# Patient Record
Sex: FEMALE | Race: WHITE | Employment: OTHER | URBAN - METROPOLITAN AREA
[De-identification: names, ages, dates, MRNs, and addresses within clinical notes are randomized per-mention and may not be internally consistent; named-entity substitution may affect disease eponyms.]

---

## 2017-01-03 ENCOUNTER — HOSPITAL ENCOUNTER (OUTPATIENT)
Dept: PHYSICAL MEDICINE AND REHAB | Facility: CLINIC | Age: 57
Discharge: HOME | End: 2017-01-03
Attending: PHYSICIAN ASSISTANT

## 2017-01-03 ENCOUNTER — COMMUNICATION - HEALTHEAST (OUTPATIENT)
Dept: PHYSICAL MEDICINE AND REHAB | Facility: CLINIC | Age: 57
End: 2017-01-03

## 2017-01-03 DIAGNOSIS — F43.21 ADJUSTMENT DISORDER WITH DEPRESSED MOOD: ICD-10-CM

## 2017-02-14 ENCOUNTER — HOSPITAL ENCOUNTER (OUTPATIENT)
Dept: PHYSICAL MEDICINE AND REHAB | Facility: CLINIC | Age: 57
Discharge: HOME | End: 2017-02-14

## 2017-02-14 DIAGNOSIS — F43.21 ADJUSTMENT DISORDER WITH DEPRESSED MOOD: ICD-10-CM

## 2017-02-23 ENCOUNTER — HOSPITAL ENCOUNTER (EMERGENCY)
Facility: CLINIC | Age: 57
Discharge: HOME | End: 2017-02-24
Attending: EMERGENCY MEDICINE | Admitting: EMERGENCY MEDICINE
Payer: COMMERCIAL

## 2017-02-23 DIAGNOSIS — R51.9 ACUTE NONINTRACTABLE HEADACHE, UNSPECIFIED HEADACHE TYPE: ICD-10-CM

## 2017-02-23 PROCEDURE — 99284 EMERGENCY DEPT VISIT MOD MDM: CPT

## 2017-02-23 PROCEDURE — 25000125 ZZHC RX 250: Performed by: EMERGENCY MEDICINE

## 2017-02-23 PROCEDURE — 25000128 H RX IP 250 OP 636: Performed by: EMERGENCY MEDICINE

## 2017-02-23 PROCEDURE — 96365 THER/PROPH/DIAG IV INF INIT: CPT

## 2017-02-23 PROCEDURE — 96375 TX/PRO/DX INJ NEW DRUG ADDON: CPT

## 2017-02-23 RX ORDER — DIPHENHYDRAMINE HYDROCHLORIDE 50 MG/ML
25 INJECTION INTRAMUSCULAR; INTRAVENOUS ONCE
Status: COMPLETED | OUTPATIENT
Start: 2017-02-23 | End: 2017-02-23

## 2017-02-23 RX ORDER — DEXAMETHASONE SODIUM PHOSPHATE 10 MG/ML
10 INJECTION, SOLUTION INTRAMUSCULAR; INTRAVENOUS ONCE
Status: COMPLETED | OUTPATIENT
Start: 2017-02-23 | End: 2017-02-23

## 2017-02-23 RX ORDER — LIDOCAINE 40 MG/G
CREAM TOPICAL
Status: DISCONTINUED | OUTPATIENT
Start: 2017-02-23 | End: 2017-02-24 | Stop reason: HOSPADM

## 2017-02-23 RX ORDER — KETOROLAC TROMETHAMINE 30 MG/ML
30 INJECTION, SOLUTION INTRAMUSCULAR; INTRAVENOUS ONCE
Status: COMPLETED | OUTPATIENT
Start: 2017-02-23 | End: 2017-02-23

## 2017-02-23 RX ADMIN — DEXAMETHASONE SODIUM PHOSPHATE 10 MG: 10 INJECTION, SOLUTION INTRAMUSCULAR; INTRAVENOUS at 23:39

## 2017-02-23 RX ADMIN — KETOROLAC TROMETHAMINE 30 MG: 30 INJECTION, SOLUTION INTRAMUSCULAR at 23:39

## 2017-02-23 RX ADMIN — DIPHENHYDRAMINE HYDROCHLORIDE 25 MG: 50 INJECTION, SOLUTION INTRAMUSCULAR; INTRAVENOUS at 23:39

## 2017-02-23 RX ADMIN — SODIUM CHLORIDE 1000 ML: 9 INJECTION, SOLUTION INTRAVENOUS at 23:20

## 2017-02-23 NOTE — ED AVS SNAPSHOT
[facility] Emergency Department    [address] [place] [address]    Phone:  [phone]    Fax:  [phone]                                       Janelle White   MRN: [id number]    Department:  [facility] Emergency Department   Date of Visit:  2/23/2017           Patient Information     Date Of Birth          1960        Your diagnoses for this visit were:     Acute nonintractable headache, unspecified headache type        You were seen by Dannie Paris MD.      Follow-up Information     Follow up with Christina Hinkle MD.    Specialties:  Internal Medicine, Pediatrics    Why:  As needed    Contact information:    [facility] [address] [place] [address]  [phone]          Discharge Instructions       Discharge Instructions  Headache    You were seen today for a headache. Headaches may be caused by many different things such as muscle tension, sinus inflammation, anxiety and stress, having too little sleep, too much alcohol, some medical conditions or injury. You may have a migraine, which is caused by changes in the blood vessels in your head.  At this time your doctor does not find that your headache is a sign of anything dangerous or life-threatening.  However, sometimes the signs of serious illness do not show up right away.  If you have new or worse symptoms, you may need to be seen again in the emergency department or by your primary doctor.      Return to the Emergency Department if:    You get a fever of 101 F or higher.    Your headache gets much worse.    You get a stiff neck with your headache.    You get a new headache that is different or worse than headaches you have had before.    You are vomiting and can t keep food or water down.    You have blurry or double vision or other problems with your eyes.    You have a new weakness on one side of your body.    You have difficulty with balance which is  new.    You or your family thinks you are confused.    You have a seizure or convulsion.    What can I do to help myself?    Pain medications - You may take a pain medication such as Tylenol  (acetaminophen), Advil , Nuprin  (ibuprofen) or Aleve  (naproxen).  If you have been given a narcotic such as Vicodin  (hydrocodone with acetaminophen), Percocet  (oxycodone with acetaminophen), codeine, or a muscle relaxant such as Flexeril  (cyclobenzaprine) or Soma  (carisoprodol), do not drive for four hours after you have taken it. If the narcotic contains Tylenol  (acetaminophen), do not take Tylenol  with it. All narcotics will cause constipation, so eat a high fiber diet.        Take a pain reliever as soon as you notice symptoms.  Starting medications as soon as you start to have symptoms may lessen the amount of pain you have.    Relaxing in a quiet, dark room may help.    Get enough sleep and eat meals regularly.    Schedule an appointment with your primary physician as instructed, or at least within 1 week.    You may need to watch for certain foods or other things which may trigger your headaches.  Keeping a journal of your headaches and possible triggers may help you and your primary doctor to identify things which you should avoid which may be causing your headaches.  If you were given a prescription for medicine here today, be sure to read all of the information (including the package insert) that comes with your prescription.  This will include important information about the medicine, its side effects, and any warnings that you need to know about.  The pharmacist who fills the prescription can provide more information and answer questions you may have about the medicine.  If you have questions or concerns that the pharmacist cannot address, please call or return to the Emergency Department.     Remember that you can always come back to the Emergency Department if you are not able to see your regular doctor in  the amount of time listed above, if you get any new symptoms, or if there is anything that worries you.          24 Hour Appointment Hotline       To make an appointment at any [facility], call [phone] ([phone]). If you don't have a family doctor or clinic, we will help you find one. [facility] are conveniently located to serve the needs of you and your family.             Review of your medicines      Our records show that you are taking the medicines listed below. If these are incorrect, please call your family doctor or clinic.        Dose / Directions Last dose taken    calcium citrate 950 MG tablet   Commonly known as:  CALCITRATE   Dose:  1 tablet        Take 1 tablet by mouth 2 times daily.   Refills:  0        cholecalciferol 54261 UNITS capsule   Commonly known as:  VITAMIN D3   Dose:  1 capsule   Quantity:  8 capsule        Take 1 capsule by mouth once a week.   Refills:  0        ciprofloxacin 500 MG tablet   Commonly known as:  CIPRO   Dose:  500 mg   Quantity:  28 tablet        Take 1 tablet (500 mg) by mouth 2 times daily   Refills:  0        cyanocobalamin 1000 MCG/ML injection   Commonly known as:  VITAMIN B12   Dose:  1 mL   Quantity:  1 mL        Inject 1 mL as directed every 30 days.   Refills:  12        cyclobenzaprine 10 MG tablet   Commonly known as:  FLEXERIL   Dose:  10 mg   Quantity:  90 tablet        Take 1 tablet by mouth 3 times daily as needed for muscle spasms.   Refills:  2        docusate sodium 100 MG capsule   Commonly known as:  COLACE   Dose:  200 mg   Quantity:  100 capsule        Take 200 mg by mouth as needed.   Refills:  12        DULoxetine 60 MG EC capsule   Commonly known as:  CYMBALTA   Dose:  60 mg   Quantity:  60 capsule        Take 1 capsule (60 mg) by mouth daily 2 tabs   Refills:  11        estradiol 0.1 MG/GM cream   Commonly known as:  ESTRACE VAGINAL   Dose:  2 g   Quantity:  42.5 g        Place 2 g vaginally three times a week.   Refills:   11        Estradiol 1 MG/GM Gel   Dose:  5 g   Quantity:  60 g        Place 5 g onto the skin daily   Refills:  11        hydrOXYzine 25 MG tablet   Commonly known as:  ATARAX   Dose:   mg   Quantity:  30 tablet        Take 2-4 tablets ( mg) by mouth nightly as needed for itching   Refills:  11        Ketoprofen Powd   Quantity:  120 g        Apply to affected area BID   Refills:  11        lidocaine 2 % topical gel   Commonly known as:  XYLOCAINE   Quantity:  30 mL        Apply  topically as needed.   Refills:  11        lidocaine 5 % Patch   Commonly known as:  LIDODERM   Dose:  1 patch   Quantity:  90 patch        Place 1 patch onto the skin daily. Apply 1 patch to skin. Wear for 12 hours and remove for 12 hrs   Refills:  3        morphine 15 MG 12 hr tablet   Commonly known as:  MS CONTIN   Dose:  15 mg   Quantity:  60 tablet        Take 1 tablet (15 mg) by mouth 2 times daily   Refills:  0        polyethylene glycol powder   Commonly known as:  MIRALAX   Dose:  1 capful   Quantity:  510 g        Take 17 g by mouth 2 times daily   Refills:  11        sennosides 8.6 MG tablet   Commonly known as:  SENOKOT   Dose:  1 tablet        Take 1 tablet by mouth daily as needed for constipation.   Refills:  0                Orders Needing Specimen Collection     None      Pending Results     No orders found for last 3 day(s).            Pending Culture Results     No orders found for last 3 day(s).             Test Results from your hospital stay            Clinical Quality Measure: Blood Pressure Screening     Your blood pressure was checked while you were in the emergency department today. The last reading we obtained was  BP: 127/75 . Please read the guidelines below about what these numbers mean and what you should do about them.  If your systolic blood pressure (the top number) is less than 120 and your diastolic blood pressure (the bottom number) is less than 80, then your blood pressure is normal. There  is nothing more that you need to do about it.  If your systolic blood pressure (the top number) is 120-139 or your diastolic blood pressure (the bottom number) is 80-89, your blood pressure may be higher than it should be. You should have your blood pressure rechecked within a year by a primary care provider.  If your systolic blood pressure (the top number) is 140 or greater or your diastolic blood pressure (the bottom number) is 90 or greater, you may have high blood pressure. High blood pressure is treatable, but if left untreated over time it can put you at risk for heart attack, stroke, or kidney failure. You should have your blood pressure rechecked by a primary care provider within the next 4 weeks.  If your provider in the emergency department today gave you specific instructions to follow-up with your doctor or provider even sooner than that, you should follow that instruction and not wait for up to 4 weeks for your follow-up visit.        Thank you for choosing [facility]       Thank you for choosing [facility] for your care. Our goal is always to provide you with excellent care. Hearing back from our patients is one way we can continue to improve our services. Please take a few minutes to complete the written survey that you may receive in the mail after you visit with us. Thank you!        [facility] Information     [facility] gives you secure access to your electronic health record. If you see a primary care provider, you can also send messages to your care team and make appointments. If you have questions, please call your primary care clinic.  If you do not have a primary care provider, please call [phone] and they will assist you.        Care EveryWhere ID     This is your Care EveryWhere ID. This could be used by other organizations to access your [facility] medical records  [id number]        After Visit Summary       This is your record. Keep this with you and show to your community pharmacist(s) and  doctor(s) at your next visit.

## 2017-02-23 NOTE — ED AVS SNAPSHOT
[facility] Emergency Department    [address]    [facility] [address]    Phone:  [phone]    Fax:  [phone]                                       Janelle White   MRN: [id number]    Department:  [facility] Emergency Department   Date of Visit:  2/23/2017           After Visit Summary Signature Page     I have received my discharge instructions, and my questions have been answered. I have discussed any challenges I see with this plan with the nurse or doctor.    ..........................................................................................................................................  Patient/Patient Representative Signature      ..........................................................................................................................................  Patient Representative Print Name and Relationship to Patient    ..................................................               ................................................  Date                                            Time    ..........................................................................................................................................  Reviewed by Signature/Title    ...................................................              ..............................................  Date                                                            Time

## 2017-02-24 VITALS
SYSTOLIC BLOOD PRESSURE: 127 MMHG | HEART RATE: 98 BPM | WEIGHT: 185 LBS | BODY MASS INDEX: 29.42 KG/M2 | DIASTOLIC BLOOD PRESSURE: 75 MMHG | OXYGEN SATURATION: 92 % | TEMPERATURE: 98.7 F | RESPIRATION RATE: 22 BRPM

## 2017-02-24 PROCEDURE — 25000128 H RX IP 250 OP 636: Performed by: EMERGENCY MEDICINE

## 2017-02-24 PROCEDURE — 25000125 ZZHC RX 250: Performed by: EMERGENCY MEDICINE

## 2017-02-24 PROCEDURE — 96375 TX/PRO/DX INJ NEW DRUG ADDON: CPT

## 2017-02-24 PROCEDURE — 96365 THER/PROPH/DIAG IV INF INIT: CPT

## 2017-02-24 RX ORDER — VALPROATE SODIUM 100 MG/ML
500 INJECTION, SOLUTION INTRAVENOUS ONCE
Status: COMPLETED | OUTPATIENT
Start: 2017-02-24 | End: 2017-02-24

## 2017-02-24 RX ADMIN — PROCHLORPERAZINE EDISYLATE 10 MG: 5 INJECTION INTRAMUSCULAR; INTRAVENOUS at 00:39

## 2017-02-24 RX ADMIN — VALPROATE SODIUM 500 MG: 100 INJECTION, SOLUTION INTRAVENOUS at 00:39

## 2017-02-24 ASSESSMENT — ENCOUNTER SYMPTOMS
SHORTNESS OF BREATH: 0
NECK STIFFNESS: 0
NAUSEA: 0
COUGH: 1
PHOTOPHOBIA: 1
VOMITING: 0
FEVER: 0
HEADACHES: 1
NECK PAIN: 0

## 2017-02-24 NOTE — ED NOTES
Seen at u/c  For chest congestion on Monday  On zpak    Was not getting any better  So  Saw her md yesterday and was started on steroids and nebs   Ate pizza tonight and went into hottub  Having frontal ha  Eye pain    After neck surgery migraines went away   Now with 10/10 pain  Here for rashawn

## 2017-02-24 NOTE — ED PROVIDER NOTES
"  History     Chief Complaint:  Headache    HPI   Janelle White is a 56 year old female with a history of migraine headaches who presents to the emergency department today for evaluation of a headache. The patient reports that for the past week she has been experiencing intermittent headaches which seemed to be associated with URI symptoms on 2017 including cough, congestion, and fever. She went into urgent care at that time and had a negative strep and influenza swab and went home on a Z-Biju and Prednisone. She affirms that she took her first dose of 80 mg Prednisone today. Throughout the day her headache had been bothering her, but seemed to be exacerbated after having a hot tub. She notes a history of migraine headaches, but states that it has been some time since the last one and that this is current headache is especially bad; rating it at 10/10 in severity at this time. She reports that she feels like her \"left eye is going to pop out\" and is quite photophobic at this time. She denies any current fevers, nausea, vomiting, neck pain/stiffness, chest pain, shortness of breath, vision changes, or other     Allergies:  Budeprion  Codeine  Effexor  Lexapro  Pregablin  Prozac  Tramadol     Medications:    Morphine  Atarax  Ketoprofen  Miralax  Estrace  Flexeril  Lidoderm  Colace  Senokot     Past Medical History:    Cervicalgia  Migraines  Rotator cuff repair  Sacroiliac inflammation    Past Surgical History:     x2  Tubal ligation  Appendectomy  Cervical discectomy and fusion  Hysterectomy  Partial vulvectomy  Fusion lumbar anterior L3-S1  Rotator cuff repair, right  Blepharoplasty bilateral    Family History:    Mother - Hypertension, Osteoporosis, Alcohol/Drug  Father - Hypertension, Diabetes, Alcohol/Drug  Brother - Alcohol/Drugs    Social History:  The patient was accompanied to the ED by her .  Smoking Status: Former Smoker  Alcohol Use: Positive  Marital Status:   "     Review of Systems   Constitutional: Negative for fever.   HENT: Positive for congestion.    Eyes: Positive for photophobia. Negative for visual disturbance.   Respiratory: Positive for cough. Negative for shortness of breath.    Cardiovascular: Negative for chest pain.   Gastrointestinal: Negative for nausea and vomiting.   Musculoskeletal: Negative for neck pain and neck stiffness.   Neurological: Positive for headaches.   All other systems reviewed and are negative.    Physical Exam   Vitals:  Patient Vitals for the past 24 hrs:   BP Temp Temp src Pulse Resp SpO2 Weight   02/24/17 0112 - - - - - 92 % -   02/24/17 0100 127/75 - - - - 96 % -   02/23/17 2302 123/88 98.7  F (37.1  C) Oral 98 22 93 % 83.9 kg (185 lb)       Physical Exam  Nursing note and vitals reviewed.  Constitutional: Cooperative. Appears uncomfortable, towel over her eyes.   HENT:   Mouth/Throat: Mucous membranes are normal.   Cardiovascular: Normal rate, regular rhythm and normal heart sounds.  No murmur.  Pulmonary/Chest: Effort normal and breath sounds normal. No respiratory distress. No wheezes. No rales.   Abdominal: Soft. Normal appearance. There is no tenderness.   Musculoskeletal: Normal range of motion. No neck rigidity.    Neurological: Alert. Oriented x4.  Strength normal.   Skin: Skin is warm and dry.   Psychiatric: Normal mood and affect.     Emergency Department Course     Interventions:  2320 ns 1000 mL IV  2339 Dexamethasone 10 mg IV  2339 Diphenhydramine 25 mg IV  2339 Ketorolac 30 mg IV     0039 Depacon 500 mg IV    Emergency Department Course:  Nursing notes and vitals reviewed.  I performed an exam of the patient as documented above.   At 0056 the patient was rechecked and reports that her headache has improved to 6/10 in severity from 10/10.  At 0115 the patient was rechecked and reports that her headache is improved enough at this time and wishes to be discharged home.   I discussed the treatment plan with the patient. She  expressed understanding of this plan and consented to discharge. She will be discharged home with instructions for care and follow up. In addition, the patient will return to the emergency department if their symptoms persist, worsen, if new symptoms arise or if there is any concern.  All questions were answered.    Impression & Plan      Medical Decision Making:  Janelle White is a 56 year old female with a history of migraines who is currently being treated for a upper respiratory and lower respiratory infections with a Z-Biju and steroids. She presents with a headache. Initial medication was not successful, but with the addition of Depacon she is feeling much better and wishes to go home. She is afebrile, has no neck rigidity and has a normal neurologic exam. Her history is not consistent with encephalitis or meningitis. There has been no trauma. This was not thunderclap onset or concerning for subarachnoid hemorrhage. No indication for lumbar puncture at this time. Etiology is not consistent with carbon monoxide poisoning or ocular involvement. She will be discharged home with appropriate return precautions regarding her headache.     Diagnosis:    ICD-10-CM    1. Acute nonintractable headache, unspecified headache type R51        Scribe Disclosure:  Norberto SHERWOOD, am serving as a scribe at 11:09 PM on 2/23/2017 to document services personally performed by Dannie Paris MD, based on my observations and the provider's statements to me.    2/23/2017   [facility] EMERGENCY DEPARTMENT       Dannie Paris MD  02/24/17 0132

## 2017-02-24 NOTE — DISCHARGE INSTRUCTIONS
Discharge Instructions  Headache    You were seen today for a headache. Headaches may be caused by many different things such as muscle tension, sinus inflammation, anxiety and stress, having too little sleep, too much alcohol, some medical conditions or injury. You may have a migraine, which is caused by changes in the blood vessels in your head.  At this time your doctor does not find that your headache is a sign of anything dangerous or life-threatening.  However, sometimes the signs of serious illness do not show up right away.  If you have new or worse symptoms, you may need to be seen again in the emergency department or by your primary doctor.      Return to the Emergency Department if:    You get a fever of 101 F or higher.    Your headache gets much worse.    You get a stiff neck with your headache.    You get a new headache that is different or worse than headaches you have had before.    You are vomiting and can t keep food or water down.    You have blurry or double vision or other problems with your eyes.    You have a new weakness on one side of your body.    You have difficulty with balance which is new.    You or your family thinks you are confused.    You have a seizure or convulsion.    What can I do to help myself?    Pain medications - You may take a pain medication such as Tylenol  (acetaminophen), Advil , Nuprin  (ibuprofen) or Aleve  (naproxen).  If you have been given a narcotic such as Vicodin  (hydrocodone with acetaminophen), Percocet  (oxycodone with acetaminophen), codeine, or a muscle relaxant such as Flexeril  (cyclobenzaprine) or Soma  (carisoprodol), do not drive for four hours after you have taken it. If the narcotic contains Tylenol  (acetaminophen), do not take Tylenol  with it. All narcotics will cause constipation, so eat a high fiber diet.        Take a pain reliever as soon as you notice symptoms.  Starting medications as soon as you start to have symptoms may lessen the  amount of pain you have.    Relaxing in a quiet, dark room may help.    Get enough sleep and eat meals regularly.    Schedule an appointment with your primary physician as instructed, or at least within 1 week.    You may need to watch for certain foods or other things which may trigger your headaches.  Keeping a journal of your headaches and possible triggers may help you and your primary doctor to identify things which you should avoid which may be causing your headaches.  If you were given a prescription for medicine here today, be sure to read all of the information (including the package insert) that comes with your prescription.  This will include important information about the medicine, its side effects, and any warnings that you need to know about.  The pharmacist who fills the prescription can provide more information and answer questions you may have about the medicine.  If you have questions or concerns that the pharmacist cannot address, please call or return to the Emergency Department.     Remember that you can always come back to the Emergency Department if you are not able to see your regular doctor in the amount of time listed above, if you get any new symptoms, or if there is anything that worries you.

## 2017-04-18 ENCOUNTER — HOSPITAL ENCOUNTER (OUTPATIENT)
Dept: PHYSICAL MEDICINE AND REHAB | Facility: CLINIC | Age: 57
Discharge: HOME | End: 2017-04-18

## 2017-04-18 DIAGNOSIS — F43.21 ADJUSTMENT DISORDER WITH DEPRESSED MOOD: ICD-10-CM

## 2017-06-05 ENCOUNTER — COMMUNICATION - HEALTHEAST (OUTPATIENT)
Dept: PHYSICAL MEDICINE AND REHAB | Facility: CLINIC | Age: 57
End: 2017-06-05

## 2017-07-27 ENCOUNTER — AMBULATORY - HEALTHEAST (OUTPATIENT)
Dept: PHYSICAL MEDICINE AND REHAB | Facility: CLINIC | Age: 57
End: 2017-07-27

## 2017-07-28 ENCOUNTER — COMMUNICATION - HEALTHEAST (OUTPATIENT)
Dept: PHYSICAL MEDICINE AND REHAB | Facility: CLINIC | Age: 57
End: 2017-07-28

## 2017-11-14 ENCOUNTER — OFFICE VISIT (OUTPATIENT)
Dept: FAMILY MEDICINE | Facility: CLINIC | Age: 57
End: 2017-11-14
Payer: COMMERCIAL

## 2017-11-14 ENCOUNTER — RADIANT APPOINTMENT (OUTPATIENT)
Dept: GENERAL RADIOLOGY | Facility: CLINIC | Age: 57
End: 2017-11-14
Attending: FAMILY MEDICINE
Payer: COMMERCIAL

## 2017-11-14 VITALS
HEIGHT: 66 IN | HEART RATE: 64 BPM | RESPIRATION RATE: 16 BRPM | SYSTOLIC BLOOD PRESSURE: 110 MMHG | WEIGHT: 189.5 LBS | BODY MASS INDEX: 30.46 KG/M2 | DIASTOLIC BLOOD PRESSURE: 76 MMHG | TEMPERATURE: 96.8 F

## 2017-11-14 DIAGNOSIS — M53.3 SACROILIAC DYSFUNCTION: ICD-10-CM

## 2017-11-14 DIAGNOSIS — Z23 NEED FOR PROPHYLACTIC VACCINATION AND INOCULATION AGAINST INFLUENZA: ICD-10-CM

## 2017-11-14 DIAGNOSIS — M50.30 DDD (DEGENERATIVE DISC DISEASE), CERVICAL: ICD-10-CM

## 2017-11-14 DIAGNOSIS — Z01.818 PREOP GENERAL PHYSICAL EXAM: Primary | ICD-10-CM

## 2017-11-14 LAB
DIFFERENTIAL METHOD BLD: NORMAL
ERYTHROCYTE [DISTWIDTH] IN BLOOD BY AUTOMATED COUNT: 12.8 % (ref 10–15)
ERYTHROCYTE [DISTWIDTH] IN BLOOD BY AUTOMATED COUNT: NORMAL % (ref 10–15)
HCT VFR BLD AUTO: 40.5 % (ref 35–47)
HCT VFR BLD AUTO: NORMAL % (ref 35–47)
HGB BLD-MCNC: 12.9 G/DL (ref 11.7–15.7)
HGB BLD-MCNC: NORMAL G/DL (ref 11.7–15.7)
MCH RBC QN AUTO: 30.4 PG (ref 26.5–33)
MCH RBC QN AUTO: NORMAL PG (ref 26.5–33)
MCHC RBC AUTO-ENTMCNC: 31.9 G/DL (ref 31.5–36.5)
MCHC RBC AUTO-ENTMCNC: NORMAL G/DL (ref 31.5–36.5)
MCV RBC AUTO: 96 FL (ref 78–100)
MCV RBC AUTO: NORMAL FL (ref 78–100)
PLATELET # BLD AUTO: 183 10E9/L (ref 150–450)
PLATELET # BLD AUTO: NORMAL 10E9/L (ref 150–450)
POTASSIUM SERPL-SCNC: 4.1 MMOL/L (ref 3.4–5.3)
RBC # BLD AUTO: 4.24 10E12/L (ref 3.8–5.2)
RBC # BLD AUTO: NORMAL 10E12/L (ref 3.8–5.2)
WBC # BLD AUTO: 18.4 10E9/L (ref 4–11)
WBC # BLD AUTO: NORMAL 10E9/L (ref 4–11)

## 2017-11-14 PROCEDURE — 90686 IIV4 VACC NO PRSV 0.5 ML IM: CPT | Performed by: FAMILY MEDICINE

## 2017-11-14 PROCEDURE — 93000 ELECTROCARDIOGRAM COMPLETE: CPT | Performed by: FAMILY MEDICINE

## 2017-11-14 PROCEDURE — 85025 COMPLETE CBC W/AUTO DIFF WBC: CPT | Performed by: FAMILY MEDICINE

## 2017-11-14 PROCEDURE — 85027 COMPLETE CBC AUTOMATED: CPT | Mod: 59 | Performed by: FAMILY MEDICINE

## 2017-11-14 PROCEDURE — 90471 IMMUNIZATION ADMIN: CPT | Performed by: FAMILY MEDICINE

## 2017-11-14 PROCEDURE — 84132 ASSAY OF SERUM POTASSIUM: CPT | Performed by: FAMILY MEDICINE

## 2017-11-14 PROCEDURE — 99215 OFFICE O/P EST HI 40 MIN: CPT | Mod: 25 | Performed by: FAMILY MEDICINE

## 2017-11-14 PROCEDURE — 36415 COLL VENOUS BLD VENIPUNCTURE: CPT | Performed by: FAMILY MEDICINE

## 2017-11-14 PROCEDURE — 71020 XR CHEST 2 VW: CPT

## 2017-11-14 RX ORDER — HYDROCODONE BITARTRATE AND ACETAMINOPHEN 10; 325 MG/1; MG/1
1 TABLET ORAL EVERY 8 HOURS PRN
Qty: 90 TABLET | Refills: 0 | Status: SHIPPED | OUTPATIENT
Start: 2017-11-14 | End: 2017-12-14

## 2017-11-14 RX ORDER — LORAZEPAM 0.5 MG/1
0.5 TABLET ORAL DAILY
Qty: 60 TABLET | Refills: 3 | Status: SHIPPED | OUTPATIENT
Start: 2017-11-14 | End: 2017-12-31

## 2017-11-14 RX ORDER — MORPHINE SULFATE 20 MG/1
20 CAPSULE, EXTENDED RELEASE ORAL EVERY 12 HOURS
Qty: 60 CAPSULE | Refills: 0 | Status: SHIPPED | OUTPATIENT
Start: 2017-11-14 | End: 2017-12-19

## 2017-11-14 RX ORDER — LORAZEPAM 0.5 MG/1
0.5 TABLET ORAL DAILY
COMMUNITY
Start: 2017-10-19 | End: 2017-11-14

## 2017-11-14 ASSESSMENT — PATIENT HEALTH QUESTIONNAIRE - PHQ9: SUM OF ALL RESPONSES TO PHQ QUESTIONS 1-9: 0

## 2017-11-14 NOTE — MR AVS SNAPSHOT
After Visit Summary   11/14/2017    Janelle White    MRN: [id number]           Patient Information     Date Of Birth          1960        Visit Information        Provider Department      11/14/2017 11:20 AM Emili Ballard MD [facility]        Today's Diagnoses     Preop general physical exam    -  1    Sacroiliac dysfunction        DDD (degenerative disc disease), cervical        Need for prophylactic vaccination and inoculation against influenza          Care Instructions      Before Your Surgery      Call your surgeon if there is any change in your health. This includes signs of a cold or flu (such as a sore throat, runny nose, cough, rash or fever).    Do not smoke, drink alcohol or take over the counter medicine (unless your surgeon or primary care doctor tells you to) for the 24 hours before and after surgery.    If you take prescribed drugs: Follow your doctor s orders about which medicines to take and which to stop until after surgery.    Eating and drinking prior to surgery: follow the instructions from your surgeon    Take a shower or bath the night before surgery. Use the soap your surgeon gave you to gently clean your skin. If you do not have soap from your surgeon, use your regular soap. Do not shave or scrub the surgery site.  Wear clean pajamas and have clean sheets on your bed.           Follow-ups after your visit        Follow-up notes from your care team     Return if symptoms worsen or fail to improve.      Who to contact     If you have questions or need follow up information about today's clinic visit or your schedule please contact [facility] directly at [phone].  Normal or non-critical lab and imaging results will be communicated to you by MyChart, letter or phone within 4 business days after the clinic has received the results. If you do not hear from us within 7 days, please contact the clinic  "through [employer] or phone. If you have a critical or abnormal lab result, we will notify you by phone as soon as possible.  Submit refill requests through Aub[employer] or call your pharmacy and they will forward the refill request to us. Please allow 3 business days for your refill to be completed.          Additional Information About Your Visit        [employer] Information     [employer] gives you secure access to your electronic health record. If you see a primary care provider, you can also send messages to your care team and make appointments. If you have questions, please call your primary care clinic.  If you do not have a primary care provider, please call [phone] and they will assist you.        Care EveryWhere ID     This is your Care EveryWhere ID. This could be used by other organizations to access your [facility] medical records  [id number]        Your Vitals Were     Pulse Temperature Respirations Height Last Period Breastfeeding?    64 96.8  F (36  C) (Tympanic) 16 5' 5.5\" (1.664 m) 05/01/2005 No    BMI (Body Mass Index)                   31.05 kg/m2            Blood Pressure from Last 3 Encounters:   11/14/17 110/76   02/24/17 127/75   01/15/14 100/80    Weight from Last 3 Encounters:   11/14/17 189 lb 8 oz (86 kg)   02/23/17 185 lb (83.9 kg)   11/08/13 183 lb (83 kg)              We Performed the Following     CBC with platelets differential     CBC with platelets     EKG 12-lead complete w/read - Clinics     FLU VAC, SPLIT VIRUS IM > 3 YO (QUADRIVALENT) [90169]     Potassium     Vaccine Administration, Initial [65277]     XR Chest 2 Views          Today's Medication Changes          These changes are accurate as of: 11/14/17 11:59 PM.  If you have any questions, ask your nurse or doctor.               Start taking these medicines.        Dose/Directions    HYDROcodone-acetaminophen  MG per tablet   Commonly known as:  NORCO   Used for:  Sacroiliac dysfunction   Started by:  Emili Ballard " MD Alirio        Dose:  1 tablet   Take 1 tablet by mouth every 8 hours as needed for moderate to severe pain maximum 3 tablet(s) per day   Quantity:  90 tablet   Refills:  0       morphine sulfate 20 MG 24 hr capsule   Commonly known as:  NARGIS   Used for:  Sacroiliac dysfunction   Replaces:  morphine 15 MG 12 hr tablet   Started by:  Emili Ballard MD        Dose:  20 mg   Take 1 capsule (20 mg) by mouth every 12 hours   Quantity:  60 capsule   Refills:  0         Stop taking these medicines if you haven't already. Please contact your care team if you have questions.     ciprofloxacin 500 MG tablet   Commonly known as:  CIPRO   Stopped by:  Emili Ballard MD           cyanocobalamin 1000 MCG/ML injection   Commonly known as:  VITAMIN B12   Stopped by:  Emili Ballard MD           docusate sodium 100 MG capsule   Commonly known as:  COLACE   Stopped by:  Emili Ballard MD           hydrOXYzine 25 MG tablet   Commonly known as:  ATARAX   Stopped by:  Emili Ballard MD           Ketoprofen Powd   Stopped by:  Emili Ballard MD           lidocaine 2 % topical gel   Commonly known as:  XYLOCAINE   Stopped by:  Emili Ballard MD           morphine 15 MG 12 hr tablet   Commonly known as:  MS CONTIN   Replaced by:  morphine sulfate 20 MG 24 hr capsule   Stopped by:  Emili Ballard MD           polyethylene glycol powder   Commonly known as:  MIRALAX   Stopped by:  Emili Ballard MD                Where to get your medicines      Some of these will need a paper prescription and others can be bought over the counter.  Ask your nurse if you have questions.     Bring a paper prescription for each of these medications     HYDROcodone-acetaminophen  MG per tablet    LORazepam 0.5 MG tablet    morphine sulfate 20 MG 24 hr capsule                 Primary Care Provider Office Phone # Fax #    Christina Hinkle [phone] [address] [place] [address]        Equal Access to Services     TIERAJAYDON HICKSEVI : Lottie eric pham isaaco Andrés, waaxda luqadaha, qaybta kaalmada rocio, william rao lamagoderic geneva. So [facility] [phone].    ATENCIÓN: Si habla español, tiene a parson disposición servicios gratuitos de asistencia lingüística. Llame al [phone].    We comply with applicable federal civil rights laws and [place] laws. We do not discriminate on the basis of race, color, national origin, age, disability, sex, sexual orientation, or gender identity.            Thank you!     Thank you for choosing [facility]  for your care. Our goal is always to provide you with excellent care. Hearing back from our patients is one way we can continue to improve our services. Please take a few minutes to complete the written survey that you may receive in the mail after your visit with us. Thank you!             Your Updated Medication List - Protect others around you: Learn how to safely use, store and throw away your medicines at www.disposemymeds.org.          This list is accurate as of: 11/14/17 11:59 PM.  Always use your most recent med list.                   Brand Name Dispense Instructions for use Diagnosis    calcium citrate 950 MG tablet    CALCITRATE     Take 1 tablet by mouth 2 times daily.        cholecalciferol 16158 UNITS capsule    VITAMIN D3    8 capsule    Take 1 capsule by mouth once a week.    Vitamin D deficiency       cyclobenzaprine 10 MG tablet    FLEXERIL    90 tablet    Take 1 tablet by mouth 3 times daily as needed for muscle spasms.    Myofascial pain       DULoxetine 60 MG EC capsule    CYMBALTA    60 capsule    Take 1 capsule (60 mg) by mouth daily 2 tabs    Chronic low back pain, DDD (degenerative disc disease), cervical, Sacroiliac pain       estradiol 0.1  MG/GM cream    ESTRACE VAGINAL    42.5 g    Place 2 g vaginally three times a week.    Vaginal atrophy       Estradiol 1 MG/GM Gel     60 g    Place 5 g onto the skin daily    Postmenopausal status       HYDROcodone-acetaminophen  MG per tablet    NORCO    90 tablet    Take 1 tablet by mouth every 8 hours as needed for moderate to severe pain maximum 3 tablet(s) per day    Sacroiliac dysfunction       lidocaine 5 % Patch    LIDODERM    90 patch    Place 1 patch onto the skin daily. Apply 1 patch to skin. Wear for 12 hours and remove for 12 hrs    Cervicalgia       LORazepam 0.5 MG tablet    ATIVAN    60 tablet    Take 1 tablet (0.5 mg) by mouth daily    Sacroiliac dysfunction       morphine sulfate 20 MG 24 hr capsule    NARGIS    60 capsule    Take 1 capsule (20 mg) by mouth every 12 hours    Sacroiliac dysfunction       sennosides 8.6 MG tablet    SENOKOT     Take 1 tablet by mouth daily as needed for constipation.
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
## 2017-11-14 NOTE — PROGRESS NOTES

## 2017-12-06 ENCOUNTER — TELEPHONE (OUTPATIENT)
Dept: FAMILY MEDICINE | Facility: CLINIC | Age: 57
End: 2017-12-06

## 2017-12-06 NOTE — TELEPHONE ENCOUNTER
Reason for Call:  Other fax    Detailed comments: [facility] states they received the Pre-Op notes however they are missing the EKG tracing and chest X-Ray report. Please fax as soon as possible to [phone]. Thanks!    Phone Number Patient can be reached at: Other phone number: See contacts    Best Time: ASAP    Can we leave a detailed message on this number? Not Applicable    Call taken on 12/6/2017 at 2:32 PM by Julianna Cavanaugh

## 2017-12-19 ENCOUNTER — OFFICE VISIT (OUTPATIENT)
Dept: FAMILY MEDICINE | Facility: CLINIC | Age: 57
End: 2017-12-19
Payer: COMMERCIAL

## 2017-12-19 VITALS
DIASTOLIC BLOOD PRESSURE: 75 MMHG | HEART RATE: 100 BPM | OXYGEN SATURATION: 100 % | TEMPERATURE: 97.8 F | SYSTOLIC BLOOD PRESSURE: 107 MMHG | RESPIRATION RATE: 16 BRPM

## 2017-12-19 DIAGNOSIS — M54.2 CERVICALGIA: ICD-10-CM

## 2017-12-19 DIAGNOSIS — M54.5 CHRONIC LOW BACK PAIN, UNSPECIFIED BACK PAIN LATERALITY, WITH SCIATICA PRESENCE UNSPECIFIED: ICD-10-CM

## 2017-12-19 DIAGNOSIS — Z78.0 POSTMENOPAUSAL STATUS: ICD-10-CM

## 2017-12-19 DIAGNOSIS — G89.29 CHRONIC LOW BACK PAIN, UNSPECIFIED BACK PAIN LATERALITY, WITH SCIATICA PRESENCE UNSPECIFIED: ICD-10-CM

## 2017-12-19 PROCEDURE — 99214 OFFICE O/P EST MOD 30 MIN: CPT | Performed by: FAMILY MEDICINE

## 2017-12-19 RX ORDER — DULOXETIN HYDROCHLORIDE 60 MG/1
60 CAPSULE, DELAYED RELEASE ORAL DAILY
Qty: 60 CAPSULE | Refills: 11 | Status: SHIPPED | OUTPATIENT
Start: 2017-12-19 | End: 2017-12-19

## 2017-12-19 RX ORDER — LIDOCAINE 50 MG/G
1 PATCH TOPICAL DAILY
Qty: 90 PATCH | Refills: 3 | Status: SHIPPED | OUTPATIENT
Start: 2017-12-19 | End: 2018-05-08

## 2017-12-19 RX ORDER — ESTRADIOL 1 MG/G
5 GEL TOPICAL DAILY
Qty: 60 G | Refills: 11 | Status: SHIPPED | OUTPATIENT
Start: 2017-12-19 | End: 2018-02-19

## 2017-12-19 RX ORDER — DULOXETIN HYDROCHLORIDE 60 MG/1
60 CAPSULE, DELAYED RELEASE ORAL DAILY
Qty: 30 CAPSULE | Refills: 11 | Status: SHIPPED | OUTPATIENT
Start: 2017-12-19 | End: 2019-03-21

## 2017-12-19 RX ORDER — HYDROCODONE BITARTRATE AND ACETAMINOPHEN 10; 325 MG/1; MG/1
1 TABLET ORAL EVERY 6 HOURS PRN
Qty: 120 TABLET | Refills: 0 | Status: SHIPPED | OUTPATIENT
Start: 2017-12-19 | End: 2018-01-09

## 2017-12-19 RX ORDER — MORPHINE SULFATE 20 MG/1
20 CAPSULE, EXTENDED RELEASE ORAL EVERY 12 HOURS
Qty: 60 CAPSULE | Refills: 0 | Status: SHIPPED | OUTPATIENT
Start: 2018-01-05 | End: 2018-01-09

## 2017-12-19 NOTE — MR AVS SNAPSHOT
After Visit Summary   12/19/2017    Janelle White    MRN: [id number]           Patient Information     Date Of Birth          1960        Visit Information        Provider Department      12/19/2017 11:20 AM Emili Ballard MD [facility]        Today's Diagnoses     Chronic low back pain, unspecified back pain laterality, with sciatica presence unspecified        Cervicalgia        Postmenopausal status           Follow-ups after your visit        Follow-up notes from your care team     Return if symptoms worsen or fail to improve.      Who to contact     If you have questions or need follow up information about today's clinic visit or your schedule please contact [facility] directly at [phone].  Normal or non-critical lab and imaging results will be communicated to you by Ormet Circuitshart, letter or phone within 4 business days after the clinic has received the results. If you do not hear from us within 7 days, please contact the clinic through O[employer] Circ[employer]hart or phone. If you have a critical or abnormal lab result, we will notify you by phone as soon as possible.  Submit refill requests through Local Magnet or call your pharmacy and they will forward the refill request to us. Please allow 3 business days for your refill to be completed.          Additional Information About Your Visit        MyChart Information     Local Magnet gives you secure access to your electronic health record. If you see a primary care provider, you can also send messages to your care team and make appointments. If you have questions, please call your primary care clinic.  If you do not have a primary care provider, please call [phone] and they will assist you.        Care EveryWhere ID     This is your Care EveryWhere ID. This could be used by other organizations to access your [place] medical records  [id number]        Your Vitals Were     Pulse Temperature  Respirations Last Period Pulse Oximetry Breastfeeding?    100 97.8  F (36.6  C) (Oral) 16 05/01/2005 100% No       Blood Pressure from Last 3 Encounters:   12/19/17 107/75   11/14/17 110/76   02/24/17 127/75    Weight from Last 3 Encounters:   11/14/17 189 lb 8 oz (86 kg)   02/23/17 185 lb (83.9 kg)   11/08/13 183 lb (83 kg)              Today, you had the following     No orders found for display         Today's Medication Changes          These changes are accurate as of: 12/19/17 11:59 PM.  If you have any questions, ask your nurse or doctor.               Start taking these medicines.        Dose/Directions    DULoxetine 60 MG EC capsule   Commonly known as:  CYMBALTA   Used for:  Chronic low back pain, unspecified back pain laterality, with sciatica presence unspecified   Started by:  Emili Ballard MD        Dose:  60 mg   Take 1 capsule (60 mg) by mouth daily   Quantity:  30 capsule   Refills:  11       HYDROcodone-acetaminophen  MG per tablet   Commonly known as:  NORCO   Used for:  Chronic low back pain, unspecified back pain laterality, with sciatica presence unspecified   Started by:  Emili Ballard MD        Dose:  1 tablet   Take 1 tablet by mouth every 6 hours as needed for moderate to severe pain maximum 4 tablet(s) per day   Quantity:  120 tablet   Refills:  0         These medicines have changed or have updated prescriptions.        Dose/Directions    lidocaine 5 % Patch   Commonly known as:  LIDODERM   This may have changed:  additional instructions   Used for:  Cervicalgia   Changed by:  Emili Ballard MD        Dose:  1 patch   Place 1 patch onto the skin daily Apply 1 patch to skin. Wear for 12 hours and remove for 12 hrs.  Refill when patient requests.   Quantity:  90 patch   Refills:  3            Where to get your medicines      These medications were sent to [facility] - [place], [place] - [address]  [address]  [address], [facility] [address]     Phone:  [phone]     DULoxetine 60 MG EC capsule    Estradiol 1 MG/GM Gel    lidocaine 5 % Patch         Some of these will need a paper prescription and others can be bought over the counter.  Ask your nurse if you have questions.     Bring a paper prescription for each of these medications     HYDROcodone-acetaminophen  MG per tablet    morphine sulfate 20 MG 24 hr capsule                Primary Care Provider Office Phone # Fax #    Christina Hinkle [phone] [address] [place] [address]        Equal Access to Services     [facility]: Hadii eric pham hadashjoseph Soandria, waaxda luqadaha, qaybta kaalmashukri chan, william royal . So [facility] [phone].    ATENCIÓN: Si habla español, tiene a parson disposición servicios gratuitos de asistencia lingüística. [facility] [phone].    We comply with applicable federal civil rights laws and [place] laws. We do not discriminate on the basis of race, color, national origin, age, disability, sex, sexual orientation, or gender identity.            Thank you!     Thank you for choosing [facility]  for your care. Our goal is always to provide you with excellent care. Hearing back from our patients is one way we can continue to improve our services. Please take a few minutes to complete the written survey that you may receive in the mail after your visit with us. Thank you!             Your Updated Medication List - Protect others around you: Learn how to safely use, store and throw away your medicines at www.disposemymeds.org.          This list is accurate as of: 12/19/17 11:59 PM.  Always use your most recent med list.                   Brand Name Dispense Instructions for use Diagnosis    calcium citrate 950 MG tablet    CALCITRATE     Take 1 tablet by mouth 2 times daily.        cholecalciferol 88293 UNITS capsule     VITAMIN D3    8 capsule    Take 1 capsule by mouth once a week.    Vitamin D deficiency       cyclobenzaprine 10 MG tablet    FLEXERIL    90 tablet    Take 1 tablet by mouth 3 times daily as needed for muscle spasms.    Myofascial pain       DULoxetine 60 MG EC capsule    CYMBALTA    30 capsule    Take 1 capsule (60 mg) by mouth daily    Chronic low back pain, unspecified back pain laterality, with sciatica presence unspecified       estradiol 0.1 MG/GM cream    ESTRACE VAGINAL    42.5 g    Place 2 g vaginally three times a week.    Vaginal atrophy       Estradiol 1 MG/GM Gel     60 g    Place 5 g onto the skin daily    Postmenopausal status       HYDROcodone-acetaminophen  MG per tablet    NORCO    120 tablet    Take 1 tablet by mouth every 6 hours as needed for moderate to severe pain maximum 4 tablet(s) per day    Chronic low back pain, unspecified back pain laterality, with sciatica presence unspecified       lidocaine 5 % Patch    LIDODERM    90 patch    Place 1 patch onto the skin daily Apply 1 patch to skin. Wear for 12 hours and remove for 12 hrs.  Refill when patient requests.    Cervicalgia       LORazepam 0.5 MG tablet    ATIVAN    60 tablet    Take 1 tablet (0.5 mg) by mouth daily    Sacroiliac dysfunction       morphine sulfate 20 MG 24 hr capsule   Start taking on:  1/5/2018    NARGIS    60 capsule    Take 1 capsule (20 mg) by mouth every 12 hours        sennosides 8.6 MG tablet    SENOKOT     Take 1 tablet by mouth daily as needed for constipation.

## 2017-12-19 NOTE — PROGRESS NOTES
SUBJECTIVE:   Janelle White is a 57 year old female who presents to clinic today for the following health issues:    Medication Followup of pain meds     Taking Medication as prescribed: yes    Side Effects:  None    Medication Helping Symptoms:  NO     Patient presents today after recent hospitalization for POSTERIOR FUSION WITH TRANSFORAMINAL LUMBAR INTERBODY FUSION (TFLIF) L2-3, HARDWARE REMOVAL L3/4 PRONE 12- at [facility] with Dr. Clayton.  She has done very well post-operatively but needs to reconcile her pain medications with chronic opioid use.  She would like to not use the Dilaudid she was given at discharge and go back to her regular regimen of the Norco 10/325 using 3-4 tablets daily in additional to her extended release morphine sulfate 20mg q12 hours.    She is also s/p recent ANTERIOR CERVICAL DECOMPRESSION AND FUSION C7-T1 WITH ANTERIOR HARDWARE REMOVAL C6-7 AND POSTERIOR CERVICAL FUSION C7-T1 9-    Diazepam 5 mg  # 45 at discharge from hospital-  2 at night for sleep     She also requests refill of her HRT for postmenopausal hot flashes.    Problem list and histories reviewed & adjusted, as indicated.  Additional history: as documented    Patient Active Problem List   Diagnosis     PERSONAL HX OF  MELANOMA     B-complex deficiency     Migraine     Chronic Low Back Pain     Urticaria     CARDIOVASCULAR SCREENING; LDL GOAL LESS THAN 160     DDD (degenerative disc disease), cervical     Moderate recurrent major depression (H)     Sacroiliac pain     ESBL (extended spectrum beta-lactamase) producing bacteria infection     Elevated liver enzymes     Vitamin D deficiency     Shift work sleep disorder     Chronic pain syndrome     CLL (chronic lymphocytic leukemia) (H)     Past Surgical History:   Procedure Laterality Date     anterior cervical discectomy C4-5 ,Fusion C5-6-7  10/2007     BLEPHAROPLASTY BILATERAL  4/25/2013    Procedure: BLEPHAROPLASTY BILATERAL;  BILATERAL  UPPER LID BLEPHAROPLASTY AND BROWPEXY ;  Surgeon: Godfrey Miguel MD;  Location:  [facility]     C APPENDECTOMY  2006      SECTION      x2     FUSION LUMBAR ANTERIOR, FUSION LUMBAR POSTERIOR TWO LEVELS, COMBINED  2010    L3-S1 anterior posterior fusion     HYSTERECTOMY  2006    ovaries intact     Partial vulvectomy for NEENA III  2009     Pubovaginal sling, post op durasphere injections  2006    wears pad     ROTATOR CUFF REPAIR RT/LT  2011    right     SPINAL FUSION C3-4  2009    C3-4, anterior spinal fusion     TUBAL LIGATION         Social History   Substance Use Topics     Smoking status: Former Smoker     Packs/day: 1.00     Years: 5.00     Quit date: 1984     Smokeless tobacco: Never Used     Alcohol use Yes      Comment: no alcohol currently since prior to her back surgeries,      Family History   Problem Relation Age of Onset     Hypertension Mother      Alcohol/Drug Mother      OSTEOPOROSIS Mother      borderline     Hypertension Father      DIABETES Father      Type 2, diet controlled     Alcohol/Drug Father      remote use     C.A.D. Maternal Grandmother       late 50s     C.A.D. Maternal Grandfather      bone and brain cancer mets as well     DIABETES Paternal Grandfather      Alcohol/Drug Brother      Breast Cancer No family hx of      Cancer - colorectal No family hx of      CEREBROVASCULAR DISEASE No family hx of          Current Outpatient Prescriptions   Medication Sig Dispense Refill     Estradiol 1 MG/GM GEL Place 5 g onto the skin daily 60 g 11     lidocaine (LIDODERM) 5 % Patch Place 1 patch onto the skin daily Apply 1 patch to skin. Wear for 12 hours and remove for 12 hrs.  Refill when patient requests. 90 patch 3     [START ON 2018] morphine sulfate (NARGIS) 20 MG 24 hr capsule Take 1 capsule (20 mg) by mouth every 12 hours 60 capsule 0     HYDROcodone-acetaminophen (NORCO)  MG per tablet Take 1 tablet by mouth every 6 hours as needed for moderate  to severe pain maximum 4 tablet(s) per day 120 tablet 0     DULoxetine (CYMBALTA) 60 MG EC capsule Take 1 capsule (60 mg) by mouth daily 30 capsule 11     LORazepam (ATIVAN) 0.5 MG tablet Take 1 tablet (0.5 mg) by mouth daily 60 tablet 3     estradiol (ESTRACE VAGINAL) 0.1 MG/GM vaginal cream Place 2 g vaginally three times a week. 42.5 g 11     cyclobenzaprine (FLEXERIL) 10 MG tablet Take 1 tablet by mouth 3 times daily as needed for muscle spasms. 90 tablet 2     cholecalciferol 58704 UNITS capsule Take 1 capsule by mouth once a week. 8 capsule 0     calcium citrate (CALCIUM CITRATE) 950 MG tablet Take 1 tablet by mouth 2 times daily.       sennosides (SENOKOT) 8.6 MG tablet Take 1 tablet by mouth daily as needed for constipation.       Allergies   Allergen Reactions     Budeprion Sr      Ineffective, name brand works best     Codeine      Shaky     Effexor [Venlafaxine Hydrochloride]      Affect too flat     Levaquin [Levofloxacin] Other (See Comments)     Dark thoughts- mood changes     Lexapro      Sexual dysfunction     Pregabalin      Audiovisual hallucinations     Prozac [Fluoxetine Hcl]      Sexual dysfunction     Tramadol      Hives       BP Readings from Last 3 Encounters:   12/19/17 107/75   11/14/17 110/76   02/24/17 127/75    Wt Readings from Last 3 Encounters:   11/14/17 189 lb 8 oz (86 kg)   02/23/17 185 lb (83.9 kg)   11/08/13 183 lb (83 kg)        Reviewed and updated as needed this visit by clinical staffTobacco  Allergies  Med Hx  Surg Hx  Fam Hx  Soc Hx      Reviewed and updated as needed this visit by Provider         ROS:  Constitutional, HEENT, cardiovascular, pulmonary, gi and gu systems are negative, except as otherwise noted.      OBJECTIVE:   /75 (BP Location: Left arm, Patient Position: Sitting, Cuff Size: Adult Regular)  Pulse 100  Temp 97.8  F (36.6  C) (Oral)  Resp 16  LMP 05/01/2005  SpO2 100%  Breastfeeding? No    GENERAL: healthy, alert and no distress  EYES: Eyes  grossly normal to inspection, PERRL and conjunctivae and sclerae normal  NECK: no adenopathy, no asymmetry, masses, anterior surgical scar healing well from recent surgery 9-2017.  RESP: lungs clear to auscultation - no rales, rhonchi or wheezes  CV: regular rate and rhythm, normal S1 S2, no S3 or S4, no murmur, click or rub, no peripheral edema and peripheral pulses strong  ABDOMEN: soft, nontender, no hepatosplenomegaly, no masses and bowel sounds normal  MS: no gross musculoskeletal defects noted, no edema  SKIN: no suspicious lesions or rashes  BACK: incision site midline with staples for closure clean, dry, intact.  No signs of infection.  PSYCH: mentation appears normal, affect normal/bright    Diagnostic Test Results:  none     ASSESSMENT/PLAN:     1. Chronic low back pain, unspecified back pain laterality, with sciatica presence unspecified    - HYDROcodone-acetaminophen (NORCO)  MG per tablet; Take 1 tablet by mouth every 6 hours as needed for moderate to severe pain maximum 4 tablet(s) per day  Dispense: 120 tablet; Refill: 0  - DULoxetine (CYMBALTA) 60 MG EC capsule; Take 1 capsule (60 mg) by mouth daily  Dispense: 30 capsule; Refill: 11    Patient to dc the Dilaudid and revert back to Norco  which she is best controlled at.  Refilled at #120 x 1 month- will back down to 3x.daily #90 next month as she recuperates.  Follow up one month.    2. Cervicalgia    - lidocaine (LIDODERM) 5 % Patch; Place 1 patch onto the skin daily Apply 1 patch to skin. Wear for 12 hours and remove for 12 hrs.  Refill when patient requests.  Dispense: 90 patch; Refill: 3    Continues to do well- refill of lidocaine patches today.    3. Postmenopausal status    - Estradiol 1 MG/GM GEL; Place 5 g onto the skin daily  Dispense: 60 g; Refill: 11    Stable on HRT- refilled today.      Emili Ballard MD  [facility]

## 2017-12-21 PROBLEM — G89.4 CHRONIC PAIN SYNDROME: Status: ACTIVE | Noted: 2017-12-21

## 2017-12-21 PROBLEM — C91.10 CLL (CHRONIC LYMPHOCYTIC LEUKEMIA) (H): Status: ACTIVE | Noted: 2017-12-21

## 2017-12-31 DIAGNOSIS — M53.3 SACROILIAC DYSFUNCTION: ICD-10-CM

## 2017-12-31 NOTE — TELEPHONE ENCOUNTER
Controlled Substance Refill Request for LORazepam (ATIVAN) 0.5 MG tablet  Problem List Complete:  No     PROVIDER TO CONSIDER COMPLETION OF PROBLEM LIST AND OVERVIEW/CONTROLLED SUBSTANCE AGREEMENT    Last Written Prescription Date:  11/14/17  Last Fill Quantity: 60,   # refills: 3    [facility] Office Visit with [facility] primary care provider: 12/19/2017    Future Office visit:     Controlled substance agreement on file: No.     Processing:  Fax Rx to [facility] pharmacy     checked in past 6 months?  Yes 12/21/2017

## 2018-01-02 RX ORDER — LORAZEPAM 0.5 MG/1
0.5 TABLET ORAL DAILY
Qty: 60 TABLET | Refills: 3 | Status: SHIPPED | OUTPATIENT
Start: 2018-01-02 | End: 2018-02-02

## 2018-01-09 ENCOUNTER — OFFICE VISIT (OUTPATIENT)
Dept: FAMILY MEDICINE | Facility: CLINIC | Age: 58
End: 2018-01-09
Payer: COMMERCIAL

## 2018-01-09 VITALS
DIASTOLIC BLOOD PRESSURE: 75 MMHG | SYSTOLIC BLOOD PRESSURE: 109 MMHG | RESPIRATION RATE: 16 BRPM | HEART RATE: 83 BPM | TEMPERATURE: 96 F | OXYGEN SATURATION: 96 %

## 2018-01-09 DIAGNOSIS — G89.4 CHRONIC PAIN SYNDROME: Primary | ICD-10-CM

## 2018-01-09 DIAGNOSIS — M54.5 CHRONIC LOW BACK PAIN, UNSPECIFIED BACK PAIN LATERALITY, WITH SCIATICA PRESENCE UNSPECIFIED: ICD-10-CM

## 2018-01-09 DIAGNOSIS — G89.29 CHRONIC LOW BACK PAIN, UNSPECIFIED BACK PAIN LATERALITY, WITH SCIATICA PRESENCE UNSPECIFIED: ICD-10-CM

## 2018-01-09 PROCEDURE — 99213 OFFICE O/P EST LOW 20 MIN: CPT | Performed by: FAMILY MEDICINE

## 2018-01-09 RX ORDER — MORPHINE SULFATE 20 MG/1
20 CAPSULE, EXTENDED RELEASE ORAL EVERY 12 HOURS
Qty: 60 CAPSULE | Refills: 0 | Status: SHIPPED | OUTPATIENT
Start: 2018-01-09 | End: 2018-02-02

## 2018-01-09 RX ORDER — HYDROCODONE BITARTRATE AND ACETAMINOPHEN 10; 325 MG/1; MG/1
1 TABLET ORAL EVERY 6 HOURS PRN
Qty: 120 TABLET | Refills: 0 | Status: SHIPPED | OUTPATIENT
Start: 2018-01-14 | End: 2018-02-02

## 2018-01-09 NOTE — MR AVS SNAPSHOT
After Visit Summary   1/9/2018    Janelle White    MRN: [id number]           Patient Information     Date Of Birth          1960        Visit Information        Provider Department      1/9/2018 2:00 PM Emili Ballard MD [facility]        Today's Diagnoses     Chronic pain syndrome    -  1    Chronic low back pain, unspecified back pain laterality, with sciatica presence unspecified           Follow-ups after your visit        Follow-up notes from your care team     Return if symptoms worsen or fail to improve.      Who to contact     If you have questions or need follow up information about today's clinic visit or your schedule please contact [facility] directly at [phone].  Normal or non-critical lab and imaging results will be communicated to you by myEDmatchhart, letter or phone within 4 business days after the clinic has received the results. If you do not hear from us within 7 days, please contact the clinic through myEDmatchhart or phone. If you have a critical or abnormal lab result, we will notify you by phone as soon as possible.  Submit refill requests through Webstep or call your pharmacy and they will forward the refill request to us. Please allow 3 business days for your refill to be completed.          Additional Information About Your Visit        MyChart Information     Webstep gives you secure access to your electronic health record. If you see a primary care provider, you can also send messages to your care team and make appointments. If you have questions, please call your primary care clinic.  If you do not have a primary care provider, please call [phone] and they will assist you.        Care EveryWhere ID     This is your Care EveryWhere ID. This could be used by other organizations to access your [place] medical records  [id number]        Your Vitals Were     Pulse Temperature Respirations Last Period  Pulse Oximetry Breastfeeding?    83 96  F (35.6  C) (Tympanic) 16 05/01/2005 96% No       Blood Pressure from Last 3 Encounters:   01/09/18 109/75   12/19/17 107/75   11/14/17 110/76    Weight from Last 3 Encounters:   11/14/17 189 lb 8 oz (86 kg)   02/23/17 185 lb (83.9 kg)   11/08/13 183 lb (83 kg)              Today, you had the following     No orders found for display         Where to get your medicines      Some of these will need a paper prescription and others can be bought over the counter.  Ask your nurse if you have questions.     Bring a paper prescription for each of these medications     HYDROcodone-acetaminophen  MG per tablet    morphine sulfate 20 MG 24 hr capsule          Primary Care Provider Office Phone # Fax #    Christina Hinkle [phone] [address] [place] [address]        Equal Access to Services     [facility]: Hadii aad ku hadasho Soandria, waaxda luqadaha, qaybta kaalmada ademadyyada, william royal . So [facility] [phone].    ATENCIÓN: Si habla español, tiene a parson disposición servicios gratuitos de asistencia lingüística. Llame al [phone].    We comply with applicable federal civil rights laws and [place] laws. We do not discriminate on the basis of race, color, national origin, age, disability, sex, sexual orientation, or gender identity.            Thank you!     Thank you for choosing [facility]  for your care. Our goal is always to provide you with excellent care. Hearing back from our patients is one way we can continue to improve our services. Please take a few minutes to complete the written survey that you may receive in the mail after your visit with us. Thank you!             Your Updated Medication List - Protect others around you: Learn how to safely use, store and throw away your medicines at www.disposemymeds.org.          This list is accurate as of: 1/9/18 11:59 PM.   Always use your most recent med list.                   Brand Name Dispense Instructions for use Diagnosis    calcium citrate 950 MG tablet    CALCITRATE     Take 1 tablet by mouth 2 times daily.        cholecalciferol 23722 UNITS capsule    VITAMIN D3    8 capsule    Take 1 capsule by mouth once a week.    Vitamin D deficiency       cyclobenzaprine 10 MG tablet    FLEXERIL    90 tablet    Take 1 tablet by mouth 3 times daily as needed for muscle spasms.    Myofascial pain       DULoxetine 60 MG EC capsule    CYMBALTA    30 capsule    Take 1 capsule (60 mg) by mouth daily    Chronic low back pain, unspecified back pain laterality, with sciatica presence unspecified       estradiol 0.1 MG/GM cream    ESTRACE VAGINAL    42.5 g    Place 2 g vaginally three times a week.    Vaginal atrophy       Estradiol 1 MG/GM Gel     60 g    Place 5 g onto the skin daily    Postmenopausal status       HYDROcodone-acetaminophen  MG per tablet   Start taking on:  1/14/2018    NORCO    120 tablet    Take 1 tablet by mouth every 6 hours as needed for moderate to severe pain maximum 4 tablet(s) per day    Chronic low back pain, unspecified back pain laterality, with sciatica presence unspecified       lidocaine 5 % Patch    LIDODERM    90 patch    Place 1 patch onto the skin daily Apply 1 patch to skin. Wear for 12 hours and remove for 12 hrs.  Refill when patient requests.    Cervicalgia       LORazepam 0.5 MG tablet    ATIVAN    60 tablet    Take 1 tablet (0.5 mg) by mouth daily    Sacroiliac dysfunction       morphine sulfate 20 MG 24 hr capsule    NARGIS    60 capsule    Take 1 capsule (20 mg) by mouth every 12 hours    Chronic pain syndrome       sennosides 8.6 MG tablet    SENOKOT     Take 1 tablet by mouth daily as needed for constipation.

## 2018-01-09 NOTE — PROGRESS NOTES
SUBJECTIVE:   Janelle White is a 57 year old female who presents to clinic today for the following health issues:    Medication Followup of Morphine and Narco     Taking Medication as prescribed: yes - pt state she is 3 days ahead of herself.     Side Effects:  None    Medication Helping Symptoms:  yes     Patient presents today after hospitalization for POSTERIOR FUSION WITH TRANSFORAMINAL LUMBAR INTERBODY FUSION (TFLIF) L2-3, HARDWARE REMOVAL L3/4 PRONE 12- at [facility] with Dr. Clayton.  She has done very well post-operatively and just had stitches removed from lumbar site yesterday.     She is also s/p recent ANTERIOR CERVICAL DECOMPRESSION AND FUSION C7-T1 WITH ANTERIOR HARDWARE REMOVAL C6-7 AND POSTERIOR CERVICAL FUSION C7-T1 9-    She is feeling a bit cooped up right now with the cold temps and the limited mobility with her bone stimulator on around her waist.  She is working with her therapist on mindfulness and meditation.  She is looking forward to a winter vacation to [place] with her  and friends at the end of the month.  She does not desire increasing the Cymbalta noting when she has increased the dose in the past she has felt numb.    She is here for refill of her pain medications.    Problem list and histories reviewed & adjusted, as indicated.  Additional history: as documented    Patient Active Problem List   Diagnosis     PERSONAL HX OF  MELANOMA     B-complex deficiency     Migraine     Chronic Low Back Pain     CARDIOVASCULAR SCREENING; LDL GOAL LESS THAN 160     DDD (degenerative disc disease), cervical     Moderate recurrent major depression (H)     Vitamin D deficiency     Shift work sleep disorder     Chronic pain syndrome     CLL (chronic lymphocytic leukemia) (H)     Past Surgical History:   Procedure Laterality Date     anterior cervical discectomy C4-5 ,Fusion C5-6-7  10/2007     BLEPHAROPLASTY BILATERAL  4/25/2013    Procedure: BLEPHAROPLASTY  BILATERAL;  BILATERAL UPPER LID BLEPHAROPLASTY AND BROWPEXY ;  Surgeon: Godfrey Miguel MD;  [facility]  [facility]     C APPENDECTOMY  2006      SECTION      x2     FUSION LUMBAR ANTERIOR, FUSION LUMBAR POSTERIOR TWO LEVELS, COMBINED  2010    L3-S1 anterior posterior fusion     HYSTERECTOMY  2006    ovaries intact     Partial vulvectomy for NEENA III  2009     Pubovaginal sling, post op durasphere injections  2006    wears pad     ROTATOR CUFF REPAIR RT/LT  2011    right     SPINAL FUSION C3-4  2009    C3-4, anterior spinal fusion     TUBAL LIGATION         Social History   Substance Use Topics     Smoking status: Former Smoker     Packs/day: 1.00     Years: 5.00     Quit date: 1984     Smokeless tobacco: Never Used     Alcohol use Yes      Comment: no alcohol currently since prior to her back surgeries,      Family History   Problem Relation Age of Onset     Hypertension Mother      Alcohol/Drug Mother      OSTEOPOROSIS Mother      borderline     Hypertension Father      DIABETES Father      Type 2, diet controlled     Alcohol/Drug Father      remote use     C.A.D. Maternal Grandmother       late 50s     C.A.D. Maternal Grandfather      bone and brain cancer mets as well     DIABETES Paternal Grandfather      Alcohol/Drug Brother      Breast Cancer No family hx of      Cancer - colorectal No family hx of      CEREBROVASCULAR DISEASE No family hx of          Current Outpatient Prescriptions   Medication Sig Dispense Refill     morphine sulfate (NARGIS) 20 MG 24 hr capsule Take 1 capsule (20 mg) by mouth every 12 hours 60 capsule 0     [START ON 2018] HYDROcodone-acetaminophen (NORCO)  MG per tablet Take 1 tablet by mouth every 6 hours as needed for moderate to severe pain maximum 4 tablet(s) per day 120 tablet 0     LORazepam (ATIVAN) 0.5 MG tablet Take 1 tablet (0.5 mg) by mouth daily 60 tablet 3     Estradiol 1 MG/GM GEL Place 5 g onto the skin daily 60 g 11      lidocaine (LIDODERM) 5 % Patch Place 1 patch onto the skin daily Apply 1 patch to skin. Wear for 12 hours and remove for 12 hrs.  Refill when patient requests. 90 patch 3     DULoxetine (CYMBALTA) 60 MG EC capsule Take 1 capsule (60 mg) by mouth daily 30 capsule 11     estradiol (ESTRACE VAGINAL) 0.1 MG/GM vaginal cream Place 2 g vaginally three times a week. 42.5 g 11     cyclobenzaprine (FLEXERIL) 10 MG tablet Take 1 tablet by mouth 3 times daily as needed for muscle spasms. 90 tablet 2     cholecalciferol 24993 UNITS capsule Take 1 capsule by mouth once a week. 8 capsule 0     calcium citrate (CALCIUM CITRATE) 950 MG tablet Take 1 tablet by mouth 2 times daily.       sennosides (SENOKOT) 8.6 MG tablet Take 1 tablet by mouth daily as needed for constipation.       Allergies   Allergen Reactions     Budeprion Sr      Ineffective, name brand works best     Codeine      Shaky     Effexor [Venlafaxine Hydrochloride]      Affect too flat     Levaquin [Levofloxacin] Other (See Comments)     Dark thoughts- mood changes     Lexapro      Sexual dysfunction     Pregabalin      Audiovisual hallucinations     Prozac [Fluoxetine Hcl]      Sexual dysfunction     Tramadol      Hives       BP Readings from Last 3 Encounters:   01/09/18 109/75   12/19/17 107/75   11/14/17 110/76    Wt Readings from Last 3 Encounters:   11/14/17 189 lb 8 oz (86 kg)   02/23/17 185 lb (83.9 kg)   11/08/13 183 lb (83 kg)                        Reviewed and updated as needed this visit by clinical staff     Reviewed and updated as needed this visit by Provider         ROS:  Constitutional, HEENT, cardiovascular, pulmonary, gi and gu systems are negative, except as otherwise noted.      OBJECTIVE:   /75 (BP Location: Left arm, Patient Position: Sitting, Cuff Size: Adult Regular)  Pulse 83  Temp 96  F (35.6  C) (Tympanic)  Resp 16  LMP 05/01/2005  SpO2 96%  Breastfeeding? No  There is no height or weight on file to calculate BMI.  GENERAL:  healthy, alert and no distress  EYES: Eyes grossly normal to inspection, PERRL and conjunctivae and sclerae normal  HENT: ear canals and TM's normal, nose and mouth without ulcers or lesions  NECK: no adenopathy, no asymmetry, masses, or scars and thyroid normal to palpation  RESP: lungs clear to auscultation - no rales, rhonchi or wheezes  CV: regular rate and rhythm, normal S1 S2, no S3 or S4, no murmur, click or rub, no peripheral edema and peripheral pulses strong  ABDOMEN: soft, nontender, no hepatosplenomegaly, no masses and bowel sounds normal  MS: no gross musculoskeletal defects noted, no edema  PSYCH: mentation appears normal, affect normal/bright    Diagnostic Test Results:  none     ASSESSMENT/PLAN:     1. Chronic pain syndrome    - morphine sulfate (NARGIS) 20 MG 24 hr capsule; Take 1 capsule (20 mg) by mouth every 12 hours  Dispense: 60 capsule; Refill: 0    Refill today x 1 month.  FU 1 month.    3. Chronic low back pain, unspecified back pain laterality, with sciatica presence unspecified    - HYDROcodone-acetaminophen (NORCO)  MG per tablet; Take 1 tablet by mouth every 6 hours as needed for moderate to severe pain maximum 4 tablet(s) per day  Dispense: 120 tablet; Refill: 0    Refill today x 1 month.  FU 1 month-- continue to support as she recuperates from two major surgeries this fa//winter-- continue with exercise as able- will be starting PT soon.    Emili Ballard MD  [facility]

## 2018-02-02 ENCOUNTER — OFFICE VISIT (OUTPATIENT)
Dept: FAMILY MEDICINE | Facility: CLINIC | Age: 58
End: 2018-02-02
Payer: COMMERCIAL

## 2018-02-02 VITALS
TEMPERATURE: 95.5 F | RESPIRATION RATE: 16 BRPM | DIASTOLIC BLOOD PRESSURE: 72 MMHG | OXYGEN SATURATION: 98 % | HEART RATE: 78 BPM | SYSTOLIC BLOOD PRESSURE: 111 MMHG

## 2018-02-02 DIAGNOSIS — G89.4 CHRONIC PAIN SYNDROME: Primary | ICD-10-CM

## 2018-02-02 DIAGNOSIS — M54.5 CHRONIC LOW BACK PAIN, UNSPECIFIED BACK PAIN LATERALITY, WITH SCIATICA PRESENCE UNSPECIFIED: ICD-10-CM

## 2018-02-02 DIAGNOSIS — G89.29 CHRONIC LOW BACK PAIN, UNSPECIFIED BACK PAIN LATERALITY, WITH SCIATICA PRESENCE UNSPECIFIED: ICD-10-CM

## 2018-02-02 PROCEDURE — 99213 OFFICE O/P EST LOW 20 MIN: CPT | Performed by: FAMILY MEDICINE

## 2018-02-02 RX ORDER — HYDROCODONE BITARTRATE AND ACETAMINOPHEN 10; 325 MG/1; MG/1
1 TABLET ORAL EVERY 6 HOURS PRN
Qty: 100 TABLET | Refills: 0 | Status: SHIPPED | OUTPATIENT
Start: 2018-02-15 | End: 2018-03-12

## 2018-02-02 RX ORDER — MORPHINE SULFATE 20 MG/1
CAPSULE, EXTENDED RELEASE ORAL
Qty: 65 CAPSULE | Refills: 0 | Status: SHIPPED | OUTPATIENT
Start: 2018-02-08 | End: 2018-03-12

## 2018-02-02 RX ORDER — LORAZEPAM 0.5 MG/1
0.5 TABLET ORAL EVERY 8 HOURS PRN
Qty: 90 TABLET | Refills: 2 | Status: SHIPPED | OUTPATIENT
Start: 2018-02-02 | End: 2018-06-12

## 2018-02-02 NOTE — PROGRESS NOTES
SUBJECTIVE:   Janelle White is a 57 year old female who presents to clinic today for the following health issues:    Medication Followup of Pain meds    Taking Medication as prescribed: yes    Side Effects:  None    Medication Helping Symptoms:  yes     Patient presents today after hospitalization for POSTERIOR FUSION WITH TRANSFORAMINAL LUMBAR INTERBODY FUSION (TFLIF) L2-3, HARDWARE REMOVAL L3/4 PRONE 12- at [facility] with Dr. Clayton.  She has done very well post-operatively and just had stitches removed from lumbar site yesterday.      She is also s/p recent ANTERIOR CERVICAL DECOMPRESSION AND FUSION C7-T1 WITH ANTERIOR HARDWARE REMOVAL C6-7 AND POSTERIOR CERVICAL FUSION C7-T1 9-    Recently returned from [place] with  and friends.  Continues to recuperate.  Has started at the [facility] for continued rehab and strengthening.  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    She is here for refill of her pain medications.  She is anxious she may need to increase use with initiation of PT.  Also wonders if down the road she may be a good candidate for medical marijuana.      Problem list and histories reviewed & adjusted, as indicated.  Additional history: as documented    Patient Active Problem List   Diagnosis     PERSONAL HX OF  MELANOMA     B-complex deficiency     Migraine     Chronic Low Back Pain     CARDIOVASCULAR SCREENING; LDL GOAL LESS THAN 160     DDD (degenerative disc disease), cervical     Moderate recurrent major depression (H)     Vitamin D deficiency     Shift work sleep disorder     Chronic pain syndrome     CLL (chronic lymphocytic leukemia) (H)     Past Surgical History:   Procedure Laterality Date     anterior cervical discectomy C4-5 ,Fusion C5-6-7  10/2007     BLEPHAROPLASTY BILATERAL  4/25/2013    Procedure: BLEPHAROPLASTY BILATERAL;  BILATERAL UPPER LID BLEPHAROPLASTY AND BROWPEXY ;  Surgeon: Lamont  MD Godfrey;  Location:  [facility]     C APPENDECTOMY  2006      SECTION      x2     FUSION LUMBAR ANTERIOR, FUSION LUMBAR POSTERIOR TWO LEVELS, COMBINED  2010    L3-S1 anterior posterior fusion     HYSTERECTOMY  2006    ovaries intact     Partial vulvectomy for NEENA III  2009     Pubovaginal sling, post op durasphere injections  2006    wears pad     ROTATOR CUFF REPAIR RT/LT  2011    right     SPINAL FUSION C3-4  2009    C3-4, anterior spinal fusion     TUBAL LIGATION         Social History   Substance Use Topics     Smoking status: Former Smoker     Packs/day: 1.00     Years: 5.00     Quit date: 1984     Smokeless tobacco: Never Used     Alcohol use Yes      Comment: no alcohol currently since prior to her back surgeries,      Family History   Problem Relation Age of Onset     Hypertension Mother      Alcohol/Drug Mother      OSTEOPOROSIS Mother      borderline     Hypertension Father      DIABETES Father      Type 2, diet controlled     Alcohol/Drug Father      remote use     C.A.D. Maternal Grandmother       late 50s     C.A.D. Maternal Grandfather      bone and brain cancer mets as well     DIABETES Paternal Grandfather      Alcohol/Drug Brother      Breast Cancer No family hx of      Cancer - colorectal No family hx of      CEREBROVASCULAR DISEASE No family hx of          Current Outpatient Prescriptions   Medication Sig Dispense Refill     [START ON 2018] morphine sulfate (NARGIS) 20 MG 24 hr capsule 1 capsule bid x 30 days with additional 5 tabs dispensed for breakthrough- -max 60mg/24 hours 65 capsule 0     [START ON 2/15/2018] HYDROcodone-acetaminophen (NORCO)  MG per tablet Take 1 tablet by mouth every 6 hours as needed for moderate to severe pain maximum 4 tablet(s) per day 100 tablet 0     LORazepam (ATIVAN) 0.5 MG tablet Take 1 tablet (0.5 mg) by mouth every 8 hours as needed for anxiety 90 tablet 2     Estradiol 1 MG/GM GEL Place 5 g onto the skin daily 60  g 11     lidocaine (LIDODERM) 5 % Patch Place 1 patch onto the skin daily Apply 1 patch to skin. Wear for 12 hours and remove for 12 hrs.  Refill when patient requests. 90 patch 3     DULoxetine (CYMBALTA) 60 MG EC capsule Take 1 capsule (60 mg) by mouth daily 30 capsule 11     estradiol (ESTRACE VAGINAL) 0.1 MG/GM vaginal cream Place 2 g vaginally three times a week. 42.5 g 11     cyclobenzaprine (FLEXERIL) 10 MG tablet Take 1 tablet by mouth 3 times daily as needed for muscle spasms. 90 tablet 2     cholecalciferol 43537 UNITS capsule Take 1 capsule by mouth once a week. 8 capsule 0     calcium citrate (CALCIUM CITRATE) 950 MG tablet Take 1 tablet by mouth 2 times daily.       sennosides (SENOKOT) 8.6 MG tablet Take 1 tablet by mouth daily as needed for constipation.       Allergies   Allergen Reactions     Budeprion Sr      Ineffective, name brand works best     Codeine      Shaky     Effexor [Venlafaxine Hydrochloride]      Affect too flat     Levaquin [Levofloxacin] Other (See Comments)     Dark thoughts- mood changes     Lexapro      Sexual dysfunction     Pregabalin      Audiovisual hallucinations     Prozac [Fluoxetine Hcl]      Sexual dysfunction     Tramadol      Hives       BP Readings from Last 3 Encounters:   02/02/18 111/72   01/09/18 109/75   12/19/17 107/75    Wt Readings from Last 3 Encounters:   11/14/17 189 lb 8 oz (86 kg)   02/23/17 185 lb (83.9 kg)   11/08/13 183 lb (83 kg)                    Reviewed and updated as needed this visit by clinical staff  Tobacco  Allergies  Med Hx  Surg Hx  Fam Hx  Soc Hx      Reviewed and updated as needed this visit by Provider         ROS:  Constitutional, HEENT, cardiovascular, pulmonary, gi and gu systems are negative, except as otherwise noted.    OBJECTIVE:     /72 (BP Location: Right arm, Patient Position: Sitting, Cuff Size: Adult Regular)  Pulse 78  Temp 95.5  F (35.3  C) (Tympanic)  Resp 16  LMP 05/01/2005  SpO2 98%  Breastfeeding?  No  There is no height or weight on file to calculate BMI.  GENERAL: healthy, alert and no distress, wearing bone stimulator today  EYES: Eyes grossly normal to inspection, PERRL and conjunctivae and sclerae normal  NECK: no adenopathy, no asymmetry, masses, or scars and thyroid normal to palpation  MS: no gross musculoskeletal defects noted, no edema  SKIN: no suspicious lesions or rashes  PSYCH: mentation appears normal, affect normal/bright    Diagnostic Test Results:  none     ASSESSMENT/PLAN:     1. Chronic pain syndrome    - morphine sulfate (NARGIS) 20 MG 24 hr capsule; 1 capsule bid x 30 days with additional 5 tabs dispensed for breakthrough- -max 60mg/24 hours  Dispense: 65 capsule; Refill: 0    Refill of MSO4 20mg bid x 30 days.  FU one month.    2. Chronic low back pain, unspecified back pain laterality, with sciatica presence unspecified    - HYDROcodone-acetaminophen (NORCO)  MG per tablet; Take 1 tablet by mouth every 6 hours as needed for moderate to severe pain maximum 4 tablet(s) per day  Dispense: 100 tablet; Refill: 0    Refill fo [facility] #100.  FU one month.    Continues under direction of neurosurgeon Dr. Clayton.  Will begin PT per his instruction and start at [facility]- as well.    Emili Ballard MD  [facility]

## 2018-02-02 NOTE — LETTER
My Depression Action Plan  Name: Janelle Sandovaludike   Date of Birth 1960  Date: 2/2/2018    My doctor: Christina Hinkle   My clinic: [address] [id number] [phone]          GREEN    ZONE   Good Control    What it looks like:     Things are going generally well. You have normal up s and down s. You may even feel depressed from time to time, but bad moods usually last less than a day.   What you need to do:  1. Continue to care for yourself (see self care plan)  2. Check your depression survival kit and update it as needed  3. Follow your physician s recommendations including any medication.  4. Do not stop taking medication unless you consult with your physician first.           YELLOW         ZONE Getting Worse    What it looks like:     Depression is starting to interfere with your life.     It may be hard to get out of bed; you may be starting to isolate yourself from others.    Symptoms of depression are starting to last most all day and this has happened for several days.     You may have suicidal thoughts but they are not constant.   What you need to do:     1. Call your care team, your response to treatment will improve if you keep your care team informed of your progress. Yellow periods are signs an adjustment may need to be made.     2. Continue your self-care, even if you have to fake it!    3. Talk to someone in your support network    4. Open up your depression survival kit           RED    ZONE Medical Alert - Get Help    What it looks like:     Depression is seriously interfering with your life.     You may experience these or other symptoms: You can t get out of bed most days, can t work or engage in other necessary activities, you have trouble taking care of basic hygiene, or basic responsibilities, thoughts of suicide or death that will not go away, self-injurious behavior.     What you need to do:  1. Call your care  team and request a same-day appointment. If they are not available (weekends or after hours) call your local crisis line, emergency room or 911.      Electronically signed by: Marcelino Dale, February 2, 2018    Depression Self Care Plan / Survival Kit    Self-Care for Depression  Here s the deal. Your body and mind are really not as separate as most people think.  What you do and think affects how you feel and how you feel influences what you do and think. This means if you do things that people who feel good do, it will help you feel better.  Sometimes this is all it takes.  There is also a place for medication and therapy depending on how severe your depression is, so be sure to consult with your medical provider and/ or Behavioral Health Consultant if your symptoms are worsening or not improving.     In order to better manage my stress, I will:    Exercise  Get some form of exercise, every day. This will help reduce pain and release endorphins, the  feel good  chemicals in your brain. This is almost as good as taking antidepressants!  This is not the same as joining a gym and then never going! (they count on that by the way ) It can be as simple as just going for a walk or doing some gardening, anything that will get you moving.      Hygiene   Maintain good hygiene (Get out of bed in the morning, Make your bed, Brush your teeth, Take a shower, and Get dressed like you were going to work, even if you are unemployed).  If your clothes don't fit try to get ones that do.    Diet  I will strive to eat foods that are good for me, drink plenty of water, and avoid excessive sugar, caffeine, alcohol, and other mood-altering substances.  Some foods that are helpful in depression are: complex carbohydrates, B vitamins, flaxseed, fish or fish oil, fresh fruits and vegetables.    Psychotherapy  I agree to participate in Individual Therapy (if recommended).    Medication  If prescribed medications, I agree to take them.  Missing  doses can result in serious side effects.  I understand that drinking alcohol, or other illicit drug use, may cause potential side effects.  I will not stop my medication abruptly without first discussing it with my provider.    Staying Connected With Others  I will stay in touch with my friends, family members, and my primary care provider/team.    Use your imagination  Be creative.  We all have a creative side; it doesn t matter if it s oil painting, sand castles, or mud pies! This will also kick up the endorphins.    Witness Beauty  (AKA stop and smell the roses) Take a look outside, even in mid-winter. Notice colors, textures. Watch the squirrels and birds.     Service to others  Be of service to others.  There is always someone else in need.  By helping others we can  get out of ourselves  and remember the really important things.  This also provides opportunities for practicing all the other parts of the program.    Humor  Laugh and be silly!  Adjust your TV habits for less news and crime-drama and more comedy.    Control your stress  Try breathing deep, massage therapy, biofeedback, and meditation. Find time to relax each day.     My support system    Clinic Contact:  Phone number:    Contact 1:  Phone number:    Contact 2:  Phone number:    Church/:  Phone number:    Therapist:  Phone number:    Local crisis center:    Phone number:    Other community support:  Phone number:

## 2018-02-02 NOTE — LETTER
Return to  Work Release      Able to return to work on 3-5-2018.    Date: 2/2/2018      Name: Janelle White                       YOB: 1960    Medical Record Number: [id number]    The patient was seen at: [facility]        Restrictions:    Return 1/2 days with patients scheduled 7AM with last appt 1130AM  With 15 minute block in AM and PM.    Also will require assistance in room as needed for instrumentation, positioning of patients and trays, etc.        Recommendations:    Please allow sit-to-stand desk and ergonomic accommodations.          Any questions, please call.              _________________________  Emili Ballard MD

## 2018-02-02 NOTE — MR AVS SNAPSHOT
After Visit Summary   2/2/2018    Janelle White    MRN: [id number]           Patient Information     Date Of Birth          1960        Visit Information        Provider Department      2/2/2018 11:20 AM Emili Ballard MD [facility]        Today's Diagnoses     Chronic pain syndrome    -  1    Chronic low back pain, unspecified back pain laterality, with sciatica presence unspecified           Follow-ups after your visit        Follow-up notes from your care team     Return if symptoms worsen or fail to improve.      Who to contact     If you have questions or need follow up information about today's clinic visit or your schedule please contact [facility] directly at [phone].  Normal or non-critical lab and imaging results will be communicated to you by [employer], letter or phone within 4 business days after the clinic has received the results. If you do not hear from us within 7 days, please contact the clinic through [employer] or phone. If you have a critical or abnormal lab result, we will notify you by phone as soon as possible.  Submit refill requests through [employer] or call your pharmacy and they will forward the refill request to us. Please allow 3 business days for your refill to be completed.          Additional Information About Your Visit        MyChart Information     [employer] gives you secure access to your electronic health record. If you see a primary care provider, you can also send messages to your care team and make appointments. If you have questions, please call your primary care clinic.  If you do not have a primary care provider, please call [phone] and they will assist you.        Care EveryWhere ID     This is your Care EveryWhere ID. This could be used by other organizations to access your [place] medical records  [id number]        Your Vitals Were     Pulse Temperature Respirations Last Period  Pulse Oximetry Breastfeeding?    78 95.5  F (35.3  C) (Tympanic) 16 05/01/2005 98% No       Blood Pressure from Last 3 Encounters:   02/02/18 111/72   01/09/18 109/75   12/19/17 107/75    Weight from Last 3 Encounters:   11/14/17 189 lb 8 oz (86 kg)   02/23/17 185 lb (83.9 kg)   11/08/13 183 lb (83 kg)              Today, you had the following     No orders found for display         Today's Medication Changes          These changes are accurate as of 2/2/18 11:59 PM.  If you have any questions, ask your nurse or doctor.               These medicines have changed or have updated prescriptions.        Dose/Directions    LORazepam 0.5 MG tablet   Commonly known as:  ATIVAN   This may have changed:    - when to take this  - reasons to take this   Changed by:  Emili Ballard MD        Dose:  0.5 mg   Take 1 tablet (0.5 mg) by mouth every 8 hours as needed for anxiety   Quantity:  90 tablet   Refills:  2       morphine sulfate 20 MG 24 hr capsule   Commonly known as:  NARGIS   This may have changed:    - how much to take  - how to take this  - when to take this  - additional instructions   Used for:  Chronic pain syndrome   Changed by:  Emili Ballard MD        Start taking on:  2/8/2018   1 capsule bid x 30 days with additional 5 tabs dispensed for breakthrough- -max 60mg/24 hours   Quantity:  65 capsule   Refills:  0            Where to get your medicines      Some of these will need a paper prescription and others can be bought over the counter.  Ask your nurse if you have questions.     Bring a paper prescription for each of these medications     HYDROcodone-acetaminophen  MG per tablet    LORazepam 0.5 MG tablet    morphine sulfate 20 MG 24 hr capsule                Primary Care Provider Office Phone # Fax #    Christina Hinkle [phone] [address] [place] [address]        Equal Access to Services     LIZA PASTRANA AH: Lottie reyes  ankit Bowen, wapebblesda luqadaha, qaybta kaalmada rocio, william stephaniein hayaaderic amezquitamady romarioysabel lamagoderic geneva. So [facility] [phone].    ATENCIÓN: Si habla mahamed, tiene a parson disposición servicios gratuitos de asistencia lingüística. Hermilo al [phone].    We comply with applicable federal civil rights laws and [place] laws. We do not discriminate on the basis of race, color, national origin, age, disability, sex, sexual orientation, or gender identity.            Thank you!     Thank you for choosing [facility]  for your care. Our goal is always to provide you with excellent care. Hearing back from our patients is one way we can continue to improve our services. Please take a few minutes to complete the written survey that you may receive in the mail after your visit with us. Thank you!             Your Updated Medication List - Protect others around you: Learn how to safely use, store and throw away your medicines at www.disposemymeds.org.          This list is accurate as of 2/2/18 11:59 PM.  Always use your most recent med list.                   Brand Name Dispense Instructions for use Diagnosis    calcium citrate 950 MG tablet    CALCITRATE     Take 1 tablet by mouth 2 times daily.        cholecalciferol 67772 UNITS capsule    VITAMIN D3    8 capsule    Take 1 capsule by mouth once a week.    Vitamin D deficiency       cyclobenzaprine 10 MG tablet    FLEXERIL    90 tablet    Take 1 tablet by mouth 3 times daily as needed for muscle spasms.    Myofascial pain       DULoxetine 60 MG EC capsule    CYMBALTA    30 capsule    Take 1 capsule (60 mg) by mouth daily    Chronic low back pain, unspecified back pain laterality, with sciatica presence unspecified       estradiol 0.1 MG/GM cream    ESTRACE VAGINAL    42.5 g    Place 2 g vaginally three times a week.    Vaginal atrophy       Estradiol 1 MG/GM Gel     60 g    Place 5 g onto the skin daily    Postmenopausal status        HYDROcodone-acetaminophen  MG per tablet   Start taking on:  2/15/2018    NORCO    100 tablet    Take 1 tablet by mouth every 6 hours as needed for moderate to severe pain maximum 4 tablet(s) per day    Chronic low back pain, unspecified back pain laterality, with sciatica presence unspecified       lidocaine 5 % Patch    LIDODERM    90 patch    Place 1 patch onto the skin daily Apply 1 patch to skin. Wear for 12 hours and remove for 12 hrs.  Refill when patient requests.    Cervicalgia       LORazepam 0.5 MG tablet    ATIVAN    90 tablet    Take 1 tablet (0.5 mg) by mouth every 8 hours as needed for anxiety        morphine sulfate 20 MG 24 hr capsule   Start taking on:  2/8/2018    NARGIS    65 capsule    1 capsule bid x 30 days with additional 5 tabs dispensed for breakthrough- -max 60mg/24 hours    Chronic pain syndrome       sennosides 8.6 MG tablet    SENOKOT     Take 1 tablet by mouth daily as needed for constipation.

## 2018-03-12 ENCOUNTER — OFFICE VISIT (OUTPATIENT)
Dept: FAMILY MEDICINE | Facility: CLINIC | Age: 58
End: 2018-03-12
Payer: COMMERCIAL

## 2018-03-12 VITALS
RESPIRATION RATE: 18 BRPM | HEART RATE: 69 BPM | OXYGEN SATURATION: 96 % | DIASTOLIC BLOOD PRESSURE: 74 MMHG | TEMPERATURE: 97.8 F | SYSTOLIC BLOOD PRESSURE: 117 MMHG

## 2018-03-12 DIAGNOSIS — Z23 NEED FOR PNEUMOCOCCAL VACCINATION: Primary | ICD-10-CM

## 2018-03-12 DIAGNOSIS — C91.10 CLL (CHRONIC LYMPHOCYTIC LEUKEMIA) (H): ICD-10-CM

## 2018-03-12 DIAGNOSIS — G89.29 CHRONIC LOW BACK PAIN, UNSPECIFIED BACK PAIN LATERALITY, WITH SCIATICA PRESENCE UNSPECIFIED: ICD-10-CM

## 2018-03-12 DIAGNOSIS — M54.5 CHRONIC LOW BACK PAIN, UNSPECIFIED BACK PAIN LATERALITY, WITH SCIATICA PRESENCE UNSPECIFIED: ICD-10-CM

## 2018-03-12 DIAGNOSIS — G89.4 CHRONIC PAIN SYNDROME: ICD-10-CM

## 2018-03-12 PROCEDURE — 90670 PCV13 VACCINE IM: CPT | Performed by: FAMILY MEDICINE

## 2018-03-12 PROCEDURE — 90471 IMMUNIZATION ADMIN: CPT | Performed by: FAMILY MEDICINE

## 2018-03-12 PROCEDURE — 99214 OFFICE O/P EST MOD 30 MIN: CPT | Mod: 25 | Performed by: FAMILY MEDICINE

## 2018-03-12 RX ORDER — MORPHINE SULFATE 20 MG/1
CAPSULE, EXTENDED RELEASE ORAL
Qty: 65 CAPSULE | Refills: 0 | Status: SHIPPED | OUTPATIENT
Start: 2018-03-12 | End: 2018-04-10

## 2018-03-12 RX ORDER — HYDROCODONE BITARTRATE AND ACETAMINOPHEN 10; 325 MG/1; MG/1
1 TABLET ORAL EVERY 6 HOURS PRN
Qty: 100 TABLET | Refills: 0 | Status: SHIPPED | OUTPATIENT
Start: 2018-03-12 | End: 2018-04-10

## 2018-03-12 NOTE — MR AVS SNAPSHOT
After Visit Summary   3/12/2018    Janelle White    MRN: [id number]           Patient Information     Date Of Birth          1960        Visit Information        Provider Department      3/12/2018 1:40 PM Emili Ballard MD [facility]        Today's Diagnoses     Need for pneumococcal vaccination    -  1    Chronic pain syndrome        Chronic low back pain, unspecified back pain laterality, with sciatica presence unspecified        CLL (chronic lymphocytic leukemia) (H)           Follow-ups after your visit        Follow-up notes from your care team     Return if symptoms worsen or fail to improve.      Who to contact     If you have questions or need follow up information about today's clinic visit or your schedule please contact [facility] directly at [phone].  Normal or non-critical lab and imaging results will be communicated to you by MyChart, letter or phone within 4 business days after the clinic has received the results. If you do not hear from us within 7 days, please contact the clinic through MyChart or phone. If you have a critical or abnormal lab result, we will notify you by phone as soon as possible.  Submit refill requests through [employer] or call your pharmacy and they will forward the refill request to us. Please allow 3 business days for your refill to be completed.          Additional Information About Your Visit        MyChart Information     [employer] gives you secure access to your electronic health record. If you see a primary care provider, you can also send messages to your care team and make appointments. If you have questions, please call your primary care clinic.  If you do not have a primary care provider, please call [phone] and they will assist you.        Care EveryWhere ID     This is your Care EveryWhere ID. This could be used by other organizations to access your [facility]  records  [id number]        Your Vitals Were     Pulse Temperature Respirations Last Period Pulse Oximetry       69 97.8  F (36.6  C) (Oral) 18 05/01/2005 96%        Blood Pressure from Last 3 Encounters:   03/12/18 117/74   02/02/18 111/72   01/09/18 109/75    Weight from Last 3 Encounters:   11/14/17 189 lb 8 oz (86 kg)   02/23/17 185 lb (83.9 kg)   11/08/13 183 lb (83 kg)              We Performed the Following     ADMIN 1st VACCINE     Pneumococcal vaccine 13 valent PCV13 IM (Prevnar) [68703]          Where to get your medicines      Some of these will need a paper prescription and others can be bought over the counter.  Ask your nurse if you have questions.     Bring a paper prescription for each of these medications     HYDROcodone-acetaminophen  MG per tablet    morphine sulfate 20 MG 24 hr capsule          Primary Care Provider Office Phone # Fax #    Christina Hinkle [phone] [address] [place] [address]        Equal Access to Services     [facility]: Hadii aad ku hadasho Soomaali, waaxda luqadaha, qaybta kaalmada adeegyada, waxsurjit ramey haylaura royal . So [facility] [phone].    ATENCIÓN: Si habla español, tiene a parson disposición servicios gratuitos de asistencia lingüística. Llame al [phone].    We comply with applicable federal civil rights laws and [place] laws. We do not discriminate on the basis of race, color, national origin, age, disability, sex, sexual orientation, or gender identity.            Thank you!     Thank you for choosing [facility]  for your care. Our goal is always to provide you with excellent care. Hearing back from our patients is one way we can continue to improve our services. Please take a few minutes to complete the written survey that you may receive in the mail after your visit with us. Thank you!             Your Updated Medication List - Protect others around you: Learn how to  safely use, store and throw away your medicines at www.disposemymeds.org.          This list is accurate as of 3/12/18  3:28 PM.  Always use your most recent med list.                   Brand Name Dispense Instructions for use Diagnosis    calcium citrate 950 MG tablet    CALCITRATE     Take 1 tablet by mouth 2 times daily.        cholecalciferol 91878 UNITS capsule    VITAMIN D3    8 capsule    Take 1 capsule by mouth once a week.    Vitamin D deficiency       cyclobenzaprine 10 MG tablet    FLEXERIL    90 tablet    Take 1 tablet by mouth 3 times daily as needed for muscle spasms.    Myofascial pain       DULoxetine 60 MG EC capsule    CYMBALTA    30 capsule    Take 1 capsule (60 mg) by mouth daily    Chronic low back pain, unspecified back pain laterality, with sciatica presence unspecified       estradiol 0.1 MG/GM cream    ESTRACE VAGINAL    42.5 g    Place 2 g vaginally three times a week.    Vaginal atrophy       Estradiol 1 MG/GM Gel     30 g    Place 1 g onto the skin daily    Postmenopausal status       HYDROcodone-acetaminophen  MG per tablet    NORCO    100 tablet    Take 1 tablet by mouth every 6 hours as needed for moderate to severe pain maximum 4 tablet(s) per day    Chronic low back pain, unspecified back pain laterality, with sciatica presence unspecified, Chronic pain syndrome, Need for pneumococcal vaccination       lidocaine 5 % Patch    LIDODERM    90 patch    Place 1 patch onto the skin daily Apply 1 patch to skin. Wear for 12 hours and remove for 12 hrs.  Refill when patient requests.    Cervicalgia       LORazepam 0.5 MG tablet    ATIVAN    90 tablet    Take 1 tablet (0.5 mg) by mouth every 8 hours as needed for anxiety        morphine sulfate 20 MG 24 hr capsule    NARGIS    65 capsule    1 capsule bid x 30 days with additional 5 tabs dispensed for breakthrough- -max 60mg/24 hours    Chronic pain syndrome, Chronic low back pain, unspecified back pain laterality, with sciatica presence  unspecified, Need for pneumococcal vaccination       sennosides 8.6 MG tablet    SENOKOT     Take 1 tablet by mouth daily as needed for constipation.

## 2018-03-12 NOTE — NURSING NOTE
Screening Questionnaire for Adult Immunization     Are you sick today?   No    Do you have allergies to medications, food or any vaccine?   No    Have you ever had a serious reaction after receiving a vaccination?   No    Do you have a long-term health problem with heart disease, lung disease,  asthma, kidney disease, diabetes, anemia, metabolic or blood disease?   No    Do you have cancer, leukemia, AIDS, or any immune system problem?   No    Do you take cortisone, prednisone, other steroids, or anticancer drugs, or  have you had any x-ray (radiation) treatments?   No    Have you had a seizure, brain, or other nervous system problem?   No    During the past year, have you received a transfusion of blood or blood       products, or been given a medicine called immune (gamma) globulin?   No    For women: Are you pregnant or is there a chance you could become         pregnant during the next month?   No    Have you received any vaccinations in the past 4 weeks?   No     Immunization questionnaire answers were all negative.      MNVFC doesn't apply on this patient     Form completed by patient.  Per orders of Dr. Ballard, injection(s) of PCV13 given by Manjit Dale. Patient instructed to remain in clinic for 20 minutes afterwards, and to report any adverse reaction to me immediately

## 2018-03-12 NOTE — PROGRESS NOTES
SUBJECTIVE:   Soo White is a 57 year old female who presents to clinic today for the following health issues:    Pt is here for refills on norco and morphine    Patient presents today after hospitalization for POSTERIOR FUSION WITH TRANSFORAMINAL LUMBAR INTERBODY FUSION (TFLIF) L2-3, HARDWARE REMOVAL L3/4 PRONE 12- at [facility] with Dr. Clayton.     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  She is in her second week back to work with increased upper back pain and tightness.    She has continued with [facility] for further rehab and strengthening.  She has been using her pain medications as scheduled.  She has been doing well back at work with her sit-to-stand work and finds she does best when she is standing.    She continues to work on her stamina as she returns from a six month medical leave.  She is exhausted by the end of her shift and needs a nap often after.  She is trying to get out and enjoy walks with her dog Dent.  She continues to see her talk therapist regularly.    She recently saw Dr. STEVE [place] [facility] for her CLL.  He recommended she get the Pneumovax and get an IgG infusion.        Problem list and histories reviewed & adjusted, as indicated.  Additional history: as documented    Patient Active Problem List   Diagnosis     PERSONAL HX OF  MELANOMA     B-complex deficiency     Migraine     Chronic Low Back Pain     CARDIOVASCULAR SCREENING; LDL GOAL LESS THAN 160     DDD (degenerative disc disease), cervical     Moderate recurrent major depression (H)     Vitamin D deficiency     Shift work sleep disorder     Chronic pain syndrome     CLL (chronic lymphocytic leukemia) (H)     Past Surgical History:   Procedure  Laterality Date     anterior cervical discectomy C4-5 ,Fusion C5-6-7  10/2007     BLEPHAROPLASTY BILATERAL  2013    Procedure: BLEPHAROPLASTY BILATERAL;  BILATERAL UPPER LID BLEPHAROPLASTY AND BROWPEXY ;  Surgeon: Godfrey Miguel MD;  [facility]: [facility] APPENDECTOMY  2006      SECTION      x2     FUSION LUMBAR ANTERIOR, FUSION LUMBAR POSTERIOR TWO LEVELS, COMBINED  2010    L3-S1 anterior posterior fusion     HYSTERECTOMY  2006    ovaries intact     Partial vulvectomy for NEENA III  2009     Pubovaginal sling, post op durasphere injections  2006    wears pad     ROTATOR CUFF REPAIR RT/LT  2011    right     SPINAL FUSION C3-4  2009    C3-4, anterior spinal fusion     TUBAL LIGATION         Social History   Substance Use Topics     Smoking status: Former Smoker     Packs/day: 1.00     Years: 5.00     Quit date: 1984     Smokeless tobacco: Never Used     Alcohol use Yes      Comment: no alcohol currently since prior to her back surgeries,      Family History   Problem Relation Age of Onset     Hypertension Mother      Alcohol/Drug Mother      OSTEOPOROSIS Mother      borderline     Hypertension Father      DIABETES Father      Type 2, diet controlled     Alcohol/Drug Father      remote use     C.A.D. Maternal Grandmother       late 50s     C.A.D. Maternal Grandfather      bone and brain cancer mets as well     DIABETES Paternal Grandfather      Alcohol/Drug Brother      Breast Cancer No family hx of      Cancer - colorectal No family hx of      CEREBROVASCULAR DISEASE No family hx of          Current Outpatient Prescriptions   Medication Sig Dispense Refill     morphine sulfate (NARGIS) 20 MG 24 hr capsule 1 capsule bid x 30 days with additional 5 tabs dispensed for breakthrough- -max 60mg/24 hours 65 capsule 0     HYDROcodone-acetaminophen (NORCO)  MG per tablet Take 1 tablet by mouth every 6 hours as needed for moderate to severe pain maximum 4 tablet(s) per day  100 tablet 0     Estradiol 1 MG/GM GEL Place 1 g onto the skin daily 30 g 11     LORazepam (ATIVAN) 0.5 MG tablet Take 1 tablet (0.5 mg) by mouth every 8 hours as needed for anxiety 90 tablet 2     lidocaine (LIDODERM) 5 % Patch Place 1 patch onto the skin daily Apply 1 patch to skin. Wear for 12 hours and remove for 12 hrs.  Refill when patient requests. 90 patch 3     DULoxetine (CYMBALTA) 60 MG EC capsule Take 1 capsule (60 mg) by mouth daily 30 capsule 11     estradiol (ESTRACE VAGINAL) 0.1 MG/GM vaginal cream Place 2 g vaginally three times a week. 42.5 g 11     cyclobenzaprine (FLEXERIL) 10 MG tablet Take 1 tablet by mouth 3 times daily as needed for muscle spasms. 90 tablet 2     cholecalciferol 45119 UNITS capsule Take 1 capsule by mouth once a week. 8 capsule 0     calcium citrate (CALCIUM CITRATE) 950 MG tablet Take 1 tablet by mouth 2 times daily.       sennosides (SENOKOT) 8.6 MG tablet Take 1 tablet by mouth daily as needed for constipation.       Allergies   Allergen Reactions     Budeprion Sr      Ineffective, name brand works best     Codeine      Shaky     Effexor [Venlafaxine Hydrochloride]      Affect too flat     Levaquin [Levofloxacin] Other (See Comments)     Dark thoughts- mood changes     Lexapro      Sexual dysfunction     Pregabalin      Audiovisual hallucinations     Prozac [Fluoxetine Hcl]      Sexual dysfunction     Tramadol      Hives       BP Readings from Last 3 Encounters:   03/12/18 117/74   02/02/18 111/72   01/09/18 109/75    Wt Readings from Last 3 Encounters:   11/14/17 189 lb 8 oz (86 kg)   02/23/17 185 lb (83.9 kg)   11/08/13 183 lb (83 kg)                    Reviewed and updated as needed this visit by clinical staff  Tobacco  Allergies  Meds  Med Hx  Surg Hx  Fam Hx  Soc Hx      Reviewed and updated as needed this visit by Provider         ROS:  Constitutional, HEENT, cardiovascular, pulmonary, gi and gu systems are negative, except as otherwise noted.    OBJECTIVE:      /74 (BP Location: Right arm, Patient Position: Sitting, Cuff Size: Adult Regular)  Pulse 69  Temp 97.8  F (36.6  C) (Oral)  Resp 18  LMP 05/01/2005  SpO2 96%  There is no height or weight on file to calculate BMI.  GENERAL: healthy, alert and no distress  EYES: Eyes grossly normal to inspection, PERRL and conjunctivae and sclerae normal  NECK: no adenopathy, no asymmetry, masses, or scars and thyroid normal to palpation  MS: no gross musculoskeletal defects noted, no edema  SKIN: no suspicious lesions or rashes  NEURO: Normal strength and tone, mentation intact and speech normal  PSYCH: mentation appears normal, affect normal/bright        ASSESSMENT/PLAN:     1. Chronic pain syndrome    - morphine sulfate (NARGIS) 20 MG 24 hr capsule; 1 capsule bid x 30 days with additional 5 tabs dispensed for breakthrough- -max 60mg/24 hours  Dispense: 65 capsule; Refill: 0  - HYDROcodone-acetaminophen (NORCO)  MG per tablet; Take 1 tablet by mouth every 6 hours as needed for moderate to severe pain maximum 4 tablet(s) per day  Dispense: 100 tablet; Refill: 0     Refill of her pain medications x 1 month.  FU one month.    2. Chronic low back pain, unspecified back pain laterality, with sciatica presence unspecified    - morphine sulfate (NARGIS) 20 MG 24 hr capsule; 1 capsule bid x 30 days with additional 5 tabs dispensed for breakthrough- -max 60mg/24 hours  Dispense: 65 capsule; Refill: 0  - HYDROcodone-acetaminophen (NORCO)  MG per tablet; Take 1 tablet by mouth every 6 hours as needed for moderate to severe pain maximum 4 tablet(s) per day  Dispense: 100 tablet; Refill: 0    Continue to work on appropriate work restrictions as she returns to avoid reinjury or new injury moving forward.  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  Continue to build stamina slowly.  Discussed weekly massage to help with upper  back spasms.  Continue same work schedule of half days at this time until feeling stronger.    3. CLL (chronic lymphocytic leukemia) (H)    Followed by Dr. Blackwell-- to call and see if she needs to go to the infusion center at [facility] for this.  We will FU with PCV-13 at his recommendation.    4. Need for pneumococcal vaccination    - Pneumococcal vaccine 13 valent PCV13 IM (Prevnar) [[id number]]  - ADMIN 1st VACCINE    FU one month for recheck-- sooner prn.    Emili Ballard MD  [facility]

## 2018-03-15 ASSESSMENT — ANXIETY QUESTIONNAIRES
3. WORRYING TOO MUCH ABOUT DIFFERENT THINGS: NOT AT ALL
IF YOU CHECKED OFF ANY PROBLEMS ON THIS QUESTIONNAIRE, HOW DIFFICULT HAVE THESE PROBLEMS MADE IT FOR YOU TO DO YOUR WORK, TAKE CARE OF THINGS AT HOME, OR GET ALONG WITH OTHER PEOPLE: NOT DIFFICULT AT ALL
6. BECOMING EASILY ANNOYED OR IRRITABLE: NOT AT ALL
1. FEELING NERVOUS, ANXIOUS, OR ON EDGE: NOT AT ALL
GAD7 TOTAL SCORE: 1
5. BEING SO RESTLESS THAT IT IS HARD TO SIT STILL: NOT AT ALL
2. NOT BEING ABLE TO STOP OR CONTROL WORRYING: SEVERAL DAYS
7. FEELING AFRAID AS IF SOMETHING AWFUL MIGHT HAPPEN: NOT AT ALL

## 2018-03-15 ASSESSMENT — PATIENT HEALTH QUESTIONNAIRE - PHQ9: 5. POOR APPETITE OR OVEREATING: NOT AT ALL

## 2018-03-16 ASSESSMENT — ANXIETY QUESTIONNAIRES: GAD7 TOTAL SCORE: 1

## 2018-03-16 ASSESSMENT — PATIENT HEALTH QUESTIONNAIRE - PHQ9: SUM OF ALL RESPONSES TO PHQ QUESTIONS 1-9: 4

## 2018-04-10 ENCOUNTER — OFFICE VISIT (OUTPATIENT)
Dept: FAMILY MEDICINE | Facility: CLINIC | Age: 58
End: 2018-04-10
Payer: COMMERCIAL

## 2018-04-10 VITALS
SYSTOLIC BLOOD PRESSURE: 117 MMHG | OXYGEN SATURATION: 97 % | DIASTOLIC BLOOD PRESSURE: 80 MMHG | TEMPERATURE: 97.8 F | HEART RATE: 73 BPM | RESPIRATION RATE: 16 BRPM

## 2018-04-10 DIAGNOSIS — G89.29 CHRONIC LOW BACK PAIN, UNSPECIFIED BACK PAIN LATERALITY, WITH SCIATICA PRESENCE UNSPECIFIED: ICD-10-CM

## 2018-04-10 DIAGNOSIS — G89.4 CHRONIC PAIN SYNDROME: ICD-10-CM

## 2018-04-10 DIAGNOSIS — M54.5 CHRONIC LOW BACK PAIN, UNSPECIFIED BACK PAIN LATERALITY, WITH SCIATICA PRESENCE UNSPECIFIED: ICD-10-CM

## 2018-04-10 PROCEDURE — 99213 OFFICE O/P EST LOW 20 MIN: CPT | Performed by: FAMILY MEDICINE

## 2018-04-10 RX ORDER — MORPHINE SULFATE 20 MG/1
CAPSULE, EXTENDED RELEASE ORAL
Qty: 60 CAPSULE | Refills: 0 | Status: SHIPPED | OUTPATIENT
Start: 2018-04-10 | End: 2018-05-08

## 2018-04-10 RX ORDER — HYDROCODONE BITARTRATE AND ACETAMINOPHEN 10; 325 MG/1; MG/1
1 TABLET ORAL EVERY 6 HOURS PRN
Qty: 90 TABLET | Refills: 0 | Status: SHIPPED | OUTPATIENT
Start: 2018-04-10 | End: 2018-05-08

## 2018-04-10 NOTE — PROGRESS NOTES
SUBJECTIVE:   Janelle White is a 57 year old female who presents to clinic today for the following health issues:      Medication Followup of Pain medications    Taking Medication as prescribed: yes    Side Effects:  None    Medication Helping Symptoms:  yes     Pt is here for refills on norco and morphine     Patient presents today after hospitalization for POSTERIOR FUSION WITH TRANSFORAMINAL LUMBAR INTERBODY FUSION (TFLIF) L2-3, HARDWARE REMOVAL L3/4 PRONE 12- at [facility] with Dr. Clayton.     She is also s/p recent ANTERIOR CERVICAL DECOMPRESSION AND FUSION C7-T1 WITH ANTERIOR HARDWARE REMOVAL C6-7 AND POSTERIOR CERVICAL FUSION C7-T1 9-      She continues to do well at work-- finding benefit in a one hour noon break time.  She hopes this will remain a priority on her schedule.     She has continued with [facility] for further rehab and strengthening.  She has been using her pain medications as scheduled.  She has been doing well back at work with her sit-to-stand work and finds she does best when she is standing.     She continues to work on her stamina as she returns from a six month medical leave.  She is exhausted by the end of her shift and needs a nap often after.  She is trying to get out and enjoy walks with her dog Taylor.  She continues to see her talk therapist regularly.     She just came from a modified Pilates session today.  She is feeling she is regaining energy and strength.  Pain control has been stable.      Problem list and histories reviewed & adjusted, as indicated.  Additional history: as documented    Patient Active Problem List   Diagnosis     PERSONAL HX OF  MELANOMA     B-complex deficiency     Migraine     Chronic Low Back Pain     CARDIOVASCULAR SCREENING; LDL GOAL LESS THAN 160     DDD (degenerative disc disease), cervical     Moderate recurrent major depression (H)     Vitamin D deficiency     Shift work sleep disorder     Chronic pain syndrome     CLL  (chronic lymphocytic leukemia) (H)     Past Surgical History:   Procedure Laterality Date     anterior cervical discectomy C4-5 ,Fusion C5-6-7  10/2007     BLEPHAROPLASTY BILATERAL  2013    Procedure: BLEPHAROPLASTY BILATERAL;  BILATERAL UPPER LID BLEPHAROPLASTY AND BROWPEXY ;  Surgeon: Godfrey Miguel MD;  Location: [facility]     C APPENDECTOMY  2006      SECTION      x2     FUSION LUMBAR ANTERIOR, FUSION LUMBAR POSTERIOR TWO LEVELS, COMBINED  2010    L3-S1 anterior posterior fusion     HYSTERECTOMY  2006    ovaries intact     Partial vulvectomy for NEENA III  2009     Pubovaginal sling, post op durasphere injections  2006    wears pad     ROTATOR CUFF REPAIR RT/LT  2011    right     SPINAL FUSION C3-4  2009    C3-4, anterior spinal fusion     TUBAL LIGATION         Social History   Substance Use Topics     Smoking status: Former Smoker     Packs/day: 1.00     Years: 5.00     Quit date: 1984     Smokeless tobacco: Never Used     Alcohol use Yes      Comment: no alcohol currently since prior to her back surgeries,      Family History   Problem Relation Age of Onset     Hypertension Mother      Alcohol/Drug Mother      OSTEOPOROSIS Mother      borderline     Hypertension Father      DIABETES Father      Type 2, diet controlled     Alcohol/Drug Father      remote use     C.A.D. Maternal Grandmother       late 50s     C.A.D. Maternal Grandfather      bone and brain cancer mets as well     DIABETES Paternal Grandfather      Alcohol/Drug Brother      Breast Cancer No family hx of      Cancer - colorectal No family hx of      CEREBROVASCULAR DISEASE No family hx of          Current Outpatient Prescriptions   Medication Sig Dispense Refill     morphine sulfate (NARGIS) 20 MG 24 hr capsule 1 capsule bid x 30 days with additional 5 tabs dispensed for breakthrough- -max 60mg/24 hours 65 capsule 0     HYDROcodone-acetaminophen (NORCO)  MG per tablet Take 1 tablet by mouth every  6 hours as needed for moderate to severe pain maximum 4 tablet(s) per day 100 tablet 0     Estradiol 1 MG/GM GEL Place 1 g onto the skin daily 30 g 11     LORazepam (ATIVAN) 0.5 MG tablet Take 1 tablet (0.5 mg) by mouth every 8 hours as needed for anxiety 90 tablet 2     lidocaine (LIDODERM) 5 % Patch Place 1 patch onto the skin daily Apply 1 patch to skin. Wear for 12 hours and remove for 12 hrs.  Refill when patient requests. 90 patch 3     DULoxetine (CYMBALTA) 60 MG EC capsule Take 1 capsule (60 mg) by mouth daily 30 capsule 11     estradiol (ESTRACE VAGINAL) 0.1 MG/GM vaginal cream Place 2 g vaginally three times a week. 42.5 g 11     cyclobenzaprine (FLEXERIL) 10 MG tablet Take 1 tablet by mouth 3 times daily as needed for muscle spasms. 90 tablet 2     cholecalciferol 38410 UNITS capsule Take 1 capsule by mouth once a week. 8 capsule 0     calcium citrate (CALCIUM CITRATE) 950 MG tablet Take 1 tablet by mouth 2 times daily.       sennosides (SENOKOT) 8.6 MG tablet Take 1 tablet by mouth daily as needed for constipation.       Allergies   Allergen Reactions     Budeprion Sr      Ineffective, name brand works best     Codeine      Shaky     Effexor [Venlafaxine Hydrochloride]      Affect too flat     Levaquin [Levofloxacin] Other (See Comments)     Dark thoughts- mood changes     Lexapro      Sexual dysfunction     Pregabalin      Audiovisual hallucinations     Prozac [Fluoxetine Hcl]      Sexual dysfunction     Tramadol      Hives       BP Readings from Last 3 Encounters:   04/10/18 117/80   03/12/18 117/74   02/02/18 111/72    Wt Readings from Last 3 Encounters:   11/14/17 189 lb 8 oz (86 kg)   02/23/17 185 lb (83.9 kg)   11/08/13 183 lb (83 kg)                    Reviewed and updated as needed this visit by clinical staff  Tobacco  Allergies  Meds  Med Hx  Surg Hx  Fam Hx  Soc Hx      Reviewed and updated as needed this visit by Provider         ROS:  Constitutional, HEENT, cardiovascular, pulmonary,  gi and gu systems are negative, except as otherwise noted.    OBJECTIVE:     /80  Pulse 73  Temp 97.8  F (36.6  C) (Oral)  Resp 16  LMP 05/01/2005 (Exact Date)  SpO2 97%  Breastfeeding? No  There is no height or weight on file to calculate BMI.  GENERAL: healthy, alert and no distress  EYES: Eyes grossly normal to inspection, PERRL and conjunctivae and sclerae normal  NECK: no adenopathy, no asymmetry, masses, or scars and thyroid normal to palpation  MS: no gross musculoskeletal defects noted, no edema  SKIN: no suspicious lesions or rashes  PSYCH: mentation appears normal, affect normal/bright        ASSESSMENT/PLAN:             1. Chronic low back pain, unspecified back pain laterality, with sciatica presence unspecified    - HYDROcodone-acetaminophen (NORCO)  MG per tablet; Take 1 tablet by mouth every 6 hours as needed for moderate to severe pain maximum 4 tablet(s) per day  Dispense: 90 tablet; Refill: 0  - morphine sulfate (NARGIS) 20 MG 24 hr capsule; 1 capsule bid x 30 days with additional 5 tabs dispensed for breakthrough- -max 60mg/24 hours  Dispense: 60 capsule; Refill: 0    2. Chronic pain syndrome    - HYDROcodone-acetaminophen (NORCO)  MG per tablet; Take 1 tablet by mouth every 6 hours as needed for moderate to severe pain maximum 4 tablet(s) per day  Dispense: 90 tablet; Refill: 0  - morphine sulfate (NARGIS) 20 MG 24 hr capsule; 1 capsule bid x 30 days with additional 5 tabs dispensed for breakthrough- -max 60mg/24 hours  Dispense: 60 capsule; Refill: 0    Patient is doing great transitioning back to work after two major surgeries this fall and winter.  Pain has stabilized and she feels her dosing is stable- does not desire a dose change.  FU one month.    Emili Ballard MD  [facility]

## 2018-04-10 NOTE — MR AVS SNAPSHOT
After Visit Summary   4/10/2018    Janelle White    MRN: [id number]           Patient Information     Date Of Birth          1960        Visit Information        Provider Department      4/10/2018 4:00 PM Emili Ballard MD [facility]        Today's Diagnoses     Chronic low back pain, unspecified back pain laterality, with sciatica presence unspecified        Chronic pain syndrome           Follow-ups after your visit        Follow-up notes from your care team     Return if symptoms worsen or fail to improve.      Who to contact     If you have questions or need follow up information about today's clinic visit or your schedule please contact [facility] directly at [phone].  Normal or non-critical lab and imaging results will be communicated to you by AFARhart, letter or phone within 4 business days after the clinic has received the results. If you do not hear from us within 7 days, please contact the clinic through AFARhart or phone. If you have a critical or abnormal lab result, we will notify you by phone as soon as possible.  Submit refill requests through [employer] or call your pharmacy and they will forward the refill request to us. Please allow 3 business days for your refill to be completed.          Additional Information About Your Visit        MyChart Information     [employer] gives you secure access to your electronic health record. If you see a primary care provider, you can also send messages to your care team and make appointments. If you have questions, please call your primary care clinic.  If you do not have a primary care provider, please call [phone] and they will assist you.        Care EveryWhere ID     This is your Care EveryWhere ID. This could be used by other organizations to access your [place] medical records  [id number]        Your Vitals Were     Pulse Temperature Respirations Last Period Pulse  Oximetry Breastfeeding?    73 97.8  F (36.6  C) (Oral) 16 05/01/2005 (Exact Date) 97% No       Blood Pressure from Last 3 Encounters:   04/10/18 117/80   03/12/18 117/74   02/02/18 111/72    Weight from Last 3 Encounters:   11/14/17 189 lb 8 oz (86 kg)   02/23/17 185 lb (83.9 kg)   11/08/13 183 lb (83 kg)              Today, you had the following     No orders found for display         Where to get your medicines      Some of these will need a paper prescription and others can be bought over the counter.  Ask your nurse if you have questions.     Bring a paper prescription for each of these medications     HYDROcodone-acetaminophen  MG per tablet    morphine sulfate 20 MG 24 hr capsule          Primary Care Provider Office Phone # Fax #    Emili Shayna Ballard [phone]       [facility] [address] [place] [address]        Equal Access to Services     LIZA PASTRANA : Hadii aad ku hadasho Soomaali, waaxda luqadaha, qaybta kaalmada adeegyada, waxay idiin hayrgn dhara royal . So [facility] [phone].    ATENCIÓN: Si habla español, tiene a parson disposición servicios gratuitos de asistencia lingüística. Llame al [phone].    We comply with applicable federal civil rights laws and [place] laws. We do not discriminate on the basis of race, color, national origin, age, disability, sex, sexual orientation, or gender identity.            Thank you!     Thank you for choosing [facility]  for your care. Our goal is always to provide you with excellent care. Hearing back from our patients is one way we can continue to improve our services. Please take a few minutes to complete the written survey that you may receive in the mail after your visit with us. Thank you!             Your Updated Medication List - Protect others around you: Learn how to safely use, store and throw away your medicines at www.disposemymeds.org.          This list is  accurate as of 4/10/18 11:59 PM.  Always use your most recent med list.                   Brand Name Dispense Instructions for use Diagnosis    calcium citrate 950 MG tablet    CALCITRATE     Take 1 tablet by mouth 2 times daily.        cholecalciferol 04927 units capsule    VITAMIN D3    8 capsule    Take 1 capsule by mouth once a week.    Vitamin D deficiency       cyclobenzaprine 10 MG tablet    FLEXERIL    90 tablet    Take 1 tablet by mouth 3 times daily as needed for muscle spasms.    Myofascial pain       DULoxetine 60 MG EC capsule    CYMBALTA    30 capsule    Take 1 capsule (60 mg) by mouth daily    Chronic low back pain, unspecified back pain laterality, with sciatica presence unspecified       estradiol 0.1 MG/GM cream    ESTRACE VAGINAL    42.5 g    Place 2 g vaginally three times a week.    Vaginal atrophy       Estradiol 1 MG/GM Gel     30 g    Place 1 g onto the skin daily    Postmenopausal status       HYDROcodone-acetaminophen  MG per tablet    NORCO    90 tablet    Take 1 tablet by mouth every 6 hours as needed for moderate to severe pain maximum 4 tablet(s) per day    Chronic low back pain, unspecified back pain laterality, with sciatica presence unspecified, Chronic pain syndrome       lidocaine 5 % Patch    LIDODERM    90 patch    Place 1 patch onto the skin daily Apply 1 patch to skin. Wear for 12 hours and remove for 12 hrs.  Refill when patient requests.    Cervicalgia       LORazepam 0.5 MG tablet    ATIVAN    90 tablet    Take 1 tablet (0.5 mg) by mouth every 8 hours as needed for anxiety        morphine sulfate 20 MG 24 hr capsule    NARGIS    60 capsule    1 capsule bid x 30 days with additional 5 tabs dispensed for breakthrough- -max 60mg/24 hours    Chronic pain syndrome, Chronic low back pain, unspecified back pain laterality, with sciatica presence unspecified       sennosides 8.6 MG tablet    SENOKOT     Take 1 tablet by mouth daily as needed for constipation.

## 2018-04-18 DIAGNOSIS — M79.18 MYOFASCIAL PAIN: ICD-10-CM

## 2018-04-18 RX ORDER — CYCLOBENZAPRINE HCL 10 MG
10 TABLET ORAL 3 TIMES DAILY PRN
Qty: 90 TABLET | Refills: 2 | Status: SHIPPED | OUTPATIENT
Start: 2018-04-18 | End: 2018-05-08

## 2018-04-18 NOTE — TELEPHONE ENCOUNTER
cyclobenzaprine (FLEXERIL) 10 MG tablet      Last Written Prescription Date:  4-8-13  Last Fill Quantity: 90 tab,   # refills: 2  Last Office Visit: 4-10-18  Future Office visit:       Routing refill request to provider for review/approval because:  Drug not on the [facility], [facility] or [facility] refill protocol or controlled substance

## 2018-05-08 ENCOUNTER — OFFICE VISIT (OUTPATIENT)
Dept: FAMILY MEDICINE | Facility: CLINIC | Age: 58
End: 2018-05-08
Payer: COMMERCIAL

## 2018-05-08 VITALS
HEART RATE: 85 BPM | SYSTOLIC BLOOD PRESSURE: 109 MMHG | TEMPERATURE: 95.9 F | RESPIRATION RATE: 16 BRPM | OXYGEN SATURATION: 96 % | DIASTOLIC BLOOD PRESSURE: 66 MMHG

## 2018-05-08 DIAGNOSIS — M54.2 CERVICALGIA: ICD-10-CM

## 2018-05-08 DIAGNOSIS — G89.29 CHRONIC LOW BACK PAIN, UNSPECIFIED BACK PAIN LATERALITY, WITH SCIATICA PRESENCE UNSPECIFIED: ICD-10-CM

## 2018-05-08 DIAGNOSIS — M79.18 MYOFASCIAL PAIN: ICD-10-CM

## 2018-05-08 DIAGNOSIS — G89.4 CHRONIC PAIN SYNDROME: ICD-10-CM

## 2018-05-08 DIAGNOSIS — M54.5 CHRONIC LOW BACK PAIN, UNSPECIFIED BACK PAIN LATERALITY, WITH SCIATICA PRESENCE UNSPECIFIED: ICD-10-CM

## 2018-05-08 PROCEDURE — 99213 OFFICE O/P EST LOW 20 MIN: CPT | Performed by: FAMILY MEDICINE

## 2018-05-08 RX ORDER — MORPHINE SULFATE 20 MG/1
CAPSULE, EXTENDED RELEASE ORAL
Qty: 60 CAPSULE | Refills: 0 | Status: SHIPPED | OUTPATIENT
Start: 2018-05-08 | End: 2018-06-12

## 2018-05-08 RX ORDER — CYCLOBENZAPRINE HCL 10 MG
10 TABLET ORAL 3 TIMES DAILY PRN
Qty: 90 TABLET | Refills: 3 | Status: SHIPPED | OUTPATIENT
Start: 2018-05-08 | End: 2019-05-30

## 2018-05-08 RX ORDER — LIDOCAINE 50 MG/G
4 PATCH TOPICAL DAILY
Qty: 120 PATCH | Refills: 3 | Status: SHIPPED | OUTPATIENT
Start: 2018-05-08 | End: 2018-05-10

## 2018-05-08 RX ORDER — HYDROCODONE BITARTRATE AND ACETAMINOPHEN 10; 325 MG/1; MG/1
1 TABLET ORAL EVERY 6 HOURS PRN
Qty: 90 TABLET | Refills: 0 | Status: SHIPPED | OUTPATIENT
Start: 2018-05-08 | End: 2018-06-12

## 2018-05-08 NOTE — MR AVS SNAPSHOT
After Visit Summary   5/8/2018    Janelle White    MRN: [id number]           Patient Information     Date Of Birth          1960        Visit Information        Provider Department      5/8/2018 2:00 PM Emili Ballard MD [facility]        Today's Diagnoses     Myofascial pain        Chronic pain syndrome        Chronic low back pain, unspecified back pain laterality, with sciatica presence unspecified        Cervicalgia           Follow-ups after your visit        Who to contact     If you have questions or need follow up information about today's clinic visit or your schedule please contact [facility] directly at [phone].  Normal or non-critical lab and imaging results will be communicated to you by MyChart, letter or phone within 4 business days after the clinic has received the results. If you do not hear from us within 7 days, please contact the clinic through [employer] or phone. If you have a critical or abnormal lab result, we will notify you by phone as soon as possible.  Submit refill requests through [employer] or call your pharmacy and they will forward the refill request to us. Please allow 3 business days for your refill to be completed.          Additional Information About Your Visit        MyChart Information     [employer] gives you secure access to your electronic health record. If you see a primary care provider, you can also send messages to your care team and make appointments. If you have questions, please call your primary care clinic.  If you do not have a primary care provider, please call [phone] and they will assist you.        Care EveryWhere ID     This is your Care EveryWhere ID. This could be used by other organizations to access your [place] medical records  [id number]        Your Vitals Were     Pulse Temperature Respirations Last Period Pulse Oximetry Breastfeeding?    85 95.9  F (35.5  C)  (Tympanic) 16 05/01/2005 (Exact Date) 96% No       Blood Pressure from Last 3 Encounters:   05/08/18 109/66   04/10/18 117/80   03/12/18 117/74    Weight from Last 3 Encounters:   11/14/17 189 lb 8 oz (86 kg)   02/23/17 185 lb (83.9 kg)   11/08/13 183 lb (83 kg)              Today, you had the following     No orders found for display         Today's Medication Changes          These changes are accurate as of 5/8/18  2:34 PM.  If you have any questions, ask your nurse or doctor.               These medicines have changed or have updated prescriptions.        Dose/Directions    lidocaine 5 % Patch   Commonly known as:  LIDODERM   This may have changed:    - how much to take  - additional instructions   Used for:  Cervicalgia   Changed by:  Emili Ballard MD        Dose:  4 patch   Place 4 patches onto the skin daily Apply up to 4 patches to skin. Wear for 12 hours and remove for 12 hrs.  Refill when patient requests.   Quantity:  120 patch   Refills:  3            Where to get your medicines      These medications were sent to [facility] [place] [id number] [employer] [place] [id number] [employer] [facility] [address]     Phone:  [phone]     cyclobenzaprine 10 MG tablet         Some of these will need a paper prescription and others can be bought over the counter.  Ask your nurse if you have questions.     Bring a paper prescription for each of these medications     HYDROcodone-acetaminophen  MG per tablet    morphine sulfate 20 MG 24 hr capsule         Call your pharmacy to confirm that your medication is ready for pickup. It may take up to 24 hours for them to receive the prescription. If the prescription is not ready within 3 business days, please contact your clinic or your provider.     We will let you know when these medications are ready. If you don't hear back within 3 business days, please contact us.     lidocaine 5 % Patch               Information  about OPIOIDS     PRESCRIPTION OPIOIDS: WHAT YOU NEED TO KNOW   You have a prescription for an opioid (narcotic) pain medicine. Opioids can cause addiction. If you have a history of chemical dependency of any type, you are at a higher risk of becoming addicted to opioids. Only take this medicine after all other options have been tried. Take it for as short a time and as few doses as possible.     Do not:    Drive. If you drive while taking these medicines, you could be arrested for driving under the influence (DUI).    Operate heavy machinery    Do any other dangerous activities while taking these medicines.     Drink any alcohol while taking these medicines.      Take with any other medicines that contain acetaminophen. Read all labels carefully. Look for the word  acetaminophen  or  Tylenol.  Ask your pharmacist if you have questions or are unsure.    Store your pills in a secure place, locked if possible. We will not replace any lost or stolen medicine. If you don t finish your medicine, please throw away (dispose) as directed by your pharmacist. The [place] Pollution Control Agency has more information about safe disposal: https://www.pca.[facility].[place].us/living-green/managing-unwanted-medications    All opioids tend to cause constipation. Drink plenty of water and eat foods that have a lot of fiber, such as fruits, vegetables, prune juice, apple juice and high-fiber cereal. Take a laxative (Miralax, milk of magnesia, Colace, Senna) if you don t move your bowels at least every other day.          Primary Care Provider Office Phone # Fax #    Emili Shayna Ballard [phone]       [facility] [place] [address]        Equal Access to Services     JAYDON PASTRANA : Hadii eric jenkins Soandria, waaxda luqadaha, qaybta kaalmada william chan. So [facility] [phone].    ATENCIÓN: Si habla español, tiene a parson disposición servicios gratuitos de  asistencia lingüística. Hermilo al [phone].    We comply with applicable federal civil rights laws and [place] laws. We do not discriminate on the basis of race, color, national origin, age, disability, sex, sexual orientation, or gender identity.            Thank you!     Thank you for choosing [facility]  for your care. Our goal is always to provide you with excellent care. Hearing back from our patients is one way we can continue to improve our services. Please take a few minutes to complete the written survey that you may receive in the mail after your visit with us. Thank you!             Your Updated Medication List - Protect others around you: Learn how to safely use, store and throw away your medicines at www.disposemymeds.org.          This list is accurate as of 5/8/18  2:34 PM.  Always use your most recent med list.                   Brand Name Dispense Instructions for use Diagnosis    calcium citrate 950 MG tablet    CALCITRATE     Take 1 tablet by mouth 2 times daily.        cholecalciferol 24914 units capsule    VITAMIN D3    8 capsule    Take 1 capsule by mouth once a week.    Vitamin D deficiency       cyclobenzaprine 10 MG tablet    FLEXERIL    90 tablet    Take 1 tablet (10 mg) by mouth 3 times daily as needed for muscle spasms    Myofascial pain       DULoxetine 60 MG EC capsule    CYMBALTA    30 capsule    Take 1 capsule (60 mg) by mouth daily    Chronic low back pain, unspecified back pain laterality, with sciatica presence unspecified       estradiol 0.1 MG/GM cream    ESTRACE VAGINAL    42.5 g    Place 2 g vaginally three times a week.    Vaginal atrophy       Estradiol 1 MG/GM Gel     30 g    Place 1 g onto the skin daily    Postmenopausal status       HYDROcodone-acetaminophen  MG per tablet    NORCO    90 tablet    Take 1 tablet by mouth every 6 hours as needed for moderate to severe pain maximum 4 tablet(s) per day    Chronic low back pain, unspecified back  pain laterality, with sciatica presence unspecified, Chronic pain syndrome       lidocaine 5 % Patch    LIDODERM    120 patch    Place 4 patches onto the skin daily Apply up to 4 patches to skin. Wear for 12 hours and remove for 12 hrs.  Refill when patient requests.    Cervicalgia       LORazepam 0.5 MG tablet    ATIVAN    90 tablet    Take 1 tablet (0.5 mg) by mouth every 8 hours as needed for anxiety        morphine sulfate 20 MG 24 hr capsule    NARGIS    60 capsule    1 capsule bid x 30 days with additional 5 tabs dispensed for breakthrough- -max 60mg/24 hours    Chronic pain syndrome, Chronic low back pain, unspecified back pain laterality, with sciatica presence unspecified       sennosides 8.6 MG tablet    SENOKOT     Take 1 tablet by mouth daily as needed for constipation.

## 2018-05-10 RX ORDER — LIDOCAINE 50 MG/G
4 PATCH TOPICAL DAILY
Qty: 120 PATCH | Refills: 3 | Status: SHIPPED | OUTPATIENT
Start: 2018-05-10 | End: 2019-04-04

## 2018-06-12 ENCOUNTER — OFFICE VISIT (OUTPATIENT)
Dept: FAMILY MEDICINE | Facility: CLINIC | Age: 58
End: 2018-06-12
Payer: COMMERCIAL

## 2018-06-12 VITALS
SYSTOLIC BLOOD PRESSURE: 95 MMHG | RESPIRATION RATE: 18 BRPM | OXYGEN SATURATION: 96 % | HEART RATE: 76 BPM | TEMPERATURE: 98 F | DIASTOLIC BLOOD PRESSURE: 61 MMHG

## 2018-06-12 DIAGNOSIS — G47.00 INSOMNIA, UNSPECIFIED TYPE: Primary | ICD-10-CM

## 2018-06-12 DIAGNOSIS — G89.4 CHRONIC PAIN SYNDROME: ICD-10-CM

## 2018-06-12 DIAGNOSIS — M54.5 CHRONIC LOW BACK PAIN, UNSPECIFIED BACK PAIN LATERALITY, WITH SCIATICA PRESENCE UNSPECIFIED: ICD-10-CM

## 2018-06-12 DIAGNOSIS — G89.29 CHRONIC LOW BACK PAIN, UNSPECIFIED BACK PAIN LATERALITY, WITH SCIATICA PRESENCE UNSPECIFIED: ICD-10-CM

## 2018-06-12 PROCEDURE — 99214 OFFICE O/P EST MOD 30 MIN: CPT | Performed by: FAMILY MEDICINE

## 2018-06-12 RX ORDER — MORPHINE SULFATE 20 MG/1
CAPSULE, EXTENDED RELEASE ORAL
Qty: 60 CAPSULE | Refills: 0 | Status: SHIPPED | OUTPATIENT
Start: 2018-07-12 | End: 2018-08-14

## 2018-06-12 RX ORDER — HYDROCODONE BITARTRATE AND ACETAMINOPHEN 10; 325 MG/1; MG/1
1 TABLET ORAL EVERY 6 HOURS PRN
Qty: 90 TABLET | Refills: 0 | Status: SHIPPED | OUTPATIENT
Start: 2018-07-12 | End: 2018-08-14

## 2018-06-12 RX ORDER — HYDROCODONE BITARTRATE AND ACETAMINOPHEN 10; 325 MG/1; MG/1
1 TABLET ORAL EVERY 6 HOURS PRN
Qty: 90 TABLET | Refills: 0 | Status: SHIPPED | OUTPATIENT
Start: 2018-06-12 | End: 2018-06-12

## 2018-06-12 RX ORDER — LORAZEPAM 0.5 MG/1
TABLET ORAL
Qty: 100 TABLET | Refills: 5 | Status: SHIPPED | OUTPATIENT
Start: 2018-06-12 | End: 2019-01-03

## 2018-06-12 RX ORDER — MORPHINE SULFATE 20 MG/1
CAPSULE, EXTENDED RELEASE ORAL
Qty: 60 CAPSULE | Refills: 0 | Status: SHIPPED | OUTPATIENT
Start: 2018-06-12 | End: 2018-06-12

## 2018-06-12 NOTE — MR AVS SNAPSHOT
After Visit Summary   6/12/2018    Janelle White    MRN: [id number]           Patient Information     Date Of Birth          1960        Visit Information        Provider Department      6/12/2018 4:00 PM Emili Ballard MD [facility]        Today's Diagnoses     Insomnia, unspecified type    -  1    Chronic low back pain, unspecified back pain laterality, with sciatica presence unspecified        Chronic pain syndrome           Follow-ups after your visit        Follow-up notes from your care team     Return if symptoms worsen or fail to improve.      Who to contact     If you have questions or need follow up information about today's clinic visit or your schedule please contact [facility] directly at [phone].  Normal or non-critical lab and imaging results will be communicated to you by [employer], letter or phone within 4 business days after the clinic has received the results. If you do not hear from us within 7 days, please contact the clinic through MyChart or phone. If you have a critical or abnormal lab result, we will notify you by phone as soon as possible.  Submit refill requests through [employer] or call your pharmacy and they will forward the refill request to us. Please allow 3 business days for your refill to be completed.          Additional Information About Your Visit        MyChart Information     [employer] gives you secure access to your electronic health record. If you see a primary care provider, you can also send messages to your care team and make appointments. If you have questions, please call your primary care clinic.  If you do not have a primary care provider, please call [phone] and they will assist you.        Care EveryWhere ID     This is your Care EveryWhere ID. This could be used by other organizations to access your [facility] records  [id number]        Your Vitals Were     Pulse  Temperature Respirations Last Period Pulse Oximetry       76 98  F (36.7  C) (Oral) 18 05/01/2005 (Exact Date) 96%        Blood Pressure from Last 3 Encounters:   06/12/18 95/61   05/08/18 109/66   04/10/18 117/80    Weight from Last 3 Encounters:   11/14/17 189 lb 8 oz (86 kg)   02/23/17 185 lb (83.9 kg)   11/08/13 183 lb (83 kg)              Today, you had the following     No orders found for display         Today's Medication Changes          These changes are accurate as of 6/12/18 11:59 PM.  If you have any questions, ask your nurse or doctor.               Start taking these medicines.        Dose/Directions    HYDROcodone-acetaminophen  MG per tablet   Commonly known as:  NORCO   Used for:  Chronic low back pain, unspecified back pain laterality, with sciatica presence unspecified, Chronic pain syndrome   Started by:  Emili Ballard MD        Dose:  1 tablet   Start taking on:  7/12/2018   Take 1 tablet by mouth every 6 hours as needed for moderate to severe pain maximum 4 tablet(s) per day   Quantity:  90 tablet   Refills:  0       morphine sulfate 20 MG 24 hr capsule   Commonly known as:  NARGIS   Used for:  Chronic pain syndrome, Chronic low back pain, unspecified back pain laterality, with sciatica presence unspecified   Started by:  Emili Ballard MD        Start taking on:  7/12/2018   1 capsule bid x 30 days with additional 5 tabs dispensed for breakthrough- -max 60mg/24 hours   Quantity:  60 capsule   Refills:  0         These medicines have changed or have updated prescriptions.        Dose/Directions    LORazepam 0.5 MG tablet   Commonly known as:  ATIVAN   This may have changed:    - how much to take  - how to take this  - when to take this  - reasons to take this  - additional instructions   Used for:  Insomnia, unspecified type   Changed by:  Emili Ballard MD        1-2 tabs qhs prn insomnia and 1 q 8 hours prn anxiety    Quantity:  100 tablet   Refills:  5            Where to get your medicines      Some of these will need a paper prescription and others can be bought over the counter.  Ask your nurse if you have questions.     Bring a paper prescription for each of these medications     HYDROcodone-acetaminophen  MG per tablet    LORazepam 0.5 MG tablet    morphine sulfate 20 MG 24 hr capsule               Information about OPIOIDS     PRESCRIPTION OPIOIDS: WHAT YOU NEED TO KNOW   We gave you an opioid (narcotic) pain medicine. It is important to manage your pain, but opioids are not always the best choice. You should first try all the other options your care team gave you. Take this medicine for as short a time (and as few doses) as possible.     These medicines have risks:    DO NOT drive when on new or higher doses of pain medicine. These medicines can affect your alertness and reaction times, and you could be arrested for driving under the influence (DUI). If you need to use opioids long-term, talk to your care team about driving.    DO NOT operate heave machinery    DO NOT do any other dangerous activities while taking these medicines.     DO NOT drink any alcohol while taking these medicines.      If the opioid prescribed includes acetaminophen, DO NOT take with any other medicines that contain acetaminophen. Read all labels carefully. Look for the word  acetaminophen  or  Tylenol.  Ask your pharmacist if you have questions or are unsure.    You can get addicted to pain medicines, especially if you have a history of addiction (chemical, alcohol or substance dependence). Talk to your care team about ways to reduce this risk.    Store your pills in a secure place, locked if possible. We will not replace any lost or stolen medicine. If you don t finish your medicine, please throw away (dispose) as directed by your pharmacist. The [place] Pollution Control Agency has more information about safe disposal:  https://www.pca.[facility].[place]/living-green/managing-unwanted-medications.     All opioids tend to cause constipation. Drink plenty of water and eat foods that have a lot of fiber, such as fruits, vegetables, prune juice, apple juice and high-fiber cereal. Take a laxative (Miralax, milk of magnesia, Colace, Senna) if you don t move your bowels at least every other day.          Primary Care Provider Office Phone # Fax #    Emili Shayna Ballard [phone] [address] [place] [address]        Equal Access to Services     LIZA PASTRANA : Hadii eric grayo Soandria, waaxda luqadaha, qaybta kaalmada adeross, william bean. So [facility] [phone].    ATENCIÓN: Si habla español, tiene a parson disposición servicios gratuitos de asistencia lingüística. Llame al [phone].    We comply with applicable federal civil rights laws and [place] laws. We do not discriminate on the basis of race, color, national origin, age, disability, sex, sexual orientation, or gender identity.            Thank you!     Thank you for choosing [facility]  for your care. Our goal is always to provide you with excellent care. Hearing back from our patients is one way we can continue to improve our services. Please take a few minutes to complete the written survey that you may receive in the mail after your visit with us. Thank you!             Your Updated Medication List - Protect others around you: Learn how to safely use, store and throw away your medicines at www.disposemymeds.org.          This list is accurate as of 6/12/18 11:59 PM.  Always use your most recent med list.                   Brand Name Dispense Instructions for use Diagnosis    calcium citrate 950 MG tablet    CALCITRATE     Take 1 tablet by mouth 2 times daily.        cholecalciferol 90743 units capsule    VITAMIN D3    8 capsule    Take 1 capsule by mouth once a week.    Vitamin D  deficiency       cyclobenzaprine 10 MG tablet    FLEXERIL    90 tablet    Take 1 tablet (10 mg) by mouth 3 times daily as needed for muscle spasms    Myofascial pain       DULoxetine 60 MG EC capsule    CYMBALTA    30 capsule    Take 1 capsule (60 mg) by mouth daily    Chronic low back pain, unspecified back pain laterality, with sciatica presence unspecified       estradiol 0.1 MG/GM cream    ESTRACE VAGINAL    42.5 g    Place 2 g vaginally three times a week.    Vaginal atrophy       Estradiol 1 MG/GM Gel     30 g    Place 1 g onto the skin daily    Postmenopausal status       HYDROcodone-acetaminophen  MG per tablet   Start taking on:  7/12/2018    NORCO    90 tablet    Take 1 tablet by mouth every 6 hours as needed for moderate to severe pain maximum 4 tablet(s) per day    Chronic low back pain, unspecified back pain laterality, with sciatica presence unspecified, Chronic pain syndrome       lidocaine 5 % Patch    LIDODERM    120 patch    Place 4 patches onto the skin daily Apply up to 4 patches to skin. Wear for 12 hours and remove for 12 hrs.  Refill when patient requests.    Cervicalgia, Chronic low back pain, unspecified back pain laterality, with sciatica presence unspecified       LORazepam 0.5 MG tablet    ATIVAN    100 tablet    1-2 tabs qhs prn insomnia and 1 q 8 hours prn anxiety    Insomnia, unspecified type       morphine sulfate 20 MG 24 hr capsule   Start taking on:  7/12/2018    NARGIS    60 capsule    1 capsule bid x 30 days with additional 5 tabs dispensed for breakthrough- -max 60mg/24 hours    Chronic pain syndrome, Chronic low back pain, unspecified back pain laterality, with sciatica presence unspecified       sennosides 8.6 MG tablet    SENOKOT     Take 1 tablet by mouth daily as needed for constipation.

## 2018-06-12 NOTE — PROGRESS NOTES
SUBJECTIVE:   Janelle White is a 57 year old female who presents to clinic today for the following health issues:    Medication Followup    Taking Medication as prescribed: yes    Side Effects:  None    Medication Helping Symptoms:  yes     Pt is here for refills on norco and morphine      Patient presents today after hospitalization for POSTERIOR FUSION WITH TRANSFORAMINAL LUMBAR INTERBODY FUSION (TFLIF) L2-3, HARDWARE REMOVAL L3/4 PRONE 12- at [facility] with Dr. Clayton.     She is also s/p recent ANTERIOR CERVICAL DECOMPRESSION AND FUSION C7-T1 WITH ANTERIOR HARDWARE REMOVAL C6-7 AND POSTERIOR CERVICAL FUSION C7-T1 9-      She continues to do well at work-- finding benefit in a one hour noon break time.  She hopes this will remain a priority on her schedule.  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      She has continued with [facility] for further rehab and strengthening.  She has been using her pain medications as scheduled.  She has been doing well back at work with her sit-to-stand work and finds she does best when she is standing.  She has been cleared to begin classes.  Recently started a Pilates class.      She continues to work on her stamina as she returns from a six month medical leave.  She is exhausted by the end of her shift and needs a nap often after.  She is trying to get out and enjoy walks with her dog [place].  She continues to see her talk therapist regularly.      Pain control has been stable.  Does not desire a dose adjustment today.    Has had some intermittent increased insomnia.  Would like to use lorazepam 1-2 pills qhs prn.    Problem list and histories reviewed & adjusted, as indicated.  Additional history: as documented    Patient Active Problem List   Diagnosis     PERSONAL HX OF  MELANOMA     B-complex deficiency     Migraine     Chronic Low Back Pain     CARDIOVASCULAR SCREENING; LDL  GOAL LESS THAN 160     DDD (degenerative disc disease), cervical     Moderate recurrent major depression (H)     Vitamin D deficiency     Shift work sleep disorder     Chronic pain syndrome     CLL (chronic lymphocytic leukemia) (H)     Past Surgical History:   Procedure Laterality Date     anterior cervical discectomy C4-5 ,Fusion C5-6-7  10/2007     BLEPHAROPLASTY BILATERAL  2013    Procedure: BLEPHAROPLASTY BILATERAL;  BILATERAL UPPER LID BLEPHAROPLASTY AND BROWPEXY ;  Surgeon: Godfrey Miguel MD;  Location:  [facility]     C APPENDECTOMY  2006      SECTION      x2     FUSION LUMBAR ANTERIOR, FUSION LUMBAR POSTERIOR TWO LEVELS, COMBINED  2010    L3-S1 anterior posterior fusion     HYSTERECTOMY  2006    ovaries intact     Partial vulvectomy for NEENA III  2009     Pubovaginal sling, post op durasphere injections  2006    wears pad     ROTATOR CUFF REPAIR RT/LT  2011    right     SPINAL FUSION C3-4  2009    C3-4, anterior spinal fusion     TUBAL LIGATION         Social History   Substance Use Topics     Smoking status: Former Smoker     Packs/day: 1.00     Years: 5.00     Quit date: 1984     Smokeless tobacco: Never Used     Alcohol use Yes      Comment: no alcohol currently since prior to her back surgeries,      Family History   Problem Relation Age of Onset     Hypertension Mother      Alcohol/Drug Mother      OSTEOPOROSIS Mother      borderline     Hypertension Father      DIABETES Father      Type 2, diet controlled     Alcohol/Drug Father      remote use     C.A.D. Maternal Grandmother       late 50s     C.A.D. Maternal Grandfather      bone and brain cancer mets as well     DIABETES Paternal Grandfather      Alcohol/Drug Brother      Breast Cancer No family hx of      Cancer - colorectal No family hx of      CEREBROVASCULAR DISEASE No family hx of          Current Outpatient Prescriptions   Medication Sig Dispense Refill     calcium citrate (CALCIUM CITRATE) 950 MG  tablet Take 1 tablet by mouth 2 times daily.       cholecalciferol 58778 UNITS capsule Take 1 capsule by mouth once a week. 8 capsule 0     cyclobenzaprine (FLEXERIL) 10 MG tablet Take 1 tablet (10 mg) by mouth 3 times daily as needed for muscle spasms 90 tablet 3     DULoxetine (CYMBALTA) 60 MG EC capsule Take 1 capsule (60 mg) by mouth daily 30 capsule 11     estradiol (ESTRACE VAGINAL) 0.1 MG/GM vaginal cream Place 2 g vaginally three times a week. 42.5 g 11     Estradiol 1 MG/GM GEL Place 1 g onto the skin daily 30 g 11     [START ON 7/12/2018] HYDROcodone-acetaminophen (NORCO)  MG per tablet Take 1 tablet by mouth every 6 hours as needed for moderate to severe pain maximum 4 tablet(s) per day 90 tablet 0     lidocaine (LIDODERM) 5 % Patch Place 4 patches onto the skin daily Apply up to 4 patches to skin. Wear for 12 hours and remove for 12 hrs.  Refill when patient requests. 120 patch 3     LORazepam (ATIVAN) 0.5 MG tablet 1-2 tabs qhs prn insomnia and 1 q 8 hours prn anxiety 100 tablet 5     sennosides (SENOKOT) 8.6 MG tablet Take 1 tablet by mouth daily as needed for constipation.       [START ON 7/12/2018] morphine sulfate (NARGIS) 20 MG 24 hr capsule 1 capsule bid x 30 days with additional 5 tabs dispensed for breakthrough- -max 60mg/24 hours 60 capsule 0     Allergies   Allergen Reactions     Budeprion Sr      Ineffective, name brand works best     Codeine      Shaky     Effexor [Venlafaxine Hydrochloride]      Affect too flat     Levaquin [Levofloxacin] Other (See Comments)     Dark thoughts- mood changes     Lexapro      Sexual dysfunction     Pregabalin      Audiovisual hallucinations     Prozac [Fluoxetine Hcl]      Sexual dysfunction     Tramadol      Hives       BP Readings from Last 3 Encounters:   06/12/18 95/61   05/08/18 109/66   04/10/18 117/80    Wt Readings from Last 3 Encounters:   11/14/17 189 lb 8 oz (86 kg)   02/23/17 185 lb (83.9 kg)   11/08/13 183 lb (83 kg)                     Reviewed and updated as needed this visit by clinical staff  Allergies  Meds       Reviewed and updated as needed this visit by Provider         ROS:  Constitutional, HEENT, cardiovascular, pulmonary, gi and gu systems are negative, except as otherwise noted.    OBJECTIVE:     BP 95/61  Pulse 76  Temp 98  F (36.7  C) (Oral)  Resp 18  LMP 05/01/2005 (Exact Date)  SpO2 96%  There is no height or weight on file to calculate BMI.  GENERAL: healthy, alert and no distress  EYES: Eyes grossly normal to inspection, PERRL and conjunctivae and sclerae normal  NECK: no adenopathy, no asymmetry, masses, or scars and thyroid normal to palpation  MS: no gross musculoskeletal defects noted, no edema  SKIN: no suspicious lesions or rashes  PSYCH: mentation appears normal, affect normal/bright    Diagnostic Test Results:  none     ASSESSMENT/PLAN:     1. Chronic low back pain, unspecified back pain laterality, with sciatica presence unspecified    - morphine sulfate (NARGIS) 20 MG 24 hr capsule; 1 capsule bid x 30 days with additional 5 tabs dispensed for breakthrough- -max 60mg/24 hours  Dispense: 60 capsule; Refill: 0  - HYDROcodone-acetaminophen (NORCO)  MG per tablet; Take 1 tablet by mouth every 6 hours as needed for moderate to severe pain maximum 4 tablet(s) per day  Dispense: 90 tablet; Refill: 0    Refill of MS and Norco as above x 2 months-- rxs for 6-15 and 7-15 given today.  Will FU in early August to check in and for further refills.    2. Chronic pain syndrome    - morphine sulfate (NARGIS) 20 MG 24 hr capsule; 1 capsule bid x 30 days with additional 5 tabs dispensed for breakthrough- -max 60mg/24 hours  Dispense: 60 capsule; Refill: 0  - HYDROcodone-acetaminophen (NORCO)  MG per tablet; Take 1 tablet by mouth every 6 hours as needed for moderate to severe pain maximum 4 tablet(s) per day  Dispense: 90 tablet; Refill: 0    As above.    3. Insomnia, unspecified type    - LORazepam (ATIVAN) 0.5 MG  tablet; 1-2 tabs qhs prn insomnia and 1 q 8 hours prn anxiety  Dispense: 100 tablet; Refill: 5    Using 1-2 lorazepam for sleep at this time and rarely takes an additional one during the day.  Script rewritten to reflect this.    Emili Ballard MD  [facility]

## 2018-08-14 ENCOUNTER — OFFICE VISIT (OUTPATIENT)
Dept: FAMILY MEDICINE | Facility: CLINIC | Age: 58
End: 2018-08-14
Payer: COMMERCIAL

## 2018-08-14 VITALS
DIASTOLIC BLOOD PRESSURE: 68 MMHG | TEMPERATURE: 98.3 F | SYSTOLIC BLOOD PRESSURE: 99 MMHG | HEART RATE: 61 BPM | OXYGEN SATURATION: 98 %

## 2018-08-14 DIAGNOSIS — G89.4 CHRONIC PAIN SYNDROME: Primary | ICD-10-CM

## 2018-08-14 DIAGNOSIS — M54.5 CHRONIC LOW BACK PAIN, UNSPECIFIED BACK PAIN LATERALITY, WITH SCIATICA PRESENCE UNSPECIFIED: ICD-10-CM

## 2018-08-14 DIAGNOSIS — G89.29 CHRONIC LOW BACK PAIN, UNSPECIFIED BACK PAIN LATERALITY, WITH SCIATICA PRESENCE UNSPECIFIED: ICD-10-CM

## 2018-08-14 DIAGNOSIS — Z79.899 CONTROLLED SUBSTANCE AGREEMENT SIGNED: ICD-10-CM

## 2018-08-14 PROCEDURE — 80307 DRUG TEST PRSMV CHEM ANLYZR: CPT | Mod: 90 | Performed by: FAMILY MEDICINE

## 2018-08-14 PROCEDURE — 99213 OFFICE O/P EST LOW 20 MIN: CPT | Performed by: FAMILY MEDICINE

## 2018-08-14 PROCEDURE — 99000 SPECIMEN HANDLING OFFICE-LAB: CPT | Performed by: FAMILY MEDICINE

## 2018-08-14 RX ORDER — MORPHINE SULFATE 15 MG/1
15 TABLET, FILM COATED, EXTENDED RELEASE ORAL EVERY 12 HOURS
Qty: 60 TABLET | Refills: 0 | Status: SHIPPED | OUTPATIENT
Start: 2018-08-14 | End: 2018-09-11

## 2018-08-14 RX ORDER — HYDROCODONE BITARTRATE AND ACETAMINOPHEN 10; 325 MG/1; MG/1
1 TABLET ORAL EVERY 6 HOURS PRN
Qty: 90 TABLET | Refills: 0 | Status: SHIPPED | OUTPATIENT
Start: 2018-08-14 | End: 2018-09-11

## 2018-08-14 NOTE — MR AVS SNAPSHOT
After Visit Summary   8/14/2018    Janelle White    MRN: [id number]           Patient Information     Date Of Birth          1960        Visit Information        Provider Department      8/14/2018 11:40 AM Emili Ballard MD [facility]        Today's Diagnoses     Chronic pain syndrome    -  1    Controlled substance agreement signed        Chronic low back pain, unspecified back pain laterality, with sciatica presence unspecified           Follow-ups after your visit        Follow-up notes from your care team     Return if symptoms worsen or fail to improve.      Who to contact     If you have questions or need follow up information about today's clinic visit or your schedule please contact [facility] directly at [phone].  Normal or non-critical lab and imaging results will be communicated to you by Coterahart, letter or phone within 4 business days after the clinic has received the results. If you do not hear from us within 7 days, please contact the clinic through Coterahart or phone. If you have a critical or abnormal lab result, we will notify you by phone as soon as possible.  Submit refill requests through Foodzai or call your pharmacy and they will forward the refill request to us. Please allow 3 business days for your refill to be completed.          Additional Information About Your Visit        MyChart Information     Foodzai gives you secure access to your electronic health record. If you see a primary care provider, you can also send messages to your care team and make appointments. If you have questions, please call your primary care clinic.  If you do not have a primary care provider, please call [phone] and they will assist you.        Care EveryWhere ID     This is your Care EveryWhere ID. This could be used by other organizations to access your [place] medical records  [id number]        Your Vitals Were      Pulse Temperature Last Period Pulse Oximetry Breastfeeding?       61 98.3  F (36.8  C) (Oral) 05/01/2005 (Exact Date) 98% No        Blood Pressure from Last 3 Encounters:   08/14/18 99/68   06/12/18 95/61   05/08/18 109/66    Weight from Last 3 Encounters:   11/14/17 189 lb 8 oz (86 kg)   02/23/17 185 lb (83.9 kg)   11/08/13 183 lb (83 kg)              We Performed the Following     Pain Drug Scr UR W Rptd Meds          Today's Medication Changes          These changes are accurate as of 8/14/18  1:37 PM.  If you have any questions, ask your nurse or doctor.               Start taking these medicines.        Dose/Directions    morphine 15 MG 12 hr tablet   Commonly known as:  MS CONTIN   Used for:  Chronic pain syndrome   Replaces:  morphine sulfate 20 MG 24 hr capsule   Started by:  Emili Ballard MD        Dose:  15 mg   Take 1 tablet (15 mg) by mouth every 12 hours maximum 2 tablet(s) per day   Quantity:  60 tablet   Refills:  0         Stop taking these medicines if you haven't already. Please contact your care team if you have questions.     morphine sulfate 20 MG 24 hr capsule   Commonly known as:  NARGIS   Replaced by:  morphine 15 MG 12 hr tablet   Stopped by:  Emili Ballard MD                Where to get your medicines      Some of these will need a paper prescription and others can be bought over the counter.  Ask your nurse if you have questions.     Bring a paper prescription for each of these medications     HYDROcodone-acetaminophen  MG per tablet    morphine 15 MG 12 hr tablet               Information about OPIOIDS     PRESCRIPTION OPIOIDS: WHAT YOU NEED TO KNOW   We gave you an opioid (narcotic) pain medicine. It is important to manage your pain, but opioids are not always the best choice. You should first try all the other options your care team gave you. Take this medicine for as short a time (and as few doses) as possible.    Some activities can  increase your pain, such as bandage changes or therapy sessions. It may help to take your pain medicine 30 to 60 minutes before these activities. Reduce your stress by getting enough sleep, working on hobbies you enjoy and practicing relaxation or meditation. Talk to your care team about ways to manage your pain beyond prescription opioids.    These medicines have risks:    DO NOT drive when on new or higher doses of pain medicine. These medicines can affect your alertness and reaction times, and you could be arrested for driving under the influence (DUI). If you need to use opioids long-term, talk to your care team about driving.    DO NOT operate heavy machinery    DO NOT do any other dangerous activities while taking these medicines.    DO NOT drink any alcohol while taking these medicines.     If the opioid prescribed includes acetaminophen, DO NOT take with any other medicines that contain acetaminophen. Read all labels carefully. Look for the word  acetaminophen  or  Tylenol.  Ask your pharmacist if you have questions or are unsure.    You can get addicted to pain medicines, especially if you have a history of addiction (chemical, alcohol or substance dependence). Talk to your care team about ways to reduce this risk.    All opioids tend to cause constipation. Drink plenty of water and eat foods that have a lot of fiber, such as fruits, vegetables, prune juice, apple juice and high-fiber cereal. Take a laxative (Miralax, milk of magnesia, Colace, Senna) if you don t move your bowels at least every other day. Other side effects include upset stomach, sleepiness, dizziness, throwing up, tolerance (needing more of the medicine to have the same effect), physical dependence and slowed breathing.    Store your pills in a secure place, locked if possible. We will not replace any lost or stolen medicine. If you don t finish your medicine, please throw away (dispose) as directed by your pharmacist. The [place]  Pollution Control Agency has more information about safe disposal: https://www.pca.[email or web address].[email or web address]/living-green/managing-unwanted-medications         Primary Care Provider Office Phone # Fax #    Emili Shayna Ballard [phone] [address] [place] [address]        Equal Access to Services     [facility] VICTORIANO : Hadii aad ku hadasho Soomaali, waaxda luqadaha, qaybta kaalmada adeegyada, waxay idiin hayaan adeeg khysabel lapatria . So [facility] [phone].    ATENCIÓN: Si habla español, tiene a parson disposición servicios gratuitos de asistencia lingüística. Hermilo al [phone].    We comply with applicable federal civil rights laws and [place] laws. We do not discriminate on the basis of race, color, national origin, age, disability, sex, sexual orientation, or gender identity.            Thank you!     Thank you for choosing [facility]  for your care. Our goal is always to provide you with excellent care. Hearing back from our patients is one way we can continue to improve our services. Please take a few minutes to complete the written survey that you may receive in the mail after your visit with us. Thank you!             Your Updated Medication List - Protect others around you: Learn how to safely use, store and throw away your medicines at www.disposemymeds.org.          This list is accurate as of 8/14/18  1:37 PM.  Always use your most recent med list.                   Brand Name Dispense Instructions for use Diagnosis    calcium citrate 950 MG tablet    CALCITRATE     Take 1 tablet by mouth 2 times daily.        cholecalciferol 46028 units capsule    VITAMIN D3    8 capsule    Take 1 capsule by mouth once a week.    Vitamin D deficiency       cyclobenzaprine 10 MG tablet    FLEXERIL    90 tablet    Take 1 tablet (10 mg) by mouth 3 times daily as needed for muscle spasms    Myofascial pain       DULoxetine 60 MG EC capsule    CYMBALTA    30 capsule    Take  1 capsule (60 mg) by mouth daily    Chronic low back pain, unspecified back pain laterality, with sciatica presence unspecified       estradiol 0.1 MG/GM cream    ESTRACE VAGINAL    42.5 g    Place 2 g vaginally three times a week.    Vaginal atrophy       Estradiol 1 MG/GM Gel     30 g    Place 1 g onto the skin daily    Postmenopausal status       HYDROcodone-acetaminophen  MG per tablet    NORCO    90 tablet    Take 1 tablet by mouth every 6 hours as needed for moderate to severe pain maximum 4 tablet(s) per day    Chronic low back pain, unspecified back pain laterality, with sciatica presence unspecified, Chronic pain syndrome       lidocaine 5 % Patch    LIDODERM    120 patch    Place 4 patches onto the skin daily Apply up to 4 patches to skin. Wear for 12 hours and remove for 12 hrs.  Refill when patient requests.    Cervicalgia, Chronic low back pain, unspecified back pain laterality, with sciatica presence unspecified       LORazepam 0.5 MG tablet    ATIVAN    100 tablet    1-2 tabs qhs prn insomnia and 1 q 8 hours prn anxiety    Insomnia, unspecified type       morphine 15 MG 12 hr tablet    MS CONTIN    60 tablet    Take 1 tablet (15 mg) by mouth every 12 hours maximum 2 tablet(s) per day    Chronic pain syndrome       sennosides 8.6 MG tablet    SENOKOT     Take 1 tablet by mouth daily as needed for constipation.

## 2018-08-14 NOTE — LETTER
[facility]    08/14/18    Patient: Janelle White  YOB: 1960  Medical Record Number: [id number]                                                                  Controlled Substance Agreement  I understand that my care provider has prescribed controlled substances (narcotics, tranquilizers, and/or stimulants) to help manage my condition(s).  I am taking this medicine to help me function or work.  I know that this is strong medicine.  It could have serious side effects and even cause a dependency on the drug.  If I stop these medicines suddenly, I could have severe withdrawal symptoms.    The risks, benefits, and side effects of these medication(s) were explained to me.  I agree that:  1. I will take part in other treatments as advised by my provider.  This may be psychiatry or counseling, physical therapy, behavioral therapy, group treatment, or a referral to a pain clinic.  I will reduce or stop my medicine when my provider tells me to do so.   2. I will take my medicines as prescribed.  I will not change the dose or schedule unless my provider tells me to.  There will be no refills if I  run out early.   I may be contacted at any time without warning and asked to complete a drug test or pill count.   3. I will keep all my appointments at the clinic.  If I miss appointments or fail to follow instructions, my provider may stop my medicine.  4. I will not ask other providers to prescribe controlled substances. And I will not accept controlled substances from other people. If I need another prescribed controlled substance for a new reason, I will notify my provider within one business day.  5. If I enroll in the [place] Medical Marijuana program, I will tell my provider.  I will also sign an agreement to share my medical records with my provider.  6. I will use one pharmacy to fill all of my controlled substance prescriptions.  If my prescription is mailed to my pharmacy, it  may take 5 to 7 days for my medicine to be ready.  7. I understand that my provider, clinic care team, and pharmacy can track controlled substance prescriptions from other providers through a central database (prescription monitoring program).  8. I will bring in my list of medications (or my medicine bottles) each time I come to the clinic.  [id number] REV-  07/2018                                                                                                                                   Page 1 of 2      [facility]    08/14/18    Patient: Janelle White  YOB: 1960  Medical Record Number: [id number]    9. Refills of controlled substances will be made only during office hours.  It is up to me to make sure that I do not run out of my medicines on weekends or holidays.    10. I am responsible for my prescriptions.  If the medicine/prescription is lost or stolen, it will not be replaced.   I also agree not to share these medicines with anyone.  11. I agree to not use ANY illegal or recreational drugs.  This includes marijuana, cocaine, bath salts or other drugs.  I agree not to use alcohol unless my provider says I may.  I agree to give urine samples whenever asked.  If I fail to give a urine sample, the provider may stop my medicine.     12. I will tell my nurse or provider right away if I become pregnant or have a new medical problem treated outside of [facility].  13. I understand that this medicine can affect my thinking and judgment.  It may be unsafe for me to drive, use machinery and do dangerous tasks.  I will not do any of these things until I know how the medicine affects me.  If my dose changes, I will wait to see how it affects me.  I will contact my provider if I have concerns about medicine side effects.  I understand that if I do not follow any of the conditions above, my prescriptions or treatment may be stopped.    I agree that my provider, clinic care  team, and pharmacy may work with any city, state or federal law enforcement agency that investigates the misuse, sale, or other diversion of my controlled medicine. I will allow my provider to discuss my care with or share a copy of this agreement with any other treating provider, pharmacy or emergency room where I receive care.  I agree to give up (waive) any right of privacy or confidentiality with respect to these authorizations.   I have read this agreement and have asked questions about anything I did not understand.   ___________________________________    ___________________________  Patient Signature                                                           Date and Time  ___________________________________     ____________________________  Witness                                                                            Date and Time  ___________________________________  Emili Ballard MD  [id number] REV-  07/2018                                                                                                                                                   Page 2 of 2

## 2018-08-14 NOTE — PROGRESS NOTES
SUBJECTIVE:   Janelle White is a 57 year old female who presents to clinic today for the following health issues:    Medication Followup of all medications    Taking Medication as prescribed: yes    Side Effects:  None    Medication Helping Symptoms:  yes     Pt is here for refills on norco and morphine      Patient presents today after hospitalization for POSTERIOR FUSION WITH TRANSFORAMINAL LUMBAR INTERBODY FUSION (TFLIF) L2-3, HARDWARE REMOVAL L3/4 PRONE 12- at [facility] with Dr. Clayton.     She is also s/p ANTERIOR CERVICAL DECOMPRESSION AND FUSION C7-T1 WITH ANTERIOR HARDWARE REMOVAL C6-7 AND POSTERIOR CERVICAL FUSION C7-T1 9-      She continues to do well at work-- finding benefit in a one hour noon break time.  She hopes this will remain a priority on her schedule.  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      She has continued with [facility] for further rehab and strengthening.  She has been using her pain medications as scheduled.  She has been doing well back at work with her sit-to-stand work and finds she does best when she is standing. She is enjoying her [employer] class.      Pain control has been stable.  Does not desire a dose adjustment today.    Self reports using THC this weekend while home in [place] with family.    Problem list and histories reviewed & adjusted, as indicated.  Additional history: as documented    Patient Active Problem List   Diagnosis     PERSONAL HX OF  MELANOMA     B-complex deficiency     Migraine     Chronic Low Back Pain     CARDIOVASCULAR SCREENING; LDL GOAL LESS THAN 160     DDD (degenerative disc disease), cervical     Moderate recurrent major depression (H)     Vitamin D deficiency     Shift work sleep disorder     Chronic pain syndrome     CLL (chronic lymphocytic leukemia) (H)     Controlled substance agreement signed     Past Surgical History:   Procedure  Laterality Date     anterior cervical discectomy C4-5 ,Fusion C5-6-7  10/2007     BLEPHAROPLASTY BILATERAL  2013    Procedure: BLEPHAROPLASTY BILATERAL;  BILATERAL UPPER LID BLEPHAROPLASTY AND BROWPEXY ;  Surgeon: Godfrey Miguel MD;  Location: [facility] APPENDECTOMY  2006      SECTION      x2     FUSION LUMBAR ANTERIOR, FUSION LUMBAR POSTERIOR TWO LEVELS, COMBINED  2010    L3-S1 anterior posterior fusion     HYSTERECTOMY  2006    ovaries intact     Partial vulvectomy for NEENA III  2009     Pubovaginal sling, post op durasphere injections  2006    wears pad     ROTATOR CUFF REPAIR RT/LT  2011    right     SPINAL FUSION C3-4  2009    C3-4, anterior spinal fusion     TUBAL LIGATION         Social History   Substance Use Topics     Smoking status: Former Smoker     Packs/day: 1.00     Years: 5.00     Quit date: 1984     Smokeless tobacco: Never Used     Alcohol use Yes      Comment: no alcohol currently since prior to her back surgeries,      Family History   Problem Relation Age of Onset     Hypertension Mother      Alcohol/Drug Mother      Osteoperosis Mother      borderline     Hypertension Father      Diabetes Father      Type 2, diet controlled     Alcohol/Drug Father      remote use     C.A.D. Maternal Grandmother       late 50s     C.A.D. Maternal Grandfather      bone and brain cancer mets as well     Diabetes Paternal Grandfather      Alcohol/Drug Brother      Breast Cancer No family hx of      Cancer - colorectal No family hx of      Cerebrovascular Disease No family hx of          Current Outpatient Prescriptions   Medication Sig Dispense Refill     calcium citrate (CALCIUM CITRATE) 950 MG tablet Take 1 tablet by mouth 2 times daily.       cholecalciferol 40419 UNITS capsule Take 1 capsule by mouth once a week. 8 capsule 0     cyclobenzaprine (FLEXERIL) 10 MG tablet Take 1 tablet (10 mg) by mouth 3 times daily as needed for muscle spasms 90 tablet 3      DULoxetine (CYMBALTA) 60 MG EC capsule Take 1 capsule (60 mg) by mouth daily 30 capsule 11     estradiol (ESTRACE VAGINAL) 0.1 MG/GM vaginal cream Place 2 g vaginally three times a week. 42.5 g 11     Estradiol 1 MG/GM GEL Place 1 g onto the skin daily 30 g 11     HYDROcodone-acetaminophen (NORCO)  MG per tablet Take 1 tablet by mouth every 6 hours as needed for moderate to severe pain maximum 4 tablet(s) per day 90 tablet 0     lidocaine (LIDODERM) 5 % Patch Place 4 patches onto the skin daily Apply up to 4 patches to skin. Wear for 12 hours and remove for 12 hrs.  Refill when patient requests. 120 patch 3     LORazepam (ATIVAN) 0.5 MG tablet 1-2 tabs qhs prn insomnia and 1 q 8 hours prn anxiety 100 tablet 5     morphine sulfate (NARGIS) 20 MG 24 hr capsule 1 capsule bid x 30 days with additional 5 tabs dispensed for breakthrough- -max 60mg/24 hours 60 capsule 0     sennosides (SENOKOT) 8.6 MG tablet Take 1 tablet by mouth daily as needed for constipation.       Allergies   Allergen Reactions     Budeprion Sr      Ineffective, name brand works best     Codeine      Shaky     Effexor [Venlafaxine Hydrochloride]      Affect too flat     Levaquin [Levofloxacin] Other (See Comments)     Dark thoughts- mood changes     Lexapro      Sexual dysfunction     Pregabalin      Audiovisual hallucinations     Prozac [Fluoxetine Hcl]      Sexual dysfunction     Tramadol      Hives       BP Readings from Last 3 Encounters:   08/14/18 99/68   06/12/18 95/61   05/08/18 109/66    Wt Readings from Last 3 Encounters:   11/14/17 189 lb 8 oz (86 kg)   02/23/17 185 lb (83.9 kg)   11/08/13 183 lb (83 kg)                    Reviewed and updated as needed this visit by clinical staff  Tobacco  Allergies  Med Hx  Surg Hx  Fam Hx  Soc Hx      Reviewed and updated as needed this visit by Provider         ROS:  Constitutional, HEENT, cardiovascular, pulmonary, gi and gu systems are negative, except as otherwise noted.    OBJECTIVE:      BP 99/68  Pulse 61  Temp 98.3  F (36.8  C) (Oral)  LMP 05/01/2005 (Exact Date)  SpO2 98%  Breastfeeding? No  There is no height or weight on file to calculate BMI.  GENERAL: healthy, alert and no distress  EYES: Eyes grossly normal to inspection, PERRL and conjunctivae and sclerae normal  NECK: no adenopathy, no asymmetry, masses, or scars and thyroid normal to palpation  MS: no gross musculoskeletal defects noted, no edema  SKIN: no suspicious lesions or rashes  NEURO: Normal strength and tone, mentation intact and speech normal  PSYCH: mentation appears normal, affect normal/bright    ASSESSMENT/PLAN:     1. Chronic pain syndrome    - Pain Drug Scr UR W Rptd Meds  - morphine (MS CONTIN) 15 MG 12 hr tablet; Take 1 tablet (15 mg) by mouth every 12 hours maximum 2 tablet(s) per day  Dispense: 60 tablet; Refill: 0  - HYDROcodone-acetaminophen (NORCO)  MG per tablet; Take 1 tablet by mouth every 6 hours as needed for moderate to severe pain maximum 4 tablet(s) per day  Dispense: 90 tablet; Refill: 0    2. Controlled substance agreement signed    - Pain Drug Scr UR W Rptd Meds    3. Chronic low back pain, unspecified back pain laterality, with sciatica presence unspecified    - HYDROcodone-acetaminophen (NORCO)  MG per tablet; Take 1 tablet by mouth every 6 hours as needed for moderate to severe pain maximum 4 tablet(s) per day  Dispense: 90 tablet; Refill: 0    Continues to work at [employer] with [employer]/PT and pain management.  Has appt to see NEUROS[facility]G at end of month.  Overall continues to progress well and feels ready to be able to wean dwon on MS Contin decreasing from 20mg q12 to 15mg q 12.  Follow-up one month for recheck.    Pain control substance agreement signed today.  UTox today pending.    Emili Ballard MD  [facility]

## 2018-08-16 LAB — PAIN DRUG SCR UR W RPTD MEDS: NORMAL

## 2018-09-11 ENCOUNTER — OFFICE VISIT (OUTPATIENT)
Dept: FAMILY MEDICINE | Facility: CLINIC | Age: 58
End: 2018-09-11
Payer: COMMERCIAL

## 2018-09-11 VITALS
SYSTOLIC BLOOD PRESSURE: 121 MMHG | DIASTOLIC BLOOD PRESSURE: 82 MMHG | TEMPERATURE: 98.1 F | HEART RATE: 67 BPM | OXYGEN SATURATION: 97 % | RESPIRATION RATE: 16 BRPM

## 2018-09-11 DIAGNOSIS — G89.4 CHRONIC PAIN SYNDROME: ICD-10-CM

## 2018-09-11 DIAGNOSIS — G89.29 CHRONIC LOW BACK PAIN, UNSPECIFIED BACK PAIN LATERALITY, WITH SCIATICA PRESENCE UNSPECIFIED: ICD-10-CM

## 2018-09-11 DIAGNOSIS — M54.5 CHRONIC LOW BACK PAIN, UNSPECIFIED BACK PAIN LATERALITY, WITH SCIATICA PRESENCE UNSPECIFIED: ICD-10-CM

## 2018-09-11 PROCEDURE — 99213 OFFICE O/P EST LOW 20 MIN: CPT | Performed by: FAMILY MEDICINE

## 2018-09-11 RX ORDER — MORPHINE SULFATE 15 MG/1
15 TABLET, FILM COATED, EXTENDED RELEASE ORAL EVERY 12 HOURS
Qty: 60 TABLET | Refills: 0 | Status: SHIPPED | OUTPATIENT
Start: 2018-09-13 | End: 2018-10-16

## 2018-09-11 RX ORDER — HYDROCODONE BITARTRATE AND ACETAMINOPHEN 10; 325 MG/1; MG/1
1 TABLET ORAL EVERY 6 HOURS PRN
Qty: 90 TABLET | Refills: 0 | Status: SHIPPED | OUTPATIENT
Start: 2018-09-13 | End: 2018-10-16

## 2018-09-11 ASSESSMENT — PAIN SCALES - GENERAL: PAINLEVEL: MODERATE PAIN (4)

## 2018-09-11 NOTE — MR AVS SNAPSHOT
After Visit Summary   9/11/2018    Janelle White    MRN: [id number]           Patient Information     Date Of Birth          1960        Visit Information        Provider Department      9/11/2018 11:20 AM Emili Ballard MD [facility]        Today's Diagnoses     Chronic pain syndrome        Chronic low back pain, unspecified back pain laterality, with sciatica presence unspecified           Follow-ups after your visit        Follow-up notes from your care team     Return if symptoms worsen or fail to improve.      Who to contact     If you have questions or need follow up information about today's clinic visit or your schedule please contact [facility] directly at [phone].  Normal or non-critical lab and imaging results will be communicated to you by CodeGuardhart, letter or phone within 4 business days after the clinic has received the results. If you do not hear from us within 7 days, please contact the clinic through CodeGuardhart or phone. If you have a critical or abnormal lab result, we will notify you by phone as soon as possible.  Submit refill requests through RaftOut or call your pharmacy and they will forward the refill request to us. Please allow 3 business days for your refill to be completed.          Additional Information About Your Visit        MyChart Information     RaftOut gives you secure access to your electronic health record. If you see a primary care provider, you can also send messages to your care team and make appointments. If you have questions, please call your primary care clinic.  If you do not have a primary care provider, please call [phone] and they will assist you.        Care EveryWhere ID     This is your Care EveryWhere ID. This could be used by other organizations to access your [facility] medical records  [id number]        Your Vitals Were     Pulse Temperature Respirations Last Period  Pulse Oximetry       67 98.1  F (36.7  C) (Oral) 16 05/01/2005 (Exact Date) 97%        Blood Pressure from Last 3 Encounters:   09/11/18 121/82   08/14/18 99/68   06/12/18 95/61    Weight from Last 3 Encounters:   11/14/17 189 lb 8 oz (86 kg)   02/23/17 185 lb (83.9 kg)   11/08/13 183 lb (83 kg)              Today, you had the following     No orders found for display         Where to get your medicines      Some of these will need a paper prescription and others can be bought over the counter.  Ask your nurse if you have questions.     Bring a paper prescription for each of these medications     HYDROcodone-acetaminophen  MG per tablet    morphine 15 MG 12 hr tablet         Information about OPIOIDS     PRESCRIPTION OPIOIDS: WHAT YOU NEED TO KNOW   We gave you an opioid (narcotic) pain medicine. It is important to manage your pain, but opioids are not always the best choice. You should first try all the other options your care team gave you. Take this medicine for as short a time (and as few doses) as possible.    Some activities can increase your pain, such as bandage changes or therapy sessions. It may help to take your pain medicine 30 to 60 minutes before these activities. Reduce your stress by getting enough sleep, working on hobbies you enjoy and practicing relaxation or meditation. Talk to your care team about ways to manage your pain beyond prescription opioids.    These medicines have risks:    DO NOT drive when on new or higher doses of pain medicine. These medicines can affect your alertness and reaction times, and you could be arrested for driving under the influence (DUI). If you need to use opioids long-term, talk to your care team about driving.    DO NOT operate heavy machinery    DO NOT do any other dangerous activities while taking these medicines.    DO NOT drink any alcohol while taking these medicines.     If the opioid prescribed includes acetaminophen, DO NOT take with any other  medicines that contain acetaminophen. Read all labels carefully. Look for the word  acetaminophen  or  Tylenol.  Ask your pharmacist if you have questions or are unsure.    You can get addicted to pain medicines, especially if you have a history of addiction (chemical, alcohol or substance dependence). Talk to your care team about ways to reduce this risk.    All opioids tend to cause constipation. Drink plenty of water and eat foods that have a lot of fiber, such as fruits, vegetables, prune juice, apple juice and high-fiber cereal. Take a laxative (Miralax, milk of magnesia, Colace, Senna) if you don t move your bowels at least every other day. Other side effects include upset stomach, sleepiness, dizziness, throwing up, tolerance (needing more of the medicine to have the same effect), physical dependence and slowed breathing.    Store your pills in a secure place, locked if possible. We will not replace any lost or stolen medicine. If you don t finish your medicine, please throw away (dispose) as directed by your pharmacist. The [place] Pollution Control Agency has more information about safe disposal: [email or web address]         Primary Care Provider Office Phone # Fax #    Emili Shayna Ballard [phone] [address] [place] [address]        Equal Access to Services     LIZA PASTRANA AH: Hadii eric pham hadasho Soomaali, waaxda luqadaha, qaybta kaalmada adeegyada, william bean. So [facility] [phone].    ATENCIÓN: Si habla español, tiene a parson disposición servicios gratuitos de asistencia lingüística. Llame al [phone].    We comply with applicable federal civil rights laws and [place] laws. We do not discriminate on the basis of race, color, national origin, age, disability, sex, sexual orientation, or gender identity.            Thank you!     Thank you for choosing [facility]  for your care. Our goal is always to provide you with excellent care. Hearing back from our patients is one way we can continue to improve our services. Please take a few minutes to complete the written survey that you may receive in the mail after your visit with us. Thank you!             Your Updated Medication List - Protect others around you: Learn how to safely use, store and throw away your medicines at www.disposemymeds.org.          This list is accurate as of 9/11/18 11:59 PM.  Always use your most recent med list.                   Brand Name Dispense Instructions for use Diagnosis    calcium citrate 950 MG tablet    CALCITRATE     Take 1 tablet by mouth 2 times daily.        cholecalciferol 26737 units capsule    VITAMIN D3    8 capsule    Take 1 capsule by mouth once a week.    Vitamin D deficiency       cyclobenzaprine 10 MG tablet    FLEXERIL    90 tablet    Take 1 tablet (10 mg) by mouth 3 times daily as needed for muscle spasms    Myofascial pain       DULoxetine 60 MG EC capsule    CYMBALTA    30 capsule    Take 1 capsule (60 mg) by mouth daily    Chronic low back pain, unspecified back pain laterality, with sciatica presence unspecified       estradiol 0.1 MG/GM cream    ESTRACE VAGINAL    42.5 g    Place 2 g vaginally three times a week.    Vaginal atrophy       Estradiol 1 MG/GM Gel     30 g    Place 1 g onto the skin daily    Postmenopausal status       HYDROcodone-acetaminophen  MG per tablet    NORCO    90 tablet    Take 1 tablet by mouth every 6 hours as needed for moderate to severe pain maximum 4 tablet(s) per day    Chronic low back pain, unspecified back pain laterality, with sciatica presence unspecified, Chronic pain syndrome       lidocaine 5 % Patch    LIDODERM    120 patch    Place 4 patches onto the skin daily Apply up to 4 patches to skin. Wear for 12 hours and remove for 12 hrs.  Refill when patient requests.    Cervicalgia, Chronic low back pain, unspecified  back pain laterality, with sciatica presence unspecified       LORazepam 0.5 MG tablet    ATIVAN    100 tablet    1-2 tabs qhs prn insomnia and 1 q 8 hours prn anxiety    Insomnia, unspecified type       morphine 15 MG 12 hr tablet    MS CONTIN    60 tablet    Take 1 tablet (15 mg) by mouth every 12 hours maximum 2 tablet(s) per day    Chronic pain syndrome       sennosides 8.6 MG tablet    SENOKOT     Take 1 tablet by mouth daily as needed for constipation.

## 2018-09-11 NOTE — PROGRESS NOTES
SUBJECTIVE:   Janelle White is a 57 year old female who presents to clinic today for the following health issues:    Chronic Pain Follow-Up       Type / Location of Pain: All over SPINE  Analgesia/pain control:       Recent changes:  worse      Overall control: Tolerable with discomfort  Activity level/function:      Daily activities:  Able to do all daily activities    Work:  Able to work part time with limitations  Adverse effects:  No  Adherance    Taking medication as directed?  Yes    Participating in other treatments: yes, stretching and walking   Risk Factors:    Sleep:  Good    Mood/anxiety:  controlled    Recent family or social stressors:  none noted    Other aggravating factors: prolonged sitting and prolonged standing  PHQ-9 SCORE 12/16/2013 11/14/2017 3/15/2018   Total Score 4 - -   Total Score - 0 4     STACIE-7 SCORE 12/16/2013 3/15/2018   Total Score 6 -   Total Score - 1     Encounter-Level CSA - 08/14/2018:          Controlled Substance Agreement - Scan on 8/20/2018 12:04 PM : CONTROLLED SUBSTANCE AGREEMENT (below)                Amount of exercise or physical activity: 6-7 days/week for an average of 30-45 minutes    Problems taking medications regularly: No    Medication side effects: none    Diet: regular (no restrictions)    Pt is here for refills on norco and morphine      Patient presents today after hospitalization for POSTERIOR FUSION WITH TRANSFORAMINAL LUMBAR INTERBODY FUSION (TFLIF) L2-3, HARDWARE REMOVAL L3/4 PRONE 12- at [facility] with Dr. Clayton.     She is also s/p ANTERIOR CERVICAL DECOMPRESSION AND FUSION C7-T1 WITH ANTERIOR HARDWARE REMOVAL C6-7 AND POSTERIOR CERVICAL FUSION C7-T1 9-      She continues to do well at work-- finding benefit in a one hour noon break time.  She hopes this will remain a priority on her schedule.  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      She has  continued with [facility] for further rehab and strengthening.  She has been using her pain medications as scheduled.  She has been doing well back at work with her sit-to-stand work and finds she does best when she is standing. She is enjoying her [employer] class.      Pain control has been stable.  Does not desire a dose adjustment today.    Recently seen by NEUROSURG team.  She is scheduled for some occipital injections later this month for some C2-C3 degenerative issues,  She has new complaint 3-4x/day feels she suddenly notes she is leaning or veering to the left without warning.  She does not have any pain which precipitates this.  She forgot to mention this to her NEUROSURG team.  Denies any recent fall, head trauma, HA, vision changes or muscle weakness or speech issues.  No memory changes.    Problem list and histories reviewed & adjusted, as indicated.  Additional history: as documented    Patient Active Problem List   Diagnosis     PERSONAL HX OF  MELANOMA     B-complex deficiency     Migraine     Chronic Low Back Pain     CARDIOVASCULAR SCREENING; LDL GOAL LESS THAN 160     DDD (degenerative disc disease), cervical     Moderate recurrent major depression (H)     Vitamin D deficiency     Shift work sleep disorder     Chronic pain syndrome     CLL (chronic lymphocytic leukemia) (H)     Controlled substance agreement signed     Past Surgical History:   Procedure Laterality Date     anterior cervical discectomy C4-5 ,Fusion C5-6-7  10/2007     BLEPHAROPLASTY BILATERAL  2013    Procedure: BLEPHAROPLASTY BILATERAL;  BILATERAL UPPER LID BLEPHAROPLASTY AND BROWPEXY ;  Surgeon: Godfrey Miguel MD;  Location: [facility] APPENDECTOMY  2006      SECTION      x2     FUSION LUMBAR ANTERIOR, FUSION LUMBAR POSTERIOR TWO LEVELS, COMBINED  2010    L3-S1 anterior posterior fusion     HYSTERECTOMY  2006    ovaries intact     Partial vulvectomy for NEENA III  2009     Pubovaginal sling, post op  durasphere injections  2006    wears pad     ROTATOR CUFF REPAIR RT/LT  2011    right     SPINAL FUSION C3-4  2009    C3-4, anterior spinal fusion     TUBAL LIGATION         Social History   Substance Use Topics     Smoking status: Former Smoker     Packs/day: 1.00     Years: 5.00     Quit date: 1984     Smokeless tobacco: Never Used     Alcohol use Yes      Comment: no alcohol currently since prior to her back surgeries,      Family History   Problem Relation Age of Onset     Hypertension Mother      Alcohol/Drug Mother      Osteoporosis Mother      borderline     Hypertension Father      Diabetes Father      Type 2, diet controlled     Alcohol/Drug Father      remote use     C.A.D. Maternal Grandmother       late 50s     C.A.D. Maternal Grandfather      bone and brain cancer mets as well     Diabetes Paternal Grandfather      Alcohol/Drug Brother      Breast Cancer No family hx of      Cancer - colorectal No family hx of      Cerebrovascular Disease No family hx of          Current Outpatient Prescriptions   Medication Sig Dispense Refill     calcium citrate (CALCIUM CITRATE) 950 MG tablet Take 1 tablet by mouth 2 times daily.       cholecalciferol 46225 UNITS capsule Take 1 capsule by mouth once a week. 8 capsule 0     cyclobenzaprine (FLEXERIL) 10 MG tablet Take 1 tablet (10 mg) by mouth 3 times daily as needed for muscle spasms 90 tablet 3     DULoxetine (CYMBALTA) 60 MG EC capsule Take 1 capsule (60 mg) by mouth daily 30 capsule 11     estradiol (ESTRACE VAGINAL) 0.1 MG/GM vaginal cream Place 2 g vaginally three times a week. 42.5 g 11     Estradiol 1 MG/GM GEL Place 1 g onto the skin daily 30 g 11     [START ON 2018] HYDROcodone-acetaminophen (NORCO)  MG per tablet Take 1 tablet by mouth every 6 hours as needed for moderate to severe pain maximum 4 tablet(s) per day 90 tablet 0     lidocaine (LIDODERM) 5 % Patch Place 4 patches onto the skin daily Apply up to 4 patches to  "skin. Wear for 12 hours and remove for 12 hrs.  Refill when patient requests. 120 patch 3     LORazepam (ATIVAN) 0.5 MG tablet 1-2 tabs qhs prn insomnia and 1 q 8 hours prn anxiety 100 tablet 5     [START ON 9/13/2018] morphine (MS CONTIN) 15 MG 12 hr tablet Take 1 tablet (15 mg) by mouth every 12 hours maximum 2 tablet(s) per day 60 tablet 0     sennosides (SENOKOT) 8.6 MG tablet Take 1 tablet by mouth daily as needed for constipation.       Allergies   Allergen Reactions     Budeprion Sr      Ineffective, name brand works best     Bupropion Other (See Comments)     Ineffective, name brand works best     Codeine      Shaky     Codeine Other (See Comments)     \"Feels like electricity running through body\"  No reaction noted in [employer].  Ria  With Tylenol     Effexor [Venlafaxine Hydrochloride]      Affect too flat     Escitalopram      Other reaction(s): *Unknown  Sexual dysfunction     Fluoxetine Other (See Comments)     Sexual dysfunction     Levaquin [Levofloxacin] Other (See Comments)     Suicidal thoughts  Dark thoughts- mood changes     Lexapro      Sexual dysfunction     Pregabalin Other (See Comments)     Hallucinations  Audiovisual hallucinations     Prozac [Fluoxetine Hcl]      Sexual dysfunction     Tramadol Hives     Hives       Venlafaxine      Other reaction(s): *Unknown  Affect too flat     BP Readings from Last 3 Encounters:   09/11/18 121/82   08/14/18 99/68   06/12/18 95/61    Wt Readings from Last 3 Encounters:   11/14/17 189 lb 8 oz (86 kg)   02/23/17 185 lb (83.9 kg)   11/08/13 183 lb (83 kg)                    Reviewed and updated as needed this visit by clinical staff  Tobacco  Allergies  Meds  Med Hx  Surg Hx  Fam Hx  Soc Hx      Reviewed and updated as needed this visit by Provider         ROS:  Constitutional, HEENT, cardiovascular, pulmonary, gi and gu systems are negative, except as otherwise noted.    OBJECTIVE:     /82  Pulse 67  Temp 98.1  F (36.7  C) (Oral)  Resp 16  " LMP 05/01/2005 (Exact Date)  SpO2 97%  There is no height or weight on file to calculate BMI.  GENERAL: healthy, alert and no distress  EYES: Eyes grossly normal to inspection, PERRL and conjunctivae and sclerae normal  NECK: no adenopathy, no asymmetry, masses, or scars and thyroid normal to palpation  MS: no gross musculoskeletal defects noted, no edema  SKIN: no suspicious lesions or rashes  NEURO: Normal strength and tone, mentation intact and speech normal  PSYCH: mentation appears normal, affect normal/bright    ASSESSMENT/PLAN:     1. Chronic pain syndrome    - morphine (MS CONTIN) 15 MG 12 hr tablet; Take 1 tablet (15 mg) by mouth every 12 hours maximum 2 tablet(s) per day  Dispense: 60 tablet; Refill: 0  - HYDROcodone-acetaminophen (NORCO)  MG per tablet; Take 1 tablet by mouth every 6 hours as needed for moderate to severe pain maximum 4 tablet(s) per day  Dispense: 90 tablet; Refill: 0    2. Chronic low back pain, unspecified back pain laterality, with sciatica presence unspecified    - HYDROcodone-acetaminophen (NORCO)  MG per tablet; Take 1 tablet by mouth every 6 hours as needed for moderate to severe pain maximum 4 tablet(s) per day  Dispense: 90 tablet; Refill: 0    Refill of her MS Contin and Norco as noted above x 1 month.  Will Follow-up in one month.    Patient is referred back to NEUROSURG for further recommendations with her new symptoms of leaning to the left.  Advised emergent Follow-up if any change or worsening or new symptoms worrisome for a vascular event.    Emili Ballard MD  [facility]

## 2018-10-16 ENCOUNTER — OFFICE VISIT (OUTPATIENT)
Dept: FAMILY MEDICINE | Facility: CLINIC | Age: 58
End: 2018-10-16
Payer: COMMERCIAL

## 2018-10-16 VITALS
DIASTOLIC BLOOD PRESSURE: 82 MMHG | RESPIRATION RATE: 14 BRPM | HEART RATE: 75 BPM | SYSTOLIC BLOOD PRESSURE: 124 MMHG | TEMPERATURE: 98.3 F | OXYGEN SATURATION: 98 %

## 2018-10-16 DIAGNOSIS — M50.30 DDD (DEGENERATIVE DISC DISEASE), CERVICAL: ICD-10-CM

## 2018-10-16 DIAGNOSIS — G89.29 CHRONIC LOW BACK PAIN, UNSPECIFIED BACK PAIN LATERALITY, WITH SCIATICA PRESENCE UNSPECIFIED: ICD-10-CM

## 2018-10-16 DIAGNOSIS — G89.4 CHRONIC PAIN SYNDROME: Primary | ICD-10-CM

## 2018-10-16 DIAGNOSIS — M54.5 CHRONIC LOW BACK PAIN, UNSPECIFIED BACK PAIN LATERALITY, WITH SCIATICA PRESENCE UNSPECIFIED: ICD-10-CM

## 2018-10-16 PROCEDURE — 99213 OFFICE O/P EST LOW 20 MIN: CPT | Performed by: FAMILY MEDICINE

## 2018-10-16 RX ORDER — MORPHINE SULFATE 15 MG/1
15 TABLET, FILM COATED, EXTENDED RELEASE ORAL EVERY 12 HOURS
Qty: 60 TABLET | Refills: 0 | Status: SHIPPED | OUTPATIENT
Start: 2018-10-16 | End: 2018-11-13

## 2018-10-16 RX ORDER — HYDROCODONE BITARTRATE AND ACETAMINOPHEN 10; 325 MG/1; MG/1
1 TABLET ORAL EVERY 6 HOURS PRN
Qty: 90 TABLET | Refills: 0 | Status: SHIPPED | OUTPATIENT
Start: 2018-10-16 | End: 2018-11-13

## 2018-10-16 NOTE — MR AVS SNAPSHOT
After Visit Summary   10/16/2018    Janelle White    MRN: [id number]           Patient Information     Date Of Birth          1960        Visit Information        Provider Department      10/16/2018 11:40 AM Emili Ballard MD [facility]        Today's Diagnoses     Chronic pain syndrome    -  1    Chronic low back pain, unspecified back pain laterality, with sciatica presence unspecified           Follow-ups after your visit        Who to contact     If you have questions or need follow up information about today's clinic visit or your schedule please contact [facility] directly at [phone].  Normal or non-critical lab and imaging results will be communicated to you by MyChart, letter or phone within 4 business days after the clinic has received the results. If you do not hear from us within 7 days, please contact the clinic through [employer] or phone. If you have a critical or abnormal lab result, we will notify you by phone as soon as possible.  Submit refill requests through [employer] or call your pharmacy and they will forward the refill request to us. Please allow 3 business days for your refill to be completed.          Additional Information About Your Visit        MyChart Information     [employer] gives you secure access to your electronic health record. If you see a primary care provider, you can also send messages to your care team and make appointments. If you have questions, please call your primary care clinic.  If you do not have a primary care provider, please call [phone] and they will assist you.        Care EveryWhere ID     This is your Care EveryWhere ID. This could be used by other organizations to access your [facility] medical records  [id number]        Your Vitals Were     Pulse Temperature Respirations Last Period Pulse Oximetry       75 98.3  F (36.8  C) (Oral) 14 05/01/2005 (Exact Date) 98%         Blood Pressure from Last 3 Encounters:   10/16/18 124/82   09/11/18 121/82   08/14/18 99/68    Weight from Last 3 Encounters:   11/14/17 189 lb 8 oz (86 kg)   02/23/17 185 lb (83.9 kg)   11/08/13 183 lb (83 kg)              Today, you had the following     No orders found for display         Where to get your medicines      Some of these will need a paper prescription and others can be bought over the counter.  Ask your nurse if you have questions.     Bring a paper prescription for each of these medications     HYDROcodone-acetaminophen  MG per tablet    morphine 15 MG 12 hr tablet         Information about OPIOIDS     PRESCRIPTION OPIOIDS: WHAT YOU NEED TO KNOW   We gave you an opioid (narcotic) pain medicine. It is important to manage your pain, but opioids are not always the best choice. You should first try all the other options your care team gave you. Take this medicine for as short a time (and as few doses) as possible.    Some activities can increase your pain, such as bandage changes or therapy sessions. It may help to take your pain medicine 30 to 60 minutes before these activities. Reduce your stress by getting enough sleep, working on hobbies you enjoy and practicing relaxation or meditation. Talk to your care team about ways to manage your pain beyond prescription opioids.    These medicines have risks:    DO NOT drive when on new or higher doses of pain medicine. These medicines can affect your alertness and reaction times, and you could be arrested for driving under the influence (DUI). If you need to use opioids long-term, talk to your care team about driving.    DO NOT operate heavy machinery    DO NOT do any other dangerous activities while taking these medicines.    DO NOT drink any alcohol while taking these medicines.     If the opioid prescribed includes acetaminophen, DO NOT take with any other medicines that contain acetaminophen. Read all labels carefully. Look for the word   acetaminophen  or  Tylenol.  Ask your pharmacist if you have questions or are unsure.    You can get addicted to pain medicines, especially if you have a history of addiction (chemical, alcohol or substance dependence). Talk to your care team about ways to reduce this risk.    All opioids tend to cause constipation. Drink plenty of water and eat foods that have a lot of fiber, such as fruits, vegetables, prune juice, apple juice and high-fiber cereal. Take a laxative (Miralax, milk of magnesia, Colace, Senna) if you don t move your bowels at least every other day. Other side effects include upset stomach, sleepiness, dizziness, throwing up, tolerance (needing more of the medicine to have the same effect), physical dependence and slowed breathing.    Store your pills in a secure place, locked if possible. We will not replace any lost or stolen medicine. If you don t finish your medicine, please throw away (dispose) as directed by your pharmacist. The [place] Pollution Control Agency has more information about safe disposal: [email or web address]://www.pca.[facility].[place].us/living-green/[email or web address]-unwanted-medications         Primary Care Provider Office Phone # Fax #    Emili Shayna Ballard [phone] [address] [place] [address]        Equal Access to Services     LIZA PASTRANA AH: Lottie grayo Soandria, waaxda luqadaha, qaybta kaalmada adeegyada, william bean. So [facility] [phone].    ATENCIÓN: Si habla español, tiene a parson disposición servicios gratuitos de asistencia lingüística. Llame al [phone].    We comply with applicable federal civil rights laws and [place] laws. We do not discriminate on the basis of race, color, national origin, age, disability, sex, sexual orientation, or gender identity.            Thank you!     Thank you for choosing [facility]  for your care. Our goal is always to provide you with excellent care.  Hearing back from our patients is one way we can continue to improve our services. Please take a few minutes to complete the written survey that you may receive in the mail after your visit with us. Thank you!             Your Updated Medication List - Protect others around you: Learn how to safely use, store and throw away your medicines at www.disposemymeds.org.          This list is accurate as of 10/16/18 12:18 PM.  Always use your most recent med list.                   Brand Name Dispense Instructions for use Diagnosis    calcium citrate 950 MG tablet    CALCITRATE     Take 1 tablet by mouth 2 times daily.        cholecalciferol 72480 units capsule    VITAMIN D3    8 capsule    Take 1 capsule by mouth once a week.    Vitamin D deficiency       cyclobenzaprine 10 MG tablet    FLEXERIL    90 tablet    Take 1 tablet (10 mg) by mouth 3 times daily as needed for muscle spasms    Myofascial pain       DULoxetine 60 MG EC capsule    CYMBALTA    30 capsule    Take 1 capsule (60 mg) by mouth daily    Chronic low back pain, unspecified back pain laterality, with sciatica presence unspecified       estradiol 0.1 MG/GM cream    ESTRACE VAGINAL    42.5 g    Place 2 g vaginally three times a week.    Vaginal atrophy       Estradiol 1 MG/GM Gel     30 g    Place 1 g onto the skin daily    Postmenopausal status       HYDROcodone-acetaminophen  MG per tablet    NORCO    90 tablet    Take 1 tablet by mouth every 6 hours as needed for moderate to severe pain maximum 4 tablet(s) per day    Chronic low back pain, unspecified back pain laterality, with sciatica presence unspecified, Chronic pain syndrome       lidocaine 5 % Patch    LIDODERM    120 patch    Place 4 patches onto the skin daily Apply up to 4 patches to skin. Wear for 12 hours and remove for 12 hrs.  Refill when patient requests.    Cervicalgia, Chronic low back pain, unspecified back pain laterality, with sciatica presence unspecified       LORazepam 0.5 MG  tablet    ATIVAN    100 tablet    1-2 tabs qhs prn insomnia and 1 q 8 hours prn anxiety    Insomnia, unspecified type       morphine 15 MG 12 hr tablet    MS CONTIN    60 tablet    Take 1 tablet (15 mg) by mouth every 12 hours maximum 2 tablet(s) per day    Chronic pain syndrome       sennosides 8.6 MG tablet    SENOKOT     Take 1 tablet by mouth daily as needed for constipation.

## 2018-10-16 NOTE — PROGRESS NOTES
"  SUBJECTIVE:   Janelle White is a 57 year old female who presents to clinic today for the following health issues:      Medications follow up for morphine and NORCO       Fever: no     Chills/Sweats: YES    Headache (location?): YES    Sinus Pressure:no    Conjunctivitis:  no    Ear Pain: no    Rhinorrhea: YES    Congestion: no     Sore Throat: no      Cough: no    Wheeze: no     Decreased Appetite: no     Nausea: no     Vomiting: no     Diarrhea:  no     Dysuria/Freq.: no     Fatigue/Achiness: YES    Sick/Strep Exposure: no          Previous vist: 1 month(s) ago    Medication changes: no        Taking medication as prescribed: YES               New side effects: no    Accompanying Signs & Symptoms:          How are you feeling today: \"not well\"         Do you feel the medication(s) are helping: yes      Pt is here for refills on norco and morphine      Patient presents today after hospitalization for POSTERIOR FUSION WITH TRANSFORAMINAL LUMBAR INTERBODY FUSION (TFLIF) L2-3, HARDWARE REMOVAL L3/4 PRONE 12- at [facility] with Dr. Clayton.     She is also s/p ANTERIOR CERVICAL DECOMPRESSION AND FUSION C7-T1 WITH ANTERIOR HARDWARE REMOVAL C6-7 AND POSTERIOR CERVICAL FUSION C7-T1 9-      She continues to do well at work-- finding benefit in a one hour noon break time.  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      She has continued with [facility] for further rehab and strengthening.  She has been using her pain medications as scheduled.  She has been doing well back at work with her sit-to-stand work and finds she does best when she is standing. She is enjoying her [employer] class.      Pain control has been stable.  Does not desire a dose adjustment today.     Recently seen by NEUROSURG team.  Had some occipital injections recently for some C2-C3 degenerative issues with no relief appreciated.    She would like to " consider medical cannabis to see if this might help her wean off opioids.    Problem list and histories reviewed & adjusted, as indicated.  Additional history: as documented    Patient Active Problem List   Diagnosis     PERSONAL HX OF  MELANOMA     B-complex deficiency     Migraine     Chronic Low Back Pain     CARDIOVASCULAR SCREENING; LDL GOAL LESS THAN 160     DDD (degenerative disc disease), cervical     Moderate recurrent major depression (H)     Vitamin D deficiency     Shift work sleep disorder     Chronic pain syndrome     CLL (chronic lymphocytic leukemia) (H)     Controlled substance agreement signed     Past Surgical History:   Procedure Laterality Date     anterior cervical discectomy C4-5 ,Fusion C5-6-7  10/2007     BLEPHAROPLASTY BILATERAL  2013    Procedure: BLEPHAROPLASTY BILATERAL;  BILATERAL UPPER LID BLEPHAROPLASTY AND BROWPEXY ;  Surgeon: Godfrey Miguel MD;  Location:  [facility]     C APPENDECTOMY  2006      SECTION      x2     FUSION LUMBAR ANTERIOR, FUSION LUMBAR POSTERIOR TWO LEVELS, COMBINED  2010    L3-S1 anterior posterior fusion     HYSTERECTOMY  2006    ovaries intact     Partial vulvectomy for NEENA III  2009     Pubovaginal sling, post op durasphere injections  2006    wears pad     ROTATOR CUFF REPAIR RT/LT  2011    right     SPINAL FUSION C3-4  2009    C3-4, anterior spinal fusion     TUBAL LIGATION         Social History   Substance Use Topics     Smoking status: Former Smoker     Packs/day: 1.00     Years: 5.00     Quit date: 1984     Smokeless tobacco: Never Used     Alcohol use Yes      Comment: no alcohol currently since prior to her back surgeries,      Family History   Problem Relation Age of Onset     Hypertension Mother      Alcohol/Drug Mother      Osteoporosis Mother      borderline     Hypertension Father      Diabetes Father      Type 2, diet controlled     Alcohol/Drug Father      remote use     C.A.D. Maternal Grandmother       " late 50s     C.A.D. Maternal Grandfather      bone and brain cancer mets as well     Diabetes Paternal Grandfather      Alcohol/Drug Brother      Breast Cancer No family hx of      Cancer - colorectal No family hx of      Cerebrovascular Disease No family hx of          Current Outpatient Prescriptions   Medication Sig Dispense Refill     calcium citrate (CALCIUM CITRATE) 950 MG tablet Take 1 tablet by mouth 2 times daily.       cholecalciferol 71849 UNITS capsule Take 1 capsule by mouth once a week. 8 capsule 0     cyclobenzaprine (FLEXERIL) 10 MG tablet Take 1 tablet (10 mg) by mouth 3 times daily as needed for muscle spasms 90 tablet 3     DULoxetine (CYMBALTA) 60 MG EC capsule Take 1 capsule (60 mg) by mouth daily 30 capsule 11     estradiol (ESTRACE VAGINAL) 0.1 MG/GM vaginal cream Place 2 g vaginally three times a week. 42.5 g 11     Estradiol 1 MG/GM GEL Place 1 g onto the skin daily 30 g 11     HYDROcodone-acetaminophen (NORCO)  MG per tablet Take 1 tablet by mouth every 6 hours as needed for moderate to severe pain maximum 4 tablet(s) per day 90 tablet 0     lidocaine (LIDODERM) 5 % Patch Place 4 patches onto the skin daily Apply up to 4 patches to skin. Wear for 12 hours and remove for 12 hrs.  Refill when patient requests. 120 patch 3     LORazepam (ATIVAN) 0.5 MG tablet 1-2 tabs qhs prn insomnia and 1 q 8 hours prn anxiety 100 tablet 5     morphine (MS CONTIN) 15 MG 12 hr tablet Take 1 tablet (15 mg) by mouth every 12 hours maximum 2 tablet(s) per day 60 tablet 0     sennosides (SENOKOT) 8.6 MG tablet Take 1 tablet by mouth daily as needed for constipation.       Allergies   Allergen Reactions     Budeprion Sr      Ineffective, name brand works best     Bupropion Other (See Comments)     Ineffective, name brand works best     Codearley Rollins     Codeine Other (See Comments)     \"Feels like electricity running through body\"  No reaction noted in [place].  Ria  With Tylenol     Effexor " [Venlafaxine Hydrochloride]      Affect too flat     Escitalopram      Other reaction(s): *Unknown  Sexual dysfunction     Fluoxetine Other (See Comments)     Sexual dysfunction     Levaquin [Levofloxacin] Other (See Comments)     Suicidal thoughts  Dark thoughts- mood changes     Lexapro      Sexual dysfunction     Pregabalin Other (See Comments)     Hallucinations  Audiovisual hallucinations     Prozac [Fluoxetine Hcl]      Sexual dysfunction     Tramadol Hives     Hives       Venlafaxine      Other reaction(s): *Unknown  Affect too flat     Recent Labs   Lab Test  11/14/17   1239  12/16/13   0739  11/07/12   1207   01/09/12   0903   10/06/10   1320   LDL   --   137*  104   --    --    --    --    HDL   --   52  46*   --    --    --    --    TRIG   --   226*  120   --    --    --    --    ALT   --   17 23   --   32   < >   --    CR   --   0.76  0.68   < >   --    < >  0.67   GFRESTIMATED   --   80  >90   < >   --    < >  >90   GFRESTBLACK   --   >90  >90   < >   --    < >  >90   POTASSIUM  4.1  4.6  4.2   --    --    < >  4.0   TSH   --    --    --    --    --    --   0.94    < > = values in this interval not displayed.      BP Readings from Last 3 Encounters:   10/16/18 124/82   09/11/18 121/82   08/14/18 99/68    Wt Readings from Last 3 Encounters:   11/14/17 189 lb 8 oz (86 kg)   02/23/17 185 lb (83.9 kg)   11/08/13 183 lb (83 kg)         Reviewed and updated as needed this visit by clinical staff  Allergies  Meds       Reviewed and updated as needed this visit by Provider         ROS:  Constitutional, HEENT, cardiovascular, pulmonary, gi and gu systems are negative, except as otherwise noted.    OBJECTIVE:     /82 (BP Location: Left arm, Patient Position: Sitting, Cuff Size: Adult Regular)  Pulse 75  Temp 98.3  F (36.8  C) (Oral)  Resp 14  LMP 05/01/2005 (Exact Date)  SpO2 98%  There is no height or weight on file to calculate BMI.  GENERAL: healthy, alert and no distress  EYES: Eyes grossly  normal to inspection, PERRL and conjunctivae and sclerae normal  NECK: no adenopathy, no asymmetry, masses, or scars and thyroid normal to palpation  MS: no gross musculoskeletal defects noted, no edema  SKIN: no suspicious lesions or rashes  NEURO: Normal strength and tone, mentation intact and speech normal  PSYCH: mentation appears normal, affect normal/bright    Diagnostic Test Results:  none     ASSESSMENT/PLAN:     1. Chronic pain syndrome    - morphine (MS CONTIN) 15 MG 12 hr tablet; Take 1 tablet (15 mg) by mouth every 12 hours maximum 2 tablet(s) per day  Dispense: 60 tablet; Refill: 0  - HYDROcodone-acetaminophen (NORCO)  MG per tablet; Take 1 tablet by mouth every 6 hours as needed for moderate to severe pain maximum 4 tablet(s) per day  Dispense: 90 tablet; Refill: 0    2. Chronic low back pain, unspecified back pain laterality, with sciatica presence unspecified    - HYDROcodone-acetaminophen (NORCO)  MG per tablet; Take 1 tablet by mouth every 6 hours as needed for moderate to severe pain maximum 4 tablet(s) per day  Dispense: 90 tablet; Refill: 0    3. DDD (degenerative disc disease), cervical      Meds refilled x 1 month with no change to regimen today.  Agree with patient as she considers looking at medical cannabis to help decrease her overall opioid MEDD.  Referral sent to [facility] for eval and further discussion.    Emili Ballard MD  Critical access hospital

## 2018-10-17 ASSESSMENT — PATIENT HEALTH QUESTIONNAIRE - PHQ9: SUM OF ALL RESPONSES TO PHQ QUESTIONS 1-9: 4

## 2018-11-13 ENCOUNTER — OFFICE VISIT (OUTPATIENT)
Dept: FAMILY MEDICINE | Facility: CLINIC | Age: 58
End: 2018-11-13
Payer: COMMERCIAL

## 2018-11-13 VITALS
SYSTOLIC BLOOD PRESSURE: 118 MMHG | TEMPERATURE: 98.2 F | HEART RATE: 82 BPM | BODY MASS INDEX: 31.05 KG/M2 | DIASTOLIC BLOOD PRESSURE: 78 MMHG | HEIGHT: 66 IN | OXYGEN SATURATION: 98 % | RESPIRATION RATE: 14 BRPM

## 2018-11-13 DIAGNOSIS — G89.4 CHRONIC PAIN SYNDROME: ICD-10-CM

## 2018-11-13 DIAGNOSIS — M54.5 CHRONIC LOW BACK PAIN, UNSPECIFIED BACK PAIN LATERALITY, WITH SCIATICA PRESENCE UNSPECIFIED: ICD-10-CM

## 2018-11-13 DIAGNOSIS — M50.30 DDD (DEGENERATIVE DISC DISEASE), CERVICAL: Primary | ICD-10-CM

## 2018-11-13 DIAGNOSIS — G89.29 CHRONIC LOW BACK PAIN, UNSPECIFIED BACK PAIN LATERALITY, WITH SCIATICA PRESENCE UNSPECIFIED: ICD-10-CM

## 2018-11-13 PROCEDURE — 99213 OFFICE O/P EST LOW 20 MIN: CPT | Performed by: FAMILY MEDICINE

## 2018-11-13 RX ORDER — MORPHINE SULFATE 15 MG/1
15 TABLET, FILM COATED, EXTENDED RELEASE ORAL EVERY 12 HOURS
Qty: 60 TABLET | Refills: 0 | Status: SHIPPED | OUTPATIENT
Start: 2018-11-15 | End: 2018-12-11

## 2018-11-13 RX ORDER — HYDROCODONE BITARTRATE AND ACETAMINOPHEN 10; 325 MG/1; MG/1
1 TABLET ORAL EVERY 6 HOURS PRN
Qty: 90 TABLET | Refills: 0 | Status: SHIPPED | OUTPATIENT
Start: 2018-11-15 | End: 2018-12-11

## 2018-11-13 ASSESSMENT — ENCOUNTER SYMPTOMS
PARESIS: 0
WEIGHT LOSS: 0
BACK PAIN: 1
PARESTHESIAS: 1
TINGLING: 1
ABDOMINAL PAIN: 0
PERIANAL NUMBNESS: 0
WEAKNESS: 1
HEADACHES: 1
LEG PAIN: 1
NUMBNESS: 1
DYSURIA: 0
FEVER: 0
BOWEL INCONTINENCE: 0

## 2018-11-13 NOTE — MR AVS SNAPSHOT
"              After Visit Summary   11/13/2018    Janelle White    MRN: [id number]           Patient Information     Date Of Birth          1960        Visit Information        Provider Department      11/13/2018 11:40 AM Emili Ballard MD [facility]        Today's Diagnoses     DDD (degenerative disc disease), cervical    -  1    Chronic pain syndrome        Chronic low back pain, unspecified back pain laterality, with sciatica presence unspecified           Follow-ups after your visit        Follow-up notes from your care team     Return in about 4 weeks (around 12/11/2018) for med check.      Who to contact     If you have questions or need follow up information about today's clinic visit or your schedule please contact [facility] directly at [phone].  Normal or non-critical lab and imaging results will be communicated to you by MyChart, letter or phone within 4 business days after the clinic has received the results. If you do not hear from us within 7 days, please contact the clinic through MyChart or phone. If you have a critical or abnormal lab result, we will notify you by phone as soon as possible.  Submit refill requests through [employer] or call your pharmacy and they will forward the refill request to us. Please allow 3 business days for your refill to be completed.          Additional Information About Your Visit        MyChart Information     [employer] lets you send messages to your doctor, view your test results, renew your prescriptions, schedule appointments and more. To sign up, go to [email or web address] . Click on \"Log in\" on the left side of the screen, which will take you to the Welcome page. Then click on \"Sign up Now\" on the right side of the page.     You will be asked to enter the access code listed below, as well as some personal information. Please follow the directions to create your username and password.   " "  Your access code is: [email or web address]  Expires: 2019  1:07 PM     Your access code will  in 90 days. If you need help or a new code, please call your [facility] or [phone].        Care EveryWhere ID     This is your Care EveryWhere ID. This could be used by other organizations to access your [place] medical records  [id number]        Your Vitals Were     Pulse Temperature Respirations Height Last Period Pulse Oximetry    82 98.2  F (36.8  C) (Oral) 14 5' 5.5\" (1.664 m) 2005 (Exact Date) 98%    BMI (Body Mass Index)                   31.05 kg/m2            Blood Pressure from Last 3 Encounters:   18 118/78   10/16/18 124/82   18 121/82    Weight from Last 3 Encounters:   17 189 lb 8 oz (86 kg)   17 185 lb (83.9 kg)   13 183 lb (83 kg)              Today, you had the following     No orders found for display         Today's Medication Changes          These changes are accurate as of 18  1:07 PM.  If you have any questions, ask your nurse or doctor.               These medicines have changed or have updated prescriptions.        Dose/Directions    HYDROcodone-acetaminophen  MG per tablet   Commonly known as:  NORCO   This may have changed:  These instructions start on 11/15/2018. If you are unsure what to do until then, ask your doctor or other care provider.   Used for:  Chronic low back pain, unspecified back pain laterality, with sciatica presence unspecified, Chronic pain syndrome   Changed by:  Emili Ballard MD        Dose:  1 tablet   Start taking on:  11/15/2018   Take 1 tablet by mouth every 6 hours as needed for moderate to severe pain maximum 4 tablet(s) per day   Quantity:  90 tablet   Refills:  0       morphine 15 MG 12 hr tablet   Commonly known as:  MS CONTIN   This may have changed:  These instructions start on 11/15/2018. If you are unsure what to do until then, ask your doctor or other care provider.   Used for: "  Chronic pain syndrome, Chronic low back pain, unspecified back pain laterality, with sciatica presence unspecified   Changed by:  Emili Ballard MD        Dose:  15 mg   Start taking on:  11/15/2018   Take 1 tablet (15 mg) by mouth every 12 hours maximum 2 tablet(s) per day   Quantity:  60 tablet   Refills:  0            Where to get your medicines      Some of these will need a paper prescription and others can be bought over the counter.  Ask your nurse if you have questions.     Bring a paper prescription for each of these medications     HYDROcodone-acetaminophen  MG per tablet    morphine 15 MG 12 hr tablet               Information about OPIOIDS     PRESCRIPTION OPIOIDS: WHAT YOU NEED TO KNOW   We gave you an opioid (narcotic) pain medicine. It is important to manage your pain, but opioids are not always the best choice. You should first try all the other options your care team gave you. Take this medicine for as short a time (and as few doses) as possible.    Some activities can increase your pain, such as bandage changes or therapy sessions. It may help to take your pain medicine 30 to 60 minutes before these activities. Reduce your stress by getting enough sleep, working on hobbies you enjoy and practicing relaxation or meditation. Talk to your care team about ways to manage your pain beyond prescription opioids.    These medicines have risks:    DO NOT drive when on new or higher doses of pain medicine. These medicines can affect your alertness and reaction times, and you could be arrested for driving under the influence (DUI). If you need to use opioids long-term, talk to your care team about driving.    DO NOT operate heavy machinery    DO NOT do any other dangerous activities while taking these medicines.    DO NOT drink any alcohol while taking these medicines.     If the opioid prescribed includes acetaminophen, DO NOT take with any other medicines that contain  acetaminophen. Read all labels carefully. Look for the word  acetaminophen  or  Tylenol.  Ask your pharmacist if you have questions or are unsure.    You can get addicted to pain medicines, especially if you have a history of addiction (chemical, alcohol or substance dependence). Talk to your care team about ways to reduce this risk.    All opioids tend to cause constipation. Drink plenty of water and eat foods that have a lot of fiber, such as fruits, vegetables, prune juice, apple juice and high-fiber cereal. Take a laxative (Miralax, milk of magnesia, Colace, Senna) if you don t move your bowels at least every other day. Other side effects include upset stomach, sleepiness, dizziness, throwing up, tolerance (needing more of the medicine to have the same effect), physical dependence and slowed breathing.    Store your pills in a secure place, locked if possible. We will not replace any lost or stolen medicine. If you don t finish your medicine, please throw away (dispose) as directed by your pharmacist. The [place] Pollution Control Agency has more information about safe disposal: https://www.pca.[email or web address].us/living-green/managing-unwanted-medications         Primary Care Provider Office Phone # Fax #    Emili Shayna Ballard [phone] [address] [place] [address]        Equal Access to Services     LIZA PASTRANA AH: Hadii aad ku hadasho Soandria, waaxda luqadaha, qaybta kaalmada adeegyada, william bean. So [facility] [phone].    ATENCIÓN: Si habla español, tiene a parson disposición servicios gratuitos de asistencia lingüística. Llame al [phone].    We comply with applicable federal civil rights laws and [place] laws. We do not discriminate on the basis of race, color, national origin, age, disability, sex, sexual orientation, or gender identity.            Thank you!     Thank you for choosing [facility]  for your  care. Our goal is always to provide you with excellent care. Hearing back from our patients is one way we can continue to improve our services. Please take a few minutes to complete the written survey that you may receive in the mail after your visit with us. Thank you!             Your Updated Medication List - Protect others around you: Learn how to safely use, store and throw away your medicines at www.disposemymeds.org.          This list is accurate as of 11/13/18  1:07 PM.  Always use your most recent med list.                   Brand Name Dispense Instructions for use Diagnosis    calcium citrate 950 MG tablet    CALCITRATE     Take 1 tablet by mouth 2 times daily.        cholecalciferol 83166 units capsule    VITAMIN D3    8 capsule    Take 1 capsule by mouth once a week.    Vitamin D deficiency       cyclobenzaprine 10 MG tablet    FLEXERIL    90 tablet    Take 1 tablet (10 mg) by mouth 3 times daily as needed for muscle spasms    Myofascial pain       DULoxetine 60 MG EC capsule    CYMBALTA    30 capsule    Take 1 capsule (60 mg) by mouth daily    Chronic low back pain, unspecified back pain laterality, with sciatica presence unspecified       estradiol 0.1 MG/GM cream    ESTRACE VAGINAL    42.5 g    Place 2 g vaginally three times a week.    Vaginal atrophy       Estradiol 1 MG/GM Gel     30 g    Place 1 g onto the skin daily    Postmenopausal status       HYDROcodone-acetaminophen  MG per tablet   Start taking on:  11/15/2018    NORCO    90 tablet    Take 1 tablet by mouth every 6 hours as needed for moderate to severe pain maximum 4 tablet(s) per day    Chronic low back pain, unspecified back pain laterality, with sciatica presence unspecified, Chronic pain syndrome       lidocaine 5 % Patch    LIDODERM    120 patch    Place 4 patches onto the skin daily Apply up to 4 patches to skin. Wear for 12 hours and remove for 12 hrs.  Refill when patient requests.    Cervicalgia, Chronic low back pain,  unspecified back pain laterality, with sciatica presence unspecified       LORazepam 0.5 MG tablet    ATIVAN    100 tablet    1-2 tabs qhs prn insomnia and 1 q 8 hours prn anxiety    Insomnia, unspecified type       morphine 15 MG 12 hr tablet   Start taking on:  11/15/2018    MS CONTIN    60 tablet    Take 1 tablet (15 mg) by mouth every 12 hours maximum 2 tablet(s) per day    Chronic pain syndrome, Chronic low back pain, unspecified back pain laterality, with sciatica presence unspecified       sennosides 8.6 MG tablet    SENOKOT     Take 1 tablet by mouth daily as needed for constipation.

## 2018-12-11 ENCOUNTER — OFFICE VISIT (OUTPATIENT)
Dept: FAMILY MEDICINE | Facility: CLINIC | Age: 58
End: 2018-12-11
Payer: COMMERCIAL

## 2018-12-11 VITALS
DIASTOLIC BLOOD PRESSURE: 70 MMHG | OXYGEN SATURATION: 100 % | SYSTOLIC BLOOD PRESSURE: 103 MMHG | TEMPERATURE: 98.3 F | HEART RATE: 70 BPM | RESPIRATION RATE: 16 BRPM

## 2018-12-11 DIAGNOSIS — G89.29 CHRONIC LOW BACK PAIN, UNSPECIFIED BACK PAIN LATERALITY, WITH SCIATICA PRESENCE UNSPECIFIED: ICD-10-CM

## 2018-12-11 DIAGNOSIS — M54.5 CHRONIC LOW BACK PAIN, UNSPECIFIED BACK PAIN LATERALITY, WITH SCIATICA PRESENCE UNSPECIFIED: ICD-10-CM

## 2018-12-11 DIAGNOSIS — M50.30 DDD (DEGENERATIVE DISC DISEASE), CERVICAL: Primary | ICD-10-CM

## 2018-12-11 DIAGNOSIS — G89.4 CHRONIC PAIN SYNDROME: ICD-10-CM

## 2018-12-11 PROCEDURE — 99213 OFFICE O/P EST LOW 20 MIN: CPT | Performed by: FAMILY MEDICINE

## 2018-12-11 RX ORDER — MORPHINE SULFATE 15 MG/1
15 TABLET, FILM COATED, EXTENDED RELEASE ORAL EVERY 12 HOURS
Qty: 60 TABLET | Refills: 0 | Status: SHIPPED | OUTPATIENT
Start: 2018-12-15 | End: 2019-01-15

## 2018-12-11 RX ORDER — HYDROCODONE BITARTRATE AND ACETAMINOPHEN 10; 325 MG/1; MG/1
1 TABLET ORAL EVERY 6 HOURS PRN
Qty: 90 TABLET | Refills: 0 | Status: SHIPPED | OUTPATIENT
Start: 2018-12-15 | End: 2019-01-15

## 2018-12-11 ASSESSMENT — PAIN SCALES - GENERAL: PAINLEVEL: MILD PAIN (2)

## 2018-12-11 NOTE — LETTER
Work Note    Date: 12/11/2018      Name: Janelle White                       YOB: 1960    Medical Record Number: [id number]    The patient was seen at: [facility].    Please allow Janelle to continue seeing maximum 15-18 patients in a clinic due to medical necessity.    She has continued to do very well this past year since spine surgery x2 but continues to heal. progress has been greatly affected by limiting her workload to this patient maximum.      Please contact me with any questions.              _________________________  Emili Ballard MD

## 2018-12-11 NOTE — PROGRESS NOTES
SUBJECTIVE:   Janelle White is a 58 year old female who presents to clinic today for the following health issues:    HPI     Pt is here for refills on norco and morphine.  Had a bit of flare in past month.        Patient presents today after hospitalization for POSTERIOR FUSION WITH TRANSFORAMINAL LUMBAR INTERBODY FUSION (TFLIF) L2-3, HARDWARE REMOVAL L3/4 PRONE 12- at [facility] with Dr. [place].     She is also s/p ANTERIOR CERVICAL DECOMPRESSION AND FUSION C7-T1 WITH ANTERIOR HARDWARE REMOVAL C6-7 AND POSTERIOR CERVICAL FUSION C7-T1 9-      She continues to do well at work-- finding benefit in a one hour noon break time.  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  Feels she is in her sweet spot seeing 15-18 patients/week.  She would like to keep this schedule as she continues to rehab.      She has continued with [facility] for further rehab and strengthening.  She has been using her pain medications as scheduled.  She has been doing well back at work with her sit-to-stand work and finds she does best when she is standing. She is enjoying her [employer] class.      Pain control has been stable.  Does not desire a dose adjustment today.      Recently seen by NEUROSURG team.  Had some occipital injections recently for some C2-C3 degenerative issues with no relief appreciated.  Considering further injections and nerve stim. Considering PRP perhaps later this month or in January.    She is contemplating the possibility of medical cannabis at this time.    Problem list and histories reviewed & adjusted, as indicated.  Additional history: as documented        Patient Active Problem List   Diagnosis     PERSONAL HX OF  MELANOMA     B-complex deficiency     Migraine     Chronic Low Back Pain     CARDIOVASCULAR SCREENING; LDL GOAL LESS THAN 160     DDD (degenerative disc disease), cervical     Moderate recurrent major  depression (H)     Vitamin D deficiency     Shift work sleep disorder     Chronic pain syndrome     CLL (chronic lymphocytic leukemia) (H)     Controlled substance agreement signed     Past Surgical History:   Procedure Laterality Date     anterior cervical discectomy C4-5 ,Fusion C5-6-7  10/2007     BLEPHAROPLASTY BILATERAL  2013    Procedure: BLEPHAROPLASTY BILATERAL;  BILATERAL UPPER LID BLEPHAROPLASTY AND BROWPEXY ;  Surgeon: Godfrey Miguel MD;  Location:  [facility]     C APPENDECTOMY  2006      SECTION      x2     FUSION LUMBAR ANTERIOR, FUSION LUMBAR POSTERIOR TWO LEVELS, COMBINED  2010    L3-S1 anterior posterior fusion     HYSTERECTOMY  2006    ovaries intact     Partial vulvectomy for NEENA III  2009     Pubovaginal sling, post op durasphere injections  2006    wears pad     ROTATOR CUFF REPAIR RT/LT  2011    right     SPINAL FUSION C3-4  2009    C3-4, anterior spinal fusion     TUBAL LIGATION         Social History     Tobacco Use     Smoking status: Former Smoker     Packs/day: 1.00     Years: 5.00     Pack years: 5.00     Last attempt to quit: 1984     Years since quittin.9     Smokeless tobacco: Never Used   Substance Use Topics     Alcohol use: Yes     Comment: no alcohol currently since prior to her back surgeries,      Family History   Problem Relation Age of Onset     Hypertension Mother      Alcohol/Drug Mother      Osteoporosis Mother         borderline     Hypertension Father      Diabetes Father         Type 2, diet controlled     Alcohol/Drug Father         remote use     C.A.D. Maternal Grandmother          late 50s     C.A.D. Maternal Grandfather         bone and brain cancer mets as well     Diabetes Paternal Grandfather      Alcohol/Drug Brother      Breast Cancer No family hx of      Cancer - colorectal No family hx of      Cerebrovascular Disease No family hx of          Current Outpatient Medications   Medication Sig Dispense Refill      "calcium citrate (CALCIUM CITRATE) 950 MG tablet Take 1 tablet by mouth 2 times daily.       cholecalciferol 93922 UNITS capsule Take 1 capsule by mouth once a week. 8 capsule 0     cyclobenzaprine (FLEXERIL) 10 MG tablet Take 1 tablet (10 mg) by mouth 3 times daily as needed for muscle spasms 90 tablet 3     DULoxetine (CYMBALTA) 60 MG EC capsule Take 1 capsule (60 mg) by mouth daily 30 capsule 11     estradiol (ESTRACE VAGINAL) 0.1 MG/GM vaginal cream Place 2 g vaginally three times a week. 42.5 g 11     Estradiol 1 MG/GM GEL Place 1 g onto the skin daily 30 g 11     [START ON 12/15/2018] HYDROcodone-acetaminophen (NORCO)  MG per tablet Take 1 tablet by mouth every 6 hours as needed for moderate to severe pain maximum 4 tablet(s) per day 90 tablet 0     lidocaine (LIDODERM) 5 % Patch Place 4 patches onto the skin daily Apply up to 4 patches to skin. Wear for 12 hours and remove for 12 hrs.  Refill when patient requests. 120 patch 3     LORazepam (ATIVAN) 0.5 MG tablet 1-2 tabs qhs prn insomnia and 1 q 8 hours prn anxiety 100 tablet 5     milnacipran HCl 12.5 & 25 & 50 MG MISC Take as directed starter pack       [START ON 12/15/2018] morphine (MS CONTIN) 15 MG CR tablet Take 1 tablet (15 mg) by mouth every 12 hours maximum 2 tablet(s) per day 60 tablet 0     sennosides (SENOKOT) 8.6 MG tablet Take 1 tablet by mouth daily as needed for constipation.       Allergies   Allergen Reactions     Budeprion Sr      Ineffective, name brand works best     Bupropion Other (See Comments)     Ineffective, name brand works best     Codeine      Ria     Codeine Other (See Comments)     \"Feels like electricity running through body\"  No reaction noted in [place].  Ria  With Tylenol     Effexor [Venlafaxine Hydrochloride]      Affect too flat     Escitalopram      Other reaction(s): *Unknown  Sexual dysfunction     Fluoxetine Other (See Comments)     Sexual dysfunction     Levaquin [Levofloxacin] Other (See Comments)     " Suicidal thoughts  Dark thoughts- mood changes     Lexapro      Sexual dysfunction     Pregabalin Other (See Comments)     Hallucinations  Audiovisual hallucinations     Prozac [Fluoxetine Hcl]      Sexual dysfunction     Tramadol Hives     Hives       Venlafaxine      Other reaction(s): *Unknown  Affect too flat     Recent Labs   Lab Test 11/14/17  1239 12/16/13  0739 11/07/12  1207  01/09/12  0903  10/06/10  1320   LDL  --  137* 104  --   --   --   --    HDL  --  52 46*  --   --   --   --    TRIG  --  226* 120  --   --   --   --    ALT  --  17 23  --  32   < >  --    CR  --  0.76 0.68   < >  --    < > 0.67   GFRESTIMATED  --  80 >90   < >  --    < > >90   GFRESTBLACK  --  >90 >90   < >  --    < > >90   POTASSIUM 4.1 4.6 4.2  --   --    < > 4.0   TSH  --   --   --   --   --   --  0.94    < > = values in this interval not displayed.      BP Readings from Last 3 Encounters:   12/11/18 103/70   11/13/18 118/78   10/16/18 124/82    Wt Readings from Last 3 Encounters:   11/14/17 86 kg (189 lb 8 oz)   02/23/17 83.9 kg (185 lb)   11/08/13 83 kg (183 lb)                    ROS:  Constitutional, HEENT, cardiovascular, pulmonary, gi and gu systems are negative, except as otherwise noted.    OBJECTIVE:     /70   Pulse 70   Temp 98.3  F (36.8  C) (Oral)   Resp 16   LMP 05/01/2005 (Exact Date)   SpO2 100%   There is no height or weight on file to calculate BMI.  GENERAL: healthy, alert and no distress  EYES: Eyes grossly normal to inspection, PERRL and conjunctivae and sclerae normal  NECK: no adenopathy, no asymmetry, masses, or scars and thyroid normal to palpation  MS: no gross musculoskeletal defects noted, no edema  SKIN: no suspicious lesions or rashes  NEURO: Normal strength and tone, mentation intact and speech normal  PSYCH: mentation appears normal, affect normal/bright    ASSESSMENT/PLAN:     1. Chronic low back pain, unspecified back pain laterality, with sciatica presence unspecified    -  HYDROcodone-acetaminophen (NORCO)  MG per tablet; Take 1 tablet by mouth every 6 hours as needed for moderate to severe pain maximum 4 tablet(s) per day  Dispense: 90 tablet; Refill: 0  - morphine (MS CONTIN) 15 MG CR tablet; Take 1 tablet (15 mg) by mouth every 12 hours maximum 2 tablet(s) per day  Dispense: 60 tablet; Refill: 0    Stable on current regimen.    2. Chronic pain syndrome    - HYDROcodone-acetaminophen (NORCO)  MG per tablet; Take 1 tablet by mouth every 6 hours as needed for moderate to severe pain maximum 4 tablet(s) per day  Dispense: 90 tablet; Refill: 0  - morphine (MS CONTIN) 15 MG CR tablet; Take 1 tablet (15 mg) by mouth every 12 hours maximum 2 tablet(s) per day  Dispense: 60 tablet; Refill: 0    Stable on current regimen.    3. DDD (degenerative disc disease), cervical    Hoping for PRP injection later this month to three different areas of cervical spine.    Advised Follow-up with Nancy Mustafa CNP to discuss certifying for medical cannabis use prn.    Emili Ballard MD  [facility]

## 2019-01-02 DIAGNOSIS — G47.00 INSOMNIA, UNSPECIFIED TYPE: ICD-10-CM

## 2019-01-02 NOTE — TELEPHONE ENCOUNTER
Reason for Call:  Medication or medication refill:    Do you use a [facility]?  Name of the pharmacy and phone number for the current request:  [facility] - [place], [place] - [address]     Name of the medication requested: LORazepam (ATIVAN) 0.5 MG tablet    Other request: Patient is requesting for a refill asap, she's leaving tomorrow night. Please advise, thank you!    Can we leave a detailed message on this number? YES    Phone number patient can be reached at: Home number on file [phone] (home)    Best Time: anytime    Call taken on 1/2/2019 at 8:02 AM by Rica Alonso

## 2019-01-03 RX ORDER — LORAZEPAM 0.5 MG/1
TABLET ORAL
Qty: 100 TABLET | Refills: 5 | Status: SHIPPED | OUTPATIENT
Start: 2019-01-03 | End: 2019-07-18

## 2019-01-15 ENCOUNTER — OFFICE VISIT (OUTPATIENT)
Dept: FAMILY MEDICINE | Facility: CLINIC | Age: 59
End: 2019-01-15
Payer: COMMERCIAL

## 2019-01-15 VITALS
OXYGEN SATURATION: 98 % | SYSTOLIC BLOOD PRESSURE: 110 MMHG | HEART RATE: 81 BPM | DIASTOLIC BLOOD PRESSURE: 76 MMHG | RESPIRATION RATE: 18 BRPM | TEMPERATURE: 98.7 F

## 2019-01-15 VITALS
SYSTOLIC BLOOD PRESSURE: 110 MMHG | OXYGEN SATURATION: 98 % | HEART RATE: 81 BPM | RESPIRATION RATE: 18 BRPM | TEMPERATURE: 98.7 F | DIASTOLIC BLOOD PRESSURE: 76 MMHG

## 2019-01-15 DIAGNOSIS — G89.4 CHRONIC PAIN SYNDROME: Primary | ICD-10-CM

## 2019-01-15 DIAGNOSIS — M54.5 CHRONIC LOW BACK PAIN, UNSPECIFIED BACK PAIN LATERALITY, WITH SCIATICA PRESENCE UNSPECIFIED: ICD-10-CM

## 2019-01-15 DIAGNOSIS — G89.29 CHRONIC LOW BACK PAIN, UNSPECIFIED BACK PAIN LATERALITY, WITH SCIATICA PRESENCE UNSPECIFIED: ICD-10-CM

## 2019-01-15 DIAGNOSIS — M50.30 DDD (DEGENERATIVE DISC DISEASE), CERVICAL: ICD-10-CM

## 2019-01-15 DIAGNOSIS — G89.4 CHRONIC PAIN SYNDROME: ICD-10-CM

## 2019-01-15 PROCEDURE — 99213 OFFICE O/P EST LOW 20 MIN: CPT | Performed by: NURSE PRACTITIONER

## 2019-01-15 RX ORDER — MORPHINE SULFATE 15 MG/1
15 TABLET, FILM COATED, EXTENDED RELEASE ORAL EVERY 12 HOURS
Qty: 60 TABLET | Refills: 0 | Status: SHIPPED | OUTPATIENT
Start: 2019-01-15 | End: 2019-02-12

## 2019-01-15 RX ORDER — HYDROCODONE BITARTRATE AND ACETAMINOPHEN 10; 325 MG/1; MG/1
1 TABLET ORAL EVERY 6 HOURS PRN
Qty: 90 TABLET | Refills: 0 | Status: SHIPPED | OUTPATIENT
Start: 2019-01-15 | End: 2019-02-12

## 2019-01-15 NOTE — PROGRESS NOTES
SUBJECTIVE:   Saw Nancy first to be certified for medical cannabis- could not bill for two appointments same day to address same concern: chronic pain syndrome-- will Follow-up with me in one month.  Medications refilled today x 1 month.
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
## 2019-01-29 ENCOUNTER — TRANSFERRED RECORDS (OUTPATIENT)
Dept: HEALTH INFORMATION MANAGEMENT | Facility: CLINIC | Age: 59
End: 2019-01-29

## 2019-02-12 ENCOUNTER — OFFICE VISIT (OUTPATIENT)
Dept: FAMILY MEDICINE | Facility: CLINIC | Age: 59
End: 2019-02-12
Payer: COMMERCIAL

## 2019-02-12 VITALS
DIASTOLIC BLOOD PRESSURE: 70 MMHG | RESPIRATION RATE: 16 BRPM | TEMPERATURE: 97.6 F | SYSTOLIC BLOOD PRESSURE: 103 MMHG | OXYGEN SATURATION: 96 % | HEART RATE: 70 BPM

## 2019-02-12 DIAGNOSIS — G89.29 CHRONIC LOW BACK PAIN, UNSPECIFIED BACK PAIN LATERALITY, WITH SCIATICA PRESENCE UNSPECIFIED: ICD-10-CM

## 2019-02-12 DIAGNOSIS — G89.4 CHRONIC PAIN SYNDROME: ICD-10-CM

## 2019-02-12 DIAGNOSIS — F33.1 MODERATE RECURRENT MAJOR DEPRESSION (H): ICD-10-CM

## 2019-02-12 DIAGNOSIS — G47.33 OSA (OBSTRUCTIVE SLEEP APNEA): Primary | ICD-10-CM

## 2019-02-12 DIAGNOSIS — M54.5 CHRONIC LOW BACK PAIN, UNSPECIFIED BACK PAIN LATERALITY, WITH SCIATICA PRESENCE UNSPECIFIED: ICD-10-CM

## 2019-02-12 DIAGNOSIS — C91.10 CLL (CHRONIC LYMPHOCYTIC LEUKEMIA) (H): ICD-10-CM

## 2019-02-12 PROCEDURE — 99214 OFFICE O/P EST MOD 30 MIN: CPT | Performed by: FAMILY MEDICINE

## 2019-02-12 RX ORDER — HYDROCODONE BITARTRATE AND ACETAMINOPHEN 10; 325 MG/1; MG/1
1 TABLET ORAL EVERY 6 HOURS PRN
Qty: 90 TABLET | Refills: 0 | Status: SHIPPED | OUTPATIENT
Start: 2019-02-15 | End: 2019-03-12

## 2019-02-12 RX ORDER — HYDROCODONE BITARTRATE AND ACETAMINOPHEN 10; 325 MG/1; MG/1
1 TABLET ORAL EVERY 6 HOURS PRN
Qty: 90 TABLET | Refills: 0 | Status: SHIPPED | OUTPATIENT
Start: 2019-02-12 | End: 2019-02-12

## 2019-02-12 RX ORDER — MORPHINE SULFATE 15 MG/1
15 TABLET, FILM COATED, EXTENDED RELEASE ORAL EVERY 12 HOURS
Qty: 60 TABLET | Refills: 0 | Status: SHIPPED | OUTPATIENT
Start: 2019-02-15 | End: 2019-03-12

## 2019-02-12 RX ORDER — DIPHENOXYLATE HYDROCHLORIDE AND ATROPINE SULFATE 2.5; .025 MG/1; MG/1
2 TABLET ORAL
COMMUNITY
Start: 2011-04-23 | End: 2022-06-30

## 2019-02-12 RX ORDER — MORPHINE SULFATE 15 MG/1
15 TABLET, FILM COATED, EXTENDED RELEASE ORAL EVERY 12 HOURS
Qty: 60 TABLET | Refills: 0 | Status: SHIPPED | OUTPATIENT
Start: 2019-02-12 | End: 2019-02-12

## 2019-02-12 ASSESSMENT — PAIN SCALES - GENERAL: PAINLEVEL: MILD PAIN (2)

## 2019-02-12 ASSESSMENT — MIFFLIN-ST. JEOR
SCORE: 1417.96
SCORE: 1417.96

## 2019-02-12 NOTE — PROGRESS NOTES
SUBJECTIVE:   Janelle White is a 58 year old female who presents to clinic today for the following health issues:      HPI     Pt is here for refills on norco and morphine.         Patient presents today after hospitalization for POSTERIOR FUSION WITH TRANSFORAMINAL LUMBAR INTERBODY FUSION (TFLIF) L2-3, HARDWARE REMOVAL L3/4 PRONE 12- at [facility] with Dr. Clayton.     She is also s/p ANTERIOR CERVICAL DECOMPRESSION AND FUSION C7-T1 WITH ANTERIOR HARDWARE REMOVAL C6-7 AND POSTERIOR CERVICAL FUSION C7-T1 9-      She continues to do well at work-- finding benefit in a one hour noon break time.  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  Feels she is in her sweet spot seeing 15-18 patients/week.  She would like to keep this schedule as she continues to rehab.      She has continued with [facility] for further rehab and strengthening.  She has been using her pain medications as scheduled.  She has been doing well back at work with her sit-to-stand work and finds she does best when she is standing. She is enjoying her [id number] class.      Pain control has been stable.  Does not desire a dose adjustment today.      Recently seen by NEUROSURG team.  Had some occipital injections recently for some C2-C3 degenerative issues with no relief appreciated.  HAs first trial of PRP later this week.     Has been certified for medical cannabis last month.  Feels she might be able to omit her last dose of norco in the evening.  She continues to work with her dispensary tweaking her meds.     Now using CPAP x 1 week for CLARE after recent sleep study.    Recent travel to [place] with her family.  She had a great week in [place].  Mood has been stable.    CLL stable off meds at this time followed by HEME/ONC.    Problem list and histories reviewed & adjusted, as indicated.  Additional history: as documented        Patient Active  Problem List   Diagnosis     PERSONAL HX OF  MELANOMA     B-complex deficiency     Migraine     Chronic Low Back Pain     CARDIOVASCULAR SCREENING; LDL GOAL LESS THAN 160     DDD (degenerative disc disease), cervical     Moderate recurrent major depression (H)     Vitamin D deficiency     Shift work sleep disorder     Chronic pain syndrome     CLL (chronic lymphocytic leukemia) (H)     Controlled substance agreement signed     Past Surgical History:   Procedure Laterality Date     anterior cervical discectomy C4-5 ,Fusion C5-6-7  10/2007     BLEPHAROPLASTY BILATERAL  2013    Procedure: BLEPHAROPLASTY BILATERAL;  BILATERAL UPPER LID BLEPHAROPLASTY AND BROWPEXY ;  Surgeon: Godfrey Miguel MD;  Location: [facility]     C APPENDECTOMY  2006      SECTION      x2     FUSION LUMBAR ANTERIOR, FUSION LUMBAR POSTERIOR TWO LEVELS, COMBINED  2010    L3-S1 anterior posterior fusion     HYSTERECTOMY  2006    ovaries intact     Partial vulvectomy for NEENA III  2009     Pubovaginal sling, post op durasphere injections  2006    wears pad     ROTATOR CUFF REPAIR RT/LT  2011    right     SPINAL FUSION C3-4  2009    C3-4, anterior spinal fusion     TUBAL LIGATION         Social History     Tobacco Use     Smoking status: Former Smoker     Packs/day: 1.00     Years: 5.00     Pack years: 5.00     Last attempt to quit: 1984     Years since quittin.1     Smokeless tobacco: Never Used   Substance Use Topics     Alcohol use: Yes     Comment: no alcohol currently since prior to her back surgeries,      Family History   Problem Relation Age of Onset     Hypertension Mother      Alcohol/Drug Mother      Osteoporosis Mother         borderline     Hypertension Father      Diabetes Father         Type 2, diet controlled     Alcohol/Drug Father         remote use     C.A.D. Maternal Grandmother          late 50s     C.A.D. Maternal Grandfather         bone and brain cancer mets as well     Diabetes  "Paternal Grandfather      Alcohol/Drug Brother      Breast Cancer No family hx of      Cancer - colorectal No family hx of      Cerebrovascular Disease No family hx of          Current Outpatient Medications   Medication Sig Dispense Refill     calcium citrate (CALCIUM CITRATE) 950 MG tablet Take 1 tablet by mouth 2 times daily.       cholecalciferol 63670 UNITS capsule Take 1 capsule by mouth once a week. 8 capsule 0     cyclobenzaprine (FLEXERIL) 10 MG tablet Take 1 tablet (10 mg) by mouth 3 times daily as needed for muscle spasms 90 tablet 3     DULoxetine (CYMBALTA) 60 MG EC capsule Take 1 capsule (60 mg) by mouth daily 30 capsule 11     estradiol (ESTRACE VAGINAL) 0.1 MG/GM vaginal cream Place 2 g vaginally three times a week. 42.5 g 11     Estradiol 1 MG/GM GEL Place 1 g onto the skin daily 30 g 11     HYDROcodone-acetaminophen (NORCO)  MG per tablet Take 1 tablet by mouth every 6 hours as needed for moderate to severe pain maximum 4 tablet(s) per day 90 tablet 0     lidocaine (LIDODERM) 5 % Patch Place 4 patches onto the skin daily Apply up to 4 patches to skin. Wear for 12 hours and remove for 12 hrs.  Refill when patient requests. 120 patch 3     LORazepam (ATIVAN) 0.5 MG tablet 1-2 tabs qhs prn insomnia and 1 q 8 hours prn anxiety 100 tablet 5     morphine (MS CONTIN) 15 MG CR tablet Take 1 tablet (15 mg) by mouth every 12 hours maximum 2 tablet(s) per day 60 tablet 0     Multiple Vitamin (MULTI-VITAMINS) TABS Take 1 tablet by mouth       sennosides (SENOKOT) 8.6 MG tablet Take 1 tablet by mouth daily as needed for constipation.       Allergies   Allergen Reactions     Budeprion Sr      Ineffective, name brand works best     Bupropion Other (See Comments)     Ineffective, name brand works best     [place] Rollins     Codeine Other (See Comments)     \"Feels like electricity running through body\"  No reaction noted in Abby.  Ria  With Tylenol     Effexor [Venlafaxine Hydrochloride]      Affect " "too flat     Escitalopram      Other reaction(s): *Unknown  Sexual dysfunction     Fluoxetine Other (See Comments)     Sexual dysfunction     Levaquin [Levofloxacin] Other (See Comments)     Suicidal thoughts  Dark thoughts- mood changes     Lexapro      Sexual dysfunction     Milnacipran      12.5 MG is fine. 25 MG caused side effects. \"Dark thoughts\"     Pregabalin Other (See Comments)     Hallucinations  Audiovisual hallucinations     Prozac [Fluoxetine Hcl]      Sexual dysfunction     Tramadol Hives     Hives       Venlafaxine      Other reaction(s): *Unknown  Affect too flat     Recent Labs   Lab Test 11/14/17  1239 12/16/13  0739 11/07/12  1207  01/09/12  0903   LDL  --  137* 104  --   --    HDL  --  52 46*  --   --    TRIG  --  226* 120  --   --    ALT  --  17 23  --  32   CR  --  0.76 0.68   < >  --    GFRESTIMATED  --  80 >90   < >  --    GFRESTBLACK  --  >90 >90   < >  --    POTASSIUM 4.1 4.6 4.2  --   --     < > = values in this interval not displayed.      BP Readings from Last 3 Encounters:   02/12/19 103/70   01/15/19 110/76   01/15/19 110/76    Wt Readings from Last 3 Encounters:   11/14/17 86 kg (189 lb 8 oz)   02/23/17 83.9 kg (185 lb)   11/08/13 83 kg (183 lb)                    ROS:  Constitutional, HEENT, cardiovascular, pulmonary, gi and gu systems are negative, except as otherwise noted.    OBJECTIVE:     /70   Pulse 70   Temp 97.6  F (36.4  C) (Oral)   Resp 16   LMP 05/01/2005 (Exact Date)   SpO2 96%   There is no height or weight on file to calculate BMI.  GENERAL: healthy, alert and no distress  EYES: Eyes grossly normal to inspection, PERRL and conjunctivae and sclerae normal  NECK: no adenopathy, no asymmetry, masses, or scars and thyroid normal to palpation  MS: no gross musculoskeletal defects noted, no edema  SKIN: no suspicious lesions or rashes  NEURO: Normal strength and tone, mentation intact and speech normal  PSYCH: mentation appears normal, affect " normal/bright    ASSESSMENT/PLAN:     1. Chronic pain syndrome    - morphine (MS CONTIN) 15 MG CR tablet; Take 1 tablet (15 mg) by mouth every 12 hours maximum 2 tablet(s) per day  Dispense: 60 tablet; Refill: 0  - HYDROcodone-acetaminophen (NORCO)  MG per tablet; Take 1 tablet by mouth every 6 hours as needed for moderate to severe pain maximum 4 tablet(s) per day  Dispense: 90 tablet; Refill: 0    See below.    2. Chronic low back pain, unspecified back pain laterality, with sciatica presence unspecified    - morphine (MS CONTIN) 15 MG CR tablet; Take 1 tablet (15 mg) by mouth every 12 hours maximum 2 tablet(s) per day  Dispense: 60 tablet; Refill: 0  - HYDROcodone-acetaminophen (NORCO)  MG per tablet; Take 1 tablet by mouth every 6 hours as needed for moderate to severe pain maximum 4 tablet(s) per day  Dispense: 90 tablet; Refill: 0     Stable on current regimen.  No change to plan yet as she starts medical cannabis program and PRP in hopes to reduce her pain meds.  Follow-up one month.    3. Moderate recurrent major depression (H)    Stable on current regimen.  Continues with therapy.    4. CLL (chronic lymphocytic leukemia) (H)    Stable.  Followed by HEME/ONC.    5. CLARE    Recently started on CLARE.    Emili Ballard MD  [facility]

## 2019-02-13 PROBLEM — G47.33 OSA (OBSTRUCTIVE SLEEP APNEA): Status: ACTIVE | Noted: 2019-02-13

## 2019-02-14 ENCOUNTER — HOSPITAL ENCOUNTER (OUTPATIENT)
Dept: SURGERY | Facility: AMBULATORY SURGERY CENTER | Age: 59
Discharge: HOME | End: 2019-02-14
Attending: PHYSICAL MEDICINE & REHABILITATION | Admitting: PHYSICAL MEDICINE & REHABILITATION
Payer: COMMERCIAL

## 2019-02-14 ENCOUNTER — SURGERY - HEALTHEAST (OUTPATIENT)
Dept: SURGERY | Facility: AMBULATORY SURGERY CENTER | Age: 59
End: 2019-02-14

## 2019-02-14 ASSESSMENT — MIFFLIN-ST. JEOR
SCORE: 1417.96
SCORE: 1417.96

## 2019-03-12 ENCOUNTER — OFFICE VISIT (OUTPATIENT)
Dept: FAMILY MEDICINE | Facility: CLINIC | Age: 59
End: 2019-03-12
Payer: COMMERCIAL

## 2019-03-12 VITALS
RESPIRATION RATE: 16 BRPM | HEART RATE: 74 BPM | SYSTOLIC BLOOD PRESSURE: 117 MMHG | TEMPERATURE: 98.1 F | OXYGEN SATURATION: 98 % | DIASTOLIC BLOOD PRESSURE: 76 MMHG

## 2019-03-12 DIAGNOSIS — G89.4 CHRONIC PAIN SYNDROME: ICD-10-CM

## 2019-03-12 DIAGNOSIS — G89.29 CHRONIC LOW BACK PAIN, UNSPECIFIED BACK PAIN LATERALITY, WITH SCIATICA PRESENCE UNSPECIFIED: ICD-10-CM

## 2019-03-12 DIAGNOSIS — C91.10 CLL (CHRONIC LYMPHOCYTIC LEUKEMIA) (H): ICD-10-CM

## 2019-03-12 DIAGNOSIS — F33.1 MODERATE RECURRENT MAJOR DEPRESSION (H): Primary | ICD-10-CM

## 2019-03-12 DIAGNOSIS — M54.5 CHRONIC LOW BACK PAIN, UNSPECIFIED BACK PAIN LATERALITY, WITH SCIATICA PRESENCE UNSPECIFIED: ICD-10-CM

## 2019-03-12 PROCEDURE — 99214 OFFICE O/P EST MOD 30 MIN: CPT | Performed by: FAMILY MEDICINE

## 2019-03-12 RX ORDER — TOCOPHERSOLAN (VITAMIN E TPGS) 400/15ML
1 LIQUID (ML) ORAL
COMMUNITY
Start: 2019-02-28 | End: 2022-06-30

## 2019-03-12 RX ORDER — HYDROCODONE BITARTRATE AND ACETAMINOPHEN 10; 325 MG/1; MG/1
1 TABLET ORAL EVERY 6 HOURS PRN
Qty: 90 TABLET | Refills: 0 | Status: SHIPPED | OUTPATIENT
Start: 2019-03-12 | End: 2019-04-04

## 2019-03-12 RX ORDER — MORPHINE SULFATE 15 MG/1
15 TABLET, FILM COATED, EXTENDED RELEASE ORAL EVERY 12 HOURS
Qty: 60 TABLET | Refills: 0 | Status: SHIPPED | OUTPATIENT
Start: 2019-03-12 | End: 2019-04-04

## 2019-03-12 ASSESSMENT — ANXIETY QUESTIONNAIRES
GAD7 TOTAL SCORE: 5
7. FEELING AFRAID AS IF SOMETHING AWFUL MIGHT HAPPEN: NOT AT ALL
2. NOT BEING ABLE TO STOP OR CONTROL WORRYING: SEVERAL DAYS
5. BEING SO RESTLESS THAT IT IS HARD TO SIT STILL: NOT AT ALL
1. FEELING NERVOUS, ANXIOUS, OR ON EDGE: NOT AT ALL
GAD7 TOTAL SCORE: 5
GAD7 TOTAL SCORE: 5
7. FEELING AFRAID AS IF SOMETHING AWFUL MIGHT HAPPEN: NOT AT ALL
3. WORRYING TOO MUCH ABOUT DIFFERENT THINGS: SEVERAL DAYS
4. TROUBLE RELAXING: NOT AT ALL
6. BECOMING EASILY ANNOYED OR IRRITABLE: NEARLY EVERY DAY

## 2019-03-12 ASSESSMENT — PATIENT HEALTH QUESTIONNAIRE - PHQ9
SUM OF ALL RESPONSES TO PHQ QUESTIONS 1-9: 13
SUM OF ALL RESPONSES TO PHQ QUESTIONS 1-9: 13
10. IF YOU CHECKED OFF ANY PROBLEMS, HOW DIFFICULT HAVE THESE PROBLEMS MADE IT FOR YOU TO DO YOUR WORK, TAKE CARE OF THINGS AT HOME, OR GET ALONG WITH OTHER PEOPLE: VERY DIFFICULT

## 2019-03-12 ASSESSMENT — PAIN SCALES - GENERAL: PAINLEVEL: MILD PAIN (3)

## 2019-03-12 NOTE — PROGRESS NOTES
SUBJECTIVE:   Janelle White is a 58 year old female who presents to clinic today for the following health issues:    Answers for HPI/ROS submitted by the patient on 3/12/2019   If you checked off any problems, how difficult have these problems made it for you to do your work, take care of things at home, or get along with other people?: Very difficult  PHQ9 TOTAL SCORE: 13  STACIE 7 TOTAL SCORE: 5    CC: Med check    HPI    She is here today for refills of her pain medications.  She has found medical cannabis to be too expensive and not working well for long-term relief of her pain.  It has helped for short times but is thinking she will only be using it for flares and not for daily use at this time.    Past Medical/Surgical History  Patient presents today after hospitalization for POSTERIOR FUSION WITH TRANSFORAMINAL LUMBAR INTERBODY FUSION (TFLIF) L2-3, HARDWARE REMOVAL L3/4 PRONE 12- at [facility] with Dr. Clayton.     She is also s/p ANTERIOR CERVICAL DECOMPRESSION AND FUSION C7-T1 WITH ANTERIOR HARDWARE REMOVAL C6-7 AND POSTERIOR CERVICAL FUSION C7-T1 9-      She continues to do well at work-- finding benefit in a one hour noon break time.  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  Feels she is in her sweet spot seeing 15-18 patients/week.  She would like to keep this schedule as she continues to rehab.      She has continued with [facility] for further rehab and strengthening.  She has been using her pain medications as scheduled.  She has been doing well back at work with her sit-to-stand work and finds she does best when she is standing. She is enjoying her [employer] class.      Pain control has been stable.  Does not desire a dose adjustment today.      Continues to be followed by NEUROSURG team.  Had some occipital injections recently for some C2-C3 degenerative issues with no relief appreciated.  Had  first trial of PRP 2-3 weeks ago now to the anterior cervical spine.     She has been struggling with mood these past weeks- missing the warm weather after a great trip to [place].  She continues to miss her beloved dog which passed away earlier this winter.  She has had more fatigue recently.    Has Follow-up in next week with HEME/ONC for labs related to her CLL.    Problem list and histories reviewed & adjusted, as indicated.  Additional history: as documented        Patient Active Problem List   Diagnosis     PERSONAL HX OF  MELANOMA     B-complex deficiency     Migraine     Chronic Low Back Pain     CARDIOVASCULAR SCREENING; LDL GOAL LESS THAN 160     DDD (degenerative disc disease), cervical     Moderate recurrent major depression (H)     Vitamin D deficiency     Shift work sleep disorder     Chronic pain syndrome     CLL (chronic lymphocytic leukemia) (H)     Controlled substance agreement signed     CLARE (obstructive sleep apnea)     Past Surgical History:   Procedure Laterality Date     anterior cervical discectomy C4-5 ,Fusion C5-6-7  10/2007     BLEPHAROPLASTY BILATERAL  2013    Procedure: BLEPHAROPLASTY BILATERAL;  BILATERAL UPPER LID BLEPHAROPLASTY AND BROWPEXY ;  Surgeon: Godfrey Miguel MD;  Location: [facility]     C APPENDECTOMY  2006      SECTION      x2     FUSION LUMBAR ANTERIOR, FUSION LUMBAR POSTERIOR TWO LEVELS, COMBINED  2010    L3-S1 anterior posterior fusion     HYSTERECTOMY  2006    ovaries intact     Partial vulvectomy for NEENA III  2009     Pubovaginal sling, post op durasphere injections  2006    wears pad     ROTATOR CUFF REPAIR RT/LT  2011    right     SPINAL FUSION C3-4  2009    C3-4, anterior spinal fusion     TUBAL LIGATION         Social History     Tobacco Use     Smoking status: Former Smoker     Packs/day: 1.00     Years: 5.00     Pack years: 5.00     Last attempt to quit: 1984     Years since quittin.2     Smokeless tobacco: Never Used   Substance  Use Topics     Alcohol use: Yes     Comment: no alcohol currently since prior to her back surgeries,      Family History   Problem Relation Age of Onset     Hypertension Mother      Alcohol/Drug Mother      Osteoporosis Mother         borderline     Hypertension Father      Diabetes Father         Type 2, diet controlled     Alcohol/Drug Father         remote use     C.A.D. Maternal Grandmother          late 50s     C.A.D. Maternal Grandfather         bone and brain cancer mets as well     Diabetes Paternal Grandfather      Alcohol/Drug Brother      Breast Cancer No family hx of      Cancer - colorectal No family hx of      Cerebrovascular Disease No family hx of          Current Outpatient Medications   Medication Sig Dispense Refill     calcium citrate (CALCIUM CITRATE) 950 MG tablet Take 1 tablet by mouth 2 times daily.       Calcium-Magnesium-Vitamin D (CALCIUM 500) 500-250-200 MG-MG-UNIT TABS Take 1 tablet by mouth       Cholecalciferol (D-5000) 5000 units TABS Take 5,000 Units by mouth       cyclobenzaprine (FLEXERIL) 10 MG tablet Take 1 tablet (10 mg) by mouth 3 times daily as needed for muscle spasms 90 tablet 3     DOCOSAHEXAENOIC ACID PO Take 1,000 mg by mouth        DULoxetine (CYMBALTA) 60 MG EC capsule Take 1 capsule (60 mg) by mouth daily 30 capsule 11     estradiol (ESTRACE VAGINAL) 0.1 MG/GM vaginal cream Place 2 g vaginally three times a week. 42.5 g 11     Estradiol 1 MG/GM GEL Place 1 g onto the skin daily 30 g 11     HYDROcodone-acetaminophen (NORCO)  MG per tablet Take 1 tablet by mouth every 6 hours as needed for moderate to severe pain maximum 3 tablet(s) per day 90 tablet 0     lidocaine (LIDODERM) 5 % Patch Place 4 patches onto the skin daily Apply up to 4 patches to skin. Wear for 12 hours and remove for 12 hrs.  Refill when patient requests. 120 patch 3     LORazepam (ATIVAN) 0.5 MG tablet 1-2 tabs qhs prn insomnia and 1 q 8 hours prn anxiety 100 tablet 5     medical  "cannabis (Patient's own supply.  Not a prescription) Medical Cannabis - [place] 4-6 ml by mouth daily. [facility]       morphine (MS CONTIN) 15 MG CR tablet Take 1 tablet (15 mg) by mouth every 12 hours maximum 2 tablet(s) per day 60 tablet 0     Multiple Vitamin (MULTI-VITAMINS) TABS Take 1 tablet by mouth       sennosides (SENOKOT) 8.6 MG tablet Take 1 tablet by mouth daily as needed for constipation.       Allergies   Allergen Reactions     Budeprion Sr      Ineffective, name brand works best     Bupropion Other (See Comments)     Ineffective, name brand works best     Codeine      Shaky     Codeine Other (See Comments)     \"Feels like electricity running through body\"  No reaction noted in Abby.  Ria  With Tylenol     Effexor [Venlafaxine Hydrochloride]      Affect too flat     Escitalopram      Other reaction(s): *Unknown  Sexual dysfunction     Fluoxetine Other (See Comments)     Sexual dysfunction     Levaquin [Levofloxacin] Other (See Comments)     Suicidal thoughts  Dark thoughts- mood changes     Lexapro      Sexual dysfunction     Milnacipran      12.5 MG is fine. 25 MG caused side effects. \"Dark thoughts\"     Pregabalin Other (See Comments)     Hallucinations  Audiovisual hallucinations     Prozac [Fluoxetine Hcl]      Sexual dysfunction     Tramadol Hives     Hives       Venlafaxine      Other reaction(s): *Unknown  Affect too flat     Recent Labs   Lab Test 11/14/17  1239 12/16/13  0739 11/07/12  1207  01/09/12  0903   LDL  --  137* 104  --   --    HDL  --  52 46*  --   --    TRIG  --  226* 120  --   --    ALT  --  17 23  --  32   CR  --  0.76 0.68   < >  --    GFRESTIMATED  --  80 >90   < >  --    GFRESTBLACK  --  >90 >90   < >  --    POTASSIUM 4.1 4.6 4.2  --   --     < > = values in this interval not displayed.      BP Readings from Last 3 Encounters:   03/12/19 117/76   02/12/19 103/70   01/15/19 110/76    Wt Readings from Last 3 Encounters:   11/14/17 86 kg (189 lb 8 oz)   02/23/17 83.9 " kg (185 lb)   11/08/13 83 kg (183 lb)                    ROS:  Constitutional, HEENT, cardiovascular, pulmonary, gi and gu systems are negative, except as otherwise noted.    OBJECTIVE:     /76   Pulse 74   Temp 98.1  F (36.7  C) (Oral)   Resp 16   LMP 05/01/2005 (Exact Date)   SpO2 98%   Breastfeeding? No   There is no height or weight on file to calculate BMI.  GENERAL: healthy, alert and no distress  EYES: Eyes grossly normal to inspection, PERRL and conjunctivae and sclerae normal  NECK: no adenopathy, no asymmetry, masses, or scars and thyroid normal to palpation  ABDOMEN: soft, nontender, no hepatosplenomegaly, no masses and bowel sounds normal  MS: no gross musculoskeletal defects noted, no edema  SKIN: no suspicious lesions or rashes  NEURO: Normal strength and tone, mentation intact and speech normal  PSYCH: mentation appears normal, affect normal/bright    ASSESSMENT/PLAN:     1. Chronic low back pain, unspecified back pain laterality, with sciatica presence unspecified  2. Chronic pain syndrome    - HYDROcodone-acetaminophen (NORCO)  MG per tablet; Take 1 tablet by mouth every 6 hours as needed for moderate to severe pain maximum 3 tablet(s) per day  Dispense: 90 tablet; Refill: 0  - morphine (MS CONTIN) 15 MG CR tablet; Take 1 tablet (15 mg) by mouth every 12 hours maximum 2 tablet(s) per day  Dispense: 60 tablet; Refill: 0    Refill of meds x 1 month.  Continues to discern benefit/longterm use of medical cannabis as noted above.    3. Moderate recurrent major depression (H)    Acute exacerbation with winter weather and still grieving loss of dear family dog.  Trying to carve out space for self.  Not currently in therapy.  Continue to support.    4. CLL (chronic lymphocytic leukemia) (H)    Has Follow-up with HEME/ONC this month for labs.  Remaining stable.        Emili Ballard MD  [facility]

## 2019-03-13 ASSESSMENT — ANXIETY QUESTIONNAIRES: GAD7 TOTAL SCORE: 5

## 2019-03-14 DIAGNOSIS — Z78.0 POSTMENOPAUSAL STATUS: ICD-10-CM

## 2019-03-15 NOTE — TELEPHONE ENCOUNTER
"Requested Prescriptions   Pending Prescriptions Disp Refills     DIVIGEL 1 MG/GM GEL [Pharmacy Med Name: estradiol 1 mg/gram (0.1 %) transdermal gel packet]  Last Written Prescription Date:  4/19/2013  Last Fill Quantity: 42.5g,  # refills: 11   Last Office Visit: 3/12/2019 Elio  Future Office Visit:      30 g 11     Sig: Place 1 packet onto the skin daily    Hormone Replacement Therapy Failed - 3/14/2019  5:08 PM       Failed - Patient has mammogram in past 2 years on file if age 50-75       Passed - Blood pressure under 140/90 in past 12 months    BP Readings from Last 3 Encounters:   03/12/19 117/76   02/12/19 103/70   01/15/19 110/76            Passed - Recent (12 mo) or future (30 days) visit within the authorizing provider's specialty    Patient had office visit in the last 12 months or has a visit in the next 30 days with authorizing provider or within the authorizing provider's specialty.  See \"Patient Info\" tab in inbasket, or \"Choose Columns\" in Meds & Orders section of the refill encounter.             Passed - Medication is active on med list       Passed - Patient is 18 years of age or older       Passed - No active pregnancy on record       Passed - No positive pregnancy test on record in past 12 months          "

## 2019-03-18 RX ORDER — ESTRADIOL 1 MG/G
GEL TOPICAL
Qty: 30 G | Refills: 11 | Status: SHIPPED | OUTPATIENT
Start: 2019-03-18 | End: 2020-01-14

## 2019-03-19 ENCOUNTER — TRANSFERRED RECORDS (OUTPATIENT)
Dept: HEALTH INFORMATION MANAGEMENT | Facility: CLINIC | Age: 59
End: 2019-03-19

## 2019-03-20 DIAGNOSIS — M54.5 CHRONIC LOW BACK PAIN, UNSPECIFIED BACK PAIN LATERALITY, WITH SCIATICA PRESENCE UNSPECIFIED: ICD-10-CM

## 2019-03-20 DIAGNOSIS — G89.29 CHRONIC LOW BACK PAIN, UNSPECIFIED BACK PAIN LATERALITY, WITH SCIATICA PRESENCE UNSPECIFIED: ICD-10-CM

## 2019-03-20 NOTE — TELEPHONE ENCOUNTER
"Requested Prescriptions   Pending Prescriptions Disp Refills     DULoxetine (CYMBALTA) 60 MG capsule  Last Written Prescription Date:  12/19/2017  Last Fill Quantity: 30 cap,  # refills: 11   Last office visit: 3/12/2019 with prescribing provider:  Elio   Future Office Visit:     30 capsule 11     Sig: Take 1 capsule (60 mg) by mouth daily    Serotonin-Norepinephrine Reuptake Inhibitors  Passed - 3/20/2019 11:07 AM       Passed - Blood pressure under 140/90 in past 12 months    BP Readings from Last 3 Encounters:   03/12/19 117/76   02/12/19 103/70   01/15/19 110/76                Passed - Recent (12 mo) or future (30 days) visit within the authorizing provider's specialty    Patient had office visit in the last 12 months or has a visit in the next 30 days with authorizing provider or within the authorizing provider's specialty.  See \"Patient Info\" tab in inbasket, or \"Choose Columns\" in Meds & Orders section of the refill encounter.             Passed - Medication is active on med list       Passed - Patient is age 18 or older       Passed - No active pregnancy on record       Passed - No positive pregnancy test in past 12 months        "

## 2019-03-21 RX ORDER — DULOXETIN HYDROCHLORIDE 60 MG/1
60 CAPSULE, DELAYED RELEASE ORAL DAILY
Qty: 30 CAPSULE | Refills: 11 | Status: SHIPPED | OUTPATIENT
Start: 2019-03-21 | End: 2019-06-19

## 2019-04-04 ENCOUNTER — OFFICE VISIT (OUTPATIENT)
Dept: FAMILY MEDICINE | Facility: CLINIC | Age: 59
End: 2019-04-04
Payer: COMMERCIAL

## 2019-04-04 VITALS
TEMPERATURE: 97.2 F | SYSTOLIC BLOOD PRESSURE: 107 MMHG | OXYGEN SATURATION: 96 % | HEART RATE: 69 BPM | DIASTOLIC BLOOD PRESSURE: 66 MMHG | RESPIRATION RATE: 16 BRPM

## 2019-04-04 DIAGNOSIS — G89.4 CHRONIC PAIN SYNDROME: ICD-10-CM

## 2019-04-04 DIAGNOSIS — M54.5 CHRONIC LOW BACK PAIN, UNSPECIFIED BACK PAIN LATERALITY, WITH SCIATICA PRESENCE UNSPECIFIED: ICD-10-CM

## 2019-04-04 DIAGNOSIS — G89.29 CHRONIC LOW BACK PAIN, UNSPECIFIED BACK PAIN LATERALITY, WITH SCIATICA PRESENCE UNSPECIFIED: ICD-10-CM

## 2019-04-04 DIAGNOSIS — M54.2 CERVICALGIA: ICD-10-CM

## 2019-04-04 PROCEDURE — 99213 OFFICE O/P EST LOW 20 MIN: CPT | Performed by: FAMILY MEDICINE

## 2019-04-04 RX ORDER — LIDOCAINE 50 MG/G
4 PATCH TOPICAL DAILY
Qty: 120 PATCH | Refills: 3 | Status: SHIPPED | OUTPATIENT
Start: 2019-04-04 | End: 2019-07-23

## 2019-04-04 RX ORDER — MORPHINE SULFATE 15 MG/1
15 TABLET, FILM COATED, EXTENDED RELEASE ORAL EVERY 12 HOURS
Qty: 60 TABLET | Refills: 0 | Status: SHIPPED | OUTPATIENT
Start: 2019-04-14 | End: 2019-05-14

## 2019-04-04 RX ORDER — HYDROCODONE BITARTRATE AND ACETAMINOPHEN 10; 325 MG/1; MG/1
1 TABLET ORAL EVERY 6 HOURS PRN
Qty: 90 TABLET | Refills: 0 | Status: SHIPPED | OUTPATIENT
Start: 2019-04-14 | End: 2019-05-14

## 2019-04-04 ASSESSMENT — PAIN SCALES - GENERAL: PAINLEVEL: MILD PAIN (3)

## 2019-04-04 NOTE — PROGRESS NOTES
SUBJECTIVE:   Janelle White is a 58 year old female who presents to clinic today for the following health issues:      HPI     She is here today for refills of her pain medications.      Past Medical/Surgical History  Patient presents today after hospitalization for POSTERIOR FUSION WITH TRANSFORAMINAL LUMBAR INTERBODY FUSION (TFLIF) L2-3, HARDWARE REMOVAL L3/4 PRONE 2017 at [facility] with Dr. Clayton.     She is also s/p ANTERIOR CERVICAL DECOMPRESSION AND FUSION C7-T1 WITH ANTERIOR HARDWARE REMOVAL C6-7 AND POSTERIOR CERVICAL FUSION C7-T1 2017      Pain control has been stable.  Does not desire a dose adjustment today.      Continues to be followed by NEUROSURG team.  Had some occipital injections recently for some C2-C3 degenerative issues with no relief appreciated.  Had first trial of PRP 6+ weeks ago now to the anterior cervical spine.    Problem list and histories reviewed & adjusted, as indicated.  Additional history: as documented        Patient Active Problem List   Diagnosis     PERSONAL HX OF  MELANOMA     B-complex deficiency     Migraine     Chronic Low Back Pain     CARDIOVASCULAR SCREENING; LDL GOAL LESS THAN 160     DDD (degenerative disc disease), cervical     Moderate recurrent major depression (H)     Vitamin D deficiency     Shift work sleep disorder     Chronic pain syndrome     CLL (chronic lymphocytic leukemia) (H)     Controlled substance agreement signed     CLARE (obstructive sleep apnea)     Past Surgical History:   Procedure Laterality Date     anterior cervical discectomy C4-5 ,Fusion C5-6-7  10/2007     BLEPHAROPLASTY BILATERAL  2013    Procedure: BLEPHAROPLASTY BILATERAL;  BILATERAL UPPER LID BLEPHAROPLASTY AND BROWPEXY ;  Surgeon: Godfrey Miguel MD;  Location: [facility] APPENDECTOMY  2006      SECTION      x2     FUSION LUMBAR ANTERIOR, FUSION LUMBAR POSTERIOR TWO LEVELS, COMBINED  2010    L3-S1 anterior posterior fusion      HYSTERECTOMY  2006    ovaries intact     Partial vulvectomy for NEENA III  2009     Pubovaginal sling, post op durasphere injections  2006    wears pad     ROTATOR CUFF REPAIR RT/LT  2011    right     SPINAL FUSION C3-4  2009    C3-4, anterior spinal fusion     TUBAL LIGATION         Social History     Tobacco Use     Smoking status: Former Smoker     Packs/day: 1.00     Years: 5.00     Pack years: 5.00     Last attempt to quit: 1984     Years since quittin.2     Smokeless tobacco: Never Used   Substance Use Topics     Alcohol use: Yes     Comment: no alcohol currently since prior to her back surgeries,      Family History   Problem Relation Age of Onset     Hypertension Mother      Alcohol/Drug Mother      Osteoporosis Mother         borderline     Hypertension Father      Diabetes Father         Type 2, diet controlled     Alcohol/Drug Father         remote use     C.A.D. Maternal Grandmother          late 50s     C.A.D. Maternal Grandfather         bone and brain cancer mets as well     Diabetes Paternal Grandfather      Alcohol/Drug Brother      Breast Cancer No family hx of      Cancer - colorectal No family hx of      Cerebrovascular Disease No family hx of          Current Outpatient Medications   Medication Sig Dispense Refill     calcium citrate (CALCIUM CITRATE) 950 MG tablet Take 1 tablet by mouth 2 times daily.       Calcium-Magnesium-Vitamin D (CALCIUM 500) 500-250-200 MG-MG-UNIT TABS Take 1 tablet by mouth       Cholecalciferol (D-5000) 5000 units TABS Take 5,000 Units by mouth       cyclobenzaprine (FLEXERIL) 10 MG tablet Take 1 tablet (10 mg) by mouth 3 times daily as needed for muscle spasms 90 tablet 3     DIVIGEL 1 MG/GM GEL Place 1 packet onto the skin daily 30 g 11     DOCOSAHEXAENOIC ACID PO Take 1,000 mg by mouth        DULoxetine (CYMBALTA) 60 MG capsule Take 1 capsule (60 mg) by mouth daily 30 capsule 11     estradiol (ESTRACE VAGINAL) 0.1 MG/GM vaginal cream  "Place 2 g vaginally three times a week. 42.5 g 11     [START ON 4/14/2019] HYDROcodone-acetaminophen (NORCO)  MG per tablet Take 1 tablet by mouth every 6 hours as needed for moderate to severe pain maximum 3 tablet(s) per day 90 tablet 0     lidocaine (LIDODERM) 5 % patch Place 4 patches onto the skin daily Apply up to 4 patches to skin. Wear for 12 hours and remove for 12 hrs.  Refill when patient requests. 120 patch 3     LORazepam (ATIVAN) 0.5 MG tablet 1-2 tabs qhs prn insomnia and 1 q 8 hours prn anxiety 100 tablet 5     medical cannabis (Patient's own supply.  Not a prescription) Medical Cannabis - Tangerine 4-6 ml by mouth daily. [facility]       [START ON 4/14/2019] morphine (MS CONTIN) 15 MG CR tablet Take 1 tablet (15 mg) by mouth every 12 hours maximum 2 tablet(s) per day 60 tablet 0     Multiple Vitamin (MULTI-VITAMINS) TABS Take 1 tablet by mouth       sennosides (SENOKOT) 8.6 MG tablet Take 1 tablet by mouth daily as needed for constipation.       Allergies   Allergen Reactions     Budeprion Sr      Ineffective, name brand works best     Bupropion Other (See Comments)     Ineffective, name brand works best     Codeine      Shaky     Codeine Other (See Comments)     \"Feels like electricity running through body\"  No reaction noted in Abby.  Ria  With Tylenol     Effexor [Venlafaxine Hydrochloride]      Affect too flat     Escitalopram      Other reaction(s): *Unknown  Sexual dysfunction     Fluoxetine Other (See Comments)     Sexual dysfunction     Levaquin [Levofloxacin] Other (See Comments)     Suicidal thoughts  Dark thoughts- mood changes     Lexapro      Sexual dysfunction     Milnacipran      12.5 MG is fine. 25 MG caused side effects. \"Dark thoughts\"     Pregabalin Other (See Comments)     Hallucinations  Audiovisual hallucinations     Prozac [Fluoxetine Hcl]      Sexual dysfunction     Tramadol Hives     Hives       Venlafaxine      Other reaction(s): *Unknown  Affect too flat "     Recent Labs   Lab Test 11/14/17  1239 12/16/13  0739 11/07/12  1207  01/09/12  0903   LDL  --  137* 104  --   --    HDL  --  52 46*  --   --    TRIG  --  226* 120  --   --    ALT  --  17 23  --  32   CR  --  0.76 0.68   < >  --    GFRESTIMATED  --  80 >90   < >  --    GFRESTBLACK  --  >90 >90   < >  --    POTASSIUM 4.1 4.6 4.2  --   --     < > = values in this interval not displayed.      BP Readings from Last 3 Encounters:   04/04/19 107/66   03/12/19 117/76   02/12/19 103/70    Wt Readings from Last 3 Encounters:   11/14/17 86 kg (189 lb 8 oz)   02/23/17 83.9 kg (185 lb)   11/08/13 83 kg (183 lb)                    ROS:  Constitutional, HEENT, cardiovascular, pulmonary, gi and gu systems are negative, except as otherwise noted.    OBJECTIVE:     /66   Pulse 69   Temp 97.2  F (36.2  C) (Oral)   Resp 16   LMP 05/01/2005 (Exact Date)   SpO2 96%   There is no height or weight on file to calculate BMI.  GENERAL: healthy, alert and no distress  EYES: Eyes grossly normal to inspection, PERRL and conjunctivae and sclerae normal  NECK: no adenopathy, no asymmetry, masses, or scars and thyroid normal to palpation  MS: no gross musculoskeletal defects noted, no edema  SKIN: no suspicious lesions or rashes  NEURO: Normal strength and tone, mentation intact and speech normal  PSYCH: mentation appears normal, affect normal/bright    ASSESSMENT/PLAN:             1. Chronic pain syndrome  2. Cervicalgia  3. Chronic low back pain, unspecified back pain laterality, with sciatica presence unspecified    - lidocaine (LIDODERM) 5 % patch; Place 4 patches onto the skin daily Apply up to 4 patches to skin. Wear for 12 hours and remove for 12 hrs.  Refill when patient requests.  Dispense: 120 patch; Refill: 3  - morphine (MS CONTIN) 15 MG CR tablet; Take 1 tablet (15 mg) by mouth every 12 hours maximum 2 tablet(s) per day  Dispense: 60 tablet; Refill: 0  - HYDROcodone-acetaminophen (NORCO)  MG per tablet; Take 1  tablet by mouth every 6 hours as needed for moderate to severe pain maximum 3 tablet(s) per day  Dispense: 90 tablet; Refill: 0    Medications refilled today x 1 month.  Remains stable on current regimen.  Mood improving with change of seasons.  Follow-up 1 month.    Emili Ballard MD  [facility]

## 2019-05-09 DIAGNOSIS — M54.5 CHRONIC LOW BACK PAIN, UNSPECIFIED BACK PAIN LATERALITY, WITH SCIATICA PRESENCE UNSPECIFIED: ICD-10-CM

## 2019-05-09 DIAGNOSIS — G89.29 CHRONIC LOW BACK PAIN, UNSPECIFIED BACK PAIN LATERALITY, WITH SCIATICA PRESENCE UNSPECIFIED: ICD-10-CM

## 2019-05-09 DIAGNOSIS — G89.4 CHRONIC PAIN SYNDROME: ICD-10-CM

## 2019-05-09 NOTE — TELEPHONE ENCOUNTER
Reason for Call:  Medication or medication refill:    Do you use a [facility]?  Name of the pharmacy and phone number for the current request:  [facility] - [place], [place] - [address]     Name of the medication requested:   1.HYDROcodone-acetaminophen (NORCO)  MG per tablet   2. morphine (MS CONTIN) 15 MG CR tablet    Other request: Patient is seeing PCP on 5/22 but will need a weeks worth of refills to last her until her appt. Please advise, thank you!    * Pt will  the hardcopy from the clinic if approved*    Can we leave a detailed message on this number? YES    Phone number patient can be reached at: Home number on file [phone] (home)    Best Time: anytime    Call taken on 5/9/2019 at 12:57 PM by Rica Alonso

## 2019-05-10 NOTE — TELEPHONE ENCOUNTER
Requested Prescriptions   Pending Prescriptions Disp Refills     HYDROcodone-acetaminophen (NORCO)  MG per tablet  This maybe too early.   Last Written Prescription Date:  4-14-19  Last Fill Quantity: 90 tab,   # refills: 3  Last Office Visit: 4-4-19  Future Office visit:    Next 5 appointments (look out 90 days)    May 22, 2019  6:00 PM CDT  Office Visit with Osmar Ballard MD  [address] [address]  [phone]       Routing refill request to provider for review/approval because:  Drug not on the [facility], [facility] or  Health refill protocol or controlled substance  90 tablet 0     Sig: Take 1 tablet by mouth every 6 hours as needed for moderate to severe pain maximum 3 tablet(s) per day       There is no refill protocol information for this order     ____________________________________         morphine (MS CONTIN) 15 MG CR tablet  Last Written Prescription Date:  4-14-19  Last Fill Quantity: 60 tab,  # refills: 0   Last office visit: 4/4/2019 with prescribing provider:  OSMAR BALLRAD    Future Office Visit:   Next 5 appointments (look out 90 days)    May 22, 2019  6:00 PM CDT  Office Visit with Osmar Ballard MD  [address] [id number] [phone]       60 tablet 0     Sig: Take 1 tablet (15 mg) by mouth every 12 hours maximum 2 tablet(s) per day       There is no refill protocol information for this order

## 2019-05-14 ENCOUNTER — MYC REFILL (OUTPATIENT)
Dept: FAMILY MEDICINE | Facility: CLINIC | Age: 59
End: 2019-05-14

## 2019-05-14 DIAGNOSIS — M54.5 CHRONIC LOW BACK PAIN, UNSPECIFIED BACK PAIN LATERALITY, WITH SCIATICA PRESENCE UNSPECIFIED: ICD-10-CM

## 2019-05-14 DIAGNOSIS — G89.4 CHRONIC PAIN SYNDROME: ICD-10-CM

## 2019-05-14 DIAGNOSIS — G89.29 CHRONIC LOW BACK PAIN, UNSPECIFIED BACK PAIN LATERALITY, WITH SCIATICA PRESENCE UNSPECIFIED: ICD-10-CM

## 2019-05-15 RX ORDER — HYDROCODONE BITARTRATE AND ACETAMINOPHEN 10; 325 MG/1; MG/1
1 TABLET ORAL EVERY 6 HOURS PRN
Qty: 21 TABLET | Refills: 0 | Status: SHIPPED | OUTPATIENT
Start: 2019-05-15 | End: 2019-05-22

## 2019-05-15 RX ORDER — MORPHINE SULFATE 15 MG/1
15 TABLET, FILM COATED, EXTENDED RELEASE ORAL EVERY 12 HOURS
Qty: 60 TABLET | Refills: 0 | OUTPATIENT
Start: 2019-05-15

## 2019-05-15 RX ORDER — MORPHINE SULFATE 15 MG/1
15 TABLET, FILM COATED, EXTENDED RELEASE ORAL EVERY 12 HOURS
Qty: 14 TABLET | Refills: 0 | Status: SHIPPED | OUTPATIENT
Start: 2019-05-15 | End: 2019-05-22

## 2019-05-15 RX ORDER — HYDROCODONE BITARTRATE AND ACETAMINOPHEN 10; 325 MG/1; MG/1
1 TABLET ORAL EVERY 6 HOURS PRN
Qty: 90 TABLET | Refills: 0 | OUTPATIENT
Start: 2019-05-15

## 2019-05-15 NOTE — TELEPHONE ENCOUNTER
I spoke with Janelle to let her know the scripts were ready for her and she said her  [facility] will pick them up.

## 2019-05-15 NOTE — TELEPHONE ENCOUNTER
Patient calling to find out status of these refills.  Is out of these medications and needs asap.  Please call as soon as you can.  OK to LM on VM.

## 2019-05-15 NOTE — TELEPHONE ENCOUNTER
Dr. Ballard-Please review and advise if you would like patient to request eVisit to address these refill requests?  patient has appt scheduled on 5/22/19.    Thank you!  SHANEL MooneyN, RN

## 2019-05-22 ENCOUNTER — OFFICE VISIT (OUTPATIENT)
Dept: FAMILY MEDICINE | Facility: CLINIC | Age: 59
End: 2019-05-22
Payer: COMMERCIAL

## 2019-05-22 VITALS
OXYGEN SATURATION: 97 % | SYSTOLIC BLOOD PRESSURE: 119 MMHG | TEMPERATURE: 98.7 F | DIASTOLIC BLOOD PRESSURE: 79 MMHG | HEART RATE: 65 BPM | RESPIRATION RATE: 16 BRPM

## 2019-05-22 DIAGNOSIS — G89.4 CHRONIC PAIN SYNDROME: Primary | ICD-10-CM

## 2019-05-22 DIAGNOSIS — M54.5 CHRONIC LOW BACK PAIN, UNSPECIFIED BACK PAIN LATERALITY, WITH SCIATICA PRESENCE UNSPECIFIED: ICD-10-CM

## 2019-05-22 DIAGNOSIS — M50.30 DDD (DEGENERATIVE DISC DISEASE), CERVICAL: ICD-10-CM

## 2019-05-22 DIAGNOSIS — G89.29 CHRONIC LOW BACK PAIN, UNSPECIFIED BACK PAIN LATERALITY, WITH SCIATICA PRESENCE UNSPECIFIED: ICD-10-CM

## 2019-05-22 PROCEDURE — 99213 OFFICE O/P EST LOW 20 MIN: CPT | Performed by: FAMILY MEDICINE

## 2019-05-22 RX ORDER — MORPHINE SULFATE 15 MG/1
15 TABLET, FILM COATED, EXTENDED RELEASE ORAL EVERY 12 HOURS
Qty: 60 TABLET | Refills: 0 | Status: SHIPPED | OUTPATIENT
Start: 2019-05-22 | End: 2019-06-19

## 2019-05-22 RX ORDER — HYDROCODONE BITARTRATE AND ACETAMINOPHEN 10; 325 MG/1; MG/1
1 TABLET ORAL EVERY 6 HOURS PRN
Qty: 90 TABLET | Refills: 0 | Status: SHIPPED | OUTPATIENT
Start: 2019-05-22 | End: 2019-06-19

## 2019-05-22 ASSESSMENT — PAIN SCALES - GENERAL: PAINLEVEL: MODERATE PAIN (4)

## 2019-05-22 NOTE — PROGRESS NOTES
Subjective     Janelle White is a 58 year old female who presents to clinic today for the following health issues:    HPI     She is here today for refills of her pain medications.       Past Medical/Surgical History  Patient presents today after hospitalization for POSTERIOR FUSION WITH TRANSFORAMINAL LUMBAR INTERBODY FUSION (TFLIF) L2-3, HARDWARE REMOVAL L3/4 PRONE 2017 at [facility] with Dr. Clayton.     She is also s/p ANTERIOR CERVICAL DECOMPRESSION AND FUSION C7-T1 WITH ANTERIOR HARDWARE REMOVAL C6-7 AND POSTERIOR CERVICAL FUSION C7-T1 2017      Pain control has been stable.  Does not desire a dose adjustment today.      Continues to be followed by NEUROSURG team.  Had some occipital injections recently for some C2-C3 degenerative issues with no relief appreciated.  Had first trial of PRP 3 months ago now to the anterior cervical spine with minimal results.  Considering botox injections for tight upper back and neck muscles.      Patient Active Problem List   Diagnosis     PERSONAL HX OF  MELANOMA     B-complex deficiency     Migraine     Chronic Low Back Pain     CARDIOVASCULAR SCREENING; LDL GOAL LESS THAN 160     DDD (degenerative disc disease), cervical     Moderate recurrent major depression (H)     Vitamin D deficiency     Shift work sleep disorder     Chronic pain syndrome     CLL (chronic lymphocytic leukemia) (H)     Controlled substance agreement signed     CLARE (obstructive sleep apnea)     Past Surgical History:   Procedure Laterality Date     anterior cervical discectomy C4-5 ,Fusion C5-6-7  10/2007     BLEPHAROPLASTY BILATERAL  2013    Procedure: BLEPHAROPLASTY BILATERAL;  BILATERAL UPPER LID BLEPHAROPLASTY AND BROWPEXY ;  Surgeon: Godfrey Miguel MD;  Location: [facility] APPENDECTOMY  2006      SECTION      x2     FUSION LUMBAR ANTERIOR, FUSION LUMBAR POSTERIOR TWO LEVELS, COMBINED  2010    L3-S1 anterior posterior fusion     HYSTERECTOMY  2006     ovaries intact     Partial vulvectomy for NEENA III  2009     Pubovaginal sling, post op durasphere injections  2006    wears pad     ROTATOR CUFF REPAIR RT/LT  2011    right     SPINAL FUSION C3-4  2009    C3-4, anterior spinal fusion     TUBAL LIGATION         Social History     Tobacco Use     Smoking status: Former Smoker     Packs/day: 1.00     Years: 5.00     Pack years: 5.00     Last attempt to quit: 1984     Years since quittin.4     Smokeless tobacco: Never Used   Substance Use Topics     Alcohol use: Yes     Comment: no alcohol currently since prior to her back surgeries,      Family History   Problem Relation Age of Onset     Hypertension Mother      Alcohol/Drug Mother      Osteoporosis Mother         borderline     Hypertension Father      Diabetes Father         Type 2, diet controlled     Alcohol/Drug Father         remote use     C.A.D. Maternal Grandmother          late 50s     C.A.D. Maternal Grandfather         bone and brain cancer mets as well     Diabetes Paternal Grandfather      Alcohol/Drug Brother      Breast Cancer No family hx of      Cancer - colorectal No family hx of      Cerebrovascular Disease No family hx of          Current Outpatient Medications   Medication Sig Dispense Refill     calcium citrate (CALCIUM CITRATE) 950 MG tablet Take 1 tablet by mouth 2 times daily.       Calcium-Magnesium-Vitamin D (CALCIUM 500) 500-250-200 MG-MG-UNIT TABS Take 1 tablet by mouth       Cholecalciferol (D-5000) 5000 units TABS Take 5,000 Units by mouth       cyclobenzaprine (FLEXERIL) 10 MG tablet Take 1 tablet (10 mg) by mouth 3 times daily as needed for muscle spasms 90 tablet 3     DIVIGEL 1 MG/GM GEL Place 1 packet onto the skin daily 30 g 11     DOCOSAHEXAENOIC ACID PO Take 1,000 mg by mouth        DULoxetine (CYMBALTA) 60 MG capsule Take 1 capsule (60 mg) by mouth daily 30 capsule 11     estradiol (ESTRACE VAGINAL) 0.1 MG/GM vaginal cream Place 2 g vaginally  "three times a week. 42.5 g 11     HYDROcodone-acetaminophen (NORCO)  MG per tablet Take 1 tablet by mouth every 6 hours as needed for moderate to severe pain maximum 3 tablet(s) per day 21 tablet 0     lidocaine (LIDODERM) 5 % patch Place 4 patches onto the skin daily Apply up to 4 patches to skin. Wear for 12 hours and remove for 12 hrs.  Refill when patient requests. 120 patch 3     LORazepam (ATIVAN) 0.5 MG tablet 1-2 tabs qhs prn insomnia and 1 q 8 hours prn anxiety 100 tablet 5     medical cannabis (Patient's own supply.  Not a prescription) Medical Cannabis - [employer] 4-6 ml by mouth daily. [facility]       morphine (MS CONTIN) 15 MG CR tablet Take 1 tablet (15 mg) by mouth every 12 hours maximum 2 tablet(s) per day 14 tablet 0     Multiple Vitamin (MULTI-VITAMINS) TABS Take 1 tablet by mouth       sennosides (SENOKOT) 8.6 MG tablet Take 1 tablet by mouth daily as needed for constipation.       Allergies   Allergen Reactions     Budeprion Sr      Ineffective, name brand works best     Bupropion Other (See Comments)     Ineffective, name brand works best     Codeine      Shaky     Codeine Other (See Comments)     \"Feels like electricity running through body\"  No reaction noted in Kristian Rollins  With Tylenol     Effexor [Venlafaxine Hydrochloride]      Affect too flat     Escitalopram      Other reaction(s): *Unknown  Sexual dysfunction     Fluoxetine Other (See Comments)     Sexual dysfunction     Levaquin [Levofloxacin] Other (See Comments)     Suicidal thoughts  Dark thoughts- mood changes     Lexapro      Sexual dysfunction     Milnacipran      12.5 MG is fine. 25 MG caused side effects. \"Dark thoughts\"     Pregabalin Other (See Comments)     Hallucinations  Audiovisual hallucinations     Prozac [Fluoxetine Hcl]      Sexual dysfunction     Tramadol Hives     Hives       Venlafaxine      Other reaction(s): *Unknown  Affect too flat     Recent Labs   Lab Test 11/14/17  1239 12/16/13  0739 " 11/07/12  1207  01/09/12  0903   LDL  --  137* 104  --   --    HDL  --  52 46*  --   --    TRIG  --  226* 120  --   --    ALT  --  17 23  --  32   CR  --  0.76 0.68   < >  --    GFRESTIMATED  --  80 >90   < >  --    GFRESTBLACK  --  >90 >90   < >  --    POTASSIUM 4.1 4.6 4.2  --   --     < > = values in this interval not displayed.      BP Readings from Last 3 Encounters:   05/22/19 119/79   04/04/19 107/66   03/12/19 117/76    Wt Readings from Last 3 Encounters:   11/14/17 86 kg (189 lb 8 oz)   02/23/17 83.9 kg (185 lb)   11/08/13 83 kg (183 lb)                    Reviewed and updated as needed this visit by Provider         Review of Systems   ROS COMP: Constitutional, HEENT, cardiovascular, pulmonary, GI, , musculoskeletal, neuro, skin, endocrine and psych systems are negative, except as otherwise noted.      Objective    /79   Pulse 65   Temp 98.7  F (37.1  C) (Oral)   Resp 16   LMP 05/01/2005 (Exact Date)   SpO2 97%   There is no height or weight on file to calculate BMI.  Physical Exam   GENERAL: healthy, alert and no distress  EYES: Eyes grossly normal to inspection, PERRL and conjunctivae and sclerae normal  NECK: no adenopathy, no asymmetry, masses, or scars and thyroid normal to palpation  MS: no gross musculoskeletal defects noted, no edema  SKIN: no suspicious lesions or rashes  NEURO: Normal strength and tone, mentation intact and speech normal  PSYCH: mentation appears normal, affect normal/bright        Assessment & Plan     1. Chronic pain syndrome  2. Chronic low back pain, unspecified back pain laterality, with sciatica presence unspecified  3. DDD (degenerative disc disease), cervical    - morphine (MS CONTIN) 15 MG CR tablet; Take 1 tablet (15 mg) by mouth every 12 hours maximum 2 tablet(s) per day  Dispense: 60 tablet; Refill: 0  - HYDROcodone-acetaminophen (NORCO)  MG per tablet; Take 1 tablet by mouth every 6 hours as needed for moderate to severe pain maximum 3 tablet(s)  per day  Dispense: 90 tablet; Refill: 0    Stable on current pain regimen.  Follow-up one month.    Emili Ballard MD  [facility]

## 2019-05-30 DIAGNOSIS — M79.18 MYOFASCIAL PAIN: ICD-10-CM

## 2019-05-30 RX ORDER — CYCLOBENZAPRINE HCL 10 MG
10 TABLET ORAL 3 TIMES DAILY PRN
Qty: 90 TABLET | Refills: 3 | Status: SHIPPED | OUTPATIENT
Start: 2019-05-30 | End: 2020-01-14

## 2019-05-30 NOTE — TELEPHONE ENCOUNTER
cyclobenzaprine (FLEXERIL) 10 MG tablet 90 tablet 3 5/8/2018  No   Sig - Route: Take 1 tablet (10 mg) by mouth 3 times daily as needed for muscle spasms - Oral   Sent to pharmacy as: cyclobenzaprine (FLEXERIL) 10 MG tablet   Class: E-Prescribe   Order: [id number]   E-Prescribing Status: Receipt confirmed by pharmacy (5/8/2018  2:32 PM CDT)       Last Office Visit: 5/22/2019  Future Office visit:    Next 5 appointments (look out 90 days)    Jun 19, 2019  5:40 PM CDT  SHORT with Emili Ballard MD  [facility] ([facility]) [address] [place] [id number] [phone]           Routing refill request to provider for review/approval because:  Drug not on the [facility], [facility] or  Health refill protocol or controlled substance

## 2019-05-30 NOTE — TELEPHONE ENCOUNTER
Routing refill request to provider for review/approval because:  Drug not on the [facility] refill protocol   Galina Hines RN

## 2019-06-19 ENCOUNTER — OFFICE VISIT (OUTPATIENT)
Dept: FAMILY MEDICINE | Facility: CLINIC | Age: 59
End: 2019-06-19
Payer: COMMERCIAL

## 2019-06-19 VITALS
RESPIRATION RATE: 16 BRPM | OXYGEN SATURATION: 97 % | SYSTOLIC BLOOD PRESSURE: 111 MMHG | TEMPERATURE: 98.4 F | DIASTOLIC BLOOD PRESSURE: 75 MMHG | HEART RATE: 72 BPM

## 2019-06-19 DIAGNOSIS — G89.29 CHRONIC LOW BACK PAIN, UNSPECIFIED BACK PAIN LATERALITY, WITH SCIATICA PRESENCE UNSPECIFIED: ICD-10-CM

## 2019-06-19 DIAGNOSIS — M54.5 CHRONIC LOW BACK PAIN, UNSPECIFIED BACK PAIN LATERALITY, WITH SCIATICA PRESENCE UNSPECIFIED: ICD-10-CM

## 2019-06-19 DIAGNOSIS — G89.4 CHRONIC PAIN SYNDROME: Primary | ICD-10-CM

## 2019-06-19 DIAGNOSIS — M50.30 DDD (DEGENERATIVE DISC DISEASE), CERVICAL: ICD-10-CM

## 2019-06-19 PROCEDURE — 99213 OFFICE O/P EST LOW 20 MIN: CPT | Performed by: FAMILY MEDICINE

## 2019-06-19 RX ORDER — MORPHINE SULFATE 15 MG/1
15 TABLET, FILM COATED, EXTENDED RELEASE ORAL EVERY 12 HOURS
Qty: 60 TABLET | Refills: 0 | Status: SHIPPED | OUTPATIENT
Start: 2019-06-19 | End: 2019-07-15

## 2019-06-19 RX ORDER — DULOXETIN HYDROCHLORIDE 60 MG/1
60 CAPSULE, DELAYED RELEASE ORAL DAILY
Qty: 90 CAPSULE | Refills: 3 | Status: SHIPPED | OUTPATIENT
Start: 2019-06-19 | End: 2020-03-10

## 2019-06-19 RX ORDER — HYDROCODONE BITARTRATE AND ACETAMINOPHEN 10; 325 MG/1; MG/1
1 TABLET ORAL EVERY 6 HOURS PRN
Qty: 90 TABLET | Refills: 0 | Status: SHIPPED | OUTPATIENT
Start: 2019-06-19 | End: 2019-07-15

## 2019-06-19 ASSESSMENT — PAIN SCALES - GENERAL: PAINLEVEL: MODERATE PAIN (4)

## 2019-06-19 NOTE — PROGRESS NOTES
Subjective     Janelle White is a 58 year old female who presents to clinic today for the following health issues:    HPI     She is here today for refills of her pain medications.       Past Medical/Surgical History  Patient presents today after hospitalization for POSTERIOR FUSION WITH TRANSFORAMINAL LUMBAR INTERBODY FUSION (TFLIF) L2-3, HARDWARE REMOVAL L3/4 PRONE 2017 at  with Dr. Clayton.     She is also s/p ANTERIOR CERVICAL DECOMPRESSION AND FUSION C7-T1 WITH ANTERIOR HARDWARE REMOVAL C6-7 AND POSTERIOR CERVICAL FUSION C7-T1 2017      Pain control has been stable.  Does not desire a dose adjustment today.      Continues to be followed by NEUROSURG team.  Had some occipital injections recently for some C2-C3 degenerative issues with no relief appreciated.  Had first trial of PRP 4 months ago now to the anterior cervical spine with minimal results.  Was told recently botox injections would not help patient.    More tearful today with increased stress at work.       Patient Active Problem List   Diagnosis     PERSONAL HX OF  MELANOMA     B-complex deficiency     Migraine     Chronic Low Back Pain     CARDIOVASCULAR SCREENING; LDL GOAL LESS THAN 160     DDD (degenerative disc disease), cervical     Moderate recurrent major depression (H)     Vitamin D deficiency     Shift work sleep disorder     Chronic pain syndrome     CLL (chronic lymphocytic leukemia) (H)     Controlled substance agreement signed     CLARE (obstructive sleep apnea)     Past Surgical History:   Procedure Laterality Date     anterior cervical discectomy C4-5 ,Fusion C5-6-7  10/2007     BLEPHAROPLASTY BILATERAL  2013    Procedure: BLEPHAROPLASTY BILATERAL;  BILATERAL UPPER LID BLEPHAROPLASTY AND BROWPEXY ;  Surgeon: Godfrey Miguel MD;  Location: [facility] APPENDECTOMY  2006      SECTION      x2     FUSION LUMBAR ANTERIOR, FUSION LUMBAR POSTERIOR TWO LEVELS, COMBINED  2010    L3-S1 anterior  posterior fusion     HYSTERECTOMY  2006    ovaries intact     Partial vulvectomy for NEENA III  2009     Pubovaginal sling, post op durasphere injections  2006    wears pad     ROTATOR CUFF REPAIR RT/LT  2011    right     SPINAL FUSION C3-4  2009    C3-4, anterior spinal fusion     TUBAL LIGATION         Social History     Tobacco Use     Smoking status: Former Smoker     Packs/day: 1.00     Years: 5.00     Pack years: 5.00     Last attempt to quit: 1984     Years since quittin.4     Smokeless tobacco: Never Used   Substance Use Topics     Alcohol use: Yes     Comment: no alcohol currently since prior to her back surgeries,      Family History   Problem Relation Age of Onset     Hypertension Mother      Alcohol/Drug Mother      Osteoporosis Mother         borderline     Hypertension Father      Diabetes Father         Type 2, diet controlled     Alcohol/Drug Father         remote use     C.A.D. Maternal Grandmother          late 50s     C.A.D. Maternal Grandfather         bone and brain cancer mets as well     Diabetes Paternal Grandfather      Alcohol/Drug Brother      Breast Cancer No family hx of      Cancer - colorectal No family hx of      Cerebrovascular Disease No family hx of          Current Outpatient Medications   Medication Sig Dispense Refill     calcium citrate (CALCIUM CITRATE) 950 MG tablet Take 1 tablet by mouth 2 times daily.       Calcium-Magnesium-Vitamin D (CALCIUM 500) 500-250-200 MG-MG-UNIT TABS Take 1 tablet by mouth       Cholecalciferol (D-5000) 5000 units TABS Take 5,000 Units by mouth       cyclobenzaprine (FLEXERIL) 10 MG tablet Take 1 tablet (10 mg) by mouth 3 times daily as needed for muscle spasms 90 tablet 3     DIVIGEL 1 MG/GM GEL Place 1 packet onto the skin daily 30 g 11     DOCOSAHEXAENOIC ACID PO Take 1,000 mg by mouth        DULoxetine (CYMBALTA) 60 MG capsule Take 1 capsule (60 mg) by mouth daily 90 capsule 3     estradiol (ESTRACE VAGINAL) 0.1  "MG/GM vaginal cream Place 2 g vaginally three times a week. 42.5 g 11     HYDROcodone-acetaminophen (NORCO)  MG per tablet Take 1 tablet by mouth every 6 hours as needed for moderate to severe pain maximum 3 tablet(s) per day 90 tablet 0     lidocaine (LIDODERM) 5 % patch Place 4 patches onto the skin daily Apply up to 4 patches to skin. Wear for 12 hours and remove for 12 hrs.  Refill when patient requests. 120 patch 3     LORazepam (ATIVAN) 0.5 MG tablet 1-2 tabs qhs prn insomnia and 1 q 8 hours prn anxiety 100 tablet 5     medical cannabis (Patient's own supply.  Not a prescription) Medical Cannabis - Tangerine 4-6 ml by mouth daily. [facility]       morphine (MS CONTIN) 15 MG CR tablet Take 1 tablet (15 mg) by mouth every 12 hours maximum 2 tablet(s) per day 60 tablet 0     Multiple Vitamin (MULTI-VITAMINS) TABS Take 1 tablet by mouth       sennosides (SENOKOT) 8.6 MG tablet Take 1 tablet by mouth daily as needed for constipation.       Allergies   Allergen Reactions     Budeprion Sr      Ineffective, name brand works best     Bupropion Other (See Comments)     Ineffective, name brand works best     Codeine      Shaky     Codeine Other (See Comments)     \"Feels like electricity running through body\"  No reaction noted in [facility] Rollins  With Tylenol     Effexor [Venlafaxine Hydrochloride]      Affect too flat     Escitalopram      Other reaction(s): *Unknown  Sexual dysfunction     Fluoxetine Other (See Comments)     Sexual dysfunction     Levaquin [Levofloxacin] Other (See Comments)     Suicidal thoughts  Dark thoughts- mood changes     Lexapro      Sexual dysfunction     Milnacipran      12.5 MG is fine. 25 MG caused side effects. \"Dark thoughts\"     Pregabalin Other (See Comments)     Hallucinations  Audiovisual hallucinations     Prozac [Fluoxetine Hcl]      Sexual dysfunction     Tramadol Hives     Hives       Venlafaxine      Other reaction(s): *Unknown  Affect too flat     Recent Labs   Lab Test " 11/14/17  1239 12/16/13  0739 11/07/12  1207  01/09/12  0903   LDL  --  137* 104  --   --    HDL  --  52 46*  --   --    TRIG  --  226* 120  --   --    ALT  --  17 23  --  32   CR  --  0.76 0.68   < >  --    GFRESTIMATED  --  80 >90   < >  --    GFRESTBLACK  --  >90 >90   < >  --    POTASSIUM 4.1 4.6 4.2  --   --     < > = values in this interval not displayed.      BP Readings from Last 3 Encounters:   06/19/19 111/75   05/22/19 119/79   04/04/19 107/66    Wt Readings from Last 3 Encounters:   11/14/17 86 kg (189 lb 8 oz)   02/23/17 83.9 kg (185 lb)   11/08/13 83 kg (183 lb)                    Reviewed and updated as needed this visit by Provider         Review of Systems   ROS COMP: Constitutional, HEENT, cardiovascular, pulmonary, gi and gu systems are negative, except as otherwise noted.      Objective    /75   Pulse 72   Temp 98.4  F (36.9  C) (Oral)   Resp 16   LMP 05/01/2005 (Exact Date)   SpO2 97%   There is no height or weight on file to calculate BMI.  Physical Exam   GENERAL: healthy, alert and no distress  EYES: Eyes grossly normal to inspection, PERRL and conjunctivae and sclerae normal  NECK: no adenopathy, no asymmetry, masses, or scars and thyroid normal to palpation  MS: no gross musculoskeletal defects noted, no edema  SKIN: no suspicious lesions or rashes  NEURO: Normal strength and tone, mentation intact and speech normal  PSYCH: mentation appears normal, affect normal/bright          Assessment & Plan     1. Chronic pain syndrome  2. Chronic low back pain without sciatica, unspecified back pain laterality  3. DDD (degenerative disc disease), cervical    - DULoxetine (CYMBALTA) 60 MG capsule; Take 1 capsule (60 mg) by mouth daily  Dispense: 90 capsule; Refill: 3  - morphine (MS CONTIN) 15 MG CR tablet; Take 1 tablet (15 mg) by mouth every 12 hours maximum 2 tablet(s) per day  Dispense: 60 tablet; Refill: 0  - HYDROcodone-acetaminophen (NORCO)  MG per tablet; Take 1 tablet by  mouth every 6 hours as needed for moderate to severe pain maximum 3 tablet(s) per day  Dispense: 90 tablet; Refill: 0     Stable on current pain regimen- no changes made today.  Recommend improved self-care with increased stress at work.  Restart therapy.  Follow-up one month.    Emili Ballard MD  [facility]

## 2019-07-16 DIAGNOSIS — G47.00 INSOMNIA, UNSPECIFIED TYPE: ICD-10-CM

## 2019-07-16 NOTE — TELEPHONE ENCOUNTER
LORazepam (ATIVAN) 0.5 MG tablet      Last Written Prescription Date:  1-3-19  Last Fill Quantity: 100 tab,   # refills: 5  Last Office Visit: 6-19-19  Future Office visit:    Next 5 appointments (look out 90 days)    Jul 23, 2019 12:00 PM CDT  Office Visit with Emili Ballard MD  [facility] ([facility]) [address] [place] [address] [phone]           Routing refill request to provider for review/approval because:  Drug not on the [facility], [facility] or [facility] refill protocol or controlled substance

## 2019-07-18 RX ORDER — LORAZEPAM 0.5 MG/1
TABLET ORAL
Qty: 100 TABLET | Refills: 5 | Status: SHIPPED | OUTPATIENT
Start: 2019-07-18 | End: 2020-01-14

## 2019-07-18 NOTE — TELEPHONE ENCOUNTER
Script faxed to pharmacy.  Placed in provider's faxed RX bin.     Patient informed.    Rica Son

## 2019-07-23 ENCOUNTER — OFFICE VISIT (OUTPATIENT)
Dept: FAMILY MEDICINE | Facility: CLINIC | Age: 59
End: 2019-07-23
Payer: COMMERCIAL

## 2019-07-23 VITALS — SYSTOLIC BLOOD PRESSURE: 108 MMHG | DIASTOLIC BLOOD PRESSURE: 68 MMHG | HEART RATE: 74 BPM | RESPIRATION RATE: 16 BRPM

## 2019-07-23 DIAGNOSIS — G89.4 CHRONIC PAIN SYNDROME: ICD-10-CM

## 2019-07-23 DIAGNOSIS — G89.29 CHRONIC LOW BACK PAIN, UNSPECIFIED BACK PAIN LATERALITY, WITH SCIATICA PRESENCE UNSPECIFIED: ICD-10-CM

## 2019-07-23 DIAGNOSIS — M54.5 CHRONIC LOW BACK PAIN, UNSPECIFIED BACK PAIN LATERALITY, WITH SCIATICA PRESENCE UNSPECIFIED: ICD-10-CM

## 2019-07-23 DIAGNOSIS — N95.2 ATROPHIC VAGINITIS: Primary | ICD-10-CM

## 2019-07-23 DIAGNOSIS — M50.30 DDD (DEGENERATIVE DISC DISEASE), CERVICAL: ICD-10-CM

## 2019-07-23 DIAGNOSIS — M54.2 CERVICALGIA: ICD-10-CM

## 2019-07-23 PROCEDURE — 99214 OFFICE O/P EST MOD 30 MIN: CPT | Performed by: FAMILY MEDICINE

## 2019-07-23 RX ORDER — HYDROCODONE BITARTRATE AND ACETAMINOPHEN 10; 325 MG/1; MG/1
1 TABLET ORAL EVERY 6 HOURS PRN
Qty: 90 TABLET | Refills: 0 | Status: SHIPPED | OUTPATIENT
Start: 2019-07-23 | End: 2019-08-19

## 2019-07-23 RX ORDER — MORPHINE SULFATE 15 MG/1
15 TABLET, FILM COATED, EXTENDED RELEASE ORAL EVERY 12 HOURS
Qty: 60 TABLET | Refills: 0 | Status: SHIPPED | OUTPATIENT
Start: 2019-07-23 | End: 2019-08-19

## 2019-07-23 RX ORDER — LIDOCAINE 50 MG/G
4 PATCH TOPICAL DAILY
Qty: 120 PATCH | Refills: 3 | Status: SHIPPED | OUTPATIENT
Start: 2019-07-23 | End: 2020-03-04

## 2019-07-23 ASSESSMENT — ANXIETY QUESTIONNAIRES
IF YOU CHECKED OFF ANY PROBLEMS ON THIS QUESTIONNAIRE, HOW DIFFICULT HAVE THESE PROBLEMS MADE IT FOR YOU TO DO YOUR WORK, TAKE CARE OF THINGS AT HOME, OR GET ALONG WITH OTHER PEOPLE: NOT DIFFICULT AT ALL
7. FEELING AFRAID AS IF SOMETHING AWFUL MIGHT HAPPEN: NOT AT ALL
1. FEELING NERVOUS, ANXIOUS, OR ON EDGE: NOT AT ALL
3. WORRYING TOO MUCH ABOUT DIFFERENT THINGS: NOT AT ALL
5. BEING SO RESTLESS THAT IT IS HARD TO SIT STILL: NOT AT ALL
2. NOT BEING ABLE TO STOP OR CONTROL WORRYING: NOT AT ALL
6. BECOMING EASILY ANNOYED OR IRRITABLE: SEVERAL DAYS
GAD7 TOTAL SCORE: 1

## 2019-07-23 ASSESSMENT — PATIENT HEALTH QUESTIONNAIRE - PHQ9
5. POOR APPETITE OR OVEREATING: NOT AT ALL
SUM OF ALL RESPONSES TO PHQ QUESTIONS 1-9: 3

## 2019-07-23 ASSESSMENT — PAIN SCALES - GENERAL: PAINLEVEL: MODERATE PAIN (4)

## 2019-07-23 NOTE — PROGRESS NOTES
Subjective     Janelle White is a 58 year old female who presents to clinic today for the following health issues:    HPI     Presents today for follow up of her chronic pain syndrome related to DD in neck and back s/p multiple surgeries and residual pain.  OVerall she continues to manage very well and pain has been very well-controlled on current regimen.    She has been having unbearable vaginal dryness.  She would like to try Prasterone vaginal suppositories for relief.  UTD on mammogram, s/p hysterectomy.  NOtes dryness is causing dyspareunia.    Patient Active Problem List   Diagnosis     PERSONAL HX OF  MELANOMA     B-complex deficiency     Migraine     Chronic Low Back Pain     CARDIOVASCULAR SCREENING; LDL GOAL LESS THAN 160     DDD (degenerative disc disease), cervical     Moderate recurrent major depression (H)     Vitamin D deficiency     Shift work sleep disorder     Chronic pain syndrome     CLL (chronic lymphocytic leukemia) (H)     Controlled substance agreement signed     CLARE (obstructive sleep apnea)     Past Surgical History:   Procedure Laterality Date     anterior cervical discectomy C4-5 ,Fusion C5-6-7  10/2007     BLEPHAROPLASTY BILATERAL  2013    Procedure: BLEPHAROPLASTY BILATERAL;  BILATERAL UPPER LID BLEPHAROPLASTY AND BROWPEXY ;  Surgeon: Godfrey Miguel MD;  Location: [facility]     C APPENDECTOMY  2006      SECTION      x2     FUSION LUMBAR ANTERIOR, FUSION LUMBAR POSTERIOR TWO LEVELS, COMBINED  2010    L3-S1 anterior posterior fusion     HYSTERECTOMY  2006    ovaries intact     Partial vulvectomy for NEENA III  2009     Pubovaginal sling, post op durasphere injections  2006    wears pad     ROTATOR CUFF REPAIR RT/LT  2011    right     SPINAL FUSION C3-4  2009    C3-4, anterior spinal fusion     TUBAL LIGATION         Social History     Tobacco Use     Smoking status: Former Smoker     Packs/day: 1.00     Years: 5.00     Pack years: 5.00     Last attempt  to quit: 1984     Years since quittin.5     Smokeless tobacco: Never Used   Substance Use Topics     Alcohol use: Yes     Comment: no alcohol currently since prior to her back surgeries,      Family History   Problem Relation Age of Onset     Hypertension Mother      Alcohol/Drug Mother      Osteoporosis Mother         borderline     Hypertension Father      Diabetes Father         Type 2, diet controlled     Alcohol/Drug Father         remote use     C.A.D. Maternal Grandmother          late 50s     C.A.D. Maternal Grandfather         bone and brain cancer mets as well     Diabetes Paternal Grandfather      Alcohol/Drug Brother      Breast Cancer No family hx of      Cancer - colorectal No family hx of      Cerebrovascular Disease No family hx of          Current Outpatient Medications   Medication Sig Dispense Refill     calcium citrate (CALCIUM CITRATE) 950 MG tablet Take 1 tablet by mouth 2 times daily.       Calcium-Magnesium-Vitamin D (CALCIUM 500) 500-250-200 MG-MG-UNIT TABS Take 1 tablet by mouth       Cholecalciferol (D-5000) 5000 units TABS Take 5,000 Units by mouth       cyclobenzaprine (FLEXERIL) 10 MG tablet Take 1 tablet (10 mg) by mouth 3 times daily as needed for muscle spasms 90 tablet 3     DIVIGEL 1 MG/GM GEL Place 1 packet onto the skin daily 30 g 11     DOCOSAHEXAENOIC ACID PO Take 1,000 mg by mouth        DULoxetine (CYMBALTA) 60 MG capsule Take 1 capsule (60 mg) by mouth daily 90 capsule 3     estradiol (ESTRACE VAGINAL) 0.1 MG/GM vaginal cream Place 2 g vaginally three times a week. 42.5 g 11     HYDROcodone-acetaminophen (NORCO)  MG per tablet Take 1 tablet by mouth every 6 hours as needed for moderate to severe pain maximum 3 tablet(s) per day 90 tablet 0     lidocaine (LIDODERM) 5 % patch Place 4 patches onto the skin daily Apply up to 4 patches to skin. Wear for 12 hours and remove for 12 hrs.  Refill when patient requests. 120 patch 3     LORazepam (ATIVAN)  "0.5 MG tablet 1-2 tabs qhs prn insomnia and 1 q 8 hours prn anxiety 100 tablet 5     medical cannabis (Patient's own supply.  Not a prescription) Medical Cannabis - [employer] 4-6 ml by mouth daily. [facility]       morphine (MS CONTIN) 15 MG CR tablet Take 1 tablet (15 mg) by mouth every 12 hours maximum 2 tablet(s) per day 60 tablet 0     Multiple Vitamin (MULTI-VITAMINS) TABS Take 1 tablet by mouth       Prasterone 6.5 MG INST Place 1 suppository vaginally At Bedtime 28 each 11     sennosides (SENOKOT) 8.6 MG tablet Take 1 tablet by mouth daily as needed for constipation.       Allergies   Allergen Reactions     Bupropion Other (See Comments) and Swelling     Ineffective, name brand works best  Ineffective, name brand works best     Codeine Other (See Comments)     \"Feels like electricity running through body\"  No reaction noted in [employer].  Shaky  With Tylenol     Effexor [Venlafaxine Hydrochloride]      Affect too flat     Escitalopram      Other reaction(s): *Unknown  Sexual dysfunction     Fluoxetine Other (See Comments)     Sexual dysfunction     Levaquin [Levofloxacin] Other (See Comments)     Suicidal thoughts  Dark thoughts- mood changes     Lexapro      Sexual dysfunction     Milnacipran      12.5 MG is fine. 25 MG caused side effects. \"Dark thoughts\"     Pregabalin Other (See Comments)     Hallucinations  Audiovisual hallucinations     Prozac [Fluoxetine Hcl]      Sexual dysfunction     Tramadol Hives     Hives       Venlafaxine      Other reaction(s): *Unknown  Affect too flat     Recent Labs   Lab Test 11/14/17  1239 12/16/13  0739 11/07/12  1207  01/09/12  0903   LDL  --  137* 104  --   --    HDL  --  52 46*  --   --    TRIG  --  226* 120  --   --    ALT  --  17 23  --  32   CR  --  0.76 0.68   < >  --    GFRESTIMATED  --  80 >90   < >  --    GFRESTBLACK  --  >90 >90   < >  --    POTASSIUM 4.1 4.6 4.2  --   --     < > = values in this interval not displayed.      BP Readings from Last 3 Encounters: "   07/23/19 108/68   06/19/19 111/75   05/22/19 119/79    Wt Readings from Last 3 Encounters:   11/14/17 86 kg (189 lb 8 oz)   02/23/17 83.9 kg (185 lb)   11/08/13 83 kg (183 lb)                    Reviewed and updated as needed this visit by Provider         Review of Systems   ROS COMP: Constitutional, HEENT, cardiovascular, pulmonary, gi and gu systems are negative, except as otherwise noted.      Objective    /68   Pulse 74   Resp 16   LMP 05/01/2005 (Exact Date)   There is no height or weight on file to calculate BMI.  Physical Exam   GENERAL: healthy, alert and no distress  EYES: Eyes grossly normal to inspection, PERRL and conjunctivae and sclerae normal  NECK: no adenopathy, no asymmetry, masses, or scars and thyroid normal to palpation  MS: no gross musculoskeletal defects noted, no edema  SKIN: no suspicious lesions or rashes  NEURO: Normal strength and tone, mentation intact and speech normal  PSYCH: mentation appears normal, affect normal/bright          Assessment & Plan     1. Chronic pain syndrome  2. Cervicalgia  3. DDD (degenerative disc disease), cervical  4. Chronic low back pain, unspecified back pain laterality, with sciatica presence unspecified    - lidocaine (LIDODERM) 5 % patch; Place 4 patches onto the skin daily Apply up to 4 patches to skin. Wear for 12 hours and remove for 12 hrs.  Refill when patient requests.  Dispense: 120 patch; Refill: 3  - morphine (MS CONTIN) 15 MG CR tablet; Take 1 tablet (15 mg) by mouth every 12 hours maximum 2 tablet(s) per day  Dispense: 60 tablet; Refill: 0  - HYDROcodone-acetaminophen (NORCO)  MG per tablet; Take 1 tablet by mouth every 6 hours as needed for moderate to severe pain maximum 3 tablet(s) per day  Dispense: 90 tablet; Refill: 0    Stable on current regimen.  Follow-up one month.    5. Atrophic vaginitis    - Prasterone 6.5 MG INST; Place 1 suppository vaginally At Bedtime  Dispense: 28 each; Refill: 11    Trial of Prasterone  given.  Follow-up one month for recheck.     Emili Ballard MD  [facility]

## 2019-07-24 ASSESSMENT — ANXIETY QUESTIONNAIRES: GAD7 TOTAL SCORE: 1

## 2019-08-15 ENCOUNTER — MYC REFILL (OUTPATIENT)
Dept: FAMILY MEDICINE | Facility: CLINIC | Age: 59
End: 2019-08-15

## 2019-08-15 ENCOUNTER — MYC MEDICAL ADVICE (OUTPATIENT)
Dept: FAMILY MEDICINE | Facility: CLINIC | Age: 59
End: 2019-08-15

## 2019-08-15 DIAGNOSIS — G89.29 CHRONIC LOW BACK PAIN, UNSPECIFIED BACK PAIN LATERALITY, WITH SCIATICA PRESENCE UNSPECIFIED: ICD-10-CM

## 2019-08-15 DIAGNOSIS — M54.5 CHRONIC LOW BACK PAIN, UNSPECIFIED BACK PAIN LATERALITY, WITH SCIATICA PRESENCE UNSPECIFIED: ICD-10-CM

## 2019-08-15 DIAGNOSIS — G89.4 CHRONIC PAIN SYNDROME: ICD-10-CM

## 2019-08-19 RX ORDER — MORPHINE SULFATE 15 MG/1
15 TABLET, FILM COATED, EXTENDED RELEASE ORAL EVERY 12 HOURS
Qty: 60 TABLET | Refills: 0 | Status: SHIPPED | OUTPATIENT
Start: 2019-08-19 | End: 2019-09-17

## 2019-08-19 RX ORDER — HYDROCODONE BITARTRATE AND ACETAMINOPHEN 10; 325 MG/1; MG/1
1 TABLET ORAL EVERY 6 HOURS PRN
Qty: 90 TABLET | Refills: 0 | Status: SHIPPED | OUTPATIENT
Start: 2019-08-19 | End: 2019-09-17

## 2019-08-19 RX ORDER — MORPHINE SULFATE 15 MG/1
15 TABLET, FILM COATED, EXTENDED RELEASE ORAL EVERY 12 HOURS
Qty: 60 TABLET | Refills: 0 | OUTPATIENT
Start: 2019-08-19

## 2019-08-19 RX ORDER — HYDROCODONE BITARTRATE AND ACETAMINOPHEN 10; 325 MG/1; MG/1
1 TABLET ORAL EVERY 6 HOURS PRN
Qty: 90 TABLET | Refills: 0 | OUTPATIENT
Start: 2019-08-19

## 2019-08-19 NOTE — TELEPHONE ENCOUNTER
"Patient appointment for med refills on 8/27. Patient has enough to get through to 8/22. Asking for bridge from 8/22-8/27 for MScontin and Norco.    \"My 8/20 appointment was rescheduled for 8/27 for medication refill. I submitted a request for the week gap for MS Contin 15 mg #14, and Norco 10mg #21.\"       Pleased advise. meds pended.    Thanks! Joelle Reeves RN    "

## 2019-08-19 NOTE — TELEPHONE ENCOUNTER
Requested Prescriptions   Pending Prescriptions Disp Refills     morphine (MS CONTIN) 15 MG CR tablet 60 tablet 0     Sig: Take 1 tablet (15 mg) by mouth every 12 hours maximum 2 tablet(s) per day         Last Written Prescription Date:  7/23/19  Last Fill Quantity: 60,   # refills: 0  Last Office Visit: 7/23/19  Future Office visit:    Next 5 appointments (look out 90 days)    Aug 27, 2019 11:40 AM CDT  [facility] [address] [place] [id number]  [phone]   Sep 17, 2019  2:00 PM CDT  Office Visit with Emili Ballard MD  [facility] [address] [place] [id number]  [phone]   Oct 15, 2019 12:00 PM CDT  Office Visit with Emili Ballard MD  [facility] [address] [place] [id number]  [phone]           Routing refill request to provider for review/approval because:  Drug not on the [facility] or  Health refill protocol or controlled substance      There is no refill protocol information for this order        HYDROcodone-acetaminophen (NORCO)  MG per tablet 90 tablet 0     Sig: Take 1 tablet by mouth every 6 hours as needed for moderate to severe pain maximum 3 tablet(s) per day       There is no refill protocol information for this order            Last Written Prescription Date:  7/23/19  Last Fill Quantity: 90,   # refills: 0      Routing refill request to provider for review/approval because:  Drug not on the [facility], [facility] or  Health refill protocol or controlled substance

## 2019-08-20 ENCOUNTER — MYC MEDICAL ADVICE (OUTPATIENT)
Dept: FAMILY MEDICINE | Facility: CLINIC | Age: 59
End: 2019-08-20

## 2019-08-20 ENCOUNTER — MYC REFILL (OUTPATIENT)
Dept: FAMILY MEDICINE | Facility: CLINIC | Age: 59
End: 2019-08-20

## 2019-08-20 DIAGNOSIS — M54.5 CHRONIC LOW BACK PAIN, UNSPECIFIED BACK PAIN LATERALITY, WITH SCIATICA PRESENCE UNSPECIFIED: ICD-10-CM

## 2019-08-20 DIAGNOSIS — G89.29 CHRONIC LOW BACK PAIN, UNSPECIFIED BACK PAIN LATERALITY, WITH SCIATICA PRESENCE UNSPECIFIED: ICD-10-CM

## 2019-08-20 DIAGNOSIS — G89.4 CHRONIC PAIN SYNDROME: ICD-10-CM

## 2019-08-20 RX ORDER — MORPHINE SULFATE 15 MG/1
15 TABLET, FILM COATED, EXTENDED RELEASE ORAL EVERY 12 HOURS
Qty: 60 TABLET | Refills: 0 | Status: CANCELLED | OUTPATIENT
Start: 2019-08-20

## 2019-08-20 RX ORDER — HYDROCODONE BITARTRATE AND ACETAMINOPHEN 10; 325 MG/1; MG/1
1 TABLET ORAL EVERY 6 HOURS PRN
Qty: 90 TABLET | Refills: 0 | Status: CANCELLED | OUTPATIENT
Start: 2019-08-20

## 2019-08-21 NOTE — TELEPHONE ENCOUNTER
Reason for Call:  Other prescription    Detailed comments: Pt is calling to see if scripts can be filled today by another provider and is hoping that her  can pick this up for her today. She states that she takes her meds at 4 am and she will not be able to go to work tomorrow if she does not have this. Pt will be with her patient's today so please  LVM.     Phone Number Patient can be reached at: Home number on file [phone] (home)    Best Time: anytime    Can we leave a detailed message on this number? YES    Call taken on 8/21/2019 at 8:45 AM by Sophia Cabral

## 2019-08-21 NOTE — TELEPHONE ENCOUNTER
[facility] and [facility] got the ref[facility] and the meds are ready for her. I also called and left a message for Janelle that the pharmacy has the scripts and the meds are ready for .  Malini Murcia RN

## 2019-08-27 ENCOUNTER — OFFICE VISIT (OUTPATIENT)
Dept: FAMILY MEDICINE | Facility: CLINIC | Age: 59
End: 2019-08-27
Payer: COMMERCIAL

## 2019-08-27 VITALS
HEART RATE: 64 BPM | RESPIRATION RATE: 16 BRPM | SYSTOLIC BLOOD PRESSURE: 110 MMHG | DIASTOLIC BLOOD PRESSURE: 78 MMHG | TEMPERATURE: 97 F

## 2019-08-27 DIAGNOSIS — Z13.1 SCREENING FOR DIABETES MELLITUS: ICD-10-CM

## 2019-08-27 DIAGNOSIS — Z23 NEED FOR DIPHTHERIA-TETANUS-PERTUSSIS (TDAP) VACCINE: Primary | ICD-10-CM

## 2019-08-27 DIAGNOSIS — Z11.4 ENCOUNTER FOR SCREENING FOR HIV: ICD-10-CM

## 2019-08-27 DIAGNOSIS — E78.5 DYSLIPIDEMIA: ICD-10-CM

## 2019-08-27 DIAGNOSIS — G89.29 CHRONIC LOW BACK PAIN WITHOUT SCIATICA, UNSPECIFIED BACK PAIN LATERALITY: ICD-10-CM

## 2019-08-27 DIAGNOSIS — M54.50 CHRONIC LOW BACK PAIN WITHOUT SCIATICA, UNSPECIFIED BACK PAIN LATERALITY: ICD-10-CM

## 2019-08-27 DIAGNOSIS — G89.4 CHRONIC PAIN SYNDROME: ICD-10-CM

## 2019-08-27 DIAGNOSIS — Z23 NEED FOR SHINGLES VACCINE: ICD-10-CM

## 2019-08-27 DIAGNOSIS — M50.30 DDD (DEGENERATIVE DISC DISEASE), CERVICAL: ICD-10-CM

## 2019-08-27 PROCEDURE — 90472 IMMUNIZATION ADMIN EACH ADD: CPT | Performed by: FAMILY MEDICINE

## 2019-08-27 PROCEDURE — 99214 OFFICE O/P EST MOD 30 MIN: CPT | Mod: 25 | Performed by: FAMILY MEDICINE

## 2019-08-27 PROCEDURE — 90715 TDAP VACCINE 7 YRS/> IM: CPT | Performed by: FAMILY MEDICINE

## 2019-08-27 PROCEDURE — 90750 HZV VACC RECOMBINANT IM: CPT | Performed by: FAMILY MEDICINE

## 2019-08-27 PROCEDURE — 90471 IMMUNIZATION ADMIN: CPT | Performed by: FAMILY MEDICINE

## 2019-08-27 ASSESSMENT — PAIN SCALES - GENERAL: PAINLEVEL: MODERATE PAIN (5)

## 2019-08-27 NOTE — PROGRESS NOTES
Subjective     Janelle White is a 58 year old female who presents to clinic today for the following health issues:    HPI     Presents today for Follow-up of chronic pain syndrome.  Everything remains status quo.  Medications have already been refilled by my colleague in my absence while I was on vacation.  She presents for her monthly check in.  No change.  Regimen remains stable.    She is overdue for many healthcare maintenance items.  She would like to do labs when she is seen by [facility] for her CLL.    She is also due for update of her Tdap and first Shingrix shot today.      Patient Active Problem List   Diagnosis     PERSONAL HX OF  MELANOMA     B-complex deficiency     Migraine     Chronic Low Back Pain     CARDIOVASCULAR SCREENING; LDL GOAL LESS THAN 160     DDD (degenerative disc disease), cervical     Moderate recurrent major depression (H)     Vitamin D deficiency     Shift work sleep disorder     Chronic pain syndrome     CLL (chronic lymphocytic leukemia) (H)     Controlled substance agreement signed     CLARE (obstructive sleep apnea)     Past Surgical History:   Procedure Laterality Date     anterior cervical discectomy C4-5 ,Fusion C5-6-7  10/2007     BLEPHAROPLASTY BILATERAL  2013    Procedure: BLEPHAROPLASTY BILATERAL;  BILATERAL UPPER LID BLEPHAROPLASTY AND BROWPEXY ;  Surgeon: Godfrey Miguel MD;  Location: [facility] APPENDECTOMY  2006      SECTION      x2     FUSION LUMBAR ANTERIOR, FUSION LUMBAR POSTERIOR TWO LEVELS, COMBINED  2010    L3-S1 anterior posterior fusion     HYSTERECTOMY  2006    ovaries intact     Partial vulvectomy for NEENA III  2009     Pubovaginal sling, post op durasphere injections  2006    wears pad     ROTATOR CUFF REPAIR RT/LT  2011    right     SPINAL FUSION C3-4  2009    C3-4, anterior spinal fusion     TUBAL LIGATION         Social History     Tobacco Use     Smoking status: Former Smoker     Packs/day: 1.00     Years: 5.00     Pack  years: 5.00     Last attempt to quit: 1984     Years since quittin.6     Smokeless tobacco: Never Used   Substance Use Topics     Alcohol use: Yes     Comment: no alcohol currently since prior to her back surgeries,      Family History   Problem Relation Age of Onset     Hypertension Mother      Alcohol/Drug Mother      Osteoporosis Mother         borderline     Hypertension Father      Diabetes Father         Type 2, diet controlled     Alcohol/Drug Father         remote use     C.A.D. Maternal Grandmother          late 50s     C.A.D. Maternal Grandfather         bone and brain cancer mets as well     Diabetes Paternal Grandfather      Alcohol/Drug Brother      Breast Cancer No family hx of      Cancer - colorectal No family hx of      Cerebrovascular Disease No family hx of          Current Outpatient Medications   Medication Sig Dispense Refill     calcium citrate (CALCIUM CITRATE) 950 MG tablet Take 1 tablet by mouth 2 times daily.       Calcium-Magnesium-Vitamin D (CALCIUM 500) 500-250-200 MG-MG-UNIT TABS Take 1 tablet by mouth       Cholecalciferol (D-5000) 5000 units TABS Take 5,000 Units by mouth       cyclobenzaprine (FLEXERIL) 10 MG tablet Take 1 tablet (10 mg) by mouth 3 times daily as needed for muscle spasms 90 tablet 3     DIVIGEL 1 MG/GM GEL Place 1 packet onto the skin daily 30 g 11     DOCOSAHEXAENOIC ACID PO Take 1,000 mg by mouth        DULoxetine (CYMBALTA) 60 MG capsule Take 1 capsule (60 mg) by mouth daily 90 capsule 3     estradiol (ESTRACE VAGINAL) 0.1 MG/GM vaginal cream Place 2 g vaginally three times a week. 42.5 g 11     HYDROcodone-acetaminophen (NORCO)  MG per tablet Take 1 tablet by mouth every 6 hours as needed for moderate to severe pain maximum 3 tablet(s) per day 90 tablet 0     lidocaine (LIDODERM) 5 % patch Place 4 patches onto the skin daily Apply up to 4 patches to skin. Wear for 12 hours and remove for 12 hrs.  Refill when patient requests. 120  "patch 3     LORazepam (ATIVAN) 0.5 MG tablet 1-2 tabs qhs prn insomnia and 1 q 8 hours prn anxiety 100 tablet 5     medical cannabis (Patient's own supply.  Not a prescription) Medical Cannabis - [employer] 4-6 ml by mouth daily. [facility]       morphine (MS CONTIN) 15 MG CR tablet Take 1 tablet (15 mg) by mouth every 12 hours maximum 2 tablet(s) per day 60 tablet 0     Multiple Vitamin (MULTI-VITAMINS) TABS Take 1 tablet by mouth       Prasterone 6.5 MG INST Place 1 suppository vaginally At Bedtime 28 each 11     sennosides (SENOKOT) 8.6 MG tablet Take 1 tablet by mouth daily as needed for constipation.       Allergies   Allergen Reactions     Bupropion Other (See Comments) and Swelling     Ineffective, name brand works best  Ineffective, name brand works best     Codeine Other (See Comments)     \"Feels like electricity running through body\"  No reaction noted in [employer]ner.  Shaky  With Tylenol     Effexor [Venlafaxine Hydrochloride]      Affect too flat     Escitalopram      Other reaction(s): *Unknown  Sexual dysfunction     Fluoxetine Other (See Comments)     Sexual dysfunction     Levaquin [Levofloxacin] Other (See Comments)     Suicidal thoughts  Dark thoughts- mood changes     Lexapro      Sexual dysfunction     Milnacipran      12.5 MG is fine. 25 MG caused side effects. \"Dark thoughts\"     Pregabalin Other (See Comments)     Hallucinations  Audiovisual hallucinations     Prozac [Fluoxetine Hcl]      Sexual dysfunction     Tramadol Hives     Hives       Venlafaxine      Other reaction(s): *Unknown  Affect too flat     Recent Labs   Lab Test 11/14/17  1239 12/16/13  0739 11/07/12  1207  01/09/12  0903   LDL  --  137* 104  --   --    HDL  --  52 46*  --   --    TRIG  --  226* 120  --   --    ALT  --  17 23  --  32   CR  --  0.76 0.68   < >  --    GFRESTIMATED  --  80 >90   < >  --    GFRESTBLACK  --  >90 >90   < >  --    POTASSIUM 4.1 4.6 4.2  --   --     < > = values in this interval not displayed.      BP " Readings from Last 3 Encounters:   08/27/19 110/78   07/23/19 108/68   06/19/19 111/75    Wt Readings from Last 3 Encounters:   11/14/17 86 kg (189 lb 8 oz)   02/23/17 83.9 kg (185 lb)   11/08/13 83 kg (183 lb)                    Reviewed and updated as needed this visit by Provider         Review of Systems   ROS COMP: Constitutional, HEENT, cardiovascular, pulmonary, gi and gu systems are negative, except as otherwise noted.      Objective    /78   Pulse 64   Temp 97  F (36.1  C) (Tympanic)   Resp 16   LMP 05/01/2005 (Exact Date)   There is no height or weight on file to calculate BMI.  Physical Exam   GENERAL: healthy, alert and no distress  EYES: Eyes grossly normal to inspection, PERRL and conjunctivae and sclerae normal  NECK: no adenopathy, no asymmetry, masses, or scars and thyroid normal to palpation  MS: no gross musculoskeletal defects noted, no edema  SKIN: no suspicious lesions or rashes  NEURO: Normal strength and tone, mentation intact and speech normal  PSYCH: mentation appears normal, affect normal/bright          Assessment & Plan     1. Chronic pain syndrome  2. DDD (degenerative disc disease), cervical  3. Chronic low back pain without sciatica, unspecified back pain laterality    Stable on current regimen.  Patient will follow up in one month.    4. Need for diphtheria-tetanus-pertussis (Tdap) vaccine    - TDAP, IM (10 - 64 YRS) - Adacel    Tdap updated today.    5. Need for shingles vaccine    - ZOSTER VACCINE RECOMBINANT ADJUVANTED IM NJX    Shingrix #1 today.    6. Dyslipidemia    - Comprehensive metabolic panel; Future  - Lipid panel reflex to direct LDL Fasting; Future    7. Screening for diabetes mellitus    - Comprehensive metabolic panel; Future  - Hemoglobin A1c; Future    8. Encounter for screening for HIV    - HIV Antigen Antibody Combo; Future      Will return for fasting labs.    Emili Ballard MD  [facility]

## 2019-08-27 NOTE — NURSING NOTE
Screening Questionnaire for Adult Immunization    Are you sick today?   No   Do you have allergies to medications, food, a vaccine component or latex?   No   Have you ever had a serious reaction after receiving a vaccination?   No   Do you have a long-term health problem with heart disease, lung disease, asthma, kidney disease, metabolic disease (e.g. diabetes), anemia, or other blood disorder?   Yes   Do you have cancer, leukemia, HIV/AIDS, or any other immune system problem?   No   In the past 3 months, have you taken medications that affect  your immune system, such as prednisone, other steroids, or anticancer drugs; drugs for the treatment of rheumatoid arthritis, Crohn s disease, or psoriasis; or have you had radiation treatments?   No   Have you had a seizure, or a brain or other nervous system problem?   No   During the past year, have you received a transfusion of blood or blood     products, or been given immune (gamma) globulin or antiviral drug?   No   For women: Are you pregnant or is there a chance you could become        pregnant during the next month?   No   Have you received any vaccinations in the past 4 weeks?   No     Immunization questionnaire was positive for at least one answer.  Notified Elio.        Per orders of Dr. Ballard, injection of Tdap and Shingrix given by Trini Leyva. Patient instructed to remain in clinic for 15 minutes afterwards, and to report any adverse reaction to me immediately.       Screening performed by Trini Leyva on 8/27/2019 at 12:40 PM.

## 2019-09-06 ENCOUNTER — TRANSFERRED RECORDS (OUTPATIENT)
Dept: HEALTH INFORMATION MANAGEMENT | Facility: CLINIC | Age: 59
End: 2019-09-06

## 2019-09-06 LAB
ALT SERPL-CCNC: 21 IU/L (ref 8–45)
AST SERPL-CCNC: 31 IU/L (ref 2–40)
CHOLEST SERPL-MCNC: 246 MG/DL (ref 100–199)
CREAT SERPL-MCNC: 0.75 MG/DL (ref 0.57–1.11)
GFR SERPL CREATININE-BSD FRML MDRD: >60 ML/MIN/1.73M2
GLUCOSE SERPL-MCNC: 90 MG/DL (ref 65–100)
HBA1C MFR BLD: 5.9 % (ref 0–?)
HDLC SERPL-MCNC: 40 MG/DL
LDLC SERPL CALC-MCNC: 154 MG/DL
NONHDLC SERPL-MCNC: 206 MG/DL
POTASSIUM SERPL-SCNC: 4.3 MMOL/L (ref 3.5–5)
TRIGL SERPL-MCNC: 259 MG/DL

## 2019-09-13 ENCOUNTER — TRANSFERRED RECORDS (OUTPATIENT)
Dept: HEALTH INFORMATION MANAGEMENT | Facility: CLINIC | Age: 59
End: 2019-09-13

## 2019-09-17 ENCOUNTER — OFFICE VISIT (OUTPATIENT)
Dept: FAMILY MEDICINE | Facility: CLINIC | Age: 59
End: 2019-09-17
Payer: COMMERCIAL

## 2019-09-17 VITALS
HEIGHT: 66 IN | TEMPERATURE: 97.5 F | RESPIRATION RATE: 16 BRPM | HEART RATE: 74 BPM | BODY MASS INDEX: 31.05 KG/M2 | DIASTOLIC BLOOD PRESSURE: 62 MMHG | SYSTOLIC BLOOD PRESSURE: 98 MMHG

## 2019-09-17 DIAGNOSIS — G89.4 CHRONIC PAIN SYNDROME: ICD-10-CM

## 2019-09-17 DIAGNOSIS — R73.03 PREDIABETES: Primary | ICD-10-CM

## 2019-09-17 DIAGNOSIS — G89.29 CHRONIC LOW BACK PAIN, UNSPECIFIED BACK PAIN LATERALITY, WITH SCIATICA PRESENCE UNSPECIFIED: ICD-10-CM

## 2019-09-17 DIAGNOSIS — E78.5 HYPERLIPIDEMIA LDL GOAL <130: ICD-10-CM

## 2019-09-17 DIAGNOSIS — M54.5 CHRONIC LOW BACK PAIN, UNSPECIFIED BACK PAIN LATERALITY, WITH SCIATICA PRESENCE UNSPECIFIED: ICD-10-CM

## 2019-09-17 DIAGNOSIS — M50.30 DDD (DEGENERATIVE DISC DISEASE), CERVICAL: ICD-10-CM

## 2019-09-17 PROCEDURE — 99214 OFFICE O/P EST MOD 30 MIN: CPT | Performed by: FAMILY MEDICINE

## 2019-09-17 RX ORDER — HYDROCODONE BITARTRATE AND ACETAMINOPHEN 10; 325 MG/1; MG/1
1 TABLET ORAL EVERY 6 HOURS PRN
Qty: 90 TABLET | Refills: 0 | Status: SHIPPED | OUTPATIENT
Start: 2019-09-17 | End: 2019-10-15

## 2019-09-17 RX ORDER — MORPHINE SULFATE 15 MG/1
15 TABLET, FILM COATED, EXTENDED RELEASE ORAL EVERY 12 HOURS
Qty: 60 TABLET | Refills: 0 | Status: SHIPPED | OUTPATIENT
Start: 2019-09-17 | End: 2019-09-17

## 2019-09-17 RX ORDER — MORPHINE SULFATE 15 MG/1
15 TABLET, FILM COATED, EXTENDED RELEASE ORAL EVERY 12 HOURS
Qty: 60 TABLET | Refills: 0 | Status: SHIPPED | OUTPATIENT
Start: 2019-09-17 | End: 2019-10-15

## 2019-09-17 NOTE — PROGRESS NOTES
Subjective     Janelle White is a 58 year old female who presents to clinic today for the following health issues:    HPI   Presents today to follow up chronic pain syndrome managed with opioids per pain contract.  More trigger point injections planned. Still wearing splints at night.  Was given green light for all activities-- hoping to cross country ski this winter.      CLL stable-- fatigue is biggest symptom.     Recent labs done with [facility] shows stable prediabetes with A1c 5.9% at beginning of month and lipids worsening with total 246// HDL 40/ .  With recent surgeries and continued recovery- she is at her highest weight and feels she is only slowly getting back to her regular exercise affecting these numbers.      Patient Active Problem List   Diagnosis     PERSONAL HX OF  MELANOMA     B-complex deficiency     Migraine     Chronic Low Back Pain     CARDIOVASCULAR SCREENING; LDL GOAL LESS THAN 160     DDD (degenerative disc disease), cervical     Moderate recurrent major depression (H)     Vitamin D deficiency     Shift work sleep disorder     Chronic pain syndrome     CLL (chronic lymphocytic leukemia) (H)     Controlled substance agreement signed     CLARE (obstructive sleep apnea)     Past Surgical History:   Procedure Laterality Date     anterior cervical discectomy C4-5 ,Fusion C5-6-7  10/2007     BLEPHAROPLASTY BILATERAL  2013    Procedure: BLEPHAROPLASTY BILATERAL;  BILATERAL UPPER LID BLEPHAROPLASTY AND BROWPEXY ;  Surgeon: Godfrey Miguel MD;  Location:  [facility]     C APPENDECTOMY  2006      SECTION      x2     FUSION LUMBAR ANTERIOR, FUSION LUMBAR POSTERIOR TWO LEVELS, COMBINED  2010    L3-S1 anterior posterior fusion     HYSTERECTOMY  2006    ovaries intact     Partial vulvectomy for NEENA III  2009     Pubovaginal sling, post op durasphere injections  2006    wears pad     ROTATOR CUFF REPAIR RT/LT  2011    right     SPINAL FUSION C3-4  2009    C3-4,  anterior spinal fusion     TUBAL LIGATION         Social History     Tobacco Use     Smoking status: Former Smoker     Packs/day: 1.00     Years: 5.00     Pack years: 5.00     Last attempt to quit: 1984     Years since quittin.7     Smokeless tobacco: Never Used   Substance Use Topics     Alcohol use: Yes     Comment: no alcohol currently since prior to her back surgeries,      Family History   Problem Relation Age of Onset     Hypertension Mother      Alcohol/Drug Mother      Osteoporosis Mother         borderline     Hypertension Father      Diabetes Father         Type 2, diet controlled     Alcohol/Drug Father         remote use     C.A.D. Maternal Grandmother          late 50s     C.A.D. Maternal Grandfather         bone and brain cancer mets as well     Diabetes Paternal Grandfather      Alcohol/Drug Brother      Breast Cancer No family hx of      Cancer - colorectal No family hx of      Cerebrovascular Disease No family hx of          Current Outpatient Medications   Medication Sig Dispense Refill     calcium citrate (CALCIUM CITRATE) 950 MG tablet Take 1 tablet by mouth 2 times daily.       Calcium-Magnesium-Vitamin D (CALCIUM 500) 500-250-200 MG-MG-UNIT TABS Take 1 tablet by mouth       Cholecalciferol (D-5000) 5000 units TABS Take 5,000 Units by mouth       cyclobenzaprine (FLEXERIL) 10 MG tablet Take 1 tablet (10 mg) by mouth 3 times daily as needed for muscle spasms 90 tablet 3     DIVIGEL 1 MG/GM GEL Place 1 packet onto the skin daily 30 g 11     DOCOSAHEXAENOIC ACID PO Take 1,000 mg by mouth        DULoxetine (CYMBALTA) 60 MG capsule Take 1 capsule (60 mg) by mouth daily 90 capsule 3     estradiol (ESTRACE VAGINAL) 0.1 MG/GM vaginal cream Place 2 g vaginally three times a week. 42.5 g 11     HYDROcodone-acetaminophen (NORCO)  MG per tablet Take 1 tablet by mouth every 6 hours as needed for moderate to severe pain maximum 3 tablet(s) per day 90 tablet 0     lidocaine  "(LIDODERM) 5 % patch Place 4 patches onto the skin daily Apply up to 4 patches to skin. Wear for 12 hours and remove for 12 hrs.  Refill when patient requests. 120 patch 3     LORazepam (ATIVAN) 0.5 MG tablet 1-2 tabs qhs prn insomnia and 1 q 8 hours prn anxiety 100 tablet 5     medical cannabis (Patient's own supply.  Not a prescription) Medical Cannabis - [employer] 4-6 ml by mouth daily. [facility]       morphine (MS CONTIN) 15 MG CR tablet Take 1 tablet (15 mg) by mouth every 12 hours maximum 2 tablet(s) per day 60 tablet 0     Multiple Vitamin (MULTI-VITAMINS) TABS Take 1 tablet by mouth       Prasterone 6.5 MG INST Place 1 suppository vaginally At Bedtime 28 each 11     sennosides (SENOKOT) 8.6 MG tablet Take 1 tablet by mouth daily as needed for constipation.       Allergies   Allergen Reactions     Bupropion Other (See Comments) and Swelling     Ineffective, name brand works best  Ineffective, name brand works best     Codeine Other (See Comments)     \"Feels like electricity running through body\"  No reaction noted in [employer]ner.  Shaky  With Tylenol     Effexor [Venlafaxine Hydrochloride]      Affect too flat     Escitalopram      Other reaction(s): *Unknown  Sexual dysfunction     Fluoxetine Other (See Comments)     Sexual dysfunction     Levaquin [Levofloxacin] Other (See Comments)     Suicidal thoughts  Dark thoughts- mood changes     Lexapro      Sexual dysfunction     Milnacipran      12.5 MG is fine. 25 MG caused side effects. \"Dark thoughts\"     Pregabalin Other (See Comments)     Hallucinations  Audiovisual hallucinations     Prozac [Fluoxetine Hcl]      Sexual dysfunction     Tramadol Hives     Hives       Venlafaxine      Other reaction(s): *Unknown  Affect too flat     Recent Labs   Lab Test 11/14/17  1239 12/16/13  0739 11/07/12  1207  01/09/12  0903   LDL  --  137* 104  --   --    HDL  --  52 46*  --   --    TRIG  --  226* 120  --   --    ALT  --  17 23  --  32   CR  --  0.76 0.68   < >  --  " "  GFRESTIMATED  --  80 >90   < >  --    GFRESTBLACK  --  >90 >90   < >  --    POTASSIUM 4.1 4.6 4.2  --   --     < > = values in this interval not displayed.      BP Readings from Last 3 Encounters:   09/17/19 98/62   08/27/19 110/78   07/23/19 108/68    Wt Readings from Last 3 Encounters:   11/14/17 86 kg (189 lb 8 oz)   02/23/17 83.9 kg (185 lb)   11/08/13 83 kg (183 lb)                    Reviewed and updated as needed this visit by Provider         Review of Systems   ROS COMP: Constitutional, HEENT, cardiovascular, pulmonary, gi and gu systems are negative, except as otherwise noted.      Objective    BP 98/62   Pulse 74   Temp 97.5  F (36.4  C) (Tympanic)   Resp 16   Ht 1.664 m (5' 5.5\")   LMP 05/01/2005 (Exact Date)   Breastfeeding? No   BMI 31.05 kg/m    Body mass index is 31.05 kg/m .  Physical Exam   GENERAL: healthy, alert and no distress  EYES: Eyes grossly normal to inspection, PERRL and conjunctivae and sclerae normal  NECK: no adenopathy, no asymmetry, masses, or scars and thyroid normal to palpation  MS: no gross musculoskeletal defects noted, no edema  SKIN: no suspicious lesions or rashes  PSYCH: mentation appears normal, affect normal/bright          Assessment & Plan     1. Chronic pain syndrome  2. DDD (degenerative disc disease), cervical  3. Chronic low back pain, unspecified back pain laterality, with sciatica presence unspecified    - HYDROcodone-acetaminophen (NORCO)  MG per tablet; Take 1 tablet by mouth every 6 hours as needed for moderate to severe pain maximum 3 tablet(s) per day  Dispense: 90 tablet; Refill: 0  - morphine (MS CONTIN) 15 MG CR tablet; Take 1 tablet (15 mg) by mouth every 12 hours maximum 2 tablet(s) per day  Dispense: 60 tablet; Refill: 0    Stable on current regimen.  Working hard with Pilates and other modalities to actively improve stamina and strength.  Follow-up 3 months for next face-to-face visit.    4. Prediabetes    Continue to work at improved " diet and exercise.    5. Hyperlipidemia LDL goal <130    Continue to work at improved diet and exercise.  Recheck in one year.    Emili Ballard MD  [facility]

## 2019-09-19 PROBLEM — E78.5 HYPERLIPIDEMIA LDL GOAL <130: Status: ACTIVE | Noted: 2019-09-19

## 2019-09-19 PROBLEM — R73.03 PREDIABETES: Status: ACTIVE | Noted: 2019-09-19

## 2019-09-26 DIAGNOSIS — M79.18 MYOFASCIAL PAIN: ICD-10-CM

## 2019-09-26 NOTE — TELEPHONE ENCOUNTER
Received 3 refill request from pharmacy. Cymbalta 60mg, flexeril 10mg, lidoderm 5% patch.     Spoke to pharmacist and he said disregard request due to error. It is all taken care of.

## 2019-10-15 ENCOUNTER — MYC MEDICAL ADVICE (OUTPATIENT)
Dept: FAMILY MEDICINE | Facility: CLINIC | Age: 59
End: 2019-10-15

## 2019-10-15 ENCOUNTER — OFFICE VISIT (OUTPATIENT)
Dept: FAMILY MEDICINE | Facility: CLINIC | Age: 59
End: 2019-10-15
Payer: COMMERCIAL

## 2019-10-15 VITALS
RESPIRATION RATE: 18 BRPM | SYSTOLIC BLOOD PRESSURE: 104 MMHG | DIASTOLIC BLOOD PRESSURE: 72 MMHG | HEART RATE: 68 BPM | TEMPERATURE: 98.5 F

## 2019-10-15 DIAGNOSIS — R05.9 COUGH: Primary | ICD-10-CM

## 2019-10-15 DIAGNOSIS — G89.4 CHRONIC PAIN SYNDROME: ICD-10-CM

## 2019-10-15 PROCEDURE — 99214 OFFICE O/P EST MOD 30 MIN: CPT | Performed by: FAMILY MEDICINE

## 2019-10-15 RX ORDER — ALBUTEROL SULFATE 90 UG/1
2 AEROSOL, METERED RESPIRATORY (INHALATION) EVERY 6 HOURS
Qty: 1 INHALER | Refills: 3 | Status: SHIPPED | OUTPATIENT
Start: 2019-10-15 | End: 2020-01-14

## 2019-10-15 RX ORDER — HYDROCODONE BITARTRATE AND ACETAMINOPHEN 10; 325 MG/1; MG/1
1 TABLET ORAL EVERY 6 HOURS PRN
Qty: 90 TABLET | Refills: 0 | Status: SHIPPED | OUTPATIENT
Start: 2019-10-15 | End: 2019-11-11

## 2019-10-15 RX ORDER — METHYLPREDNISOLONE 4 MG
TABLET, DOSE PACK ORAL
Qty: 21 TABLET | Refills: 0 | Status: SHIPPED | OUTPATIENT
Start: 2019-10-15 | End: 2020-03-03

## 2019-10-15 RX ORDER — MORPHINE SULFATE 15 MG/1
15 TABLET, FILM COATED, EXTENDED RELEASE ORAL EVERY 12 HOURS
Qty: 60 TABLET | Refills: 0 | Status: SHIPPED | OUTPATIENT
Start: 2019-10-15 | End: 2019-11-10

## 2019-10-15 ASSESSMENT — PAIN SCALES - GENERAL: PAINLEVEL: MODERATE PAIN (4)

## 2019-10-15 NOTE — PROGRESS NOTES
Subjective     Janelle White is a 58 year old female who presents to clinic today for the following health issues:    HPI     Would like refill of albuterol inhaler to use with seasons changing and when has URI with bronchospasm.  Not currently sick today.  No cough or fever.    Presents today to follow up chronic pain syndrome managed with opioids per pain contract for chronic neck and back issues and multiple surgeries to manage.  More trigger point injections planned. Still wearing splints at night.  Was given green light for all activities-- hoping to cross country ski this winter.      Patient Active Problem List   Diagnosis     PERSONAL HX OF  MELANOMA     B-complex deficiency     Migraine     Chronic Low Back Pain     CARDIOVASCULAR SCREENING; LDL GOAL LESS THAN 160     DDD (degenerative disc disease), cervical     Moderate recurrent major depression (H)     Vitamin D deficiency     Shift work sleep disorder     Chronic pain syndrome     CLL (chronic lymphocytic leukemia) (H)     Controlled substance agreement signed     CLARE (obstructive sleep apnea)     Prediabetes     Hyperlipidemia LDL goal <130     Past Surgical History:   Procedure Laterality Date     anterior cervical discectomy C4-5 ,Fusion C5-6-7  10/2007     BLEPHAROPLASTY BILATERAL  2013    Procedure: BLEPHAROPLASTY BILATERAL;  BILATERAL UPPER LID BLEPHAROPLASTY AND BROWPEXY ;  Surgeon: Godfrey Miguel MD;  Location: [facility] APPENDECTOMY  2006      SECTION      x2     FUSION LUMBAR ANTERIOR, FUSION LUMBAR POSTERIOR TWO LEVELS, COMBINED  2010    L3-S1 anterior posterior fusion     HYSTERECTOMY  2006    ovaries intact     Partial vulvectomy for NEENA III  2009     Pubovaginal sling, post op durasphere injections  2006    wears pad     ROTATOR CUFF REPAIR RT/LT  2011    right     SPINAL FUSION C3-4  2009    C3-4, anterior spinal fusion     TUBAL LIGATION         Social History     Tobacco Use     Smoking status:  Former Smoker     Packs/day: 1.00     Years: 5.00     Pack years: 5.00     Last attempt to quit: 1984     Years since quittin.8     Smokeless tobacco: Never Used   Substance Use Topics     Alcohol use: Yes     Comment: no alcohol currently since prior to her back surgeries,      Family History   Problem Relation Age of Onset     Hypertension Mother      Alcohol/Drug Mother      Osteoporosis Mother         borderline     Hypertension Father      Diabetes Father         Type 2, diet controlled     Alcohol/Drug Father         remote use     C.A.D. Maternal Grandmother          late 50s     C.A.D. Maternal Grandfather         bone and brain cancer mets as well     Diabetes Paternal Grandfather      Alcohol/Drug Brother      Breast Cancer No family hx of      Cancer - colorectal No family hx of      Cerebrovascular Disease No family hx of          Current Outpatient Medications   Medication Sig Dispense Refill     albuterol (PROAIR HFA/PROVENTIL HFA/VENTOLIN HFA) 108 (90 Base) MCG/ACT inhaler Inhale 2 puffs into the lungs every 6 hours 1 Inhaler 3     calcium citrate (CALCIUM CITRATE) 950 MG tablet Take 1 tablet by mouth 2 times daily.       Calcium-Magnesium-Vitamin D (CALCIUM 500) 500-250-200 MG-MG-UNIT TABS Take 1 tablet by mouth       Cholecalciferol (D-5000) 5000 units TABS Take 5,000 Units by mouth       cyclobenzaprine (FLEXERIL) 10 MG tablet Take 1 tablet (10 mg) by mouth 3 times daily as needed for muscle spasms 90 tablet 3     DIVIGEL 1 MG/GM GEL Place 1 packet onto the skin daily 30 g 11     DOCOSAHEXAENOIC ACID PO Take 1,000 mg by mouth        DULoxetine (CYMBALTA) 60 MG capsule Take 1 capsule (60 mg) by mouth daily 90 capsule 3     estradiol (ESTRACE VAGINAL) 0.1 MG/GM vaginal cream Place 2 g vaginally three times a week. 42.5 g 11     HYDROcodone-acetaminophen (NORCO)  MG per tablet Take 1 tablet by mouth every 6 hours as needed for moderate to severe pain maximum 3 tablet(s) per  "day 90 tablet 0     lidocaine (LIDODERM) 5 % patch Place 4 patches onto the skin daily Apply up to 4 patches to skin. Wear for 12 hours and remove for 12 hrs.  Refill when patient requests. 120 patch 3     LORazepam (ATIVAN) 0.5 MG tablet 1-2 tabs qhs prn insomnia and 1 q 8 hours prn anxiety 100 tablet 5     medical cannabis (Patient's own supply.  Not a prescription) Medical Cannabis - [employer] 4-6 ml by mouth daily. [facility]       methylPREDNISolone (MEDROL DOSEPAK) 4 MG tablet therapy pack Follow Package Directions 21 tablet 0     morphine (MS CONTIN) 15 MG CR tablet Take 1 tablet (15 mg) by mouth every 12 hours maximum 2 tablet(s) per day 60 tablet 0     Multiple Vitamin (MULTI-VITAMINS) TABS Take 1 tablet by mouth       Prasterone 6.5 MG INST Place 1 suppository vaginally At Bedtime 28 each 11     sennosides (SENOKOT) 8.6 MG tablet Take 1 tablet by mouth daily as needed for constipation.       Allergies   Allergen Reactions     Bupropion Other (See Comments) and Swelling     Ineffective, name brand works best  Ineffective, name brand works best     Codeine Other (See Comments)     \"Feels like electricity running through body\"  No reaction noted in Cerner.  Shaky  With Tylenol     Effexor [Venlafaxine Hydrochloride]      Affect too flat     Escitalopram      Other reaction(s): *Unknown  Sexual dysfunction     Fluoxetine Other (See Comments)     Sexual dysfunction     Levaquin [Levofloxacin] Other (See Comments)     Suicidal thoughts  Dark thoughts- mood changes     Lexapro      Sexual dysfunction     Milnacipran      12.5 MG is fine. 25 MG caused side effects. \"Dark thoughts\"     Pregabalin Other (See Comments)     Hallucinations  Audiovisual hallucinations     Prozac [Fluoxetine Hcl]      Sexual dysfunction     Tramadol Hives     Hives       Venlafaxine      Other reaction(s): *Unknown  Affect too flat     Recent Labs   Lab Test 09/06/19 11/14/17  1239 12/16/13  0739 11/07/12  1207   A1C 5.9*  --   --   " --    *  --  137* 104   HDL 40*  --  52 46*   TRIG 259*  --  226* 120   ALT 21  --  17 23   CR 0.75  --  0.76 0.68   GFRESTIMATED >60  --  80 >90   GFRESTBLACK >60  --  >90 >90   POTASSIUM 4.3 4.1 4.6 4.2      BP Readings from Last 3 Encounters:   10/15/19 104/72   09/17/19 98/62   08/27/19 110/78    Wt Readings from Last 3 Encounters:   11/14/17 86 kg (189 lb 8 oz)   02/23/17 83.9 kg (185 lb)   11/08/13 83 kg (183 lb)                    Reviewed and updated as needed this visit by Provider         Review of Systems   ROS COMP: Constitutional, HEENT, cardiovascular, pulmonary, gi and gu systems are negative, except as otherwise noted.      Objective    /72   Pulse 68   Temp 98.5  F (36.9  C) (Oral)   Resp 18   LMP 05/01/2005 (Exact Date)   There is no height or weight on file to calculate BMI.  Physical Exam   GENERAL: healthy, alert and no distress  EYES: Eyes grossly normal to inspection, PERRL and conjunctivae and sclerae normal  HENT: ear canals and TM's normal, nose and mouth without ulcers or lesions  NECK: no adenopathy, no asymmetry, masses, or scars and thyroid normal to palpation  RESP: lungs clear to auscultation - no rales, rhonchi or wheezes  CV: regular rate and rhythm, normal S1 S2, no S3 or S4, no murmur, click or rub, no peripheral edema and peripheral pulses strong  ABDOMEN: soft, nontender, no hepatosplenomegaly, no masses and bowel sounds normal  MS: no gross musculoskeletal defects noted, no edema  SKIN: no suspicious lesions or rashes  NEURO: Normal strength and tone, mentation intact and speech normal  PSYCH: mentation appears normal, affect normal/bright          Assessment & Plan     1. Cough    - albuterol (PROAIR HFA/PROVENTIL HFA/VENTOLIN HFA) 108 (90 Base) MCG/ACT inhaler; Inhale 2 puffs into the lungs every 6 hours  Dispense: 1 Inhaler; Refill: 3    Refill albuterol for prn use with URIs and cold air.    2. Chronic pain syndrome    - methylPREDNISolone (MEDROL DOSEPAK)  4 MG tablet therapy pack; Follow Package Directions  Dispense: 21 tablet; Refill: 0  - HYDROcodone-acetaminophen (NORCO)  MG per tablet; Take 1 tablet by mouth every 6 hours as needed for moderate to severe pain maximum 3 tablet(s) per day  Dispense: 90 tablet; Refill: 0  - morphine (MS CONTIN) 15 MG CR tablet; Take 1 tablet (15 mg) by mouth every 12 hours maximum 2 tablet(s) per day  Dispense: 60 tablet; Refill: 0    Stable on current regimen.  Follow-up one month.  Medrol dosepak to have on hand for acute flares.    Emili Ballard MD  [facility]

## 2019-10-18 ENCOUNTER — TRANSFERRED RECORDS (OUTPATIENT)
Dept: HEALTH INFORMATION MANAGEMENT | Facility: CLINIC | Age: 59
End: 2019-10-18

## 2019-11-03 ENCOUNTER — HEALTH MAINTENANCE LETTER (OUTPATIENT)
Age: 59
End: 2019-11-03

## 2019-12-10 ENCOUNTER — OFFICE VISIT (OUTPATIENT)
Dept: FAMILY MEDICINE | Facility: CLINIC | Age: 59
End: 2019-12-10
Payer: COMMERCIAL

## 2019-12-10 VITALS
RESPIRATION RATE: 16 BRPM | TEMPERATURE: 95.6 F | DIASTOLIC BLOOD PRESSURE: 68 MMHG | SYSTOLIC BLOOD PRESSURE: 88 MMHG | HEART RATE: 60 BPM

## 2019-12-10 DIAGNOSIS — G89.4 CHRONIC PAIN SYNDROME: ICD-10-CM

## 2019-12-10 PROCEDURE — 99213 OFFICE O/P EST LOW 20 MIN: CPT | Performed by: FAMILY MEDICINE

## 2019-12-10 RX ORDER — MORPHINE SULFATE 15 MG/1
15 TABLET, FILM COATED, EXTENDED RELEASE ORAL EVERY 12 HOURS
Qty: 60 TABLET | Refills: 0 | Status: SHIPPED | OUTPATIENT
Start: 2019-12-10 | End: 2020-01-14

## 2019-12-10 RX ORDER — HYDROCODONE BITARTRATE AND ACETAMINOPHEN 10; 325 MG/1; MG/1
1 TABLET ORAL EVERY 6 HOURS PRN
Qty: 90 TABLET | Refills: 0 | Status: SHIPPED | OUTPATIENT
Start: 2019-12-10 | End: 2020-01-14

## 2019-12-10 ASSESSMENT — ANXIETY QUESTIONNAIRES
1. FEELING NERVOUS, ANXIOUS, OR ON EDGE: NOT AT ALL
5. BEING SO RESTLESS THAT IT IS HARD TO SIT STILL: NOT AT ALL
GAD7 TOTAL SCORE: 2
7. FEELING AFRAID AS IF SOMETHING AWFUL MIGHT HAPPEN: NOT AT ALL
2. NOT BEING ABLE TO STOP OR CONTROL WORRYING: NOT AT ALL
6. BECOMING EASILY ANNOYED OR IRRITABLE: SEVERAL DAYS
IF YOU CHECKED OFF ANY PROBLEMS ON THIS QUESTIONNAIRE, HOW DIFFICULT HAVE THESE PROBLEMS MADE IT FOR YOU TO DO YOUR WORK, TAKE CARE OF THINGS AT HOME, OR GET ALONG WITH OTHER PEOPLE: NOT DIFFICULT AT ALL
3. WORRYING TOO MUCH ABOUT DIFFERENT THINGS: NOT AT ALL

## 2019-12-10 ASSESSMENT — PAIN SCALES - GENERAL: PAINLEVEL: MODERATE PAIN (4)

## 2019-12-10 ASSESSMENT — PATIENT HEALTH QUESTIONNAIRE - PHQ9
5. POOR APPETITE OR OVEREATING: SEVERAL DAYS
SUM OF ALL RESPONSES TO PHQ QUESTIONS 1-9: 6

## 2019-12-10 NOTE — PROGRESS NOTES
Subjective     Janelle White is a 59 year old female who presents to clinic today for the following health issues:    HPI     Presents today to follow up chronic pain syndrome managed with opioids per pain contract for chronic neck and back issues and multiple surgeries to manage.  More trigger point injections planned. Still wearing splints at night.    No acute concerns today.    Patient Active Problem List   Diagnosis     PERSONAL HX OF  MELANOMA     B-complex deficiency     Migraine     Chronic Low Back Pain     CARDIOVASCULAR SCREENING; LDL GOAL LESS THAN 160     DDD (degenerative disc disease), cervical     Moderate recurrent major depression (H)     Vitamin D deficiency     Shift work sleep disorder     Chronic pain syndrome     CLL (chronic lymphocytic leukemia) (H)     Controlled substance agreement signed     CLARE (obstructive sleep apnea)     Prediabetes     Hyperlipidemia LDL goal <130     Past Surgical History:   Procedure Laterality Date     anterior cervical discectomy C4-5 ,Fusion C5-6-7  10/2007     BLEPHAROPLASTY BILATERAL  2013    Procedure: BLEPHAROPLASTY BILATERAL;  BILATERAL UPPER LID BLEPHAROPLASTY AND BROWPEXY ;  Surgeon: Godfrey Miguel MD;  Location:  [facility]     C APPENDECTOMY  2006      SECTION      x2     FUSION LUMBAR ANTERIOR, FUSION LUMBAR POSTERIOR TWO LEVELS, COMBINED  2010    L3-S1 anterior posterior fusion     HYSTERECTOMY  2006    ovaries intact     Partial vulvectomy for NEENA III  2009     Pubovaginal sling, post op durasphere injections  2006    wears pad     ROTATOR CUFF REPAIR RT/LT  2011    right     SPINAL FUSION C3-4  2009    C3-4, anterior spinal fusion     TUBAL LIGATION         Social History     Tobacco Use     Smoking status: Former Smoker     Packs/day: 1.00     Years: 5.00     Pack years: 5.00     Last attempt to quit: 1984     Years since quittin.9     Smokeless tobacco: Never Used   Substance Use Topics     Alcohol use:  Yes     Comment: no alcohol currently since prior to her back surgeries,      Family History   Problem Relation Age of Onset     Hypertension Mother      Alcohol/Drug Mother      Osteoporosis Mother         borderline     Hypertension Father      Diabetes Father         Type 2, diet controlled     Alcohol/Drug Father         remote use     C.A.D. Maternal Grandmother          late 50s     C.A.D. Maternal Grandfather         bone and brain cancer mets as well     Diabetes Paternal Grandfather      Alcohol/Drug Brother      Breast Cancer No family hx of      Cancer - colorectal No family hx of      Cerebrovascular Disease No family hx of          Current Outpatient Medications   Medication Sig Dispense Refill     albuterol (PROAIR HFA/PROVENTIL HFA/VENTOLIN HFA) 108 (90 Base) MCG/ACT inhaler Inhale 2 puffs into the lungs every 6 hours 1 Inhaler 3     calcium citrate (CALCIUM CITRATE) 950 MG tablet Take 1 tablet by mouth 2 times daily.       Calcium-Magnesium-Vitamin D (CALCIUM 500) 500-250-200 MG-MG-UNIT TABS Take 1 tablet by mouth       Cholecalciferol (D-5000) 5000 units TABS Take 5,000 Units by mouth       cyclobenzaprine (FLEXERIL) 10 MG tablet Take 1 tablet (10 mg) by mouth 3 times daily as needed for muscle spasms 90 tablet 3     DIVIGEL 1 MG/GM GEL Place 1 packet onto the skin daily 30 g 11     DOCOSAHEXAENOIC ACID PO Take 1,000 mg by mouth        DULoxetine (CYMBALTA) 60 MG capsule Take 1 capsule (60 mg) by mouth daily 90 capsule 3     estradiol (ESTRACE VAGINAL) 0.1 MG/GM vaginal cream Place 2 g vaginally three times a week. 42.5 g 11     HYDROcodone-acetaminophen (NORCO)  MG per tablet Take 1 tablet by mouth every 6 hours as needed for moderate to severe pain maximum 3 tablet(s) per day 90 tablet 0     lidocaine (LIDODERM) 5 % patch Place 4 patches onto the skin daily Apply up to 4 patches to skin. Wear for 12 hours and remove for 12 hrs.  Refill when patient requests. 120 patch 3      "LORazepam (ATIVAN) 0.5 MG tablet 1-2 tabs qhs prn insomnia and 1 q 8 hours prn anxiety 100 tablet 5     medical cannabis (Patient's own supply.  Not a prescription) Medical Cannabis - [employer] 4-6 ml by mouth daily. [facility]       methylPREDNISolone (MEDROL DOSEPAK) 4 MG tablet therapy pack Follow Package Directions 21 tablet 0     morphine (MS CONTIN) 15 MG CR tablet Take 1 tablet (15 mg) by mouth every 12 hours maximum 2 tablet(s) per day 60 tablet 0     Multiple Vitamin (MULTI-VITAMINS) TABS Take 1 tablet by mouth       Prasterone 6.5 MG INST Place 1 suppository vaginally At Bedtime 28 each 11     sennosides (SENOKOT) 8.6 MG tablet Take 1 tablet by mouth daily as needed for constipation.       Allergies   Allergen Reactions     Bupropion Other (See Comments) and Swelling     Ineffective, name brand works best  Ineffective, name brand works best     Codeine Other (See Comments)     \"Feels like electricity running through body\"  No reaction noted in [employer]ner.  Shaky  With Tylenol     Effexor [Venlafaxine Hydrochloride]      Affect too flat     Escitalopram      Other reaction(s): *Unknown  Sexual dysfunction     Fluoxetine Other (See Comments)     Sexual dysfunction     Levaquin [Levofloxacin] Other (See Comments)     Suicidal thoughts  Dark thoughts- mood changes     Lexapro      Sexual dysfunction     Milnacipran      12.5 MG is fine. 25 MG caused side effects. \"Dark thoughts\"     Pregabalin Other (See Comments)     Hallucinations  Audiovisual hallucinations     Prozac [Fluoxetine Hcl]      Sexual dysfunction     Tramadol Hives     Hives       Venlafaxine      Other reaction(s): *Unknown  Affect too flat     Recent Labs   Lab Test 09/06/19 11/14/17  1239 12/16/13  0739 11/07/12  1207   A1C 5.9*  --   --   --    *  --  137* 104   HDL 40*  --  52 46*   TRIG 259*  --  226* 120   ALT 21  --  17 23   CR 0.75  --  0.76 0.68   GFRESTIMATED >60  --  80 >90   GFRESTBLACK >60  --  >90 >90   POTASSIUM 4.3 4.1 4.6 " 4.2      BP Readings from Last 3 Encounters:   12/10/19 (!) 88/68   10/15/19 104/72   09/17/19 98/62    Wt Readings from Last 3 Encounters:   11/14/17 86 kg (189 lb 8 oz)   02/23/17 83.9 kg (185 lb)   11/08/13 83 kg (183 lb)                    Reviewed and updated as needed this visit by Provider         Review of Systems   ROS COMP: Constitutional, HEENT, cardiovascular, pulmonary, gi and gu systems are negative, except as otherwise noted.      Objective    BP (!) 88/68   Pulse 60   Temp 95.6  F (35.3  C) (Tympanic)   Resp 16   LMP 05/01/2005 (Exact Date)   There is no height or weight on file to calculate BMI.  Physical Exam   GENERAL: healthy, alert and no distress  EYES: Eyes grossly normal to inspection, PERRL and conjunctivae and sclerae normal  NECK: no adenopathy, no asymmetry, masses, or scars and thyroid normal to palpation  RESP: lungs clear to auscultation - no rales, rhonchi or wheezes  CV: regular rate and rhythm, normal S1 S2, no S3 or S4, no murmur, click or rub, no peripheral edema and peripheral pulses strong  MS: no gross musculoskeletal defects noted, no edema  SKIN: no suspicious lesions or rashes  NEURO: Normal strength and tone, mentation intact and speech normal  PSYCH: mentation appears normal, affect normal/bright          Assessment & Plan     1. Chronic pain syndrome    - morphine (MS CONTIN) 15 MG CR tablet; Take 1 tablet (15 mg) by mouth every 12 hours maximum 2 tablet(s) per day  Dispense: 60 tablet; Refill: 0  - HYDROcodone-acetaminophen (NORCO)  MG per tablet; Take 1 tablet by mouth every 6 hours as needed for moderate to severe pain maximum 3 tablet(s) per day  Dispense: 90 tablet; Refill: 0     Continue current regimen- refilled x 1 month.  Follow-up 1 month.    Emili Ballard MD  [facility]

## 2019-12-11 ASSESSMENT — ANXIETY QUESTIONNAIRES: GAD7 TOTAL SCORE: 2

## 2020-01-14 ENCOUNTER — MYC MEDICAL ADVICE (OUTPATIENT)
Dept: FAMILY MEDICINE | Facility: CLINIC | Age: 60
End: 2020-01-14

## 2020-01-14 ENCOUNTER — OFFICE VISIT (OUTPATIENT)
Dept: FAMILY MEDICINE | Facility: CLINIC | Age: 60
End: 2020-01-14
Payer: COMMERCIAL

## 2020-01-14 VITALS
HEART RATE: 60 BPM | TEMPERATURE: 96.1 F | SYSTOLIC BLOOD PRESSURE: 108 MMHG | RESPIRATION RATE: 16 BRPM | DIASTOLIC BLOOD PRESSURE: 70 MMHG

## 2020-01-14 DIAGNOSIS — Z12.11 SPECIAL SCREENING FOR MALIGNANT NEOPLASMS, COLON: ICD-10-CM

## 2020-01-14 DIAGNOSIS — M54.50 CHRONIC LOW BACK PAIN WITHOUT SCIATICA, UNSPECIFIED BACK PAIN LATERALITY: ICD-10-CM

## 2020-01-14 DIAGNOSIS — R10.13 EPIGASTRIC PAIN: ICD-10-CM

## 2020-01-14 DIAGNOSIS — M50.30 DDD (DEGENERATIVE DISC DISEASE), CERVICAL: ICD-10-CM

## 2020-01-14 DIAGNOSIS — G89.4 CHRONIC PAIN SYNDROME: Primary | ICD-10-CM

## 2020-01-14 DIAGNOSIS — G47.00 INSOMNIA, UNSPECIFIED TYPE: ICD-10-CM

## 2020-01-14 DIAGNOSIS — M79.18 MYOFASCIAL PAIN: ICD-10-CM

## 2020-01-14 DIAGNOSIS — G89.29 CHRONIC LOW BACK PAIN WITHOUT SCIATICA, UNSPECIFIED BACK PAIN LATERALITY: ICD-10-CM

## 2020-01-14 DIAGNOSIS — Z23 NEED FOR VACCINATION: ICD-10-CM

## 2020-01-14 DIAGNOSIS — Z78.0 POSTMENOPAUSAL STATUS: ICD-10-CM

## 2020-01-14 DIAGNOSIS — R05.9 COUGH: ICD-10-CM

## 2020-01-14 PROCEDURE — 80307 DRUG TEST PRSMV CHEM ANLYZR: CPT | Mod: 90 | Performed by: FAMILY MEDICINE

## 2020-01-14 PROCEDURE — 99000 SPECIMEN HANDLING OFFICE-LAB: CPT | Performed by: FAMILY MEDICINE

## 2020-01-14 PROCEDURE — 99214 OFFICE O/P EST MOD 30 MIN: CPT | Mod: 25 | Performed by: FAMILY MEDICINE

## 2020-01-14 PROCEDURE — 90750 HZV VACC RECOMBINANT IM: CPT | Performed by: FAMILY MEDICINE

## 2020-01-14 PROCEDURE — 90471 IMMUNIZATION ADMIN: CPT | Performed by: FAMILY MEDICINE

## 2020-01-14 RX ORDER — CYCLOBENZAPRINE HCL 10 MG
10 TABLET ORAL 3 TIMES DAILY PRN
Qty: 90 TABLET | Refills: 3 | Status: SHIPPED | OUTPATIENT
Start: 2020-01-14 | End: 2020-09-29

## 2020-01-14 RX ORDER — MORPHINE SULFATE 15 MG/1
15 TABLET, FILM COATED, EXTENDED RELEASE ORAL EVERY 12 HOURS
Qty: 60 TABLET | Refills: 0 | Status: SHIPPED | OUTPATIENT
Start: 2020-01-14 | End: 2020-02-11

## 2020-01-14 RX ORDER — HYDROCODONE BITARTRATE AND ACETAMINOPHEN 10; 325 MG/1; MG/1
1 TABLET ORAL EVERY 6 HOURS PRN
Qty: 90 TABLET | Refills: 0 | Status: SHIPPED | OUTPATIENT
Start: 2020-01-14 | End: 2020-02-11

## 2020-01-14 RX ORDER — ESTRADIOL 1 MG/G
GEL TOPICAL
Qty: 30 G | Refills: 11 | Status: SHIPPED | OUTPATIENT
Start: 2020-01-14 | End: 2021-03-22

## 2020-01-14 RX ORDER — ALBUTEROL SULFATE 90 UG/1
2 AEROSOL, METERED RESPIRATORY (INHALATION) EVERY 6 HOURS
Qty: 1 INHALER | Refills: 3 | Status: SHIPPED | OUTPATIENT
Start: 2020-01-14 | End: 2021-07-31

## 2020-01-14 RX ORDER — LORAZEPAM 0.5 MG/1
TABLET ORAL
Qty: 100 TABLET | Refills: 5 | Status: SHIPPED | OUTPATIENT
Start: 2020-01-14 | End: 2020-06-30

## 2020-01-14 ASSESSMENT — PAIN SCALES - GENERAL: PAINLEVEL: MODERATE PAIN (4)

## 2020-01-14 NOTE — PROGRESS NOTES
Subjective     Janelle White is a 59 year old female who presents to clinic today for the following health issues:    HPI     Presents today to follow up chronic pain syndrome managed with opioids per pain contract for chronic neck and back issues and multiple surgeries to manage.  More trigger point injections planned. Still wearing splints at night.      Requests updated refills of her chronic medical conditions.    She also notes some increased midepigastric and LUQ pains that come and go and not relieved by PPI.  She feels more gassy-- has been using a lot of simethicone.  No N/V or unexplained weight loss.    Patient Active Problem List   Diagnosis     PERSONAL HX OF  MELANOMA     B-complex deficiency     Migraine     Chronic Low Back Pain     CARDIOVASCULAR SCREENING; LDL GOAL LESS THAN 160     DDD (degenerative disc disease), cervical     Moderate recurrent major depression (H)     Vitamin D deficiency     Shift work sleep disorder     Chronic pain syndrome     CLL (chronic lymphocytic leukemia) (H)     Controlled substance agreement signed     CLARE (obstructive sleep apnea)     Prediabetes     Hyperlipidemia LDL goal <130     Past Surgical History:   Procedure Laterality Date     anterior cervical discectomy C4-5 ,Fusion C5-6-7  10/2007     BLEPHAROPLASTY BILATERAL  2013    Procedure: BLEPHAROPLASTY BILATERAL;  BILATERAL UPPER LID BLEPHAROPLASTY AND BROWPEXY ;  Surgeon: Godfrey Miguel MD;  Location: [facility] APPENDECTOMY  2006      SECTION      x2     FUSION LUMBAR ANTERIOR, FUSION LUMBAR POSTERIOR TWO LEVELS, COMBINED  2010    L3-S1 anterior posterior fusion     HYSTERECTOMY  2006    ovaries intact     Partial vulvectomy for NEENA III  2009     Pubovaginal sling, post op durasphere injections  2006    wears pad     ROTATOR CUFF REPAIR RT/LT  2011    right     SPINAL FUSION C3-4  2009    C3-4, anterior spinal fusion     TUBAL LIGATION         Social History     Tobacco  Use     Smoking status: Former Smoker     Packs/day: 1.00     Years: 5.00     Pack years: 5.00     Last attempt to quit: 1984     Years since quittin.0     Smokeless tobacco: Never Used   Substance Use Topics     Alcohol use: Yes     Comment: no alcohol currently since prior to her back surgeries,      Family History   Problem Relation Age of Onset     Hypertension Mother      Alcohol/Drug Mother      Osteoporosis Mother         borderline     Hypertension Father      Diabetes Father         Type 2, diet controlled     Alcohol/Drug Father         remote use     C.A.D. Maternal Grandmother          late 50s     C.A.D. Maternal Grandfather         bone and brain cancer mets as well     Diabetes Paternal Grandfather      Alcohol/Drug Brother      Breast Cancer No family hx of      Cancer - colorectal No family hx of      Cerebrovascular Disease No family hx of          Current Outpatient Medications   Medication Sig Dispense Refill     albuterol (PROAIR HFA/PROVENTIL HFA/VENTOLIN HFA) 108 (90 Base) MCG/ACT inhaler Inhale 2 puffs into the lungs every 6 hours 1 Inhaler 3     calcium citrate (CALCIUM CITRATE) 950 MG tablet Take 1 tablet by mouth 2 times daily.       Calcium-Magnesium-Vitamin D (CALCIUM 500) 500-250-200 MG-MG-UNIT TABS Take 1 tablet by mouth       Cholecalciferol (D-5000) 5000 units TABS Take 5,000 Units by mouth       cyclobenzaprine (FLEXERIL) 10 MG tablet Take 1 tablet (10 mg) by mouth 3 times daily as needed for muscle spasms 90 tablet 3     DOCOSAHEXAENOIC ACID PO Take 1,000 mg by mouth        DULoxetine (CYMBALTA) 60 MG capsule Take 1 capsule (60 mg) by mouth daily 90 capsule 3     Estradiol (DIVIGEL) 1 MG/GM GEL Place 1 packet onto the skin daily 30 g 11     estradiol (ESTRACE VAGINAL) 0.1 MG/GM vaginal cream Place 2 g vaginally three times a week. 42.5 g 11     HYDROcodone-acetaminophen (NORCO)  MG per tablet Take 1 tablet by mouth every 6 hours as needed for moderate to  "severe pain maximum 3 tablet(s) per day 90 tablet 0     lidocaine (LIDODERM) 5 % patch Place 4 patches onto the skin daily Apply up to 4 patches to skin. Wear for 12 hours and remove for 12 hrs.  Refill when patient requests. 120 patch 3     LORazepam (ATIVAN) 0.5 MG tablet 1-2 tabs qhs prn insomnia and 1 q 8 hours prn anxiety 100 tablet 5     medical cannabis (Patient's own supply.  Not a prescription) Medical Cannabis - [employer] 4-6 ml by mouth daily. [facility]       methylPREDNISolone (MEDROL DOSEPAK) 4 MG tablet therapy pack Follow Package Directions 21 tablet 0     morphine (MS CONTIN) 15 MG CR tablet Take 1 tablet (15 mg) by mouth every 12 hours maximum 2 tablet(s) per day 60 tablet 0     Multiple Vitamin (MULTI-VITAMINS) TABS Take 1 tablet by mouth       Prasterone 6.5 MG INST Place 1 suppository vaginally At Bedtime 28 each 11     sennosides (SENOKOT) 8.6 MG tablet Take 1 tablet by mouth daily as needed for constipation.       Allergies   Allergen Reactions     Bupropion Other (See Comments) and Swelling     Ineffective, name brand works best  Ineffective, name brand works best     Codeine Other (See Comments)     \"Feels like electricity running through body\"  No reaction noted in Cerner.  Shaky  With Tylenol     Effexor [Venlafaxine Hydrochloride]      Affect too flat     Escitalopram      Other reaction(s): *Unknown  Sexual dysfunction     Fluoxetine Other (See Comments)     Sexual dysfunction     Levaquin [Levofloxacin] Other (See Comments)     Suicidal thoughts  Dark thoughts- mood changes     Lexapro      Sexual dysfunction     Milnacipran      12.5 MG is fine. 25 MG caused side effects. \"Dark thoughts\"     Pregabalin Other (See Comments)     Hallucinations  Audiovisual hallucinations     Prozac [Fluoxetine Hcl]      Sexual dysfunction     Tramadol Hives     Hives       Venlafaxine      Other reaction(s): *Unknown  Affect too flat     Recent Labs   Lab Test 09/06/19 11/14/17  1239 12/16/13  0739 " 11/07/12  1207   A1C 5.9*  --   --   --    *  --  137* 104   HDL 40*  --  52 46*   TRIG 259*  --  226* 120   ALT 21  --  17 23   CR 0.75  --  0.76 0.68   GFRESTIMATED >60  --  80 >90   GFRESTBLACK >60  --  >90 >90   POTASSIUM 4.3 4.1 4.6 4.2      BP Readings from Last 3 Encounters:   01/14/20 108/70   12/10/19 (!) 88/68   10/15/19 104/72    Wt Readings from Last 3 Encounters:   11/14/17 86 kg (189 lb 8 oz)   02/23/17 83.9 kg (185 lb)   11/08/13 83 kg (183 lb)                    Reviewed and updated as needed this visit by Provider         Review of Systems   ROS COMP: Constitutional, HEENT, cardiovascular, pulmonary, GI, , musculoskeletal, neuro, skin, endocrine and psych systems are negative, except as otherwise noted.      Objective    /70   Pulse 60   Temp 96.1  F (35.6  C) (Tympanic)   Resp 16   LMP 05/01/2005 (Exact Date)   There is no height or weight on file to calculate BMI.  Physical Exam   GENERAL: healthy, alert and no distress  EYES: Eyes grossly normal to inspection, PERRL and conjunctivae and sclerae normal  NECK: no adenopathy, no asymmetry, masses, or scars and thyroid normal to palpation  MS: no gross musculoskeletal defects noted, no edema  SKIN: no suspicious lesions or rashes  NEURO: Normal strength and tone, mentation intact and speech normal  PSYCH: mentation appears normal, affect normal/bright            Assessment & Plan     1. Chronic pain syndrome  2. DDD (degenerative disc disease), cervical  3. Chronic low back pain without sciatica, unspecified back pain laterality    - Drug  Screen Comprehensive , Urine with Reported Meds ([facility]) (Pain Care Package)  - morphine (MS CONTIN) 15 MG CR tablet; Take 1 tablet (15 mg) by mouth every 12 hours maximum 2 tablet(s) per day  Dispense: 60 tablet; Refill: 0  - HYDROcodone-acetaminophen (NORCO)  MG per tablet; Take 1 tablet by mouth every 6 hours as needed for moderate to severe pain maximum 3 tablet(s) per day  Dispense: 90  tablet; Refill: 0    No change in chronic pain management. Remains stable on current regimen as well as Pilates.    4. Insomnia, unspecified type    - LORazepam (ATIVAN) 0.5 MG tablet; 1-2 tabs qhs prn insomnia and 1 q 8 hours prn anxiety  Dispense: 100 tablet; Refill: 5    Stable on current regimen.    5. Cough    - albuterol (PROAIR HFA/PROVENTIL HFA/VENTOLIN HFA) 108 (90 Base) MCG/ACT inhaler; Inhale 2 puffs into the lungs every 6 hours  Dispense: 1 Inhaler; Refill: 3    Prn mild intermittent asthma with cold air-    6. Postmenopausal status    - Estradiol (DIVIGEL) 1 MG/GM GEL; Place 1 packet onto the skin daily  Dispense: 30 g; Refill: 11    Stable on HRT.    7. Myofascial pain    - cyclobenzaprine (FLEXERIL) 10 MG tablet; Take 1 tablet (10 mg) by mouth 3 times daily as needed for muscle spasms  Dispense: 90 tablet; Refill: 3    Stable using Flexeril prn muscle spasms.    8. Special screening for malignant neoplasms, colon    - GASTROENTEROLOGY ADULT REF PROCEDURE ONLY Rowan Wylie [phone]; No Provider Preference    Due for screening colonoscopy with hx of polyps.    9. Epigastric pain    - GASTROENTEROLOGY ADULT REF PROCEDURE ONLY Rowan Wylie [phone]; No Provider Preference    Will get EGD for further eval-    10. Need for vaccination    - SHINGRIX [[id number]]  - 1st  Administration  [[id number]]       Emili Ballard MD  [facility]

## 2020-01-17 ENCOUNTER — TRANSFERRED RECORDS (OUTPATIENT)
Dept: HEALTH INFORMATION MANAGEMENT | Facility: CLINIC | Age: 60
End: 2020-01-17

## 2020-01-17 LAB — PAIN DRUG SCR UR W RPTD MEDS: NORMAL

## 2020-01-28 ENCOUNTER — OFFICE VISIT (OUTPATIENT)
Dept: FAMILY MEDICINE | Facility: CLINIC | Age: 60
End: 2020-01-28
Payer: COMMERCIAL

## 2020-01-28 VITALS
TEMPERATURE: 98 F | DIASTOLIC BLOOD PRESSURE: 70 MMHG | SYSTOLIC BLOOD PRESSURE: 110 MMHG | HEART RATE: 63 BPM | OXYGEN SATURATION: 96 %

## 2020-01-28 DIAGNOSIS — G89.4 CHRONIC PAIN SYNDROME: Primary | ICD-10-CM

## 2020-01-28 PROCEDURE — 99213 OFFICE O/P EST LOW 20 MIN: CPT | Performed by: NURSE PRACTITIONER
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
## 2020-02-11 ENCOUNTER — OFFICE VISIT (OUTPATIENT)
Dept: FAMILY MEDICINE | Facility: CLINIC | Age: 60
End: 2020-02-11
Payer: COMMERCIAL

## 2020-02-11 VITALS
DIASTOLIC BLOOD PRESSURE: 74 MMHG | SYSTOLIC BLOOD PRESSURE: 102 MMHG | TEMPERATURE: 96.4 F | RESPIRATION RATE: 16 BRPM | HEART RATE: 60 BPM

## 2020-02-11 DIAGNOSIS — M50.30 DDD (DEGENERATIVE DISC DISEASE), CERVICAL: ICD-10-CM

## 2020-02-11 DIAGNOSIS — R53.83 OTHER FATIGUE: ICD-10-CM

## 2020-02-11 DIAGNOSIS — G89.29 CHRONIC LOW BACK PAIN WITHOUT SCIATICA, UNSPECIFIED BACK PAIN LATERALITY: ICD-10-CM

## 2020-02-11 DIAGNOSIS — R73.03 PREDIABETES: ICD-10-CM

## 2020-02-11 DIAGNOSIS — G89.4 CHRONIC PAIN SYNDROME: Primary | ICD-10-CM

## 2020-02-11 DIAGNOSIS — M54.50 CHRONIC LOW BACK PAIN WITHOUT SCIATICA, UNSPECIFIED BACK PAIN LATERALITY: ICD-10-CM

## 2020-02-11 DIAGNOSIS — C91.10 CLL (CHRONIC LYMPHOCYTIC LEUKEMIA) (H): ICD-10-CM

## 2020-02-11 LAB
FERRITIN SERPL-MCNC: 32 NG/ML (ref 8–252)
HBA1C MFR BLD: 5.4 % (ref 0–5.6)
VIT B12 SERPL-MCNC: 477 PG/ML (ref 193–986)

## 2020-02-11 PROCEDURE — 82728 ASSAY OF FERRITIN: CPT | Performed by: FAMILY MEDICINE

## 2020-02-11 PROCEDURE — 36415 COLL VENOUS BLD VENIPUNCTURE: CPT | Performed by: FAMILY MEDICINE

## 2020-02-11 PROCEDURE — 82607 VITAMIN B-12: CPT | Performed by: FAMILY MEDICINE

## 2020-02-11 PROCEDURE — 83036 HEMOGLOBIN GLYCOSYLATED A1C: CPT | Performed by: FAMILY MEDICINE

## 2020-02-11 PROCEDURE — 82306 VITAMIN D 25 HYDROXY: CPT | Performed by: FAMILY MEDICINE

## 2020-02-11 PROCEDURE — 99214 OFFICE O/P EST MOD 30 MIN: CPT | Performed by: FAMILY MEDICINE

## 2020-02-11 RX ORDER — MORPHINE SULFATE 15 MG/1
15 TABLET, FILM COATED, EXTENDED RELEASE ORAL EVERY 12 HOURS
Qty: 60 TABLET | Refills: 0 | Status: SHIPPED | OUTPATIENT
Start: 2020-02-11 | End: 2020-03-10

## 2020-02-11 RX ORDER — HYDROCODONE BITARTRATE AND ACETAMINOPHEN 10; 325 MG/1; MG/1
1 TABLET ORAL EVERY 6 HOURS PRN
Qty: 90 TABLET | Refills: 0 | Status: SHIPPED | OUTPATIENT
Start: 2020-02-11 | End: 2020-03-10
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
## 2020-02-12 LAB — DEPRECATED CALCIDIOL+CALCIFEROL SERPL-MC: 24 UG/L (ref 20–75)

## 2020-02-18 ENCOUNTER — HOSPITAL ENCOUNTER (OUTPATIENT)
Facility: CLINIC | Age: 60
Discharge: HOME | End: 2020-02-18
Attending: INTERNAL MEDICINE | Admitting: INTERNAL MEDICINE
Payer: COMMERCIAL

## 2020-02-18 VITALS
RESPIRATION RATE: 16 BRPM | HEIGHT: 66 IN | OXYGEN SATURATION: 94 % | HEART RATE: 75 BPM | WEIGHT: 189 LBS | BODY MASS INDEX: 30.37 KG/M2 | SYSTOLIC BLOOD PRESSURE: 119 MMHG | DIASTOLIC BLOOD PRESSURE: 87 MMHG

## 2020-02-18 LAB
COLONOSCOPY: NORMAL
UPPER GI ENDOSCOPY: NORMAL

## 2020-02-18 PROCEDURE — 88305 TISSUE EXAM BY PATHOLOGIST: CPT | Performed by: INTERNAL MEDICINE

## 2020-02-18 PROCEDURE — G0500 MOD SEDAT ENDO SERVICE >5YRS: HCPCS | Performed by: INTERNAL MEDICINE

## 2020-02-18 PROCEDURE — G0121 COLON CA SCRN NOT HI RSK IND: HCPCS | Performed by: INTERNAL MEDICINE

## 2020-02-18 PROCEDURE — 43239 EGD BIOPSY SINGLE/MULTIPLE: CPT | Performed by: INTERNAL MEDICINE

## 2020-02-18 PROCEDURE — 25000128 H RX IP 250 OP 636: Performed by: INTERNAL MEDICINE

## 2020-02-18 PROCEDURE — 25000125 ZZHC RX 250: Performed by: INTERNAL MEDICINE

## 2020-02-18 PROCEDURE — 45378 DIAGNOSTIC COLONOSCOPY: CPT | Performed by: INTERNAL MEDICINE

## 2020-02-18 PROCEDURE — 88305 TISSUE EXAM BY PATHOLOGIST: CPT | Mod: 26 | Performed by: INTERNAL MEDICINE

## 2020-02-18 PROCEDURE — 99153 MOD SED SAME PHYS/QHP EA: CPT | Performed by: INTERNAL MEDICINE

## 2020-02-18 RX ORDER — FENTANYL CITRATE 50 UG/ML
INJECTION, SOLUTION INTRAMUSCULAR; INTRAVENOUS PRN
Status: DISCONTINUED | OUTPATIENT
Start: 2020-02-18 | End: 2020-02-18 | Stop reason: HOSPADM

## 2020-02-18 RX ORDER — ONDANSETRON 2 MG/ML
4 INJECTION INTRAMUSCULAR; INTRAVENOUS
Status: DISCONTINUED | OUTPATIENT
Start: 2020-02-18 | End: 2020-02-18 | Stop reason: HOSPADM

## 2020-02-18 RX ORDER — FLUMAZENIL 0.1 MG/ML
0.2 INJECTION, SOLUTION INTRAVENOUS
Status: CANCELLED | OUTPATIENT
Start: 2020-02-18 | End: 2020-02-18

## 2020-02-18 RX ORDER — NALOXONE HYDROCHLORIDE 0.4 MG/ML
.1-.4 INJECTION, SOLUTION INTRAMUSCULAR; INTRAVENOUS; SUBCUTANEOUS
Status: CANCELLED | OUTPATIENT
Start: 2020-02-18 | End: 2020-02-19

## 2020-02-18 RX ORDER — ONDANSETRON 2 MG/ML
4 INJECTION INTRAMUSCULAR; INTRAVENOUS EVERY 6 HOURS PRN
Status: CANCELLED | OUTPATIENT
Start: 2020-02-18

## 2020-02-18 RX ORDER — LIDOCAINE 40 MG/G
CREAM TOPICAL
Status: DISCONTINUED | OUTPATIENT
Start: 2020-02-18 | End: 2020-02-18 | Stop reason: HOSPADM

## 2020-02-18 RX ORDER — ONDANSETRON 4 MG/1
4 TABLET, ORALLY DISINTEGRATING ORAL EVERY 6 HOURS PRN
Status: CANCELLED | OUTPATIENT
Start: 2020-02-18

## 2020-02-18 ASSESSMENT — MIFFLIN-ST. JEOR: SCORE: 1441.11

## 2020-02-18 NOTE — DISCHARGE INSTRUCTIONS
Understanding H. pylori and Ulcers  Traditionally, ulcers, or sores in the lining of your digestive tract, were thought to be caused by too much spicy food, stress, or an anxious personality. We now know that most ulcers are probably due to infection with bacteria known as Helicobacter pylori (H. pylori).     H. pylori invade and disturb the lining of the digestive tract. Acid may weaken the area, causing an ulcer.      Common Ulcer Symptoms  Burning, cramping, or hunger-like pain in the stomach area, often one to three hours after a meal or in the middle of the night  Pain that gets better or worse with eating  Nausea or vomiting  Black, tarry, or bloody stools (which means the ulcer is bleeding)  Or you may have no symptoms.     An ulcer can form in two areas of the digestive tract; the stomach and the duodenum (where the stomach meets the small intestine).      Your Evaluation  An evaluation by your doctor can show if you have an ulcer and determine whether it was caused by H. pylori. Your doctor may ask you questions, examine you, and possibly do some tests. These may include:  A special X-ray called a barium upper gastrointestinal series, to help locate an ulcer. During the test, you drink a chalky liquid. This liquid helps the ulcer show up on the X-ray.  An endoscopic exam, done with a long tube passed through your mouth into your stomach, to give the doctor a closer look at your ulcer. You will be lightly sedated for this procedure. Your doctor can also take a tissue sample to test for H. pylori.  Blood, stool, and breath tests are also available to show whether you have H. pylori in your digestive tract.  Your Treatment  To kill H. pylori so your ulcer can heal, your doctor will probably prescribe antibiotics. Other ulcer medications that help reduce stomach acid may also be prescribed as well. Testing after treatment is recommended to be sure the H. pylori infection is gone. Usually, killing H. pylori  helps keep the ulcer from returning.    [id number] [employer], [address] [place] [address], [place] [address]. All rights reserved. This information is not intended as a substitute for professional medical care. Always follow your healthcare professional's instructions.

## 2020-02-18 NOTE — H&P
Pre-Endoscopy History and Physical     Janelle White MRN# [id number]   YOB: 1960 Age: 59 year old     Date of Procedure: 2/18/2020  Primary care provider: Emili Ballard  Type of Endoscopy: Gastroscopy with possible biopsy, possible dilation  Reason for Procedure: pain  Type of Anesthesia Anticipated: Conscious Sedation    HPI:    Janelle is a 59 year old female who will be undergoing the above procedure.      A history and physical has been performed. The patient's medications and allergies have been reviewed. The risks and benefits of the procedure and the sedation options and risks were discussed with the patient.  All questions were answered and informed consent was obtained.      She denies a personal or family history of anesthesia complications or bleeding disorders.     Patient Active Problem List   Diagnosis     PERSONAL HX OF  MELANOMA     B-complex deficiency     Migraine     Chronic Low Back Pain     CARDIOVASCULAR SCREENING; LDL GOAL LESS THAN 160     DDD (degenerative disc disease), cervical     Moderate recurrent major depression (H)     Vitamin D deficiency     Shift work sleep disorder     Chronic pain syndrome     CLL (chronic lymphocytic leukemia) (H)     Controlled substance agreement signed     CLARE (obstructive sleep apnea)     Prediabetes     Hyperlipidemia LDL goal <130        Past Medical History:   Diagnosis Date     Cervicalgia 2007    C5-6 disc protrusion     ESBL (extended spectrum beta-lactamase) producing bacteria infection      History of blood transfusion 2007    Cervical fusion     Melanoma (H) 1998     Migraine      Rotator cuff tear     s/p injections     Sacroiliac inflammation (H)      Shift work sleep disorder 12/16/2013     Urinary calculi      Vitamin B12 deficiency anemia 2006    started injections        Past Surgical History:   Procedure Laterality Date     anterior cervical discectomy C4-5 ,Fusion C5-6-7  10/2007      BLEPHAROPLASTY BILATERAL  2013    Procedure: BLEPHAROPLASTY BILATERAL;  BILATERAL UPPER LID BLEPHAROPLASTY AND BROWPEXY ;  Surgeon: Godfrey Miguel MD;  Location:  [facility]     C APPENDECTOMY  2006      SECTION      x2     FUSION LUMBAR ANTERIOR, FUSION LUMBAR POSTERIOR TWO LEVELS, COMBINED  2010    L3-S1 anterior posterior fusion     HYSTERECTOMY  2006    ovaries intact     HYSTERECTOMY, PAP NO LONGER INDICATED       Partial vulvectomy for NEENA III  2009     Pubovaginal sling, post op durasphere injections  2006    wears pad     ROTATOR CUFF REPAIR RT/LT  2011    right     SPINAL FUSION C3-4  2009    C3-4, anterior spinal fusion     TUBAL LIGATION         Social History     Tobacco Use     Smoking status: Former Smoker     Packs/day: 1.00     Years: 5.00     Pack years: 5.00     Last attempt to quit: 1984     Years since quittin.1     Smokeless tobacco: Never Used   Substance Use Topics     Alcohol use: Yes     Comment: no alcohol currently since prior to her back surgeries,        Family History   Problem Relation Age of Onset     Hypertension Mother      Alcohol/Drug Mother      Osteoporosis Mother         borderline     Hypertension Father      Diabetes Father         Diet controlled     Alcohol/Drug Father         remote use     C.A.D. Maternal Grandmother          late 50s     C.A.D. Maternal Grandfather         bone and brain cancer mets as well     Diabetes Paternal Grandfather          from diabetic complications     Alcohol/Drug Brother      Hypertension Brother      Breast Cancer No family hx of         Dcis     Cancer - colorectal No family hx of      Cerebrovascular Disease No family hx of        Prior to Admission medications    Medication Sig Start Date End Date Taking? Authorizing Provider   albuterol (PROAIR HFA/PROVENTIL HFA/VENTOLIN HFA) 108 (90 Base) MCG/ACT inhaler Inhale 2 puffs into the lungs every 6 hours 20   Emili Ballard  "MD Alirio   calcium citrate (CALCIUM CITRATE) 950 MG tablet Take 1 tablet by mouth 2 times daily. 12/29/10   Christina Hinkle MD   Calcium-Magnesium-Vitamin D (CALCIUM 500) 500-250-200 MG-MG-UNIT TABS Take 1 tablet by mouth 2/28/19   Reported, Patient   Cholecalciferol (D-5000) 5000 units TABS Take 5,000 Units by mouth 2/28/19   Reported, Patient   cyanocobalamin (CYANOCOBALAMIN) 1000 MCG/ML injection Inject 1 mL (1,000 mcg) into the muscle every 30 days 2/14/20   Emili Ballard MD   cyclobenzaprine (FLEXERIL) 10 MG tablet Take 1 tablet (10 mg) by mouth 3 times daily as needed for muscle spasms 1/14/20   Emili Ballard MD   DOCOSAHEXAENOIC ACID PO Take 1,000 mg by mouth  2/28/19   Reported, Patient   DULoxetine (CYMBALTA) 60 MG capsule Take 1 capsule (60 mg) by mouth daily 6/19/19   Emili Ballard MD   Estradiol (DIVIGEL) 1 MG/GM GEL Place 1 packet onto the skin daily 1/14/20   Emili Ballard MD   estradiol (ESTRACE VAGINAL) 0.1 MG/GM vaginal cream Place 2 g vaginally three times a week. 4/19/13   Christina Hinkle MD   HYDROcodone-acetaminophen (NORCO)  MG per tablet Take 1 tablet by mouth every 6 hours as needed for moderate to severe pain maximum 3 tablet(s) per day 2/11/20   Emili Ballard MD   insulin syringe-needle U-100 (30G X 1/2\" 1 ML) 30G X 1/2\" 1 ML miscellaneous Inject 1 ml B12 qmonth 2/14/20   Emili Ballard MD   lidocaine (LIDODERM) 5 % patch Place 4 patches onto the skin daily Apply up to 4 patches to skin. Wear for 12 hours and remove for 12 hrs.  Refill when patient requests. 7/23/19   Emili Ballard MD   LORazepam (ATIVAN) 0.5 MG tablet 1-2 tabs qhs prn insomnia and 1 q 8 hours prn anxiety 1/14/20   Emili Ballard MD   medical cannabis (Patient's own supply.  Not a prescription) Medical Cannabis - Tangerine 4-6 ml " "by mouth daily. [facility] 2/28/19   Reported, Patient   methylPREDNISolone (MEDROL DOSEPAK) 4 MG tablet therapy pack Follow Package Directions 10/15/19   Emili Ballard MD   morphine (MS CONTIN) 15 MG CR tablet Take 1 tablet (15 mg) by mouth every 12 hours maximum 2 tablet(s) per day 2/11/20   Emili Ballard MD   Multiple Vitamin (MULTI-VITAMINS) TABS Take 1 tablet by mouth 4/23/11   Reported, Patient   Prasterone 6.5 MG INST Place 1 suppository vaginally At Bedtime 7/23/19   Emili Ballard MD   sennosides (SENOKOT) 8.6 MG tablet Take 1 tablet by mouth daily as needed for constipation. 12/29/10   Christina Hinkle MD       Allergies   Allergen Reactions     Bupropion Other (See Comments) and Swelling     Ineffective, name brand works best  Ineffective, name brand works best     Codeine Other (See Comments)     \"Feels like electricity running through body\"  No reaction noted in [employer].  Shaky  With Tylenol     Effexor [Venlafaxine Hydrochloride]      Affect too flat     Escitalopram      Other reaction(s): *Unknown  Sexual dysfunction     Fluoxetine Other (See Comments)     Sexual dysfunction     Levaquin [Levofloxacin] Other (See Comments)     Suicidal thoughts  Dark thoughts- mood changes     Lexapro      Sexual dysfunction     Milnacipran      12.5 MG is fine. 25 MG caused side effects. \"Dark thoughts\"     Pregabalin Other (See Comments)     Hallucinations  Audiovisual hallucinations     Prozac [Fluoxetine Hcl]      Sexual dysfunction     Tramadol Hives     Hives       Venlafaxine      Other reaction(s): *Unknown  Affect too flat        REVIEW OF SYSTEMS:   5 point ROS negative except as noted above in HPI, including Gen., Resp., CV, GI &  system review.    PHYSICAL EXAM:   LMP 05/01/2005 (Exact Date)  Estimated body mass index is 31.05 kg/m  as calculated from the following:    Height as of 9/17/19: 1.664 m (5' 5.5\").    Weight as of " 11/14/17: 86 kg (189 lb 8 oz).   GENERAL APPEARANCE: alert, and oriented  MENTAL STATUS: alert  AIRWAY EXAM: Mallampatti Class I (visualization of the soft palate, fauces, uvula, anterior and posterior pillars)  RESP: lungs clear to auscultation - no rales, rhonchi or wheezes  CV: regular rates and rhythm  DIAGNOSTICS:    Not indicated    IMPRESSION   ASA Class 2 - Mild systemic disease    PLAN:   Plan for Gastroscopy with possible biopsy, possible dilation. We discussed the risks, benefits and alternatives and the patient wished to proceed.    The above has been forwarded to the consulting provider.      Signed Electronically by: Butch Lockhart MD  February 18, 2020

## 2020-02-18 NOTE — LETTER
January 30, 2020      Janelle White  [address] [id number]        Dear Janelle,         Thank you for choosing [facility]. You are scheduled for the following service(s).   Please be aware that coverage of these services is subject to the terms and limitations of your health insurance plan.  Call member services at your health plan with any benefit or coverage questions.    Date:  2-18-20       Procedure: UPPER ENDOSCOPY & COLONOSCOPY  Doctor:  2-18-20          Arrival Time:   0900  *check in at Emergency/Endoscopy desk*  Procedure Time:   0930  Location:   [facility]        Endoscopy Department, First Floor (Enter through ER Doors) *         [address] [place], [place] [address]      [phone] or [phone] ([facility]) to reschedule   MIRALAX -GATORADE  PREP    Upper Endoscopy or Esophagogastroduodenoscopy (EGD) is a test performed to evaluate symptoms of persistent abdominal pain, nausea, vomiting or difficulty swallowing. It may also be used to treat various conditions of the upper gastrointestinal (GI) tract, such as bleeding, narrowing or abnormal growths. During the procedure, a doctor examines the lining of your esophagus, stomach and the first part of your small intestine through a thin, flexible tube called an endoscope. If growths or other abnormalities are found during the procedure, the doctor may remove the abnormal tissue (biopsy) for further examination.     Colonoscopy is the most accurate test to detect colon polyps and colon cancer; and the only test where polyps can be removed. During this procedure, a doctor examines the lining of your large intestine and rectum through a flexible tube.     Transportation  You must arrange for a ride for the day of your procedure with a responsible adult. A taxi , Uber, etc, is not an option unless you are accompanied by a responsible adult. If you fail to arrange  transportation with a responsible adult, your procedure will be cancelled and rescheduled.    Purchase the  following supplies at your local pharmacy:  - 2 (two) bisacodyl tablets: each tablet contains 5 mg.  (Dulcolax  laxative NOT Dulcolax  stool softener)   - 1 (one) 8.3 oz bottle of Polyethylene Glycol (PEG) 3350 Powder   (MiraLAX , Smooth LAX , ClearLAX  or equivalent)  - 64 oz Gatorade    Regular Gatorade, Gatorade G2 , Powerade , Powerade Zero  or Pedialyte  is acceptable. Red colored flavors are not allowed; all other colors (yellow, green, orange, purple and blue) are okay. It is also okay to buy two 2.12 oz packets of powdered Gatorade that can be mixed with water to a total volume of 64 oz of liquid.  - 1 (one) 10 oz bottle of Magnesium Citrate (Red colored flavors are not allowed)  It is also okay for you to use a 0.5 oz package of powdered magnesium citrate (17 g) mixed with 10 oz of water.      PREPARATION FOR COLONOSCOPY  7 days before:    Discontinue fiber supplements and medications containing iron. This includes Metamucil  and Fibercon ; and multivitamins with iron.  3 days before:    Begin a low-fiber diet. A low-fiber diet helps making the cleanout more effective.     Examples of a low-fiber diet include (but are not limited to): white bread, white rice, pasta, crackers, fish, chicken, eggs, ground beef, creamy peanut butter, cooked/steamed/boiled vegetables, canned fruit, bananas, melons, milk, plain yogurt cheese, salad dressing and other condiments.     The following are not allowed on a low-fiber diet: seeds, nuts, popcorn, bran, whole wheat, corn, quinoa, raw fruits and vegetables, berries and dried fruit, beans and lentils.    For additional details on low-fiber diet, please refer to the table on the last page.  2 days before:    Continue the low-fiber diet.     Drink at least 8 glasses of water throughout the day.     Stop eating solid foods at 11:45 pm.  1. 1 day before:    In the  morning: begin a clear liquid diet (liquids you can see through).     Examples of a clear liquid diet include: water, clear broth or bouillon, coffee or tea without milk or cream Gatorade, Pedialyte or Powerade, carbonated and non-carbonated soft drinks (Sprite , 7-Up , ginger ale), strained fruit juices without pulp (apple, white grape, white cranberry), Jell-O  and popsicles.     The following are not allowed on a clear liquid diet: red liquids, alcoholic beverages,  dairy products (milk, creamer, and yogurt), protein shakes, creamy broths, juice with pulp and chewing tobacco.    At noon: take 2 (two) bisacodyl tablets     At 4 (and no later than 6pm): start drinking the Miralax-Gatorade preparation (8.3 oz of Miralax mixed with 64 oz of Gatorade in a large pitcher). Drink 1(one) 8 oz glass every 15 minutes thereafter, until the mixture is gone.      COLON CLEANSING TIPS: drink adequate amounts of fluids before and after your colon cleansing to prevent dehydration. Stay near a toilet because you will have diarrhea. Even if you are sitting on the toilet, continue to drink the cleansing solution every 15 minutes. If you feel nauseous or vomit, rinse your mouth with water, take a 15 to 30-minute-break and then continue drinking the solution. You will be uncomfortable until the stool has flushed from your colon (in about 2 to 4 hours). You may feel chilled.    Day of your procedure  You may take all of your morning medications including blood pressure medications, blood thinners (if you have not been instructed to stop these by our office), methadone, anti-seizure medications with sips of water 3 hours prior to your procedure or earlier. Do not take insulin or vitamins prior to your procedure. Continue the clear liquid diet.   4 hours prior: drink 10 oz of magnesium citrate. It may be easier to drink it with a straw.    STOP consuming all liquids after that.     Do not take anything by mouth during this time.      Allow extra time to travel to your procedure as you may need to stop and use a restroom along the way.  You are ready for the procedure, if you followed all instructions and your stool is no longer formed, but clear or yellow liquid. If you are unsure whether your colon is clean, please call our office at [phone] before you leave for your appointment.  Bring the following to your procedure:  - Insurance Card/Photo ID.   - List of current medications including over-the-counter medications and supplements.   - Your rescue inhaler if you currently use one to control asthma.    Canceling or rescheduling your appointment:   If you must cancel or reschedule your appointment, please call [phone] as soon as possible.    COLONOSCOPY PRE-PROCEDURE CHECKLIST  If you have diabetes, ask your regular doctor for diet and medication restrictions.  If you take an anticoagulant or anti-platelet medication (such as Coumadin , Lovenox , Pradaxa , Xarelto , Eliquis , etc.), please call your primary doctor for advice on holding this medication.  If you take aspirin you may continue to do so.  If you are or may be pregnant, please discuss the risks and benefits of this procedure with your doctor.    What happens during a colonoscopy?  Plan to spend up to two hours, starting at registration time, at the endoscopy center the day of your procedure. The colonoscopy takes an average of 15 to 30 minutes. Recovery time is about 30 minutes.     Before the exam:    You will change into a gown.    Your medical history and medication list will be reviewed with you, unless that has been done over the phone prior to the procedure.     A nurse will insert an intravenous (IV) line into your hand or arm.    The doctor will meet with you and will give you a consent form to sign.  1.   2. During the exam:     Medicine will be given through the IV line to help you relax.     Your heart rate and oxygen levels will be monitored. If your blood  pressure is low, you may be given fluids through the IV line.     The doctor will insert a flexible hollow tube, called a colonoscope, into your rectum. The scope will be advanced slowly through the large intestine (colon).    You may have a feeling of fullness or pressure.     If an abnormal tissue or a polyp is found, the doctor may remove it through the endoscope for closer examination, or biopsy. Tissue removal is painless    After the exam:           Any tissue samples removed during the exam will be sent to a lab for evaluation. It may take 5-7 working days for you to be notified of the results.     A nurse will provide you with complete discharge instructions before you leave the endoscopy center. Be sure to ask the nurse for specific instructions if you take blood thinners such as Aspirin, Coumadin or Plavix.     The doctor will prepare a full report for you and for the physician who referred you for the procedure.     Your doctor will talk with you about the initial results of your exam.      Medication given during the exam will prohibit you from driving for the rest of the day.     Following the exam, you may resume your normal diet. Your first meal should be light, no greasy foods. Avoid alcohol until the next day.     You may resume your regular activities the day after the procedure.       LOW-FIBER DIET  Foods RECOMMENDED Foods to AVOID   Breads, Cereal, Rice and Pasta:   White bread, rolls, biscuits, croissant and stalin toast.   Waffles, Kyrgyz toast and pancakes.   White rice, noodles, pasta, macaroni and peeled cooked potatoes.   Plain crackers and saltines.   Cooked cereals: farina, cream of rice.   Cold cereals: Puffed Rice , Rice Krispies , Corn Flakes  and Special K    Breads, Cereal, Rice and Pasta:   Breads or rolls with nuts, seeds or fruit.   Whole wheat, pumpernickel, rye breads and cornbread.   Potatoes with skin, brown or wild rice, and kasha (buckwheat).     Vegetables:   Tender cooked  and canned vegetables without seeds: carrots, asparagus tips, green or wax beans, pumpkin, spinach, lima beans. Vegetables:   Raw or steamed vegetables (w/ or without seeds)   Sauerkraut.   Winter squash, peas, broccoli, Brussel sprouts, cabbage, onions, cauliflower, baked beans, peas and corn.   Fruits:   Strained fruit juice.   Canned fruit, except pineapple.   Ripe bananas and melon. Fruits:   Prunes and prune juice.   Raw fruits.   Dried fruits: figs, dates and raisins.   Milk/Dairy:   Milk: plain or flavored; yogurt; ice cream.   Custard; cheese and cottage cheese Milk/Dairy:     Meat and other proteins:   Ground, well-cooked tender beef, lamb, ham, veal, pork, fish, poultry, organ meats and eggs.   Peanut butter without nuts. Meat and other proteins:   Tough, fibrous meats with gristle.   Dry beans and peas; lentils and Tofu   Peanut butter with nuts.   Fats, Snack, Sweets, Condiments and Beverages:   Margarine, butter, oils, mayonnaise, sour cream and salad dressing, plain gravy.   Sugar, hard candy, clear jelly, honey and syrup.   Spices, cooked herbs, bouillon, broth and soups made with allowed vegetable, ketchup and mustard.   Coffee, tea and carbonated drinks.   Plain cakes, cookies and pretzels.   Gelatin, plain puddings, custard, ice cream, sherbet and popsicles. Fats, Snack, Sweets, Condiments and Beverages:   Nuts, seeds and coconut.   Jam, marmalade and preserves.   Pickles, olives, relish and horseradish.   All desserts containing nuts, seeds, dried fruit and coconut; or made from whole grains or bran.   Candy made with nuts or seeds.   Popcorn.             DIRECTIONS TO THE ENDOSCOPY DEPARTMENT  From the north ([facility])  Take [address] south, exit on [address] . Get into the left hand nubia, turn left (east), go one-half mile to [address] and turn left. Go north to the first stoplight, take a right on [address] and follow it to the Emergency entrance.    From the  south ([facility])  Take 35N to the 35E split and exit on [address] . On [address] , turn left (west) to [address]. Turn right (north) on [address]. Go north to the first stoplight, take a right on [address] and follow it to the Emergency entrance.    From the east via 35E ([facility])  Take [address]E south to [address]  exit. Turn right on [address] . Go west to [address]. Turn right (north) on [address]. Go to the first stoplight, take a right and follow on [address] to the Emergency entrance.    From the east via [address] ([facility])  Take [address] West to [address]. Turn left (south) on [address] to [address]. Turn left (east) on [address] and follow it to the Emergency entrance.    From the west via [address] ([place], [place])  Take [address] east to [address]. Turn right (south) on [address] to [address]. Turn left (east) on [address] and follow it to the Emergency entrance.

## 2020-02-18 NOTE — H&P
Pre-Endoscopy History and Physical     Janelle White MRN# [id number]   YOB: 1960 Age: 59 year old     Date of Procedure: 2/18/2020  Primary care provider: Emili Ballard  Type of Endoscopy: Colonoscopy with possible biopsy, possible polypectomy and Gastroscopy with possible biopsy, possible dilation  Reason for Procedure: screen,pain  Type of Anesthesia Anticipated: Conscious Sedation    HPI:    Janelle is a 59 year old female who will be undergoing the above procedure.      A history and physical has been performed. The patient's medications and allergies have been reviewed. The risks and benefits of the procedure and the sedation options and risks were discussed with the patient.  All questions were answered and informed consent was obtained.      She denies a personal or family history of anesthesia complications or bleeding disorders.     Patient Active Problem List   Diagnosis     PERSONAL HX OF  MELANOMA     B-complex deficiency     Migraine     Chronic Low Back Pain     CARDIOVASCULAR SCREENING; LDL GOAL LESS THAN 160     DDD (degenerative disc disease), cervical     Moderate recurrent major depression (H)     Vitamin D deficiency     Shift work sleep disorder     Chronic pain syndrome     CLL (chronic lymphocytic leukemia) (H)     Controlled substance agreement signed     CLARE (obstructive sleep apnea)     Prediabetes     Hyperlipidemia LDL goal <130        Past Medical History:   Diagnosis Date     Cervicalgia 2007    C5-6 disc protrusion     ESBL (extended spectrum beta-lactamase) producing bacteria infection      History of blood transfusion 2007    Cervical fusion     Melanoma (H) 1998     Migraine      Rotator cuff tear     s/p injections     Sacroiliac inflammation (H)      Shift work sleep disorder 12/16/2013     Urinary calculi      Vitamin B12 deficiency anemia 2006    started injections        Past Surgical History:   Procedure Laterality Date     anterior  cervical discectomy C4-5 ,Fusion C5-6-7  10/2007     BLEPHAROPLASTY BILATERAL  2013    Procedure: BLEPHAROPLASTY BILATERAL;  BILATERAL UPPER LID BLEPHAROPLASTY AND BROWPEXY ;  Surgeon: Godfrey Miguel MD;  Location: [facility]     C APPENDECTOMY  2006      SECTION      x2     FUSION LUMBAR ANTERIOR, FUSION LUMBAR POSTERIOR TWO LEVELS, COMBINED  2010    L3-S1 anterior posterior fusion     HYSTERECTOMY  2006    ovaries intact     HYSTERECTOMY, PAP NO LONGER INDICATED       Partial vulvectomy for NEENA III  2009     Pubovaginal sling, post op durasphere injections  2006    wears pad     ROTATOR CUFF REPAIR RT/LT  2011    right     SPINAL FUSION C3-4  2009    C3-4, anterior spinal fusion     TUBAL LIGATION         Social History     Tobacco Use     Smoking status: Former Smoker     Packs/day: 1.00     Years: 5.00     Pack years: 5.00     Last attempt to quit: 1984     Years since quittin.1     Smokeless tobacco: Never Used   Substance Use Topics     Alcohol use: Yes     Comment: no alcohol currently since prior to her back surgeries,        Family History   Problem Relation Age of Onset     Hypertension Mother      Alcohol/Drug Mother      Osteoporosis Mother         borderline     Hypertension Father      Diabetes Father         Diet controlled     Alcohol/Drug Father         remote use     C.A.D. Maternal Grandmother          late 50s     C.A.D. Maternal Grandfather         bone and brain cancer mets as well     Diabetes Paternal Grandfather          from diabetic complications     Alcohol/Drug Brother      Hypertension Brother      Breast Cancer No family hx of         Dcis     Cancer - colorectal No family hx of      Cerebrovascular Disease No family hx of        Prior to Admission medications    Medication Sig Start Date End Date Taking? Authorizing Provider   albuterol (PROAIR HFA/PROVENTIL HFA/VENTOLIN HFA) 108 (90 Base) MCG/ACT inhaler Inhale 2 puffs into the  "lungs every 6 hours 1/14/20   Emili Ballard MD   calcium citrate (CALCIUM CITRATE) 950 MG tablet Take 1 tablet by mouth 2 times daily. 12/29/10   Christina Hinkle MD   Calcium-Magnesium-Vitamin D (CALCIUM 500) 500-250-200 MG-MG-UNIT TABS Take 1 tablet by mouth 2/28/19   Reported, Patient   Cholecalciferol (D-5000) 5000 units TABS Take 5,000 Units by mouth 2/28/19   Reported, Patient   cyanocobalamin (CYANOCOBALAMIN) 1000 MCG/ML injection Inject 1 mL (1,000 mcg) into the muscle every 30 days 2/14/20   Emili Ballard MD   cyclobenzaprine (FLEXERIL) 10 MG tablet Take 1 tablet (10 mg) by mouth 3 times daily as needed for muscle spasms 1/14/20   Emili Ballard MD   DOCOSAHEXAENOIC ACID PO Take 1,000 mg by mouth  2/28/19   Reported, Patient   DULoxetine (CYMBALTA) 60 MG capsule Take 1 capsule (60 mg) by mouth daily 6/19/19   Emili Ballard MD   Estradiol (DIVIGEL) 1 MG/GM GEL Place 1 packet onto the skin daily 1/14/20   Emili Ballard MD   estradiol (ESTRACE VAGINAL) 0.1 MG/GM vaginal cream Place 2 g vaginally three times a week. 4/19/13   Christina Hinkle MD   HYDROcodone-acetaminophen (NORCO)  MG per tablet Take 1 tablet by mouth every 6 hours as needed for moderate to severe pain maximum 3 tablet(s) per day 2/11/20   Emili Ballard MD   insulin syringe-needle U-100 (30G X 1/2\" 1 ML) 30G X 1/2\" 1 ML miscellaneous Inject 1 ml B12 qmonth 2/14/20   Emili Ballard MD   lidocaine (LIDODERM) 5 % patch Place 4 patches onto the skin daily Apply up to 4 patches to skin. Wear for 12 hours and remove for 12 hrs.  Refill when patient requests. 7/23/19   Emili Ballard MD   LORazepam (ATIVAN) 0.5 MG tablet 1-2 tabs qhs prn insomnia and 1 q 8 hours prn anxiety 1/14/20   Emili Ballard MD   medical cannabis (Patient's own supply.  " "Not a prescription) Medical Cannabis - Tangerine 4-6 ml by mouth daily. [facility] 2/28/19   Reported, Patient   methylPREDNISolone (MEDROL DOSEPAK) 4 MG tablet therapy pack Follow Package Directions 10/15/19   Emili Ballard MD   morphine (MS CONTIN) 15 MG CR tablet Take 1 tablet (15 mg) by mouth every 12 hours maximum 2 tablet(s) per day 2/11/20   Emili Ballard MD   Multiple Vitamin (MULTI-VITAMINS) TABS Take 1 tablet by mouth 4/23/11   Reported, Patient   Prasterone 6.5 MG INST Place 1 suppository vaginally At Bedtime 7/23/19   Emili Ballard MD   sennosides (SENOKOT) 8.6 MG tablet Take 1 tablet by mouth daily as needed for constipation. 12/29/10   Christina Hinkle MD       Allergies   Allergen Reactions     Bupropion Other (See Comments) and Swelling     Ineffective, name brand works best  Ineffective, name brand works best     Codeine Other (See Comments)     \"Feels like electricity running through body\"  No reaction noted in Cerner.  Shaky  With Tylenol     Effexor [Venlafaxine Hydrochloride]      Affect too flat     Escitalopram      Other reaction(s): *Unknown  Sexual dysfunction     Fluoxetine Other (See Comments)     Sexual dysfunction     Levaquin [Levofloxacin] Other (See Comments)     Suicidal thoughts  Dark thoughts- mood changes     Lexapro      Sexual dysfunction     Milnacipran      12.5 MG is fine. 25 MG caused side effects. \"Dark thoughts\"     Pregabalin Other (See Comments)     Hallucinations  Audiovisual hallucinations     Prozac [Fluoxetine Hcl]      Sexual dysfunction     Tramadol Hives     Hives       Venlafaxine      Other reaction(s): *Unknown  Affect too flat        REVIEW OF SYSTEMS:   5 point ROS negative except as noted above in HPI, including Gen., Resp., CV, GI &  system review.    PHYSICAL EXAM:   LMP 05/01/2005 (Exact Date)  Estimated body mass index is 31.05 kg/m  as calculated from the following:    Height " "as of 9/17/19: 1.664 m (5' 5.5\").    Weight as of 11/14/17: 86 kg (189 lb 8 oz).   GENERAL APPEARANCE: alert, and oriented  MENTAL STATUS: alert  AIRWAY EXAM: Mallampatti Class I (visualization of the soft palate, fauces, uvula, anterior and posterior pillars)  RESP: lungs clear to auscultation - no rales, rhonchi or wheezes  CV: regular rates and rhythm  DIAGNOSTICS:    Not indicated    IMPRESSION   ASA Class 2 - Mild systemic disease    PLAN:   Plan for Colonoscopy with possible biopsy, possible polypectomy and Gastroscopy with possible biopsy, possible dilation. We discussed the risks, benefits and alternatives and the patient wished to proceed.    The above has been forwarded to the consulting provider.      Signed Electronically by: Butch Lockhart MD  February 18, 2020          "

## 2020-02-19 LAB — COPATH REPORT: NORMAL

## 2020-03-02 DIAGNOSIS — G89.4 CHRONIC PAIN SYNDROME: ICD-10-CM

## 2020-03-02 NOTE — TELEPHONE ENCOUNTER
Requested Prescriptions   Pending Prescriptions Disp Refills     methylPREDNISolone (MEDROL DOSEPAK) 4 MG tablet therapy pack  Last Written Prescription Date:  10-15-19  Last Fill Quantity: 21 tab,   # refills: 0  Last Office Visit: 2-11-20  Future Office visit:    Next 5 appointments (look out 90 days)    Mar 10, 2020 11:40 AM CDT  Office Visit with Emili Ballard MD  [facility] [place] [address] [place] [address]  [phone]   Apr 14, 2020 11:40 AM CDT  Office Visit with Emili Ballard MD  [facility] ([facility]) [address] [place] [address]  [phone]           Routing refill request to provider for review/approval because:  Drug not on the [facility], [facility] or  Health refill protocol or controlled substance 21 tablet 0     Sig: Follow Package Directions       There is no refill protocol information for this order

## 2020-03-03 DIAGNOSIS — M54.2 CERVICALGIA: ICD-10-CM

## 2020-03-03 RX ORDER — METHYLPREDNISOLONE 4 MG
TABLET, DOSE PACK ORAL
Qty: 21 TABLET | Refills: 0 | Status: ON HOLD | OUTPATIENT
Start: 2020-03-03 | End: 2021-08-11

## 2020-03-03 NOTE — TELEPHONE ENCOUNTER
Office visit 2/11   1. Chronic pain syndrome  2. Chronic low back pain without sciatica, unspecified back pain laterality  3. DDD (degenerative disc disease), cervical     - HYDROcodone-acetaminophen (NORCO)  MG per tablet; Take 1 tablet by mouth every 6 hours as needed for moderate to severe pain maximum 3 tablet(s) per day  Dispense: 90 tablet; Refill: 0  - morphine (MS CONTIN) 15 MG CR tablet; Take 1 tablet (15 mg) by mouth every 12 hours maximum 2 tablet(s) per day  Dispense: 60 tablet; Refill: 0        Office visit 10/15 -   Medrol dosepak to have on hand for acute flares.

## 2020-03-04 RX ORDER — LIDOCAINE 50 MG/G
4 PATCH TOPICAL DAILY
Qty: 120 PATCH | Refills: 3 | Status: SHIPPED | OUTPATIENT
Start: 2020-03-04 | End: 2020-10-06

## 2020-03-04 NOTE — TELEPHONE ENCOUNTER
"Requested Prescriptions   Pending Prescriptions Disp Refills     lidocaine (LIDODERM) 5 % patch 120 patch 3     Sig: Place 4 patches onto the skin daily Apply up to 4 patches to skin. Wear for 12 hours and remove for 12 hrs.  Refill when patient requests.  Last Written Prescription Date:  07/23/2019  Last Fill Quantity: 120 patch,  # refills: 3   Last Office Visit: 2/11/2020 Elio  Future Office Visit:    Next 5 appointments (look out 90 days)    Mar 10, 2020 11:40 AM CDT  Office Visit with Emili Ballard MD  [facility] [address] [place] [address]  [phone]   Apr 14, 2020 11:40 AM CDT  Office Visit with Emili Ballard MD  [facility] ([facility]) [address] [place] [address]  [phone]                Topical Anesthetic Agents Passed - 3/3/2020  6:33 PM        Passed - Recent (12 mo) or future (30 days) visit within the authorizing provider's specialty     Patient has had an office visit with the authorizing provider or a provider within the authorizing providers department within the previous 12 mos or has a future within next 30 days. See \"Patient Info\" tab in inbasket, or \"Choose Columns\" in Meds & Orders section of the refill encounter.              Passed - Medication is active on med list        Passed - Patient is age 2 or older        "

## 2020-03-10 ENCOUNTER — OFFICE VISIT (OUTPATIENT)
Dept: FAMILY MEDICINE | Facility: CLINIC | Age: 60
End: 2020-03-10
Payer: COMMERCIAL

## 2020-03-10 VITALS
DIASTOLIC BLOOD PRESSURE: 88 MMHG | HEART RATE: 72 BPM | TEMPERATURE: 98.5 F | RESPIRATION RATE: 16 BRPM | SYSTOLIC BLOOD PRESSURE: 122 MMHG

## 2020-03-10 DIAGNOSIS — F33.1 MODERATE RECURRENT MAJOR DEPRESSION (H): Primary | ICD-10-CM

## 2020-03-10 DIAGNOSIS — G89.4 CHRONIC PAIN SYNDROME: ICD-10-CM

## 2020-03-10 PROCEDURE — 99214 OFFICE O/P EST MOD 30 MIN: CPT | Performed by: FAMILY MEDICINE

## 2020-03-10 RX ORDER — HYDROCODONE BITARTRATE AND ACETAMINOPHEN 10; 325 MG/1; MG/1
1 TABLET ORAL EVERY 6 HOURS PRN
Qty: 90 TABLET | Refills: 0 | Status: SHIPPED | OUTPATIENT
Start: 2020-03-10 | End: 2020-04-10

## 2020-03-10 RX ORDER — MORPHINE SULFATE 15 MG/1
15 TABLET, FILM COATED, EXTENDED RELEASE ORAL EVERY 12 HOURS
Qty: 60 TABLET | Refills: 0 | Status: SHIPPED | OUTPATIENT
Start: 2020-03-10 | End: 2020-04-10

## 2020-03-10 RX ORDER — DULOXETIN HYDROCHLORIDE 60 MG/1
60 CAPSULE, DELAYED RELEASE ORAL DAILY
Qty: 90 CAPSULE | Refills: 3 | Status: SHIPPED | OUTPATIENT
Start: 2020-03-10 | End: 2020-10-13

## 2020-03-10 ASSESSMENT — PAIN SCALES - GENERAL: PAINLEVEL: MILD PAIN (3)
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
## 2020-04-10 ENCOUNTER — VIRTUAL VISIT (OUTPATIENT)
Dept: FAMILY MEDICINE | Facility: CLINIC | Age: 60
End: 2020-04-10
Payer: COMMERCIAL

## 2020-04-10 VITALS — WEIGHT: 190 LBS | BODY MASS INDEX: 30.53 KG/M2 | HEIGHT: 66 IN

## 2020-04-10 DIAGNOSIS — G89.4 CHRONIC PAIN SYNDROME: ICD-10-CM

## 2020-04-10 PROCEDURE — 99213 OFFICE O/P EST LOW 20 MIN: CPT | Mod: 95 | Performed by: FAMILY MEDICINE

## 2020-04-10 RX ORDER — HYDROCODONE BITARTRATE AND ACETAMINOPHEN 10; 325 MG/1; MG/1
1 TABLET ORAL EVERY 6 HOURS PRN
Qty: 90 TABLET | Refills: 0 | Status: SHIPPED | OUTPATIENT
Start: 2020-04-10 | End: 2020-05-05

## 2020-04-10 RX ORDER — MORPHINE SULFATE 15 MG/1
15 TABLET, FILM COATED, EXTENDED RELEASE ORAL EVERY 12 HOURS
Qty: 60 TABLET | Refills: 0 | Status: SHIPPED | OUTPATIENT
Start: 2020-04-10 | End: 2020-05-05

## 2020-04-10 ASSESSMENT — MIFFLIN-ST. JEOR: SCORE: 1445.64

## 2020-04-10 NOTE — PROGRESS NOTES
"Subjective     Janelle White is a 59 year old female who is being evaluated via a billable telephone visit.      The patient has been notified of following:     \"This telephone visit will be conducted via a call between you and your physician/provider. We have found that certain health care needs can be provided without the need for a physical exam.  This service lets us provide the care you need with a short phone conversation.  If a prescription is necessary we can send it directly to your pharmacy.  If lab work is needed we can place an order for that and you can then stop by our lab to have the test done at a later time.    Telephone visits are billed at different rates depending on your insurance coverage. During this emergency period, for some insurers they may be billed the same as an in-person visit.  Please reach out to your insurance provider with any questions.    If during the course of the call the physician/provider feels a telephone visit is not appropriate, you will not be charged for this service.\"    Patient has given verbal consent for Telephone visit?  Yes    How would you like to obtain your AVS? MyChart    Janelle White complains of   Chief Complaint   Patient presents with     Musculoskeletal Problem     Refill Request     Med check today.  Hx of chronic pain syndrome- please refer to past notes.  STable on current regimen.  No dose change needed today.    ALLERGIES  Bupropion; Codeine; Effexor [venlafaxine hydrochloride]; Escitalopram; Fluoxetine; Levaquin [levofloxacin]; Lexapro; Milnacipran; Pregabalin; Prozac [fluoxetine hcl]; Tramadol; and Venlafaxine               Patient Active Problem List   Diagnosis     PERSONAL HX OF  MELANOMA     B-complex deficiency     Migraine     Chronic Low Back Pain     CARDIOVASCULAR SCREENING; LDL GOAL LESS THAN 160     DDD (degenerative disc disease), cervical     Moderate recurrent major depression (H)     Vitamin D deficiency     Shift work " sleep disorder     Chronic pain syndrome     CLL (chronic lymphocytic leukemia) (H)     Controlled substance agreement signed     CLARE (obstructive sleep apnea)     Prediabetes     Hyperlipidemia LDL goal <130     Past Surgical History:   Procedure Laterality Date     anterior cervical discectomy C4-5 ,Fusion C5-6-7  10/2007     BLEPHAROPLASTY BILATERAL  2013    Procedure: BLEPHAROPLASTY BILATERAL;  BILATERAL UPPER LID BLEPHAROPLASTY AND BROWPEXY ;  Surgeon: Godfrey Miguel MD;  Location:  [facility]     C APPENDECTOMY  2006      SECTION      x2     COLONOSCOPY N/A 2020    Procedure: COLONOSCOPY;  Surgeon: Butch Lockhart MD;  Location:  [facility]     ESOPHAGOSCOPY, GASTROSCOPY, DUODENOSCOPY (EGD), COMBINED N/A 2020    Procedure: ESOPHAGOGASTRODUODENOSCOPY, WITH BIOPSY biosies by cold forceps;  Surgeon: Butch Lockhart MD;  Location:  GI     FUSION LUMBAR ANTERIOR, FUSION LUMBAR POSTERIOR TWO LEVELS, COMBINED  2010    L3-S1 anterior posterior fusion     HYSTERECTOMY  2006    ovaries intact     HYSTERECTOMY, PAP NO LONGER INDICATED       Partial vulvectomy for NEENA III  2009     Pubovaginal sling, post op durasphere injections  2006    wears pad     ROTATOR CUFF REPAIR RT/LT  2011    right     SPINAL FUSION C3-4  2009    C3-4, anterior spinal fusion     TUBAL LIGATION         Social History     Tobacco Use     Smoking status: Former Smoker     Packs/day: 1.00     Years: 5.00     Pack years: 5.00     Last attempt to quit: 1984     Years since quittin.2     Smokeless tobacco: Never Used   Substance Use Topics     Alcohol use: Yes     Comment: no alcohol currently since prior to her back surgeries,      Family History   Problem Relation Age of Onset     Hypertension Mother      Alcohol/Drug Mother      Osteoporosis Mother         borderline     Hypertension Father      Diabetes Father         Diet controlled     Alcohol/Drug Father         remote use     C.A.D.  "Maternal Grandmother          late 50s     C.A.D. Maternal Grandfather         bone and brain cancer mets as well     Diabetes Paternal Grandfather          from diabetic complications     Alcohol/Drug Brother      Hypertension Brother      Breast Cancer No family hx of         Dcis     Cancer - colorectal No family hx of      Cerebrovascular Disease No family hx of      Colon Cancer No family hx of          Current Outpatient Medications   Medication Sig Dispense Refill     albuterol (PROAIR HFA/PROVENTIL HFA/VENTOLIN HFA) 108 (90 Base) MCG/ACT inhaler Inhale 2 puffs into the lungs every 6 hours 1 Inhaler 3     calcium citrate (CALCIUM CITRATE) 950 MG tablet Take 1 tablet by mouth 2 times daily.       Calcium-Magnesium-Vitamin D (CALCIUM 500) 500-250-200 MG-MG-UNIT TABS Take 1 tablet by mouth       Cholecalciferol (D-5000) 5000 units TABS Take 5,000 Units by mouth       cyanocobalamin (CYANOCOBALAMIN) 1000 MCG/ML injection Inject 1 mL (1,000 mcg) into the muscle every 30 days 10 mL 1     cyclobenzaprine (FLEXERIL) 10 MG tablet Take 1 tablet (10 mg) by mouth 3 times daily as needed for muscle spasms 90 tablet 3     DOCOSAHEXAENOIC ACID PO Take 1,000 mg by mouth        DULoxetine (CYMBALTA) 60 MG capsule Take 1 capsule (60 mg) by mouth daily 90 capsule 3     Estradiol (DIVIGEL) 1 MG/GM GEL Place 1 packet onto the skin daily 30 g 11     estradiol (ESTRACE VAGINAL) 0.1 MG/GM vaginal cream Place 2 g vaginally three times a week. 42.5 g 11     HYDROcodone-acetaminophen (NORCO)  MG per tablet Take 1 tablet by mouth every 6 hours as needed for moderate to severe pain maximum 3 tablet(s) per day 90 tablet 0     insulin syringe-needle U-100 (30G X 1/2\" 1 ML) 30G X 1/2\" 1 ML miscellaneous Inject 1 ml B12 qmonth 10 each 1     lidocaine (LIDODERM) 5 % patch Place 4 patches onto the skin daily Apply up to 4 patches to skin. Wear for 12 hours and remove for 12 hrs.  Refill when patient requests. 120 patch 3     " "LORazepam (ATIVAN) 0.5 MG tablet 1-2 tabs qhs prn insomnia and 1 q 8 hours prn anxiety 100 tablet 5     medical cannabis (Patient's own supply.  Not a prescription) Medical Cannabis - [employer] 4-6 ml by mouth daily. [facility]       methylPREDNISolone (MEDROL DOSEPAK) 4 MG tablet therapy pack Follow Package Directions 21 tablet 0     morphine (MS CONTIN) 15 MG CR tablet Take 1 tablet (15 mg) by mouth every 12 hours maximum 2 tablet(s) per day 60 tablet 0     Multiple Vitamin (MULTI-VITAMINS) TABS Take 1 tablet by mouth       Prasterone 6.5 MG INST Place 1 suppository vaginally At Bedtime 28 each 11     sennosides (SENOKOT) 8.6 MG tablet Take 1 tablet by mouth daily as needed for constipation.       Allergies   Allergen Reactions     Bupropion Other (See Comments) and Swelling     Ineffective, name brand works best  Ineffective, name brand works best     Codeine Other (See Comments)     \"Feels like electricity running through body\"  No reaction noted in [employer]ner.  Shaky  With Tylenol     Effexor [Venlafaxine Hydrochloride]      Affect too flat     Escitalopram      Other reaction(s): *Unknown  Sexual dysfunction     Fluoxetine Other (See Comments)     Sexual dysfunction     Levaquin [Levofloxacin] Other (See Comments)     Suicidal thoughts  Dark thoughts- mood changes     Lexapro      Sexual dysfunction     Milnacipran      12.5 MG is fine. 25 MG caused side effects. \"Dark thoughts\"     Pregabalin Other (See Comments)     Hallucinations  Audiovisual hallucinations     Prozac [Fluoxetine Hcl]      Sexual dysfunction     Tramadol Hives     Hives       Venlafaxine      Other reaction(s): *Unknown  Affect too flat     Recent Labs   Lab Test 02/11/20  1231 09/06/19 11/14/17  1239 12/16/13  0739 11/07/12  1207   A1C 5.4 5.9*  --   --   --    LDL  --  154*  --  137* 104   HDL  --  40*  --  52 46*   TRIG  --  259*  --  226* 120   ALT  --  21  --  17 23   CR  --  0.75  --  0.76 0.68   GFRESTIMATED  --  >60  --  80 >90 " "  GFRESTBLACK  --  >60  --  >90 >90   POTASSIUM  --  4.3 4.1 4.6 4.2      BP Readings from Last 3 Encounters:   03/10/20 122/88   02/18/20 119/87   02/11/20 102/74    Wt Readings from Last 3 Encounters:   04/10/20 86.2 kg (190 lb)   02/18/20 85.7 kg (189 lb)   11/14/17 86 kg (189 lb 8 oz)                    Reviewed and updated as needed this visit by Provider         Review of Systems   ROS COMP: Constitutional, HEENT, cardiovascular, pulmonary, gi and gu systems are negative, except as otherwise noted.       Objective   Reported vitals:  Ht 1.664 m (5' 5.5\")   Wt 86.2 kg (190 lb)   LMP 05/01/2005 (Exact Date)   BMI 31.14 kg/m     alert and no distress  Psych: Alert and oriented times 3; coherent speech, normal   rate and volume, able to articulate logical thoughts, able   to abstract reason, no tangential thoughts, no hallucinations   or delusions  Her affect is normal             Assessment/Plan:  1. Chronic pain syndrome    - HYDROcodone-acetaminophen (NORCO)  MG per tablet; Take 1 tablet by mouth every 6 hours as needed for moderate to severe pain maximum 3 tablet(s) per day  Dispense: 90 tablet; Refill: 0  - morphine (MS CONTIN) 15 MG CR tablet; Take 1 tablet (15 mg) by mouth every 12 hours maximum 2 tablet(s) per day  Dispense: 60 tablet; Refill: 0    No follow-ups on file.    Stable on current regimen.  Refilled x 1 month.  Follow-up 4 weeks.    Phone call duration:  10 minutes    Emili Ballard MD      "

## 2020-05-04 ENCOUNTER — MYC REFILL (OUTPATIENT)
Dept: FAMILY MEDICINE | Facility: CLINIC | Age: 60
End: 2020-05-04

## 2020-05-04 DIAGNOSIS — G89.4 CHRONIC PAIN SYNDROME: ICD-10-CM

## 2020-05-04 RX ORDER — MORPHINE SULFATE 15 MG/1
15 TABLET, FILM COATED, EXTENDED RELEASE ORAL EVERY 12 HOURS
Qty: 60 TABLET | Refills: 0 | Status: CANCELLED | OUTPATIENT
Start: 2020-05-04

## 2020-05-04 RX ORDER — HYDROCODONE BITARTRATE AND ACETAMINOPHEN 10; 325 MG/1; MG/1
1 TABLET ORAL EVERY 6 HOURS PRN
Qty: 90 TABLET | Refills: 0 | Status: CANCELLED | OUTPATIENT
Start: 2020-05-04

## 2020-05-05 ENCOUNTER — VIRTUAL VISIT (OUTPATIENT)
Dept: FAMILY MEDICINE | Facility: CLINIC | Age: 60
End: 2020-05-05
Payer: COMMERCIAL

## 2020-05-05 DIAGNOSIS — G89.4 CHRONIC PAIN SYNDROME: ICD-10-CM

## 2020-05-05 PROCEDURE — 99213 OFFICE O/P EST LOW 20 MIN: CPT | Mod: 95 | Performed by: FAMILY MEDICINE

## 2020-05-05 RX ORDER — MORPHINE SULFATE 15 MG/1
15 TABLET, FILM COATED, EXTENDED RELEASE ORAL EVERY 12 HOURS
Qty: 60 TABLET | Refills: 0 | Status: SHIPPED | OUTPATIENT
Start: 2020-05-07 | End: 2020-06-02

## 2020-05-05 RX ORDER — HYDROCODONE BITARTRATE AND ACETAMINOPHEN 10; 325 MG/1; MG/1
1 TABLET ORAL EVERY 6 HOURS PRN
Qty: 90 TABLET | Refills: 0 | Status: SHIPPED | OUTPATIENT
Start: 2020-05-07 | End: 2020-06-02

## 2020-05-05 NOTE — PROGRESS NOTES
"Janelle White is a 59 year old female who is being evaluated via a billable telephone visit.      The patient has been notified of following:     \"This telephone visit will be conducted via a call between you and your physician/provider. We have found that certain health care needs can be provided without the need for a physical exam.  This service lets us provide the care you need with a short phone conversation.  If a prescription is necessary we can send it directly to your pharmacy.  If lab work is needed we can place an order for that and you can then stop by our lab to have the test done at a later time.    Telephone visits are billed at different rates depending on your insurance coverage. During this emergency period, for some insurers they may be billed the same as an in-person visit.  Please reach out to your insurance provider with any questions.    If during the course of the call the physician/provider feels a telephone visit is not appropriate, you will not be charged for this service.\"    Patient has given verbal consent for Telephone visit?  Yes    What phone number would you like to be contacted at? [phone]*    How would you like to obtain your AVS? Alisha Alvares     Janelle White is a 59 year old female who presents to clinic today for the following health issues:    HPI  Monthly Follow-up today for chronic pain syndrome and refill of meds.  Stable on current regimen.  No dose change needed today.    Patient Active Problem List   Diagnosis     PERSONAL HX OF  MELANOMA     B-complex deficiency     Migraine     Chronic Low Back Pain     CARDIOVASCULAR SCREENING; LDL GOAL LESS THAN 160     DDD (degenerative disc disease), cervical     Moderate recurrent major depression (H)     Vitamin D deficiency     Shift work sleep disorder     Chronic pain syndrome     CLL (chronic lymphocytic leukemia) (H)     Controlled substance agreement signed     CLARE (obstructive sleep apnea)     " Prediabetes     Hyperlipidemia LDL goal <130     Past Surgical History:   Procedure Laterality Date     anterior cervical discectomy C4-5 ,Fusion C5-6-7  10/2007     BLEPHAROPLASTY BILATERAL  2013    Procedure: BLEPHAROPLASTY BILATERAL;  BILATERAL UPPER LID BLEPHAROPLASTY AND BROWPEXY ;  Surgeon: Godfrey Miguel MD;  Location: [facility]     C APPENDECTOMY  2006      SECTION      x2     COLONOSCOPY N/A 2020    Procedure: COLONOSCOPY;  Surgeon: Butch Lockhart MD;  Location:  [facility]     ESOPHAGOSCOPY, GASTROSCOPY, DUODENOSCOPY (EGD), COMBINED N/A 2020    Procedure: ESOPHAGOGASTRODUODENOSCOPY, WITH BIOPSY biosies by cold forceps;  Surgeon: Butch Lockhart MD;  Location:  [facility]     FUSION LUMBAR ANTERIOR, FUSION LUMBAR POSTERIOR TWO LEVELS, COMBINED  2010    L3-S1 anterior posterior fusion     HYSTERECTOMY  2006    ovaries intact     HYSTERECTOMY, PAP NO LONGER INDICATED       Partial vulvectomy for NEENA III  2009     Pubovaginal sling, post op durasphere injections  2006    wears pad     ROTATOR CUFF REPAIR RT/LT  2011    right     SPINAL FUSION C3-4  2009    C3-4, anterior spinal fusion     TUBAL LIGATION         Social History     Tobacco Use     Smoking status: Former Smoker     Packs/day: 1.00     Years: 5.00     Pack years: 5.00     Last attempt to quit: 1984     Years since quittin.3     Smokeless tobacco: Never Used   Substance Use Topics     Alcohol use: Yes     Comment: no alcohol currently since prior to her back surgeries,      Family History   Problem Relation Age of Onset     Hypertension Mother      Alcohol/Drug Mother      Osteoporosis Mother         borderline     Hypertension Father      Diabetes Father         Diet controlled     Alcohol/Drug Father         remote use     C.A.D. Maternal Grandmother          late 50s     C.A.D. Maternal Grandfather         bone and brain cancer mets as well     Diabetes Paternal Grandfather           "from diabetic complications     Alcohol/Drug Brother      Hypertension Brother      Breast Cancer No family hx of         Dcis     Cancer - colorectal No family hx of      Cerebrovascular Disease No family hx of      Colon Cancer No family hx of          Current Outpatient Medications   Medication Sig Dispense Refill     albuterol (PROAIR HFA/PROVENTIL HFA/VENTOLIN HFA) 108 (90 Base) MCG/ACT inhaler Inhale 2 puffs into the lungs every 6 hours 1 Inhaler 3     calcium citrate (CALCIUM CITRATE) 950 MG tablet Take 1 tablet by mouth 2 times daily.       Calcium-Magnesium-Vitamin D (CALCIUM 500) 500-250-200 MG-MG-UNIT TABS Take 1 tablet by mouth       Cholecalciferol (D-5000) 5000 units TABS Take 5,000 Units by mouth       cyanocobalamin (CYANOCOBALAMIN) 1000 MCG/ML injection Inject 1 mL (1,000 mcg) into the muscle every 30 days 10 mL 1     cyclobenzaprine (FLEXERIL) 10 MG tablet Take 1 tablet (10 mg) by mouth 3 times daily as needed for muscle spasms 90 tablet 3     DOCOSAHEXAENOIC ACID PO Take 1,000 mg by mouth        DULoxetine (CYMBALTA) 60 MG capsule Take 1 capsule (60 mg) by mouth daily 90 capsule 3     Estradiol (DIVIGEL) 1 MG/GM GEL Place 1 packet onto the skin daily 30 g 11     estradiol (ESTRACE VAGINAL) 0.1 MG/GM vaginal cream Place 2 g vaginally three times a week. 42.5 g 11     [START ON 5/7/2020] HYDROcodone-acetaminophen (NORCO)  MG per tablet Take 1 tablet by mouth every 6 hours as needed for moderate to severe pain maximum 3 tablet(s) per day 90 tablet 0     insulin syringe-needle U-100 (30G X 1/2\" 1 ML) 30G X 1/2\" 1 ML miscellaneous Inject 1 ml B12 qmonth 10 each 1     lidocaine (LIDODERM) 5 % patch Place 4 patches onto the skin daily Apply up to 4 patches to skin. Wear for 12 hours and remove for 12 hrs.  Refill when patient requests. 120 patch 3     LORazepam (ATIVAN) 0.5 MG tablet 1-2 tabs qhs prn insomnia and 1 q 8 hours prn anxiety 100 tablet 5     medical cannabis (Patient's own supply.  Not " "a prescription) Medical Cannabis - [employer] 4-6 ml by mouth daily. [facility]       methylPREDNISolone (MEDROL DOSEPAK) 4 MG tablet therapy pack Follow Package Directions 21 tablet 0     [START ON 5/7/2020] morphine (MS CONTIN) 15 MG CR tablet Take 1 tablet (15 mg) by mouth every 12 hours maximum 2 tablet(s) per day 60 tablet 0     Multiple Vitamin (MULTI-VITAMINS) TABS Take 1 tablet by mouth       Prasterone 6.5 MG INST Place 1 suppository vaginally At Bedtime 28 each 11     sennosides (SENOKOT) 8.6 MG tablet Take 1 tablet by mouth daily as needed for constipation.       Allergies   Allergen Reactions     Bupropion Other (See Comments) and Swelling     Ineffective, name brand works best  Ineffective, name brand works best     Codeine Other (See Comments)     \"Feels like electricity running through body\"  No reaction noted in [employer].  Shaky  With Tylenol     Effexor [Venlafaxine Hydrochloride]      Affect too flat     Escitalopram      Other reaction(s): *Unknown  Sexual dysfunction     Fluoxetine Other (See Comments)     Sexual dysfunction     Levaquin [Levofloxacin] Other (See Comments)     Suicidal thoughts  Dark thoughts- mood changes     Lexapro      Sexual dysfunction     Milnacipran      12.5 MG is fine. 25 MG caused side effects. \"Dark thoughts\"     Pregabalin Other (See Comments)     Hallucinations  Audiovisual hallucinations     Prozac [Fluoxetine Hcl]      Sexual dysfunction     Tramadol Hives     Hives       Venlafaxine      Other reaction(s): *Unknown  Affect too flat     Recent Labs   Lab Test 02/11/20  1231 09/06/19 11/14/17  1239 12/16/13  0739 11/07/12  1207   A1C 5.4 5.9*  --   --   --    LDL  --  154*  --  137* 104   HDL  --  40*  --  52 46*   TRIG  --  259*  --  226* 120   ALT  --  21  --  17 23   CR  --  0.75  --  0.76 0.68   GFRESTIMATED  --  >60  --  80 >90   GFRESTBLACK  --  >60  --  >90 >90   POTASSIUM  --  4.3 4.1 4.6 4.2      BP Readings from Last 3 Encounters:   03/10/20 122/88 "   02/18/20 119/87   02/11/20 102/74    Wt Readings from Last 3 Encounters:   04/10/20 86.2 kg (190 lb)   02/18/20 85.7 kg (189 lb)   11/14/17 86 kg (189 lb 8 oz)                    Reviewed and updated as needed this visit by Provider         Review of Systems   ROS COMP: Constitutional, HEENT, cardiovascular, pulmonary, gi and gu systems are negative, except as otherwise noted.       Objective   Reported vitals:  LMP 05/01/2005 (Exact Date)    healthy, alert and no distress  PSYCH: Alert and oriented times 3; coherent speech, normal   rate and volume, able to articulate logical thoughts, able   to abstract reason, no tangential thoughts, no hallucinations   or delusions  Her affect is normal and pleasant  RESP: No cough, no audible wheezing, able to talk in full sentences  Remainder of exam unable to be completed due to telephone visits        Assessment/Plan:  1. Chronic pain syndrome    - HYDROcodone-acetaminophen (NORCO)  MG per tablet; Take 1 tablet by mouth every 6 hours as needed for moderate to severe pain maximum 3 tablet(s) per day  Dispense: 90 tablet; Refill: 0  - morphine (MS CONTIN) 15 MG CR tablet; Take 1 tablet (15 mg) by mouth every 12 hours maximum 2 tablet(s) per day  Dispense: 60 tablet; Refill: 0    Stable on current regimen.  Refilled x 1 month.  Follow-up one month.    Phone call duration:  10 minutes    Emili Ballard MD

## 2020-06-02 ENCOUNTER — VIRTUAL VISIT (OUTPATIENT)
Dept: FAMILY MEDICINE | Facility: CLINIC | Age: 60
End: 2020-06-02
Payer: COMMERCIAL

## 2020-06-02 VITALS — BODY MASS INDEX: 30.97 KG/M2 | WEIGHT: 189 LBS

## 2020-06-02 DIAGNOSIS — G89.4 CHRONIC PAIN SYNDROME: ICD-10-CM

## 2020-06-02 PROCEDURE — 99213 OFFICE O/P EST LOW 20 MIN: CPT | Mod: 95 | Performed by: FAMILY MEDICINE

## 2020-06-02 RX ORDER — HYDROCODONE BITARTRATE AND ACETAMINOPHEN 10; 325 MG/1; MG/1
1 TABLET ORAL EVERY 6 HOURS PRN
Qty: 90 TABLET | Refills: 0 | Status: SHIPPED | OUTPATIENT
Start: 2020-06-03 | End: 2020-06-30

## 2020-06-02 RX ORDER — MORPHINE SULFATE 15 MG/1
15 TABLET, FILM COATED, EXTENDED RELEASE ORAL EVERY 12 HOURS
Qty: 60 TABLET | Refills: 0 | Status: SHIPPED | OUTPATIENT
Start: 2020-06-03 | End: 2020-06-30

## 2020-06-02 ASSESSMENT — PATIENT HEALTH QUESTIONNAIRE - PHQ9: SUM OF ALL RESPONSES TO PHQ QUESTIONS 1-9: 6

## 2020-06-02 NOTE — PROGRESS NOTES
"Janelle White is a 59 year old female who is being evaluated via a billable telephone visit.      The patient has been notified of following:     \"This telephone visit will be conducted via a call between you and your physician/provider. We have found that certain health care needs can be provided without the need for a physical exam.  This service lets us provide the care you need with a short phone conversation.  If a prescription is necessary we can send it directly to your pharmacy.  If lab work is needed we can place an order for that and you can then stop by our lab to have the test done at a later time.    Telephone visits are billed at different rates depending on your insurance coverage. During this emergency period, for some insurers they may be billed the same as an in-person visit.  Please reach out to your insurance provider with any questions.    If during the course of the call the physician/provider feels a telephone visit is not appropriate, you will not be charged for this service.\"    Patient has given verbal consent for Telephone visit?  Yes    What phone number would you like to be contacted at? [phone]    How would you like to obtain your AVS? Alisha Alvares     Janelle White is a 59 year old female who presents via phone visit today for the following health issues:    HPI  Monthly Follow-up today for chronic pain syndrome and refill of meds. Stable on current regimen.  No dose change needed today.    Patient on pain contract with me.  No past issues.    Patient Active Problem List   Diagnosis     PERSONAL HX OF  MELANOMA     B-complex deficiency     Migraine     Chronic Low Back Pain     CARDIOVASCULAR SCREENING; LDL GOAL LESS THAN 160     DDD (degenerative disc disease), cervical     Moderate recurrent major depression (H)     Vitamin D deficiency     Shift work sleep disorder     Chronic pain syndrome     CLL (chronic lymphocytic leukemia) (H)     Controlled " substance agreement signed     CLARE (obstructive sleep apnea)     Prediabetes     Hyperlipidemia LDL goal <130     Past Surgical History:   Procedure Laterality Date     anterior cervical discectomy C4-5 ,Fusion C5-6-7  10/2007     BLEPHAROPLASTY BILATERAL  2013    Procedure: BLEPHAROPLASTY BILATERAL;  BILATERAL UPPER LID BLEPHAROPLASTY AND BROWPEXY ;  Surgeon: Godfrey Miguel MD;  Location: [facility]     C APPENDECTOMY  2006      SECTION      x2     COLONOSCOPY N/A 2020    Procedure: COLONOSCOPY;  Surgeon: Butch Lockhart MD;  Location:  GI     ESOPHAGOSCOPY, GASTROSCOPY, DUODENOSCOPY (EGD), COMBINED N/A 2020    Procedure: ESOPHAGOGASTRODUODENOSCOPY, WITH BIOPSY biosies by cold forceps;  Surgeon: Butch Lockhart MD;  Location:  GI     FUSION LUMBAR ANTERIOR, FUSION LUMBAR POSTERIOR TWO LEVELS, COMBINED  2010    L3-S1 anterior posterior fusion     HYSTERECTOMY  2006    ovaries intact     HYSTERECTOMY, PAP NO LONGER INDICATED       Partial vulvectomy for NEENA III  2009     Pubovaginal sling, post op durasphere injections  2006    wears pad     ROTATOR CUFF REPAIR RT/LT  2011    right     SPINAL FUSION C3-4  2009    C3-4, anterior spinal fusion     TUBAL LIGATION         Social History     Tobacco Use     Smoking status: Former Smoker     Packs/day: 1.00     Years: 5.00     Pack years: 5.00     Last attempt to quit: 1984     Years since quittin.4     Smokeless tobacco: Never Used   Substance Use Topics     Alcohol use: Yes     Comment: no alcohol currently since prior to her back surgeries,      Family History   Problem Relation Age of Onset     Hypertension Mother      Alcohol/Drug Mother      Osteoporosis Mother         borderline     Hypertension Father      Diabetes Father         Diet controlled     Alcohol/Drug Father         remote use     C.A.D. Maternal Grandmother          late 50s     C.A.D. Maternal Grandfather         bone and brain  "cancer mets as well     Diabetes Paternal Grandfather          from diabetic complications     Alcohol/Drug Brother      Hypertension Brother      Breast Cancer No family hx of         Dcis     Cancer - colorectal No family hx of      Cerebrovascular Disease No family hx of      Colon Cancer No family hx of          Current Outpatient Medications   Medication Sig Dispense Refill     albuterol (PROAIR HFA/PROVENTIL HFA/VENTOLIN HFA) 108 (90 Base) MCG/ACT inhaler Inhale 2 puffs into the lungs every 6 hours 1 Inhaler 3     calcium citrate (CALCIUM CITRATE) 950 MG tablet Take 1 tablet by mouth 2 times daily.       Calcium-Magnesium-Vitamin D (CALCIUM 500) 500-250-200 MG-MG-UNIT TABS Take 1 tablet by mouth       Cholecalciferol (D-5000) 5000 units TABS Take 5,000 Units by mouth       cyanocobalamin (CYANOCOBALAMIN) 1000 MCG/ML injection Inject 1 mL (1,000 mcg) into the muscle every 30 days 10 mL 1     cyclobenzaprine (FLEXERIL) 10 MG tablet Take 1 tablet (10 mg) by mouth 3 times daily as needed for muscle spasms 90 tablet 3     DOCOSAHEXAENOIC ACID PO Take 1,000 mg by mouth        DULoxetine (CYMBALTA) 60 MG capsule Take 1 capsule (60 mg) by mouth daily 90 capsule 3     Estradiol (DIVIGEL) 1 MG/GM GEL Place 1 packet onto the skin daily 30 g 11     estradiol (ESTRACE VAGINAL) 0.1 MG/GM vaginal cream Place 2 g vaginally three times a week. 42.5 g 11     HYDROcodone-acetaminophen (NORCO)  MG per tablet Take 1 tablet by mouth every 6 hours as needed for moderate to severe pain maximum 3 tablet(s) per day 90 tablet 0     insulin syringe-needle U-100 (30G X 1/2\" 1 ML) 30G X 1/2\" 1 ML miscellaneous Inject 1 ml B12 qmonth 10 each 1     lidocaine (LIDODERM) 5 % patch Place 4 patches onto the skin daily Apply up to 4 patches to skin. Wear for 12 hours and remove for 12 hrs.  Refill when patient requests. 120 patch 3     LORazepam (ATIVAN) 0.5 MG tablet 1-2 tabs qhs prn insomnia and 1 q 8 hours prn anxiety 100 tablet 5 " "    medical cannabis (Patient's own supply.  Not a prescription) Medical Cannabis - [place] 4-6 ml by mouth daily. [facility]       methylPREDNISolone (MEDROL DOSEPAK) 4 MG tablet therapy pack Follow Package Directions 21 tablet 0     morphine (MS CONTIN) 15 MG CR tablet Take 1 tablet (15 mg) by mouth every 12 hours maximum 2 tablet(s) per day 60 tablet 0     Multiple Vitamin (MULTI-VITAMINS) TABS Take 1 tablet by mouth       Prasterone 6.5 MG INST Place 1 suppository vaginally At Bedtime 28 each 11     sennosides (SENOKOT) 8.6 MG tablet Take 1 tablet by mouth daily as needed for constipation.       Allergies   Allergen Reactions     Bupropion Other (See Comments) and Swelling     Ineffective, name brand works best  Ineffective, name brand works best     Codeine Other (See Comments)     \"Feels like electricity running through body\"  No reaction noted in Cerner.  Shaky  With Tylenol     Effexor [Venlafaxine Hydrochloride]      Affect too flat     Escitalopram      Other reaction(s): *Unknown  Sexual dysfunction     Fluoxetine Other (See Comments)     Sexual dysfunction     Levaquin [Levofloxacin] Other (See Comments)     Suicidal thoughts  Dark thoughts- mood changes     Lexapro      Sexual dysfunction     Milnacipran      12.5 MG is fine. 25 MG caused side effects. \"Dark thoughts\"     Pregabalin Other (See Comments)     Hallucinations  Audiovisual hallucinations     Prozac [Fluoxetine Hcl]      Sexual dysfunction     Tramadol Hives     Hives       Venlafaxine      Other reaction(s): *Unknown  Affect too flat     Recent Labs   Lab Test 02/11/20  1231 09/06/19 11/14/17  1239 12/16/13  0739 11/07/12  1207   A1C 5.4 5.9*  --   --   --    LDL  --  154*  --  137* 104   HDL  --  40*  --  52 46*   TRIG  --  259*  --  226* 120   ALT  --  21  --  17 23   CR  --  0.75  --  0.76 0.68   GFRESTIMATED  --  >60  --  80 >90   GFRESTBLACK  --  >60  --  >90 >90   POTASSIUM  --  4.3 4.1 4.6 4.2      BP Readings from Last 3 " Encounters:   03/10/20 122/88   02/18/20 119/87   02/11/20 102/74    Wt Readings from Last 3 Encounters:   06/02/20 85.7 kg (189 lb)   04/10/20 86.2 kg (190 lb)   02/18/20 85.7 kg (189 lb)                    Reviewed and updated as needed this visit by Provider         Review of Systems   Constitutional, HEENT, cardiovascular, pulmonary, GI, , musculoskeletal, neuro, skin, endocrine and psych systems are negative, except as otherwise noted.       Objective   Reported vitals:  LMP 05/01/2005 (Exact Date)    healthy, alert and no distress  PSYCH: Alert and oriented times 3; coherent speech, normal   rate and volume, able to articulate logical thoughts, able   to abstract reason, no tangential thoughts, no hallucinations   or delusions  Her affect is normal and pleasant  RESP: No cough, no audible wheezing, able to talk in full sentences  Remainder of exam unable to be completed due to telephone visits        Assessment/Plan:  1. Chronic pain syndrome    - HYDROcodone-acetaminophen (NORCO)  MG per tablet; Take 1 tablet by mouth every 6 hours as needed for moderate to severe pain maximum 3 tablet(s) per day  Dispense: 90 tablet; Refill: 0  - morphine (MS CONTIN) 15 MG CR tablet; Take 1 tablet (15 mg) by mouth every 12 hours maximum 2 tablet(s) per day  Dispense: 60 tablet; Refill: 0    Stable on current regimen.  Refilled x 1 month.  Follow-up one month.    Phone call duration: 10  minutes    Emili Ballard MD

## 2020-06-30 ENCOUNTER — VIRTUAL VISIT (OUTPATIENT)
Dept: FAMILY MEDICINE | Facility: CLINIC | Age: 60
End: 2020-06-30
Payer: COMMERCIAL

## 2020-06-30 DIAGNOSIS — G47.00 INSOMNIA, UNSPECIFIED TYPE: ICD-10-CM

## 2020-06-30 DIAGNOSIS — G89.4 CHRONIC PAIN SYNDROME: ICD-10-CM

## 2020-06-30 DIAGNOSIS — M75.102 TEAR OF LEFT ROTATOR CUFF, UNSPECIFIED TEAR EXTENT, UNSPECIFIED WHETHER TRAUMATIC: Primary | ICD-10-CM

## 2020-06-30 PROCEDURE — 99214 OFFICE O/P EST MOD 30 MIN: CPT | Mod: 95 | Performed by: FAMILY MEDICINE

## 2020-06-30 RX ORDER — LORAZEPAM 0.5 MG/1
TABLET ORAL
Qty: 100 TABLET | Refills: 5 | Status: SHIPPED | OUTPATIENT
Start: 2020-06-30 | End: 2021-02-11

## 2020-06-30 RX ORDER — MORPHINE SULFATE 15 MG/1
15 TABLET, FILM COATED, EXTENDED RELEASE ORAL EVERY 12 HOURS
Qty: 60 TABLET | Refills: 0 | Status: SHIPPED | OUTPATIENT
Start: 2020-07-02 | End: 2020-07-24

## 2020-06-30 RX ORDER — HYDROCODONE BITARTRATE AND ACETAMINOPHEN 10; 325 MG/1; MG/1
1 TABLET ORAL EVERY 6 HOURS PRN
Qty: 90 TABLET | Refills: 0 | Status: SHIPPED | OUTPATIENT
Start: 2020-07-02 | End: 2020-07-24
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
## 2020-07-08 ENCOUNTER — HOSPITAL ENCOUNTER (OUTPATIENT)
Dept: MRI IMAGING | Facility: CLINIC | Age: 60
Discharge: HOME | End: 2020-07-08
Attending: FAMILY MEDICINE | Admitting: FAMILY MEDICINE
Payer: COMMERCIAL

## 2020-07-08 DIAGNOSIS — M75.102 TEAR OF LEFT ROTATOR CUFF, UNSPECIFIED TEAR EXTENT, UNSPECIFIED WHETHER TRAUMATIC: ICD-10-CM

## 2020-07-08 PROCEDURE — 73221 MRI JOINT UPR EXTREM W/O DYE: CPT | Mod: LT

## 2020-07-14 ENCOUNTER — TRANSFERRED RECORDS (OUTPATIENT)
Dept: HEALTH INFORMATION MANAGEMENT | Facility: CLINIC | Age: 60
End: 2020-07-14

## 2020-07-24 ENCOUNTER — OFFICE VISIT (OUTPATIENT)
Dept: FAMILY MEDICINE | Facility: CLINIC | Age: 60
End: 2020-07-24
Payer: COMMERCIAL

## 2020-07-24 VITALS
HEART RATE: 72 BPM | DIASTOLIC BLOOD PRESSURE: 76 MMHG | TEMPERATURE: 98.4 F | SYSTOLIC BLOOD PRESSURE: 108 MMHG | OXYGEN SATURATION: 97 % | RESPIRATION RATE: 18 BRPM

## 2020-07-24 DIAGNOSIS — M75.102 TEAR OF LEFT ROTATOR CUFF, UNSPECIFIED TEAR EXTENT, UNSPECIFIED WHETHER TRAUMATIC: Primary | ICD-10-CM

## 2020-07-24 DIAGNOSIS — G89.4 CHRONIC PAIN SYNDROME: ICD-10-CM

## 2020-07-24 PROCEDURE — 99213 OFFICE O/P EST LOW 20 MIN: CPT | Performed by: FAMILY MEDICINE

## 2020-07-24 RX ORDER — HYDROCODONE BITARTRATE AND ACETAMINOPHEN 10; 325 MG/1; MG/1
TABLET ORAL
Qty: 180 TABLET | Refills: 0 | Status: SHIPPED | OUTPATIENT
Start: 2020-07-26 | End: 2020-08-06

## 2020-07-24 RX ORDER — MORPHINE SULFATE 15 MG/1
15 TABLET, FILM COATED, EXTENDED RELEASE ORAL EVERY 12 HOURS
Qty: 60 TABLET | Refills: 0 | Status: SHIPPED | OUTPATIENT
Start: 2020-07-26 | End: 2020-09-01

## 2020-07-24 NOTE — PATIENT INSTRUCTIONS
Lifestyle Medicine Resources  Documentaries to get you started:  [place] [employer]  The Game[employer]angers  What the Health    Websites to browse:  [email or web address]  https://nutritionstudies.org/  https://www.[employer].com/    Books to explore:  How Not to Die: Discover the Foods Scientifically Proven to Prevent and Reverse Disease by Harmeet Atwood MD  How Not to Diet: The Groundbreaking Science of Healthy, Permanent Weight Loss by Harmeet Atwood MD  Fiber Fueled by Jonnathan Brooke MD  Undo It! How Simple Lifestyle Changes Can Revers Most Chronic Disease by Tyrone Isidro MD  The Lifestyle Medicine Handbook by Miriam Escalante  The [place] Study by RAUL Light  The Omnivore's Dilemma by Harmeet Youssef  Why We Sleep by Bin Caicedo, PhD  The Spectrum by Tyrone Iisdro MD  Super Immunity by Mukund Cramer MD  Kick Diabetes Essentials: The Diet and Lifestyle Guide by Rosemary Connolly  The Obesity Code by Elfego Mayen MD  The Longevity Diet by Bonifacio Muhammad  Food Rules: An Eater's Manual by Harmeet Youssef  Cooked: A Natural History of Transformation by Harmeet Youssef  Food Politics by Adilia Perea    Feel free to follow my Facebook page called Plant Based MD--I will be adding pertinent articles and info regularly in the weeks and months ahead!    And two great quotes I will end with:     He that takes medicine and neglects diet, wastes the time of his doctor.  Ancient Chinese Proverb   The doctor of the future will no longer treat the human frame with drugs, but rather will cure and prevent disease with nutrition.  Harshal Montenegro    I look forward to partnering with you as we all strive to live a healthy lifestyle and be well!  Cheers!  MD [email or web address]

## 2020-07-24 NOTE — PROGRESS NOTES
Subjective     Janelle White is a 59 year old female who presents to clinic today for the following health issues:    HPI   Presents today for follow up of chronic pain syndrome and monthly med check/refill.  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          Patient Active Problem List   Diagnosis     PERSONAL HX OF  MELANOMA     B-complex deficiency     Migraine     Chronic Low Back Pain     CARDIOVASCULAR SCREENING; LDL GOAL LESS THAN 160     DDD (degenerative disc disease), cervical     Moderate recurrent major depression (H)     Vitamin D deficiency     Shift work sleep disorder     Chronic pain syndrome     CLL (chronic lymphocytic leukemia) (H)     Controlled substance agreement signed     CLARE (obstructive sleep apnea)     Prediabetes     Hyperlipidemia LDL goal <130     Past Surgical History:   Procedure Laterality Date     anterior cervical discectomy C4-5 ,Fusion C5-6-7  10/2007     BLEPHAROPLASTY BILATERAL  2013    Procedure: BLEPHAROPLASTY BILATERAL;  BILATERAL UPPER LID BLEPHAROPLASTY AND BROWPEXY ;  Surgeon: Godfrey Migule MD;  Location: [facility]     C APPENDECTOMY  2006      SECTION      x2     COLONOSCOPY N/A 2020    Procedure: COLONOSCOPY;  Surgeon: Butch Lockhart MD;  Location:  GI     ESOPHAGOSCOPY, GASTROSCOPY, DUODENOSCOPY (EGD), COMBINED N/A 2020    Procedure: ESOPHAGOGASTRODUODENOSCOPY, WITH BIOPSY biosies by cold forceps;  Surgeon: Butch Lockhart MD;  Location:  GI     FUSION LUMBAR ANTERIOR, FUSION LUMBAR POSTERIOR TWO LEVELS, COMBINED  2010    L3-S1 anterior posterior fusion     HYSTERECTOMY  2006    ovaries intact     HYSTERECTOMY, PAP NO LONGER INDICATED       Partial vulvectomy for  NEENA III  2009     Pubovaginal sling, post op durasphere injections  2006    wears pad     ROTATOR CUFF REPAIR RT/LT  2011    right     SPINAL FUSION C3-4  2009    C3-4, anterior spinal fusion     TUBAL LIGATION         Social History     Tobacco Use     Smoking status: Former Smoker     Packs/day: 1.00     Years: 5.00     Pack years: 5.00     Last attempt to quit: 1984     Years since quittin.5     Smokeless tobacco: Never Used   Substance Use Topics     Alcohol use: Yes     Comment: no alcohol currently since prior to her back surgeries,      Family History   Problem Relation Age of Onset     Hypertension Mother      Alcohol/Drug Mother      Osteoporosis Mother         borderline     Hypertension Father      Diabetes Father         Diet controlled     Alcohol/Drug Father         remote use     C.A.D. Maternal Grandmother          late 50s     C.A.D. Maternal Grandfather         bone and brain cancer mets as well     Diabetes Paternal Grandfather          from diabetic complications     Alcohol/Drug Brother      Hypertension Brother      Breast Cancer No family hx of         Dcis     Cancer - colorectal No family hx of      Cerebrovascular Disease No family hx of      Colon Cancer No family hx of          Current Outpatient Medications   Medication Sig Dispense Refill     albuterol (PROAIR HFA/PROVENTIL HFA/VENTOLIN HFA) 108 (90 Base) MCG/ACT inhaler Inhale 2 puffs into the lungs every 6 hours 1 Inhaler 3     calcium citrate (CALCIUM CITRATE) 950 MG tablet Take 1 tablet by mouth 2 times daily.       Calcium-Magnesium-Vitamin D (CALCIUM 500) 500-250-200 MG-MG-UNIT TABS Take 1 tablet by mouth       Cholecalciferol (D-5000) 5000 units TABS Take 5,000 Units by mouth       cyanocobalamin (CYANOCOBALAMIN) 1000 MCG/ML injection Inject 1 mL (1,000 mcg) into the muscle every 30 days 10 mL 1     cyclobenzaprine (FLEXERIL) 10 MG tablet Take 1 tablet (10 mg) by mouth 3 times daily as needed  "for muscle spasms 90 tablet 3     DOCOSAHEXAENOIC ACID PO Take 1,000 mg by mouth        DULoxetine (CYMBALTA) 60 MG capsule Take 1 capsule (60 mg) by mouth daily 90 capsule 3     Estradiol (DIVIGEL) 1 MG/GM GEL Place 1 packet onto the skin daily 30 g 11     estradiol (ESTRACE VAGINAL) 0.1 MG/GM vaginal cream Place 2 g vaginally three times a week. 42.5 g 11     [START ON 7/26/2020] HYDROcodone-acetaminophen (NORCO)  MG per tablet 1.5 tabs q 6 hours for pain-maximum 6 tablet(s) per day 180 tablet 0     insulin syringe-needle U-100 (30G X 1/2\" 1 ML) 30G X 1/2\" 1 ML miscellaneous Inject 1 ml B12 qmonth 10 each 1     lidocaine (LIDODERM) 5 % patch Place 4 patches onto the skin daily Apply up to 4 patches to skin. Wear for 12 hours and remove for 12 hrs.  Refill when patient requests. 120 patch 3     LORazepam (ATIVAN) 0.5 MG tablet 1-2 tabs qhs prn insomnia and 1 q 8 hours prn anxiety 100 tablet 5     medical cannabis (Patient's own supply.  Not a prescription) Medical Cannabis - Tangerine 4-6 ml by mouth daily. [facility] [facility]       methylPREDNISolone (MEDROL DOSEPAK) 4 MG tablet therapy pack Follow Package Directions 21 tablet 0     [START ON 7/26/2020] morphine (MS CONTIN) 15 MG CR tablet Take 1 tablet (15 mg) by mouth every 12 hours maximum 2 tablet(s) per day 60 tablet 0     Multiple Vitamin (MULTI-VITAMINS) TABS Take 1 tablet by mouth       Prasterone 6.5 MG INST Place 1 suppository vaginally At Bedtime 28 each 11     sennosides (SENOKOT) 8.6 MG tablet Take 1 tablet by mouth daily as needed for constipation.       Allergies   Allergen Reactions     Bupropion Other (See Comments) and Swelling     Ineffective, name brand works best  Ineffective, name brand works best     Codeine Other (See Comments)     \"Feels like electricity running through body\"  No reaction noted in [employer]ner.  Shaky  With Tylenol     Effexor [Venlafaxine Hydrochloride]      Affect too flat     Escitalopram      Other reaction(s): " "*Unknown  Sexual dysfunction     Fluoxetine Other (See Comments)     Sexual dysfunction     Levaquin [Levofloxacin] Other (See Comments)     Suicidal thoughts  Dark thoughts- mood changes     Lexapro      Sexual dysfunction     Milnacipran      12.5 MG is fine. 25 MG caused side effects. \"Dark thoughts\"     Pregabalin Other (See Comments)     Hallucinations  Audiovisual hallucinations     Prozac [Fluoxetine Hcl]      Sexual dysfunction     Tramadol Hives     Hives       Venlafaxine      Other reaction(s): *Unknown  Affect too flat     Recent Labs   Lab Test 02/11/20  1231 09/06/19 11/14/17  1239 12/16/13  0739 11/07/12  1207   A1C 5.4 5.9*  --   --   --    LDL  --  154*  --  137* 104   HDL  --  40*  --  52 46*   TRIG  --  259*  --  226* 120   ALT  --  21  --  17 23   CR  --  0.75  --  0.76 0.68   GFRESTIMATED  --  >60  --  80 >90   GFRESTBLACK  --  >60  --  >90 >90   POTASSIUM  --  4.3 4.1 4.6 4.2      BP Readings from Last 3 Encounters:   07/24/20 108/76   03/10/20 122/88   02/18/20 119/87    Wt Readings from Last 3 Encounters:   06/02/20 85.7 kg (189 lb)   04/10/20 86.2 kg (190 lb)   02/18/20 85.7 kg (189 lb)                    Reviewed and updated as needed this visit by Provider         Review of Systems   Constitutional, HEENT, cardiovascular, pulmonary, GI, , musculoskeletal, neuro, skin, endocrine and psych systems are negative, except as otherwise noted.      Objective    LMP 05/01/2005 (Exact Date)   There is no height or weight on file to calculate BMI.  Physical Exam   GENERAL: healthy, alert and no distress  EYES: Eyes grossly normal to inspection, PERRL and conjunctivae and sclerae normal  NECK: no adenopathy, no asymmetry, masses, or scars and thyroid normal to palpation  MS: no gross musculoskeletal defects noted, no edema  SKIN: no suspicious lesions or rashes  NEURO: mentation intact and speech normal  PSYCH: mentation appears normal, affect normal/bright            Assessment & Plan     1. " Chronic pain syndrome    - morphine (MS CONTIN) 15 MG CR tablet; Take 1 tablet (15 mg) by mouth every 12 hours maximum 2 tablet(s) per day  Dispense: 60 tablet; Refill: 0  - HYDROcodone-acetaminophen (NORCO)  MG per tablet; 1.5 tabs q 6 hours for pain-maximum 6 tablet(s) per day  Dispense: 180 tablet; Refill: 0    2. Tear of left rotator cuff, unspecified tear extent, unspecified whether traumatic    - morphine (MS CONTIN) 15 MG CR tablet; Take 1 tablet (15 mg) by mouth every 12 hours maximum 2 tablet(s) per day  Dispense: 60 tablet; Refill: 0  - HYDROcodone-acetaminophen (NORCO)  MG per tablet; 1.5 tabs q 6 hours for pain-maximum 6 tablet(s) per day  Dispense: 180 tablet; Refill: 0     Stable on current regimen- agree with need to increase Norco in this short interim leading up to surgery.  Will Follow-up for preop in next weeks.    Emili Ballard MD  [facility]

## 2020-08-06 ENCOUNTER — OFFICE VISIT (OUTPATIENT)
Dept: FAMILY MEDICINE | Facility: CLINIC | Age: 60
End: 2020-08-06
Payer: COMMERCIAL

## 2020-08-06 VITALS
TEMPERATURE: 97.4 F | OXYGEN SATURATION: 98 % | BODY MASS INDEX: 31.3 KG/M2 | WEIGHT: 191 LBS | HEART RATE: 77 BPM | RESPIRATION RATE: 18 BRPM | DIASTOLIC BLOOD PRESSURE: 70 MMHG | SYSTOLIC BLOOD PRESSURE: 101 MMHG

## 2020-08-06 DIAGNOSIS — M75.102 NONTRAUMATIC TEAR OF LEFT ROTATOR CUFF, UNSPECIFIED TEAR EXTENT: ICD-10-CM

## 2020-08-06 DIAGNOSIS — Z01.818 PREOP GENERAL PHYSICAL EXAM: Primary | ICD-10-CM

## 2020-08-06 DIAGNOSIS — G89.4 CHRONIC PAIN SYNDROME: ICD-10-CM

## 2020-08-06 LAB
ANION GAP SERPL CALCULATED.3IONS-SCNC: 9 MMOL/L (ref 3–14)
BASOPHILS # BLD AUTO: 0 10E9/L (ref 0–0.2)
BASOPHILS NFR BLD AUTO: 0.1 %
BUN SERPL-MCNC: 10 MG/DL (ref 7–30)
CALCIUM SERPL-MCNC: 9.1 MG/DL (ref 8.5–10.1)
CHLORIDE SERPL-SCNC: 106 MMOL/L (ref 94–109)
CO2 SERPL-SCNC: 25 MMOL/L (ref 20–32)
CREAT SERPL-MCNC: 0.71 MG/DL (ref 0.52–1.04)
DIFFERENTIAL METHOD BLD: ABNORMAL
EOSINOPHIL # BLD AUTO: 0.3 10E9/L (ref 0–0.7)
EOSINOPHIL NFR BLD AUTO: 1.1 %
ERYTHROCYTE [DISTWIDTH] IN BLOOD BY AUTOMATED COUNT: 14 % (ref 10–15)
GFR SERPL CREATININE-BSD FRML MDRD: >90 ML/MIN/{1.73_M2}
GLUCOSE SERPL-MCNC: 90 MG/DL (ref 70–99)
HCT VFR BLD AUTO: 42.4 % (ref 35–47)
HGB BLD-MCNC: 13.5 G/DL (ref 11.7–15.7)
LYMPHOCYTES # BLD AUTO: 20.2 10E9/L (ref 0.8–5.3)
LYMPHOCYTES NFR BLD AUTO: 80.4 %
MCH RBC QN AUTO: 29.8 PG (ref 26.5–33)
MCHC RBC AUTO-ENTMCNC: 31.8 G/DL (ref 31.5–36.5)
MCV RBC AUTO: 94 FL (ref 78–100)
MONOCYTES # BLD AUTO: 1.2 10E9/L (ref 0–1.3)
MONOCYTES NFR BLD AUTO: 4.8 %
NEUTROPHILS # BLD AUTO: 3.4 10E9/L (ref 1.6–8.3)
NEUTROPHILS NFR BLD AUTO: 13.6 %
PLATELET # BLD AUTO: 140 10E9/L (ref 150–450)
POTASSIUM SERPL-SCNC: 3.8 MMOL/L (ref 3.4–5.3)
RBC # BLD AUTO: 4.53 10E12/L (ref 3.8–5.2)
SODIUM SERPL-SCNC: 140 MMOL/L (ref 133–144)
WBC # BLD AUTO: 25.1 10E9/L (ref 4–11)

## 2020-08-06 PROCEDURE — 80048 BASIC METABOLIC PNL TOTAL CA: CPT | Performed by: FAMILY MEDICINE

## 2020-08-06 PROCEDURE — 99214 OFFICE O/P EST MOD 30 MIN: CPT | Performed by: FAMILY MEDICINE

## 2020-08-06 PROCEDURE — 85025 COMPLETE CBC W/AUTO DIFF WBC: CPT | Performed by: FAMILY MEDICINE

## 2020-08-06 PROCEDURE — 36415 COLL VENOUS BLD VENIPUNCTURE: CPT | Performed by: FAMILY MEDICINE

## 2020-08-06 RX ORDER — HYDROCODONE BITARTRATE AND ACETAMINOPHEN 10; 325 MG/1; MG/1
TABLET ORAL
Qty: 180 TABLET | Refills: 0 | Status: SHIPPED | OUTPATIENT
Start: 2020-08-14 | End: 2020-08-18

## 2020-08-06 RX ORDER — MORPHINE SULFATE 30 MG/1
30 TABLET, FILM COATED, EXTENDED RELEASE ORAL EVERY 12 HOURS
Qty: 60 TABLET | Refills: 0 | Status: SHIPPED | OUTPATIENT
Start: 2020-08-14 | End: 2020-09-13

## 2020-08-06 NOTE — PROGRESS NOTES
[facility] [address] [place] [address]  [phone]  Dept: [phone]    PRE-OP EVALUATION:  Today's date: 2020    Janelle White (: 1960) presents for pre-operative evaluation assessment as requested by Dr. Barrera.  She requires evaluation and anesthesia risk assessment prior to undergoing surgery/procedure for treatment of Left RVR,SAD,, Biceps tenodesis, +\- SCR .    Fax number for surgical facility: [phone]  Primary Physician: Emili Ballard  Type of Anesthesia Anticipated: to be determined    Preop Questionnnaire:  Pre-op Questionnaire 2020   Surgery Location: [facility]   Surgeon: Dr Barrera   Surgery/Procedure: Left RVR,SAD,, Biceps tenodesis, +\- SCR   Surgery Date: 2020   Time of Surgery: 700   Where patient plans to recover: At home with family   Have you ever had a heart attack or stroke? No   Have you ever had surgery on your heart or blood vessels, such as a stent placement, a coronary artery bypass, or surgery on an artery in your head, neck, heart, or legs? No   Do you have chest pain with activity? No   Do you have a history of  heart failure? No   Do you currently have a cold, bronchitis or symptoms of other infection? No   Do you have a cough, shortness of breath, or wheezing? No   Do you or anyone in your family have previous history of blood clots? No   Do you or does anyone in your family have a serious bleeding problem such as prolonged bleeding following surgeries or cuts? No   Have you ever had problems with anemia or been told to take iron pills? No   Have you had any abnormal blood loss such as black, tarry or bloody stools, or abnormal vaginal bleeding? No   Have you ever had a blood transfusion? YES -  or    Have you ever had a transfusion reaction? No   Are you willing to have a blood transfusion if it is medically needed before, during, or after your surgery? Yes   Have you  or any of your relatives ever had problems with anesthesia? No   Do you have sleep apnea, excessive snoring or daytime drowsiness? YES - pt have CPAP machine    Do you have a CPAP machine? Yes   Do you have any artifical heart valves or other implanted medical devices like a pacemaker, defibrillator, or continuous glucose monitor? No   Do you have artificial joints? No   Are you allergic to latex? No   Is there any chance that you may be pregnant? No       HPI:     HPI related to upcoming procedure: Increased LEFT shoulder pain- MRI revealed rotator cuff tear-- surgery recommended to treat.      See problem list for active medical problems.  Problems all longstanding and stable, except as noted/documented.  See ROS for pertinent symptoms related to these conditions.      MEDICAL HISTORY:     Patient Active Problem List    Diagnosis Date Noted     Prediabetes 09/19/2019     Priority: Medium     Hyperlipidemia LDL goal <130 09/19/2019     Priority: Medium     CLARE (obstructive sleep apnea) 02/13/2019     Priority: Medium     Controlled substance agreement signed 08/14/2018     Priority: Medium     Chronic pain syndrome 12/21/2017     Priority: Medium     CLL (chronic lymphocytic leukemia) (H) 12/21/2017     Priority: Medium     Shift work sleep disorder 12/16/2013     Priority: Medium     Vitamin D deficiency 11/08/2012     Priority: Medium     Moderate recurrent major depression (H) 01/06/2011     Priority: Medium     DDD (degenerative disc disease), cervical 10/07/2010     Priority: Medium     CARDIOVASCULAR SCREENING; LDL GOAL LESS THAN 160 02/10/2010     Priority: Medium     Chronic Low Back Pain 10/01/2009     Priority: Medium     S/p AP L3-S1 fusion 12/2010 - referred to [facility].   Orthopedics writing scripts for narcotics post-op.       Migraine      Priority: Medium     Problem list name updated by automated process. Provider to review       B-complex deficiency 10/10/2006     Priority: Medium      Problem list name updated by automated process. Provider to review       PERSONAL HX OF  MELANOMA 2003     Priority: Low      Past Medical History:   Diagnosis Date     Cervicalgia     C5-6 disc protrusion     ESBL (extended spectrum beta-lactamase) producing bacteria infection      History of blood transfusion     Cervical fusion     Melanoma (H)      Migraine      Rotator cuff tear     s/p injections     Sacroiliac inflammation (H)      Shift work sleep disorder 2013     Urinary calculi      Vitamin B12 deficiency anemia 2006    started injections     Past Surgical History:   Procedure Laterality Date     anterior cervical discectomy C4-5 ,Fusion C5-6-7  10/2007     BLEPHAROPLASTY BILATERAL  2013    Procedure: BLEPHAROPLASTY BILATERAL;  BILATERAL UPPER LID BLEPHAROPLASTY AND BROWPEXY ;  Surgeon: Godfrey Miguel MD;  Location:  [facility]     C APPENDECTOMY  2006      SECTION      x2     COLONOSCOPY N/A 2020    Procedure: COLONOSCOPY;  Surgeon: Butch Lockhart MD;  Location:  GI     ESOPHAGOSCOPY, GASTROSCOPY, DUODENOSCOPY (EGD), COMBINED N/A 2020    Procedure: ESOPHAGOGASTRODUODENOSCOPY, WITH BIOPSY biosies by cold forceps;  Surgeon: Butch Lockhart MD;  Location:  GI     FUSION LUMBAR ANTERIOR, FUSION LUMBAR POSTERIOR TWO LEVELS, COMBINED  2010    L3-S1 anterior posterior fusion     HYSTERECTOMY  2006    ovaries intact     HYSTERECTOMY, PAP NO LONGER INDICATED       Partial vulvectomy for NEENA III  2009     Pubovaginal sling, post op durasphere injections  2006    wears pad     ROTATOR CUFF REPAIR RT/LT  2011    right     SPINAL FUSION C3-4  2009    C3-4, anterior spinal fusion     TUBAL LIGATION       Current Outpatient Medications   Medication Sig Dispense Refill     albuterol (PROAIR HFA/PROVENTIL HFA/VENTOLIN HFA) 108 (90 Base) MCG/ACT inhaler Inhale 2 puffs into the lungs every 6 hours 1 Inhaler 3     calcium citrate (CALCIUM CITRATE) 950  "MG tablet Take 1 tablet by mouth 2 times daily.       Calcium-Magnesium-Vitamin D (CALCIUM 500) 500-250-200 MG-MG-UNIT TABS Take 1 tablet by mouth       Cholecalciferol (D-5000) 5000 units TABS Take 5,000 Units by mouth       cyanocobalamin (CYANOCOBALAMIN) 1000 MCG/ML injection Inject 1 mL (1,000 mcg) into the muscle every 30 days 10 mL 1     cyclobenzaprine (FLEXERIL) 10 MG tablet Take 1 tablet (10 mg) by mouth 3 times daily as needed for muscle spasms 90 tablet 3     DOCOSAHEXAENOIC ACID PO Take 1,000 mg by mouth        DULoxetine (CYMBALTA) 60 MG capsule Take 1 capsule (60 mg) by mouth daily 90 capsule 3     Estradiol (DIVIGEL) 1 MG/GM GEL Place 1 packet onto the skin daily 30 g 11     estradiol (ESTRACE VAGINAL) 0.1 MG/GM vaginal cream Place 2 g vaginally three times a week. 42.5 g 11     [START ON 8/14/2020] HYDROcodone-acetaminophen (NORCO)  MG per tablet 1.5 tabs q 6 hours for pain-maximum 6 tablet(s) per day 180 tablet 0     insulin syringe-needle U-100 (30G X 1/2\" 1 ML) 30G X 1/2\" 1 ML miscellaneous Inject 1 ml B12 qmonth 10 each 1     lidocaine (LIDODERM) 5 % patch Place 4 patches onto the skin daily Apply up to 4 patches to skin. Wear for 12 hours and remove for 12 hrs.  Refill when patient requests. 120 patch 3     LORazepam (ATIVAN) 0.5 MG tablet 1-2 tabs qhs prn insomnia and 1 q 8 hours prn anxiety 100 tablet 5     medical cannabis (Patient's own supply.  Not a prescription) Medical Cannabis - Tangerine 4-6 ml by mouth daily. [facility]       methylPREDNISolone (MEDROL DOSEPAK) 4 MG tablet therapy pack Follow Package Directions 21 tablet 0     morphine (MS CONTIN) 15 MG CR tablet Take 1 tablet (15 mg) by mouth every 12 hours maximum 2 tablet(s) per day 60 tablet 0     [START ON 8/14/2020] morphine (MS CONTIN) 30 MG CR tablet Take 1 tablet (30 mg) by mouth every 12 hours maximum 2 tablet(s) per day post-op x 2-4 weeks 60 tablet 0     Multiple Vitamin (MULTI-VITAMINS) TABS Take 1 tablet by " "mouth       naloxone (NARCAN) 4 MG/0.1ML nasal spray Spray 1 spray (4 mg) into one nostril alternating nostrils as needed for opioid reversal every 2-3 minutes until assistance arrives 0.2 mL 1     Prasterone 6.5 MG INST Place 1 suppository vaginally At Bedtime 28 each 11     sennosides (SENOKOT) 8.6 MG tablet Take 1 tablet by mouth daily as needed for constipation.       OTC products: None, except as noted above    Allergies   Allergen Reactions     Bupropion Other (See Comments) and Swelling     Ineffective, name brand works best  Ineffective, name brand works best     Codeine Other (See Comments)     \"Feels like electricity running through body\"  No reaction noted in [employer].  Shaky  With Tylenol     Effexor [Venlafaxine Hydrochloride]      Affect too flat     Escitalopram      Other reaction(s): *Unknown  Sexual dysfunction     Fluoxetine Other (See Comments)     Sexual dysfunction     Levaquin [Levofloxacin] Other (See Comments)     Suicidal thoughts  Dark thoughts- mood changes     Lexapro      Sexual dysfunction     Milnacipran      12.5 MG is fine. 25 MG caused side effects. \"Dark thoughts\"     Pregabalin Other (See Comments)     Hallucinations  Audiovisual hallucinations     Prozac [Fluoxetine Hcl]      Sexual dysfunction     Tramadol Hives     Hives       Venlafaxine      Other reaction(s): *Unknown  Affect too flat      Latex Allergy: NO    Social History     Tobacco Use     Smoking status: Former Smoker     Packs/day: 1.00     Years: 5.00     Pack years: 5.00     Last attempt to quit: 1984     Years since quittin.6     Smokeless tobacco: Never Used   Substance Use Topics     Alcohol use: Yes     Comment: no alcohol currently since prior to her back surgeries,      History   Drug Use No       REVIEW OF SYSTEMS:   CONSTITUTIONAL: NEGATIVE for fever, chills, change in weight  INTEGUMENTARY/SKIN: NEGATIVE for worrisome rashes, moles or lesions  EYES: NEGATIVE for vision changes or " irritation  ENT/MOUTH: NEGATIVE for ear, mouth and throat problems  RESP: NEGATIVE for significant cough or SOB  BREAST: NEGATIVE for masses, tenderness or discharge  CV: NEGATIVE for chest pain, palpitations or peripheral edema  GI: NEGATIVE for nausea, abdominal pain, heartburn, or change in bowel habits  : NEGATIVE for frequency, dysuria, or hematuria  MUSCULOSKELETAL: NEGATIVE for significant arthralgias or myalgia  NEURO: NEGATIVE for weakness, dizziness or paresthesias  ENDOCRINE: NEGATIVE for temperature intolerance, skin/hair changes  HEME: NEGATIVE for bleeding problems  PSYCHIATRIC: NEGATIVE for changes in mood or affect    EXAM:   /70 (BP Location: Right arm, Patient Position: Sitting, Cuff Size: Adult Regular)   Pulse 77   Temp 97.4  F (36.3  C) (Oral)   Resp 18   Wt 86.6 kg (191 lb)   LMP 05/01/2005 (Exact Date)   SpO2 98%   BMI 31.30 kg/m      GENERAL APPEARANCE: healthy, alert and no distress     EYES: EOMI, PERRL     HENT: ear canals and TM's normal and nose and mouth without ulcers or lesions     NECK: no adenopathy, no asymmetry, masses, or scars and thyroid normal to palpation     RESP: lungs clear to auscultation - no rales, rhonchi or wheezes     CV: regular rates and rhythm, normal S1 S2, no S3 or S4 and no murmur, click or rub     ABDOMEN:  soft, nontender, no HSM or masses and bowel sounds normal     MS: extremities normal- no gross deformities noted, no evidence of inflammation in joints, FROM in all extremities.     SKIN: no suspicious lesions or rashes     NEURO: Normal strength and tone, sensory exam grossly normal, mentation intact and speech normal     PSYCH: mentation appears normal. and affect normal/bright     LYMPHATICS: No cervical adenopathy    DIAGNOSTICS:     Labs Drawn and in Process:   CBC and BMP today    IMPRESSION:   Reason for surgery/procedure: as above    The proposed surgical procedure is considered INTERMEDIATE risk.    REVISED CARDIAC RISK INDEX  The  patient has the following serious cardiovascular risks for perioperative complications such as (MI, PE, VFib and 3  AV Block):  No serious cardiac risks  INTERPRETATION: 0 risks: Class I (very low risk - 0.4% complication rate)    The patient has the following additional risks for perioperative complications:  High tolerance to opioid analgesics due to chronic pain syndrome for DJD of neck and back s/p fusion surgeries      ICD-10-CM    1. Preop general physical exam  Z01.818 CBC with platelets and differential     Basic metabolic panel  (Ca, Cl, CO2, Creat, Gluc, K, Na, BUN)   2. Nontraumatic tear of left rotator cuff, unspecified tear extent  M75.102 CBC with platelets and differential     Basic metabolic panel  (Ca, Cl, CO2, Creat, Gluc, K, Na, BUN)     morphine (MS CONTIN) 30 MG CR tablet     naloxone (NARCAN) 4 MG/0.1ML nasal spray   3. Chronic pain syndrome  G89.4 naloxone (NARCAN) 4 MG/0.1ML nasal spray     HYDROcodone-acetaminophen (NORCO)  MG per tablet       RECOMMENDATIONS:     --Consult hospital rounder / IM to assist post-op medical management    --Patient is to take all scheduled medications on the day of surgery EXCEPT for modifications listed below.    APPROVAL GIVEN to proceed with proposed procedure, without further diagnostic evaluation       Signed Electronically by: Emili Ballard MD    Copy of this evaluation report is provided to requesting physician.    [facility] Preop Guidelines    Revised Cardiac Risk Index

## 2020-08-18 ENCOUNTER — TELEPHONE (OUTPATIENT)
Dept: FAMILY MEDICINE | Facility: CLINIC | Age: 60
End: 2020-08-18

## 2020-08-18 DIAGNOSIS — G89.4 CHRONIC PAIN SYNDROME: ICD-10-CM

## 2020-08-18 RX ORDER — HYDROCODONE BITARTRATE AND ACETAMINOPHEN 10; 325 MG/1; MG/1
TABLET ORAL
Qty: 180 TABLET | Refills: 0 | Status: SHIPPED | OUTPATIENT
Start: 2020-08-18 | End: 2020-09-17

## 2020-08-18 NOTE — TELEPHONE ENCOUNTER
Reason for Call:  Other call back    Detailed comments: Isis from [facility] states pt had surgery yesterday on rotator cuff ,pharmacy will not refill norco without provider approval, very urgent she gets this medication.     Phone Number Patient can be reached at: Other phone number:  [phone]    Best Time: asap    Can we leave a detailed message on this number? Not Applicable    Call taken on 8/18/2020 at 1:42 PM by Mela Ruffin

## 2020-08-18 NOTE — TELEPHONE ENCOUNTER
TC Ortho nurse call back stating her request was that 'Dr. Ballard needs to okay the fill from Dr. Barrera for post op pain by calling the [facility]    at [phone]'. She wasn't requesting an RX from PCP but rather that her care team okay the one already filled so they can dispense it to the patient. Patient is waiting to get this RX. States her initial request was for a call back so she could explain this in further detail. Please assist, unless PCP wants to be the one to take over and continue managing patients pain medication. Thanks!

## 2020-08-18 NOTE — TELEPHONE ENCOUNTER
Can another provider approve for patient to get NORCO refill?  Pt had Surgery Yesterday.  Latrice Bar RN

## 2020-08-18 NOTE — TELEPHONE ENCOUNTER
Left message on voice mail for Dr Bruce Marinelli's care coordinator that this prescription has been OK'd.  Latrice Bar RN

## 2020-08-19 NOTE — TELEPHONE ENCOUNTER
Rx received 6:35pm from Pharmacy: Received new RX with same directions as previous. Next fill not available until 8/24. If patient is to be taking more than 6 per day we need new RX with current directions and max # of tablets daily.

## 2020-08-19 NOTE — TELEPHONE ENCOUNTER
Zafar called from pharmacy stating they are still needing clarification to fill Norco for patient    Zafar explains pt received a script for 40 Norco from having surgery and needs ok to fill the additional 40.   Also, unable to fill the Norco script from Dr Ballard due to directions being max of 6 and per insurance and having extra it would need to state max of 8 daily    Please advise asap

## 2020-08-19 NOTE — TELEPHONE ENCOUNTER
Pharmacist Zafar called at [facility].   This was all they needed.     Veronica Mercado, RN, BSN

## 2020-09-01 ENCOUNTER — TRANSFERRED RECORDS (OUTPATIENT)
Dept: HEALTH INFORMATION MANAGEMENT | Facility: CLINIC | Age: 60
End: 2020-09-01

## 2020-09-01 DIAGNOSIS — G89.4 CHRONIC PAIN SYNDROME: ICD-10-CM

## 2020-09-01 DIAGNOSIS — M75.102 TEAR OF LEFT ROTATOR CUFF, UNSPECIFIED TEAR EXTENT, UNSPECIFIED WHETHER TRAUMATIC: ICD-10-CM

## 2020-09-01 RX ORDER — MORPHINE SULFATE 15 MG/1
15 TABLET, FILM COATED, EXTENDED RELEASE ORAL EVERY 12 HOURS
Qty: 20 TABLET | Refills: 0 | Status: SHIPPED | OUTPATIENT
Start: 2020-09-01 | End: 2020-09-17

## 2020-09-02 ENCOUNTER — MYC MEDICAL ADVICE (OUTPATIENT)
Dept: FAMILY MEDICINE | Facility: CLINIC | Age: 60
End: 2020-09-02

## 2020-09-17 ENCOUNTER — OFFICE VISIT (OUTPATIENT)
Dept: FAMILY MEDICINE | Facility: CLINIC | Age: 60
End: 2020-09-17
Payer: COMMERCIAL

## 2020-09-17 VITALS
TEMPERATURE: 97.7 F | OXYGEN SATURATION: 96 % | DIASTOLIC BLOOD PRESSURE: 81 MMHG | RESPIRATION RATE: 16 BRPM | SYSTOLIC BLOOD PRESSURE: 116 MMHG | HEART RATE: 65 BPM

## 2020-09-17 DIAGNOSIS — M54.50 CHRONIC LOW BACK PAIN WITHOUT SCIATICA, UNSPECIFIED BACK PAIN LATERALITY: ICD-10-CM

## 2020-09-17 DIAGNOSIS — G89.4 CHRONIC PAIN SYNDROME: Primary | ICD-10-CM

## 2020-09-17 DIAGNOSIS — M75.102 TEAR OF LEFT ROTATOR CUFF, UNSPECIFIED TEAR EXTENT, UNSPECIFIED WHETHER TRAUMATIC: ICD-10-CM

## 2020-09-17 DIAGNOSIS — M50.30 DDD (DEGENERATIVE DISC DISEASE), CERVICAL: ICD-10-CM

## 2020-09-17 DIAGNOSIS — G89.29 CHRONIC LOW BACK PAIN WITHOUT SCIATICA, UNSPECIFIED BACK PAIN LATERALITY: ICD-10-CM

## 2020-09-17 PROCEDURE — 99213 OFFICE O/P EST LOW 20 MIN: CPT | Performed by: FAMILY MEDICINE

## 2020-09-17 RX ORDER — MORPHINE SULFATE 30 MG/1
TABLET, FILM COATED, EXTENDED RELEASE ORAL
COMMUNITY
Start: 2020-08-14 | End: 2020-09-17

## 2020-09-17 RX ORDER — MORPHINE SULFATE 15 MG/1
TABLET, FILM COATED, EXTENDED RELEASE ORAL
Qty: 30 TABLET | Refills: 0 | Status: SHIPPED | OUTPATIENT
Start: 2020-09-17 | End: 2021-04-16

## 2020-09-17 RX ORDER — HYDROCODONE BITARTRATE AND ACETAMINOPHEN 10; 325 MG/1; MG/1
TABLET ORAL
Qty: 180 TABLET | Refills: 0 | Status: SHIPPED | OUTPATIENT
Start: 2020-09-17 | End: 2020-09-18

## 2020-09-17 RX ORDER — MORPHINE SULFATE 30 MG/1
TABLET, FILM COATED, EXTENDED RELEASE ORAL
Qty: 30 TABLET | Refills: 0 | Status: SHIPPED | OUTPATIENT
Start: 2020-09-17 | End: 2020-10-13
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
## 2020-09-18 ENCOUNTER — TRANSFERRED RECORDS (OUTPATIENT)
Dept: HEALTH INFORMATION MANAGEMENT | Facility: CLINIC | Age: 60
End: 2020-09-18

## 2020-09-18 ENCOUNTER — MYC MEDICAL ADVICE (OUTPATIENT)
Dept: FAMILY MEDICINE | Facility: CLINIC | Age: 60
End: 2020-09-18

## 2020-09-18 DIAGNOSIS — G89.4 CHRONIC PAIN SYNDROME: ICD-10-CM

## 2020-09-18 RX ORDER — HYDROCODONE BITARTRATE AND ACETAMINOPHEN 10; 325 MG/1; MG/1
TABLET ORAL
Qty: 180 TABLET | Refills: 0 | Status: SHIPPED | OUTPATIENT
Start: 2020-09-18 | End: 2020-10-13

## 2020-09-21 ENCOUNTER — TRANSFERRED RECORDS (OUTPATIENT)
Dept: HEALTH INFORMATION MANAGEMENT | Facility: CLINIC | Age: 60
End: 2020-09-21

## 2020-09-29 ENCOUNTER — TRANSFERRED RECORDS (OUTPATIENT)
Dept: HEALTH INFORMATION MANAGEMENT | Facility: CLINIC | Age: 60
End: 2020-09-29

## 2020-09-29 DIAGNOSIS — M79.18 MYOFASCIAL PAIN: ICD-10-CM

## 2020-09-29 DIAGNOSIS — N95.2 ATROPHIC VAGINITIS: ICD-10-CM

## 2020-09-29 RX ORDER — CYCLOBENZAPRINE HCL 10 MG
10 TABLET ORAL 3 TIMES DAILY PRN
Qty: 90 TABLET | Refills: 3 | Status: SHIPPED | OUTPATIENT
Start: 2020-09-29 | End: 2021-01-12

## 2020-10-01 ENCOUNTER — MYC MEDICAL ADVICE (OUTPATIENT)
Dept: FAMILY MEDICINE | Facility: CLINIC | Age: 60
End: 2020-10-01

## 2020-10-01 NOTE — TELEPHONE ENCOUNTER
Dr Ballard, would patient be able to do both the pre-op and med check at appointment 10/13/2020? Or will she need 2 separate appointments?  Latrice Bar RN

## 2020-10-13 ENCOUNTER — OFFICE VISIT (OUTPATIENT)
Dept: FAMILY MEDICINE | Facility: CLINIC | Age: 60
End: 2020-10-13
Payer: COMMERCIAL

## 2020-10-13 VITALS
BODY MASS INDEX: 30.99 KG/M2 | RESPIRATION RATE: 16 BRPM | WEIGHT: 186 LBS | HEART RATE: 71 BPM | TEMPERATURE: 97 F | DIASTOLIC BLOOD PRESSURE: 80 MMHG | HEIGHT: 65 IN | SYSTOLIC BLOOD PRESSURE: 112 MMHG | OXYGEN SATURATION: 97 %

## 2020-10-13 DIAGNOSIS — G56.01 CARPAL TUNNEL SYNDROME OF RIGHT WRIST: ICD-10-CM

## 2020-10-13 DIAGNOSIS — G56.21 ULNAR TUNNEL SYNDROME OF RIGHT WRIST: ICD-10-CM

## 2020-10-13 DIAGNOSIS — G56.22 ULNAR TUNNEL SYNDROME OF LEFT WRIST: ICD-10-CM

## 2020-10-13 DIAGNOSIS — F33.1 MODERATE RECURRENT MAJOR DEPRESSION (H): ICD-10-CM

## 2020-10-13 DIAGNOSIS — G56.02 CARPAL TUNNEL SYNDROME OF LEFT WRIST: ICD-10-CM

## 2020-10-13 DIAGNOSIS — Z01.818 PREOP GENERAL PHYSICAL EXAM: Primary | ICD-10-CM

## 2020-10-13 DIAGNOSIS — Z23 NEED FOR PROPHYLACTIC VACCINATION AND INOCULATION AGAINST INFLUENZA: ICD-10-CM

## 2020-10-13 DIAGNOSIS — G89.4 CHRONIC PAIN SYNDROME: ICD-10-CM

## 2020-10-13 LAB
ANION GAP SERPL CALCULATED.3IONS-SCNC: 7 MMOL/L (ref 3–14)
BASOPHILS # BLD AUTO: 0 10E9/L (ref 0–0.2)
BASOPHILS NFR BLD AUTO: 0.1 %
BUN SERPL-MCNC: 12 MG/DL (ref 7–30)
CALCIUM SERPL-MCNC: 9.2 MG/DL (ref 8.5–10.1)
CHLORIDE SERPL-SCNC: 106 MMOL/L (ref 94–109)
CO2 SERPL-SCNC: 24 MMOL/L (ref 20–32)
CREAT SERPL-MCNC: 0.65 MG/DL (ref 0.52–1.04)
DIFFERENTIAL METHOD BLD: ABNORMAL
EOSINOPHIL # BLD AUTO: 0.3 10E9/L (ref 0–0.7)
EOSINOPHIL NFR BLD AUTO: 1.6 %
ERYTHROCYTE [DISTWIDTH] IN BLOOD BY AUTOMATED COUNT: 14 % (ref 10–15)
GFR SERPL CREATININE-BSD FRML MDRD: >90 ML/MIN/{1.73_M2}
GLUCOSE SERPL-MCNC: 95 MG/DL (ref 70–99)
HCT VFR BLD AUTO: 41.4 % (ref 35–47)
HGB BLD-MCNC: 13 G/DL (ref 11.7–15.7)
LYMPHOCYTES # BLD AUTO: 15.2 10E9/L (ref 0.8–5.3)
LYMPHOCYTES NFR BLD AUTO: 77.2 %
MCH RBC QN AUTO: 29.9 PG (ref 26.5–33)
MCHC RBC AUTO-ENTMCNC: 31.4 G/DL (ref 31.5–36.5)
MCV RBC AUTO: 95 FL (ref 78–100)
MONOCYTES # BLD AUTO: 0.7 10E9/L (ref 0–1.3)
MONOCYTES NFR BLD AUTO: 3.7 %
NEUTROPHILS # BLD AUTO: 3.4 10E9/L (ref 1.6–8.3)
NEUTROPHILS NFR BLD AUTO: 17.4 %
PLATELET # BLD AUTO: 137 10E9/L (ref 150–450)
POTASSIUM SERPL-SCNC: 4.3 MMOL/L (ref 3.4–5.3)
RBC # BLD AUTO: 4.35 10E12/L (ref 3.8–5.2)
SODIUM SERPL-SCNC: 138 MMOL/L (ref 133–144)
WBC # BLD AUTO: 19.7 10E9/L (ref 4–11)

## 2020-10-13 PROCEDURE — 80048 BASIC METABOLIC PNL TOTAL CA: CPT | Performed by: FAMILY MEDICINE

## 2020-10-13 PROCEDURE — 90682 RIV4 VACC RECOMBINANT DNA IM: CPT | Performed by: FAMILY MEDICINE

## 2020-10-13 PROCEDURE — 36415 COLL VENOUS BLD VENIPUNCTURE: CPT | Performed by: FAMILY MEDICINE

## 2020-10-13 PROCEDURE — 85025 COMPLETE CBC W/AUTO DIFF WBC: CPT | Performed by: FAMILY MEDICINE

## 2020-10-13 PROCEDURE — 90471 IMMUNIZATION ADMIN: CPT | Performed by: FAMILY MEDICINE

## 2020-10-13 PROCEDURE — 93000 ELECTROCARDIOGRAM COMPLETE: CPT | Performed by: FAMILY MEDICINE

## 2020-10-13 PROCEDURE — 99214 OFFICE O/P EST MOD 30 MIN: CPT | Mod: 25 | Performed by: FAMILY MEDICINE

## 2020-10-13 RX ORDER — DULOXETIN HYDROCHLORIDE 60 MG/1
60 CAPSULE, DELAYED RELEASE ORAL 2 TIMES DAILY
Qty: 180 CAPSULE | Refills: 3 | Status: SHIPPED | OUTPATIENT
Start: 2020-10-13 | End: 2021-04-18 | Stop reason: DRUGHIGH

## 2020-10-13 RX ORDER — DULOXETIN HYDROCHLORIDE 60 MG/1
60 CAPSULE, DELAYED RELEASE ORAL 2 TIMES DAILY
Qty: 180 CAPSULE | Refills: 3 | Status: SHIPPED | OUTPATIENT
Start: 2020-10-13 | End: 2020-10-13

## 2020-10-13 RX ORDER — MORPHINE SULFATE 30 MG/1
TABLET, FILM COATED, EXTENDED RELEASE ORAL
Qty: 30 TABLET | Refills: 0 | Status: SHIPPED | OUTPATIENT
Start: 2020-10-14 | End: 2020-10-15

## 2020-10-13 RX ORDER — HYDROCODONE BITARTRATE AND ACETAMINOPHEN 10; 325 MG/1; MG/1
TABLET ORAL
Qty: 180 TABLET | Refills: 0 | Status: SHIPPED | OUTPATIENT
Start: 2020-10-14 | End: 2020-11-16

## 2020-10-13 ASSESSMENT — MIFFLIN-ST. JEOR: SCORE: 1419.57

## 2020-10-13 NOTE — PATIENT INSTRUCTIONS

## 2020-10-13 NOTE — PROGRESS NOTES
[facility] [address] [place] [address]  Phone: [phone]  Primary Provider: Osmar Ballard  Pre-op Performing Provider: OSMAR BALLARD    PREOPERATIVE EVALUATION:  Today's date: 10/13/2020    Janelle White is a 59 year old female who presents for a preoperative evaluation.    Surgical Information:  Surgery Details 10/13/2020   Surgery/Procedure: bilateral carpal, medial ulnar relese   Surgery Location: [facility]   Surgeon: Dr Vahid Kessler   Surgery Date: 10/22/2020 and 11/12/2020   Time of Surgery: 9am   Where patient plans to recover: At home with family     Fax number for surgical facility: [facility]- [phone]  Type of Anesthesia Anticipated: to be determined    Subjective     HPI related to upcoming procedure: Increased pain and numbness and weakness, loss of function in B hands.  Preop Questions 10/13/2020   1. Have you ever had a heart attack or stroke? No   2. Have you ever had surgery on your heart or blood vessels, such as a stent placement, a coronary artery bypass, or surgery on an artery in your head, neck, heart, or legs? No   3. Do you have chest pain with activity? No   4. Do you have a history of  heart failure? No   5. Do you currently have a cold, bronchitis or symptoms of other infection? No   6. Do you have a cough, shortness of breath, or wheezing? No   7. Do you or anyone in your family have previous history of blood clots? No   8. Do you or does anyone in your family have a serious bleeding problem such as prolonged bleeding following surgeries or cuts? No   9. Have you ever had problems with anemia or been told to take iron pills? No   10. Have you had any abnormal blood loss such as black, tarry or bloody stools, or abnormal vaginal bleeding? No   11. Have you ever had a blood transfusion? YES - in the past surgery   11a. Have you ever had a transfusion reaction? No   Are you  willing to have a blood transfusion if it is medically needed before, during, or after your surgery? Yes   13. Have you or any of your relatives ever had problems with anesthesia? No   14. Do you have sleep apnea, excessive snoring or daytime drowsiness? No   14a. Do you have a CPAP machine? -   15. Do you have any artifical heart valves or other implanted medical devices like a pacemaker, defibrillator, or continuous glucose monitor? No   16. Do you have artificial joints? No   17. Are you allergic to latex? No   18. Is there any chance that you may be pregnant? No     Patient does not have a Health Care Directive or Living Will: Discussed advance care planning with patient; however, patient declined at this time.    RX [place] ([place] reviewed:  reviewed- no concerns  See problem list for active medical problems.  Problems all longstanding and stable, except as noted/documented.  See ROS for pertinent symptoms related to these conditions.      Review of Systems  CONSTITUTIONAL: NEGATIVE for fever, chills, change in weight  INTEGUMENTARY/SKIN: NEGATIVE for worrisome rashes, moles or lesions  EYES: NEGATIVE for vision changes or irritation  ENT/MOUTH: NEGATIVE for ear, mouth and throat problems  RESP: NEGATIVE for significant cough or SOB  BREAST: NEGATIVE for masses, tenderness or discharge  CV: NEGATIVE for chest pain, palpitations or peripheral edema  GI: NEGATIVE for nausea, abdominal pain, heartburn, or change in bowel habits  : NEGATIVE for frequency, dysuria, or hematuria  MUSCULOSKELETAL: NEGATIVE for significant arthralgias or myalgia  NEURO: NEGATIVE for weakness, dizziness or paresthesias  ENDOCRINE: NEGATIVE for temperature intolerance, skin/hair changes  HEME: NEGATIVE for bleeding problems  PSYCHIATRIC: NEGATIVE for changes in mood or affect    Patient Active Problem List    Diagnosis Date Noted     Prediabetes 09/19/2019     Priority: Medium     Hyperlipidemia LDL goal <130 09/19/2019      Priority: Medium     CLARE (obstructive sleep apnea) 2019     Priority: Medium     Controlled substance agreement signed 2018     Priority: Medium     Chronic pain syndrome 2017     Priority: Medium     CLL (chronic lymphocytic leukemia) (H) 2017     Priority: Medium     Shift work sleep disorder 2013     Priority: Medium     Vitamin D deficiency 2012     Priority: Medium     Moderate recurrent major depression (H) 2011     Priority: Medium     DDD (degenerative disc disease), cervical 10/07/2010     Priority: Medium     CARDIOVASCULAR SCREENING; LDL GOAL LESS THAN 160 02/10/2010     Priority: Medium     Chronic Low Back Pain 10/01/2009     Priority: Medium     S/p AP L3-S1 fusion 2010 - referred to  Pain clinic.   Orthopedics writing scripts for narcotics post-op.       Migraine      Priority: Medium     Problem list name updated by automated process. Provider to review       B-complex deficiency 10/10/2006     Priority: Medium     Problem list name updated by automated process. Provider to review       PERSONAL HX OF  MELANOMA 2003     Priority: Low      Past Medical History:   Diagnosis Date     Cervicalgia 2007    C5-6 disc protrusion     ESBL (extended spectrum beta-lactamase) producing bacteria infection      History of blood transfusion     Cervical fusion     Melanoma (H)      Migraine      Rotator cuff tear     s/p injections     Sacroiliac inflammation (H)      Shift work sleep disorder 2013     Urinary calculi      Vitamin B12 deficiency anemia     started injections     Past Surgical History:   Procedure Laterality Date     anterior cervical discectomy C4-5 ,Fusion C5-6-7  10/2007     BLEPHAROPLASTY BILATERAL  2013    Procedure: BLEPHAROPLASTY BILATERAL;  BILATERAL UPPER LID BLEPHAROPLASTY AND BROWPEXY ;  Surgeon: Godfrey Miguel MD;  Location: [facility] APPENDECTOMY  2006      SECTION      x2     COLONOSCOPY N/A  2/18/2020    Procedure: COLONOSCOPY;  Surgeon: Butch Lockhart MD;  Location: [facility]     ESOPHAGOSCOPY, GASTROSCOPY, DUODENOSCOPY (EGD), COMBINED N/A 2/18/2020    Procedure: ESOPHAGOGASTRODUODENOSCOPY, WITH BIOPSY biosies by cold forceps;  Surgeon: Butch Lockhart MD;  Location: [facility]     FUSION LUMBAR ANTERIOR, FUSION LUMBAR POSTERIOR TWO LEVELS, COMBINED  12/2010    L3-S1 anterior posterior fusion     HYSTERECTOMY  7/2006    ovaries intact     HYSTERECTOMY, PAP NO LONGER INDICATED       Partial vulvectomy for NEENA III  01/2009     Pubovaginal sling, post op durasphere injections  07/2006    wears pad     ROTATOR CUFF REPAIR RT/LT  5/2011    right     SPINAL FUSION C3-4  7/2009    C3-4, anterior spinal fusion     TUBAL LIGATION       Current Outpatient Medications   Medication Sig Dispense Refill     albuterol (PROAIR HFA/PROVENTIL HFA/VENTOLIN HFA) 108 (90 Base) MCG/ACT inhaler Inhale 2 puffs into the lungs every 6 hours 1 Inhaler 3     calcium citrate (CALCIUM CITRATE) 950 MG tablet Take 1 tablet by mouth 2 times daily.       Calcium-Magnesium-Vitamin D (CALCIUM 500) 500-250-200 MG-MG-UNIT TABS Take 1 tablet by mouth       Cholecalciferol (D-5000) 5000 units TABS Take 5,000 Units by mouth       cyanocobalamin (CYANOCOBALAMIN) 1000 MCG/ML injection Inject 1 mL (1,000 mcg) into the muscle every 30 days 10 mL 1     cyclobenzaprine (FLEXERIL) 10 MG tablet Take 1 tablet (10 mg) by mouth 3 times daily as needed for muscle spasms 90 tablet 3     DOCOSAHEXAENOIC ACID PO Take 1,000 mg by mouth        DULoxetine (CYMBALTA) 60 MG capsule Take 1 capsule (60 mg) by mouth 2 times daily 180 capsule 3     Estradiol (DIVIGEL) 1 MG/GM GEL Place 1 packet onto the skin daily 30 g 11     estradiol (ESTRACE VAGINAL) 0.1 MG/GM vaginal cream Place 2 g vaginally three times a week. 42.5 g 11     [START ON 10/14/2020] HYDROcodone-acetaminophen (NORCO)  MG per tablet take 1-2 tabs q 4-6 hrs, max 8 tabs/24 hrs 180 tablet 0      "insulin syringe-needle U-100 (30G X 1/2\" 1 ML) 30G X 1/2\" 1 ML miscellaneous Inject 1 ml B12 qmonth 10 each 1     lidocaine (LIDODERM) 5 % patch Place 4 patches onto the skin daily Apply up to 4 patches to skin. Wear for 12 hours and remove for 12 hrs.  Refill when patient requests. 120 patch 3     LORazepam (ATIVAN) 0.5 MG tablet 1-2 tabs qhs prn insomnia and 1 q 8 hours prn anxiety 100 tablet 5     medical cannabis (Patient's own supply.  Not a prescription) Medical Cannabis - [employer] 4-6 ml by mouth daily. [facility]       methylPREDNISolone (MEDROL DOSEPAK) 4 MG tablet therapy pack Follow Package Directions 21 tablet 0     morphine (MS CONTIN) 15 MG CR tablet Take 15 mg in AM 30 tablet 0     [START ON 10/14/2020] morphine (MS CONTIN) 30 MG CR tablet Take 30 mg qhs 30 tablet 0     Multiple Vitamin (MULTI-VITAMINS) TABS Take 1 tablet by mouth       naloxone (NARCAN) 4 MG/0.1ML nasal spray Spray 1 spray (4 mg) into one nostril alternating nostrils as needed for opioid reversal every 2-3 minutes until assistance arrives 0.2 mL 1     Prasterone 6.5 MG INST Place 1 suppository vaginally At Bedtime 28 each 11     sennosides (SENOKOT) 8.6 MG tablet Take 1 tablet by mouth daily as needed for constipation.         Allergies   Allergen Reactions     Bupropion Other (See Comments) and Swelling     Ineffective, name brand works best  Ineffective, name brand works best     Codeine Other (See Comments)     \"Feels like electricity running through body\"  No reaction noted in [employer].  Shaky  With Tylenol     Effexor [Venlafaxine Hydrochloride]      Affect too flat     Escitalopram      Other reaction(s): *Unknown  Sexual dysfunction     Fluoxetine Other (See Comments)     Sexual dysfunction     Levaquin [Levofloxacin] Other (See Comments)     Suicidal thoughts  Dark thoughts- mood changes     Lexapro      Sexual dysfunction     Milnacipran      12.5 MG is fine. 25 MG caused side effects. \"Dark thoughts\"     Pregabalin Other " "(See Comments)     Hallucinations  Audiovisual hallucinations     Prozac [Fluoxetine Hcl]      Sexual dysfunction     Tramadol Hives     Hives       Venlafaxine      Other reaction(s): *Unknown  Affect too flat        Social History     Tobacco Use     Smoking status: Former Smoker     Packs/day: 1.00     Years: 5.00     Pack years: 5.00     Quit date: 1984     Years since quittin.8     Smokeless tobacco: Never Used   Substance Use Topics     Alcohol use: Yes     Comment: no alcohol currently since prior to her back surgeries,      Family History   Problem Relation Age of Onset     Hypertension Mother      Alcohol/Drug Mother      Osteoporosis Mother         borderline     Hypertension Father      Diabetes Father         Diet controlled     Alcohol/Drug Father         remote use     C.A.D. Maternal Grandmother          late 50s     C.A.D. Maternal Grandfather         bone and brain cancer mets as well     Diabetes Paternal Grandfather          from diabetic complications     Alcohol/Drug Brother      Hypertension Brother      Breast Cancer No family hx of         Dcis     Cancer - colorectal No family hx of      Cerebrovascular Disease No family hx of      Colon Cancer No family hx of      History   Drug Use No            Objective   /80   Pulse 71   Temp 97  F (36.1  C) (Tympanic)   Resp 16   Ht 1.651 m (5' 5\")   Wt 84.4 kg (186 lb)   LMP 2005 (Exact Date)   SpO2 97%   BMI 30.95 kg/m    Physical Exam    GENERAL APPEARANCE: healthy, alert and no distress     EYES: EOMI, PERRL     HENT: ear canals and TM's normal and nose and mouth without ulcers or lesions     NECK: no adenopathy, no asymmetry, masses, or scars and thyroid normal to palpation     RESP: lungs clear to auscultation - no rales, rhonchi or wheezes     CV: regular rates and rhythm, normal S1 S2, no S3 or S4 and no murmur, click or rub     ABDOMEN:  soft, nontender, no HSM or masses and bowel sounds normal     " MS: extremities normal- no gross deformities noted, no evidence of inflammation in joints, FROM in all extremities.     SKIN: no suspicious lesions or rashes     NEURO: Normal strength and tone, sensory exam grossly normal, mentation intact and speech normal     PSYCH: mentation appears normal. and affect normal/bright     LYMPHATICS: No cervical adenopathy    Recent Labs   Lab Test 08/06/20  0901 02/11/20  1231 09/06/19   HGB 13.5  --   --    *  --   --      --   --    POTASSIUM 3.8  --  4.3   CR 0.71  --  0.75   A1C  --  5.4 5.9*        PRE-OP Diagnostics:  Labs pending at this time.  Results will be reviewed when available.  EKG: appears normal, NSR, normal axis, normal intervals, no acute ST/T changes c/w ischemia, no LVH by voltage criteria  Elevated WBC is secondary to CLL- this remains stable- followed by [facility]       Assessment & Plan   The proposed surgical procedure is considered INTERMEDIATE risk.    REVISED CARDIAC RISK INDEX  The patient has the following serious cardiovascular risks for perioperative complications:   - No serious cardiac risks = 0 points  No serious cardiac risks = 0 points    INTERPRETATION: 0 points: Class I (very low risk - 0.4% complication rate)      1. Preop general physical exam    - CBC with platelets and differential  - Basic metabolic panel  (Ca, Cl, CO2, Creat, Gluc, K, Na, BUN)  - EKG 12-lead complete w/read - Clinics    2. Carpal tunnel syndrome of left wrist  3. Ulnar tunnel syndrome of left wrist  4. Carpal tunnel syndrome of right wrist  5. Ulnar tunnel syndrome of right wrist    Patient is deemed medically optimized for the two surgeries to repair B carpal and ulnar tunnel syndromes.    6. Chronic pain syndrome    - morphine (MS CONTIN) 30 MG CR tablet; Take 30 mg qhs  Dispense: 30 tablet; Refill: 0  - HYDROcodone-acetaminophen (NORCO)  MG per tablet; take 1-2 tabs q 4-6 hrs, max 8 tabs/24 hrs  Dispense: 180 tablet; Refill: 0    Stable on current pain  regimen.  Refilled x 1 month.  Follow-up 4 weeks.    7. Moderate recurrent major depression (H)    - DULoxetine (CYMBALTA) 60 MG capsule; Take 1 capsule (60 mg) by mouth 2 times daily  Dispense: 180 capsule; Refill: 3    Stable with this dose change.    8. Need for prophylactic vaccination and inoculation against influenza    - INFLUENZA QUAD, RECOMBINANT, P-FREE (RIV4) (FLUBLOCK) [30029]  - Vaccine Administration, Initial [60115]    Flu vaccine updated today.    The patient has the following additional risks and recommendations for perioperative complications:     - No identified additional risk factors other than previously addressed     MEDICATION INSTRUCTIONS:  Patient is to take all scheduled medications on the day of surgery    RECOMMENDATION:  APPROVAL GIVEN to proceed with proposed procedure, without further diagnostic evaluation.    No follow-ups on file.    Signed Electronically by: Emili Ballard MD    Copy of this evaluation report is provided to requesting physician.    [facility]    Revised Cardiac Risk Index

## 2020-10-15 DIAGNOSIS — G89.4 CHRONIC PAIN SYNDROME: ICD-10-CM

## 2020-10-15 RX ORDER — MORPHINE SULFATE 30 MG/1
TABLET, FILM COATED, EXTENDED RELEASE ORAL
Qty: 60 TABLET | Refills: 0 | Status: SHIPPED | OUTPATIENT
Start: 2020-10-15 | End: 2021-04-16

## 2020-11-02 ENCOUNTER — TRANSFERRED RECORDS (OUTPATIENT)
Dept: HEALTH INFORMATION MANAGEMENT | Facility: CLINIC | Age: 60
End: 2020-11-02

## 2020-11-16 ENCOUNTER — VIRTUAL VISIT (OUTPATIENT)
Dept: FAMILY MEDICINE | Facility: CLINIC | Age: 60
End: 2020-11-16
Payer: COMMERCIAL

## 2020-11-16 ENCOUNTER — HEALTH MAINTENANCE LETTER (OUTPATIENT)
Age: 60
End: 2020-11-16

## 2020-11-16 DIAGNOSIS — G89.4 CHRONIC PAIN SYNDROME: ICD-10-CM

## 2020-11-16 PROCEDURE — 99213 OFFICE O/P EST LOW 20 MIN: CPT | Mod: 95 | Performed by: FAMILY MEDICINE

## 2020-11-16 RX ORDER — MORPHINE SULFATE 15 MG/1
15 TABLET, FILM COATED, EXTENDED RELEASE ORAL EVERY 12 HOURS
Qty: 60 TABLET | Refills: 0 | Status: SHIPPED | OUTPATIENT
Start: 2020-11-16 | End: 2020-12-15

## 2020-11-16 RX ORDER — HYDROCODONE BITARTRATE AND ACETAMINOPHEN 10; 325 MG/1; MG/1
TABLET ORAL
Qty: 100 TABLET | Refills: 0 | Status: SHIPPED | OUTPATIENT
Start: 2020-11-16 | End: 2020-12-15

## 2020-11-16 ASSESSMENT — PATIENT HEALTH QUESTIONNAIRE - PHQ9: SUM OF ALL RESPONSES TO PHQ QUESTIONS 1-9: 2

## 2020-11-16 NOTE — PROGRESS NOTES
"Janelle White is a 59 year old female who is being evaluated via a billable video visit.      The patient has been notified of following:     \"This video visit will be conducted via a call between you and your physician/provider. We have found that certain health care needs can be provided without the need for an in-person physical exam.  This service lets us provide the care you need with a video conversation.  If a prescription is necessary we can send it directly to your pharmacy.  If lab work is needed we can place an order for that and you can then stop by our lab to have the test done at a later time.    Video visits are billed at different rates depending on your insurance coverage.  Please reach out to your insurance provider with any questions.    If during the course of the call the physician/provider feels a video visit is not appropriate, you will not be charged for this service.\"    Patient has given verbal consent for Video visit? Yes  How would you like to obtain your AVS? MyChart  If you are dropped from the video visit, the video invite should be resent to: Send to e-mail at: [email or web address]  Will anyone else be joining your video visit? No      Subjective     Janelle White is a 59 year old female who presents today via video visit for the following health issues:    HPI     Medication Followup of morphine 30mg    Taking Medication as prescribed: yes    Side Effects:  None    Medication Helping Symptoms:  Yes    Additional: pt would like to discuss stopping 30mg tabs, would like to take one 15mg tab bid    Had refill 10/28/2020         Med check s/p recent B carpal tunnel surgeries.  Pain in BUEs largely gone.  Feeling so much better- is ready to reduce MS Contin to pre-op requirements for chronic pain syndrome.    Video Start Time: 10:34 AM        Review of Systems   Constitutional, HEENT, cardiovascular, pulmonary, GI, , musculoskeletal, neuro, skin, endocrine and psych systems " "are negative, except as otherwise noted.      Objective    Vitals - Patient Reported  Weight (Patient Reported): 83.9 kg (185 lb)  Height (Patient Reported): 166.4 cm (5' 5.5\")  BMI (Based on Pt Reported Ht/Wt): 30.32  Temperature (Patient Reported): 97.5  F (36.4  C)        Physical Exam     GENERAL: Healthy, alert and no distress  EYES: Eyes grossly normal to inspection.  No discharge or erythema, or obvious scleral/conjunctival abnormalities.  RESP: No audible wheeze, cough, or visible cyanosis.  No visible retractions or increased work of breathing.    SKIN: Visible skin clear. No significant rash, abnormal pigmentation or lesions.  NEURO: Cranial nerves grossly intact.  Mentation and speech appropriate for age.  PSYCH: Mentation appears normal, affect normal/bright, judgement and insight intact, normal speech and appearance well-groomed.          Assessment & Plan     Chronic pain syndrome    - HYDROcodone-acetaminophen (NORCO)  MG per tablet; take 1 tabs q 4-6 hrs, max 3-4 tabs/24 hrs  - morphine (MS CONTIN) 15 MG CR tablet; Take 1 tablet (15 mg) by mouth every 12 hours maximum 2 tablet(s) per day     Recuperating so well following B carpal tunnel surgeries- LEFT first and now RIGHT hand still splinted.  Feels able to reduce pain regimen back to preop requirements with excellent reduction of BUE pain s/p recent surgeries.  Follow-up 4 weeks med check.    Emili Ballard MD  [facility]      Video-Visit Details    Type of service:  Video Visit    Video End Time:10:55 AM    Originating Location (pt. Location): Home    Distant Location (provider location):  [facility]     Platform used for Video Visit: Janis          "

## 2020-12-02 ENCOUNTER — TELEPHONE (OUTPATIENT)
Dept: FAMILY MEDICINE | Facility: CLINIC | Age: 60
End: 2020-12-02

## 2020-12-02 NOTE — TELEPHONE ENCOUNTER
Form from [employer] insurance/disaility management services placed in providers wall/box located at front by fax machine

## 2020-12-04 ENCOUNTER — TRANSFERRED RECORDS (OUTPATIENT)
Dept: HEALTH INFORMATION MANAGEMENT | Facility: CLINIC | Age: 60
End: 2020-12-04

## 2020-12-08 NOTE — TELEPHONE ENCOUNTER
Final request for completion of attending physician statement for disability benefits  Placed in provider wall/box up front by fax machine

## 2020-12-15 ENCOUNTER — OFFICE VISIT (OUTPATIENT)
Dept: FAMILY MEDICINE | Facility: CLINIC | Age: 60
End: 2020-12-15
Payer: COMMERCIAL

## 2020-12-15 VITALS
DIASTOLIC BLOOD PRESSURE: 77 MMHG | TEMPERATURE: 96.4 F | HEART RATE: 76 BPM | RESPIRATION RATE: 16 BRPM | SYSTOLIC BLOOD PRESSURE: 129 MMHG | OXYGEN SATURATION: 99 %

## 2020-12-15 DIAGNOSIS — C91.10 CLL (CHRONIC LYMPHOCYTIC LEUKEMIA) (H): ICD-10-CM

## 2020-12-15 DIAGNOSIS — G89.4 CHRONIC PAIN SYNDROME: ICD-10-CM

## 2020-12-15 DIAGNOSIS — F33.1 MODERATE RECURRENT MAJOR DEPRESSION (H): Primary | ICD-10-CM

## 2020-12-15 PROCEDURE — 99214 OFFICE O/P EST MOD 30 MIN: CPT | Performed by: FAMILY MEDICINE

## 2020-12-15 RX ORDER — HYDROCODONE BITARTRATE AND ACETAMINOPHEN 10; 325 MG/1; MG/1
TABLET ORAL
Qty: 100 TABLET | Refills: 0 | Status: SHIPPED | OUTPATIENT
Start: 2020-12-15 | End: 2021-01-12

## 2020-12-15 RX ORDER — MORPHINE SULFATE 15 MG/1
15 TABLET, FILM COATED, EXTENDED RELEASE ORAL EVERY 12 HOURS
Qty: 60 TABLET | Refills: 0 | Status: SHIPPED | OUTPATIENT
Start: 2020-12-15 | End: 2021-01-12

## 2020-12-15 NOTE — PROGRESS NOTES
Wellbutrin in past- name brand- then went to generic and felt very depressed and could not get off couch  Cymbalta-- x 10 years and overall doing really well-  Splenomegaly    Subjective   HPI     Med check today for chronic pain syndrome s/p B carpal tunnel surgeries 10-22 and  and LEFT rotator cuff repair 2020.  She has done well anticipating return to work half days  with work accommodations.    Increased stressors with work not filling out right paperwork to have her off on leave-- now may need to pay back the company money she was paid while on leave.  Feeling more depressed and exhausted.  Would be interested in med eval.    Currently on Cymbalta 60 mg bid x 10 days.  She previously had been on Wellbutrin alone with great results until it became generic and suddenly stopped working.    Recently had U/S of LUQ for increased pain and hx of CLL followed by [facility].  U/S showed mild splenomegaly.    Patient Active Problem List   Diagnosis     PERSONAL HX OF  MELANOMA     B-complex deficiency     Migraine     Chronic Low Back Pain     CARDIOVASCULAR SCREENING; LDL GOAL LESS THAN 160     DDD (degenerative disc disease), cervical     Moderate recurrent major depression (H)     Vitamin D deficiency     Shift work sleep disorder     Chronic pain syndrome     CLL (chronic lymphocytic leukemia) (H)     Controlled substance agreement signed     CLARE (obstructive sleep apnea)     Prediabetes     Hyperlipidemia LDL goal <130     Past Surgical History:   Procedure Laterality Date     anterior cervical discectomy C4-5 ,Fusion C5-6-7  10/2007     BLEPHAROPLASTY BILATERAL  2013    Procedure: BLEPHAROPLASTY BILATERAL;  BILATERAL UPPER LID BLEPHAROPLASTY AND BROWPEXY ;  Surgeon: Godfrey Miguel MD;  Location: [facility] APPENDECTOMY  2006      SECTION      x2     COLONOSCOPY N/A 2020    Procedure: COLONOSCOPY;  Surgeon: Butch Lockhart MD;  Location:  GI     ESOPHAGOSCOPY,  GASTROSCOPY, DUODENOSCOPY (EGD), COMBINED N/A 2020    Procedure: ESOPHAGOGASTRODUODENOSCOPY, WITH BIOPSY biosies by cold forceps;  Surgeon: Butch Lockhart MD;  Location: [facility]     FUSION LUMBAR ANTERIOR, FUSION LUMBAR POSTERIOR TWO LEVELS, COMBINED  2010    L3-S1 anterior posterior fusion     HYSTERECTOMY  2006    ovaries intact     HYSTERECTOMY, PAP NO LONGER INDICATED       Partial vulvectomy for NEENA III  2009     Pubovaginal sling, post op durasphere injections  2006    wears pad     ROTATOR CUFF REPAIR RT/LT  2011    right     SPINAL FUSION C3-4  2009    C3-4, anterior spinal fusion     TUBAL LIGATION         Social History     Tobacco Use     Smoking status: Former Smoker     Packs/day: 1.00     Years: 5.00     Pack years: 5.00     Quit date: 1984     Years since quittin.9     Smokeless tobacco: Never Used   Substance Use Topics     Alcohol use: Yes     Comment: no alcohol currently since prior to her back surgeries,      Family History   Problem Relation Age of Onset     Hypertension Mother      Alcohol/Drug Mother      Osteoporosis Mother         borderline     Hypertension Father      Diabetes Father         Diet controlled     Alcohol/Drug Father         remote use     C.A.D. Maternal Grandmother          late 50s     C.A.D. Maternal Grandfather         bone and brain cancer mets as well     Diabetes Paternal Grandfather          from diabetic complications     Alcohol/Drug Brother      Hypertension Brother      Breast Cancer No family hx of         Dcis     Cancer - colorectal No family hx of      Cerebrovascular Disease No family hx of      Colon Cancer No family hx of          Current Outpatient Medications   Medication Sig Dispense Refill     albuterol (PROAIR HFA/PROVENTIL HFA/VENTOLIN HFA) 108 (90 Base) MCG/ACT inhaler Inhale 2 puffs into the lungs every 6 hours 1 Inhaler 3     calcium citrate (CALCIUM CITRATE) 950 MG tablet Take 1 tablet by mouth 2  "times daily.       Calcium-Magnesium-Vitamin D (CALCIUM 500) 500-250-200 MG-MG-UNIT TABS Take 1 tablet by mouth       Cholecalciferol (D-5000) 5000 units TABS Take 5,000 Units by mouth       cyanocobalamin (CYANOCOBALAMIN) 1000 MCG/ML injection Inject 1 mL (1,000 mcg) into the muscle every 30 days 10 mL 1     cyclobenzaprine (FLEXERIL) 10 MG tablet Take 1 tablet (10 mg) by mouth 3 times daily as needed for muscle spasms 90 tablet 3     DOCOSAHEXAENOIC ACID PO Take 1,000 mg by mouth        DULoxetine (CYMBALTA) 60 MG capsule Take 1 capsule (60 mg) by mouth 2 times daily 180 capsule 3     Estradiol (DIVIGEL) 1 MG/GM GEL Place 1 packet onto the skin daily 30 g 11     estradiol (ESTRACE VAGINAL) 0.1 MG/GM vaginal cream Place 2 g vaginally three times a week. 42.5 g 11     HYDROcodone-acetaminophen (NORCO)  MG per tablet take 1 tabs q 4-6 hrs, max 3-4 tabs/24 hrs 100 tablet 0     insulin syringe-needle U-100 (30G X 1/2\" 1 ML) 30G X 1/2\" 1 ML miscellaneous Inject 1 ml B12 qmonth 10 each 1     lidocaine (LIDODERM) 5 % patch Place 4 patches onto the skin daily Apply up to 4 patches to skin. Wear for 12 hours and remove for 12 hrs.  Refill when patient requests. 120 patch 3     LORazepam (ATIVAN) 0.5 MG tablet 1-2 tabs qhs prn insomnia and 1 q 8 hours prn anxiety 100 tablet 5     medical cannabis (Patient's own supply.  Not a prescription) Medical Cannabis - [place] 4-6 ml by mouth daily. [facility]       methylPREDNISolone (MEDROL DOSEPAK) 4 MG tablet therapy pack Follow Package Directions 21 tablet 0     morphine (MS CONTIN) 15 MG CR tablet Take 1 tablet (15 mg) by mouth every 12 hours maximum 2 tablet(s) per day 60 tablet 0     morphine (MS CONTIN) 15 MG CR tablet Take 15 mg in AM 30 tablet 0     morphine (MS CONTIN) 30 MG CR tablet Take 30 mg bid 60 tablet 0     Multiple Vitamin (MULTI-VITAMINS) TABS Take 1 tablet by mouth       naloxone (NARCAN) 4 MG/0.1ML nasal spray Spray 1 spray (4 mg) into one nostril " "alternating nostrils as needed for opioid reversal every 2-3 minutes until assistance arrives 0.2 mL 1     Prasterone 6.5 MG INST Place 1 suppository vaginally At Bedtime 28 each 11     sennosides (SENOKOT) 8.6 MG tablet Take 1 tablet by mouth daily as needed for constipation.       Allergies   Allergen Reactions     Bupropion Other (See Comments) and Swelling     Ineffective, name brand works best  Ineffective, name brand works best     Codeine Other (See Comments)     \"Feels like electricity running through body\"  No reaction noted in [employer].  Shaky  With Tylenol     Effexor [Venlafaxine Hydrochloride]      Affect too flat     Escitalopram      Other reaction(s): *Unknown  Sexual dysfunction     Fluoxetine Other (See Comments)     Sexual dysfunction     Levaquin [Levofloxacin] Other (See Comments)     Suicidal thoughts  Dark thoughts- mood changes     Lexapro      Sexual dysfunction     Milnacipran      12.5 MG is fine. 25 MG caused side effects. \"Dark thoughts\"     Pregabalin Other (See Comments)     Hallucinations  Audiovisual hallucinations     Prozac [Fluoxetine Hcl]      Sexual dysfunction     Tramadol Hives     Hives       Venlafaxine      Other reaction(s): *Unknown  Affect too flat     Recent Labs   Lab Test 10/13/20  1157 08/06/20  0901 02/11/20  1231 09/06/19 12/16/13  0739 12/16/13  0739 11/07/12  1207   A1C  --   --  5.4 5.9*  --   --   --    LDL  --   --   --  154*  --  137* 104   HDL  --   --   --  40*  --  52 46*   TRIG  --   --   --  259*  --  226* 120   ALT  --   --   --  21  --  17 23   CR 0.65 0.71  --  0.75  --  0.76 0.68   GFRESTIMATED >90 >90  --  >60  --  80 >90   GFRESTBLACK >90 >90  --  >60  --  >90 >90   POTASSIUM 4.3 3.8  --  4.3   < > 4.6 4.2    < > = values in this interval not displayed.      BP Readings from Last 3 Encounters:   12/15/20 129/77   10/13/20 112/80   09/17/20 116/81    Wt Readings from Last 3 Encounters:   10/13/20 84.4 kg (186 lb)   08/06/20 86.6 kg (191 lb)   06/02/20 " 85.7 kg (189 lb)                    Reviewed and updated as needed this visit by Provider         Review of Systems   ROS COMP: Constitutional, HEENT, cardiovascular, pulmonary, GI, , musculoskeletal, neuro, skin, endocrine and psych systems are negative, except as otherwise noted.      Objective    /77   Pulse 76   Temp 96.4  F (35.8  C) (Tympanic)   Resp 16   LMP 05/01/2005 (Exact Date)   SpO2 99%   Breastfeeding No     Physical Exam   GENERAL: healthy, alert and no distress  EYES: Eyes grossly normal to inspection, PERRL and conjunctivae and sclerae normal  MS: no gross musculoskeletal defects noted, no edema  SKIN: no suspicious lesions or rashes  NEURO: Normal strength and tone, mentation intact and speech normal  PSYCH: tearful today.            Assessment & Plan     1. Chronic pain syndrome    - HYDROcodone-acetaminophen (NORCO)  MG per tablet; take 1 tabs q 4-6 hrs, max 3-4 tabs/24 hrs  Dispense: 100 tablet; Refill: 0  - morphine (MS CONTIN) 15 MG CR tablet; Take 1 tablet (15 mg) by mouth every 12 hours maximum 2 tablet(s) per day  Dispense: 60 tablet; Refill: 0    Patient has recovered well from her 3 surgeries in the past 4 months.  Her pain med use is back down to her baseline and she is feeling that she is healing well.  Paperwork filled out for return to work.    2. Moderate recurrent major depression (H)    - MENTAL HEALTH REFERRAL  - Adult; Psychiatry; Psychiatry; G: Collaborative Care Psychiatry Service/Bridge to Long-Term Psychiatry as indicated ([phone]); Yes; Several Medications tried and failed; Yes; We will contact you to schedule the a...    Would like med eval with multiple meds she is already on- and maxed out on Cymbalta with reaction to Wellbutrin.  Patient currently working with [facility] for counseling right now.    3. CLL (chronic lymphocytic leukemia) (H)    Per [facility]- considering starting on medication now for CLL management.      Emili Ballard,  MD  [facility]

## 2021-01-05 ENCOUNTER — TRANSFERRED RECORDS (OUTPATIENT)
Dept: HEALTH INFORMATION MANAGEMENT | Facility: CLINIC | Age: 61
End: 2021-01-05

## 2021-01-12 ENCOUNTER — VIRTUAL VISIT (OUTPATIENT)
Dept: FAMILY MEDICINE | Facility: CLINIC | Age: 61
End: 2021-01-12
Payer: COMMERCIAL

## 2021-01-12 DIAGNOSIS — G89.4 CHRONIC PAIN SYNDROME: ICD-10-CM

## 2021-01-12 DIAGNOSIS — M79.18 MYOFASCIAL PAIN: ICD-10-CM

## 2021-01-12 PROCEDURE — 99213 OFFICE O/P EST LOW 20 MIN: CPT | Mod: 95 | Performed by: FAMILY MEDICINE

## 2021-01-12 RX ORDER — HYDROCODONE BITARTRATE AND ACETAMINOPHEN 10; 325 MG/1; MG/1
TABLET ORAL
Qty: 100 TABLET | Refills: 0 | Status: SHIPPED | OUTPATIENT
Start: 2021-01-12 | End: 2021-02-11

## 2021-01-12 RX ORDER — CYCLOBENZAPRINE HCL 10 MG
10 TABLET ORAL 3 TIMES DAILY PRN
Qty: 90 TABLET | Refills: 3 | Status: SHIPPED | OUTPATIENT
Start: 2021-01-12 | End: 2022-06-30

## 2021-01-12 RX ORDER — MORPHINE SULFATE 15 MG/1
15 TABLET, FILM COATED, EXTENDED RELEASE ORAL EVERY 12 HOURS
Qty: 60 TABLET | Refills: 0 | Status: SHIPPED | OUTPATIENT
Start: 2021-01-14 | End: 2021-02-11

## 2021-01-12 NOTE — PROGRESS NOTES
"Janelle is a 60 year old who is being evaluated via a billable video visit.      How would you like to obtain your AVS? MyChart  If the video visit is dropped, the invitation should be resent by: Text to cell phone: [phone]  Will anyone else be joining your video visit? No      Video Start Time: 458p  Assessment & Plan     Chronic pain syndrome    - HYDROcodone-acetaminophen (NORCO)  MG per tablet; take 1 tabs q 4hrs, max 4 tabs/24 hrs  - morphine (MS CONTIN) 15 MG CR tablet; Take 1 tablet (15 mg) by mouth every 12 hours maximum 2 tablet(s) per day    Myofascial pain    - cyclobenzaprine (FLEXERIL) 10 MG tablet; Take 1 tablet (10 mg) by mouth 3 times daily as needed for muscle spasms    Med check today for chronic pain syndrome s/p B carpal tunnel surgeries 10-22 and 11-12 and LEFT rotator cuff repair 8-.  She has done well and went back to work next week.  Pain stable- no change to regimen.  Refills today.      20 minutes spent on the date of the encounter doing patient visit and documentation          BMI:   Estimated body mass index is 30.95 kg/m  as calculated from the following:    Height as of 10/13/20: 1.651 m (5' 5\").    Weight as of 10/13/20: 84.4 kg (186 lb).             No follow-ups on file.    Emili Ballard MD  [facility]    Giorgio Luis is a 60 year old who presents to clinic today for the following health issues     HPI         Review of Systems   Constitutional, HEENT, cardiovascular, pulmonary, GI, , musculoskeletal, neuro, skin, endocrine and psych systems are negative, except as otherwise noted.      Objective           Vitals:  No vitals were obtained today due to virtual visit.    Physical Exam   GENERAL: Healthy, alert and no distress  EYES: Eyes grossly normal to inspection.  No discharge or erythema, or obvious scleral/conjunctival abnormalities.  RESP: No audible wheeze, cough, or visible cyanosis.  No visible retractions or " increased work of breathing.    SKIN: Visible skin clear. No significant rash, abnormal pigmentation or lesions.  NEURO: Cranial nerves grossly intact.  Mentation and speech appropriate for age.  PSYCH: Mentation appears normal, affect normal/bright, judgement and insight intact, normal speech and appearance well-groomed.                Video-Visit Details    Type of service:  Video Visit    Video End Time: 511P    Originating Location (pt. Location): Home    Distant Location (provider location):  [facility]     Platform used for Video Visit: Doximity changed to phone due to poor Internet connection on patient end.

## 2021-02-09 DIAGNOSIS — G47.00 INSOMNIA, UNSPECIFIED TYPE: ICD-10-CM

## 2021-02-10 RX ORDER — LORAZEPAM 0.5 MG/1
TABLET ORAL
Qty: 100 TABLET | Refills: 5 | OUTPATIENT
Start: 2021-02-10

## 2021-02-10 RX ORDER — LORAZEPAM 0.5 MG/1
TABLET ORAL
Qty: 100 TABLET | Refills: 5 | Status: CANCELLED | OUTPATIENT
Start: 2021-02-10

## 2021-02-10 NOTE — TELEPHONE ENCOUNTER
Routing refill request to provider for review/approval because:  Drug not on the [facility] refill protocol       Last Written Prescription Date:  6/30/2020  Last Fill Quantity: 100,  # refills: 5   Last office visit: 12/15/2020 with prescribing provider:     Future Office Visit:   Next 5 appointments (look out 90 days)    Feb 11, 2021  8:20 AM  Alisha Longo with Emili Ballard MD  [facility] ([facility] ) [address] [place] [id number] [phone]

## 2021-02-11 ENCOUNTER — OFFICE VISIT (OUTPATIENT)
Dept: FAMILY MEDICINE | Facility: CLINIC | Age: 61
End: 2021-02-11
Payer: COMMERCIAL

## 2021-02-11 VITALS
SYSTOLIC BLOOD PRESSURE: 129 MMHG | WEIGHT: 175.38 LBS | HEART RATE: 84 BPM | HEIGHT: 65 IN | OXYGEN SATURATION: 99 % | DIASTOLIC BLOOD PRESSURE: 78 MMHG | BODY MASS INDEX: 29.22 KG/M2 | RESPIRATION RATE: 18 BRPM

## 2021-02-11 DIAGNOSIS — G89.4 CHRONIC PAIN SYNDROME: ICD-10-CM

## 2021-02-11 DIAGNOSIS — G47.00 INSOMNIA, UNSPECIFIED TYPE: ICD-10-CM

## 2021-02-11 PROCEDURE — 99214 OFFICE O/P EST MOD 30 MIN: CPT | Performed by: FAMILY MEDICINE

## 2021-02-11 RX ORDER — MORPHINE SULFATE 15 MG/1
15 TABLET, FILM COATED, EXTENDED RELEASE ORAL EVERY 12 HOURS
Qty: 60 TABLET | Refills: 0 | Status: SHIPPED | OUTPATIENT
Start: 2021-02-11 | End: 2021-03-12

## 2021-02-11 RX ORDER — LORAZEPAM 0.5 MG/1
TABLET ORAL
Qty: 100 TABLET | Refills: 5 | Status: SHIPPED | OUTPATIENT
Start: 2021-02-11 | End: 2021-12-13 | Stop reason: DRUGHIGH

## 2021-02-11 RX ORDER — HYDROCODONE BITARTRATE AND ACETAMINOPHEN 10; 325 MG/1; MG/1
TABLET ORAL
Qty: 100 TABLET | Refills: 0 | Status: SHIPPED | OUTPATIENT
Start: 2021-02-11 | End: 2021-02-11

## 2021-02-11 RX ORDER — HYDROCODONE BITARTRATE AND ACETAMINOPHEN 10; 325 MG/1; MG/1
TABLET ORAL
Qty: 100 TABLET | Refills: 0 | Status: SHIPPED | OUTPATIENT
Start: 2021-02-11 | End: 2021-03-12

## 2021-02-11 ASSESSMENT — MIFFLIN-ST. JEOR: SCORE: 1362.41

## 2021-02-11 NOTE — PROGRESS NOTES
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  Increasing pain with poorly designed work spaces for patient returning to work.  Follow-up one month.    Insomnia, unspecified type    - LORazepam (ATIVAN) 0.5 MG tablet; 1-2 tabs qhs prn insomnia and 1 q 8 hours prn anxiety    Stable with Ativan prn.    30 minutes spent on the date of the encounter doing chart review, patient visit and documentation     Emili Ballard MD  [facility]    Giorgio Luis is a 60 year old who presents for the following health issues     HPI       Chronic Pain Follow-Up    Where in your body do you have pain? Chronic pain   How has your pain affected your ability to work? Just resigned   Which of these pain treatments have you tried since your last clinic visit? No   How well are you sleeping? Fair  How has your mood been since your last visit? Better  Have you had a significant life event? Job   Other aggravating factors: none  Taking medication as directed? Yes    PHQ-9 SCORE 12/10/2019 6/2/2020 11/16/2020   PHQ-9 Total Score - - -   PHQ-9 Total Score MyChart - - -   PHQ-9 Total Score 6 6 2     STACIE-7 SCORE 3/12/2019 7/23/2019 12/10/2019   Total Score - - -   Total Score 5 (mild anxiety) - -   Total Score 5 1 2     No flowsheet data found.  Encounter-Level CSA - 08/14/2018:    Controlled Substance Agreement - Scan on 8/20/2018 12:04 PM: CONTROLLED SUBSTANCE AGREEMENT     Patient-Level CSA:    There are no patient-level csa.               Review of Systems   Constitutional, HEENT, cardiovascular, pulmonary, GI, ,  "musculoskeletal, neuro, skin, endocrine and psych systems are negative, except as otherwise noted.      Objective    /78   Pulse 84   Resp 18   Ht 1.645 m (5' 4.75\")   Wt 79.5 kg (175 lb 6 oz)   LMP 05/01/2005 (Exact Date)   SpO2 99%   BMI 29.41 kg/m    Body mass index is 29.41 kg/m .  Physical Exam                     "

## 2021-02-16 ENCOUNTER — TRANSFERRED RECORDS (OUTPATIENT)
Dept: HEALTH INFORMATION MANAGEMENT | Facility: CLINIC | Age: 61
End: 2021-02-16

## 2021-03-05 ENCOUNTER — TELEPHONE (OUTPATIENT)
Dept: FAMILY MEDICINE | Facility: CLINIC | Age: 61
End: 2021-03-05

## 2021-03-05 NOTE — TELEPHONE ENCOUNTER
Janelle White 's LONG TERM DISABILITY FORMS : PRUDENTIAL  have been  transfer to Emili Ballard's forms folder.       Wayne Dale MA

## 2021-03-11 ENCOUNTER — MYC MEDICAL ADVICE (OUTPATIENT)
Dept: PSYCHIATRY | Facility: CLINIC | Age: 61
End: 2021-03-11

## 2021-03-11 ENCOUNTER — VIRTUAL VISIT (OUTPATIENT)
Dept: PSYCHOLOGY | Facility: CLINIC | Age: 61
End: 2021-03-11
Payer: COMMERCIAL

## 2021-03-11 ENCOUNTER — VIRTUAL VISIT (OUTPATIENT)
Dept: PSYCHIATRY | Facility: CLINIC | Age: 61
End: 2021-03-11
Attending: FAMILY MEDICINE
Payer: COMMERCIAL

## 2021-03-11 DIAGNOSIS — F33.2 SEVERE EPISODE OF RECURRENT MAJOR DEPRESSIVE DISORDER, WITHOUT PSYCHOTIC FEATURES (H): Primary | ICD-10-CM

## 2021-03-11 DIAGNOSIS — F33.9 RECURRENT MAJOR DEPRESSION RESISTANT TO TREATMENT (H): ICD-10-CM

## 2021-03-11 PROCEDURE — 99204 OFFICE O/P NEW MOD 45 MIN: CPT | Mod: 95 | Performed by: PSYCHIATRY & NEUROLOGY

## 2021-03-11 PROCEDURE — 90791 PSYCH DIAGNOSTIC EVALUATION: CPT | Mod: 52 | Performed by: PSYCHOLOGIST

## 2021-03-11 RX ORDER — METHYLPHENIDATE HYDROCHLORIDE 10 MG/1
10 TABLET ORAL 2 TIMES DAILY
Qty: 60 TABLET | Refills: 0 | Status: SHIPPED | OUTPATIENT
Start: 2021-03-11 | End: 2021-03-22

## 2021-03-11 SDOH — HEALTH STABILITY: MENTAL HEALTH: HOW MANY STANDARD DRINKS CONTAINING ALCOHOL DO YOU HAVE ON A TYPICAL DAY?: 1 OR 2

## 2021-03-11 SDOH — ECONOMIC STABILITY: FOOD INSECURITY: WITHIN THE PAST 12 MONTHS, YOU WORRIED THAT YOUR FOOD WOULD RUN OUT BEFORE YOU GOT MONEY TO BUY MORE.: NEVER TRUE

## 2021-03-11 SDOH — ECONOMIC STABILITY: INCOME INSECURITY: HOW HARD IS IT FOR YOU TO PAY FOR THE VERY BASICS LIKE FOOD, HOUSING, MEDICAL CARE, AND HEATING?: NOT HARD AT ALL

## 2021-03-11 SDOH — SOCIAL STABILITY: SOCIAL INSECURITY: WITHIN THE LAST YEAR, HAVE YOU BEEN HUMILIATED OR EMOTIONALLY ABUSED IN OTHER WAYS BY YOUR PARTNER OR EX-PARTNER?: NO

## 2021-03-11 SDOH — SOCIAL STABILITY: SOCIAL NETWORK: HOW OFTEN DO YOU ATTEND CHURCH OR RELIGIOUS SERVICES?: NOT ASKED

## 2021-03-11 SDOH — SOCIAL STABILITY: SOCIAL INSECURITY
WITHIN THE LAST YEAR, HAVE YOU BEEN KICKED, HIT, SLAPPED, OR OTHERWISE PHYSICALLY HURT BY YOUR PARTNER OR EX-PARTNER?: NO

## 2021-03-11 SDOH — ECONOMIC STABILITY: FOOD INSECURITY: WITHIN THE PAST 12 MONTHS, THE FOOD YOU BOUGHT JUST DIDN'T LAST AND YOU DIDN'T HAVE MONEY TO GET MORE.: NEVER TRUE

## 2021-03-11 SDOH — SOCIAL STABILITY: SOCIAL NETWORK: HOW OFTEN DO YOU ATTENT MEETINGS OF THE CLUB OR ORGANIZATION YOU BELONG TO?: NOT ASKED

## 2021-03-11 SDOH — SOCIAL STABILITY: SOCIAL INSECURITY: WITHIN THE LAST YEAR, HAVE YOU BEEN AFRAID OF YOUR PARTNER OR EX-PARTNER?: NO

## 2021-03-11 SDOH — SOCIAL STABILITY: SOCIAL NETWORK: HOW OFTEN DO YOU GET TOGETHER WITH FRIENDS OR RELATIVES?: NOT ASKED

## 2021-03-11 SDOH — HEALTH STABILITY: MENTAL HEALTH: HOW OFTEN DO YOU HAVE 6 OR MORE DRINKS ON ONE OCCASION?: NEVER

## 2021-03-11 SDOH — SOCIAL STABILITY: SOCIAL NETWORK: IN A TYPICAL WEEK, HOW MANY TIMES DO YOU TALK ON THE PHONE WITH FAMILY, FRIENDS, OR NEIGHBORS?: NOT ASKED

## 2021-03-11 SDOH — SOCIAL STABILITY: SOCIAL NETWORK: ARE YOU MARRIED, WIDOWED, DIVORCED, SEPARATED, NEVER MARRIED, OR LIVING WITH A PARTNER?: MARRIED

## 2021-03-11 SDOH — SOCIAL STABILITY: SOCIAL INSECURITY
WITHIN THE LAST YEAR, HAVE TO BEEN RAPED OR FORCED TO HAVE ANY KIND OF SEXUAL ACTIVITY BY YOUR PARTNER OR EX-PARTNER?: NO

## 2021-03-11 SDOH — ECONOMIC STABILITY: TRANSPORTATION INSECURITY
IN THE PAST 12 MONTHS, HAS THE LACK OF TRANSPORTATION KEPT YOU FROM MEDICAL APPOINTMENTS OR FROM GETTING MEDICATIONS?: NO

## 2021-03-11 SDOH — HEALTH STABILITY: MENTAL HEALTH: HOW OFTEN DO YOU HAVE A DRINK CONTAINING ALCOHOL?: 2-3 TIMES A WEEK

## 2021-03-11 SDOH — SOCIAL STABILITY: SOCIAL NETWORK
DO YOU BELONG TO ANY CLUBS OR ORGANIZATIONS SUCH AS CHURCH GROUPS UNIONS, FRATERNAL OR ATHLETIC GROUPS, OR SCHOOL GROUPS?: NOT ASKED

## 2021-03-11 SDOH — ECONOMIC STABILITY: TRANSPORTATION INSECURITY
IN THE PAST 12 MONTHS, HAS LACK OF TRANSPORTATION KEPT YOU FROM MEETINGS, WORK, OR FROM GETTING THINGS NEEDED FOR DAILY LIVING?: NO

## 2021-03-11 ASSESSMENT — ANXIETY QUESTIONNAIRES
GAD7 TOTAL SCORE: 8
GAD7 TOTAL SCORE: 8
7. FEELING AFRAID AS IF SOMETHING AWFUL MIGHT HAPPEN: NOT AT ALL
6. BECOMING EASILY ANNOYED OR IRRITABLE: NEARLY EVERY DAY
GAD7 TOTAL SCORE: 8
7. FEELING AFRAID AS IF SOMETHING AWFUL MIGHT HAPPEN: NOT AT ALL
1. FEELING NERVOUS, ANXIOUS, OR ON EDGE: SEVERAL DAYS
3. WORRYING TOO MUCH ABOUT DIFFERENT THINGS: SEVERAL DAYS
4. TROUBLE RELAXING: MORE THAN HALF THE DAYS
2. NOT BEING ABLE TO STOP OR CONTROL WORRYING: SEVERAL DAYS
5. BEING SO RESTLESS THAT IT IS HARD TO SIT STILL: NOT AT ALL

## 2021-03-11 ASSESSMENT — COLUMBIA-SUICIDE SEVERITY RATING SCALE - C-SSRS
4. HAVE YOU HAD THESE THOUGHTS AND HAD SOME INTENTION OF ACTING ON THEM?: YES
TOTAL  NUMBER OF INTERRUPTED ATTEMPTS LIFETIME: NO
6. HAVE YOU EVER DONE ANYTHING, STARTED TO DO ANYTHING, OR PREPARED TO DO ANYTHING TO END YOUR LIFE?: NO
2. HAVE YOU ACTUALLY HAD ANY THOUGHTS OF KILLING YOURSELF?: YES
TOTAL  NUMBER OF ABORTED OR SELF INTERRUPTED ATTEMPTS PAST 3 MONTHS: NO
5. HAVE YOU STARTED TO WORK OUT OR WORKED OUT THE DETAILS OF HOW TO KILL YOURSELF? DO YOU INTEND TO CARRY OUT THIS PLAN?: YES
ATTEMPT PAST THREE MONTHS: NO
TOTAL  NUMBER OF INTERRUPTED ATTEMPTS PAST 3 MONTHS: NO
TOTAL  NUMBER OF ABORTED OR SELF INTERRUPTED ATTEMPTS PAST LIFETIME: NO
5. HAVE YOU STARTED TO WORK OUT OR WORKED OUT THE DETAILS OF HOW TO KILL YOURSELF? DO YOU INTEND TO CARRY OUT THIS PLAN?: YES
4. HAVE YOU HAD THESE THOUGHTS AND HAD SOME INTENTION OF ACTING ON THEM?: YES
1. IN THE PAST MONTH, HAVE YOU WISHED YOU WERE DEAD OR WISHED YOU COULD GO TO SLEEP AND NOT WAKE UP?: YES
ATTEMPT LIFETIME: NO
6. HAVE YOU EVER DONE ANYTHING, STARTED TO DO ANYTHING, OR PREPARED TO DO ANYTHING TO END YOUR LIFE?: NO
2. HAVE YOU ACTUALLY HAD ANY THOUGHTS OF KILLING YOURSELF LIFETIME?: YES
1. IN THE PAST MONTH, HAVE YOU WISHED YOU WERE DEAD OR WISHED YOU COULD GO TO SLEEP AND NOT WAKE UP?: YES
3. HAVE YOU BEEN THINKING ABOUT HOW YOU MIGHT KILL YOURSELF?: NO

## 2021-03-11 ASSESSMENT — PATIENT HEALTH QUESTIONNAIRE - PHQ9
SUM OF ALL RESPONSES TO PHQ QUESTIONS 1-9: 22
SUM OF ALL RESPONSES TO PHQ QUESTIONS 1-9: 22
10. IF YOU CHECKED OFF ANY PROBLEMS, HOW DIFFICULT HAVE THESE PROBLEMS MADE IT FOR YOU TO DO YOUR WORK, TAKE CARE OF THINGS AT HOME, OR GET ALONG WITH OTHER PEOPLE: EXTREMELY DIFFICULT

## 2021-03-11 NOTE — Clinical Note
Please call this patient to get them scheduled for a follow-up visit in 4 weeks. Please schedule with me and the [facility], Dr. Manzo if possible. Thanks!

## 2021-03-11 NOTE — PATIENT INSTRUCTIONS
"  [facility]                                      Patient's Name: Janelle White  March 11, 2021    SAFETY PLAN:  Step 1: Warning signs / cues (Thoughts, images, mood, situation, behavior) that a crisis may be developing:    Thoughts: \"I don't matter\", \"People would be better off without me\", \"I can't do this anymore\" and \"I just want this to end\"    Images: obsessive thoughts of death or dying: thoughts of carrying out plan    Thinking Processes: ruminations (can't stop thinking about my problems): ruminate on my plan and highly critical and negative thoughts: if  says something critical i shut down    Mood: worsening depression, hopelessness, disinhibited (not caring about things or consequences) and worthlessness    Behaviors: isolating/withdrawing , can't stop crying, not taking care of myself and not taking care of my responsibilities    Situations: relationship problems   Step 2: Coping strategies - Things I can do to take my mind off of my problems without contacting another person (relaxation technique, physical activity):    Distress Tolerance Strategies:  play with my pet  and watch a funny movie:      Physical Activities: go for a walk and get in the GIVTEDuzzi    Focus on helpful thoughts:  \"This is temporary\"  Step 3: People and social settings that provide distraction:   Name: Alyx Phone: in my phone   Name: Raven Phone: in my phone    park    Step 4: Remind myself of people and things that are important to me and worth living for:  Children, dog, friends  Step 5: When I am in crisis, I can ask these people to help me use my safety plan:   Name: Alyx Phone: in my phone   Name: Raven Phone: in my phone  Step 6: Making the environment safe:     none identified  Step 7: Professionals or agencies I can contact during a crisis:    Suicide Prevention Lifeline: 0-228-678-TALK (4580)    Crisis Text Line Service: Text   HOME  to 455-796.    Local Crisis Services: [facility]" [facility]    Call 911 or go to my nearest emergency department.   I helped develop this safety plan and agree to use it when needed.  I have been given a copy of this plan.      Patient signature: _______________________________________________________________  Today s date:  March 11, 2021  Adapted from Safety Plan Template 2008 Antionette Trevino and Joseph Leon is reprinted with the express permission of the authors.  No portion of the Safety Plan Template may be reproduced without the express, written permission.  You can contact the authors at [email or web address] or [email or web address].

## 2021-03-11 NOTE — Clinical Note
Hi there!  I saw Janelle today for intake.  I do not know if I will have her note complete by your visit with her tomorrow, but my treatment plan in her AVS is all typed up and complete for your review.  Tough case.  I did start her on twice daily low-dose methylphenidate to augment her current regimen and to see if we can improve her mood by activating her dopamine receptors and by increasing energy/motivation.  Discussed risks and benefits at length with her.  I will make recommendations as I see fit, but at this time I will defer benzodiazepine prescribing to you since you know her well and she is on a complicated regimen (especially given ongoing prominent suicidal ideation).  I am glad she is open to therapy.  I will be sending her some resources.  I will follow-up with her in about a month.  Let me know if you have any questions or concerns at all!  I look forward to continuing to work with her and hope we can get her more stable for you.

## 2021-03-11 NOTE — PROGRESS NOTES
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 suicidal ideation. There are notable risk factors for self-harm, including anxiety, comorbid medical condition of Chronic pain, suicidal ideation, purposelessness/no reason for living, hopelessness, withdrawing and mood change. However, risk is mitigated by commitment to family, absence of past attempts, ability to volunteer a safety plan, history of seeking help when needed, future oriented and denies suicidal intent or plan. Therefore, based on all available evidence including the factors cited above, Janelle White does not appear to be at imminent risk for self-harm, does not meet criteria for a 72-hr hold, and therefore remains appropriate for ongoing outpatient level of care.  A thorough assessment of risk factors related to suicide and self-harm have been reviewed and are noted above. The patient convincingly denies acute suicidality on several occasions. Local community safety resources reviewed and printed for patient to use if needed. There was no deceit  detected, and the patient presented in a manner that was believable.     DSM5  Diagnosis:  296.33 (F33.2) Major Depressive Disorder, Recurrent Episode, Severe _, Without psychotic features  Treatment resistant depression    Medical Comorbidities Include:   Patient Active Problem List    Diagnosis Date Noted     Prediabetes 09/19/2019     Priority: Medium     Hyperlipidemia LDL goal <130 09/19/2019     Priority: Medium     CLARE (obstructive sleep apnea) 02/13/2019     Priority: Medium     Controlled substance agreement signed 08/14/2018     Priority: Medium     Chronic pain syndrome 12/21/2017     Priority: Medium     CLL (chronic lymphocytic leukemia) (H) 12/21/2017     Priority: Medium     Shift work sleep disorder 12/16/2013     Priority: Medium     Vitamin D deficiency 11/08/2012     Priority: Medium     Moderate recurrent major depression (H) 01/06/2011     Priority: Medium     DDD (degenerative disc disease), cervical 10/07/2010     Priority: Medium     CARDIOVASCULAR SCREENING; LDL GOAL LESS THAN 160 02/10/2010     Priority: Medium     Chronic Low Back Pain 10/01/2009     Priority: Medium     S/p AP L3-S1 fusion 12/2010 - referred to [facility].   Orthopedics writing scripts for narcotics post-op.       Migraine      Priority: Medium     Problem list name updated by automated process. Provider to review       B-complex deficiency 10/10/2006     Priority: Medium     Problem list name updated by automated process. Provider to review       PERSONAL HX OF  MELANOMA 12/04/2003     Priority: Low       Impression:  Janelle TORRES Christopher is a 60-year-old female with past psychiatric history including severe major depressive disorder who presents today for psychiatric evaluation.  Unfortunately patient has experienced profound worsening of her depression, particularly within the context of chronic pain.  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  She has been unable to be engaged in the activities that bring her roxy due to this pain.  She has been quite dependent on narcotic pain medication and benzodiazepines to help treat the pain and restlessness and poor sleep.  She has had multiple past psychiatric medication trials, and even a trial of ECT as a teenager.  She is at high risk for suicide/self-harm given the above.  At this time, the goal would be to work towards increasing her quality of life.  Patient educated about Suboxone as an option to treat her pain and to maybe allow for a better range of functioning off of narcotic pain medication.  I am guessing some of her mood and day-to-day functioning has to be somewhat blunted from being on both benzodiazepines and opioid pain medication.  She has been on duloxetine for quite some time and has found it somewhat helpful.  Due to her multiple past failed medication trials and sensitivity to medications, I recommended [employer] testing to investigate how her body metabolizes medication.  We will wait on any antidepressant medication changes at this time.  We did discuss risks and benefits of starting a stimulant trial to augment her duloxetine.  I am hopeful this will help increase some of her energy, motivation, focus/concentration, and improve mood.  Also recommend individual psychotherapy for additional support and ongoing development of nonpharmacologic coping skills and strategies.    Medication side effects and alternatives reviewed. Health promotion activities recommended and reviewed today. All questions addressed. Education and counseling completed regarding risks and benefits of medications and psychotherapy options. Recommend therapy for additional support.     Treatment Plan:    Continue duloxetine 60 mg daily for mood, anxiety, pain.  Message me on my chart if you would prefer I prescribe 30 mg capsules to take twice daily.    Start  ritalin/methylphenadate 10 mg twice daily to augment duloxetine and for treatment resistant depression symptoms. Start with 5 mg twice daily and increase as tolerated.      Continue benzodiazepines per primary care prescriber as I am not comfortable managing your benzodiazepine regimen due to concommitment chronic opioid therapy, ongoing suicidal ideation, and lack of knowledge/rapport with patient.    Recommend individual and/or psychotherapy.  Discussed recommendation for DBT therapy since it is quite skill-based.  I will send resources via [employer] for [facility] programs once I receive them from colleagues.    [employer] test kit will be mailed to your home as discussed during today's appointment.  Any questions or concerns regarding the test (particularly financial responsibility) should be directed to their customer service line as noted in the kit materials.    Continue all other cares per primary care provider.     Continue all other medications as reviewed per electronic medical record today.     Safety plan reviewed. To the Emergency Department as needed or call after hours crisis line at [phone] or [phone]. [place] Crisis Text Line: Text [place] to 946096  or  Suicide LifeLine Chat: suicidepreventionlifeline.org/chat    Schedule an appointment with me in 4 weeks or sooner as needed.  Call [facility] at [phone] to schedule.    Follow up with primary care provider as planned or sooner if needed for acute medical concerns.    Call the psychiatric nurse line with medication questions or concerns at [phone].    [employer] may be used to communicate with your provider, but this is not intended to be used for emergencies.    Patient Education:  Discussed risks of stimulant medication including, but not limited to, decreased appetite, risk of tics (and that they may be lasting), trouble sleeping, cardiac risks such as increased heart rate and blood pressure, and rare risk of  sudden cardiac death.  Also risk of addiction/tolerance/dependence.    Discussed safety planning.  Discussed consideration of keeping her opioid and benzodiazepine medications with someone she trusts/full bottles locked up while making medication changes.  Discussed that stimulant medications may provide enough energy to follow through with suicide plans in some cases.  I recommended she inform loved ones whom are close to her that she is making medication changes and to help her watch out for any red flags/changes in behaviors.  She understood the risk/benefit discussion.    Community Resources:    National Suicide Prevention Lifeline: 376.793.2118 (TTY: 156.411.7106). Call anytime for help.  (www.suicidepreventionlifeline.org)  [employer]ness (www.[place]is.org): 884.801.1631 or 045-130-8611.   Mental Health Association (www.mentalhealth.org): 534.442.8719 or 285-543-5154.  [place] Crisis Text Line: Text [place] to 705767  Suicide LifeLine Chat: suicide[email or web address].org/chat    Administrative Billing:   Phone Call/Video Duration: 44 Minutes  Start: 9:26a  Stop: 10:10a    Complexity of Care: Patient with multiple psychiatric diagnoses, medical comorbidities, and medication change adding to complexity of care.    Patient Status:  Patient will continue to be seen for ongoing consultation and stabilization.    Signed:   Kimberly Perez DO  [facility]    Disclaimer: This note consists of symbols derived from keyboarding, dictation and/or voice recognition software. As a result, there may be errors in the script that have gone undetected. Please consider this when interpreting information found in this chart.
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
## 2021-03-11 NOTE — PATIENT INSTRUCTIONS
Treatment Plan:    Continue duloxetine 60 mg daily for mood, anxiety, pain.  Message me on my chart if you would prefer I prescribe 30 mg capsules to take twice daily.    Start ritalin/methylphenadate 10 mg twice daily to augment duloxetine and for treatment resistant depression symptoms. Start with 5 mg twice daily and increase as tolerated.      Continue benzodiazepines per primary care prescriber as I am not comfortable managing your benzodiazepine regimen due to concommitment chronic opioid therapy, ongoing suicidal ideation, and lack of knowledge/rapport with patient.    Recommend individual and/or psychotherapy.  Discussed recommendation for DBT therapy since it is quite skill-based.  I will send resources via FanChatter for [facility] programs once I receive them from colleagues.    [employer] test kit will be mailed to your home as discussed during today's appointment.  Any questions or concerns regarding the test (particularly financial responsibility) should be directed to their customer service line as noted in the kit materials.    Continue all other cares per primary care provider.     Continue all other medications as reviewed per electronic medical record today.     Safety plan reviewed. To the Emergency Department as needed or call after hours crisis line at [phone] or [phone]. [place] Crisis Text Line: Text [place] to 540524  or  Suicide LifeLine Chat: suicidepreventionlifeline.org/chat    Schedule an appointment with me in 4 weeks or sooner as needed.  Call [facility] at [phone] to schedule.    Follow up with primary care provider as planned or sooner if needed for acute medical concerns.    Call the psychiatric nurse line with medication questions or concerns at [phone].    FanChatter may be used to communicate with your provider, but this is not intended to be used for emergencies.    Patient Education:  Discussed risks of stimulant medication including, but not  limited to, decreased appetite, risk of tics (and that they may be lasting), trouble sleeping, cardiac risks such as increased heart rate and blood pressure, and rare risk of sudden cardiac death.  Also risk of addiction/tolerance/dependence.    Discussed safety planning.  Discussed consideration of keeping her opioid and benzodiazepine medications with someone she trusts/full bottles locked up while making medication changes.  Discussed that stimulant medications may provide enough energy to follow through with suicide plans in some cases.  I recommended she inform loved ones whom are close to her that she is making medication changes and to help her watch out for any red flags/changes in behaviors.  She understood the risk/benefit discussion.    Community Resources:    National Suicide Prevention Lifeline: 278.509.5104 (TTY: 127.842.5129). Call anytime for help.  (www.suicidepreventionlifeline.org)  National [place] [employer] Mental Illness (www.[place].org): 568.334.9955 or 963-272-8558.   Mental Health Association (www.mentalhealth.org): 307.777.3859 or 727-739-5499.  [place] Crisis Text Line: Text [place] to 424791  Suicide LifeLine Chat: suicidepreiMotions - Eye Trackingline.org/chat

## 2021-03-12 DIAGNOSIS — G89.4 CHRONIC PAIN SYNDROME: ICD-10-CM

## 2021-03-12 RX ORDER — HYDROCODONE BITARTRATE AND ACETAMINOPHEN 10; 325 MG/1; MG/1
TABLET ORAL
Qty: 60 TABLET | Refills: 0 | Status: SHIPPED | OUTPATIENT
Start: 2021-03-12 | End: 2021-04-16

## 2021-03-12 RX ORDER — MORPHINE SULFATE 15 MG/1
15 TABLET, FILM COATED, EXTENDED RELEASE ORAL EVERY 12 HOURS
Qty: 60 TABLET | Refills: 0 | Status: SHIPPED | OUTPATIENT
Start: 2021-03-12 | End: 2021-04-16

## 2021-03-12 ASSESSMENT — ANXIETY QUESTIONNAIRES: GAD7 TOTAL SCORE: 8

## 2021-03-16 ENCOUNTER — OFFICE VISIT (OUTPATIENT)
Dept: FAMILY MEDICINE | Facility: CLINIC | Age: 61
End: 2021-03-16
Payer: COMMERCIAL

## 2021-03-16 VITALS
WEIGHT: 163 LBS | RESPIRATION RATE: 16 BRPM | HEART RATE: 64 BPM | BODY MASS INDEX: 27.33 KG/M2 | DIASTOLIC BLOOD PRESSURE: 84 MMHG | SYSTOLIC BLOOD PRESSURE: 110 MMHG | TEMPERATURE: 97.2 F

## 2021-03-16 DIAGNOSIS — F33.1 MODERATE RECURRENT MAJOR DEPRESSION (H): ICD-10-CM

## 2021-03-16 DIAGNOSIS — G89.4 CHRONIC PAIN SYNDROME: Primary | ICD-10-CM

## 2021-03-16 DIAGNOSIS — C91.10 CLL (CHRONIC LYMPHOCYTIC LEUKEMIA) (H): ICD-10-CM

## 2021-03-16 DIAGNOSIS — R10.84 ABDOMINAL PAIN, GENERALIZED: ICD-10-CM

## 2021-03-16 DIAGNOSIS — R11.10 VOMITING AND DIARRHEA: ICD-10-CM

## 2021-03-16 DIAGNOSIS — R19.7 VOMITING AND DIARRHEA: ICD-10-CM

## 2021-03-16 LAB
ALBUMIN SERPL-MCNC: 3.4 G/DL (ref 3.4–5)
ALP SERPL-CCNC: 87 U/L (ref 40–150)
ALT SERPL W P-5'-P-CCNC: 26 U/L (ref 0–50)
AMYLASE SERPL-CCNC: 35 U/L (ref 30–110)
ANION GAP SERPL CALCULATED.3IONS-SCNC: 2 MMOL/L (ref 3–14)
AST SERPL W P-5'-P-CCNC: 44 U/L (ref 0–45)
BILIRUB SERPL-MCNC: 0.4 MG/DL (ref 0.2–1.3)
BUN SERPL-MCNC: 5 MG/DL (ref 7–30)
CALCIUM SERPL-MCNC: 8.7 MG/DL (ref 8.5–10.1)
CHLORIDE SERPL-SCNC: 111 MMOL/L (ref 94–109)
CO2 SERPL-SCNC: 28 MMOL/L (ref 20–32)
CREAT SERPL-MCNC: 0.68 MG/DL (ref 0.52–1.04)
GFR SERPL CREATININE-BSD FRML MDRD: >90 ML/MIN/{1.73_M2}
GLUCOSE SERPL-MCNC: 99 MG/DL (ref 70–99)
LIPASE SERPL-CCNC: 99 U/L (ref 73–393)
POTASSIUM SERPL-SCNC: 3.8 MMOL/L (ref 3.4–5.3)
PROT SERPL-MCNC: 6.2 G/DL (ref 6.8–8.8)
SODIUM SERPL-SCNC: 141 MMOL/L (ref 133–144)

## 2021-03-16 PROCEDURE — 36415 COLL VENOUS BLD VENIPUNCTURE: CPT | Performed by: FAMILY MEDICINE

## 2021-03-16 PROCEDURE — 80053 COMPREHEN METABOLIC PANEL: CPT | Performed by: FAMILY MEDICINE

## 2021-03-16 PROCEDURE — 82150 ASSAY OF AMYLASE: CPT | Performed by: FAMILY MEDICINE

## 2021-03-16 PROCEDURE — 85025 COMPLETE CBC W/AUTO DIFF WBC: CPT | Performed by: FAMILY MEDICINE

## 2021-03-16 PROCEDURE — 83690 ASSAY OF LIPASE: CPT | Performed by: FAMILY MEDICINE

## 2021-03-16 PROCEDURE — 99214 OFFICE O/P EST MOD 30 MIN: CPT | Performed by: FAMILY MEDICINE

## 2021-03-16 NOTE — PROGRESS NOTES
Assessment & Plan     Vomiting and diarrhea  Abdominal pain, generalized    - Comprehensive metabolic panel (BMP + Alb, Alk Phos, ALT, AST, Total. Bili, TP)  - Amylase  - Lipase  - CBC with platelets differential    Will check labs to rule out any metabolic cause-- suspect IBS with increased stress from job transition and new meds.  Close Follow-up if no change or worsening symptoms prn.    Chronic pain syndrome    Starting to wean down on meds- recent change from #100 [place] last month to #60 this month.  Has stopped all medical marijuana and minimized EtOH feeling a link between their use and depressed mood sx.  Spoke with Psychiatry about possible change to Suboxone in future prn.  Patient with mixed feelings about Suboxone.  Follow-up 1 month for recheck.    CLL (chronic lymphocytic leukemia) (H)    Followed by [facility]-- increased fatigue lately.    Moderate recurrent major depression (H)    Recent start of Ritalin bid to help augment Cymbalta and help with refractory depression sx.  Has noted some weight loss with medication.  Feels it is starting to help- has Follow-up with Psych and therapy again later this month.      30 minutes spent on the date of the encounter doing chart review, review of test results, interpretation of tests and patient visit     Emili Ballard MD  [facility]    Giorgio Luis is a 60 year old who presents for the following health issues  accompanied by her spouse:    HPI   Hx CLL dx in 2017-- splenomegaly --   decreased intake, constipation, nausea, vomiting. Labs for CLL -- CBC, CMP. Walking with new puppy, abd uncomfortable but movement helps.   Constipation -- taking senna and colace, occasionally mag citrate. Soft, mark like BM this morning -- either watery or difficult to pass. Normally BMs soft/formed. Eating high fiber diet.   Unintentional weight loss -- believes she has lost close to 40lb since December.     Mild splenomegaly on  imaging a few months ago through [facility].     Dr Blackwell hem/onc -- has discussed IVIG or immunotherapy.      Working on getting disability -- stopped working Feb 26th.     Working with behavioral health specialist  -- will be doing gene kit soon    Started ritalin Friday -- helping energy.     Erythematous rash on hands -- similar occurrence about a year.           Review of Systems   Constitutional, HEENT, cardiovascular, pulmonary, GI, , musculoskeletal, neuro, skin, endocrine and psych systems are negative, except as otherwise noted.      Objective    /84   Pulse 64   Temp 97.2  F (36.2  C) (Tympanic)   Resp 16   Wt 73.9 kg (163 lb)   LMP 05/01/2005 (Exact Date)   Breastfeeding No   BMI 27.33 kg/m    Body mass index is 27.33 kg/m .  Physical Exam   GENERAL: healthy, alert and no distress  EYES: Eyes grossly normal to inspection, PERRL and conjunctivae and sclerae normal  NECK: no adenopathy, no asymmetry, masses, or scars and thyroid normal to palpation  ABDOMEN: soft, nontender, no hepatosplenomegaly, no masses and bowel sounds normal  MS: no gross musculoskeletal defects noted, no edema  SKIN: no suspicious lesions or rashes  NEURO: Normal strength and tone, mentation intact and speech normal  PSYCH: mentation appears normal, affect normal/bright        Physician Attestation   I, Emili Ballard, was present with the medical/FADY student who participated in the service and in the documentation of the note.  I have verified the history and personally performed the physical exam and medical decision making.  I agree with the assessment and plan of care as documented in the note.      Vani Bautista RN, DNP student.    Emili Ballard MD

## 2021-03-17 ENCOUNTER — TRANSFERRED RECORDS (OUTPATIENT)
Dept: HEALTH INFORMATION MANAGEMENT | Facility: CLINIC | Age: 61
End: 2021-03-17

## 2021-03-17 LAB
DIFFERENTIAL METHOD BLD: ABNORMAL
EOSINOPHIL # BLD AUTO: 0.5 10E9/L (ref 0–0.7)
EOSINOPHIL NFR BLD AUTO: 3 %
ERYTHROCYTE [DISTWIDTH] IN BLOOD BY AUTOMATED COUNT: 13.7 % (ref 10–15)
HCT VFR BLD AUTO: 42.1 % (ref 35–47)
HGB BLD-MCNC: 13.3 G/DL (ref 11.7–15.7)
LYMPHOCYTES # BLD AUTO: 14 10E9/L (ref 0.8–5.3)
LYMPHOCYTES NFR BLD AUTO: 80 %
MCH RBC QN AUTO: 29.6 PG (ref 26.5–33)
MCHC RBC AUTO-ENTMCNC: 31.6 G/DL (ref 31.5–36.5)
MCV RBC AUTO: 94 FL (ref 78–100)
MONOCYTES # BLD AUTO: 0.4 10E9/L (ref 0–1.3)
MONOCYTES NFR BLD AUTO: 2 %
NEUTROPHILS # BLD AUTO: 2.6 10E9/L (ref 1.6–8.3)
NEUTROPHILS NFR BLD AUTO: 15 %
PLATELET # BLD AUTO: 134 10E9/L (ref 150–450)
PLATELET # BLD EST: ABNORMAL 10*3/UL
RBC # BLD AUTO: 4.5 10E12/L (ref 3.8–5.2)
RBC MORPH BLD: ABNORMAL
SMUDGE CELLS BLD QL SMEAR: PRESENT
WBC # BLD AUTO: 17.5 10E9/L (ref 4–11)

## 2021-03-19 ENCOUNTER — MYC MEDICAL ADVICE (OUTPATIENT)
Dept: FAMILY MEDICINE | Facility: CLINIC | Age: 61
End: 2021-03-19

## 2021-03-19 ENCOUNTER — TRANSFERRED RECORDS (OUTPATIENT)
Dept: HEALTH INFORMATION MANAGEMENT | Facility: CLINIC | Age: 61
End: 2021-03-19

## 2021-03-19 DIAGNOSIS — E53.9 B-COMPLEX DEFICIENCY: ICD-10-CM

## 2021-03-19 DIAGNOSIS — Z78.0 POSTMENOPAUSAL STATUS: ICD-10-CM

## 2021-03-20 ENCOUNTER — MYC MEDICAL ADVICE (OUTPATIENT)
Dept: PSYCHIATRY | Facility: CLINIC | Age: 61
End: 2021-03-20

## 2021-03-22 ENCOUNTER — TRANSFERRED RECORDS (OUTPATIENT)
Dept: HEALTH INFORMATION MANAGEMENT | Facility: CLINIC | Age: 61
End: 2021-03-22

## 2021-03-22 DIAGNOSIS — F33.9 RECURRENT MAJOR DEPRESSION RESISTANT TO TREATMENT (H): ICD-10-CM

## 2021-03-22 DIAGNOSIS — F33.2 SEVERE EPISODE OF RECURRENT MAJOR DEPRESSIVE DISORDER, WITHOUT PSYCHOTIC FEATURES (H): Primary | ICD-10-CM

## 2021-03-22 RX ORDER — CYANOCOBALAMIN 1000 UG/ML
1 INJECTION, SOLUTION INTRAMUSCULAR; SUBCUTANEOUS
Qty: 10 ML | Refills: 1 | Status: SHIPPED | OUTPATIENT
Start: 2021-03-22 | End: 2022-06-30

## 2021-03-22 RX ORDER — ESTRADIOL 1 MG/G
GEL TOPICAL
Qty: 30 G | Refills: 11 | Status: SHIPPED | OUTPATIENT
Start: 2021-03-22 | End: 2021-12-27

## 2021-03-22 NOTE — TELEPHONE ENCOUNTER
Dr. Ballard-Please advise if you recommend follow up visit and/or lab work in regards to Vitamin B12 injections?   A. Medication and lab pended.    Thank you!  SHANEL MooneyN, RN  [facility]

## 2021-03-23 DIAGNOSIS — E53.9 B-COMPLEX DEFICIENCY: ICD-10-CM

## 2021-03-23 RX ORDER — DEXTROAMPHETAMINE SACCHARATE, AMPHETAMINE ASPARTATE, DEXTROAMPHETAMINE SULFATE AND AMPHETAMINE SULFATE 2.5; 2.5; 2.5; 2.5 MG/1; MG/1; MG/1; MG/1
10 TABLET ORAL 2 TIMES DAILY
Qty: 60 TABLET | Refills: 0 | Status: SHIPPED | OUTPATIENT
Start: 2021-03-23 | End: 2021-04-12

## 2021-03-24 RX ORDER — SYRINGE-NEEDLE,INSULIN,0.5 ML 27GX1/2"
SYRINGE, EMPTY DISPOSABLE MISCELLANEOUS
Qty: 10 EACH | Refills: 1 | Status: SHIPPED | OUTPATIENT
Start: 2021-03-24 | End: 2023-12-01

## 2021-03-29 ENCOUNTER — MYC MEDICAL ADVICE (OUTPATIENT)
Dept: PSYCHIATRY | Facility: CLINIC | Age: 61
End: 2021-03-29

## 2021-04-02 ENCOUNTER — TRANSFERRED RECORDS (OUTPATIENT)
Dept: HEALTH INFORMATION MANAGEMENT | Facility: CLINIC | Age: 61
End: 2021-04-02

## 2021-04-12 ENCOUNTER — VIRTUAL VISIT (OUTPATIENT)
Dept: PSYCHOLOGY | Facility: CLINIC | Age: 61
End: 2021-04-12
Payer: COMMERCIAL

## 2021-04-12 ENCOUNTER — VIRTUAL VISIT (OUTPATIENT)
Dept: PSYCHIATRY | Facility: CLINIC | Age: 61
End: 2021-04-12
Payer: COMMERCIAL

## 2021-04-12 DIAGNOSIS — F33.9 RECURRENT MAJOR DEPRESSION RESISTANT TO TREATMENT (H): ICD-10-CM

## 2021-04-12 DIAGNOSIS — E88.89 CYP2D6 POOR METABOLIZER (H): ICD-10-CM

## 2021-04-12 DIAGNOSIS — F33.2 SEVERE EPISODE OF RECURRENT MAJOR DEPRESSIVE DISORDER, WITHOUT PSYCHOTIC FEATURES (H): Primary | ICD-10-CM

## 2021-04-12 DIAGNOSIS — E72.12 HOMOZYGOUS FOR C677T POLYMORPHISM OF MTHFR (H): ICD-10-CM

## 2021-04-12 DIAGNOSIS — E88.89 CYP2B6 INTERMEDIATE METABOLIZER (H): ICD-10-CM

## 2021-04-12 PROCEDURE — 99214 OFFICE O/P EST MOD 30 MIN: CPT | Mod: 95 | Performed by: PSYCHIATRY & NEUROLOGY

## 2021-04-12 PROCEDURE — 90832 PSYTX W PT 30 MINUTES: CPT | Mod: 95 | Performed by: PSYCHOLOGIST

## 2021-04-12 RX ORDER — LEVOMEFOLATE CALCIUM 7.5 MG TABLET
7.5 TABLET DAILY
Qty: 30 TABLET | Refills: 1 | Status: SHIPPED | OUTPATIENT
Start: 2021-04-12 | End: 2023-01-09

## 2021-04-12 RX ORDER — DESVENLAFAXINE 25 MG/1
25 TABLET, EXTENDED RELEASE ORAL DAILY
Qty: 30 TABLET | Refills: 0 | Status: SHIPPED | OUTPATIENT
Start: 2021-04-12 | End: 2021-05-03

## 2021-04-12 RX ORDER — DEXTROAMPHETAMINE SACCHARATE, AMPHETAMINE ASPARTATE, DEXTROAMPHETAMINE SULFATE AND AMPHETAMINE SULFATE 2.5; 2.5; 2.5; 2.5 MG/1; MG/1; MG/1; MG/1
10 TABLET ORAL 2 TIMES DAILY
Qty: 60 TABLET | Refills: 0 | Status: SHIPPED | OUTPATIENT
Start: 2021-04-19 | End: 2021-05-03 | Stop reason: DRUGHIGH

## 2021-04-12 RX ORDER — DULOXETIN HYDROCHLORIDE 20 MG/1
CAPSULE, DELAYED RELEASE ORAL
Qty: 42 CAPSULE | Refills: 0 | Status: SHIPPED | OUTPATIENT
Start: 2021-04-12 | End: 2021-05-03 | Stop reason: SINTOL

## 2021-04-12 NOTE — PROGRESS NOTES
"Janelle White is a 60 year old year old who is being evaluated via a billable video visit.      How would you like to obtain your AVS? MyChart  If you are dropped from the video visit, the video invite should be resent to: Text to cell phone: see Epic  Will anyone else be joining your video visit? No       Telemedicine Visit: The patient's condition can be safely assessed and treated via synchronous audio and visual telemedicine encounter.      Reason for Telemedicine Visit: Covid-19 Pandemic    Originating Site (Patient Location): Patient's home     Distant Site (Provider Location): Provider Remote Setting    Mode of Communication:  Video Conference via LiquidTalk    As the provider I attest to compliance with applicable laws and regulations related to telemedicine.        Outpatient Psychiatric Progress Note    Name: Janelle White   : 1960                    Primary Care Provider: Emili Ballard MD   Therapist: None    PHQ-9 scores:  PHQ-9 SCORE 2020 2020 3/11/2021   PHQ-9 Total Score - - -   PHQ-9 Total Score MyChart - - 22 (Severe depression)   PHQ-9 Total Score 6 2 22       STACIE-7 scores:  STACIE-7 SCORE 2019 12/10/2019 3/11/2021   Total Score - - -   Total Score - - 8 (mild anxiety)   Total Score 1 2 8       Patient Identification:  Patient is a 60 year old,   White American female  who presents for return visit with me.  Patient is currently on medical leave from work as NP. Patient attended the phone/video session alone. Patient prefers to be called: \"Janelle\".    Interim History:  I last saw Janelle White for outpatient psychiatry Consultation on 2021. During that appointment, we:      Continue duloxetine 60 mg daily for mood, anxiety, pain.  Message me on my chart if you would prefer I prescribe 30 mg capsules to take twice daily.    Start ritalin/methylphenadate 10 mg twice daily to augment duloxetine and for treatment resistant depression " "symptoms. Start with 5 mg twice daily and increase as tolerated.      Continue benzodiazepines per primary care prescriber as I am not comfortable managing your benzodiazepine regimen due to concommitment chronic opioid therapy, ongoing suicidal ideation, and lack of knowledge/rapport with patient.    Recommend individual and/or psychotherapy.  Discussed recommendation for DBT therapy since it is quite skill-based.  I will send resources via [employer] for [facility] programs once I receive them from colleagues.    GeneSight test kit will be mailed to your home as discussed during today's appointment.  Any questions or concerns regarding the test (particularly financial responsibility) should be directed to their customer service line as noted in the kit materials.    4/12: Patient overall with little change in her symptoms since last visit.  Although, she does notice a little boost in energy from Adderall, just not as much as with Ritalin.  Ritalin had been quite helpful but unfortunately she broke out into hives and did not tolerate the medication.  Adderall was started in lieu of Ritalin.  No obvious negative side effects.  Patient completed GeneSight testing and we discussed results during today's visit.  She continues to work hard on self cares.  Only taking 60 mg of her duloxetine daily since she was feeling quite a bit of cognitive clouding on the full 120 mg daily.  No problematic drug or alcohol use.  No safety concerns today.    Per [facility] [place], Dr. Matt Manzo, during today's team-based visit:  Ritalin caused hives. Finds the medication switch to Adderall has helped some \"but not the same zip as with the ritalin.\" She spoke about working on long-term disability and said \"it's not in my control but it's stressful.\" She spoke about her challenges with her health as well. She continues to work with her pain specialist and oncologist in this regard. She noted that her word finding problems are better as she has " "limited duloxetine to 60mg once per day and finds it is better for her. She is hopeful that [employer] test results are ready. She also would like to discuss any adjustments to the Adderall.     Psychiatric ROS:  Janelle MELISSA White reports mood has been: About the same, depressed/low  Anxiety has been: About the same  Sleep has been: About the same  Deepa sxs: None  Psychosis sxs: None  ADHD/ADD sxs: None  PTSD sxs: None  PHQ9 and GAD7 scores were reviewed today if completed.   Medication side effects: Yes: Cognitive clouding and other side effects from duloxetine  Current stressors include: Symptoms and See HPI above  Coping mechanisms and supports include: Family, Hobbies and Friends    Current medications include:   Current Outpatient Medications   Medication Sig     albuterol (PROAIR HFA/PROVENTIL HFA/VENTOLIN HFA) 108 (90 Base) MCG/ACT inhaler Inhale 2 puffs into the lungs every 6 hours     amphetamine-dextroamphetamine (ADDERALL) 10 MG tablet Take 1 tablet (10 mg) by mouth 2 times daily     calcium citrate (CALCIUM CITRATE) 950 MG tablet Take 1 tablet by mouth 2 times daily.     Calcium-Magnesium-Vitamin D (CALCIUM 500) 500-250-200 MG-MG-UNIT TABS Take 1 tablet by mouth     Cholecalciferol (D-5000) 5000 units TABS Take 5,000 Units by mouth     cyanocobalamin (CYANOCOBALAMIN) 1000 MCG/ML injection Inject 1 mL (1,000 mcg) into the muscle every 30 days     cyclobenzaprine (FLEXERIL) 10 MG tablet Take 1 tablet (10 mg) by mouth 3 times daily as needed for muscle spasms     DOCOSAHEXAENOIC ACID PO Take 1,000 mg by mouth      DULoxetine (CYMBALTA) 60 MG capsule Take 1 capsule (60 mg) by mouth 2 times daily     Estradiol (DIVIGEL) 1 MG/GM GEL Place 1 packet onto the skin daily     estradiol (ESTRACE VAGINAL) 0.1 MG/GM vaginal cream Place 2 g vaginally three times a week.     HYDROcodone-acetaminophen (NORCO)  MG per tablet take 1 tabs q 4hrs, max 4 tabs/24 hrs     insulin syringe-needle U-100 (30G X 1/2\" 1 ML) " "30G X 1/2\" 1 ML miscellaneous Inject 1 ml B12 qmonth     lidocaine (LIDODERM) 5 % patch Place 4 patches onto the skin daily Apply up to 4 patches to skin. Wear for 12 hours and remove for 12 hrs.  Refill when patient requests.     LORazepam (ATIVAN) 0.5 MG tablet 1-2 tabs qhs prn insomnia and 1 q 8 hours prn anxiety     medical cannabis (Patient's own supply.  Not a prescription) Medical Cannabis - [place] 4-6 ml by mouth daily. [facility]     methylPREDNISolone (MEDROL DOSEPAK) 4 MG tablet therapy pack Follow Package Directions     morphine (MS CONTIN) 15 MG CR tablet Take 1 tablet (15 mg) by mouth every 12 hours maximum 2 tablet(s) per day     morphine (MS CONTIN) 15 MG CR tablet Take 15 mg in AM     morphine (MS CONTIN) 30 MG CR tablet Take 30 mg bid     Multiple Vitamin (MULTI-VITAMINS) TABS Take 1 tablet by mouth     naloxone (NARCAN) 4 MG/0.1ML nasal spray Spray 1 spray (4 mg) into one nostril alternating nostrils as needed for opioid reversal every 2-3 minutes until assistance arrives     Prasterone 6.5 MG INST Place 1 suppository vaginally At Bedtime     sennosides (SENOKOT) 8.6 MG tablet Take 1 tablet by mouth daily as needed for constipation.     No current facility-administered medications for this visit.        The [place] Prescription Monitoring Program has been reviewed and there are no concerns about diversionary activity for controlled substances at this time.   03/30/2021 1 02/11/2021 03/31/2021 Lorazepam 0.5 Mg Tablet  100.00 20 Ka [facility] [id number] All (4435) 1/5 2.50 L[place]  03/23/2021 1 03/23/2021 03/23/2021 Dextroamp-Amphetamin 10 Mg Tab  60.00 30 Al [facility] [phone] All (4435) 0/0  [place]  03/15/2021 1 03/12/2021  Morphine Sulf Er 15 Mg Tablet  60.00 30 Ka Kli [id number] All (4435) 0/0 30.00 [place]  03/15/2021 1 03/12/2021  Hydrocodone-Acetamin  Mg  60.00 15 Ka Kli [id number] All (4435) 0/0 40.00 [place]  03/12/2021 1 03/11/2021  Methylphenidate " 10 Mg Tablet  60.00 30 Al [facility] [id number] All (4435[place] 0/[place]  [place]  02/11/2021 1 02/11/2021  Lorazepam 0.5 Mg Tablet  100.00 20 Ka Kli [id number] All (4435) 0/5 2.50 [place]  02/11/2021 1 02/11/2021  Morphine Sulf Er 15 Mg Tablet  60.00 30 Ka Kli [id number] All (4435) 0/0 30.[place]  02/11/2021 1 02/11/2021  Hydrocodone-Acetamin  Mg  100.00 25 Ka Kli [id number] All (4435) 0/0 40.00 [place]    Past Medical/Surgical History:  Past Medical History:   Diagnosis Date     Anxiety      Cervicalgia 2007    C5-6 disc protrusion     Depressive disorder      ESBL (extended spectrum beta-lactamase) producing bacteria infection      History of blood transfusion 2007    Cervical fusion     Melanoma (H) 1998     Migraine      Other chronic pain      Rotator cuff tear     s/p injections     Sacroiliac inflammation (H)      Shift work sleep disorder 12/16/2013     Urinary calculi      Vitamin B12 deficiency anemia 2006    started injections      has a past medical history of Anxiety, Cervicalgia (2007), Depressive disorder, ESBL (extended spectrum beta-lactamase) producing bacteria infection, History of blood transfusion (2007), Melanoma (H) (1998), Migraine, Other chronic pain, Rotator cuff tear, Sacroiliac inflammation (H), Shift work sleep disorder (12/16/2013), Urinary calculi, and Vitamin B12 deficiency anemia (2006).    Social History:  Reviewed. No changes to social history except as noted above in HPI.    Vital Signs:   None. This is phone/video visit.     Labs:  Most recent laboratory results reviewed and the pertinent results include:   Lab Results   Component Value Date    WBC 17.5 03/16/2021     Lab Results   Component Value Date    RBC 4.50 03/16/2021     Lab Results   Component Value Date    HGB 13.3 03/16/2021     Lab Results   Component Value Date    HCT 42.1 03/16/2021     No components found for: MCT  Lab Results   Component Value Date    MCV 94 03/16/2021     Lab Results    Component Value Date    MCH 29.6 03/16/2021     Lab Results   Component Value Date    MCHC 31.6 03/16/2021     Lab Results   Component Value Date    RDW 13.7 03/16/2021     Lab Results   Component Value Date     03/16/2021     Last Comprehensive Metabolic Panel:  Sodium   Date Value Ref Range Status   03/16/2021 141 133 - 144 mmol/L Final     Potassium   Date Value Ref Range Status   03/16/2021 3.8 3.4 - 5.3 mmol/L Final     Chloride   Date Value Ref Range Status   03/16/2021 111 (H) 94 - 109 mmol/L Final     Carbon Dioxide   Date Value Ref Range Status   03/16/2021 28 20 - 32 mmol/L Final     Anion Gap   Date Value Ref Range Status   03/16/2021 2 (L) 3 - 14 mmol/L Final     Glucose   Date Value Ref Range Status   03/16/2021 99 70 - 99 mg/dL Final     Comment:     Fasting specimen     Urea Nitrogen   Date Value Ref Range Status   03/16/2021 5 (L) 7 - 30 mg/dL Final     Creatinine   Date Value Ref Range Status   03/16/2021 0.68 0.52 - 1.04 mg/dL Final     GFR Estimate   Date Value Ref Range Status   03/16/2021 >90 >60 mL/min/[1.73_m2] Final     Comment:     Non  GFR Calc  Starting 12/18/2018, serum creatinine based estimated GFR (eGFR) will be   calculated using the Chronic Kidney Disease Epidemiology Collaboration   (CKD-EPI) equation.       Calcium   Date Value Ref Range Status   03/16/2021 8.7 8.5 - 10.1 mg/dL Final     Bilirubin Total   Date Value Ref Range Status   03/16/2021 0.4 0.2 - 1.3 mg/dL Final     Alkaline Phosphatase   Date Value Ref Range Status   03/16/2021 87 40 - 150 U/L Final     ALT   Date Value Ref Range Status   03/16/2021 26 0 - 50 U/L Final     AST   Date Value Ref Range Status   03/16/2021 44 0 - 45 U/L Final     Review of Systems:  10 systems (general, cardiovascular, respiratory, eyes, ENT, endocrine, GI, , M/S, neurological) were reviewed. Most pertinent finding(s) is/are: Chronic pain. The remaining systems are all unremarkable.    Mental Status Examination  (limited as this is by phone/video):  Appearance: Awake, alert, appears stated age, well-groomed, no acute distress  Attitude:  cooperative, pleasant  Motor: No gross abnormalities observed via video, not formally tested  Oriented to:  person, place, time, and situation  Attention Span and Concentration:  normal  Speech:  clear, coherent, regular rate, rhythm, and volume  Language: intact  Mood:  anxious and depressed  Affect:  appropriate and in normal range and mood congruent  Associations:  no loose associations  Thought Process:  logical, linear and goal oriented  Thought Content:  no evidence of suicidal ideation today or homicidal ideation, no evidence of psychotic thought, no auditory hallucinations present and no visual hallucinations present  Recent and Remote Memory:  Intact to interview. Not formally assessed. No amnesia.  Fund of Knowledge: appropriate  Insight:  good  Judgment:  intact, adequate for safety  Impulse Control:  intact    Suicide Risk Assessment:  Today Janelle White reports no suicidal ideation today but does have history of chronic/intermittent suicidal ideation.There are notable risk factors for self-harm, including anxiety, comorbid medical condition of Chronic pain, suicidal ideation, purposelessness/no reason for living, hopelessness, withdrawing and mood change. However, risk is mitigated by commitment to family, absence of past attempts, ability to volunteer a safety plan, history of seeking help when needed, future oriented and denies suicidal intent or plan.  Therefore, based on all available evidence including the factors cited above, Janelle White does not appear to be at imminent risk for self-harm, does not meet criteria for a 72-hr hold, and therefore remains appropriate for ongoing outpatient level of care.  A thorough assessment of risk factors related to suicide and self-harm have been reviewed and are noted above. Local community safety resources printed and  reviewed for patient to use if needed. There was no deceit detected, and the patient presented in a manner that was believable.     DSM5 Diagnosis:  296.33 (F33.2) Major Depressive Disorder, Recurrent Episode, Severe _, Without psychotic features  Treatment resistant depression  CYP2D6 poor metabolizer  CYP2B6 intermediate metabolizer  Homozygous for C677T polymorphism of MTHFR    Medical comorbidities include:   Patient Active Problem List    Diagnosis Date Noted     Prediabetes 09/19/2019     Priority: Medium     Hyperlipidemia LDL goal <130 09/19/2019     Priority: Medium     CLARE (obstructive sleep apnea) 02/13/2019     Priority: Medium     Controlled substance agreement signed 08/14/2018     Priority: Medium     Chronic pain syndrome 12/21/2017     Priority: Medium     CLL (chronic lymphocytic leukemia) (H) 12/21/2017     Priority: Medium     Shift work sleep disorder 12/16/2013     Priority: Medium     Vitamin D deficiency 11/08/2012     Priority: Medium     Moderate recurrent major depression (H) 01/06/2011     Priority: Medium     DDD (degenerative disc disease), cervical 10/07/2010     Priority: Medium     CARDIOVASCULAR SCREENING; LDL GOAL LESS THAN 160 02/10/2010     Priority: Medium     Chronic Low Back Pain 10/01/2009     Priority: Medium     S/p AP L3-S1 fusion 12/2010 - referred to [facility].   Orthopedics writing scripts for narcotics post-op.       Migraine      Priority: Medium     Problem list name updated by automated process. Provider to review       B-complex deficiency 10/10/2006     Priority: Medium     Problem list name updated by automated process. Provider to review       PERSONAL HX OF  MELANOMA 12/04/2003     Priority: Low       Psychosocial & Contextual Factors: see HPI above    Assessment:  Janelle TORRES Christopher reports overall little change in her symptoms with the exception of slightly improved energy and Adderall.  Methylphenidate was quite helpful but unfortunately she did not  tolerate due to allergies/hives.  Adderall started in its place.  We will wait on increasing Adderall at this time due to multiple other medication changes.  GeneSight testing revealed she is a poor 2D6 metabolizer and an intermediate 2B6 metabolizer.  This would explain her multiple failed medication trials due to the negative side effects.  She also has a genotype that would suggest a phenotype sensitivity to serotonin.  She decreased duloxetine to 60 mg daily since she was not tolerating the higher dose.  GeneSight testing revealed she may tolerate Pristiq better than duloxetine (Fetzima and Viibryd also in her green category).  I would like to give Pristiq a try since she may still get some pain benefits by staying on an SNRI.  She also was found to have significantly reduced folic acid conversion.  I recommend a trial with methyl folate.  Folate lab ordered as well.    Medication side effects and alternatives were reviewed. Health promotion activities recommended and reviewed today. All questions addressed. Education and counseling completed regarding risks and benefits of medications and psychotherapy options. Recommend therapy for additional support.     Treatment Plan:    Decrease/discontinue duloxetine.  Take 40 mg daily for 2 weeks and then decrease to 20 mg daily for 2 weeks and then stop the medication.    Start Pristiq 25 mg daily for mood, anxiety.  Depending on how you are feeling, can trial increasing the dose to 50 mg daily when you are down to 20 mg daily of duloxetine.  You can also wait for your follow-up visit if preferred.    Start methyl folate 7.5 mg daily as supplementation.    Continue Adderall 10 mg twice daily to augment your antidepressant.    Continue benzodiazepine per primary care prescriber as I am not comfortable managing your benzodiazepine regimen due to concommitment chronic opioid therapy, ongoing suicidal ideation, and lack of knowledge/rapport with patient.    Recommend  individual and/or psychotherapy.      Continue all other cares per primary care provider.     Continue all other medications as reviewed per electronic medical record today.     Safety plan reviewed. To the Emergency Department as needed or call after hours crisis line at [phone] or [phone]. [place] Crisis Text Line. Text [place] to 709994 or Suicide LifeLine Chat: [email or web address] of [email or web address]/chat    Schedule an appointment with me in 3-4 weeks or sooner as needed. Call [facility] at [phone] to schedule.    Follow up with primary care provider as planned or for acute medical concerns.    Call the psychiatric nurse line with medication questions or concerns at [phone].    WineNicehart may be used to communicate with your provider, but this is not intended to be used for emergencies.    Risks of benzodiazepine (Ativan, Xanax, Klonopin, Valium, etc) use including, but not limited to, sedation, tolerance, risk for addiction/dependence. Do not drink alcohol while taking benzodiazepines due to risk of trouble breathing and potential death. Do not drive or operate heavy machinery until it is known how the drug affects you. Discuss with physician or pharmacist before ever taking a benzodiazepine with a narcotic/opioid pain medication.     Have discussed risks of stimulant medication including, but not limited to, decreased appetite, risk of tics (and that they may be lasting), trouble sleeping, cardiac risks such as increased heart rate and blood pressure, and rare risk of sudden cardiac death.  Also risk of addiction/tolerance/dependence.    Administrative Billing:   Phone Call/Video Duration: 30 Minutes  Start: 10:31a  Stop: 11:01a    Time spent with patient was 30 minutes and greater than 50% of time or 16 minutes was spent in counseling and coordination of care regarding above diagnoses and treatment plan. Patient with difficult to treat psychiatric diagnoses and multiple medication  changes adding to complexity of care.    Patient Status:  Patient will continue to be seen for ongoing consultation and stabilization.    Signed:   Kimberly Perez DO  [facility]    Disclaimer: This note consists of symbols derived from keyboarding, dictation and/or voice recognition software. As a result, there may be errors in the script that have gone undetected. Please consider this when interpreting information found in this chart.

## 2021-04-12 NOTE — PROGRESS NOTES
"    [facility]                                      Patient's Name: Janelle White  March 11, 2021    SAFETY PLAN:  Step 1: Warning signs / cues (Thoughts, images, mood, situation, behavior) that a crisis may be developing:    Thoughts: \"I don't matter\", \"People would be better off without me\", \"I can't do this anymore\" and \"I just want this to end\"    Images: obsessive thoughts of death or dying: thoughts of carrying out plan    Thinking Processes: ruminations (can't stop thinking about my problems): ruminate on my plan and highly critical and negative thoughts: if  says something critical i shut down    Mood: worsening depression, hopelessness, disinhibited (not caring about things or consequences) and worthlessness    Behaviors: isolating/withdrawing , can't stop crying, not taking care of myself and not taking care of my responsibilities    Situations: relationship problems   Step 2: Coping strategies - Things I can do to take my mind off of my problems without contacting another person (relaxation technique, physical activity):    Distress Tolerance Strategies:  play with my pet  and watch a funny movie:      Physical Activities: go for a walk and get in the [place]    Focus on helpful thoughts:  \"This is temporary\"  Step 3: People and social settings that provide distraction:   Name: Alyx Phone: in my phone   Name: Raven Phone: in my phone    park    Step 4: Remind myself of people and things that are important to me and worth living for:  Children, dog, friends  Step 5: When I am in crisis, I can ask these people to help me use my safety plan:   Name: Alyx Phone: in my phone   Name: Raven Phone: in my phone  Step 6: Making the environment safe:     none identified  Step 7: Professionals or agencies I can contact during a crisis:    Suicide Prevention Lifeline: 2-062-950-TALK (0156)    Crisis Text Line Service: Text   HOME  to 506-762.    Local Crisis Services: [facility]" [facility]    Call 911 or go to my nearest emergency department.   I helped develop this safety plan and agree to use it when needed.  I have been given a copy of this plan.      Patient signature: _______________________________________________________________  Today s date:  March 11, 2021  Adapted from Safety Plan Template 2008 Antionette Trevino and Joseph Leon is reprinted with the express permission of the authors.  No portion of the Safety Plan Template may be reproduced without the express, written permission.  You can contact the authors at [email or web address] or [email or web address].      [facility] ([facility])  April 12, 2021    Behavioral Health Clinician Progress Note    Patient Name: Janelle White      Telemedicine Visit: The patient's condition can be safely assessed and treated via synchronous audio and visual telemedicine encounter.      Reason for Telemedicine Visit: Services only offered telehealth    Originating Site (Patient Location): Patient's home    Distant Site (Provider Location): Provider Remote Setting    Consent:  The patient/guardian has verbally consented to: the potential risks and benefits of telemedicine (video visit) versus in person care; bill my insurance or make self-payment for services provided; and responsibility for payment of non-covered services.     Mode of Communication:  Video Conference via [employer]    As the provider I attest to compliance with applicable laws and regulations related to telemedicine.         Service Type:  Individual      Session Start Time: 09:45am  Session End Time: 10:10am      Session Length: 16 - 37      Attendees: Patient    Visit Activities (Refresh list every visit): [facility] Only    Diagnostic Assessment Date: 03/11/2021  See Flowsheets for today's PHQ-9 and STACIE-7 results  Previous PHQ-9:   PHQ-9 SCORE 6/2/2020 11/16/2020 3/11/2021   PHQ-9 Total Score - - -   PHQ-9 Total Score MyChart - - 22  "(Severe depression)   PHQ-9 Total Score 6 2 22       Previous STACIE-7:   STACIE-7 SCORE 7/23/2019 12/10/2019 3/11/2021   Total Score - - -   Total Score - - 8 (mild anxiety)   Total Score 1 2 8       WHODAS  WHODAS 2.0 Total Score 3/11/2021   Total Score 37   Total Score MyChart 37        AUDIT  AUDIT - Alcohol Use Disorders Identification Test - Reproduced from the World Health Organization Audit 2001 (Second Edition) 3/11/2021   1.  How often do you have a drink containing alcohol? 2 to 3 times a week   2.  How many drinks containing alcohol do you have on a typical day when you are drinking? 1 or 2   3.  How often do you have five or more drinks on one occasion? Never   4.  How often during the last year have you found that you were not able to stop drinking once you had started? Never   5.  How often during the last year have you failed to do what was normally expected of you because of drinking? Never   6.  How often during the last year have you needed a first drink in the morning to get yourself going after a heavy drinking session? Never   7.  How often during the last year have you had a feeling of guilt or remorse after drinking? Less than monthly   8.  How often during the last year have you been unable to remember what happened the night before because of your drinking? Never   9.  Have you or someone else been injured because of your drinking? No   10. Has a relative, friend, doctor or other health care worker been concerned about your drinking or suggested you cut down? Yes, during the last year   TOTAL SCORE 8       DATA  Extended Session (60+ minutes): No  Interactive Complexity: No  Crisis: No    Medication Compliance:  Yes      Chemical Use Review:   Substance Use: Chemical use reviewed, no active concerns identified      Tobacco Use: No current tobacco use.      Current Stressors / Issues:  Ritalin caused hives. Finds the medication switch to Adderall has helped some \"but not the same zip as with the " "ritalin.\" She spoke about working on long-term disability and said \"it's not in my control but it's stressful.\" She spoke about her challenges with her health as well. She continues to work with her pain specialist and oncologist in this regard. She noted that her word finding problems are better as she has limited duloxetine to 60mg once per day and finds it is better for her. She is hopeful that [facility] test results are ready. She also would like to discuss any adjustments to the Adderall.       Review of Symptoms per patient report:  Depression:     Change in sleep, Change in energy level, Difficulties concentrating, Suicidal ideation, Feelings of hopelessness, Low self-worth, Ruminations, Irritability, Feeling sad, down, or depressed, Withdrawn, Poor hygeine, Frequent crying, Anger outbursts and has a history of cutting behavior in high school; not since then  Deepa:             No Symptoms  Psychosis:       No Symptoms  Anxiety:           No Symptoms  Panic:              No symptoms  Post Traumatic Stress Disorder:  No Symptoms   Eating Disorder:          No Symptoms and History of binge/purge in college and for a few years after; no treatment  ADD / ADHD:              No symptoms  Conduct Disorder:       No symptoms  Autism Spectrum Disorder:     No symptoms  Obsessive Compulsive Disorder:       No Symptoms      Changes in Health Issues:   None reported    Assessment: Current Emotional / Mental Status (status of significant symptoms):  Risk status (Self / Other harm or suicidal ideation)  Patient has had a history of suicidal ideation: has a plan but will not disclose   Patient denies current fears or concerns for personal safety.  Patient reports the following current or recent suicidal ideation or behaviors: ongoing SI as previously noted .  Patient denies current or recent homicidal ideation or behaviors.  Patient denies current or recent self injurious behavior or ideation.  Patient denies other safety " concerns.  A safety and risk management plan has been developed including: Patient consented to co-developed safety plan.  A safety and risk management plan was completed.  Patient agreed to use safety plan should any safety concerns arise.  A copy was given to the patient.    Appearance:   Appropriate   Eye Contact:   Good   Psychomotor Behavior: Normal   Attitude:   Cooperative   Orientation:   All  Speech   Rate / Production: Normal    Volume:  Normal   Mood:    Normal  Affect:    Appropriate   Thought Content:  Clear   Thought Form:  Coherent  Logical   Insight:    Good     Diagnoses:  1. Severe episode of recurrent major depressive disorder, without psychotic features (H)        Collateral Reports Completed:  Communicated with: Dr. Perez    Plan: (Homework, other):  Patient was given information about behavioral services and encouraged to schedule a follow up appointment with the clinic [facility] in conjunction with next [facility] appointment.  She was also given information about mental health symptoms and treatment options .  CD Recommendations: No indications of CD issues.      Matt Manzo PsyD, LP      Melvin Manzo PsyD  April 12, 2021

## 2021-04-12 NOTE — Clinical Note
Setup testing offered a great deal of insight into her case.  You can see my note for further details.  Made some changes based on results.  Hopefully the changes will be more helpful.  I will see her back for follow-up soon.  Let me know if you have any questions or concerns.

## 2021-04-12 NOTE — Clinical Note
Please call this patient to get them scheduled for a follow-up visit in 3-4 weeks. Please schedule with me and the [facility]. Thanks!

## 2021-04-12 NOTE — PATIENT INSTRUCTIONS
Treatment Plan:    Decrease/discontinue duloxetine.  Take 40 mg daily for 2 weeks and then decrease to 20 mg daily for 2 weeks and then stop the medication.    Start Pristiq 25 mg daily for mood, anxiety.  Depending on how you are feeling, can trial increasing the dose to 50 mg daily when you are down to 20 mg daily of duloxetine.  You can also wait for your follow-up visit if preferred.    Start methyl folate 7.5 mg daily as supplementation.    Continue Adderall 10 mg twice daily to augment your antidepressant.    Continue benzodiazepine per primary care prescriber as I am not comfortable managing your benzodiazepine regimen due to concommitment chronic opioid therapy, ongoing suicidal ideation, and lack of knowledge/rapport with patient.    Recommend individual and/or psychotherapy.      Continue all other cares per primary care provider.     Continue all other medications as reviewed per electronic medical record today.     Safety plan reviewed. To the Emergency Department as needed or call after hours crisis line at [phone] or [phone]. [place] Crisis Text Line. Text [place] to 837921 or Suicide LifeLine Chat: suicidepreventionlifeline.org/chat    Schedule an appointment with me in 3-4 weeks or sooner as needed. Call [facility] at [phone] to schedule.    Follow up with primary care provider as planned or for acute medical concerns.    Call the psychiatric nurse line with medication questions or concerns at [phone].    MyChart may be used to communicate with your provider, but this is not intended to be used for emergencies.    Risks of benzodiazepine (Ativan, Xanax, Klonopin, Valium, etc) use including, but not limited to, sedation, tolerance, risk for addiction/dependence. Do not drink alcohol while taking benzodiazepines due to risk of trouble breathing and potential death. Do not drive or operate heavy machinery until it is known how the drug affects you. Discuss with  physician or pharmacist before ever taking a benzodiazepine with a narcotic/opioid pain medication.     Have discussed risks of stimulant medication including, but not limited to, decreased appetite, risk of tics (and that they may be lasting), trouble sleeping, cardiac risks such as increased heart rate and blood pressure, and rare risk of sudden cardiac death.  Also risk of addiction/tolerance/dependence.

## 2021-04-13 ENCOUNTER — HOSPITAL ENCOUNTER (OUTPATIENT)
Dept: PET IMAGING | Facility: CLINIC | Age: 61
Discharge: HOME | End: 2021-04-13
Attending: INTERNAL MEDICINE | Admitting: INTERNAL MEDICINE
Payer: COMMERCIAL

## 2021-04-13 DIAGNOSIS — C91.10 CHRONIC LYMPHOCYTIC LEUKEMIA OF B-CELL TYPE NOT HAVING ACHIEVED REMISSION (H): ICD-10-CM

## 2021-04-13 PROCEDURE — 78816 PET IMAGE W/CT FULL BODY: CPT | Mod: 26 | Performed by: RADIOLOGY

## 2021-04-13 PROCEDURE — A9552 F18 FDG: HCPCS | Performed by: INTERNAL MEDICINE

## 2021-04-13 PROCEDURE — 343N000001 HC RX 343: Performed by: INTERNAL MEDICINE

## 2021-04-13 PROCEDURE — 78816 PET IMAGE W/CT FULL BODY: CPT | Mod: PI

## 2021-04-13 RX ADMIN — FLUDEOXYGLUCOSE F-18 11.37 MCI.: 500 INJECTION, SOLUTION INTRAVENOUS at 14:34

## 2021-04-16 ENCOUNTER — OFFICE VISIT (OUTPATIENT)
Dept: FAMILY MEDICINE | Facility: CLINIC | Age: 61
End: 2021-04-16
Payer: COMMERCIAL

## 2021-04-16 VITALS
DIASTOLIC BLOOD PRESSURE: 74 MMHG | SYSTOLIC BLOOD PRESSURE: 110 MMHG | BODY MASS INDEX: 26.66 KG/M2 | WEIGHT: 160 LBS | HEIGHT: 65 IN | HEART RATE: 80 BPM | OXYGEN SATURATION: 98 % | RESPIRATION RATE: 14 BRPM | TEMPERATURE: 96.8 F

## 2021-04-16 DIAGNOSIS — E53.9 B-COMPLEX DEFICIENCY: ICD-10-CM

## 2021-04-16 DIAGNOSIS — F33.2 SEVERE EPISODE OF RECURRENT MAJOR DEPRESSIVE DISORDER, WITHOUT PSYCHOTIC FEATURES (H): ICD-10-CM

## 2021-04-16 DIAGNOSIS — G89.4 CHRONIC PAIN SYNDROME: ICD-10-CM

## 2021-04-16 LAB
FOLATE SERPL-MCNC: 15.2 NG/ML
VIT B12 SERPL-MCNC: 478 PG/ML (ref 193–986)

## 2021-04-16 PROCEDURE — 82607 VITAMIN B-12: CPT | Performed by: FAMILY MEDICINE

## 2021-04-16 PROCEDURE — 99214 OFFICE O/P EST MOD 30 MIN: CPT | Performed by: FAMILY MEDICINE

## 2021-04-16 PROCEDURE — 82746 ASSAY OF FOLIC ACID SERUM: CPT | Performed by: PSYCHIATRY & NEUROLOGY

## 2021-04-16 PROCEDURE — 36415 COLL VENOUS BLD VENIPUNCTURE: CPT | Performed by: PSYCHIATRY & NEUROLOGY

## 2021-04-16 RX ORDER — MORPHINE SULFATE 15 MG/1
15 TABLET, FILM COATED, EXTENDED RELEASE ORAL EVERY 12 HOURS
Qty: 60 TABLET | Refills: 0 | Status: SHIPPED | OUTPATIENT
Start: 2021-04-16 | End: 2021-05-10

## 2021-04-16 RX ORDER — HYDROCODONE BITARTRATE AND ACETAMINOPHEN 10; 325 MG/1; MG/1
TABLET ORAL
Qty: 60 TABLET | Refills: 0 | Status: SHIPPED | OUTPATIENT
Start: 2021-04-16 | End: 2021-05-10

## 2021-04-16 ASSESSMENT — MIFFLIN-ST. JEOR: SCORE: 1292.67
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
## 2021-04-18 PROBLEM — Z15.89 MTHFR GENE MUTATION: Status: ACTIVE | Noted: 2021-04-12

## 2021-04-22 ENCOUNTER — TRANSFERRED RECORDS (OUTPATIENT)
Dept: HEALTH INFORMATION MANAGEMENT | Facility: CLINIC | Age: 61
End: 2021-04-22

## 2021-04-27 ENCOUNTER — TRANSFERRED RECORDS (OUTPATIENT)
Dept: HEALTH INFORMATION MANAGEMENT | Facility: CLINIC | Age: 61
End: 2021-04-27

## 2021-05-03 ENCOUNTER — VIRTUAL VISIT (OUTPATIENT)
Dept: PSYCHIATRY | Facility: CLINIC | Age: 61
End: 2021-05-03
Payer: COMMERCIAL

## 2021-05-03 ENCOUNTER — VIRTUAL VISIT (OUTPATIENT)
Dept: PSYCHOLOGY | Facility: CLINIC | Age: 61
End: 2021-05-03
Payer: COMMERCIAL

## 2021-05-03 DIAGNOSIS — E88.89 CYP2B6 INTERMEDIATE METABOLIZER (H): ICD-10-CM

## 2021-05-03 DIAGNOSIS — F33.41 RECURRENT MAJOR DEPRESSIVE DISORDER, IN PARTIAL REMISSION (H): Primary | ICD-10-CM

## 2021-05-03 DIAGNOSIS — E72.12 HOMOZYGOUS FOR C677T POLYMORPHISM OF MTHFR (H): ICD-10-CM

## 2021-05-03 DIAGNOSIS — F33.9 RECURRENT MAJOR DEPRESSION RESISTANT TO TREATMENT (H): ICD-10-CM

## 2021-05-03 DIAGNOSIS — E88.89 CYP2D6 POOR METABOLIZER (H): ICD-10-CM

## 2021-05-03 DIAGNOSIS — F33.9 RECURRENT MAJOR DEPRESSION RESISTANT TO TREATMENT (H): Primary | ICD-10-CM

## 2021-05-03 PROCEDURE — 90832 PSYTX W PT 30 MINUTES: CPT | Mod: 95 | Performed by: PSYCHOLOGIST

## 2021-05-03 PROCEDURE — 99214 OFFICE O/P EST MOD 30 MIN: CPT | Mod: 95 | Performed by: PSYCHIATRY & NEUROLOGY

## 2021-05-03 RX ORDER — DEXTROAMPHETAMINE SACCHARATE, AMPHETAMINE ASPARTATE MONOHYDRATE, DEXTROAMPHETAMINE SULFATE AND AMPHETAMINE SULFATE 5; 5; 5; 5 MG/1; MG/1; MG/1; MG/1
20 CAPSULE, EXTENDED RELEASE ORAL DAILY
Qty: 30 CAPSULE | Refills: 0 | Status: SHIPPED | OUTPATIENT
Start: 2021-05-03 | End: 2021-06-01

## 2021-05-03 RX ORDER — DESVENLAFAXINE 50 MG/1
50 TABLET, FILM COATED, EXTENDED RELEASE ORAL DAILY
Qty: 30 TABLET | Refills: 1 | Status: SHIPPED | OUTPATIENT
Start: 2021-05-03 | End: 2021-06-15

## 2021-05-03 RX ORDER — DEXTROAMPHETAMINE SACCHARATE, AMPHETAMINE ASPARTATE, DEXTROAMPHETAMINE SULFATE AND AMPHETAMINE SULFATE 2.5; 2.5; 2.5; 2.5 MG/1; MG/1; MG/1; MG/1
5-10 TABLET ORAL DAILY PRN
COMMUNITY
End: 2021-06-01

## 2021-05-03 NOTE — Clinical Note
Had a good visit with Janelle today.  Things are starting to look up since transitioning to Pristiq.  I am hopeful we have turned a corner by getting her off of duloxetine and on 2 medication she will tolerate better.  I will see her back for follow-up in about a month.  Let me know if you have any questions or concerns.

## 2021-05-03 NOTE — Clinical Note
Please call this patient to get them scheduled for a follow-up visit in 4-6 weeks. Please schedule with me and the [facility]. Thanks!

## 2021-05-03 NOTE — PATIENT INSTRUCTIONS
Treatment Plan:    Discontinued duloxetine due to side effects/inefficacy    Increase Pristiq to 50 mg daily for mood, anxiety. Can wait another week or two to increase to 50 mg if you would like.    Continue methyl folate 7.5 mg daily as supplementation.    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    Continue benzodiazepine per primary care prescriber.    Recommend individual and/or psychotherapy.      Continue all other cares per primary care provider.     Continue all other medications as reviewed per electronic medical record today.     Safety plan reviewed. To the Emergency Department as needed or call after hours crisis line at [phone] or [phone]. [place] Crisis Text Line. Text [place] to 070837 or Suicide LifeLine Chat: suicidepreventionlifeline.org/chat    Schedule an appointment with me in 4-6 weeks or sooner as needed. Call [facility] at [phone] to schedule.    Follow up with primary care provider as planned or for acute medical concerns.    Call the psychiatric nurse line with medication questions or concerns at [phone].    Fubleshart may be used to communicate with your provider, but this is not intended to be used for emergencies.    Risks of benzodiazepine (Ativan, Xanax, Klonopin, Valium, etc) use including, but not limited to, sedation, tolerance, risk for addiction/dependence. Do not drink alcohol while taking benzodiazepines due to risk of trouble breathing and potential death. Do not drive or operate heavy machinery until it is known how the drug affects you. Discuss with physician or pharmacist before ever taking a benzodiazepine with a narcotic/opioid pain medication.     Have discussed risks of stimulant medication including, but not limited to, decreased appetite, risk of tics (and that they may be  lasting), trouble sleeping, cardiac risks such as increased heart rate and blood pressure, and rare risk of sudden cardiac death.  Also risk of addiction/tolerance/dependence.

## 2021-05-03 NOTE — PROGRESS NOTES
"    [facility]                                      Patient's Name: Janelle White  March 11, 2021    SAFETY PLAN:  Step 1: Warning signs / cues (Thoughts, images, mood, situation, behavior) that a crisis may be developing:    Thoughts: \"I don't matter\", \"People would be better off without me\", \"I can't do this anymore\" and \"I just want this to end\"    Images: obsessive thoughts of death or dying: thoughts of carrying out plan    Thinking Processes: ruminations (can't stop thinking about my problems): ruminate on my plan and highly critical and negative thoughts: if  says something critical i shut down    Mood: worsening depression, hopelessness, disinhibited (not caring about things or consequences) and worthlessness    Behaviors: isolating/withdrawing , can't stop crying, not taking care of myself and not taking care of my responsibilities    Situations: relationship problems   Step 2: Coping strategies - Things I can do to take my mind off of my problems without contacting another person (relaxation technique, physical activity):    Distress Tolerance Strategies:  play with my pet  and watch a funny movie:      Physical Activities: go for a walk and get in the [employer]    Focus on helpful thoughts:  \"This is temporary\"  Step 3: People and social settings that provide distraction:   Name: Alyx Phone: in my phone   Name: Raven Phone: in my phone    park    Step 4: Remind myself of people and things that are important to me and worth living for:  Children, dog, friends  Step 5: When I am in crisis, I can ask these people to help me use my safety plan:   Name: Alyx Phone: in my phone   Name: Raven Phone: in my phone  Step 6: Making the environment safe:     none identified  Step 7: Professionals or agencies I can contact during a crisis:    Suicide Prevention Lifeline: 0-816-791-TALK (1496)    Crisis Text Line Service: Text   HOME  to 730-726.    Local Crisis Services: [facility]" [facility]    Call 911 or go to my nearest emergency department.   I helped develop this safety plan and agree to use it when needed.  I have been given a copy of this plan.      Patient signature: _______________________________________________________________  Today s date:  March 11, 2021  Adapted from Safety Plan Template 2008 Antionette Trevino and Joseph Leon is reprinted with the express permission of the authors.  No portion of the Safety Plan Template may be reproduced without the express, written permission.  You can contact the authors at [email or web address] or [email or web address].      [facility] ([facility])  May 3, 2021      Behavioral Health Clinician Progress Note    Patient Name: Janelle White      Telemedicine Visit: The patient's condition can be safely assessed and treated via synchronous audio and visual telemedicine encounter.      Reason for Telemedicine Visit: Services only offered telehealth    Originating Site (Patient Location): Patient's home    Distant Site (Provider Location): Provider Remote Setting    Consent:  The patient/guardian has verbally consented to: the potential risks and benefits of telemedicine (video visit) versus in person care; bill my insurance or make self-payment for services provided; and responsibility for payment of non-covered services.     Mode of Communication:  Video Conference via [employer]    As the provider I attest to compliance with applicable laws and regulations related to telemedicine.         Service Type:  Individual      Session Start Time: 09:45am  Session End Time: 10:05am      Session Length: 16 - 37      Attendees: Patient    Visit Activities (Refresh list every visit): [facility] [place] Only    Diagnostic Assessment Date: 03/11/2021  See Flowsheets for today's PHQ-9 and STACIE-7 results  Previous PHQ-9:   PHQ-9 SCORE 6/2/2020 11/16/2020 3/11/2021   PHQ-9 Total Score - - -   PHQ-9 Total Score MyChart - - 22  "(Severe depression)   PHQ-9 Total Score 6 2 22       Previous STACIE-7:   STACIE-7 SCORE 7/23/2019 12/10/2019 3/11/2021   Total Score - - -   Total Score - - 8 (mild anxiety)   Total Score 1 2 8       WHODAS  WHODAS 2.0 Total Score 3/11/2021   Total Score 37   Total Score MyChart 37        AUDIT  AUDIT - Alcohol Use Disorders Identification Test - Reproduced from the World Health Organization Audit 2001 (Second Edition) 3/11/2021   1.  How often do you have a drink containing alcohol? 2 to 3 times a week   2.  How many drinks containing alcohol do you have on a typical day when you are drinking? 1 or 2   3.  How often do you have five or more drinks on one occasion? Never   4.  How often during the last year have you found that you were not able to stop drinking once you had started? Never   5.  How often during the last year have you failed to do what was normally expected of you because of drinking? Never   6.  How often during the last year have you needed a first drink in the morning to get yourself going after a heavy drinking session? Never   7.  How often during the last year have you had a feeling of guilt or remorse after drinking? Less than monthly   8.  How often during the last year have you been unable to remember what happened the night before because of your drinking? Never   9.  Have you or someone else been injured because of your drinking? No   10. Has a relative, friend, doctor or other health care worker been concerned about your drinking or suggested you cut down? Yes, during the last year   TOTAL SCORE 8       DATA  Extended Session (60+ minutes): No  Interactive Complexity: No  Crisis: No    Medication Compliance:  Yes      Chemical Use Review:   Substance Use: Chemical use reviewed, no active concerns identified      Tobacco Use: No current tobacco use.      Current Stressors / Issues:  \"The transition to [facility] actually went pretty well.\" She noted that better weather and being able to be outside " "helped as well. She noted that her mood seems stable and she is pleased with that. She noted she has some irritability still and is working on better communication at home. She wondered if in the future there will be room to increase the Pristiq if needed. She also questioned if she could increase Adderall to 15mg twice per day to help with \"a little more consistent energy.\" She finds that as it wears off she gets \"more snappier\" with people. \"I've noticed a good walk helps with that as well.\" She continues to work with her therapist to improve communication and irritability. \"A lot of times I don't recognize when I'm getting irritable\" which is part of her work.       Review of Symptoms per patient report:  Depression:     Change in sleep, Change in energy level, Difficulties concentrating, Suicidal ideation, Feelings of hopelessness, Low self-worth, Ruminations, Irritability, Feeling sad, down, or depressed, Withdrawn, Poor hygeine, Frequent crying, Anger outbursts and has a history of cutting behavior in high school; not since then  Deepa:             No Symptoms  Psychosis:       No Symptoms  Anxiety:           No Symptoms  Panic:              No symptoms  Post Traumatic Stress Disorder:  No Symptoms   Eating Disorder:          No Symptoms and History of binge/purge in college and for a few years after; no treatment  ADD / ADHD:              No symptoms  Conduct Disorder:       No symptoms  Autism Spectrum Disorder:     No symptoms  Obsessive Compulsive Disorder:       No Symptoms      Changes in Health Issues:   None reported    Assessment: Current Emotional / Mental Status (status of significant symptoms):  Risk status (Self / Other harm or suicidal ideation)  Patient has had a history of suicidal ideation: has a plan but will not disclose   Patient denies current fears or concerns for personal safety.  Patient reports the following current or recent suicidal ideation or behaviors: ongoing SI as previously " noted .  Patient denies current or recent homicidal ideation or behaviors.  Patient denies current or recent self injurious behavior or ideation.  Patient denies other safety concerns.  A safety and risk management plan has been developed including: Patient consented to co-developed safety plan.  A safety and risk management plan was completed.  Patient agreed to use safety plan should any safety concerns arise.  A copy was given to the patient.    Appearance:   Appropriate   Eye Contact:   Good   Psychomotor Behavior: Normal   Attitude:   Cooperative   Orientation:   All  Speech   Rate / Production: Normal    Volume:  Normal   Mood:    Normal  Affect:    Appropriate   Thought Content:  Clear   Thought Form:  Coherent  Logical   Insight:    Good     Diagnoses:  1. Recurrent major depression resistant to treatment (H)        Collateral Reports Completed:  Communicated with: Dr. Perez    Plan: (Homework, other):  Patient was given information about behavioral services and encouraged to schedule a follow up appointment with the clinic [facility] in conjunction with next [facility] appointment.  She was also given information about mental health symptoms and treatment options .  CD Recommendations: No indications of CD issues.      Matt Manzo PsyD, LP      Melvin Manzo PsyD  May 3, 2021

## 2021-05-03 NOTE — PROGRESS NOTES
"Janelle White is a 60 year old year old who is being evaluated via a billable video visit.      How would you like to obtain your AVS? MyChart  If you are dropped from the video visit, the video invite should be resent to: Text to cell phone: see Epic  Will anyone else be joining your video visit? No       Telemedicine Visit: The patient's condition can be safely assessed and treated via synchronous audio and visual telemedicine encounter.      Reason for Telemedicine Visit: Covid-19 Pandemic    Originating Site (Patient Location): Patient's home     Distant Site (Provider Location): Provider Remote Setting    Mode of Communication:  Video Conference via GroundLink    As the provider I attest to compliance with applicable laws and regulations related to telemedicine.        Outpatient Psychiatric Progress Note    Name: Janelle White   : 1960                    Primary Care Provider: Emili Ballard MD   Therapist: None    PHQ-9 scores:  PHQ-9 SCORE 2020 2020 3/11/2021   PHQ-9 Total Score - - -   PHQ-9 Total Score MyChart - - 22 (Severe depression)   PHQ-9 Total Score 6 2 22       STACIE-7 scores:  STACIE-7 SCORE 2019 12/10/2019 3/11/2021   Total Score - - -   Total Score - - 8 (mild anxiety)   Total Score 1 2 8       Patient Identification:  Patient is a 60 year old,   White Not  or  female  who presents for return visit with me.  Patient is currently on medical leave from work as NP. Patient attended the phone/video session alone. Patient prefers to be called: \"Janelle\".    Interim History:  I last saw Janelle White for outpatient psychiatry return visit on 2021. During that appointment, we:      Decrease/discontinue duloxetine.  Take 40 mg daily for 2 weeks and then decrease to 20 mg daily for 2 weeks and then stop the medication.    Start Pristiq 25 mg daily for mood, anxiety.  Depending on how you are feeling, can trial increasing the dose " "to 50 mg daily when you are down to 20 mg daily of duloxetine.  You can also wait for your follow-up visit if preferred.    Start methyl folate 7.5 mg daily as supplementation.    Continue Adderall 10 mg twice daily to augment your antidepressant.    Continue benzodiazepine per primary care prescriber as I am not comfortable managing your benzodiazepine regimen due to concommitment chronic opioid therapy, ongoing suicidal ideation, and lack of knowledge/rapport with patient.    Recommend individual and/or psychotherapy.      5/3: Pt overall doing quite well since last visit.  Transition off of duloxetine went better than expected.  No brain zaps or other major discontinuation symptoms.  Tolerating Pristiq well.  Having more energy and better motivation.  Thinking more clearly.  She will be starting chemotherapy soon for her CLL.  Goes for frequent walks and is eating a healthy diet.  Adderall continues to be helpful for improved energy and motivation but she does notice crashing in the early afternoon.  She will often take a nap in the afternoon.  Does not endorse suicidal ideation today.  No problematic drug or alcohol use.    Per [facility], Dr. Matt Manzo, during today's team-based visit:  \"The transition to Pristiq actually went pretty well.\" She noted that better weather and being able to be outside helped as well. She noted that her mood seems stable and she is pleased with that. She noted she has some irritability still and is working on better communication at home. She wondered if in the future there will be room to increase the Pristiq if needed. She also questioned if she could increase Adderall to 15mg twice per day to help with \"a little more consistent energy.\" She finds that as it wears off she gets \"more snappier\" with people. \"I've noticed a good walk helps with that as well.\" She continues to work with her therapist to improve communication and irritability. \"A lot of times I don't recognize when I'm " "getting irritable\" which is part of her work.     Psychiatric ROS:  Janelle TORRES Christopher reports mood has been: Improving  Anxiety has been: Improving  Sleep has been: Improving  Deepa sxs: None  Psychosis sxs: None  ADHD/ADD sxs: None  PTSD sxs: None  PHQ9 and GAD7 scores were reviewed today if completed.   Medication side effects: Denies  Current stressors include: Symptoms and See HPI above  Coping mechanisms and supports include: Family, Hobbies and Friends    Current medications include:   Current Outpatient Medications   Medication Sig     albuterol (PROAIR HFA/PROVENTIL HFA/VENTOLIN HFA) 108 (90 Base) MCG/ACT inhaler Inhale 2 puffs into the lungs every 6 hours     amphetamine-dextroamphetamine (ADDERALL) 10 MG tablet Take 1 tablet (10 mg) by mouth 2 times daily     calcium citrate (CALCIUM CITRATE) 950 MG tablet Take 1 tablet by mouth 2 times daily.     Calcium-Magnesium-Vitamin D (CALCIUM 500) 500-250-200 MG-MG-UNIT TABS Take 1 tablet by mouth     Cholecalciferol (D-5000) 5000 units TABS Take 5,000 Units by mouth     cyanocobalamin (CYANOCOBALAMIN) 1000 MCG/ML injection Inject 1 mL (1,000 mcg) into the muscle every 30 days     cyclobenzaprine (FLEXERIL) 10 MG tablet Take 1 tablet (10 mg) by mouth 3 times daily as needed for muscle spasms     desvenlafaxine succinate (PRISTIQ) 25 MG 24 hr tablet Take 1 tablet (25 mg) by mouth daily     DOCOSAHEXAENOIC ACID PO Take 1,000 mg by mouth      DULoxetine (CYMBALTA) 20 MG capsule Take 2 caps by mouth daily for two weeks then 1 cap daily for two weeks then stop.     Estradiol (DIVIGEL) 1 MG/GM GEL Place 1 packet onto the skin daily     estradiol (ESTRACE VAGINAL) 0.1 MG/GM vaginal cream Place 2 g vaginally three times a week.     HYDROcodone-acetaminophen (NORCO)  MG per tablet take 1 tabs q 4hrs, max 4 tabs/24 hrs     insulin syringe-needle U-100 (30G X 1/2\" 1 ML) 30G X 1/2\" 1 ML miscellaneous Inject 1 ml B12 qmonth     lidocaine (LIDODERM) 5 % patch Place 4 " patches onto the skin daily Apply up to 4 patches to skin. Wear for 12 hours and remove for 12 hrs.  Refill when patient requests.     LORazepam (ATIVAN) 0.5 MG tablet 1-2 tabs qhs prn insomnia and 1 q 8 hours prn anxiety     medical cannabis (Patient's own supply.  Not a prescription) Medical Cannabis - [place] 4-6 ml by mouth daily. [facility]     methylfolate (DEPLIN) 7.5 MG TABS tablet Take 1 tablet (7.5 mg) by mouth daily     methylPREDNISolone (MEDROL DOSEPAK) 4 MG tablet therapy pack Follow Package Directions     morphine (MS CONTIN) 15 MG CR tablet Take 1 tablet (15 mg) by mouth every 12 hours maximum 2 tablet(s) per day     Multiple Vitamin (MULTI-VITAMINS) TABS Take 1 tablet by mouth     naloxone (NARCAN) 4 MG/0.1ML nasal spray Spray 1 spray (4 mg) into one nostril alternating nostrils as needed for opioid reversal every 2-3 minutes until assistance arrives     Prasterone 6.5 MG INST Place 1 suppository vaginally At Bedtime     sennosides (SENOKOT) 8.6 MG tablet Take 1 tablet by mouth daily as needed for constipation.     No current facility-administered medications for this visit.        The [place] Prescription Monitoring Program has been reviewed and there are no concerns about diversionary activity for controlled substances at this time.   04/20/2021 1 04/12/2021 04/20/2021 Dextroamp-Amphetamin 10 Mg Tab  60.00 30 Al Bau [id number] All (4435) 0/0  [place]  04/16/2021 2 04/16/2021 04/16/2021 Morphine Sulf Er 15 Mg Tablet  60.00 30 Ka Kli [id number] All (4435) 0/0 30.00 [place]  04/16/2021 2 04/16/2021 04/16/2021 Hydrocodone-Acetamin  Mg  60.00 15 Ka Kli [id number] All (4435) 0/0 40.00 [place]  03/30/2021 1 02/11/2021 03/31/2021 Lorazepam 0.5 Mg Tablet  100.00 20 Ka Kli [id number] All (7249) 1/5 2.50 [place]    Past Medical/Surgical History:  Past Medical History:   Diagnosis Date     Anxiety      Cervicalgia 2007    C5-6 disc protrusion     Depressive  disorder      ESBL (extended spectrum beta-lactamase) producing bacteria infection      History of blood transfusion 2007    Cervical fusion     Melanoma (H) 1998     Migraine      Other chronic pain      Rotator cuff tear     s/p injections     Sacroiliac inflammation (H)      Shift work sleep disorder 12/16/2013     Urinary calculi      Vitamin B12 deficiency anemia 2006    started injections      has a past medical history of Anxiety, Cervicalgia (2007), Depressive disorder, ESBL (extended spectrum beta-lactamase) producing bacteria infection, History of blood transfusion (2007), Melanoma (H) (1998), Migraine, Other chronic pain, Rotator cuff tear, Sacroiliac inflammation (H), Shift work sleep disorder (12/16/2013), Urinary calculi, and Vitamin B12 deficiency anemia (2006).    Social History:  Reviewed. No changes to social history except as noted above in HPI.    Vital Signs:   None. This is phone/video visit.     Labs:  Most recent laboratory results reviewed and the pertinent results include:   Lab Results   Component Value Date    WBC 17.5 03/16/2021     Lab Results   Component Value Date    RBC 4.50 03/16/2021     Lab Results   Component Value Date    HGB 13.3 03/16/2021     Lab Results   Component Value Date    HCT 42.1 03/16/2021     No components found for: MCT  Lab Results   Component Value Date    MCV 94 03/16/2021     Lab Results   Component Value Date    MCH 29.6 03/16/2021     Lab Results   Component Value Date    MCHC 31.6 03/16/2021     Lab Results   Component Value Date    RDW 13.7 03/16/2021     Lab Results   Component Value Date     03/16/2021     Last Comprehensive Metabolic Panel:  Sodium   Date Value Ref Range Status   03/16/2021 141 133 - 144 mmol/L Final     Potassium   Date Value Ref Range Status   03/16/2021 3.8 3.4 - 5.3 mmol/L Final     Chloride   Date Value Ref Range Status   03/16/2021 111 (H) 94 - 109 mmol/L Final     Carbon Dioxide   Date Value Ref Range Status   03/16/2021  28 20 - 32 mmol/L Final     Anion Gap   Date Value Ref Range Status   03/16/2021 2 (L) 3 - 14 mmol/L Final     Glucose   Date Value Ref Range Status   03/16/2021 99 70 - 99 mg/dL Final     Comment:     Fasting specimen     Urea Nitrogen   Date Value Ref Range Status   03/16/2021 5 (L) 7 - 30 mg/dL Final     Creatinine   Date Value Ref Range Status   03/16/2021 0.68 0.52 - 1.04 mg/dL Final     GFR Estimate   Date Value Ref Range Status   03/16/2021 >90 >60 mL/min/[1.73_m2] Final     Comment:     Non  GFR Calc  Starting 12/18/2018, serum creatinine based estimated GFR (eGFR) will be   calculated using the Chronic Kidney Disease Epidemiology Collaboration   (CKD-EPI) equation.       Calcium   Date Value Ref Range Status   03/16/2021 8.7 8.5 - 10.1 mg/dL Final     Bilirubin Total   Date Value Ref Range Status   03/16/2021 0.4 0.2 - 1.3 mg/dL Final     Alkaline Phosphatase   Date Value Ref Range Status   03/16/2021 87 40 - 150 U/L Final     ALT   Date Value Ref Range Status   03/16/2021 26 0 - 50 U/L Final     AST   Date Value Ref Range Status   03/16/2021 44 0 - 45 U/L Final     Review of Systems:  10 systems (general, cardiovascular, respiratory, eyes, ENT, endocrine, GI, , M/S, neurological) were reviewed. Most pertinent finding(s) is/are: Chronic pain. The remaining systems are all unremarkable.    Mental Status Examination (limited as this is by phone/video):  Appearance: Awake, alert, appears stated age, well-groomed, no acute distress  Attitude:  cooperative, pleasant  Motor: No gross abnormalities observed via video, not formally tested  Oriented to:  person, place, time, and situation  Attention Span and Concentration:  normal  Speech:  clear, coherent, regular rate, rhythm, and volume  Language: intact  Mood: A little better  Affect:  appropriate and in normal range and mood congruent  Associations:  no loose associations  Thought Process:  logical, linear and goal oriented  Thought Content:   no evidence of suicidal ideation today or homicidal ideation, no evidence of psychotic thought, no auditory hallucinations present and no visual hallucinations present  Recent and Remote Memory:  Intact to interview. Not formally assessed. No amnesia.  Fund of Knowledge: appropriate  Insight:  good  Judgment:  intact, adequate for safety  Impulse Control:  intact    Suicide Risk Assessment:  Today Janelle White reports no suicidal ideation today but does have history of chronic/intermittent suicidal ideation.There are notable risk factors for self-harm, including anxiety, comorbid medical condition of Chronic pain, suicidal ideation, purposelessness/no reason for living, hopelessness, withdrawing and mood change. However, risk is mitigated by commitment to family, absence of past attempts, ability to volunteer a safety plan, history of seeking help when needed, future oriented and denies suicidal intent or plan.  Therefore, based on all available evidence including the factors cited above, Janelle White does not appear to be at imminent risk for self-harm, does not meet criteria for a 72-hr hold, and therefore remains appropriate for ongoing outpatient level of care.  A thorough assessment of risk factors related to suicide and self-harm have been reviewed and are noted above. Local community safety resources printed and reviewed for patient to use if needed. There was no deceit detected, and the patient presented in a manner that was believable.     DSM5 Diagnosis:  296.33 (F33.2) Major Depressive Disorder, Recurrent Episode, In Partial Remission  Treatment resistant depression  CYP2D6 poor metabolizer  CYP2B6 intermediate metabolizer  Homozygous for C677T polymorphism of MTHFR    Medical comorbidities include:   Patient Active Problem List    Diagnosis Date Noted     MTHFR gene mutation (H) 04/12/2021     Priority: Medium     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     Priority: Medium      CYP2D6 poor metabolizer (H) 04/12/2021     Priority: Medium     Prediabetes 09/19/2019     Priority: Medium     Hyperlipidemia LDL goal <130 09/19/2019     Priority: Medium     CLARE (obstructive sleep apnea) 02/13/2019     Priority: Medium     Controlled substance agreement signed 08/14/2018     Priority: Medium     Chronic pain syndrome 12/21/2017     Priority: Medium     CLL (chronic lymphocytic leukemia) (H) 12/21/2017     Priority: Medium     Shift work sleep disorder 12/16/2013     Priority: Medium     Vitamin D deficiency 11/08/2012     Priority: Medium     Moderate recurrent major depression (H) 01/06/2011     Priority: Medium     DDD (degenerative disc disease), cervical 10/07/2010     Priority: Medium     CARDIOVASCULAR SCREENING; LDL GOAL LESS THAN 160 02/10/2010     Priority: Medium     Chronic Low Back Pain 10/01/2009     Priority: Medium     S/p AP L3-S1 fusion 12/2010 - referred to [facility].   Orthopedics writing scripts for narcotics post-op.       Migraine      Priority: Medium     Problem list name updated by automated process. Provider to review       B-complex deficiency 10/10/2006     Priority: Medium     Problem list name updated by automated process. Provider to review       PERSONAL HX OF  MELANOMA 12/04/2003     Priority: Low       Psychosocial & Contextual Factors: see HPI above    Assessment:  Janelle TORRES Franchescamag reports overall some significant changes in her mental health symptoms and energy levels since changing medications around at last visit.  She so far tolerating Pristiq quite well.  Transition off of duloxetine went better than expected.  She is noticing how contributory the negative effects of duloxetine have been to her overall physical and mental health.  Feeling more clear cognitively.  Increased energy.  Adderall continues to be helpful but does notice some decent crashes when morning dose of stimulant wears off mid afternoon.  She is getting plenty of water and good  caloric intake throughout the day.  She is agreeable to a trial with extended release Adderall to see if that helps prevent crashes.  She can continue 5-10 mg daily as needed of the immediate release Adderall for poor energy, poor motivation, and to continue to augment her antidepressant.  She is currently only taking Pristiq 25 mg daily.  She will likely give that another 1-2 weeks before considering increasing to 50 mg daily.  Overall, she feels things are moving in the right direction.    GeneSight testing Info:  GeneSight testing revealed she is a poor 2D6 metabolizer and an intermediate 2B6 metabolizer.  This would explain her multiple failed medication trials due to the negative side effects.  She also has a genotype that would suggest a phenotype sensitivity to serotonin. She also was found to have significantly reduced folic acid conversion.      Medication side effects and alternatives were reviewed. Health promotion activities recommended and reviewed today. All questions addressed. Education and counseling completed regarding risks and benefits of medications and psychotherapy options. Recommend therapy for additional support.     Treatment Plan:    Discontinued duloxetine due to side effects/inefficacy    Increase Pristiq to 50 mg daily for mood, anxiety. Can wait another week or two to increase to 50 mg if you would like.    Continue methyl folate 7.5 mg daily as supplementation.    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    Continue benzodiazepine per primary care prescriber.    Recommend individual and/or psychotherapy.      Continue all other cares per primary care provider.     Continue all other medications as reviewed per electronic medical record today.     Safety plan reviewed. To the Emergency Department as needed or call after  hours crisis line at [phone] or [phone]. [place] Crisis Text Line. Text [place] to 011016 or Suicide LifeLine Chat: suicidepreventionlifeline.org/chat    Schedule an appointment with me in 4-6 weeks or sooner as needed. Call [facility] at [phone] to schedule.    Follow up with primary care provider as planned or for acute medical concerns.    Call the psychiatric nurse line with medication questions or concerns at [phone].    Xcalarhart may be used to communicate with your provider, but this is not intended to be used for emergencies.    Risks of benzodiazepine (Ativan, Xanax, Klonopin, Valium, etc) use including, but not limited to, sedation, tolerance, risk for addiction/dependence. Do not drink alcohol while taking benzodiazepines due to risk of trouble breathing and potential death. Do not drive or operate heavy machinery until it is known how the drug affects you. Discuss with physician or pharmacist before ever taking a benzodiazepine with a narcotic/opioid pain medication.     Have discussed risks of stimulant medication including, but not limited to, decreased appetite, risk of tics (and that they may be lasting), trouble sleeping, cardiac risks such as increased heart rate and blood pressure, and rare risk of sudden cardiac death.  Also risk of addiction/tolerance/dependence.    Administrative Billing:   Phone Call/Video Duration: 15 Minutes  Start: 10:37a  Stop: 10:52a    Time spent with patient was 15 minutes and greater than 50% of time or 8 minutes was spent in counseling and coordination of care regarding above diagnoses and treatment plan. Patient with multiple psychiatric diagnoses and multiple medication changes adding to complexity of care.    Patient Status:  Patient will continue to be seen for ongoing consultation and stabilization.    Signed:   Kimberly Perez DO  [facility]    Disclaimer: This note consists of symbols derived from keyboarding, dictation and/or  voice recognition software. As a result, there may be errors in the script that have gone undetected. Please consider this when interpreting information found in this chart.

## 2021-05-04 ENCOUNTER — TRANSFERRED RECORDS (OUTPATIENT)
Dept: HEALTH INFORMATION MANAGEMENT | Facility: CLINIC | Age: 61
End: 2021-05-04

## 2021-05-10 ENCOUNTER — OFFICE VISIT (OUTPATIENT)
Dept: FAMILY MEDICINE | Facility: CLINIC | Age: 61
End: 2021-05-10
Payer: COMMERCIAL

## 2021-05-10 VITALS
SYSTOLIC BLOOD PRESSURE: 110 MMHG | BODY MASS INDEX: 26 KG/M2 | WEIGHT: 156.04 LBS | TEMPERATURE: 97.7 F | RESPIRATION RATE: 16 BRPM | HEART RATE: 80 BPM | HEIGHT: 65 IN | DIASTOLIC BLOOD PRESSURE: 64 MMHG

## 2021-05-10 DIAGNOSIS — R53.83 OTHER FATIGUE: ICD-10-CM

## 2021-05-10 DIAGNOSIS — R61 NIGHT SWEATS: ICD-10-CM

## 2021-05-10 DIAGNOSIS — C91.10 CLL (CHRONIC LYMPHOCYTIC LEUKEMIA) (H): ICD-10-CM

## 2021-05-10 DIAGNOSIS — M54.2 CERVICALGIA: ICD-10-CM

## 2021-05-10 DIAGNOSIS — Z13.220 SCREENING FOR HYPERLIPIDEMIA: ICD-10-CM

## 2021-05-10 DIAGNOSIS — F33.1 MODERATE RECURRENT MAJOR DEPRESSION (H): ICD-10-CM

## 2021-05-10 DIAGNOSIS — M79.18 MYOFASCIAL PAIN: ICD-10-CM

## 2021-05-10 DIAGNOSIS — M75.102 TEAR OF LEFT ROTATOR CUFF, UNSPECIFIED TEAR EXTENT, UNSPECIFIED WHETHER TRAUMATIC: ICD-10-CM

## 2021-05-10 DIAGNOSIS — M50.30 DDD (DEGENERATIVE DISC DISEASE), CERVICAL: ICD-10-CM

## 2021-05-10 DIAGNOSIS — G89.4 CHRONIC PAIN SYNDROME: Primary | ICD-10-CM

## 2021-05-10 DIAGNOSIS — R10.13 EPIGASTRIC PAIN: ICD-10-CM

## 2021-05-10 LAB
CHOLEST SERPL-MCNC: 175 MG/DL
HDLC SERPL-MCNC: 23 MG/DL
LDLC SERPL CALC-MCNC: 118 MG/DL
NONHDLC SERPL-MCNC: 152 MG/DL
TRIGL SERPL-MCNC: 168 MG/DL

## 2021-05-10 PROCEDURE — 99215 OFFICE O/P EST HI 40 MIN: CPT | Performed by: PHYSICIAN ASSISTANT

## 2021-05-10 PROCEDURE — 36415 COLL VENOUS BLD VENIPUNCTURE: CPT | Performed by: PHYSICIAN ASSISTANT

## 2021-05-10 PROCEDURE — 80061 LIPID PANEL: CPT | Performed by: PHYSICIAN ASSISTANT

## 2021-05-10 RX ORDER — MORPHINE SULFATE 15 MG/1
15 TABLET, FILM COATED, EXTENDED RELEASE ORAL EVERY 12 HOURS
Qty: 60 TABLET | Refills: 0 | Status: SHIPPED | OUTPATIENT
Start: 2021-05-10 | End: 2021-06-10

## 2021-05-10 RX ORDER — HYDROCODONE BITARTRATE AND ACETAMINOPHEN 10; 325 MG/1; MG/1
TABLET ORAL
Qty: 60 TABLET | Refills: 0 | Status: SHIPPED | OUTPATIENT
Start: 2021-05-10 | End: 2021-05-24

## 2021-05-10 ASSESSMENT — MIFFLIN-ST. JEOR: SCORE: 1274.7

## 2021-05-10 NOTE — LETTER
Opioid / Opioid Plus Controlled Substance Agreement    This is an agreement between you and your provider about the safe and appropriate use of controlled substance/opioids prescribed by your care team. Controlled substances are medicines that can cause physical and mental dependence (abuse).    There are strict laws about having and using these medicines. We here at [facility] are committing to working with you in your efforts to get better. To support you in this work, we ll help you schedule regular office appointments for medicine refills. If we must cancel or change your appointment for any reason, we ll make sure you have enough medicine to last until your next appointment.     As a Provider, I will:    Listen carefully to your concerns and treat you with respect.     Recommend a treatment plan that I believe is in your best interest. This plan may involve therapies other than opioid pain medication.     Talk with you often about the possible benefits, and the risk of harm of any medicine that we prescribe for you.     Provide a plan on how to taper (discontinue or go off) using this medicine if the decision is made to stop its use.    As a Patient, I understand that opioid(s):     Are a controlled substance prescribed by my care team to help me function or work and manage my condition(s).     Are strong medicines and can cause serious side effects such as:    Drowsiness, which can seriously affect my driving ability    A lower breathing rate, enough to cause death    Harm to my thinking ability     Depression     Abuse of and addiction to this medicine    Need to be taken exactly as prescribed. Combining opioids with certain medicines or chemicals (such as illegal drugs, sedatives, sleeping pills, and benzodiazepines) can be dangerous or even fatal. If I stop opioids suddenly, I may have severe withdrawal symptoms.    Do not work for all types of pain nor for all patients. If they re not helpful, I may  be asked to stop them.      The risks, benefits and side effects of these medicine(s) were explained to me. I agree that:  1. I will take part in other treatments as advised by my care team. This may be psychiatry or counseling, physical therapy, behavioral therapy, group treatment or a referral to a specialist.     2. I will keep all my appointments. I understand that this is part of the monitoring of opioids. My care team may require an office visit for EVERY opioid/controlled substance refill. If I miss appointments or don t follow instructions, my care team may stop my medicine.    3. I will take my medicines as prescribed. I will not change the dose or schedule unless my care team tells me to. There will be no refills if I run out early.     4. I may be asked to come to the clinic and complete a urine drug test or complete a pill count at any time. If I don t give a urine sample or participate in a pill count, the care team may stop my medicine.    5. I will only receive prescriptions from this clinic for chronic pain. If I am treated by another provider for acute pain issues, I will tell them that I am taking opioid pain medication for chronic pain and that I have a treatment agreement with this provider. I will inform my [facility] care team within one business day if I am given a prescription for any pain medication by another healthcare provider. My [facility] care team can contact other providers and pharmacists about my use of any medicines.    6. It is up to me to make sure that I don t run out of my medicines on weekends or holidays. If my care team is willing to refill my opioid prescription without a visit, I must request refills only during office hours. Refills may take up to 3 business days to process. I will use one pharmacy to fill all my opioid and other controlled substance prescriptions. I will notify the clinic about any changes to my insurance or medication  availability.    7. I am responsible for my prescriptions. If the medicine/prescription is lost, stolen or destroyed, it will not be replaced. I also agree not to share controlled substance medicines with anyone.    8. I am aware I should not use any illegal or recreational drugs. I agree not to drink alcohol unless my care team says I can.       9. If I enroll in the [place] [facility] Cannabis program, I will tell my care team prior to my next refill.     10. I will tell my care team right away if I become pregnant, have a new medical problem treated outside of my regular clinic, or have a change in my medications.    11. I understand that this medicine can affect my thinking, judgment and reaction time. Alcohol and drugs affect the brain and body, which can affect the safety of my driving. Being under the influence of alcohol or drugs can affect my decision-making, behaviors, personal safety, and the safety of others. Driving while impaired (DWI) can occur if a person is driving, operating, or in physical control of a car, motorcycle, boat, snowmobile, ATV, motorbike, off-road vehicle, or any other motor vehicle ([place] Statute 169A.20). I understand the risk if I choose to drive or operate any vehicle or machinery.    I understand that if I do not follow any of the conditions above, my prescriptions or treatment may be stopped or changed.          Opioids  What You Need to Know    What are opioids?   Opioids are pain medicines that must be prescribed by a doctor. They are also known as narcotics.     Examples are:   1. morphine (MS Contin, Liyah)  2. oxycodone (Oxycontin)  3. oxycodone and acetaminophen (Percocet)  4. hydrocodone and acetaminophen (Vicodin, Norco)   5. fentanyl patch (Duragesic)   6. hydromorphone (Dilaudid)   7. methadone  8. codeine (Tylenol #3)     What do opioids do well?   Opioids are best for severe short-term pain such as after a surgery or injury. They may work well for cancer pain. They may  help some people with long-lasting (chronic) pain.     What do opioids NOT do well?   Opioids never get rid of pain entirely, and they don t work well for most patients with chronic pain. Opioids don t reduce swelling, one of the causes of pain.                                    Other ways to manage chronic pain and improve function include:       Treat the health problem that may be causing pain    Anti-inflammation medicines, which reduce swelling and tenderness, such as ibuprofen (Advil, Motrin) or naproxen (Aleve)    Acetaminophen (Tylenol)    Antidepressants and anti-seizure medicines, especially for nerve pain    Topical treatments such as patches or creams    Injections or nerve blocks    Chiropractic or osteopathic treatment    Acupuncture, massage, deep breathing, meditation, visual imagery, aromatherapy    Use heat or ice at the pain site    Physical therapy     Exercise    Stop smoking    Take part in therapy       Risks and side effects     Talk to your doctor before you start or decide to keep taking opioids. Possible side effects include:      Lowering your breathing rate enough to cause death    Overdose, including death, especially if taking higher than prescribed doses    Worse depression symptoms; less pleasure in things you usually enjoy    Feeling tired or sluggish    Slower thoughts or cloudy thinking    Being more sensitive to pain over time; pain is harder to control    Trouble sleeping or restless sleep    Changes in hormone levels (for example, less testosterone)    Changes in sex drive or ability to have sex    Constipation    Unsafe driving    Itching and sweating    Dizziness    Nausea, throwing up and dry mouth    What else should I know about opioids?    Opioids may lead to dependence, tolerance, or addiction.      Dependence means that if you stop or reduce the medicine too quickly, you will have withdrawal symptoms. These include loose poop (diarrhea), jitters, flu-like symptoms,  nervousness and tremors. Dependence is not the same as addiction.                       Tolerance means needing higher doses over time to get the same effect. This may increase the chance of serious side effects.      Addiction is when people improperly use a substance that harms their body, their mind or their relations with others. Use of opiates can cause a relapse of addiction if you have a history of drug or alcohol abuse.      People who have used opioids for a long time may have a lower quality of life, worse depression, higher levels of pain and more visits to doctors.    You can overdose on opioids. Take these steps to lower your risk of overdose:    1. Recognize the signs:  Signs of overdose include decrease or loss of consciousness (blackout), slowed breathing, trouble waking up and blue lips. If someone is worried about overdose, they should call 911.    2. Talk to your doctor about Narcan (naloxone).   If you are at risk for overdose, you may be given a prescription for Narcan. This medicine very quickly reverses the effects of opioids.   If you overdose, a friend or family member can give you Narcan while waiting for the ambulance. They need to know the signs of overdose and how to give Narcan.     3. Don't use alcohol or street drugs.   Taking them with opioids can cause death.    4. Do not take any of these medicines unless your doctor says it s OK. Taking these with opioids can cause death:    Benzodiazepines, such as lorazepam (Ativan), alprazolam (Xanax) or diazepam (Valium)    Muscle relaxers, such as cyclobenzaprine (Flexeril)    Sleeping pills like zolpidem (Ambien)     Other opioids      How to keep you and other people safe while taking opioids:    1. Never share your opioids with others.  Opioid medicines are regulated by the Drug Enforcement Agency (ENE). Selling or sharing medications is a criminal act.    2. Be sure to store opioids in a secure place, locked up if possible. Young children  can easily swallow them and overdose.    3. When you are traveling with your medicines, keep them in the original bottles. If you use a pill box, be sure you also carry a copy of your medicine list from your clinic or pharmacy.    4. Safe disposal of opioids    Most pharmacies have places to get rid of medicine, called disposal kiosks. Medicine disposal options are also available in every [facility]. Search your county and  medication disposal  to find more options. You can find more details at:  [email or web address]     I agree that my provider, clinic care team, and pharmacy may work with any city, state or federal law enforcement agency that investigates the misuse, sale, or other diversion of my controlled medicine. I will allow my provider to discuss my care with, or share a copy of, this agreement with any other treating provider, pharmacy or emergency room where I receive care.    I have read this agreement and have asked questions about anything I did not understand.    _______________________________________________________  Patient Signature - Janelle White _____________________                   Date     _______________________________________________________  Provider Signature - Hakeem Concepcion PA-C   _____________________                   Date     _______________________________________________________  Witness Signature (required if provider not present while patient signing)   _____________________                   Date

## 2021-05-13 ENCOUNTER — TRANSFERRED RECORDS (OUTPATIENT)
Dept: HEALTH INFORMATION MANAGEMENT | Facility: CLINIC | Age: 61
End: 2021-05-13

## 2021-05-17 ENCOUNTER — TRANSFERRED RECORDS (OUTPATIENT)
Dept: HEALTH INFORMATION MANAGEMENT | Facility: CLINIC | Age: 61
End: 2021-05-17

## 2021-05-24 ENCOUNTER — OFFICE VISIT (OUTPATIENT)
Dept: FAMILY MEDICINE | Facility: CLINIC | Age: 61
End: 2021-05-24
Payer: COMMERCIAL

## 2021-05-24 ENCOUNTER — TRANSFERRED RECORDS (OUTPATIENT)
Dept: HEALTH INFORMATION MANAGEMENT | Facility: CLINIC | Age: 61
End: 2021-05-24

## 2021-05-24 VITALS
HEART RATE: 84 BPM | SYSTOLIC BLOOD PRESSURE: 96 MMHG | DIASTOLIC BLOOD PRESSURE: 60 MMHG | WEIGHT: 152 LBS | OXYGEN SATURATION: 96 % | BODY MASS INDEX: 25.49 KG/M2 | TEMPERATURE: 97.2 F

## 2021-05-24 DIAGNOSIS — Z98.890 S/P LEFT ROTATOR CUFF REPAIR: ICD-10-CM

## 2021-05-24 DIAGNOSIS — Z01.818 PREOP GENERAL PHYSICAL EXAM: Primary | ICD-10-CM

## 2021-05-24 DIAGNOSIS — G89.4 CHRONIC PAIN SYNDROME: ICD-10-CM

## 2021-05-24 DIAGNOSIS — M75.102 TEAR OF LEFT ROTATOR CUFF, UNSPECIFIED TEAR EXTENT, UNSPECIFIED WHETHER TRAUMATIC: ICD-10-CM

## 2021-05-24 LAB
BASOPHILS # BLD AUTO: 0 10E9/L (ref 0–0.2)
BASOPHILS NFR BLD AUTO: 0.3 %
DIFFERENTIAL METHOD BLD: ABNORMAL
EOSINOPHIL # BLD AUTO: 0 10E9/L (ref 0–0.7)
EOSINOPHIL NFR BLD AUTO: 0.9 %
ERYTHROCYTE [DISTWIDTH] IN BLOOD BY AUTOMATED COUNT: 13.4 % (ref 10–15)
HCT VFR BLD AUTO: 33.6 % (ref 35–47)
HGB BLD-MCNC: 11 G/DL (ref 11.7–15.7)
LYMPHOCYTES # BLD AUTO: 1.1 10E9/L (ref 0.8–5.3)
LYMPHOCYTES NFR BLD AUTO: 34.4 %
MCH RBC QN AUTO: 29.4 PG (ref 26.5–33)
MCHC RBC AUTO-ENTMCNC: 32.7 G/DL (ref 31.5–36.5)
MCV RBC AUTO: 90 FL (ref 78–100)
MONOCYTES # BLD AUTO: 0.3 10E9/L (ref 0–1.3)
MONOCYTES NFR BLD AUTO: 7.8 %
NEUTROPHILS # BLD AUTO: 1.8 10E9/L (ref 1.6–8.3)
NEUTROPHILS NFR BLD AUTO: 56.6 %
PLATELET # BLD AUTO: 105 10E9/L (ref 150–450)
RBC # BLD AUTO: 3.74 10E12/L (ref 3.8–5.2)
WBC # BLD AUTO: 3.2 10E9/L (ref 4–11)

## 2021-05-24 PROCEDURE — 80048 BASIC METABOLIC PNL TOTAL CA: CPT | Performed by: PHYSICIAN ASSISTANT

## 2021-05-24 PROCEDURE — 36415 COLL VENOUS BLD VENIPUNCTURE: CPT | Performed by: PHYSICIAN ASSISTANT

## 2021-05-24 PROCEDURE — 99214 OFFICE O/P EST MOD 30 MIN: CPT | Performed by: PHYSICIAN ASSISTANT

## 2021-05-24 PROCEDURE — 85025 COMPLETE CBC W/AUTO DIFF WBC: CPT | Performed by: PHYSICIAN ASSISTANT

## 2021-05-24 PROCEDURE — 93000 ELECTROCARDIOGRAM COMPLETE: CPT | Performed by: PHYSICIAN ASSISTANT

## 2021-05-24 RX ORDER — HYDROCODONE BITARTRATE AND ACETAMINOPHEN 10; 325 MG/1; MG/1
TABLET ORAL
Qty: 40 TABLET | Refills: 0 | Status: SHIPPED | OUTPATIENT
Start: 2021-06-01 | End: 2021-05-24

## 2021-05-24 RX ORDER — HYDROCODONE BITARTRATE AND ACETAMINOPHEN 10; 325 MG/1; MG/1
TABLET ORAL
Qty: 40 TABLET | Refills: 0 | Status: SHIPPED | OUTPATIENT
Start: 2021-06-01 | End: 2021-06-10

## 2021-05-24 ASSESSMENT — ANXIETY QUESTIONNAIRES
GAD7 TOTAL SCORE: 6
4. TROUBLE RELAXING: SEVERAL DAYS
GAD7 TOTAL SCORE: 6
2. NOT BEING ABLE TO STOP OR CONTROL WORRYING: SEVERAL DAYS
7. FEELING AFRAID AS IF SOMETHING AWFUL MIGHT HAPPEN: NOT AT ALL
7. FEELING AFRAID AS IF SOMETHING AWFUL MIGHT HAPPEN: NOT AT ALL
GAD7 TOTAL SCORE: 6
6. BECOMING EASILY ANNOYED OR IRRITABLE: MORE THAN HALF THE DAYS
3. WORRYING TOO MUCH ABOUT DIFFERENT THINGS: SEVERAL DAYS
5. BEING SO RESTLESS THAT IT IS HARD TO SIT STILL: NOT AT ALL
1. FEELING NERVOUS, ANXIOUS, OR ON EDGE: SEVERAL DAYS

## 2021-05-24 ASSESSMENT — PATIENT HEALTH QUESTIONNAIRE - PHQ9
10. IF YOU CHECKED OFF ANY PROBLEMS, HOW DIFFICULT HAVE THESE PROBLEMS MADE IT FOR YOU TO DO YOUR WORK, TAKE CARE OF THINGS AT HOME, OR GET ALONG WITH OTHER PEOPLE: NOT DIFFICULT AT ALL
SUM OF ALL RESPONSES TO PHQ QUESTIONS 1-9: 4
SUM OF ALL RESPONSES TO PHQ QUESTIONS 1-9: 4

## 2021-05-24 NOTE — PROGRESS NOTES
Answers for HPI/ROS submitted by the patient on 5/24/2021   If you checked off any problems, how difficult have these problems made it for you to do your work, take care of things at home, or get along with other people?: Not difficult at all  PHQ9 TOTAL SCORE: 4  STACIE 7 TOTAL SCORE: 6  [facility] [address] [place] [address]  Phone: [phone]  Primary Provider: Hakeem Sahu  Pre-op Performing Provider: HAKEEM SAHU    PREOPERATIVE EVALUATION:  Today's date: 5/24/2021    Janelle White is a 60 year old female who presents for a preoperative evaluation.  Surgical Information:  Surgery/Procedure: Left shoulder rotator cuff surgery   Surgery Location: [facility]  Surgeon: Dr Barrera  Surgery Date: June 2  Time of Surgery: 7:30  Where patient plans to recover: At home with family  Fax number for surgical facility: [phone]    Type of Anesthesia Anticipated: to be determined    Assessment & Plan     The proposed surgical procedure is considered INTERMEDIATE risk.    Preop general physical exam  Tear of left rotator cuff, unspecified tear extent, unspecified whether traumatic  S/P left rotator cuff repair  - EKG 12-lead complete w/read - Clinics  - CBC with platelets and differential  - Basic metabolic panel  (Ca, Cl, CO2, Creat, Gluc, K, Na, BUN)    Chronic pain syndrome  For post op pain management additional 40 norco to be filled 6/1/21.   - HYDROcodone-acetaminophen (NORCO)  MG per tablet; take 1 tabs q 4hrs, max 4 tabs/24 hrs        Risks and Recommendations:  The patient has the following additional risks and recommendations for perioperative complications:   - Consult Hospitalist / IM to assist with post-op medical management   - High tolerance for opioid analgesics due to chronic pain related to DJD of neck and back s/p fusion surgeries     Medication Instructions:  Patient is to take all scheduled medications on the day of surgery EXCEPT  for modifications listed below:    RECOMMENDATION:  APPROVAL GIVEN to proceed with proposed procedure, without further diagnostic evaluation.    30 minutes spent on the date of the encounter doing chart review, history and exam, documentation and further activities per the note      Subjective     HPI related to upcoming procedure:   Worsening left shoulder pain s/p fall. Planning rotator cuff repair and possible biceps tendon release. Has had rotator cuff repair previously.       Preop Questions 5/18/2021   1. Have you ever had a heart attack or stroke? No   2. Have you ever had surgery on your heart or blood vessels, such as a stent placement, a coronary artery bypass, or surgery on an artery in your head, neck, heart, or legs? No   3. Do you have chest pain with activity? No   4. Do you have a history of  heart failure? No   5. Do you currently have a cold, bronchitis or symptoms of other infection? No   6. Do you have a cough, shortness of breath, or wheezing? No   7. Do you or anyone in your family have previous history of blood clots? No   8. Do you or does anyone in your family have a serious bleeding problem such as prolonged bleeding following surgeries or cuts? No   9. Have you ever had problems with anemia or been told to take iron pills? No   10. Have you had any abnormal blood loss such as black, tarry or bloody stools, or abnormal vaginal bleeding? No   11. Have you ever had a blood transfusion? YES - 2010 or 2012   11a. Have you ever had a transfusion reaction? No   12. Are you willing to have a blood transfusion if it is medically needed before, during, or after your surgery? Yes   13. Have you or any of your relatives ever had problems with anesthesia? No   14. Do you have sleep apnea, excessive snoring or daytime drowsiness? YES - pt has CPAP machine   14a. Do you have a CPAP machine? Yes   15. Do you have any artifical heart valves or other implanted medical devices like a pacemaker,  defibrillator, or continuous glucose monitor? No   16. Do you have artificial joints? No   17. Are you allergic to latex? No   18. Is there any chance that you may be pregnant? No       Health Care Directive:  Patient does not have a Health Care Directive or Living Will: Discussed advance care planning with patient; information given to patient to review.    Preoperative Review of :   reviewed - controlled substances reflected in medication list.      Status of Chronic Conditions:  See problem list for active medical problems.  Problems all longstanding and stable, except as noted/documented.  See ROS for pertinent symptoms related to these conditions.      Review of Systems  CONSTITUTIONAL:POSITIVE  for night sweats related to CLL have been improving since starting chemotherapy   INTEGUMENTARY/SKIN: NEGATIVE for worrisome rashes, moles or lesions  EYES: NEGATIVE for vision changes or irritation  ENT/MOUTH: NEGATIVE for ear, mouth and throat problems  RESP: NEGATIVE for significant cough or SOB  BREAST: NEGATIVE for masses, tenderness or discharge  CV: NEGATIVE for chest pain, palpitations or peripheral edema  GI: POSITIVE for nausea but this too has been improving since starting chemotherapy   : NEGATIVE for frequency, dysuria, or hematuria  MUSCULOSKELETAL: POSITIVE for chronic neck, back, and shoulder pain   NEURO: NEGATIVE for weakness, dizziness or paresthesias  ENDOCRINE: NEGATIVE for temperature intolerance, skin/hair changes  HEME: NEGATIVE for bleeding problems  PSYCHIATRIC: POSITIVE for anxiety depression related to chronic pain overall stable at present     Patient Active Problem List    Diagnosis Date Noted     MTHFR gene mutation (H) 04/12/2021     Priority: Medium     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     Priority: Medium     CYP2D6 poor metabolizer (H) 04/12/2021     Priority: Medium     Prediabetes 09/19/2019     Priority: Medium     Hyperlipidemia LDL goal <130 09/19/2019      Priority: Medium     CLARE (obstructive sleep apnea) 02/13/2019     Priority: Medium     Controlled substance agreement signed 08/14/2018     Priority: Medium     Chronic pain syndrome 12/21/2017     Priority: Medium     CLL (chronic lymphocytic leukemia) (H) 12/21/2017     Priority: Medium     Shift work sleep disorder 12/16/2013     Priority: Medium     Vitamin D deficiency 11/08/2012     Priority: Medium     Moderate recurrent major depression (H) 01/06/2011     Priority: Medium     DDD (degenerative disc disease), cervical 10/07/2010     Priority: Medium     CARDIOVASCULAR SCREENING; LDL GOAL LESS THAN 160 02/10/2010     Priority: Medium     Chronic Low Back Pain 10/01/2009     Priority: Medium     S/p AP L3-S1 fusion 12/2010 - referred to [facility].   Orthopedics writing scripts for narcotics post-op.       Migraine      Priority: Medium     Problem list name updated by automated process. Provider to review       B-complex deficiency 10/10/2006     Priority: Medium     Problem list name updated by automated process. Provider to review       PERSONAL HX OF  MELANOMA 12/04/2003     Priority: Low      Past Medical History:   Diagnosis Date     Anxiety      Cervicalgia 2007    C5-6 disc protrusion     Depressive disorder      ESBL (extended spectrum beta-lactamase) producing bacteria infection      History of blood transfusion 2007    Cervical fusion     Melanoma (H) 1998     Migraine      Other chronic pain      Rotator cuff tear     s/p injections     Sacroiliac inflammation (H)      Shift work sleep disorder 12/16/2013     Urinary calculi      Vitamin B12 deficiency anemia 2006    started injections     Past Surgical History:   Procedure Laterality Date     anterior cervical discectomy C4-5 ,Fusion C5-6-7  10/2007     BLEPHAROPLASTY BILATERAL  4/25/2013    Procedure: BLEPHAROPLASTY BILATERAL;  BILATERAL UPPER LID BLEPHAROPLASTY AND BROWPEXY ;  Surgeon: Godfrey Miguel MD;  Location: [facility] APPENDECTOMY   2006      SECTION      x2     COLONOSCOPY N/A 2020    Procedure: COLONOSCOPY;  Surgeon: Butch Lockhart MD;  Location: [facility]     ESOPHAGOSCOPY, GASTROSCOPY, DUODENOSCOPY (EGD), COMBINED N/A 2020    Procedure: ESOPHAGOGASTRODUODENOSCOPY, WITH BIOPSY biosies by cold forceps;  Surgeon: Butch Lockhart MD;  Location: [facility]     FUSION LUMBAR ANTERIOR, FUSION LUMBAR POSTERIOR TWO LEVELS, COMBINED  2010    L3-S1 anterior posterior fusion     HYSTERECTOMY  2006    ovaries intact     HYSTERECTOMY, PAP NO LONGER INDICATED       Partial vulvectomy for NEENA III  2009     Pubovaginal sling, post op durasphere injections  2006    wears pad     ROTATOR CUFF REPAIR RT/LT  2011    right     SPINAL FUSION C3-4  2009    C3-4, anterior spinal fusion     TUBAL LIGATION       Current Outpatient Medications   Medication Sig Dispense Refill     albuterol (PROAIR HFA/PROVENTIL HFA/VENTOLIN HFA) 108 (90 Base) MCG/ACT inhaler Inhale 2 puffs into the lungs every 6 hours 1 Inhaler 3     amphetamine-dextroamphetamine (ADDERALL XR) 20 MG 24 hr capsule Take 1 capsule (20 mg) by mouth daily 30 capsule 0     amphetamine-dextroamphetamine (ADDERALL) 10 MG tablet Take 5-10 mg by mouth daily as needed for other For mood and energy sxs       calcium citrate (CALCIUM CITRATE) 950 MG tablet Take 1 tablet by mouth 2 times daily.       Calcium-Magnesium-Vitamin D (CALCIUM 500) 500-250-200 MG-MG-UNIT TABS Take 1 tablet by mouth       Cholecalciferol (D-5000) 5000 units TABS Take 5,000 Units by mouth       cyanocobalamin (CYANOCOBALAMIN) 1000 MCG/ML injection Inject 1 mL (1,000 mcg) into the muscle every 30 days 10 mL 1     cyclobenzaprine (FLEXERIL) 10 MG tablet Take 1 tablet (10 mg) by mouth 3 times daily as needed for muscle spasms 90 tablet 3     desvenlafaxine succinate (PRISTIQ) 50 MG 24 hr tablet Take 1 tablet (50 mg) by mouth daily 30 tablet 1     DOCOSAHEXAENOIC ACID PO Take 1,000 mg by mouth        Estradiol  "(DIVIGEL) 1 MG/GM GEL Place 1 packet onto the skin daily 30 g 11     estradiol (ESTRACE VAGINAL) 0.1 MG/GM vaginal cream Place 2 g vaginally three times a week. 42.5 g 11     [START ON 6/1/2021] HYDROcodone-acetaminophen (NORCO)  MG per tablet take 1 tabs q 4hrs, max 4 tabs/24 hrs 40 tablet 0     insulin syringe-needle U-100 (30G X 1/2\" 1 ML) 30G X 1/2\" 1 ML miscellaneous Inject 1 ml B12 qmonth 10 each 1     lidocaine (LIDODERM) 5 % patch Place 4 patches onto the skin daily Apply up to 4 patches to skin. Wear for 12 hours and remove for 12 hrs.  Refill when patient requests. 120 patch 3     LORazepam (ATIVAN) 0.5 MG tablet 1-2 tabs qhs prn insomnia and 1 q 8 hours prn anxiety 100 tablet 5     medical cannabis (Patient's own supply.  Not a prescription) Medical Cannabis - [employer] 4-6 ml by mouth daily. [facility]       methylfolate (DEPLIN) 7.5 MG TABS tablet Take 1 tablet (7.5 mg) by mouth daily 30 tablet 1     methylPREDNISolone (MEDROL DOSEPAK) 4 MG tablet therapy pack Follow Package Directions 21 tablet 0     morphine (MS CONTIN) 15 MG CR tablet Take 1 tablet (15 mg) by mouth every 12 hours maximum 2 tablet(s) per day 60 tablet 0     Multiple Vitamin (MULTI-VITAMINS) TABS Take 1 tablet by mouth       naloxone (NARCAN) 4 MG/0.1ML nasal spray Spray 1 spray (4 mg) into one nostril alternating nostrils as needed for opioid reversal every 2-3 minutes until assistance arrives 0.2 mL 1     Prasterone 6.5 MG INST Place 1 suppository vaginally At Bedtime 28 each 11     sennosides (SENOKOT) 8.6 MG tablet Take 1 tablet by mouth daily as needed for constipation.         Allergies   Allergen Reactions     Bupropion Other (See Comments) and Swelling     Ineffective, name brand works best  Ineffective, name brand works best     Codeine Other (See Comments)     \"Feels like electricity running through body\"  No reaction noted in [employer].  Shaky  With Tylenol     Effexor [Venlafaxine Hydrochloride]      Affect too flat " "    Escitalopram      Other reaction(s): *Unknown  Sexual dysfunction     Fluoxetine Other (See Comments)     Sexual dysfunction     Levaquin [Levofloxacin] Other (See Comments)     Suicidal thoughts  Dark thoughts- mood changes     Lexapro      Sexual dysfunction     Methylphenidate Hives     Milnacipran      12.5 MG is fine. 25 MG caused side effects. \"Dark thoughts\"     Pregabalin Other (See Comments)     Hallucinations  Audiovisual hallucinations     Prozac [Fluoxetine Hcl]      Sexual dysfunction     Tramadol Hives     Hives       Venlafaxine      Other reaction(s): *Unknown  Affect too flat        Social History     Tobacco Use     Smoking status: Former Smoker     Packs/day: 1.00     Years: 5.00     Pack years: 5.00     Quit date: 1984     Years since quittin.4     Smokeless tobacco: Never Used   Substance Use Topics     Alcohol use: Yes     Frequency: 2-3 times a week     Drinks per session: 1 or 2     Binge frequency: Never     Comment: no alcohol currently since prior to her back surgeries,        History   Drug Use Unknown         Objective     BP 96/60   Pulse 84   Temp 97.2  F (36.2  C) (Tympanic)   Wt 68.9 kg (152 lb)   LMP 2005 (LMP Unknown)   SpO2 96%   BMI 25.49 kg/m      Physical Exam    GENERAL APPEARANCE: healthy, alert and no distress     EYES: EOMI, PERRL     HENT: ear canals and TM's normal and nose and mouth without ulcers or lesions     NECK: no adenopathy, no asymmetry, masses, or scars and thyroid normal to palpation     RESP: lungs clear to auscultation - no rales, rhonchi or wheezes     CV: regular rates and rhythm, normal S1 S2, no S3 or S4 and no murmur, click or rub     ABDOMEN:  soft, nontender, no HSM or masses and bowel sounds normal     MS: extremities normal- no gross deformities noted, no evidence of inflammation in joints, FROM in all extremities.     SKIN: no suspicious lesions or rashes     NEURO: Normal strength and tone, sensory exam grossly " normal, mentation intact and speech normal     PSYCH: mentation appears normal. and affect normal/bright     LYMPHATICS: No cervical adenopathy    Recent Labs   Lab Test 03/16/21  1002 10/13/20  1157 02/11/20  1231 02/11/20  1231 09/06/19   HGB 13.3 13.0   < >  --   --    * 137*   < >  --   --     138   < >  --   --    POTASSIUM 3.8 4.3   < >  --  4.3   CR 0.68 0.65   < >  --  0.75   A1C  --   --   --  5.4 5.9*    < > = values in this interval not displayed.        Diagnostics:  Recent Results (from the past 168 hour(s))   CBC with platelets and differential    Collection Time: 05/24/21  1:11 PM   Result Value Ref Range    WBC 3.2 (L) 4.0 - 11.0 10e9/L    RBC Count 3.74 (L) 3.8 - 5.2 10e12/L    Hemoglobin 11.0 (L) 11.7 - 15.7 g/dL    Hematocrit 33.6 (L) 35.0 - 47.0 %    MCV 90 78 - 100 fl    MCH 29.4 26.5 - 33.0 pg    MCHC 32.7 31.5 - 36.5 g/dL    RDW 13.4 10.0 - 15.0 %    Platelet Count 105 (L) 150 - 450 10e9/L    Diff Method Automated Method     % Neutrophils 56.6 %    % Lymphocytes 34.4 %    % Monocytes 7.8 %    % Eosinophils 0.9 %    % Basophils 0.3 %    Absolute Neutrophil 1.8 1.6 - 8.3 10e9/L    Absolute Lymphocytes 1.1 0.8 - 5.3 10e9/L    Absolute Monocytes 0.3 0.0 - 1.3 10e9/L    Absolute Eosinophils 0.0 0.0 - 0.7 10e9/L    Absolute Basophils 0.0 0.0 - 0.2 10e9/L   Basic metabolic panel  (Ca, Cl, CO2, Creat, Gluc, K, Na, BUN)    Collection Time: 05/24/21  1:11 PM   Result Value Ref Range    Sodium 141 133 - 144 mmol/L    Potassium 3.9 3.4 - 5.3 mmol/L    Chloride 108 94 - 109 mmol/L    Carbon Dioxide 25 20 - 32 mmol/L    Anion Gap 8 3 - 14 mmol/L    Glucose 87 70 - 99 mg/dL    Urea Nitrogen 15 7 - 30 mg/dL    Creatinine 0.64 0.52 - 1.04 mg/dL    GFR Estimate >90 >60 mL/min/[1.73_m2]    GFR Estimate If Black >90 >60 mL/min/[1.73_m2]    Calcium 8.4 (L) 8.5 - 10.1 mg/dL      EKG: appears normal, NSR, normal axis, normal intervals, no acute ST/T changes c/w ischemia, no LVH by voltage criteria,  unchanged from previous tracings    Revised Cardiac Risk Index (RCRI):  The patient has the following serious cardiovascular risks for perioperative complications:   - No serious cardiac risks = 0 points     RCRI Interpretation: 0 points: Class I (very low risk - 0.4% complication rate)           Signed Electronically by: Hakeem Concepcion PA-C  Copy of this evaluation report is provided to requesting physician.

## 2021-05-25 LAB
ANION GAP SERPL CALCULATED.3IONS-SCNC: 8 MMOL/L (ref 3–14)
BUN SERPL-MCNC: 15 MG/DL (ref 7–30)
CALCIUM SERPL-MCNC: 8.4 MG/DL (ref 8.5–10.1)
CHLORIDE SERPL-SCNC: 108 MMOL/L (ref 94–109)
CO2 SERPL-SCNC: 25 MMOL/L (ref 20–32)
CREAT SERPL-MCNC: 0.64 MG/DL (ref 0.52–1.04)
GFR SERPL CREATININE-BSD FRML MDRD: >90 ML/MIN/{1.73_M2}
GLUCOSE SERPL-MCNC: 87 MG/DL (ref 70–99)
POTASSIUM SERPL-SCNC: 3.9 MMOL/L (ref 3.4–5.3)
SODIUM SERPL-SCNC: 141 MMOL/L (ref 133–144)

## 2021-05-25 ASSESSMENT — ANXIETY QUESTIONNAIRES: GAD7 TOTAL SCORE: 6

## 2021-05-25 ASSESSMENT — PATIENT HEALTH QUESTIONNAIRE - PHQ9: SUM OF ALL RESPONSES TO PHQ QUESTIONS 1-9: 4

## 2021-05-30 ENCOUNTER — MYC MEDICAL ADVICE (OUTPATIENT)
Dept: PSYCHIATRY | Facility: CLINIC | Age: 61
End: 2021-05-30

## 2021-05-30 DIAGNOSIS — F33.9 RECURRENT MAJOR DEPRESSION RESISTANT TO TREATMENT (H): Primary | ICD-10-CM

## 2021-05-30 DIAGNOSIS — F33.41 RECURRENT MAJOR DEPRESSIVE DISORDER, IN PARTIAL REMISSION (H): ICD-10-CM

## 2021-06-01 DIAGNOSIS — F33.9 RECURRENT MAJOR DEPRESSION RESISTANT TO TREATMENT (H): ICD-10-CM

## 2021-06-01 DIAGNOSIS — F33.41 RECURRENT MAJOR DEPRESSIVE DISORDER, IN PARTIAL REMISSION (H): ICD-10-CM

## 2021-06-01 RX ORDER — DESVENLAFAXINE 50 MG/1
50 TABLET, FILM COATED, EXTENDED RELEASE ORAL DAILY
Qty: 30 TABLET | Refills: 1 | Status: CANCELLED | OUTPATIENT
Start: 2021-06-01

## 2021-06-01 RX ORDER — DEXTROAMPHETAMINE SACCHARATE, AMPHETAMINE ASPARTATE, DEXTROAMPHETAMINE SULFATE AND AMPHETAMINE SULFATE 2.5; 2.5; 2.5; 2.5 MG/1; MG/1; MG/1; MG/1
5-10 TABLET ORAL DAILY PRN
Qty: 30 TABLET | Refills: 0 | Status: SHIPPED | OUTPATIENT
Start: 2021-06-01 | End: 2021-06-15 | Stop reason: ALTCHOICE

## 2021-06-01 RX ORDER — DEXTROAMPHETAMINE SACCHARATE, AMPHETAMINE ASPARTATE MONOHYDRATE, DEXTROAMPHETAMINE SULFATE AND AMPHETAMINE SULFATE 5; 5; 5; 5 MG/1; MG/1; MG/1; MG/1
20 CAPSULE, EXTENDED RELEASE ORAL DAILY
Qty: 30 CAPSULE | Refills: 0 | Status: SHIPPED | OUTPATIENT
Start: 2021-06-01 | End: 2021-06-15 | Stop reason: ALTCHOICE

## 2021-06-01 NOTE — TELEPHONE ENCOUNTER
Date of Last Office Visit: 5/3/21  Date of Next Office Visit: 6/15/21  No shows since last visit: 0  Cancellations since last visit: 0    Medication requested: amphetamine-dextroamphetamine 20 mg Date last ordered: 5/3/21 Qty: 30 Refills: 0     Review of [place] ?: Yes  Medication last filled date: 5/4/21 Qty filled: 30  Other controlled substance on [place] ?: Yes, see report.  If yes, is this a new medication?: Unknown    Lapse in medication adherence greater than 5 days?: No  If yes, call patient and gather details: N/A  Medication refill request verified as identical to current order?: Yes  Result of Last DAM, VPA, Li+ Level, CBC, or Carbamazepine Level (at or since last visit): N/A    []Medication refilled per  Medication Refill in Ambulatory Care  policy.  [x]Medication unable to be refilled by RN due to criteria not met as indicated below:    []Eligibility - not seen in the last year   []Supervision - no future appointment   []Compliance - no shows, cancellations or lapse in therapy   []Verification - order discrepancy   [x]Controlled medication   []Medication not included in policy   []90-day supply request   []Other

## 2021-06-02 VITALS
HEIGHT: 66 IN | BODY MASS INDEX: 29.73 KG/M2 | BODY MASS INDEX: 29.73 KG/M2 | WEIGHT: 185 LBS | WEIGHT: 185 LBS | HEIGHT: 66 IN

## 2021-06-06 ENCOUNTER — MYC MEDICAL ADVICE (OUTPATIENT)
Dept: FAMILY MEDICINE | Facility: CLINIC | Age: 61
End: 2021-06-06

## 2021-06-06 DIAGNOSIS — G89.4 CHRONIC PAIN SYNDROME: ICD-10-CM

## 2021-06-08 NOTE — PROGRESS NOTES
Mental Health Visit Note    Date of Service: 2/14/2017    Start time: 1003  Stop time: 1100  Individual PsychotherapySession # 1 (this calendar year)    No  was required because the patient speaks and understands English.  Janelle White is a 56 y.o. female is being seen today for individual psychotherapy to address the following:    Chief Complaint   Patient presents with     Follow-up     Psychotherapy   .     New symptoms or complaints: None    Functional Impairment:   Personal: 3  Family: 2  Work: 3  Social:2    Clinical Assessment of Mental Status  Mood: Depressed and Anxious  Affect: Congruent with content of speech  Appearance: well groomed,  Speech/Language: Within normal limits  Orientation: Oriented to person, time, and place  Memory: No Evidence of Impairment  Concentration: Within normal limits  Focus: Within normal limits  Fund of knowledge: adequate  Behavior Towards Examiner: Cooperative  Motor Activity: Within normal limits   Eye Contact: Appropriate  Attention: Within normal limits  Thought Process: Within normal limits  Thought Content: Within normal limits   Judgement: No Evidence of Impairment  Demonstrated Insight: Adequate    Suicidal Risk Assessment  Suicidal Ideation: Absent  Suicidal Plan: No specific plan to harm self  Crisis plan: Patient able to call 911 if needed.    Homicidal Risk Assessment:   None reported  No crisis plan required due to absence of risk.    Patient's impression of their current status:  Patient believes symptoms show no change overall.  Patient continues to be especially distressed by the following symptoms:  ongoing pain and physical limitations and frustration with not knowing plan/timeline for healing; feeling overwhelmed with recovery process and impact on time, work, etc.    Therapist impression of patient's current status:  Symptoms  show no change overall.   Patient reports her nightmares have decreased in frequency; discussed dream rescripting  with patient and she identified image of bubble to use to rescript recurrent nightmare. Patient identified knowledge and practice of sleep hygiene techniques, imagery skills, and stretches to help with sleep. Processed thoughts and feelings regarding pain, work (past and present), and recovery process. Educated regarding EFT with plan to learn technique and practice in next session.     Type of psychotherapeutic technique provided:  Cognitive Behavioral Therapy  Insight-oriented therapy  Client-centered therapy  Mindfulness-based therapy  Techniques used:  Problem identification  Identification of emotions.  Identification of automatic thoughts Mindfulness    Response to psychotherapeutic interventions:  Patient responded to interventions in the following manner: Accepting    Progress toward short term goals:  Patient is engaging in the therapeutic process.     Review of long-term goals:  Not done at today's visit. Treatment planning in process.    NECESSITY: The individual session was necessary for the care of the patient to address difficulties in psychosocial functioning that are affected by and affect the patient's ability to cope with and heal from spinal conditions and are affected by her mental health diagnosis listed in the Assessment section below.     Diagnosis:   1. Adjustment disorder with depressed mood      Plan and Follow-Up:  Patient will return for follow-up psychotherapy session in one -two weeks    Patient will complete the following homework before our next session:  Practice of mindfulness techniques  and make appropriate phone calls to learn more about FMLA and surgery     Discharge Criteria/ Planning:  Patient will continue with follow-up until therapy can be discontinued without return of symptoms.      Raven Garcia, RAMIREZ   2/14/2017
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
## 2021-06-10 RX ORDER — HYDROCODONE BITARTRATE AND ACETAMINOPHEN 10; 325 MG/1; MG/1
TABLET ORAL
Qty: 60 TABLET | Refills: 0 | Status: SHIPPED | OUTPATIENT
Start: 2021-06-12 | End: 2021-07-03

## 2021-06-10 RX ORDER — MORPHINE SULFATE 15 MG/1
15 TABLET, FILM COATED, EXTENDED RELEASE ORAL EVERY 12 HOURS
Qty: 60 TABLET | Refills: 0 | Status: SHIPPED | OUTPATIENT
Start: 2021-06-12 | End: 2021-07-03

## 2021-06-10 NOTE — PROGRESS NOTES
Mental Health Visit Note    Date of Service: 4/19/2017    Start time: 1507  Stop time: 1600  Individual PsychotherapySession # 2 (this calendar year)    No  was required because the patient speaks and understands English.  Janelle White is a 56 y.o. female is being seen today for individual psychotherapy to address the following:    Chief Complaint   Patient presents with     Follow-up     Psychotherapy   .     New symptoms or complaints: None    Functional Impairment:   Personal: 3  Family: 2  Work: 3  Social:2    Clinical Assessment of Mental Status  Mood: Depressed  Affect: Congruent with content of speech  Appearance: well groomed,  Speech/Language: Within normal limits  Orientation: Oriented to person, time, and place  Memory: No Evidence of Impairment  Concentration: Within normal limits  Focus: Within normal limits  Fund of knowledge: adequate  Behavior Towards Examiner: Cooperative  Motor Activity: Within normal limits   Eye Contact: Appropriate  Attention: Within normal limits  Thought Process: Within normal limits  Thought Content: Somatic and psychosocial stressors   Judgement: No Evidence of Impairment  Demonstrated Insight: Adequate    Suicidal Risk Assessment  Suicidal Ideation: Absent  Suicidal Plan: No specific plan to harm self  Crisis plan: Patient is able and willing to call 911 if needed.    Homicidal Risk Assessment:   None reported  No crisis plan required due to absence of risk.    Patient's impression of their current status:  Patient believes symptoms show no change overall.  Patient continues to be especially distressed by the following symptoms:  pain, sexual intimacy difficulties    Therapist impression of patient's current status:  Symptoms  show no change overall.   First session with patient since 2/14/17; discussed approaches for engaging more frequently in psychotherapy to include patient seeking a psychotherapist closer to her home. Will continue to work together;  plan for monthly sessions due to distance/time to get to this clinic. Provided CBT skills regarding sexual intimacy difficulties; identified ways to reframe the approach to sex; provided handout. Educated about EFT for pain and mood management; practiced EFT skills regarding current pain in tailbone. Patient reported a reduction in current pain and identified ways to continue to apply these skills between sessions; provided handout.    Type of psychotherapeutic technique provided:  Cognitive Behavioral Therapy  Client-centered therapy  Mindfulness-based therapy  Techniques used:  Identification of emotions.  Identification of automatic thoughts Behavioral activation  Mindfulness EFT    Response to psychotherapeutic interventions:  Patient responded to interventions in the following manner: Enthusiastic    Progress toward short term goals:  The patient is experiencing some relief from symptoms as a result of using skills learned in therapy.    Review of long-term goals:  Not done at today's visit. Patient unsure if she will continue in psychotherapy with this provider or find a provider closer to her home.    NECESSITY: The individual session was necessary for the care of the patient to address difficulties in psychosocial functioning that are affected by and affect the patient's ability to cope with and heal from spinal conditions and are affected by her mental health diagnosis listed in the Assessment section below.     Diagnosis:   1. Adjustment disorder with depressed mood      Plan and Follow-Up:  Patient will return for follow-up psychotherapy session in one month    Patient will complete the following homework before our next session:  Practice the CBT and EFT skills learned in today's session.    Discharge Criteria/ Planning:  Patient will continue with follow-up until therapy can be discontinued without return of symptoms. or until patient finds a new therapist closer to home and then transfer patient to  that therapist.      Raven Garcia, [facility]   4/19/2017

## 2021-06-12 NOTE — PROGRESS NOTES
I have requested that the [facility]  staff send the patient a letter stating we will be closing the patient's psychotherapy file due to inactivity. If the patient does not respond to the letter within two weeks, I will consider the patient's psychotherapy file closed due to lack of follow-up.  At close of treatment, the patient's condition was unchanged due to lack of follow-through.    The patient was informed in the letter that I am available to re-open the patient's case at any time and I can provide a list of resources of other psychotherapists for the patient if needed.\

## 2021-06-15 ENCOUNTER — VIRTUAL VISIT (OUTPATIENT)
Dept: PSYCHIATRY | Facility: CLINIC | Age: 61
End: 2021-06-15
Payer: COMMERCIAL

## 2021-06-15 ENCOUNTER — TRANSFERRED RECORDS (OUTPATIENT)
Dept: HEALTH INFORMATION MANAGEMENT | Facility: CLINIC | Age: 61
End: 2021-06-15

## 2021-06-15 ENCOUNTER — TELEPHONE (OUTPATIENT)
Dept: PSYCHIATRY | Facility: CLINIC | Age: 61
End: 2021-06-15

## 2021-06-15 ENCOUNTER — VIRTUAL VISIT (OUTPATIENT)
Dept: BEHAVIORAL HEALTH | Facility: CLINIC | Age: 61
End: 2021-06-15
Payer: COMMERCIAL

## 2021-06-15 DIAGNOSIS — F33.1 MODERATE RECURRENT MAJOR DEPRESSION (H): Primary | ICD-10-CM

## 2021-06-15 DIAGNOSIS — E88.89 CYP2B6 INTERMEDIATE METABOLIZER (H): ICD-10-CM

## 2021-06-15 DIAGNOSIS — F33.2 SEVERE EPISODE OF RECURRENT MAJOR DEPRESSIVE DISORDER, WITHOUT PSYCHOTIC FEATURES (H): ICD-10-CM

## 2021-06-15 DIAGNOSIS — E72.12 HOMOZYGOUS FOR C677T POLYMORPHISM OF MTHFR (H): ICD-10-CM

## 2021-06-15 DIAGNOSIS — F41.1 GAD (GENERALIZED ANXIETY DISORDER): ICD-10-CM

## 2021-06-15 DIAGNOSIS — E88.89 CYP2D6 POOR METABOLIZER (H): ICD-10-CM

## 2021-06-15 DIAGNOSIS — F33.9 RECURRENT MAJOR DEPRESSION RESISTANT TO TREATMENT (H): Primary | ICD-10-CM

## 2021-06-15 PROCEDURE — 90832 PSYTX W PT 30 MINUTES: CPT | Mod: 95 | Performed by: MARRIAGE & FAMILY THERAPIST

## 2021-06-15 PROCEDURE — 99214 OFFICE O/P EST MOD 30 MIN: CPT | Mod: 95 | Performed by: PSYCHIATRY & NEUROLOGY

## 2021-06-15 RX ORDER — DESVENLAFAXINE 50 MG/1
50 TABLET, FILM COATED, EXTENDED RELEASE ORAL DAILY
Qty: 90 TABLET | Refills: 0 | Status: SHIPPED | OUTPATIENT
Start: 2021-06-15 | End: 2021-07-27

## 2021-06-15 RX ORDER — LIOTHYRONINE SODIUM 50 UG/1
50 TABLET ORAL DAILY
Qty: 30 TABLET | Refills: 1 | Status: SHIPPED | OUTPATIENT
Start: 2021-06-15 | End: 2021-07-08

## 2021-06-15 NOTE — Clinical Note
Please call this patient to get them scheduled for a follow-up visit in 4-6 weeks. Please schedule with me and the [facility]. Pt aware there is a freeze on my scheduling for now and that call might come later in the week/early next week. Thanks!

## 2021-06-15 NOTE — PROGRESS NOTES
[facility] ([facility])  Nichol 15, 2021    Behavioral Health Clinician Progress Note    Patient Name: Janelle White      Telemedicine Visit: The patient's condition can be safely assessed and treated via synchronous audio and visual telemedicine encounter.      Reason for Telemedicine Visit: Services only offered telehealth    Originating Site (Patient Location): Patient's other car    Distant Site (Provider Location): [facility]: [place]    Consent:  The patient/guardian has verbally consented to: the potential risks and benefits of telemedicine (video visit) versus in person care; bill my insurance or make self-payment for services provided; and responsibility for payment of non-covered services.     Mode of Communication:  Video Conference via MobileSnack    As the provider I attest to compliance with applicable laws and regulations related to telemedicine.         Service Type:  Individual      Service Location:   Face to Face in Home / Community     Session Start Time: 11:11am  Session End Time: 11:35 am      Session Length: 16 - 37      Attendees: Client    Visit Activities (Refresh list every visit): [facility] [place] Only    Diagnostic Assessment Date: 03/11/2021 Dr. Manzo  See Flowsheets for today's PHQ-9 and STACIE-7 results  Previous PHQ-9:   PHQ-9 SCORE 11/16/2020 3/11/2021 5/24/2021   PHQ-9 Total Score - - -   PHQ-9 Total Score MyChart - 22 (Severe depression) 4 (Minimal depression)   PHQ-9 Total Score 2 22 4       Previous STACIE-7:   STACIE-7 SCORE 12/10/2019 3/11/2021 5/24/2021   Total Score - - -   Total Score - 8 (mild anxiety) 6 (mild anxiety)   Total Score 2 8 6       WHODAS  WHODAS 2.0 Total Score 3/11/2021   Total Score 37   Total Score MyChart 37        AUDIT  AUDIT - Alcohol Use Disorders Identification Test - Reproduced from the World Health Organization Audit 2001 (Second Edition) 3/11/2021   1.  How often do you have a drink containing alcohol? 2 to 3 times a week  "  2.  How many drinks containing alcohol do you have on a typical day when you are drinking? 1 or 2   3.  How often do you have five or more drinks on one occasion? Never   4.  How often during the last year have you found that you were not able to stop drinking once you had started? Never   5.  How often during the last year have you failed to do what was normally expected of you because of drinking? Never   6.  How often during the last year have you needed a first drink in the morning to get yourself going after a heavy drinking session? Never   7.  How often during the last year have you had a feeling of guilt or remorse after drinking? Less than monthly   8.  How often during the last year have you been unable to remember what happened the night before because of your drinking? Never   9.  Have you or someone else been injured because of your drinking? No   10. Has a relative, friend, doctor or other health care worker been concerned about your drinking or suggested you cut down? Yes, during the last year   TOTAL SCORE 8       DATA  Extended Session (60+ minutes): No  Interactive Complexity: No  Crisis: No    Medication Compliance:  Yes      Chemical Use Review:   Substance Use: Chemical use reviewed, no active concerns identified      Tobacco Use: No current tobacco use.      Current Stressors / Issues:  Questions/Thoughts for Dr. Perez:  Highlights: Had surgery. I have another surgery in July. They were unexpected. Physically and mentally not feeling well. Hand surgery in July and shoulder a couple of weeks ago. Struggling with daughter.   Current Symptoms: Just feel icky. Feeling stuck. Very angry and irritable. In a lot of pain. SI.   Worse/Better/Same: Worse  Side Effects: Denied  Mood: depressed and anxious.   Suicidality: SI, without plans or intentions. Denied needing to go to the hospital. \" I would never do anything, I am safe.\" I have had these thought for awhile.\" Has her safety plan. Reviewed " "coping skills with patient. Dog keeps her going. States she can call the crisis number and has in the past. Recent plan with crisis numbers in [facility].   Substance Use: denied  Therapist: Seeing therapist biweekly. To help with pain and anger  Medication Questions/Requests: I don't have the motivation to do the things I need to do to help myself.     Talked about coping skills- go for a walk with dog.   Recommend day treatment- declined. \"I don't have the motivation. Explained it is a lot of time and hard but so is depression and anxiety.   Pt agreed to take the dog for a walk today. States dog keep her going.     Review of Symptoms per patient report:  Depression: Change in sleep, Lack of interest, Excessive or inappropriate guilt, Change in energy level, Difficulties concentrating, Psychomotor slowing or agitation, Suicidal ideation, Feelings of hopelessness, Feelings of helplessness, Low self-worth, Ruminations, Irritability, Feeling sad, down, or depressed, Withdrawn, Poor hygeine and Frequent crying  Deepa:  No Symptoms  Psychosis: No Symptoms  Anxiety: Excessive worry, Nervousness, Physical complaints, such as headaches, stomachaches, muscle tension, Sleep disturbance, Psychomotor agitation, Ruminations, Poor concentration and Irritability  Panic:  No symptoms  Post Traumatic Stress Disorder:  No Symptoms   Eating Disorder: No Symptoms  ADD / ADHD:  No symptoms  Conduct Disorder: No symptoms  Autism Spectrum Disorder: No symptoms  Obsessive Compulsive Disorder: No Symptoms      Changes in Health Issues:   Yes: Pain, Associated Psychological Distress    Assessment: Current Emotional / Mental Status (status of significant symptoms):  Risk status (Self / Other harm or suicidal ideation)  Patient has had a history of suicidal ideation: ongoing SI as previously noted   Patient denies current fears or concerns for personal safety.  Patient reports the following current or recent suicidal ideation or behaviors: " "ongoing SI as previously noted .  Patient denies current or recent homicidal ideation or behaviors.  Patient denies current or recent self injurious behavior or ideation.  Patient denies other safety concerns.  A safety and risk management plan has been developed including: Patient consented to co-developed safety plan.  A safety and risk management plan was completed.  Patient agreed to use safety plan should any safety concerns arise.  A copy was given to the patient.    Appearance:   Appropriate   Eye Contact:   Fair   Psychomotor Behavior: Normal   Attitude:   Cooperative   Orientation:   All  Speech   Rate / Production: Normal    Volume:  Normal   Mood:    Sad   Affect:    Flat   Thought Content:  Clear   Thought Form:  Coherent  Logical   Insight:    Fair     Diagnoses:  1. Moderate recurrent major depression (H)    2. STACIE (generalized anxiety disorder)        Collateral Reports Completed:  Communicated with: Dr. Perez Talked to Dr. Perez about patients SI. Dr. Perez reports chronic thoughts and will access safety with patient as well.     Plan: (Homework, other):  Patient was given information about behavioral services and encouraged to schedule a follow up appointment with the clinic [facility].  She was also given information about mental health symptoms and treatment options  and Cognitive Behavioral Therapy skills to practice when experiencing anxiety and depression.  CD Recommendations: No indications of CD issues.  JEANE Degroot, [facility]      JEANE Ramos  Nichol 15, 2021        Integrated Behavioral Health Services                                       Patient's Name: Janelle White  March 11, 2021     SAFETY PLAN:  Step 1: Warning signs / cues (Thoughts, images, mood, situation, behavior) that a crisis may be developing:  ? Thoughts: \"I don't matter\", \"People would be better off without me\", \"I can't do this anymore\" and \"I just want this to " "end\"  ? Images: obsessive thoughts of death or dying: thoughts of carrying out plan  ? Thinking Processes: ruminations (can't stop thinking about my problems): ruminate on my plan and highly critical and negative thoughts: if  says something critical i shut down  ? Mood: worsening depression, hopelessness, disinhibited (not caring about things or consequences) and worthlessness  ? Behaviors: isolating/withdrawing , can't stop crying, not taking care of myself and not taking care of my responsibilities  ? Situations: relationship problems   Step 2: Coping strategies - Things I can do to take my mind off of my problems without contacting another person (relaxation technique, physical activity):  ? Distress Tolerance Strategies:  play with my pet  and watch a funny movie:    ? Physical Activities: go for a walk and get in the [employer]  ? Focus on helpful thoughts:  \"This is temporary\"  Step 3: People and social settings that provide distraction:              Name: Alyx     Phone: in my phone              Name: Raven   Phone: in my phone  ? park              Step 4: Remind myself of people and things that are important to me and worth living for:  Children, dog, friends  Step 5: When I am in crisis, I can ask these people to help me use my safety plan:              Name: Alyx     Phone: in my phone              Name: Raven   Phone: in my phone  Step 6: Making the environment safe:   ? none identified  Step 7: Professionals or agencies I can contact during a crisis:  ? Suicide Prevention Lifeline: 2-605-280-TALK (3036)  ? Crisis Text Line Service: Text   HOME  to 542-081.    Local Crisis Services: [facility] [place] [facility]     Call 981 or go to my nearest emergency department.       I helped develop this safety plan and agree to use it when needed.  I have been given a copy of this plan.       Patient signature: _______________________________________________________________  Today s date:  March 11, " 2021  Adapted from Safety Plan Template 2008 Antionette Trevino and Joseph Leon is reprinted with the express permission of the authors.  No portion of the Safety Plan Template may be reproduced without the express, written permission.  You can contact the authors at [email or web address] or [email or web address].

## 2021-06-15 NOTE — Clinical Note
Patient feeling quite depressed.  Started T3 as augmentation strategy for her treatment resistant depression symptoms.  Stopped stimulant augmentation in lieu of starting T3.  She was also referred to interventional psychiatry clinic for her treatment resistant depression to explore treatment options such as ketamine, ECT, TMS.  I will continue to follow.  Let me know if you have any questions or concerns.

## 2021-06-15 NOTE — PATIENT INSTRUCTIONS
Treatment Plan:    Continue Pristiq 50 mg daily for mood, anxiety.     Start Cytomel/T3 25 mcg for 2 weeks then increase to 50 mcg daily as tolerated for depression, treatment resistant depression symptoms. START ONLY AFTER LABS/BASELINE TSH COMPLETED AND NORMAL.    Continue methyl folate 7.5 mg daily as supplementation.    Discontinue Adderall XR    Continue benzodiazepine per primary care prescriber.    Have labs completed at any [facility] lab. Need to call your clinic ahead of time to schedule an appointment for lab due to limited hours and no walk-ins due to Covid-19 restrictions/changes.     Call [facility] for your treatment resistant depression: #[phone]    Recommend individual psychotherapy.      Continue all other cares per primary care provider.     Continue all other medications as reviewed per electronic medical record today.     Safety plan reviewed. To the Emergency Department as needed or call after hours crisis line at [phone] or [phone]. [place] Crisis Text Line. Text [place] to 110473 or Suicide LifeLine Chat: suicidepreventionlifeline.org/chat    Schedule an appointment with me in 4-6 weeks or sooner as needed. Call [facility] at [phone] to schedule.    Follow up with primary care provider as planned or for acute medical concerns.    Call the psychiatric nurse line with medication questions or concerns at [phone].    oboxohart may be used to communicate with your provider, but this is not intended to be used for emergencies.    [facility] Website:  [email or web address]    Risks of benzodiazepine (Ativan, Xanax, Klonopin, Valium, etc) use including, but not limited to, sedation, tolerance, risk for addiction/dependence. Do not drink alcohol while taking benzodiazepines due to risk of trouble breathing and potential death. Do not  drive or operate heavy machinery until it is known how the drug affects you. Discuss with physician or pharmacist before ever taking a benzodiazepine with a narcotic/opioid pain medication.

## 2021-06-15 NOTE — PROGRESS NOTES
"Janelle White is a 60 year old year old who is being evaluated via a billable video visit.      How would you like to obtain your AVS? MyChart  If you are dropped from the video visit, the video invite should be resent to: Text to cell phone: see Epic  Will anyone else be joining your video visit? No     Telemedicine Visit: The patient's condition can be safely assessed and treated via synchronous audio and visual telemedicine encounter.      Reason for Telemedicine Visit: Covid-19 Pandemic    Originating Site (Patient Location): Patient's home     Distant Site (Provider Location): Provider Remote Setting    Mode of Communication:  Video Conference via [employer]    As the provider I attest to compliance with applicable laws and regulations related to telemedicine.        Outpatient Psychiatric Progress Note    Name: Janelle White   : 1960                    Primary Care Provider: Emili Ballard MD   Therapist: None    PHQ-9 scores:  PHQ-9 SCORE 2020 3/11/2021 2021   PHQ-9 Total Score - - -   PHQ-9 Total Score MyChart - 22 (Severe depression) 4 (Minimal depression)   PHQ-9 Total Score 2 22 4       STACIE-7 scores:  STACIE-7 SCORE 12/10/2019 3/11/2021 2021   Total Score - - -   Total Score - 8 (mild anxiety) 6 (mild anxiety)   Total Score 2 8 6       Patient Identification:  Patient is a 60 year old,   White Not  or  female  who presents for return visit with me.  Patient is currently on medical leave from work as NP. Patient attended the phone/video session alone. Patient prefers to be called: \"Janelle\".    Interim History:  I last saw Janelle White for outpatient psychiatry return visit on 5/3/2021. During that appointment, we:      Discontinued duloxetine due to side effects/inefficacy    Increase Pristiq to 50 mg daily for mood, anxiety. Can wait another week or two to increase to 50 mg if you would like.    Continue methyl folate 7.5 mg daily " "as supplementation.    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    Continue benzodiazepine per primary care prescriber.    Recommend individual and/or psychotherapy.      6/15: Unfortunately patient reports really struggling lately.  Had surgery June 2 on her shoulder.  She had shoulder surgery last fall that needed a revision currently.  Historically she has been very emotional a couple weeks after being under general anesthesia.  Everything feels quite overwhelming at this moment.  Poor motivation, poor energy.  With Adderall XR she reports she usually has about 3 good hours per day.  10 AM-1 PM or 9 AM-12 PM tend to be the best hours.  She has hand surgery July 8.  Adderall XR - usually has about 3 good hours per day (10a-1p or 9a-12p best hours of day).  Depression unfortunately severe enough that it is currently interfering with her ability to utilize her typical coping skills, strategies, breathing consider more intensive group therapy like PHP.  Continues to have passive thoughts of suicide but denies any intent or plan.  Does not feel like she needs to go to the hospital.  No problematic drug or alcohol use.    Per [facility], JEANE Degroot, during today's team-based visit:  Questions/Thoughts for Dr. Perez:  Highlights: Had surgery. I have another surgery in July. They were unexpected. Physically and mentally not feeling well. Hand surgery in July and shoulder a couple of weeks ago. Struggling with daughter.   Current Symptoms: Just feel icky. Feeling stuck. Very angry and irritable. In a lot of pain. SI.   Worse/Better/Same: Worse  Side Effects: Denied  Mood: depressed and anxious.   Suicidality: SI, without plans or intentions. Denied needing to go to the hospital. \" I would never do anything, I am safe.\" I have had these thought for awhile.\" " "Has her safety plan. Reviewed coping skills with patient. Dog keeps her going. States she can call the crisis number and has in the past. Recent plan with crisis numbers in [facility].   Substance Use: denied  Therapist: Seeing therapist biweekly. To help with pain and anger  Medication Questions/Requests: I don't have the motivation to do the things I need to do to help myself.      Talked about coping skills- go for a walk with dog.   Recommend day treatment- declined. \"I don't have the motivation. Explained it is a lot of time and hard but so is depression and anxiety.   Pt agreed to take the dog for a walk today. States dog keep her going.     Past medication trials include but are not limited to:   Effexor-very flat  Celexa, zoloft, was on wellbutrin a couple years at one point  wellbutrin XL + celexa; 2005ish  tca-a ton of weight gain  depakote maybe for a short time  Abilify/lithium ?  ECT as teen - made me dull    Psychiatric ROS:  Janelle White reports mood has been: Worse, depressed  Anxiety has been: Worse  Sleep has been: Worse  Deepa sxs: None  Psychosis sxs: None  ADHD/ADD sxs: None  PTSD sxs: None  PHQ9 and GAD7 scores were reviewed today if completed.   Medication side effects: Denies  Current stressors include: Symptoms and See HPI above, recent surgery  Coping mechanisms and supports include: Family, Hobbies and Friends    Current medications include:   Current Outpatient Medications   Medication Sig     albuterol (PROAIR HFA/PROVENTIL HFA/VENTOLIN HFA) 108 (90 Base) MCG/ACT inhaler Inhale 2 puffs into the lungs every 6 hours     amphetamine-dextroamphetamine (ADDERALL XR) 20 MG 24 hr capsule Take 1 capsule (20 mg) by mouth daily     amphetamine-dextroamphetamine (ADDERALL) 10 MG tablet Take 0.5-1 tablets (5-10 mg) by mouth daily as needed (focus/concentration, energy, motivation) For mood and energy sxs     calcium citrate (CALCIUM CITRATE) 950 MG tablet Take 1 tablet by mouth 2 times daily. " "    Calcium-Magnesium-Vitamin D (CALCIUM 500) 500-250-200 MG-MG-UNIT TABS Take 1 tablet by mouth     Cholecalciferol (D-5000) 5000 units TABS Take 5,000 Units by mouth     cyanocobalamin (CYANOCOBALAMIN) 1000 MCG/ML injection Inject 1 mL (1,000 mcg) into the muscle every 30 days     cyclobenzaprine (FLEXERIL) 10 MG tablet Take 1 tablet (10 mg) by mouth 3 times daily as needed for muscle spasms     desvenlafaxine succinate (PRISTIQ) 50 MG 24 hr tablet Take 1 tablet (50 mg) by mouth daily     DOCOSAHEXAENOIC ACID PO Take 1,000 mg by mouth      Estradiol (DIVIGEL) 1 MG/GM GEL Place 1 packet onto the skin daily     estradiol (ESTRACE VAGINAL) 0.1 MG/GM vaginal cream Place 2 g vaginally three times a week.     HYDROcodone-acetaminophen (NORCO)  MG per tablet take 1 tabs q 4hrs, max 4 tabs/24 hrs     insulin syringe-needle U-100 (30G X 1/2\" 1 ML) 30G X 1/2\" 1 ML miscellaneous Inject 1 ml B12 qmonth     lidocaine (LIDODERM) 5 % patch Place 4 patches onto the skin daily Apply up to 4 patches to skin. Wear for 12 hours and remove for 12 hrs.  Refill when patient requests.     LORazepam (ATIVAN) 0.5 MG tablet 1-2 tabs qhs prn insomnia and 1 q 8 hours prn anxiety     medical cannabis (Patient's own supply.  Not a prescription) Medical Cannabis - [place] 4-6 ml by mouth daily. [facility]     methylfolate (DEPLIN) 7.5 MG TABS tablet Take 1 tablet (7.5 mg) by mouth daily     methylPREDNISolone (MEDROL DOSEPAK) 4 MG tablet therapy pack Follow Package Directions     morphine (MS CONTIN) 15 MG CR tablet Take 1 tablet (15 mg) by mouth every 12 hours maximum 2 tablet(s) per day     Multiple Vitamin (MULTI-VITAMINS) TABS Take 1 tablet by mouth     naloxone (NARCAN) 4 MG/0.1ML nasal spray Spray 1 spray (4 mg) into one nostril alternating nostrils as needed for opioid reversal every 2-3 minutes until assistance arrives     Prasterone 6.5 MG INST Place 1 suppository vaginally At Bedtime     sennosides (SENOKOT) 8.6 MG tablet " Take 1 tablet by mouth daily as needed for constipation.     No current facility-administered medications for this visit.        The [place] Prescription Monitoring Program has been reviewed and there are no concerns about diversionary activity for controlled substances at this time.   06/12/2021 1 06/10/2021 06/12/2021 Morphine Sulf Er 15 Mg Tablet  60.00 30 Ev Wel [id number] All (4435) 0/0 30.00 [place]  06/12/2021 1 06/10/2021 06/12/2021 Hydrocodone-Acetamin  Mg  60.00 15 Ev Wel [id number] All (4435) 0/0 40.00 [place]  06/01/2021 1 06/01/2021 06/01/2021 Dextroamp-Amphetamin 10 Mg Tab  30.00 30 Al Bau [id number] All (4435) 0/[place]  [place]  06/01/2021 1 06/01/2021 06/01/2021 Adderall Xr 20 Mg Capsule  30.00 30 Al Bau [id number] All (4435[place] 0/[place]  [place]  06/01/2021 1 05/24/2021 06/02/2021 Hydrocodone-Acetamin  Mg  40.00 10 Ev Wel [id number] All (4435) 0/0 40.00 [place]  05/17/2021 1 02/11/2021 05/17/2021 Lorazepam 0.5 Mg Tablet  100.00 20 Ka Kli [id number] All (4435) 2/5 2.50 [place]  05/13/2021 1 05/10/2021 05/13/2021 Morphine Sulf Er 15 Mg Tablet  60.00 30 Ev Wel [id number] All (4435) 0/0 30.00 [place]    Past Medical/Surgical History:  Past Medical History:   Diagnosis Date     Anxiety      Cervicalgia 2007    C5-6 disc protrusion     Depressive disorder      ESBL (extended spectrum beta-lactamase) producing bacteria infection      History of blood transfusion 2007    Cervical fusion     Melanoma (H) 1998     Migraine      Other chronic pain      Rotator cuff tear     s/p injections     Sacroiliac inflammation (H)      Shift work sleep disorder 12/16/2013     Urinary calculi      Vitamin B12 deficiency anemia 2006    started injections      has a past medical history of Anxiety, Cervicalgia (2007), Depressive disorder, ESBL (extended spectrum beta-lactamase) producing bacteria infection, History of blood transfusion (2007), Melanoma (H)  (1998), Migraine, Other chronic pain, Rotator cuff tear, Sacroiliac inflammation (H), Shift work sleep disorder (12/16/2013), Urinary calculi, and Vitamin B12 deficiency anemia (2006).    Social History:  Reviewed. No changes to social history except as noted above in HPI.    Vital Signs:   None. This is phone/video visit.     Labs:  Most recent laboratory results reviewed and the pertinent results include:   Lab Results   Component Value Date    WBC 17.5 03/16/2021     Lab Results   Component Value Date    RBC 4.50 03/16/2021     Lab Results   Component Value Date    HGB 13.3 03/16/2021     Lab Results   Component Value Date    HCT 42.1 03/16/2021     No components found for: MCT  Lab Results   Component Value Date    MCV 94 03/16/2021     Lab Results   Component Value Date    MCH 29.6 03/16/2021     Lab Results   Component Value Date    MCHC 31.6 03/16/2021     Lab Results   Component Value Date    RDW 13.7 03/16/2021     Lab Results   Component Value Date     03/16/2021     Last Comprehensive Metabolic Panel:  Sodium   Date Value Ref Range Status   05/24/2021 141 133 - 144 mmol/L Final     Potassium   Date Value Ref Range Status   05/24/2021 3.9 3.4 - 5.3 mmol/L Final     Chloride   Date Value Ref Range Status   05/24/2021 108 94 - 109 mmol/L Final     Carbon Dioxide   Date Value Ref Range Status   05/24/2021 25 20 - 32 mmol/L Final     Anion Gap   Date Value Ref Range Status   05/24/2021 8 3 - 14 mmol/L Final     Glucose   Date Value Ref Range Status   05/24/2021 87 70 - 99 mg/dL Final     Urea Nitrogen   Date Value Ref Range Status   05/24/2021 15 7 - 30 mg/dL Final     Creatinine   Date Value Ref Range Status   05/24/2021 0.64 0.52 - 1.04 mg/dL Final     GFR Estimate   Date Value Ref Range Status   05/24/2021 >90 >60 mL/min/[1.73_m2] Final     Comment:     Non  GFR Calc  Starting 12/18/2018, serum creatinine based estimated GFR (eGFR) will be   calculated using the Chronic Kidney Disease  Epidemiology Collaboration   (CKD-EPI) equation.       Calcium   Date Value Ref Range Status   05/24/2021 8.4 (L) 8.5 - 10.1 mg/dL Final     Bilirubin Total   Date Value Ref Range Status   03/16/2021 0.4 0.2 - 1.3 mg/dL Final     Alkaline Phosphatase   Date Value Ref Range Status   03/16/2021 87 40 - 150 U/L Final     ALT   Date Value Ref Range Status   03/16/2021 26 0 - 50 U/L Final     AST   Date Value Ref Range Status   03/16/2021 44 0 - 45 U/L Final     Review of Systems:  10 systems (general, cardiovascular, respiratory, eyes, ENT, endocrine, GI, , M/S, neurological) were reviewed. Most pertinent finding(s) is/are: Severe chronic pain. The remaining systems are all unremarkable.    Mental Status Examination (limited as this is by phone/video):  Appearance: Awake, alert, appears stated age, well-groomed, no acute distress  Attitude:  cooperative, pleasant  Motor: No gross abnormalities observed via video, not formally tested  Oriented to:  person, place, time, and situation  Attention Span and Concentration:  normal  Speech:  clear, coherent, regular rate, rhythm, and volume  Language: intact  Mood: Depressed  Affect: Quite subdued, mood congruent  Associations:  no loose associations  Thought Process:  logical, linear and goal oriented  Thought Content:  evidence of suicidal ideation with no plan or intent today, no homicidal ideation, no evidence of psychotic thought, no auditory hallucinations present and no visual hallucinations present  Recent and Remote Memory:  Intact to interview. Not formally assessed. No amnesia.  Fund of Knowledge: appropriate  Insight:  good  Judgment:  intact, adequate for safety  Impulse Control:  intact    Suicide Risk Assessment:  Today Janelle TORRES Christopher reports no suicidal ideation today but does have history of chronic/intermittent suicidal ideation.There are notable risk factors for self-harm, including anxiety, comorbid medical condition of Chronic pain, suicidal  ideation, purposelessness/no reason for living, hopelessness, withdrawing and mood change. However, risk is mitigated by commitment to family, absence of past attempts, ability to volunteer a safety plan, history of seeking help when needed, future oriented and denies suicidal intent or plan.  Therefore, based on all available evidence including the factors cited above, Janelle White does not appear to be at imminent risk for self-harm, does not meet criteria for a 72-hr hold, and therefore remains appropriate for ongoing outpatient level of care.  A thorough assessment of risk factors related to suicide and self-harm have been reviewed and are noted above. Local community safety resources printed and reviewed for patient to use if needed. There was no deceit detected, and the patient presented in a manner that was believable.     DSM5 Diagnosis:  Major Depressive Disorder, Recurrent Episode, severe, without psychotic features  Treatment resistant depression  CYP2D6 poor metabolizer  CYP2B6 intermediate metabolizer  Homozygous for C677T polymorphism of MTHFR    Medical comorbidities include:   Patient Active Problem List    Diagnosis Date Noted     MTHFR gene mutation (H) 04/12/2021     Priority: Medium     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     Priority: Medium     CYP2D6 poor metabolizer (H) 04/12/2021     Priority: Medium     Prediabetes 09/19/2019     Priority: Medium     Hyperlipidemia LDL goal <130 09/19/2019     Priority: Medium     CLARE (obstructive sleep apnea) 02/13/2019     Priority: Medium     Controlled substance agreement signed 08/14/2018     Priority: Medium     Chronic pain syndrome 12/21/2017     Priority: Medium     CLL (chronic lymphocytic leukemia) (H) 12/21/2017     Priority: Medium     Shift work sleep disorder 12/16/2013     Priority: Medium     Vitamin D deficiency 11/08/2012     Priority: Medium     Moderate recurrent major depression (H) 01/06/2011     Priority: Medium      DDD (degenerative disc disease), cervical 10/07/2010     Priority: Medium     CARDIOVASCULAR SCREENING; LDL GOAL LESS THAN 160 02/10/2010     Priority: Medium     Chronic Low Back Pain 10/01/2009     Priority: Medium     S/p AP L3-S1 fusion 12/2010 - referred to [facility].   Orthopedics writing scripts for narcotics post-op.       Migraine      Priority: Medium     Problem list name updated by automated process. Provider to review       B-complex deficiency 10/10/2006     Priority: Medium     Problem list name updated by automated process. Provider to review       PERSONAL HX OF  MELANOMA 12/04/2003     Priority: Low       Psychosocial & Contextual Factors: see HPI above    Assessment:  Janelle TORRES Franchescamag reports overall some significant worsening of mood symptoms.  Increased anxiety with her depression.  Poor motivation and poor energy.  Symptoms severe enough to prevent her from being able to utilize typical coping skills and strategies.  No current motivation or energy to participate in PHP/day treatment program.  Continues to take medication as scheduled.  Recent surgery and general anesthesia could be contributing.  Discussed discontinuing stimulant medication as an augmentation strategy.  She is agreeable to moving forward with T3 as an augmentation for treatment resistant depression.  Discussed risks and benefits at length.  Will get baseline thyroid labs prior to starting T3.  Hoping she will experience increased energy and motivation and that her mood will lift.  Also discussed setting up an appointment with her interventional psychiatry clinic to discuss other potential options such as ketamine therapy, ECT, TMS.  Does have history of ECT for depression when she was quite young.  I am hopeful to stay ahead of her symptoms before things get too severe.  Has chronic suicidal ideation and is at high risk for suicide.  Continues to deny any plan or intent.  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  See below for risk/benefit conversation regarding T3 had with the patient:    GeneSight testing Info:  GeneSight testing revealed she is a poor 2D6 metabolizer and an intermediate 2B6 metabolizer.  This would explain her multiple failed medication trials due to the negative side effects.  She also has a genotype that would suggest a phenotype sensitivity to serotonin. She also was found to have significantly reduced folic acid conversion.      Starting T3 as augment to antidepressant therapy for treatment resistant depression:  Start T3 at 25 mcg per day for one to two weeks, and if there is little or no improvement, increase the dose to 50 mcg per day; this is consistent with practice guidelines from the American Psychiatric Association and [place] [employer] for Mood and Anxiety Treatments.     Adverse effects consistent with hyperthyroidism may occur, including tremor, palpitations, heat intolerance, sweating, anxiety, increased frequency of bowel movements, shortness of breath, and exacerbation of cardiac arrhythmia. In addition, hyperthyroidism that emerges during long-term treatment may lead to bone demineralization, osteoporosis, and an increased risk of fracture    Following a normal baseline TSH concentration, no other laboratory monitoring during a four to six week trial of adjunctive T3 is necessary. However, if T3 is continued longer, a serum TSH concentration should be checked after the first one to three months of treatment and then every six months.    Medication side effects and alternatives were reviewed. Health promotion activities recommended and reviewed today. All questions addressed. Education and counseling completed regarding risks and benefits of medications and psychotherapy options. Recommend therapy for additional support.     Treatment Plan:    Continue Pristiq 50 mg daily for mood, anxiety.     Start Cytomel/T3 25 mcg for 2 weeks then increase to 50 mcg daily  as tolerated for depression, treatment resistant depression symptoms. START ONLY AFTER LABS/BASELINE TSH COMPLETED AND NORMAL.    Continue methyl folate 7.5 mg daily as supplementation.    Discontinue Adderall XR    Continue benzodiazepine per primary care prescriber.    Have labs completed at any [facility] lab. Need to call your clinic ahead of time to schedule an appointment for lab due to limited hours and no walk-ins due to Covid-19 restrictions/changes.     Call [facility] for your treatment resistant depression: #[phone]    Recommend individual psychotherapy.      Continue all other cares per primary care provider.     Continue all other medications as reviewed per electronic medical record today.     Safety plan reviewed. To the Emergency Department as needed or call after hours crisis line at [phone] or [phone]. [place] Crisis Text Line. Text [place] to 244492 or Suicide LifeLine Chat: [email or web address] Softwareline.[email or web address]/chat    Schedule an appointment with me in 4-6 weeks or sooner as needed. Call [facility] at [phone] to schedule.    Follow up with primary care provider as planned or for acute medical concerns.    Call the psychiatric nurse line with medication questions or concerns at [phone].    [employer] may be used to communicate with your provider, but this is not intended to be used for emergencies.    [facility] Website:  [email or web address]    Risks of benzodiazepine (Ativan, Xanax, Klonopin, Valium, etc) use including, but not limited to, sedation, tolerance, risk for addiction/dependence. Do not drink alcohol while taking benzodiazepines due to risk of trouble breathing and potential death. Do not drive or operate heavy machinery until it is known how the drug affects you. Discuss with physician or pharmacist before ever taking a  benzodiazepine with a narcotic/opioid pain medication.     Administrative Billing:   Phone Call/Video Duration: 21 Minutes    Time spent with patient was 21 minutes and greater than 50% of time or 12 minutes was spent in counseling and coordination of care regarding above diagnoses and treatment plan. Patient with multiple psychiatric diagnoses, treatment resistant sxs, and multiple medication changes adding to complexity of care.    Patient Status:  Patient will continue to be seen for ongoing consultation and stabilization.    Signed:   Kimberly Perez DO  [facility]    Disclaimer: This note consists of symbols derived from keyboarding, dictation and/or voice recognition software. As a result, there may be errors in the script that have gone undetected. Please consider this when interpreting information found in this chart.

## 2021-06-15 NOTE — TELEPHONE ENCOUNTER
PSYCHIATRY CLINIC PHONE INTAKE     SERVICES REQUESTED / INTERESTED IN          Other:  TRD    Presenting Problem and Brief History                              What would you like to be seen for? (brief description):  Pt's diagnosis has been evolving over the years. Pt's current therapist recommended TRD tx options such as ECT or ketamine, but wasn't sure which tx would be best. Pt currently takes prestiq 50, and recently got off of adderall. She also takes lorazepam 0.5mg as needed. Pt stated she doesn't metabolize most medications well. Pt has leukemia and it's been difficult to eat. Pt has always had an issue due to pain.\    Have you received a mental health diagnosis? Yes   Which one (s): TRD  Is there any history of developmental delay?  No   Are you currently seeing a mental health provider?  Yes            Who / month last seen:  Suyapa Perez - [facility]. Also has behavioral therapist - Faby Fry, [facility] in [place]  Do you have mental health records elsewhere?  Yes  Will you sign a release so we can obtain them?  Yes    Have you ever been hospitalized for psychiatric reasons?  No  Describe:  Not in the last 10 years.    Do you have current thoughts of self-harm?  Yes  . She has thoughts but no plan or intent.   Do you currently have thoughts of harming others?  No       Substance Use History     Do you have any history of alcohol / illicit drug use?  No  Describe:  NA  Have you ever received treatment for this?  No    Describe:  NA     Social History     Who is the patient's a guardian?  No    Name / number: NA  Have you had an ACT team in last 12 months?  No  Describe: NA   Do you have any current or past legal issues?  No  Describe: NA   OK to leave a detailed voicemail?  Yes    Medical/ Surgical History                                   Patient Active Problem List   Diagnosis     PERSONAL HX OF  MELANOMA     B-complex deficiency     Migraine     Chronic Low Back Pain     CARDIOVASCULAR SCREENING; LDL  "GOAL LESS THAN 160     DDD (degenerative disc disease), cervical     Moderate recurrent major depression (H)     Vitamin D deficiency     Shift work sleep disorder     Chronic pain syndrome     CLL (chronic lymphocytic leukemia) (H)     Controlled substance agreement signed     CLARE (obstructive sleep apnea)     Prediabetes     Hyperlipidemia LDL goal <130     MTHFR gene mutation (H)     CYP2B6 intermediate metabolizer (H)     CYP2D6 poor metabolizer (H)          Medications             Current Outpatient Medications   Medication Sig Dispense Refill     albuterol (PROAIR HFA/PROVENTIL HFA/VENTOLIN HFA) 108 (90 Base) MCG/ACT inhaler Inhale 2 puffs into the lungs every 6 hours 1 Inhaler 3     calcium citrate (CALCIUM CITRATE) 950 MG tablet Take 1 tablet by mouth 2 times daily.       Calcium-Magnesium-Vitamin D (CALCIUM 500) 500-250-200 MG-MG-UNIT TABS Take 1 tablet by mouth       Cholecalciferol (D-5000) 5000 units TABS Take 5,000 Units by mouth       cyanocobalamin (CYANOCOBALAMIN) 1000 MCG/ML injection Inject 1 mL (1,000 mcg) into the muscle every 30 days 10 mL 1     cyclobenzaprine (FLEXERIL) 10 MG tablet Take 1 tablet (10 mg) by mouth 3 times daily as needed for muscle spasms 90 tablet 3     desvenlafaxine (PRISTIQ) 50 MG 24 hr tablet Take 1 tablet (50 mg) by mouth daily 90 tablet 0     DOCOSAHEXAENOIC ACID PO Take 1,000 mg by mouth        Estradiol (DIVIGEL) 1 MG/GM GEL Place 1 packet onto the skin daily 30 g 11     estradiol (ESTRACE VAGINAL) 0.1 MG/GM vaginal cream Place 2 g vaginally three times a week. 42.5 g 11     HYDROcodone-acetaminophen (NORCO)  MG per tablet take 1 tabs q 4hrs, max 4 tabs/24 hrs 60 tablet 0     insulin syringe-needle U-100 (30G X 1/2\" 1 ML) 30G X 1/2\" 1 ML miscellaneous Inject 1 ml B12 qmonth 10 each 1     lidocaine (LIDODERM) 5 % patch Place 4 patches onto the skin daily Apply up to 4 patches to skin. Wear for 12 hours and remove for 12 hrs.  Refill when patient requests. 120 " patch 3     liothyronine (CYTOMEL) 50 MCG tablet Take 1 tablet (50 mcg) by mouth daily 30 tablet 1     LORazepam (ATIVAN) 0.5 MG tablet 1-2 tabs qhs prn insomnia and 1 q 8 hours prn anxiety 100 tablet 5     medical cannabis (Patient's own supply.  Not a prescription) Medical Cannabis - [employer] 4-6 ml by mouth daily. [facility]       methylfolate (DEPLIN) 7.5 MG TABS tablet Take 1 tablet (7.5 mg) by mouth daily 30 tablet 1     methylPREDNISolone (MEDROL DOSEPAK) 4 MG tablet therapy pack Follow Package Directions 21 tablet 0     morphine (MS CONTIN) 15 MG CR tablet Take 1 tablet (15 mg) by mouth every 12 hours maximum 2 tablet(s) per day 60 tablet 0     Multiple Vitamin (MULTI-VITAMINS) TABS Take 1 tablet by mouth       naloxone (NARCAN) 4 MG/0.1ML nasal spray Spray 1 spray (4 mg) into one nostril alternating nostrils as needed for opioid reversal every 2-3 minutes until assistance arrives 0.2 mL 1     Prasterone 6.5 MG INST Place 1 suppository vaginally At Bedtime 28 each 11     sennosides (SENOKOT) 8.6 MG tablet Take 1 tablet by mouth daily as needed for constipation.           DISPOSITION      6/15/21 Intake complete. Sending to Rona Noriega at [facility] for TRD.     Itzel Carter,

## 2021-06-16 ENCOUNTER — TRANSFERRED RECORDS (OUTPATIENT)
Dept: HEALTH INFORMATION MANAGEMENT | Facility: CLINIC | Age: 61
End: 2021-06-16

## 2021-06-22 ENCOUNTER — TELEPHONE (OUTPATIENT)
Dept: FAMILY MEDICINE | Facility: CLINIC | Age: 61
End: 2021-06-22

## 2021-06-22 NOTE — TELEPHONE ENCOUNTER
[employer] form has been placed in provider forms folder in pod c     Cinda Askew CMA on 6/22/2021 at 2:58 PM

## 2021-06-23 NOTE — TELEPHONE ENCOUNTER
Forms completed and faxed to [employer].   Will hold onto originals and return to patient at upcoming appointment.   ERW

## 2021-06-24 NOTE — PROCEDURES
"CERVICAL FACET JOINT INJECTION WITH FLUOROSCOPIC GUIDANCE C1-C2 and C2-C3 Bilaterally with PRP      Pre Procedure Diagnosis:  NECK PAIN, Cervical Facet syndrome, Cervical spondylosis   Post Procedure Diagnosis:  Same  Procedure Performed:  Diagnostic Cervical Facet Joint injection with Fluoroscopic Guidance  Clinical Scenario:  OA in facets up Fusion  Anesthesia/Fluids:  As per intra-procedure documentation  Vital Signs:  As per intra-procedure documentation  Level Injected:  C1-C2 and C2-C3  Side Injected:  Bilateral  CC:      The patient has had other conservative treatment but wishes to pursue spine injections.  Alternative treatments to spine injections were discussed with the patient.  The procedure of  Cervical facet joint corticosteroid injection was discussed in detail, using a spine model to demonstrate.  The patient had the opportunity to directly ask the physician questions regarding the procedure and the questions were answered prior to the consent form being presented.  The risks of the procedure, including but not limited to:  Neck pain/soreness, infection, bleeding, allergic reaction,  worsening of neck pain or no change in pain. The patient elected to proceed and signed informed consent.       The patient denies any symptoms of an active infection and denies taking antibiotics. The patient denies taking any prescription blood thinning medications. The patient denies any allergies to iodine or iodine contrast.     The patient was placed  in the prone position in the fluoroscopy table. A procedural pause was performed to verify patient identity, site, and side of the procedure.  The neck was prepped and draped in the usual sterile fashion.  After anesthetizing the skin, a 3.5\",22 gauge spinal needle was introduced into the Bilateral C1-C2 and C2-C3 facet joints under fluoroscopic guidance.   After negative aspiration of blood or CSF, 0.1 ml of Omnipaque 300 was injected at each site.  Intra-articular " flow was noted.  A solution of 2 mL of autologous  injected into each facet joint.    The patient tolerated the procedure well.  After a short period of observation, the patient was discharged.  The patient was instructed to call the spine clinic if any questions or concerns arise prior to the follow-up appointment.

## 2021-06-29 ENCOUNTER — OFFICE VISIT (OUTPATIENT)
Dept: PSYCHIATRY | Facility: CLINIC | Age: 61
End: 2021-06-29
Payer: COMMERCIAL

## 2021-06-29 DIAGNOSIS — F33.2 SEVERE EPISODE OF RECURRENT MAJOR DEPRESSIVE DISORDER, WITHOUT PSYCHOTIC FEATURES (H): Primary | ICD-10-CM

## 2021-07-02 ENCOUNTER — OFFICE VISIT (OUTPATIENT)
Dept: FAMILY MEDICINE | Facility: CLINIC | Age: 61
End: 2021-07-02
Payer: COMMERCIAL

## 2021-07-02 VITALS
SYSTOLIC BLOOD PRESSURE: 100 MMHG | WEIGHT: 150 LBS | OXYGEN SATURATION: 99 % | TEMPERATURE: 97.1 F | BODY MASS INDEX: 24.99 KG/M2 | DIASTOLIC BLOOD PRESSURE: 68 MMHG | HEIGHT: 65 IN | HEART RATE: 74 BPM | RESPIRATION RATE: 14 BRPM

## 2021-07-02 DIAGNOSIS — R22.32 MASS OF LEFT HAND: ICD-10-CM

## 2021-07-02 DIAGNOSIS — G89.4 CHRONIC PAIN SYNDROME: ICD-10-CM

## 2021-07-02 DIAGNOSIS — Z01.818 PREOP GENERAL PHYSICAL EXAM: Primary | ICD-10-CM

## 2021-07-02 PROCEDURE — 99214 OFFICE O/P EST MOD 30 MIN: CPT | Performed by: PHYSICIAN ASSISTANT

## 2021-07-02 ASSESSMENT — MIFFLIN-ST. JEOR: SCORE: 1243.34

## 2021-07-02 NOTE — PROGRESS NOTES
[facility] [address] [place] [address]  Phone: [phone]  Primary Provider: Hakeem Sahu  Pre-op Performing Provider: HAKEEM SAHU      PREOPERATIVE EVALUATION:  Today's date: 7/2/2021    Janelle White is a 60 year old female who presents for a preoperative evaluation.    Surgical Information:  Surgery/Procedure: left hand-Cole Mass Excision   Surgery Location:  [facility]  Surgeon: Dr.Adam Kessler  Surgery Date: 7/8/2021   Time of Surgery: TBA  Where patient plans to recover: At home with family  Fax number for surgical facility: [phone]    Type of Anesthesia Anticipated: General    Assessment & Plan     The proposed surgical procedure is considered INTERMEDIATE risk.    Preop general physical exam  Mass of left hand  Ongoing left hand and wrist pain with palmar mass. Planning excision. Cleared for surgery.     Chronic pain syndrome  Refills to pharmacy. Additional # due to upcoming surgery and anticipation of post op pain.   - HYDROcodone-acetaminophen (NORCO)  MG per tablet; take 1 tabs q 4hrs, max 4 tabs/24 hrs  - morphine (MS CONTIN) 15 MG CR tablet; Take 1 tablet (15 mg) by mouth every 12 hours maximum 2 tablet(s) per day         Risks and Recommendations:  The patient has the following additional risks and recommendations for perioperative complications:   - Consult Hospitalist / IM to assist with post-op medical management   - High tolerance for opioid analgesics due to chronic pain related to DJD of neck and back s/p fusion surgeries     Medication Instructions:  Patient is to take all scheduled medications on the day of surgery EXCEPT for modifications listed below:    RECOMMENDATION:  APPROVAL GIVEN to proceed with proposed procedure, without further diagnostic evaluation.    30 minutes spent on the date of the encounter doing chart review, history and exam, documentation and further activities per the note      Subjective      HPI related to upcoming procedure:     Left hand palmar mass. Causing left wrist pain and increasing discomfort.     Preop Questions 7/2/2021   1. Have you ever had a heart attack or stroke? No   2. Have you ever had surgery on your heart or blood vessels, such as a stent placement, a coronary artery bypass, or surgery on an artery in your head, neck, heart, or legs? No   3. Do you have chest pain with activity? No   4. Do you have a history of  heart failure? No   5. Do you currently have a cold, bronchitis or symptoms of other infection? No   6. Do you have a cough, shortness of breath, or wheezing? No   7. Do you or anyone in your family have previous history of blood clots? YES    8. Do you or does anyone in your family have a serious bleeding problem such as prolonged bleeding following surgeries or cuts? No   9. Have you ever had problems with anemia or been told to take iron pills? No   10. Have you had any abnormal blood loss such as black, tarry or bloody stools, or abnormal vaginal bleeding? No   11. Have you ever had a blood transfusion? YES   11a. Have you ever had a transfusion reaction? No   12. Are you willing to have a blood transfusion if it is medically needed before, during, or after your surgery? Yes   13. Have you or any of your relatives ever had problems with anesthesia? No   14. Do you have sleep apnea, excessive snoring or daytime drowsiness? YES - uses CPAP   14a. Do you have a CPAP machine? Yes   15. Do you have any artifical heart valves or other implanted medical devices like a pacemaker, defibrillator, or continuous glucose monitor? No   16. Do you have artificial joints? No   17. Are you allergic to latex? No   18. Is there any chance that you may be pregnant? No       Health Care Directive:  Patient does not have a Health Care Directive or Living Will: Discussed advance care planning with patient; however, patient declined at this time.    Preoperative Review of :   reviewed  - controlled substances reflected in medication list.    Review of Systems  Constitutional, neuro, ENT, endocrine, pulmonary, cardiac, gastrointestinal, genitourinary, musculoskeletal, integument and psychiatric systems are negative, except as otherwise noted.    Patient Active Problem List    Diagnosis Date Noted     MTHFR gene mutation (H) 04/12/2021     Priority: Medium     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     Priority: Medium     CYP2D6 poor metabolizer (H) 04/12/2021     Priority: Medium     Prediabetes 09/19/2019     Priority: Medium     Hyperlipidemia LDL goal <130 09/19/2019     Priority: Medium     CLARE (obstructive sleep apnea) 02/13/2019     Priority: Medium     Controlled substance agreement signed 08/14/2018     Priority: Medium     Chronic pain syndrome 12/21/2017     Priority: Medium     CLL (chronic lymphocytic leukemia) (H) 12/21/2017     Priority: Medium     Shift work sleep disorder 12/16/2013     Priority: Medium     Vitamin D deficiency 11/08/2012     Priority: Medium     Moderate recurrent major depression (H) 01/06/2011     Priority: Medium     DDD (degenerative disc disease), cervical 10/07/2010     Priority: Medium     CARDIOVASCULAR SCREENING; LDL GOAL LESS THAN 160 02/10/2010     Priority: Medium     Chronic Low Back Pain 10/01/2009     Priority: Medium     S/p AP L3-S1 fusion 12/2010 - referred to [facility].   Orthopedics writing scripts for narcotics post-op.       Migraine      Priority: Medium     Problem list name updated by automated process. Provider to review       B-complex deficiency 10/10/2006     Priority: Medium     Problem list name updated by automated process. Provider to review       PERSONAL HX OF  MELANOMA 12/04/2003     Priority: Low      Past Medical History:   Diagnosis Date     Anxiety      Cervicalgia 2007    C5-6 disc protrusion     Depressive disorder      ESBL (extended spectrum beta-lactamase) producing bacteria infection      History of blood  transfusion     Cervical fusion     Melanoma (H)      Migraine      Other chronic pain      Rotator cuff tear     s/p injections     Sacroiliac inflammation (H)      Shift work sleep disorder 2013     Urinary calculi      Vitamin B12 deficiency anemia     started injections     Past Surgical History:   Procedure Laterality Date     anterior cervical discectomy C4-5 ,Fusion C5-6-7  10/2007     APPENDECTOMY       BACK SURGERY       BLEPHAROPLASTY BILATERAL  2013    Procedure: BLEPHAROPLASTY BILATERAL;  BILATERAL UPPER LID BLEPHAROPLASTY AND BROWPEXY ;  Surgeon: Godfrey Miguel MD;  Location:  [facility]     C APPENDECTOMY  2006      SECTION      x2     COLONOSCOPY N/A 2020    Procedure: COLONOSCOPY;  Surgeon: Butch Lockhart MD;  Location:  GI     ESOPHAGOSCOPY, GASTROSCOPY, DUODENOSCOPY (EGD), COMBINED N/A 2020    Procedure: ESOPHAGOGASTRODUODENOSCOPY, WITH BIOPSY biosies by cold forceps;  Surgeon: Butch Lockhart MD;  Location:  GI     EYE SURGERY       FUSION LUMBAR ANTERIOR, FUSION LUMBAR POSTERIOR TWO LEVELS, COMBINED  2010    L3-S1 anterior posterior fusion     HYSTERECTOMY  2006    ovaries intact     HYSTERECTOMY       HYSTERECTOMY, PAP NO LONGER INDICATED       Partial vulvectomy for NEENA III  2009     Pubovaginal sling, post op durasphere injections  2006    wears pad     ROTATOR CUFF REPAIR RT/LT  2011    right     SPINAL FUSION C3-4  2009    C3-4, anterior spinal fusion     TUBAL LIGATION       Current Outpatient Medications   Medication Sig Dispense Refill     albuterol (PROAIR HFA/PROVENTIL HFA/VENTOLIN HFA) 108 (90 Base) MCG/ACT inhaler Inhale 2 puffs into the lungs every 6 hours 1 Inhaler 3     calcium citrate (CALCIUM CITRATE) 950 MG tablet Take 1 tablet by mouth 2 times daily.       Calcium-Magnesium-Vitamin D (CALCIUM 500) 500-250-200 MG-MG-UNIT TABS Take 1 tablet by mouth       Cholecalciferol (D-5000) 5000 units TABS Take 5,000 Units by  "mouth       cyanocobalamin (CYANOCOBALAMIN) 1000 MCG/ML injection Inject 1 mL (1,000 mcg) into the muscle every 30 days 10 mL 1     cyclobenzaprine (FLEXERIL) 10 MG tablet Take 1 tablet (10 mg) by mouth 3 times daily as needed for muscle spasms 90 tablet 3     desvenlafaxine (PRISTIQ) 50 MG 24 hr tablet Take 1 tablet (50 mg) by mouth daily 90 tablet 0     DOCOSAHEXAENOIC ACID PO Take 1,000 mg by mouth        Estradiol (DIVIGEL) 1 MG/GM GEL Place 1 packet onto the skin daily 30 g 11     estradiol (ESTRACE VAGINAL) 0.1 MG/GM vaginal cream Place 2 g vaginally three times a week. 42.5 g 11     HYDROcodone-acetaminophen (NORCO)  MG per tablet take 1 tabs q 4hrs, max 4 tabs/24 hrs 60 tablet 0     insulin syringe-needle U-100 (30G X 1/2\" 1 ML) 30G X 1/2\" 1 ML miscellaneous Inject 1 ml B12 qmonth 10 each 1     lidocaine (LIDODERM) 5 % patch Place 4 patches onto the skin daily Apply up to 4 patches to skin. Wear for 12 hours and remove for 12 hrs.  Refill when patient requests. 120 patch 3     liothyronine (CYTOMEL) 50 MCG tablet Take 1 tablet (50 mcg) by mouth daily 30 tablet 1     LORazepam (ATIVAN) 0.5 MG tablet 1-2 tabs qhs prn insomnia and 1 q 8 hours prn anxiety 100 tablet 5     medical cannabis (Patient's own supply.  Not a prescription) Medical Cannabis - [employer] 4-6 ml by mouth daily. [facility]       methylfolate (DEPLIN) 7.5 MG TABS tablet Take 1 tablet (7.5 mg) by mouth daily 30 tablet 1     methylPREDNISolone (MEDROL DOSEPAK) 4 MG tablet therapy pack Follow Package Directions 21 tablet 0     morphine (MS CONTIN) 15 MG CR tablet Take 1 tablet (15 mg) by mouth every 12 hours maximum 2 tablet(s) per day 60 tablet 0     Multiple Vitamin (MULTI-VITAMINS) TABS Take 1 tablet by mouth       naloxone (NARCAN) 4 MG/0.1ML nasal spray Spray 1 spray (4 mg) into one nostril alternating nostrils as needed for opioid reversal every 2-3 minutes until assistance arrives 0.2 mL 1     Prasterone 6.5 MG INST Place 1 " "suppository vaginally At Bedtime 28 each 11     sennosides (SENOKOT) 8.6 MG tablet Take 1 tablet by mouth daily as needed for constipation.         Allergies   Allergen Reactions     Bupropion Other (See Comments) and Swelling     Ineffective, name brand works best  Ineffective, name brand works best     Codeine Other (See Comments)     \"Feels like electricity running through body\"  No reaction noted in [employer].  Shaky  With Tylenol     Effexor [Venlafaxine Hydrochloride]      Affect too flat     Escitalopram      Other reaction(s): *Unknown  Sexual dysfunction     Fluoxetine Other (See Comments)     Sexual dysfunction     Levaquin [Levofloxacin] Other (See Comments)     Suicidal thoughts  Dark thoughts- mood changes     Lexapro      Sexual dysfunction     Methylphenidate Hives     Milnacipran      12.5 MG is fine. 25 MG caused side effects. \"Dark thoughts\"     Pregabalin Other (See Comments)     Hallucinations  Audiovisual hallucinations     Prozac [Fluoxetine Hcl]      Sexual dysfunction     Tramadol Hives     Hives       Venlafaxine      Other reaction(s): *Unknown  Affect too flat        Social History     Tobacco Use     Smoking status: Former Smoker     Packs/day: 1.00     Years: 5.00     Pack years: 5.00     Quit date: 1984     Years since quittin.5     Smokeless tobacco: Never Used   Substance Use Topics     Alcohol use: Yes     Frequency: 2-3 times a week     Drinks per session: 1 or 2     Binge frequency: Never     Comment: no alcohol currently since prior to her back surgeries,        History   Drug Use Unknown         Objective     /68 (BP Location: Right arm, Patient Position: Chair, Cuff Size: Adult Regular)   Pulse 74   Temp 97.1  F (36.2  C) (Tympanic)   Resp 14   Ht 1.638 m (5' 4.5\")   Wt 68 kg (150 lb)   LMP 2005 (LMP Unknown)   SpO2 99%   BMI 25.35 kg/m      Physical Exam    GENERAL APPEARANCE: healthy, alert and no distress     EYES: EOMI, PERRL     HENT: ear " canals and TM's normal and nose and mouth without ulcers or lesions     NECK: no adenopathy, no asymmetry, masses, or scars and thyroid normal to palpation     RESP: lungs clear to auscultation - no rales, rhonchi or wheezes     CV: regular rates and rhythm, normal S1 S2, no S3 or S4 and no murmur, click or rub     ABDOMEN:  soft, nontender, no HSM or masses and bowel sounds normal     MS: extremities normal- no gross deformities noted, no evidence of inflammation in joints, FROM in all extremities.     SKIN: no suspicious lesions or rashes     NEURO: Normal strength and tone, sensory exam grossly normal, mentation intact and speech normal     PSYCH: mentation appears normal. and affect normal/bright     LYMPHATICS: No cervical adenopathy    Recent Labs   Lab Test 05/24/21  1311 03/16/21  1002 02/11/20  1231 02/11/20  1231 09/06/19   HGB 11.0* 13.3   < >  --   --    * 134*   < >  --   --     141   < >  --   --    POTASSIUM 3.9 3.8   < >  --  4.3   CR 0.64 0.68   < >  --  0.75   A1C  --   --   --  5.4 5.9*    < > = values in this interval not displayed.        Diagnostics:  Recent outpatient labs included normal CBC and BMP   No EKG this visit, completed in the last 90 days.    Revised Cardiac Risk Index (RCRI):  The patient has the following serious cardiovascular risks for perioperative complications:   - No serious cardiac risks = 0 points     RCRI Interpretation: 0 points: Class I (very low risk - 0.4% complication rate)           Signed Electronically by: Hakeem Concepcion PA-C  Copy of this evaluation report is provided to requesting physician.

## 2021-07-02 NOTE — PATIENT INSTRUCTIONS

## 2021-07-03 RX ORDER — MORPHINE SULFATE 15 MG/1
15 TABLET, FILM COATED, EXTENDED RELEASE ORAL EVERY 12 HOURS
Qty: 60 TABLET | Refills: 0 | Status: SHIPPED | OUTPATIENT
Start: 2021-07-10 | End: 2021-08-08

## 2021-07-03 RX ORDER — HYDROCODONE BITARTRATE AND ACETAMINOPHEN 10; 325 MG/1; MG/1
TABLET ORAL
Qty: 60 TABLET | Refills: 0 | Status: SHIPPED | OUTPATIENT
Start: 2021-07-03 | End: 2021-07-21

## 2021-07-07 ENCOUNTER — MYC MEDICAL ADVICE (OUTPATIENT)
Dept: PSYCHIATRY | Facility: CLINIC | Age: 61
End: 2021-07-07

## 2021-07-07 DIAGNOSIS — F33.2 SEVERE EPISODE OF RECURRENT MAJOR DEPRESSIVE DISORDER, WITHOUT PSYCHOTIC FEATURES (H): ICD-10-CM

## 2021-07-07 DIAGNOSIS — F33.9 RECURRENT MAJOR DEPRESSION RESISTANT TO TREATMENT (H): Primary | ICD-10-CM

## 2021-07-08 RX ORDER — DEXTROAMPHETAMINE SACCHARATE, AMPHETAMINE ASPARTATE MONOHYDRATE, DEXTROAMPHETAMINE SULFATE AND AMPHETAMINE SULFATE 5; 5; 5; 5 MG/1; MG/1; MG/1; MG/1
20 CAPSULE, EXTENDED RELEASE ORAL DAILY
Qty: 30 CAPSULE | Refills: 0 | Status: SHIPPED | OUTPATIENT
Start: 2021-07-08 | End: 2021-07-27

## 2021-07-08 RX ORDER — DEXTROAMPHETAMINE SACCHARATE, AMPHETAMINE ASPARTATE, DEXTROAMPHETAMINE SULFATE AND AMPHETAMINE SULFATE 2.5; 2.5; 2.5; 2.5 MG/1; MG/1; MG/1; MG/1
5-10 TABLET ORAL DAILY PRN
Qty: 30 TABLET | Refills: 0 | Status: SHIPPED | OUTPATIENT
Start: 2021-07-08 | End: 2021-07-27
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
## 2021-07-13 ENCOUNTER — TRANSFERRED RECORDS (OUTPATIENT)
Dept: HEALTH INFORMATION MANAGEMENT | Facility: CLINIC | Age: 61
End: 2021-07-13

## 2021-07-14 ENCOUNTER — VIRTUAL VISIT (OUTPATIENT)
Dept: PSYCHIATRY | Facility: CLINIC | Age: 61
End: 2021-07-14
Payer: COMMERCIAL

## 2021-07-14 DIAGNOSIS — F33.9 RECURRENT MAJOR DEPRESSION RESISTANT TO TREATMENT (H): Primary | ICD-10-CM

## 2021-07-14 ASSESSMENT — PATIENT HEALTH QUESTIONNAIRE - PHQ9: SUM OF ALL RESPONSES TO PHQ QUESTIONS 1-9: 14

## 2021-07-14 NOTE — PROGRESS NOTES
brittany is a 60 year old who is being evaluated via a billable video visit.      How would you like to obtain your AVS? MyChart  If the video visit is dropped, the invitation should be resent by: Text to cell phone: [phone]  Will anyone else be joining your video visit? No       Depression Response    Patient completed the PHQ-9 assessment for depression and scored >9? Yes  Question 9 on the PHQ-9 was positive for suicidality? Yes  Does patient have current mental health provider? Yes    Is this a virtual visit? Yes   Does patient have suicidal ideation (positive question 9)? Yes, does have a good support and doesn't need a referral.         Video Start Time: 1:00 pm  Video-Visit Details    Type of service:  Video Visit    Video End Time:2:01 pm    Originating Location (pt. Location): Home    Distant Location (provider location):  [facility] PSYCHIATRY     Platform used for Video Visit: [employer]

## 2021-07-21 ENCOUNTER — MYC REFILL (OUTPATIENT)
Dept: FAMILY MEDICINE | Facility: CLINIC | Age: 61
End: 2021-07-21

## 2021-07-21 ENCOUNTER — MYC MEDICAL ADVICE (OUTPATIENT)
Dept: FAMILY MEDICINE | Facility: CLINIC | Age: 61
End: 2021-07-21

## 2021-07-21 DIAGNOSIS — G89.4 CHRONIC PAIN SYNDROME: ICD-10-CM

## 2021-07-21 RX ORDER — HYDROCODONE BITARTRATE AND ACETAMINOPHEN 10; 325 MG/1; MG/1
TABLET ORAL
Qty: 60 TABLET | Refills: 0 | Status: SHIPPED | OUTPATIENT
Start: 2021-07-21 | End: 2021-08-08

## 2021-07-21 NOTE — TELEPHONE ENCOUNTER
Routing refill request to provider for review/approval because:  Drug not on the [facility] refill protocol     Last filled: 7/3/2021 #30 no refills    Last visit: 7/2/2021

## 2021-07-27 ENCOUNTER — VIRTUAL VISIT (OUTPATIENT)
Dept: BEHAVIORAL HEALTH | Facility: CLINIC | Age: 61
End: 2021-07-27
Payer: COMMERCIAL

## 2021-07-27 ENCOUNTER — VIRTUAL VISIT (OUTPATIENT)
Dept: PSYCHIATRY | Facility: CLINIC | Age: 61
End: 2021-07-27
Payer: COMMERCIAL

## 2021-07-27 DIAGNOSIS — F33.2 SEVERE EPISODE OF RECURRENT MAJOR DEPRESSIVE DISORDER, WITHOUT PSYCHOTIC FEATURES (H): ICD-10-CM

## 2021-07-27 DIAGNOSIS — F33.1 MODERATE RECURRENT MAJOR DEPRESSION (H): Primary | ICD-10-CM

## 2021-07-27 DIAGNOSIS — E88.89 CYP2D6 POOR METABOLIZER (H): ICD-10-CM

## 2021-07-27 DIAGNOSIS — E88.89 CYP2B6 INTERMEDIATE METABOLIZER (H): ICD-10-CM

## 2021-07-27 DIAGNOSIS — F33.9 RECURRENT MAJOR DEPRESSION RESISTANT TO TREATMENT (H): Primary | ICD-10-CM

## 2021-07-27 DIAGNOSIS — F41.1 GAD (GENERALIZED ANXIETY DISORDER): ICD-10-CM

## 2021-07-27 DIAGNOSIS — E72.12 HOMOZYGOUS FOR C677T POLYMORPHISM OF MTHFR (H): ICD-10-CM

## 2021-07-27 PROCEDURE — 99214 OFFICE O/P EST MOD 30 MIN: CPT | Mod: 95 | Performed by: PSYCHIATRY & NEUROLOGY

## 2021-07-27 PROCEDURE — 90832 PSYTX W PT 30 MINUTES: CPT | Mod: 95 | Performed by: MARRIAGE & FAMILY THERAPIST

## 2021-07-27 RX ORDER — DEXTROAMPHETAMINE SACCHARATE, AMPHETAMINE ASPARTATE MONOHYDRATE, DEXTROAMPHETAMINE SULFATE AND AMPHETAMINE SULFATE 5; 5; 5; 5 MG/1; MG/1; MG/1; MG/1
20 CAPSULE, EXTENDED RELEASE ORAL DAILY
Qty: 30 CAPSULE | Refills: 0 | Status: SHIPPED | OUTPATIENT
Start: 2021-07-30 | End: 2021-09-09

## 2021-07-27 RX ORDER — DESVENLAFAXINE 100 MG/1
100 TABLET, EXTENDED RELEASE ORAL DAILY
Qty: 90 TABLET | Refills: 0 | Status: SHIPPED | OUTPATIENT
Start: 2021-07-27 | End: 2021-10-05

## 2021-07-27 RX ORDER — DEXTROAMPHETAMINE SACCHARATE, AMPHETAMINE ASPARTATE, DEXTROAMPHETAMINE SULFATE AND AMPHETAMINE SULFATE 5; 5; 5; 5 MG/1; MG/1; MG/1; MG/1
10-20 TABLET ORAL DAILY PRN
Qty: 30 TABLET | Refills: 0 | Status: SHIPPED | OUTPATIENT
Start: 2021-07-30 | End: 2021-09-09

## 2021-07-27 ASSESSMENT — PATIENT HEALTH QUESTIONNAIRE - PHQ9
SUM OF ALL RESPONSES TO PHQ QUESTIONS 1-9: 17
10. IF YOU CHECKED OFF ANY PROBLEMS, HOW DIFFICULT HAVE THESE PROBLEMS MADE IT FOR YOU TO DO YOUR WORK, TAKE CARE OF THINGS AT HOME, OR GET ALONG WITH OTHER PEOPLE: VERY DIFFICULT
SUM OF ALL RESPONSES TO PHQ QUESTIONS 1-9: 17

## 2021-07-27 ASSESSMENT — ANXIETY QUESTIONNAIRES
6. BECOMING EASILY ANNOYED OR IRRITABLE: SEVERAL DAYS
7. FEELING AFRAID AS IF SOMETHING AWFUL MIGHT HAPPEN: NOT AT ALL
7. FEELING AFRAID AS IF SOMETHING AWFUL MIGHT HAPPEN: NOT AT ALL
GAD7 TOTAL SCORE: 5
2. NOT BEING ABLE TO STOP OR CONTROL WORRYING: SEVERAL DAYS
8. IF YOU CHECKED OFF ANY PROBLEMS, HOW DIFFICULT HAVE THESE MADE IT FOR YOU TO DO YOUR WORK, TAKE CARE OF THINGS AT HOME, OR GET ALONG WITH OTHER PEOPLE?: SOMEWHAT DIFFICULT
5. BEING SO RESTLESS THAT IT IS HARD TO SIT STILL: NOT AT ALL
1. FEELING NERVOUS, ANXIOUS, OR ON EDGE: SEVERAL DAYS
3. WORRYING TOO MUCH ABOUT DIFFERENT THINGS: SEVERAL DAYS
GAD7 TOTAL SCORE: 5
4. TROUBLE RELAXING: SEVERAL DAYS
GAD7 TOTAL SCORE: 5

## 2021-07-27 NOTE — PROGRESS NOTES
"Janelle White is a 60 year old year old who is being evaluated via a billable video visit.      How would you like to obtain your AVS? MyChart  If you are dropped from the video visit, the video invite should be resent to: Text to cell phone: see Epic  Will anyone else be joining your video visit? No     Telemedicine Visit: The patient's condition can be safely assessed and treated via synchronous audio and visual telemedicine encounter.      Reason for Telemedicine Visit: Covid-19 Pandemic    Originating Site (Patient Location): Patient's home     Distant Site (Provider Location): Provider Remote Setting    Mode of Communication:  Video Conference via [employer]    As the provider I attest to compliance with applicable laws and regulations related to telemedicine.        Outpatient Psychiatric Progress Note    Name: Janelle White   : 1960                    Primary Care Provider: Emili Ballard MD   Therapist: None    PHQ-9 scores:  PHQ-9 SCORE 3/11/2021 2021 2021   PHQ-9 Total Score - - -   PHQ-9 Total Score MyChart 22 (Severe depression) 4 (Minimal depression) -   PHQ-9 Total Score 22 4 14       STACIE-7 scores:  STACIE-7 SCORE 12/10/2019 3/11/2021 2021   Total Score - - -   Total Score - 8 (mild anxiety) 6 (mild anxiety)   Total Score 2 8 6       Patient Identification:  Patient is a 60 year old,   White Not  or  female  who presents for return visit with me.  Patient is currently on medical leave from work as NP. Patient attended the phone/video session alone. Patient prefers to be called: \"Janelle\".    Interim History:  I last saw Janelle White for outpatient psychiatry return visit on 6/15/2021. During that appointment, we:      Continue Pristiq 50 mg daily for mood, anxiety.     Start Cytomel/T3 25 mcg for 2 weeks then increase to 50 mcg daily as tolerated for depression, treatment resistant depression symptoms. START ONLY AFTER " LABS/BASELINE TSH COMPLETED AND NORMAL.    Continue methyl folate 7.5 mg daily as supplementation.    Discontinue Adderall XR    Continue benzodiazepine per primary care prescriber.    Have labs completed at any [facility] lab. Need to call your clinic ahead of time to schedule an appointment for lab due to limited hours and no walk-ins due to Covid-19 restrictions/changes.     Call [facility] for your treatment resistant depression: #[phone]    Recommend individual psychotherapy.      Since stopped Cytomel and back on stimulant/Adderall.     7/27: Pt reports overall feeling relatively the same as last visit, but not worse.  Did not feel Cytomel was working very well and so we discontinued that between last visit and today.  She was restarted on stimulant augmentation since felt better taking that.  Continues to give her more energy and less in the fatigue during the day.  She continues to walk her dog early every morning before he gets too hot.  She also has a close friend with an inground pool and will go do some light swimming/wading.  She does find that helpful as well for her pain and to prevent too much stiffness.  She used to participate in a lot of scuba diving but has been unable to for the past several years due to her medical conditions.  It is been difficult to not be able to participate in activities she used to really enjoy.  She has an appointment tomorrow with the interventional psychiatry clinic.  She is looking forward to the appointment and is very hopeful regarding possible next treatments.  Suicidal ideation has lessened some on current regimen.  Usually takes an extended release Adderall in the morning and up to 15 mg total of immediate release throughout the day.  Denies any negative side effects and is tolerating well.  Recent surgery went well.  No problematic drug or alcohol use.    Per [facility], JEANE Degroot, during today's team-based visit:  Pt  "reports that the surgery went well and that is positive. Reviewed screening. Pt states that nothing has changed and the increased numbers are due to her physical health and headaches and her dog being anemic. Pt reports that she feels like medication is working might like an increase pristiq. Afternoons and evening are hard. Physical pain is bad. Had a CT about neck and waiting to here back on 8/2/21.  Still struggling with motivation. Reviewed suicidal thoughts. \"They are better. Very fleeting. No plans or intentions. I am safe and would never do anything.\"Pt states her dog keeps going. Has her safety plan.  Excited about meeting Dr. Yanes tomorrow at 1pm about ketamine. No questions just kind of waiting to hear what Dr. Yanes says tomorrow.     Past medication trials include but are not limited to:   Effexor-very flat  Celexa, zoloft, was on wellbutrin a couple years at one point  wellbutrin XL + celexa; 2005ish  tca-a ton of weight gain  depakote maybe for a short time  Abilify/lithium ?  ECT as teen - made me dull  Cytomel-not effective at all, used 50 mcg    Psychiatric ROS:  Janelle White reports mood has been: Ongoing depression  Anxiety has been: Slightly better/improved  Sleep has been: Up-and-down  Deepa sxs: None  Psychosis sxs: None  ADHD/ADD sxs: None  PTSD sxs: None  PHQ9 and GAD7 scores were reviewed today if completed.   Medication side effects: Denies  Current stressors include: Symptoms and See HPI above, recent surgery  Coping mechanisms and supports include: Family, Hobbies and Friends    Current medications include:   Current Outpatient Medications   Medication Sig     albuterol (PROAIR HFA/PROVENTIL HFA/VENTOLIN HFA) 108 (90 Base) MCG/ACT inhaler Inhale 2 puffs into the lungs every 6 hours     amphetamine-dextroamphetamine (ADDERALL XR) 20 MG 24 hr capsule Take 1 capsule (20 mg) by mouth daily     amphetamine-dextroamphetamine (ADDERALL) 10 MG tablet Take 0.5-1 tablets (5-10 mg) by " "mouth daily as needed (extreme fatigue, exhaustion, daytime sedation)     calcium citrate (CALCIUM CITRATE) 950 MG tablet Take 1 tablet by mouth 2 times daily.     Calcium-Magnesium-Vitamin D (CALCIUM 500) 500-250-200 MG-MG-UNIT TABS Take 1 tablet by mouth     Cholecalciferol (D-5000) 5000 units TABS Take 5,000 Units by mouth     cyanocobalamin (CYANOCOBALAMIN) 1000 MCG/ML injection Inject 1 mL (1,000 mcg) into the muscle every 30 days     cyclobenzaprine (FLEXERIL) 10 MG tablet Take 1 tablet (10 mg) by mouth 3 times daily as needed for muscle spasms     desvenlafaxine (PRISTIQ) 50 MG 24 hr tablet Take 1 tablet (50 mg) by mouth daily     DOCOSAHEXAENOIC ACID PO Take 1,000 mg by mouth      Estradiol (DIVIGEL) 1 MG/GM GEL Place 1 packet onto the skin daily     estradiol (ESTRACE VAGINAL) 0.1 MG/GM vaginal cream Place 2 g vaginally three times a week.     HYDROcodone-acetaminophen (NORCO)  MG per tablet take 1 tabs q 4hrs, max 4 tabs/24 hrs     insulin syringe-needle U-100 (30G X 1/2\" 1 ML) 30G X 1/2\" 1 ML miscellaneous Inject 1 ml B12 qmonth     lidocaine (LIDODERM) 5 % patch Place 4 patches onto the skin daily Apply up to 4 patches to skin. Wear for 12 hours and remove for 12 hrs.  Refill when patient requests.     LORazepam (ATIVAN) 0.5 MG tablet 1-2 tabs qhs prn insomnia and 1 q 8 hours prn anxiety     medical cannabis (Patient's own supply.  Not a prescription) Medical Cannabis - [place] 4-6 ml by mouth daily. [facility]     methylfolate (DEPLIN) 7.5 MG TABS tablet Take 1 tablet (7.5 mg) by mouth daily     methylPREDNISolone (MEDROL DOSEPAK) 4 MG tablet therapy pack Follow Package Directions     morphine (MS CONTIN) 15 MG CR tablet Take 1 tablet (15 mg) by mouth every 12 hours maximum 2 tablet(s) per day     Multiple Vitamin (MULTI-VITAMINS) TABS Take 1 tablet by mouth     naloxone (NARCAN) 4 MG/0.1ML nasal spray Spray 1 spray (4 mg) into one nostril alternating nostrils as needed for opioid reversal " every 2-3 minutes until assistance arrives     Prasterone 6.5 MG INST Place 1 suppository vaginally At Bedtime     sennosides (SENOKOT) 8.6 MG tablet Take 1 tablet by mouth daily as needed for constipation.     No current facility-administered medications for this visit.       The [place] Prescription Monitoring Program has been reviewed and there are no concerns about diversionary activity for controlled substances at this time.   07/21/2021 1 07/21/2021 07/23/2021 Hydrocodone-Acetamin  Mg  60.00 10 Ev Wel [id number] All (4435) 0/0 60.00 [place]  07/10/2021 1 07/03/2021 07/10/2021 Morphine Sulf Er 15 Mg Tablet  60.00 30 Ev Wel [id number] All (4435) 0/0 30.00 [place]  07/09/2021 1 07/08/2021 07/09/2021 Dextroamp-Amphet Er 20 Mg Cap  30.00 30 Al Bau [id number] All (4435) 0/0  [place]  07/08/2021 1 07/08/2021 07/09/2021 Dextroamp-Amphetamin 10 Mg Tab  30.00 30 Al Bau [id number] All (4435[place] 0/[place]  [place]  07/03/2021 1 07/03/2021 07/03/2021 Hydrocodone-Acetamin  Mg  60.00 15 Ev Wel [id number] All (4435) 0/0 40.00 [place]  06/29/2021 1 02/11/2021 06/29/2021 Lorazepam 0.5 Mg Tablet  100.00 20 Ka Kli [id number] All (4435) 3/5 2.50 [place]    Past Medical/Surgical History:  Past Medical History:   Diagnosis Date     Anxiety      Cervicalgia 2007    C5-6 disc protrusion     Depressive disorder      ESBL (extended spectrum beta-lactamase) producing bacteria infection      History of blood transfusion 2007    Cervical fusion     Melanoma (H) 1998     Migraine      Other chronic pain      Rotator cuff tear     s/p injections     Sacroiliac inflammation (H)      Shift work sleep disorder 12/16/2013     Urinary calculi      Vitamin B12 deficiency anemia 2006    started injections      has a past medical history of Anxiety, Cervicalgia (2007), Depressive disorder, ESBL (extended spectrum beta-lactamase) producing bacteria infection, History of blood transfusion  (2007), Melanoma (H) (1998), Migraine, Other chronic pain, Rotator cuff tear, Sacroiliac inflammation (H), Shift work sleep disorder (12/16/2013), Urinary calculi, and Vitamin B12 deficiency anemia (2006).    Social History:  Reviewed. No changes to social history except as noted above in HPI.    Vital Signs:   None. This is phone/video visit.     Labs:  Most recent laboratory results reviewed and the pertinent results include:   Lab Results   Component Value Date    WBC 17.5 03/16/2021     Lab Results   Component Value Date    RBC 4.50 03/16/2021     Lab Results   Component Value Date    HGB 13.3 03/16/2021     Lab Results   Component Value Date    HCT 42.1 03/16/2021     No components found for: MCT  Lab Results   Component Value Date    MCV 94 03/16/2021     Lab Results   Component Value Date    MCH 29.6 03/16/2021     Lab Results   Component Value Date    MCHC 31.6 03/16/2021     Lab Results   Component Value Date    RDW 13.7 03/16/2021     Lab Results   Component Value Date     03/16/2021     Last Comprehensive Metabolic Panel:  Sodium   Date Value Ref Range Status   05/24/2021 141 133 - 144 mmol/L Final     Potassium   Date Value Ref Range Status   05/24/2021 3.9 3.4 - 5.3 mmol/L Final     Chloride   Date Value Ref Range Status   05/24/2021 108 94 - 109 mmol/L Final     Carbon Dioxide   Date Value Ref Range Status   05/24/2021 25 20 - 32 mmol/L Final     Anion Gap   Date Value Ref Range Status   05/24/2021 8 3 - 14 mmol/L Final     Glucose   Date Value Ref Range Status   05/24/2021 87 70 - 99 mg/dL Final     Urea Nitrogen   Date Value Ref Range Status   05/24/2021 15 7 - 30 mg/dL Final     Creatinine   Date Value Ref Range Status   05/24/2021 0.64 0.52 - 1.04 mg/dL Final     GFR Estimate   Date Value Ref Range Status   05/24/2021 >90 >60 mL/min/[1.73_m2] Final     Comment:     Non  GFR Calc  Starting 12/18/2018, serum creatinine based estimated GFR (eGFR) will be   calculated using the  Chronic Kidney Disease Epidemiology Collaboration   (CKD-EPI) equation.       Calcium   Date Value Ref Range Status   05/24/2021 8.4 (L) 8.5 - 10.1 mg/dL Final     Bilirubin Total   Date Value Ref Range Status   03/16/2021 0.4 0.2 - 1.3 mg/dL Final     Alkaline Phosphatase   Date Value Ref Range Status   03/16/2021 87 40 - 150 U/L Final     ALT   Date Value Ref Range Status   03/16/2021 26 0 - 50 U/L Final     AST   Date Value Ref Range Status   03/16/2021 44 0 - 45 U/L Final     Review of Systems:  10 systems (general, cardiovascular, respiratory, eyes, ENT, endocrine, GI, , M/S, neurological) were reviewed. Most pertinent finding(s) is/are: Severe chronic pain. The remaining systems are all unremarkable.    Mental Status Examination (limited as this is by phone/video):  Appearance: Awake, alert, appears stated age, well-groomed, no acute distress  Attitude:  cooperative, pleasant  Motor: No gross abnormalities observed via video, not formally tested  Oriented to:  person, place, time, and situation  Attention Span and Concentration:  normal  Speech:  clear, coherent, regular rate, rhythm, and volume  Language: intact  Mood: Depressed  Affect: Quite subdued, mood congruent  Associations:  no loose associations  Thought Process:  logical, linear and goal oriented  Thought Content:  evidence of suicidal ideation with no plan or intent today, no homicidal ideation, no evidence of psychotic thought, no auditory hallucinations present and no visual hallucinations present  Recent and Remote Memory:  Intact to interview. Not formally assessed. No amnesia.  Fund of Knowledge: appropriate  Insight:  good  Judgment:  intact, adequate for safety  Impulse Control:  intact    Suicide Risk Assessment:  Today Janelle TORRES Christopher reports no suicidal ideation today but does have history of chronic/intermittent suicidal ideation.There are notable risk factors for self-harm, including anxiety, comorbid medical condition of Chronic  pain, suicidal ideation, purposelessness/no reason for living, hopelessness, withdrawing and mood change. However, risk is mitigated by commitment to family, absence of past attempts, ability to volunteer a safety plan, history of seeking help when needed, future oriented and denies suicidal intent or plan.  Therefore, based on all available evidence including the factors cited above, Janelle White does not appear to be at imminent risk for self-harm, does not meet criteria for a 72-hr hold, and therefore remains appropriate for ongoing outpatient level of care.  A thorough assessment of risk factors related to suicide and self-harm have been reviewed and are noted above. Local community safety resources printed and reviewed for patient to use if needed. There was no deceit detected, and the patient presented in a manner that was believable.     DSM5 Diagnosis:  Major Depressive Disorder, Recurrent Episode, severe, without psychotic features  Treatment resistant depression  CYP2D6 poor metabolizer  CYP2B6 intermediate metabolizer  Homozygous for C677T polymorphism of MTHFR    Medical comorbidities include:   Patient Active Problem List    Diagnosis Date Noted     MTHFR gene mutation (H) 04/12/2021     Priority: Medium     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     Priority: Medium     CYP2D6 poor metabolizer (H) 04/12/2021     Priority: Medium     Prediabetes 09/19/2019     Priority: Medium     Hyperlipidemia LDL goal <130 09/19/2019     Priority: Medium     CLARE (obstructive sleep apnea) 02/13/2019     Priority: Medium     Controlled substance agreement signed 08/14/2018     Priority: Medium     Chronic pain syndrome 12/21/2017     Priority: Medium     CLL (chronic lymphocytic leukemia) (H) 12/21/2017     Priority: Medium     Shift work sleep disorder 12/16/2013     Priority: Medium     Vitamin D deficiency 11/08/2012     Priority: Medium     Moderate recurrent major depression (H) 01/06/2011      Priority: Medium     DDD (degenerative disc disease), cervical 10/07/2010     Priority: Medium     CARDIOVASCULAR SCREENING; LDL GOAL LESS THAN 160 02/10/2010     Priority: Medium     Chronic Low Back Pain 10/01/2009     Priority: Medium     S/p AP L3-S1 fusion 12/2010 - referred to [facility].   Orthopedics writing scripts for narcotics post-op.       Migraine      Priority: Medium     Problem list name updated by automated process. Provider to review       B-complex deficiency 10/10/2006     Priority: Medium     Problem list name updated by automated process. Provider to review       PERSONAL HX OF  MELANOMA 12/04/2003     Priority: Low       Psychosocial & Contextual Factors: see HPI above    Assessment:  6/15/2021:  Janelle TORRES Franchescamag reports overall some significant worsening of mood symptoms.  Increased anxiety with her depression.  Poor motivation and poor energy.  Symptoms severe enough to prevent her from being able to utilize typical coping skills and strategies.  No current motivation or energy to participate in PHP/day treatment program.  Continues to take medication as scheduled.  Recent surgery and general anesthesia could be contributing.  Discussed discontinuing stimulant medication as an augmentation strategy.  She is agreeable to moving forward with T3 as an augmentation for treatment resistant depression.  Discussed risks and benefits at length.  Will get baseline thyroid labs prior to starting T3.  Hoping she will experience increased energy and motivation and that her mood will lift.  Also discussed setting up an appointment with her interventional psychiatry clinic to discuss other potential options such as ketamine therapy, ECT, TMS.  Does have history of ECT for depression when she was quite young.  I am hopeful to stay ahead of her symptoms before things get too severe.  Has chronic suicidal ideation and is at high risk for suicide.  Continues to deny any plan or intent.  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  See below for risk/benefit conversation regarding T3 had with the patient:    GeneSight testing Info:  GeneSight testing revealed she is a poor 2D6 metabolizer and an intermediate 2B6 metabolizer.  This would explain her multiple failed medication trials due to the negative side effects.  She also has a genotype that would suggest a phenotype sensitivity to serotonin. She also was found to have significantly reduced folic acid conversion.      Starting T3 as augment to antidepressant therapy for treatment resistant depression:  Start T3 at 25 mcg per day for one to two weeks, and if there is little or no improvement, increase the dose to 50 mcg per day; this is consistent with practice guidelines from the American Psychiatric Association and [place] Network for Mood and Anxiety Treatments.     Adverse effects consistent with hyperthyroidism may occur, including tremor, palpitations, heat intolerance, sweating, anxiety, increased frequency of bowel movements, shortness of breath, and exacerbation of cardiac arrhythmia. In addition, hyperthyroidism that emerges during long-term treatment may lead to bone demineralization, osteoporosis, and an increased risk of fracture    Following a normal baseline TSH concentration, no other laboratory monitoring during a four to six week trial of adjunctive T3 is necessary. However, if T3 is continued longer, a serum TSH concentration should be checked after the first one to three months of treatment and then every six months.    Today, 7/27/21:   Patient overall with little change in her symptoms.  Did not do as well on Cytomel and had limited to no improvement at all after weeks on 50 mcg.  Labs were unremarkable prior to starting Cytomel.  Opted to go back to stimulant augmentation since patient does find it quite helpful.  She has been taking 15 mg of immediate release most afternoons.  Due to good tolerability and  some efficacy, we will bump up her immediate release dose slightly to 20 mg daily as needed in addition to her 20 mg extended release dose. I have no concerns about misuse or diversion at this time.  Tolerating well with no negative side effects.  Last blood pressure normal 7/2.  Patient has noted some efficacy from Pristiq more than other antidepressant trials and so we will continue to titrate further to 100 mg daily.  She is tolerating well.  Continues to use lorazepam for anxiety, pain medicine adjunct, and for sleep as prescribed by primary care provider.  Has been utilizing roughly 100 tablets of 0.5 mg every 1.5 months.  Patient with ongoing chronic intermittent passive suicidal ideation with no plan or intent.  Denies safety concerns today.  No problematic drug or alcohol use.  I am hopeful the interventional psychiatry clinic might be able to initiate ketamine therapy or another therapy they would recommend as potentially being more helpful than patient's multiple medication/augmentation trials.    Medication side effects and alternatives were reviewed. Health promotion activities recommended and reviewed today. All questions addressed. Education and counseling completed regarding risks and benefits of medications and psychotherapy options. Recommend therapy for additional support.     Treatment Plan:    Increase Pristiq to 100 mg daily for mood, anxiety.     Continue methyl folate 7.5 mg daily as supplementation.    Continue Adderall XR 20 mg daily for mood augmentation    Increase Adderall IR to 20 mg daily as needed for mood augmentation and daytime fatigue/hypersomnia    Continue benzodiazepine per primary care prescriber.    Keep appt tomorrow with Dr. Yanes at the interventional psychiatry clinic.     Recommend individual psychotherapy.      Continue all other cares per primary care provider.     Continue all other medications as reviewed per electronic medical record today.     Safety plan reviewed.  To the Emergency Department as needed or call after hours crisis line at [phone] or [phone]. [place] Crisis Text Line. Text [place] to 257571 or Suicide LifeLine Chat: suicidepreventionlifeline.org/chat    Schedule an appointment with me in 6 weeks or sooner as needed. Call [facility] at [phone] to schedule.    Follow up with primary care provider as planned or for acute medical concerns.    Call the psychiatric nurse line with medication questions or concerns at [phone].    [employer] may be used to communicate with your provider, but this is not intended to be used for emergencies.    [facility] [place] [facility] Website:  [email or web address]    Risks of benzodiazepine (Ativan, Xanax, Klonopin, Valium, etc) use including, but not limited to, sedation, tolerance, risk for addiction/dependence. Do not drink alcohol while taking benzodiazepines due to risk of trouble breathing and potential death. Do not drive or operate heavy machinery until it is known how the drug affects you. Discuss with physician or pharmacist before ever taking a benzodiazepine with a narcotic/opioid pain medication.     Administrative Billing:   Phone Call/Video Duration: 14 Minutes    Time spent with patient was 14 minutes and greater than 50% of time or 8 minutes was spent in counseling and coordination of care regarding above diagnoses and treatment plan. Patient with multiple psychiatric diagnoses, treatment resistant sxs, and multiple medication changes adding to complexity of care.    Patient Status:  Patient will continue to be seen for ongoing consultation and stabilization.    Signed:   Kimberly Perez DO  [facility]    Disclaimer: This note consists of symbols derived from keyboarding, dictation and/or voice recognition software. As a result, there may be errors in the script that have gone undetected. Please consider  this when interpreting information found in this chart.

## 2021-07-27 NOTE — PATIENT INSTRUCTIONS
Treatment Plan:    Increase Pristiq to 100 mg daily for mood, anxiety.     Continue methyl folate 7.5 mg daily as supplementation.    Continue Adderall XR 20 mg daily for mood augmentation    Increase Adderall IR to 20 mg daily as needed for mood augmentation and daytime fatigue/hypersomnia    Continue benzodiazepine per primary care prescriber.    Keep appt tomorrow with Dr. Yanes at the interventional psychiatry clinic.     Recommend individual psychotherapy.      Continue all other cares per primary care provider.     Continue all other medications as reviewed per electronic medical record today.     Safety plan reviewed. To the Emergency Department as needed or call after hours crisis line at [phone] or [phone]. [place] Crisis Text Line. Text [place] to [address]30719 or Suicide LifeLine Chat: suicidepreventionFlossonicline.org/chat    Schedule an appointment with me in 6 weeks or sooner as needed. Call [facility] at [phone] to schedule.    Follow up with primary care provider as planned or for acute medical concerns.    Call the psychiatric nurse line with medication questions or concerns at [phone].    Exotelhart may be used to communicate with your provider, but this is not intended to be used for emergencies.    [facility] Website:  [email or web address]    Risks of benzodiazepine (Ativan, Xanax, Klonopin, Valium, etc) use including, but not limited to, sedation, tolerance, risk for addiction/dependence. Do not drink alcohol while taking benzodiazepines due to risk of trouble breathing and potential death. Do not drive or operate heavy machinery until it is known how the drug affects you. Discuss with physician or pharmacist before ever taking a benzodiazepine with a narcotic/opioid pain medication.

## 2021-07-27 NOTE — PROGRESS NOTES
[facility] ([facility])  July 27th, 2021    Behavioral Health Clinician Progress Note    Patient Name: Janelle White      Telemedicine Visit: The patient's condition can be safely assessed and treated via synchronous audio and visual telemedicine encounter.      Reason for Telemedicine Visit: Services only offered telehealth    Originating Site (Patient Location): Patient's home    Distant Site (Provider Location): [facility]: [place]    Consent:  The patient/guardian has verbally consented to: the potential risks and benefits of telemedicine (video visit) versus in person care; bill my insurance or make self-payment for services provided; and responsibility for payment of non-covered services.     Mode of Communication:  Video Conference via [employer]    As the provider I attest to compliance with applicable laws and regulations related to telemedicine.         Service Type:  Individual      Service Location:   Face to Face in Home / Community     Session Start Time: 11:00am  Session End Time: 11:18 am      Session Length: 16 - 37      Attendees: Client    Visit Activities (Refresh list every visit): [facility] Only    Diagnostic Assessment Date: 03/11/2021 Dr. Manzo  See Flowsheets for today's PHQ-9 and STACIE-7 results  Previous PHQ-9:   PHQ-9 SCORE 3/11/2021 5/24/2021 7/14/2021   PHQ-9 Total Score - - -   PHQ-9 Total Score MyChart 22 (Severe depression) 4 (Minimal depression) -   PHQ-9 Total Score 22 4 14       Previous STACIE-7:   STACIE-7 SCORE 12/10/2019 3/11/2021 5/24/2021   Total Score - - -   Total Score - 8 (mild anxiety) 6 (mild anxiety)   Total Score 2 8 6       WHODAS  WHODAS 2.0 Total Score 3/11/2021   Total Score 37   Total Score MyChart 37        AUDIT  AUDIT - Alcohol Use Disorders Identification Test - Reproduced from the World Health Organization Audit 2001 (Second Edition) 3/11/2021   1.  How often do you have a drink containing alcohol? 2 to 3 times a week   2.   "How many drinks containing alcohol do you have on a typical day when you are drinking? 1 or 2   3.  How often do you have five or more drinks on one occasion? Never   4.  How often during the last year have you found that you were not able to stop drinking once you had started? Never   5.  How often during the last year have you failed to do what was normally expected of you because of drinking? Never   6.  How often during the last year have you needed a first drink in the morning to get yourself going after a heavy drinking session? Never   7.  How often during the last year have you had a feeling of guilt or remorse after drinking? Less than monthly   8.  How often during the last year have you been unable to remember what happened the night before because of your drinking? Never   9.  Have you or someone else been injured because of your drinking? No   10. Has a relative, friend, doctor or other health care worker been concerned about your drinking or suggested you cut down? Yes, during the last year   TOTAL SCORE 8       DATA  Extended Session (60+ minutes): No  Interactive Complexity: No  Crisis: No    Medication Compliance:  Yes      Chemical Use Review:   Substance Use: Chemical use reviewed, no active concerns identified      Tobacco Use: No current tobacco use.      Current Stressors / Issues:    Pt reports that the surgery went well and that is positive. Reviewed screening. Pt states that nothing has changed and the increased numbers are due to her physical health and headaches and her dog being anemic. Pt reports that she feels like medication is working might like an increase pristiq. Afternoons and evening are hard. Physical pain is bad. Had a CT about neck and waiting to here back on 8/2/21.  Still struggling with motivation. Reviewed suicidal thoughts. \"They are better. Very fleeting. No plans or intentions. I am safe and would never do anything.\"Pt states her dog keeps going. Has her safety plan.  " Excited about meeting Dr. Yanes tomorrow at 1pm about ketamine. No questions just kind of waiting to hear what Dr. Yanes says tomorrow.         Review of Symptoms per patient report:  Depression: Change in sleep, Lack of interest, Excessive or inappropriate guilt, Change in energy level, Difficulties concentrating, Psychomotor slowing or agitation, Suicidal ideation, Feelings of hopelessness, Feelings of helplessness, Low self-worth, Ruminations, Irritability, Feeling sad, down, or depressed, Withdrawn, Poor hygeine and Frequent crying  Deepa:  No Symptoms  Psychosis: No Symptoms  Anxiety: Excessive worry, Nervousness, Physical complaints, such as headaches, stomachaches, muscle tension, Sleep disturbance, Psychomotor agitation, Ruminations, Poor concentration and Irritability  Panic:  No symptoms  Post Traumatic Stress Disorder:  No Symptoms   Eating Disorder: No Symptoms  ADD / ADHD:  No symptoms  Conduct Disorder: No symptoms  Autism Spectrum Disorder: No symptoms  Obsessive Compulsive Disorder: No Symptoms      Changes in Health Issues:   Yes: Pain, Associated Psychological Distress    Assessment: Current Emotional / Mental Status (status of significant symptoms):  Risk status (Self / Other harm or suicidal ideation)  Patient has had a history of suicidal ideation: ongoing SI as previously noted   Patient denies current fears or concerns for personal safety.  Patient reports the following current or recent suicidal ideation or behaviors: ongoing SI as previously noted .  Patient denies current or recent homicidal ideation or behaviors.  Patient denies current or recent self injurious behavior or ideation.  Patient denies other safety concerns.  A safety and risk management plan has been developed including: Patient consented to co-developed safety plan.  A safety and risk management plan was completed.  Patient agreed to use safety plan should any safety concerns arise.  A copy was given to the  "patient.    Appearance:   Appropriate   Eye Contact:   Fair   Psychomotor Behavior: Normal   Attitude:   Cooperative   Orientation:   All  Speech   Rate / Production: Normal    Volume:  Normal   Mood:    Sad   Affect:    Flat   Thought Content:  Clear   Thought Form:  Coherent  Logical   Insight:    Fair     Diagnoses:  1. Moderate recurrent major depression (H)    2. STACIE (generalized anxiety disorder)        Collateral Reports Completed:  Communicated with: Dr. Perez Talked to Dr. Perez about patients SI. Dr. Perez reports chronic thoughts and will access safety with patient as well.     Plan: (Homework, other):  Patient was given information about behavioral services and encouraged to schedule a follow up appointment with the clinic [facility].  She was also given information about mental health symptoms and treatment options  and Cognitive Behavioral Therapy skills to practice when experiencing anxiety and depression.  CD Recommendations: No indications of CD issues.  JEANE Degroot, [facility]      JEANE Ramos  July 27th, 2021        [facility]                                       Patient's Name: Janelle White  March 11, 2021     SAFETY PLAN:  Step 1: Warning signs / cues (Thoughts, images, mood, situation, behavior) that a crisis may be developing:  ? Thoughts: \"I don't matter\", \"People would be better off without me\", \"I can't do this anymore\" and \"I just want this to end\"  ? Images: obsessive thoughts of death or dying: thoughts of carrying out plan  ? Thinking Processes: ruminations (can't stop thinking about my problems): ruminate on my plan and highly critical and negative thoughts: if  says something critical i shut down  ? Mood: worsening depression, hopelessness, disinhibited (not caring about things or consequences) and worthlessness  ? Behaviors: isolating/withdrawing , can't stop crying, not taking care of myself " "and not taking care of my responsibilities  ? Situations: relationship problems   Step 2: Coping strategies - Things I can do to take my mind off of my problems without contacting another person (relaxation technique, physical activity):  ? Distress Tolerance Strategies:  play with my pet  and watch a funny movie:    ? Physical Activities: go for a walk and get in the jacuzzi  ? Focus on helpful thoughts:  \"This is temporary\"  Step 3: People and social settings that provide distraction:              Name: Alyx     Phone: in my phone              Name: Raven   Phone: in my phone  ? park              Step 4: Remind myself of people and things that are important to me and worth living for:  Children, dog, friends  Step 5: When I am in crisis, I can ask these people to help me use my safety plan:              Name: Alyx     Phone: in my phone              Name: Raven   Phone: in my phone  Step 6: Making the environment safe:   ? none identified  Step 7: Professionals or agencies I can contact during a crisis:  ? Suicide Prevention Lifeline: 0-677-030-TALK (1504)  ? Crisis Text Line Service: Text   HOME  to 724-027.    Local Crisis Services: [facility]     Call 911 or go to my nearest emergency department.       I helped develop this safety plan and agree to use it when needed.  I have been given a copy of this plan.       Patient signature: _______________________________________________________________  Today s date:  March 11, 2021  Adapted from Safety Plan Template 2008 Antionette Trevino and Joseph Leon is reprinted with the express permission of the authors.  No portion of the Safety Plan Template may be reproduced without the express, written permission.  You can contact the authors at [email or web address] or [email or web address].  "

## 2021-07-27 NOTE — Clinical Note
Please call this patient to get them scheduled for a follow-up visit in 6 weeks. Please schedule with me and the [facility]. Thanks!

## 2021-07-27 NOTE — Clinical Note
Thanks for seeing this patient tomorrow.  I am hopeful ketamine might be a viable option for her.  Please feel free to reach out to me with any questions, concerns, suggestions, etc.  I have built good report with this patient and I am happy to continue seeing her and advocating on her behalf. -Kimberly

## 2021-07-28 ENCOUNTER — OFFICE VISIT (OUTPATIENT)
Dept: PSYCHIATRY | Facility: CLINIC | Age: 61
End: 2021-07-28
Payer: COMMERCIAL

## 2021-07-28 VITALS
TEMPERATURE: 97.5 F | WEIGHT: 149.8 LBS | SYSTOLIC BLOOD PRESSURE: 120 MMHG | HEIGHT: 65 IN | DIASTOLIC BLOOD PRESSURE: 68 MMHG | HEART RATE: 80 BPM | BODY MASS INDEX: 24.96 KG/M2

## 2021-07-28 DIAGNOSIS — G89.4 CHRONIC PAIN SYNDROME: ICD-10-CM

## 2021-07-28 DIAGNOSIS — F33.2 SEVERE EPISODE OF RECURRENT MAJOR DEPRESSIVE DISORDER, WITHOUT PSYCHOTIC FEATURES (H): Primary | ICD-10-CM

## 2021-07-28 ASSESSMENT — PATIENT HEALTH QUESTIONNAIRE - PHQ9: SUM OF ALL RESPONSES TO PHQ QUESTIONS 1-9: 17

## 2021-07-28 ASSESSMENT — MIFFLIN-ST. JEOR: SCORE: 1250.37

## 2021-07-28 ASSESSMENT — ANXIETY QUESTIONNAIRES: GAD7 TOTAL SCORE: 5

## 2021-07-28 NOTE — PROGRESS NOTES
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 ongoing follow-up for ketamine?    NA  Has lab monitoring been ordered? NA           CRISIS NUMBERS:   Provided routinely in AVS.    Treatment Risk Statement:  The patient understands the risks, benefits, adverse effects and alternatives. Agrees to treatment with the capacity to do so. No medical contraindications to treatment. Agrees to call clinic for any problems. The patient understands to call 911 or go to the nearest ED if life threatening or urgent symptoms occur.            PROVIDER:  Zafar Yanes MD    Time based billing.  Time on day of service includes:  65min spent face to face with the patient   30 minutes pre-reviewing records  35 minutes preparing consultation note and follow up messages/documentation
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
## 2021-07-30 ENCOUNTER — MYC MEDICAL ADVICE (OUTPATIENT)
Dept: FAMILY MEDICINE | Facility: CLINIC | Age: 61
End: 2021-07-30

## 2021-07-30 DIAGNOSIS — G89.4 CHRONIC PAIN SYNDROME: ICD-10-CM

## 2021-07-31 DIAGNOSIS — R05.9 COUGH: ICD-10-CM

## 2021-08-03 ENCOUNTER — MYC REFILL (OUTPATIENT)
Dept: FAMILY MEDICINE | Facility: CLINIC | Age: 61
End: 2021-08-03

## 2021-08-03 DIAGNOSIS — G89.4 CHRONIC PAIN SYNDROME: ICD-10-CM

## 2021-08-03 RX ORDER — HYDROCODONE BITARTRATE AND ACETAMINOPHEN 10; 325 MG/1; MG/1
TABLET ORAL
Qty: 60 TABLET | Refills: 0 | Status: CANCELLED | OUTPATIENT
Start: 2021-08-03

## 2021-08-03 RX ORDER — MORPHINE SULFATE 15 MG/1
15 TABLET, FILM COATED, EXTENDED RELEASE ORAL EVERY 12 HOURS
Qty: 60 TABLET | Refills: 0 | Status: CANCELLED | OUTPATIENT
Start: 2021-08-03

## 2021-08-04 RX ORDER — ALBUTEROL SULFATE 90 UG/1
2 AEROSOL, METERED RESPIRATORY (INHALATION) EVERY 6 HOURS
Qty: 18 G | Refills: 0 | Status: SHIPPED | OUTPATIENT
Start: 2021-08-04 | End: 2021-09-15

## 2021-08-04 NOTE — TELEPHONE ENCOUNTER
Prescription approved per [facility] Refill Protocol.  SHANEL MooneyN, RN  [facility]

## 2021-08-08 RX ORDER — MORPHINE SULFATE 15 MG/1
15 TABLET, FILM COATED, EXTENDED RELEASE ORAL EVERY 12 HOURS
Qty: 60 TABLET | Refills: 0 | Status: SHIPPED | OUTPATIENT
Start: 2021-08-08 | End: 2021-08-30

## 2021-08-08 RX ORDER — HYDROCODONE BITARTRATE AND ACETAMINOPHEN 10; 325 MG/1; MG/1
TABLET ORAL
Qty: 60 TABLET | Refills: 0 | Status: SHIPPED | OUTPATIENT
Start: 2021-08-08 | End: 2021-08-30

## 2021-08-09 NOTE — TELEPHONE ENCOUNTER
Morphine and Norco have refill date of 8/8/21.  Patient to follow up with her pharmacy.    Writer responded via [employer].    SHANEL MooneyN, RN  [facility]

## 2021-08-10 ENCOUNTER — HOSPITAL ENCOUNTER (INPATIENT)
Facility: CLINIC | Age: 61
LOS: 2 days | Discharge: HOME | End: 2021-08-13
Attending: EMERGENCY MEDICINE | Admitting: INTERNAL MEDICINE
Payer: COMMERCIAL

## 2021-08-10 DIAGNOSIS — A41.9 SEPSIS, DUE TO UNSPECIFIED ORGANISM, UNSPECIFIED WHETHER ACUTE ORGAN DYSFUNCTION PRESENT (H): ICD-10-CM

## 2021-08-10 DIAGNOSIS — L03.90 CELLULITIS, UNSPECIFIED CELLULITIS SITE: ICD-10-CM

## 2021-08-10 DIAGNOSIS — M11.20 PSEUDOGOUT: Primary | ICD-10-CM

## 2021-08-10 DIAGNOSIS — M25.50 POLYARTHRALGIA: ICD-10-CM

## 2021-08-10 LAB
ALBUMIN SERPL-MCNC: 2.7 G/DL (ref 3.4–5)
ALBUMIN UR-MCNC: 70 MG/DL
ALP SERPL-CCNC: 92 U/L (ref 40–150)
ALT SERPL W P-5'-P-CCNC: 26 U/L (ref 0–50)
ANION GAP SERPL CALCULATED.3IONS-SCNC: 7 MMOL/L (ref 3–14)
APPEARANCE UR: ABNORMAL
AST SERPL W P-5'-P-CCNC: 19 U/L (ref 0–45)
BASOPHILS # BLD AUTO: 0 10E3/UL (ref 0–0.2)
BASOPHILS NFR BLD AUTO: 0 %
BILIRUB SERPL-MCNC: 0.5 MG/DL (ref 0.2–1.3)
BILIRUB UR QL STRIP: NEGATIVE
BUN SERPL-MCNC: 12 MG/DL (ref 7–30)
CALCIUM SERPL-MCNC: 9.2 MG/DL (ref 8.5–10.1)
CHLORIDE BLD-SCNC: 106 MMOL/L (ref 94–109)
CO2 SERPL-SCNC: 24 MMOL/L (ref 20–32)
COLOR UR AUTO: YELLOW
CREAT SERPL-MCNC: 0.77 MG/DL (ref 0.52–1.04)
EOSINOPHIL # BLD AUTO: 0 10E3/UL (ref 0–0.7)
EOSINOPHIL NFR BLD AUTO: 0 %
ERYTHROCYTE [DISTWIDTH] IN BLOOD BY AUTOMATED COUNT: 12.1 % (ref 10–15)
GFR SERPL CREATININE-BSD FRML MDRD: 84 ML/MIN/1.73M2
GLUCOSE BLD-MCNC: 116 MG/DL (ref 70–99)
GLUCOSE BLDC GLUCOMTR-MCNC: 106 MG/DL (ref 70–99)
GLUCOSE UR STRIP-MCNC: NEGATIVE MG/DL
HCT VFR BLD AUTO: 36.4 % (ref 35–47)
HGB BLD-MCNC: 12 G/DL (ref 11.7–15.7)
HGB UR QL STRIP: NEGATIVE
HOLD SPECIMEN: NORMAL
IMM GRANULOCYTES # BLD: 0.1 10E3/UL
IMM GRANULOCYTES NFR BLD: 1 %
KETONES UR STRIP-MCNC: ABNORMAL MG/DL
LACTATE SERPL-SCNC: 1.1 MMOL/L (ref 0.7–2)
LEUKOCYTE ESTERASE UR QL STRIP: NEGATIVE
LYMPHOCYTES # BLD AUTO: 0.7 10E3/UL (ref 0.8–5.3)
LYMPHOCYTES NFR BLD AUTO: 5 %
MCH RBC QN AUTO: 30.6 PG (ref 26.5–33)
MCHC RBC AUTO-ENTMCNC: 33 G/DL (ref 31.5–36.5)
MCV RBC AUTO: 93 FL (ref 78–100)
MONOCYTES # BLD AUTO: 0.4 10E3/UL (ref 0–1.3)
MONOCYTES NFR BLD AUTO: 3 %
MUCOUS THREADS #/AREA URNS LPF: PRESENT /LPF
NEUTROPHILS # BLD AUTO: 11.8 10E3/UL (ref 1.6–8.3)
NEUTROPHILS NFR BLD AUTO: 91 %
NITRATE UR QL: NEGATIVE
NRBC # BLD AUTO: 0 10E3/UL
NRBC BLD AUTO-RTO: 0 /100
PH UR STRIP: 7 [PH] (ref 5–7)
PLATELET # BLD AUTO: 157 10E3/UL (ref 150–450)
POTASSIUM BLD-SCNC: 3.5 MMOL/L (ref 3.4–5.3)
PROT SERPL-MCNC: 6.4 G/DL (ref 6.8–8.8)
RBC # BLD AUTO: 3.92 10E6/UL (ref 3.8–5.2)
RBC URINE: 7 /HPF
SODIUM SERPL-SCNC: 137 MMOL/L (ref 133–144)
SP GR UR STRIP: 1.03 (ref 1–1.03)
SQUAMOUS EPITHELIAL: 4 /HPF
UROBILINOGEN UR STRIP-MCNC: NORMAL MG/DL
WBC # BLD AUTO: 13 10E3/UL (ref 4–11)
WBC URINE: 3 /HPF

## 2021-08-10 PROCEDURE — 99285 EMERGENCY DEPT VISIT HI MDM: CPT | Mod: 25

## 2021-08-10 PROCEDURE — 83605 ASSAY OF LACTIC ACID: CPT | Performed by: EMERGENCY MEDICINE

## 2021-08-10 PROCEDURE — 250N000011 HC RX IP 250 OP 636: Performed by: EMERGENCY MEDICINE

## 2021-08-10 PROCEDURE — 36415 COLL VENOUS BLD VENIPUNCTURE: CPT | Performed by: EMERGENCY MEDICINE

## 2021-08-10 PROCEDURE — 80053 COMPREHEN METABOLIC PANEL: CPT | Performed by: EMERGENCY MEDICINE

## 2021-08-10 PROCEDURE — 81001 URINALYSIS AUTO W/SCOPE: CPT | Performed by: EMERGENCY MEDICINE

## 2021-08-10 PROCEDURE — 86140 C-REACTIVE PROTEIN: CPT | Performed by: EMERGENCY MEDICINE

## 2021-08-10 PROCEDURE — 85025 COMPLETE CBC W/AUTO DIFF WBC: CPT | Performed by: EMERGENCY MEDICINE

## 2021-08-10 PROCEDURE — 82040 ASSAY OF SERUM ALBUMIN: CPT | Performed by: EMERGENCY MEDICINE

## 2021-08-10 PROCEDURE — 87635 SARS-COV-2 COVID-19 AMP PRB: CPT | Performed by: EMERGENCY MEDICINE

## 2021-08-10 PROCEDURE — 87040 BLOOD CULTURE FOR BACTERIA: CPT | Performed by: EMERGENCY MEDICINE

## 2021-08-10 PROCEDURE — 250N000013 HC RX MED GY IP 250 OP 250 PS 637: Performed by: EMERGENCY MEDICINE

## 2021-08-10 PROCEDURE — 85652 RBC SED RATE AUTOMATED: CPT | Performed by: EMERGENCY MEDICINE

## 2021-08-10 PROCEDURE — 99223 1ST HOSP IP/OBS HIGH 75: CPT | Mod: AI | Performed by: INTERNAL MEDICINE

## 2021-08-10 RX ORDER — CEFTRIAXONE 1 G/1
1 INJECTION, POWDER, FOR SOLUTION INTRAMUSCULAR; INTRAVENOUS ONCE
Status: DISCONTINUED | OUTPATIENT
Start: 2021-08-10 | End: 2021-08-10

## 2021-08-10 RX ORDER — CEFTRIAXONE 1 G/1
1 INJECTION, POWDER, FOR SOLUTION INTRAMUSCULAR; INTRAVENOUS ONCE
Status: COMPLETED | OUTPATIENT
Start: 2021-08-10 | End: 2021-08-11

## 2021-08-10 RX ORDER — ONDANSETRON 2 MG/ML
4 INJECTION INTRAMUSCULAR; INTRAVENOUS ONCE
Status: COMPLETED | OUTPATIENT
Start: 2021-08-10 | End: 2021-08-10

## 2021-08-10 RX ORDER — HYDROMORPHONE HYDROCHLORIDE 1 MG/ML
0.5 INJECTION, SOLUTION INTRAMUSCULAR; INTRAVENOUS; SUBCUTANEOUS ONCE
Status: COMPLETED | OUTPATIENT
Start: 2021-08-10 | End: 2021-08-10

## 2021-08-10 RX ORDER — ACETAMINOPHEN 500 MG
1000 TABLET ORAL ONCE
Status: COMPLETED | OUTPATIENT
Start: 2021-08-10 | End: 2021-08-10

## 2021-08-10 RX ADMIN — ACETAMINOPHEN 1000 MG: 500 TABLET, FILM COATED ORAL at 23:22

## 2021-08-10 RX ADMIN — ONDANSETRON 4 MG: 2 INJECTION INTRAMUSCULAR; INTRAVENOUS at 23:21

## 2021-08-10 RX ADMIN — ONDANSETRON 4 MG: 2 INJECTION INTRAMUSCULAR; INTRAVENOUS at 20:09

## 2021-08-10 RX ADMIN — HYDROMORPHONE HYDROCHLORIDE 0.5 MG: 1 INJECTION, SOLUTION INTRAMUSCULAR; INTRAVENOUS; SUBCUTANEOUS at 23:22

## 2021-08-11 ENCOUNTER — TELEPHONE (OUTPATIENT)
Dept: PSYCHIATRY | Facility: CLINIC | Age: 61
End: 2021-08-11

## 2021-08-11 ENCOUNTER — APPOINTMENT (OUTPATIENT)
Dept: MRI IMAGING | Facility: CLINIC | Age: 61
End: 2021-08-11
Attending: INTERNAL MEDICINE
Payer: COMMERCIAL

## 2021-08-11 PROBLEM — A41.9 SEPSIS, DUE TO UNSPECIFIED ORGANISM, UNSPECIFIED WHETHER ACUTE ORGAN DYSFUNCTION PRESENT (H): Status: ACTIVE | Noted: 2021-08-11

## 2021-08-11 PROBLEM — M25.50 POLYARTHRALGIA: Status: ACTIVE | Noted: 2021-08-11

## 2021-08-11 PROBLEM — L03.90 CELLULITIS: Status: ACTIVE | Noted: 2021-08-11

## 2021-08-11 PROBLEM — L03.90 CELLULITIS, UNSPECIFIED CELLULITIS SITE: Status: ACTIVE | Noted: 2021-08-11

## 2021-08-11 LAB
APPEARANCE FLD: ABNORMAL
COLOR FLD: YELLOW
CREAT SERPL-MCNC: 0.65 MG/DL (ref 0.52–1.04)
CRP SERPL-MCNC: 237 MG/L (ref 0–8)
CRYSTALS SNV MICRO: ABNORMAL
ERYTHROCYTE [SEDIMENTATION RATE] IN BLOOD BY WESTERGREN METHOD: 50 MM/HR (ref 0–30)
GFR SERPL CREATININE-BSD FRML MDRD: >90 ML/MIN/1.73M2
LACTATE SERPL-SCNC: 0.8 MMOL/L (ref 0.7–2)
LYMPHOCYTES NFR FLD MANUAL: 7 %
MONOS+MACROS NFR FLD MANUAL: 5 %
NEUTS BAND NFR FLD MANUAL: 88 %
SARS-COV-2 RNA RESP QL NAA+PROBE: NEGATIVE
WBC # FLD AUTO: 7427 /UL

## 2021-08-11 PROCEDURE — 36415 COLL VENOUS BLD VENIPUNCTURE: CPT | Performed by: EMERGENCY MEDICINE

## 2021-08-11 PROCEDURE — 82565 ASSAY OF CREATININE: CPT | Performed by: INTERNAL MEDICINE

## 2021-08-11 PROCEDURE — 250N000013 HC RX MED GY IP 250 OP 250 PS 637: Performed by: EMERGENCY MEDICINE

## 2021-08-11 PROCEDURE — 83605 ASSAY OF LACTIC ACID: CPT | Performed by: INTERNAL MEDICINE

## 2021-08-11 PROCEDURE — 250N000011 HC RX IP 250 OP 636: Performed by: INTERNAL MEDICINE

## 2021-08-11 PROCEDURE — 250N000013 HC RX MED GY IP 250 OP 250 PS 637: Performed by: PHYSICIAN ASSISTANT

## 2021-08-11 PROCEDURE — 72158 MRI LUMBAR SPINE W/O & W/DYE: CPT

## 2021-08-11 PROCEDURE — 36415 COLL VENOUS BLD VENIPUNCTURE: CPT | Performed by: INTERNAL MEDICINE

## 2021-08-11 PROCEDURE — 250N000013 HC RX MED GY IP 250 OP 250 PS 637: Performed by: INTERNAL MEDICINE

## 2021-08-11 PROCEDURE — 99233 SBSQ HOSP IP/OBS HIGH 50: CPT | Performed by: INTERNAL MEDICINE

## 2021-08-11 PROCEDURE — 0S9C3ZX DRAINAGE OF RIGHT KNEE JOINT, PERCUTANEOUS APPROACH, DIAGNOSTIC: ICD-10-PCS | Performed by: PHYSICIAN ASSISTANT

## 2021-08-11 PROCEDURE — 89051 BODY FLUID CELL COUNT: CPT | Performed by: PHYSICIAN ASSISTANT

## 2021-08-11 PROCEDURE — 258N000003 HC RX IP 258 OP 636: Performed by: INTERNAL MEDICINE

## 2021-08-11 PROCEDURE — 258N000003 HC RX IP 258 OP 636: Performed by: EMERGENCY MEDICINE

## 2021-08-11 PROCEDURE — A9585 GADOBUTROL INJECTION: HCPCS | Performed by: EMERGENCY MEDICINE

## 2021-08-11 PROCEDURE — 250N000011 HC RX IP 250 OP 636: Performed by: EMERGENCY MEDICINE

## 2021-08-11 PROCEDURE — 120N000004 HC R&B MS OVERFLOW

## 2021-08-11 PROCEDURE — 89060 EXAM SYNOVIAL FLUID CRYSTALS: CPT | Mod: TC | Performed by: PHYSICIAN ASSISTANT

## 2021-08-11 PROCEDURE — 84145 PROCALCITONIN (PCT): CPT | Performed by: INTERNAL MEDICINE

## 2021-08-11 PROCEDURE — 86618 LYME DISEASE ANTIBODY: CPT | Performed by: INTERNAL MEDICINE

## 2021-08-11 PROCEDURE — 82784 ASSAY IGA/IGD/IGG/IGM EACH: CPT | Performed by: INTERNAL MEDICINE

## 2021-08-11 PROCEDURE — 255N000002 HC RX 255 OP 636: Performed by: EMERGENCY MEDICINE

## 2021-08-11 PROCEDURE — 87040 BLOOD CULTURE FOR BACTERIA: CPT | Performed by: EMERGENCY MEDICINE

## 2021-08-11 RX ORDER — HYDROMORPHONE HYDROCHLORIDE 1 MG/ML
0.5 INJECTION, SOLUTION INTRAMUSCULAR; INTRAVENOUS; SUBCUTANEOUS
Status: DISCONTINUED | OUTPATIENT
Start: 2021-08-11 | End: 2021-08-13 | Stop reason: HOSPADM

## 2021-08-11 RX ORDER — ONDANSETRON 2 MG/ML
4 INJECTION INTRAMUSCULAR; INTRAVENOUS EVERY 6 HOURS PRN
Status: DISCONTINUED | OUTPATIENT
Start: 2021-08-11 | End: 2021-08-13 | Stop reason: HOSPADM

## 2021-08-11 RX ORDER — HYDROMORPHONE HYDROCHLORIDE 1 MG/ML
0.5 INJECTION, SOLUTION INTRAMUSCULAR; INTRAVENOUS; SUBCUTANEOUS ONCE
Status: COMPLETED | OUTPATIENT
Start: 2021-08-11 | End: 2021-08-11

## 2021-08-11 RX ORDER — ONDANSETRON 4 MG/1
4 TABLET, ORALLY DISINTEGRATING ORAL EVERY 6 HOURS PRN
Status: DISCONTINUED | OUTPATIENT
Start: 2021-08-11 | End: 2021-08-13 | Stop reason: HOSPADM

## 2021-08-11 RX ORDER — AMOXICILLIN 250 MG
6-9 CAPSULE ORAL EVERY EVENING
COMMUNITY

## 2021-08-11 RX ORDER — IBUPROFEN 600 MG/1
600 TABLET, FILM COATED ORAL ONCE
Status: COMPLETED | OUTPATIENT
Start: 2021-08-11 | End: 2021-08-11

## 2021-08-11 RX ORDER — OXYCODONE HYDROCHLORIDE 5 MG/1
5 TABLET ORAL EVERY 4 HOURS PRN
Status: DISCONTINUED | OUTPATIENT
Start: 2021-08-11 | End: 2021-08-11

## 2021-08-11 RX ORDER — LIDOCAINE 40 MG/G
CREAM TOPICAL
Status: DISCONTINUED | OUTPATIENT
Start: 2021-08-11 | End: 2021-08-13 | Stop reason: HOSPADM

## 2021-08-11 RX ORDER — MORPHINE SULFATE 15 MG/1
15 TABLET, FILM COATED, EXTENDED RELEASE ORAL EVERY 12 HOURS
Status: DISCONTINUED | OUTPATIENT
Start: 2021-08-11 | End: 2021-08-13 | Stop reason: HOSPADM

## 2021-08-11 RX ORDER — ACETAMINOPHEN 650 MG/1
650 SUPPOSITORY RECTAL EVERY 6 HOURS PRN
Status: DISCONTINUED | OUTPATIENT
Start: 2021-08-11 | End: 2021-08-13 | Stop reason: HOSPADM

## 2021-08-11 RX ORDER — CYCLOBENZAPRINE HCL 10 MG
10 TABLET ORAL 3 TIMES DAILY PRN
Status: DISCONTINUED | OUTPATIENT
Start: 2021-08-11 | End: 2021-08-13 | Stop reason: HOSPADM

## 2021-08-11 RX ORDER — CEFAZOLIN SODIUM 1 G/50ML
2000 SOLUTION INTRAVENOUS EVERY 24 HOURS
Status: DISCONTINUED | OUTPATIENT
Start: 2021-08-11 | End: 2021-08-13

## 2021-08-11 RX ORDER — GADOBUTROL 604.72 MG/ML
7.5 INJECTION INTRAVENOUS ONCE
Status: COMPLETED | OUTPATIENT
Start: 2021-08-11 | End: 2021-08-11

## 2021-08-11 RX ORDER — LIDOCAINE 4 G/G
1-4 PATCH TOPICAL DAILY
Status: DISCONTINUED | OUTPATIENT
Start: 2021-08-11 | End: 2021-08-13 | Stop reason: HOSPADM

## 2021-08-11 RX ORDER — LORAZEPAM 1 MG/1
1 TABLET ORAL AT BEDTIME
Status: DISCONTINUED | OUTPATIENT
Start: 2021-08-11 | End: 2021-08-13 | Stop reason: HOSPADM

## 2021-08-11 RX ORDER — HYDROCODONE BITARTRATE AND ACETAMINOPHEN 10; 325 MG/1; MG/1
1-2 TABLET ORAL EVERY 4 HOURS PRN
Status: DISCONTINUED | OUTPATIENT
Start: 2021-08-11 | End: 2021-08-13 | Stop reason: HOSPADM

## 2021-08-11 RX ORDER — FENTANYL CITRATE 50 UG/ML
50 INJECTION, SOLUTION INTRAMUSCULAR; INTRAVENOUS ONCE
Status: COMPLETED | OUTPATIENT
Start: 2021-08-11 | End: 2021-08-11

## 2021-08-11 RX ORDER — CEFAZOLIN SODIUM 1 G/50ML
1 INJECTION, SOLUTION INTRAVENOUS ONCE
Status: COMPLETED | OUTPATIENT
Start: 2021-08-11 | End: 2021-08-12

## 2021-08-11 RX ORDER — ACETAMINOPHEN 325 MG/1
650 TABLET ORAL EVERY 6 HOURS PRN
Status: DISCONTINUED | OUTPATIENT
Start: 2021-08-11 | End: 2021-08-13 | Stop reason: HOSPADM

## 2021-08-11 RX ORDER — SODIUM CHLORIDE, SODIUM LACTATE, POTASSIUM CHLORIDE, CALCIUM CHLORIDE 600; 310; 30; 20 MG/100ML; MG/100ML; MG/100ML; MG/100ML
INJECTION, SOLUTION INTRAVENOUS CONTINUOUS
Status: DISCONTINUED | OUTPATIENT
Start: 2021-08-11 | End: 2021-08-12

## 2021-08-11 RX ORDER — CEFAZOLIN SODIUM 1 G/3ML
1 INJECTION, POWDER, FOR SOLUTION INTRAMUSCULAR; INTRAVENOUS 3 TIMES DAILY
Status: DISCONTINUED | OUTPATIENT
Start: 2021-08-11 | End: 2021-08-12

## 2021-08-11 RX ADMIN — HYDROMORPHONE HYDROCHLORIDE 0.5 MG: 1 INJECTION, SOLUTION INTRAMUSCULAR; INTRAVENOUS; SUBCUTANEOUS at 08:58

## 2021-08-11 RX ADMIN — OXYCODONE HYDROCHLORIDE 5 MG: 5 TABLET ORAL at 14:00

## 2021-08-11 RX ADMIN — VANCOMYCIN HYDROCHLORIDE 2000 MG: 10 INJECTION, POWDER, LYOPHILIZED, FOR SOLUTION INTRAVENOUS at 14:40

## 2021-08-11 RX ADMIN — LIDOCAINE 2 PATCH: 246 PATCH TOPICAL at 19:35

## 2021-08-11 RX ADMIN — HYDROMORPHONE HYDROCHLORIDE 0.5 MG: 1 INJECTION, SOLUTION INTRAMUSCULAR; INTRAVENOUS; SUBCUTANEOUS at 17:11

## 2021-08-11 RX ADMIN — CEFAZOLIN 1 G: 1 INJECTION, POWDER, FOR SOLUTION INTRAMUSCULAR; INTRAVENOUS at 14:00

## 2021-08-11 RX ADMIN — GADOBUTROL 7 ML: 604.72 INJECTION INTRAVENOUS at 07:01

## 2021-08-11 RX ADMIN — IBUPROFEN 600 MG: 600 TABLET, FILM COATED ORAL at 04:48

## 2021-08-11 RX ADMIN — SODIUM CHLORIDE, POTASSIUM CHLORIDE, SODIUM LACTATE AND CALCIUM CHLORIDE: 600; 310; 30; 20 INJECTION, SOLUTION INTRAVENOUS at 11:31

## 2021-08-11 RX ADMIN — CEFAZOLIN SODIUM 1 G: 1 INJECTION, SOLUTION INTRAVENOUS at 09:17

## 2021-08-11 RX ADMIN — ONDANSETRON 4 MG: 2 INJECTION INTRAMUSCULAR; INTRAVENOUS at 17:11

## 2021-08-11 RX ADMIN — SODIUM CHLORIDE 1000 ML: 9 INJECTION, SOLUTION INTRAVENOUS at 12:49

## 2021-08-11 RX ADMIN — SODIUM CHLORIDE 1000 ML: 9 INJECTION, SOLUTION INTRAVENOUS at 02:23

## 2021-08-11 RX ADMIN — ACETAMINOPHEN 650 MG: 325 TABLET, FILM COATED ORAL at 11:30

## 2021-08-11 RX ADMIN — CEFTRIAXONE 1 G: 1 INJECTION, POWDER, FOR SOLUTION INTRAMUSCULAR; INTRAVENOUS at 00:56

## 2021-08-11 RX ADMIN — LORAZEPAM 1 MG: 1 TABLET ORAL at 21:55

## 2021-08-11 RX ADMIN — FENTANYL CITRATE 50 MCG: 50 INJECTION, SOLUTION INTRAMUSCULAR; INTRAVENOUS at 03:56

## 2021-08-11 RX ADMIN — SODIUM CHLORIDE, POTASSIUM CHLORIDE, SODIUM LACTATE AND CALCIUM CHLORIDE: 600; 310; 30; 20 INJECTION, SOLUTION INTRAVENOUS at 15:25

## 2021-08-11 RX ADMIN — VANCOMYCIN HYDROCHLORIDE 1250 MG: 5 INJECTION, POWDER, LYOPHILIZED, FOR SOLUTION INTRAVENOUS at 01:48

## 2021-08-11 RX ADMIN — SODIUM CHLORIDE 1000 ML: 9 INJECTION, SOLUTION INTRAVENOUS at 00:55

## 2021-08-11 RX ADMIN — MORPHINE SULFATE 15 MG: 15 TABLET, EXTENDED RELEASE ORAL at 20:33

## 2021-08-11 RX ADMIN — ACETAMINOPHEN 650 MG: 325 TABLET, FILM COATED ORAL at 16:19

## 2021-08-11 RX ADMIN — HYDROMORPHONE HYDROCHLORIDE 0.5 MG: 1 INJECTION, SOLUTION INTRAMUSCULAR; INTRAVENOUS; SUBCUTANEOUS at 12:50

## 2021-08-11 ASSESSMENT — ENCOUNTER SYMPTOMS
FEVER: 0
SHORTNESS OF BREATH: 0
FATIGUE: 1
JOINT SWELLING: 1
MYALGIAS: 1
NAUSEA: 1
COUGH: 0
APPETITE CHANGE: 1
ARTHRALGIAS: 1
VOMITING: 1
COLOR CHANGE: 1

## 2021-08-11 ASSESSMENT — MIFFLIN-ST. JEOR: SCORE: 1311.6

## 2021-08-11 NOTE — ED NOTES
"[facility]  ED Nurse Handoff Report    Janelle White is a 60 year old female   ED Chief complaint: Fatigue  . ED Diagnosis:   Final diagnoses:   Cellulitis, unspecified cellulitis site   Sepsis, due to unspecified organism, unspecified whether acute organ dysfunction present (H)   Polyarthralgia     Allergies:   Allergies   Allergen Reactions     Bupropion Other (See Comments) and Swelling     Ineffective, name brand works best  Ineffective, name brand works best     Codeine Other (See Comments)     \"Feels like electricity running through body\"  No reaction noted in [employer].  Shaky  With Tylenol     Effexor [Venlafaxine Hydrochloride]      Affect too flat     Escitalopram      Other reaction(s): *Unknown  Sexual dysfunction     Fluoxetine Other (See Comments)     Sexual dysfunction     Levaquin [Levofloxacin] Other (See Comments)     Suicidal thoughts  Dark thoughts- mood changes     Lexapro      Sexual dysfunction     Methylphenidate Hives     Milnacipran      12.5 MG is fine. 25 MG caused side effects. \"Dark thoughts\"     Pregabalin Other (See Comments)     Hallucinations  Audiovisual hallucinations     Prozac [Fluoxetine Hcl]      Sexual dysfunction     Tramadol Hives     Hives       Venlafaxine      Other reaction(s): *Unknown  Affect too flat       Code Status: Full Code  Activity level - Baseline/Home:  Independent. Activity Level - Current:   Assist X 1. Lift room needed: No. Bariatric: No   Needed: No   Isolation: Yes. Infection: Not Applicable  ESBL.     Vital Signs:   Vitals:    08/11/21 0430 08/11/21 0500 08/11/21 0530 08/11/21 0600   BP: 116/69 123/79 117/66 97/48   Pulse: 94 96 102 92   Resp:       Temp: 99.9  F (37.7  C)  (!) 100.5  F (38.1  C)    TempSrc: Oral  Oral    SpO2: 96% 99% 91% 93%   Weight:           Cardiac Rhythm:  ,      Pain level:    Patient confused: No. Patient Falls Risk: Yes.   Elimination Status: Has voided   Patient Report - Initial Complaint: " Arthralgia & chills. Focused Assessment: Pt has hx CLL and multiple ortho surgeries. New generalized cellulitis. Multiple joint pains & chills, low grade temps. Sepsis work up. IV abx given. 2L IVF. MRI later today, checklist completed and faxed.   Tests Performed:   Labs Ordered and Resulted from Time of ED Arrival Up to the Time of Departure from the ED   COMPREHENSIVE METABOLIC PANEL - Abnormal; Notable for the following components:       Result Value    Glucose 116 (*)     Protein Total 6.4 (*)     Albumin 2.7 (*)     All other components within normal limits   CBC WITH PLATELETS AND DIFFERENTIAL - Abnormal; Notable for the following components:    WBC Count 13.0 (*)     Absolute Neutrophils 11.8 (*)     Absolute Lymphocytes 0.7 (*)     Absolute Immature Granulocytes 0.1 (*)     All other components within normal limits   GLUCOSE BY METER - Abnormal; Notable for the following components:    GLUCOSE BY METER POCT 106 (*)     All other components within normal limits   ROUTINE UA WITH MICROSCOPIC - Abnormal; Notable for the following components:    Appearance Urine Slightly Cloudy (*)     Ketones Urine Trace (*)     Protein Albumin Urine 70  (*)     Mucus Urine Present (*)     RBC Urine 7 (*)     Squamous Epithelials Urine 4 (*)     All other components within normal limits   ERYTHROCYTE SEDIMENTATION RATE AUTO - Abnormal; Notable for the following components:    Erythrocyte Sedimentation Rate 50 (*)     All other components within normal limits   CRP INFLAMMATION - Abnormal; Notable for the following components:    CRP Inflammation 237.0 (*)     All other components within normal limits   LACTIC ACID WHOLE BLOOD - Normal   SARS-COV2 (COVID-19) VIRUS RT-PCR - Normal    Narrative:     Testing was performed using the [facility]ita  SARS-CoV-2 & Influenza A/B Assay on the [employer]  [employer]  System.  This test should be ordered for the detection of SARS-COV-2 in individuals who meet SARS-CoV-2 clinical and/or epidemiological  criteria. Test performance is unknown in asymptomatic patients.  This test is for in vitro diagnostic use under the FDA EUA for laboratories certified under CLIA to perform moderate and/or high complexity testing. This test has not been FDA cleared or approved.  A negative test does not rule out the presence of PCR inhibitors in the specimen or target RNA in concentration below the limit of detection for the assay. The possibility of a false negative should be considered if the patient's recent exposure or clinical presentation suggests COVID-19.  [facility] are certified under the Clinical Laboratory Improvement Amendments of 1988 (CLIA-88) as qualified to perform moderate and/or high complexity laboratory testing.   CBC WITH PLATELETS & DIFFERENTIAL    Narrative:     The following orders were created for panel order CBC + differential.  Procedure                               Abnormality         Status                     ---------                               -----------         ------                     CBC with platelets and d...[[id number]]  Abnormal            Final result                 Please view results for these tests on the individual orders.   EXTRA RED TOP TUBE   COVID-19 VIRUS (CORONAVIRUS) BY PCR    Narrative:     The following orders were created for panel order Asymptomatic COVID-19 Virus (Coronavirus) by PCR Nasopharyngeal.  Procedure                               Abnormality         Status                     ---------                               -----------         ------                     SARS-COV2 (COVID-19) Vir...[[id number]]  Normal              Final result                 Please view results for these tests on the individual orders.   BLOOD CULTURE   BLOOD CULTURE   EXTRA TUBE    Narrative:     The following orders were created for panel order Extra Tube ([place] Draw).  Procedure                               Abnormality         Status                      ---------                               -----------         ------                     Extra Red Top Tube[[id number]]                               Final result                 Please view results for these tests on the individual orders.     . Abnormal Results: Bcx pending.   Treatments provided:   MR Lumbar Spine w/o & w Contrast    (Results Pending)     Family Comments: Pt will contact  OBS brochure/video discussed/provided to patient:  No  ED Medications:   Medications   ondansetron (ZOFRAN) injection 4 mg (4 mg Intravenous Given 8/10/21 2009)   acetaminophen (TYLENOL) tablet 1,000 mg (1,000 mg Oral Given 8/10/21 2322)   HYDROmorphone (PF) (DILAUDID) injection 0.5 mg (0.5 mg Intravenous Given 8/10/21 2322)   ondansetron (ZOFRAN) injection 4 mg (4 mg Intravenous Given 8/10/21 2321)   cefTRIAXone (ROCEPHIN) 1 g vial to attach to  mL bag for ADULTS or NS 50 mL bag for PEDS (0 g Intravenous Stopped 8/11/21 0131)   0.9% sodium chloride BOLUS (0 mLs Intravenous Stopped 8/11/21 0220)   vancomycin 1250 mg in 0.9% NaCl 250 mL intermittent infusion 1,250 mg (0 mg Intravenous Stopped 8/11/21 0341)   0.9% sodium chloride BOLUS (0 mLs Intravenous Stopped 8/11/21 0340)   fentaNYL (PF) (SUBLIMAZE) injection 50 mcg (50 mcg Intravenous Given 8/11/21 0356)   ibuprofen (ADVIL/MOTRIN) tablet 600 mg (600 mg Oral Given 8/11/21 0448)     Drips infusing:  No  For the majority of the shift, the patient's behavior Green. Interventions performed were none.    Sepsis treatment initiated: No     Patient tested for COVID 19 prior to admission: YES    ED Nurse Name/Phone Number: Luciana Vu RN,   5:53 AM    RECEIVING UNIT ED HANDOFF REVIEW    Above ED Nurse Handoff Report was reviewed: Yes  Reviewed by: Francia Meraz RN on August 11, 2021 at 10:48 AM

## 2021-08-11 NOTE — ED PROVIDER NOTES
History   Chief Complaint:  Weakness     History limited. Poor historian.     HPI   Janelle White is a 60 year old female with a complicated past medical history who presents with generalized weakness. The patient reports that she began experiencing nausea and vomiting about five days ago. Three days ago, she began noticing body aches, joint swelling, and redness. She also denies chest pain, abdominal pain, difficulty breathing, IV drug use, history of MRSA, or fever. Of note however, she does state roughly 3 weeks ago hitting her R. Leg on a stick and reports recent travel to [place] for camping trip.  She denies known tick or insect bites.     Of note, the patient has a history of leukemia but is not currently undergoing chemo therapy; states last dose roughly 2 months ago.     Patient's last tetanus was in 2019 (per [facility])    Review of Systems   Constitutional: Positive for appetite change and fatigue. Negative for fever.   Respiratory: Negative for cough and shortness of breath.    Cardiovascular: Negative for chest pain.   Gastrointestinal: Positive for nausea and vomiting.   Musculoskeletal: Positive for arthralgias, joint swelling and myalgias.   Skin: Positive for color change.   All other systems reviewed and are negative.    Allergies:  Bupropion  Codeine  Effexor  Escitalopram  Fluoxetine  Levaquin  Methylphenidate  Milnacipran  Pregabalin    Medications:  Proair  Adderall  Flexeril  Pristiq  Divigel  Estace  Lidoderm  Ativan  Deplin  Senokot    Past Medical History:    Anxiety  Cervicalgia  Depressive disorder  ESBL  Leukemia  Melanoma  Migraine  Chronic pain  Rotator cuff tear  Sacroiliac inflammation  Urinary calculi  Vitamin B12 deficiency  CLARE    Past Surgical History:    Appendectomy  Blepharoplasty bilateral   section x2  Colonoscopy  EGD  Eye surgery  Hysterectomy  Partial vulvectomy  Spinal fusion  Tubal ligation     Family History:    Hypertension, mother  Osteoporosis,  mother  Hypertension, father  Diabetes, father  Hypertension, brother    Social History:  Accompanied by   Arrives via triage    Physical Exam     Patient Vitals for the past 24 hrs:   BP Temp Temp src Pulse Resp SpO2 Weight   08/11/21 0243 -- -- -- -- -- -- 68 kg (150 lb)   08/11/21 0230 -- -- -- -- -- 93 % --   08/11/21 0215 -- -- -- -- -- 95 % --   08/11/21 0210 -- -- -- -- -- 90 % --   08/11/21 0205 -- -- -- -- -- 90 % --   08/11/21 0200 98/58 -- -- 87 -- 90 % --   08/11/21 0155 -- -- -- -- -- 90 % --   08/11/21 0150 (!) 88/54 -- -- 87 -- 92 % --   08/11/21 0145 -- -- -- -- -- 91 % --   08/10/21 1959 123/72 99  F (37.2  C) Temporal 112 20 100 % --     Physical Exam  Nursing note and vitals reviewed.  Constitutional: Well nourished. Ill appearing  Eyes: Conjunctiva normal.    ENT: Nose normal. Mucous membranes pink and moist.  No oral lesions  Neck: Normal range of motion.  CVS: Sinus tachycardia.  Normal heart sounds.  No murmur.  Pulmonary: Lungs clear to auscultation bilaterally. No wheezes/rales/rhonchi.  GI: Abdomen soft. Nontender, nondistended. No rigidity or guarding.    MSK: No calf tenderness or swelling.  No CVA tenderness. No c/t/l bony tenderness  Neuro: Alert. Follows simple commands.  Skin: Skin is warm and dry. No rash noted. Well healed scar to lower lumbar region.  Right lower tibia region with excoration and surrounding erythema/warmth/tenderness. No crepitance.  R. Inner upper thigh erythema, no crepitance.  L. Anterior tibia with noted nodular erythema.  L. Dorsal hand erythema, no L. Wrist tenderness, full active ROM.   Psychiatric: Normal affect.     Emergency Department Course     Laboratory:    Asymptomatic COVID19 Virus PCR by nasopharyngeal swab negative    Blood cultures pending x2    UA with microscopic: appearance slightly cloudy (A), ketones trace (A), albumin 70 (A), mucus present (A), rbc 7 (H), squamous epithelial 4 (H) o/w WNL     Glucose by meter: 106 (H)    CMP: glucose  116 (H), protein total 6.4 (L), albumin 2.7 (L) o/w WNL (Creatinine 0.77)     Lactic acid (result time 2008) 1.1    CBC: WBC 13.0 (H), HGB 12.0,      Sed rate: 50 (H)    CRP inflammation: 237 (H)     Emergency Department Course:    Reviewed:  I reviewed nursing notes, vitals, past medical history and care everywhere    Assessments:  2239 I obtained history and examined the patient as noted above.   0037 I rechecked the patient and explained findings.     Consults:   2348 I spoke with Dr. Godwin of the hospitalist service who agrees to admit the patient.     Interventions:  2009 Zofran 4 mg IV  2321 Zofran 4 mg IV  2322 Dilaudid 0.5 mg IV  2322 Tylenol 1000 mg PO  0055 Normal Saline 1000 mL IV  0056 Rocephin 1 g IV  0148 Vancomycin 1250 mg IV    Disposition:  The patient was admitted to the hospital under the care of Dr. Tato MD.     Impression & Plan     Medical Decision Making:  Patient is a 61 yo female presenting with complaint of weakness, vomiting and nausea.  She is immunocompromised.  She is ill-appearing on arrival and has notable areas concerning for cellulitis.  She also has polyarthralgias.  No evidence to suggest obvious septic joint on exam.  I do have concern however for primary right lower extremity cellulitis.  She does have a history of ESBL though in discussion with hospitalist decision was made to initiate Rocephin and vancomycin at this point time given concern for possible early bacteremia.  No history MRSA.  She is meeting sepsis criteria in the setting of tachycardia and leukocytosis though no evidence to suggest severe sepsis or shock. I do not feel her presentation is consistent with Lyme as patient expressed concern for this.  No abdominal tenderness on exam to necessitate emergent imaging as I doubt intraabdominal source.  She was accepted by hospitalist for admission.     Covid-19  Janelle TORRES Christopher was evaluated during a global COVID-19 pandemic, which necessitated  consideration that the patient might be at risk for infection with the SARS-CoV-2 virus that causes COVID-19.   Applicable protocols for evaluation were followed during the patient's care.   COVID-19 was considered as part of the patient's evaluation. The plan for testing is:  a test was obtained during this visit.    Diagnosis:    ICD-10-CM    1. Cellulitis, unspecified cellulitis site  L03.90    2. Sepsis, due to unspecified organism, unspecified whether acute organ dysfunction present (H)  A41.9    3. Polyarthralgia  M25.50      Scribe Disclosure:  Carlo SHERWOOD, am serving as a scribe at 10:40 PM on 8/10/2021 to document services personally performed by Linda Bell DO based on my observations and the provider's statements to me.            Linda Bell DO  08/11/21 0246

## 2021-08-11 NOTE — CONSULTS
[facility]    Orthopedic Consultation    Janelle White MRN# [id number]   Age: 60 year old YOB: 1960     Date of Admission: 8/10/2021    Reason for consult: Right knee effusion, polyarthralgias       Requesting physician: Emanuel Barnes       Level of consult: Consult, follow and place orders           Assessment and Plan:   Assessment:   Right knee effusion, polyarthralgias  Cellulitis bilateral lower extremities and left dorsal wrist       Plan:   Given the elevated inflammatory markers and new onset of right knee pain and effusion, the right knee was aspirated and fluid was sent for analysis (see procedure note).  Will continue to monitor her multiple joint pain complaints  WBAT Right LE  Continue IV Abx  Pain medication prn           Chief Complaint:   Right knee pain          History of Present Illness:   Janelle White is a 60 year old female.   This is a 60-year-old lady with a past medical history significant for CLL for which she received rituximab a few months ago through [place] [facility] (Dr. Vishnu Blackwell), osteoarthritis history of multiple surgeries, chronic pain syndrome, ESBL UTI and bacteremia in 2011, rotator cuff surgery of the left shoulder in June, lumbar spine surgery, right shoulder surgery with hardware in place, C-spine surgery.  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    Her symptoms started last Thursday in form of feeling unwell and vomiting, multiple episodes.  She was visiting her mom in [place] at that time.   Over the weekend she started feeling worse.  Her symptoms included subjective fever, sweating, chills.  She also started noticing patches of skin redness all over her body.  She also started experiencing joint pain and swelling affecting multiple joints, starting with right knee joint.    Her greatest pain complaints today are her right knee and lower back.  Her knee is painful both at rest and with  weight bearing. The lower back pain started about a day ago.  Localizing in the lower back and she thinks it is due to her laying in the bed.  Denies any radiating symptoms into either of the legs.     She has a history of lumbar spine surgery a few years ago.  For the last 1 day or so she has noticed some pain in the area.  She has had left shoulder surgery by Dr Barrera recently.  She notes chronic pain in that shoulder but no new pain in the area.  Has a history of C-spine surgery but denies any new pain in the area.  No surgery of the right knee but history of carpal tunnel surgery both sides.          Past Medical History:     Past Medical History:   Diagnosis Date     Anxiety      Cervicalgia     C5-6 disc protrusion     Depressive disorder      ESBL (extended spectrum beta-lactamase) producing bacteria infection      History of blood transfusion     Cervical fusion     Leukemia (H)     CLA large beta     Melanoma (H)      Migraine      Other chronic pain      Rotator cuff tear     s/p injections     Sacroiliac inflammation (H)      Shift work sleep disorder 2013     Urinary calculi      Vitamin B12 deficiency anemia 2006    started injections             Past Surgical History:     Past Surgical History:   Procedure Laterality Date     anterior cervical discectomy C4-5 ,Fusion C5-6-7  10/2007     APPENDECTOMY       BACK SURGERY       BLEPHAROPLASTY BILATERAL  2013    Procedure: BLEPHAROPLASTY BILATERAL;  BILATERAL UPPER LID BLEPHAROPLASTY AND BROWPEXY ;  Surgeon: Godfrey Miguel MD;  Location: [facility] APPENDECTOMY  2006      SECTION      x2     COLONOSCOPY N/A 2020    Procedure: COLONOSCOPY;  Surgeon: Butch Lockhart MD;  [facility]: [facility]     ESOPHAGOSCOPY, GASTROSCOPY, DUODENOSCOPY (EGD), COMBINED N/A 2020    Procedure: ESOPHAGOGASTRODUODENOSCOPY, WITH BIOPSY biosies by cold forceps;  Surgeon: Butch Lockhart MD;  Location:  GI     EYE SURGERY       FUSION  LUMBAR ANTERIOR, FUSION LUMBAR POSTERIOR TWO LEVELS, COMBINED  2010    L3-S1 anterior posterior fusion     HYSTERECTOMY  2006    ovaries intact     HYSTERECTOMY       HYSTERECTOMY, PAP NO LONGER INDICATED       Partial vulvectomy for NEENA III  2009     Pubovaginal sling, post op durasphere injections  2006    wears pad     ROTATOR CUFF REPAIR RT/LT  2011    right     SPINAL FUSION C3-4  2009    C3-4, anterior spinal fusion     TUBAL LIGATION               Social History:     Social History     Tobacco Use     Smoking status: Former Smoker     Packs/day: 1.00     Years: 5.00     Pack years: 5.00     Quit date: 1984     Years since quittin.6     Smokeless tobacco: Never Used   Substance Use Topics     Alcohol use: Yes     Comment: no alcohol currently since prior to her back surgeries,              Family History:     Family History   Problem Relation Age of Onset     Hypertension Mother      Alcohol/Drug Mother      Osteoporosis Mother         borderline     Hypertension Father      Diabetes Father         Diet controlled     Alcohol/Drug Father         remote use     C.A.D. Maternal Grandmother          late 50s     C.A.D. Maternal Grandfather         bone and brain cancer mets as well     Diabetes Paternal Grandfather          from diabetic complications     Alcohol/Drug Brother      Hypertension Brother      Breast Cancer No family hx of         Dcis     Cancer - colorectal No family hx of      Cerebrovascular Disease No family hx of      Colon Cancer No family hx of              Immunizations:     VACCINE/DOSE   Diptheria   DPT   DTAP   HBIG   Hepatitis A   Hepatitis B   HIB   Influenza   Measles   Meningococcal   MMR   Mumps   Pneumococcal   Polio   Rubella   Small Pox   TDAP   Varicella   Zoster             Allergies:     Allergies   Allergen Reactions     Bupropion Other (See Comments) and Swelling     Ineffective, name brand works best  Ineffective, name brand works  "best     Codeine Other (See Comments)     \"Feels like electricity running through body\"  No reaction noted in [employer].  Shaky  With Tylenol     Effexor [Venlafaxine Hydrochloride]      Affect too flat     Escitalopram      Other reaction(s): *Unknown  Sexual dysfunction     Fluoxetine Other (See Comments)     Sexual dysfunction     Levaquin [Levofloxacin] Other (See Comments)     Suicidal thoughts  Dark thoughts- mood changes     Lexapro      Sexual dysfunction     Methylphenidate Hives     Milnacipran      12.5 MG is fine. 25 MG caused side effects. \"Dark thoughts\"     Pregabalin Other (See Comments)     Hallucinations  Audiovisual hallucinations     Prozac [Fluoxetine Hcl]      Sexual dysfunction     Tramadol Hives     Hives       Venlafaxine      Other reaction(s): *Unknown  Affect too flat             Medications:     Current Facility-Administered Medications   Medication     0.9% sodium chloride BOLUS     acetaminophen (TYLENOL) tablet 650 mg    Or     acetaminophen (TYLENOL) Suppository 650 mg     ceFAZolin (ANCEF) 1 g vial to attach to  ml bag for ADULT or 50 ml bag for PEDS     HYDROmorphone (PF) (DILAUDID) injection 0.5 mg     lactated ringers infusion     lidocaine (LMX4) cream     lidocaine 1 % 0.1-1 mL     melatonin tablet 1 mg     ondansetron (ZOFRAN-ODT) ODT tab 4 mg    Or     ondansetron (ZOFRAN) injection 4 mg     oxyCODONE (ROXICODONE) tablet 5 mg     sodium chloride (PF) 0.9% PF flush 3 mL     sodium chloride (PF) 0.9% PF flush 3 mL     vancomycin (VANCOCIN) 2,000 mg in sodium chloride 0.9 % 500 mL intermittent infusion             Review of Systems:   ROS:  10 point ROS neg other than the symptoms noted above in the HPI.          Physical Exam:   All vitals have been reviewed  Patient Vitals for the past 24 hrs:   BP Temp Temp src Pulse Resp SpO2 Height Weight   08/11/21 1250 -- 98.8  F (37.1  C) Axillary -- -- -- -- --   08/11/21 1147 -- 99.9  F (37.7  C) Oral -- -- -- -- --   08/11/21 1117 " "105/62 (!) 101.7  F (38.7  C) Oral 90 20 91 % -- --   08/11/21 1109 109/55 100.3  F (37.9  C) Oral 87 20 94 % 1.651 m (5' 5\") 74.1 kg (163 lb 4.8 oz)   08/11/21 1059 104/71 -- -- 100 -- 99 % -- --   08/11/21 1030 106/62 -- -- 90 -- 96 % -- --   08/11/21 1000 101/61 -- -- 94 -- 98 % -- --   08/11/21 0930 108/66 -- -- 88 -- 100 % -- --   08/11/21 0915 -- -- -- -- -- 100 % -- --   08/11/21 0900 109/61 -- -- 84 -- 100 % -- --   08/11/21 0840 98/58 -- -- 88 -- 100 % -- --   08/11/21 0830 (!) 88/50 -- -- 87 -- 98 % -- --   08/11/21 0800 99/59 -- -- 85 -- 97 % -- --   08/11/21 0730 102/61 -- -- 83 -- 99 % -- --   08/11/21 0630 -- -- -- -- -- 96 % -- --   08/11/21 0600 97/48 -- -- 92 -- 93 % -- --   08/11/21 0530 117/66 (!) 100.5  F (38.1  C) Oral 102 -- 91 % -- --   08/11/21 0500 123/79 -- -- 96 -- 99 % -- --   08/11/21 0430 116/69 99.9  F (37.7  C) Oral 94 -- 96 % -- --   08/11/21 0400 109/70 -- -- 88 -- 98 % -- --   08/11/21 0330 101/67 -- -- 91 -- 95 % -- --   08/11/21 0315 -- -- -- -- -- 94 % -- --   08/11/21 0300 109/64 -- -- 84 -- 95 % -- --   08/11/21 0245 -- -- -- -- -- 95 % -- --   08/11/21 0243 -- -- -- -- -- -- -- 68 kg (150 lb)   08/11/21 0230 -- -- -- -- -- 93 % -- --   08/11/21 0215 -- -- -- -- -- 95 % -- --   08/11/21 0210 -- -- -- -- -- 90 % -- --   08/11/21 0205 -- -- -- -- -- 90 % -- --   08/11/21 0200 98/58 -- -- 87 -- 90 % -- --   08/11/21 0155 -- -- -- -- -- 90 % -- --   08/11/21 0150 (!) 88/54 -- -- 87 -- 92 % -- --   08/11/21 0145 -- -- -- -- -- 91 % -- --   08/10/21 1959 123/72 99  F (37.2  C) Temporal 112 20 100 % -- --     No intake or output data in the 24 hours ending 08/11/21 1438      Physical Exam   Temp: 98.8  F (37.1  C) Temp src: Axillary BP: 105/62 Pulse: 90   Resp: 20 SpO2: 91 % O2 Device: None (Room air)    Vital Signs with Ranges  Temp:  [98.8  F (37.1  C)-101.7  F (38.7  C)] 98.8  F (37.1  C)  Pulse:  [] 90  Resp:  [20] 20  BP: ()/(48-79) 105/62  SpO2:  [90 %-100 %] 91 " %  163 lbs 4.8 oz    Constitutional: Pleasant, alert, appropriate, following commands.  HEENT: Head atraumatic normocephalic. Pupils equal round and reactive to light.  Respiratory: Unlabored breathing no audible wheeze  Cardiovascular: Regular rate and rhythm per pulses   GI: Abdomen soft nontender nondistended.  Lymph/Hematologic: No lymphadenopathy in areas examined  Genitourinary:  No blanchard  Skin: No rashes, no cyanosis, no edema.  Musculoskeletal: On physical exam:  Moderate right knee effusion present, warm to touch.  No erythema along knee.  There is distal anterior low leg appearance of cellulitis along bilateral low legs, right greater than left.  Able to range the right knee 0-80 with mild discomfort.  Left knee: no effusion or pain with ROM.  Able to range bilateral ankles without pain.  Denies pain with hip rotation. Erythema is present into the right groin region.  Pulses and sensation intact and equal bilaterally.  SLR negative bilaterally for radiculopathy.    There is also erythema along the dorsal left wrist.  Denies pain with wrist flexion/extension.  Denies left shoulder pain with passive motion and is able to actively flex to 90 degrees.  No effusion present.    Neurologic: normal without focal findings, mental status, speech normal, alert and oriented x iii, SATHISH  Neuropsychiatric: stable          Data:   All laboratory data reviewed  Results for orders placed or performed during the hospital encounter of 08/10/21   MR Lumbar Spine w/o & w Contrast     Status: None    Narrative    MR LUMBAR SPINE WITH AND WITHOUT CONTRAST August 11, 2021 7:12 AM     HISTORY: Low back pain, infection suspected. Prior L2-S1 fusion with  posterior instrumentation.    TECHNIQUE: Multiplanar multisequence images were obtained through the  lumbar spine without and with contrast. 17 mL of Gadavist given.    COMPARISON: 10/14/2010.    FINDINGS: Since the prior exam, there has been anterior and posterior  fusion from L2  through S1. Posterior alignment is normal. The  posterior instrumentation rods and and transpedicular screws are  causing significant metallic artifact. Vertebral body heights are  maintained. Bone marrow signal intensity is within normal limits.  Postcontrast images do not show any abnormal intradural enhancement or  enhancement along the cauda equina nerve roots. There is also a  metallic device across the right sacroiliac joint which is probably a  fusion device. Paraspinal soft tissues and visualized bony pelvis  otherwise normal.    T12-L1: Normal.    L1-L2: Normal discs. Mild to moderate facet hypertrophy. No  significant stenosis.    L2-L3: Prior anterior and posterior fusion and posterior  decompression. There is some mild left-sided foraminal stenosis but no  central canal or right-sided foraminal stenosis.    L3-L4: Prior anterior and posterior fusion with posterior  instrumentation. There is no significant stenosis.    L4-L5: Prior anterior and posterior fusion with posterior  instrumentation. There is no significant stenosis.    L5-S1: Prior anterior and posterior fusion. There is no evidence for  any stenosis.      Impression    IMPRESSION:  1. Interval L2-S1 anterior and posterior fusion with posterior  instrumentation. There also appears be fusion across the right  sacroiliac joint. There is no evidence for any significant stenosis.  2. No evidence for any infectious spondylitis or epidural abscess.    MG SERRANO MD         SYSTEM ID:  [id number]   CBC + differential     Status: Abnormal    Narrative    The following orders were created for panel order CBC + differential.  Procedure                               Abnormality         Status                     ---------                               -----------         ------                     CBC with platelets and d...[[id number]]  Abnormal            Final result                 Please view results for these tests on the individual orders.    Comprehensive metabolic panel     Status: Abnormal   Result Value Ref Range    Sodium 137 133 - 144 mmol/L    Potassium 3.5 3.4 - 5.3 mmol/L    Chloride 106 94 - 109 mmol/L    Carbon Dioxide (CO2) 24 20 - 32 mmol/L    Anion Gap 7 3 - 14 mmol/L    Urea Nitrogen 12 7 - 30 mg/dL    Creatinine 0.77 0.52 - 1.04 mg/dL    Calcium 9.2 8.5 - 10.1 mg/dL    Glucose 116 (H) 70 - 99 mg/dL    Alkaline Phosphatase 92 40 - 150 U/L    AST 19 0 - 45 U/L    ALT 26 0 - 50 U/L    Protein Total 6.4 (L) 6.8 - 8.8 g/dL    Albumin 2.7 (L) 3.4 - 5.0 g/dL    Bilirubin Total 0.5 0.2 - 1.3 mg/dL    GFR Estimate 84 >60 mL/min/1.73m2   Lactic acid whole blood     Status: Normal   Result Value Ref Range    Lactic Acid 1.1 0.7 - 2.0 mmol/L   CBC with platelets and differential     Status: Abnormal   Result Value Ref Range    WBC Count 13.0 (H) 4.0 - 11.0 10e3/uL    RBC Count 3.92 3.80 - 5.20 10e6/uL    Hemoglobin 12.0 11.7 - 15.7 g/dL    Hematocrit 36.4 35.0 - 47.0 %    MCV 93 78 - 100 fL    MCH 30.6 26.5 - 33.0 pg    MCHC 33.0 31.5 - 36.5 g/dL    RDW 12.1 10.0 - 15.0 %    Platelet Count 157 150 - 450 10e3/uL    % Neutrophils 91 %    % Lymphocytes 5 %    % Monocytes 3 %    % Eosinophils 0 %    % Basophils 0 %    % Immature Granulocytes 1 %    NRBCs per 100 WBC 0 <1 /100    Absolute Neutrophils 11.8 (H) 1.6 - 8.3 10e3/uL    Absolute Lymphocytes 0.7 (L) 0.8 - 5.3 10e3/uL    Absolute Monocytes 0.4 0.0 - 1.3 10e3/uL    Absolute Eosinophils 0.0 0.0 - 0.7 10e3/uL    Absolute Basophils 0.0 0.0 - 0.2 10e3/uL    Absolute Immature Granulocytes 0.1 (H) <=0.0 10e3/uL    Absolute NRBCs 0.0 10e3/uL   Extra Red Top Tube     Status: None   Result Value Ref Range    Hold Specimen JIC    Glucose by meter     Status: Abnormal   Result Value Ref Range    GLUCOSE BY METER POCT 106 (H) 70 - 99 mg/dL   UA with Microscopic     Status: Abnormal   Result Value Ref Range    Color Urine Yellow Colorless, Straw, Light Yellow, Yellow    Appearance Urine Slightly Cloudy (A)  Clear    Glucose Urine Negative Negative mg/dL    Bilirubin Urine Negative Negative    Ketones Urine Trace (A) Negative mg/dL    Specific Gravity Urine 1.026 1.003 - 1.035    Blood Urine Negative Negative    pH Urine 7.0 5.0 - 7.0    Protein Albumin Urine 70  (A) Negative mg/dL    Urobilinogen Urine Normal Normal, 2.0 mg/dL    Nitrite Urine Negative Negative    Leukocyte Esterase Urine Negative Negative    Mucus Urine Present (A) None Seen /LPF    RBC Urine 7 (H) <=2 /HPF    WBC Urine 3 <=5 /HPF    Squamous Epithelials Urine 4 (H) <=1 /HPF   SARS-COV2 (COVID-19) Virus RT-PCR     Status: Normal    Specimen: Nasopharyngeal; Swab   Result Value Ref Range    SARS CoV2 PCR Negative Negative    Narrative    Testing was performed using the [facility]  SARS-CoV-2 & Influenza A/B Assay on the [employer]  [employer]  System.  This test should be ordered for the detection of SARS-COV-2 in individuals who meet SARS-CoV-2 clinical and/or epidemiological criteria. Test performance is unknown in asymptomatic patients.  This test is for in vitro diagnostic use under the FDA EUA for laboratories certified under CLIA to perform moderate and/or high complexity testing. This test has not been FDA cleared or approved.  A negative test does not rule out the presence of PCR inhibitors in the specimen or target RNA in concentration below the limit of detection for the assay. The possibility of a false negative should be considered if the patient's recent exposure or clinical presentation suggests COVID-19.  [facility] are certified under the Clinical Laboratory Improvement Amendments of 1988 (CLIA-88) as qualified to perform moderate and/or high complexity laboratory testing.   Erythrocyte sedimentation rate auto     Status: Abnormal   Result Value Ref Range    Erythrocyte Sedimentation Rate 50 (H) 0 - 30 mm/hr   CRP inflammation     Status: Abnormal   Result Value Ref Range    CRP Inflammation 237.0 (H) 0.0 - 8.0 mg/L   Creatinine      Status: Normal   Result Value Ref Range    Creatinine 0.65 0.52 - 1.04 mg/dL    GFR Estimate >90 >60 mL/min/1.73m2   Lactic acid whole blood     Status: Normal   Result Value Ref Range    Lactic Acid 0.8 0.7 - 2.0 mmol/L   Asymptomatic COVID-19 Virus (Coronavirus) by PCR Nasopharyngeal     Status: Normal    Specimen: Nasopharyngeal; Swab    Narrative    The following orders were created for panel order Asymptomatic COVID-19 Virus (Coronavirus) by PCR Nasopharyngeal.  Procedure                               Abnormality         Status                     ---------                               -----------         ------                     SARS-COV2 (COVID-19) Vir...[[id number]]  Normal              Final result                 Please view results for these tests on the individual orders.   Extra Tube ([place] Draw)     Status: None    Narrative    The following orders were created for panel order Extra Tube ([place] Draw).  Procedure                               Abnormality         Status                     ---------                               -----------         ------                     Extra Red Top Tube[[id number]]                               Final result                 Please view results for these tests on the individual orders.   Cell count with differential fluid     Status: None ()    Narrative    The following orders were created for panel order Cell count with differential fluid.  Procedure                               Abnormality         Status                     ---------                               -----------         ------                     Cell Count Body Fluid[[id number]]                                                         Please view results for these tests on the individual orders.          Attestation:  I have reviewed today's vital signs, notes, medications, labs and imaging with Dr. Abreu.  Amount of time performed on this consult: 30 minutes.    Isis Camacho PA-C

## 2021-08-11 NOTE — PLAN OF CARE
ROOM # 226    Living Situation (if not independent, order SW consult):  Facility name:  : [facility] () [phone]    Activity level at baseline: Independent  Activity level on admit: 1 assist gait belt       Patient registered to observation; given Patient Bill of Rights; given the opportunity to ask questions about observation status and their plan of care.  Patient has been oriented to the observation room, bathroom and call light is in place.    Discussed discharge goals and expectations with patient/family.

## 2021-08-11 NOTE — PROGRESS NOTES
[facility]    Medicine Progress Note - Hospitalist Service       Date of Admission:  8/10/2021    Assessment & Plan         Janelle White is a 60 year old female who presents with fever, sweating, chills, multiple areas of skin redness, polyarthralgia.    This is a 60-year-old lady with a past medical history significant for CLL for which she received rituximab a few months ago through [facility] (Dr. Vishnu Blackwell), osteoarthritis history of multiple surgeries, chronic pain syndrome, ESBL UTI and bacteremia in 2011, rotator cuff surgery of the left shoulder in June, lumbar spine surgery, right shoulder surgery with hardware in place, C-spine surgery.  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    Her symptoms started last Thursday in form of feeling unwell and vomiting, multiple episodes.  She was visiting her mom in [place] at that time.  This weekend, on Saturday she started feeling worse.  Her symptoms included subjective fever, sweating, chills.  She also started noticing patches of skin redness all over her body.  She also started experiencing joint pain and swelling affecting multiple joints, starting with right knee joint.  Currently she has pain in her right knee, bilateral wrists, lower back pain.  All of the pain started this weekend and progressively worsening.  She has noticed that her skin redness is appearing on multiple sites.  The lower back pain started about a day ago.  Localizing in the lower back and she thinks it is due to her laying in the bed.  Not radiating to either of the legs.  No history of bowel or bladder incontinence.  No history of weakness of lower extremities.    She has a history of lumbar spine surgery a few years ago.  For the last 1 day or so she has noticed some pain in the area.  On exam she did have redness in the lower back.  She also has a history of right shoulder surgery with hardware in place, many years ago but  denies any new pain in the right shoulder area.  Recent left shoulder surgery but no hardware in place.  No new pain in the area.  Has a history of C-spine surgery but denies any new pain in the area.  No surgery of the right knee but history of carpal tunnel surgery both sides.    In the ER, patient appears to be septic.  Appears flushed and sweaty.  Low-grade fever.  Tachycardia.  Leukocytosis.  2 sets of blood cultures drawn.  Being given IV ceftriaxone followed by IV vancomycin.  Being admitted to internal medicine service.    COVID vaccinated, second dose in January.    Problem List:    Sepsis.  High probability of bacteremia.  Skin and soft tissue infection.  May also have joint infection.  -Patient has multiple patches of skin redness: Right lower leg, right inner thigh, left lower leg, lower back, bilateral hands.  -Has right knee pain and some effusion as well as bilateral hand and wrist pain.  This is likely reactive polyarthritis in the setting of bacteremia.  She has fair amount of movement of the right knee limited by some pain but mostly due to her tightness due to effusion.    -Continue IV cefazolin and IV vancomycin.  -2 sets of blood cultures drawn from the emergency room.  -Lumbar spine MRI with and without contrast showed no acute process.  -Infectious diseases consultation and ortho consult.  -Immunocompromised due to history of CLL and recent rituximab infusion.  -I did not appreciate any murmur on exam.    Chronic pain syndrome.  -On multiple chronic opioid medications.  Pharmacy to reconcile med list then resume.    History of CLL  -Received rituximab in May.  Primary oncologist Dr. Vishnu Blackwell [facility].    Depression/anxiety.  By history.  -Resume home meds once med list is reconciled.           Diet: Combination Diet Regular Diet Adult    DVT Prophylaxis: Pneumatic Compression Devices  [place] Catheter: Not present  Central Lines: None  Code Status: Full Code      Disposition Plan    Expected discharge: 3-5 days  recommended to prior living arrangement once SIRS/Sepsis treated.     The patient's care was discussed with the Patient and Patient's Family.    Emanuel Barnes MD  Hospitalist Service  [facility]  Securely message with the Vocera Web Console (learn more here)  Text page via [facility] Paging/Directory      Clinically Significant Risk Factors Present on Admission                   ______________________________________________________________________    Interval History     + fever. + arthralgia. No chest pain/SOB.    Data reviewed today: I reviewed all medications, new labs and imaging results over the last 24 hours. I personally reviewed no images or EKG's today.    Physical Exam   Vital Signs: Temp: 98.8  F (37.1  C) Temp src: Axillary BP: 105/62 Pulse: 90   Resp: 20 SpO2: 91 % O2 Device: None (Room air)    Weight: 163 lbs 4.8 oz    Gen - AAO x 3.  Lungs - CTA B.  Heart - RR,S1+S2 nml, no m/g/r.  Abd - soft, NT, ND, + BS.  Ext - + redness, warmth and ttp in B wrist joints, R knee joint and L shin.    Data   Recent Labs   Lab 08/11/21  1133 08/10/21  2011 08/10/21  2008   WBC  --   --  13.0*   HGB  --   --  12.0   MCV  --   --  93   PLT  --   --  157   NA  --   --  137   POTASSIUM  --   --  3.5   CHLORIDE  --   --  106   CO2  --   --  24   BUN  --   --  12   CR 0.65  --  0.77   ANIONGAP  --   --  7   BERYL  --   --  9.2   GLC  --  106* 116*   ALBUMIN  --   --  2.7*   PROTTOTAL  --   --  6.4*   BILITOTAL  --   --  0.5   ALKPHOS  --   --  92   ALT  --   --  26   AST  --   --  19     Recent Results (from the past 24 hour(s))   MR Lumbar Spine w/o & w Contrast    Narrative    MR LUMBAR SPINE WITH AND WITHOUT CONTRAST August 11, 2021 7:12 AM     HISTORY: Low back pain, infection suspected. Prior L2-S1 fusion with  posterior instrumentation.    TECHNIQUE: Multiplanar multisequence images were obtained through the  lumbar spine without and with contrast. 17 mL of Gadavist  given.    COMPARISON: 10/14/2010.    FINDINGS: Since the prior exam, there has been anterior and posterior  fusion from L2 through S1. Posterior alignment is normal. The  posterior instrumentation rods and and transpedicular screws are  causing significant metallic artifact. Vertebral body heights are  maintained. Bone marrow signal intensity is within normal limits.  Postcontrast images do not show any abnormal intradural enhancement or  enhancement along the cauda equina nerve roots. There is also a  metallic device across the right sacroiliac joint which is probably a  fusion device. Paraspinal soft tissues and visualized bony pelvis  otherwise normal.    T12-L1: Normal.    L1-L2: Normal discs. Mild to moderate facet hypertrophy. No  significant stenosis.    L2-L3: Prior anterior and posterior fusion and posterior  decompression. There is some mild left-sided foraminal stenosis but no  central canal or right-sided foraminal stenosis.    L3-L4: Prior anterior and posterior fusion with posterior  instrumentation. There is no significant stenosis.    L4-L5: Prior anterior and posterior fusion with posterior  instrumentation. There is no significant stenosis.    L5-S1: Prior anterior and posterior fusion. There is no evidence for  any stenosis.      Impression    IMPRESSION:  1. Interval L2-S1 anterior and posterior fusion with posterior  instrumentation. There also appears be fusion across the right  sacroiliac joint. There is no evidence for any significant stenosis.  2. No evidence for any infectious spondylitis or epidural abscess.    MG SERRANO MD         SYSTEM ID:  [id number]     Medications     lactated ringers 100 mL/hr at 08/11/21 1131       sodium chloride 0.9%  1,000 mL Intravenous Once     ceFAZolin  1 g Intravenous TID     sodium chloride (PF)  3 mL Intracatheter Q8H

## 2021-08-11 NOTE — PROGRESS NOTES
Patient is alert and oriented x4. VS now WNL and documented on the FS. Lung sounds clear in all lobes and patient is on RA. Denies SOB. Active bowel sounds in all 4 quadrants with LBM today. Patient denies any pain, urgency, and frequency when voiding. Patient rating back, neck, hip, and head pain an 8/10 and is being treated with Dilaudid 0.5mg and also has Tylenol and Oxycodone available. Patient is a SBA/Independent when up. Regular diet and tolerating. IV fluids infusing at 100 ml/hr.  at bedside.     Redness noted on:   (L) hand swollen/red and marked.  (R) hand is swollen.  (R) leg/foot/knee is red/swollen and marked.  (R) thigh and groin area is also red.  (L) leg/foot is swollen, but not red.     Patient received a 1 liter bolus of NS and started on cefazolin and IV vancomycin. Blood cultures drawn and are pending.     Plan: ID, orthopedics consult.

## 2021-08-11 NOTE — PROCEDURES
Right knee aspiration:     Following a sterile skin prep with betadine, the right knee was aspirated expressing  23 ml of clear synovial fluid.  Patient tolerated procedure without complication.  All questions answered.  The fluid was sent to the lab for evaluation.

## 2021-08-11 NOTE — PHARMACY-ADMISSION MEDICATION HISTORY
Admission medication history interview status for this patient is complete. See [facility] admission navigator for allergy information, prior to admission medications and immunization status.     Medication history interview done, indicate source(s): Patient  Medication history resources (including written lists, pill bottles, clinic record): Rx refill hx  Pharmacy:  [facility]    Changes made to PTA medication list:  Added:  None  Changed:   - Albuterol scheduled ----> prn  - Lorazepam 0.5-1mg at bedtime prn & 0.5mg q8h prn ---> 1mg at bedtime   - Prasterone 6.5mg 1 suppository at bedtime ---> 1/2 suppository 3 times a week  - Senna 1 daily prn ---> Senna-S 4-6 tabs daily prn  Reported as Not Taking:  None  Removed:  Docosahexaenoic acid, Medrol dosepak    Actions taken by pharmacist (provider contacted, etc):None     Additional medication history information:None    Medication reconciliation/reorder completed by provider prior to medication history?  No      Prior to Admission medications    Medication Sig Last Dose Taking? Auth Provider   albuterol (PROAIR HFA/PROVENTIL HFA/VENTOLIN HFA) 108 (90 Base) MCG/ACT inhaler Inhale 2 puffs into the lungs every 6 hours  Patient taking differently: Inhale 2 puffs into the lungs every 6 hours as needed  prn Yes Hakeem Concepcion PAFRANCHESCA   amphetamine-dextroamphetamine (ADDERALL XR) 20 MG 24 hr capsule Take 1 capsule (20 mg) by mouth daily 8/10/2021 at   Yes Kimberly Perez,    amphetamine-dextroamphetamine (ADDERALL) 20 MG tablet Take 0.5-1 tablets (10-20 mg) by mouth daily as needed (extreme fatigue, exhaustion, daytime sedation) prn Yes Kimberly Perez,    calcium citrate (CALCIUM CITRATE) 950 MG tablet Take 1 tablet by mouth 2 times daily. Past Week at Unknown time Yes Christina Hinkle MD   Calcium-Magnesium-Vitamin D (CALCIUM 500) 500-250-200 MG-MG-UNIT TABS Take 1 tablet by mouth  Past Month at Unknown time Yes Reported, Patient   Cholecalciferol (D-5000) 5000 units  TABS Take 5,000 Units by mouth 8/8/2021 Yes Reported, Patient   cyanocobalamin (CYANOCOBALAMIN) 1000 MCG/ML injection Inject 1 mL (1,000 mcg) into the muscle every 30 days Past Month at Unknown time Yes Emili Ballard MD   cyclobenzaprine (FLEXERIL) 10 MG tablet Take 1 tablet (10 mg) by mouth 3 times daily as needed for muscle spasms prn Yes Emili Ballard MD   desvenlafaxine (PRISTIQ) 100 MG 24 hr tablet Take 1 tablet (100 mg) by mouth daily 8/10/2021 at Unknown time Yes Kimberly Perez DO   Estradiol (DIVIGEL) 1 MG/GM GEL Place 1 packet onto the skin daily 8/10/2021 at Unknown time Yes Emili Ballard MD   estradiol (ESTRACE VAGINAL) 0.1 MG/GM vaginal cream Place 2 g vaginally three times a week.  Patient taking differently: Place 2 g vaginally three times a week as needed prn Yes Christina Hinkle MD   HYDROcodone-acetaminophen (NORCO)  MG per tablet take 1 tabs q 4hrs, max 4 tabs/24 hrs 8/10/2021 at Unknown time Yes Hakeem Concepcion PA-C   lidocaine (LIDODERM) 5 % patch Place 4 patches onto the skin daily Apply up to 4 patches to skin. Wear for 12 hours and remove for 12 hrs.  Refill when patient requests. 8/10/2021 at Unknown time Yes Emili Ballard MD   LORazepam (ATIVAN) 0.5 MG tablet 1-2 tabs qhs prn insomnia and 1 q 8 hours prn anxiety  Patient taking differently: Take 1 mg by mouth At Bedtime  8/10/2021 at hs Yes Emili Ballard MD   medical cannabis (Patient's own supply.  Not a prescription) Medical Cannabis - [place] 4-6 ml by mouth daily. [facility] Past Week at Unknown time Yes Reported, Patient   methylfolate (DEPLIN) 7.5 MG TABS tablet Take 1 tablet (7.5 mg) by mouth daily 8/10/2021 at Unknown time Yes Kimberly Perez DO   morphine (MS CONTIN) 15 MG CR tablet Take 1 tablet (15 mg) by mouth every 12 hours maximum 2 tablet(s) per day 8/10/2021 at Unknown time Yes Hakeem Concepcion, PA-C  "  Multiple Vitamin (MULTI-VITAMINS) TABS Take 1 tablet by mouth  8/10/2021 at Unknown time Yes Reported, Patient   naloxone (NARCAN) 4 MG/0.1ML nasal spray Spray 1 spray (4 mg) into one nostril alternating nostrils as needed for opioid reversal every 2-3 minutes until assistance arrives prn Yes Emili Ballard MD   Prasterone 6.5 MG INST Place 1 suppository vaginally At Bedtime  Patient taking differently: Place 0.5 suppositories vaginally three times a week  8/8/2021 Yes Emili Ballard MD   senna-docusate (SENOKOT-S/PERICOLACE) 8.6-50 MG tablet Take 4-6 tablets by mouth daily as needed for constipation 8/10/2021 at Unknown time Yes Unknown, Entered By History   insulin syringe-needle U-100 (30G X 1/2\" 1 ML) 30G X 1/2\" 1 ML miscellaneous Inject 1 ml B12 qmonth   Emili Ballard MD         "

## 2021-08-11 NOTE — PROGRESS NOTES
When patient arrived on observations patient stated she just felt awful, Patient had rigors in the bed and temp was 101.7 (Tylenol given). Sepsis triggered and stat lactate ordered.  IV fluids started. Will monitor closely.

## 2021-08-11 NOTE — CONSULTS
Consult Date: 08/11/2021    INFECTIOUS DISEASE PROGRESS CONSULTATION    LOCATION:  Room 226 [facility].    REFERRING PHYSICIAN:  Emanuel Barnes M.D.    IMPRESSION:     1.  A 60-year-old female, underlying chronic lymphocytic leukemia, 1 week illness with fever, arthralgias and possible inflammatory joint process, numerous skin lesions.  Large differential diagnosis for this clinical syndrome.  The diagnosis is not clear.  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  Other possibilities here include atypical erythema nodosum, acute Lyme disease (no obvious exposure and tick-borne disease endemic areas and atypical labs), or bacteremia with multifocal infection, but this is certainly atypical in appearance.  2.  Prior history of extended spectrum beta-lactamase infections and urinary tract infections, but has been without this for several years.  3.  Chronic lymphocytic leukemia, active disease, being treated including Rituxan dose early this year.  Thus, immunosuppressed host and enlarges the differential diagnosis of the main problem.    RECOMMENDATIONS:     1.  Continue antibiotics for now, but if blood cultures are still negative tomorrow, discontinue antibiotics and proceed with further workup as directed.    2.  Get Lyme serology and procalcitonin.    3.  Definitely have oncology see.  Potentially a biopsy of one of these, as continues to have fever and is not improving, and diagnosis is unclear.    HISTORY OF PRESENT ILLNESS:  This 60-year-old female is seen in consultation due to fever and skin lesions.  The patient has a history of chronic lymphocytic leukemia, which has been fairly stable, but has been requiring treatment including Rituxan dose earlier this year.  She has had no significant infection problems with it, but has on occasions had fever symptoms.  She was in  [place] and as she was traveling there, noticed some degree of fever symptoms, nausea, malaise and eventually soon thereafter, had skin lesions gradually develop.  She has had new ones progressively appearing in multiple locations.  Of note, no other travel history.  Specifically, no travel to any typical tick-borne disease endemic areas.  Definitely fever symptoms, some of these are painful, particularly in the knee area, the right leg area and in the left wrist area, not so clear whether it is in the joint itself of the skin things themselves that are painful.  Her labs are relatively unremarkable otherwise.  White count 13,000, but hard to interpret in light of CLL.    PAST MEDICAL HISTORY:  CLL as noted, prior history of recurrent UTIs and ESBL.    ALLERGIES:  13 LISTED ALLERGIES.  NO ANTIBIOTIC ALLERGIES.    SOCIAL AND FAMILY HISTORY:  No travel to any tick-borne disease endemic areas.  No exposure to anyone else who has been ill.  No significant animal exposures or outdoor exposure of note.    MEDICATIONS:  As listed.    REVIEW OF SYSTEMS:  Some arthralgia-type symptoms, but most painful at the areas where the skin lesions themselves are, i.e., right knee area and left wrist.  Some tender and painful areas in the other locations.  No other focal symptoms.  Nausea is improved.    PHYSICAL EXAMINATION:    GENERAL:  The patient appears her stated age.  She is a good historian.  Cognition fully intact.  Febrile to 101, tachycardic 110.  HEENT:  No skin lesions noted.  There are no facial rashes.  NECK:  Supple and nontender, no meningismus.  HEART:  Regular rhythm, slightly tachycardic.  LUNGS:  Clear.  ABDOMEN:  Soft, nontender.  Spleen tip noted.  EXTREMITIES:  Multiple lesions, I count approximately 10 ranging from a few centimeters to 20 cm.  Right leg area is quite tender, as is the left wrist area.  These lesions are compatible with erythema migrans, but again, no tick-borne disease endemic exposure and at  least a couple are atypical in appearance.    LABORATORY DATA:  White count 13,000.  Platelet count stable and not that low.  Blood cultures pending.    Thank you very much for the consultation.  I will follow the patient with you.    Jim Diaz MD        D: 2021   T: 2021   MT: [id number]    Name:     SANDY BLEDSOE  MRN:      [id number]        Account:      [id number]   :      1960           Consult Date: 2021     Document: [id number]

## 2021-08-11 NOTE — PROGRESS NOTES
Oncology/Hematology Follow Up Note:    Assessment and Plan:  #1 Fever, polyarthralgia, knee effusion, N/V, and skin lesions  - Elevated inflammatory markers  - Knee effusion has been drained.  Studies pending  - Infectious vs. Inflammatory.  Very unlikely to be related to CLL    PLAN:  - Appreciate ID recommendations  - Continue antibiotics  - Follow up on studies on knee effusion  - If workup remains unremarkable, would consider biopsying one of the skin lesions    #2 CLL  - Developed fatigue and night sweats in the spring.  WBC was only around 20 at the time.  Plt was also decreased.  Gave the patient a short course of Rituxan weekly x 4 instead of a longer course of treatment for CLL, with significant improvement of her symptoms  - Hgb and Plt currently WNL.  WBC only elevated to 13  - No palpable adenopathy.  Spleen size appears stable per examination    PLAN:  - Again, do not think her current symptoms are CLL related at all, and the patient agrees  - Recent Rituxan does make her immunocompromised so infectious workup may need to be expanded  - Will check IgG level with tomorrow morning's labs    Vishnu Blackwell M.D.  [facility]  [phone]    Subjective:    Janelle was admitted to the hospital for 1 week history of low grade fever, nausea, vomiting, right knee effusion, polyathralgia, and skin lesions.  She was started on broad spectrum antibiotics.  Her knee effusion was drained.  ID is following    Scheduled Medications:  Reviewed active medications    Labs:  CBC RESULTS: Recent Labs   Lab Test 08/10/21  2008 05/24/21  1311 03/16/21  1002   WBC 13.0* 3.2* 17.5*   HGB 12.0 11.0* 13.3   HCT 36.4 33.6* 42.1   MCV 93 90 94    105* 134*       CMP  Recent Labs   Lab 08/11/21  1133 08/10/21  2011 08/10/21  2008   NA  --   --  137   POTASSIUM  --   --  3.5   CHLORIDE  --   --  106   CO2  --   --  24   ANIONGAP  --   --  7   GLC  --  106* 116*   BUN  --   --  12   CR 0.65  --  0.77   GFRESTIMATED >90  --  " 84   BERYL  --   --  9.2   PROTTOTAL  --   --  6.4*   ALBUMIN  --   --  2.7*   BILITOTAL  --   --  0.5   ALKPHOS  --   --  92   AST  --   --  19   ALT  --   --  26       INRNo lab results found in last 7 days.    Objective/Physical Exam:  Blood pressure 103/59, pulse 98, temperature 100.1  F (37.8  C), temperature source Oral, resp. rate 18, height 1.651 m (5' 5\"), weight 74.1 kg (163 lb 4.8 oz), last menstrual period 05/01/2005, SpO2 99 %, not currently breastfeeding.  General appearance:alert, oriented, no acute distress  HEENT:sclera anicteric  Skin: Erythematous patches on bilateral upper and lower extremities  Lymph:  No palpable adenopathy  Abd: Spleen tip barely palpable.  Ext: bilateral leg swelling 1+    Vishnu Blackwell MD  [facility]  8/11/2021 6:05 PM        "

## 2021-08-11 NOTE — TELEPHONE ENCOUNTER
What is the concern that needs to be addressed by a nurse? Pt was admitted to the ed/ hospital last night, and is unable to start tms or have her mapping appt today. Please try calling her back today and lvm, and if she doesn't answer she will call back when she is available or discharged.     May a detailed message be left on voicemail? yes    Date of last office visit: 7/28/21    Message routed to: [employer]

## 2021-08-11 NOTE — H&P
[facility]    History and Physical  Hospitalist       Date of Admission:  8/10/2021    Assessment & Plan   Janelle White is a 60 year old female who presents with fever, sweating, chills, multiple areas of skin redness, polyarthralgia.    This is a 60-year-old lady with a past medical history significant for CLL for which she received rituximab a few months ago through [facility] (Dr. Vishnu Blackwell), osteoarthritis history of multiple surgeries, chronic pain syndrome, ESBL UTI and bacteremia in 2011, rotator cuff surgery of the left shoulder in June, lumbar spine surgery, right shoulder surgery with hardware in place, C-spine surgery.  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    Her symptoms started last Thursday in form of feeling unwell and vomiting, multiple episodes.  She was visiting her mom in [place] at that time.  This weekend, on Saturday she started feeling worse.  Her symptoms included subjective fever, sweating, chills.  She also started noticing patches of skin redness all over her body.  She also started experiencing joint pain and swelling affecting multiple joints, starting with right knee joint.  Currently she has pain in her right knee, bilateral wrists, lower back pain.  All of the pain started this weekend and progressively worsening.  She has noticed that her skin redness is appearing on multiple sites.  The lower back pain started about a day ago.  Localizing in the lower back and she thinks it is due to her laying in the bed.  Not radiating to either of the legs.  No history of bowel or bladder incontinence.  No history of weakness of lower extremities.    She has a history of lumbar spine surgery a few years ago.  For the last 1 day or so she has noticed some pain in the area.  On exam she did have redness in the lower back.  She also has a history of right shoulder surgery with hardware in place, many years ago but denies any new pain  in the right shoulder area.  Recent left shoulder surgery but no hardware in place.  No new pain in the area.  Has a history of C-spine surgery but denies any new pain in the area.  No surgery of the right knee but history of carpal tunnel surgery both sides.    In the ER, patient appears to be septic.  Appears flushed and sweaty.  Low-grade fever.  Tachycardia.  Leukocytosis.  2 sets of blood cultures drawn.  Being given IV ceftriaxone followed by IV vancomycin.  Being admitted to internal medicine service.    COVID vaccinated, second dose in January.    Problem List:    Sepsis.  High probability of bacteremia.  Skin and soft tissue infection.  May also have joint infection.  -Patient has multiple patches of skin redness: Right lower leg, right inner thigh, left lower leg, lower back, bilateral hands.  -Has right knee pain and some effusion as well as bilateral hand and wrist pain.  This is likely reactive polyarthritis in the setting of bacteremia.  She has fair amount of movement of the right knee limited by some pain but mostly due to her tightness due to effusion.  Probably reactive rather than septic joint.  -Continue IV cefazolin and IV vancomycin.  -2 sets of blood cultures drawn from the emergency room.  -Lumbar spine MRI with and without contrast to rule out infection in the area.  -Infectious diseases consultation.  -If the knee joint pain and swelling gets worse then, obtain orthopedic consultation for aspiration of the knee.  -Immunocompromised due to history of CLL and recent rituximab infusion.  -I did not appreciate any murmur on exam.    Chronic pain syndrome.  -On multiple chronic opioid medications.  Pharmacy to reconcile med list then resume.    History of CLL  -Received rituximab in May.  Primary oncologist Dr. Vishnu Blackwell [facility].    Depression/anxiety.  By history.  -Resume home meds once med list is reconciled.    DVT Prophylaxis: Pneumatic Compression Devices  Code Status: Full  Code    Juan Pablo Godwin MD    Primary Care Physician   Hakeem Concepcion    Chief Complaint   Fever, chills, sweating, joint pain, skin redness      History of Present Illness   Janelle White is a 60 year old female presented with presented with the above symptoms.      Past Medical History    I have reviewed this patient's medical history and updated it with pertinent information if needed.   Past Medical History:   Diagnosis Date     Anxiety      Cervicalgia     C5-6 disc protrusion     Depressive disorder      ESBL (extended spectrum beta-lactamase) producing bacteria infection      History of blood transfusion     Cervical fusion     Leukemia (H)     CLA large beta     Melanoma (H)      Migraine      Other chronic pain      Rotator cuff tear     s/p injections     Sacroiliac inflammation (H)      Shift work sleep disorder 2013     Urinary calculi      Vitamin B12 deficiency anemia     started injections       Past Surgical History   I have reviewed this patient's surgical history and updated it with pertinent information if needed.  Past Surgical History:   Procedure Laterality Date     anterior cervical discectomy C4-5 ,Fusion C5-6-7  10/2007     APPENDECTOMY       BACK SURGERY       BLEPHAROPLASTY BILATERAL  2013    Procedure: BLEPHAROPLASTY BILATERAL;  BILATERAL UPPER LID BLEPHAROPLASTY AND BROWPEXY ;  Surgeon: Godfrey Miguel MD;  Location: [facility] APPENDECTOMY  2006      SECTION      x2     COLONOSCOPY N/A 2020    Procedure: COLONOSCOPY;  Surgeon: Butch Lockhart MD;  Location:  GI     ESOPHAGOSCOPY, GASTROSCOPY, DUODENOSCOPY (EGD), COMBINED N/A 2020    Procedure: ESOPHAGOGASTRODUODENOSCOPY, WITH BIOPSY biosies by cold forceps;  Surgeon: Butch Lockhart MD;  Location:  GI     EYE SURGERY       FUSION LUMBAR ANTERIOR, FUSION LUMBAR POSTERIOR TWO LEVELS, COMBINED  2010    L3-S1 anterior posterior fusion     HYSTERECTOMY  2006    ovaries intact      HYSTERECTOMY       HYSTERECTOMY, PAP NO LONGER INDICATED       Partial vulvectomy for NEENA III  2009     Pubovaginal sling, post op durasphere injections  2006    wears pad     ROTATOR CUFF REPAIR RT/LT  2011    right     SPINAL FUSION C3-4  2009    C3-4, anterior spinal fusion     TUBAL LIGATION         Prior to Admission Medications   Prior to Admission Medications   Prescriptions Last Dose Informant Patient Reported? Taking?   Calcium-Magnesium-Vitamin D (CALCIUM 500) 500-250-200 MG-MG-UNIT TABS   Yes No   Sig: Take 1 tablet by mouth   Patient not taking: Reported on 2021   Cholecalciferol (D-5000) 5000 units TABS   Yes No   Sig: Take 5,000 Units by mouth   DOCOSAHEXAENOIC ACID PO   Yes No   Sig: Take 1,000 mg by mouth    Estradiol (DIVIGEL) 1 MG/GM GEL   No No   Sig: Place 1 packet onto the skin daily   HYDROcodone-acetaminophen (NORCO)  MG per tablet   No No   Sig: take 1 tabs q 4hrs, max 4 tabs/24 hrs   LORazepam (ATIVAN) 0.5 MG tablet   No No   Si-2 tabs qhs prn insomnia and 1 q 8 hours prn anxiety   Multiple Vitamin (MULTI-VITAMINS) TABS   Yes No   Sig: Take 1 tablet by mouth   Patient not taking: Reported on 2021   Prasterone 6.5 MG INST   No No   Sig: Place 1 suppository vaginally At Bedtime   albuterol (PROAIR HFA/PROVENTIL HFA/VENTOLIN HFA) 108 (90 Base) MCG/ACT inhaler   No No   Sig: Inhale 2 puffs into the lungs every 6 hours   amphetamine-dextroamphetamine (ADDERALL XR) 20 MG 24 hr capsule   No No   Sig: Take 1 capsule (20 mg) by mouth daily   amphetamine-dextroamphetamine (ADDERALL) 20 MG tablet   No No   Sig: Take 0.5-1 tablets (10-20 mg) by mouth daily as needed (extreme fatigue, exhaustion, daytime sedation)   calcium citrate (CALCIUM CITRATE) 950 MG tablet   Yes No   Sig: Take 1 tablet by mouth 2 times daily.   cyanocobalamin (CYANOCOBALAMIN) 1000 MCG/ML injection   No No   Sig: Inject 1 mL (1,000 mcg) into the muscle every 30 days   cyclobenzaprine (FLEXERIL) 10  "MG tablet   No No   Sig: Take 1 tablet (10 mg) by mouth 3 times daily as needed for muscle spasms   desvenlafaxine (PRISTIQ) 100 MG 24 hr tablet   No No   Sig: Take 1 tablet (100 mg) by mouth daily   estradiol (ESTRACE VAGINAL) 0.1 MG/GM vaginal cream   No No   Sig: Place 2 g vaginally three times a week.   insulin syringe-needle U-100 (30G X 1/2\" 1 ML) 30G X 1/2\" 1 ML miscellaneous   No No   Sig: Inject 1 ml B12 qmonth   lidocaine (LIDODERM) 5 % patch   No No   Sig: Place 4 patches onto the skin daily Apply up to 4 patches to skin. Wear for 12 hours and remove for 12 hrs.  Refill when patient requests.   medical cannabis (Patient's own supply.  Not a prescription)   Yes No   Sig: Medical Cannabis - [place] 4-6 ml by mouth daily. [facility]   methylPREDNISolone (MEDROL DOSEPAK) 4 MG tablet therapy pack   No No   Sig: Follow Package Directions   methylfolate (DEPLIN) 7.5 MG TABS tablet   No No   Sig: Take 1 tablet (7.5 mg) by mouth daily   morphine (MS CONTIN) 15 MG CR tablet   No No   Sig: Take 1 tablet (15 mg) by mouth every 12 hours maximum 2 tablet(s) per day   naloxone (NARCAN) 4 MG/0.1ML nasal spray   No No   Sig: Spray 1 spray (4 mg) into one nostril alternating nostrils as needed for opioid reversal every 2-3 minutes until assistance arrives   sennosides (SENOKOT) 8.6 MG tablet   Yes No   Sig: Take 1 tablet by mouth daily as needed for constipation.      Facility-Administered Medications: None     Allergies   Allergies   Allergen Reactions     Bupropion Other (See Comments) and Swelling     Ineffective, name brand works best  Ineffective, name brand works best     Codeine Other (See Comments)     \"Feels like electricity running through body\"  No reaction noted in [employer].  Shaky  With Tylenol     Effexor [Venlafaxine Hydrochloride]      Affect too flat     Escitalopram      Other reaction(s): *Unknown  Sexual dysfunction     Fluoxetine Other (See Comments)     Sexual dysfunction     Levaquin " "[Levofloxacin] Other (See Comments)     Suicidal thoughts  Dark thoughts- mood changes     Lexapro      Sexual dysfunction     Methylphenidate Hives     Milnacipran      12.5 MG is fine. 25 MG caused side effects. \"Dark thoughts\"     Pregabalin Other (See Comments)     Hallucinations  Audiovisual hallucinations     Prozac [Fluoxetine Hcl]      Sexual dysfunction     Tramadol Hives     Hives       Venlafaxine      Other reaction(s): *Unknown  Affect too flat       Social History   I have reviewed this patient's social history and updated it with pertinent information if needed. Janelle TORRES Christopher  reports that she quit smoking about 37 years ago. She has a 5.00 pack-year smoking history. She has never used smokeless tobacco. She reports current alcohol use. She reports previous drug use. Drugs: Marijuana, Psilocybin, LSD, and Cocaine.    Family History   I have reviewed this patient's family history and updated it with pertinent information if needed.   Family History   Problem Relation Age of Onset     Hypertension Mother      Alcohol/Drug Mother      Osteoporosis Mother         borderline     Hypertension Father      Diabetes Father         Diet controlled     Alcohol/Drug Father         remote use     C.A.D. Maternal Grandmother          late 50s     C.A.D. Maternal Grandfather         bone and brain cancer mets as well     Diabetes Paternal Grandfather          from diabetic complications     Alcohol/Drug Brother      Hypertension Brother      Breast Cancer No family hx of         Dcis     Cancer - colorectal No family hx of      Cerebrovascular Disease No family hx of      Colon Cancer No family hx of        Review of Systems   The 10 point Review of Systems is negative other than noted in the HPI or here.     Physical Exam   Temp: 99  F (37.2  C) Temp src: Temporal BP: 123/72 Pulse: 112   Resp: 20 SpO2: 100 % O2 Device: None (Room air)    Vital Signs with Ranges  Temp:  [99  F (37.2  C)] 99  F (37.2 "  C)  Pulse:  [112] 112  Resp:  [20] 20  BP: (123)/(72) 123/72  SpO2:  [100 %] 100 %  0 lbs 0 oz    Constitutional: Awake, alert, cooperative, appears flushed and sweaty.  Eyes: Conjunctiva and pupils examined and normal.  HEENT: Dry mucous membranes, normal dentition.  Respiratory: Clear to auscultation bilaterally, no crackles or wheezing.  Cardiovascular: Regular rate and rhythm, normal S1 and S2, and no murmur noted.  GI: Soft, non-distended, non-tender, normal bowel sounds.  Skin:   Multiple areas of skin redness in form of patches.  They are marked.  There is also a red area affecting her lower back along the previous surgical scar.  Musculoskeletal:   Multiple joint pain and effusion, most prominently affected joint is right knee.  Left knee is unaffected.  Other affected joints include bilateral wrists.  Neurologic: Cranial nerves 2-12 intact, normal strength and sensation.  Bilateral lower extremity strength is symmetrical.  Psychiatric: Alert, oriented to person, place and time, no obvious anxiety or depression.    Data     Recent Labs   Lab 08/10/21  2011 08/10/21  2008   WBC  --  13.0*   HGB  --  12.0   MCV  --  93   PLT  --  157   NA  --  137   POTASSIUM  --  3.5   CHLORIDE  --  106   CO2  --  24   BUN  --  12   CR  --  0.77   ANIONGAP  --  7   BERYL  --  9.2   * 116*   ALBUMIN  --  2.7*   PROTTOTAL  --  6.4*   BILITOTAL  --  0.5   ALKPHOS  --  92   ALT  --  26   AST  --  19       No results found for this or any previous visit (from the past 24 hour(s)).

## 2021-08-11 NOTE — CONSULTS
ID consult dictated IMP 1 61 yo female CLL, acute fever/multiple skin lesions, dx not clear ? Lyme(no exposure/travel hx to fit), ? Bacteremia and multifocal infection but atypical, ? Sweets syndrome and some CLL conversion    REC await cx check lyme and procal, onc to see, folow clinically

## 2021-08-11 NOTE — ED TRIAGE NOTES
Pt presents with vomiting since Thursday of last week. Reports feeling worse and now with swelling in RLE and LUE. Pt feelks very weak and reports chills. Reports pain all over. Negative COVID. ABCs intact. A&OX3

## 2021-08-11 NOTE — PHARMACY-VANCOMYCIN DOSING SERVICE
Pharmacy Vancomycin Initial Note  Date of Service 2021  Patient's  1960  60 year old, female    Indication: Skin and Soft Tissue Infection    Current estimated CrCl = Estimated Creatinine Clearance: 92.7 mL/min (based on SCr of 0.65 mg/dL).    Creatinine for last 3 days  8/10/2021:  8:08 PM Creatinine 0.77 mg/dL  2021: 11:33 AM Creatinine 0.65 mg/dL    Recent Vancomycin Level(s) for last 3 days  No results found for requested labs within last 72 hours.      Vancomycin IV Administrations (past 72 hours)                   vancomycin 1250 mg in 0.9% NaCl 250 mL intermittent infusion 1,250 mg (mg) 1,250 mg New Bag 21 0148                Nephrotoxins and other renal medications (From now, onward)    Start     Dose/Rate Route Frequency Ordered Stop    21 1430  vancomycin (VANCOCIN) 2,000 mg in sodium chloride 0.9 % 500 mL intermittent infusion      2,000 mg  over 2 Hours Intravenous EVERY 24 HOURS 21 1408            Contrast Orders - past 72 hours (72h ago, onward)    Start     Dose/Rate Route Frequency Ordered Stop    21 0630  gadobutrol (GADAVIST) injection 7.5 mL      7.5 mL Intravenous ONCE 21 0629 21 0701          Loading dose: N/A  Regimen: 2000 mg IV every 24 hours.  Start time: 13:48 on 2021  Exposure target: AUC24 (range)400-600 mg/L.hr   AUC24,ss: 518 mg/L.hr  Probability of AUC24 > 400: 76 %  Ctrough,ss: 12.2 mg/L  Probability of Ctrough,ss > 20: 18 %  Probability of nephrotoxicity (Lodise SERGIO ): 7 %    Plan:  1. Start vancomycin  2000 mg IV q24h.   2. Vancomycin monitoring method: AUC  3. Vancomycin therapeutic monitoring goal: 400-600 mg*h/L  4. Pharmacy will check vancomycin levels as appropriate in 1-3 Days.    5. Serum creatinine levels will be ordered a minimum of twice weekly.      Emi Us, formerly [facility]

## 2021-08-12 ENCOUNTER — APPOINTMENT (OUTPATIENT)
Dept: GENERAL RADIOLOGY | Facility: CLINIC | Age: 61
End: 2021-08-12
Attending: PHYSICIAN ASSISTANT
Payer: COMMERCIAL

## 2021-08-12 LAB
ANION GAP SERPL CALCULATED.3IONS-SCNC: 4 MMOL/L (ref 3–14)
B BURGDOR IGG+IGM SER QL: 0.01
BUN SERPL-MCNC: 8 MG/DL (ref 7–30)
CALCIUM SERPL-MCNC: 8.1 MG/DL (ref 8.5–10.1)
CHLORIDE BLD-SCNC: 110 MMOL/L (ref 94–109)
CO2 SERPL-SCNC: 27 MMOL/L (ref 20–32)
CREAT SERPL-MCNC: 0.67 MG/DL (ref 0.52–1.04)
CRP SERPL-MCNC: 225 MG/L (ref 0–8)
ERYTHROCYTE [DISTWIDTH] IN BLOOD BY AUTOMATED COUNT: 12.5 % (ref 10–15)
GFR SERPL CREATININE-BSD FRML MDRD: >90 ML/MIN/1.73M2
GLUCOSE BLD-MCNC: 98 MG/DL (ref 70–99)
HCT VFR BLD AUTO: 29.5 % (ref 35–47)
HGB BLD-MCNC: 9.3 G/DL (ref 11.7–15.7)
IGG SERPL-MCNC: 118 MG/DL (ref 610–1616)
MCH RBC QN AUTO: 30.3 PG (ref 26.5–33)
MCHC RBC AUTO-ENTMCNC: 31.5 G/DL (ref 31.5–36.5)
MCV RBC AUTO: 96 FL (ref 78–100)
PLATELET # BLD AUTO: 121 10E3/UL (ref 150–450)
POTASSIUM BLD-SCNC: 3.3 MMOL/L (ref 3.4–5.3)
POTASSIUM BLD-SCNC: 3.5 MMOL/L (ref 3.4–5.3)
PROCALCITONIN SERPL-MCNC: 0.1 NG/ML
RBC # BLD AUTO: 3.07 10E6/UL (ref 3.8–5.2)
SODIUM SERPL-SCNC: 141 MMOL/L (ref 133–144)
WBC # BLD AUTO: 6.9 10E3/UL (ref 4–11)

## 2021-08-12 PROCEDURE — 999N000157 HC STATISTIC RCP TIME EA 10 MIN

## 2021-08-12 PROCEDURE — 36415 COLL VENOUS BLD VENIPUNCTURE: CPT | Performed by: HOSPITALIST

## 2021-08-12 PROCEDURE — 84132 ASSAY OF SERUM POTASSIUM: CPT | Performed by: HOSPITALIST

## 2021-08-12 PROCEDURE — 86431 RHEUMATOID FACTOR QUANT: CPT | Performed by: INTERNAL MEDICINE

## 2021-08-12 PROCEDURE — 80048 BASIC METABOLIC PNL TOTAL CA: CPT | Performed by: INTERNAL MEDICINE

## 2021-08-12 PROCEDURE — 250N000011 HC RX IP 250 OP 636: Performed by: INTERNAL MEDICINE

## 2021-08-12 PROCEDURE — G0463 HOSPITAL OUTPT CLINIC VISIT: HCPCS | Performed by: PHYSICIAN ASSISTANT

## 2021-08-12 PROCEDURE — 250N000013 HC RX MED GY IP 250 OP 250 PS 637: Performed by: HOSPITALIST

## 2021-08-12 PROCEDURE — 94660 CPAP INITIATION&MGMT: CPT

## 2021-08-12 PROCEDURE — 250N000013 HC RX MED GY IP 250 OP 250 PS 637: Performed by: PHYSICIAN ASSISTANT

## 2021-08-12 PROCEDURE — 85027 COMPLETE CBC AUTOMATED: CPT | Performed by: INTERNAL MEDICINE

## 2021-08-12 PROCEDURE — 99233 SBSQ HOSP IP/OBS HIGH 50: CPT | Performed by: HOSPITALIST

## 2021-08-12 PROCEDURE — 250N000013 HC RX MED GY IP 250 OP 250 PS 637: Performed by: INTERNAL MEDICINE

## 2021-08-12 PROCEDURE — 250N000011 HC RX IP 250 OP 636: Performed by: HOSPITALIST

## 2021-08-12 PROCEDURE — 86140 C-REACTIVE PROTEIN: CPT | Performed by: PHYSICIAN ASSISTANT

## 2021-08-12 PROCEDURE — 86255 FLUORESCENT ANTIBODY SCREEN: CPT | Performed by: INTERNAL MEDICINE

## 2021-08-12 PROCEDURE — 258N000003 HC RX IP 258 OP 636: Performed by: INTERNAL MEDICINE

## 2021-08-12 PROCEDURE — 36415 COLL VENOUS BLD VENIPUNCTURE: CPT | Performed by: INTERNAL MEDICINE

## 2021-08-12 PROCEDURE — 120N000004 HC R&B MS OVERFLOW

## 2021-08-12 PROCEDURE — 86038 ANTINUCLEAR ANTIBODIES: CPT | Performed by: INTERNAL MEDICINE

## 2021-08-12 PROCEDURE — 73562 X-RAY EXAM OF KNEE 3: CPT | Mod: RT

## 2021-08-12 RX ORDER — IBUPROFEN 600 MG/1
600 TABLET, FILM COATED ORAL ONCE
Status: DISCONTINUED | OUTPATIENT
Start: 2021-08-12 | End: 2021-08-12 | Stop reason: ALTCHOICE

## 2021-08-12 RX ORDER — POTASSIUM CHLORIDE 1500 MG/1
40 TABLET, EXTENDED RELEASE ORAL ONCE
Status: DISCONTINUED | OUTPATIENT
Start: 2021-08-12 | End: 2021-08-12 | Stop reason: CLARIF

## 2021-08-12 RX ORDER — COLCHICINE 0.6 MG/1
0.6 TABLET ORAL DAILY
Status: DISCONTINUED | OUTPATIENT
Start: 2021-08-13 | End: 2021-08-13 | Stop reason: HOSPADM

## 2021-08-12 RX ORDER — ALBUTEROL SULFATE 90 UG/1
2 AEROSOL, METERED RESPIRATORY (INHALATION) EVERY 6 HOURS PRN
Status: DISCONTINUED | OUTPATIENT
Start: 2021-08-12 | End: 2021-08-13 | Stop reason: HOSPADM

## 2021-08-12 RX ORDER — KETOROLAC TROMETHAMINE 30 MG/ML
30 INJECTION, SOLUTION INTRAMUSCULAR; INTRAVENOUS EVERY 6 HOURS PRN
Status: DISCONTINUED | OUTPATIENT
Start: 2021-08-12 | End: 2021-08-13 | Stop reason: HOSPADM

## 2021-08-12 RX ORDER — CEFAZOLIN SODIUM 1 G/3ML
1 INJECTION, POWDER, FOR SOLUTION INTRAMUSCULAR; INTRAVENOUS EVERY 8 HOURS
Status: DISCONTINUED | OUTPATIENT
Start: 2021-08-12 | End: 2021-08-13 | Stop reason: HOSPADM

## 2021-08-12 RX ORDER — DESVENLAFAXINE 100 MG/1
100 TABLET, EXTENDED RELEASE ORAL DAILY
Status: DISCONTINUED | OUTPATIENT
Start: 2021-08-12 | End: 2021-08-13 | Stop reason: HOSPADM

## 2021-08-12 RX ORDER — DEXTROAMPHETAMINE SACCHARATE, AMPHETAMINE ASPARTATE MONOHYDRATE, DEXTROAMPHETAMINE SULFATE AND AMPHETAMINE SULFATE 5; 5; 5; 5 MG/1; MG/1; MG/1; MG/1
20 CAPSULE, EXTENDED RELEASE ORAL DAILY
Status: DISCONTINUED | OUTPATIENT
Start: 2021-08-12 | End: 2021-08-13 | Stop reason: HOSPADM

## 2021-08-12 RX ORDER — LEVOMEFOLATE CALCIUM 7.5 MG TABLET
7.5 TABLET DAILY
Status: DISCONTINUED | OUTPATIENT
Start: 2021-08-12 | End: 2021-08-13 | Stop reason: HOSPADM

## 2021-08-12 RX ORDER — POTASSIUM CHLORIDE 1500 MG/1
20 TABLET, EXTENDED RELEASE ORAL ONCE
Status: COMPLETED | OUTPATIENT
Start: 2021-08-12 | End: 2021-08-12

## 2021-08-12 RX ORDER — COLCHICINE 0.6 MG/1
1.2 TABLET ORAL ONCE
Status: COMPLETED | OUTPATIENT
Start: 2021-08-12 | End: 2021-08-12

## 2021-08-12 RX ADMIN — DESVENLAFAXINE SUCCINATE 100 MG: 100 TABLET, EXTENDED RELEASE ORAL at 13:32

## 2021-08-12 RX ADMIN — LORAZEPAM 1 MG: 1 TABLET ORAL at 21:36

## 2021-08-12 RX ADMIN — DEXTROAMPHETAMINE SACCHARATE, AMPHETAMINE ASPARTATE MONOHYDRATE, DEXTROAMPHETAMINE SULFATE, AND AMPHETAMINE SULFATE 20 MG: 5; 5; 5; 5 CAPSULE, EXTENDED RELEASE ORAL at 13:32

## 2021-08-12 RX ADMIN — CEFAZOLIN 1 G: 1 INJECTION, POWDER, FOR SOLUTION INTRAMUSCULAR; INTRAVENOUS at 20:09

## 2021-08-12 RX ADMIN — ACETAMINOPHEN 650 MG: 325 TABLET, FILM COATED ORAL at 00:57

## 2021-08-12 RX ADMIN — HYDROCODONE BITARTRATE AND ACETAMINOPHEN 1 TABLET: 10; 325 TABLET ORAL at 16:24

## 2021-08-12 RX ADMIN — CEFAZOLIN 1 G: 1 INJECTION, POWDER, FOR SOLUTION INTRAMUSCULAR; INTRAVENOUS at 13:28

## 2021-08-12 RX ADMIN — POTASSIUM CHLORIDE 20 MEQ: 1500 TABLET, EXTENDED RELEASE ORAL at 13:32

## 2021-08-12 RX ADMIN — KETOROLAC TROMETHAMINE 30 MG: 30 INJECTION, SOLUTION INTRAMUSCULAR; INTRAVENOUS at 13:25

## 2021-08-12 RX ADMIN — CEFAZOLIN 1 G: 1 INJECTION, POWDER, FOR SOLUTION INTRAMUSCULAR; INTRAVENOUS at 00:50

## 2021-08-12 RX ADMIN — LIDOCAINE 1 PATCH: 246 PATCH TOPICAL at 20:09

## 2021-08-12 RX ADMIN — MORPHINE SULFATE 15 MG: 15 TABLET, EXTENDED RELEASE ORAL at 07:55

## 2021-08-12 RX ADMIN — MORPHINE SULFATE 15 MG: 15 TABLET, EXTENDED RELEASE ORAL at 20:09

## 2021-08-12 RX ADMIN — VANCOMYCIN HYDROCHLORIDE 2000 MG: 10 INJECTION, POWDER, LYOPHILIZED, FOR SOLUTION INTRAVENOUS at 14:54

## 2021-08-12 RX ADMIN — COLCHICINE 1.2 MG: 0.6 TABLET, FILM COATED ORAL at 13:32

## 2021-08-12 NOTE — PLAN OF CARE
PRIMARY DIAGNOSIS: SOFT TISSUE INFECTIONS  GOALS TO BE MET BEFORE DISCHARGE:  Vitals sign stable or return to baseline: Yes    Tolerating oral antibiotics or has home infusion set up if applicable: Yes    Pain status: Improved-controlled with oral pain medications.    Return to near baseline physical activity: No    Discharge Planner Nurse   Safe discharge environment identified: Yes  Barriers to discharge: Yes       Entered by: Janelle Harris 08/12/2021 5:41 AM     Please review provider order for any additional goals.     Nurse to notify provider when observation goals have been met and patient is ready for discharge.    Pt is alert and oriented. Pt had a fever at the beginning of this shift or 101.8 along with a headache. Pt requested Ibuprofen, MD paged. Tylenol given which resolved the fever and the headache. Redness appears to be receding from outline. Pt states that her right knee feels more swollen. Results from knee aspiration are back, see result review. Pt pulled out her IV while sleeping, per ID, possibly stopping IV abx today. Patient is drinking fluids, IV not replaced per pt request. Pt is SBA in the room.

## 2021-08-12 NOTE — PROGRESS NOTES
[facility]    Medicine Progress Note - Hospitalist Service       Date of Admission:  8/10/2021    Assessment & Plan           60-year-old lady with a past medical history significant for CLL for which she received rituximab a few months ago through [place] [facility] (Dr. Vishnu Blackwell), osteoarthritis history of multiple surgeries, chronic pain syndrome, ESBL UTI and bacteremia in 2011, rotator cuff surgery of the left shoulder in June, lumbar spine surgery, right shoulder surgery with hardware in place, C-spine surgery, CLARE. Admitted on 8/10/2021 with fever, diaphoresis, chills, cutaneous erythema, polyarthralgia. 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 Currently on IV vanco, blood cultures pending R knee pseudogout.    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 (acute febrile neutrophilic dermatosis) related to underlying CLL    - may need skin biopsy tomorrow    - covering for underlying systemic infection    - on vanco    - blood cultures negative TD    - Tmax 101.8 at midnight    - ID following    - pending Lyme serology    - seen by Oncology (does not feel process is related to her CLL)    R knee Pseudogout    - will start colchicine    - would avoid steroids given possible underlying systemic infection    - s/p tap by ortho    Low back pain over surgical site    - spoke with ortho, they are concerned about area over surgical site    - rec Neurosurg to review    - had MRI lumbar spine on admission without acute findings    - dose have some erythema over the area    - spoke with Neurosurg- will see (Dr. Ignacio does not come here)    CLL    - seen by Dr. Blackwell    - diagnosed this past Spring    - treated with Rituxan weekly x 4 with improvement    - pending labs    Hypokalemia    - replaced K    - ok to discontinue IVF    Chronic pain    - cont home meds    Depression/Anxiety    - cont home meds    CLARE    - cont CPAP at bedtime    Thrombocytopenia    - stable    Offered to  "call family, patient declined     Diet: Combination Diet Regular Diet Adult    DVT Prophylaxis: has thrombocytopenia  [place] Catheter: Not present  Central Lines: None  Code Status: Full Code      Disposition Plan   Expected discharge: 08/12/2021 recommended to prior living arrangement once antibiotic plan established.     The patient's care was discussed with the Bedside Nurse, Patient and Neurosurg/Ortho Consultant.    Chance Manzo MD  Hospitalist Service  [facility]  Securely message with the Vocera Web Console (learn more here)  Text page via [employer] Paging/Directory      Clinically Significant Risk Factors Present on Admission               ______________________________________________________________________    Interval History   Patient in bed. States she feels \"a little\" better\". No chest pain, sob, abdo pain, n/v/d. Joint swelling improved    Data reviewed today: I reviewed all medications, new labs and imaging results over the last 24 hours. I personally reviewed no images or EKG's today.    Physical Exam   Vital Signs: Temp: 98.9  F (37.2  C) Temp src: Oral BP: 102/57 Pulse: 85   Resp: 20 SpO2: 98 % O2 Device: None (Room air)    Weight: 163 lbs 4.8 oz  Constitutional: awake, alert, cooperative, no apparent distress, and appears stated age  Eyes: Lids and lashes normal, pupils equal, round and reactive to light, extra ocular muscles intact, sclera clear, conjunctiva normal  ENT: Normocephalic, without obvious abnormality, atraumatic, sinuses nontender on palpation, external ears without lesions, oral pharynx with moist mucous membranes, tonsils without erythema or exudates, gums normal and good dentition.  Respiratory: No increased work of breathing, good air exchange, clear to auscultation bilaterally, no crackles or wheezing  Cardiovascular: Normal apical impulse, regular rate and rhythm, normal S1 and S2, no S3 or S4, and no murmur noted  GI: No scars, normal bowel sounds, soft, " non-distended, non-tender, no masses palpated, no hepatosplenomegally  Skin: warm. Areas of erythema (arms/legs) decreased  Musculoskeletal: joint (ankle/wrists) swelling noted    Data   Recent Labs   Lab 08/12/21  1218 08/12/21  0606 08/11/21  1133 08/10/21  2011 08/10/21  2008   WBC  --  6.9  --   --  13.0*   HGB  --  9.3*  --   --  12.0   MCV  --  96  --   --  93   PLT  --  121*  --   --  157   NA  --  141  --   --  137   POTASSIUM 3.5 3.3*  --   --  3.5   CHLORIDE  --  110*  --   --  106   CO2  --  27  --   --  24   BUN  --  8  --   --  12   CR  --  0.67 0.65  --  0.77   ANIONGAP  --  4  --   --  7   BERYL  --  8.1*  --   --  9.2   GLC  --  98  --  106* 116*   ALBUMIN  --   --   --   --  2.7*   PROTTOTAL  --   --   --   --  6.4*   BILITOTAL  --   --   --   --  0.5   ALKPHOS  --   --   --   --  92   ALT  --   --   --   --  26   AST  --   --   --   --  19     Recent Results (from the past 24 hour(s))   XR Knee Right 3 Views    Narrative    KNEE RIGHT THREE VIEWS August 12, 2021 8:21 AM     HISTORY: Right knee pain and suspicion of effusion.    COMPARISON: None.      Impression    IMPRESSION: Joint spaces appear maintained. No evidence of acute  fracture. Small joint effusion.

## 2021-08-12 NOTE — PROGRESS NOTES
Vital signs:  Temp: 98.8  F (37.1  C) Temp src: Oral BP: 103/59 Pulse: 98   Resp: 18 SpO2: 99 % O2 Device: None (Room air)    [id number]     Neuro: alert and oriented x4.  Cardiac: wdl tachycardic at times  Lungs: wdl  GI: wdl ex for nausea at times. Zofran in use.  : wdl  Pain: chronic neck, back, buttock and hip pain. Dilaudid and Oxycodone given. resumed home medication (Lidocaine patch and MS Contin given). Tylenol given for headache and fever 100.1.   IV: LR @100mL/hr. Ancef Q8hrs and Vancomycin Q24 hrs for suspected Bacteriemia.    Labs/Imaging: WBC 13.0. . BC negative for >12 hrs.   Diet: regular diet. Good appetite. Zofran given before meal.  Activity: stand by assist  Skin: redness and swelling through out the body (Left and right hand swelling and redness. Right groin, thigh, knee, leg and foot redness and swelling. Left foot mild swelling).  Consult: ID, orthopedic, hematology/oncology   Isolation: contact for ESBL  Plan: pain management. IV antibiotic. IV hydration. Follow up with blood culture and culture from aspirated fluid from right knee.     Will continue to monitor and supportive care.

## 2021-08-12 NOTE — PROGRESS NOTES
Orthopedic Surgery  Janelle TORRES Christopher  2021  Admit Date:  8/10/2021  Right knee effusion: pseudogout  Polyarthralgia   Low back pain, History of lumbar surgery by Dr Ignacio     Alert and oriented to person, place, and time.  Patient states she did not sleep well due to her low back pain.  States her right knee remains painful with weight bearing.  States her knees feels like it wants to give out.    Fever of 101.8 overnight    Vital Sign Ranges  Temperature Temp  Av.1  F (37.8  C)  Min: 98.1  F (36.7  C)  Max: 101.8  F (38.8  C)   Blood pressure Systolic (24hrs), Av , Min:88 , Max:113        Diastolic (24hrs), Av, Min:50, Max:71      Pulse Pulse  Av.3  Min: 61  Max: 100   Respirations Resp  Av.7  Min: 12  Max: 20   Pulse oximetry SpO2  Av.7 %  Min: 91 %  Max: 100 %       Exam:   Right knee:  Effusion decreased today vs yesterday.  Remains TTP.  Active ROM 0-100 degrees with pain at end range.    Erythema along bilateral low legs decreasing and withdrawing from borders  Erythema along left dorsal wrist decreasing.    Patient remains very TTP along her lumbar spine.    SLR negative for radiculopathy  Bilateral calves are soft, non-tender.  Bilateral upper and lower extremities are NVI.  Sensation intact bilateral upper and lower extremities  Patient able to resist dorsi and plantar flexion bilaterally  +Dp pulse    Labs:    Recent Labs   Lab Test 21  0606 08/10/21  2008 05/24/21  1311   HGB 9.3* 12.0 11.0*      CRP:  237 --> 225  Right knee aspiration: + for pyrophosphate crystals  Cell count [id number]  Gram stain: pending   Cultures: pending      1. Plan:   Could consider colchicine for treatment of pseudogout if medically able to take.  Would hold on a cortisone injection in setting of fever and pending joint fluid cultures.     Recommend contacting Dr Ignacio or consulting spine team regarding her continued spine tenderness.       Mobilize with PT/OT    WBAT Right LE.   Will order a knee immobilizer for transfers only.  Ok to have off in bed.       Continue current pain regiment.    2. Disposition   Per medicine team.      Isis Camacho PA-C

## 2021-08-12 NOTE — PROGRESS NOTES
[facility]  Infectious Disease Progress Note          Assessment and Plan:      IMPRESSION:     1.  A 60-year-old female, underlying chronic lymphocytic leukemia, 1 week illness with fever, arthralgias and possible inflammatory joint process, numerous skin lesions.  Large differential diagnosis for this clinical syndrome.  The diagnosis is not clear.  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  Other possibilities here include atypical erythema nodosum, acute Lyme disease (no obvious exposure and tick-borne disease endemic areas and atypical labs), or bacteremia with multifocal infection, but this is certainly atypical in appearance.  2.  Prior history of extended spectrum beta-lactamase infections and urinary tract infections, but has been without this for several years.  3.  Chronic lymphocytic leukemia, active disease, being treated including Rituxan dose early this year.  Thus, immunosuppressed host and enlarges the differential diagnosis of the main problem.     RECOMMENDATIONS:     1.  Continue antibiotics for now, but if blood cultures are still negative as of 8/13, discontinue antibiotics and proceed with further workup as directed, unlikely this is bacterial.    2.  neg Lyme serology and procalcitonin low.    3.   oncology noted.  Potentially a biopsy of one of these as early as tomorrow is logical, if continues to have fever and is not improving, and diagnosis is unclear.  Likely this is an inflammatory dermatsis, neutrophilic or E nodosum variant with steroids as tx  But this requires biopsy and is dx of exclusion  4 Knee is pseudogout, tx but does not explain overall condition           Interval History:   no new complaints about same overall still fever knee crystals, cx neg lyme neg no new focal sxs              Medications:       ceFAZolin  1 g  "Intravenous TID     Lidocaine  1-4 patch Transdermal Daily     lidocaine   Transdermal Q8H     LORazepam  1 mg Oral At Bedtime     morphine  15 mg Oral Q12H     sodium chloride (PF)  3 mL Intracatheter Q8H     vancomycin  2,000 mg Intravenous Q24H                  Physical Exam:   Blood pressure 102/57, pulse 85, temperature 98.5  F (36.9  C), temperature source Oral, resp. rate 20, height 1.651 m (5' 5\"), weight 74.1 kg (163 lb 4.8 oz), last menstrual period 05/01/2005, SpO2 98 %, not currently breastfeeding.  Wt Readings from Last 2 Encounters:   08/11/21 74.1 kg (163 lb 4.8 oz)   07/28/21 67.9 kg (149 lb 12.8 oz)     Vital Signs with Ranges  Temp:  [98.1  F (36.7  C)-101.8  F (38.8  C)] 98.5  F (36.9  C)  Pulse:  [61-98] 85  Resp:  [12-20] 20  BP: ()/(47-60) 102/57  FiO2 (%):  [21 %] 21 %  SpO2:  [95 %-100 %] 98 %    Constitutional: Awake, alert, cooperative, no apparent distress   Lungs: Clear to auscultation bilaterally, no crackles or wheezing   Cardiovascular: Regular rate and rhythm, normal S1 and S2, and no murmur noted   Abdomen: Normal bowel sounds, soft, non-distended, non-tender   Skin: same rashes, not new areas, no cyanosis, sl edema   Other:           Data:   All microbiology laboratory data reviewed.  Recent Labs   Lab Test 08/12/21  0606 08/10/21  2008 05/24/21  1311   WBC 6.9 13.0* 3.2*   HGB 9.3* 12.0 11.0*   HCT 29.5* 36.4 33.6*   MCV 96 93 90   * 157 105*     Recent Labs   Lab Test 08/12/21  0606 08/11/21  1133 08/10/21  2008   CR 0.67 0.65 0.77     Recent Labs   Lab Test 08/10/21  2008   SED 50*     No lab results found.    Invalid input(s):     "

## 2021-08-12 NOTE — PROGRESS NOTES
X-COVER    Patient asked to have home meds resumed, mainly MS Contin, lidocaine patch, lorazepam, and Norco.  These medications have been reordered.  PRN oxycodone discontinued.  Additional medications can be resumed by rounding provider.    [facility]

## 2021-08-12 NOTE — CONSULTS
NEUROSURGERY CONSULT    Neurosurgery was asked by Dr. Manzo to consult for tenderness and erythema over a previous incision performed by an outside provider at an outside location.       PLAN:  Ms. White's most recent imaging does not exhibit any evidence for a infectious spondylitis or epidural abscess, therefore, we do not appreciate any indication for Neurosurgical intervention at this time. This appears to be an inflammatory process or atypical infection as the blood cultures are inconclusive at this time. We will defer further treatment to our colleagues in the Medicine and Infectious Disease departments.     We did review the images together and we explained that there is no need for surgical intervention, although we did discuss signs of a worsening problem that she should seek being evaluated.     We will sign off at this time. Please page our on-call service for any questions or concerns.     It has been a pleasure meeting Janelle White. Thank you for having us be involved in her care.    ______________________________________________________________________    HPI:    Janelle White is a pleasant 60 year old female who presented to the [facility] Emergency Department yesterday for consultation on generalized weakness, nausea, vomiting, myalgias and erythematous skin. Pertinent past medical history consist of a cervical and lumbar fusion performed in 2010 approximately by Dr. Ignacio, known CLL which has been treated with rituximab by Dr. Vishnu Blackwell of [facility] (last treatment was two months ago), chronic pain syndrome being treated at [facility] and bacteremia in 2011.     She states that three weeks ago she hit her right leg on a stick and was recently camping in [place]. We have been asked to see her to due to some erythema at her previous lumbar incision.      In regard to her lumbar spine and lower extremities. Other than the diffuse erythematous patches and her current  headache she denies any symptomology, specifically lumbar spine or lower extremity pain, paresthesia, numbness or perceived weakness.    There are no bowel or bladder changes. No other concerns are voiced.      Past Medical History:   Diagnosis Date     Anxiety      Cervicalgia     C5-6 disc protrusion     Depressive disorder      ESBL (extended spectrum beta-lactamase) producing bacteria infection      History of blood transfusion     Cervical fusion     Leukemia (H)     CLA large beta     Melanoma (H)      Migraine      Other chronic pain      Rotator cuff tear     s/p injections     Sacroiliac inflammation (H)      Shift work sleep disorder 2013     Urinary calculi      Vitamin B12 deficiency anemia     started injections       Past Surgical History:   Procedure Laterality Date     anterior cervical discectomy C4-5 ,Fusion C5-6-7  10/2007     APPENDECTOMY       BACK SURGERY       BLEPHAROPLASTY BILATERAL  2013    Procedure: BLEPHAROPLASTY BILATERAL;  BILATERAL UPPER LID BLEPHAROPLASTY AND BROWPEXY ;  Surgeon: Godfrey Miguel MD;  Location: [facility] APPENDECTOMY  2006      SECTION      x2     COLONOSCOPY N/A 2020    Procedure: COLONOSCOPY;  Surgeon: Butch Lockhart MD;  Location:  GI     ESOPHAGOSCOPY, GASTROSCOPY, DUODENOSCOPY (EGD), COMBINED N/A 2020    Procedure: ESOPHAGOGASTRODUODENOSCOPY, WITH BIOPSY biosies by cold forceps;  Surgeon: Butch Lockhart MD;  Location:  GI     EYE SURGERY       FUSION LUMBAR ANTERIOR, FUSION LUMBAR POSTERIOR TWO LEVELS, COMBINED  2010    L3-S1 anterior posterior fusion     HYSTERECTOMY  2006    ovaries intact     HYSTERECTOMY       HYSTERECTOMY, PAP NO LONGER INDICATED       Partial vulvectomy for NEENA III  2009     Pubovaginal sling, post op durasphere injections  2006    wears pad     ROTATOR CUFF REPAIR RT/LT  2011    right     SPINAL FUSION C3-4  2009    C3-4, anterior spinal fusion     TUBAL LIGATION  "        Allergies   Allergen Reactions     Bupropion Other (See Comments) and Swelling     Ineffective, name brand works best  Ineffective, name brand works best     Codeine Other (See Comments)     \"Feels like electricity running through body\"  No reaction noted in [employer].  Shaky  With Tylenol     Effexor [Venlafaxine Hydrochloride]      Affect too flat     Escitalopram      Other reaction(s): *Unknown  Sexual dysfunction     Fluoxetine Other (See Comments)     Sexual dysfunction     Levaquin [Levofloxacin] Other (See Comments)     Suicidal thoughts  Dark thoughts- mood changes     Lexapro      Sexual dysfunction     Methylphenidate Hives     Milnacipran      12.5 MG is fine. 25 MG caused side effects. \"Dark thoughts\"     Pregabalin Other (See Comments)     Hallucinations  Audiovisual hallucinations     Prozac [Fluoxetine Hcl]      Sexual dysfunction     Tramadol Hives     Hives       Venlafaxine      Other reaction(s): *Unknown  Affect too flat       Social History     Tobacco Use     Smoking status: Former Smoker     Packs/day: 1.00     Years: 5.00     Pack years: 5.00     Quit date: 1984     Years since quittin.6     Smokeless tobacco: Never Used   Substance Use Topics     Alcohol use: Yes     Comment: no alcohol currently since prior to her back surgeries,        Family History   Problem Relation Age of Onset     Hypertension Mother      Alcohol/Drug Mother      Osteoporosis Mother         borderline     Hypertension Father      Diabetes Father         Diet controlled     Alcohol/Drug Father         remote use     C.A.D. Maternal Grandmother          late 50s     C.A.D. Maternal Grandfather         bone and brain cancer mets as well     Diabetes Paternal Grandfather          from diabetic complications     Alcohol/Drug Brother      Hypertension Brother      Breast Cancer No family hx of         Dcis     Cancer - colorectal No family hx of      Cerebrovascular Disease No family hx of  " "    Colon Cancer No family hx of        Scheduled Medications      amphetamine-dextroamphetamine  20 mg Oral Daily     ceFAZolin  1 g Intravenous TID     [START ON 8/13/2021] colchicine  0.6 mg Oral Daily     colchicine  1.2 mg Oral Once     desvenlafaxine  100 mg Oral Daily     Lidocaine  1-4 patch Transdermal Daily     lidocaine   Transdermal Q8H     LORazepam  1 mg Oral At Bedtime     methylfolate  7.5 mg Oral Daily     morphine  15 mg Oral Q12H     potassium chloride  20 mEq Oral Once     sodium chloride (PF)  3 mL Intracatheter Q8H     vancomycin  2,000 mg Intravenous Q24H       Home Medications  Proair  Adderall  Flexeril  Pristiq  Divigel  Estace  Lidoderm  Ativan  Deplin  Senokot    PRN Medications    acetaminophen **OR** acetaminophen, albuterol, cyclobenzaprine, HYDROcodone-acetaminophen, HYDROmorphone, ketorolac, lidocaine 4%, lidocaine (buffered or not buffered), melatonin, ondansetron **OR** ondansetron, sodium chloride (PF)    ROS: 10 point ROS neg other than the symptoms noted above in the HPI.    Vitals:    /57 (BP Location: Left arm)   Pulse 85   Temp 98.9  F (37.2  C) (Oral)   Resp 20   Ht 5' 5\" (1.651 m)   Wt 163 lb 4.8 oz (74.1 kg)   LMP 05/01/2005 (LMP Unknown)   SpO2 98%   BMI 27.17 kg/m    Body mass index is 27.17 kg/m .  No intake/output data recorded.    Exam:    Patient appears comfortable, conversational, and in no apparent distress.   Head: Normocephalic, without obvious abnormality, atraumatic, no facial asymmetry.   Eyes: conjunctivae/corneas clear. PERRL, EOM's intact.   Throat: lips, mucosa, and tongue normal; teeth and gums normal.   Neck: supple, symmetrical, trachea midline, no adenopathy and thyroid: not enlarged, symmetric, no tenderness/mass/nodules.   Lungs: clear to auscultation bilaterally.   Heart: regular rate and rhythm.   Abdomen: soft, non-tender; bowel sounds normal; no masses, no organomegaly.   Pulses: 2+ and symmetric.   Skin: Skin color, texture, " turgor normal. No rashes or lesions.     CN II-XII grossly intact, alert and appropriate with conversation and following commands.   Cervical spine is non tender to palpation. Appropriate range of motion of neck, not concerning for lhermitte's phenomenon.   Bilateral bicep 2/4 and tricep reflexes 1/4. Sensation intact throughout upper extremities.     UE muscle strength  Right  Left    Deltoid  5/5  5/5    Biceps  5/5  5/5    Triceps  5/5  5/5    Hand intrinsics  5/5  5/5    Hand grasp  5/5  5/5    Moon signs  neg  neg      Lumbar spine is non tender to palpation.   Intact sensation throughout lower extremities.   Bilateral patellar 2/4 and achilles reflex 1/4. Negative for pain with straight leg raise.     LE muscle strength  Right  Left    Iliopsoas (hip flexion)  5/5  5/5    Quad (knee extension)  5/5  5/5    Hamstring (knee flexion)  5/5  5/5    Gastrocnemius (PF)  5/5  5/5    Tibialis Ant. (DF)  5/5  5/5    EHL  5/5  5/5      Negative Babinski bilaterally. Negative for clonus.   Calves are soft and non-tender bilaterally.     Imaging: MR LUMBAR SPINE WITH AND WITHOUT CONTRAST August 11, 2021 7:12 AM  Impression per radiology read - I have personally reviewed the images with the patient.    1. Interval L2-S1 anterior and posterior fusion with posterior  instrumentation. There also appears be fusion across the right  sacroiliac joint. There is no evidence for any significant stenosis.  2. No evidence for any infectious spondylitis or epidural abscess.    Available labs at time of consult:       Recent Labs   Lab 08/12/21  0606 08/10/21  2008   WBC 6.9 13.0*   HGB 9.3* 12.0   HCT 29.5* 36.4   MCV 96 93   * 157     Recent Labs   Lab 08/12/21  0606 08/10/21  2008   WBC 6.9 13.0*   HGB 9.3* 12.0   HCT 29.5* 36.4   MCV 96 93   * 157     Recent Labs   Lab 08/12/21  0606 08/10/21  2008   SED  --  50*   .0* 237.0*     Recent Labs   Lab 08/12/21  0606 08/10/21  2008   HGB 9.3* 12.0     No results  for input(s): INR in the last 168 hours.  No results for input(s): LIPASE in the last 168 hours.  Recent Labs   Lab 08/12/21  0606 08/10/21  2008   * 157     Recent Labs   Lab 08/12/21  0606 08/10/21  2008   WBC 6.9 13.0*         Respectfully,    PEDRO Rodriguez, PAFRANCHESCA  [facility]  [facility]     Tel: [phone]      All imaging, physical findings, and the above plan have been reviewed with Dr. Martinez.

## 2021-08-12 NOTE — PROGRESS NOTES
Oncology Chart Check:    Continues to spike fevers, up to 101 overnight.  Afebrile now.  Knee effusion c/w pseudogout which does not explain her other symptoms.  Inflammatory markers remain significantly elevated    Agree with ID this does not appear to be a bacterial infection.  Either an atypical infection or an inflammatory process.    Again I do not think her presentation is consistent with CLL.    - Adding IVETT, ANCA, and RF today  - If persistently febrile, may need to consider biopsying one of the skin lesions and order a CT of the C/A/P to rule out infectious source.    Vishnu Blackwell M.D.  [facility]  [phone]

## 2021-08-12 NOTE — PROGRESS NOTES
A CPAP of  +5 @ 21% was applied to the pt via the mask for sleep apnea. The bridge of the nose skin integrity looks intact with gel pad in place. Pt is tolerating it well. Will continue to monitor and assess the pt's current respiratory status and needs.        Deniz Weller, RT

## 2021-08-13 VITALS
RESPIRATION RATE: 16 BRPM | HEIGHT: 65 IN | BODY MASS INDEX: 27.21 KG/M2 | OXYGEN SATURATION: 100 % | DIASTOLIC BLOOD PRESSURE: 57 MMHG | HEART RATE: 73 BPM | SYSTOLIC BLOOD PRESSURE: 105 MMHG | WEIGHT: 163.3 LBS | TEMPERATURE: 97.9 F

## 2021-08-13 LAB
ANA SER QL IF: NEGATIVE
ANCA AB PATTERN SER IF-IMP: NORMAL
ANION GAP SERPL CALCULATED.3IONS-SCNC: 5 MMOL/L (ref 3–14)
BASOPHILS # BLD AUTO: 0 10E3/UL (ref 0–0.2)
BASOPHILS NFR BLD AUTO: 0 %
BUN SERPL-MCNC: 10 MG/DL (ref 7–30)
C-ANCA TITR SER IF: NORMAL {TITER}
CALCIUM SERPL-MCNC: 8.3 MG/DL (ref 8.5–10.1)
CHLORIDE BLD-SCNC: 111 MMOL/L (ref 94–109)
CO2 SERPL-SCNC: 25 MMOL/L (ref 20–32)
CREAT SERPL-MCNC: 0.6 MG/DL (ref 0.52–1.04)
CRP SERPL-MCNC: 126 MG/L (ref 0–8)
EOSINOPHIL # BLD AUTO: 0 10E3/UL (ref 0–0.7)
EOSINOPHIL NFR BLD AUTO: 1 %
ERYTHROCYTE [DISTWIDTH] IN BLOOD BY AUTOMATED COUNT: 12.4 % (ref 10–15)
GFR SERPL CREATININE-BSD FRML MDRD: >90 ML/MIN/1.73M2
GLUCOSE BLD-MCNC: 107 MG/DL (ref 70–99)
HCT VFR BLD AUTO: 29.1 % (ref 35–47)
HGB BLD-MCNC: 9.3 G/DL (ref 11.7–15.7)
IMM GRANULOCYTES # BLD: 0.1 10E3/UL
IMM GRANULOCYTES NFR BLD: 2 %
LYMPHOCYTES # BLD AUTO: 0.6 10E3/UL (ref 0.8–5.3)
LYMPHOCYTES NFR BLD AUTO: 13 %
MCH RBC QN AUTO: 29.9 PG (ref 26.5–33)
MCHC RBC AUTO-ENTMCNC: 32 G/DL (ref 31.5–36.5)
MCV RBC AUTO: 94 FL (ref 78–100)
MONOCYTES # BLD AUTO: 0.3 10E3/UL (ref 0–1.3)
MONOCYTES NFR BLD AUTO: 6 %
NEUTROPHILS # BLD AUTO: 3.7 10E3/UL (ref 1.6–8.3)
NEUTROPHILS NFR BLD AUTO: 78 %
NRBC # BLD AUTO: 0 10E3/UL
NRBC BLD AUTO-RTO: 0 /100
PLATELET # BLD AUTO: 130 10E3/UL (ref 150–450)
POTASSIUM BLD-SCNC: 3.4 MMOL/L (ref 3.4–5.3)
RBC # BLD AUTO: 3.11 10E6/UL (ref 3.8–5.2)
RHEUMATOID FACT SER NEPH-ACNC: 10 IU/ML
SODIUM SERPL-SCNC: 141 MMOL/L (ref 133–144)
VANCOMYCIN SERPL-MCNC: 6 MG/L
WBC # BLD AUTO: 4.7 10E3/UL (ref 4–11)

## 2021-08-13 PROCEDURE — 36415 COLL VENOUS BLD VENIPUNCTURE: CPT | Performed by: HOSPITALIST

## 2021-08-13 PROCEDURE — 250N000013 HC RX MED GY IP 250 OP 250 PS 637: Performed by: HOSPITALIST

## 2021-08-13 PROCEDURE — 86140 C-REACTIVE PROTEIN: CPT | Performed by: PHYSICIAN ASSISTANT

## 2021-08-13 PROCEDURE — 99239 HOSP IP/OBS DSCHRG MGMT >30: CPT | Performed by: HOSPITALIST

## 2021-08-13 PROCEDURE — 85025 COMPLETE CBC W/AUTO DIFF WBC: CPT | Performed by: HOSPITALIST

## 2021-08-13 PROCEDURE — 80048 BASIC METABOLIC PNL TOTAL CA: CPT | Performed by: HOSPITALIST

## 2021-08-13 PROCEDURE — 250N000013 HC RX MED GY IP 250 OP 250 PS 637: Performed by: INTERNAL MEDICINE

## 2021-08-13 PROCEDURE — 250N000013 HC RX MED GY IP 250 OP 250 PS 637: Performed by: PHYSICIAN ASSISTANT

## 2021-08-13 PROCEDURE — 80202 ASSAY OF VANCOMYCIN: CPT | Performed by: HOSPITALIST

## 2021-08-13 PROCEDURE — 250N000011 HC RX IP 250 OP 636: Performed by: HOSPITALIST

## 2021-08-13 RX ORDER — COLCHICINE 0.6 MG/1
0.6 TABLET ORAL DAILY
Qty: 20 TABLET | Refills: 0 | Status: SHIPPED | OUTPATIENT
Start: 2021-08-14 | End: 2022-06-30

## 2021-08-13 RX ADMIN — DEXTROAMPHETAMINE SACCHARATE, AMPHETAMINE ASPARTATE MONOHYDRATE, DEXTROAMPHETAMINE SULFATE, AND AMPHETAMINE SULFATE 20 MG: 5; 5; 5; 5 CAPSULE, EXTENDED RELEASE ORAL at 08:40

## 2021-08-13 RX ADMIN — HYDROCODONE BITARTRATE AND ACETAMINOPHEN 1 TABLET: 10; 325 TABLET ORAL at 03:52

## 2021-08-13 RX ADMIN — HYDROCODONE BITARTRATE AND ACETAMINOPHEN 1 TABLET: 10; 325 TABLET ORAL at 11:34

## 2021-08-13 RX ADMIN — COLCHICINE 0.6 MG: 0.6 TABLET, FILM COATED ORAL at 08:40

## 2021-08-13 RX ADMIN — MORPHINE SULFATE 15 MG: 15 TABLET, EXTENDED RELEASE ORAL at 08:40

## 2021-08-13 RX ADMIN — DESVENLAFAXINE SUCCINATE 100 MG: 100 TABLET, EXTENDED RELEASE ORAL at 08:40

## 2021-08-13 RX ADMIN — CEFAZOLIN 1 G: 1 INJECTION, POWDER, FOR SOLUTION INTRAMUSCULAR; INTRAVENOUS at 03:52

## 2021-08-13 NOTE — PLAN OF CARE
For VS and complete assessments, please see documentation flowsheets.     Pertinent shift assessments: VSS. A&Ox4. Pain controlled with MS Cotin and lidocaine patch. LS clear. Saline locked in between antibiotics Ancef and Vancomycin. Up independent in room. Using home CPAP for sleep.  ID, ortho, hem/onc consulted. Redness appears to be receeding from outlines.      Treatment Plan: Continue IV antibiotics and continue to provide supportive cares.      Expected Discharge Date/Disposition: TBD

## 2021-08-13 NOTE — PROGRESS NOTES
Oncology/Hematology Follow Up Note:    Assessment and Plan:  #1 Fever, polyarthralgia, knee effusion, N/V, and skin lesions  - Elevated inflammatory markers  - Knee effusion has been drained.  C/w pseudogout, but this wouldn't explain her other symptoms  - Infectious vs. Inflammatory.  Very unlikely to be related to CLL  - Ordered IVETT, RF, ANCA.  Pending     PLAN:  - Appreciate ID recommendations  - Agree with stopping antibiotics since blood cultures are negative and low suspicion for a bacterial infection  - Follow up on IVETT, ANCA, and RF  - If workup remains unremarkable, and she has recurrent fevers, would consider biopsying one of the skin lesions and getting a CT of the chest abdomen and pelvis     #2 CLL  - Developed fatigue and night sweats in the spring.  WBC was only around 20 at the time.  Plt was also decreased.  Gave the patient a short course of Rituxan weekly x 4 instead of a longer course of treatment for CLL, with significant improvement of her symptoms  - Hgb and Plt WNL on admission.  WBC only elevated to 13 on admission.  Hgb and Plt have decrease over the past 2 days likely due to hemodilution and antibiotics  - No palpable adenopathy.  Spleen size appears stable per examination     PLAN:  - Again, do not think her current symptoms are CLL related at all, and the patient agrees  - Recent Rituxan does make her immunocompromised so infectious workup may need to be expanded  - Wonder if she has had an autoimmune condition all along, and the Rituxan we gave her in May may have helped with that.     Vishnu Blackwell M.D.  [facility]  [phone]      Subjective:    Feeling better today.  Afebrile for the past 24 hours.    Scheduled Medications:  Reviewed active medications    Labs:  CBC RESULTS: Recent Labs   Lab Test 08/13/21  0547 08/12/21  0606 08/10/21  2008   WBC 4.7 6.9 13.0*   HGB 9.3* 9.3* 12.0   HCT 29.1* 29.5* 36.4   MCV 94 96 93   * 121* 157       CMP  Recent Labs   Lab  "08/13/21  0547 08/12/21  1218 08/12/21  0606 08/11/21  1133 08/10/21  2011 08/10/21  2008     --  141  --   --  137   POTASSIUM 3.4 3.5 3.3*  --   --  3.5   CHLORIDE 111*  --  110*  --   --  106   CO2 25  --  27  --   --  24   ANIONGAP 5  --  4  --   --  7   *  --  98  --  106* 116*   BUN 10  --  8  --   --  12   CR 0.60  --  0.67 0.65  --  0.77   GFRESTIMATED >90  --  >90 >90  --  84   BERYL 8.3*  --  8.1*  --   --  9.2   PROTTOTAL  --   --   --   --   --  6.4*   ALBUMIN  --   --   --   --   --  2.7*   BILITOTAL  --   --   --   --   --  0.5   ALKPHOS  --   --   --   --   --  92   AST  --   --   --   --   --  19   ALT  --   --   --   --   --  26       INRNo lab results found in last 7 days.    Objective/Physical Exam:  Blood pressure 105/57, pulse 73, temperature 97.9  F (36.6  C), temperature source Oral, resp. rate 16, height 1.651 m (5' 5\"), weight 74.1 kg (163 lb 4.8 oz), last menstrual period 05/01/2005, SpO2 100 %, not currently breastfeeding.  General appearance:alert, oriented, no acute distress  HEENT:sclera anicteric  Skin: Erythematous patches on bilateral upper and lower extremities, improving  Lymph:  No palpable adenopathy  Abd: Spleen tip barely palpable.  Ext: bilateral leg swelling 1+    Vishnu Blackwell MD  [facility]  8/13/2021 12:42 PM        "

## 2021-08-13 NOTE — DISCHARGE SUMMARY
[facility]  Hospitalist Discharge Summary      Date of Admission:  8/10/2021  Date of Discharge:  8/13/2021  Discharging Provider: Chance Manzo MD      Discharge Diagnoses   Fever, polyarthralgia, skin lesions. Possible underlying Rheumatologic disease. Pseudogout. 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  No follow up labs or test are needed. Follow-up with   Dr. Blackewll as directed. Follow-up with Rheumatology for further work-up. An   appointment has been scheduled for [facility] on Tuesday, September 21st at 8:30AM with Dr. Abarca. The   address for their office is [address] [place] [address] [id number], [place] [address].             Unresulted Labs Ordered in the Past 30 Days of this Admission     Date and Time Order Name Status Description    8/13/2021  9:31 AM Vancomycin level In process     8/12/2021 12:39 PM ANCA IgG by IFA with Reflex to Titer In process     8/12/2021 12:39 PM Anti Nuclear Nga IgG by IFA with Reflex In process     8/10/2021 10:51 PM Blood Culture Hand, Right Preliminary     8/10/2021 10:51 PM Blood Culture Line, venous Preliminary       These results will be followed up by PCP/Rheum    Discharge Disposition   Discharged to home  Condition at discharge: Stable      Hospital Course   Janelle White is a 60 year old female who presents with fever, sweating, chills, multiple areas of skin redness, polyarthralgia.     This is a 60-year-old lady with a past medical history significant for CLL for which she received rituximab a few months ago through [place] [facility] (Dr. Vishnu Blackwell), osteoarthritis history of multiple surgeries, chronic pain syndrome, ESBL UTI and bacteremia in 2011, rotator cuff surgery of the left shoulder in June, lumbar spine surgery, right  shoulder surgery with hardware in place, C-spine surgery.  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     Her symptoms started last Thursday in form of feeling unwell and vomiting, multiple episodes.  She was visiting her mom in [place] at that time.  This weekend, on Saturday she started feeling worse.  Her symptoms included subjective fever, sweating, chills.  She also started noticing patches of skin redness all over her body.  She also started experiencing joint pain and swelling affecting multiple joints, starting with right knee joint.  Currently she has pain in her right knee, bilateral wrists, lower back pain.  All of the pain started this weekend and progressively worsening.  She has noticed that her skin redness is appearing on multiple sites.  The lower back pain started about a day ago.  Localizing in the lower back and she thinks it is due to her laying in the bed.  Not radiating to either of the legs.  No history of bowel or bladder incontinence.  No history of weakness of lower extremities.     She has a history of lumbar spine surgery a few years ago.  For the last 1 day or so she has noticed some pain in the area.  On exam she did have redness in the lower back.  She also has a history of right shoulder surgery with hardware in place, many years ago but denies any new pain in the right shoulder area.  Recent left shoulder surgery but no hardware in place.  No new pain in the area.  Has a history of C-spine surgery but denies any new pain in the area.  No surgery of the right knee but history of carpal tunnel surgery both sides.     In the ER, patient appears to be septic.  Appears flushed and sweaty.  Low-grade fever.  Tachycardia.  Leukocytosis.  2 sets of blood cultures drawn.  Being given IV ceftriaxone followed by IV vancomycin.  Being admitted to internal medicine service.    Patient was admitted to the hospital.  He was seen by Dr. Blackwell of oncology who did not feel like  this was related to her CLL.  Patient continued to have fevers.  She was placed on Ancef and vancomycin.  Dr. Diaz was consulted.  She was seen by orthopedic surgery who did an arthrocentesis on her knee.  The results of this returned positive for pseudogout.  She was started on colchicine.  There was some concern on her low back pain where her surgery with Dr. Ignacio had been done.  MRI did not show any concerns.  She was seen by neurosurgery and cleared.  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  Dr. Blackwell even feels that her CLL might have actually been an underlying rheumatologic condition.  At this point she has now been afebrile for more than 24 hours.  Cultures are still negative.  I spoke with Dr. Blackwell and Dr. Diaz.  We will discharge her home.  She will follow up with rheumatology.  An appointment has been made for her for September.  She will continue on the colchicine.  Today patient's feels well.  Almost all her swelling of her joints is gone.  Her erythema is gone.  She has no new complaints.  She will discharge home.  Consultations This Hospital Stay   PHARMACY TO DOSE VANCO  PHARMACY TO DOSE VANCO  INFECTIOUS DISEASES IP CONSULT  ORTHOPEDIC SURGERY IP CONSULT  HEMATOLOGY & ONCOLOGY IP CONSULT  NEUROSURGERY IP CONSULT    Code Status   Full Code    Time Spent on this Encounter   I, Chance Manzo MD, personally saw the patient today and spent greater than 30 minutes discharging this patient.       Chance Manzo MD  [facility] OBSERVATION DEPT  [address] [place] [address]  Phone: [phone]  ______________________________________________________________________    Physical Exam   Vital Signs: Temp: 97.9  F (36.6  C) Temp src: Oral BP: 105/57 Pulse: 73   Resp: 16 SpO2: 100 % O2 Device: None (Room air)    Weight: 163 lbs 4.8 oz  Constitutional: awake, alert, cooperative, no apparent distress, and appears stated age  Eyes: Lids and lashes normal,  pupils equal, round and reactive to light, extra ocular muscles intact, sclera clear, conjunctiva normal  ENT: Normocephalic, without obvious abnormality, atraumatic, sinuses nontender on palpation, external ears without lesions, oral pharynx with moist mucous membranes, tonsils without erythema or exudates, gums normal and good dentition.  Respiratory: No increased work of breathing, good air exchange, clear to auscultation bilaterally, no crackles or wheezing  Cardiovascular: Normal apical impulse, regular rate and rhythm, normal S1 and S2, no S3 or S4, and no murmur noted  GI: No scars, normal bowel sounds, soft, non-distended, non-tender, no masses palpated, no hepatosplenomegally  Skin: no bruising or bleeding  Musculoskeletal: no edema. No erythema over ankles or wrists. Swelling essentially resolved.        Primary Care Physician   Hakeem Concepcion    Discharge Orders      Reason for your hospital stay    Fevers, arthralgias, skin lesions. Pseudogout of right knee. Possible underlying Rheumatological disease (possibly Sweet Syndrome)     Follow-up and recommended labs and tests     Follow up with primary care provider, Hakeem Concepcion, within 7 days for hospital follow- up.  No follow up labs or test are needed. Follow-up with Dr. Blackwell as directed. Follow-up with Rheumatology for further work-up. An appointment has been scheduled for you [facility] on Tuesday, September 21st at 8:30AM with Dr. Abarca. The address for their office is [address] [place] [address] [id number] [address].     Activity    Your activity upon discharge: activity as tolerated     Diet    Follow this diet upon discharge: Orders Placed This Encounter      Combination Diet Regular Diet Adult       Significant Results and Procedures   Most Recent 3 CBC's:Recent Labs   Lab Test 08/13/21  0547 08/12/21  0606 08/10/21  2008   WBC 4.7 6.9 13.0*   HGB 9.3* 9.3* 12.0   MCV 94 96 93   * 121* 157     Most Recent 3 BMP's:Recent  Labs   Lab Test 08/13/21  0547 08/12/21  1218 08/12/21  0606 08/11/21  1133 08/10/21  2011 08/10/21  2008     --  141  --   --  137   POTASSIUM 3.4 3.5 3.3*  --   --  3.5   CHLORIDE 111*  --  110*  --   --  106   CO2 25  --  27  --   --  24   BUN 10  --  8  --   --  12   CR 0.60  --  0.67 0.65  --  0.77   ANIONGAP 5  --  4  --   --  7   BERYL 8.3*  --  8.1*  --   --  9.2   *  --  98  --  106* 116*     Most Recent 2 LFT's:Recent Labs   Lab Test 08/10/21  2008 03/16/21  1002   AST 19 44   ALT 26 26   ALKPHOS 92 87   BILITOTAL 0.5 0.4     Most Recent Urinalysis:Recent Labs   Lab Test 08/10/21  2222   COLOR Yellow   APPEARANCE Slightly Cloudy*   URINEGLC Negative   URINEBILI Negative   URINEKETONE Trace*   SG 1.026   UBLD Negative   URINEPH 7.0   PROTEIN 70 *   NITRITE Negative   LEUKEST Negative   RBCU 7*   WBCU 3   ,   Results for orders placed or performed during the hospital encounter of 08/10/21   MR Lumbar Spine w/o & w Contrast    Narrative    MR LUMBAR SPINE WITH AND WITHOUT CONTRAST August 11, 2021 7:12 AM     HISTORY: Low back pain, infection suspected. Prior L2-S1 fusion with  posterior instrumentation.    TECHNIQUE: Multiplanar multisequence images were obtained through the  lumbar spine without and with contrast. 17 mL of Gadavist given.    COMPARISON: 10/14/2010.    FINDINGS: Since the prior exam, there has been anterior and posterior  fusion from L2 through S1. Posterior alignment is normal. The  posterior instrumentation rods and and transpedicular screws are  causing significant metallic artifact. Vertebral body heights are  maintained. Bone marrow signal intensity is within normal limits.  Postcontrast images do not show any abnormal intradural enhancement or  enhancement along the cauda equina nerve roots. There is also a  metallic device across the right sacroiliac joint which is probably a  fusion device. Paraspinal soft tissues and visualized bony pelvis  otherwise normal.    T12-L1:  Normal.    L1-L2: Normal discs. Mild to moderate facet hypertrophy. No  significant stenosis.    L2-L3: Prior anterior and posterior fusion and posterior  decompression. There is some mild left-sided foraminal stenosis but no  central canal or right-sided foraminal stenosis.    L3-L4: Prior anterior and posterior fusion with posterior  instrumentation. There is no significant stenosis.    L4-L5: Prior anterior and posterior fusion with posterior  instrumentation. There is no significant stenosis.    L5-S1: Prior anterior and posterior fusion. There is no evidence for  any stenosis.      Impression    IMPRESSION:  1. Interval L2-S1 anterior and posterior fusion with posterior  instrumentation. There also appears be fusion across the right  sacroiliac joint. There is no evidence for any significant stenosis.  2. No evidence for any infectious spondylitis or epidural abscess.    MG SERRANO MD         SYSTEM ID:  [id number]   XR Knee Right 3 Views    Narrative    KNEE RIGHT THREE VIEWS August 12, 2021 8:21 AM     HISTORY: Right knee pain and suspicion of effusion.    COMPARISON: None.      Impression    IMPRESSION: Joint spaces appear maintained. No evidence of acute  fracture. Small joint effusion.    GALA SAMUEL MD         SYSTEM ID:  [id number]       Discharge Medications   Current Discharge Medication List      START taking these medications    Details   colchicine (COLCYRS) 0.6 MG tablet Take 1 tablet (0.6 mg) by mouth daily  Qty: 20 tablet, Refills: 0    Associated Diagnoses: Pseudogout         CONTINUE these medications which have NOT CHANGED    Details   albuterol (PROAIR HFA/PROVENTIL HFA/VENTOLIN HFA) 108 (90 Base) MCG/ACT inhaler Inhale 2 puffs into the lungs every 6 hours  Qty: 18 g, Refills: 0    Comments: Pharmacy may dispense brand covered by insurance (Proair, or proventil or ventolin or generic albuterol inhaler)  Associated Diagnoses: Cough      amphetamine-dextroamphetamine (ADDERALL XR) 20 MG 24 hr  capsule Take 1 capsule (20 mg) by mouth daily  Qty: 30 capsule, Refills: 0    Comments: Ok to fill early on 7/30/21 to get her on same schedule as immediate release. Thanks!  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      Calcium-Magnesium-Vitamin D (CALCIUM 500) 500-250-200 MG-MG-UNIT TABS Take 1 tablet by mouth       Cholecalciferol (D-5000) 5000 units TABS Take 5,000 Units by mouth      cyanocobalamin (CYANOCOBALAMIN) 1000 MCG/ML injection Inject 1 mL (1,000 mcg) into the muscle every 30 days  Qty: 10 mL, Refills: 1    Associated Diagnoses: B-complex deficiency      cyclobenzaprine (FLEXERIL) 10 MG tablet Take 1 tablet (10 mg) by mouth 3 times daily as needed for muscle spasms  Qty: 90 tablet, Refills: 3    Associated Diagnoses: Myofascial pain      desvenlafaxine (PRISTIQ) 100 MG 24 hr tablet Take 1 tablet (100 mg) by mouth daily  Qty: 90 tablet, Refills: 0    Comments: Dose increase  Associated Diagnoses: Severe episode of recurrent major depressive disorder, without psychotic features (H)      Estradiol (DIVIGEL) 1 MG/GM GEL Place 1 packet onto the skin daily  Qty: 30 g, Refills: 11    Associated Diagnoses: Postmenopausal status      estradiol (ESTRACE VAGINAL) 0.1 MG/GM vaginal cream Place 2 g vaginally three times a week.  Qty: 42.5 g, Refills: 11    Associated Diagnoses: Vaginal atrophy      HYDROcodone-acetaminophen (NORCO)  " MG per tablet take 1 tabs q 4hrs, max 4 tabs/24 hrs  Qty: 60 tablet, Refills: 0    Associated Diagnoses: Chronic pain syndrome      lidocaine (LIDODERM) 5 % patch Place 4 patches onto the skin daily Apply up to 4 patches to skin. Wear for 12 hours and remove for 12 hrs.  Refill when patient requests.  Qty: 120 patch, Refills: 3    Associated Diagnoses: Cervicalgia      LORazepam (ATIVAN) 0.5 MG tablet 1-2 tabs qhs prn insomnia and 1 q 8 hours prn anxiety  Qty: 100 tablet, Refills: 5    Associated Diagnoses: Insomnia, unspecified type      medical cannabis (Patient's own supply.  Not a prescription) Medical Cannabis - Tangerine 4-6 ml by mouth daily. [facility]      methylfolate (DEPLIN) 7.5 MG TABS tablet Take 1 tablet (7.5 mg) by mouth daily  Qty: 30 tablet, Refills: 1    Associated Diagnoses: Severe episode of recurrent major depressive disorder, without psychotic features (H)      morphine (MS CONTIN) 15 MG CR tablet Take 1 tablet (15 mg) by mouth every 12 hours maximum 2 tablet(s) per day  Qty: 60 tablet, Refills: 0    Associated Diagnoses: Chronic pain syndrome      Multiple Vitamin (MULTI-VITAMINS) TABS Take 1 tablet by mouth       naloxone (NARCAN) 4 MG/0.1ML nasal spray Spray 1 spray (4 mg) into one nostril alternating nostrils as needed for opioid reversal every 2-3 minutes until assistance arrives  Qty: 0.2 mL, Refills: 1    Associated Diagnoses: Nontraumatic tear of left rotator cuff, unspecified tear extent; Chronic pain syndrome      Prasterone 6.5 MG INST Place 1 suppository vaginally At Bedtime  Qty: 28 each, Refills: 11    Associated Diagnoses: Atrophic vaginitis      senna-docusate (SENOKOT-S/PERICOLACE) 8.6-50 MG tablet Take 4-6 tablets by mouth daily as needed for constipation      insulin syringe-needle U-100 (30G X 1/2\" 1 ML) 30G X 1/2\" 1 ML miscellaneous Inject 1 ml B12 qmonth  Qty: 10 each, Refills: 1    Associated Diagnoses: B-complex deficiency           Allergies   Allergies " "  Allergen Reactions     Bupropion Other (See Comments) and Swelling     Ineffective, name brand works best  Ineffective, name brand works best     Codeine Other (See Comments)     \"Feels like electricity running through body\"  No reaction noted in [employer].  Shaky  With Tylenol     Effexor [Venlafaxine Hydrochloride]      Affect too flat     Escitalopram      Other reaction(s): *Unknown  Sexual dysfunction     Fluoxetine Other (See Comments)     Sexual dysfunction     Levaquin [Levofloxacin] Other (See Comments)     Suicidal thoughts  Dark thoughts- mood changes     Lexapro      Sexual dysfunction     Methylphenidate Hives     Milnacipran      12.5 MG is fine. 25 MG caused side effects. \"Dark thoughts\"     Pregabalin Other (See Comments)     Hallucinations  Audiovisual hallucinations     Prozac [Fluoxetine Hcl]      Sexual dysfunction     Tramadol Hives     Hives       Venlafaxine      Other reaction(s): *Unknown  Affect too flat     "

## 2021-08-13 NOTE — PROGRESS NOTES
Orthopedic Surgery  Janelle TORRES Christopher  2021  Admit Date:  8/10/2021  Right knee pain: Aspiration + Pseudogout   Polyarthralgia   Low back pain, history of lumbar surgery by Dr. Ignacio    Patient resting comfortably in bed side chair  Pleasantly alert and following commands   Patient states her right knee and lumbar pain have improved significantly from yesterday.   afebrile  No events overnight       Vital Sign Ranges  Temperature Temp  Av.4  F (36.9  C)  Min: 97.9  F (36.6  C)  Max: 98.9  F (37.2  C)   Blood pressure Systolic (24hrs), Av , Min:102 , Max:117        Diastolic (24hrs), Av, Min:57, Max:67      Pulse Pulse  Av.4  Min: 57  Max: 85   Respirations Resp  Av.6  Min: 16  Max: 20   Pulse oximetry SpO2  Av.2 %  Min: 97 %  Max: 100 %       Right knee:   Mild effusion   AROM: 0-100 degrees with pain at end range    Erythema on right lower extremity withdrawing from borders  Erythema on left lower extremity and left dorsal wrist nearly completely resolved.     Lumbar spine:  Negative for erythema along well healed incision  No midline TTP  SILT bilateral upper and lower extremities   5/5 df/pf/ehl   + DP pulse     Labs:  Recent Labs   Lab Test 21  0547 21  1218 21   POTASSIUM 3.4 3.5 3.3*     Recent Labs   Lab Test 21  0547 21  0606 08/10/21  2008   HGB 9.3* 9.3* 12.0     No results for input(s): INR in the last 12068 hours.  Recent Labs   Lab Test 21  0547 21  0606 08/10/21  2008   * 121* 157          CRP:  225 --> 126  Right knee aspiration: + for pyrophosphate crystals  Cell count [id number]  Gram stain: pending   Cultures: pending      1. Plan:         Continue Colchicine for treatment of pseudogout.         Will hold on cortisone injection while awaiting cultures         Mobilize with PT/OT          WBAT Right LE.   knee immobilizer for transfers only.  Ok to have off in bed.             Continue current pain  regiment.     2. Disposition              Per medicine team.          Marquise Alvarez PA-C

## 2021-08-13 NOTE — DISCHARGE INSTRUCTIONS
An appointment has been scheduled for you [facility] on Tuesday, September 21st at 8:30AM with Dr. Abarca. The address for their office is [address] [place] [address] [facility] [id number], [place] [address].

## 2021-08-13 NOTE — PROGRESS NOTES
[facility]  Infectious Disease Progress Note          Assessment and Plan:      IMPRESSION:     1.  A 60-year-old female, underlying chronic lymphocytic leukemia, 1 week illness with fever, arthralgias and possible inflammatory joint process, numerous skin lesions.  Large differential diagnosis for this clinical syndrome.  The diagnosis is not clear.  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  Other possibilities here include atypical erythema nodosum, acute Lyme disease (no obvious exposure and tick-borne disease endemic areas and atypical labs), or bacteremia with multifocal infection, but this is certainly atypical in appearance.  2.  Prior history of extended spectrum beta-lactamase infections and urinary tract infections, but has been without this for several years.  3.  Chronic lymphocytic leukemia, active disease, being treated including Rituxan dose early this year.  Thus, immunosuppressed host and enlarges the differential diagnosis of the main problem.     RECOMMENDATIONS:     1. Can DC antibiotics blood cultures are still negative as of 8/13, discontinue antibiotics and proceed with further workup as directed, unlikely this is bacterial.    2.  neg Lyme serology and procalcitonin low.    3.   oncology noted.  Potentially a biopsy of one of these as early as tomorrow is logical, if continues to have fever and is not improving, and diagnosis is unclear.  Likely this is an inflammatory dermatsis, neutrophilic or E nodosum variant with steroids as tx  But this requires biopsy and is dx of exclusion  4 Knee is pseudogout, tx but does not explain overall condition  Discussed with Dr Manzo, Rheum appt and disposition OK with me  Skin areas better, if pesrsidt derm and bx also likely need  as outpt           Interval History:   no new complaints improved overall  "resolving  Fever,+ knee crystals, cx neg lyme neg no new focal sxs              Medications:       amphetamine-dextroamphetamine  20 mg Oral Daily     ceFAZolin  1 g Intravenous Q8H     colchicine  0.6 mg Oral Daily     desvenlafaxine  100 mg Oral Daily     Lidocaine  1-4 patch Transdermal Daily     lidocaine   Transdermal Q8H     LORazepam  1 mg Oral At Bedtime     methylfolate  7.5 mg Oral Daily     morphine  15 mg Oral Q12H     sodium chloride (PF)  3 mL Intracatheter Q8H     vancomycin  2,000 mg Intravenous Q24H                  Physical Exam:   Blood pressure 105/57, pulse 73, temperature 97.9  F (36.6  C), temperature source Oral, resp. rate 16, height 1.651 m (5' 5\"), weight 74.1 kg (163 lb 4.8 oz), last menstrual period 05/01/2005, SpO2 100 %, not currently breastfeeding.  Wt Readings from Last 2 Encounters:   08/11/21 74.1 kg (163 lb 4.8 oz)   07/28/21 67.9 kg (149 lb 12.8 oz)     Vital Signs with Ranges  Temp:  [97.9  F (36.6  C)-98.5  F (36.9  C)] 97.9  F (36.6  C)  Pulse:  [57-73] 73  Resp:  [16-18] 16  BP: (104-117)/(57-67) 105/57  SpO2:  [97 %-100 %] 100 %    Constitutional: Awake, alert, cooperative, no apparent distress   Lungs: Clear to auscultation bilaterally, no crackles or wheezing   Cardiovascular: Regular rate and rhythm, normal S1 and S2, and no murmur noted   Abdomen: Normal bowel sounds, soft, non-distended, non-tender   Skin: same rashes, not new areas, no cyanosis, sl edema   Other:           Data:   All microbiology laboratory data reviewed.  Recent Labs   Lab Test 08/13/21  0547 08/12/21  0606 08/10/21  2008   WBC 4.7 6.9 13.0*   HGB 9.3* 9.3* 12.0   HCT 29.1* 29.5* 36.4   MCV 94 96 93   * 121* 157     Recent Labs   Lab Test 08/13/21  0547 08/12/21  0606 08/11/21  1133   CR 0.60 0.67 0.65     Recent Labs   Lab Test 08/10/21  2008   SED 50*     No lab results found.    Invalid input(s): MAGGIE    "

## 2021-08-13 NOTE — PLAN OF CARE
Patient's After Visit Summary was reviewed with patient.   Patient verbalized understanding of After Visit Summary, recommended follow up and was given an opportunity to ask questions.   Discharge medications sent home with patient/family: YES- filled at home pharmacy  Discharged with daughter

## 2021-08-14 DIAGNOSIS — Z71.89 OTHER SPECIFIED COUNSELING: ICD-10-CM

## 2021-08-15 ENCOUNTER — PATIENT OUTREACH (OUTPATIENT)
Dept: CARE COORDINATION | Facility: CLINIC | Age: 61
End: 2021-08-15

## 2021-08-15 NOTE — PROGRESS NOTES
Clinic Care Coordination Contact  [facility]/Voicemail    Clinical Data: Care Coordinator Outreach  Reason for referral: TCM outreach  Outreach attempted x 1.  Left message on patient's voicemail with call back information and requested return call.  Plan:. Care Coordinator will try to reach patient again in 1-2 business days.    Greta Carter   Community Health Worker   [facility]

## 2021-08-16 ENCOUNTER — TELEPHONE (OUTPATIENT)
Dept: NEUROSURGERY | Facility: CLINIC | Age: 61
End: 2021-08-16

## 2021-08-16 LAB
BACTERIA BLD CULT: NO GROWTH
BACTERIA BLD CULT: NO GROWTH

## 2021-08-16 NOTE — TELEPHONE ENCOUNTER
Writer TORY asking for pt to call back and schedule consult    Please schedule a visit with Dr. Subramanian, in person or virtual, for the next available     Tara Gonzalez

## 2021-08-16 NOTE — PROGRESS NOTES
Clinic Care Coordination Contact  [facility]/Voicemail       Clinical Data: TCM Outreach  Outreach attempted x 2.  Left message on patient's voicemail with call back information and requested return call.  Plan: Care Coordinator will do no further outreaches at this time.

## 2021-08-16 NOTE — TELEPHONE ENCOUNTER
FUTURE VISIT INFORMATION      FUTURE VISIT INFORMATION:    Date: 8/17/2021    Time: 230pm    Location: [facility]  REFERRAL INFORMATION:    Referring provider:      Referring providers clinic:      Reason for visit/diagnosis  Spinal Cord Stimulator     RECORDS REQUESTED FROM:       Clinic name Comments Records Status Imaging Status   Internal MR Lumbar Spine-8/11/2021 Epic PACS

## 2021-08-17 ENCOUNTER — PRE VISIT (OUTPATIENT)
Dept: NEUROSURGERY | Facility: CLINIC | Age: 61
End: 2021-08-17

## 2021-08-17 ENCOUNTER — VIRTUAL VISIT (OUTPATIENT)
Dept: NEUROSURGERY | Facility: CLINIC | Age: 61
End: 2021-08-17
Payer: COMMERCIAL

## 2021-08-17 DIAGNOSIS — G89.29 CHRONIC BILATERAL LOW BACK PAIN WITH RIGHT-SIDED SCIATICA: ICD-10-CM

## 2021-08-17 DIAGNOSIS — G89.4 CHRONIC PAIN SYNDROME: Primary | ICD-10-CM

## 2021-08-17 DIAGNOSIS — M54.41 CHRONIC BILATERAL LOW BACK PAIN WITH RIGHT-SIDED SCIATICA: ICD-10-CM

## 2021-08-17 PROCEDURE — 99204 OFFICE O/P NEW MOD 45 MIN: CPT | Mod: 95 | Performed by: NEUROLOGICAL SURGERY

## 2021-08-17 NOTE — PROGRESS NOTES
"Janelle is a 60 year old who is being evaluated via a billable video visit.      How would you like to obtain your AVS? MyChart  If the video visit is dropped, the invitation should be resent by: Send to e-mail at: [email or web address]  Will anyone else be joining your video visit? No      Video Start Time: 2:18 PM  Video-Visit Details    Type of service:  Video Visit    Video End Time:3:06 PM    Originating Location (pt. Location): Home    Distant Location (provider location):  [facility] [place]     Platform used for Video Visit: [facility]      Neurosurgery [facility] Note    Reason for Visit: back and neck pain    History of Present Illness  Janelle White is a 60 year old woman with a long history of back and neck pain. She has had three lumbar surgeries, including anterior posterior fusion at L5-S1, extension to L2-S1 and SI fusion as well as removal of hardware at L2. She has suffered back pain and right leg weakness and shooting pain, which resulted in difficulty standing. She continues to suffer lower back pain and neuropathy down her Right leg.    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 As a result, she has a suffered a few falls including one with a head trauma and torn shoulder last fall. In addition, she suffers ongoing neck pain. Her neck pain often travels to her head, resulting in a headache with secondary brain fogginess. Her neck often feels tilted where the \"angle of everything is off.\"      Did a trial a decade ago [place] spine      Usually she is doing well in the morning and gets outside. She tries to do some tasks. She often needs to lay down. Sometimes she performs pilates type movements to help with her neck and upper back pain. At around that time, she needs to lay down for a while after a walk. The changes in her neck, motions that do not feel good. If she tries to do anything when she gets home, then " she won't be able to do anything else for the rest of the day. So she breaks it up for the day. She gets up and moves around, working on raised garden beds. That's about as much as she is able to do.    She can generally tolerate 15-20 minutes standing > sitting, which is limited by neck strain mostly. If she stands too long then it feels like her femur is coming through her rectum. Right as COVID hit, she was starting rehab with her  Cinda for ROM of face, jaw, and cervical spine. She thought she was doing really well.    She tries to pace herself and acknowledges the pain and deals with it before it. Her pain has led her to postpone her TMS    At this time, she thinks she will benefit significantly from more rehabilitation and is not interested in procedural intervention. She did have an SCS trial about 10 years ago.    Past Medical History:   Diagnosis Date     Anxiety      Cervicalgia     C5-6 disc protrusion     Depressive disorder      ESBL (extended spectrum beta-lactamase) producing bacteria infection      History of blood transfusion     Cervical fusion     Leukemia (H)     CLA large beta     Melanoma (H)      Migraine      Other chronic pain      Rotator cuff tear     s/p injections     Sacroiliac inflammation (H)      Shift work sleep disorder 2013     Urinary calculi      Vitamin B12 deficiency anemia     started injections       Past Surgical History:   Procedure Laterality Date     anterior cervical discectomy C4-5 ,Fusion C5-6-7  10/2007     APPENDECTOMY       BACK SURGERY       BLEPHAROPLASTY BILATERAL  2013    Procedure: BLEPHAROPLASTY BILATERAL;  BILATERAL UPPER LID BLEPHAROPLASTY AND BROWPEXY ;  Surgeon: Godfrey Miguel MD;  Location: [facility]     C APPENDECTOMY  2006      SECTION      x2     COLONOSCOPY N/A 2020    Procedure: COLONOSCOPY;  Surgeon: Butch Lockhart MD;  Location:  GI     ESOPHAGOSCOPY, GASTROSCOPY, DUODENOSCOPY (EGD), COMBINED  N/A 2020    Procedure: ESOPHAGOGASTRODUODENOSCOPY, WITH BIOPSY biosies by cold forceps;  Surgeon: Butch Lockhart MD;  Location:  GI     EYE SURGERY       FUSION LUMBAR ANTERIOR, FUSION LUMBAR POSTERIOR TWO LEVELS, COMBINED  2010    L3-S1 anterior posterior fusion     HYSTERECTOMY  2006    ovaries intact     HYSTERECTOMY       HYSTERECTOMY, PAP NO LONGER INDICATED       Partial vulvectomy for NEENA III  2009     Pubovaginal sling, post op durasphere injections  2006    wears pad     ROTATOR CUFF REPAIR RT/LT  2011    right     SPINAL FUSION C3-4  2009    C3-4, anterior spinal fusion     TUBAL LIGATION         Family History   Problem Relation Age of Onset     Hypertension Mother      Alcohol/Drug Mother      Osteoporosis Mother         borderline     Hypertension Father      Diabetes Father         Diet controlled     Alcohol/Drug Father         remote use     C.A.D. Maternal Grandmother          late 50s     C.A.D. Maternal Grandfather         bone and brain cancer mets as well     Diabetes Paternal Grandfather          from diabetic complications     Alcohol/Drug Brother      Hypertension Brother      Breast Cancer No family hx of         Dcis     Cancer - colorectal No family hx of      Cerebrovascular Disease No family hx of      Colon Cancer No family hx of        Social History     Socioeconomic History     Marital status:      Spouse name: Milton     Number of children: 2     Years of education: 18     Highest education level: Bachelor's degree (e.g., BA, AB, BS)   Occupational History     Occupation: nurse practitioner     Employer: [employer]   Tobacco Use     Smoking status: Former Smoker     Packs/day: 1.00     Years: 5.00     Pack years: 5.00     Quit date: 1984     Years since quittin.6     Smokeless tobacco: Never Used   Substance and Sexual Activity     Alcohol use: Yes     Comment: no alcohol currently since prior to her back surgeries,  "2009/2010     Drug use: Not Currently     Types: Marijuana, Psilocybin, LSD, Cocaine     Sexual activity: Yes     Partners: Male     Birth control/protection: Post-menopausal, None   Other Topics Concern     Parent/sibling w/ CABG, MI or angioplasty before 65F 55M? Not Asked   Social History Narrative     Not on file        Allergies   Allergen Reactions     Bupropion Other (See Comments) and Swelling     Ineffective, name brand works best  Ineffective, name brand works best     Codeine Other (See Comments)     \"Feels like electricity running through body\"  No reaction noted in [employer].  Shaky  With Tylenol     Effexor [Venlafaxine Hydrochloride]      Affect too flat     Escitalopram      Other reaction(s): *Unknown  Sexual dysfunction     Fluoxetine Other (See Comments)     Sexual dysfunction     Levaquin [Levofloxacin] Other (See Comments)     Suicidal thoughts  Dark thoughts- mood changes     Lexapro      Sexual dysfunction     Methylphenidate Hives     Milnacipran      12.5 MG is fine. 25 MG caused side effects. \"Dark thoughts\"     Pregabalin Other (See Comments)     Hallucinations  Audiovisual hallucinations     Prozac [Fluoxetine Hcl]      Sexual dysfunction     Tramadol Hives     Hives       Venlafaxine      Other reaction(s): *Unknown  Affect too flat       Current Outpatient Medications   Medication     albuterol (PROAIR HFA/PROVENTIL HFA/VENTOLIN HFA) 108 (90 Base) MCG/ACT inhaler     amphetamine-dextroamphetamine (ADDERALL XR) 20 MG 24 hr capsule     amphetamine-dextroamphetamine (ADDERALL) 20 MG tablet     calcium citrate (CALCIUM CITRATE) 950 MG tablet     Calcium-Magnesium-Vitamin D (CALCIUM 500) 500-250-200 MG-MG-UNIT TABS     Cholecalciferol (D-5000) 5000 units TABS     colchicine (COLCYRS) 0.6 MG tablet     cyanocobalamin (CYANOCOBALAMIN) 1000 MCG/ML injection     cyclobenzaprine (FLEXERIL) 10 MG tablet     desvenlafaxine (PRISTIQ) 100 MG 24 hr tablet     Estradiol (DIVIGEL) 1 MG/GM GEL     estradiol " "(ESTRACE VAGINAL) 0.1 MG/GM vaginal cream     HYDROcodone-acetaminophen (NORCO)  MG per tablet     insulin syringe-needle U-100 (30G X 1/2\" 1 ML) 30G X 1/2\" 1 ML miscellaneous     lidocaine (LIDODERM) 5 % patch     LORazepam (ATIVAN) 0.5 MG tablet     medical cannabis (Patient's own supply.  Not a prescription)     methylfolate (DEPLIN) 7.5 MG TABS tablet     morphine (MS CONTIN) 15 MG CR tablet     Multiple Vitamin (MULTI-VITAMINS) TABS     naloxone (NARCAN) 4 MG/0.1ML nasal spray     Prasterone 6.5 MG INST     senna-docusate (SENOKOT-S/PERICOLACE) 8.6-50 MG tablet     No current facility-administered medications for this visit.       ROS: 10 point ROS neg other than the symptoms noted above in the HPI.        Imaging: my interpretations only  8/11/2021 MRI L: no significant stenosis, fusion from L2 to S1      Assessment and Plan   Janelle TORRES Jaimeyvette is a 60 year old female with MDD and failed-back surgery syndrome and cervicalgia. Unfortunately I do not have access to her cervical MRIs. At this time, she is not interested in pursuing an intervention for her pain. We spoke about her candidacy for SCS as well as the risks, benefits, and alternatives. She will follow up if she becomes interested in seeking further treatment.      Pipo Subramanian MD  Department of Neurosurgery  [facility]    "

## 2021-08-17 NOTE — LETTER
"8/17/2021       RE: Janelle White  [address] [address] [id number]     Dear Colleague,    Thank you for referring your patient, Janelle White, to the [facility] [place] [facility]. Please see a copy of my visit note below.    Neurosurgery Clinic Note    Reason for Visit: back and neck pain    History of Present Illness  Janelle White is a 60 year old woman with a long history of back and neck pain. She has had three lumbar surgeries, including anterior posterior fusion at L5-S1, extension to L2-S1 and SI fusion as well as removal of hardware at L2. She has suffered back pain and right leg weakness and shooting pain, which resulted in difficulty standing. She continues to suffer lower back pain and neuropathy down her Right leg.    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 As a result, she has a suffered a few falls including one with a head trauma and torn shoulder last fall. In addition, she suffers ongoing neck pain. Her neck pain often travels to her head, resulting in a headache with secondary brain fogginess. Her neck often feels tilted where the \"angle of everything is off.\"      Did a trial a decade ago [place] [facility]      Usually she is doing well in the morning and gets outside. She tries to do some tasks. She often needs to lay down. Sometimes she performs pilates type movements to help with her neck and upper back pain. At around that time, she needs to lay down for a while after a walk. The changes in her neck, motions that do not feel good. If she tries to do anything when she gets home, then she won't be able to do anything else for the rest of the day. So she breaks it up for the day. She gets up and moves around, working on raised garden beds. That's about as much as she is able to do.    She can generally tolerate 15-20 minutes standing " > sitting, which is limited by neck strain mostly. If she stands too long then it feels like her femur is coming through her rectum. Right as COVID hit, she was starting rehab with her  Cinda for ROM of face, jaw, and cervical spine. She thought she was doing really well.    She tries to pace herself and acknowledges the pain and deals with it before it. Her pain has led her to postpone her TMS    At this time, she thinks she will benefit significantly from more rehabilitation and is not interested in procedural intervention. She did have an SCS trial about 10 years ago.    Past Medical History:   Diagnosis Date     Anxiety      Cervicalgia     C5-6 disc protrusion     Depressive disorder      ESBL (extended spectrum beta-lactamase) producing bacteria infection      History of blood transfusion     Cervical fusion     Leukemia (H)     CLA large beta     Melanoma (H)      Migraine      Other chronic pain      Rotator cuff tear     s/p injections     Sacroiliac inflammation (H)      Shift work sleep disorder 2013     Urinary calculi      Vitamin B12 deficiency anemia 2006    started injections       Past Surgical History:   Procedure Laterality Date     anterior cervical discectomy C4-5 ,Fusion C5-6-7  10/2007     APPENDECTOMY       BACK SURGERY       BLEPHAROPLASTY BILATERAL  2013    Procedure: BLEPHAROPLASTY BILATERAL;  BILATERAL UPPER LID BLEPHAROPLASTY AND BROWPEXY ;  Surgeon: Godfrey Miguel MD;  Location: [facility]     C APPENDECTOMY  2006      SECTION      x2     COLONOSCOPY N/A 2020    Procedure: COLONOSCOPY;  Surgeon: Butch Lockhart MD;  Location:  GI     ESOPHAGOSCOPY, GASTROSCOPY, DUODENOSCOPY (EGD), COMBINED N/A 2020    Procedure: ESOPHAGOGASTRODUODENOSCOPY, WITH BIOPSY biosies by cold forceps;  Surgeon: Butch Lockhart MD;  Location:  GI     EYE SURGERY       FUSION LUMBAR ANTERIOR, FUSION LUMBAR POSTERIOR TWO LEVELS, COMBINED  2010    L3-S1  anterior posterior fusion     HYSTERECTOMY  2006    ovaries intact     HYSTERECTOMY       HYSTERECTOMY, PAP NO LONGER INDICATED       Partial vulvectomy for NEENA III  2009     Pubovaginal sling, post op durasphere injections  2006    wears pad     ROTATOR CUFF REPAIR RT/LT  2011    right     SPINAL FUSION C3-4  2009    C3-4, anterior spinal fusion     TUBAL LIGATION         Family History   Problem Relation Age of Onset     Hypertension Mother      Alcohol/Drug Mother      Osteoporosis Mother         borderline     Hypertension Father      Diabetes Father         Diet controlled     Alcohol/Drug Father         remote use     C.A.D. Maternal Grandmother          late 50s     C.A.D. Maternal Grandfather         bone and brain cancer mets as well     Diabetes Paternal Grandfather          from diabetic complications     Alcohol/Drug Brother      Hypertension Brother      Breast Cancer No family hx of         Dcis     Cancer - colorectal No family hx of      Cerebrovascular Disease No family hx of      Colon Cancer No family hx of        Social History     Socioeconomic History     Marital status:      Spouse name: Milton     Number of children: 2     Years of education: 18     Highest education level: Bachelor's degree (e.g., BA, AB, BS)   Occupational History     Occupation: nurse practitioner     Employer: [employer]   Tobacco Use     Smoking status: Former Smoker     Packs/day: 1.00     Years: 5.00     Pack years: 5.00     Quit date: 1984     Years since quittin.6     Smokeless tobacco: Never Used   Substance and Sexual Activity     Alcohol use: Yes     Comment: no alcohol currently since prior to her back surgeries,      Drug use: Not Currently     Types: Marijuana, Psilocybin, LSD, Cocaine     Sexual activity: Yes     Partners: Male     Birth control/protection: Post-menopausal, None   Other Topics Concern     Parent/sibling w/ CABG, MI or angioplasty before  "65F 55M? Not Asked   Social History Narrative     Not on file        Allergies   Allergen Reactions     Bupropion Other (See Comments) and Swelling     Ineffective, name brand works best  Ineffective, name brand works best     Codeine Other (See Comments)     \"Feels like electricity running through body\"  No reaction noted in [employer].  Shaky  With Tylenol     Effexor [Venlafaxine Hydrochloride]      Affect too flat     Escitalopram      Other reaction(s): *Unknown  Sexual dysfunction     Fluoxetine Other (See Comments)     Sexual dysfunction     Levaquin [Levofloxacin] Other (See Comments)     Suicidal thoughts  Dark thoughts- mood changes     Lexapro      Sexual dysfunction     Methylphenidate Hives     Milnacipran      12.5 MG is fine. 25 MG caused side effects. \"Dark thoughts\"     Pregabalin Other (See Comments)     Hallucinations  Audiovisual hallucinations     Prozac [Fluoxetine Hcl]      Sexual dysfunction     Tramadol Hives     Hives       Venlafaxine      Other reaction(s): *Unknown  Affect too flat       Current Outpatient Medications   Medication     albuterol (PROAIR HFA/PROVENTIL HFA/VENTOLIN HFA) 108 (90 Base) MCG/ACT inhaler     amphetamine-dextroamphetamine (ADDERALL XR) 20 MG 24 hr capsule     amphetamine-dextroamphetamine (ADDERALL) 20 MG tablet     calcium citrate (CALCIUM CITRATE) 950 MG tablet     Calcium-Magnesium-Vitamin D (CALCIUM 500) 500-250-200 MG-MG-UNIT TABS     Cholecalciferol (D-5000) 5000 units TABS     colchicine (COLCYRS) 0.6 MG tablet     cyanocobalamin (CYANOCOBALAMIN) 1000 MCG/ML injection     cyclobenzaprine (FLEXERIL) 10 MG tablet     desvenlafaxine (PRISTIQ) 100 MG 24 hr tablet     Estradiol (DIVIGEL) 1 MG/GM GEL     estradiol (ESTRACE VAGINAL) 0.1 MG/GM vaginal cream     HYDROcodone-acetaminophen (NORCO)  MG per tablet     insulin syringe-needle U-100 (30G X 1/2\" 1 ML) 30G X 1/2\" 1 ML miscellaneous     lidocaine (LIDODERM) 5 % patch     LORazepam (ATIVAN) 0.5 MG tablet "     medical cannabis (Patient's own supply.  Not a prescription)     methylfolate (DEPLIN) 7.5 MG TABS tablet     morphine (MS CONTIN) 15 MG CR tablet     Multiple Vitamin (MULTI-VITAMINS) TABS     naloxone (NARCAN) 4 MG/0.1ML nasal spray     Prasterone 6.5 MG INST     senna-docusate (SENOKOT-S/PERICOLACE) 8.6-50 MG tablet     No current facility-administered medications for this visit.       ROS: 10 point ROS neg other than the symptoms noted above in the HPI.        Imaging: my interpretations only  8/11/2021 MRI L: no significant stenosis, fusion from L2 to S1      Assessment and Plan   Janelle White is a 60 year old female with MDD and failed-back surgery syndrome and cervicalgia. Unfortunately I do not have access to her cervical MRIs. At this time, she is not interested in pursuing an intervention for her pain. We spoke about her candidacy for SCS as well as the risks, benefits, and alternatives. She will follow up if she becomes interested in seeking further treatment.      Pipo Subramanian MD  Department of Neurosurgery  [facility]        Again, thank you for allowing me to participate in the care of your patient.      Sincerely,    Pipo Subramanian MD

## 2021-08-18 ENCOUNTER — OFFICE VISIT (OUTPATIENT)
Dept: FAMILY MEDICINE | Facility: CLINIC | Age: 61
End: 2021-08-18
Payer: COMMERCIAL

## 2021-08-18 VITALS
OXYGEN SATURATION: 100 % | HEART RATE: 74 BPM | DIASTOLIC BLOOD PRESSURE: 64 MMHG | TEMPERATURE: 98.3 F | SYSTOLIC BLOOD PRESSURE: 98 MMHG

## 2021-08-18 DIAGNOSIS — R50.9 FEVER, UNSPECIFIED FEVER CAUSE: ICD-10-CM

## 2021-08-18 DIAGNOSIS — C91.10 CLL (CHRONIC LYMPHOCYTIC LEUKEMIA) (H): ICD-10-CM

## 2021-08-18 DIAGNOSIS — L98.9 SKIN LESION: ICD-10-CM

## 2021-08-18 DIAGNOSIS — Z09 HOSPITAL DISCHARGE FOLLOW-UP: Primary | ICD-10-CM

## 2021-08-18 DIAGNOSIS — M11.20 PSEUDOGOUT: ICD-10-CM

## 2021-08-18 DIAGNOSIS — E55.9 VITAMIN D DEFICIENCY: ICD-10-CM

## 2021-08-18 DIAGNOSIS — E53.8 VITAMIN B12 DEFICIENCY (NON ANEMIC): ICD-10-CM

## 2021-08-18 DIAGNOSIS — M25.50 POLYARTHRALGIA: ICD-10-CM

## 2021-08-18 LAB
DEPRECATED CALCIDIOL+CALCIFEROL SERPL-MC: 54 UG/L (ref 20–75)
VIT B12 SERPL-MCNC: 822 PG/ML (ref 193–986)

## 2021-08-18 PROCEDURE — 82607 VITAMIN B-12: CPT | Performed by: PHYSICIAN ASSISTANT

## 2021-08-18 PROCEDURE — 82306 VITAMIN D 25 HYDROXY: CPT | Performed by: PHYSICIAN ASSISTANT

## 2021-08-18 PROCEDURE — 99213 OFFICE O/P EST LOW 20 MIN: CPT | Performed by: PHYSICIAN ASSISTANT

## 2021-08-18 PROCEDURE — 36415 COLL VENOUS BLD VENIPUNCTURE: CPT | Performed by: PHYSICIAN ASSISTANT

## 2021-08-18 NOTE — PROGRESS NOTES
Assessment & Plan     Hospital discharge follow-up  Fever, unspecified fever cause  Polyarthralgia  Skin lesion  Pseudogout  CLL (chronic lymphocytic leukemia) (H)  Feeling improved since discharge. Some mild ongoing knee instability but wearing sleeve which helps. She has follow up with rheumatology scheduled. Question was raised at recent admission whether has CLL vs other underlying rheumatologic condition. Wondering if skin lesions could be erythema nodosum. Janelle is planning to schedule a consultation at [facility] to review and synthesize her health issues.     Vitamin D deficiency  - Vitamin D Deficiency; Future  - Vitamin D Deficiency    Vitamin B12 deficiency (non anemic)  - Vitamin B12; Future  - Vitamin B12    Return for [facility] evaluation.    Hakeem Concepcion PA-C  [facility]    Giorgio Luis is a 60 year old who presents for the following health issues     HPI       Hospital Follow-up Visit:    Hospital/Nursing Home/ Rehab Facility: [facility]  Date of Admission: 8-10   Date of Discharge: 8-13   Reason(s) for Admission:       Polyarthralgia  Cellulitis, unspecified cellulitis site  Sepsis, due to unspecified organism, unspecified whether acute organ dysfunction present (H)  Cellulitis        Was your hospitalization related to COVID-19? No   Problems taking medications regularly:  None  Medication changes since discharge: None  Problems adhering to non-medication therapy:  None    Summary of hospitalization:  [facility] discharge summary reviewed  Diagnostic Tests/Treatments reviewed.  Follow up needed: Yes; follow up with rheumatology   Other Healthcare Providers Involved in Patient s Care:         Rheumatology, oncology, psychiatry  Update since discharge: improved.   Post Discharge Medication Reconciliation: discharge medications reconciled, continue medications without change.  Plan of care communicated with  patient        Hospital Course  Janelle White is a 60 year old female who presents with fever, sweating, chills, multiple areas of skin redness, polyarthralgia.     This is a 60-year-old lady with a past medical history significant for CLL for which she received rituximab a few months ago through [place] [facility] (Dr. Vishnu Blackwell), osteoarthritis history of multiple surgeries, chronic pain syndrome, ESBL UTI and bacteremia in 2011, rotator cuff surgery of the left shoulder in June, lumbar spine surgery, right shoulder surgery with hardware in place, C-spine surgery.  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     Her symptoms started last Thursday in form of feeling unwell and vomiting, multiple episodes.  She was visiting her mom in [place] at that time.  This weekend, on Saturday she started feeling worse.  Her symptoms included subjective fever, sweating, chills.  She also started noticing patches of skin redness all over her body.  She also started experiencing joint pain and swelling affecting multiple joints, starting with right knee joint.  Currently she has pain in her right knee, bilateral wrists, lower back pain.  All of the pain started this weekend and progressively worsening.  She has noticed that her skin redness is appearing on multiple sites.  The lower back pain started about a day ago.  Localizing in the lower back and she thinks it is due to her laying in the bed.  Not radiating to either of the legs.  No history of bowel or bladder incontinence.  No history of weakness of lower extremities.     She has a history of lumbar spine surgery a few years ago.  For the last 1 day or so she has noticed some pain in the area.  On exam she did have redness in the lower back.  She also has a history of right shoulder surgery with hardware in place, many years ago but denies any new pain in the right shoulder area.  Recent left shoulder surgery but no hardware in place.  No new  pain in the area.  Has a history of C-spine surgery but denies any new pain in the area.  No surgery of the right knee but history of carpal tunnel surgery both sides.     In the ER, patient appears to be septic.  Appears flushed and sweaty.  Low-grade fever.  Tachycardia.  Leukocytosis.  2 sets of blood cultures drawn.  Being given IV ceftriaxone followed by IV vancomycin.  Being admitted to internal medicine service.     Patient was admitted to the hospital.  He was seen by Dr. Blackwell of oncology who did not feel like this was related to her CLL.  Patient continued to have fevers.  She was placed on Ancef and vancomycin.  Dr. Diaz was consulted.  She was seen by orthopedic surgery who did an arthrocentesis on her knee.  The results of this returned positive for pseudogout.  She was started on colchicine.  There was some concern on her low back pain where her surgery with Dr. Ignacio had been done.  MRI did not show any concerns.  She was seen by neurosurgery and cleared.  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  Dr. Blackwell even feels that her CLL might have actually been an underlying rheumatologic condition.  At this point she has now been afebrile for more than 24 hours.  Cultures are still negative.  I spoke with Dr. Blackwell and Dr. Diaz.  We will discharge her home.  She will follow up with rheumatology.  An appointment has been made for her for September.  She will continue on the colchicine.  Today patient's feels well.  Almost all her swelling of her joints is gone.  Her erythema is gone.  She has no new complaints.  She will discharge home.    Review of Systems   Constitutional, HEENT, cardiovascular, pulmonary, gi and gu systems are negative, except as otherwise noted.      Objective    BP 98/64   Pulse 74   Temp 98.3  F (36.8  C)   LMP 05/01/2005 (LMP Unknown)   SpO2 100%   There is no height or weight on file to calculate BMI.  Physical Exam  Vitals and nursing note reviewed.    Constitutional:       Appearance: Normal appearance.   HENT:      Head: Normocephalic and atraumatic.   Cardiovascular:      Rate and Rhythm: Normal rate and regular rhythm.      Heart sounds: Normal heart sounds.   Pulmonary:      Breath sounds: Normal breath sounds.   Musculoskeletal:         General: Normal range of motion.   Skin:     General: Skin is warm and dry.   Neurological:      General: No focal deficit present.      Mental Status: She is alert.   Psychiatric:         Mood and Affect: Mood normal.         Behavior: Behavior normal.            Results for orders placed or performed in visit on 08/18/21   Vitamin B12     Status: Normal   Result Value Ref Range    Vitamin B12 822 193 - 986 pg/mL   Vitamin D Deficiency     Status: Normal   Result Value Ref Range    Vitamin D, Total (25-Hydroxy) 54 20 - 75 ug/L    Narrative    Season, race, dietary intake, and treatment affect the concentration of 25-hydroxy-Vitamin D. Values may decrease during winter months and increase during summer months. Values 20-29 ug/L may indicate Vitamin D insufficiency and values <20 ug/L may indicate Vitamin D deficiency.    Vitamin D determination is routinely performed by an immunoassay specific for 25 hydroxyvitamin D3.  If an individual is on vitamin D2(ergocalciferol) supplementation, please specify 25 OH vitamin D2 and D3 level determination by LCMSMS test [id number].

## 2021-08-20 ENCOUNTER — MYC MEDICAL ADVICE (OUTPATIENT)
Dept: FAMILY MEDICINE | Facility: CLINIC | Age: 61
End: 2021-08-20

## 2021-08-25 NOTE — TELEPHONE ENCOUNTER
Hakeem Concepcion PA-C  [facility] Grand Triage 1 hour ago (8:35 AM)   EW  We could do virtual 5;20 appointment on 8/30?   Let me know. Thanks!

## 2021-08-25 NOTE — TELEPHONE ENCOUNTER
Kd Palacio-no availability at 1720 on 8/30/21.  Okay to use same day hold?    Thank you!  SHANEL MooneyN, RN  [facility]

## 2021-08-25 NOTE — TELEPHONE ENCOUNTER
Hakeem Concepcion PA-C  Hp Grand Triage 31 minutes ago (4:57 PM)   EW  Yes -- OK to use hold I went ahead and put her down for virtual appt. Thanks!      Writer responded via [employer].    SHANEL MooneyN, RN  [facility]

## 2021-08-26 NOTE — TELEPHONE ENCOUNTER
Writer responded via [employer].    SHANEL MooneyN, RN  [facility]

## 2021-08-27 ENCOUNTER — TELEPHONE (OUTPATIENT)
Dept: FAMILY MEDICINE | Facility: CLINIC | Age: 61
End: 2021-08-27

## 2021-08-27 NOTE — TELEPHONE ENCOUNTER
"Patient is asking for the monoclonal antibodies.  States she was exposed to COVID on Monday  Had Test today that came back positive.  Had the Pfizer vaccine and the booster was given 8/18/21  Patient has Leukemia and was recently in the hospital for a week due to sepsis.    If not able to get the monoclonal antibodies is wondering what she should do.  States she has been taking all her meds and ibuprofen and Tylenol and still \"every cell in my body hurts\" States this is chronic but has been worse the past couple days.  Denies any SOB but has developed a dry cough the past few days and this morning it was hard to get out of bed.  Please advise.  Latrice Bar RN  [facility]  "

## 2021-08-27 NOTE — TELEPHONE ENCOUNTER
So sorry to hear this!   Please advise Janelle that she should be able to call the [facility] and arrange this herself.  Had a quick huddle with some colleagues and it sounds like this is best route.  If any issues please let me know!

## 2021-08-30 ENCOUNTER — VIRTUAL VISIT (OUTPATIENT)
Dept: FAMILY MEDICINE | Facility: CLINIC | Age: 61
End: 2021-08-30
Payer: COMMERCIAL

## 2021-08-30 DIAGNOSIS — U07.1 CLINICAL DIAGNOSIS OF COVID-19: Primary | ICD-10-CM

## 2021-08-30 DIAGNOSIS — G89.4 CHRONIC PAIN SYNDROME: ICD-10-CM

## 2021-08-30 PROCEDURE — 99213 OFFICE O/P EST LOW 20 MIN: CPT | Mod: 95 | Performed by: PHYSICIAN ASSISTANT

## 2021-08-30 RX ORDER — MORPHINE SULFATE 15 MG/1
15 TABLET, FILM COATED, EXTENDED RELEASE ORAL EVERY 12 HOURS
Qty: 60 TABLET | Refills: 0 | Status: SHIPPED | OUTPATIENT
Start: 2021-09-06 | End: 2021-09-15

## 2021-08-30 RX ORDER — HYDROCODONE BITARTRATE AND ACETAMINOPHEN 10; 325 MG/1; MG/1
TABLET ORAL
Qty: 60 TABLET | Refills: 0 | Status: SHIPPED | OUTPATIENT
Start: 2021-08-30 | End: 2021-09-02

## 2021-08-30 NOTE — PROGRESS NOTES
Janelle is a 60 year old who is being evaluated via a billable telephone visit.      What phone number would you like to be contacted at? [phone]  How would you like to obtain your AVS? [place]    Assessment & Plan     Clinical diagnosis of COVID-19  Exposed to COVID-19 via unvaccinated friend on 8/23/21. Developed symptoms shortly thereafter and had positive test 8/27/21. I placed order for Monoclonal antibody treatment on 8/27/21 and earlier today Janelle was able to receive this treatment. Pushing fluids and rest. Did receive two dose Pfizer vaccine 12/22/2020 and 1/12/21 along with recent booster 8/18/21. If new or worsening symptoms should return to care. If shortness of breath to the ED for further evaluation.     Chronic pain syndrome  Chronic, stable. Refills to pharmacy.   - HYDROcodone-acetaminophen (NORCO)  MG per tablet; take 1 tabs q 4hrs, max 4 tabs/24 hrs  - morphine (MS CONTIN) 15 MG CR tablet; Take 1 tablet (15 mg) by mouth every 12 hours maximum 2 tablet(s) per day    Return in 5 weeks (on 10/4/2021) for visit with Nancy Mustafa.    Hakeem Concepcion PA-C  [facility]    Subjective   Janelle is a 60 year old who presents for the following health issues     HPI   COVID follow up - medication refills    Exposed to COVID on 8/23/21 when visited with friend who is unvaccinated. They were outside so thought risk was low. However received call from friend the next day with news of positive test. Janelle then developed symptoms -- body aches, sweats, congestion, occasional cough -- and went in for test. Test came back positive 8/27/21.   I placed order through [facility] for monoclonal antibody treatment. She was able to have this arranged and received today. Hoping it helps with symptoms. Janelle did receive her COVID-19 booster vaccine on 8/18/21.     Janelle is planning to follow up with [facility] on 9/8/21 for comprehensive evaluation of recent health  problems    Needing refills today for Shock and MS contin. Needs the Norco today and MS contin will need on 9/6/21.       Review of Systems   Constitutional, HEENT, cardiovascular, pulmonary, gi and gu systems are negative, except as otherwise noted.      Objective           Vitals:  No vitals were obtained today due to virtual visit.    Physical Exam   healthy, alert and no distress  PSYCH: Alert and oriented times 3; coherent speech, normal   rate and volume, able to articulate logical thoughts, able   to abstract reason, no tangential thoughts, no hallucinations   or delusions  Her affect is normal and pleasant  RESP: No cough, no audible wheezing, able to talk in full sentences  Remainder of exam unable to be completed due to telephone visits        Phone call duration: 20 minutes

## 2021-09-02 DIAGNOSIS — G89.4 CHRONIC PAIN SYNDROME: ICD-10-CM

## 2021-09-02 RX ORDER — HYDROCODONE BITARTRATE AND ACETAMINOPHEN 10; 325 MG/1; MG/1
TABLET ORAL
Qty: 60 TABLET | Refills: 0 | Status: SHIPPED | OUTPATIENT
Start: 2021-09-27 | End: 2021-09-15

## 2021-09-07 ENCOUNTER — DOCUMENTATION ONLY (OUTPATIENT)
Dept: NEUROSURGERY | Facility: CLINIC | Age: 61
End: 2021-09-07

## 2021-09-07 NOTE — PROGRESS NOTES
"Called [facility] to request cervical spine MRI be pushed. They do not have C-spine MRI, but suggested that the [facility] may have it.     Called [facility] and spoke to medical records. They will push images and fax report if they have them. If they do not, they will call with a \"patient not found\" message.   "

## 2021-09-09 ENCOUNTER — MYC REFILL (OUTPATIENT)
Dept: PSYCHIATRY | Facility: CLINIC | Age: 61
End: 2021-09-09

## 2021-09-09 DIAGNOSIS — F33.9 RECURRENT MAJOR DEPRESSION RESISTANT TO TREATMENT (H): ICD-10-CM

## 2021-09-09 DIAGNOSIS — F33.2 SEVERE EPISODE OF RECURRENT MAJOR DEPRESSIVE DISORDER, WITHOUT PSYCHOTIC FEATURES (H): ICD-10-CM

## 2021-09-09 RX ORDER — DEXTROAMPHETAMINE SACCHARATE, AMPHETAMINE ASPARTATE MONOHYDRATE, DEXTROAMPHETAMINE SULFATE AND AMPHETAMINE SULFATE 5; 5; 5; 5 MG/1; MG/1; MG/1; MG/1
20 CAPSULE, EXTENDED RELEASE ORAL DAILY
Qty: 30 CAPSULE | Refills: 0 | Status: SHIPPED | OUTPATIENT
Start: 2021-09-09 | End: 2021-10-05

## 2021-09-09 RX ORDER — DEXTROAMPHETAMINE SACCHARATE, AMPHETAMINE ASPARTATE, DEXTROAMPHETAMINE SULFATE AND AMPHETAMINE SULFATE 5; 5; 5; 5 MG/1; MG/1; MG/1; MG/1
10-20 TABLET ORAL DAILY PRN
Qty: 30 TABLET | Refills: 0 | Status: SHIPPED | OUTPATIENT
Start: 2021-09-09 | End: 2022-01-30

## 2021-09-09 NOTE — TELEPHONE ENCOUNTER
Date of Last Office Visit: 07/27/2021  Date of Next Office Visit: 10/5/2021  No shows since last visit: 0  Cancellations since last visit: 09/08/2021 by provider, 09/07/2021 by pt    Medication requested: amphetamine-dextroamphetamine (ADDERALL XR) 20 MG  Date last ordered: 07/30/2021 Qty: 30 Refills: 0     Medication requested: amphetamine-dextroamphetamine (ADDERALL) 20 MG PRN Date last ordered: 07/30/2021 Qty: 30 Refills: 0    From last visit w/ provider 7/27/2021 (for covering provider benefit):  Treatment Plan:    Increase Pristiq to 100 mg daily for mood, anxiety.     Continue methyl folate 7.5 mg daily as supplementation.    Continue Adderall XR 20 mg daily for mood augmentation    Increase Adderall IR to 20 mg daily as needed for mood augmentation and daytime fatigue/hypersomnia    Review of [place] ?: yes  Medication last filled date: 07/30/2021 for both formulations Qty filled: 28/30  Other [place] ?: yes (see below)  Fill Date  ID  Written  Sold  Drug  Qty  Days  Prescriber  Rx #  Pharmacy  Refill  Daily Dose *  Pymt Type    09/07/2021  1  08/30/2021 09/07/2021  Morphine Sulf Er 15 Mg Tablet   60.00  30  Ev Wel  [id number]  All (4435)  0/0  30.00 MME  [place]  08/30/2021  1  08/30/2021 09/01/2021  Hydrocodone-Acetamin  Mg   60.00  15  Ev Wel  [id number]  All (4435)  0/0  40.00 MME  [place]  08/09/2021  1  08/08/2021 08/09/2021  Morphine Sulf Er 15 Mg Tablet   60.00  30  Ev Wel  [id number]  All ([id number])  0/0  30.00 [place]  08/09/2021  1  08/08/2021 08/09/2021  Hydrocodone-Acetamin  Mg   60.00  15  Ev Wel  [id number]  All (4435)  0/0  40.00 MME  [place]  07/31/2021  1  02/11/2021 08/02/2021  Lorazepam 0.5 Mg Tablet   100.00  20  Ka Kli  [id number]  All ([id number])  4/5  2.50 LME  [place]  07/30/2021  1  07/27/2021 07/30/2021  Dextroamp-Amphetamin 20 Mg Tab   30.00  30  Al Bandaru  [id number]  All (4435)  0/0   Comm Ins     Lapse in  medication adherence greater than 5 days?: yes  If yes, call patient and gather details: pt reports she was sick with COVID-19 for a period of time  Medication refill request verified as identical to current order?: yes  Result of Last DAM, VPA, Li+ Level, CBC, or Carbamazepine Level (at or since last visit): N/A    []Medication refilled per  Medication Refill in Ambulatory Care  policy.  [x]Medication unable to be refilled by RN due to criteria not met as indicated below:    []Eligibility - not seen in the last year   []Supervision - no future appointment   []Compliance - no shows, cancellations or lapse in therapy   []Verification - order discrepancy   [x]Controlled medication   []Medication not included in policy   []90-day supply request   []Other    Routing to both provider pool & provider dt provider out today.    Olaf Blanc RN on 9/9/2021 at 11:40 AM

## 2021-09-15 DIAGNOSIS — G89.4 CHRONIC PAIN SYNDROME: ICD-10-CM

## 2021-09-15 RX ORDER — HYDROCODONE BITARTRATE AND ACETAMINOPHEN 10; 325 MG/1; MG/1
TABLET ORAL
Qty: 60 TABLET | Refills: 0 | Status: SHIPPED | OUTPATIENT
Start: 2021-09-16 | End: 2021-10-04

## 2021-09-15 RX ORDER — MORPHINE SULFATE 15 MG/1
15 TABLET, FILM COATED, EXTENDED RELEASE ORAL EVERY 12 HOURS
Qty: 60 TABLET | Refills: 0 | Status: SHIPPED | OUTPATIENT
Start: 2021-10-08 | End: 2021-11-05

## 2021-09-17 ENCOUNTER — TELEPHONE (OUTPATIENT)
Dept: PSYCHIATRY | Facility: CLINIC | Age: 61
End: 2021-09-17

## 2021-09-17 NOTE — TELEPHONE ENCOUNTER
Left VM - patient was hospitalized before starting TMS. Checking in to see if patient is still interested in pursuing TMS at this time.

## 2021-09-18 ENCOUNTER — HEALTH MAINTENANCE LETTER (OUTPATIENT)
Age: 61
End: 2021-09-18

## 2021-09-23 ENCOUNTER — MYC MEDICAL ADVICE (OUTPATIENT)
Dept: FAMILY MEDICINE | Facility: CLINIC | Age: 61
End: 2021-09-23

## 2021-09-24 NOTE — TELEPHONE ENCOUNTER
Responded to patient via [employer] and made tentative appt on 10/4/21.     SHANEL ArchuletaN RN  [facility]

## 2021-10-03 DIAGNOSIS — G47.00 INSOMNIA, UNSPECIFIED TYPE: ICD-10-CM

## 2021-10-04 ENCOUNTER — OFFICE VISIT (OUTPATIENT)
Dept: FAMILY MEDICINE | Facility: CLINIC | Age: 61
End: 2021-10-04
Payer: COMMERCIAL

## 2021-10-04 VITALS
RESPIRATION RATE: 16 BRPM | DIASTOLIC BLOOD PRESSURE: 63 MMHG | WEIGHT: 147 LBS | OXYGEN SATURATION: 100 % | TEMPERATURE: 97.4 F | BODY MASS INDEX: 24.49 KG/M2 | SYSTOLIC BLOOD PRESSURE: 92 MMHG | HEIGHT: 65 IN | HEART RATE: 88 BPM

## 2021-10-04 DIAGNOSIS — R53.83 FATIGUE, UNSPECIFIED TYPE: ICD-10-CM

## 2021-10-04 DIAGNOSIS — G89.4 CHRONIC PAIN SYNDROME: ICD-10-CM

## 2021-10-04 DIAGNOSIS — G47.00 INSOMNIA, UNSPECIFIED TYPE: ICD-10-CM

## 2021-10-04 DIAGNOSIS — R60.0 PERIPHERAL EDEMA: Primary | ICD-10-CM

## 2021-10-04 DIAGNOSIS — Z23 NEED FOR PROPHYLACTIC VACCINATION AND INOCULATION AGAINST INFLUENZA: ICD-10-CM

## 2021-10-04 DIAGNOSIS — R11.0 NAUSEA: ICD-10-CM

## 2021-10-04 PROCEDURE — 99214 OFFICE O/P EST MOD 30 MIN: CPT | Mod: 25 | Performed by: PHYSICIAN ASSISTANT

## 2021-10-04 PROCEDURE — 90471 IMMUNIZATION ADMIN: CPT | Performed by: PHYSICIAN ASSISTANT

## 2021-10-04 PROCEDURE — 90682 RIV4 VACC RECOMBINANT DNA IM: CPT | Performed by: PHYSICIAN ASSISTANT

## 2021-10-04 RX ORDER — ONDANSETRON 8 MG/1
8 TABLET, ORALLY DISINTEGRATING ORAL EVERY 8 HOURS PRN
Qty: 30 TABLET | Refills: 4 | Status: SHIPPED | OUTPATIENT
Start: 2021-10-04 | End: 2023-03-13

## 2021-10-04 RX ORDER — LORAZEPAM 1 MG/1
1 TABLET ORAL EVERY 6 HOURS PRN
Qty: 50 TABLET | Refills: 1 | Status: SHIPPED | OUTPATIENT
Start: 2021-10-04 | End: 2021-12-27

## 2021-10-04 RX ORDER — HYDROCODONE BITARTRATE AND ACETAMINOPHEN 10; 325 MG/1; MG/1
TABLET ORAL
Qty: 60 TABLET | Refills: 0 | Status: SHIPPED | OUTPATIENT
Start: 2021-10-04 | End: 2021-11-05

## 2021-10-04 ASSESSMENT — MIFFLIN-ST. JEOR: SCORE: 1237.67

## 2021-10-04 NOTE — PROGRESS NOTES
Assessment & Plan     Peripheral edema  Recent trip to [place] to visit son experienced bilateral lower extremity edema. On exam today this has resolved. Reports gradual improvement over the past few days. There is still some faint redness to skin overlying shins but no increased warmth or pain. Unclear etiology. Could be due to recent travel and increased time on feet hiking. No new chest pain or shortness of breath. No new urinary symptoms. Continue to monitor. Elevate legs at end of day. Continue compression stockings if on feet for extended periods. Return to care if new or worsening symptoms. We agreed to hold off on lab work today.     Fatigue, unspecified type  Some increased fatigue since COVID-19 infection with + test on 8/27/21. Did receive monoclonal antibodies which helped. Has been down to [place] for comprehensive evaluation of CLL and other symptoms. Could consider referral to post-COVID clinic. Having some shortness of breath and feeling orthostatic since COVID diagnosis. Could send for POTS evaluation. For now continue monitoring. Stay well hydrated.     Nausea  Occasional bouts of nausea. Zofran helps. Refill to pharmacy.   - ondansetron (ZOFRAN-ODT) 8 MG ODT tab; Take 1 tablet (8 mg) by mouth every 8 hours as needed for nausea    Insomnia, unspecified type  Chronic, stable. Refill ativan. Knows not to take with norco.   - LORazepam (ATIVAN) 1 MG tablet; Take 1 tablet (1 mg) by mouth every 6 hours as needed for anxiety    Chronic pain syndrome  Chornic, stable. Needing refill of norco today.   - HYDROcodone-acetaminophen (NORCO)  MG per tablet; take 1 tabs q 4hrs, max 4 tabs/24 hrs    Need for prophylactic vaccination and inoculation against influenza  - INFLUENZA QUAD, RECOMBINANT, P-FREE (RIV4) (FLUBLOK)    No follow-ups on file.    EZIO Mahajan [facility]    Giorgio Luis is a 60 year old who presents for the following health issues     HPI      Feels overarching fatigue, shortness of breath, orthostatic since COVID infection  Has been taking time and working on hydration     Depression and Anxiety Follow-Up    How are you doing with your depression since your last visit? Improved     How are you doing with your anxiety since your last visit?  Worsened     Are you having other symptoms that might be associated with depression or anxiety? No    Have you had a significant life event? No     Do you have any concerns with your use of alcohol or other drugs? No    Social History     Tobacco Use     Smoking status: Former Smoker     Packs/day: 1.00     Years: 5.00     Pack years: 5.00     Quit date: 1984     Years since quittin.7     Smokeless tobacco: Never Used   Substance Use Topics     Alcohol use: Yes     Comment: no alcohol currently since prior to her back surgeries,      Drug use: Not Currently     Types: Marijuana, Psilocybin, LSD, Cocaine     PHQ 2021   PHQ-9 Total Score 4 14 17   Q9: Thoughts of better off dead/self-harm past 2 weeks Not at all Several days More than half the days   F/U: Thoughts of suicide or self-harm - - Yes   F/U: Self harm-plan - - Yes   F/U: Self-harm action - - No   F/U: Safety concerns - - No     STACIE-7 SCORE 3/11/2021 2021 2021   Total Score - - -   Total Score 8 (mild anxiety) 6 (mild anxiety) 5 (mild anxiety)   Total Score 8 6 5     Suicide Assessment Five-step Evaluation and Treatment (SAFE-T)      How many servings of fruits and vegetables do you eat daily?  4 or more    On average, how many sweetened beverages do you drink each day (Examples: soda, juice, sweet tea, etc.  Do NOT count diet or artificially sweetened beverages)?   1    How many days per week do you exercise enough to make your heart beat faster? 6    How many minutes a day do you exercise enough to make your heart beat faster? 30 - 60    How many days per week do you miss taking your medication?  "0        Review of Systems   Constitutional, HEENT, cardiovascular, pulmonary, gi and gu systems are negative, except as otherwise noted.      Objective    BP 92/63 (BP Location: Right arm, Patient Position: Chair, Cuff Size: Adult Regular)   Pulse 88   Temp 97.4  F (36.3  C) (Tympanic)   Resp 16   Ht 1.651 m (5' 5\")   Wt 66.7 kg (147 lb)   LMP 05/01/2005 (LMP Unknown)   SpO2 100%   BMI 24.46 kg/m    Body mass index is 24.46 kg/m .  Physical Exam  Vitals and nursing note reviewed.   Constitutional:       Appearance: Normal appearance.   Eyes:      Conjunctiva/sclera: Conjunctivae normal.   Cardiovascular:      Rate and Rhythm: Normal rate and regular rhythm.      Heart sounds: Normal heart sounds.   Pulmonary:      Effort: Pulmonary effort is normal.      Breath sounds: Normal breath sounds.   Musculoskeletal:      Right lower leg: No edema.      Left lower leg: No edema.      Right ankle: No swelling.      Left ankle: No swelling.   Skin:     General: Skin is warm and dry.   Neurological:      General: No focal deficit present.      Mental Status: She is alert. Mental status is at baseline.   Psychiatric:         Mood and Affect: Mood normal.         Behavior: Behavior normal.         No results found for any visits on 10/04/21.          "

## 2021-10-04 NOTE — NURSING NOTE
Prior to immunization administration, verified patients identity using patient s name and date of birth. Please see Immunization Activity for additional information.     Screening Questionnaire for Adult Immunization    Are you sick today?   No   Do you have allergies to medications, food, a vaccine component or latex?   No   Have you ever had a serious reaction after receiving a vaccination?   No   Do you have a long-term health problem with heart, lung, kidney, or metabolic disease (e.g., diabetes), asthma, a blood disorder, no spleen, complement component deficiency, a cochlear implant, or a spinal fluid leak?  Are you on long-term aspirin therapy?   No   Do you have cancer, leukemia, HIV/AIDS, or any other immune system problem?   Yes   Do you have a parent, brother, or sister with an immune system problem?   No   In the past 3 months, have you taken medications that affect  your immune system, such as prednisone, other steroids, or anticancer drugs; drugs for the treatment of rheumatoid arthritis, Crohn s disease, or psoriasis; or have you had radiation treatments?   No   Have you had a seizure, or a brain or other nervous system problem?   No   During the past year, have you received a transfusion of blood or blood    products, or been given immune (gamma) globulin or antiviral drug?   No   For women: Are you pregnant or is there a chance you could become       pregnant during the next month?   No   Have you received any vaccinations in the past 4 weeks?   No     Immunization questionnaire was positive for at least one answer.  Notified bonilla.        Per orders of Dr. crystal, injection of ppsv23, flublok given by Alessandra Simon. Patient instructed to remain in clinic for 15 minutes afterwards, and to report any adverse reaction to me immediately.     Alessandra Simon on 10/4/2021 at 3:02 PM  Screening performed by Alessandra Simon on 10/4/2021 at 3:00 PM.

## 2021-10-05 ENCOUNTER — VIRTUAL VISIT (OUTPATIENT)
Dept: PSYCHIATRY | Facility: CLINIC | Age: 61
End: 2021-10-05
Payer: COMMERCIAL

## 2021-10-05 ENCOUNTER — VIRTUAL VISIT (OUTPATIENT)
Dept: BEHAVIORAL HEALTH | Facility: CLINIC | Age: 61
End: 2021-10-05
Payer: COMMERCIAL

## 2021-10-05 DIAGNOSIS — F33.2 SEVERE EPISODE OF RECURRENT MAJOR DEPRESSIVE DISORDER, WITHOUT PSYCHOTIC FEATURES (H): Primary | ICD-10-CM

## 2021-10-05 DIAGNOSIS — E72.12 HOMOZYGOUS FOR C677T POLYMORPHISM OF MTHFR (H): ICD-10-CM

## 2021-10-05 DIAGNOSIS — F33.1 MODERATE RECURRENT MAJOR DEPRESSION (H): Primary | ICD-10-CM

## 2021-10-05 DIAGNOSIS — F33.9 RECURRENT MAJOR DEPRESSION RESISTANT TO TREATMENT (H): ICD-10-CM

## 2021-10-05 DIAGNOSIS — E88.89 CYP2D6 POOR METABOLIZER (H): ICD-10-CM

## 2021-10-05 DIAGNOSIS — F41.1 GAD (GENERALIZED ANXIETY DISORDER): ICD-10-CM

## 2021-10-05 DIAGNOSIS — G89.4 CHRONIC PAIN SYNDROME: ICD-10-CM

## 2021-10-05 DIAGNOSIS — E88.89 CYP2B6 INTERMEDIATE METABOLIZER (H): ICD-10-CM

## 2021-10-05 PROCEDURE — 90832 PSYTX W PT 30 MINUTES: CPT | Mod: 95 | Performed by: MARRIAGE & FAMILY THERAPIST

## 2021-10-05 PROCEDURE — 99213 OFFICE O/P EST LOW 20 MIN: CPT | Mod: 95 | Performed by: PSYCHIATRY & NEUROLOGY

## 2021-10-05 RX ORDER — DEXTROAMPHETAMINE SACCHARATE, AMPHETAMINE ASPARTATE MONOHYDRATE, DEXTROAMPHETAMINE SULFATE AND AMPHETAMINE SULFATE 5; 5; 5; 5 MG/1; MG/1; MG/1; MG/1
20 CAPSULE, EXTENDED RELEASE ORAL DAILY
Qty: 30 CAPSULE | Refills: 0 | Status: SHIPPED | OUTPATIENT
Start: 2021-11-05 | End: 2021-12-05

## 2021-10-05 RX ORDER — DEXTROAMPHETAMINE SACCHARATE, AMPHETAMINE ASPARTATE MONOHYDRATE, DEXTROAMPHETAMINE SULFATE AND AMPHETAMINE SULFATE 5; 5; 5; 5 MG/1; MG/1; MG/1; MG/1
20 CAPSULE, EXTENDED RELEASE ORAL DAILY
Qty: 30 CAPSULE | Refills: 0 | Status: SHIPPED | OUTPATIENT
Start: 2021-10-05 | End: 2021-11-04

## 2021-10-05 RX ORDER — DEXTROAMPHETAMINE SACCHARATE, AMPHETAMINE ASPARTATE, DEXTROAMPHETAMINE SULFATE AND AMPHETAMINE SULFATE 5; 5; 5; 5 MG/1; MG/1; MG/1; MG/1
20 TABLET ORAL DAILY
Qty: 30 TABLET | Refills: 0 | Status: SHIPPED | OUTPATIENT
Start: 2021-12-06 | End: 2022-01-05

## 2021-10-05 RX ORDER — DESVENLAFAXINE 100 MG/1
100 TABLET, EXTENDED RELEASE ORAL DAILY
Qty: 90 TABLET | Refills: 0 | Status: SHIPPED | OUTPATIENT
Start: 2021-10-05 | End: 2022-01-04

## 2021-10-05 RX ORDER — DEXTROAMPHETAMINE SACCHARATE, AMPHETAMINE ASPARTATE, DEXTROAMPHETAMINE SULFATE AND AMPHETAMINE SULFATE 5; 5; 5; 5 MG/1; MG/1; MG/1; MG/1
20 TABLET ORAL DAILY
Qty: 30 TABLET | Refills: 0 | Status: SHIPPED | OUTPATIENT
Start: 2021-11-05 | End: 2021-12-05

## 2021-10-05 RX ORDER — DEXTROAMPHETAMINE SACCHARATE, AMPHETAMINE ASPARTATE, DEXTROAMPHETAMINE SULFATE AND AMPHETAMINE SULFATE 5; 5; 5; 5 MG/1; MG/1; MG/1; MG/1
20 TABLET ORAL DAILY
Qty: 30 TABLET | Refills: 0 | Status: SHIPPED | OUTPATIENT
Start: 2021-10-05 | End: 2021-11-04

## 2021-10-05 RX ORDER — LORAZEPAM 0.5 MG/1
TABLET ORAL
Qty: 100 TABLET | Refills: 5 | Status: CANCELLED | OUTPATIENT
Start: 2021-10-05

## 2021-10-05 RX ORDER — DEXTROAMPHETAMINE SACCHARATE, AMPHETAMINE ASPARTATE MONOHYDRATE, DEXTROAMPHETAMINE SULFATE AND AMPHETAMINE SULFATE 5; 5; 5; 5 MG/1; MG/1; MG/1; MG/1
20 CAPSULE, EXTENDED RELEASE ORAL DAILY
Qty: 30 CAPSULE | Refills: 0 | Status: SHIPPED | OUTPATIENT
Start: 2021-12-06 | End: 2022-01-05

## 2021-10-05 ASSESSMENT — ANXIETY QUESTIONNAIRES
5. BEING SO RESTLESS THAT IT IS HARD TO SIT STILL: NOT AT ALL
4. TROUBLE RELAXING: SEVERAL DAYS
1. FEELING NERVOUS, ANXIOUS, OR ON EDGE: NOT AT ALL
GAD7 TOTAL SCORE: 3
8. IF YOU CHECKED OFF ANY PROBLEMS, HOW DIFFICULT HAVE THESE MADE IT FOR YOU TO DO YOUR WORK, TAKE CARE OF THINGS AT HOME, OR GET ALONG WITH OTHER PEOPLE?: NOT DIFFICULT AT ALL
7. FEELING AFRAID AS IF SOMETHING AWFUL MIGHT HAPPEN: NOT AT ALL
2. NOT BEING ABLE TO STOP OR CONTROL WORRYING: NOT AT ALL
7. FEELING AFRAID AS IF SOMETHING AWFUL MIGHT HAPPEN: NOT AT ALL
6. BECOMING EASILY ANNOYED OR IRRITABLE: SEVERAL DAYS
GAD7 TOTAL SCORE: 3
GAD7 TOTAL SCORE: 3
3. WORRYING TOO MUCH ABOUT DIFFERENT THINGS: SEVERAL DAYS

## 2021-10-05 ASSESSMENT — PATIENT HEALTH QUESTIONNAIRE - PHQ9
10. IF YOU CHECKED OFF ANY PROBLEMS, HOW DIFFICULT HAVE THESE PROBLEMS MADE IT FOR YOU TO DO YOUR WORK, TAKE CARE OF THINGS AT HOME, OR GET ALONG WITH OTHER PEOPLE: SOMEWHAT DIFFICULT
SUM OF ALL RESPONSES TO PHQ QUESTIONS 1-9: 6
SUM OF ALL RESPONSES TO PHQ QUESTIONS 1-9: 6

## 2021-10-05 NOTE — PROGRESS NOTES
"Janelle White is a 60 year old year old who is being evaluated via a billable video visit.      How would you like to obtain your AVS? MyChart  If you are dropped from the video visit, the video invite should be resent to: Text to cell phone: see Epic  Will anyone else be joining your video visit? No     Telemedicine Visit: The patient's condition can be safely assessed and treated via synchronous audio and visual telemedicine encounter.      Reason for Telemedicine Visit: Covid-19 Pandemic    Originating Site (Patient Location): Patient's home     Distant Site (Provider Location): Provider Remote Setting    Mode of Communication:  Video Conference via GuideIT    As the provider I attest to compliance with applicable laws and regulations related to telemedicine.        Outpatient Psychiatric Progress Note    Name: Janelle White   : 1960                    Primary Care Provider: Emili Ballard MD   Therapist: None    PHQ-9 scores:  PHQ-9 SCORE 2021 2021 10/5/2021   PHQ-9 Total Score - - -   PHQ-9 Total Score MyChart - 17 (Moderately severe depression) 6 (Mild depression)   PHQ-9 Total Score 14 17 6       STACIE-7 scores:  STACIE-7 SCORE 2021 2021 10/5/2021   Total Score - - -   Total Score 6 (mild anxiety) 5 (mild anxiety) 3 (minimal anxiety)   Total Score 6 5 3       Patient Identification:  Patient is a 60 year old,   White Not  or  female  who presents for return visit with me.  Patient is currently on medical leave from work as NP. Patient attended the phone/video session alone. Patient prefers to be called: \"Janelle\".    Interim History:  I last saw Janelle White for outpatient psychiatry return visit on 6/15/2021. During that appointment, we:      Increase Pristiq to 100 mg daily for mood, anxiety.     Continue methyl folate 7.5 mg daily as supplementation.    Continue Adderall XR 20 mg daily for mood augmentation    Increase Adderall " "IR to 20 mg daily as needed for mood augmentation and daytime fatigue/hypersomnia    Continue benzodiazepine per primary care prescriber.    Keep appt tomorrow with Dr. Yanes at the interventional psychiatry clinic.     Recommend individual psychotherapy.      10/5: Patient overall with improvement of symptoms since last visit.  Still quite down at times due to her ongoing pain and medical conditions, but overall improved.  She saw specialty provider at [facility] who has instilled a lot of hope in her regarding her conditions and limitations.  Recent trip to [place] to visit her son has also helped her understand what she is capable of accomplishing despite her medical conditions and pain.  Was quite exhausting but she overall has more hope about the future.  This [place] has been quite powerful.  She also feels like Pristiq continues to be somewhat helpful.  Had a good appointment with Dr. Yanes at the interventional psychiatry clinic.  Might consider pursuing TMS when things slow down a little after the holidays.  She was sick with COVID which interrupted her possible TMS schedule.  Tolerating medication well with no negative side effects.  Her chronic suicidal ideation has decreased significantly as well.  They are more fleeting thoughts than chronic at this point.  No problematic drug or alcohol use.    Per [facility], JEANE Degroot, during today's team-based visit:  Reviewed notes and processed with patient her recent health scares. Pt is doing better with her covid- and getting the anti-body. She is now going to [place] for her CLL.   Pt reports doing better. Feels like the increase in the Pristiq has helped.  Reviewed safety with Patient. Patient denies any current SI,plans or intentions. Pt states that her health issues have helped her move past those thoughts. \"I have hope again and a plan to help with her physical health.\"      Pt got to see her son and states that has helped as well.      Still " struggling with energy level. Needs refills on her adderall medication. Good on ativan.   Does not feel like medication needs to changed.  Wants to work on pacing and not over doing it.   Is still interested in TMS but wondering about time with all her health issues. Encouraged her to call and ask them the questions. Pt agreed.    Past medication trials include but are not limited to:   Effexor-very flat  Celexa, zoloft, was on wellbutrin a couple years at one point  wellbutrin XL + celexa; 2005ish  tca-a ton of weight gain  depakote maybe for a short time  Abilify/lithium ?  ECT as teen - made me dull  Cytomel-not effective at all, used 50 mcg    Psychiatric ROS:  Janelle White reports mood has been: Still quite depressed at times, but definitely improved  Anxiety has been: Slightly better/improved  Sleep has been: Up-and-down  Deepa sxs: None  Psychosis sxs: None  ADHD/ADD sxs: None  PTSD sxs: None  PHQ9 and GAD7 scores were reviewed today if completed.   Medication side effects: Denies  Current stressors include: Symptoms and See HPI above  Coping mechanisms and supports include: Family, Hobbies and Friends    Current medications include:   Current Outpatient Medications   Medication Sig     amphetamine-dextroamphetamine (ADDERALL XR) 20 MG 24 hr capsule Take 1 capsule (20 mg) by mouth daily     amphetamine-dextroamphetamine (ADDERALL) 20 MG tablet Take 0.5-1 tablets (10-20 mg) by mouth daily as needed (extreme fatigue, exhaustion, daytime sedation)     calcium citrate (CALCIUM CITRATE) 950 MG tablet Take 1 tablet by mouth 2 times daily.     Calcium-Magnesium-Vitamin D (CALCIUM 500) 500-250-200 MG-MG-UNIT TABS Take 1 tablet by mouth      Cholecalciferol (D-5000) 5000 units TABS Take 5,000 Units by mouth     colchicine (COLCYRS) 0.6 MG tablet Take 1 tablet (0.6 mg) by mouth daily     cyanocobalamin (CYANOCOBALAMIN) 1000 MCG/ML injection Inject 1 mL (1,000 mcg) into the muscle every 30 days      "cyclobenzaprine (FLEXERIL) 10 MG tablet Take 1 tablet (10 mg) by mouth 3 times daily as needed for muscle spasms     desvenlafaxine (PRISTIQ) 100 MG 24 hr tablet Take 1 tablet (100 mg) by mouth daily     Estradiol (DIVIGEL) 1 MG/GM GEL Place 1 packet onto the skin daily     HYDROcodone-acetaminophen (NORCO)  MG per tablet take 1 tabs q 4hrs, max 4 tabs/24 hrs     insulin syringe-needle U-100 (30G X 1/2\" 1 ML) 30G X 1/2\" 1 ML miscellaneous Inject 1 ml B12 qmonth     lidocaine (LIDODERM) 5 % patch Place 4 patches onto the skin daily Apply up to 4 patches to skin. Wear for 12 hours and remove for 12 hrs.  Refill when patient requests.     LORazepam (ATIVAN) 0.5 MG tablet 1-2 tabs qhs prn insomnia and 1 q 8 hours prn anxiety (Patient taking differently: Take 1 mg by mouth At Bedtime )     LORazepam (ATIVAN) 1 MG tablet Take 1 tablet (1 mg) by mouth every 6 hours as needed for anxiety     medical cannabis (Patient's own supply.  Not a prescription) Medical Cannabis - [place] 4-6 ml by mouth daily. [facility]     methylfolate (DEPLIN) 7.5 MG TABS tablet Take 1 tablet (7.5 mg) by mouth daily     [START ON 10/8/2021] morphine (MS CONTIN) 15 MG CR tablet Take 1 tablet (15 mg) by mouth every 12 hours maximum 2 tablet(s) per day     Multiple Vitamin (MULTI-VITAMINS) TABS Take 1 tablet by mouth      naloxone (NARCAN) 4 MG/0.1ML nasal spray Spray 1 spray (4 mg) into one nostril alternating nostrils as needed for opioid reversal every 2-3 minutes until assistance arrives     ondansetron (ZOFRAN-ODT) 8 MG ODT tab Take 1 tablet (8 mg) by mouth every 8 hours as needed for nausea     Prasterone 6.5 MG INST Place 1 suppository vaginally At Bedtime (Patient taking differently: Place 0.5 suppositories vaginally three times a week )     senna-docusate (SENOKOT-S/PERICOLACE) 8.6-50 MG tablet Take 4-6 tablets by mouth daily as needed for constipation     No current facility-administered medications for this visit.       The " [place] Prescription Monitoring Program has been reviewed and there are no concerns about diversionary activity for controlled substances at this time.   10/04/2021 1 10/04/2021 10/05/2021 Lorazepam 1 Mg Tablet  50.00 12 Ev Wel [id number] All (4435) 0/1 4.17 [place]  10/04/2021 1 10/04/2021 10/05/2021 Hydrocodone-Acetamin  Mg  60.00 15 Ev Wel [id number] All (4435) 0/0 40.00 [place]  09/16/2021 1 09/15/2021 09/16/2021 Hydrocodone-Acetamin  Mg  60.00 30 Ev Wel [id number] All (4435) 0/0 20.00 [place]  09/09/2021 1 09/09/2021 09/11/2021 Dextroamp-Amphetamin 20 Mg Tab  30.00 30 De Cav [id number] All (4435[place] 0/[place]  09/09/2021 1 09/09/2021 09/11/2021 Dextroamp-Amphet Er 20 Mg Cap  30.00 30 De Cav [id number] [place] (4435[place] 0/[place]  09/07/2021 1 08/30/2021 09/07/2021 Morphine Sulf Er 15 Mg Tablet  60.00 30 Ev Wel [id number] All (4435) 0/0 30.00 [place]    Past Medical/Surgical History:  Past Medical History:   Diagnosis Date     Anxiety      Cervicalgia 2007    C5-6 disc protrusion     Depressive disorder      ESBL (extended spectrum beta-lactamase) producing bacteria infection      History of blood transfusion 2007    Cervical fusion     Leukemia (H)     CLA large beta     Melanoma (H) 1998     Migraine      Other chronic pain      Rotator cuff tear     s/p injections     Sacroiliac inflammation (H)      Shift work sleep disorder 12/16/2013     Urinary calculi      Vitamin B12 deficiency anemia 2006    started injections      has a past medical history of Anxiety, Cervicalgia (2007), Depressive disorder, ESBL (extended spectrum beta-lactamase) producing bacteria infection, History of blood transfusion (2007), Leukemia (H), Melanoma (H) (1998), Migraine, Other chronic pain, Rotator cuff tear, Sacroiliac inflammation (H), Shift work sleep disorder (12/16/2013), Urinary calculi, and Vitamin B12 deficiency anemia (2006).    Social History:  Reviewed. No  changes to social history except as noted above in HPI.    Vital Signs:   None. This is phone/video visit.     Labs:  Most recent laboratory results reviewed and the pertinent results include:   Lab Results   Component Value Date    WBC 17.5 03/16/2021     Lab Results   Component Value Date    RBC 4.50 03/16/2021     Lab Results   Component Value Date    HGB 13.3 03/16/2021     Lab Results   Component Value Date    HCT 42.1 03/16/2021     No components found for: MCT  Lab Results   Component Value Date    MCV 94 03/16/2021     Lab Results   Component Value Date    MCH 29.6 03/16/2021     Lab Results   Component Value Date    MCHC 31.6 03/16/2021     Lab Results   Component Value Date    RDW 13.7 03/16/2021     Lab Results   Component Value Date     03/16/2021     Last Comprehensive Metabolic Panel:  Sodium   Date Value Ref Range Status   08/13/2021 141 133 - 144 mmol/L Final   05/24/2021 141 133 - 144 mmol/L Final     Potassium   Date Value Ref Range Status   08/13/2021 3.4 3.4 - 5.3 mmol/L Final   05/24/2021 3.9 3.4 - 5.3 mmol/L Final     Chloride   Date Value Ref Range Status   08/13/2021 111 (H) 94 - 109 mmol/L Final   05/24/2021 108 94 - 109 mmol/L Final     Carbon Dioxide   Date Value Ref Range Status   05/24/2021 25 20 - 32 mmol/L Final     Carbon Dioxide (CO2)   Date Value Ref Range Status   08/13/2021 25 20 - 32 mmol/L Final     Anion Gap   Date Value Ref Range Status   08/13/2021 5 3 - 14 mmol/L Final   05/24/2021 8 3 - 14 mmol/L Final     Glucose   Date Value Ref Range Status   08/13/2021 107 (H) 70 - 99 mg/dL Final   05/24/2021 87 70 - 99 mg/dL Final     Urea Nitrogen   Date Value Ref Range Status   08/13/2021 10 7 - 30 mg/dL Final   05/24/2021 15 7 - 30 mg/dL Final     Creatinine   Date Value Ref Range Status   08/13/2021 0.60 0.52 - 1.04 mg/dL Final   05/24/2021 0.64 0.52 - 1.04 mg/dL Final     GFR Estimate   Date Value Ref Range Status   08/13/2021 >90 >60 mL/min/1.73m2 Final     Comment:     As  of July 11, 2021, eGFR is calculated by the CKD-EPI creatinine equation, without race adjustment. eGFR can be influenced by muscle mass, exercise, and diet. The reported eGFR is an estimation only and is only applicable if the renal function is stable.   05/24/2021 >90 >60 mL/min/[1.73_m2] Final     Comment:     Non  GFR Calc  Starting 12/18/2018, serum creatinine based estimated GFR (eGFR) will be   calculated using the Chronic Kidney Disease Epidemiology Collaboration   (CKD-EPI) equation.       Calcium   Date Value Ref Range Status   08/13/2021 8.3 (L) 8.5 - 10.1 mg/dL Final   05/24/2021 8.4 (L) 8.5 - 10.1 mg/dL Final     Bilirubin Total   Date Value Ref Range Status   08/10/2021 0.5 0.2 - 1.3 mg/dL Final   03/16/2021 0.4 0.2 - 1.3 mg/dL Final     Alkaline Phosphatase   Date Value Ref Range Status   08/10/2021 92 40 - 150 U/L Final   03/16/2021 87 40 - 150 U/L Final     ALT   Date Value Ref Range Status   08/10/2021 26 0 - 50 U/L Final   03/16/2021 26 0 - 50 U/L Final     AST   Date Value Ref Range Status   08/10/2021 19 0 - 45 U/L Final   03/16/2021 44 0 - 45 U/L Final     Review of Systems:  10 systems (general, cardiovascular, respiratory, eyes, ENT, endocrine, GI, , M/S, neurological) were reviewed. Most pertinent finding(s) is/are: Severe chronic pain. The remaining systems are all unremarkable.    Mental Status Examination (limited as this is by phone/video):  Appearance: Awake, alert, appears stated age, well-groomed, no acute distress  Attitude:  cooperative, pleasant  Motor: No gross abnormalities observed via video, not formally tested  Oriented to:  person, place, time, and situation  Attention Span and Concentration:  normal  Speech:  clear, coherent, regular rate, rhythm, and volume  Language: intact  Mood: A little better  Affect: Brighter than past visits, appropriate, mood congruent  Associations:  no loose associations  Thought Process:  logical, linear and goal  oriented  Thought Content: No suicidal ideation today, no homicidal ideation, no evidence of psychotic thought, no auditory hallucinations present and no visual hallucinations present  Recent and Remote Memory:  Intact to interview. Not formally assessed. No amnesia.  Fund of Knowledge: appropriate  Insight:  good  Judgment:  intact, adequate for safety  Impulse Control:  intact    Suicide Risk Assessment:  Today Janelle White reports no suicidal ideation today but does have history of chronic/intermittent suicidal ideation.There are notable risk factors for self-harm, including anxiety, comorbid medical condition of Chronic pain, suicidal ideation, purposelessness/no reason for living, hopelessness, withdrawing and mood change. However, risk is mitigated by commitment to family, absence of past attempts, ability to volunteer a safety plan, history of seeking help when needed, future oriented and denies suicidal intent or plan. Therefore, based on all available evidence including the factors cited above, Janelle White does not appear to be at imminent risk for self-harm, does not meet criteria for a 72-hr hold, and therefore remains appropriate for ongoing outpatient level of care.  A thorough assessment of risk factors related to suicide and self-harm have been reviewed and are noted above. Local community safety resources printed and reviewed for patient to use if needed. There was no deceit detected, and the patient presented in a manner that was believable.     DSM5 Diagnosis:  Major Depressive Disorder, Recurrent Episode, severe, without psychotic features, in partial remission?  Treatment resistant depression  CYP2D6 poor metabolizer  CYP2B6 intermediate metabolizer  Homozygous for C677T polymorphism of MTHFR    Medical comorbidities include:   Patient Active Problem List    Diagnosis Date Noted     Polyarthralgia 08/11/2021     Priority: Medium     Cellulitis, unspecified cellulitis site  08/11/2021     Priority: Medium     Sepsis, due to unspecified organism, unspecified whether acute organ dysfunction present (H) 08/11/2021     Priority: Medium     Cellulitis 08/11/2021     Priority: Medium     STACIE (generalized anxiety disorder) 07/27/2021     Priority: Medium     MTHFR gene mutation 04/12/2021     Priority: Medium     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     Priority: Medium     CYP2D6 poor metabolizer (H) 04/12/2021     Priority: Medium     Prediabetes 09/19/2019     Priority: Medium     Hyperlipidemia LDL goal <130 09/19/2019     Priority: Medium     CLARE (obstructive sleep apnea) 02/13/2019     Priority: Medium     Controlled substance agreement signed 08/14/2018     Priority: Medium     Chronic pain syndrome 12/21/2017     Priority: Medium     CLL (chronic lymphocytic leukemia) (H) 12/21/2017     Priority: Medium     Shift work sleep disorder 12/16/2013     Priority: Medium     Vitamin D deficiency 11/08/2012     Priority: Medium     Moderate recurrent major depression (H) 01/06/2011     Priority: Medium     DDD (degenerative disc disease), cervical 10/07/2010     Priority: Medium     CARDIOVASCULAR SCREENING; LDL GOAL LESS THAN 160 02/10/2010     Priority: Medium     Chronic Low Back Pain 10/01/2009     Priority: Medium     S/p AP L3-S1 fusion 12/2010 - referred to [facility].   Orthopedics writing scripts for narcotics post-op.       Migraine      Priority: Medium     Problem list name updated by automated process. Provider to review       B-complex deficiency 10/10/2006     Priority: Medium     Problem list name updated by automated process. Provider to review       PERSONAL HX OF  MELANOMA 12/04/2003     Priority: Low       Psychosocial & Contextual Factors: see HPI above    Assessment:  6/15/2021:  Janelle White reports overall some significant worsening of mood symptoms.  Increased anxiety with her depression.  Poor motivation and poor energy.  Symptoms severe enough  to prevent her from being able to utilize typical coping skills and strategies.  No current motivation or energy to participate in PHP/day treatment program.  Continues to take medication as scheduled.  Recent surgery and general anesthesia could be contributing.  Discussed discontinuing stimulant medication as an augmentation strategy.  She is agreeable to moving forward with T3 as an augmentation for treatment resistant depression.  Discussed risks and benefits at length.  Will get baseline thyroid labs prior to starting T3.  Hoping she will experience increased energy and motivation and that her mood will lift.  Also discussed setting up an appointment with her interventional psychiatry clinic to discuss other potential options such as ketamine therapy, ECT, TMS.  Does have history of ECT for depression when she was quite young.  I am hopeful to stay ahead of her symptoms before things get too severe.  Has chronic suicidal ideation and is at high risk for suicide.  Continues to deny any plan or intent.  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  See below for risk/benefit conversation regarding T3 had with the patient:    GeneSight testing Info:  GeneSight testing revealed she is a poor 2D6 metabolizer and an intermediate 2B6 metabolizer.  This would explain her multiple failed medication trials due to the negative side effects.  She also has a genotype that would suggest a phenotype sensitivity to serotonin. She also was found to have significantly reduced folic acid conversion.      Starting T3 as augment to antidepressant therapy for treatment resistant depression:  Start T3 at 25 mcg per day for one to two weeks, and if there is little or no improvement, increase the dose to 50 mcg per day; this is consistent with practice guidelines from the American Psychiatric Association and [place] [employer] for Mood and Anxiety Treatments.     Adverse effects consistent with hyperthyroidism  may occur, including tremor, palpitations, heat intolerance, sweating, anxiety, increased frequency of bowel movements, shortness of breath, and exacerbation of cardiac arrhythmia. In addition, hyperthyroidism that emerges during long-term treatment may lead to bone demineralization, osteoporosis, and an increased risk of fracture    Following a normal baseline TSH concentration, no other laboratory monitoring during a four to six week trial of adjunctive T3 is necessary. However, if T3 is continued longer, a serum TSH concentration should be checked after the first one to three months of treatment and then every six months.    Today, 7/27/21:   Patient overall with little change in her symptoms.  Did not do as well on Cytomel and had limited to no improvement at all after weeks on 50 mcg.  Labs were unremarkable prior to starting Cytomel.  Opted to go back to stimulant augmentation since patient does find it quite helpful.  She has been taking 15 mg of immediate release most afternoons.  Due to good tolerability and some efficacy, we will bump up her immediate release dose slightly to 20 mg daily as needed in addition to her 20 mg extended release dose. I have no concerns about misuse or diversion at this time.  Tolerating well with no negative side effects.  Last blood pressure normal 7/2.  Patient has noted some efficacy from Pristiq more than other antidepressant trials and so we will continue to titrate further to 100 mg daily.  She is tolerating well.  Continues to use lorazepam for anxiety, pain medicine adjunct, and for sleep as prescribed by primary care provider.  Has been utilizing roughly 100 tablets of 0.5 mg every 1.5 months.  Patient with ongoing chronic intermittent passive suicidal ideation with no plan or intent.  Denies safety concerns today.  No problematic drug or alcohol use.  I am hopeful the interventional psychiatry clinic might be able to initiate ketamine therapy or another therapy they  would recommend as potentially being more helpful than patient's multiple medication/augmentation trials.    10/6/2021:  Patient with some improved depression symptoms and much more hopeful.  Seeing specialist at [facility]-feels very hurt and listened to.  Also is hopeful there will be some helpful treatments.  Visit to [place] was also very good and instilled a lot of hope in her abilities.  She was encouraged to continue to push herself to do the things she enjoys doing.  No medication changes today.  She will follow-up with getting TMS rescheduled, possibly when things slow down after the holidays.  Does not need to be seen until after the holidays.  No acute safety concerns today.  No problematic drug or alcohol use.    Medication side effects and alternatives were reviewed. Health promotion activities recommended and reviewed today. All questions addressed. Education and counseling completed regarding risks and benefits of medications and psychotherapy options. Recommend therapy for additional support.     Treatment Plan:    Continue Pristiq 100 mg daily for mood, anxiety.     Continue methyl folate 7.5 mg daily as supplementation.    Continue Adderall XR 20 mg daily for mood augmentation    Increase Adderall IR to 20 mg daily as needed for mood augmentation and daytime fatigue/hypersomnia    Continue benzodiazepine per primary care prescriber.    Continue to consider starting TMS, possibly after the holidays when things slow down if still clinically indicated.    Continue to work with your specialist from [facility].    Recommend individual psychotherapy.      Continue all other cares per primary care provider.     Continue all other medications as reviewed per electronic medical record today.     Safety plan reviewed. To the Emergency Department as needed or call after hours crisis line at [phone] or [phone]. [place] Crisis Text Line. Text [place] to 967566 or Suicide LifeLine Chat:  suicidepreventionlifeline.org/chat    Schedule an appointment with me in 3-4 months (or after TMS), or sooner as needed. Call [facility] at [phone] to schedule.    Follow up with primary care provider as planned or for acute medical concerns.    Call the psychiatric nurse line with medication questions or concerns at [phone].    MyChart may be used to communicate with your provider, but this is not intended to be used for emergencies.    Risks of benzodiazepine (Ativan, Xanax, Klonopin, Valium, etc) use including, but not limited to, sedation, tolerance, risk for addiction/dependence. Do not drink alcohol while taking benzodiazepines due to risk of trouble breathing and potential death. Do not drive or operate heavy machinery until it is known how the drug affects you. Discuss with physician or pharmacist before ever taking a benzodiazepine with a narcotic/opioid pain medication.     Discussed risks of stimulant medication including, but not limited to, decreased appetite, risk of tics (and that they may be lasting), trouble sleeping, cardiac risks such as increased heart rate and blood pressure, and rare risk of sudden cardiac death.  Also risk of addiction/tolerance/dependence.    Administrative Billing:   Phone Call/Video Duration: 24 Minutes    Time spent with patient was 24 minutes and greater than 50% of time or 13 minutes was spent in counseling and coordination of care regarding above diagnoses and treatment plan. Patient with multiple psychiatric diagnoses, treatment resistant sxs, and multiple medication changes adding to complexity of care.    Patient Status:  Patient will continue to be seen for ongoing consultation and stabilization.    Signed:   Kimberly Perez DO  [facility]    Disclaimer: This note consists of symbols derived from keyboarding, dictation and/or voice recognition software. As a result, there may be errors in the script that have gone undetected. Please  consider this when interpreting information found in this chart.

## 2021-10-05 NOTE — PROGRESS NOTES
[facility]aborative Care Psychiatry Service ([facility]PS)  October 5th, 2021    Behavioral Health Clinician Progress Note    Patient Name: Janelle White      Telemedicine Visit: The patient's condition can be safely assessed and treated via synchronous audio and visual telemedicine encounter.      Reason for Telemedicine Visit: Services only offered telehealth    Originating Site (Patient Location): Patient's home    Distant Site (Provider Location): [facility]: [place]    Consent:  The patient/guardian has verbally consented to: the potential risks and benefits of telemedicine (video visit) versus in person care; bill my insurance or make self-payment for services provided; and responsibility for payment of non-covered services.     Mode of Communication:  Video Conference via [employer]    As the provider I attest to compliance with applicable laws and regulations related to telemedicine.         Service Type:  Individual      Service Location:   Face to Face in Home / Community     Session Start Time: 12:28 pm  Session End Time: 12:53 pm      Session Length: 16 - 37      Attendees: Client    Visit Activities (Refresh list every visit): [facility] Only    Diagnostic Assessment Date: 03/11/2021 Dr. Manzo  See Flowsheets for today's PHQ-9 and STACIE-7 results  Previous PHQ-9:   PHQ-9 SCORE 7/14/2021 7/27/2021 10/5/2021   PHQ-9 Total Score - - -   PHQ-9 Total Score MyChart - 17 (Moderately severe depression) 6 (Mild depression)   PHQ-9 Total Score 14 17 6       Previous STACIE-7:   STACIE-7 SCORE 5/24/2021 7/27/2021 10/5/2021   Total Score - - -   Total Score 6 (mild anxiety) 5 (mild anxiety) 3 (minimal anxiety)   Total Score 6 5 3       WHODAS  WHODAS 2.0 Total Score 3/11/2021   Total Score 37   Total Score MyChart 37        AUDIT  AUDIT - Alcohol Use Disorders Identification Test - Reproduced from the World Health Organization Audit 2001 (Second Edition) 3/11/2021   1.  How often do you have a drink containing  "alcohol? 2 to 3 times a week   2.  How many drinks containing alcohol do you have on a typical day when you are drinking? 1 or 2   3.  How often do you have five or more drinks on one occasion? Never   4.  How often during the last year have you found that you were not able to stop drinking once you had started? Never   5.  How often during the last year have you failed to do what was normally expected of you because of drinking? Never   6.  How often during the last year have you needed a first drink in the morning to get yourself going after a heavy drinking session? Never   7.  How often during the last year have you had a feeling of guilt or remorse after drinking? Less than monthly   8.  How often during the last year have you been unable to remember what happened the night before because of your drinking? Never   9.  Have you or someone else been injured because of your drinking? No   10. Has a relative, friend, doctor or other health care worker been concerned about your drinking or suggested you cut down? Yes, during the last year   TOTAL SCORE 8       DATA  Extended Session (60+ minutes): No  Interactive Complexity: No  Crisis: No    Medication Compliance:  Yes      Chemical Use Review:   Substance Use: Chemical use reviewed, no active concerns identified      Tobacco Use: No current tobacco use.      Current Stressors / Issues:     Reviewed notes and processed with patient her recent health scares. Pt is doing better with her covid- and getting the anti-body. She is now going to [place] for her CLL.   Pt reports doing better. Feels like the increase in the Pristiq has helped.  Reviewed safety with Patient. Patient denies any current SI,plans or intentions. Pt states that her health issues have helped her move past those thoughts. \"I have hope again and a plan to help with her physical health.\"     Pt got to see her son and states that has helped as well.     Still struggling with energy level. Needs refills " on her adderall medication. Good on ativan.   Does not feel like medication needs to changed.  Wants to work on pacing and not over doing it.   Is still interested in TMS but wondering about time with all her health issues. Encouraged her to call and ask them the questions. Pt agreed.    Review of Symptoms per patient report:  Depression: Change in sleep, Lack of interest, Excessive or inappropriate guilt, Change in energy level, Difficulties concentrating, Psychomotor slowing or agitation, Feelings of hopelessness, Feelings of helplessness, Low self-worth, Ruminations, Irritability, Feeling sad, down, or depressed, Withdrawn, Poor hygeine and Frequent crying  Deepa:  No Symptoms  Psychosis: No Symptoms  Anxiety: Excessive worry, Nervousness, Physical complaints, such as headaches, stomachaches, muscle tension, Sleep disturbance, Psychomotor agitation, Ruminations, Poor concentration and Irritability  Panic:  No symptoms  Post Traumatic Stress Disorder:  No Symptoms   Eating Disorder: No Symptoms  ADD / ADHD:  No symptoms  Conduct Disorder: No symptoms  Autism Spectrum Disorder: No symptoms  Obsessive Compulsive Disorder: No Symptoms      Changes in Health Issues:   Yes: Covid and CLL    Assessment: Current Emotional / Mental Status (status of significant symptoms):  Risk status (Self / Other harm or suicidal ideation)  Patient has had a history of suicidal ideation: ongoing SI as previously noted   Patient denies current fears or concerns for personal safety.  Patient denies current or recent suicidal ideation or behaviors.  Patient denies current or recent homicidal ideation or behaviors.  Patient denies current or recent self injurious behavior or ideation.  Patient denies other safety concerns.  A safety and risk management plan has been developed including: Patient consented to co-developed safety plan.  A safety and risk management plan was completed.  Patient agreed to use safety plan should any safety  "concerns arise.  A copy was given to the patient.    Appearance:   Appropriate   Eye Contact:   Fair   Psychomotor Behavior: Normal   Attitude:   Cooperative   Orientation:   All  Speech   Rate / Production: Normal    Volume:  Normal   Mood:    Normal  Affect:    Flat   Thought Content:  Clear   Thought Form:  Coherent  Logical   Insight:    Fair     Diagnoses:  1. Moderate recurrent major depression (H)    2. STACIE (generalized anxiety disorder)        Collateral Reports Completed:  Communicated with: Dr. Perez     Plan: (Homework, other):  Patient was given information about behavioral services and encouraged to schedule a follow up appointment with the clinic [facility] in conjunction with [facility].  She was also given information about mental health symptoms and treatment options  and Cognitive Behavioral Therapy skills to practice when experiencing anxiety and depression.  CD Recommendations: No indications of CD issues.  JEANE Degroot, [facility]      JEANE Ramos  July 27th, 2021        [facility]                                       Patient's Name: Janelle White  March 11, 2021     SAFETY PLAN:  Step 1: Warning signs / cues (Thoughts, images, mood, situation, behavior) that a crisis may be developing:  ? Thoughts: \"I don't matter\", \"People would be better off without me\", \"I can't do this anymore\" and \"I just want this to end\"  ? Images: obsessive thoughts of death or dying: thoughts of carrying out plan  ? Thinking Processes: ruminations (can't stop thinking about my problems): ruminate on my plan and highly critical and negative thoughts: if  says something critical i shut down  ? Mood: worsening depression, hopelessness, disinhibited (not caring about things or consequences) and worthlessness  ? Behaviors: isolating/withdrawing , can't stop crying, not taking care of myself and not taking care of my responsibilities  ? Situations: relationship " "problems   Step 2: Coping strategies - Things I can do to take my mind off of my problems without contacting another person (relaxation technique, physical activity):  ? Distress Tolerance Strategies:  play with my pet  and watch a funny movie:    ? Physical Activities: go for a walk and get in the jacuzzi  ? Focus on helpful thoughts:  \"This is temporary\"  Step 3: People and social settings that provide distraction:              Name: Alyx     Phone: in my phone              Name: Raven   Phone: in my phone  ? park              Step 4: Remind myself of people and things that are important to me and worth living for:  Children, dog, friends  Step 5: When I am in crisis, I can ask these people to help me use my safety plan:              Name: Alyx     Phone: in my phone              Name: Raven   Phone: in my phone  Step 6: Making the environment safe:   ? none identified  Step 7: Professionals or agencies I can contact during a crisis:  ? Suicide Prevention Lifeline: 8-028-233-TALK (8193)  ? Crisis Text Line Service: Text   HOME  to 274-254.    Local Crisis Services: [facility]     Call 911 or go to my nearest emergency department.       I helped develop this safety plan and agree to use it when needed.  I have been given a copy of this plan.       Patient signature: _______________________________________________________________  Today s date:  March 11, 2021  Adapted from Safety Plan Template 2008 Antionette Trevino and Joseph Leon is reprinted with the express permission of the authors.  No portion of the Safety Plan Template may be reproduced without the express, written permission.  You can contact the authors at [email or web address] or [email or web address].  "

## 2021-10-06 ENCOUNTER — TELEPHONE (OUTPATIENT)
Dept: PSYCHIATRY | Facility: CLINIC | Age: 61
End: 2021-10-06
Payer: COMMERCIAL

## 2021-10-06 ASSESSMENT — PATIENT HEALTH QUESTIONNAIRE - PHQ9: SUM OF ALL RESPONSES TO PHQ QUESTIONS 1-9: 6

## 2021-10-06 ASSESSMENT — ANXIETY QUESTIONNAIRES: GAD7 TOTAL SCORE: 3

## 2021-10-07 NOTE — PATIENT INSTRUCTIONS
Treatment Plan:    Continue Pristiq 100 mg daily for mood, anxiety.     Continue methyl folate 7.5 mg daily as supplementation.    Continue Adderall XR 20 mg daily for mood augmentation    Increase Adderall IR to 20 mg daily as needed for mood augmentation and daytime fatigue/hypersomnia    Continue benzodiazepine per primary care prescriber.    Continue to consider starting TMS, possibly after the holidays when things slow down if still clinically indicated.    Continue to work with your specialist from [facility].    Recommend individual psychotherapy.      Continue all other cares per primary care provider.     Continue all other medications as reviewed per electronic medical record today.     Safety plan reviewed. To the Emergency Department as needed or call after hours crisis line at [phone] or [phone]. [place] Crisis Text Line. Text [place] to 248543 or Suicide LifeLine Chat: suicidepreventionlifeline.org/chat    Schedule an appointment with me in 3-4 months (or after TMS), or sooner as needed. Call [facility] at [phone] to schedule.    Follow up with primary care provider as planned or for acute medical concerns.    Call the psychiatric nurse line with medication questions or concerns at [phone].    DataSp[employer]ret may be used to communicate with your provider, but this is not intended to be used for emergencies.    Risks of benzodiazepine (Ativan, Xanax, Klonopin, Valium, etc) use including, but not limited to, sedation, tolerance, risk for addiction/dependence. Do not drink alcohol while taking benzodiazepines due to risk of trouble breathing and potential death. Do not drive or operate heavy machinery until it is known how the drug affects you. Discuss with physician or pharmacist before ever taking a benzodiazepine with a narcotic/opioid pain medication.     Discussed risks of stimulant medication including, but not limited to, decreased appetite, risk of tics (and that they  may be lasting), trouble sleeping, cardiac risks such as increased heart rate and blood pressure, and rare risk of sudden cardiac death.  Also risk of addiction/tolerance/dependence.

## 2021-10-26 ENCOUNTER — TRANSFERRED RECORDS (OUTPATIENT)
Dept: HEALTH INFORMATION MANAGEMENT | Facility: CLINIC | Age: 61
End: 2021-10-26
Payer: COMMERCIAL

## 2021-11-03 ENCOUNTER — TRANSFERRED RECORDS (OUTPATIENT)
Dept: HEALTH INFORMATION MANAGEMENT | Facility: CLINIC | Age: 61
End: 2021-11-03
Payer: COMMERCIAL

## 2021-11-03 LAB
ALT SERPL-CCNC: 8 U/L (ref 6–29)
AST SERPL-CCNC: 14 U/L (ref 10–35)
CREATININE (EXTERNAL): 0.68 MG/DL (ref 0.5–0.99)
GFR ESTIMATED (EXTERNAL): 95 ML/MIN/1.73M2
GFR ESTIMATED (IF AFRICAN AMERICAN) (EXTERNAL): 110 ML/MIN/1.73M2
GLUCOSE (EXTERNAL): 92 MG/DL (ref 65–99)
POTASSIUM (EXTERNAL): 4.4 MMOL/L (ref 3.5–5.3)

## 2021-11-04 PROBLEM — U07.1 COVID-19: Status: ACTIVE | Noted: 2021-08-29

## 2021-11-04 PROBLEM — G44.209 TENSION TYPE HEADACHE: Status: ACTIVE | Noted: 2021-11-04

## 2021-11-04 PROBLEM — M48.061 SPINAL STENOSIS OF LUMBAR REGION WITH RADICULOPATHY: Status: ACTIVE | Noted: 2021-09-02

## 2021-11-04 PROBLEM — Z98.890 STATUS POST BLEPHAROPLASTY: Status: ACTIVE | Noted: 2020-11-18

## 2021-11-04 PROBLEM — I95.9 HYPOTENSION: Status: ACTIVE | Noted: 2017-09-14

## 2021-11-04 PROBLEM — S46.009A ROTATOR CUFF INJURY: Status: ACTIVE | Noted: 2021-11-04

## 2021-11-04 PROBLEM — M54.16 SPINAL STENOSIS OF LUMBAR REGION WITH RADICULOPATHY: Status: ACTIVE | Noted: 2021-09-02

## 2021-11-04 PROBLEM — H52.223 REGULAR ASTIGMATISM, BILATERAL: Status: ACTIVE | Noted: 2020-11-18

## 2021-11-04 NOTE — PROGRESS NOTES
SUBJECTIVE:   Janelle White is a 57 year old female who presents to clinic today for the following health issues:    Medication Followup of  Norco and Moirphine    Taking Medication as prescribed: yes    Side Effects:  None    Medication Helping Symptoms:  yes   Answers for HPI/ROS submitted by the patient on 11/13/2018   Back pain  Chronicity: chronic  Onset: more than 1 year ago  Frequency: constantly  Progression since onset: waxing and waning  Pain location: gluteal, lumbar spine, sacro-iliac, thoracic spine  Pain quality: aching, burning, cramping, shooting, stabbing  Radiates to: right foot, right knee, right thigh  Pain - numeric: 8/10  Pain is: worse during the night  Aggravated by: bending, coughing, position, lying down, sitting, stress  Stiffness is present: in the morning  Positive for the following: bladder incontinence, headaches, leg pain, numbness, paresthesias, tingling, weakness  Negative for the following: abdominal pain, bowel incontinence, chest pain, dysuria, fever, paresis, pelvic pain, perianal numbness, weight loss  Risk factors: history of cancer, history of steroid use, menopause, obesity    Pt is here for refills on norco and morphine.  Had a bit of flare in past month.        Patient presents today after hospitalization for POSTERIOR FUSION WITH TRANSFORAMINAL LUMBAR INTERBODY FUSION (TFLIF) L2-3, HARDWARE REMOVAL L3/4 PRONE 12- at [facility] with Dr. Clayton.     She is also s/p ANTERIOR CERVICAL DECOMPRESSION AND FUSION C7-T1 WITH ANTERIOR HARDWARE REMOVAL C6-7 AND POSTERIOR CERVICAL FUSION C7-T1 9-      She continues to do well at work-- finding benefit in a one hour noon break time.  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      She has continued with [facility] for further rehab and strengthening.  She has been using her pain medications as scheduled.  She has been doing well back at  I would prefer to continue with diclofenac.  Other NSAIDs have too many side effects, Celebrex has too many side effects work with her sit-to-stand work and finds she does best when she is standing. She is enjoying her [employer] class.      Pain control has been stable.  Does not desire a dose adjustment today.      Recently seen by NEUROSURG team.  Had some occipital injections recently for some C2-C3 degenerative issues with no relief appreciated.  Considering further injections and nerve stim.  Has Follow-up early next month with NEUROSURG     She is putting the possibility of medical cannabis at this time.        Problem list and histories reviewed & adjusted, as indicated.  Additional history: as documented    Patient Active Problem List   Diagnosis     PERSONAL HX OF  MELANOMA     B-complex deficiency     Migraine     Chronic Low Back Pain     CARDIOVASCULAR SCREENING; LDL GOAL LESS THAN 160     DDD (degenerative disc disease), cervical     Moderate recurrent major depression (H)     Vitamin D deficiency     Shift work sleep disorder     Chronic pain syndrome     CLL (chronic lymphocytic leukemia) (H)     Controlled substance agreement signed     Past Surgical History:   Procedure Laterality Date     anterior cervical discectomy C4-5 ,Fusion C5-6-7  10/2007     BLEPHAROPLASTY BILATERAL  2013    Procedure: BLEPHAROPLASTY BILATERAL;  BILATERAL UPPER LID BLEPHAROPLASTY AND BROWPEXY ;  Surgeon: Godfrey Miguel MD;  Location:  [facility]     C APPENDECTOMY  2006      SECTION      x2     FUSION LUMBAR ANTERIOR, FUSION LUMBAR POSTERIOR TWO LEVELS, COMBINED  2010    L3-S1 anterior posterior fusion     HYSTERECTOMY  2006    ovaries intact     Partial vulvectomy for NEENA III  2009     Pubovaginal sling, post op durasphere injections  2006    wears pad     ROTATOR CUFF REPAIR RT/LT  2011    right     SPINAL FUSION C3-4  2009    C3-4, anterior spinal fusion     TUBAL LIGATION         Social History   Substance Use Topics     Smoking status: Former Smoker     Packs/day: 1.00     Years: 5.00     Quit date:  1984     Smokeless tobacco: Never Used     Alcohol use Yes      Comment: no alcohol currently since prior to her back surgeries,      Family History   Problem Relation Age of Onset     Hypertension Mother      Alcohol/Drug Mother      Osteoporosis Mother      borderline     Hypertension Father      Diabetes Father      Type 2, diet controlled     Alcohol/Drug Father      remote use     C.A.D. Maternal Grandmother       late 50s     C.A.D. Maternal Grandfather      bone and brain cancer mets as well     Diabetes Paternal Grandfather      Alcohol/Drug Brother      Breast Cancer No family hx of      Cancer - colorectal No family hx of      Cerebrovascular Disease No family hx of          Current Outpatient Prescriptions   Medication Sig Dispense Refill     calcium citrate (CALCIUM CITRATE) 950 MG tablet Take 1 tablet by mouth 2 times daily.       cholecalciferol 30090 UNITS capsule Take 1 capsule by mouth once a week. 8 capsule 0     cyclobenzaprine (FLEXERIL) 10 MG tablet Take 1 tablet (10 mg) by mouth 3 times daily as needed for muscle spasms 90 tablet 3     DULoxetine (CYMBALTA) 60 MG EC capsule Take 1 capsule (60 mg) by mouth daily 30 capsule 11     estradiol (ESTRACE VAGINAL) 0.1 MG/GM vaginal cream Place 2 g vaginally three times a week. 42.5 g 11     Estradiol 1 MG/GM GEL Place 1 g onto the skin daily 30 g 11     HYDROcodone-acetaminophen (NORCO)  MG per tablet Take 1 tablet by mouth every 6 hours as needed for moderate to severe pain maximum 4 tablet(s) per day 90 tablet 0     lidocaine (LIDODERM) 5 % Patch Place 4 patches onto the skin daily Apply up to 4 patches to skin. Wear for 12 hours and remove for 12 hrs.  Refill when patient requests. 120 patch 3     LORazepam (ATIVAN) 0.5 MG tablet 1-2 tabs qhs prn insomnia and 1 q 8 hours prn anxiety 100 tablet 5     morphine (MS CONTIN) 15 MG 12 hr tablet Take 1 tablet (15 mg) by mouth every 12 hours maximum 2 tablet(s) per day 60 tablet 0      "sennosides (SENOKOT) 8.6 MG tablet Take 1 tablet by mouth daily as needed for constipation.       Allergies   Allergen Reactions     Budeprion Sr      Ineffective, name brand works best     Bupropion Other (See Comments)     Ineffective, name brand works best     Codeine      Shaky     Codeine Other (See Comments)     \"Feels like electricity running through body\"  No reaction noted in [place].  Ria  With Tylenol     Effexor [Venlafaxine Hydrochloride]      Affect too flat     Escitalopram      Other reaction(s): *Unknown  Sexual dysfunction     Fluoxetine Other (See Comments)     Sexual dysfunction     Levaquin [Levofloxacin] Other (See Comments)     Suicidal thoughts  Dark thoughts- mood changes     Lexapro      Sexual dysfunction     Pregabalin Other (See Comments)     Hallucinations  Audiovisual hallucinations     Prozac [Fluoxetine Hcl]      Sexual dysfunction     Tramadol Hives     Hives       Venlafaxine      Other reaction(s): *Unknown  Affect too flat     Recent Labs   Lab Test  11/14/17   1239  12/16/13   0739  11/07/12   1207   01/09/12   0903   10/06/10   1320   LDL   --   137*  104   --    --    --    --    HDL   --   52  46*   --    --    --    --    TRIG   --   226*  120   --    --    --    --    ALT   --   17  23   --   32   < >   --    CR   --   0.76  0.68   < >   --    < >  0.67   GFRESTIMATED   --   80  >90   < >   --    < >  >90   GFRESTBLACK   --   >90  >90   < >   --    < >  >90   POTASSIUM  4.1  4.6  4.2   --    --    < >  4.0   TSH   --    --    --    --    --    --   0.94    < > = values in this interval not displayed.      BP Readings from Last 3 Encounters:   10/16/18 124/82   09/11/18 121/82   08/14/18 99/68    Wt Readings from Last 3 Encounters:   11/14/17 189 lb 8 oz (86 kg)   02/23/17 185 lb (83.9 kg)   11/08/13 183 lb (83 kg)         Reviewed and updated as needed this visit by clinical staff  Tobacco  Allergies  Meds  Med Hx  Surg Hx  Fam Hx  Soc Hx      Reviewed and updated as " "needed this visit by Provider         ROS:  Constitutional, HEENT, cardiovascular, pulmonary, gi and gu systems are negative, except as otherwise noted.    OBJECTIVE:     /78 (BP Location: Left arm, Patient Position: Sitting, Cuff Size: Adult Regular)  Pulse 82  Temp 98.2  F (36.8  C) (Oral)  Resp 14  Ht 5' 5.5\" (1.664 m)  LMP 05/01/2005 (Exact Date)  SpO2 98%  BMI 31.05 kg/m2    GENERAL: healthy, alert and no distress  EYES: Eyes grossly normal to inspection, PERRL and conjunctivae and sclerae normal  NECK: no adenopathy, no asymmetry, masses, or scars and thyroid normal to palpation  MS: no gross musculoskeletal defects noted, no edema  SKIN: no suspicious lesions or rashes  PSYCH: mentation appears normal, affect normal/bright    ASSESSMENT/PLAN:     1. Chronic pain syndrome    - HYDROcodone-acetaminophen (NORCO)  MG per tablet; Take 1 tablet by mouth every 6 hours as needed for moderate to severe pain maximum 4 tablet(s) per day  Dispense: 90 tablet; Refill: 0  - morphine (MS CONTIN) 15 MG 12 hr tablet; Take 1 tablet (15 mg) by mouth every 12 hours maximum 2 tablet(s) per day  Dispense: 60 tablet; Refill: 0    2. Chronic low back pain, unspecified back pain laterality, with sciatica presence unspecified    - HYDROcodone-acetaminophen (NORCO)  MG per tablet; Take 1 tablet by mouth every 6 hours as needed for moderate to severe pain maximum 4 tablet(s) per day  Dispense: 90 tablet; Refill: 0  - morphine (MS CONTIN) 15 MG 12 hr tablet; Take 1 tablet (15 mg) by mouth every 12 hours maximum 2 tablet(s) per day  Dispense: 60 tablet; Refill: 0    3. DDD (degenerative disc disease), cervical    Refill of [place] and MS COntin today x 1 month- no dose change with stable progress noted.  Follow-up one month.    Emili Ballard MD  [facility]  "

## 2021-11-05 ENCOUNTER — OFFICE VISIT (OUTPATIENT)
Dept: FAMILY MEDICINE | Facility: CLINIC | Age: 61
End: 2021-11-05
Payer: COMMERCIAL

## 2021-11-05 VITALS
OXYGEN SATURATION: 95 % | TEMPERATURE: 97.4 F | WEIGHT: 146 LBS | HEART RATE: 54 BPM | RESPIRATION RATE: 16 BRPM | BODY MASS INDEX: 24.32 KG/M2 | DIASTOLIC BLOOD PRESSURE: 52 MMHG | SYSTOLIC BLOOD PRESSURE: 116 MMHG | HEIGHT: 65 IN

## 2021-11-05 DIAGNOSIS — G89.4 CHRONIC PAIN SYNDROME: Primary | ICD-10-CM

## 2021-11-05 DIAGNOSIS — L98.9 SKIN LESION: ICD-10-CM

## 2021-11-05 DIAGNOSIS — C91.10 CLL (CHRONIC LYMPHOCYTIC LEUKEMIA) (H): ICD-10-CM

## 2021-11-05 DIAGNOSIS — F33.1 MODERATE RECURRENT MAJOR DEPRESSION (H): ICD-10-CM

## 2021-11-05 PROCEDURE — 11104 PUNCH BX SKIN SINGLE LESION: CPT | Performed by: PHYSICIAN ASSISTANT

## 2021-11-05 PROCEDURE — 99213 OFFICE O/P EST LOW 20 MIN: CPT | Mod: 25 | Performed by: PHYSICIAN ASSISTANT

## 2021-11-05 RX ORDER — HYDROCODONE BITARTRATE AND ACETAMINOPHEN 10; 325 MG/1; MG/1
TABLET ORAL
Qty: 90 TABLET | Refills: 0 | Status: SHIPPED | OUTPATIENT
Start: 2021-11-06 | End: 2021-12-13

## 2021-11-05 RX ORDER — MORPHINE SULFATE 15 MG/1
15 TABLET, FILM COATED, EXTENDED RELEASE ORAL EVERY 12 HOURS
Qty: 80 TABLET | Refills: 0 | Status: SHIPPED | OUTPATIENT
Start: 2021-11-06 | End: 2021-12-13

## 2021-11-05 ASSESSMENT — MIFFLIN-ST. JEOR: SCORE: 1233.13
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
## 2021-11-08 DIAGNOSIS — M54.2 CERVICALGIA: ICD-10-CM

## 2021-11-08 PROCEDURE — 88312 SPECIAL STAINS GROUP 1: CPT | Performed by: DERMATOLOGY

## 2021-11-08 PROCEDURE — 88305 TISSUE EXAM BY PATHOLOGIST: CPT | Performed by: DERMATOLOGY

## 2021-11-09 RX ORDER — LIDOCAINE 50 MG/G
4 PATCH TOPICAL DAILY
Qty: 120 PATCH | Refills: 3 | Status: SHIPPED | OUTPATIENT
Start: 2021-11-09 | End: 2023-10-26

## 2021-11-12 LAB
PATH REPORT.COMMENTS IMP SPEC: NORMAL
PATH REPORT.FINAL DX SPEC: NORMAL
PATH REPORT.GROSS SPEC: NORMAL
PATH REPORT.MICROSCOPIC SPEC OTHER STN: NORMAL
PATH REPORT.RELEVANT HX SPEC: NORMAL

## 2021-12-13 DIAGNOSIS — G89.4 CHRONIC PAIN SYNDROME: ICD-10-CM

## 2021-12-13 RX ORDER — HYDROCODONE BITARTRATE AND ACETAMINOPHEN 10; 325 MG/1; MG/1
1 TABLET ORAL EVERY 4 HOURS PRN
Qty: 60 TABLET | Refills: 0 | Status: SHIPPED | OUTPATIENT
Start: 2021-12-13 | End: 2021-12-27

## 2021-12-13 RX ORDER — MORPHINE SULFATE 15 MG/1
15 TABLET, FILM COATED, EXTENDED RELEASE ORAL EVERY 12 HOURS
Qty: 60 TABLET | Refills: 0 | Status: SHIPPED | OUTPATIENT
Start: 2021-12-13 | End: 2021-12-27

## 2021-12-16 ENCOUNTER — TRANSFERRED RECORDS (OUTPATIENT)
Dept: HEALTH INFORMATION MANAGEMENT | Facility: CLINIC | Age: 61
End: 2021-12-16
Payer: COMMERCIAL

## 2021-12-22 ENCOUNTER — TELEPHONE (OUTPATIENT)
Dept: PSYCHIATRY | Facility: CLINIC | Age: 61
End: 2021-12-22

## 2021-12-22 NOTE — TELEPHONE ENCOUNTER
Pt called back to let clinic know she received the message about starting TMS on Monday, and that is okay for her. No need for call back.

## 2021-12-27 ENCOUNTER — ALLIED HEALTH/NURSE VISIT (OUTPATIENT)
Dept: PSYCHIATRY | Facility: CLINIC | Age: 61
End: 2021-12-27
Payer: COMMERCIAL

## 2021-12-27 ENCOUNTER — OFFICE VISIT (OUTPATIENT)
Dept: PSYCHIATRY | Facility: CLINIC | Age: 61
End: 2021-12-27
Payer: COMMERCIAL

## 2021-12-27 ENCOUNTER — VIRTUAL VISIT (OUTPATIENT)
Dept: FAMILY MEDICINE | Facility: CLINIC | Age: 61
End: 2021-12-27
Payer: COMMERCIAL

## 2021-12-27 DIAGNOSIS — G47.00 INSOMNIA, UNSPECIFIED TYPE: ICD-10-CM

## 2021-12-27 DIAGNOSIS — G89.4 CHRONIC PAIN SYNDROME: Primary | ICD-10-CM

## 2021-12-27 DIAGNOSIS — Z78.0 POSTMENOPAUSAL STATUS: ICD-10-CM

## 2021-12-27 DIAGNOSIS — F33.2 SEVERE EPISODE OF RECURRENT MAJOR DEPRESSIVE DISORDER, WITHOUT PSYCHOTIC FEATURES (H): Primary | ICD-10-CM

## 2021-12-27 DIAGNOSIS — N95.2 ATROPHIC VAGINITIS: ICD-10-CM

## 2021-12-27 PROCEDURE — 99213 OFFICE O/P EST LOW 20 MIN: CPT | Mod: 95 | Performed by: PHYSICIAN ASSISTANT

## 2021-12-27 RX ORDER — HYDROCODONE BITARTRATE AND ACETAMINOPHEN 10; 325 MG/1; MG/1
1 TABLET ORAL EVERY 4 HOURS PRN
Qty: 60 TABLET | Refills: 0 | Status: SHIPPED | OUTPATIENT
Start: 2022-01-10 | End: 2022-02-07

## 2021-12-27 RX ORDER — DULOXETIN HYDROCHLORIDE 30 MG/1
30 CAPSULE, DELAYED RELEASE ORAL
COMMUNITY
End: 2022-04-01

## 2021-12-27 RX ORDER — DULOXETIN HYDROCHLORIDE 60 MG/1
CAPSULE, DELAYED RELEASE ORAL
COMMUNITY
Start: 2020-10-13 | End: 2022-04-01

## 2021-12-27 RX ORDER — FERROUS SULFATE 325(65) MG
TABLET ORAL
COMMUNITY
Start: 2021-11-19 | End: 2022-06-30

## 2021-12-27 RX ORDER — LIOTHYRONINE SODIUM 50 UG/1
TABLET ORAL
COMMUNITY
Start: 2021-06-15 | End: 2022-04-01

## 2021-12-27 RX ORDER — LORAZEPAM 1 MG/1
1 TABLET ORAL EVERY 6 HOURS PRN
Qty: 50 TABLET | Refills: 3 | Status: SHIPPED | OUTPATIENT
Start: 2022-01-03 | End: 2023-08-17

## 2021-12-27 RX ORDER — ESTRADIOL 1 MG/G
GEL TOPICAL
Qty: 30 G | Refills: 11 | Status: SHIPPED | OUTPATIENT
Start: 2021-12-27 | End: 2023-03-13

## 2021-12-27 RX ORDER — DULOXETIN HYDROCHLORIDE 20 MG/1
CAPSULE, DELAYED RELEASE ORAL
COMMUNITY
Start: 2021-04-12 | End: 2022-04-01

## 2021-12-27 RX ORDER — MORPHINE SULFATE 15 MG/1
15 TABLET, FILM COATED, EXTENDED RELEASE ORAL EVERY 12 HOURS
Qty: 60 TABLET | Refills: 0 | Status: SHIPPED | OUTPATIENT
Start: 2022-01-10 | End: 2022-02-07

## 2021-12-27 RX ORDER — ERGOCALCIFEROL 1.25 MG/1
50000 CAPSULE ORAL
COMMUNITY
End: 2022-06-30

## 2021-12-27 ASSESSMENT — PATIENT HEALTH QUESTIONNAIRE - PHQ9: SUM OF ALL RESPONSES TO PHQ QUESTIONS 1-9: 25

## 2021-12-27 NOTE — PROGRESS NOTES
TMS Education     Janelle White MRN# [id number]  Age: 61 year old year old YOB: 1960        Patient is here in clinic for TMS education and consenting.  Patient will be starting TMS today on the [employer].  Writer and patient reviewed mechanics of TMS.  Discussed using magnetic pulses to stimulate and induce an electrical field inside the brain.  Discussed the difference between F8 and H1 coil.  Patient was able to verbalize understanding of this.  Discussed that the idea is that we are treating the DLPFC of the brain, as it has been shown by in studies that people who have depression have decreased activity in this part of the brain, the idea is that we stimulate this part of the brain to increase activity.       Reviewed processing of mapping and how visit today would go.  Encouraged patient to communicate with staff if treatment at anytime became painful.         Discussed side effects of TMS.  Side effects include headaches after treatment, hearing loss, lightheadedness/dizziness, short lived vision changes, and seizures.  Discussed ways that TMS Clinic helps with preventing these side effects.  Such as retesting motor thresholds and having patient wear ear plugs.  Instructed patient that she can take OTC pain relievers such as tylenol or IBU if she has a headache after today's treatment.  Reviewed safety concerns regarding sleep and use of illegal drugs/alcohol.        Patient was able to ask questions regarding procedure.  Patient informed of 10 minute late policy and attendance policy.  Consent was signed by patient today.

## 2021-12-27 NOTE — PROGRESS NOTES
" Interventional Psychiatry Program  [address], [address]  [place] [address]  TMS Procedure Note   Janelle White MRN# [id number]  Age: 61 year old year old YOB: 1960    Pre-Procedure:  History and Physical: Reviewed in medical record  Consent Signed by: Janelle White for this course of treatment.  On: 12/27/21    Reviewed possible side effects associated with treatment as well as questions regarding possible course of treatments.  Patient agreed to procedure and was able to reflect implications.    Clinical Narrative:  Patient presents to initiate an acute course of TMS for management of her symptoms of resistant depression.    Indications for TMS:  MDD, recurrent, severe; 4+ medication trials (from 2+ classes) ineffective; Psychotherapy ineffective     Procedure Diagnosis:  Severe episode of recurrent major depressive disorder, without psychotic features (H)  (primary encounter diagnosis)    Treatment Hx:  Treatment number this series:  1 and Total lifetime treatment number: 1    Allergies   Allergen Reactions     Bupropion Other (See Comments) and Swelling     Ineffective, name brand works best  Ineffective, name brand works best     Codeine Other (See Comments)     \"Feels like electricity running through body\"  No reaction noted in [employer]ner.  Shaky  With Tylenol     Effexor [Venlafaxine Hydrochloride]      Affect too flat     Escitalopram      Other reaction(s): *Unknown  Sexual dysfunction     Fluoxetine Other (See Comments)     Sexual dysfunction     Levaquin [Levofloxacin] Other (See Comments)     Suicidal thoughts  Dark thoughts- mood changes     Lexapro      Sexual dysfunction     Methylphenidate Hives     Milnacipran      12.5 MG is fine. 25 MG caused side effects. \"Dark thoughts\"     Pregabalin Other (See Comments)     Hallucinations  Audiovisual hallucinations     Prozac [Fluoxetine Hcl]      Sexual dysfunction     Tramadol Hives     Hives       Venlafaxine  "     Other reaction(s): *Unknown  Affect too flat      LMP 05/01/2005 (LMP Unknown)     Pause for the Cause  Right patient: Yes  Right procedure/correct coil:  Yes; rTMS; [id number]; F8 coil.   Earplugs in place:  Yes    Procedure  Patient was seated in procedure chair. Identity and procedure was verified. Ear plugs were placed in ears and patient-specific cap was placed on head and tightened appropriately. Ruler locations were verified. Bone conducting headphones were not used.  Coil was placed and stimulator was set to 62% (100% of MT).  Initial train was well tolerated so 60 trains were delivered.  Patient tolerated procedure well with minimal left eyebrow movement.    Motor Threshold Determination  Distance from nasion to inion: 36  Tragus to tragus distance: 35  Head circumference: 54  Distance along the vertex: 9.38  Distance along circumference from midline: 6.25  MT 1:@ 62% on 12/27/2021    Theta LDLFPC     Frequency: 50 Hz  Burst Frequency: 5 Hz  Train Duration: 10 sec   Number of Bursts: 10   Total pulses delivered: 1800  Tx Loc: F3  Energy: 62% (100%MT)  Trains: 60       Date MADRS IDS-SR PHQ-9   12/27/21 35 66 25    --         PHQ-9 SCORE 7/14/2021 7/27/2021 10/5/2021   PHQ-9 Total Score - - -   PHQ-9 Total Score MyChart - 17 (Moderately severe depression) 6 (Mild depression)   PHQ-9 Total Score 14 17 6       Saniya Miller RN   [facility]    Plan   - Cont TMS   -Increase energy as tolerated     I saw the patient before treatment and completed motor threshold re-determination as documented above and remained available in the clinic throughout the TMS session.      Dharmesh Degroot M.D.   [facility]
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
## 2021-12-28 ENCOUNTER — OFFICE VISIT (OUTPATIENT)
Dept: PSYCHIATRY | Facility: CLINIC | Age: 61
End: 2021-12-28
Payer: COMMERCIAL

## 2021-12-28 DIAGNOSIS — F33.2 SEVERE EPISODE OF RECURRENT MAJOR DEPRESSIVE DISORDER, WITHOUT PSYCHOTIC FEATURES (H): Primary | ICD-10-CM

## 2021-12-28 ASSESSMENT — PATIENT HEALTH QUESTIONNAIRE - PHQ9: SUM OF ALL RESPONSES TO PHQ QUESTIONS 1-9: 26

## 2021-12-28 NOTE — PROGRESS NOTES
" Interventional Psychiatry Program  [address], [address]  [place] [address]  TMS Procedure Note   Janelle White MRN# [id number]  Age: 61 year old year old YOB: 1960    Pre-Procedure:  History and Physical: Reviewed in medical record  Consent Signed by: Janelle White for this course of treatment.  On: 12/27/21    Reviewed possible side effects associated with treatment as well as questions regarding possible course of treatments.  Patient agreed to procedure and was able to reflect implications.    Clinical Narrative:  Patient tolerating treatment. Tolerated increase to 105% MT.    Indications for TMS:  MDD, recurrent, severe; 4+ medication trials (from 2+ classes) ineffective; Psychotherapy ineffective     Procedure Diagnosis:  Severe episode of recurrent major depressive disorder, without psychotic features (H)  (primary encounter diagnosis)    Treatment Hx:   Treatment number this series: 2  Total lifetime treatment number: 2      Allergies   Allergen Reactions     Bupropion Other (See Comments) and Swelling     Ineffective, name brand works best  Ineffective, name brand works best     Codeine Other (See Comments)     \"Feels like electricity running through body\"  No reaction noted in [employer]ner.  Ria  With Tylenol     Effexor [Venlafaxine Hydrochloride]      Affect too flat     Escitalopram      Other reaction(s): *Unknown  Sexual dysfunction     Fluoxetine Other (See Comments)     Sexual dysfunction     Levaquin [Levofloxacin] Other (See Comments)     Suicidal thoughts  Dark thoughts- mood changes     Lexapro      Sexual dysfunction     Methylphenidate Hives     Milnacipran      12.5 MG is fine. 25 MG caused side effects. \"Dark thoughts\"     Pregabalin Other (See Comments)     Hallucinations  Audiovisual hallucinations     Prozac [Fluoxetine Hcl]      Sexual dysfunction     Tramadol Hives     Hives       Venlafaxine      Other reaction(s): *Unknown  Affect too flat "      LMP 05/01/2005 (LMP Unknown)     Pause for the Cause  Right patient: Yes  Right procedure/correct coil:  Yes; rTMS; [id number]; F8 coil.   Earplugs in place:  Yes    Procedure  Patient was seated in procedure chair. Identity and procedure was verified. Ear plugs were placed in ears and patient-specific cap was placed on head and tightened appropriately. Ruler locations were verified. Bone conducting headphones were not used.  Coil was placed and stimulator was set to 62% (100% of MT).  Initial train was well tolerated so 60 trains were delivered.  Patient tolerated procedure well with minimal left eyebrow movement.    Motor Threshold Determination  Distance from nasion to inion: 36  Tragus to tragus distance: 35  Head circumference: 54  Distance along the vertex: 9.38  Distance along circumference from midline: 6.25  MT 1:@ 62% on 12/27/2021    Theta LDLFPC     Frequency: 50 Hz  Burst Frequency: 5 Hz  Train Duration: 10 sec   Number of Bursts: 10   Total pulses delivered: 1800  Tx Loc: F3  Energy: 65% (105%MT)  Trains: 60       Date MADRS IDS-SR PHQ-9   12/27/21 35 66 25    --         PHQ-9 SCORE 7/27/2021 10/5/2021 12/27/2021   PHQ-9 Total Score - - -   PHQ-9 Total Score MyChart 17 (Moderately severe depression) 6 (Mild depression) -   PHQ-9 Total Score 17 6 25       Griselda Lopez, EMT  [facility]    Plan   - Cont TMS   -Increase energy as tolerated     I did not see the patient but remained available in the clinic throughout the TMS session.     Dharmesh Degroot M.D.   [facility]

## 2021-12-29 ENCOUNTER — OFFICE VISIT (OUTPATIENT)
Dept: PSYCHIATRY | Facility: CLINIC | Age: 61
End: 2021-12-29
Payer: COMMERCIAL

## 2021-12-29 DIAGNOSIS — F33.2 SEVERE EPISODE OF RECURRENT MAJOR DEPRESSIVE DISORDER, WITHOUT PSYCHOTIC FEATURES (H): Primary | ICD-10-CM

## 2021-12-29 ASSESSMENT — PATIENT HEALTH QUESTIONNAIRE - PHQ9: SUM OF ALL RESPONSES TO PHQ QUESTIONS 1-9: 27

## 2021-12-29 NOTE — PROGRESS NOTES
" Interventional Psychiatry Program  [address], [address]  [place] [address]  TMS Procedure Note   Janelle White MRN# [id number]  Age: 61 year old year old YOB: 1960    Pre-Procedure:  History and Physical: Reviewed in medical record  Consent Signed by: Janelle White for this course of treatment.  On: 12/27/21    Reviewed possible side effects associated with treatment as well as questions regarding possible course of treatments.  Patient agreed to procedure and was able to reflect implications.    Clinical Narrative:  Patient tolerating treatment. Tolerated increase to 110% MT. She reports feeling tired and nauseous lately, but this isn't something new to TMS.    Indications for TMS:  MDD, recurrent, severe; 4+ medication trials (from 2+ classes) ineffective; Psychotherapy ineffective     Procedure Diagnosis:  Severe episode of recurrent major depressive disorder, without psychotic features (H)  (primary encounter diagnosis)    Treatment Hx:   Treatment number this series: 3  Total lifetime treatment number: 3      Allergies   Allergen Reactions     Bupropion Other (See Comments) and Swelling     Ineffective, name brand works best  Ineffective, name brand works best     Codeine Other (See Comments)     \"Feels like electricity running through body\"  No reaction noted in [employer]ner.  Shaky  With Tylenol     Effexor [Venlafaxine Hydrochloride]      Affect too flat     Escitalopram      Other reaction(s): *Unknown  Sexual dysfunction     Fluoxetine Other (See Comments)     Sexual dysfunction     Levaquin [Levofloxacin] Other (See Comments)     Suicidal thoughts  Dark thoughts- mood changes     Lexapro      Sexual dysfunction     Methylphenidate Hives     Milnacipran      12.5 MG is fine. 25 MG caused side effects. \"Dark thoughts\"     Pregabalin Other (See Comments)     Hallucinations  Audiovisual hallucinations     Prozac [Fluoxetine Hcl]      Sexual dysfunction     Tramadol " Hives     Hives       Venlafaxine      Other reaction(s): *Unknown  Affect too flat      LMP 05/01/2005 (LMP Unknown)     Pause for the Cause  Right patient: Yes  Right procedure/correct coil:  Yes; rTMS; [id number]; F8 coil.   Earplugs in place:  Yes    Procedure  Patient was seated in procedure chair. Identity and procedure was verified. Ear plugs were placed in ears and patient-specific cap was placed on head and tightened appropriately. Ruler locations were verified. Bone conducting headphones were not used.  Coil was placed and stimulator was set to 62% (100% of MT).  Initial train was well tolerated so 60 trains were delivered.  Patient tolerated procedure well with minimal left eyebrow movement.    Motor Threshold Determination  Distance from nasion to inion: 36  Tragus to tragus distance: 35  Head circumference: 54  Distance along the vertex: 9.38  Distance along circumference from midline: 6.25  MT 1:@ 62% on 12/27/2021    Theta LDLFPC     Frequency: 50 Hz  Burst Frequency: 5 Hz  Train Duration: 10 sec   Number of Bursts: 10   Total pulses delivered: 1800  Tx Loc: F3  Energy: 68% (110%MT)  Trains: 60       Date MADRS IDS-SR PHQ-9   12/27/21 35 66 25    --         PHQ-9 SCORE 10/5/2021 12/27/2021 12/28/2021   PHQ-9 Total Score - - -   PHQ-9 Total Score MyChart 6 (Mild depression) - -   PHQ-9 Total Score 6 25 26       Griselda Lopez, EMT  [facility]    Plan   - Cont TMS   -Increase energy as tolerated     I did not see the patient but remained available in the clinic throughout the TMS session.     Dharmesh Degroot M.D.   [facility]

## 2021-12-30 ENCOUNTER — OFFICE VISIT (OUTPATIENT)
Dept: PSYCHIATRY | Facility: CLINIC | Age: 61
End: 2021-12-30
Payer: COMMERCIAL

## 2021-12-30 DIAGNOSIS — F33.2 SEVERE EPISODE OF RECURRENT MAJOR DEPRESSIVE DISORDER, WITHOUT PSYCHOTIC FEATURES (H): Primary | ICD-10-CM

## 2021-12-30 ASSESSMENT — PATIENT HEALTH QUESTIONNAIRE - PHQ9: SUM OF ALL RESPONSES TO PHQ QUESTIONS 1-9: 27

## 2021-12-30 NOTE — PROGRESS NOTES
" Interventional Psychiatry Program  [address], [address]  [place] [address]  TMS Procedure Note   Jnaelle White MRN# [id number]  Age: 61 year old year old YOB: 1960    Pre-Procedure:  History and Physical: Reviewed in medical record  Consent Signed by: Janelle White for this course of treatment.  On: 12/27/21    Reviewed possible side effects associated with treatment as well as questions regarding possible course of treatments.  Patient agreed to procedure and was able to reflect implications.    Clinical Narrative:  Patient tolerating treatment. Tolerated increase to 115% MT. She reports feeling tired still.    Indications for TMS:  MDD, recurrent, severe; 4+ medication trials (from 2+ classes) ineffective; Psychotherapy ineffective     Procedure Diagnosis:  Severe episode of recurrent major depressive disorder, without psychotic features (H)  (primary encounter diagnosis)    Treatment Hx:   Treatment number this series: 4  Total lifetime treatment number: 4      Allergies   Allergen Reactions     Bupropion Other (See Comments) and Swelling     Ineffective, name brand works best  Ineffective, name brand works best     Codeine Other (See Comments)     \"Feels like electricity running through body\"  No reaction noted in [employer]ner.  Shaky  With Tylenol     Effexor [Venlafaxine Hydrochloride]      Affect too flat     Escitalopram      Other reaction(s): *Unknown  Sexual dysfunction     Fluoxetine Other (See Comments)     Sexual dysfunction     Levaquin [Levofloxacin] Other (See Comments)     Suicidal thoughts  Dark thoughts- mood changes     Lexapro      Sexual dysfunction     Methylphenidate Hives     Milnacipran      12.5 MG is fine. 25 MG caused side effects. \"Dark thoughts\"     Pregabalin Other (See Comments)     Hallucinations  Audiovisual hallucinations     Prozac [Fluoxetine Hcl]      Sexual dysfunction     Tramadol Hives     Hives       Venlafaxine      Other " reaction(s): *Unknown  Affect too flat      LMP 05/01/2005 (LMP Unknown)     Pause for the Cause  Right patient: Yes  Right procedure/correct coil:  Yes; rTMS; [id number]; F8 coil.   Earplugs in place:  Yes    Procedure  Patient was seated in procedure chair. Identity and procedure was verified. Ear plugs were placed in ears and patient-specific cap was placed on head and tightened appropriately. Ruler locations were verified. Bone conducting headphones were not used.  Coil was placed and stimulator was set to 62% (100% of MT).  Initial train was well tolerated so 60 trains were delivered.  Patient tolerated procedure well with minimal left eyebrow movement.    Motor Threshold Determination  Distance from nasion to inion: 36  Tragus to tragus distance: 35  Head circumference: 54  Distance along the vertex: 9.38  Distance along circumference from midline: 6.25  MT 1:@ 62% on 12/27/2021    Theta LDLFPC     Frequency: 50 Hz  Burst Frequency: 5 Hz  Train Duration: 10 sec   Number of Bursts: 10   Total pulses delivered: 1800  Tx Loc: F3  Energy: 71% (115%MT)  Trains: 60       Date MADRS IDS-SR PHQ-9   12/27/21 35 66 25    --         PHQ-9 SCORE 12/27/2021 12/28/2021 12/29/2021   PHQ-9 Total Score - - -   PHQ-9 Total Score MyChart - - -   PHQ-9 Total Score 25 26 27       Griselda Lopez, EMT  [facility]    Plan   - Cont TMS   -Increase energy as tolerated     I did not see the patient but remained available in the clinic throughout the TMS session.     Kavitha Abarca MD  [facility]

## 2021-12-31 ENCOUNTER — OFFICE VISIT (OUTPATIENT)
Dept: PSYCHIATRY | Facility: CLINIC | Age: 61
End: 2021-12-31
Payer: COMMERCIAL

## 2021-12-31 DIAGNOSIS — F33.2 SEVERE EPISODE OF RECURRENT MAJOR DEPRESSIVE DISORDER, WITHOUT PSYCHOTIC FEATURES (H): Primary | ICD-10-CM

## 2021-12-31 ASSESSMENT — PATIENT HEALTH QUESTIONNAIRE - PHQ9: SUM OF ALL RESPONSES TO PHQ QUESTIONS 1-9: 26

## 2021-12-31 NOTE — PROGRESS NOTES
" Interventional Psychiatry Program  [address], [address]  [place] [address]  TMS Procedure Note   Janelle White MRN# [id number]  Age: 61 year old year old YOB: 1960    Pre-Procedure:  History and Physical: Reviewed in medical record  Consent Signed by: Janelle White for this course of treatment.  On: 12/27/21    Reviewed possible side effects associated with treatment as well as questions regarding possible course of treatments.  Patient agreed to procedure and was able to reflect implications.    Clinical Narrative:  Patient tolerating treatment. Tolerated increase to 117% MT, slight increase in facial twitching. Plans on taking dog for walk after TMS.     Indications for TMS:  MDD, recurrent, severe; 4+ medication trials (from 2+ classes) ineffective; Psychotherapy ineffective     Procedure Diagnosis:  Severe episode of recurrent major depressive disorder, without psychotic features (H)  (primary encounter diagnosis)    Treatment Hx:   Treatment number this series: 5  Total lifetime treatment number: 5      Allergies   Allergen Reactions     Bupropion Other (See Comments) and Swelling     Ineffective, name brand works best  Ineffective, name brand works best     Codeine Other (See Comments)     \"Feels like electricity running through body\"  No reaction noted in Cerner.  Shaky  With Tylenol     Effexor [Venlafaxine Hydrochloride]      Affect too flat     Escitalopram      Other reaction(s): *Unknown  Sexual dysfunction     Fluoxetine Other (See Comments)     Sexual dysfunction     Levaquin [Levofloxacin] Other (See Comments)     Suicidal thoughts  Dark thoughts- mood changes     Lexapro      Sexual dysfunction     Methylphenidate Hives     Milnacipran      12.5 MG is fine. 25 MG caused side effects. \"Dark thoughts\"     Pregabalin Other (See Comments)     Hallucinations  Audiovisual hallucinations     Prozac [Fluoxetine Hcl]      Sexual dysfunction     Tramadol Hives "     Hives       Venlafaxine      Other reaction(s): *Unknown  Affect too flat      LMP 05/01/2005 (LMP Unknown)     Pause for the Cause  Right patient: Yes  Right procedure/correct coil:  Yes; rTMS; [id number]; F8 coil.   Earplugs in place:  Yes    Procedure  Patient was seated in procedure chair. Identity and procedure was verified. Ear plugs were placed in ears and patient-specific cap was placed on head and tightened appropriately. Ruler locations were verified. Bone conducting headphones were not used.  Coil was placed and stimulator was set to 62% (100% of MT).  Initial train was well tolerated so 60 trains were delivered.  Patient tolerated procedure well with minimal left eyebrow movement.    Motor Threshold Determination  Distance from nasion to inion: 36  Tragus to tragus distance: 35  Head circumference: 54  Distance along the vertex: 9.38  Distance along circumference from midline: 6.25  MT 1:@ 62% on 12/27/2021    Theta LDLFPC     Frequency: 50 Hz  Burst Frequency: 5 Hz  Train Duration: 10 sec   Number of Bursts: 10   Total pulses delivered: 1800  Tx Loc: F3  Energy: 71% (117%MT)  Trains: 60       Date MADRS IDS-SR PHQ-9   12/27/21 35 66 25    --         PHQ-9 SCORE 12/28/2021 12/29/2021 12/30/2021   PHQ-9 Total Score - - -   PHQ-9 Total Score MyChart - - -   PHQ-9 Total Score 26 27 27       Fiona Hand, ARTEMIO  [facility]    Plan   - Cont TMS   -Increase energy as tolerated     I did not see the patient during/after treatment but remained available in the clinic during  treatment.    Kavitha Abarca MD  [facility]     Kavitha Abarca MD  [facility]

## 2022-01-03 ENCOUNTER — OFFICE VISIT (OUTPATIENT)
Dept: PSYCHIATRY | Facility: CLINIC | Age: 62
End: 2022-01-03
Payer: COMMERCIAL

## 2022-01-03 DIAGNOSIS — F33.2 SEVERE EPISODE OF RECURRENT MAJOR DEPRESSIVE DISORDER, WITHOUT PSYCHOTIC FEATURES (H): Primary | ICD-10-CM

## 2022-01-03 NOTE — PROGRESS NOTES
" Interventional Psychiatry Program  [address], [address]  [place] [address]  TMS Procedure Note   Janelle White MRN# [id number]  Age: 61 year old year old YOB: 1960    Pre-Procedure:  History and Physical: Reviewed in medical record  Consent Signed by: Janelle White for this course of treatment.  On: 12/27/21    Reviewed possible side effects associated with treatment as well as questions regarding possible course of treatments.  Patient agreed to procedure and was able to reflect implications.    Clinical Narrative:  Patient tolerating treatment. Increased to 120%     Indications for TMS:  MDD, recurrent, severe; 4+ medication trials (from 2+ classes) ineffective; Psychotherapy ineffective     Procedure Diagnosis:  Severe episode of recurrent major depressive disorder, without psychotic features (H)  (primary encounter diagnosis)    Treatment Hx:   Treatment number this series: 6  Total lifetime treatment number: 6      Allergies   Allergen Reactions     Bupropion Other (See Comments) and Swelling     Ineffective, name brand works best  Ineffective, name brand works best     Codeine Other (See Comments)     \"Feels like electricity running through body\"  No reaction noted in [employer]ner.  Shaky  With Tylenol     Effexor [Venlafaxine Hydrochloride]      Affect too flat     Escitalopram      Other reaction(s): *Unknown  Sexual dysfunction     Fluoxetine Other (See Comments)     Sexual dysfunction     Levaquin [Levofloxacin] Other (See Comments)     Suicidal thoughts  Dark thoughts- mood changes     Lexapro      Sexual dysfunction     Methylphenidate Hives     Milnacipran      12.5 MG is fine. 25 MG caused side effects. \"Dark thoughts\"     Pregabalin Other (See Comments)     Hallucinations  Audiovisual hallucinations     Prozac [Fluoxetine Hcl]      Sexual dysfunction     Tramadol Hives     Hives       Venlafaxine      Other reaction(s): *Unknown  Affect too flat      LMP " 05/01/2005 (LMP Unknown)     Pause for the Cause  Right patient: Yes  Right procedure/correct coil:  Yes; rTMS; [id number]; F8 coil.   Earplugs in place:  Yes    Procedure  Patient was seated in procedure chair. Identity and procedure was verified. Ear plugs were placed in ears and patient-specific cap was placed on head and tightened appropriately. Ruler locations were verified. Bone conducting headphones were not used.  Coil was placed and stimulator was set to 62% (100% of MT).  Initial train was well tolerated so 60 trains were delivered.  Patient tolerated procedure well with minimal left eyebrow movement.    Motor Threshold Determination  Distance from nasion to inion: 36  Tragus to tragus distance: 35  Head circumference: 54  Distance along the vertex: 9.38  Distance along circumference from midline: 6.25  MT 1:@ 62% on 12/27/2021    Theta LDLFPC     Frequency: 50 Hz  Burst Frequency: 5 Hz  Train Duration: 10 sec   Number of Bursts: 10   Total pulses delivered: 1800  Tx Loc: F3  Energy: 71% (117%MT)  Trains: 60       Date MADRS IDS-SR PHQ-9   12/27/21 35 66 25    --         PHQ-9 SCORE 12/29/2021 12/30/2021 12/31/2021   PHQ-9 Total Score - - -   PHQ-9 Total Score MyChart - - -   PHQ-9 Total Score 27 27 26       Fiona Hand RN  [facility]    Plan   - Cont TMS     I did not see the patient during/after treatment but remained available in the clinic during  treatment.    Yayo Mireles MD  [facility]

## 2022-01-04 ENCOUNTER — OFFICE VISIT (OUTPATIENT)
Dept: PSYCHIATRY | Facility: CLINIC | Age: 62
End: 2022-01-04
Payer: COMMERCIAL

## 2022-01-04 DIAGNOSIS — F33.2 SEVERE EPISODE OF RECURRENT MAJOR DEPRESSIVE DISORDER, WITHOUT PSYCHOTIC FEATURES (H): Primary | ICD-10-CM

## 2022-01-04 ASSESSMENT — PATIENT HEALTH QUESTIONNAIRE - PHQ9: SUM OF ALL RESPONSES TO PHQ QUESTIONS 1-9: 21

## 2022-01-04 NOTE — PROGRESS NOTES
" Interventional Psychiatry Program  [address], [address]  [place] [address]  TMS Procedure Note   Janelle White MRN# [id number]  Age: 61 year old year old YOB: 1960    Pre-Procedure:  History and Physical: Reviewed in medical record  Consent Signed by: Janelle White for this course of treatment.  On: 12/27/21      Clinical Narrative:  Patient tolerating treatment.  Patient states TMS is going well, has been tolerating it well.     Indications for TMS:  MDD, recurrent, severe; 4+ medication trials (from 2+ classes) ineffective; Psychotherapy ineffective     Procedure Diagnosis:  Severe episode of recurrent major depressive disorder, without psychotic features (H)  (primary encounter diagnosis)    Treatment Hx:   Treatment number this series: 7  Total lifetime treatment number: 7      Allergies   Allergen Reactions     Bupropion Other (See Comments) and Swelling     Ineffective, name brand works best  Ineffective, name brand works best     Codeine Other (See Comments)     \"Feels like electricity running through body\"  No reaction noted in [employer]ner.  Shaky  With Tylenol     Effexor [Venlafaxine Hydrochloride]      Affect too flat     Escitalopram      Other reaction(s): *Unknown  Sexual dysfunction     Fluoxetine Other (See Comments)     Sexual dysfunction     Levaquin [Levofloxacin] Other (See Comments)     Suicidal thoughts  Dark thoughts- mood changes     Lexapro      Sexual dysfunction     Methylphenidate Hives     Milnacipran      12.5 MG is fine. 25 MG caused side effects. \"Dark thoughts\"     Pregabalin Other (See Comments)     Hallucinations  Audiovisual hallucinations     Prozac [Fluoxetine Hcl]      Sexual dysfunction     Tramadol Hives     Hives       Venlafaxine      Other reaction(s): *Unknown  Affect too flat      LMP 05/01/2005 (LMP Unknown)     Pause for the Cause  Right patient: Yes  Right procedure/correct coil:  Yes; rTMS; [id number]; F8 coil.   Earplugs " in place:  Yes    Procedure  Patient was seated in procedure chair. Identity and procedure was verified. Ear plugs were placed in ears and patient-specific cap was placed on head and tightened appropriately. Ruler locations were verified. Bone conducting headphones were not used.  Coil was placed and stimulator was set to 62% (100% of MT).  Initial train was well tolerated so 60 trains were delivered.  Patient tolerated procedure well with minimal left eyebrow movement.    Motor Threshold Determination  Distance from nasion to inion: 36  Tragus to tragus distance: 35  Head circumference: 54  Distance along the vertex: 9.38  Distance along circumference from midline: 6.25  MT 1:@ 62% on 12/27/2021    Theta LDLFPC     Frequency: 50 Hz  Burst Frequency: 5 Hz  Train Duration: 10 sec   Number of Bursts: 10   Total pulses delivered: 1800  Tx Loc: F3  Energy: 74% (120%MT)  Trains: 60       Date MADRS IDS-SR PHQ-9   12/27/21 35 66 25    --         PHQ-9 SCORE 12/30/2021 12/31/2021 1/4/2022   PHQ-9 Total Score - - -   PHQ-9 Total Score MyChart - - -   PHQ-9 Total Score 27 26 21       Saniya Miller RN   [facility]    Plan   - Cont TMS     I did not see the patient during/after treatment but remained available in the clinic during  treatment.    Melissa Taveras MD  [facility]

## 2022-01-05 ENCOUNTER — OFFICE VISIT (OUTPATIENT)
Dept: PSYCHIATRY | Facility: CLINIC | Age: 62
End: 2022-01-05
Payer: COMMERCIAL

## 2022-01-05 DIAGNOSIS — F33.2 SEVERE EPISODE OF RECURRENT MAJOR DEPRESSIVE DISORDER, WITHOUT PSYCHOTIC FEATURES (H): Primary | ICD-10-CM

## 2022-01-05 ASSESSMENT — PATIENT HEALTH QUESTIONNAIRE - PHQ9: SUM OF ALL RESPONSES TO PHQ QUESTIONS 1-9: 25

## 2022-01-05 NOTE — PROGRESS NOTES
" Interventional Psychiatry Program  [address], [address]  [place] [address]  TMS Procedure Note   Janelle White MRN# [id number]  Age: 61 year old year old YOB: 1960    Pre-Procedure:  History and Physical: Reviewed in medical record  Consent Signed by: Janelle White for this course of treatment.  On: 12/27/21      Clinical Narrative:  Patient tolerating treatment.  No changes reported.      Indications for TMS:  MDD, recurrent, severe; 4+ medication trials (from 2+ classes) ineffective; Psychotherapy ineffective     Procedure Diagnosis:  Severe episode of recurrent major depressive disorder, without psychotic features (H)  (primary encounter diagnosis)    Treatment Hx:   Treatment number this series: 8  Total lifetime treatment number: 8      Allergies   Allergen Reactions     Bupropion Other (See Comments) and Swelling     Ineffective, name brand works best  Ineffective, name brand works best     Codeine Other (See Comments)     \"Feels like electricity running through body\"  No reaction noted in [employer]ner.  Shaky  With Tylenol     Effexor [Venlafaxine Hydrochloride]      Affect too flat     Escitalopram      Other reaction(s): *Unknown  Sexual dysfunction     Fluoxetine Other (See Comments)     Sexual dysfunction     Levaquin [Levofloxacin] Other (See Comments)     Suicidal thoughts  Dark thoughts- mood changes     Lexapro      Sexual dysfunction     Methylphenidate Hives     Milnacipran      12.5 MG is fine. 25 MG caused side effects. \"Dark thoughts\"     Pregabalin Other (See Comments)     Hallucinations  Audiovisual hallucinations     Prozac [Fluoxetine Hcl]      Sexual dysfunction     Tramadol Hives     Hives       Venlafaxine      Other reaction(s): *Unknown  Affect too flat      LMP 05/01/2005 (LMP Unknown)     Pause for the Cause  Right patient:  Yes  Right procedure/correct coil:  Yes; rTMS; cpt 00966; F8 coil.   Earplugs in place:  Yes    Procedure  Patient was " seated in procedure chair. Identity and procedure was verified. Ear plugs were placed in ears and patient-specific cap was placed on head and tightened appropriately. Ruler locations were verified. Coil was placed at treatment location and stimulator was set to parameters described below. A test train was delivered and pt tolerated train. Given pt tolerance, 60 treatment trains were delivered. Pt tolerated procedure eyebrow movement.      Motor Threshold Determination  Distance from nasion to inion: 36  Tragus to tragus distance: 35  Head circumference: 54  Distance along the vertex: 9.38  Distance along circumference from midline: 6.25  MT 1:@ 62% on 12/27/2021    Theta LDLFPC     Frequency: 50 Hz  Burst Frequency: 5 Hz  Train Duration: 10 sec   Number of Bursts: 10   Total pulses delivered: 1800  Tx Loc: F3  Energy: 74% (120%MT)  Trains: 60       Date MADRS IDS-SR PHQ-9   12/27/21 35 66 25    --         PHQ-9 SCORE 12/31/2021 1/4/2022 1/5/2022   PHQ-9 Total Score - - -   PHQ-9 Total Score MyChart - - -   PHQ-9 Total Score 26 21 25       Saniya Miller RN   [facility]    Plan   - Cont TMS     I did not see the patient but remained available in the clinic throughout the TMS session.     Dharmesh Degroot M.D.   [facility]

## 2022-01-06 ENCOUNTER — OFFICE VISIT (OUTPATIENT)
Dept: PSYCHIATRY | Facility: CLINIC | Age: 62
End: 2022-01-06
Payer: COMMERCIAL

## 2022-01-06 DIAGNOSIS — F33.2 SEVERE EPISODE OF RECURRENT MAJOR DEPRESSIVE DISORDER, WITHOUT PSYCHOTIC FEATURES (H): Primary | ICD-10-CM

## 2022-01-06 ASSESSMENT — PATIENT HEALTH QUESTIONNAIRE - PHQ9: SUM OF ALL RESPONSES TO PHQ QUESTIONS 1-9: 26

## 2022-01-06 NOTE — PROGRESS NOTES
" Interventional Psychiatry Program  [address], [address]  [place] [address]  TMS Procedure Note   Janelle White MRN# [id number]  Age: 61 year old year old YOB: 1960    Pre-Procedure:  History and Physical: Reviewed in medical record  Consent Signed by: Janelle White for this course of treatment.  On: 12/27/21      Clinical Narrative:  Patient tolerating treatment.  No changes reported.      Indications for TMS:  MDD, recurrent, severe; 4+ medication trials (from 2+ classes) ineffective; Psychotherapy ineffective     Procedure Diagnosis:  Severe episode of recurrent major depressive disorder, without psychotic features (H)  (primary encounter diagnosis)    Treatment Hx:   Treatment number this series: 9  Total lifetime treatment number: 9      Allergies   Allergen Reactions     Bupropion Other (See Comments) and Swelling     Ineffective, name brand works best  Ineffective, name brand works best     Codeine Other (See Comments)     \"Feels like electricity running through body\"  No reaction noted in Cerner.  Shaky  With Tylenol     Effexor [Venlafaxine Hydrochloride]      Affect too flat     Escitalopram      Other reaction(s): *Unknown  Sexual dysfunction     Fluoxetine Other (See Comments)     Sexual dysfunction     Levaquin [Levofloxacin] Other (See Comments)     Suicidal thoughts  Dark thoughts- mood changes     Lexapro      Sexual dysfunction     Methylphenidate Hives     Milnacipran      12.5 MG is fine. 25 MG caused side effects. \"Dark thoughts\"     Pregabalin Other (See Comments)     Hallucinations  Audiovisual hallucinations     Prozac [Fluoxetine Hcl]      Sexual dysfunction     Tramadol Hives     Hives       Venlafaxine      Other reaction(s): *Unknown  Affect too flat      LMP 05/01/2005 (LMP Unknown)     Pause for the Cause  Right patient:  Yes  Right procedure/correct coil:  Yes; rTMS; cpt 63008; F8 coil.   Earplugs in place:  Yes    Procedure  Patient was " seated in procedure chair. Identity and procedure was verified. Ear plugs were placed in ears and patient-specific cap was placed on head and tightened appropriately. Ruler locations were verified. Coil was placed at treatment location and stimulator was set to parameters described below. A test train was delivered and pt tolerated train. Given pt tolerance, 60 treatment trains were delivered. Pt tolerated procedure eyebrow movement.      Motor Threshold Determination  Distance from nasion to inion: 36  Tragus to tragus distance: 35  Head circumference: 54  Distance along the vertex: 9.38  Distance along circumference from midline: 6.25  MT 1:@ 62% on 12/27/2021    Theta LDLFPC     Frequency: 50 Hz  Burst Frequency: 5 Hz  Train Duration: 10 sec   Number of Bursts: 10   Total pulses delivered: 1800  Tx Loc: F3  Energy: 74% (120%MT)  Trains: 60       Date MADRS IDS-SR PHQ-9   12/27/21 35 66 25    --         PHQ-9 SCORE 12/31/2021 1/4/2022 1/5/2022   PHQ-9 Total Score - - -   PHQ-9 Total Score MyChart - - -   PHQ-9 Total Score 26 21 25       Shilpi Ramesh Psychometrist  [facility]    Plan   - Cont TMS       I did not see the patient during/after treatment but remained available in the clinic during  treatment.    Melissa Taveras MD  [facility]

## 2022-01-07 ENCOUNTER — OFFICE VISIT (OUTPATIENT)
Dept: PSYCHIATRY | Facility: CLINIC | Age: 62
End: 2022-01-07
Payer: COMMERCIAL

## 2022-01-07 DIAGNOSIS — F33.2 SEVERE EPISODE OF RECURRENT MAJOR DEPRESSIVE DISORDER, WITHOUT PSYCHOTIC FEATURES (H): Primary | ICD-10-CM

## 2022-01-07 ASSESSMENT — PATIENT HEALTH QUESTIONNAIRE - PHQ9: SUM OF ALL RESPONSES TO PHQ QUESTIONS 1-9: 23

## 2022-01-07 NOTE — PROGRESS NOTES
" Interventional Psychiatry Program  [address], [address]  [place] [address]  TMS Procedure Note   Janelle White MRN# [id number]  Age: 61 year old year old YOB: 1960    Pre-Procedure:  History and Physical: Reviewed in medical record  Consent Signed by: Janelle White for this course of treatment.  On: 12/27/21      Clinical Narrative:  Patient tolerating treatment.  No changes reported.      Indications for TMS:  MDD, recurrent, severe; 4+ medication trials (from 2+ classes) ineffective; Psychotherapy ineffective     Procedure Diagnosis:  Severe episode of recurrent major depressive disorder, without psychotic features (H)  (primary encounter diagnosis)    Treatment Hx:   Treatment number this series: 10  Total lifetime treatment number: 10      Allergies   Allergen Reactions     Bupropion Other (See Comments) and Swelling     Ineffective, name brand works best  Ineffective, name brand works best     Codeine Other (See Comments)     \"Feels like electricity running through body\"  No reaction noted in Cerner.  Shaky  With Tylenol     Effexor [Venlafaxine Hydrochloride]      Affect too flat     Escitalopram      Other reaction(s): *Unknown  Sexual dysfunction     Fluoxetine Other (See Comments)     Sexual dysfunction     Levaquin [Levofloxacin] Other (See Comments)     Suicidal thoughts  Dark thoughts- mood changes     Lexapro      Sexual dysfunction     Methylphenidate Hives     Milnacipran      12.5 MG is fine. 25 MG caused side effects. \"Dark thoughts\"     Pregabalin Other (See Comments)     Hallucinations  Audiovisual hallucinations     Prozac [Fluoxetine Hcl]      Sexual dysfunction     Tramadol Hives     Hives       Venlafaxine      Other reaction(s): *Unknown  Affect too flat      LMP 05/01/2005 (LMP Unknown)     Pause for the Cause  Right patient:  Yes  Right procedure/correct coil:  Yes; rTMS; cpt 78591; F8 coil.   Earplugs in place:  Yes    Procedure  Patient " was seated in procedure chair. Identity and procedure was verified. Ear plugs were placed in ears and patient-specific cap was placed on head and tightened appropriately. Ruler locations were verified. Coil was placed at treatment location and stimulator was set to parameters described below. A test train was delivered and pt tolerated train. Given pt tolerance, 60 treatment trains were delivered. Pt tolerated procedure eyebrow movement.      Motor Threshold Determination  Distance from nasion to inion: 36  Tragus to tragus distance: 35  Head circumference: 54  Distance along the vertex: 9.38  Distance along circumference from midline: 6.25  MT 1:@ 62% on 12/27/2021    Theta LDLFPC     Frequency: 50 Hz  Burst Frequency: 5 Hz  Train Duration: 10 sec   Number of Bursts: 10   Total pulses delivered: 1800  Tx Loc: F3  Energy: 74% (120%MT)  Trains: 60       Date MADRS IDS-SR PHQ-9   12/27/21 35 66 25   1/7/22 -- 56 23       PHQ-9 SCORE 1/4/2022 1/5/2022 1/6/2022   PHQ-9 Total Score - - -   PHQ-9 Total Score MyChart - - -   PHQ-9 Total Score 21 25 26       Shilpi Ramesh, Psychometrist  [facility]    Plan   - Cont TMS       I did not see the patient during/after treatment but remained available in the clinic during  treatment.      Narendra Hopkins MD PhD  [facility]

## 2022-01-08 ENCOUNTER — HEALTH MAINTENANCE LETTER (OUTPATIENT)
Age: 62
End: 2022-01-08

## 2022-01-08 ENCOUNTER — MYC MEDICAL ADVICE (OUTPATIENT)
Dept: PSYCHIATRY | Facility: CLINIC | Age: 62
End: 2022-01-08
Payer: COMMERCIAL

## 2022-01-08 DIAGNOSIS — F33.9 RECURRENT MAJOR DEPRESSION RESISTANT TO TREATMENT (H): ICD-10-CM

## 2022-01-08 DIAGNOSIS — F33.2 SEVERE EPISODE OF RECURRENT MAJOR DEPRESSIVE DISORDER, WITHOUT PSYCHOTIC FEATURES (H): Primary | ICD-10-CM

## 2022-01-10 ENCOUNTER — MYC MEDICAL ADVICE (OUTPATIENT)
Dept: FAMILY MEDICINE | Facility: CLINIC | Age: 62
End: 2022-01-10
Payer: COMMERCIAL

## 2022-01-10 ENCOUNTER — OFFICE VISIT (OUTPATIENT)
Dept: PSYCHIATRY | Facility: CLINIC | Age: 62
End: 2022-01-10
Payer: COMMERCIAL

## 2022-01-10 DIAGNOSIS — F33.9 RECURRENT MAJOR DEPRESSION RESISTANT TO TREATMENT (H): Primary | ICD-10-CM

## 2022-01-10 RX ORDER — DEXTROAMPHETAMINE SACCHARATE, AMPHETAMINE ASPARTATE, DEXTROAMPHETAMINE SULFATE AND AMPHETAMINE SULFATE 5; 5; 5; 5 MG/1; MG/1; MG/1; MG/1
20 TABLET ORAL DAILY
Qty: 30 TABLET | Refills: 0 | Status: SHIPPED | OUTPATIENT
Start: 2022-02-10 | End: 2022-03-12

## 2022-01-10 RX ORDER — DEXTROAMPHETAMINE SACCHARATE, AMPHETAMINE ASPARTATE MONOHYDRATE, DEXTROAMPHETAMINE SULFATE AND AMPHETAMINE SULFATE 5; 5; 5; 5 MG/1; MG/1; MG/1; MG/1
20 CAPSULE, EXTENDED RELEASE ORAL DAILY
Qty: 30 CAPSULE | Refills: 0 | Status: SHIPPED | OUTPATIENT
Start: 2022-03-13 | End: 2022-04-12

## 2022-01-10 RX ORDER — DEXTROAMPHETAMINE SACCHARATE, AMPHETAMINE ASPARTATE MONOHYDRATE, DEXTROAMPHETAMINE SULFATE AND AMPHETAMINE SULFATE 5; 5; 5; 5 MG/1; MG/1; MG/1; MG/1
20 CAPSULE, EXTENDED RELEASE ORAL DAILY
Qty: 30 CAPSULE | Refills: 0 | Status: SHIPPED | OUTPATIENT
Start: 2022-02-10 | End: 2022-03-12

## 2022-01-10 RX ORDER — DEXTROAMPHETAMINE SACCHARATE, AMPHETAMINE ASPARTATE MONOHYDRATE, DEXTROAMPHETAMINE SULFATE AND AMPHETAMINE SULFATE 5; 5; 5; 5 MG/1; MG/1; MG/1; MG/1
20 CAPSULE, EXTENDED RELEASE ORAL DAILY
Qty: 30 CAPSULE | Refills: 0 | Status: SHIPPED | OUTPATIENT
Start: 2022-01-10 | End: 2022-02-09

## 2022-01-10 RX ORDER — DEXTROAMPHETAMINE SACCHARATE, AMPHETAMINE ASPARTATE, DEXTROAMPHETAMINE SULFATE AND AMPHETAMINE SULFATE 5; 5; 5; 5 MG/1; MG/1; MG/1; MG/1
20 TABLET ORAL DAILY
Qty: 30 TABLET | Refills: 0 | Status: SHIPPED | OUTPATIENT
Start: 2022-03-13 | End: 2022-04-12

## 2022-01-10 RX ORDER — DEXTROAMPHETAMINE SACCHARATE, AMPHETAMINE ASPARTATE, DEXTROAMPHETAMINE SULFATE AND AMPHETAMINE SULFATE 5; 5; 5; 5 MG/1; MG/1; MG/1; MG/1
20 TABLET ORAL DAILY
Qty: 30 TABLET | Refills: 0 | Status: SHIPPED | OUTPATIENT
Start: 2022-01-10 | End: 2022-02-09

## 2022-01-10 ASSESSMENT — PATIENT HEALTH QUESTIONNAIRE - PHQ9: SUM OF ALL RESPONSES TO PHQ QUESTIONS 1-9: 24

## 2022-01-10 NOTE — PROGRESS NOTES
" Interventional Psychiatry Program  [address], [address]  [place] [address]  TMS Procedure Note   Janelle White MRN# [id number]  Age: 61 year old year old YOB: 1960    Pre-Procedure:  History and Physical: Reviewed in medical record  Consent Signed by: Janelle White for this course of treatment.  On: 12/27/21      Clinical Narrative:  Patient tolerating treatment.  No changes reported.      Indications for TMS:  MDD, recurrent, severe; 4+ medication trials (from 2+ classes) ineffective; Psychotherapy ineffective     Procedure Diagnosis:  Severe episode of recurrent major depressive disorder, without psychotic features (H)  (primary encounter diagnosis)    Treatment Hx:   Treatment number this series: 11  Total lifetime treatment number: 11      Allergies   Allergen Reactions     Bupropion Other (See Comments) and Swelling     Ineffective, name brand works best  Ineffective, name brand works best     Codeine Other (See Comments)     \"Feels like electricity running through body\"  No reaction noted in [employer]ner.  Shaky  With Tylenol     Effexor [Venlafaxine Hydrochloride]      Affect too flat     Escitalopram      Other reaction(s): *Unknown  Sexual dysfunction     Fluoxetine Other (See Comments)     Sexual dysfunction     Levaquin [Levofloxacin] Other (See Comments)     Suicidal thoughts  Dark thoughts- mood changes     Lexapro      Sexual dysfunction     Methylphenidate Hives     Milnacipran      12.5 MG is fine. 25 MG caused side effects. \"Dark thoughts\"     Pregabalin Other (See Comments)     Hallucinations  Audiovisual hallucinations     Prozac [Fluoxetine Hcl]      Sexual dysfunction     Tramadol Hives     Hives       Venlafaxine      Other reaction(s): *Unknown  Affect too flat      LMP 05/01/2005 (LMP Unknown)     Pause for the Cause  Right patient:  Yes  Right procedure/correct coil:  Yes; rTMS; cpt 61979; F8 coil.   Earplugs in place:  Yes    Procedure  Patient " was seated in procedure chair. Identity and procedure was verified. Ear plugs were placed in ears and patient-specific cap was placed on head and tightened appropriately. Ruler locations were verified. Coil was placed at treatment location and stimulator was set to parameters described below. A test train was delivered and pt tolerated train. Given pt tolerance, 60 treatment trains were delivered. Pt tolerated procedure eyebrow movement.      Motor Threshold Determination  Distance from nasion to inion: 36  Tragus to tragus distance: 35  Head circumference: 54  Distance along the vertex: 9.38  Distance along circumference from midline: 6.25  MT 1:@ 62% on 12/27/2021    Theta LDLFPC     Frequency: 50 Hz  Burst Frequency: 5 Hz  Train Duration: 10 sec   Number of Bursts: 10   Total pulses delivered: 1800  Tx Loc: F3  Energy: 74% (120%MT)  Trains: 60       Date MADRS IDS-SR PHQ-9   12/27/21 35 66 25   1/7/22 -- 56 23       PHQ-9 SCORE 1/5/2022 1/6/2022 1/7/2022   PHQ-9 Total Score - - -   PHQ-9 Total Score MyChart - - -   PHQ-9 Total Score 25 26 23       Ashanti Carter, TMS Technician  [facility]    Plan   - Cont TMS       I did not see patient but remained available in the clinic throughout the TMS session.      Yayo Mireles MD   [facility]

## 2022-01-11 ENCOUNTER — OFFICE VISIT (OUTPATIENT)
Dept: PSYCHIATRY | Facility: CLINIC | Age: 62
End: 2022-01-11
Payer: COMMERCIAL

## 2022-01-11 DIAGNOSIS — F33.2 SEVERE EPISODE OF RECURRENT MAJOR DEPRESSIVE DISORDER, WITHOUT PSYCHOTIC FEATURES (H): Primary | ICD-10-CM

## 2022-01-11 ASSESSMENT — PATIENT HEALTH QUESTIONNAIRE - PHQ9: SUM OF ALL RESPONSES TO PHQ QUESTIONS 1-9: 20

## 2022-01-11 NOTE — PROGRESS NOTES
" Interventional Psychiatry Program  [address], [address]  [place] [address]  TMS Procedure Note   Janelle White MRN# [id number]  Age: 61 year old year old YOB: 1960    Pre-Procedure:  History and Physical: Reviewed in medical record  Consent Signed by: Janelle White for this course of treatment.  On: 12/27/21      Clinical Narrative:  Patient tolerating treatment.  Patient was talkative today.    Daily Adult Stimulation Safety Questionnaire: No or Yes in past 24 hours     1) Have you discontinued or started any new medication? No  2) Have you missed any doses of your medication differently then prescribed? No  3) Have you consumed alcohol or drugs (other than prescribed)?  No  4) Did you not sleep AT ALL last night?  No  5) Has your SI changed or worsened?  No    Indications for TMS:  MDD, recurrent, severe; 4+ medication trials (from 2+ classes) ineffective; Psychotherapy ineffective     Procedure Diagnosis:  Severe episode of recurrent major depressive disorder, without psychotic features (H)  (primary encounter diagnosis)    Treatment Hx:   Treatment number this series: 12  Total lifetime treatment number: 12      Allergies   Allergen Reactions     Bupropion Other (See Comments) and Swelling     Ineffective, name brand works best  Ineffective, name brand works best     Codeine Other (See Comments)     \"Feels like electricity running through body\"  No reaction noted in [employer].  Shaky  With Tylenol     Effexor [Venlafaxine Hydrochloride]      Affect too flat     Escitalopram      Other reaction(s): *Unknown  Sexual dysfunction     Fluoxetine Other (See Comments)     Sexual dysfunction     Levaquin [Levofloxacin] Other (See Comments)     Suicidal thoughts  Dark thoughts- mood changes     Lexapro      Sexual dysfunction     Methylphenidate Hives     Milnacipran      12.5 MG is fine. 25 MG caused side effects. \"Dark thoughts\"     Pregabalin Other (See Comments)     " Hallucinations  Audiovisual hallucinations     Prozac [Fluoxetine Hcl]      Sexual dysfunction     Tramadol Hives     Hives       Venlafaxine      Other reaction(s): *Unknown  Affect too flat      LMP 05/01/2005 (LMP Unknown)     Pause for the Cause  Right patient:  Yes  Right procedure/correct coil:  Yes; rTMS; cpt 39960; F8 coil.   Earplugs in place:  Yes    Procedure  Patient was seated in procedure chair. Identity and procedure was verified. Ear plugs were placed in ears and patient-specific cap was placed on head and tightened appropriately. Ruler locations were verified. Coil was placed at treatment location and stimulator was set to parameters described below. A test train was delivered and pt tolerated train. Given pt tolerance, 60 treatment trains were delivered. Pt tolerated procedure eyebrow movement.      Motor Threshold Determination  Distance from nasion to inion: 36  Tragus to tragus distance: 35  Head circumference: 54  Distance along the vertex: 9.38  Distance along circumference from midline: 6.25  MT 1:@ 62% on 12/27/2021    Theta LDLFPC     Frequency: 50 Hz  Burst Frequency: 5 Hz  Train Duration: 10 sec   Number of Bursts: 10   Total pulses delivered: 1800  Tx Loc: F3  Energy: 74% (120%MT)  Trains: 60       Date MADRS IDS-SR PHQ-9   12/27/21 35 66 25   1/7/22 -- 56 23       PHQ-9 SCORE 1/7/2022 1/10/2022 1/11/2022   PHQ-9 Total Score - - -   PHQ-9 Total Score MyChart - - -   PHQ-9 Total Score 23 24 20       Griselda Lopez, EMT  [facility]    Plan   - Cont TMS       I did not see the patient during/after treatment but remained available in the clinic during  treatment.    Melissa Taveras MD  [facility]

## 2022-01-12 ENCOUNTER — OFFICE VISIT (OUTPATIENT)
Dept: PSYCHIATRY | Facility: CLINIC | Age: 62
End: 2022-01-12
Payer: COMMERCIAL

## 2022-01-12 DIAGNOSIS — F33.2 SEVERE EPISODE OF RECURRENT MAJOR DEPRESSIVE DISORDER, WITHOUT PSYCHOTIC FEATURES (H): Primary | ICD-10-CM

## 2022-01-12 ASSESSMENT — PATIENT HEALTH QUESTIONNAIRE - PHQ9: SUM OF ALL RESPONSES TO PHQ QUESTIONS 1-9: 21

## 2022-01-12 NOTE — PROGRESS NOTES
" Interventional Psychiatry Program  [address], [address]  [place] [address]  TMS Procedure Note   Janelle White MRN# [id number]  Age: 61 year old year old YOB: 1960    Pre-Procedure:  History and Physical: Reviewed in medical record  Consent Signed by: Janelle White for this course of treatment.  On: 12/27/21      Clinical Narrative:  Patient tolerating treatment.  Patient reports she might be feeling slight improvement.  She reports being more labile, but the lows are shorter.    Daily Adult Stimulation Safety Questionnaire: No or Yes in past 24 hours     1) Have you discontinued or started any new medication? No  2) Have you missed any doses of your medication differently then prescribed? No  3) Have you consumed alcohol or drugs (other than prescribed)?  No  4) Did you not sleep AT ALL last night?  No  5) Has your SI changed or worsened?  No    Indications for TMS:  MDD, recurrent, severe; 4+ medication trials (from 2+ classes) ineffective; Psychotherapy ineffective     Procedure Diagnosis:  Severe episode of recurrent major depressive disorder, without psychotic features (H)  (primary encounter diagnosis)    Treatment Hx:   Treatment number this series: 13  Total lifetime treatment number: 13      Allergies   Allergen Reactions     Bupropion Other (See Comments) and Swelling     Ineffective, name brand works best  Ineffective, name brand works best     Codeine Other (See Comments)     \"Feels like electricity running through body\"  No reaction noted in [employer].  Shaky  With Tylenol     Effexor [Venlafaxine Hydrochloride]      Affect too flat     Escitalopram      Other reaction(s): *Unknown  Sexual dysfunction     Fluoxetine Other (See Comments)     Sexual dysfunction     Levaquin [Levofloxacin] Other (See Comments)     Suicidal thoughts  Dark thoughts- mood changes     Lexapro      Sexual dysfunction     Methylphenidate Hives     Milnacipran      12.5 MG is fine. " "25 MG caused side effects. \"Dark thoughts\"     Pregabalin Other (See Comments)     Hallucinations  Audiovisual hallucinations     Prozac [Fluoxetine Hcl]      Sexual dysfunction     Tramadol Hives     Hives       Venlafaxine      Other reaction(s): *Unknown  Affect too flat      LMP 05/01/2005 (LMP Unknown)     Pause for the Cause  Right patient:  Yes  Right procedure/correct coil:  Yes; rTMS; cpt 34681; F8 coil.   Earplugs in place:  Yes    Procedure  Patient was seated in procedure chair. Identity and procedure was verified. Ear plugs were placed in ears and patient-specific cap was placed on head and tightened appropriately. Ruler locations were verified. Coil was placed at treatment location and stimulator was set to parameters described below. A test train was delivered and pt tolerated train. Given pt tolerance, 60 treatment trains were delivered. Pt tolerated procedure eyebrow movement.      Motor Threshold Determination  Distance from nasion to inion: 36  Tragus to tragus distance: 35  Head circumference: 54  Distance along the vertex: 9.38  Distance along circumference from midline: 6.25  MT 1:@ 62% on 12/27/2021    Theta LDLFPC     Frequency: 50 Hz  Burst Frequency: 5 Hz  Train Duration: 10 sec   Number of Bursts: 10   Total pulses delivered: 1800  Tx Loc: F3  Energy: 74% (120%MT)  Trains: 60       Date MADRS IDS-SR PHQ-9   12/27/21 35 66 25   1/7/22 -- 56 23       PHQ-9 SCORE 1/7/2022 1/10/2022 1/11/2022   PHQ-9 Total Score - - -   PHQ-9 Total Score MyChart - - -   PHQ-9 Total Score 23 24 20       Shilpi Ramesh, Psychometrist  [facility]   [facility]    Plan   - Cont TMS     I did not see the patient butt remained available in clinic throughout treatment.     Zafar Yanes MD   [facility]  [facility]  "

## 2022-01-13 ENCOUNTER — OFFICE VISIT (OUTPATIENT)
Dept: PSYCHIATRY | Facility: CLINIC | Age: 62
End: 2022-01-13
Payer: COMMERCIAL

## 2022-01-13 DIAGNOSIS — F33.2 SEVERE EPISODE OF RECURRENT MAJOR DEPRESSIVE DISORDER, WITHOUT PSYCHOTIC FEATURES (H): Primary | ICD-10-CM

## 2022-01-13 ASSESSMENT — PATIENT HEALTH QUESTIONNAIRE - PHQ9: SUM OF ALL RESPONSES TO PHQ QUESTIONS 1-9: 17

## 2022-01-13 NOTE — PROGRESS NOTES
" Interventional Psychiatry Program  [address], [address]  [place] [address]  TMS Procedure Note   Janelle White MRN# [id number]  Age: 61 year old year old YOB: 1960    Pre-Procedure:  History and Physical: Reviewed in medical record  Consent Signed by: Janelle White for this course of treatment.  On: 12/27/21      Clinical Narrative:  Patient tolerating treatment.  No changes are reported.    Daily Adult Stimulation Safety Questionnaire: No or Yes in past 24 hours     1) Have you discontinued or started any new medication? No  2) Have you missed any doses of your medication differently then prescribed? No  3) Have you consumed alcohol or drugs (other than prescribed)?  No  4) Did you not sleep AT ALL last night?  No  5) Has your SI changed or worsened?  No    Indications for TMS:  MDD, recurrent, severe; 4+ medication trials (from 2+ classes) ineffective; Psychotherapy ineffective     Procedure Diagnosis:  Severe episode of recurrent major depressive disorder, without psychotic features (H)  (primary encounter diagnosis)    Treatment Hx:   Treatment number this series: 14  Total lifetime treatment number: 14      Allergies   Allergen Reactions     Bupropion Other (See Comments) and Swelling     Ineffective, name brand works best  Ineffective, name brand works best     Codeine Other (See Comments)     \"Feels like electricity running through body\"  No reaction noted in [employer].  Shaky  With Tylenol     Effexor [Venlafaxine Hydrochloride]      Affect too flat     Escitalopram      Other reaction(s): *Unknown  Sexual dysfunction     Fluoxetine Other (See Comments)     Sexual dysfunction     Levaquin [Levofloxacin] Other (See Comments)     Suicidal thoughts  Dark thoughts- mood changes     Lexapro      Sexual dysfunction     Methylphenidate Hives     Milnacipran      12.5 MG is fine. 25 MG caused side effects. \"Dark thoughts\"     Pregabalin Other (See Comments)     " Hallucinations  Audiovisual hallucinations     Prozac [Fluoxetine Hcl]      Sexual dysfunction     Tramadol Hives     Hives       Venlafaxine      Other reaction(s): *Unknown  Affect too flat      LMP 05/01/2005 (LMP Unknown)     Pause for the Cause  Right patient:  Yes  Right procedure/correct coil:  Yes; rTMS; cpt 53667; F8 coil.   Earplugs in place:  Yes    Procedure  Patient was seated in procedure chair. Identity and procedure was verified. Ear plugs were placed in ears and patient-specific cap was placed on head and tightened appropriately. Ruler locations were verified. Coil was placed at treatment location and stimulator was set to parameters described below. A test train was delivered and pt tolerated train. Given pt tolerance, 60 treatment trains were delivered. Pt tolerated procedure eyebrow movement.      Motor Threshold Determination  Distance from nasion to inion: 36  Tragus to tragus distance: 35  Head circumference: 54  Distance along the vertex: 9.38  Distance along circumference from midline: 6.25  MT 1:@ 62% on 12/27/2021    Theta LDLFPC     Frequency: 50 Hz  Burst Frequency: 5 Hz  Train Duration: 10 sec   Number of Bursts: 10   Total pulses delivered: 1800  Tx Loc: F3  Energy: 74% (120%MT)  Trains: 60       Date MADRS IDS-SR PHQ-9   12/27/21 35 66 25   1/7/22 -- 56 23       PHQ-9 SCORE 1/10/2022 1/11/2022 1/12/2022   PHQ-9 Total Score - - -   PHQ-9 Total Score MyChart - - -   PHQ-9 Total Score 24 20 21       Shilpi Ramesh Psychometrist  [facility]    Plan   - Cont TMS       I did not see the patient during/after treatment but remained available in the clinic during  treatment.    Melissa Taveras MD  [facility]

## 2022-01-14 ENCOUNTER — VIRTUAL VISIT (OUTPATIENT)
Dept: PSYCHIATRY | Facility: CLINIC | Age: 62
End: 2022-01-14
Payer: COMMERCIAL

## 2022-01-14 ENCOUNTER — VIRTUAL VISIT (OUTPATIENT)
Dept: PSYCHOLOGY | Facility: CLINIC | Age: 62
End: 2022-01-14
Payer: COMMERCIAL

## 2022-01-14 ENCOUNTER — OFFICE VISIT (OUTPATIENT)
Dept: PSYCHIATRY | Facility: CLINIC | Age: 62
End: 2022-01-14
Payer: COMMERCIAL

## 2022-01-14 DIAGNOSIS — G89.4 CHRONIC PAIN SYNDROME: ICD-10-CM

## 2022-01-14 DIAGNOSIS — F33.9 RECURRENT MAJOR DEPRESSION RESISTANT TO TREATMENT (H): ICD-10-CM

## 2022-01-14 DIAGNOSIS — E88.89 CYP2D6 POOR METABOLIZER (H): ICD-10-CM

## 2022-01-14 DIAGNOSIS — E72.12 HOMOZYGOUS FOR C677T POLYMORPHISM OF MTHFR (H): ICD-10-CM

## 2022-01-14 DIAGNOSIS — F33.2 SEVERE EPISODE OF RECURRENT MAJOR DEPRESSIVE DISORDER, WITHOUT PSYCHOTIC FEATURES (H): Primary | ICD-10-CM

## 2022-01-14 DIAGNOSIS — E88.89 CYP2B6 INTERMEDIATE METABOLIZER (H): ICD-10-CM

## 2022-01-14 PROCEDURE — 99214 OFFICE O/P EST MOD 30 MIN: CPT | Mod: 95 | Performed by: PSYCHIATRY & NEUROLOGY

## 2022-01-14 PROCEDURE — 90832 PSYTX W PT 30 MINUTES: CPT | Mod: 95 | Performed by: PSYCHOLOGIST

## 2022-01-14 ASSESSMENT — PATIENT HEALTH QUESTIONNAIRE - PHQ9: SUM OF ALL RESPONSES TO PHQ QUESTIONS 1-9: 19

## 2022-01-14 NOTE — PROGRESS NOTES
"Janelle White is a 61 year old year old who is being evaluated via a billable video visit.      How would you like to obtain your AVS? MyChart  If you are dropped from the video visit, the video invite should be resent to: Text to cell phone: see Epic  Will anyone else be joining your video visit? No     Telemedicine Visit: The patient's condition can be safely assessed and treated via synchronous audio and visual telemedicine encounter.      Reason for Telemedicine Visit: Covid-19 Pandemic    Originating Site (Patient Location): Patient's home     Distant Site (Provider Location): Provider Remote Setting    Mode of Communication:  Video Conference via [employer]    As the provider I attest to compliance with applicable laws and regulations related to telemedicine.        Outpatient Psychiatric Progress Note    Name: Janelle White   : 1960                    Primary Care Provider: Emili Ballard MD   Therapist: None    PHQ-9 scores:  PHQ-9 SCORE 2022   PHQ-9 Total Score - - -   PHQ-9 Total Score MyChart - - -   PHQ-9 Total Score 20 21 17       STACIE-7 scores:  STACIE-7 SCORE 2021 2021 10/5/2021   Total Score - - -   Total Score 6 (mild anxiety) 5 (mild anxiety) 3 (minimal anxiety)   Total Score 6 5 3       Patient Identification:  Patient is a 61 year old,   White Not  or  female  who presents for return visit with me.  Patient is currently on medical leave from work as NP. Patient attended the phone/video session alone. Patient prefers to be called: \"Janelle\".    Interim History:  I last saw Janelle White for outpatient psychiatry return visit on 10/05/2021. During that appointment, we:      Continue Pristiq 100 mg daily for mood, anxiety.     Continue methyl folate 7.5 mg daily as supplementation.    Continue Adderall XR 20 mg daily for mood augmentation    Increase Adderall IR to 20 mg daily as needed for mood augmentation " "and daytime fatigue/hypersomnia    Continue benzodiazepine per primary care prescriber.    Continue to consider starting TMS, possibly after the holidays when things slow down if still clinically indicated.    Continue to work with your specialist from [facility].    Recommend individual psychotherapy.      1/14: Patient overall hanging in there.  Spent some good time lately with family.  Unfortunate was hospitalized in [place] while on her trip for couple of days-for medical reasons.  Now she feels like she is feeling better than she has felt in a very long time physically at least.  Emotionally she has had some additional struggles lately.  Continues in TMS.  Hopeful things will start to stabilize.  Continues to find the stimulant medication very helpful.  We discussed that there will be no medication changes today since she is undergoing TMS.  No acute safety concerns today.  No problematic drug or alcohol use.    Per [facility], Dr. Matt Manzo, during today's team-based visit:  Reported that she is feeling \"really labile\" recently. She is in her 3rd week of TMS and has been more emotionally labile. She has been finding the benefits of TMS as positive. She tends to find the weather greatly influences her moods as well. She continues to struggle with not understanding why she feels \"so bad when I have nothing to feel bad for.\" She recently went on a month long trip with her . She said that it was \"really good but a real test on every level\" because of her physical and emotional health. The Adderall helps her stay organized and less scattered.      Past medication trials include but are not limited to:   Effexor-very flat  Celexa, zoloft, was on wellbutrin a couple years at one point  wellbutrin XL + celexa; 2005ish  tca-a ton of weight gain  depakote maybe for a short time  Abilify/lithium ?  ECT as teen - made me dull  Cytomel-not effective at all, used 50 mcg    Psychiatric ROS:  Janelle White reports mood " has been: A little up-and-down since starting TMS  Anxiety has been: A little more up-and-down  Sleep has been: Up-and-down  Deepa sxs: None  Psychosis sxs: None  ADHD/ADD sxs: None  PTSD sxs: None  PHQ9 and GAD7 scores were reviewed today if completed.   Medication side effects: Denies  Current stressors include: Symptoms and See HPI above  Coping mechanisms and supports include: Family, Hobbies and Friends    Current medications include:   Current Outpatient Medications   Medication Sig     albuterol (PROAIR HFA/PROVENTIL HFA/VENTOLIN HFA) 108 (90 Base) MCG/ACT inhaler Inhale 2 puffs into the lungs every 6 hours     amphetamine-dextroamphetamine (ADDERALL XR) 20 MG 24 hr capsule Take 1 capsule (20 mg) by mouth daily     [START ON 2/10/2022] amphetamine-dextroamphetamine (ADDERALL XR) 20 MG 24 hr capsule Take 1 capsule (20 mg) by mouth daily     [START ON 3/13/2022] amphetamine-dextroamphetamine (ADDERALL XR) 20 MG 24 hr capsule Take 1 capsule (20 mg) by mouth daily     amphetamine-dextroamphetamine (ADDERALL) 20 MG tablet Take 1 tablet (20 mg) by mouth daily     [START ON 2/10/2022] amphetamine-dextroamphetamine (ADDERALL) 20 MG tablet Take 1 tablet (20 mg) by mouth daily     [START ON 3/13/2022] amphetamine-dextroamphetamine (ADDERALL) 20 MG tablet Take 1 tablet (20 mg) by mouth daily     amphetamine-dextroamphetamine (ADDERALL) 20 MG tablet Take 0.5-1 tablets (10-20 mg) by mouth daily as needed (extreme fatigue, exhaustion, daytime sedation)     calcium citrate (CALCIUM CITRATE) 950 MG tablet Take 1 tablet by mouth 2 times daily.     Calcium-Magnesium-Vitamin D (CALCIUM 500) 500-250-200 MG-MG-UNIT TABS Take 1 tablet by mouth      Cholecalciferol (D-5000) 5000 units TABS Take 5,000 Units by mouth     colchicine (COLCYRS) 0.6 MG tablet Take 1 tablet (0.6 mg) by mouth daily     cyanocobalamin (CYANOCOBALAMIN) 1000 MCG/ML injection Inject 1 mL (1,000 mcg) into the muscle every 30 days     cyclobenzaprine (FLEXERIL)  "10 MG tablet Take 1 tablet (10 mg) by mouth 3 times daily as needed for muscle spasms     desvenlafaxine (PRISTIQ) 100 MG 24 hr tablet Take 1 tablet (100 mg) by mouth daily     DULoxetine (CYMBALTA) 20 MG capsule duloxetine 20 mg capsule,delayed release     DULoxetine (CYMBALTA) 30 MG capsule Take 30 mg by mouth     DULoxetine (CYMBALTA) 60 MG capsule duloxetine 60 mg capsule,delayed release     ergocalciferol (ERGOCALCIFEROL) 1.25 MG (93766 UT) capsule Take 50,000 Units by mouth     Estradiol (DIVIGEL) 1 MG/GM GEL Place 1 packet onto the skin daily     ferrous sulfate (FEROSUL) 325 (65 Fe) MG tablet      HYDROcodone-acetaminophen (NORCO)  MG per tablet Take 1 tablet by mouth every 4 hours as needed for severe pain max 4 tabs/24 hrs     insulin syringe-needle U-100 (30G X 1/2\" 1 ML) 30G X 1/2\" 1 ML miscellaneous Inject 1 ml B12 qmonth     lidocaine (LIDODERM) 5 % patch Place 4 patches onto the skin daily Apply up to 4 patches to skin. Wear for 12 hours and remove for 12 hrs.  Refill when patient requests.     liothyronine (CYTOMEL) 50 MCG tablet      LORazepam (ATIVAN) 1 MG tablet Take 1 tablet (1 mg) by mouth every 6 hours as needed for anxiety     medical cannabis (Patient's own supply.  Not a prescription) Medical Cannabis - [place] 4-6 ml by mouth daily. [facility]     methylfolate (DEPLIN) 7.5 MG TABS tablet Take 1 tablet (7.5 mg) by mouth daily     morphine (MS CONTIN) 15 MG CR tablet Take 1 tablet (15 mg) by mouth every 12 hours maximum 2 tablet(s) per day     Multiple Vitamin (MULTI-VITAMINS) TABS Take 1 tablet by mouth      naloxone (NARCAN) 4 MG/0.1ML nasal spray Spray 1 spray (4 mg) into one nostril alternating nostrils as needed for opioid reversal every 2-3 minutes until assistance arrives     ondansetron (ZOFRAN-ODT) 8 MG ODT tab Take 1 tablet (8 mg) by mouth every 8 hours as needed for nausea     Prasterone 6.5 MG INST Place 0.5 suppositories vaginally three times a week     senna-docusate " (SENOKOT-S/PERICOLACE) 8.6-50 MG tablet Take 4-6 tablets by mouth daily as needed for constipation     No current facility-administered medications for this visit.       The [place] Prescription Monitoring Program has been reviewed and there are no concerns about diversionary activity for controlled substances at this time.   01/10/2022 12/27/2021 01/11/2022 1   Hydrocodone-Acetamin  Mg  60.00 15 Ev Wel [id number] All (4435) 0/0 40.00 [place]  01/10/2022 12/27/2021 01/11/2022 1   Morphine Sulf Er 15 Mg Tablet  60.00 30 Ev Wel [id number] All (4435) 0/0 30.00 [place]  01/10/2022 01/10/2022 01/10/2022 1   Dextroamp-Amphetamin 20 Mg Tab  30.00 30 Al Bau [id number] All (4435) 0/[place]  [place]  01/10/2022 01/10/2022 01/10/2022 1   Dextroamp-Amphet Er 20 Mg Cap  30.00 30 Al Bau [id number] All (4435[place] 0/[place]  [place]  12/27/2021 12/27/2021 12/28/2021 1   Lorazepam 1 Mg Tablet  50.00 13 Ev Wel [id number] All (4435) 0/3 3.85 [place]  12/13/2021 12/13/2021 12/14/2021 1   Hydrocodone-Acetamin  Mg  60.00 15 Ev Wel [id number] All (4435) 0/0 40.00 [place]    Past Medical/Surgical History:  Past Medical History:   Diagnosis Date     Anxiety      Cervicalgia 2007    C5-6 disc protrusion     Depressive disorder      ESBL (extended spectrum beta-lactamase) producing bacteria infection      History of blood transfusion 2007    Cervical fusion     Leukemia (H)     CLA large beta     Melanoma (H) 1998     Migraine      Other chronic pain      Rotator cuff tear     s/p injections     Sacroiliac inflammation (H)      Shift work sleep disorder 12/16/2013     Urinary calculi      Vitamin B12 deficiency anemia 2006    started injections      has a past medical history of Anxiety, Cervicalgia (2007), Depressive disorder, ESBL (extended spectrum beta-lactamase) producing bacteria infection, History of blood transfusion (2007), Leukemia (H), Melanoma (H) (1998), Migraine, Other chronic  pain, Rotator cuff tear, Sacroiliac inflammation (H), Shift work sleep disorder (12/16/2013), Urinary calculi, and Vitamin B12 deficiency anemia (2006).    She has no past medical history of Cerebral infarction (H), Congestive heart failure (H), COPD (chronic obstructive pulmonary disease) (H), Diabetes (H), Heart disease, Hypertension, Thyroid disease, or Uncomplicated asthma.    Social History:  Reviewed. No changes to social history except as noted above in HPI.    Vital Signs:   None. This is phone/video visit.     Labs:  Most recent laboratory results reviewed and the pertinent results include:   Lab Results   Component Value Date    WBC 17.5 03/16/2021     Lab Results   Component Value Date    RBC 4.50 03/16/2021     Lab Results   Component Value Date    HGB 13.3 03/16/2021     Lab Results   Component Value Date    HCT 42.1 03/16/2021     No components found for: MCT  Lab Results   Component Value Date    MCV 94 03/16/2021     Lab Results   Component Value Date    MCH 29.6 03/16/2021     Lab Results   Component Value Date    MCHC 31.6 03/16/2021     Lab Results   Component Value Date    RDW 13.7 03/16/2021     Lab Results   Component Value Date     03/16/2021     Last Comprehensive Metabolic Panel:  Sodium   Date Value Ref Range Status   08/13/2021 141 133 - 144 mmol/L Final   05/24/2021 141 133 - 144 mmol/L Final     Potassium   Date Value Ref Range Status   08/13/2021 3.4 3.4 - 5.3 mmol/L Final   05/24/2021 3.9 3.4 - 5.3 mmol/L Final     Chloride   Date Value Ref Range Status   08/13/2021 111 (H) 94 - 109 mmol/L Final   05/24/2021 108 94 - 109 mmol/L Final     Carbon Dioxide   Date Value Ref Range Status   05/24/2021 25 20 - 32 mmol/L Final     Carbon Dioxide (CO2)   Date Value Ref Range Status   08/13/2021 25 20 - 32 mmol/L Final     Anion Gap   Date Value Ref Range Status   08/13/2021 5 3 - 14 mmol/L Final   05/24/2021 8 3 - 14 mmol/L Final     Glucose   Date Value Ref Range Status   08/13/2021 107  (H) 70 - 99 mg/dL Final   05/24/2021 87 70 - 99 mg/dL Final     Urea Nitrogen   Date Value Ref Range Status   08/13/2021 10 7 - 30 mg/dL Final   05/24/2021 15 7 - 30 mg/dL Final     Creatinine   Date Value Ref Range Status   08/13/2021 0.60 0.52 - 1.04 mg/dL Final   05/24/2021 0.64 0.52 - 1.04 mg/dL Final     GFR Estimate   Date Value Ref Range Status   08/13/2021 >90 >60 mL/min/1.73m2 Final     Comment:     As of July 11, 2021, eGFR is calculated by the CKD-EPI creatinine equation, without race adjustment. eGFR can be influenced by muscle mass, exercise, and diet. The reported eGFR is an estimation only and is only applicable if the renal function is stable.   05/24/2021 >90 >60 mL/min/[1.73_m2] Final     Comment:     Non  GFR Calc  Starting 12/18/2018, serum creatinine based estimated GFR (eGFR) will be   calculated using the Chronic Kidney Disease Epidemiology Collaboration   (CKD-EPI) equation.       Calcium   Date Value Ref Range Status   08/13/2021 8.3 (L) 8.5 - 10.1 mg/dL Final   05/24/2021 8.4 (L) 8.5 - 10.1 mg/dL Final     Bilirubin Total   Date Value Ref Range Status   08/10/2021 0.5 0.2 - 1.3 mg/dL Final   03/16/2021 0.4 0.2 - 1.3 mg/dL Final     Alkaline Phosphatase   Date Value Ref Range Status   08/10/2021 92 40 - 150 U/L Final   03/16/2021 87 40 - 150 U/L Final     ALT   Date Value Ref Range Status   08/10/2021 26 0 - 50 U/L Final   03/16/2021 26 0 - 50 U/L Final     AST   Date Value Ref Range Status   08/10/2021 19 0 - 45 U/L Final   03/16/2021 44 0 - 45 U/L Final     Review of Systems:  10 systems (general, cardiovascular, respiratory, eyes, ENT, endocrine, GI, , M/S, neurological) were reviewed. Most pertinent finding(s) is/are: Severe chronic pain. The remaining systems are all unremarkable.    Mental Status Examination (limited as this is by phone/video):  Appearance: Awake, alert, appears stated age, well-groomed, no acute distress  Attitude:  cooperative, pleasant  Motor: No  gross abnormalities observed via video, not formally tested  Oriented to:  person, place, time, and situation  Attention Span and Concentration:  normal  Speech:  clear, coherent, regular rate, rhythm, and volume  Language: intact  Mood: a little more down lately  Affect: appropriate, mood congruent  Associations:  no loose associations  Thought Process:  logical, linear and goal oriented  Thought Content: No suicidal ideation today, no homicidal ideation, no evidence of psychotic thought, no auditory hallucinations present and no visual hallucinations present  Recent and Remote Memory:  Intact to interview. Not formally assessed. No amnesia.  Fund of Knowledge: appropriate  Insight:  good  Judgment:  intact, adequate for safety  Impulse Control:  intact    Suicide Risk Assessment:  Today Janelle White reports no suicidal ideation today but does have history of chronic/intermittent suicidal ideation.There are notable risk factors for self-harm, including anxiety, comorbid medical condition of Chronic pain, suicidal ideation, purposelessness/no reason for living, hopelessness, withdrawing and mood change. However, risk is mitigated by commitment to family, absence of past attempts, ability to volunteer a safety plan, history of seeking help when needed, future oriented and denies suicidal intent or plan. Therefore, based on all available evidence including the factors cited above, Janelle White does not appear to be at imminent risk for self-harm, does not meet criteria for a 72-hr hold, and therefore remains appropriate for ongoing outpatient level of care.  A thorough assessment of risk factors related to suicide and self-harm have been reviewed and are noted above. Local community safety resources printed and reviewed for patient to use if needed. There was no deceit detected, and the patient presented in a manner that was believable.     DSM5 Diagnosis:  Major Depressive Disorder, Recurrent Episode,  severe, without psychotic features  Treatment resistant depression  CYP2D6 poor metabolizer  CYP2B6 intermediate metabolizer  Homozygous for C677T polymorphism of MTHFR    Medical comorbidities include:   Patient Active Problem List    Diagnosis Date Noted     Tension type headache 11/04/2021     Priority: Medium     Rotator cuff injury 11/04/2021     Priority: Medium     Spinal stenosis of lumbar region with radiculopathy 09/02/2021     Priority: Medium     COVID-19 08/29/2021     Priority: Medium     Polyarthralgia 08/11/2021     Priority: Medium     Cellulitis, unspecified cellulitis site 08/11/2021     Priority: Medium     Sepsis, due to unspecified organism, unspecified whether acute organ dysfunction present (H) 08/11/2021     Priority: Medium     Cellulitis 08/11/2021     Priority: Medium     STACIE (generalized anxiety disorder) 07/27/2021     Priority: Medium     MTHFR gene mutation 04/12/2021     Priority: Medium     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     Priority: Medium     CYP2D6 poor metabolizer (H) 04/12/2021     Priority: Medium     Status post blepharoplasty 11/18/2020     Priority: Medium     Regular astigmatism, bilateral 11/18/2020     Priority: Medium     Prediabetes 09/19/2019     Priority: Medium     Hyperlipidemia LDL goal <130 09/19/2019     Priority: Medium     CLARE (obstructive sleep apnea) 02/13/2019     Priority: Medium     Controlled substance agreement signed 08/14/2018     Priority: Medium     Chronic pain syndrome 12/21/2017     Priority: Medium     CLL (chronic lymphocytic leukemia) (H) 12/21/2017     Priority: Medium     Hypotension 09/14/2017     Priority: Medium     History of laser assisted in situ keratomileusis 10/14/2014     Priority: Medium     Pain medication agreement 04/23/2014     Priority: Medium     Formatting of this note might be different from the original.  Patient takes morphine 15 mg ER BID for chronic neck and back pain.  She is also on cymbalta,  ibuprofen, flexaril prn       Shift work sleep disorder 12/16/2013     Priority: Medium     Vitamin D deficiency 11/08/2012     Priority: Medium     Moderate recurrent major depression (H) 01/06/2011     Priority: Medium     DDD (degenerative disc disease), cervical 10/07/2010     Priority: Medium     CARDIOVASCULAR SCREENING; LDL GOAL LESS THAN 160 02/10/2010     Priority: Medium     Chronic Low Back Pain 10/01/2009     Priority: Medium     S/p AP L3-S1 fusion 12/2010 - referred to [facility].   Orthopedics writing scripts for narcotics post-op.       Migraine      Priority: Medium     Problem list name updated by automated process. Provider to review       B-complex deficiency 10/10/2006     Priority: Medium     Problem list name updated by automated process. Provider to review       PERSONAL HX OF  MELANOMA 12/04/2003     Priority: Low       Psychosocial & Contextual Factors: see HPI above    Assessment:  6/15/2021:  Janelle TORRES Christopher reports overall some significant worsening of mood symptoms.  Increased anxiety with her depression.  Poor motivation and poor energy.  Symptoms severe enough to prevent her from being able to utilize typical coping skills and strategies.  No current motivation or energy to participate in PHP/day treatment program.  Continues to take medication as scheduled.  Recent surgery and general anesthesia could be contributing.  Discussed discontinuing stimulant medication as an augmentation strategy.  She is agreeable to moving forward with T3 as an augmentation for treatment resistant depression.  Discussed risks and benefits at length.  Will get baseline thyroid labs prior to starting T3.  Hoping she will experience increased energy and motivation and that her mood will lift.  Also discussed setting up an appointment with her interventional psychiatry clinic to discuss other potential options such as ketamine therapy, ECT, TMS.  Does have history of ECT for depression when she was  quite young.  I am hopeful to stay ahead of her symptoms before things get too severe.  Has chronic suicidal ideation and is at high risk for suicide.  Continues to deny any plan or intent.  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  See below for risk/benefit conversation regarding T3 had with the patient:    GeneSight testing Info:  GeneSight testing revealed she is a poor 2D6 metabolizer and an intermediate 2B6 metabolizer.  This would explain her multiple failed medication trials due to the negative side effects.  She also has a genotype that would suggest a phenotype sensitivity to serotonin. She also was found to have significantly reduced folic acid conversion.      Starting T3 as augment to antidepressant therapy for treatment resistant depression:  Start T3 at 25 mcg per day for one to two weeks, and if there is little or no improvement, increase the dose to 50 mcg per day; this is consistent with practice guidelines from the American Psychiatric Association and [place] [employer] for Mood and Anxiety Treatments.     Adverse effects consistent with hyperthyroidism may occur, including tremor, palpitations, heat intolerance, sweating, anxiety, increased frequency of bowel movements, shortness of breath, and exacerbation of cardiac arrhythmia. In addition, hyperthyroidism that emerges during long-term treatment may lead to bone demineralization, osteoporosis, and an increased risk of fracture    Following a normal baseline TSH concentration, no other laboratory monitoring during a four to six week trial of adjunctive T3 is necessary. However, if T3 is continued longer, a serum TSH concentration should be checked after the first one to three months of treatment and then every six months.    Today, 7/27/21:   Patient overall with little change in her symptoms.  Did not do as well on Cytomel and had limited to no improvement at all after weeks on 50 mcg.  Labs were unremarkable prior  to starting Cytomel.  Opted to go back to stimulant augmentation since patient does find it quite helpful.  She has been taking 15 mg of immediate release most afternoons.  Due to good tolerability and some efficacy, we will bump up her immediate release dose slightly to 20 mg daily as needed in addition to her 20 mg extended release dose. I have no concerns about misuse or diversion at this time.  Tolerating well with no negative side effects.  Last blood pressure normal 7/2.  Patient has noted some efficacy from Pristiq more than other antidepressant trials and so we will continue to titrate further to 100 mg daily.  She is tolerating well.  Continues to use lorazepam for anxiety, pain medicine adjunct, and for sleep as prescribed by primary care provider.  Has been utilizing roughly 100 tablets of 0.5 mg every 1.5 months.  Patient with ongoing chronic intermittent passive suicidal ideation with no plan or intent.  Denies safety concerns today.  No problematic drug or alcohol use.  I am hopeful the interventional psychiatry clinic might be able to initiate ketamine therapy or another therapy they would recommend as potentially being more helpful than patient's multiple medication/augmentation trials.    10/6/2021:  Patient with some improved depression symptoms and much more hopeful.  [facility]  Also is hopeful there will be some helpful treatments.  Visit to [place] was also very good and instilled a lot of hope in her abilities.  She was encouraged to continue to push herself to do the things she enjoys doing.  No medication changes today.  She will follow-up with getting TMS rescheduled, possibly when things slow down after the holidays.  Does not need to be seen until after the holidays.  No acute safety concerns today.  No problematic drug or alcohol use.    1/14/2022:  Overall patient feeling a little more down lately.  Tolerating TMS well.  Encouraged to  continue TMS.  No medication changes today since undergoing TMS treatment.  Talked about life stages today generativity versus stagnation and also integrity versus despair.  Talked about consideration for acceptance and commitment therapy.  She is encouraged to continue to be active.  No acute suicidal ideation today.  No acute safety concerns today.  No problematic drug or alcohol use.    Medication side effects and alternatives were reviewed. Health promotion activities recommended and reviewed today. All questions addressed. Education and counseling completed regarding risks and benefits of medications and psychotherapy options. Recommend therapy for additional support.     Treatment Plan:    Continue Pristiq 100 mg daily for mood, anxiety.     Continue methyl folate 7.5 mg daily as supplementation.    Continue Adderall XR 20 mg daily for mood augmentation    Continue Adderall IR 20 mg daily as needed for mood augmentation and daytime fatigue/hypersomnia    Continue benzodiazepine per primary care prescriber.    Continue TMS therapy.     Continue to work with your specialist from [facility] as indicated.     Recommend individual psychotherapy.      Continue all other cares per primary care provider.     Continue all other medications as reviewed per electronic medical record today.     Safety plan reviewed. To the Emergency Department as needed or call after hours crisis line at [phone] or [phone]. [place] Crisis Text Line. Text [place] to 753647 or Suicide LifeLine Chat: suicidepreventionlifeline.org/chat    Schedule an appointment with me in 2-3 months (after TMS), or sooner as needed. Call [facility] at [phone] to schedule.    Follow up with primary care provider as planned or for acute medical concerns.    Call the psychiatric nurse line with medication questions or concerns at [phone].    Angie[employer] Listhart may be used to communicate with your provider, but this is not intended to be  used for emergencies.    Therapy resources:  Www.MPSI.org    Risks of benzodiazepine (Ativan, Xanax, Klonopin, Valium, etc) use including, but not limited to, sedation, tolerance, risk for addiction/dependence. Do not drink alcohol while taking benzodiazepines due to risk of trouble breathing and potential death. Do not drive or operate heavy machinery until it is known how the drug affects you. Discuss with physician or pharmacist before ever taking a benzodiazepine with a narcotic/opioid pain medication.     Discussed risks of stimulant medication including, but not limited to, decreased appetite, risk of tics (and that they may be lasting), trouble sleeping, cardiac risks such as increased heart rate and blood pressure, and rare risk of sudden cardiac death.  Also risk of addiction/tolerance/dependence.    Administrative Billing:   Phone Call/Video Duration: 24 Minutes  11:20a-11:44a    Time spent with patient was 24 minutes and greater than 50% of time or 13 minutes was spent in counseling and coordination of care regarding above diagnoses and treatment plan. Patient with multiple psychiatric diagnoses, treatment resistant sxs, and multiple medication changes adding to complexity of care.    Patient Status:  Patient will continue to be seen for ongoing consultation and stabilization.    Signed:   Kimberly Perez DO  [facility]    Disclaimer: This note consists of symbols derived from keyboarding, dictation and/or voice recognition software. As a result, there may be errors in the script that have gone undetected. Please consider this when interpreting information found in this chart.

## 2022-01-14 NOTE — PROGRESS NOTES
" Interventional Psychiatry Program  [address], [address]  [place] [address]  TMS Procedure Note   Janelle White MRN# [id number]  Age: 61 year old year old YOB: 1960    Pre-Procedure:  History and Physical: Reviewed in medical record  Consent Signed by: Janelle White for this course of treatment.  On: 12/27/21      Clinical Narrative:  Patient tolerating treatment.  No changes are reported.    Daily Adult Stimulation Safety Questionnaire: No or Yes in past 24 hours     1) Have you discontinued or started any new medication? No  2) Have you missed any doses of your medication differently then prescribed? No  3) Have you consumed alcohol or drugs (other than prescribed)?  No  4) Did you not sleep AT ALL last night?  No  5) Has your SI changed or worsened?  No    Indications for TMS:  MDD, recurrent, severe; 4+ medication trials (from 2+ classes) ineffective; Psychotherapy ineffective     Procedure Diagnosis:  Severe episode of recurrent major depressive disorder, without psychotic features (H)  (primary encounter diagnosis)    Treatment Hx:   Treatment number this series: 15  Total lifetime treatment number: 15      Allergies   Allergen Reactions     Bupropion Other (See Comments) and Swelling     Ineffective, name brand works best  Ineffective, name brand works best     Codeine Other (See Comments)     \"Feels like electricity running through body\"  No reaction noted in [employer]ner.  Shaky  With Tylenol     Effexor [Venlafaxine Hydrochloride]      Affect too flat     Escitalopram      Other reaction(s): *Unknown  Sexual dysfunction     Fluoxetine Other (See Comments)     Sexual dysfunction     Levaquin [Levofloxacin] Other (See Comments)     Suicidal thoughts  Dark thoughts- mood changes     Lexapro      Sexual dysfunction     Methylphenidate Hives     Milnacipran      12.5 MG is fine. 25 MG caused side effects. \"Dark thoughts\"     Pregabalin Other (See Comments)     " Hallucinations  Audiovisual hallucinations     Prozac [Fluoxetine Hcl]      Sexual dysfunction     Tramadol Hives     Hives       Venlafaxine      Other reaction(s): *Unknown  Affect too flat      LMP 05/01/2005 (LMP Unknown)     Pause for the Cause  Right patient:  Yes  Right procedure/correct coil:  Yes; rTMS; cpt 80600; F8 coil.   Earplugs in place:  Yes    Procedure  Patient was seated in procedure chair. Identity and procedure was verified. Ear plugs were placed in ears and patient-specific cap was placed on head and tightened appropriately. Ruler locations were verified. Coil was placed at treatment location and stimulator was set to parameters described below. A test train was delivered and pt tolerated train. Given pt tolerance, 60 treatment trains were delivered. Pt tolerated procedure eyebrow movement.      Motor Threshold Determination  Distance from nasion to inion: 36  Tragus to tragus distance: 35  Head circumference: 54  Distance along the vertex: 9.38  Distance along circumference from midline: 6.25  MT 1:@ 62% on 12/27/2021    Theta LDLFPC     Frequency: 50 Hz  Burst Frequency: 5 Hz  Train Duration: 10 sec   Number of Bursts: 10   Total pulses delivered: 1800  Tx Loc: F3  Energy: 74% (120%MT)  Trains: 60       Date MADRS IDS-SR PHQ-9   12/27/21 35 66 25   1/7/22 -- 56 23   1/14/22   19       PHQ-9 SCORE 1/11/2022 1/12/2022 1/13/2022   PHQ-9 Total Score - - -   PHQ-9 Total Score MyChart - - -   PHQ-9 Total Score 20 21 17       Shilpi Ramesh Psychometrist  [facility]    Plan   - Cont TMS     I did not see the patient but remained available in the clinic throughout the TMS session.     Dharmesh Degroot M.D.   [facility]

## 2022-01-14 NOTE — PROGRESS NOTES
Collaborative Care Psychiatry Service (CCPS)  January 14, 2022      Behavioral Health Clinician Progress Note    Patient Name: Janelle White      Telemedicine Visit: The patient's condition can be safely assessed and treated via synchronous audio and visual telemedicine encounter.      Reason for Telemedicine Visit: Services only offered telehealth    Originating Site (Patient Location): Patient's home    Distant Site (Provider Location): Provider Remote Setting- Home Office    Consent:  The patient/guardian has verbally consented to: the potential risks and benefits of telemedicine (video visit) versus in person care; bill my insurance or make self-payment for services provided; and responsibility for payment of non-covered services.     Mode of Communication:  Video Conference via [email or web address]    As the provider I attest to compliance with applicable laws and regulations related to telemedicine.         Service Type:  Individual     Session Start Time: 10:30am  Session End Time:  10:55am      Session Length: 16 - 37      Attendees: Client    Visit Activities (Refresh list every visit): [facility] Only    Diagnostic Assessment Date: 03/11/2021  See Flowsheets for today's PHQ-9 and STACIE-7 results  Previous PHQ-9:   PHQ-9 SCORE 1/11/2022 1/12/2022 1/13/2022   PHQ-9 Total Score - - -   PHQ-9 Total Score MyChart - - -   PHQ-9 Total Score 20 21 17       Previous STACIE-7:   STACIE-7 SCORE 5/24/2021 7/27/2021 10/5/2021   Total Score - - -   Total Score 6 (mild anxiety) 5 (mild anxiety) 3 (minimal anxiety)   Total Score 6 5 3       WHODAS  WHODAS 2.0 Total Score 3/11/2021   Total Score 37   Total Score MyChart 37        AUDIT  AUDIT - Alcohol Use Disorders Identification Test - Reproduced from the World Health Organization Audit 2001 (Second Edition) 3/11/2021   1.  How often do you have a drink containing alcohol? 2 to 3 times a week   2.  How many drinks containing alcohol do you have on a typical day when you are drinking? 1  "or 2   3.  How often do you have five or more drinks on one occasion? Never   4.  How often during the last year have you found that you were not able to stop drinking once you had started? Never   5.  How often during the last year have you failed to do what was normally expected of you because of drinking? Never   6.  How often during the last year have you needed a first drink in the morning to get yourself going after a heavy drinking session? Never   7.  How often during the last year have you had a feeling of guilt or remorse after drinking? Less than monthly   8.  How often during the last year have you been unable to remember what happened the night before because of your drinking? Never   9.  Have you or someone else been injured because of your drinking? No   10. Has a relative, friend, doctor or other health care worker been concerned about your drinking or suggested you cut down? Yes, during the last year   TOTAL SCORE 8       DATA  Extended Session (60+ minutes): No  Interactive Complexity: No  Crisis: No    Medication Compliance:  Yes      Chemical Use Review:   Substance Use: Chemical use reviewed, no active concerns identified      Tobacco Use: No current tobacco use.      Current Stressors / Issues:   Reported that she is feeling \"really labile\" recently. She is in her 3rd week of TMS and has been more emotionally labile. She has been finding the benefits of TMS as positive. She tends to find the weather greatly influences her moods as well. She continues to struggle with not understanding why she feels \"so bad when I have nothing to feel bad for.\" She recently went on a month long trip with her . She said that it was \"really good but a real test on every level\" because of her physical and emotional health. The Adderall helps her stay organized and less scattered.       Review of Symptoms per patient report:  Depression: Change in sleep, Lack of interest, Excessive or inappropriate guilt, " Change in energy level, Difficulties concentrating, Psychomotor slowing or agitation, Feelings of hopelessness, Feelings of helplessness, Low self-worth, Ruminations, Irritability, Feeling sad, down, or depressed, Withdrawn, Poor hygeine and Frequent crying  Deepa:  No Symptoms  Psychosis: No Symptoms  Anxiety: Excessive worry, Nervousness, Physical complaints, such as headaches, stomachaches, muscle tension, Sleep disturbance, Psychomotor agitation, Ruminations, Poor concentration and Irritability  Panic:  No symptoms  Post Traumatic Stress Disorder:  No Symptoms   Eating Disorder: No Symptoms  ADD / ADHD:  No symptoms  Conduct Disorder: No symptoms  Autism Spectrum Disorder: No symptoms  Obsessive Compulsive Disorder: No Symptoms      Changes in Health Issues:   Yes: Covid and CLL    Assessment: Current Emotional / Mental Status (status of significant symptoms):  Risk status (Self / Other harm or suicidal ideation)  Patient has had a history of suicidal ideation: ongoing SI as previously noted   Patient denies current fears or concerns for personal safety.  Patient denies current or recent suicidal ideation or behaviors.  Patient denies current or recent homicidal ideation or behaviors.  Patient denies current or recent self injurious behavior or ideation.  Patient denies other safety concerns.  A safety and risk management plan has been developed including: Patient consented to co-developed safety plan.  A safety and risk management plan was completed.  Patient agreed to use safety plan should any safety concerns arise.  A copy was given to the patient.    Appearance:   Appropriate   Eye Contact:   Fair   Psychomotor Behavior: Normal   Attitude:   Cooperative   Orientation:   All  Speech   Rate / Production: Normal    Volume:  Normal   Mood:    Normal  Affect:    Flat   Thought Content:  Clear   Thought Form:  Coherent  Logical   Insight:    Fair     Diagnoses:  1. Severe episode of recurrent major depressive  "disorder, without psychotic features (H)    2. Chronic pain syndrome        Collateral Reports Completed:  Communicated with: Dr. Perez     Plan: (Homework, other):  Patient was given information about behavioral services and encouraged to schedule a follow up appointment with the clinic [facility] in conjunction with next [facility].  She was also given information about mental health symptoms and treatment options  and Cognitive Behavioral Therapy skills to practice when experiencing anxiety and depression.  CD Recommendations: No indications of CD issues.  Matt Manzo PsyD, LP      Melvin Manzo PsyD  January 14, 2022          [facility]                                       Patient's Name: Janelle White  March 11, 2021     SAFETY PLAN:  Step 1: Warning signs / cues (Thoughts, images, mood, situation, behavior) that a crisis may be developing:  ? Thoughts: \"I don't matter\", \"People would be better off without me\", \"I can't do this anymore\" and \"I just want this to end\"  ? Images: obsessive thoughts of death or dying: thoughts of carrying out plan  ? Thinking Processes: ruminations (can't stop thinking about my problems): ruminate on my plan and highly critical and negative thoughts: if  says something critical i shut down  ? Mood: worsening depression, hopelessness, disinhibited (not caring about things or consequences) and worthlessness  ? Behaviors: isolating/withdrawing , can't stop crying, not taking care of myself and not taking care of my responsibilities  ? Situations: relationship problems   Step 2: Coping strategies - Things I can do to take my mind off of my problems without contacting another person (relaxation technique, physical activity):  ? Distress Tolerance Strategies:  play with my pet  and watch a funny movie:    ? Physical Activities: go for a walk and get in the [place]  ? Focus on helpful thoughts:  \"This is temporary\"  Step 3: People and social " settings that provide distraction:              Name: Alyx     Phone: in my phone              Name: Raven   Phone: in my phone  ? park              Step 4: Remind myself of people and things that are important to me and worth living for:  Children, dog, friends  Step 5: When I am in crisis, I can ask these people to help me use my safety plan:              Name: Alyx     Phone: in my phone              Name: Raven   Phone: in my phone  Step 6: Making the environment safe:   ? none identified  Step 7: Professionals or agencies I can contact during a crisis:  ? Suicide Prevention Lifeline: 4-231-471-VDSM (6902)  ? Crisis Text Line Service: Text   HOME  to 223-024.    Local Crisis Services: [facility]     Call 911 or go to my nearest emergency department.       I helped develop this safety plan and agree to use it when needed.  I have been given a copy of this plan.       Patient signature: _______________________________________________________________  Today s date:  March 11, 2021  Adapted from Safety Plan Template 2008 Antionette Trevino and Joseph Leon is reprinted with the express permission of the authors.  No portion of the Safety Plan Template may be reproduced without the express, written permission.  You can contact the authors at [email or web address] or [email or web address].

## 2022-01-14 NOTE — Clinical Note
Please call this patient to get them scheduled for a follow-up visit in 2-3 months. Please schedule with me and the [facility]. Thanks!

## 2022-01-17 ENCOUNTER — OFFICE VISIT (OUTPATIENT)
Dept: PSYCHIATRY | Facility: CLINIC | Age: 62
End: 2022-01-17
Payer: COMMERCIAL

## 2022-01-17 DIAGNOSIS — F33.2 SEVERE EPISODE OF RECURRENT MAJOR DEPRESSIVE DISORDER, WITHOUT PSYCHOTIC FEATURES (H): Primary | ICD-10-CM

## 2022-01-17 ASSESSMENT — PATIENT HEALTH QUESTIONNAIRE - PHQ9: SUM OF ALL RESPONSES TO PHQ QUESTIONS 1-9: 18

## 2022-01-17 NOTE — PROGRESS NOTES
" Interventional Psychiatry Program  [address], [address]  [place] [address]  TMS Procedure Note   Janelle White MRN# [id number]  Age: 61 year old year old YOB: 1960    Pre-Procedure:  History and Physical: Reviewed in medical record  Consent Signed by: Janelle White for this course of treatment.  On: 12/27/21      Clinical Narrative:  Patient tolerating treatment.  No changes are reported.    Daily Adult Stimulation Safety Questionnaire: No or Yes in past 24 hours     1) Have you discontinued or started any new medication? No  2) Have you missed any doses of your medication differently then prescribed? No  3) Have you consumed alcohol or drugs (other than prescribed)?  No  4) Did you not sleep AT ALL last night?  No  5) Has your SI changed or worsened?  No    Indications for TMS:  MDD, recurrent, severe; 4+ medication trials (from 2+ classes) ineffective; Psychotherapy ineffective     Procedure Diagnosis:  Severe episode of recurrent major depressive disorder, without psychotic features (H)  (primary encounter diagnosis)    Treatment Hx:   Treatment number this series: 16  Total lifetime treatment number: 16      Allergies   Allergen Reactions     Bupropion Other (See Comments) and Swelling     Ineffective, name brand works best  Ineffective, name brand works best     Codeine Other (See Comments)     \"Feels like electricity running through body\"  No reaction noted in [employer].  Shaky  With Tylenol     Effexor [Venlafaxine Hydrochloride]      Affect too flat     Escitalopram      Other reaction(s): *Unknown  Sexual dysfunction     Fluoxetine Other (See Comments)     Sexual dysfunction     Levaquin [Levofloxacin] Other (See Comments)     Suicidal thoughts  Dark thoughts- mood changes     Lexapro      Sexual dysfunction     Methylphenidate Hives     Milnacipran      12.5 MG is fine. 25 MG caused side effects. \"Dark thoughts\"     Pregabalin Other (See Comments)     " Hallucinations  Audiovisual hallucinations     Prozac [Fluoxetine Hcl]      Sexual dysfunction     Tramadol Hives     Hives       Venlafaxine      Other reaction(s): *Unknown  Affect too flat      LMP 05/01/2005 (LMP Unknown)     Pause for the Cause  Right patient:  Yes  Right procedure/correct coil:  Yes; rTMS; cpt 69277; F8 coil.   Earplugs in place:  Yes    Procedure  Patient was seated in procedure chair. Identity and procedure was verified. Ear plugs were placed in ears and patient-specific cap was placed on head and tightened appropriately. Ruler locations were verified. Coil was placed at treatment location and stimulator was set to parameters described below. A test train was delivered and pt tolerated train. Given pt tolerance, 60 treatment trains were delivered. Pt tolerated procedure eyebrow movement.      Motor Threshold Determination  Distance from nasion to inion: 36  Tragus to tragus distance: 35  Head circumference: 54  Distance along the vertex: 9.38  Distance along circumference from midline: 6.25  MT 1:@ 62% on 12/27/2021    Theta LDLFPC     Frequency: 50 Hz  Burst Frequency: 5 Hz  Train Duration: 10 sec   Number of Bursts: 10   Total pulses delivered: 1800  Tx Loc: F3  Energy: 74% (120%MT)  Trains: 60       Date MADRS IDS-SR PHQ-9   12/27/21 35 66 25   1/7/22 -- 56 23   1/14/22   19       PHQ-9 SCORE 1/12/2022 1/13/2022 1/14/2022   PHQ-9 Total Score - - -   PHQ-9 Total Score MyChart - - -   PHQ-9 Total Score 21 17 19       Shilpi Ramesh Psychometrist  [facility]    Plan   - Cont TMS       I did not see the patient during/after treatment but remained available in the clinic during  treatment.    Kavitha Abarca MD  [facility]

## 2022-01-18 ENCOUNTER — OFFICE VISIT (OUTPATIENT)
Dept: PSYCHIATRY | Facility: CLINIC | Age: 62
End: 2022-01-18
Payer: COMMERCIAL

## 2022-01-18 DIAGNOSIS — F33.2 SEVERE EPISODE OF RECURRENT MAJOR DEPRESSIVE DISORDER, WITHOUT PSYCHOTIC FEATURES (H): Primary | ICD-10-CM

## 2022-01-18 ASSESSMENT — PATIENT HEALTH QUESTIONNAIRE - PHQ9: SUM OF ALL RESPONSES TO PHQ QUESTIONS 1-9: 12

## 2022-01-18 NOTE — PROGRESS NOTES
" Interventional Psychiatry Program  [address], [address]  [place] [address]  TMS Procedure Note   Janelel White MRN# [id number]  Age: 61 year old year old YOB: 1960    Pre-Procedure:  History and Physical: Reviewed in medical record  Consent Signed by: Janelle White for this course of treatment.  On: 12/27/21      Clinical Narrative:  Patient tolerating treatment.  Reports feeling a little bit of energy today.  Had a good nights sleep last night.    Daily Adult Stimulation Safety Questionnaire: No or Yes in past 24 hours     1) Have you discontinued or started any new medication? No  2) Have you missed any doses of your medication differently then prescribed? No  3) Have you consumed alcohol or drugs (other than prescribed)?  No  4) Did you not sleep AT ALL last night?  No  5) Has your SI changed or worsened?  No    Indications for TMS:  MDD, recurrent, severe; 4+ medication trials (from 2+ classes) ineffective; Psychotherapy ineffective     Procedure Diagnosis:  Severe episode of recurrent major depressive disorder, without psychotic features (H)  (primary encounter diagnosis)    Treatment Hx:   Treatment number this series: 17  Total lifetime treatment number: 17      Allergies   Allergen Reactions     Bupropion Other (See Comments) and Swelling     Ineffective, name brand works best  Ineffective, name brand works best     Codeine Other (See Comments)     \"Feels like electricity running through body\"  No reaction noted in [employer].  Shaky  With Tylenol     Effexor [Venlafaxine Hydrochloride]      Affect too flat     Escitalopram      Other reaction(s): *Unknown  Sexual dysfunction     Fluoxetine Other (See Comments)     Sexual dysfunction     Levaquin [Levofloxacin] Other (See Comments)     Suicidal thoughts  Dark thoughts- mood changes     Lexapro      Sexual dysfunction     Methylphenidate Hives     Milnacipran      12.5 MG is fine. 25 MG caused side effects. \"Dark " "thoughts\"     Pregabalin Other (See Comments)     Hallucinations  Audiovisual hallucinations     Prozac [Fluoxetine Hcl]      Sexual dysfunction     Tramadol Hives     Hives       Venlafaxine      Other reaction(s): *Unknown  Affect too flat      LMP 05/01/2005 (LMP Unknown)     Pause for the Cause  Right patient:  Yes  Right procedure/correct coil:  Yes; rTMS; cpt 77955; F8 coil.   Earplugs in place:  Yes    Procedure  Patient was seated in procedure chair. Identity and procedure was verified. Ear plugs were placed in ears and patient-specific cap was placed on head and tightened appropriately. Ruler locations were verified. Coil was placed at treatment location and stimulator was set to parameters described below. A test train was delivered and pt tolerated train. Given pt tolerance, 60 treatment trains were delivered. Pt tolerated procedure eyebrow movement.      Motor Threshold Determination  Distance from nasion to inion: 36  Tragus to tragus distance: 35  Head circumference: 54  Distance along the vertex: 9.38  Distance along circumference from midline: 6.25  MT 1:@ 62% on 12/27/2021    Theta LDLFPC     Frequency: 50 Hz  Burst Frequency: 5 Hz  Train Duration: 10 sec   Number of Bursts: 10   Total pulses delivered: 1800  Tx Loc: F3  Energy: 74% (120%MT)  Trains: 60       Date MADRS IDS-SR PHQ-9   12/27/21 35 66 25   1/7/22 -- 56 23   1/14/22   19       PHQ-9 SCORE 1/13/2022 1/14/2022 1/17/2022   PHQ-9 Total Score - - -   PHQ-9 Total Score MyChart - - -   PHQ-9 Total Score 17 19 18       Shilpi Ramesh, Psychometrist  [facility]    Plan   - Cont TMS       I did not see the patient during/after treatment but remained available in the clinic during  treatment.    Melissa Taveras MD  [facility]    "

## 2022-01-19 ENCOUNTER — OFFICE VISIT (OUTPATIENT)
Dept: PSYCHIATRY | Facility: CLINIC | Age: 62
End: 2022-01-19
Payer: COMMERCIAL

## 2022-01-19 DIAGNOSIS — F33.2 SEVERE EPISODE OF RECURRENT MAJOR DEPRESSIVE DISORDER, WITHOUT PSYCHOTIC FEATURES (H): Primary | ICD-10-CM

## 2022-01-19 ASSESSMENT — PATIENT HEALTH QUESTIONNAIRE - PHQ9: SUM OF ALL RESPONSES TO PHQ QUESTIONS 1-9: 13

## 2022-01-19 NOTE — PROGRESS NOTES
" Interventional Psychiatry Program  [address], [address]  [place] [address]  TMS Procedure Note   Janelle White MRN# [id number]  Age: 61 year old year old YOB: 1960    Pre-Procedure:  History and Physical: Reviewed in medical record  Consent Signed by: Janelle White for this course of treatment.  On: 12/27/21      Clinical Narrative:  Patient tolerating treatment.  States her mood has cristian getting better, and its very subtle.      Daily Adult Stimulation Safety Questionnaire: No or Yes in past 24 hours     1) Have you discontinued or started any new medication? No  2) Have you missed any doses of your medication differently then prescribed? No  3) Have you consumed alcohol or drugs (other than prescribed)?  No  4) Did you not sleep AT ALL last night?  No  5) Has your SI changed or worsened?  No    Indications for TMS:  MDD, recurrent, severe; 4+ medication trials (from 2+ classes) ineffective; Psychotherapy ineffective     Procedure Diagnosis:  Severe episode of recurrent major depressive disorder, without psychotic features (H)  (primary encounter diagnosis)    Treatment Hx:   Treatment number this series: 18  Total lifetime treatment number: 18      Allergies   Allergen Reactions     Bupropion Other (See Comments) and Swelling     Ineffective, name brand works best  Ineffective, name brand works best     Codeine Other (See Comments)     \"Feels like electricity running through body\"  No reaction noted in [employer].  Shaky  With Tylenol     Effexor [Venlafaxine Hydrochloride]      Affect too flat     Escitalopram      Other reaction(s): *Unknown  Sexual dysfunction     Fluoxetine Other (See Comments)     Sexual dysfunction     Levaquin [Levofloxacin] Other (See Comments)     Suicidal thoughts  Dark thoughts- mood changes     Lexapro      Sexual dysfunction     Methylphenidate Hives     Milnacipran      12.5 MG is fine. 25 MG caused side effects. \"Dark thoughts\"     " Pregabalin Other (See Comments)     Hallucinations  Audiovisual hallucinations     Prozac [Fluoxetine Hcl]      Sexual dysfunction     Tramadol Hives     Hives       Venlafaxine      Other reaction(s): *Unknown  Affect too flat      LMP 05/01/2005 (LMP Unknown)     Pause for the Cause  Right patient:  Yes  Right procedure/correct coil:  Yes; rTMS; cpt 02138; F8 coil.   Earplugs in place:  Yes    Procedure  Patient was seated in procedure chair. Identity and procedure was verified. Ear plugs were placed in ears and patient-specific cap was placed on head and tightened appropriately. Ruler locations were verified. Coil was placed at treatment location and stimulator was set to parameters described below. A test train was delivered and pt tolerated train. Given pt tolerance, 60 treatment trains were delivered. Pt tolerated procedure eyebrow movement.      Motor Threshold Determination  Distance from nasion to inion: 36  Tragus to tragus distance: 35  Head circumference: 54  Distance along the vertex: 9.38  Distance along circumference from midline: 6.25  MT 1:@ 62% on 12/27/2021    Theta LDLFPC     Frequency: 50 Hz  Burst Frequency: 5 Hz  Train Duration: 10 sec   Number of Bursts: 10   Total pulses delivered: 1800  Tx Loc: F3  Energy: 74% (120%MT)  Trains: 60       Date MADRS IDS-SR PHQ-9   12/27/21 35 66 25   1/7/22 -- 56 23   1/14/22   19       PHQ-9 SCORE 1/17/2022 1/18/2022 1/19/2022   PHQ-9 Total Score - - -   PHQ-9 Total Score MyChart - - -   PHQ-9 Total Score 18 12 13       Saniya Miller RN   [facility]    Plan   - Cont TMS       I did not see the patient but remained available in clinic throughout treatment.     Zafar Yanes MD  [facility]

## 2022-01-20 ENCOUNTER — OFFICE VISIT (OUTPATIENT)
Dept: PSYCHIATRY | Facility: CLINIC | Age: 62
End: 2022-01-20
Payer: COMMERCIAL

## 2022-01-20 DIAGNOSIS — F33.2 SEVERE EPISODE OF RECURRENT MAJOR DEPRESSIVE DISORDER, WITHOUT PSYCHOTIC FEATURES (H): Primary | ICD-10-CM

## 2022-01-20 NOTE — PROGRESS NOTES
" Interventional Psychiatry Program  [address], [address]  [place] [address]  TMS Procedure Note   Janelle White MRN# [id number]  Age: 61 year old year old YOB: 1960    Pre-Procedure:  History and Physical: Reviewed in medical record  Consent Signed by: Janelle White for this course of treatment.  On: 12/27/21      Clinical Narrative:  Patient tolerating treatment. No changes reported.    Daily Adult Stimulation Safety Questionnaire: No or Yes in past 24 hours     1) Have you discontinued or started any new medication? No  2) Have you missed any doses of your medication differently then prescribed? No  3) Have you consumed alcohol or drugs (other than prescribed)?  No  4) Did you not sleep AT ALL last night?  No  5) Has your SI changed or worsened?  No    Indications for TMS:  MDD, recurrent, severe; 4+ medication trials (from 2+ classes) ineffective; Psychotherapy ineffective     Procedure Diagnosis:  Severe episode of recurrent major depressive disorder, without psychotic features (H)  (primary encounter diagnosis)    Treatment Hx:   Treatment number this series: 19  Total lifetime treatment number: 19      Allergies   Allergen Reactions     Bupropion Other (See Comments) and Swelling     Ineffective, name brand works best  Ineffective, name brand works best     Codeine Other (See Comments)     \"Feels like electricity running through body\"  No reaction noted in [employer].  Shaky  With Tylenol     Effexor [Venlafaxine Hydrochloride]      Affect too flat     Escitalopram      Other reaction(s): *Unknown  Sexual dysfunction     Fluoxetine Other (See Comments)     Sexual dysfunction     Levaquin [Levofloxacin] Other (See Comments)     Suicidal thoughts  Dark thoughts- mood changes     Lexapro      Sexual dysfunction     Methylphenidate Hives     Milnacipran      12.5 MG is fine. 25 MG caused side effects. \"Dark thoughts\"     Pregabalin Other (See Comments)     " Hallucinations  Audiovisual hallucinations     Prozac [Fluoxetine Hcl]      Sexual dysfunction     Tramadol Hives     Hives       Venlafaxine      Other reaction(s): *Unknown  Affect too flat      LMP 05/01/2005 (LMP Unknown)     Pause for the Cause  Right patient:  Yes  Right procedure/correct coil:  Yes; rTMS; cpt 03948; F8 coil.   Earplugs in place:  Yes    Procedure  Patient was seated in procedure chair. Identity and procedure was verified. Ear plugs were placed in ears and patient-specific cap was placed on head and tightened appropriately. Ruler locations were verified. Coil was placed at treatment location and stimulator was set to parameters described below. A test train was delivered and pt tolerated train. Given pt tolerance, 60 treatment trains were delivered. Pt tolerated procedure eyebrow movement.      Motor Threshold Determination  Distance from nasion to inion: 36  Tragus to tragus distance: 35  Head circumference: 54  Distance along the vertex: 9.38  Distance along circumference from midline: 6.25  MT 1:@ 62% on 12/27/2021    Theta LDLFPC     Frequency: 50 Hz  Burst Frequency: 5 Hz  Train Duration: 10 sec   Number of Bursts: 10   Total pulses delivered: 1800  Tx Loc: F3  Energy: 74% (120%MT)  Trains: 60       Date MADRS IDS-SR PHQ-9   12/27/21 35 66 25   1/7/22 -- 56 23   1/14/22   19       PHQ-9 SCORE 1/17/2022 1/18/2022 1/19/2022   PHQ-9 Total Score - - -   PHQ-9 Total Score MyChart - - -   PHQ-9 Total Score 18 12 13       Griselda Lopez, EMT  [facility]    Plan   - Cont TMS       I did not see the patient during/after treatment but remained available in the clinic during  treatment.    Melissa Taveras MD  [facility]

## 2022-01-21 ENCOUNTER — OFFICE VISIT (OUTPATIENT)
Dept: PSYCHIATRY | Facility: CLINIC | Age: 62
End: 2022-01-21
Payer: COMMERCIAL

## 2022-01-21 DIAGNOSIS — F33.2 SEVERE EPISODE OF RECURRENT MAJOR DEPRESSIVE DISORDER, WITHOUT PSYCHOTIC FEATURES (H): Primary | ICD-10-CM

## 2022-01-21 ASSESSMENT — PATIENT HEALTH QUESTIONNAIRE - PHQ9: SUM OF ALL RESPONSES TO PHQ QUESTIONS 1-9: 15

## 2022-01-21 NOTE — PROGRESS NOTES
" Interventional Psychiatry Program  [address], [address]  [place] [address]  TMS Procedure Note   Janelle White MRN# [id number]  Age: 61 year old year old YOB: 1960    Pre-Procedure:  History and Physical: Reviewed in medical record  Consent Signed by: Janelle White for this course of treatment.  On: 12/27/21      Clinical Narrative:  Patient tolerating treatment. Patient was talkative during treatment.    Daily Adult Stimulation Safety Questionnaire: No or Yes in past 24 hours     1) Have you discontinued or started any new medication? No  2) Have you missed any doses of your medication differently then prescribed? No  3) Have you consumed alcohol or drugs (other than prescribed)?  No  4) Did you not sleep AT ALL last night?  No  5) Has your SI changed or worsened?  No    Indications for TMS:  MDD, recurrent, severe; 4+ medication trials (from 2+ classes) ineffective; Psychotherapy ineffective     Procedure Diagnosis:  Severe episode of recurrent major depressive disorder, without psychotic features (H)  (primary encounter diagnosis)    Treatment Hx:   Treatment number this series: 20  Total lifetime treatment number: 20      Allergies   Allergen Reactions     Bupropion Other (See Comments) and Swelling     Ineffective, name brand works best  Ineffective, name brand works best     Codeine Other (See Comments)     \"Feels like electricity running through body\"  No reaction noted in [employer].  Shaky  With Tylenol     Effexor [Venlafaxine Hydrochloride]      Affect too flat     Escitalopram      Other reaction(s): *Unknown  Sexual dysfunction     Fluoxetine Other (See Comments)     Sexual dysfunction     Levaquin [Levofloxacin] Other (See Comments)     Suicidal thoughts  Dark thoughts- mood changes     Lexapro      Sexual dysfunction     Methylphenidate Hives     Milnacipran      12.5 MG is fine. 25 MG caused side effects. \"Dark thoughts\"     Pregabalin Other (See " Comments)     Hallucinations  Audiovisual hallucinations     Prozac [Fluoxetine Hcl]      Sexual dysfunction     Tramadol Hives     Hives       Venlafaxine      Other reaction(s): *Unknown  Affect too flat      LMP 05/01/2005 (LMP Unknown)     Pause for the Cause  Right patient:  Yes  Right procedure/correct coil:  Yes; rTMS; cpt 41239; F8 coil.   Earplugs in place:  Yes    Procedure  Patient was seated in procedure chair. Identity and procedure was verified. Ear plugs were placed in ears and patient-specific cap was placed on head and tightened appropriately. Ruler locations were verified. Coil was placed at treatment location and stimulator was set to parameters described below. A test train was delivered and pt tolerated train. Given pt tolerance, 60 treatment trains were delivered. Pt tolerated procedure eyebrow movement.      Motor Threshold Determination  Distance from nasion to inion: 36  Tragus to tragus distance: 35  Head circumference: 54  Distance along the vertex: 9.38  Distance along circumference from midline: 6.25  MT 1:@ 62% on 12/27/2021    Theta LDLFPC     Frequency: 50 Hz  Burst Frequency: 5 Hz  Train Duration: 10 sec   Number of Bursts: 10   Total pulses delivered: 1800  Tx Loc: F3  Energy: 74% (120%MT)  Trains: 60       Date MADRS IDS-SR PHQ-9   12/27/21 35 66 25   1/7/22 -- 56 23   1/14/22   19   1/21/22  43 15       PHQ-9 SCORE 1/18/2022 1/19/2022 1/21/2022   PHQ-9 Total Score - - -   PHQ-9 Total Score MyChart - - -   PHQ-9 Total Score 12 13 15       KANNAN Cam  [facility]    Plan   - Cont TMS       I did not see the patient during/after treatment but remained available in the clinic during  treatment.    Narendra Hopkins MD  [facility]

## 2022-01-24 ASSESSMENT — PATIENT HEALTH QUESTIONNAIRE - PHQ9: SUM OF ALL RESPONSES TO PHQ QUESTIONS 1-9: 14

## 2022-01-25 ENCOUNTER — OFFICE VISIT (OUTPATIENT)
Dept: PSYCHIATRY | Facility: CLINIC | Age: 62
End: 2022-01-25
Payer: COMMERCIAL

## 2022-01-25 DIAGNOSIS — F33.2 SEVERE EPISODE OF RECURRENT MAJOR DEPRESSIVE DISORDER, WITHOUT PSYCHOTIC FEATURES (H): Primary | ICD-10-CM

## 2022-01-25 ASSESSMENT — PATIENT HEALTH QUESTIONNAIRE - PHQ9: SUM OF ALL RESPONSES TO PHQ QUESTIONS 1-9: 25

## 2022-01-25 NOTE — PROGRESS NOTES
" Interventional Psychiatry Program  [address], [address]  [place] [address]  TMS Procedure Note   Janelle White MRN# [id number]  Age: 61 year old year old YOB: 1960    Pre-Procedure:  History and Physical: Reviewed in medical record  Consent Signed by: Janelle White for this course of treatment.  On: 12/27/21      Clinical Narrative:  Patient tolerating treatment. Patient reports that she is feeling out of sorts today and a bit more down.  States that the weather being colder is really tough on her as she does not get to go outside as much.      Daily Adult Stimulation Safety Questionnaire: No or Yes in past 24 hours     1) Have you discontinued or started any new medication? No  2) Have you missed any doses of your medication differently then prescribed? No  3) Have you consumed alcohol or drugs (other than prescribed)?  No  4) Did you not sleep AT ALL last night?  No  5) Has your SI changed or worsened?  No    Indications for TMS:  MDD, recurrent, severe; 4+ medication trials (from 2+ classes) ineffective; Psychotherapy ineffective     Procedure Diagnosis:  Severe episode of recurrent major depressive disorder, without psychotic features (H)  (primary encounter diagnosis)    Treatment Hx:   Treatment number this series: 21  Total lifetime treatment number: 21      Allergies   Allergen Reactions     Bupropion Other (See Comments) and Swelling     Ineffective, name brand works best  Ineffective, name brand works best     Codeine Other (See Comments)     \"Feels like electricity running through body\"  No reaction noted in [employer].  Shaky  With Tylenol     Effexor [Venlafaxine Hydrochloride]      Affect too flat     Escitalopram      Other reaction(s): *Unknown  Sexual dysfunction     Fluoxetine Other (See Comments)     Sexual dysfunction     Levaquin [Levofloxacin] Other (See Comments)     Suicidal thoughts  Dark thoughts- mood changes     Lexapro      Sexual " "dysfunction     Methylphenidate Hives     Milnacipran      12.5 MG is fine. 25 MG caused side effects. \"Dark thoughts\"     Pregabalin Other (See Comments)     Hallucinations  Audiovisual hallucinations     Prozac [Fluoxetine Hcl]      Sexual dysfunction     Tramadol Hives     Hives       Venlafaxine      Other reaction(s): *Unknown  Affect too flat      LMP 05/01/2005 (LMP Unknown)     Pause for the Cause  Right patient:  Yes  Right procedure/correct coil:  Yes; rTMS; cpt 36527; F8 coil.   Earplugs in place:  Yes    Procedure  Patient was seated in procedure chair. Identity and procedure was verified. Ear plugs were placed in ears and patient-specific cap was placed on head and tightened appropriately. Ruler locations were verified. Coil was placed at treatment location and stimulator was set to parameters described below. A test train was delivered and pt tolerated train. Given pt tolerance, 60 treatment trains were delivered. Pt tolerated procedure eyebrow movement.      Motor Threshold Determination  Distance from nasion to inion: 36  Tragus to tragus distance: 35  Head circumference: 54  Distance along the vertex: 9.38  Distance along circumference from midline: 6.25  MT 1:@ 62% on 12/27/2021    Theta LDLFPC     Frequency: 50 Hz  Burst Frequency: 5 Hz  Train Duration: 10 sec   Number of Bursts: 10   Total pulses delivered: 1800  Tx Loc: F3  Energy: 74% (120%MT)  Trains: 60       Date MADRS IDS-SR PHQ-9   12/27/21 35 66 25   1/7/22 -- 56 23   1/14/22   19   1/21/22  43 15       PHQ-9 SCORE 1/21/2022 1/24/2022 1/25/2022   PHQ-9 Total Score - - -   PHQ-9 Total Score MyChart - - -   PHQ-9 Total Score 15 14 25       Saniya Miller RN   [facility]    Plan   - Cont TMS       I did not see the patient during/after treatment but remained available in the clinic during  treatment.    Melissa Taveras MD  [facility]    "

## 2022-01-26 ENCOUNTER — OFFICE VISIT (OUTPATIENT)
Dept: PSYCHIATRY | Facility: CLINIC | Age: 62
End: 2022-01-26
Payer: COMMERCIAL

## 2022-01-26 DIAGNOSIS — F33.2 SEVERE EPISODE OF RECURRENT MAJOR DEPRESSIVE DISORDER, WITHOUT PSYCHOTIC FEATURES (H): Primary | ICD-10-CM

## 2022-01-26 ASSESSMENT — PATIENT HEALTH QUESTIONNAIRE - PHQ9: SUM OF ALL RESPONSES TO PHQ QUESTIONS 1-9: 22

## 2022-01-26 NOTE — PROGRESS NOTES
" Interventional Psychiatry Program  [address], [address]  [place] [address]  TMS Procedure Note   Janelle White MRN# [id number]  Age: 61 year old year old YOB: 1960    Pre-Procedure:  History and Physical: Reviewed in medical record  Consent Signed by: Janelle White for this course of treatment.  On: 12/27/21      Clinical Narrative:  Patient tolerating treatment. Patient reports that her mood is very variable depending on the weather.    Daily Adult Stimulation Safety Questionnaire: No or Yes in past 24 hours     1) Have you discontinued or started any new medication? No  2) Have you missed any doses of your medication differently then prescribed? No  3) Have you consumed alcohol or drugs (other than prescribed)?  No  4) Did you not sleep AT ALL last night?  No  5) Has your SI changed or worsened?  No    Indications for TMS:  MDD, recurrent, severe; 4+ medication trials (from 2+ classes) ineffective; Psychotherapy ineffective     Procedure Diagnosis:  Severe episode of recurrent major depressive disorder, without psychotic features (H)  (primary encounter diagnosis)    Treatment Hx:   Treatment number this series: 22  Total lifetime treatment number: 22      Allergies   Allergen Reactions     Bupropion Other (See Comments) and Swelling     Ineffective, name brand works best  Ineffective, name brand works best     Codeine Other (See Comments)     \"Feels like electricity running through body\"  No reaction noted in [employer].  Shaky  With Tylenol     Effexor [Venlafaxine Hydrochloride]      Affect too flat     Escitalopram      Other reaction(s): *Unknown  Sexual dysfunction     Fluoxetine Other (See Comments)     Sexual dysfunction     Levaquin [Levofloxacin] Other (See Comments)     Suicidal thoughts  Dark thoughts- mood changes     Lexapro      Sexual dysfunction     Methylphenidate Hives     Milnacipran      12.5 MG is fine. 25 MG caused side effects. \"Dark thoughts\" "     Pregabalin Other (See Comments)     Hallucinations  Audiovisual hallucinations     Prozac [Fluoxetine Hcl]      Sexual dysfunction     Tramadol Hives     Hives       Venlafaxine      Other reaction(s): *Unknown  Affect too flat      LMP 05/01/2005 (LMP Unknown)     Pause for the Cause  Right patient:  Yes  Right procedure/correct coil:  Yes; rTMS; cpt 94746; F8 coil.   Earplugs in place:  Yes    Procedure  Patient was seated in procedure chair. Identity and procedure was verified. Ear plugs were placed in ears and patient-specific cap was placed on head and tightened appropriately. Ruler locations were verified. Coil was placed at treatment location and stimulator was set to parameters described below. A test train was delivered and pt tolerated train. Given pt tolerance, 60 treatment trains were delivered. Pt tolerated procedure eyebrow movement.      Motor Threshold Determination  Distance from nasion to inion: 36  Tragus to tragus distance: 35  Head circumference: 54  Distance along the vertex: 9.38  Distance along circumference from midline: 6.25  MT 1:@ 62% on 12/27/2021    Theta LDLFPC     Frequency: 50 Hz  Burst Frequency: 5 Hz  Train Duration: 10 sec   Number of Bursts: 10   Total pulses delivered: 1800  Tx Loc: F3  Energy: 74% (120%MT)  Trains: 60       Date MADRS IDS-SR PHQ-9   12/27/21 35 66 25   1/7/22 -- 56 23   1/14/22   19   1/21/22  43 15       PHQ-9 SCORE 1/24/2022 1/25/2022 1/26/2022   PHQ-9 Total Score - - -   PHQ-9 Total Score MyChart - - -   PHQ-9 Total Score 14 25 22       Griselda Lopez, EMT  [facility]    Plan   - Cont TMS       I did not see the patient during/after treatment but remained available in the clinic during  treatment.      Narendra Hopkins MD PhD  [facility]

## 2022-01-27 ENCOUNTER — OFFICE VISIT (OUTPATIENT)
Dept: PSYCHIATRY | Facility: CLINIC | Age: 62
End: 2022-01-27
Payer: COMMERCIAL

## 2022-01-27 DIAGNOSIS — F33.2 SEVERE EPISODE OF RECURRENT MAJOR DEPRESSIVE DISORDER, WITHOUT PSYCHOTIC FEATURES (H): Primary | ICD-10-CM

## 2022-01-27 ASSESSMENT — PATIENT HEALTH QUESTIONNAIRE - PHQ9: SUM OF ALL RESPONSES TO PHQ QUESTIONS 1-9: 14

## 2022-01-27 NOTE — PROGRESS NOTES
" Interventional Psychiatry Program  [address], [address]  [place] [address]  TMS Procedure Note   Janelle White MRN# [id number]  Age: 61 year old year old YOB: 1960    Pre-Procedure:  History and Physical: Reviewed in medical record  Consent Signed by: Janelle White for this course of treatment.  On: 12/27/21      Clinical Narrative:  Patient tolerating treatment. Patient reports that her mood is very variable depending on the weather.  She reports she has had some waves of dark (suicidal) thinking, but no intent.    Daily Adult Stimulation Safety Questionnaire: No or Yes in past 24 hours     1) Have you discontinued or started any new medication? No  2) Have you missed any doses of your medication differently then prescribed? No  3) Have you consumed alcohol or drugs (other than prescribed)?  No  4) Did you not sleep AT ALL last night?  No  5) Has your SI changed or worsened?  No    Indications for TMS:  MDD, recurrent, severe; 4+ medication trials (from 2+ classes) ineffective; Psychotherapy ineffective     Procedure Diagnosis:  Severe episode of recurrent major depressive disorder, without psychotic features (H)  (primary encounter diagnosis)    Treatment Hx:   Treatment number this series: 23  Total lifetime treatment number: 23      Allergies   Allergen Reactions     Bupropion Other (See Comments) and Swelling     Ineffective, name brand works best  Ineffective, name brand works best     Codeine Other (See Comments)     \"Feels like electricity running through body\"  No reaction noted in [employer].  Shaky  With Tylenol     Effexor [Venlafaxine Hydrochloride]      Affect too flat     Escitalopram      Other reaction(s): *Unknown  Sexual dysfunction     Fluoxetine Other (See Comments)     Sexual dysfunction     Levaquin [Levofloxacin] Other (See Comments)     Suicidal thoughts  Dark thoughts- mood changes     Lexapro      Sexual dysfunction     Methylphenidate Hives " "    Milnacipran      12.5 MG is fine. 25 MG caused side effects. \"Dark thoughts\"     Pregabalin Other (See Comments)     Hallucinations  Audiovisual hallucinations     Prozac [Fluoxetine Hcl]      Sexual dysfunction     Tramadol Hives     Hives       Venlafaxine      Other reaction(s): *Unknown  Affect too flat      LMP 05/01/2005 (LMP Unknown)     Pause for the Cause  Right patient:  Yes  Right procedure/correct coil:  Yes; rTMS; cpt 66272; F8 coil.   Earplugs in place:  Yes    Procedure  Patient was seated in procedure chair. Identity and procedure was verified. Ear plugs were placed in ears and patient-specific cap was placed on head and tightened appropriately. Ruler locations were verified. Coil was placed at treatment location and stimulator was set to parameters described below. A test train was delivered and pt tolerated train. Given pt tolerance, 60 treatment trains were delivered. Pt tolerated procedure eyebrow movement.      Motor Threshold Determination  Distance from nasion to inion: 36  Tragus to tragus distance: 35  Head circumference: 54  Distance along the vertex: 9.38  Distance along circumference from midline: 6.25  MT 1:@ 62% on 12/27/2021    Theta LDLFPC     Frequency: 50 Hz  Burst Frequency: 5 Hz  Train Duration: 10 sec   Number of Bursts: 10   Total pulses delivered: 1800  Tx Loc: F3  Energy: 74% (120%MT)  Trains: 60       Date MADRS IDS-SR PHQ-9   12/27/21 35 66 25   1/7/22 -- 56 23   1/14/22   19   1/21/22  43 15       PHQ-9 SCORE 1/24/2022 1/25/2022 1/26/2022   PHQ-9 Total Score - - -   PHQ-9 Total Score MyChart - - -   PHQ-9 Total Score 14 25 22       Shilpi Ramesh Psychometrist  [facility]    Plan   - Cont TMS       I did not see the patient during/after treatment but remained available in the clinic during  treatment.    Kavitha Abarca MD  [facility]  "

## 2022-01-28 ENCOUNTER — OFFICE VISIT (OUTPATIENT)
Dept: PSYCHIATRY | Facility: CLINIC | Age: 62
End: 2022-01-28
Payer: COMMERCIAL

## 2022-01-28 DIAGNOSIS — F33.2 SEVERE EPISODE OF RECURRENT MAJOR DEPRESSIVE DISORDER, WITHOUT PSYCHOTIC FEATURES (H): Primary | ICD-10-CM

## 2022-01-28 ASSESSMENT — PATIENT HEALTH QUESTIONNAIRE - PHQ9: SUM OF ALL RESPONSES TO PHQ QUESTIONS 1-9: 19

## 2022-01-28 NOTE — PROGRESS NOTES
" Interventional Psychiatry Program  [address], [address]  [place] [address]  TMS Procedure Note   Janelle White MRN# [id number]  Age: 61 year old year old YOB: 1960    Pre-Procedure:  History and Physical: Reviewed in medical record  Consent Signed by: Janelle White for this course of treatment.  On: 12/27/21      Clinical Narrative:  Patient tolerating treatment.  Patient reports mood is very labile.  States she is starting to get a little better compared to earlier this week.     Daily Adult Stimulation Safety Questionnaire: No or Yes in past 24 hours     1) Have you discontinued or started any new medication? No  2) Have you missed any doses of your medication differently then prescribed? No  3) Have you consumed alcohol or drugs (other than prescribed)?  No  4) Did you not sleep AT ALL last night?  No  5) Has your SI changed or worsened?  No    Indications for TMS:  MDD, recurrent, severe; 4+ medication trials (from 2+ classes) ineffective; Psychotherapy ineffective     Procedure Diagnosis:  Severe episode of recurrent major depressive disorder, without psychotic features (H)  (primary encounter diagnosis)    Treatment Hx:   Treatment number this series: 24  Total lifetime treatment number: 24      Allergies   Allergen Reactions     Bupropion Other (See Comments) and Swelling     Ineffective, name brand works best  Ineffective, name brand works best     Codeine Other (See Comments)     \"Feels like electricity running through body\"  No reaction noted in [employer].  Shaky  With Tylenol     Effexor [Venlafaxine Hydrochloride]      Affect too flat     Escitalopram      Other reaction(s): *Unknown  Sexual dysfunction     Fluoxetine Other (See Comments)     Sexual dysfunction     Levaquin [Levofloxacin] Other (See Comments)     Suicidal thoughts  Dark thoughts- mood changes     Lexapro      Sexual dysfunction     Methylphenidate Hives     Milnacipran      12.5 MG is fine. " "25 MG caused side effects. \"Dark thoughts\"     Pregabalin Other (See Comments)     Hallucinations  Audiovisual hallucinations     Prozac [Fluoxetine Hcl]      Sexual dysfunction     Tramadol Hives     Hives       Venlafaxine      Other reaction(s): *Unknown  Affect too flat      LMP 05/01/2005 (LMP Unknown)     Pause for the Cause  Right patient:  Yes  Right procedure/correct coil:  Yes; rTMS; cpt 69085; F8 coil.   Earplugs in place:  Yes    Procedure  Patient was seated in procedure chair. Identity and procedure was verified. Ear plugs were placed in ears and patient-specific cap was placed on head and tightened appropriately. Ruler locations were verified. Coil was placed at treatment location and stimulator was set to parameters described below. A test train was delivered and pt tolerated train. Given pt tolerance, 60 treatment trains were delivered. Pt tolerated procedure eyebrow movement.      Motor Threshold Determination  Distance from nasion to inion: 36  Tragus to tragus distance: 35  Head circumference: 54  Distance along the vertex: 9.38  Distance along circumference from midline: 6.25  MT 1:@ 62% on 12/27/2021    Theta LDLFPC     Frequency: 50 Hz  Burst Frequency: 5 Hz  Train Duration: 10 sec   Number of Bursts: 10   Total pulses delivered: 1800  Tx Loc: F3  Energy: 74% (120%MT)  Trains: 60       Date MADRS IDS-SR PHQ-9   12/27/21 35 66 25   1/7/22 -- 56 23   1/14/22   19   1/21/22  43 15   1/28/22  47 19       PHQ-9 SCORE 1/26/2022 1/27/2022 1/28/2022   PHQ-9 Total Score - - -   PHQ-9 Total Score MyChart - - -   PHQ-9 Total Score 22 14 19       Saniya Miller, ARTEMIO   [facility] Neu[facility]od[facility]    Plan   - Cont TMS       I did not see the patient during/after treatment but remained available in the clinic during  treatment.      Narendra Hopkins MD PhD  [facility]  "

## 2022-01-31 ENCOUNTER — OFFICE VISIT (OUTPATIENT)
Dept: PSYCHIATRY | Facility: CLINIC | Age: 62
End: 2022-01-31
Payer: COMMERCIAL

## 2022-01-31 DIAGNOSIS — F33.2 SEVERE EPISODE OF RECURRENT MAJOR DEPRESSIVE DISORDER, WITHOUT PSYCHOTIC FEATURES (H): Primary | ICD-10-CM

## 2022-01-31 ASSESSMENT — PATIENT HEALTH QUESTIONNAIRE - PHQ9: SUM OF ALL RESPONSES TO PHQ QUESTIONS 1-9: 27

## 2022-01-31 NOTE — PATIENT INSTRUCTIONS
Treatment Plan:    Continue Pristiq 100 mg daily for mood, anxiety.     Continue methyl folate 7.5 mg daily as supplementation.    Continue Adderall XR 20 mg daily for mood augmentation    Continue Adderall IR 20 mg daily as needed for mood augmentation and daytime fatigue/hypersomnia    Continue benzodiazepine per primary care prescriber.    Continue TMS therapy.     Continue to work with your specialist from [facility] as indicated.     Recommend individual psychotherapy.      Continue all other cares per primary care provider.     Continue all other medications as reviewed per electronic medical record today.     Safety plan reviewed. To the Emergency Department as needed or call after hours crisis line at [phone] or [phone]. [place] Crisis Text Line. Text [place] to 124549 or Suicide LifeLine Chat: suicidepreventionlifeline.org/chat    Schedule an appointment with me in 2-3 months (after TMS), or sooner as needed. Call [facility] at [phone] to schedule.    Follow up with primary care provider as planned or for acute medical concerns.    Call the psychiatric nurse line with medication questions or concerns at [phone].    wireWAXt may be used to communicate with your provider, but this is not intended to be used for emergencies.    Therapy resources:  Www.MPSI.org    Risks of benzodiazepine (Ativan, Xanax, Klonopin, Valium, etc) use including, but not limited to, sedation, tolerance, risk for addiction/dependence. Do not drink alcohol while taking benzodiazepines due to risk of trouble breathing and potential death. Do not drive or operate heavy machinery until it is known how the drug affects you. Discuss with physician or pharmacist before ever taking a benzodiazepine with a narcotic/opioid pain medication.     Discussed risks of stimulant medication including, but not limited to, decreased appetite, risk of tics (and that they may be lasting), trouble sleeping, cardiac risks  such as increased heart rate and blood pressure, and rare risk of sudden cardiac death.  Also risk of addiction/tolerance/dependence.

## 2022-01-31 NOTE — PROGRESS NOTES
Interventional Psychiatry Program  [address], [address]  [place] [address]  TMS Procedure Note   Janelle White MRN# [id number]  Age: 61 year old year old YOB: 1960    Pre-Procedure:  History and Physical: Reviewed in medical record  Consent Signed by: Janelle White for this course of treatment.  On: 12/27/21      Clinical Narrative:  Patient tolerating treatment.  Patient reports having a bad weekend after starting iron pills. She states she feels physically unwell and is hoping the iron pills help. Patient was tearful during treatment. She has had issues eating and has low energy. SI has increased and she states she has multiple plans but she has no intent on acting on them. She is spending the day with her daughter. Writer informed patient of the EMPATH unit if needed. RN came in to do an assessment/check in and patient agreed to look to get labs drawn for physical symptoms while RN looks to see if right sided TMS treatment can be added.    Daily Adult Stimulation Safety Questionnaire: No or Yes in past 24 hours     1) Have you discontinued or started any new medication? Yes- started Iron   2) Have you missed any doses of your medication differently then prescribed? No  3) Have you consumed alcohol or drugs (other than prescribed)?  No  4) Did you not sleep AT ALL last night?  No  5) Has your SI changed or worsened?  Yes    Indications for TMS:  MDD, recurrent, severe; 4+ medication trials (from 2+ classes) ineffective; Psychotherapy ineffective     Procedure Diagnosis:  Severe episode of recurrent major depressive disorder, without psychotic features (H)  (primary encounter diagnosis)    Treatment Hx:   Treatment number this series: 25  Total lifetime treatment number: 25      Allergies   Allergen Reactions     Bupropion Other (See Comments) and Swelling     Ineffective, name brand works best  Ineffective, name brand works best     Codeine Other (See Comments)     " \"Feels like electricity running through body\"  No reaction noted in [employer].  Shaky  With Tylenol     Effexor [Venlafaxine Hydrochloride]      Affect too flat     Escitalopram      Other reaction(s): *Unknown  Sexual dysfunction     Fluoxetine Other (See Comments)     Sexual dysfunction     Levaquin [Levofloxacin] Other (See Comments)     Suicidal thoughts  Dark thoughts- mood changes     Lexapro      Sexual dysfunction     Methylphenidate Hives     Milnacipran      12.5 MG is fine. 25 MG caused side effects. \"Dark thoughts\"     Pregabalin Other (See Comments)     Hallucinations  Audiovisual hallucinations     Prozac [Fluoxetine Hcl]      Sexual dysfunction     Tramadol Hives     Hives       Venlafaxine      Other reaction(s): *Unknown  Affect too flat      LMP 05/01/2005 (LMP Unknown)     Pause for the Cause  Right patient:  Yes  Right procedure/correct coil:  Yes; rTMS; cpt 94746; F8 coil.   Earplugs in place:  Yes    Procedure  Patient was seated in procedure chair. Identity and procedure was verified. Ear plugs were placed in ears and patient-specific cap was placed on head and tightened appropriately. Ruler locations were verified. Coil was placed at treatment location and stimulator was set to parameters described below. A test train was delivered and pt tolerated train. Given pt tolerance, 60 treatment trains were delivered. Pt tolerated procedure eyebrow movement.      Motor Threshold Determination  Distance from nasion to inion: 36  Tragus to tragus distance: 35  Head circumference: 54  Distance along the vertex: 9.38  Distance along circumference from midline: 6.25  MT 1:@ 62% on 12/27/2021    Theta LDLFPC     Frequency: 50 Hz  Burst Frequency: 5 Hz  Train Duration: 10 sec   Number of Bursts: 10   Total pulses delivered: 1800  Tx Loc: F3  Energy: 74% (120%MT)  Trains: 60       Date MADRS IDS-SR PHQ-9   12/27/21 35 66 25   1/7/22 -- 56 23   1/14/22   19   1/21/22  43 15   1/28/22  47 19       PHQ-9 SCORE " 1/26/2022 1/27/2022 1/28/2022   PHQ-9 Total Score - - -   PHQ-9 Total Score MyChart - - -   PHQ-9 Total Score 22 14 19       KANNAN Cam  [facility]od[facility]    Plan   - Cont TMS       I did not see the patient during/after treatment but remained available in the clinic during  treatment.      Yayo Mireles MD  [facility] Neuromod[facility]

## 2022-01-31 NOTE — NURSING NOTE
Writer met with patient after TMS, as [employer] tech had reported that patient was feeling more suicidal.  Patient reports that her feelings are more intrusive and has plans, but denies any intent.  She reports that she is also having some physical health issues as well.      Plan going forward is to have patient check in with PCP regarding physical health problems.  Writer also spoke with Dr. Yanes and we will add a right sided treatment tomorrow.      Patient knows that she should call crisis number or present to [facility] should she feel like she can no longer keep herself safe.

## 2022-02-01 ENCOUNTER — OFFICE VISIT (OUTPATIENT)
Dept: PSYCHIATRY | Facility: CLINIC | Age: 62
End: 2022-02-01
Payer: COMMERCIAL

## 2022-02-01 DIAGNOSIS — F33.2 SEVERE EPISODE OF RECURRENT MAJOR DEPRESSIVE DISORDER, WITHOUT PSYCHOTIC FEATURES (H): Primary | ICD-10-CM

## 2022-02-01 ASSESSMENT — PATIENT HEALTH QUESTIONNAIRE - PHQ9: SUM OF ALL RESPONSES TO PHQ QUESTIONS 1-9: 21

## 2022-02-01 NOTE — PROGRESS NOTES
" Interventional Psychiatry Program  [address], [address]  [place] [address]  TMS Procedure Note   Janelle White MRN# [id number]  Age: 61 year old year old YOB: 1960    Pre-Procedure:  History and Physical: Reviewed in medical record  Consent Signed by: Janelle TORRES Rodolfopetey for this course of treatment.  On: 12/27/21      Clinical Narrative:  Patient tolerating treatment.  Patient reports feeling better today.  Discussed that Dr. Yanes did ok adding the Right sided treatment and patient elected to do so today.        Daily Adult Stimulation Safety Questionnaire: No or Yes in past 24 hours     1) Have you discontinued or started any new medication? Yes- started Iron   2) Have you missed any doses of your medication differently then prescribed? No  3) Have you consumed alcohol or drugs (other than prescribed)?  No  4) Did you not sleep AT ALL last night?  No  5) Has your SI changed or worsened?  Yes    Indications for TMS:  MDD, recurrent, severe; 4+ medication trials (from 2+ classes) ineffective; Psychotherapy ineffective     Procedure Diagnosis:  Severe episode of recurrent major depressive disorder, without psychotic features (H)  (primary encounter diagnosis)    Treatment Hx:   Treatment number this series: 26  Total lifetime treatment number: 26      Allergies   Allergen Reactions     Bupropion Other (See Comments) and Swelling     Ineffective, name brand works best  Ineffective, name brand works best     Codeine Other (See Comments)     \"Feels like electricity running through body\"  No reaction noted in [employer].  Shaky  With Tylenol     Effexor [Venlafaxine Hydrochloride]      Affect too flat     Escitalopram      Other reaction(s): *Unknown  Sexual dysfunction     Fluoxetine Other (See Comments)     Sexual dysfunction     Levaquin [Levofloxacin] Other (See Comments)     Suicidal thoughts  Dark thoughts- mood changes     Lexapro      Sexual dysfunction     " "Methylphenidate Hives     Milnacipran      12.5 MG is fine. 25 MG caused side effects. \"Dark thoughts\"     Pregabalin Other (See Comments)     Hallucinations  Audiovisual hallucinations     Prozac [Fluoxetine Hcl]      Sexual dysfunction     Tramadol Hives     Hives       Venlafaxine      Other reaction(s): *Unknown  Affect too flat      LMP 05/01/2005 (LMP Unknown)     Pause for the Cause  Right patient:  Yes  Right procedure/correct coil:  Yes; rTMS; cpt 29324; F8 coil.   Earplugs in place:  Yes    Procedure  Patient was seated in procedure chair. Identity and procedure was verified. Ear plugs were placed in ears and patient-specific cap was placed on head and tightened appropriately. Ruler locations were verified. Coil was placed at treatment location and stimulator was set to parameters described below. A test train was delivered and pt tolerated train. Given pt tolerance, 60 treatment trains were delivered. Pt tolerated procedure eyebrow movement.      Motor Threshold Determination  Distance from nasion to inion: 36  Tragus to tragus distance: 35  Head circumference: 54  Distance along the vertex: 9.38  Distance along circumference from midline: 6.25  MT 1:@ 62% on 12/27/2021    Theta LDLPFC     Frequency: 50 Hz  Burst Frequency: 5 Hz  Train Duration: 10 sec   Number of Bursts: 10   Total pulses delivered: 1800  Tx Loc: F3  Energy: 74% (120%MT)  Trains: 60     Theta RDLPFC    Frequency: 50 Hz  Burst Frequency: 5 Hz  Train Duration: 56.6 sec   Number of Bursts: 200  Total pulses delivered: 1800  Tx Loc: F3  Energy: 62% (100%MT)  Trains: 3 trsans     Date MADRS IDS-SR PHQ-9   12/27/21 35 66 25   1/7/22 -- 56 23   1/14/22   19   1/21/22  43 15   1/28/22  47 19       PHQ-9 SCORE 1/28/2022 1/31/2022 2/1/2022   PHQ-9 Total Score - - -   PHQ-9 Total Score MyChart - - -   PHQ-9 Total Score 19 27 21       Saniya Miller, ARTEMIO   [facility]   Mental Health Neuromodulation    Plan   - Cont TMS     I did not see the " patient during/after treatment but remained available in the clinic during  treatment.    Melissa Taveras MD  [facility]od[facility]

## 2022-02-02 ENCOUNTER — OFFICE VISIT (OUTPATIENT)
Dept: PSYCHIATRY | Facility: CLINIC | Age: 62
End: 2022-02-02
Payer: COMMERCIAL

## 2022-02-02 DIAGNOSIS — F33.2 SEVERE EPISODE OF RECURRENT MAJOR DEPRESSIVE DISORDER, WITHOUT PSYCHOTIC FEATURES (H): Primary | ICD-10-CM

## 2022-02-02 ASSESSMENT — PATIENT HEALTH QUESTIONNAIRE - PHQ9: SUM OF ALL RESPONSES TO PHQ QUESTIONS 1-9: 17

## 2022-02-02 NOTE — PROGRESS NOTES
" Interventional Psychiatry Program  [address], [address]  [place] [address]  TMS Procedure Note   Janelle White MRN# [id number]  Age: 61 year old year old YOB: 1960    Pre-Procedure:  History and Physical: Reviewed in medical record  Consent Signed by: Janelle White for this course of treatment.  On: 12/27/21      Clinical Narrative:  Patient tolerating treatment.  Patient had very mild headache after addition of right sided treatment yesterday.    Daily Adult Stimulation Safety Questionnaire: No or Yes in past 24 hours     1) Have you discontinued or started any new medication? Yes- started Iron   2) Have you missed any doses of your medication differently then prescribed? No  3) Have you consumed alcohol or drugs (other than prescribed)?  No  4) Did you not sleep AT ALL last night?  No  5) Has your SI changed or worsened?  No    Indications for TMS:  MDD, recurrent, severe; 4+ medication trials (from 2+ classes) ineffective; Psychotherapy ineffective     Procedure Diagnosis:  Severe episode of recurrent major depressive disorder, without psychotic features (H)  (primary encounter diagnosis)    Treatment Hx:   Treatment number this series: 27  Total lifetime treatment number: 27      Allergies   Allergen Reactions     Bupropion Other (See Comments) and Swelling     Ineffective, name brand works best  Ineffective, name brand works best     Codeine Other (See Comments)     \"Feels like electricity running through body\"  No reaction noted in [employer].  Shaky  With Tylenol     Effexor [Venlafaxine Hydrochloride]      Affect too flat     Escitalopram      Other reaction(s): *Unknown  Sexual dysfunction     Fluoxetine Other (See Comments)     Sexual dysfunction     Levaquin [Levofloxacin] Other (See Comments)     Suicidal thoughts  Dark thoughts- mood changes     Lexapro      Sexual dysfunction     Methylphenidate Hives     Milnacipran      12.5 MG is fine. 25 MG caused side " "effects. \"Dark thoughts\"     Pregabalin Other (See Comments)     Hallucinations  Audiovisual hallucinations     Prozac [Fluoxetine Hcl]      Sexual dysfunction     Tramadol Hives     Hives       Venlafaxine      Other reaction(s): *Unknown  Affect too flat      LMP 05/01/2005 (LMP Unknown)     Pause for the Cause  Right patient:  Yes  Right procedure/correct coil:  Yes; rTMS; cpt 96726; F8 coil.   Earplugs in place:  Yes    Procedure  Patient was seated in procedure chair. Identity and procedure was verified. Ear plugs were placed in ears and patient-specific cap was placed on head and tightened appropriately. Ruler locations were verified. Coil was placed at treatment location and stimulator was set to parameters described below. A test train was delivered and pt tolerated train. Given pt tolerance, 60 treatment trains were delivered to the left side and 3 trains were delivered to the right side. Pt tolerated procedure eyebrow movement.      Motor Threshold Determination  Distance from nasion to inion: 36  Tragus to tragus distance: 35  Head circumference: 54  Distance along the vertex: 9.38  Distance along circumference from midline: 6.25  MT 1:@ 62% on 12/27/2021    Theta LDLPFC     Frequency: 50 Hz  Burst Frequency: 5 Hz  Train Duration: 10 sec   Number of Bursts: 10   Total pulses delivered: 1800  Tx Loc: F3  Energy: 74% (120%MT)  Trains: 60     Theta RDLPFC    Frequency: 50 Hz  Burst Frequency: 5 Hz  Train Duration: 62.9 sec   Number of Bursts: 200  Total pulses delivered: 1800  Tx Loc: F3  Energy: 65% (105%MT)  Trains: 3 trains     Date MADRS IDS-SR PHQ-9   12/27/21 35 66 25   1/7/22 -- 56 23   1/14/22   19   1/21/22  43 15   1/28/22  47 19       PHQ-9 SCORE 1/28/2022 1/31/2022 2/1/2022   PHQ-9 Total Score - - -   PHQ-9 Total Score MyChart - - -   PHQ-9 Total Score 19 27 21       Shilpi Ramesh Psychometrist   [facility]   Mental [facility] Neuromodulation    Plan   - Cont TMS   - increase energy " on right side to 110% as tolerated    I did not see the patient but remained available in clinic throughout treatment.       Zafar Yanes MD  [facility] Neuromodulation

## 2022-02-03 ENCOUNTER — OFFICE VISIT (OUTPATIENT)
Dept: PSYCHIATRY | Facility: CLINIC | Age: 62
End: 2022-02-03
Payer: COMMERCIAL

## 2022-02-03 DIAGNOSIS — F33.2 SEVERE EPISODE OF RECURRENT MAJOR DEPRESSIVE DISORDER, WITHOUT PSYCHOTIC FEATURES (H): Primary | ICD-10-CM

## 2022-02-03 ASSESSMENT — PATIENT HEALTH QUESTIONNAIRE - PHQ9: SUM OF ALL RESPONSES TO PHQ QUESTIONS 1-9: 18

## 2022-02-03 NOTE — PROGRESS NOTES
" Interventional Psychiatry Program  [address], [address]  [place] [address]  TMS Procedure Note   Janelle White MRN# [id number]  Age: 61 year old year old YOB: 1960    Pre-Procedure:  History and Physical: Reviewed in medical record  Consent Signed by: Janelle White for this course of treatment.  On: 12/27/21      Clinical Narrative:  Patient tolerating treatment.  Patient had very mild headache after addition of right sided treatment yesterday.    Daily Adult Stimulation Safety Questionnaire: No or Yes in past 24 hours     1) Have you discontinued or started any new medication? Yes- started Iron   2) Have you missed any doses of your medication differently then prescribed? No  3) Have you consumed alcohol or drugs (other than prescribed)?  No  4) Did you not sleep AT ALL last night?  No  5) Has your SI changed or worsened?  No    Indications for TMS:  MDD, recurrent, severe; 4+ medication trials (from 2+ classes) ineffective; Psychotherapy ineffective     Procedure Diagnosis:  Severe episode of recurrent major depressive disorder, without psychotic features (H)  (primary encounter diagnosis)    Treatment Hx:   Treatment number this series: 28  Total lifetime treatment number: 28      Allergies   Allergen Reactions     Bupropion Other (See Comments) and Swelling     Ineffective, name brand works best  Ineffective, name brand works best     Codeine Other (See Comments)     \"Feels like electricity running through body\"  No reaction noted in [employer].  Shaky  With Tylenol     Effexor [Venlafaxine Hydrochloride]      Affect too flat     Escitalopram      Other reaction(s): *Unknown  Sexual dysfunction     Fluoxetine Other (See Comments)     Sexual dysfunction     Levaquin [Levofloxacin] Other (See Comments)     Suicidal thoughts  Dark thoughts- mood changes     Lexapro      Sexual dysfunction     Methylphenidate Hives     Milnacipran      12.5 MG is fine. 25 MG caused side " "effects. \"Dark thoughts\"     Pregabalin Other (See Comments)     Hallucinations  Audiovisual hallucinations     Prozac [Fluoxetine Hcl]      Sexual dysfunction     Tramadol Hives     Hives       Venlafaxine      Other reaction(s): *Unknown  Affect too flat      LMP 05/01/2005 (LMP Unknown)     Pause for the Cause  Right patient:  Yes  Right procedure/correct coil:  Yes; rTMS; cpt 34779; F8 coil.   Earplugs in place:  Yes    Procedure  Patient was seated in procedure chair. Identity and procedure was verified. Ear plugs were placed in ears and patient-specific cap was placed on head and tightened appropriately. Ruler locations were verified. Coil was placed at treatment location and stimulator was set to parameters described below. A test train was delivered and pt tolerated train. Given pt tolerance, 60 treatment trains were delivered to the left side and 3 trains were delivered to the right side. Pt tolerated procedure eyebrow movement.      Motor Threshold Determination  Distance from nasion to inion: 36  Tragus to tragus distance: 35  Head circumference: 54  Distance along the vertex: 9.38  Distance along circumference from midline: 6.25  MT 1:@ 62% on 12/27/2021    Theta LDLPFC     Frequency: 50 Hz  Burst Frequency: 5 Hz  Train Duration: 10 sec   Number of Bursts: 10   Total pulses delivered: 1800  Tx Loc: F3  Energy: 74% (120%MT)  Trains: 60     Theta RDLPFC    Frequency: 50 Hz  Burst Frequency: 5 Hz  Train Duration: 69.6 sec   Number of Bursts: 200  Total pulses delivered: 1800  Tx Loc: F3  Energy: 68% (110%MT)  Trains: 3 trains     Date MADRS IDS-SR PHQ-9   12/27/21 35 66 25   1/7/22 -- 56 23   1/14/22   19   1/21/22  43 15   1/28/22  47 19       PHQ-9 SCORE 1/31/2022 2/1/2022 2/2/2022   PHQ-9 Total Score - - -   PHQ-9 Total Score MyChart - - -   PHQ-9 Total Score 27 21 17       Shilpi Ramesh Psychometrist   [facility]   Mental [facility] Neuromodulation    Plan   - Cont TMS       I did not see " the patient during/after treatment but remained available in the clinic during  treatment.    Melissa Taveras MD  [facility] Neuromodulation

## 2022-02-04 ENCOUNTER — OFFICE VISIT (OUTPATIENT)
Dept: PSYCHIATRY | Facility: CLINIC | Age: 62
End: 2022-02-04
Payer: COMMERCIAL

## 2022-02-04 DIAGNOSIS — F33.2 SEVERE EPISODE OF RECURRENT MAJOR DEPRESSIVE DISORDER, WITHOUT PSYCHOTIC FEATURES (H): Primary | ICD-10-CM

## 2022-02-04 ASSESSMENT — PATIENT HEALTH QUESTIONNAIRE - PHQ9: SUM OF ALL RESPONSES TO PHQ QUESTIONS 1-9: 16

## 2022-02-04 NOTE — PROGRESS NOTES
" Interventional Psychiatry Program  [address], [address]  [place] [address]  TMS Procedure Note   Janelle White MRN# [id number]  Age: 61 year old year old YOB: 1960    Pre-Procedure:  History and Physical: Reviewed in medical record  Consent Signed by: Janelle White for this course of treatment.  On: 12/27/21      Clinical Narrative:  Patient tolerating treatment.      Daily Adult Stimulation Safety Questionnaire: No or Yes in past 24 hours     1) Have you discontinued or started any new medication? Yes- started Iron   2) Have you missed any doses of your medication differently then prescribed? No  3) Have you consumed alcohol or drugs (other than prescribed)?  No  4) Did you not sleep AT ALL last night?  No  5) Has your SI changed or worsened?  No    Indications for TMS:  MDD, recurrent, severe; 4+ medication trials (from 2+ classes) ineffective; Psychotherapy ineffective     Procedure Diagnosis:  Severe episode of recurrent major depressive disorder, without psychotic features (H)  (primary encounter diagnosis)    Treatment Hx:   Treatment number this series: 29  Total lifetime treatment number: 29      Allergies   Allergen Reactions     Bupropion Other (See Comments) and Swelling     Ineffective, name brand works best  Ineffective, name brand works best     Codeine Other (See Comments)     \"Feels like electricity running through body\"  No reaction noted in [employer].  Shaky  With Tylenol     Effexor [Venlafaxine Hydrochloride]      Affect too flat     Escitalopram      Other reaction(s): *Unknown  Sexual dysfunction     Fluoxetine Other (See Comments)     Sexual dysfunction     Levaquin [Levofloxacin] Other (See Comments)     Suicidal thoughts  Dark thoughts- mood changes     Lexapro      Sexual dysfunction     Methylphenidate Hives     Milnacipran      12.5 MG is fine. 25 MG caused side effects. \"Dark thoughts\"     Pregabalin Other (See Comments)     " Hallucinations  Audiovisual hallucinations     Prozac [Fluoxetine Hcl]      Sexual dysfunction     Tramadol Hives     Hives       Venlafaxine      Other reaction(s): *Unknown  Affect too flat      LMP 05/01/2005 (LMP Unknown)     Pause for the Cause  Right patient:  Yes  Right procedure/correct coil:  Yes; rTMS; cpt 35756; F8 coil.   Earplugs in place:  Yes    Procedure  Patient was seated in procedure chair. Identity and procedure was verified. Ear plugs were placed in ears and patient-specific cap was placed on head and tightened appropriately. Ruler locations were verified. Coil was placed at treatment location and stimulator was set to parameters described below. A test train was delivered and pt tolerated train. Given pt tolerance, 60 treatment trains were delivered to the left side and 3 trains were delivered to the right side. Pt tolerated procedure eyebrow movement.      Motor Threshold Determination  Distance from nasion to inion: 36  Tragus to tragus distance: 35  Head circumference: 54  Distance along the vertex: 9.38  Distance along circumference from midline: 6.25  MT 1:@ 62% on 12/27/2021    Theta LDLPFC     Frequency: 50 Hz  Burst Frequency: 5 Hz  Train Duration: 10 sec   Number of Bursts: 10   Total pulses delivered: 1800  Tx Loc: F3  Energy: 74% (120%MT)  Trains: 60     Theta RDLPFC    Frequency: 50 Hz  Burst Frequency: 5 Hz  Train Duration: 69.6 sec   Number of Bursts: 200  Total pulses delivered: 1800  Tx Loc: F3  Energy: 68% (110%MT)  Trains: 3 trains     Date MADRS IDS-SR PHQ-9   12/27/21 35 66 25   1/7/22 -- 56 23   1/14/22   19   1/21/22  43 15   1/28/22  47 19   2/4/22  47 16       PHQ-9 SCORE 2/1/2022 2/2/2022 2/3/2022   PHQ-9 Total Score - - -   PHQ-9 Total Score MyChart - - -   PHQ-9 Total Score 21 17 18       Shilpi Ramesh Psychometrist   [facility]   Mental [facility] Neuromodulation    Plan   - Cont TMS       I did not see the patient during/after treatment but remained  available in the clinic during  treatment.        Narendra Hopkins MD PhD  [facility] Neuromodulation

## 2022-02-07 ENCOUNTER — OFFICE VISIT (OUTPATIENT)
Dept: PSYCHIATRY | Facility: CLINIC | Age: 62
End: 2022-02-07
Payer: COMMERCIAL

## 2022-02-07 ENCOUNTER — OFFICE VISIT (OUTPATIENT)
Dept: FAMILY MEDICINE | Facility: CLINIC | Age: 62
End: 2022-02-07
Payer: COMMERCIAL

## 2022-02-07 VITALS
TEMPERATURE: 97.2 F | WEIGHT: 145.4 LBS | BODY MASS INDEX: 24.22 KG/M2 | HEART RATE: 78 BPM | SYSTOLIC BLOOD PRESSURE: 99 MMHG | DIASTOLIC BLOOD PRESSURE: 65 MMHG | HEIGHT: 65 IN | OXYGEN SATURATION: 100 %

## 2022-02-07 DIAGNOSIS — F33.2 SEVERE EPISODE OF RECURRENT MAJOR DEPRESSIVE DISORDER, WITHOUT PSYCHOTIC FEATURES (H): Primary | ICD-10-CM

## 2022-02-07 DIAGNOSIS — G89.4 CHRONIC PAIN SYNDROME: ICD-10-CM

## 2022-02-07 DIAGNOSIS — Z12.31 SCREENING MAMMOGRAM FOR BREAST CANCER: ICD-10-CM

## 2022-02-07 DIAGNOSIS — Z13.29 SCREENING FOR THYROID DISORDER: ICD-10-CM

## 2022-02-07 DIAGNOSIS — Z00.00 ROUTINE GENERAL MEDICAL EXAMINATION AT A HEALTH CARE FACILITY: Primary | ICD-10-CM

## 2022-02-07 DIAGNOSIS — J44.9 CHRONIC OBSTRUCTIVE PULMONARY DISEASE, UNSPECIFIED COPD TYPE (H): ICD-10-CM

## 2022-02-07 LAB
ALBUMIN SERPL-MCNC: 3.7 G/DL (ref 3.4–5)
ALP SERPL-CCNC: 87 U/L (ref 40–150)
ALT SERPL W P-5'-P-CCNC: 23 U/L (ref 0–50)
ANION GAP SERPL CALCULATED.3IONS-SCNC: 2 MMOL/L (ref 3–14)
AST SERPL W P-5'-P-CCNC: 20 U/L (ref 0–45)
BILIRUB SERPL-MCNC: 0.4 MG/DL (ref 0.2–1.3)
BUN SERPL-MCNC: 12 MG/DL (ref 7–30)
CALCIUM SERPL-MCNC: 8.9 MG/DL (ref 8.5–10.1)
CANNABINOIDS UR QL SCN: ABNORMAL
CHLORIDE BLD-SCNC: 107 MMOL/L (ref 94–109)
CHOLEST SERPL-MCNC: 180 MG/DL
CO2 SERPL-SCNC: 30 MMOL/L (ref 20–32)
CREAT SERPL-MCNC: 0.78 MG/DL (ref 0.52–1.04)
CREAT UR-MCNC: 126 MG/DL
ERYTHROCYTE [DISTWIDTH] IN BLOOD BY AUTOMATED COUNT: 15.2 % (ref 10–15)
FASTING STATUS PATIENT QL REPORTED: YES
GFR SERPL CREATININE-BSD FRML MDRD: 86 ML/MIN/1.73M2
GLUCOSE BLD-MCNC: 93 MG/DL (ref 70–99)
HCT VFR BLD AUTO: 41.2 % (ref 35–47)
HDLC SERPL-MCNC: 42 MG/DL
HGB BLD-MCNC: 13.1 G/DL (ref 11.7–15.7)
LDLC SERPL CALC-MCNC: 113 MG/DL
MCH RBC QN AUTO: 28.3 PG (ref 26.5–33)
MCHC RBC AUTO-ENTMCNC: 31.8 G/DL (ref 31.5–36.5)
MCV RBC AUTO: 89 FL (ref 78–100)
NONHDLC SERPL-MCNC: 138 MG/DL
PLATELET # BLD AUTO: 178 10E3/UL (ref 150–450)
POTASSIUM BLD-SCNC: 3.9 MMOL/L (ref 3.4–5.3)
PROT SERPL-MCNC: 6.2 G/DL (ref 6.8–8.8)
RBC # BLD AUTO: 4.63 10E6/UL (ref 3.8–5.2)
SODIUM SERPL-SCNC: 139 MMOL/L (ref 133–144)
TRIGL SERPL-MCNC: 125 MG/DL
TSH SERPL DL<=0.005 MIU/L-ACNC: 3.07 MU/L (ref 0.4–4)
WBC # BLD AUTO: 6.4 10E3/UL (ref 4–11)

## 2022-02-07 PROCEDURE — 90732 PPSV23 VACC 2 YRS+ SUBQ/IM: CPT | Performed by: NURSE PRACTITIONER

## 2022-02-07 PROCEDURE — 84443 ASSAY THYROID STIM HORMONE: CPT | Performed by: NURSE PRACTITIONER

## 2022-02-07 PROCEDURE — 99214 OFFICE O/P EST MOD 30 MIN: CPT | Mod: 25 | Performed by: NURSE PRACTITIONER

## 2022-02-07 PROCEDURE — 90471 IMMUNIZATION ADMIN: CPT | Performed by: NURSE PRACTITIONER

## 2022-02-07 PROCEDURE — 80307 DRUG TEST PRSMV CHEM ANLYZR: CPT | Performed by: NURSE PRACTITIONER

## 2022-02-07 PROCEDURE — 80061 LIPID PANEL: CPT | Performed by: NURSE PRACTITIONER

## 2022-02-07 PROCEDURE — 80053 COMPREHEN METABOLIC PANEL: CPT | Performed by: NURSE PRACTITIONER

## 2022-02-07 PROCEDURE — 99396 PREV VISIT EST AGE 40-64: CPT | Mod: 25 | Performed by: NURSE PRACTITIONER

## 2022-02-07 PROCEDURE — 36415 COLL VENOUS BLD VENIPUNCTURE: CPT | Performed by: NURSE PRACTITIONER

## 2022-02-07 PROCEDURE — 85027 COMPLETE CBC AUTOMATED: CPT | Performed by: NURSE PRACTITIONER

## 2022-02-07 RX ORDER — MORPHINE SULFATE 15 MG/1
15 TABLET, FILM COATED, EXTENDED RELEASE ORAL EVERY 12 HOURS
Qty: 60 TABLET | Refills: 0 | Status: SHIPPED | OUTPATIENT
Start: 2022-02-07 | End: 2022-03-07

## 2022-02-07 RX ORDER — HYDROCODONE BITARTRATE AND ACETAMINOPHEN 10; 325 MG/1; MG/1
1 TABLET ORAL EVERY 4 HOURS PRN
Qty: 60 TABLET | Refills: 0 | Status: SHIPPED | OUTPATIENT
Start: 2022-02-07 | End: 2022-03-07

## 2022-02-07 ASSESSMENT — ENCOUNTER SYMPTOMS
SHORTNESS OF BREATH: 0
ABDOMINAL PAIN: 0
DYSURIA: 0
CONSTIPATION: 0
NAUSEA: 0
FREQUENCY: 0
HEARTBURN: 0
HEMATURIA: 0
NERVOUS/ANXIOUS: 0
PALPITATIONS: 0
DIZZINESS: 0
WEAKNESS: 1
ARTHRALGIAS: 0
EYE PAIN: 0
DIARRHEA: 0
MYALGIAS: 0
BREAST MASS: 0
SORE THROAT: 0
COUGH: 0
CHILLS: 0
HEADACHES: 0
JOINT SWELLING: 0
FEVER: 0
HEMATOCHEZIA: 0
PARESTHESIAS: 0

## 2022-02-07 ASSESSMENT — PATIENT HEALTH QUESTIONNAIRE - PHQ9: SUM OF ALL RESPONSES TO PHQ QUESTIONS 1-9: 11

## 2022-02-07 ASSESSMENT — MIFFLIN-ST. JEOR: SCORE: 1228.58

## 2022-02-07 NOTE — PROGRESS NOTES
SUBJECTIVE:   CC: Janelle White is an 61 year old woman who presents for preventive health visit.       Patient has been advised of split billing requirements and indicates understanding: Yes  Healthy Habits:     Getting at least 3 servings of Calcium per day:  Yes    Bi-annual eye exam:  Yes    Dental care twice a year:  Yes    Sleep apnea or symptoms of sleep apnea:  Daytime drowsiness and Sleep apnea    Diet:  Regular (no restrictions)    Frequency of exercise:  6-7 days/week    Duration of exercise:  30-45 minutes    Taking medications regularly:  Yes    Medication side effects:  None    PHQ-2 Total Score: 2    Additional concerns today:  Yes              Today's PHQ-2 Score:   PHQ-2 (  [employer]) 2022   Q1: Little interest or pleasure in doing things 1   Q2: Feeling down, depressed or hopeless 1   PHQ-2 Score 2   PHQ-2 Total Score (12-17 Years)- Positive if 3 or more points; Administer PHQ-A if positive -   Q1: Little interest or pleasure in doing things Several days   Q2: Feeling down, depressed or hopeless Several days   PHQ-2 Score 2       Abuse: Current or Past (Physical, Sexual or Emotional) - No  Do you feel safe in your environment? Yes        Social History     Tobacco Use     Smoking status: Former Smoker     Packs/day: 1.00     Years: 5.00     Pack years: 5.00     Quit date: 1984     Years since quittin.1     Smokeless tobacco: Never Used   Substance Use Topics     Alcohol use: Yes     Comment: rare     If you drink alcohol do you typically have >3 drinks per day or >7 drinks per week? No    Alcohol Use 2022   Prescreen: >3 drinks/day or >7 drinks/week? No   Prescreen: >3 drinks/day or >7 drinks/week? -   AUDIT SCORE  -       Reviewed orders with patient.  Reviewed health maintenance and updated orders accordingly - Yes  Lab work is in process    Breast Cancer Screening:    FHS-7:   Breast CA Risk Assessment (FHS-7) 2022   Did any of your first-degree relatives have  breast or ovarian cancer? No   Did any of your relatives have bilateral breast cancer? No   Did any man in your family have breast cancer? No   Did any woman in your family have breast and ovarian cancer? No   Did any woman in your family have breast cancer before age 50 y? No   Do you have 2 or more relatives with breast and/or ovarian cancer? Yes   Do you have 2 or more relatives with breast and/or bowel cancer? Yes       Mammogram Screening: Recommended mammography every 1-2 years with patient discussion and risk factor consideration  Pertinent mammograms are reviewed under the imaging tab.    History of abnormal Pap smear: NO - age 30-65 PAP every 5 years with negative HPV co-testing recommended     Reviewed and updated as needed this visit by clinical staff  Tobacco  Allergies    Med Hx  Surg Hx  Fam Hx  Soc Hx       Reviewed and updated as needed this visit by Provider               Started TMS, this will be the last week.  Was hoping would be a major change, but has not been very quick.  This week starting to see some results.      Has long history of depression.  Many medical issues, had shoulder surgery and carpal tunnel release.  Expected they would help.  Jackson like she spiraled after these surgeries.  Had to have second surgery 6 months later.  Many more medical issues with CLL treatment.  Feels things are improving.  Psych is managing medications and addressing mental health is helping.      Feels nutrition has been an issue when spleen was enlarged- had lost a big amount of weight.  Now maintaining.        Review of Systems   Constitutional: Negative for chills and fever.   HENT: Negative for congestion, ear pain, hearing loss and sore throat.    Eyes: Negative for pain and visual disturbance.   Respiratory: Negative for cough and shortness of breath.    Cardiovascular: Negative for chest pain, palpitations and peripheral edema.   Gastrointestinal: Negative for abdominal pain, constipation,  "diarrhea, heartburn, hematochezia and nausea.   Breasts:  Negative for tenderness, breast mass and discharge.   Genitourinary: Negative for dysuria, frequency, genital sores, hematuria, pelvic pain, urgency, vaginal bleeding and vaginal discharge.   Musculoskeletal: Negative for arthralgias, joint swelling and myalgias.   Skin: Negative for rash.   Neurological: Positive for weakness. Negative for dizziness, headaches and paresthesias.   Psychiatric/Behavioral: Negative for mood changes. The patient is not nervous/anxious.           OBJECTIVE:   BP 99/65 (BP Location: Right arm, Patient Position: Sitting, Cuff Size: Adult Regular)   Pulse 78   Temp 97.2  F (36.2  C) (Temporal)   Ht 1.656 m (5' 5.2\")   Wt 66 kg (145 lb 6.4 oz)   LMP 05/01/2005 (LMP Unknown)   SpO2 100%   BMI 24.05 kg/m    Physical Exam  GENERAL: healthy, alert and no distress  EYES: Eyes grossly normal to inspection, PERRL and conjunctivae and sclerae normal  HENT: ear canals and TM's normal, nose and mouth without ulcers or lesions  NECK: no adenopathy, no asymmetry, masses, or scars and thyroid normal to palpation  RESP: lungs clear to auscultation - no rales, rhonchi or wheezes  CV: regular rate and rhythm, normal S1 S2, no S3 or S4, no murmur, click or rub, no peripheral edema and peripheral pulses strong  ABDOMEN: soft, nontender, no hepatosplenomegaly, no masses and bowel sounds normal  MS: no gross musculoskeletal defects noted, no edema  SKIN: no suspicious lesions or rashes  NEURO: Normal strength and tone, mentation intact and speech normal  PSYCH: mentation appears normal, affect normal/bright    Diagnostic Test Results:  Labs reviewed in Epic    ASSESSMENT/PLAN:   (Z00.00) Routine general medical examination at a health care facility  (primary encounter diagnosis)  Comment: Reviewed medical history and health maintenance  Plan: CBC with platelets, Comprehensive metabolic         panel, Lipid panel reflex to direct LDL         " "Non-fasting            (J44.9) Chronic obstructive pulmonary disease, unspecified COPD type (H)  Comment: Stable  Plan: PNEUMOCOCCAL VACCINE,ADULT,SQ OR IM ([id number])            (G89.4) Chronic pain syndrome  Comment: Continues to work with the pain clinic.  They have been discussing initiating Suboxone in order to be able to discontinue narcotic medications.  We discussed ultimately patient taper off some of the medications as she is able and can place to start would be her Norco.  We discussed the risks with multiple controlled substances.  Controlled substance agreement signed today.  She is part of the [place] medical marijuana program and she finds this helpful, but unsure how sustainable it is.  Plan: [id number] - Urine Drug Confirmation Panel         (Comprehensive), HYDROcodone-acetaminophen         (NORCO)  MG per tablet, morphine (MS         CONTIN) 15 MG CR tablet            (Z13.29) Screening for thyroid disorder  Comment:   Plan: TSH with free T4 reflex            (Z12.31) Screening mammogram for breast cancer  Comment:   Plan: *MA Screening Digital Bilateral                  COUNSELING:  Reviewed preventive health counseling, as reflected in patient instructions    Estimated body mass index is 24.05 kg/m  as calculated from the following:    Height as of this encounter: 1.656 m (5' 5.2\").    Weight as of this encounter: 66 kg (145 lb 6.4 oz).        She reports that she quit smoking about 38 years ago. She has a 5.00 pack-year smoking history. She has never used smokeless tobacco.      Counseling Resources:  ATP IV Guidelines  Pooled Cohorts Equation Calculator  Breast Cancer Risk Calculator  BRCA-Related Cancer Risk Assessment: FHS-7 Tool  FRAX Risk Assessment  ICSI Preventive Guidelines  Dietary Guidelines for Americans, 2010  USDA's MyPlate  ASA Prophylaxis  Lung CA Screening    Carmen Garcia, BRIONNA [facility]  "

## 2022-02-07 NOTE — LETTER
Opioid / Opioid Plus Controlled Substance Agreement    This is an agreement between you and your provider about the safe and appropriate use of controlled substance/opioids prescribed by your care team. Controlled substances are medicines that can cause physical and mental dependence (abuse).    There are strict laws about having and using these medicines. We here at [facility] are committing to working with you in your efforts to get better. To support you in this work, we ll help you schedule regular office appointments for medicine refills. If we must cancel or change your appointment for any reason, we ll make sure you have enough medicine to last until your next appointment.     As a Provider, I will:    Listen carefully to your concerns and treat you with respect.     Recommend a treatment plan that I believe is in your best interest. This plan may involve therapies other than opioid pain medication.     Talk with you often about the possible benefits, and the risk of harm of any medicine that we prescribe for you.     Provide a plan on how to taper (discontinue or go off) using this medicine if the decision is made to stop its use.    As a Patient, I understand that opioid(s):     Are a controlled substance prescribed by my care team to help me function or work and manage my condition(s).     Are strong medicines and can cause serious side effects such as:    Drowsiness, which can seriously affect my driving ability    A lower breathing rate, enough to cause death    Harm to my thinking ability     Depression     Abuse of and addiction to this medicine    Need to be taken exactly as prescribed. Combining opioids with certain medicines or chemicals (such as illegal drugs, sedatives, sleeping pills, and benzodiazepines) can be dangerous or even fatal. If I stop opioids suddenly, I may have severe withdrawal symptoms.    Do not work for all types of pain nor for all patients. If they re not helpful, I may  be asked to stop them.        The risks, benefits and side effects of these medicine(s) were explained to me. I agree that:  1. I will take part in other treatments as advised by my care team. This may be psychiatry or counseling, physical therapy, behavioral therapy, group treatment or a referral to a specialist.     2. I will keep all my appointments. I understand that this is part of the monitoring of opioids. My care team may require an office visit for EVERY opioid/controlled substance refill. If I miss appointments or don t follow instructions, my care team may stop my medicine.    3. I will take my medicines as prescribed. I will not change the dose or schedule unless my care team tells me to. There will be no refills if I run out early.     4. I may be asked to come to the clinic and complete a urine drug test or complete a pill count at any time. If I don t give a urine sample or participate in a pill count, the care team may stop my medicine.    5. I will only receive prescriptions from this clinic for chronic pain. If I am treated by another provider for acute pain issues, I will tell them that I am taking opioid pain medication for chronic pain and that I have a treatment agreement with this provider. I will inform my [facility] care team within one business day if I am given a prescription for any pain medication by another healthcare provider. My [facility] care team can contact other providers and pharmacists about my use of any medicines.    6. It is up to me to make sure that I don t run out of my medicines on weekends or holidays. If my care team is willing to refill my opioid prescription without a visit, I must request refills only during office hours. Refills may take up to 3 business days to process. I will use one pharmacy to fill all my opioid and other controlled substance prescriptions. I will notify the clinic about any changes to my insurance or medication  availability.    7. I am responsible for my prescriptions. If the medicine/prescription is lost, stolen or destroyed, it will not be replaced. I also agree not to share controlled substance medicines with anyone.    8. I am aware I should not use any illegal or recreational drugs. I agree not to drink alcohol unless my care team says I can.       9. If I enroll in the [place] Medical Cannabis program, I will tell my care team prior to my next refill.     10. I will tell my care team right away if I become pregnant, have a new medical problem treated outside of my regular clinic, or have a change in my medications.    11. I understand that this medicine can affect my thinking, judgment and reaction time. Alcohol and drugs affect the brain and body, which can affect the safety of my driving. Being under the influence of alcohol or drugs can affect my decision-making, behaviors, personal safety, and the safety of others. Driving while impaired (DWI) can occur if a person is driving, operating, or in physical control of a car, motorcycle, boat, snowmobile, ATV, motorbike, off-road vehicle, or any other motor vehicle ([place] Statute 169A.20). I understand the risk if I choose to drive or operate any vehicle or machinery.    I understand that if I do not follow any of the conditions above, my prescriptions or treatment may be stopped or changed.          Opioids  What You Need to Know    What are opioids?   Opioids are pain medicines that must be prescribed by a doctor. They are also known as narcotics.     Examples are:   1. morphine (MS Contin, Liyah)  2. oxycodone (Oxycontin)  3. oxycodone and acetaminophen (Percocet)  4. hydrocodone and acetaminophen (Vicodin, Norco)   5. fentanyl patch (Duragesic)   6. hydromorphone (Dilaudid)   7. methadone  8. codeine (Tylenol #3)     What do opioids do well?   Opioids are best for severe short-term pain such as after a surgery or injury. They may work well for cancer pain. They may  help some people with long-lasting (chronic) pain.     What do opioids NOT do well?   Opioids never get rid of pain entirely, and they don t work well for most patients with chronic pain. Opioids don t reduce swelling, one of the causes of pain.                                    Other ways to manage chronic pain and improve function include:       Treat the health problem that may be causing pain    Anti-inflammation medicines, which reduce swelling and tenderness, such as ibuprofen (Advil, Motrin) or naproxen (Aleve)    Acetaminophen (Tylenol)    Antidepressants and anti-seizure medicines, especially for nerve pain    Topical treatments such as patches or creams    Injections or nerve blocks    Chiropractic or osteopathic treatment    Acupuncture, massage, deep breathing, meditation, visual imagery, aromatherapy    Use heat or ice at the pain site    Physical therapy     Exercise    Stop smoking    Take part in therapy       Risks and side effects     Talk to your doctor before you start or decide to keep taking opioids. Possible side effects include:      Lowering your breathing rate enough to cause death    Overdose, including death, especially if taking higher than prescribed doses    Worse depression symptoms; less pleasure in things you usually enjoy    Feeling tired or sluggish    Slower thoughts or cloudy thinking    Being more sensitive to pain over time; pain is harder to control    Trouble sleeping or restless sleep    Changes in hormone levels (for example, less testosterone)    Changes in sex drive or ability to have sex    Constipation    Unsafe driving    Itching and sweating    Dizziness    Nausea, throwing up and dry mouth    What else should I know about opioids?    Opioids may lead to dependence, tolerance, or addiction.      Dependence means that if you stop or reduce the medicine too quickly, you will have withdrawal symptoms. These include loose poop (diarrhea), jitters, flu-like symptoms,  nervousness and tremors. Dependence is not the same as addiction.                       Tolerance means needing higher doses over time to get the same effect. This may increase the chance of serious side effects.      Addiction is when people improperly use a substance that harms their body, their mind or their relations with others. Use of opiates can cause a relapse of addiction if you have a history of drug or alcohol abuse.      People who have used opioids for a long time may have a lower quality of life, worse depression, higher levels of pain and more visits to doctors.    You can overdose on opioids. Take these steps to lower your risk of overdose:    1. Recognize the signs:  Signs of overdose include decrease or loss of consciousness (blackout), slowed breathing, trouble waking up and blue lips. If someone is worried about overdose, they should call 911.    2. Talk to your doctor about Narcan (naloxone).   If you are at risk for overdose, you may be given a prescription for Narcan. This medicine very quickly reverses the effects of opioids.   If you overdose, a friend or family member can give you Narcan while waiting for the ambulance. They need to know the signs of overdose and how to give Narcan.     3. Don't use alcohol or street drugs.   Taking them with opioids can cause death.    4. Do not take any of these medicines unless your doctor says it s OK. Taking these with opioids can cause death:    Benzodiazepines, such as lorazepam (Ativan), alprazolam (Xanax) or diazepam (Valium)    Muscle relaxers, such as cyclobenzaprine (Flexeril)    Sleeping pills like zolpidem (Ambien)     Other opioids      How to keep you and other people safe while taking opioids:    1. Never share your opioids with others.  Opioid medicines are regulated by the Drug Enforcement Agency (ENE). Selling or sharing medications is a criminal act.    2. Be sure to store opioids in a secure place, locked up if possible. Young children  can easily swallow them and overdose.    3. When you are traveling with your medicines, keep them in the original bottles. If you use a pill box, be sure you also carry a copy of your medicine list from your clinic or pharmacy.    4. Safe disposal of opioids    Most pharmacies have places to get rid of medicine, called disposal kiosks. Medicine disposal options are also available in every [facility]. Search your county and  medication disposal  to find more options. You can find more details at:  [email or web address]-[email or web address]-[email or web address]     I agree that my provider, clinic care team, and pharmacy may work with any city, state or federal law enforcement agency that investigates the misuse, sale, or other diversion of my controlled medicine. I will allow my provider to discuss my care with, or share a copy of, this agreement with any other treating provider, pharmacy or emergency room where I receive care.    I have read this agreement and have asked questions about anything I did not understand.    _______________________________________________________  Patient Signature - Janelle White _____________________                   Date     _______________________________________________________  Provider Signature - BRIONNA Jordan CNP   _____________________                   Date     _______________________________________________________  Witness Signature (required if provider not present while patient signing)   _____________________                   Date

## 2022-02-07 NOTE — PROGRESS NOTES
" Interventional Psychiatry Program  [address], [address]  [place] [address]  TMS Procedure Note   Janelle White MRN# [id number]  Age: 61 year old year old YOB: 1960    Pre-Procedure:  History and Physical: Reviewed in medical record  Consent Signed by: Janelle White for this course of treatment.  On: 12/27/21      Clinical Narrative:  Patient tolerating treatment.  Patient reports feeling like her move has improved significantly since this morning. She thinks her physical symptoms have improved and may have been a result of her depression rather than anemia.     Daily Adult Stimulation Safety Questionnaire: No or Yes in past 24 hours     1) Have you discontinued or started any new medication? Yes- still taking iron  2) Have you missed any doses of your medication differently then prescribed? No  3) Have you consumed alcohol or drugs (other than prescribed)?  No  4) Did you not sleep AT ALL last night?  No  5) Has your SI changed or worsened?  No    Indications for TMS:  MDD, recurrent, severe; 4+ medication trials (from 2+ classes) ineffective; Psychotherapy ineffective     Procedure Diagnosis:  Severe episode of recurrent major depressive disorder, without psychotic features (H)  (primary encounter diagnosis)    Treatment Hx:   Treatment number this series: 30  Total lifetime treatment number: 30      Allergies   Allergen Reactions     Bupropion Other (See Comments) and Swelling     Ineffective, name brand works best  Ineffective, name brand works best     Codeine Other (See Comments)     \"Feels like electricity running through body\"  No reaction noted in [employer].  Shaky  With Tylenol     Effexor [Venlafaxine Hydrochloride]      Affect too flat     Escitalopram      Other reaction(s): *Unknown  Sexual dysfunction     Fluoxetine Other (See Comments)     Sexual dysfunction     Levaquin [Levofloxacin] Other (See Comments)     Suicidal thoughts  Dark thoughts- mood " "changes     Lexapro      Sexual dysfunction     Methylphenidate Hives     Milnacipran      12.5 MG is fine. 25 MG caused side effects. \"Dark thoughts\"     Pregabalin Other (See Comments)     Hallucinations  Audiovisual hallucinations     Prozac [Fluoxetine Hcl]      Sexual dysfunction     Tramadol Hives     Hives       Venlafaxine      Other reaction(s): *Unknown  Affect too flat      LMP 05/01/2005 (LMP Unknown)     Pause for the Cause  Right patient:  Yes  Right procedure/correct coil:  Yes; rTMS; cpt 79782; F8 coil.   Earplugs in place:  Yes    Procedure  Patient was seated in procedure chair. Identity and procedure was verified. Ear plugs were placed in ears and patient-specific cap was placed on head and tightened appropriately. Ruler locations were verified. Coil was placed at treatment location and stimulator was set to parameters described below. A test train was delivered and pt tolerated train. Given pt tolerance, 60 treatment trains were delivered to the left side and 3 trains were delivered to the right side. Pt tolerated procedure eyebrow movement.      Motor Threshold Determination  Distance from nasion to inion: 36  Tragus to tragus distance: 35  Head circumference: 54  Distance along the vertex: 9.38  Distance along circumference from midline: 6.25  MT 1:@ 62% on 12/27/2021    Theta LDLPFC     Frequency: 50 Hz  Burst Frequency: 5 Hz  Train Duration: 10 sec   Number of Bursts: 10   Total pulses delivered: 1800  Tx Loc: F3  Energy: 74% (120%MT)  Trains: 60     Theta RDLPFC    Frequency: 50 Hz  Burst Frequency: 5 Hz  Train Duration: 69.6 sec   Number of Bursts: 200  Total pulses delivered: 1800  Tx Loc: F3  Energy: 68% (110%MT)  Trains: 3 trains     Date MADRS IDS-SR PHQ-9   12/27/21 35 66 25   1/7/22 -- 56 23   1/14/22   19   1/21/22  43 15   1/28/22  47 19   2/4/22  47 16       PHQ-9 SCORE 2/2/2022 2/3/2022 2/4/2022   PHQ-9 Total Score - - -   PHQ-9 Total Score MyChart - - -   PHQ-9 Total Score 17 18 " 16       KANNAN Cam  [facility] Neuromodulation    Plan   - Cont TMS       I did not see the patient during/after treatment but remained available in the clinic during  treatment.        Yayo Mireles MD  [facility] Neuromodulation

## 2022-02-07 NOTE — NURSING NOTE
Prior to immunization administration, verified patients identity using patient s name and date of birth. Please see Immunization Activity for additional information.     Screening Questionnaire for Adult Immunization    Are you sick today?   No   Do you have allergies to medications, food, a vaccine component or latex?   No   Have you ever had a serious reaction after receiving a vaccination?   No   Do you have a long-term health problem with heart, lung, kidney, or metabolic disease (e.g., diabetes), asthma, a blood disorder, no spleen, complement component deficiency, a cochlear implant, or a spinal fluid leak?  Are you on long-term aspirin therapy?   No   Do you have cancer, leukemia, HIV/AIDS, or any other immune system problem?   Yes   Do you have a parent, brother, or sister with an immune system problem?   No   In the past 3 months, have you taken medications that affect  your immune system, such as prednisone, other steroids, or anticancer drugs; drugs for the treatment of rheumatoid arthritis, Crohn s disease, or psoriasis; or have you had radiation treatments?   No   Have you had a seizure, or a brain or other nervous system problem?   No   During the past year, have you received a transfusion of blood or blood    products, or been given immune (gamma) globulin or antiviral drug?   Yes   For women: Are you pregnant or is there a chance you could become       pregnant during the next month?   No   Have you received any vaccinations in the past 4 weeks?   No     Immunization questionnaire was positive for at least one answer.  Notified Carmen Garcia.        Per orders of Dr. Garcia, injection of Pneumococcal 23 given by Edelmira Alonso MA. Patient instructed to remain in clinic for 15 minutes afterwards, and to report any adverse reaction to me immediately.       Screening performed by Edelmira Alonso MA on 2/7/2022 at 9:22 AM.

## 2022-02-08 ENCOUNTER — OFFICE VISIT (OUTPATIENT)
Dept: PSYCHIATRY | Facility: CLINIC | Age: 62
End: 2022-02-08
Payer: COMMERCIAL

## 2022-02-08 DIAGNOSIS — F33.2 SEVERE EPISODE OF RECURRENT MAJOR DEPRESSIVE DISORDER, WITHOUT PSYCHOTIC FEATURES (H): Primary | ICD-10-CM

## 2022-02-08 ASSESSMENT — PATIENT HEALTH QUESTIONNAIRE - PHQ9: SUM OF ALL RESPONSES TO PHQ QUESTIONS 1-9: 11

## 2022-02-08 NOTE — PROGRESS NOTES
" Interventional Psychiatry Program  [address], [address]  [place] [address]  TMS Procedure Note   Janelle White MRN# [id number]  Age: 61 year old year old YOB: 1960    Pre-Procedure:  History and Physical: Reviewed in medical record  Consent Signed by: Janelle White for this course of treatment.  On: 12/27/21      Clinical Narrative:  Patient tolerating treatment.     Daily Adult Stimulation Safety Questionnaire: No or Yes in past 24 hours     1) Have you discontinued or started any new medication? Yes- still taking iron  2) Have you missed any doses of your medication differently then prescribed? No  3) Have you consumed alcohol or drugs (other than prescribed)?  No  4) Did you not sleep AT ALL last night?  No  5) Has your SI changed or worsened?  No    Indications for TMS:  MDD, recurrent, severe; 4+ medication trials (from 2+ classes) ineffective; Psychotherapy ineffective     Procedure Diagnosis:  Severe episode of recurrent major depressive disorder, without psychotic features (H)  (primary encounter diagnosis)    Treatment Hx:   Treatment number this series: 31  Total lifetime treatment number: 31      Allergies   Allergen Reactions     Bupropion Other (See Comments) and Swelling     Ineffective, name brand works best  Ineffective, name brand works best     Codeine Other (See Comments)     \"Feels like electricity running through body\"  No reaction noted in [employer].  Shaky  With Tylenol     Effexor [Venlafaxine Hydrochloride]      Affect too flat     Escitalopram      Other reaction(s): *Unknown  Sexual dysfunction     Fluoxetine Other (See Comments)     Sexual dysfunction     Levaquin [Levofloxacin] Other (See Comments)     Suicidal thoughts  Dark thoughts- mood changes     Lexapro      Sexual dysfunction     Methylphenidate Hives     Milnacipran      12.5 MG is fine. 25 MG caused side effects. \"Dark thoughts\"     Pregabalin Other (See Comments)     " Hallucinations  Audiovisual hallucinations     Prozac [Fluoxetine Hcl]      Sexual dysfunction     Tramadol Hives     Hives       Venlafaxine      Other reaction(s): *Unknown  Affect too flat      LMP 05/01/2005 (LMP Unknown)     Pause for the Cause  Right patient:  Yes  Right procedure/correct coil:  Yes; rTMS; cpt 36124; F8 coil.   Earplugs in place:  Yes    Procedure  Patient was seated in procedure chair. Identity and procedure was verified. Ear plugs were placed in ears and patient-specific cap was placed on head and tightened appropriately. Ruler locations were verified. Coil was placed at treatment location and stimulator was set to parameters described below. A test train was delivered and pt tolerated train. Given pt tolerance, 60 treatment trains were delivered to the left side and 3 trains were delivered to the right side. Pt tolerated procedure eyebrow movement.      Motor Threshold Determination  Distance from nasion to inion: 36  Tragus to tragus distance: 35  Head circumference: 54  Distance along the vertex: 9.38  Distance along circumference from midline: 6.25  MT 1:@ 62% on 12/27/2021    Theta LDLPFC     Frequency: 50 Hz  Burst Frequency: 5 Hz  Train Duration: 10 sec   Number of Bursts: 10   Total pulses delivered: 1800  Tx Loc: F3  Energy: 74% (120%MT)  Trains: 60     Theta RDLPFC    Frequency: 50 Hz  Burst Frequency: 5 Hz  Train Duration: 69.6 sec   Number of Bursts: 200  Total pulses delivered: 1800  Tx Loc: F3  Energy: 68% (110%MT)  Trains: 3 trains     Date MADRS IDS-SR PHQ-9   12/27/21 35 66 25   1/7/22 -- 56 23   1/14/22   19   1/21/22  43 15   1/28/22  47 19   2/4/22  47 16       PHQ-9 SCORE 2/3/2022 2/4/2022 2/7/2022   PHQ-9 Total Score - - -   PHQ-9 Total Score MyChart - - -   PHQ-9 Total Score 18 16 11       Shilpi Ramesh Psychometrist  [facility]   Mental [facility] Neuromodulation    Plan   - Cont TMS       I did not see the patient during/after treatment but remained  available in the clinic during  treatment.    Melissa Taveras MD  [facility] Neuromodulation

## 2022-02-09 ENCOUNTER — OFFICE VISIT (OUTPATIENT)
Dept: PSYCHIATRY | Facility: CLINIC | Age: 62
End: 2022-02-09
Payer: COMMERCIAL

## 2022-02-09 DIAGNOSIS — F33.2 SEVERE EPISODE OF RECURRENT MAJOR DEPRESSIVE DISORDER, WITHOUT PSYCHOTIC FEATURES (H): Primary | ICD-10-CM

## 2022-02-09 ASSESSMENT — PATIENT HEALTH QUESTIONNAIRE - PHQ9: SUM OF ALL RESPONSES TO PHQ QUESTIONS 1-9: 22

## 2022-02-09 NOTE — PROGRESS NOTES
" Interventional Psychiatry Program  [address], [address]  [place] [address]  TMS Procedure Note   Janelle White MRN# [id number]  Age: 61 year old year old YOB: 1960    Pre-Procedure:  History and Physical: Reviewed in medical record  Consent Signed by: Janelle White for this course of treatment.  On: 12/27/21      Clinical Narrative:  Patient tolerating treatment.  Patient having a difficult day.  Thanks she may have long covid.      Daily Adult Stimulation Safety Questionnaire: No or Yes in past 24 hours     1) Have you discontinued or started any new medication? Yes- still taking iron  2) Have you missed any doses of your medication differently then prescribed? No  3) Have you consumed alcohol or drugs (other than prescribed)?  No  4) Did you not sleep AT ALL last night?  No  5) Has your SI changed or worsened?  No    Indications for TMS:  MDD, recurrent, severe; 4+ medication trials (from 2+ classes) ineffective; Psychotherapy ineffective     Procedure Diagnosis:  Severe episode of recurrent major depressive disorder, without psychotic features (H)  (primary encounter diagnosis)    Treatment Hx:   Treatment number this series: 32  Total lifetime treatment number: 32      Allergies   Allergen Reactions     Bupropion Other (See Comments) and Swelling     Ineffective, name brand works best  Ineffective, name brand works best     Codeine Other (See Comments)     \"Feels like electricity running through body\"  No reaction noted in [employer].  Shaky  With Tylenol     Effexor [Venlafaxine Hydrochloride]      Affect too flat     Escitalopram      Other reaction(s): *Unknown  Sexual dysfunction     Fluoxetine Other (See Comments)     Sexual dysfunction     Levaquin [Levofloxacin] Other (See Comments)     Suicidal thoughts  Dark thoughts- mood changes     Lexapro      Sexual dysfunction     Methylphenidate Hives     Milnacipran      12.5 MG is fine. 25 MG caused side effects. " "\"Dark thoughts\"     Pregabalin Other (See Comments)     Hallucinations  Audiovisual hallucinations     Prozac [Fluoxetine Hcl]      Sexual dysfunction     Tramadol Hives     Hives       Venlafaxine      Other reaction(s): *Unknown  Affect too flat      LMP 05/01/2005 (LMP Unknown)     Pause for the Cause  Right patient:  Yes  Right procedure/correct coil:  Yes; rTMS; cpt 91478; F8 coil.   Earplugs in place:  Yes    Procedure  Patient was seated in procedure chair. Identity and procedure was verified. Ear plugs were placed in ears and patient-specific cap was placed on head and tightened appropriately. Ruler locations were verified. Coil was placed at treatment location and stimulator was set to parameters described below. A test train was delivered and pt tolerated train. Given pt tolerance, 60 treatment trains were delivered to the left side and 3 trains were delivered to the right side. Pt tolerated procedure eyebrow movement.      Motor Threshold Determination  Distance from nasion to inion: 36  Tragus to tragus distance: 35  Head circumference: 54  Distance along the vertex: 9.38  Distance along circumference from midline: 6.25  MT 1:@ 62% on 12/27/2021    Theta LDLPFC     Frequency: 50 Hz  Burst Frequency: 5 Hz  Train Duration: 10 sec   Number of Bursts: 10   Total pulses delivered: 1800  Tx Loc: F3  Energy: 74% (120%MT)  Trains: 60     Theta RDLPFC    Frequency: 50 Hz  Burst Frequency: 5 Hz  Train Duration: 69.6 sec   Number of Bursts: 200  Total pulses delivered: 1800  Tx Loc: F3  Energy: 68% (110%MT)  Trains: 3 trains     Date MADRS IDS-SR PHQ-9   12/27/21 35 66 25   1/7/22 -- 56 23   1/14/22   19   1/21/22  43 15   1/28/22  47 19   2/4/22  47 16       PHQ-9 SCORE 2/7/2022 2/8/2022 2/9/2022   PHQ-9 Total Score - - -   PHQ-9 Total Score MyChart - - -   PHQ-9 Total Score 11 11 22       Saniya Miller RN   [facility]   Mental [facility] Neuromodulation    Plan   - Cont TMS       I did not see the patient " but remained available in clinic throughout treatment.       Zafar Yanes MD  [facility] Neuromodulation

## 2022-02-10 ENCOUNTER — OFFICE VISIT (OUTPATIENT)
Dept: PSYCHIATRY | Facility: CLINIC | Age: 62
End: 2022-02-10
Payer: COMMERCIAL

## 2022-02-10 DIAGNOSIS — F33.2 SEVERE EPISODE OF RECURRENT MAJOR DEPRESSIVE DISORDER, WITHOUT PSYCHOTIC FEATURES (H): Primary | ICD-10-CM

## 2022-02-10 LAB
AMPHET UR CFM-MCNC: 9380 NG/ML
AMPHET/CREAT UR: 7444 NG/MG {CREAT}
DHC UR CFM-MCNC: 253 NG/ML
DHC/CREAT UR: 201 NG/MG {CREAT}
HYDROCODONE UR CFM-MCNC: 600 NG/ML
HYDROCODONE/CREAT UR: 476 NG/MG {CREAT}
HYDROMORPHONE UR CFM-MCNC: 460 NG/ML
HYDROMORPHONE/CREAT UR: 365 NG/MG {CREAT}
LORAZEPAM UR QL CFM: PRESENT
MORPHINE UR CFM-MCNC: >7000 NG/ML
MORPHINE/CREAT UR: ABNORMAL

## 2022-02-10 NOTE — PROGRESS NOTES
" Interventional Psychiatry Program  [address], [address]  [place] [address]  TMS Procedure Note   Janelle White MRN# [id number]  Age: 61 year old year old YOB: 1960    Pre-Procedure:  History and Physical: Reviewed in medical record  Consent Signed by: Janelle White for this course of treatment.  On: 12/27/21      Clinical Narrative:  Patient tolerating treatment.  Patient states her physical exhaustion may be from long COVID but her mood is better today.    Daily Adult Stimulation Safety Questionnaire: No or Yes in past 24 hours     1) Have you discontinued or started any new medication? No  2) Have you missed any doses of your medication differently then prescribed? No  3) Have you consumed alcohol or drugs (other than prescribed)?  No  4) Did you not sleep AT ALL last night?  No  5) Has your SI changed or worsened?  No    Indications for TMS:  MDD, recurrent, severe; 4+ medication trials (from 2+ classes) ineffective; Psychotherapy ineffective     Procedure Diagnosis:  Severe episode of recurrent major depressive disorder, without psychotic features (H)  (primary encounter diagnosis)    Treatment Hx:   Treatment number this series: 33  Total lifetime treatment number: 33      Allergies   Allergen Reactions     Bupropion Other (See Comments) and Swelling     Ineffective, name brand works best  Ineffective, name brand works best     Codeine Other (See Comments)     \"Feels like electricity running through body\"  No reaction noted in [employer].  Shaky  With Tylenol     Effexor [Venlafaxine Hydrochloride]      Affect too flat     Escitalopram      Other reaction(s): *Unknown  Sexual dysfunction     Fluoxetine Other (See Comments)     Sexual dysfunction     Levaquin [Levofloxacin] Other (See Comments)     Suicidal thoughts  Dark thoughts- mood changes     Lexapro      Sexual dysfunction     Methylphenidate Hives     Milnacipran      12.5 MG is fine. 25 MG caused side " "effects. \"Dark thoughts\"     Pregabalin Other (See Comments)     Hallucinations  Audiovisual hallucinations     Prozac [Fluoxetine Hcl]      Sexual dysfunction     Tramadol Hives     Hives       Venlafaxine      Other reaction(s): *Unknown  Affect too flat      LMP 05/01/2005 (LMP Unknown)     Pause for the Cause  Right patient:  Yes  Right procedure/correct coil:  Yes; rTMS; cpt 48991; F8 coil.   Earplugs in place:  Yes    Procedure  Patient was seated in procedure chair. Identity and procedure was verified. Ear plugs were placed in ears and patient-specific cap was placed on head and tightened appropriately. Ruler locations were verified. Coil was placed at treatment location and stimulator was set to parameters described below. A test train was delivered and pt tolerated train. Given pt tolerance, 60 treatment trains were delivered to the left side and 3 trains were delivered to the right side. Pt tolerated procedure eyebrow movement.      Motor Threshold Determination  Distance from nasion to inion: 36  Tragus to tragus distance: 35  Head circumference: 54  Distance along the vertex: 9.38  Distance along circumference from midline: 6.25  MT 1:@ 62% on 12/27/2021    Theta LDLPFC     Frequency: 50 Hz  Burst Frequency: 5 Hz  Train Duration: 10 sec   Number of Bursts: 10   Total pulses delivered: 1800  Tx Loc: F3  Energy: 74% (120%MT)  Trains: 60     Theta RDLPFC    Frequency: 50 Hz  Burst Frequency: 5 Hz  Train Duration: 69.6 sec   Number of Bursts: 200  Total pulses delivered: 1800  Tx Loc: F3  Energy: 68% (110%MT)  Trains: 3 trains     Date MADRS IDS-SR PHQ-9   12/27/21 35 66 25   1/7/22 -- 56 23   1/14/22   19   1/21/22  43 15   1/28/22  47 19   2/4/22  47 16       PHQ-9 SCORE 2/7/2022 2/8/2022 2/9/2022   PHQ-9 Total Score - - -   PHQ-9 Total Score MyChart - - -   PHQ-9 Total Score 11 11 22       Griselda Lopez, EMT  [facility]   Mental Health Neuromodulation    Plan   - Cont TMS       I did not " see the patient during/after treatment but remained available in the clinic during  treatment.    Melissa Taveras MD  [facility] Neuromodulation

## 2022-02-11 ENCOUNTER — OFFICE VISIT (OUTPATIENT)
Dept: PSYCHIATRY | Facility: CLINIC | Age: 62
End: 2022-02-11
Payer: COMMERCIAL

## 2022-02-11 DIAGNOSIS — F33.2 SEVERE EPISODE OF RECURRENT MAJOR DEPRESSIVE DISORDER, WITHOUT PSYCHOTIC FEATURES (H): Primary | ICD-10-CM

## 2022-02-11 ASSESSMENT — PATIENT HEALTH QUESTIONNAIRE - PHQ9: SUM OF ALL RESPONSES TO PHQ QUESTIONS 1-9: 20

## 2022-02-11 NOTE — PROGRESS NOTES
" Interventional Psychiatry Program  [address], [address]  [place] [address]  TMS Procedure Note   Janelle White MRN# [id number]  Age: 61 year old year old YOB: 1960    Pre-Procedure:  History and Physical: Reviewed in medical record  Consent Signed by: Janelle White for this course of treatment.  On: 12/27/21      Clinical Narrative:  Patient tolerating treatment.  Patient is somewhat distraught due to an incident at the [employer], where her dog injured a child.    Daily Adult Stimulation Safety Questionnaire: No or Yes in past 24 hours     1) Have you discontinued or started any new medication? No  2) Have you missed any doses of your medication differently then prescribed? No  3) Have you consumed alcohol or drugs (other than prescribed)?  No  4) Did you not sleep AT ALL last night?  No  5) Has your SI changed or worsened?  No    Indications for TMS:  MDD, recurrent, severe; 4+ medication trials (from 2+ classes) ineffective; Psychotherapy ineffective     Procedure Diagnosis:  Severe episode of recurrent major depressive disorder, without psychotic features (H)  (primary encounter diagnosis)    Treatment Hx:   Treatment number this series: 34  Total lifetime treatment number: 34      Allergies   Allergen Reactions     Bupropion Other (See Comments) and Swelling     Ineffective, name brand works best  Ineffective, name brand works best     Codeine Other (See Comments)     \"Feels like electricity running through body\"  No reaction noted in [employer].  Shaky  With Tylenol     Effexor [Venlafaxine Hydrochloride]      Affect too flat     Escitalopram      Other reaction(s): *Unknown  Sexual dysfunction     Fluoxetine Other (See Comments)     Sexual dysfunction     Levaquin [Levofloxacin] Other (See Comments)     Suicidal thoughts  Dark thoughts- mood changes     Lexapro      Sexual dysfunction     Methylphenidate Hives     Milnacipran      12.5 MG is fine. 25 MG caused side " "effects. \"Dark thoughts\"     Pregabalin Other (See Comments)     Hallucinations  Audiovisual hallucinations     Prozac [Fluoxetine Hcl]      Sexual dysfunction     Tramadol Hives     Hives       Venlafaxine      Other reaction(s): *Unknown  Affect too flat      LMP 05/01/2005 (LMP Unknown)     Pause for the Cause  Right patient:  Yes  Right procedure/correct coil:  Yes; rTMS; cpt 61863; F8 coil.   Earplugs in place:  Yes    Procedure  Patient was seated in procedure chair. Identity and procedure was verified. Ear plugs were placed in ears and patient-specific cap was placed on head and tightened appropriately. Ruler locations were verified. Coil was placed at treatment location and stimulator was set to parameters described below. A test train was delivered and pt tolerated train. Given pt tolerance, 60 treatment trains were delivered to the left side and 3 trains were delivered to the right side. Pt tolerated procedure eyebrow movement.      Motor Threshold Determination  Distance from nasion to inion: 36  Tragus to tragus distance: 35  Head circumference: 54  Distance along the vertex: 9.38  Distance along circumference from midline: 6.25  MT 1:@ 62% on 12/27/2021    Theta LDLPFC     Frequency: 50 Hz  Burst Frequency: 5 Hz  Train Duration: 10 sec   Number of Bursts: 10   Total pulses delivered: 1800  Tx Loc: F3  Energy: 74% (120%MT)  Trains: 60     Theta RDLPFC    Frequency: 50 Hz  Burst Frequency: 5 Hz  Train Duration: 69.6 sec   Number of Bursts: 200  Total pulses delivered: 1800  Tx Loc: F3  Energy: 68% (110%MT)  Trains: 3 trains     Date MADRS IDS-SR PHQ-9   12/27/21 35 66 25   1/7/22 -- 56 23   1/14/22   19   1/21/22  43 15   1/28/22  47 19   2/4/22  47 16   2/11/22   20       PHQ-9 SCORE 2/7/2022 2/8/2022 2/9/2022   PHQ-9 Total Score - - -   PHQ-9 Total Score MyChart - - -   PHQ-9 Total Score 11 11 22       Shilpi Ramesh Psychometrist  [facility]   Mental [facility] Neuromodulation    Plan   - " Cont TMS       I did not see patient but remained available in the clinic throughout the TMS session.      Narendra Hopkins MD PhD  [facility] Neuromodulation

## 2022-02-16 ENCOUNTER — OFFICE VISIT (OUTPATIENT)
Dept: PSYCHIATRY | Facility: CLINIC | Age: 62
End: 2022-02-16
Payer: COMMERCIAL

## 2022-02-16 DIAGNOSIS — F33.2 SEVERE EPISODE OF RECURRENT MAJOR DEPRESSIVE DISORDER, WITHOUT PSYCHOTIC FEATURES (H): Primary | ICD-10-CM

## 2022-02-16 ASSESSMENT — PATIENT HEALTH QUESTIONNAIRE - PHQ9: SUM OF ALL RESPONSES TO PHQ QUESTIONS 1-9: 19

## 2022-02-16 NOTE — PROGRESS NOTES
" Interventional Psychiatry Program  [address], [address]  [place] [address]  TMS Procedure Note   Janelle White MRN# [id number]  Age: 61 year old year old YOB: 1960    Pre-Procedure:  History and Physical: Reviewed in medical record  Consent Signed by: Janelle White for this course of treatment.  On: 12/27/21      Clinical Narrative:  Patient tolerating treatment.  Patient reports that traveling was ok, had some good times, and some not so good times.     Daily Adult Stimulation Safety Questionnaire: No or Yes in past 24 hours     1) Have you discontinued or started any new medication? No  2) Have you missed any doses of your medication differently then prescribed? No  3) Have you consumed alcohol or drugs (other than prescribed)?  No  4) Did you not sleep AT ALL last night?  No  5) Has your SI changed or worsened?  No    Indications for TMS:  MDD, recurrent, severe; 4+ medication trials (from 2+ classes) ineffective; Psychotherapy ineffective     Procedure Diagnosis:  Severe episode of recurrent major depressive disorder, without psychotic features (H)  (primary encounter diagnosis)    Treatment Hx:   Treatment number this series: 35  Total lifetime treatment number: 35      Allergies   Allergen Reactions     Bupropion Other (See Comments) and Swelling     Ineffective, name brand works best  Ineffective, name brand works best     Codeine Other (See Comments)     \"Feels like electricity running through body\"  No reaction noted in [employer]ner.  Shaky  With Tylenol     Effexor [Venlafaxine Hydrochloride]      Affect too flat     Escitalopram      Other reaction(s): *Unknown  Sexual dysfunction     Fluoxetine Other (See Comments)     Sexual dysfunction     Levaquin [Levofloxacin] Other (See Comments)     Suicidal thoughts  Dark thoughts- mood changes     Lexapro      Sexual dysfunction     Methylphenidate Hives     Milnacipran      12.5 MG is fine. 25 MG caused side effects. " "\"Dark thoughts\"     Pregabalin Other (See Comments)     Hallucinations  Audiovisual hallucinations     Prozac [Fluoxetine Hcl]      Sexual dysfunction     Tramadol Hives     Hives       Venlafaxine      Other reaction(s): *Unknown  Affect too flat      LMP 05/01/2005 (LMP Unknown)     Pause for the Cause  Right patient:  Yes  Right procedure/correct coil:  Yes; rTMS; cpt 10626; F8 coil.   Earplugs in place:  Yes    Procedure  Patient was seated in procedure chair. Identity and procedure was verified. Ear plugs were placed in ears and patient-specific cap was placed on head and tightened appropriately. Ruler locations were verified. Coil was placed at treatment location and stimulator was set to parameters described below. A test train was delivered and pt tolerated train. Given pt tolerance, 60 treatment trains were delivered to the left side and 3 trains were delivered to the right side. Pt tolerated procedure eyebrow movement.      Motor Threshold Determination  Distance from nasion to inion: 36  Tragus to tragus distance: 35  Head circumference: 54  Distance along the vertex: 9.38  Distance along circumference from midline: 6.25  MT 1:@ 62% on 12/27/2021    Theta LDLPFC     Frequency: 50 Hz  Burst Frequency: 5 Hz  Train Duration: 10 sec   Number of Bursts: 10   Total pulses delivered: 1800  Tx Loc: F3  Energy: 74% (120%MT)  Trains: 60     Theta RDLPFC    Frequency: 50 Hz  Burst Frequency: 5 Hz  Train Duration: 69.6 sec   Number of Bursts: 200  Total pulses delivered: 1800  Tx Loc: F3  Energy: 68% (110%MT)  Trains: 3 trains     Date MADRS IDS-SR PHQ-9   12/27/21 35 66 25   1/7/22 -- 56 23   1/14/22   19   1/21/22  43 15   1/28/22  47 19   2/4/22  47 16   2/11/22   20       PHQ-9 SCORE 2/9/2022 2/11/2022 2/16/2022   PHQ-9 Total Score - - -   PHQ-9 Total Score MyChart - - -   PHQ-9 Total Score 22 20 19       Saniya Miller, ARTEMIO   [facility]   [facility]    Plan   - Cont TMS       I did " not see the patient but remained available in clinic throughout treatment.       Zafar Yanes MD  [facility]

## 2022-02-17 ENCOUNTER — OFFICE VISIT (OUTPATIENT)
Dept: PSYCHIATRY | Facility: CLINIC | Age: 62
End: 2022-02-17
Payer: COMMERCIAL

## 2022-02-17 DIAGNOSIS — F33.2 SEVERE EPISODE OF RECURRENT MAJOR DEPRESSIVE DISORDER, WITHOUT PSYCHOTIC FEATURES (H): Primary | ICD-10-CM

## 2022-02-17 ASSESSMENT — PATIENT HEALTH QUESTIONNAIRE - PHQ9: SUM OF ALL RESPONSES TO PHQ QUESTIONS 1-9: 26

## 2022-02-17 NOTE — PROGRESS NOTES
" Interventional Psychiatry Program  [address], [address]  [place] [address]  TMS Procedure Note   Janelle White MRN# [id number]  Age: 61 year old year old YOB: 1960    Pre-Procedure:  History and Physical: Reviewed in medical record  Consent Signed by: Janelle White for this course of treatment.  On: 12/27/21      Clinical Narrative:  Patient tolerating treatment.  Patient came to treatment tearful and stated she has been having increased SI with no intent. RN notified. She contracted to safety and coming to treatment tomorrow. She states she feels mentally overwhelmed and physically exhausted.    Daily Adult Stimulation Safety Questionnaire: No or Yes in past 24 hours     1) Have you discontinued or started any new medication? No  2) Have you missed any doses of your medication differently then prescribed? No  3) Have you consumed alcohol or drugs (other than prescribed)?  No  4) Did you not sleep AT ALL last night?  No  5) Has your SI changed or worsened?  Yes, no intent    Indications for TMS:  MDD, recurrent, severe; 4+ medication trials (from 2+ classes) ineffective; Psychotherapy ineffective     Procedure Diagnosis:  Severe episode of recurrent major depressive disorder, without psychotic features (H)  (primary encounter diagnosis)    Treatment Hx:   Treatment number this series: 36  Total lifetime treatment number: 36      Allergies   Allergen Reactions     Bupropion Other (See Comments) and Swelling     Ineffective, name brand works best  Ineffective, name brand works best     Codeine Other (See Comments)     \"Feels like electricity running through body\"  No reaction noted in [employer]ner.  Shaky  With Tylenol     Effexor [Venlafaxine Hydrochloride]      Affect too flat     Escitalopram      Other reaction(s): *Unknown  Sexual dysfunction     Fluoxetine Other (See Comments)     Sexual dysfunction     Levaquin [Levofloxacin] Other (See Comments)     Suicidal " "thoughts  Dark thoughts- mood changes     Lexapro      Sexual dysfunction     Methylphenidate Hives     Milnacipran      12.5 MG is fine. 25 MG caused side effects. \"Dark thoughts\"     Pregabalin Other (See Comments)     Hallucinations  Audiovisual hallucinations     Prozac [Fluoxetine Hcl]      Sexual dysfunction     Tramadol Hives     Hives       Venlafaxine      Other reaction(s): *Unknown  Affect too flat      LMP 05/01/2005 (LMP Unknown)     Pause for the Cause  Right patient:  Yes  Right procedure/correct coil:  Yes; rTMS; cpt 39272; F8 coil.   Earplugs in place:  Yes    Procedure  Patient was seated in procedure chair. Identity and procedure was verified. Ear plugs were placed in ears and patient-specific cap was placed on head and tightened appropriately. Ruler locations were verified. Coil was placed at treatment location and stimulator was set to parameters described below. A test train was delivered and pt tolerated train. Given pt tolerance, 60 treatment trains were delivered to the left side and 3 trains were delivered to the right side. Pt tolerated procedure eyebrow movement.      Motor Threshold Determination  Distance from nasion to inion: 36  Tragus to tragus distance: 35  Head circumference: 54  Distance along the vertex: 9.38  Distance along circumference from midline: 6.25  MT 1:@ 62% on 12/27/2021    Theta LDLPFC     Frequency: 50 Hz  Burst Frequency: 5 Hz  Train Duration: 10 sec   Number of Bursts: 10   Total pulses delivered: 1800  Tx Loc: F3  Energy: 74% (120%MT)  Trains: 60     Theta RDLPFC    Frequency: 50 Hz  Burst Frequency: 5 Hz  Train Duration: 69.6 sec   Number of Bursts: 200  Total pulses delivered: 1800  Tx Loc: F3  Energy: 68% (110%MT)  Trains: 3 trains     Date MADRS IDS-SR PHQ-9   12/27/21 35 66 25   1/7/22 -- 56 23   1/14/22   19   1/21/22  43 15   1/28/22  47 19   2/4/22  47 16   2/11/22   20       PHQ-9 SCORE 2/9/2022 2/11/2022 2/16/2022   PHQ-9 Total Score - - -   PHQ-9 Total " Score MyChart - - -   PHQ-9 Total Score 22 20 19       KANNAN Cam  [facility]    Plan   - Cont TMS     I did not see the patient during/after treatment but remained available in the clinic during  treatment.    Kavitha Abarca MD  [facility]

## 2022-02-18 ENCOUNTER — OFFICE VISIT (OUTPATIENT)
Dept: PSYCHIATRY | Facility: CLINIC | Age: 62
End: 2022-02-18
Payer: COMMERCIAL

## 2022-02-18 DIAGNOSIS — F33.2 SEVERE EPISODE OF RECURRENT MAJOR DEPRESSIVE DISORDER, WITHOUT PSYCHOTIC FEATURES (H): Primary | ICD-10-CM

## 2022-02-18 ASSESSMENT — PATIENT HEALTH QUESTIONNAIRE - PHQ9: SUM OF ALL RESPONSES TO PHQ QUESTIONS 1-9: 18

## 2022-02-18 NOTE — PROGRESS NOTES
" Interventional Psychiatry Program  [address], [address]  [place] [address]  TMS Procedure Note   Janelle White MRN# [id number]  Age: 61 year old year old YOB: 1960    Pre-Procedure:  History and Physical: Reviewed in medical record  Consent Signed by: Janelle White for this course of treatment.  On: 12/27/21      Clinical Narrative:  Patient tolerating treatment.  Patient reported a much better mood today since she woke up early and went to bed early last night. Patient tolerated last treatment. She states she needs to find a new therapist since hers was on a medical leave.    Daily Adult Stimulation Safety Questionnaire: No or Yes in past 24 hours     1) Have you discontinued or started any new medication? No  2) Have you missed any doses of your medication differently then prescribed? No  3) Have you consumed alcohol or drugs (other than prescribed)?  No  4) Did you not sleep AT ALL last night?  No  5) Has your SI changed or worsened?  No    Indications for TMS:  MDD, recurrent, severe; 4+ medication trials (from 2+ classes) ineffective; Psychotherapy ineffective     Procedure Diagnosis:  Severe episode of recurrent major depressive disorder, without psychotic features (H)  (primary encounter diagnosis)    Treatment Hx:   Treatment number this series: 37  Total lifetime treatment number: 37      Allergies   Allergen Reactions     Bupropion Other (See Comments) and Swelling     Ineffective, name brand works best  Ineffective, name brand works best     Codeine Other (See Comments)     \"Feels like electricity running through body\"  No reaction noted in [employer]ner.  Shaky  With Tylenol     Effexor [Venlafaxine Hydrochloride]      Affect too flat     Escitalopram      Other reaction(s): *Unknown  Sexual dysfunction     Fluoxetine Other (See Comments)     Sexual dysfunction     Levaquin [Levofloxacin] Other (See Comments)     Suicidal thoughts  Dark thoughts- mood changes " "    Lexapro      Sexual dysfunction     Methylphenidate Hives     Milnacipran      12.5 MG is fine. 25 MG caused side effects. \"Dark thoughts\"     Pregabalin Other (See Comments)     Hallucinations  Audiovisual hallucinations     Prozac [Fluoxetine Hcl]      Sexual dysfunction     Tramadol Hives     Hives       Venlafaxine      Other reaction(s): *Unknown  Affect too flat      LMP 05/01/2005 (LMP Unknown)     Pause for the Cause  Right patient:  Yes  Right procedure/correct coil:  Yes; rTMS; cpt 40640; F8 coil.   Earplugs in place:  Yes    Procedure  Patient was seated in procedure chair. Identity and procedure was verified. Ear plugs were placed in ears and patient-specific cap was placed on head and tightened appropriately. Ruler locations were verified. Coil was placed at treatment location and stimulator was set to parameters described below. A test train was delivered and pt tolerated train. Given pt tolerance, 60 treatment trains were delivered to the left side and 3 trains were delivered to the right side. Pt tolerated procedure eyebrow movement.      Motor Threshold Determination  Distance from nasion to inion: 36  Tragus to tragus distance: 35  Head circumference: 54  Distance along the vertex: 9.38  Distance along circumference from midline: 6.25  MT 1:@ 62% on 12/27/2021    Theta LDLPFC     Frequency: 50 Hz  Burst Frequency: 5 Hz  Train Duration: 10 sec   Number of Bursts: 10   Total pulses delivered: 1800  Tx Loc: F3  Energy: 74% (120%MT)  Trains: 60     Theta RDLPFC    Frequency: 50 Hz  Burst Frequency: 5 Hz  Train Duration: 69.6 sec   Number of Bursts: 200  Total pulses delivered: 1800  Tx Loc: F3  Energy: 68% (110%MT)  Trains: 3 trains     Date MADRS IDS-SR PHQ-9   12/27/21 35 66 25   1/7/22 -- 56 23   1/14/22   19   1/21/22  43 15   1/28/22  47 19   2/4/22  47 16   2/11/22   20   2/18/22 33 47 18       PHQ-9 SCORE 2/11/2022 2/16/2022 2/17/2022   PHQ-9 Total Score - - -   PHQ-9 Total Score MyChart - - " -   PHQ-9 Total Score 20 19 26       Griselda Lopez, EMT  [facility]od[facility]    Plan   -I did not see the patient during/after treatment but remained available in the clinic during  treatment.      I did not see the patient during/after treatment but remained available in the clinic during  treatment.    Narendra Hopkins MD PhD  [facility]od[facility]

## 2022-02-25 ENCOUNTER — VIRTUAL VISIT (OUTPATIENT)
Dept: PSYCHIATRY | Facility: CLINIC | Age: 62
End: 2022-02-25
Payer: COMMERCIAL

## 2022-02-25 DIAGNOSIS — F33.2 SEVERE EPISODE OF RECURRENT MAJOR DEPRESSIVE DISORDER, WITHOUT PSYCHOTIC FEATURES (H): Primary | ICD-10-CM

## 2022-02-25 NOTE — Clinical Note
Frantz-See update on Janelle Murphy-Janelle needs help find a new therapist.  Can you follow up with her on this?  Thanks!    Saniya

## 2022-02-25 NOTE — PROGRESS NOTES
[facility] :  Care Coordination Note     SITUATION   Janelle White is a 61 year old female who is receiving support for:  Clinic Care Coordination - Follow-up (Post TMS Chck )      BACKGROUND     Patient completed TMS about a week ago    ASSESSMENT     Patient reports that she has been up and down since she stopped TMS.  She is not sure if she benefited from TMS.  States that she would really have to think about.  However she does note that she doesn't Samish the drain as easily or for as long as she did in the past.  States that she is still struggling, and that she feels like her brain is not functional.      Patient states that she feels like she needs a therapist to talk through some things.  Writer will have SW reach out to patient to discuss finding a new therapist, as she feels like she has many things that are bouncing around in her brain that she needs help sorting out.  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      Patient is seeing Dr. Yanes on 3/9 for follow up and recommendations on next steps in care.      PLAN                Follow-up plan:  See Dr. Yanes 3/9, encouraged patient to call should she need anything prior to her appointment with Dr. Joaquín Miller, RN

## 2022-03-07 ENCOUNTER — VIRTUAL VISIT (OUTPATIENT)
Dept: FAMILY MEDICINE | Facility: CLINIC | Age: 62
End: 2022-03-07
Payer: COMMERCIAL

## 2022-03-07 ENCOUNTER — TELEPHONE (OUTPATIENT)
Dept: FAMILY MEDICINE | Facility: CLINIC | Age: 62
End: 2022-03-07

## 2022-03-07 DIAGNOSIS — G89.4 CHRONIC PAIN SYNDROME: ICD-10-CM

## 2022-03-07 PROCEDURE — 99213 OFFICE O/P EST LOW 20 MIN: CPT | Mod: 95 | Performed by: NURSE PRACTITIONER

## 2022-03-07 RX ORDER — MORPHINE SULFATE 15 MG/1
15 TABLET, FILM COATED, EXTENDED RELEASE ORAL EVERY 12 HOURS
Qty: 60 TABLET | Refills: 0 | Status: SHIPPED | OUTPATIENT
Start: 2022-03-07 | End: 2022-04-08

## 2022-03-07 RX ORDER — HYDROCODONE BITARTRATE AND ACETAMINOPHEN 10; 325 MG/1; MG/1
1 TABLET ORAL EVERY 4 HOURS PRN
Qty: 45 TABLET | Refills: 0 | Status: SHIPPED | OUTPATIENT
Start: 2022-03-07 | End: 2022-04-08

## 2022-03-07 NOTE — TELEPHONE ENCOUNTER
Reason for Call:  Form, our goal is to have forms completed with 72 hours, however, some forms may require a visit or additional information.    Type of letter, form or note:  disability    Who is the form from?: Patient    Where did the form come from: Patient or family brought in       What clinic location was the form placed at?: [facility]    Where the form was placed: pod for [facility]    What number is listed as a contact on the form?: [phone]       Additional comments: none      Call taken on 3/7/2022 at 10:34 AM by Torri Mosqueda
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
## 2022-03-09 ENCOUNTER — VIRTUAL VISIT (OUTPATIENT)
Dept: PSYCHIATRY | Facility: CLINIC | Age: 62
End: 2022-03-09
Payer: COMMERCIAL

## 2022-03-09 ENCOUNTER — TELEPHONE (OUTPATIENT)
Dept: PSYCHIATRY | Facility: CLINIC | Age: 62
End: 2022-03-09

## 2022-03-09 DIAGNOSIS — F33.2 SEVERE EPISODE OF RECURRENT MAJOR DEPRESSIVE DISORDER, WITHOUT PSYCHOTIC FEATURES (H): Primary | ICD-10-CM

## 2022-03-09 ASSESSMENT — PATIENT HEALTH QUESTIONNAIRE - PHQ9: SUM OF ALL RESPONSES TO PHQ QUESTIONS 1-9: 19

## 2022-03-09 NOTE — PROGRESS NOTES
Called patient, left voicemail message to call back to complete rooming process.  Nora Johnson, CMA

## 2022-03-09 NOTE — PROGRESS NOTES
"  Psychiatry Clinic Progress Note                                                                   Janelle White is a 61 year old female   Therapist: None (stopped September 2021)   PCP: Carmen Garcia  Other Providers:  Kimberly Perez DO (osychiatrist)         Interim History                                                                                                        4, 4     The patient is a good historian and reports good treatment adherence.      At home though on video call in her car to have some privacy. She was notably tearful and in some distress on interview.      She states having continued sense of \"fogginess\" and \"cotton balls\" in her head. While this has improved after TMS, at times she feels slowed enough to be debilitating. She also feels cognition has been impaired and her memory is getting worse. She mentions a specific example of watching a movie and being unable to remember what she watched the next day. 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     She also describes persistent decreased stamina. When going on walks she feels almost pre-syncopal at times. She also describes other bothersome physical symptoms such as vertigo and getting dizzy. At times she will also be sensitive to stimuli to the point of being unable to to tolerate speaking to others, getting irritable. It is thought by her immunologist that some of these symptoms could be related to cytokine storm, but not explain all of them. She has not been referred to or seen a neurologist about these symptoms.     Additionally she describes episode similar to panic attacks. During these episodes she feels that she gets short of breath even though her breathign and heart rate are normal. This has been debilitating enough that at times she will have to pull over while driving and have someone come " "get her.     She continues to state having severe depression and chronic suidicdal ideation. She does not express intent or plan stating \"I couldn't do it.\"     She stopped going to therapy last September (2021) after her therapist retired after being ill. She would likely benefit from CBT and ACT. She see's Kimberly Perez at [facility] for medication management who can recommend therapy providers.     She said she has been taking 50 mg of desvenlafaxine daily though her list shows it as 100 mg.     Considered MAOI (plan to discuss with Kimberly Perez) though patient hesitant due to past issues with oral medications. Expressed concerns about treatment that potential interacts with her pain medications. She feels that her pain has been well under control. Her PCP, Carmen Garcia ([facility]) manages her opioids. Janelle gave consent for Dr. Yanes to speak with Carmen Garcia about medications and possible interactions. She was informed that ketamine is unlikely to interact with her medications on her list. Ketamine was discussed with Janelle. She is open to the idea of trying ketamine stating \"I have nothing to lose.\"     Recent Symptoms:   Depression:  suicidal ideation, depressed mood, anhedonia, low energy, poor concentration /memory, feeling hopeless, excessive crying, overwhelmed and mood dysregulation  Anxiety:  excessive worry and feeling fearful  Panic Attack:  dizziness, flushing, SOB, trouble taking deep breath and chest tightness     Recent Substance Use:  none reported        Social/ Family History                                  [per patient report]                                 1ea,1ea   No changes     Medical / Surgical History                                                                                                                  Patient Active Problem List   Diagnosis     PERSONAL HX OF  MELANOMA     B-complex deficiency     Migraine     Chronic Low Back Pain     CARDIOVASCULAR SCREENING; LDL GOAL " LESS THAN 160     DDD (degenerative disc disease), cervical     Moderate recurrent major depression (H)     Vitamin D deficiency     Shift work sleep disorder     Chronic pain syndrome     CLL (chronic lymphocytic leukemia) (H)     Controlled substance agreement signed     CLARE (obstructive sleep apnea)     Prediabetes     Hyperlipidemia LDL goal <130     MTHFR gene mutation     CYP2B6 intermediate metabolizer (H)     CYP2D6 poor metabolizer (H)     STACIE (generalized anxiety disorder)     Polyarthralgia     Cellulitis, unspecified cellulitis site     Sepsis, due to unspecified organism, unspecified whether acute organ dysfunction present (H)     Cellulitis     Tension type headache     Status post blepharoplasty     Rotator cuff injury     Regular astigmatism, bilateral     Pain medication agreement     Hypotension     History of laser assisted in situ keratomileusis     COVID-19     Spinal stenosis of lumbar region with radiculopathy     COPD (chronic obstructive pulmonary disease) (H)       Past Surgical History:   Procedure Laterality Date     anterior cervical discectomy C4-5 ,Fusion C5-6-7  10/2007     APPENDECTOMY       BACK SURGERY       BLEPHAROPLASTY BILATERAL  2013    Procedure: BLEPHAROPLASTY BILATERAL;  BILATERAL UPPER LID BLEPHAROPLASTY AND BROWPEXY ;  Surgeon: Godfrey Miguel MD;  Location: [facility]      SECTION      x2     COLONOSCOPY N/A 2020    Procedure: COLONOSCOPY;  Surgeon: Butch Lochkart MD;  Location:  GI     ESOPHAGOSCOPY, GASTROSCOPY, DUODENOSCOPY (EGD), COMBINED N/A 2020    Procedure: ESOPHAGOGASTRODUODENOSCOPY, WITH BIOPSY biosies by cold forceps;  Surgeon: Butch Lockhart MD;  Location:  GI     EYE SURGERY       FUSION LUMBAR ANTERIOR, FUSION LUMBAR POSTERIOR TWO LEVELS, COMBINED  2010    L3-S1 anterior posterior fusion     GENITOURINARY SURGERY  Hysterectomy    2006     HYSTERECTOMY  2006    ovaries intact     HYSTERECTOMY       HYSTERECTOMY, PAP NO  LONGER INDICATED       Partial vulvectomy for NEENA III  01/2009     Pubovaginal sling, post op durasphere injections  07/2006    wears pad     ROTATOR CUFF REPAIR RT/LT  5/2011    right     SOFT TISSUE SURGERY  2019    Bilateral carpal/ulnar release     SPINAL FUSION C3-4  7/2009    C3-4, anterior spinal fusion     TUBAL LIGATION       ZZC APPENDECTOMY  7/2006        Medical Review of Systems                                                                                                    2,10   none in addition to that documented above    Allergy                                Bupropion, Codeine, Effexor [venlafaxine hydrochloride], Escitalopram, Fluoxetine, Levaquin [levofloxacin], Lexapro, Methylphenidate, Milnacipran, Pregabalin, Prozac [fluoxetine hcl], Tramadol, and Venlafaxine    Current Medications                                                                                                       Current Outpatient Medications   Medication Sig Dispense Refill     albuterol (PROAIR HFA/PROVENTIL HFA/VENTOLIN HFA) 108 (90 Base) MCG/ACT inhaler Inhale 2 puffs into the lungs every 6 hours 8.5 g 4     amphetamine-dextroamphetamine (ADDERALL XR) 20 MG 24 hr capsule Take 1 capsule (20 mg) by mouth daily 30 capsule 0     [START ON 3/13/2022] amphetamine-dextroamphetamine (ADDERALL XR) 20 MG 24 hr capsule Take 1 capsule (20 mg) by mouth daily 30 capsule 0     amphetamine-dextroamphetamine (ADDERALL) 20 MG tablet Take 1 tablet (20 mg) by mouth daily 30 tablet 0     [START ON 3/13/2022] amphetamine-dextroamphetamine (ADDERALL) 20 MG tablet Take 1 tablet (20 mg) by mouth daily 30 tablet 0     calcium citrate (CALCIUM CITRATE) 950 MG tablet Take 1 tablet by mouth 2 times daily.       Calcium-Magnesium-Vitamin D (CALCIUM 500) 500-250-200 MG-MG-UNIT TABS Take 1 tablet by mouth        Cholecalciferol (D-5000) 5000 units TABS Take 5,000 Units by mouth       colchicine (COLCYRS) 0.6 MG tablet Take 1 tablet (0.6 mg) by mouth  "daily 20 tablet 0     cyanocobalamin (CYANOCOBALAMIN) 1000 MCG/ML injection Inject 1 mL (1,000 mcg) into the muscle every 30 days 10 mL 1     cyclobenzaprine (FLEXERIL) 10 MG tablet Take 1 tablet (10 mg) by mouth 3 times daily as needed for muscle spasms 90 tablet 3     desvenlafaxine (PRISTIQ) 100 MG 24 hr tablet Take 1 tablet (100 mg) by mouth daily 90 tablet 0     DULoxetine (CYMBALTA) 20 MG capsule duloxetine 20 mg capsule,delayed release       DULoxetine (CYMBALTA) 30 MG capsule Take 30 mg by mouth       DULoxetine (CYMBALTA) 60 MG capsule duloxetine 60 mg capsule,delayed release       ergocalciferol (ERGOCALCIFEROL) 1.25 MG (18654 UT) capsule Take 50,000 Units by mouth       Estradiol (DIVIGEL) 1 MG/GM GEL Place 1 packet onto the skin daily 30 g 11     ferrous sulfate (FEROSUL) 325 (65 Fe) MG tablet        HYDROcodone-acetaminophen (NORCO)  MG per tablet Take 1 tablet by mouth every 4 hours as needed for severe pain max 4 tabs/24 hrs 45 tablet 0     insulin syringe-needle U-100 (30G X 1/2\" 1 ML) 30G X 1/2\" 1 ML miscellaneous Inject 1 ml B12 qmonth 10 each 1     lidocaine (LIDODERM) 5 % patch Place 4 patches onto the skin daily Apply up to 4 patches to skin. Wear for 12 hours and remove for 12 hrs.  Refill when patient requests. 120 patch 3     liothyronine (CYTOMEL) 50 MCG tablet        LORazepam (ATIVAN) 1 MG tablet Take 1 tablet (1 mg) by mouth every 6 hours as needed for anxiety 50 tablet 3     medical cannabis (Patient's own supply.  Not a prescription) Medical Cannabis - [place] 4-6 ml by mouth daily. [facility]       methylfolate (DEPLIN) 7.5 MG TABS tablet Take 1 tablet (7.5 mg) by mouth daily 30 tablet 1     morphine (MS CONTIN) 15 MG CR tablet Take 1 tablet (15 mg) by mouth every 12 hours maximum 2 tablet(s) per day 60 tablet 0     Multiple Vitamin (MULTI-VITAMINS) TABS Take 1 tablet by mouth        naloxone (NARCAN) 4 MG/0.1ML nasal spray Spray 1 spray (4 mg) into one nostril alternating " nostrils as needed for opioid reversal every 2-3 minutes until assistance arrives 0.2 mL 1     ondansetron (ZOFRAN-ODT) 8 MG ODT tab Take 1 tablet (8 mg) by mouth every 8 hours as needed for nausea 30 tablet 4     Prasterone 6.5 MG INST Place 0.5 suppositories vaginally three times a week 28 each 11     senna-docusate (SENOKOT-S/PERICOLACE) 8.6-50 MG tablet Take 4-6 tablets by mouth daily as needed for constipation         Vitals                                                                                                                       3, 3   LMP 05/01/2005 (LMP Unknown)      Mental Status Exam                                                                                    9, 14 cog gs     Alertness: alert  and oriented  Appearance: well groomed  Behavior/Demeanor: cooperative and tearful and in some anxious distress, with good  eye contact   Speech: normal rate/rythm though at times rapid and/or quivering  Language: intact  Psychomotor: restless  Mood: depressed and anxious  Affect: tearful; was congruent to mood; was congruent to content  Thought Process/Associations: unremarkable  Thought Content:  Reports suicidal ideation  Perception:  No apparent psychotic symptoms observed or reported  Insight: good  Judgment: good  Cognition: (6) does  appear grossly intact; formal cognitive testing was not done  Gait/Station and/or Muscle Strength/Tone: Not observed due to  Video visit     Labs and Data                                                                                                                 Rating Scales:    N/A    PHQ9:    PHQ 2/17/2022 2/18/2022 3/9/2022   PHQ-9 Total Score 26 18 19   Q9: Thoughts of better off dead/self-harm past 2 weeks Nearly every day More than half the days Nearly every day   F/U: Thoughts of suicide or self-harm - - -   F/U: Self harm-plan - - -   F/U: Self-harm action - - -   F/U: Safety concerns - - -         Diagnosis and Assessment                                                                              m2, h3     Janelle White is a 60 year old female with previous psychiatric history of MDD, recurrent, severe, chronic pain syndrome, h/o CLL. On initial presentation it was thought that Janelle was suffering from anepisode of depression that is closely related to her pain syndrome and the resultant physically inability to engage in activities or work. Her pain management was improved and she was treated with TMS though continues to endorse significant depression and anxiety symptoms. She is hesitant to trial MAOIs or other oral agents that could potentially interfere with her current pain management as she feels it is well under control. She is interested in pursuing ketamine therapy, which should not interfere with her medications and may actually help with her pain. She gave consent to speak with her PCP about medication management and potential interventions for continued depression.      Today the following issues were addressed:    1) Major depressive disorder, recurrent, severe  2) chronic pain   3) treatment response   4) fatigue     [place] Prescription Monitoring Program [] review was not needed today.    PSYCHOTROPIC DRUG INTERACTIONS: none clinically relevant    Plan                                                                                                                    m2, h3      1) Majro depressive disorder, recurrent, severe   -- Medication: continue current outpatient medications    -- Psychotherapy: recommended patient speak with her psychiatry provider to be referred for individual psychotherapy, specifically ACT and/or CBT    -- Procedures:               - prior authorization for ketamine pending     -- Referrals: None      RTC: TBD, pending prior authorization and ketamine scheduling     CRISIS NUMBERS:   Provided routinely in AVS.    Treatment Risk Statement:  The patient understands the risks, benefits, adverse effects and  alternatives. Agrees to treatment with the capacity to do so. No medical contraindications to treatment. Agrees to call clinic for any problems. The patient understands to call 911 or go to the nearest ED if life threatening or urgent symptoms occur.       This patient has been discussed with and seen by my attending who agrees with my assessment and plan.     Krista Comer MD  PGY2 Psychiatry Resident    I was present for the entire encounter and performed key portions of the exam. I agree with the assessment and plan and have edited the note where needed.     Zafar Yanes MD, PhD

## 2022-03-16 ENCOUNTER — APPOINTMENT (OUTPATIENT)
Dept: CT IMAGING | Facility: CLINIC | Age: 62
End: 2022-03-16
Attending: EMERGENCY MEDICINE
Payer: COMMERCIAL

## 2022-03-16 ENCOUNTER — HOSPITAL ENCOUNTER (EMERGENCY)
Facility: CLINIC | Age: 62
Discharge: HOME | End: 2022-03-16
Attending: EMERGENCY MEDICINE | Admitting: EMERGENCY MEDICINE
Payer: COMMERCIAL

## 2022-03-16 VITALS
DIASTOLIC BLOOD PRESSURE: 64 MMHG | OXYGEN SATURATION: 99 % | BODY MASS INDEX: 24.2 KG/M2 | RESPIRATION RATE: 17 BRPM | HEART RATE: 68 BPM | SYSTOLIC BLOOD PRESSURE: 100 MMHG | HEIGHT: 65 IN | TEMPERATURE: 97.6 F

## 2022-03-16 DIAGNOSIS — R45.89 TEARFULNESS: ICD-10-CM

## 2022-03-16 DIAGNOSIS — R53.81 MALAISE: ICD-10-CM

## 2022-03-16 DIAGNOSIS — F41.9 ANXIOUSNESS: ICD-10-CM

## 2022-03-16 LAB
ANION GAP SERPL CALCULATED.3IONS-SCNC: 8 MMOL/L (ref 3–14)
BASOPHILS # BLD AUTO: 0 10E3/UL (ref 0–0.2)
BASOPHILS NFR BLD AUTO: 0 %
BUN SERPL-MCNC: 12 MG/DL (ref 7–30)
CALCIUM SERPL-MCNC: 9.1 MG/DL (ref 8.5–10.1)
CHLORIDE BLD-SCNC: 108 MMOL/L (ref 94–109)
CO2 SERPL-SCNC: 22 MMOL/L (ref 20–32)
CREAT SERPL-MCNC: 0.73 MG/DL (ref 0.52–1.04)
EOSINOPHIL # BLD AUTO: 0.1 10E3/UL (ref 0–0.7)
EOSINOPHIL NFR BLD AUTO: 1 %
ERYTHROCYTE [DISTWIDTH] IN BLOOD BY AUTOMATED COUNT: 13.8 % (ref 10–15)
GFR SERPL CREATININE-BSD FRML MDRD: >90 ML/MIN/1.73M2
GLUCOSE BLD-MCNC: 111 MG/DL (ref 70–99)
HCT VFR BLD AUTO: 40.6 % (ref 35–47)
HGB BLD-MCNC: 13 G/DL (ref 11.7–15.7)
IMM GRANULOCYTES # BLD: 0 10E3/UL
IMM GRANULOCYTES NFR BLD: 0 %
LYMPHOCYTES # BLD AUTO: 1.8 10E3/UL (ref 0.8–5.3)
LYMPHOCYTES NFR BLD AUTO: 19 %
MCH RBC QN AUTO: 29.9 PG (ref 26.5–33)
MCHC RBC AUTO-ENTMCNC: 32 G/DL (ref 31.5–36.5)
MCV RBC AUTO: 93 FL (ref 78–100)
MONOCYTES # BLD AUTO: 0.6 10E3/UL (ref 0–1.3)
MONOCYTES NFR BLD AUTO: 6 %
NEUTROPHILS # BLD AUTO: 7 10E3/UL (ref 1.6–8.3)
NEUTROPHILS NFR BLD AUTO: 74 %
NRBC # BLD AUTO: 0 10E3/UL
NRBC BLD AUTO-RTO: 0 /100
PLATELET # BLD AUTO: 171 10E3/UL (ref 150–450)
POTASSIUM BLD-SCNC: 3.3 MMOL/L (ref 3.4–5.3)
RBC # BLD AUTO: 4.35 10E6/UL (ref 3.8–5.2)
SODIUM SERPL-SCNC: 138 MMOL/L (ref 133–144)
WBC # BLD AUTO: 9.5 10E3/UL (ref 4–11)

## 2022-03-16 PROCEDURE — 250N000011 HC RX IP 250 OP 636: Performed by: EMERGENCY MEDICINE

## 2022-03-16 PROCEDURE — 96372 THER/PROPH/DIAG INJ SC/IM: CPT | Performed by: EMERGENCY MEDICINE

## 2022-03-16 PROCEDURE — 70496 CT ANGIOGRAPHY HEAD: CPT

## 2022-03-16 PROCEDURE — 96374 THER/PROPH/DIAG INJ IV PUSH: CPT | Mod: 59

## 2022-03-16 PROCEDURE — 99285 EMERGENCY DEPT VISIT HI MDM: CPT | Mod: 25

## 2022-03-16 PROCEDURE — 72131 CT LUMBAR SPINE W/O DYE: CPT

## 2022-03-16 PROCEDURE — 36415 COLL VENOUS BLD VENIPUNCTURE: CPT | Performed by: EMERGENCY MEDICINE

## 2022-03-16 PROCEDURE — 85025 COMPLETE CBC W/AUTO DIFF WBC: CPT | Performed by: EMERGENCY MEDICINE

## 2022-03-16 PROCEDURE — 70450 CT HEAD/BRAIN W/O DYE: CPT

## 2022-03-16 PROCEDURE — 250N000009 HC RX 250: Performed by: EMERGENCY MEDICINE

## 2022-03-16 PROCEDURE — 82374 ASSAY BLOOD CARBON DIOXIDE: CPT | Performed by: EMERGENCY MEDICINE

## 2022-03-16 PROCEDURE — 96375 TX/PRO/DX INJ NEW DRUG ADDON: CPT

## 2022-03-16 PROCEDURE — 93005 ELECTROCARDIOGRAM TRACING: CPT

## 2022-03-16 RX ORDER — IOPAMIDOL 755 MG/ML
500 INJECTION, SOLUTION INTRAVASCULAR ONCE
Status: COMPLETED | OUTPATIENT
Start: 2022-03-16 | End: 2022-03-16

## 2022-03-16 RX ORDER — ONDANSETRON 2 MG/ML
4 INJECTION INTRAMUSCULAR; INTRAVENOUS ONCE
Status: COMPLETED | OUTPATIENT
Start: 2022-03-16 | End: 2022-03-16

## 2022-03-16 RX ORDER — HYDROMORPHONE HYDROCHLORIDE 1 MG/ML
0.5 INJECTION, SOLUTION INTRAMUSCULAR; INTRAVENOUS; SUBCUTANEOUS ONCE
Status: COMPLETED | OUTPATIENT
Start: 2022-03-16 | End: 2022-03-16

## 2022-03-16 RX ORDER — OLANZAPINE 10 MG/2ML
5 INJECTION, POWDER, FOR SOLUTION INTRAMUSCULAR ONCE
Status: COMPLETED | OUTPATIENT
Start: 2022-03-16 | End: 2022-03-16

## 2022-03-16 RX ADMIN — HYDROMORPHONE HYDROCHLORIDE 0.5 MG: 1 INJECTION, SOLUTION INTRAMUSCULAR; INTRAVENOUS; SUBCUTANEOUS at 15:51

## 2022-03-16 RX ADMIN — SODIUM CHLORIDE 80 ML: 9 INJECTION, SOLUTION INTRAVENOUS at 16:37

## 2022-03-16 RX ADMIN — OLANZAPINE 5 MG: 10 INJECTION, POWDER, LYOPHILIZED, FOR SOLUTION INTRAMUSCULAR at 16:25

## 2022-03-16 RX ADMIN — ONDANSETRON 4 MG: 2 INJECTION INTRAMUSCULAR; INTRAVENOUS at 15:52

## 2022-03-16 RX ADMIN — IOPAMIDOL 70 ML: 755 INJECTION, SOLUTION INTRAVENOUS at 16:37

## 2022-03-16 NOTE — ED NOTES
"Patient back from CT. Reports feeling \"much better\". Pain improved, still experiencing mild lightheadedness/dizziness with positional changes.  "

## 2022-03-16 NOTE — DISCHARGE INSTRUCTIONS
I recommend continue to take your medications, you should drink plenty of fluids to stay well-hydrated and I would take it easy over the next few days to make sure you do not overdo it.  You should make an appointment to follow-up with your primary doctor.  Should you develop worsening symptoms, especially chest pain, chest pressure passout you should return the emergency department.

## 2022-03-16 NOTE — ED NOTES
"Rapid Assessment Note    History:   Provided by EMS and patient    Patient presents by EMS due to back pain, headache, and neck pain.  I was told the patient was out taking her dog for a walk when she had acute onset of pain.  When EMS arrived the patient was rolling on the ground noting extreme back pain.  She states she does have a history of multiple back surgeries.  Additionally, she has a history of leukemia but was just seen at [facility] yesterday and she perceives that her checkup went well.    The patient states that she has not felt that her head and neck are connected to her for \"quite some time\".    On record review, I note this office visit note from [facility] oncology from yesterday:    1. 2017: Diagnosed with CLL based on a peripheral blood flow cytometry after presenting with leukocytosis and lymphocytosis  2. [id number]: presented with night sweats and 50-60 pounds weight loss. April 2021: PET scan showed slightly enlarges spleen at 16 cm but no other FDG avid lymphadenopathy   3. May - June: Treated with rituximab 4 weekly treatment. At the time of treatment, WBC was around 40 x 10(9)/L, platelet count around 120 x 10(9)/L and normal hemoglobin   4. August 2021: developed pancytopenia and was admitted for cellulitis and joint pains. She was treated with antibiotics and was found to have pseudogout   5. She came to [facility] in September 2021 for a 2nd opinion.     Exam:   Constitutional: Vital signs reviewed as above.   Head: No external signs of trauma noted.  Eyes: Pupils are equal, round, and reactive to light. Very photophobic  Cardiovascular: Normal rate. No murmur heard. Equal B/L radial pulses.  Pulmonary/Chest: Effort normal and breath sounds normal. No respiratory distress. Patient has no wheezes. Patient has no rales.   Gastrointestinal: Soft. There is no apparent abdominal tenderness.   Musculoskeletal/Extremities: MAEx4.   Neurological: Patient is alert.   Skin: Well healed lumbar " scar from previously reported surgeries  Psychiatric: The patient appears agitated and uncomfortable.      Plan of Care:   I evaluated the patient and developed an initial plan of care. I discussed this plan and explained that I, or one of my partners, would be returning to complete the evaluation.     03/16/2022  [employer]    Portions of this medical record were completed by a scribe. UPON MY REVIEW AND AUTHENTICATION BY ELECTRONIC SIGNATURE, this confirms (a) I performed the applicable clinical services, and (b) the record is accurate.        Stanford Mckeon,   03/16/22 1549

## 2022-03-16 NOTE — ED NOTES
Bed: ED19  Expected date: 3/16/22  Expected time: 3:25 PM  Means of arrival: Ambulance  Comments:  Nakul 595- 61 y F headache, back

## 2022-03-16 NOTE — ED TRIAGE NOTES
Patient was walking dog and suddenly charted having severe back lower, tailbone, upper thighs, feet spasms and pain and a headache. ABC's intact

## 2022-03-16 NOTE — ED PROVIDER NOTES
"  History     Chief Complaint:     Back Pain and Headache       HPI   Janelle White is a 61 year old female past medical history significant for leukemia, anxiety, COPD, migraines presenting for what could best be described as malaise.  Initially reported back pain and headache however on my exam reported her body just feels disconnected.  She is tearful now denying any headache or overt back pain.  Her symptoms started while she was out for a walk, she felt like she went for too long of a walk and that is when it all sudden.  She denies any chest pain or chest pressure.  She reports no numbness or weakness.    Allergies:  Bupropion  Codeine  Effexor [Venlafaxine Hydrochloride]  Escitalopram  Fluoxetine  Levaquin [Levofloxacin]  Lexapro  Methylphenidate  Milnacipran  Pregabalin  Prozac [Fluoxetine Hcl]  Tramadol  Venlafaxine     Medications:    albuterol (PROAIR HFA/PROVENTIL HFA/VENTOLIN HFA) 108 (90 Base) MCG/ACT inhaler  amphetamine-dextroamphetamine (ADDERALL XR) 20 MG 24 hr capsule  amphetamine-dextroamphetamine (ADDERALL) 20 MG tablet  calcium citrate (CALCIUM CITRATE) 950 MG tablet  Calcium-Magnesium-Vitamin D (CALCIUM 500) 500-250-200 MG-MG-UNIT TABS  Cholecalciferol (D-5000) 5000 units TABS  colchicine (COLCYRS) 0.6 MG tablet  cyanocobalamin (CYANOCOBALAMIN) 1000 MCG/ML injection  cyclobenzaprine (FLEXERIL) 10 MG tablet  desvenlafaxine (PRISTIQ) 100 MG 24 hr tablet  DULoxetine (CYMBALTA) 20 MG capsule  DULoxetine (CYMBALTA) 30 MG capsule  DULoxetine (CYMBALTA) 60 MG capsule  ergocalciferol (ERGOCALCIFEROL) 1.25 MG (21670 UT) capsule  Estradiol (DIVIGEL) 1 MG/GM GEL  ferrous sulfate (FEROSUL) 325 (65 Fe) MG tablet  HYDROcodone-acetaminophen (NORCO)  MG per tablet  insulin syringe-needle U-100 (30G X 1/2\" 1 ML) 30G X 1/2\" 1 ML miscellaneous  lidocaine (LIDODERM) 5 % patch  liothyronine (CYTOMEL) 50 MCG tablet  LORazepam (ATIVAN) 1 MG tablet  medical cannabis (Patient's own supply.  Not a " prescription)  methylfolate (DEPLIN) 7.5 MG TABS tablet  morphine (MS CONTIN) 15 MG CR tablet  Multiple Vitamin (MULTI-VITAMINS) TABS  naloxone (NARCAN) 4 MG/0.1ML nasal spray  ondansetron (ZOFRAN-ODT) 8 MG ODT tab  Prasterone 6.5 MG INST  senna-docusate (SENOKOT-S/PERICOLACE) 8.6-50 MG tablet        Past Medical History:    Past Medical History:   Diagnosis Date     Anxiety      Cervicalgia 2007     COPD (chronic obstructive pulmonary disease) (H) 2/7/2022     Depressive disorder      ESBL (extended spectrum beta-lactamase) producing bacteria infection      History of blood transfusion 2007     Leukemia (H)      Melanoma (H) 1998     Migraine      Other chronic pain      Rotator cuff tear      Sacroiliac inflammation (H)      Shift work sleep disorder 12/16/2013     Urinary calculi      Vitamin B12 deficiency anemia 2006       Patient Active Problem List    Diagnosis Date Noted     COPD (chronic obstructive pulmonary disease) (H) 02/07/2022     Priority: Medium     Tension type headache 11/04/2021     Priority: Medium     Rotator cuff injury 11/04/2021     Priority: Medium     Spinal stenosis of lumbar region with radiculopathy 09/02/2021     Priority: Medium     COVID-19 08/29/2021     Priority: Medium     Polyarthralgia 08/11/2021     Priority: Medium     Cellulitis, unspecified cellulitis site 08/11/2021     Priority: Medium     Sepsis, due to unspecified organism, unspecified whether acute organ dysfunction present (H) 08/11/2021     Priority: Medium     Cellulitis 08/11/2021     Priority: Medium     STACIE (generalized anxiety disorder) 07/27/2021     Priority: Medium     MTHFR gene mutation 04/12/2021     Priority: Medium     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     Priority: Medium     CYP2D6 poor metabolizer (H) 04/12/2021     Priority: Medium     Status post blepharoplasty 11/18/2020     Priority: Medium     Regular astigmatism, bilateral 11/18/2020     Priority: Medium     Prediabetes 09/19/2019      Priority: Medium     Hyperlipidemia LDL goal <130 2019     Priority: Medium     CLARE (obstructive sleep apnea) 2019     Priority: Medium     Controlled substance agreement signed 2018     Priority: Medium     Chronic pain syndrome 2017     Priority: Medium     CLL (chronic lymphocytic leukemia) (H) 2017     Priority: Medium     Hypotension 2017     Priority: Medium     History of laser assisted in situ keratomileusis 10/14/2014     Priority: Medium     Pain medication agreement 2014     Priority: Medium     Formatting of this note might be different from the original.  Patient takes morphine 15 mg ER BID for chronic neck and back pain.  She is also on cymbalta, ibuprofen, flexaril prn       Shift work sleep disorder 2013     Priority: Medium     Vitamin D deficiency 2012     Priority: Medium     Moderate recurrent major depression (H) 2011     Priority: Medium     DDD (degenerative disc disease), cervical 10/07/2010     Priority: Medium     CARDIOVASCULAR SCREENING; LDL GOAL LESS THAN 160 02/10/2010     Priority: Medium     Chronic Low Back Pain 10/01/2009     Priority: Medium     S/p AP L3-S1 fusion 2010 - referred to [facility].   Orthopedics writing scripts for narcotics post-op.       Migraine      Priority: Medium     Problem list name updated by automated process. Provider to review       B-complex deficiency 10/10/2006     Priority: Medium     Problem list name updated by automated process. Provider to review       PERSONAL HX OF  MELANOMA 2003     Priority: Low        Past Surgical History:    Past Surgical History:   Procedure Laterality Date     anterior cervical discectomy C4-5 ,Fusion C5-6-7  10/2007     APPENDECTOMY       BACK SURGERY       BLEPHAROPLASTY BILATERAL  2013    Procedure: BLEPHAROPLASTY BILATERAL;  BILATERAL UPPER LID BLEPHAROPLASTY AND BROWPEXY ;  Surgeon: Godfrey Miguel MD;  Location: [facility]       SECTION      x2     COLONOSCOPY N/A 2/18/2020    Procedure: COLONOSCOPY;  Surgeon: Butch Lockhart MD;  Location:  GI     ESOPHAGOSCOPY, GASTROSCOPY, DUODENOSCOPY (EGD), COMBINED N/A 2/18/2020    Procedure: ESOPHAGOGASTRODUODENOSCOPY, WITH BIOPSY biosies by cold forceps;  Surgeon: Butch Lockhart MD;  Location:  GI     EYE SURGERY       FUSION LUMBAR ANTERIOR, FUSION LUMBAR POSTERIOR TWO LEVELS, COMBINED  12/2010    L3-S1 anterior posterior fusion     GENITOURINARY SURGERY  Hysterectomy    2006     HYSTERECTOMY  7/2006    ovaries intact     HYSTERECTOMY       HYSTERECTOMY, PAP NO LONGER INDICATED       Partial vulvectomy for NEENA III  01/2009     Pubovaginal sling, post op durasphere injections  07/2006    wears pad     ROTATOR CUFF REPAIR RT/LT  5/2011    right     SOFT TISSUE SURGERY  2019    Bilateral carpal/ulnar release     SPINAL FUSION C3-4  7/2009    C3-4, anterior spinal fusion     TUBAL LIGATION       ZZC APPENDECTOMY  7/2006        Family History:    family history includes Alcohol/Drug in her brother, father, and mother; C.A.D. in her maternal grandfather and maternal grandmother; Coronary Artery Disease in her brother and father; Depression in her brother; Diabetes in her father and paternal grandfather; Hypertension in her brother, father, and mother; Osteoporosis in her mother.    Social History:   reports that she quit smoking about 38 years ago. She has a 5.00 pack-year smoking history. She has never used smokeless tobacco. She reports current alcohol use. She reports previous drug use. Drugs: Cocaine, Marijuana, LSD, and Psilocybin.    PCP: Carmen Garcia     Review of Systems  10 point ROS completed, negative except as indicated in the HPI.      Physical Exam     Patient Vitals for the past 24 hrs:   BP Temp Temp src Pulse Resp SpO2 Height   03/16/22 1615 126/79 -- -- 74 -- 100 % --   03/16/22 1600 111/72 -- -- 65 -- 100 % --   03/16/22 1545 (!) 134/109 -- -- 83 -- 100 % --   03/16/22  "1539 123/73 97.6  F (36.4  C) Oral 74 17 100 % 1.651 m (5' 5\")        Physical Exam  Constitutional: Alert, tearful intermittently moaning but able to answer questions  HENT:    Nose: Nose normal.    Mouth/Throat: Oropharynx is clear, mucous membranes are moist  Eyes: EOM are normal, anicteric, conjugate gaze  CV: regular rate and rhythm; no murmurs  Chest: Effort normal and breath sounds clear without wheezing or rales, symmetric bilaterally   GI:  non tender. No distension. No guarding or rebound.    MSK: No LE edema, no tenderness to palpation of BLE.  Neurological: Alert, attentive, moving all extremities equally.  Good  strength, good plantarflexion  Skin: Skin is warm and dry.      Emergency Department Course   ECG (16:28:41):  Indication: Screening for cardiovascular disease.   Rate 71 bpm. KS interval 124. QRS duration 86. QT/QTc 436.    Interpretation: NSR w/ sinus arrhythmia  Agree with computer interpretation.   No significant change compared to EKG dated.   Interpreted at 1628 by Dr Acuña.        Imaging:  Lumbar spine CT w/o contrast   Final Result   IMPRESSION:   1.  Extensive spinal fusion hardware throughout the lumbar spine. Hardware appears intact. No evidence of hardware loosening.   2.  No evidence of acute fracture or subluxation in the lumbar spine.   3.  No significant spinal canal or neural foraminal narrowing appreciated at any level in the lumbar spine.      CTA Head Neck with Contrast   Final Result   IMPRESSION:    HEAD CT:   1.  No acute intracranial process.      HEAD CTA:    1.  Normal CTA [place].      NECK CTA:   1.  Normal neck CTA.      Head CT w/o contrast   Final Result   IMPRESSION:    HEAD CT:   1.  No acute intracranial process.      HEAD CTA:    1.  Normal CTA [place].      NECK CTA:   1.  Normal neck CTA.           Laboratory:  Labs Ordered and Resulted from Time of ED Arrival to Time of ED Departure   BASIC METABOLIC PANEL - Abnormal       Result " Value    Sodium 138      Potassium 3.3 (*)     Chloride 108      Carbon Dioxide (CO2) 22      Anion Gap 8      Urea Nitrogen 12      Creatinine 0.73      Calcium 9.1      Glucose 111 (*)     GFR Estimate >90     CBC WITH PLATELETS AND DIFFERENTIAL    WBC Count 9.5      RBC Count 4.35      Hemoglobin 13.0      Hematocrit 40.6      MCV 93      MCH 29.9      MCHC 32.0      RDW 13.8      Platelet Count 171      % Neutrophils 74      % Lymphocytes 19      % Monocytes 6      % Eosinophils 1      % Basophils 0      % Immature Granulocytes 0      NRBCs per 100 WBC 0      Absolute Neutrophils 7.0      Absolute Lymphocytes 1.8      Absolute Monocytes 0.6      Absolute Eosinophils 0.1      Absolute Basophils 0.0      Absolute Immature Granulocytes 0.0      Absolute NRBCs 0.0          Interventions:  Medications   HYDROmorphone (PF) (DILAUDID) injection 0.5 mg (0.5 mg Intravenous Given 3/16/22 1551)   ondansetron (ZOFRAN) injection 4 mg (4 mg Intravenous Given 3/16/22 1552)   OLANZapine (zyPREXA) injection 5 mg (5 mg Intramuscular Given 3/16/22 1625)   CT Scan Flush (80 mLs Intravenous Given 3/16/22 1637)   iopamidol (ISOVUE-370) solution 500 mL (70 mLs Intravenous Given 3/16/22 1637)      Impression & Plan    Medical Decision Makin-year-old woman past medical history significant for COPD, migraines, anxiety, leukemia presenting for initial reports of headache and back pain associated with exertion and had CT imaging of her head neck and spine done prior to my arrival which were reassuring.  On my exam, she reported just feeling like her head and body were disconnected that her leg was weak, she was tearful mildly hyperventilatory and I suspect likely mild anxiousness or panic attack.  Her labs were unremarkable, she had no chest pain or chest pressure and with a single dose of Zyprexa, she calmed down was able to ambulate and her symptoms improved.  I recommended taking it easy over the next few days following up with  her PCP and continue to take her medications as prescribed.    Diagnosis:    ICD-10-CM    1. Malaise  R53.81    2. Tearfulness  R45.89    3. Anxiousness  F41.9          Dannie Acuña MD  [employer]  6:21 PM 03/16/22          Dannie Acuña MD  03/16/22 1820

## 2022-03-17 ENCOUNTER — PATIENT OUTREACH (OUTPATIENT)
Dept: FAMILY MEDICINE | Facility: CLINIC | Age: 62
End: 2022-03-17
Payer: COMMERCIAL

## 2022-03-17 LAB
ATRIAL RATE - MUSE: 71 BPM
DIASTOLIC BLOOD PRESSURE - MUSE: NORMAL MMHG
INTERPRETATION ECG - MUSE: NORMAL
P AXIS - MUSE: 79 DEGREES
PR INTERVAL - MUSE: 124 MS
QRS DURATION - MUSE: 86 MS
QT - MUSE: 402 MS
QTC - MUSE: 436 MS
R AXIS - MUSE: 49 DEGREES
SYSTOLIC BLOOD PRESSURE - MUSE: NORMAL MMHG
T AXIS - MUSE: 32 DEGREES
VENTRICULAR RATE- MUSE: 71 BPM

## 2022-03-17 NOTE — TELEPHONE ENCOUNTER
"  ED for acute condition Discharge Protocol    \"Hi, my name is Rocio Benjamin RN, a registered nurse, and I am calling from [facility].  I am calling to follow up and see how things are going for you after your recent emergency visit.\"    Tell me how you are doing now that you are home?\" Feeling better today but tired.  Dr. Yanes placed order for Ketamine- had questions sent in yesterday's [email or web address] message.    Has a therapist appt in June 2022 and has crisis line information.    Will take it easy today.          Discharge Instructions    \"Let's review your discharge instructions.  What is/are the follow-up recommendations?  Pt. Response: follow up with PCP as needed    \"Has an appointment with your primary care provider been scheduled?\"  No, unsure if PCP will want to see her or have her follow up with specialist-see 3/16/22 [email or web address]    Medications    \"Tell me what changed about your medicines when you discharged?\"    Nothing    \"What questions do you have about your medications?\"   None        Call Summary    \"What questions or concerns do you have about your recent visit and your follow-up care?\"     none    \"If you have questions or things don't continue to improve, we encourage you contact us through the main clinic number (give number).  Even if the clinic is not open, triage nurses are available 24/7 to help you.     We would like you to know that our clinic has extended hours (provide information).  We also have urgent care (provide details on closest location and hours/contact info)\"    \"Thank you for your time and take care!\"                "

## 2022-03-18 ENCOUNTER — MYC MEDICAL ADVICE (OUTPATIENT)
Dept: PSYCHIATRY | Facility: CLINIC | Age: 62
End: 2022-03-18
Payer: COMMERCIAL

## 2022-03-18 DIAGNOSIS — G43.909 MIGRAINE WITHOUT STATUS MIGRAINOSUS, NOT INTRACTABLE, UNSPECIFIED MIGRAINE TYPE: Primary | ICD-10-CM

## 2022-03-18 DIAGNOSIS — M50.30 DDD (DEGENERATIVE DISC DISEASE), CERVICAL: ICD-10-CM

## 2022-03-18 DIAGNOSIS — Z79.899 CONTROLLED SUBSTANCE AGREEMENT SIGNED: ICD-10-CM

## 2022-03-18 DIAGNOSIS — F11.20 UNCOMPLICATED OPIOID DEPENDENCE (H): Primary | ICD-10-CM

## 2022-03-18 DIAGNOSIS — M54.41 CHRONIC BILATERAL LOW BACK PAIN WITH RIGHT-SIDED SCIATICA: ICD-10-CM

## 2022-03-18 DIAGNOSIS — G89.4 CHRONIC PAIN SYNDROME: ICD-10-CM

## 2022-03-18 DIAGNOSIS — G89.29 CHRONIC BILATERAL LOW BACK PAIN WITH RIGHT-SIDED SCIATICA: ICD-10-CM

## 2022-03-18 DIAGNOSIS — M25.50 POLYARTHRALGIA: ICD-10-CM

## 2022-03-18 DIAGNOSIS — S46.009D ROTATOR CUFF INJURY, UNSPECIFIED LATERALITY, SUBSEQUENT ENCOUNTER: ICD-10-CM

## 2022-03-18 NOTE — TELEPHONE ENCOUNTER
Routing MyChart encounter to Dr. Perez. Next appt 4/11/22. Patient's [employer] message was sent to her PCP, Carmen Garcia with psychiatric providers copied.    Felicity Hobbs RN on 3/18/2022 at 10:33 AM

## 2022-03-21 ASSESSMENT — ENCOUNTER SYMPTOMS
DEPRESSION: 1
HEMATURIA: 0
DIZZINESS: 1
INSOMNIA: 1
NERVOUS/ANXIOUS: 0
PANIC: 0
FATIGUE: 1
TINGLING: 1
NECK PAIN: 1
SEIZURES: 0
WEIGHT LOSS: 0
DISTURBANCES IN COORDINATION: 1
MUSCLE CRAMPS: 0
PARALYSIS: 0
WEIGHT GAIN: 0
DECREASED LIBIDO: 1
MEMORY LOSS: 1
NIGHT SWEATS: 1
HEADACHES: 1
MYALGIAS: 1
HOT FLASHES: 0
BACK PAIN: 1
SPEECH CHANGE: 1
STIFFNESS: 1
FLANK PAIN: 0
MUSCLE WEAKNESS: 1
DECREASED CONCENTRATION: 1
DIFFICULTY URINATING: 0
POLYPHAGIA: 0
TREMORS: 1
ARTHRALGIAS: 1
POLYDIPSIA: 0
WEAKNESS: 1
NUMBNESS: 1
FEVER: 0
HALLUCINATIONS: 0
INCREASED ENERGY: 1
DECREASED APPETITE: 0
ALTERED TEMPERATURE REGULATION: 1
DYSURIA: 0
LOSS OF CONSCIOUSNESS: 0
CHILLS: 0
JOINT SWELLING: 0

## 2022-03-23 ENCOUNTER — OFFICE VISIT (OUTPATIENT)
Dept: ANESTHESIOLOGY | Facility: CLINIC | Age: 62
End: 2022-03-23
Payer: COMMERCIAL

## 2022-03-23 DIAGNOSIS — M79.18 MYOFASCIAL PAIN: ICD-10-CM

## 2022-03-23 DIAGNOSIS — M47.812 CERVICAL SPONDYLOSIS: Primary | ICD-10-CM

## 2022-03-23 PROCEDURE — 99204 OFFICE O/P NEW MOD 45 MIN: CPT | Mod: GC | Performed by: ANESTHESIOLOGY

## 2022-03-23 RX ORDER — METHOCARBAMOL 500 MG/1
500 TABLET, FILM COATED ORAL 4 TIMES DAILY PRN
Qty: 120 TABLET | Refills: 0 | Status: SHIPPED | OUTPATIENT
Start: 2022-03-23 | End: 2022-05-18

## 2022-03-23 ASSESSMENT — ENCOUNTER SYMPTOMS
HEMATURIA: 0
POLYDIPSIA: 0
SPEECH CHANGE: 1
TINGLING: 1
HEADACHES: 1
WEIGHT LOSS: 0
DIZZINESS: 1
MYALGIAS: 1
NECK PAIN: 1
FLANK PAIN: 0
WEAKNESS: 1
DEPRESSION: 1
SEIZURES: 0
NERVOUS/ANXIOUS: 0
LOSS OF CONSCIOUSNESS: 0
HALLUCINATIONS: 0
CHILLS: 0
INSOMNIA: 1
MEMORY LOSS: 1
TREMORS: 1
FEVER: 0
BACK PAIN: 1
DYSURIA: 0

## 2022-03-23 ASSESSMENT — PAIN SCALES - GENERAL: PAINLEVEL: MODERATE PAIN (4)

## 2022-03-23 NOTE — NURSING NOTE
RN reviewed AVS with patient. Patient to contact clinic if any questions/concerns. Patient verbalized understanding.    Michaelle Stout RN
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
## 2022-03-23 NOTE — PATIENT INSTRUCTIONS
Medications:    Methocarbamol prescribed.       Referrals:    Physical Therapy Referral placed- external, cervical spondylosis and myofascial pain.      Pool Therapy Referral- external.        Recommended Follow up:      Follow up as needed.           To speak with a nurse, schedule/reschedule/cancel a clinic appointment, or request a medication refill call: [phone], option #1.    You can also reach us by [employer]: [email or web address]

## 2022-03-25 ENCOUNTER — MYC MEDICAL ADVICE (OUTPATIENT)
Dept: PSYCHIATRY | Facility: CLINIC | Age: 62
End: 2022-03-25
Payer: COMMERCIAL

## 2022-04-01 ENCOUNTER — OFFICE VISIT (OUTPATIENT)
Dept: ADDICTION MEDICINE | Facility: CLINIC | Age: 62
End: 2022-04-01
Attending: PSYCHIATRY & NEUROLOGY
Payer: COMMERCIAL

## 2022-04-01 VITALS
HEART RATE: 73 BPM | BODY MASS INDEX: 24.23 KG/M2 | WEIGHT: 145.6 LBS | DIASTOLIC BLOOD PRESSURE: 72 MMHG | SYSTOLIC BLOOD PRESSURE: 114 MMHG

## 2022-04-01 DIAGNOSIS — F11.20 UNCOMPLICATED OPIOID DEPENDENCE (H): Primary | ICD-10-CM

## 2022-04-01 DIAGNOSIS — M48.061 SPINAL STENOSIS OF LUMBAR REGION WITH RADICULOPATHY: ICD-10-CM

## 2022-04-01 DIAGNOSIS — M25.50 POLYARTHRALGIA: ICD-10-CM

## 2022-04-01 DIAGNOSIS — M54.16 SPINAL STENOSIS OF LUMBAR REGION WITH RADICULOPATHY: ICD-10-CM

## 2022-04-01 PROCEDURE — 99215 OFFICE O/P EST HI 40 MIN: CPT | Performed by: FAMILY MEDICINE

## 2022-04-01 ASSESSMENT — PAIN SCALES - GENERAL: PAINLEVEL: MODERATE PAIN (4)

## 2022-04-01 NOTE — Clinical Note
Any further comments/ideas for this case? Would you look at this differently? Is my A/P reasonable?    Thanks for your perspective. Interesting case.
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
## 2022-04-06 ENCOUNTER — MYC MEDICAL ADVICE (OUTPATIENT)
Dept: PSYCHIATRY | Facility: CLINIC | Age: 62
End: 2022-04-06
Payer: COMMERCIAL

## 2022-04-06 DIAGNOSIS — F33.2 SEVERE EPISODE OF RECURRENT MAJOR DEPRESSIVE DISORDER, WITHOUT PSYCHOTIC FEATURES (H): ICD-10-CM

## 2022-04-06 RX ORDER — DESVENLAFAXINE 100 MG/1
100 TABLET, EXTENDED RELEASE ORAL DAILY
Qty: 30 TABLET | Refills: 0 | Status: SHIPPED | OUTPATIENT
Start: 2022-04-06 | End: 2022-04-08

## 2022-04-06 NOTE — TELEPHONE ENCOUNTER
Date of Last Office Visit: 1/14/2022  Date of Next Office Visit: 4/11/2022  No shows since last visit: none  Cancellations since last visit: none    Medication requested: Pristiq 100 mg tablet Date last ordered: 1/4/2022 Qty: 90 Refills: 0     Review of [place] ?: n/a    Lapse in medication adherence greater than 5 days?: no  If yes, call patient and gather details: no  Medication refill request verified as identical to current order?: yes  Result of Last DAM, VPA, Li+ Level, CBC, or Carbamazepine Level (at or since last visit): see chart as needed.    Last visit treatment plan: Treatment Plan:    Continue Pristiq 100 mg daily for mood, anxiety.     Continue methyl folate 7.5 mg daily as supplementation.    Continue Adderall XR 20 mg daily for mood augmentation    Continue Adderall IR 20 mg daily as needed for mood augmentation and daytime fatigue/hypersomnia    Continue benzodiazepine per primary care prescriber.    Continue TMS therapy.     Continue to work with your specialist from [facility] as indicated.     Recommend individual psychotherapy.      Continue all other cares per primary care provider.     Continue all other medications as reviewed per electronic medical record today.     Safety plan reviewed. To the Emergency Department as needed or call after hours crisis line at [phone] or [phone]. [place] Crisis Text Line. Text [place] to 110337 or Suicide LifeLine Chat: suicidepreventionlifeline.org/chat    Schedule an appointment with me in 2-3 months (after TMS), or sooner as needed. Call [facility] at [phone] to schedule.    Follow up with primary care provider as planned or for acute medical concerns.    Call the psychiatric nurse line with medication questions or concerns at [phone].    MyChart may be used to communicate with your provider, but this is not intended to be used for emergencies.       []Medication refilled per  Medication Refill in Ambulatory Care   policy.  [x]Medication unable to be refilled by RN due to criteria not met as indicated below:    []Eligibility - not seen in the last year   []Supervision - no future appointment   []Compliance - no shows, cancellations or lapse in therapy   []Verification - order discrepancy   []Controlled medication   [x]Medication not included in policy   []90-day supply request   []Other.

## 2022-04-07 NOTE — TELEPHONE ENCOUNTER
Janelle White Alissa, DO 11 minutes ago (3:01 PM)           Thank you for forwarding my prescription request for renewal. I requested the Pristiq 50mg. I received the 100mg daily dose from the pharmacy.     Please submit a prescription for Pristiq 50mg  1 tab P.O. Daily. To [facility] pharmacy [place].  Thank you!  Janelle

## 2022-04-08 ENCOUNTER — VIRTUAL VISIT (OUTPATIENT)
Dept: FAMILY MEDICINE | Facility: CLINIC | Age: 62
End: 2022-04-08
Payer: COMMERCIAL

## 2022-04-08 DIAGNOSIS — F33.2 SEVERE EPISODE OF RECURRENT MAJOR DEPRESSIVE DISORDER, WITHOUT PSYCHOTIC FEATURES (H): ICD-10-CM

## 2022-04-08 DIAGNOSIS — G89.4 CHRONIC PAIN SYNDROME: Primary | ICD-10-CM

## 2022-04-08 DIAGNOSIS — C91.10 CHRONIC LYMPHOCYTIC LEUKEMIA OF B-CELL TYPE NOT HAVING ACHIEVED REMISSION (H): ICD-10-CM

## 2022-04-08 PROCEDURE — 99214 OFFICE O/P EST MOD 30 MIN: CPT | Mod: 95 | Performed by: NURSE PRACTITIONER

## 2022-04-08 RX ORDER — DESVENLAFAXINE 50 MG/1
50 TABLET, FILM COATED, EXTENDED RELEASE ORAL DAILY
Qty: 30 TABLET | Refills: 0 | Status: SHIPPED | OUTPATIENT
Start: 2022-04-08 | End: 2022-04-13

## 2022-04-08 RX ORDER — MORPHINE SULFATE 15 MG/1
15 TABLET, FILM COATED, EXTENDED RELEASE ORAL EVERY 12 HOURS
Qty: 60 TABLET | Refills: 0 | Status: SHIPPED | OUTPATIENT
Start: 2022-04-08 | End: 2022-05-09

## 2022-04-08 RX ORDER — HYDROCODONE BITARTRATE AND ACETAMINOPHEN 10; 325 MG/1; MG/1
1 TABLET ORAL EVERY 4 HOURS PRN
Qty: 10 TABLET | Refills: 0 | Status: SHIPPED | OUTPATIENT
Start: 2022-04-08 | End: 2023-03-13

## 2022-04-08 NOTE — PROGRESS NOTES
Janelle is a 61 year old who is being evaluated via a billable telephone visit.      What phone number would you like to be contacted at? [phone]  How would you like to obtain your AVS? MyChart    Assessment & Plan     Chronic pain syndrome  Much improved today.  Patient has made good progress towards tapering down her Norco use.  Her mentation seems much improved and her affect was brighter.  She is in agreement with her goal should be to discontinue this medication and to begin cutting back on her MS Contin.  I have agreed to give her 1 more refill of 10 tablets and we can discuss using Norco as we back down off her Contin.  Very appreciative of pain and addiction medicine input in helping to manage this patient.  - HYDROcodone-acetaminophen (NORCO)  MG per tablet; Take 1 tablet by mouth every 4 hours as needed for severe pain max 4 tabs/24 hrs  - morphine (MS CONTIN) 15 MG CR tablet; Take 1 tablet (15 mg) by mouth every 12 hours maximum 2 tablet(s) per day    Severe episode of recurrent major depressive disorder, without psychotic features (H)  Continues to follow with psychiatry, mood is much improved.  She was given a prescription for 100 mg of Pristiq but is requesting 50 mg she has not tolerated the 100 mg in the past.  We will let psychiatrist manage further refills.  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  Last CBC reviewed and was normal.          Return in about 2 months (around 6/8/2022) for Pain medication follow up.    [facility]ION[facility]    Subjective   Janelle is a 61 year old who presents for the following health issues     History of Present Illness       Reason for visit:  Medication follow up- refill  Symptom onset:  More than a month  Symptoms include:  Chronic pain-  spine-radiculopathy MSK pain  Symptom intensity:  Moderate  Symptom progression:  Staying the same  Had these symptoms before:  Yes  Has tried/received treatment for these symptoms:  Yes  Previous treatment was successful:  Yes  Prior treatment description:  PT, surgery, injections, topical lidocaine, Nsaids, TeNs, opiods  What makes it worse:  Repetitive motion, sitting, prolonged standing, keyboard  What makes it better:  Staying active, pacing activity/ rest, present self care regimen/ medications and medical team    She eats 4 or more servings of fruits and vegetables daily.She consumes 0 sweetened beverage(s) daily.She exercises with enough effort to increase her heart rate 60 or more minutes per day.  She exercises with enough effort to increase her heart rate 6 days per week.   She is taking medications regularly.     Feeling really good.  Doing a lot better.  Weather and activity plays a big roll.    Still has 19 tabs left from previous prescription.  Being more mindful and it is helping a lot.  Doesn't feel like she is missing anything by not taking it.    Had a nice visit with Dr. Gallego and she is in agreement with tapering off her medications.     Is working with a private PT to help get back to being able to do piliates and to work with vestibular issues.      Bought an I-watch which reassures her when having dizziness and symptoms.      Infusions through [facility].          Review of Systems   Constitutional, HEENT, cardiovascular, pulmonary, gi and gu systems are negative, except as otherwise noted.      Objective           Vitals:  No vitals were obtained today due to virtual visit.    Physical Exam   healthy, alert and no distress  PSYCH: Alert and oriented times 3; coherent speech, normal   rate and volume, able to articulate logical thoughts, able   to abstract reason, no tangential thoughts, no hallucinations   or delusions  Her affect is pleasant  RESP: No cough, no audible wheezing, able to  talk in full sentences  Remainder of exam unable to be completed due to telephone visits                Phone call duration: 15 minutes

## 2022-04-12 ASSESSMENT — PATIENT HEALTH QUESTIONNAIRE - PHQ9
10. IF YOU CHECKED OFF ANY PROBLEMS, HOW DIFFICULT HAVE THESE PROBLEMS MADE IT FOR YOU TO DO YOUR WORK, TAKE CARE OF THINGS AT HOME, OR GET ALONG WITH OTHER PEOPLE: VERY DIFFICULT
SUM OF ALL RESPONSES TO PHQ QUESTIONS 1-9: 18
SUM OF ALL RESPONSES TO PHQ QUESTIONS 1-9: 18

## 2022-04-13 ENCOUNTER — VIRTUAL VISIT (OUTPATIENT)
Dept: PSYCHIATRY | Facility: CLINIC | Age: 62
End: 2022-04-13
Payer: COMMERCIAL

## 2022-04-13 ENCOUNTER — TELEPHONE (OUTPATIENT)
Dept: PSYCHIATRY | Facility: CLINIC | Age: 62
End: 2022-04-13

## 2022-04-13 ENCOUNTER — VIRTUAL VISIT (OUTPATIENT)
Dept: PSYCHOLOGY | Facility: CLINIC | Age: 62
End: 2022-04-13
Payer: COMMERCIAL

## 2022-04-13 DIAGNOSIS — F33.2 SEVERE EPISODE OF RECURRENT MAJOR DEPRESSIVE DISORDER, WITHOUT PSYCHOTIC FEATURES (H): Primary | ICD-10-CM

## 2022-04-13 DIAGNOSIS — E72.12 HOMOZYGOUS FOR C677T POLYMORPHISM OF MTHFR (H): ICD-10-CM

## 2022-04-13 DIAGNOSIS — E88.89 CYP2B6 INTERMEDIATE METABOLIZER (H): ICD-10-CM

## 2022-04-13 DIAGNOSIS — E88.89 CYP2D6 POOR METABOLIZER (H): ICD-10-CM

## 2022-04-13 DIAGNOSIS — F33.2 SEVERE EPISODE OF RECURRENT MAJOR DEPRESSIVE DISORDER, WITHOUT PSYCHOTIC FEATURES (H): ICD-10-CM

## 2022-04-13 DIAGNOSIS — G89.4 CHRONIC PAIN SYNDROME: ICD-10-CM

## 2022-04-13 DIAGNOSIS — F33.9 RECURRENT MAJOR DEPRESSION RESISTANT TO TREATMENT (H): Primary | ICD-10-CM

## 2022-04-13 PROCEDURE — 99207 PR FOR CODING REVIEW: CPT | Performed by: PSYCHIATRY & NEUROLOGY

## 2022-04-13 PROCEDURE — 90832 PSYTX W PT 30 MINUTES: CPT | Mod: 95 | Performed by: PSYCHOLOGIST

## 2022-04-13 PROCEDURE — 99214 OFFICE O/P EST MOD 30 MIN: CPT | Mod: 95 | Performed by: PSYCHIATRY & NEUROLOGY

## 2022-04-13 RX ORDER — DESVENLAFAXINE 50 MG/1
50 TABLET, FILM COATED, EXTENDED RELEASE ORAL DAILY
Qty: 90 TABLET | Refills: 1 | Status: SHIPPED | OUTPATIENT
Start: 2022-04-13 | End: 2022-10-07

## 2022-04-13 RX ORDER — DEXTROAMPHETAMINE SACCHARATE, AMPHETAMINE ASPARTATE, DEXTROAMPHETAMINE SULFATE AND AMPHETAMINE SULFATE 5; 5; 5; 5 MG/1; MG/1; MG/1; MG/1
20 TABLET ORAL DAILY
Qty: 30 TABLET | Refills: 0 | Status: SHIPPED | OUTPATIENT
Start: 2022-04-13 | End: 2022-05-13

## 2022-04-13 RX ORDER — DEXTROAMPHETAMINE SACCHARATE, AMPHETAMINE ASPARTATE MONOHYDRATE, DEXTROAMPHETAMINE SULFATE AND AMPHETAMINE SULFATE 5; 5; 5; 5 MG/1; MG/1; MG/1; MG/1
20 CAPSULE, EXTENDED RELEASE ORAL DAILY
Qty: 30 CAPSULE | Refills: 0 | Status: SHIPPED | OUTPATIENT
Start: 2022-04-13 | End: 2022-05-13

## 2022-04-13 RX ORDER — DEXTROAMPHETAMINE SACCHARATE, AMPHETAMINE ASPARTATE MONOHYDRATE, DEXTROAMPHETAMINE SULFATE AND AMPHETAMINE SULFATE 5; 5; 5; 5 MG/1; MG/1; MG/1; MG/1
20 CAPSULE, EXTENDED RELEASE ORAL DAILY
Qty: 30 CAPSULE | Refills: 0 | Status: SHIPPED | OUTPATIENT
Start: 2022-05-14 | End: 2022-06-10

## 2022-04-13 RX ORDER — DEXTROAMPHETAMINE SACCHARATE, AMPHETAMINE ASPARTATE, DEXTROAMPHETAMINE SULFATE AND AMPHETAMINE SULFATE 5; 5; 5; 5 MG/1; MG/1; MG/1; MG/1
20 TABLET ORAL DAILY
Qty: 30 TABLET | Refills: 0 | Status: SHIPPED | OUTPATIENT
Start: 2022-05-14 | End: 2022-06-10

## 2022-04-13 RX ORDER — DEXTROAMPHETAMINE SACCHARATE, AMPHETAMINE ASPARTATE MONOHYDRATE, DEXTROAMPHETAMINE SULFATE AND AMPHETAMINE SULFATE 5; 5; 5; 5 MG/1; MG/1; MG/1; MG/1
20 CAPSULE, EXTENDED RELEASE ORAL DAILY
Qty: 30 CAPSULE | Refills: 0 | Status: SHIPPED | OUTPATIENT
Start: 2022-06-14 | End: 2022-06-30

## 2022-04-13 RX ORDER — DEXTROAMPHETAMINE SACCHARATE, AMPHETAMINE ASPARTATE, DEXTROAMPHETAMINE SULFATE AND AMPHETAMINE SULFATE 5; 5; 5; 5 MG/1; MG/1; MG/1; MG/1
20 TABLET ORAL DAILY
Qty: 30 TABLET | Refills: 0 | Status: SHIPPED | OUTPATIENT
Start: 2022-06-14 | End: 2022-06-30

## 2022-04-13 ASSESSMENT — PATIENT HEALTH QUESTIONNAIRE - PHQ9: SUM OF ALL RESPONSES TO PHQ QUESTIONS 1-9: 18

## 2022-04-13 NOTE — TELEPHONE ENCOUNTER
What is the concern that needs to be addressed by a nurse? Patient called to schedule ketamine treatments? Said she had discussed with Joaquín and thought he had put orders in? I don't see any so unsure if she needs to be schedule for injections or infusions.    May a detailed message be left on voicemail? Yes    Date of last office visit: 3/9/22    Message routed to: [facility] RN Pool

## 2022-04-13 NOTE — Clinical Note
Please call this patient to get them scheduled for a follow-up visit in 3 months. Please schedule with me and the [facility], Dr. Manzo. Thanks!

## 2022-04-13 NOTE — PROGRESS NOTES
"Janelle White is a 61 year old year old who is being evaluated via a billable video visit.      How would you like to obtain your AVS? MyChart  If you are dropped from the video visit, the video invite should be resent to: Text to cell phone: see Epic  Will anyone else be joining your video visit? No     Telemedicine Visit: The patient's condition can be safely assessed and treated via synchronous audio and visual telemedicine encounter.      Reason for Telemedicine Visit: Covid-19 Pandemic    Originating Site (Patient Location): Patient's home     Distant Site (Provider Location): Provider Remote Setting    Mode of Communication:  Video Conference via [employer]    As the provider I attest to compliance with applicable laws and regulations related to telemedicine.        Outpatient Psychiatric Progress Note    Name: Janelle White   : 1960                    Primary Care Provider: Emili Ballard MD   Therapist: None; starting in     PHQ-9 scores:  PHQ-9 SCORE 2022 3/9/2022 2022   PHQ-9 Total Score - - -   PHQ-9 Total Score [facility] - - 18 (Moderately severe depression)   PHQ-9 Total Score 18 19 18       STACIE-7 scores:  STACIE-7 SCORE 2021 2021 10/5/2021   Total Score - - -   Total Score 6 (mild anxiety) 5 (mild anxiety) 3 (minimal anxiety)   Total Score 6 5 3       Patient Identification:  Patient is a 61 year old,   White Not  or  female  who presents for return visit with me.  Patient is currently on medical leave from work as NP. Patient attended the phone/video session alone. Patient prefers to be called: \"Janelle\".    Interim History:  I last saw Janelle White for outpatient psychiatry return visit on 2022. During that appointment, we:     Continue Pristiq 100 mg daily for mood, anxiety.     Continue methyl folate 7.5 mg daily as supplementation.    Continue Adderall XR 20 mg daily for mood augmentation    Continue Adderall IR " "20 mg daily as needed for mood augmentation and daytime fatigue/hypersomnia    Continue benzodiazepine per primary care prescriber.    Continue TMS therapy.     Continue to work with your specialist from [facility] as indicated.     Recommend individual psychotherapy.      4/13: Patient overall doing relatively okay.  Continues to have some significant swings in mood that is greatly affected by her pain and also the weather.  Unfortunate has not noted a significant benefit from TMS.  Last week was a particularly difficult week.  She reports having \"a funk that will just come on.\"  Thankfully they tend not last too long.  She did have some more intense suicidal thoughts last week but that has passed.  Her son came for a visit and this was very helpful.  Reports she is able to keep herself safe.  Denies suicidality today.  Feels more hopeful working with addiction medicine and pain medicine.  Will be starting pool therapy in July.  Will be starting psychotherapy in June.  Will hopefully be starting ketamine therapy soon for mood.  Will also be starting Pilates soon as well.  Is also looking into working with a [facility] medicine/health clinic through the [facility].  Continues to find Adderall very helpful for mood, energy, motivation, focus/concentration.  Does not endorse any negative side effects.  Is tolerating Pristiq 50 mg daily well.  No problematic drug or alcohol use.    Per [facility], Dr. Matt Manzo, during today's team-based visit:  Does not feel like she received a significant benefit from TMS. She feels she was not having as frequent ruminative thoughts of suicide but that has started to return recently. It tends to happen more often when her physical pain is more intense. She got to a point where she set a date to kill herself but she changed her mind because her son came to spend time with her and she changed her mind saying, \"It just wasn't right yet.\" She noted that she has a few plans but will not discuss " "them saying, \"I don't want anyone to take those options away from me.\"     Past medication trials include but are not limited to:   Effexor-very flat  Celexa, zoloft, was on wellbutrin a couple years at one point  wellbutrin XL + celexa; 2005ish  tca-a ton of weight gain  depakote maybe for a short time  Abilify/lithium ?  ECT as teen - made me dull  Cytomel-not effective at all, used 50 mcg    Psychiatric ROS:  Janelle White reports mood has been: Still pretty up-and-down  Anxiety has been: up-and-down  Sleep has been: Up-and-down, pain is significant contributory factor  Deepa sxs: None  Psychosis sxs: None  ADHD/ADD sxs: None  PTSD sxs: None  PHQ9 and GAD7 scores were reviewed today if completed.   Medication side effects: Denies  Current stressors include: Symptoms and See HPI above  Coping mechanisms and supports include: Family, Hobbies and Friends    Current medications include:   Current Outpatient Medications   Medication Sig     albuterol (PROAIR HFA/PROVENTIL HFA/VENTOLIN HFA) 108 (90 Base) MCG/ACT inhaler Inhale 2 puffs into the lungs every 6 hours     calcium citrate (CALCIUM CITRATE) 950 MG tablet Take 1 tablet by mouth 2 times daily.     Calcium-Magnesium-Vitamin D (CALCIUM 500) 500-250-200 MG-MG-UNIT TABS Take 1 tablet by mouth      Cholecalciferol (D-5000) 5000 units TABS Take 5,000 Units by mouth     colchicine (COLCYRS) 0.6 MG tablet Take 1 tablet (0.6 mg) by mouth daily (Patient not taking: Reported on 4/1/2022)     cyanocobalamin (CYANOCOBALAMIN) 1000 MCG/ML injection Inject 1 mL (1,000 mcg) into the muscle every 30 days (Patient not taking: Reported on 4/1/2022)     cyclobenzaprine (FLEXERIL) 10 MG tablet Take 1 tablet (10 mg) by mouth 3 times daily as needed for muscle spasms (Patient not taking: Reported on 4/1/2022)     desvenlafaxine (PRISTIQ) 50 MG 24 hr tablet Take 1 tablet (50 mg) by mouth daily     ergocalciferol (ERGOCALCIFEROL) 1.25 MG (13949 UT) capsule Take 50,000 Units by " "mouth     Estradiol (DIVIGEL) 1 MG/GM GEL Place 1 packet onto the skin daily     ferrous sulfate (FEROSUL) 325 (65 Fe) MG tablet      HYDROcodone-acetaminophen (NORCO)  MG per tablet Take 1 tablet by mouth every 4 hours as needed for severe pain max 4 tabs/24 hrs     insulin syringe-needle U-100 (30G X 1/2\" 1 ML) 30G X 1/2\" 1 ML miscellaneous Inject 1 ml B12 qmonth     lidocaine (LIDODERM) 5 % patch Place 4 patches onto the skin daily Apply up to 4 patches to skin. Wear for 12 hours and remove for 12 hrs.  Refill when patient requests.     LORazepam (ATIVAN) 1 MG tablet Take 1 tablet (1 mg) by mouth every 6 hours as needed for anxiety     medical cannabis (Patient's own supply.  Not a prescription) Medical Cannabis - [place] 4-6 ml by mouth daily. [facility]     methocarbamol (ROBAXIN) 500 MG tablet Take 1 tablet (500 mg) by mouth 4 times daily as needed for muscle spasms (Patient taking differently: Take 1,000 mg by mouth 2 times daily as needed for muscle spasms )     methylfolate (DEPLIN) 7.5 MG TABS tablet Take 1 tablet (7.5 mg) by mouth daily     morphine (MS CONTIN) 15 MG CR tablet Take 1 tablet (15 mg) by mouth every 12 hours maximum 2 tablet(s) per day     Multiple Vitamin (MULTI-VITAMINS) TABS Take 1 tablet by mouth  (Patient not taking: Reported on 4/1/2022)     naloxone (NARCAN) 4 MG/0.1ML nasal spray Spray 1 spray (4 mg) into one nostril alternating nostrils as needed for opioid reversal every 2-3 minutes until assistance arrives     ondansetron (ZOFRAN-ODT) 8 MG ODT tab Take 1 tablet (8 mg) by mouth every 8 hours as needed for nausea     Prasterone 6.5 MG INST Place 0.5 suppositories vaginally three times a week     senna-docusate (SENOKOT-S/PERICOLACE) 8.6-50 MG tablet Take 4-6 tablets by mouth daily as needed for constipation     No current facility-administered medications for this visit.       The [place] Prescription Monitoring Program has been reviewed and there are no concerns about " diversionary activity for controlled substances at this time.   04/08/2022 04/08/2022 04/09/2022 1   Hydrocodone-Acetamin  Mg  10.00 3 [facility] [id number] All (4435) 0/0 33.33 [place]  04/08/2022 04/08/2022 04/09/2022 1   Morphine Sulf Er 15 Mg Tablet  60.00 30 Tosin Mar [id number] All (4435) 0/0 30.00 [place]  04/01/2022 12/27/2021 04/05/2022 1   Lorazepam 1 Mg Tablet  50.00 13 [facility] [id number] All (4435) 2/3 3.85 [place]  03/10/2022 01/10/2022 03/10/2022 1   Dextroamp-Amphet Er 20 Mg Cap  30.00 30 [facility] [id number] All (4435[place] 0/[place]  [place]  03/10/2022 01/10/2022 03/10/2022 1   Dextroamp-Amphetamin 20 Mg Tab  30.00 30 Al [facility] [id number] [place] (4435[place] 0/[place]  03/07/2022 03/07/2022 03/09/2022 1   Hydrocodone-Acetamin  Mg  45.00 12 Tosin Mar [id number] All (4435) 0/0 37.50 [place]       Past Medical/Surgical History:  Past Medical History:   Diagnosis Date     Anxiety      Cervicalgia 2007    C5-6 disc protrusion     COPD (chronic obstructive pulmonary disease) (H) 2/7/2022     Depressive disorder      ESBL (extended spectrum beta-lactamase) producing bacteria infection      History of blood transfusion 2007    Cervical fusion     Leukemia (H)     CLA large beta     Melanoma (H) 1998     Migraine      Other chronic pain      Rotator cuff tear     s/p injections     Sacroiliac inflammation (H)      Shift work sleep disorder 12/16/2013     Urinary calculi      Vitamin B12 deficiency anemia 2006    started injections      has a past medical history of Anxiety, Cervicalgia (2007), COPD (chronic obstructive pulmonary disease) (H) (2/7/2022), Depressive disorder, ESBL (extended spectrum beta-lactamase) producing bacteria infection, History of blood transfusion (2007), Leukemia (H), Melanoma (H) (1998), Migraine, Other chronic pain, Rotator cuff tear, Sacroiliac inflammation (H), Shift work sleep disorder (12/16/2013), Urinary calculi, and Vitamin B12 deficiency  anemia (2006).    She has no past medical history of Cerebral infarction (H), Congestive heart failure (H), Coronary artery disease, Diabetes (H), Heart disease, Hypertension, Thyroid disease, or Uncomplicated asthma.    Social History:  Reviewed. No changes to social history except as noted above in HPI.    Vital Signs:   None. This is phone/video visit.     Labs:  Most recent laboratory results reviewed and the pertinent results include:   Lab Results   Component Value Date    WBC 17.5 03/16/2021     Lab Results   Component Value Date    RBC 4.50 03/16/2021     Lab Results   Component Value Date    HGB 13.3 03/16/2021     Lab Results   Component Value Date    HCT 42.1 03/16/2021     No components found for: MCT  Lab Results   Component Value Date    MCV 94 03/16/2021     Lab Results   Component Value Date    MCH 29.6 03/16/2021     Lab Results   Component Value Date    MCHC 31.6 03/16/2021     Lab Results   Component Value Date    RDW 13.7 03/16/2021     Lab Results   Component Value Date     03/16/2021     Last Comprehensive Metabolic Panel:  Sodium   Date Value Ref Range Status   03/16/2022 138 133 - 144 mmol/L Final   05/24/2021 141 133 - 144 mmol/L Final     Potassium   Date Value Ref Range Status   03/16/2022 3.3 (L) 3.4 - 5.3 mmol/L Final   05/24/2021 3.9 3.4 - 5.3 mmol/L Final     Chloride   Date Value Ref Range Status   03/16/2022 108 94 - 109 mmol/L Final   05/24/2021 108 94 - 109 mmol/L Final     Carbon Dioxide   Date Value Ref Range Status   05/24/2021 25 20 - 32 mmol/L Final     Carbon Dioxide (CO2)   Date Value Ref Range Status   03/16/2022 22 20 - 32 mmol/L Final     Anion Gap   Date Value Ref Range Status   03/16/2022 8 3 - 14 mmol/L Final   05/24/2021 8 3 - 14 mmol/L Final     Glucose   Date Value Ref Range Status   03/16/2022 111 (H) 70 - 99 mg/dL Final   05/24/2021 87 70 - 99 mg/dL Final     Urea Nitrogen   Date Value Ref Range Status   03/16/2022 12 7 - 30 mg/dL Final   05/24/2021 15 7 -  30 mg/dL Final     Creatinine   Date Value Ref Range Status   03/16/2022 0.73 0.52 - 1.04 mg/dL Final   05/24/2021 0.64 0.52 - 1.04 mg/dL Final     GFR Estimate   Date Value Ref Range Status   03/16/2022 >90 >60 mL/min/1.73m2 Final     Comment:     Effective December 21, 2021 eGFRcr in adults is calculated using the 2021 CKD-EPI creatinine equation which includes age and gender (Zhou et al., NE, DOI: 10.1056/YPNEbo9705417)   05/24/2021 >90 >60 mL/min/[1.73_m2] Final     Comment:     Non  GFR Calc  Starting 12/18/2018, serum creatinine based estimated GFR (eGFR) will be   calculated using the Chronic Kidney Disease Epidemiology Collaboration   (CKD-EPI) equation.       Calcium   Date Value Ref Range Status   03/16/2022 9.1 8.5 - 10.1 mg/dL Final   05/24/2021 8.4 (L) 8.5 - 10.1 mg/dL Final     Bilirubin Total   Date Value Ref Range Status   02/07/2022 0.4 0.2 - 1.3 mg/dL Final   03/16/2021 0.4 0.2 - 1.3 mg/dL Final     Alkaline Phosphatase   Date Value Ref Range Status   02/07/2022 87 40 - 150 U/L Final   03/16/2021 87 40 - 150 U/L Final     ALT   Date Value Ref Range Status   02/07/2022 23 0 - 50 U/L Final   03/16/2021 26 0 - 50 U/L Final     AST   Date Value Ref Range Status   02/07/2022 20 0 - 45 U/L Final   03/16/2021 44 0 - 45 U/L Final     Review of Systems:  10 systems (general, cardiovascular, respiratory, eyes, ENT, endocrine, GI, , M/S, neurological) were reviewed. Most pertinent finding(s) is/are: Severe chronic pain. The remaining systems are all unremarkable.    Mental Status Examination (limited as this is by phone/video):  Appearance: Awake, alert, appears stated age, well-groomed, no acute distress  Attitude:  cooperative, pleasant  Motor: No gross abnormalities observed via video, not formally tested  Oriented to:  person, place, time, and situation  Attention Span and Concentration:  normal  Speech:  clear, coherent, regular rate, rhythm, and volume  Language: intact  Mood: ok,  up-and-down  Affect: appropriate, mood congruent  Associations:  no loose associations  Thought Process:  logical, linear and goal oriented  Thought Content: No suicidal ideation today, no homicidal ideation, no evidence of psychotic thought, no auditory hallucinations present and no visual hallucinations present  Recent and Remote Memory:  Intact to interview. Not formally assessed. No amnesia.  Fund of Knowledge: appropriate  Insight:  good  Judgment:  intact, adequate for safety  Impulse Control:  intact    Suicide Risk Assessment:  Today Janelle White reports no suicidal ideation today but does have history of chronic/intermittent suicidal ideation.There are notable risk factors for self-harm, including anxiety, comorbid medical condition of Chronic pain, suicidal ideation, purposelessness/no reason for living, hopelessness, withdrawing and mood change. However, risk is mitigated by commitment to family, absence of past attempts, ability to volunteer a safety plan, history of seeking help when needed, future oriented and denies suicidal intent or plan. Therefore, based on all available evidence including the factors cited above, Janelle White does not appear to be at imminent risk for self-harm, does not meet criteria for a 72-hr hold, and therefore remains appropriate for ongoing outpatient level of care.  A thorough assessment of risk factors related to suicide and self-harm have been reviewed and are noted above. Local community safety resources printed and reviewed for patient to use if needed. There was no deceit detected, and the patient presented in a manner that was believable.     DSM5 Diagnosis:  Major Depressive Disorder, Recurrent Episode, severe, without psychotic features  Treatment resistant depression  CYP2D6 poor metabolizer  CYP2B6 intermediate metabolizer  Homozygous for C677T polymorphism of MTHFR    Medical comorbidities include:   Patient Active Problem List    Diagnosis Date  Noted     COPD (chronic obstructive pulmonary disease) (H) 02/07/2022     Priority: Medium     Tension type headache 11/04/2021     Priority: Medium     Rotator cuff injury 11/04/2021     Priority: Medium     Spinal stenosis of lumbar region with radiculopathy 09/02/2021     Priority: Medium     COVID-19 08/29/2021     Priority: Medium     Polyarthralgia 08/11/2021     Priority: Medium     Cellulitis, unspecified cellulitis site 08/11/2021     Priority: Medium     Sepsis, due to unspecified organism, unspecified whether acute organ dysfunction present (H) 08/11/2021     Priority: Medium     Cellulitis 08/11/2021     Priority: Medium     STACIE (generalized anxiety disorder) 07/27/2021     Priority: Medium     MTHFR gene mutation 04/12/2021     Priority: Medium     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     Priority: Medium     CYP2D6 poor metabolizer (H) 04/12/2021     Priority: Medium     Status post blepharoplasty 11/18/2020     Priority: Medium     Regular astigmatism, bilateral 11/18/2020     Priority: Medium     Prediabetes 09/19/2019     Priority: Medium     Hyperlipidemia LDL goal <130 09/19/2019     Priority: Medium     CLARE (obstructive sleep apnea) 02/13/2019     Priority: Medium     Controlled substance agreement signed 08/14/2018     Priority: Medium     Chronic pain syndrome 12/21/2017     Priority: Medium     CLL (chronic lymphocytic leukemia) (H) 12/21/2017     Priority: Medium     Hypotension 09/14/2017     Priority: Medium     History of laser assisted in situ keratomileusis 10/14/2014     Priority: Medium     Pain medication agreement 04/23/2014     Priority: Medium     Formatting of this note might be different from the original.  Patient takes morphine 15 mg ER BID for chronic neck and back pain.  She is also on cymbalta, ibuprofen, flexaril prn       Shift work sleep disorder 12/16/2013     Priority: Medium     Vitamin D deficiency 11/08/2012     Priority: Medium     Moderate recurrent major  depression (H) 01/06/2011     Priority: Medium     DDD (degenerative disc disease), cervical 10/07/2010     Priority: Medium     CARDIOVASCULAR SCREENING; LDL GOAL LESS THAN 160 02/10/2010     Priority: Medium     Chronic Low Back Pain 10/01/2009     Priority: Medium     S/p AP L3-S1 fusion 12/2010 - referred to [facility].   Orthopedics writing scripts for narcotics post-op.       Migraine      Priority: Medium     Problem list name updated by automated process. Provider to review       B-complex deficiency 10/10/2006     Priority: Medium     Problem list name updated by automated process. Provider to review       PERSONAL HX OF  MELANOMA 12/04/2003     Priority: Low       Psychosocial & Contextual Factors: see HPI above    Assessment:  6/15/2021:  Janelle TORRES Christopher reports overall some significant worsening of mood symptoms.  Increased anxiety with her depression.  Poor motivation and poor energy.  Symptoms severe enough to prevent her from being able to utilize typical coping skills and strategies.  No current motivation or energy to participate in PHP/day treatment program.  Continues to take medication as scheduled.  Recent surgery and general anesthesia could be contributing.  Discussed discontinuing stimulant medication as an augmentation strategy.  She is agreeable to moving forward with T3 as an augmentation for treatment resistant depression.  Discussed risks and benefits at length.  Will get baseline thyroid labs prior to starting T3.  Hoping she will experience increased energy and motivation and that her mood will lift.  Also discussed setting up an appointment with her interventional psychiatry clinic to discuss other potential options such as ketamine therapy, ECT, TMS.  Does have history of ECT for depression when she was quite young.  I am hopeful to stay ahead of her symptoms before things get too severe.  Has chronic suicidal ideation and is at high risk for suicide.  Continues to deny any  plan or intent.  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  See below for risk/benefit conversation regarding T3 had with the patient:    GeneSight testing Info:  GeneSight testing revealed she is a poor 2D6 metabolizer and an intermediate 2B6 metabolizer.  This would explain her multiple failed medication trials due to the negative side effects.  She also has a genotype that would suggest a phenotype sensitivity to serotonin. She also was found to have significantly reduced folic acid conversion.      Starting T3 as augment to antidepressant therapy for treatment resistant depression:  Start T3 at 25 mcg per day for one to two weeks, and if there is little or no improvement, increase the dose to 50 mcg per day; this is consistent with practice guidelines from the American Psychiatric Association and [place] Network for Mood and Anxiety Treatments.     Adverse effects consistent with hyperthyroidism may occur, including tremor, palpitations, heat intolerance, sweating, anxiety, increased frequency of bowel movements, shortness of breath, and exacerbation of cardiac arrhythmia. In addition, hyperthyroidism that emerges during long-term treatment may lead to bone demineralization, osteoporosis, and an increased risk of fracture    Following a normal baseline TSH concentration, no other laboratory monitoring during a four to six week trial of adjunctive T3 is necessary. However, if T3 is continued longer, a serum TSH concentration should be checked after the first one to three months of treatment and then every six months.    Today, 7/27/21:   Patient overall with little change in her symptoms.  Did not do as well on Cytomel and had limited to no improvement at all after weeks on 50 mcg.  Labs were unremarkable prior to starting Cytomel.  Opted to go back to stimulant augmentation since patient does find it quite helpful.  She has been taking 15 mg of immediate release most afternoons.   Due to good tolerability and some efficacy, we will bump up her immediate release dose slightly to 20 mg daily as needed in addition to her 20 mg extended release dose. I have no concerns about misuse or diversion at this time.  Tolerating well with no negative side effects.  Last blood pressure normal 7/2.  Patient has noted some efficacy from Pristiq more than other antidepressant trials and so we will continue to titrate further to 100 mg daily.  She is tolerating well.  Continues to use lorazepam for anxiety, pain medicine adjunct, and for sleep as prescribed by primary care provider.  Has been utilizing roughly 100 tablets of 0.5 mg every 1.5 months.  Patient with ongoing chronic intermittent passive suicidal ideation with no plan or intent.  Denies safety concerns today.  No problematic drug or alcohol use.  I am hopeful the interventional psychiatry clinic might be able to initiate ketamine therapy or another therapy they would recommend as potentially being more helpful than patient's multiple medication/augmentation trials.    10/6/2021:  Patient with some improved depression symptoms and much more hopeful.  Seeing specialist at [facility]-feels very hurt and listened to.  Also is hopeful there will be some helpful treatments.  Visit to [place] was also very good and instilled a lot of hope in her abilities.  She was encouraged to continue to push herself to do the things she enjoys doing.  No medication changes today.  She will follow-up with getting TMS rescheduled, possibly when things slow down after the holidays.  Does not need to be seen until after the holidays.  No acute safety concerns today.  No problematic drug or alcohol use.    1/14/2022:  Overall patient feeling a little more down lately.  Tolerating TMS well.  Encouraged to continue TMS.  No medication changes today since undergoing TMS treatment.  Talked about life stages today generativity versus stagnation and also integrity versus despair.   Talked about consideration for acceptance and commitment therapy.  She is encouraged to continue to be active.  No acute suicidal ideation today.  No acute safety concerns today.  No problematic drug or alcohol use.    4/13/2022:   Patient continues to have symptoms that wax and wane.  Feels like she tolerates Pristiq 50 mg better than 100 and will continue on this dose.  Last week was particularly difficult.  Pretty intense suicidal ideation but she was able to manage these thoughts and remain safe.  She does report she would come to the hospital if necessary.  We discussed the empath unit today and other resources if she felt she could not keep herself safe.  She continues to work on tapering her narcotic pain medication regimen.  She is working with addiction medicine and also pain management.  She is starting Pilates therapy.  Pool therapy will begin in July.  Discussed possibility of vestibular rehabilitation.  Individual therapy will also start soon.  She was strongly encouraged to continue to pursue ketamine therapy.  No acute suicidality today.  No problematic drug or alcohol use.    Medication side effects and alternatives were reviewed. Health promotion activities recommended and reviewed today. All questions addressed. Education and counseling completed regarding risks and benefits of medications and psychotherapy options. Recommend therapy for additional support.     Treatment Plan:    Continue Pristiq 50 mg daily for mood, anxiety.     Continue methyl folate 7.5 mg daily as supplementation.    Continue Adderall XR 20 mg daily for mood augmentation    Continue Adderall IR 20 mg daily as needed for mood augmentation and daytime fatigue/hypersomnia    Continue benzodiazepine per primary care prescriber.    Continue working with interventional psychiatry clinic and pursuit of ketamine therapy.    Continue to work with your specialist from [facility] as indicated.      Continue working with addiction  medicine clinic as needed/as indicated.    Continue working with pain management specialists.    Recommend individual psychotherapy.      Continue all other cares per primary care provider.     Continue all other medications as reviewed per electronic medical record today.     Safety plan reviewed. To the Emergency Department as needed or call after hours crisis line at [phone] or [phone]. [place] Crisis Text Line. Text [place] to 761877 or Suicide LifeLine Chat: suicidepreventionSubmitnetline.org/chat    Schedule an appointment with me in 3 months, or sooner as needed. Call [facility] at [phone] to schedule.    Follow up with primary care provider as planned or for acute medical concerns.    Call the psychiatric nurse line with medication questions or concerns at [phone].    [facility] may be used to communicate with your provider, but this is not intended to be used for emergencies.    Therapy resources:  Www.MPSI.org    Risks of benzodiazepine (Ativan, Xanax, Klonopin, Valium, etc) use including, but not limited to, sedation, tolerance, risk for addiction/dependence. Do not drink alcohol while taking benzodiazepines due to risk of trouble breathing and potential death. Do not drive or operate heavy machinery until it is known how the drug affects you. Discuss with physician or pharmacist before ever taking a benzodiazepine with a narcotic/opioid pain medication.     Have previously discussed risks of stimulant medication including, but not limited to, decreased appetite, risk of tics (and that they may be lasting), trouble sleeping, cardiac risks such as increased heart rate and blood pressure, and rare risk of sudden cardiac death.  Also risk of addiction/tolerance/dependence.    Administrative Billing:   Phone Call/Video Duration: 31 Minutes  8:14a - 8:45a    Time spent with patient was 31 minutes and greater than 50% of time or 16 minutes was spent in counseling and coordination of  care regarding above diagnoses and treatment plan. Patient with multiple psychiatric diagnoses, treatment resistant sxs, recently worsening symptoms, and ongoing medication monitoring adding to complexity of care.    Patient Status:  Patient will continue to be seen for ongoing consultation and stabilization.    Signed:   Kimberly Perez DO  [facility] Psychiatry    Disclaimer: This note consists of symbols derived from keyboarding, dictation and/or voice recognition software. As a result, there may be errors in the script that have gone undetected. Please consider this when interpreting information found in this chart.

## 2022-04-13 NOTE — PROGRESS NOTES
[facility] Collaborative Care Psychiatry Service  April 13, 2022      Behavioral Health Clinician Progress Note    Patient Name: Janelle White           Service Type:  Individual      Service Location:   [facility]     Session Start Time: 07:30 am  Session End Time: 08:00 am      Session Length: 16 - 37      Attendees: Patient     Service Modality:  Video Visit:      Provider verified identity through the following two step process.  Patient provided:  Patient is known previously to provider    Telemedicine Visit: The patient's condition can be safely assessed and treated via synchronous audio and visual telemedicine encounter.      Reason for Telemedicine Visit: Services only offered telehealth    Originating Site (Patient Location): Patient's home    Distant Site (Provider Location): Provider Remote Setting- Home Office    Consent:  The patient/guardian has verbally consented to: the potential risks and benefits of telemedicine (video visit) versus in person care; bill my insurance or make self-payment for services provided; and responsibility for payment of non-covered services.     Patient would like the video invitation sent by:  My Chart    Mode of Communication:  Video Conference via [facility]    As the provider I attest to compliance with applicable laws and regulations related to telemedicine.    Visit Activities (Refresh list every visit): [facility] Only    Diagnostic Assessment Date: 03/11/2021  Treatment Plan Review Date: 07/13/2022  See Flowsheets for today's PHQ-9 and STACIE-7 results  Previous PHQ-9:   PHQ-9 SCORE 2/18/2022 3/9/2022 4/12/2022   PHQ-9 Total Score - - -   PHQ-9 Total Score MyChart - - 18 (Moderately severe depression)   PHQ-9 Total Score 18 19 18     Previous STACIE-7:   STACIE-7 SCORE 5/24/2021 7/27/2021 10/5/2021   Total Score - - -   Total Score 6 (mild anxiety) 5 (mild anxiety) 3 (minimal anxiety)   Total Score 6 5 3       JAYRO LEVEL:  JAYRO Score (Last Two) 7/9/2009 1/27/2011   JAYRO Raw Score  "44 50   Activation Score 70.8 86.3   JAYRO Level 4 4       DATA  Extended Session (60+ minutes): No  Interactive Complexity: No  Crisis: No  [facility] Patient: No    Treatment Objective(s) Addressed in This Session:  Target Behavior(s): disease management/lifestyle changes pain management    Depressed Mood:    Anxiety: will develop more effective coping skills to manage anxiety symptoms  Psychological distress related to Pain    Current Stressors / Issues:  Does not feel like she received a significant benefit from TMS. She feels she was not having as frequent ruminative thoughts of suicide but that has started to return recently. It tends to happen more often when her physical pain is more intense. She got to a point where she set a date to kill herself but she changed her mind because her son came to spend time with her and she changed her mind saying, \"It just wasn't right yet.\" She noted that she has a few plans but will not discuss them saying, \"I don't want anyone to take those options away from me.\"       Progress on Treatment Objective(s) / Homework:  Minimal progress - PREPARATION (Decided to change - considering how); Intervened by negotiating a change plan and determining options / strategies for behavior change, identifying triggers, exploring social supports, and working towards setting a date to begin behavior change    Motivational Interviewing    MI Intervention: Expressed Empathy/Understanding, Permission to raise concern or advise, Open-ended questions and Reflections: simple and complex     Change Talk Expressed by the Patient: Desire to change Reasons to change    Provider Response to Change Talk: E - Evoked more info from patient about behavior change, A - Affirmed patient's thoughts, decisions, or attempts at behavior change, R - Reflected patient's change talk and S - Summarized patient's change talk statements    Also provided psychoeducation about behavioral health condition, symptoms, and treatment " options    Care Plan review completed: Yes    Medication Review:  Changes to psychiatric medications, see updated Medication List in EPIC.     Medication Compliance:  Yes    Changes in Health Issues:   None reported    Chemical Use Review:   Substance Use: Chemical use reviewed, no active concerns identified      Tobacco Use: No current tobacco use.      Assessment: Current Emotional / Mental Status (status of significant symptoms):  Risk status (Self / Other harm or suicidal ideation)  Patient has had a history of suicidal ideation: ongoing  Patient denies current fears or concerns for personal safety.  Patient reports the following current or recent suicidal ideation or behaviors: ongoing thoughts, had a plan, but changed her mind last week.  Patient denies current or recent homicidal ideation or behaviors.  Patient denies current or recent self injurious behavior or ideation.  Patient denies other safety concerns.  A safety and risk management plan has been developed including: Patient consented to co-developed safety plan.  A safety and risk management plan was completed.  Patient agreed to use safety plan should any safety concerns arise.  A copy was given to the patient.    Appearance:   Appropriate   Eye Contact:   Good   Psychomotor Behavior: Normal   Attitude:   Cooperative   Orientation:   All  Speech   Rate / Production: Normal    Volume:  Normal   Mood:    Depressed   Affect:    Appropriate   Thought Content:  Clear   Thought Form:  Coherent  Logical   Insight:    Fair     Diagnoses:  1. Severe episode of recurrent major depressive disorder, without psychotic features (H)    2. Chronic pain syndrome        Collateral Reports Completed:  Communicated with: Dr. Perez    Plan: (Homework, other):  Patient was given information about behavioral services and encouraged to schedule a follow up appointment with the clinic [facility] in conjunction with next [facility] appointment.  She was also given information about mental  health symptoms and treatment options .  CD Recommendations: No indications of CD issues.  Matt Manzo, PsyD, LP      ______________________________________________________________________    Integrated Primary Care Behavioral Health Treatment Plan    Patient's Name: Janelle White  YOB: 1960    Date of Creation: April 13, 2022  Date Treatment Plan Last Reviewed/Revised: April 13, 2022    DSM5 Diagnoses: 296.33 (F33.2) Major Depressive Disorder, Recurrent Episode, Severe _  Psychosocial / Contextual Factors: chronic pain  PROMIS (reviewed every 90 days):   PROMIS 10-Global Health (only subscores and total score):   PROMIS-10 Scores Only 1/13/2022   Global Mental Health Score 5   Global Physical Health Score 10   PROMIS TOTAL - SUBSCORES 15       Referral / Collaboration:  Referral to another professional/service is not indicated at this time..      Anticipated number of session for this episode of care: 5-6  Anticipation frequency of session: As determined by Dr. Perez  Anticipated Duration of each session: 16-37 minutes  Treatment plan will be reviewed in 90 days or when goals have been changed.       MeasurableTreatment Goal(s) related to diagnosis / functional impairment(s)  Goal 1: Patient will work with providers to manage symptoms    I will know I've met my goal when I can enjoy my life again.      Objective #A (Patient Action)  Patient will attend all appointments, take medication as prescribed.  Status: New - Date: 04/13/2022     Intervention(s)  [facility] will Monitor and assist in overcoming barriers to treatment adherence    Objective #B  Patient will consider all recommendations offered.  Status: New - Date: 04/13/2022      Intervention(s)  [facility] will educate patient on treatment options, clarify concerns, work with pt to overcome any resistance to compliance.      Goal 2: Patient will replace self-harm thoughts/behaviors with self-care activities    I will know I've met my goal when I stop  "having those thoughts.      Objective #A (Patient Action)                          Status: New - Date: 04/13/2022  Patient will assist in creating safety plan.     Intervention(s)  [facility] will assist in creation of safety plan and provide copy to patient.     Objective #B  Patient will report using safety plan as needed.                       Status: New - Date: 04/13/2022     Intervention(s)  [facility] will monitor safety, use of plan, adjust plan as needed, work through any identified barriers/resistance to following her plan.      Patient has reviewed and agreed to the above plan.      Melvin Manzo PsyD  April 13, 2022            [facility]                                       Patient's Name: Janelle White  April 13, 2022     SAFETY PLAN:  Step 1: Warning signs / cues (Thoughts, images, mood, situation, behavior) that a crisis may be developing:  ? Thoughts: \"I don't matter\", \"People would be better off without me\", \"I can't do this anymore\" and \"I just want this to end\"  ? Images: obsessive thoughts of death or dying: thoughts of carrying out plan  ? Thinking Processes: ruminations (can't stop thinking about my problems): ruminate on my plan and highly critical and negative thoughts: if  says something critical i shut down  ? Mood: worsening depression, hopelessness, disinhibited (not caring about things or consequences) and worthlessness  ? Behaviors: isolating/withdrawing , can't stop crying, not taking care of myself and not taking care of my responsibilities  ? Situations: relationship problems   Step 2: Coping strategies - Things I can do to take my mind off of my problems without contacting another person (relaxation technique, physical activity):  ? Distress Tolerance Strategies:  play with my pet  and watch a funny movie:    ? Physical Activities: go for a walk and get in the [place]  ? Focus on helpful thoughts:  \"This is temporary\"  Step 3: People and social " settings that provide distraction:              Name: Alyx     Phone: in my phone              Name: Raven   Phone: in my phone  ? park              Step 4: Remind myself of people and things that are important to me and worth living for:  Children, dog, friends  Step 5: When I am in crisis, I can ask these people to help me use my safety plan:              Name: Alyx     Phone: in my phone              Name: Raven   Phone: in my phone  Step 6: Making the environment safe:   ? none identified  Step 7: Professionals or agencies I can contact during a crisis:  ? Suicide Prevention Lifeline: 0-676-898-GWCM (0727)  ? Crisis Text Line Service: Text   HOME  to 880-495.    Local Crisis Services: [facility]     Call 911 or go to my nearest emergency department.       I helped develop this safety plan and agree to use it when needed.  I have been given a copy of this plan.       Patient signature: _______________________________________________________________  Today s date:  April 13, 2022    Adapted from Safety Plan Template 2008 Antionette Trevino and Joseph Leon is reprinted with the express permission of the authors.  No portion of the Safety Plan Template may be reproduced without the express, written permission.  You can contact the authors at [email or web address] or [email or web address]

## 2022-04-13 NOTE — PATIENT INSTRUCTIONS
Treatment Plan:  Continue Pristiq 50 mg daily for mood, anxiety.   Continue methyl folate 7.5 mg daily as supplementation.  Continue Adderall XR 20 mg daily for mood augmentation  Continue Adderall IR 20 mg daily as needed for mood augmentation and daytime fatigue/hypersomnia  Continue benzodiazepine per primary care prescriber.  Continue working with interventional psychiatry clinic and pursuit of ketamine therapy.  Continue to work with your specialist from [facility] as indicated.    Continue working with addiction medicine clinic as needed/as indicated.  Continue working with pain management specialists.  Recommend individual psychotherapy.    Continue all other cares per primary care provider.   Continue all other medications as reviewed per electronic medical record today.   Safety plan reviewed. To the Emergency Department as needed or call after hours crisis line at [phone] or [phone]. [place] Crisis Text Line. Text [place] to 609755 or Suicide LifeLine Chat: suicidepreventionlifeline.org/chat  Schedule an appointment with me in 3 months, or sooner as needed. Call [facility] at [phone] to schedule.  Follow up with primary care provider as planned or for acute medical concerns.  Call the psychiatric nurse line with medication questions or concerns at [phone].  [employer] may be used to communicate with your provider, but this is not intended to be used for emergencies.    Therapy resources:  Www.MPSI.org    Risks of benzodiazepine (Ativan, Xanax, Klonopin, Valium, etc) use including, but not limited to, sedation, tolerance, risk for addiction/dependence. Do not drink alcohol while taking benzodiazepines due to risk of trouble breathing and potential death. Do not drive or operate heavy machinery until it is known how the drug affects you. Discuss with physician or pharmacist before ever taking a benzodiazepine with a narcotic/opioid pain medication.     Have previously  discussed risks of stimulant medication including, but not limited to, decreased appetite, risk of tics (and that they may be lasting), trouble sleeping, cardiac risks such as increased heart rate and blood pressure, and rare risk of sudden cardiac death.  Also risk of addiction/tolerance/dependence.

## 2022-04-13 NOTE — CONFIDENTIAL NOTE
Writer spoke with Janelle, and outline next steps for starting IV ketamine.     Per Dr. Yanes's recommendation Janelle has been decreasing her opiate prescriptions in order to start ketamine. She is fully off the Norco and is currently taking 15 mg of MS contin BID.    Will reach out to Dr. Yanes for therapy plan and next steps if pain medication needs to be further reduced.

## 2022-04-17 DIAGNOSIS — F33.2 SEVERE EPISODE OF RECURRENT MAJOR DEPRESSIVE DISORDER, WITHOUT PSYCHOTIC FEATURES (H): Primary | ICD-10-CM

## 2022-04-17 RX ORDER — ONDANSETRON 2 MG/ML
4 INJECTION INTRAMUSCULAR; INTRAVENOUS
Status: CANCELLED | OUTPATIENT
Start: 2022-04-17

## 2022-04-17 RX ORDER — METHYLPREDNISOLONE SODIUM SUCCINATE 125 MG/2ML
125 INJECTION, POWDER, LYOPHILIZED, FOR SOLUTION INTRAMUSCULAR; INTRAVENOUS
Status: CANCELLED
Start: 2022-04-17

## 2022-04-17 RX ORDER — HEPARIN SODIUM (PORCINE) LOCK FLUSH IV SOLN 100 UNIT/ML 100 UNIT/ML
5 SOLUTION INTRAVENOUS
Status: CANCELLED | OUTPATIENT
Start: 2022-04-17

## 2022-04-17 RX ORDER — HYDRALAZINE HYDROCHLORIDE 20 MG/ML
10 INJECTION INTRAMUSCULAR; INTRAVENOUS
Status: CANCELLED | OUTPATIENT
Start: 2022-04-17

## 2022-04-17 RX ORDER — EPINEPHRINE 1 MG/ML
0.3 INJECTION, SOLUTION, CONCENTRATE INTRAVENOUS EVERY 5 MIN PRN
Status: CANCELLED | OUTPATIENT
Start: 2022-04-17

## 2022-04-17 RX ORDER — ALBUTEROL SULFATE 90 UG/1
1-2 AEROSOL, METERED RESPIRATORY (INHALATION)
Status: CANCELLED
Start: 2022-04-17

## 2022-04-17 RX ORDER — ALBUTEROL SULFATE 0.83 MG/ML
2.5 SOLUTION RESPIRATORY (INHALATION)
Status: CANCELLED | OUTPATIENT
Start: 2022-04-17

## 2022-04-17 RX ORDER — MEPERIDINE HYDROCHLORIDE 25 MG/ML
25 INJECTION INTRAMUSCULAR; INTRAVENOUS; SUBCUTANEOUS EVERY 30 MIN PRN
Status: CANCELLED | OUTPATIENT
Start: 2022-04-17

## 2022-04-17 RX ORDER — NALOXONE HYDROCHLORIDE 0.4 MG/ML
0.2 INJECTION, SOLUTION INTRAMUSCULAR; INTRAVENOUS; SUBCUTANEOUS
Status: CANCELLED | OUTPATIENT
Start: 2022-04-17

## 2022-04-17 RX ORDER — DIPHENHYDRAMINE HYDROCHLORIDE 50 MG/ML
50 INJECTION INTRAMUSCULAR; INTRAVENOUS
Status: CANCELLED
Start: 2022-04-17

## 2022-04-17 RX ORDER — HEPARIN SODIUM,PORCINE 10 UNIT/ML
5 VIAL (ML) INTRAVENOUS
Status: CANCELLED | OUTPATIENT
Start: 2022-04-17

## 2022-04-22 ENCOUNTER — MYC MEDICAL ADVICE (OUTPATIENT)
Dept: PSYCHIATRY | Facility: CLINIC | Age: 62
End: 2022-04-22
Payer: COMMERCIAL

## 2022-04-26 ENCOUNTER — LAB (OUTPATIENT)
Dept: LAB | Facility: CLINIC | Age: 62
End: 2022-04-26
Payer: COMMERCIAL

## 2022-04-26 DIAGNOSIS — F33.9 RECURRENT MAJOR DEPRESSION RESISTANT TO TREATMENT (H): ICD-10-CM

## 2022-04-26 LAB
ALBUMIN UR-MCNC: NEGATIVE MG/DL
APPEARANCE UR: CLEAR
BACTERIA #/AREA URNS HPF: ABNORMAL /HPF
BILIRUB UR QL STRIP: NEGATIVE
COLOR UR AUTO: YELLOW
ERYTHROCYTE [DISTWIDTH] IN BLOOD BY AUTOMATED COUNT: 13.1 % (ref 10–15)
GLUCOSE UR STRIP-MCNC: NEGATIVE MG/DL
HCT VFR BLD AUTO: 41.5 % (ref 35–47)
HGB BLD-MCNC: 13.5 G/DL (ref 11.7–15.7)
HGB UR QL STRIP: NEGATIVE
KETONES UR STRIP-MCNC: NEGATIVE MG/DL
LEUKOCYTE ESTERASE UR QL STRIP: NEGATIVE
MCH RBC QN AUTO: 31 PG (ref 26.5–33)
MCHC RBC AUTO-ENTMCNC: 32.5 G/DL (ref 31.5–36.5)
MCV RBC AUTO: 95 FL (ref 78–100)
NITRATE UR QL: NEGATIVE
PH UR STRIP: 5.5 [PH] (ref 5–7)
PLATELET # BLD AUTO: 160 10E3/UL (ref 150–450)
RBC # BLD AUTO: 4.35 10E6/UL (ref 3.8–5.2)
RBC #/AREA URNS AUTO: ABNORMAL /HPF
SP GR UR STRIP: 1.02 (ref 1–1.03)
SQUAMOUS #/AREA URNS AUTO: ABNORMAL /LPF
UROBILINOGEN UR STRIP-ACNC: 0.2 E.U./DL
WBC # BLD AUTO: 4.3 10E3/UL (ref 4–11)
WBC #/AREA URNS AUTO: ABNORMAL /HPF

## 2022-04-26 PROCEDURE — 80053 COMPREHEN METABOLIC PANEL: CPT

## 2022-04-26 PROCEDURE — 81001 URINALYSIS AUTO W/SCOPE: CPT

## 2022-04-26 PROCEDURE — 82248 BILIRUBIN DIRECT: CPT

## 2022-04-26 PROCEDURE — 36415 COLL VENOUS BLD VENIPUNCTURE: CPT

## 2022-04-26 PROCEDURE — 85027 COMPLETE CBC AUTOMATED: CPT

## 2022-04-26 PROCEDURE — 80307 DRUG TEST PRSMV CHEM ANLYZR: CPT | Mod: 59

## 2022-04-27 LAB
ALBUMIN SERPL-MCNC: 3.9 G/DL (ref 3.4–5)
ALP SERPL-CCNC: 82 U/L (ref 40–150)
ALT SERPL W P-5'-P-CCNC: 21 U/L (ref 0–50)
AMPHETAMINES UR QL SCN: ABNORMAL
ANION GAP SERPL CALCULATED.3IONS-SCNC: 5 MMOL/L (ref 3–14)
AST SERPL W P-5'-P-CCNC: 18 U/L (ref 0–45)
BARBITURATES UR QL: ABNORMAL
BENZODIAZ UR QL: ABNORMAL
BILIRUB DIRECT SERPL-MCNC: 0.1 MG/DL (ref 0–0.2)
BILIRUB SERPL-MCNC: 0.4 MG/DL (ref 0.2–1.3)
BUN SERPL-MCNC: 13 MG/DL (ref 7–30)
CALCIUM SERPL-MCNC: 8.7 MG/DL (ref 8.5–10.1)
CANNABINOIDS UR QL SCN: ABNORMAL
CHLORIDE BLD-SCNC: 109 MMOL/L (ref 94–109)
CO2 SERPL-SCNC: 27 MMOL/L (ref 20–32)
COCAINE UR QL: ABNORMAL
CREAT SERPL-MCNC: 0.73 MG/DL (ref 0.52–1.04)
ETHANOL UR QL SCN: ABNORMAL
GFR SERPL CREATININE-BSD FRML MDRD: >90 ML/MIN/1.73M2
GLUCOSE BLD-MCNC: 107 MG/DL (ref 70–99)
OPIATES UR QL SCN: ABNORMAL
PCP UR QL SCN: ABNORMAL
POTASSIUM BLD-SCNC: 3.9 MMOL/L (ref 3.4–5.3)
PROT SERPL-MCNC: 7.1 G/DL (ref 6.8–8.8)
SODIUM SERPL-SCNC: 141 MMOL/L (ref 133–144)

## 2022-05-02 NOTE — TELEPHONE ENCOUNTER
Zafar Yanes MD Swanson, Melanie A, RN  If going to switch to Buprenorphin I would rather that happen before we start ketamine. Is it straight buprenorphin or bup/naloxone? Naloxone can interfere with the antidepressant effects of ketamine.     I would also rather her be off cannabis but I don't know how important that is for her pain management.     The [facility] has a booklet on ketamine for patients she can probably find on the internet.

## 2022-05-09 ENCOUNTER — MYC REFILL (OUTPATIENT)
Dept: FAMILY MEDICINE | Facility: CLINIC | Age: 62
End: 2022-05-09

## 2022-05-09 ENCOUNTER — INFUSION THERAPY VISIT (OUTPATIENT)
Dept: INFUSION THERAPY | Facility: CLINIC | Age: 62
End: 2022-05-09
Attending: PSYCHIATRY & NEUROLOGY
Payer: COMMERCIAL

## 2022-05-09 VITALS
TEMPERATURE: 97.9 F | BODY MASS INDEX: 24 KG/M2 | RESPIRATION RATE: 16 BRPM | DIASTOLIC BLOOD PRESSURE: 57 MMHG | WEIGHT: 144.2 LBS | OXYGEN SATURATION: 98 % | HEART RATE: 56 BPM | SYSTOLIC BLOOD PRESSURE: 93 MMHG

## 2022-05-09 DIAGNOSIS — F33.2 SEVERE EPISODE OF RECURRENT MAJOR DEPRESSIVE DISORDER, WITHOUT PSYCHOTIC FEATURES (H): Primary | ICD-10-CM

## 2022-05-09 DIAGNOSIS — G89.4 CHRONIC PAIN SYNDROME: ICD-10-CM

## 2022-05-09 PROCEDURE — 250N000009 HC RX 250: Performed by: PSYCHIATRY & NEUROLOGY

## 2022-05-09 PROCEDURE — 96365 THER/PROPH/DIAG IV INF INIT: CPT

## 2022-05-09 PROCEDURE — 258N000003 HC RX IP 258 OP 636: Performed by: PSYCHIATRY & NEUROLOGY

## 2022-05-09 RX ORDER — MEPERIDINE HYDROCHLORIDE 25 MG/ML
25 INJECTION INTRAMUSCULAR; INTRAVENOUS; SUBCUTANEOUS EVERY 30 MIN PRN
Status: CANCELLED | OUTPATIENT
Start: 2022-05-09

## 2022-05-09 RX ORDER — ALBUTEROL SULFATE 0.83 MG/ML
2.5 SOLUTION RESPIRATORY (INHALATION)
Status: CANCELLED | OUTPATIENT
Start: 2022-05-09

## 2022-05-09 RX ORDER — HEPARIN SODIUM (PORCINE) LOCK FLUSH IV SOLN 100 UNIT/ML 100 UNIT/ML
5 SOLUTION INTRAVENOUS
Status: CANCELLED | OUTPATIENT
Start: 2022-05-09

## 2022-05-09 RX ORDER — METHYLPREDNISOLONE SODIUM SUCCINATE 125 MG/2ML
125 INJECTION, POWDER, LYOPHILIZED, FOR SOLUTION INTRAMUSCULAR; INTRAVENOUS
Status: CANCELLED
Start: 2022-05-09

## 2022-05-09 RX ORDER — NALOXONE HYDROCHLORIDE 0.4 MG/ML
0.2 INJECTION, SOLUTION INTRAMUSCULAR; INTRAVENOUS; SUBCUTANEOUS
Status: CANCELLED | OUTPATIENT
Start: 2022-05-09

## 2022-05-09 RX ORDER — MORPHINE SULFATE 15 MG/1
15 TABLET, FILM COATED, EXTENDED RELEASE ORAL EVERY 12 HOURS
Qty: 60 TABLET | Refills: 0 | Status: SHIPPED | OUTPATIENT
Start: 2022-05-09 | End: 2022-07-01

## 2022-05-09 RX ORDER — EPINEPHRINE 1 MG/ML
0.3 INJECTION, SOLUTION, CONCENTRATE INTRAVENOUS EVERY 5 MIN PRN
Status: CANCELLED | OUTPATIENT
Start: 2022-05-09

## 2022-05-09 RX ORDER — HEPARIN SODIUM,PORCINE 10 UNIT/ML
5 VIAL (ML) INTRAVENOUS
Status: CANCELLED | OUTPATIENT
Start: 2022-05-09

## 2022-05-09 RX ORDER — ALBUTEROL SULFATE 90 UG/1
1-2 AEROSOL, METERED RESPIRATORY (INHALATION)
Status: CANCELLED
Start: 2022-05-09

## 2022-05-09 RX ORDER — DIPHENHYDRAMINE HYDROCHLORIDE 50 MG/ML
50 INJECTION INTRAMUSCULAR; INTRAVENOUS
Status: CANCELLED
Start: 2022-05-09

## 2022-05-09 RX ORDER — HYDRALAZINE HYDROCHLORIDE 20 MG/ML
10 INJECTION INTRAMUSCULAR; INTRAVENOUS
Status: CANCELLED | OUTPATIENT
Start: 2022-05-09

## 2022-05-09 RX ORDER — ONDANSETRON 2 MG/ML
4 INJECTION INTRAMUSCULAR; INTRAVENOUS
Status: CANCELLED | OUTPATIENT
Start: 2022-05-09

## 2022-05-09 RX ADMIN — KETAMINE HYDROCHLORIDE 30 MG: 50 INJECTION, SOLUTION INTRAMUSCULAR; INTRAVENOUS at 09:17

## 2022-05-09 NOTE — TELEPHONE ENCOUNTER
Routing refill request to provider for review/approval because:  Drug not on the [facility] refill protocol   Allergy Warnings      Last Written Prescription Date:  4/8/22  Last Fill Quantity: 60,  # refills: 0   Last office visit: 2/7/2022 with prescribing provider:     Future Office Visit:    Latrice Bar RN  [facility]

## 2022-05-09 NOTE — PROGRESS NOTES
Infusion Nursing Note:  Janelle TORRES Christopher presents today for Ketamine first dose.    Patient seen by provider today: No   present during visit today: Not Applicable.    Note: Patient reports being very hopefull that the Ketamine infusions will help her. Post Ketamine patient reports she tolerated the infusion well.       Intravenous Access:  Peripheral IV placed right lower forearm.    Treatment Conditions:  Not Applicable.      Post Infusion Assessment:  Patient tolerated infusion without incident.  Patient observed for 120 minutes post Ketamine per protocol.  Site patent and intact, free from redness, edema or discomfort.  No evidence of extravasations.  Access discontinued per protocol.       Discharge Plan:   Discharge instructions reviewed with: Patient.  Patient and/or family verbalized understanding of discharge instructions and all questions answered.  Patient discharged in stable condition accompanied by: self.  Departure Mode: Ambulatory.      Tabatha Small RN

## 2022-05-11 ENCOUNTER — INFUSION THERAPY VISIT (OUTPATIENT)
Dept: INFUSION THERAPY | Facility: CLINIC | Age: 62
End: 2022-05-11
Attending: PSYCHIATRY & NEUROLOGY
Payer: COMMERCIAL

## 2022-05-11 VITALS
HEART RATE: 51 BPM | OXYGEN SATURATION: 97 % | SYSTOLIC BLOOD PRESSURE: 106 MMHG | TEMPERATURE: 97.5 F | DIASTOLIC BLOOD PRESSURE: 57 MMHG | RESPIRATION RATE: 16 BRPM

## 2022-05-11 DIAGNOSIS — F33.2 SEVERE EPISODE OF RECURRENT MAJOR DEPRESSIVE DISORDER, WITHOUT PSYCHOTIC FEATURES (H): Primary | ICD-10-CM

## 2022-05-11 PROCEDURE — 258N000003 HC RX IP 258 OP 636: Performed by: PSYCHIATRY & NEUROLOGY

## 2022-05-11 PROCEDURE — 96365 THER/PROPH/DIAG IV INF INIT: CPT

## 2022-05-11 PROCEDURE — 250N000009 HC RX 250: Performed by: PSYCHIATRY & NEUROLOGY

## 2022-05-11 RX ORDER — DIPHENHYDRAMINE HYDROCHLORIDE 50 MG/ML
50 INJECTION INTRAMUSCULAR; INTRAVENOUS
Status: CANCELLED
Start: 2022-05-11

## 2022-05-11 RX ORDER — HEPARIN SODIUM,PORCINE 10 UNIT/ML
5 VIAL (ML) INTRAVENOUS
Status: CANCELLED | OUTPATIENT
Start: 2022-05-11

## 2022-05-11 RX ORDER — METHYLPREDNISOLONE SODIUM SUCCINATE 125 MG/2ML
125 INJECTION, POWDER, LYOPHILIZED, FOR SOLUTION INTRAMUSCULAR; INTRAVENOUS
Status: CANCELLED
Start: 2022-05-11

## 2022-05-11 RX ORDER — MEPERIDINE HYDROCHLORIDE 25 MG/ML
25 INJECTION INTRAMUSCULAR; INTRAVENOUS; SUBCUTANEOUS EVERY 30 MIN PRN
Status: CANCELLED | OUTPATIENT
Start: 2022-05-11

## 2022-05-11 RX ORDER — NALOXONE HYDROCHLORIDE 0.4 MG/ML
0.2 INJECTION, SOLUTION INTRAMUSCULAR; INTRAVENOUS; SUBCUTANEOUS
Status: CANCELLED | OUTPATIENT
Start: 2022-05-11

## 2022-05-11 RX ORDER — HEPARIN SODIUM (PORCINE) LOCK FLUSH IV SOLN 100 UNIT/ML 100 UNIT/ML
5 SOLUTION INTRAVENOUS
Status: CANCELLED | OUTPATIENT
Start: 2022-05-11

## 2022-05-11 RX ORDER — HYDRALAZINE HYDROCHLORIDE 20 MG/ML
10 INJECTION INTRAMUSCULAR; INTRAVENOUS
Status: CANCELLED | OUTPATIENT
Start: 2022-05-11

## 2022-05-11 RX ORDER — ALBUTEROL SULFATE 0.83 MG/ML
2.5 SOLUTION RESPIRATORY (INHALATION)
Status: CANCELLED | OUTPATIENT
Start: 2022-05-11

## 2022-05-11 RX ORDER — ALBUTEROL SULFATE 90 UG/1
1-2 AEROSOL, METERED RESPIRATORY (INHALATION)
Status: CANCELLED
Start: 2022-05-11

## 2022-05-11 RX ORDER — ONDANSETRON 2 MG/ML
4 INJECTION INTRAMUSCULAR; INTRAVENOUS
Status: CANCELLED | OUTPATIENT
Start: 2022-05-11

## 2022-05-11 RX ORDER — EPINEPHRINE 1 MG/ML
0.3 INJECTION, SOLUTION, CONCENTRATE INTRAVENOUS EVERY 5 MIN PRN
Status: CANCELLED | OUTPATIENT
Start: 2022-05-11

## 2022-05-11 RX ADMIN — KETAMINE HYDROCHLORIDE 30 MG: 50 INJECTION, SOLUTION INTRAMUSCULAR; INTRAVENOUS at 09:26

## 2022-05-11 NOTE — PROGRESS NOTES
Infusion Nursing Note:  Janelle White presents today for Ketamine.    Patient seen by provider today: No.   present during visit today: Not Applicable.    Note: Patient reports no adverse reaction to first Ketamine infusion. Currently pain 3/10 in low back, sacral area, and hips.       Intravenous Access:  Peripheral IV placed right lower forearm.    Treatment Conditions:  Not Applicable.      Post Infusion Assessment:  Patient tolerated infusion without incident.  Patient observed for 120 minutes post Ketamine per protocol.  No evidence of extravasations.  Access discontinued per protocol.       Discharge Plan:   Discharge instructions reviewed with: Patient.  Patient and/or family verbalized understanding of discharge instructions and all questions answered.  Patient discharged in stable condition accompanied by: self.  Departure Mode: Ambulatory,  will be providing ride home.      Tabatha Small RN

## 2022-05-13 ENCOUNTER — INFUSION THERAPY VISIT (OUTPATIENT)
Dept: INFUSION THERAPY | Facility: CLINIC | Age: 62
End: 2022-05-13
Attending: PSYCHIATRY & NEUROLOGY
Payer: COMMERCIAL

## 2022-05-13 VITALS
DIASTOLIC BLOOD PRESSURE: 55 MMHG | TEMPERATURE: 98.1 F | OXYGEN SATURATION: 99 % | HEART RATE: 57 BPM | RESPIRATION RATE: 14 BRPM | SYSTOLIC BLOOD PRESSURE: 95 MMHG

## 2022-05-13 DIAGNOSIS — F33.2 SEVERE EPISODE OF RECURRENT MAJOR DEPRESSIVE DISORDER, WITHOUT PSYCHOTIC FEATURES (H): Primary | ICD-10-CM

## 2022-05-13 PROCEDURE — 258N000003 HC RX IP 258 OP 636: Performed by: PSYCHIATRY & NEUROLOGY

## 2022-05-13 PROCEDURE — 96365 THER/PROPH/DIAG IV INF INIT: CPT

## 2022-05-13 PROCEDURE — 250N000009 HC RX 250: Performed by: PSYCHIATRY & NEUROLOGY

## 2022-05-13 RX ORDER — NALOXONE HYDROCHLORIDE 0.4 MG/ML
0.2 INJECTION, SOLUTION INTRAMUSCULAR; INTRAVENOUS; SUBCUTANEOUS
Status: CANCELLED | OUTPATIENT
Start: 2022-05-13

## 2022-05-13 RX ORDER — HEPARIN SODIUM,PORCINE 10 UNIT/ML
5 VIAL (ML) INTRAVENOUS
Status: CANCELLED | OUTPATIENT
Start: 2022-05-13

## 2022-05-13 RX ORDER — HEPARIN SODIUM (PORCINE) LOCK FLUSH IV SOLN 100 UNIT/ML 100 UNIT/ML
5 SOLUTION INTRAVENOUS
Status: CANCELLED | OUTPATIENT
Start: 2022-05-13

## 2022-05-13 RX ORDER — METHYLPREDNISOLONE SODIUM SUCCINATE 125 MG/2ML
125 INJECTION, POWDER, LYOPHILIZED, FOR SOLUTION INTRAMUSCULAR; INTRAVENOUS
Status: CANCELLED
Start: 2022-05-13

## 2022-05-13 RX ORDER — ALBUTEROL SULFATE 0.83 MG/ML
2.5 SOLUTION RESPIRATORY (INHALATION)
Status: CANCELLED | OUTPATIENT
Start: 2022-05-13

## 2022-05-13 RX ORDER — ONDANSETRON 2 MG/ML
4 INJECTION INTRAMUSCULAR; INTRAVENOUS
Status: CANCELLED | OUTPATIENT
Start: 2022-05-13

## 2022-05-13 RX ORDER — ALBUTEROL SULFATE 90 UG/1
1-2 AEROSOL, METERED RESPIRATORY (INHALATION)
Status: CANCELLED
Start: 2022-05-13

## 2022-05-13 RX ORDER — HYDRALAZINE HYDROCHLORIDE 20 MG/ML
10 INJECTION INTRAMUSCULAR; INTRAVENOUS
Status: CANCELLED | OUTPATIENT
Start: 2022-05-13

## 2022-05-13 RX ORDER — EPINEPHRINE 1 MG/ML
0.3 INJECTION, SOLUTION, CONCENTRATE INTRAVENOUS EVERY 5 MIN PRN
Status: CANCELLED | OUTPATIENT
Start: 2022-05-13

## 2022-05-13 RX ORDER — DIPHENHYDRAMINE HYDROCHLORIDE 50 MG/ML
50 INJECTION INTRAMUSCULAR; INTRAVENOUS
Status: CANCELLED
Start: 2022-05-13

## 2022-05-13 RX ORDER — MEPERIDINE HYDROCHLORIDE 25 MG/ML
25 INJECTION INTRAMUSCULAR; INTRAVENOUS; SUBCUTANEOUS EVERY 30 MIN PRN
Status: CANCELLED | OUTPATIENT
Start: 2022-05-13

## 2022-05-13 RX ADMIN — KETAMINE HYDROCHLORIDE 30 MG: 50 INJECTION, SOLUTION INTRAMUSCULAR; INTRAVENOUS at 10:14

## 2022-05-13 ASSESSMENT — PAIN SCALES - GENERAL: PAINLEVEL: MODERATE PAIN (5)

## 2022-05-13 NOTE — PROGRESS NOTES
Infusion Nursing Note:  Janelle TORRES Christopher presents today for Ketamine Infusion.    Patient seen by provider today: No   present during visit today: Not Applicable.    Note: Pt stated that she sent a message to her provider asking if the observation time could be decreased following the Ketamine Infusions.       Intravenous Access:  Peripheral IV placed.    Treatment Conditions:  Not Applicable.      Post Infusion Assessment:  Patient tolerated infusion without incident.  Patient observed for 60 minutes post Ketamine Infusion per protocol.  No evidence of extravasations.  Access discontinued per protocol.       Discharge Plan:   Discharge instructions reviewed with: Patient.  Patient and/or family verbalized understanding of discharge instructions and all questions answered.  AVS to patient via [employer].   Patient discharged in stable condition accompanied by: self.  Departure Mode: Ambulatory.      Ronal Brower RN                       [phone]

## 2022-05-15 NOTE — TELEPHONE ENCOUNTER
Patient states that she did get an e-mail that an order had been sent and she has called them and left a detailed message and now she is just waiting for a call back from [facility].  She also called her oncologist who said it would be OK to do the monoclonal lacho treatment if she is chosen.  Latrice Bar RN  [facility]   None

## 2022-05-16 ENCOUNTER — INFUSION THERAPY VISIT (OUTPATIENT)
Dept: INFUSION THERAPY | Facility: CLINIC | Age: 62
End: 2022-05-16
Attending: PSYCHIATRY & NEUROLOGY
Payer: COMMERCIAL

## 2022-05-16 VITALS
TEMPERATURE: 97.9 F | DIASTOLIC BLOOD PRESSURE: 68 MMHG | RESPIRATION RATE: 16 BRPM | WEIGHT: 145.5 LBS | OXYGEN SATURATION: 99 % | SYSTOLIC BLOOD PRESSURE: 112 MMHG | BODY MASS INDEX: 24.21 KG/M2 | HEART RATE: 59 BPM

## 2022-05-16 DIAGNOSIS — F33.2 SEVERE EPISODE OF RECURRENT MAJOR DEPRESSIVE DISORDER, WITHOUT PSYCHOTIC FEATURES (H): Primary | ICD-10-CM

## 2022-05-16 PROCEDURE — 250N000009 HC RX 250: Performed by: PSYCHIATRY & NEUROLOGY

## 2022-05-16 PROCEDURE — 96365 THER/PROPH/DIAG IV INF INIT: CPT

## 2022-05-16 PROCEDURE — 258N000003 HC RX IP 258 OP 636: Performed by: PSYCHIATRY & NEUROLOGY

## 2022-05-16 RX ORDER — MEPERIDINE HYDROCHLORIDE 25 MG/ML
25 INJECTION INTRAMUSCULAR; INTRAVENOUS; SUBCUTANEOUS EVERY 30 MIN PRN
Status: CANCELLED | OUTPATIENT
Start: 2022-05-16

## 2022-05-16 RX ORDER — ALBUTEROL SULFATE 0.83 MG/ML
2.5 SOLUTION RESPIRATORY (INHALATION)
Status: CANCELLED | OUTPATIENT
Start: 2022-05-16

## 2022-05-16 RX ORDER — NALOXONE HYDROCHLORIDE 0.4 MG/ML
0.2 INJECTION, SOLUTION INTRAMUSCULAR; INTRAVENOUS; SUBCUTANEOUS
Status: CANCELLED | OUTPATIENT
Start: 2022-05-16

## 2022-05-16 RX ORDER — HEPARIN SODIUM,PORCINE 10 UNIT/ML
5 VIAL (ML) INTRAVENOUS
Status: CANCELLED | OUTPATIENT
Start: 2022-05-16

## 2022-05-16 RX ORDER — HYDRALAZINE HYDROCHLORIDE 20 MG/ML
10 INJECTION INTRAMUSCULAR; INTRAVENOUS
Status: CANCELLED | OUTPATIENT
Start: 2022-05-16

## 2022-05-16 RX ORDER — ALBUTEROL SULFATE 90 UG/1
1-2 AEROSOL, METERED RESPIRATORY (INHALATION)
Status: CANCELLED
Start: 2022-05-16

## 2022-05-16 RX ORDER — EPINEPHRINE 1 MG/ML
0.3 INJECTION, SOLUTION, CONCENTRATE INTRAVENOUS EVERY 5 MIN PRN
Status: CANCELLED | OUTPATIENT
Start: 2022-05-16

## 2022-05-16 RX ORDER — HEPARIN SODIUM (PORCINE) LOCK FLUSH IV SOLN 100 UNIT/ML 100 UNIT/ML
5 SOLUTION INTRAVENOUS
Status: CANCELLED | OUTPATIENT
Start: 2022-05-16

## 2022-05-16 RX ORDER — ONDANSETRON 2 MG/ML
4 INJECTION INTRAMUSCULAR; INTRAVENOUS
Status: CANCELLED | OUTPATIENT
Start: 2022-05-16

## 2022-05-16 RX ORDER — DIPHENHYDRAMINE HYDROCHLORIDE 50 MG/ML
50 INJECTION INTRAMUSCULAR; INTRAVENOUS
Status: CANCELLED
Start: 2022-05-16

## 2022-05-16 RX ORDER — METHYLPREDNISOLONE SODIUM SUCCINATE 125 MG/2ML
125 INJECTION, POWDER, LYOPHILIZED, FOR SOLUTION INTRAMUSCULAR; INTRAVENOUS
Status: CANCELLED
Start: 2022-05-16

## 2022-05-16 RX ADMIN — KETAMINE HYDROCHLORIDE 30 MG: 50 INJECTION, SOLUTION INTRAMUSCULAR; INTRAVENOUS at 09:28

## 2022-05-16 NOTE — PROGRESS NOTES
"Infusion Nursing Note:  Janelle TORRES Jaimeyvette presents today for Ketamine.    Patient seen by provider today: No   present during visit today: Not Applicable.    Note: Patient reports Ketamine is helping her Depression. Patient reports \"Inner boiling and iratibility has been lessend Definalty improved my dark days are more moments now, instead of entire days\".       Intravenous Access:  Peripheral IV placed.    Treatment Conditions:  Not Applicable.      Post Infusion Assessment:  Patient tolerated infusion without incident.  Patient observed for 120 minutes post Ketamine per protocol.  No evidence of extravasations.  Access discontinued per protocol.       Discharge Plan:   Discharge instructions reviewed with: Patient.  Patient and/or family verbalized understanding of discharge instructions and all questions answered.  Patient discharged in stable condition accompanied by: self.  Departure Mode: Ambulatory.      Tabatha Small RN                      "

## 2022-05-17 ENCOUNTER — VIRTUAL VISIT (OUTPATIENT)
Dept: PSYCHIATRY | Facility: CLINIC | Age: 62
End: 2022-05-17
Payer: COMMERCIAL

## 2022-05-17 DIAGNOSIS — F33.2 SEVERE EPISODE OF RECURRENT MAJOR DEPRESSIVE DISORDER, WITHOUT PSYCHOTIC FEATURES (H): Primary | ICD-10-CM

## 2022-05-17 ASSESSMENT — PATIENT HEALTH QUESTIONNAIRE - PHQ9: SUM OF ALL RESPONSES TO PHQ QUESTIONS 1-9: 12

## 2022-05-17 NOTE — Clinical Note
Janelle is doing well with her infusions!  She is tolerating them well and is noticing some benefit, the benefit is lapsing however.  She is wondering if we could do a dose increase to see if that will give her greater benefit.  Also It looks like you changed her observation time to 1 hour but did not change the vitals and pulse ox monitoring to 1 hour.  Are you wanting to change those as well?  Thanks!  Saniya

## 2022-05-17 NOTE — PROGRESS NOTES
Physicians:  Care Coordination Note     SITUATION   Janelle White is a 61 year old femal who has been receiving IV ketamine at the request of Dr. Zafar Yanes.    BACKGROUND     During the course of the acute series of IV ketamine.  ASSESSMENT        Call placed today was to check on Janelle during the course of the acute series of IV ketamine.    During today's discussion writer and patient discussed:    Mood: Feels less tension.  Also feels no pain during her infusion; and is able to call on that type of feeling during the time when she is not receiving infusions.  States she finds the experience to be freeing.  States that she is feeling more inner peace, which she is feeling Ketamine and that feeling has been lasting.         PHQ 9: 12    # of completed infusions: 4  Toleration of infusions: Tolerating them well.  States that she does have some feeling of calming during her dissociation.     Vitals during infusion time are within an acceptable range.      PLAN           Follow-up plan:  Message sent to Dr. Yanes to follow up on dose and observation time.

## 2022-05-18 ENCOUNTER — INFUSION THERAPY VISIT (OUTPATIENT)
Dept: INFUSION THERAPY | Facility: CLINIC | Age: 62
End: 2022-05-18
Attending: PSYCHIATRY & NEUROLOGY
Payer: COMMERCIAL

## 2022-05-18 VITALS
RESPIRATION RATE: 16 BRPM | SYSTOLIC BLOOD PRESSURE: 97 MMHG | HEART RATE: 51 BPM | OXYGEN SATURATION: 100 % | DIASTOLIC BLOOD PRESSURE: 55 MMHG

## 2022-05-18 DIAGNOSIS — F33.2 SEVERE EPISODE OF RECURRENT MAJOR DEPRESSIVE DISORDER, WITHOUT PSYCHOTIC FEATURES (H): Primary | ICD-10-CM

## 2022-05-18 PROCEDURE — 258N000003 HC RX IP 258 OP 636: Performed by: PSYCHIATRY & NEUROLOGY

## 2022-05-18 PROCEDURE — 96365 THER/PROPH/DIAG IV INF INIT: CPT

## 2022-05-18 PROCEDURE — 250N000009 HC RX 250: Performed by: PSYCHIATRY & NEUROLOGY

## 2022-05-18 RX ORDER — HEPARIN SODIUM (PORCINE) LOCK FLUSH IV SOLN 100 UNIT/ML 100 UNIT/ML
5 SOLUTION INTRAVENOUS
Status: CANCELLED | OUTPATIENT
Start: 2022-05-18

## 2022-05-18 RX ORDER — ALBUTEROL SULFATE 0.83 MG/ML
2.5 SOLUTION RESPIRATORY (INHALATION)
Status: CANCELLED | OUTPATIENT
Start: 2022-05-18

## 2022-05-18 RX ORDER — ALBUTEROL SULFATE 90 UG/1
1-2 AEROSOL, METERED RESPIRATORY (INHALATION)
Status: CANCELLED
Start: 2022-05-18

## 2022-05-18 RX ORDER — DIPHENHYDRAMINE HYDROCHLORIDE 50 MG/ML
50 INJECTION INTRAMUSCULAR; INTRAVENOUS
Status: CANCELLED
Start: 2022-05-18

## 2022-05-18 RX ORDER — MEPERIDINE HYDROCHLORIDE 25 MG/ML
25 INJECTION INTRAMUSCULAR; INTRAVENOUS; SUBCUTANEOUS EVERY 30 MIN PRN
Status: CANCELLED | OUTPATIENT
Start: 2022-05-18

## 2022-05-18 RX ORDER — ONDANSETRON 2 MG/ML
4 INJECTION INTRAMUSCULAR; INTRAVENOUS
Status: CANCELLED | OUTPATIENT
Start: 2022-05-18

## 2022-05-18 RX ORDER — NALOXONE HYDROCHLORIDE 0.4 MG/ML
0.2 INJECTION, SOLUTION INTRAMUSCULAR; INTRAVENOUS; SUBCUTANEOUS
Status: CANCELLED | OUTPATIENT
Start: 2022-05-18

## 2022-05-18 RX ORDER — HYDRALAZINE HYDROCHLORIDE 20 MG/ML
10 INJECTION INTRAMUSCULAR; INTRAVENOUS
Status: CANCELLED | OUTPATIENT
Start: 2022-05-18

## 2022-05-18 RX ORDER — EPINEPHRINE 1 MG/ML
0.3 INJECTION, SOLUTION, CONCENTRATE INTRAVENOUS EVERY 5 MIN PRN
Status: CANCELLED | OUTPATIENT
Start: 2022-05-18

## 2022-05-18 RX ORDER — HEPARIN SODIUM,PORCINE 10 UNIT/ML
5 VIAL (ML) INTRAVENOUS
Status: CANCELLED | OUTPATIENT
Start: 2022-05-18

## 2022-05-18 RX ORDER — METHYLPREDNISOLONE SODIUM SUCCINATE 125 MG/2ML
125 INJECTION, POWDER, LYOPHILIZED, FOR SOLUTION INTRAMUSCULAR; INTRAVENOUS
Status: CANCELLED
Start: 2022-05-18

## 2022-05-18 RX ADMIN — KETAMINE HYDROCHLORIDE 30 MG: 50 INJECTION, SOLUTION INTRAMUSCULAR; INTRAVENOUS at 09:40

## 2022-05-18 NOTE — PROGRESS NOTES
Infusion Nursing Note:  Janelle White presents today for Ketamine.    Patient seen by provider today: No   present during visit today: Not Applicable.    Note: Monitored on cont pulse ox and every 15 min VS during and for 1 hour post.      Intravenous Access:  Peripheral IV placed.    Treatment Conditions:  Has a ride home.      Post Infusion Assessment:  Patient tolerated infusion without incident.  No evidence of extravasations.  Access discontinued per protocol.       Discharge Plan:   Patient and/or family verbalized understanding of discharge instructions and all questions answered.  Patient discharged in stable condition accompanied by: self.      BEV LAGOS RN

## 2022-05-20 ENCOUNTER — INFUSION THERAPY VISIT (OUTPATIENT)
Dept: INFUSION THERAPY | Facility: CLINIC | Age: 62
End: 2022-05-20
Attending: PSYCHIATRY & NEUROLOGY
Payer: COMMERCIAL

## 2022-05-20 VITALS
HEART RATE: 54 BPM | OXYGEN SATURATION: 97 % | TEMPERATURE: 97.9 F | DIASTOLIC BLOOD PRESSURE: 63 MMHG | SYSTOLIC BLOOD PRESSURE: 103 MMHG

## 2022-05-20 DIAGNOSIS — F33.2 SEVERE EPISODE OF RECURRENT MAJOR DEPRESSIVE DISORDER, WITHOUT PSYCHOTIC FEATURES (H): Primary | ICD-10-CM

## 2022-05-20 PROCEDURE — 96365 THER/PROPH/DIAG IV INF INIT: CPT

## 2022-05-20 PROCEDURE — 258N000003 HC RX IP 258 OP 636: Performed by: PSYCHIATRY & NEUROLOGY

## 2022-05-20 PROCEDURE — 250N000009 HC RX 250: Performed by: PSYCHIATRY & NEUROLOGY

## 2022-05-20 RX ORDER — ALBUTEROL SULFATE 0.83 MG/ML
2.5 SOLUTION RESPIRATORY (INHALATION)
Status: CANCELLED | OUTPATIENT
Start: 2022-05-20

## 2022-05-20 RX ORDER — DIPHENHYDRAMINE HYDROCHLORIDE 50 MG/ML
50 INJECTION INTRAMUSCULAR; INTRAVENOUS
Status: CANCELLED
Start: 2022-05-20

## 2022-05-20 RX ORDER — NALOXONE HYDROCHLORIDE 0.4 MG/ML
0.2 INJECTION, SOLUTION INTRAMUSCULAR; INTRAVENOUS; SUBCUTANEOUS
Status: CANCELLED | OUTPATIENT
Start: 2022-05-20

## 2022-05-20 RX ORDER — HYDRALAZINE HYDROCHLORIDE 20 MG/ML
10 INJECTION INTRAMUSCULAR; INTRAVENOUS
Status: CANCELLED | OUTPATIENT
Start: 2022-05-20

## 2022-05-20 RX ORDER — ONDANSETRON 2 MG/ML
4 INJECTION INTRAMUSCULAR; INTRAVENOUS
Status: CANCELLED | OUTPATIENT
Start: 2022-05-20

## 2022-05-20 RX ORDER — MEPERIDINE HYDROCHLORIDE 25 MG/ML
25 INJECTION INTRAMUSCULAR; INTRAVENOUS; SUBCUTANEOUS EVERY 30 MIN PRN
Status: CANCELLED | OUTPATIENT
Start: 2022-05-20

## 2022-05-20 RX ORDER — HEPARIN SODIUM (PORCINE) LOCK FLUSH IV SOLN 100 UNIT/ML 100 UNIT/ML
5 SOLUTION INTRAVENOUS
Status: CANCELLED | OUTPATIENT
Start: 2022-05-20

## 2022-05-20 RX ORDER — ALBUTEROL SULFATE 90 UG/1
1-2 AEROSOL, METERED RESPIRATORY (INHALATION)
Status: CANCELLED
Start: 2022-05-20

## 2022-05-20 RX ORDER — EPINEPHRINE 1 MG/ML
0.3 INJECTION, SOLUTION, CONCENTRATE INTRAVENOUS EVERY 5 MIN PRN
Status: CANCELLED | OUTPATIENT
Start: 2022-05-20

## 2022-05-20 RX ORDER — METHYLPREDNISOLONE SODIUM SUCCINATE 125 MG/2ML
125 INJECTION, POWDER, LYOPHILIZED, FOR SOLUTION INTRAMUSCULAR; INTRAVENOUS
Status: CANCELLED
Start: 2022-05-20

## 2022-05-20 RX ORDER — HEPARIN SODIUM,PORCINE 10 UNIT/ML
5 VIAL (ML) INTRAVENOUS
Status: CANCELLED | OUTPATIENT
Start: 2022-05-20

## 2022-05-20 RX ADMIN — KETAMINE HYDROCHLORIDE 40 MG: 50 INJECTION, SOLUTION INTRAMUSCULAR; INTRAVENOUS at 09:08

## 2022-05-20 ASSESSMENT — PAIN SCALES - GENERAL: PAINLEVEL: SEVERE PAIN (6)

## 2022-05-20 NOTE — PROGRESS NOTES
Infusion Nursing Note:  Janelle White presents today for ketamine infusion.    Patient seen by provider today: No   present during visit today: Not Applicable.    Note: Feels ketamine is helpful.      Intravenous Access:  Peripheral IV placed.    Treatment Conditions:  Not Applicable.      Post Infusion Assessment:  Patient tolerated infusion without incident.  Patient observed for 60 minutes post ketamine infusion, per protocol.  Blood return noted pre and post infusion.  Site patent and intact, free from redness, edema or discomfort.  No evidence of extravasations.  Access discontinued per protocol.       Discharge Plan:   Patient discharged in stable condition accompanied by: self.  Departure Mode: Ambulatory.  RTC next Tuesday.      Viktoria Mcdaniel

## 2022-05-24 ENCOUNTER — INFUSION THERAPY VISIT (OUTPATIENT)
Dept: INFUSION THERAPY | Facility: CLINIC | Age: 62
End: 2022-05-24
Attending: PSYCHIATRY & NEUROLOGY
Payer: COMMERCIAL

## 2022-05-24 VITALS
TEMPERATURE: 97.7 F | OXYGEN SATURATION: 100 % | DIASTOLIC BLOOD PRESSURE: 65 MMHG | SYSTOLIC BLOOD PRESSURE: 111 MMHG | RESPIRATION RATE: 16 BRPM | HEART RATE: 54 BPM | BODY MASS INDEX: 24.35 KG/M2 | WEIGHT: 146.3 LBS

## 2022-05-24 DIAGNOSIS — F33.2 SEVERE EPISODE OF RECURRENT MAJOR DEPRESSIVE DISORDER, WITHOUT PSYCHOTIC FEATURES (H): Primary | ICD-10-CM

## 2022-05-24 PROCEDURE — 96366 THER/PROPH/DIAG IV INF ADDON: CPT

## 2022-05-24 PROCEDURE — 96365 THER/PROPH/DIAG IV INF INIT: CPT

## 2022-05-24 PROCEDURE — 258N000003 HC RX IP 258 OP 636: Performed by: PSYCHIATRY & NEUROLOGY

## 2022-05-24 PROCEDURE — 250N000009 HC RX 250: Performed by: PSYCHIATRY & NEUROLOGY

## 2022-05-24 RX ORDER — EPINEPHRINE 1 MG/ML
0.3 INJECTION, SOLUTION, CONCENTRATE INTRAVENOUS EVERY 5 MIN PRN
Status: CANCELLED | OUTPATIENT
Start: 2022-05-24

## 2022-05-24 RX ORDER — MEPERIDINE HYDROCHLORIDE 25 MG/ML
25 INJECTION INTRAMUSCULAR; INTRAVENOUS; SUBCUTANEOUS EVERY 30 MIN PRN
Status: CANCELLED | OUTPATIENT
Start: 2022-05-24

## 2022-05-24 RX ORDER — ALBUTEROL SULFATE 0.83 MG/ML
2.5 SOLUTION RESPIRATORY (INHALATION)
Status: CANCELLED | OUTPATIENT
Start: 2022-05-24

## 2022-05-24 RX ORDER — METHYLPREDNISOLONE SODIUM SUCCINATE 125 MG/2ML
125 INJECTION, POWDER, LYOPHILIZED, FOR SOLUTION INTRAMUSCULAR; INTRAVENOUS
Status: CANCELLED
Start: 2022-05-24

## 2022-05-24 RX ORDER — HEPARIN SODIUM,PORCINE 10 UNIT/ML
5 VIAL (ML) INTRAVENOUS
Status: CANCELLED | OUTPATIENT
Start: 2022-05-24

## 2022-05-24 RX ORDER — HEPARIN SODIUM (PORCINE) LOCK FLUSH IV SOLN 100 UNIT/ML 100 UNIT/ML
5 SOLUTION INTRAVENOUS
Status: CANCELLED | OUTPATIENT
Start: 2022-05-24

## 2022-05-24 RX ORDER — DIPHENHYDRAMINE HYDROCHLORIDE 50 MG/ML
50 INJECTION INTRAMUSCULAR; INTRAVENOUS
Status: CANCELLED
Start: 2022-05-24

## 2022-05-24 RX ORDER — NALOXONE HYDROCHLORIDE 0.4 MG/ML
0.2 INJECTION, SOLUTION INTRAMUSCULAR; INTRAVENOUS; SUBCUTANEOUS
Status: CANCELLED | OUTPATIENT
Start: 2022-05-24

## 2022-05-24 RX ORDER — ONDANSETRON 2 MG/ML
4 INJECTION INTRAMUSCULAR; INTRAVENOUS
Status: CANCELLED | OUTPATIENT
Start: 2022-05-24

## 2022-05-24 RX ORDER — HYDRALAZINE HYDROCHLORIDE 20 MG/ML
10 INJECTION INTRAMUSCULAR; INTRAVENOUS
Status: CANCELLED | OUTPATIENT
Start: 2022-05-24

## 2022-05-24 RX ORDER — ALBUTEROL SULFATE 90 UG/1
1-2 AEROSOL, METERED RESPIRATORY (INHALATION)
Status: CANCELLED
Start: 2022-05-24

## 2022-05-24 RX ADMIN — KETAMINE HYDROCHLORIDE 40 MG: 50 INJECTION, SOLUTION INTRAMUSCULAR; INTRAVENOUS at 09:21

## 2022-05-24 NOTE — PROGRESS NOTES
"Infusion Nursing Note:  Janelle TORRES Rodolfopetey presents today for Ketamine.    Patient seen by provider today: No   present during visit today: Not Applicable.    Note: Patient reports she thinks the Ketamine is helping her Mood and Depression. Patient reports her \"lows\" are not as \"low\" as they once were.       Intravenous Access:  Peripheral IV placed right AC.     Treatment Conditions:  Not Applicable.      Post Infusion Assessment:  Patient tolerated infusion without incident.  Patient observed for 60 minutes post Ketamine per protocol.  No evidence of extravasations.  Access discontinued per protocol.       Discharge Plan:   Discharge instructions reviewed with: Patient.  Patient and/or family verbalized understanding of discharge instructions and all questions answered.  Patient discharged in stable condition accompanied by: self, has ride home from .   Departure Mode: Ambulatory.      Tabatha Small RN                      "

## 2022-05-27 ENCOUNTER — INFUSION THERAPY VISIT (OUTPATIENT)
Dept: INFUSION THERAPY | Facility: CLINIC | Age: 62
End: 2022-05-27
Attending: PSYCHIATRY & NEUROLOGY
Payer: COMMERCIAL

## 2022-05-27 VITALS
DIASTOLIC BLOOD PRESSURE: 65 MMHG | OXYGEN SATURATION: 99 % | SYSTOLIC BLOOD PRESSURE: 112 MMHG | RESPIRATION RATE: 16 BRPM | HEART RATE: 69 BPM | TEMPERATURE: 98.3 F

## 2022-05-27 DIAGNOSIS — F33.2 SEVERE EPISODE OF RECURRENT MAJOR DEPRESSIVE DISORDER, WITHOUT PSYCHOTIC FEATURES (H): Primary | ICD-10-CM

## 2022-05-27 PROCEDURE — 258N000003 HC RX IP 258 OP 636: Performed by: PSYCHIATRY & NEUROLOGY

## 2022-05-27 PROCEDURE — 96365 THER/PROPH/DIAG IV INF INIT: CPT

## 2022-05-27 PROCEDURE — 250N000009 HC RX 250: Performed by: PSYCHIATRY & NEUROLOGY

## 2022-05-27 RX ORDER — HEPARIN SODIUM (PORCINE) LOCK FLUSH IV SOLN 100 UNIT/ML 100 UNIT/ML
5 SOLUTION INTRAVENOUS
Status: CANCELLED | OUTPATIENT
Start: 2022-05-27

## 2022-05-27 RX ORDER — METHYLPREDNISOLONE SODIUM SUCCINATE 125 MG/2ML
125 INJECTION, POWDER, LYOPHILIZED, FOR SOLUTION INTRAMUSCULAR; INTRAVENOUS
Status: CANCELLED
Start: 2022-05-27

## 2022-05-27 RX ORDER — ONDANSETRON 2 MG/ML
4 INJECTION INTRAMUSCULAR; INTRAVENOUS
Status: CANCELLED | OUTPATIENT
Start: 2022-05-27

## 2022-05-27 RX ORDER — NALOXONE HYDROCHLORIDE 0.4 MG/ML
0.2 INJECTION, SOLUTION INTRAMUSCULAR; INTRAVENOUS; SUBCUTANEOUS
Status: CANCELLED | OUTPATIENT
Start: 2022-05-27

## 2022-05-27 RX ORDER — HEPARIN SODIUM,PORCINE 10 UNIT/ML
5 VIAL (ML) INTRAVENOUS
Status: CANCELLED | OUTPATIENT
Start: 2022-05-27

## 2022-05-27 RX ORDER — ONDANSETRON 2 MG/ML
4 INJECTION INTRAMUSCULAR; INTRAVENOUS
Status: DISCONTINUED | OUTPATIENT
Start: 2022-05-27 | End: 2022-05-27 | Stop reason: HOSPADM

## 2022-05-27 RX ORDER — HYDRALAZINE HYDROCHLORIDE 20 MG/ML
10 INJECTION INTRAMUSCULAR; INTRAVENOUS
Status: CANCELLED | OUTPATIENT
Start: 2022-05-27

## 2022-05-27 RX ORDER — ALBUTEROL SULFATE 0.83 MG/ML
2.5 SOLUTION RESPIRATORY (INHALATION)
Status: CANCELLED | OUTPATIENT
Start: 2022-05-27

## 2022-05-27 RX ORDER — MEPERIDINE HYDROCHLORIDE 25 MG/ML
25 INJECTION INTRAMUSCULAR; INTRAVENOUS; SUBCUTANEOUS EVERY 30 MIN PRN
Status: CANCELLED | OUTPATIENT
Start: 2022-05-27

## 2022-05-27 RX ORDER — ALBUTEROL SULFATE 90 UG/1
1-2 AEROSOL, METERED RESPIRATORY (INHALATION)
Status: CANCELLED
Start: 2022-05-27

## 2022-05-27 RX ORDER — DIPHENHYDRAMINE HYDROCHLORIDE 50 MG/ML
50 INJECTION INTRAMUSCULAR; INTRAVENOUS
Status: CANCELLED
Start: 2022-05-27

## 2022-05-27 RX ORDER — EPINEPHRINE 1 MG/ML
0.3 INJECTION, SOLUTION, CONCENTRATE INTRAVENOUS EVERY 5 MIN PRN
Status: CANCELLED | OUTPATIENT
Start: 2022-05-27

## 2022-05-27 RX ADMIN — KETAMINE HYDROCHLORIDE 40 MG: 50 INJECTION, SOLUTION INTRAMUSCULAR; INTRAVENOUS at 09:12

## 2022-05-27 NOTE — PROGRESS NOTES
Infusion Nursing Note:  Janelle White presents today for Ketamine.    Patient seen by provider today: No   present during visit today: Not Applicable.    Note: Patient reports Ketamine is helping her Depression.       Intravenous Access:  Peripheral IV placed left forearm.    Treatment Conditions:  Not Applicable.      Post Infusion Assessment:  Patient tolerated infusion without incident.  Patient observed for 60 minutes post Ketmine per protocol.  No evidence of extravasations.  Access discontinued per protocol.       Discharge Plan:   Discharge instructions reviewed with: Patient.  Patient and/or family verbalized understanding of discharge instructions and all questions answered.  Patient discharged in stable condition accompanied by: self, patient's  gives her a ride home.   Departure Mode: Ambulatory.      Tabatha Small RN

## 2022-05-31 ENCOUNTER — INFUSION THERAPY VISIT (OUTPATIENT)
Dept: INFUSION THERAPY | Facility: CLINIC | Age: 62
End: 2022-05-31
Attending: PSYCHIATRY & NEUROLOGY
Payer: COMMERCIAL

## 2022-05-31 VITALS
WEIGHT: 145.72 LBS | BODY MASS INDEX: 24.25 KG/M2 | SYSTOLIC BLOOD PRESSURE: 111 MMHG | OXYGEN SATURATION: 98 % | HEART RATE: 64 BPM | TEMPERATURE: 97.7 F | RESPIRATION RATE: 16 BRPM | DIASTOLIC BLOOD PRESSURE: 70 MMHG

## 2022-05-31 DIAGNOSIS — F33.2 SEVERE EPISODE OF RECURRENT MAJOR DEPRESSIVE DISORDER, WITHOUT PSYCHOTIC FEATURES (H): Primary | ICD-10-CM

## 2022-05-31 PROCEDURE — 250N000009 HC RX 250: Performed by: PSYCHIATRY & NEUROLOGY

## 2022-05-31 PROCEDURE — 258N000003 HC RX IP 258 OP 636: Performed by: PSYCHIATRY & NEUROLOGY

## 2022-05-31 PROCEDURE — 96365 THER/PROPH/DIAG IV INF INIT: CPT

## 2022-05-31 RX ORDER — DIPHENHYDRAMINE HYDROCHLORIDE 50 MG/ML
50 INJECTION INTRAMUSCULAR; INTRAVENOUS
Status: CANCELLED
Start: 2022-05-31

## 2022-05-31 RX ORDER — ONDANSETRON 2 MG/ML
4 INJECTION INTRAMUSCULAR; INTRAVENOUS
Status: CANCELLED | OUTPATIENT
Start: 2022-05-31

## 2022-05-31 RX ORDER — MEPERIDINE HYDROCHLORIDE 25 MG/ML
25 INJECTION INTRAMUSCULAR; INTRAVENOUS; SUBCUTANEOUS EVERY 30 MIN PRN
Status: CANCELLED | OUTPATIENT
Start: 2022-05-31

## 2022-05-31 RX ORDER — ALBUTEROL SULFATE 0.83 MG/ML
2.5 SOLUTION RESPIRATORY (INHALATION)
Status: CANCELLED | OUTPATIENT
Start: 2022-05-31

## 2022-05-31 RX ORDER — HEPARIN SODIUM (PORCINE) LOCK FLUSH IV SOLN 100 UNIT/ML 100 UNIT/ML
5 SOLUTION INTRAVENOUS
Status: CANCELLED | OUTPATIENT
Start: 2022-05-31

## 2022-05-31 RX ORDER — ALBUTEROL SULFATE 90 UG/1
1-2 AEROSOL, METERED RESPIRATORY (INHALATION)
Status: CANCELLED
Start: 2022-05-31

## 2022-05-31 RX ORDER — METHYLPREDNISOLONE SODIUM SUCCINATE 125 MG/2ML
125 INJECTION, POWDER, LYOPHILIZED, FOR SOLUTION INTRAMUSCULAR; INTRAVENOUS
Status: CANCELLED
Start: 2022-05-31

## 2022-05-31 RX ORDER — EPINEPHRINE 1 MG/ML
0.3 INJECTION, SOLUTION, CONCENTRATE INTRAVENOUS EVERY 5 MIN PRN
Status: CANCELLED | OUTPATIENT
Start: 2022-05-31

## 2022-05-31 RX ORDER — NALOXONE HYDROCHLORIDE 0.4 MG/ML
0.2 INJECTION, SOLUTION INTRAMUSCULAR; INTRAVENOUS; SUBCUTANEOUS
Status: CANCELLED | OUTPATIENT
Start: 2022-05-31

## 2022-05-31 RX ORDER — HYDRALAZINE HYDROCHLORIDE 20 MG/ML
10 INJECTION INTRAMUSCULAR; INTRAVENOUS
Status: CANCELLED | OUTPATIENT
Start: 2022-05-31

## 2022-05-31 RX ORDER — HEPARIN SODIUM,PORCINE 10 UNIT/ML
5 VIAL (ML) INTRAVENOUS
Status: CANCELLED | OUTPATIENT
Start: 2022-05-31

## 2022-05-31 RX ADMIN — KETAMINE HYDROCHLORIDE 50 MG: 50 INJECTION, SOLUTION INTRAMUSCULAR; INTRAVENOUS at 08:50

## 2022-05-31 NOTE — PROGRESS NOTES
"Infusion Nursing Note:  Janelle TORRES Christopher presents today for Ketamine.    Patient seen by provider today: No   present during visit today: Not Applicable.    Note: Patient reports Ketamine is helping both mood and depression. Patient states \"It's helping me stay calm. I don't have that constat irritability. Helps me to really relax, and I don't think about Suicide all the time which is huge\".       Intravenous Access:  Peripheral IV placed left forearm.    Treatment Conditions:  Not Applicable.      Post Infusion Assessment:  Patient tolerated infusion without incident.  Patient observed for 60 minutes post Ketamine Therapy Plan per protocol.  No evidence of extravasations.  Access discontinued per protocol.       Discharge Plan:   Discharge instructions reviewed with: Patient.  Patient and/or family verbalized understanding of discharge instructions and all questions answered.  Patient discharged in stable condition accompanied by: self.  Departure Mode: Ambulatory,  gives patient a ride home.       Tabatha Small RN                      "

## 2022-06-03 ENCOUNTER — INFUSION THERAPY VISIT (OUTPATIENT)
Dept: INFUSION THERAPY | Facility: CLINIC | Age: 62
End: 2022-06-03
Attending: PSYCHIATRY & NEUROLOGY
Payer: COMMERCIAL

## 2022-06-03 VITALS
OXYGEN SATURATION: 97 % | SYSTOLIC BLOOD PRESSURE: 109 MMHG | HEART RATE: 59 BPM | DIASTOLIC BLOOD PRESSURE: 64 MMHG | RESPIRATION RATE: 16 BRPM

## 2022-06-03 DIAGNOSIS — F33.2 SEVERE EPISODE OF RECURRENT MAJOR DEPRESSIVE DISORDER, WITHOUT PSYCHOTIC FEATURES (H): Primary | ICD-10-CM

## 2022-06-03 PROCEDURE — 258N000003 HC RX IP 258 OP 636: Performed by: PSYCHIATRY & NEUROLOGY

## 2022-06-03 PROCEDURE — 96365 THER/PROPH/DIAG IV INF INIT: CPT

## 2022-06-03 PROCEDURE — 250N000009 HC RX 250: Performed by: PSYCHIATRY & NEUROLOGY

## 2022-06-03 RX ORDER — NALOXONE HYDROCHLORIDE 0.4 MG/ML
0.2 INJECTION, SOLUTION INTRAMUSCULAR; INTRAVENOUS; SUBCUTANEOUS
Status: CANCELLED | OUTPATIENT
Start: 2022-06-03

## 2022-06-03 RX ORDER — ONDANSETRON 2 MG/ML
4 INJECTION INTRAMUSCULAR; INTRAVENOUS
Status: CANCELLED | OUTPATIENT
Start: 2022-06-03

## 2022-06-03 RX ORDER — ALBUTEROL SULFATE 90 UG/1
1-2 AEROSOL, METERED RESPIRATORY (INHALATION)
Status: CANCELLED
Start: 2022-06-03

## 2022-06-03 RX ORDER — HYDRALAZINE HYDROCHLORIDE 20 MG/ML
10 INJECTION INTRAMUSCULAR; INTRAVENOUS
Status: CANCELLED | OUTPATIENT
Start: 2022-06-03

## 2022-06-03 RX ORDER — HEPARIN SODIUM (PORCINE) LOCK FLUSH IV SOLN 100 UNIT/ML 100 UNIT/ML
5 SOLUTION INTRAVENOUS
Status: CANCELLED | OUTPATIENT
Start: 2022-06-03

## 2022-06-03 RX ORDER — ALBUTEROL SULFATE 0.83 MG/ML
2.5 SOLUTION RESPIRATORY (INHALATION)
Status: CANCELLED | OUTPATIENT
Start: 2022-06-03

## 2022-06-03 RX ORDER — DIPHENHYDRAMINE HYDROCHLORIDE 50 MG/ML
50 INJECTION INTRAMUSCULAR; INTRAVENOUS
Status: CANCELLED
Start: 2022-06-03

## 2022-06-03 RX ORDER — EPINEPHRINE 1 MG/ML
0.3 INJECTION, SOLUTION, CONCENTRATE INTRAVENOUS EVERY 5 MIN PRN
Status: CANCELLED | OUTPATIENT
Start: 2022-06-03

## 2022-06-03 RX ORDER — METHYLPREDNISOLONE SODIUM SUCCINATE 125 MG/2ML
125 INJECTION, POWDER, LYOPHILIZED, FOR SOLUTION INTRAMUSCULAR; INTRAVENOUS
Status: CANCELLED
Start: 2022-06-03

## 2022-06-03 RX ORDER — HEPARIN SODIUM,PORCINE 10 UNIT/ML
5 VIAL (ML) INTRAVENOUS
Status: CANCELLED | OUTPATIENT
Start: 2022-06-03

## 2022-06-03 RX ORDER — MEPERIDINE HYDROCHLORIDE 25 MG/ML
25 INJECTION INTRAMUSCULAR; INTRAVENOUS; SUBCUTANEOUS EVERY 30 MIN PRN
Status: CANCELLED | OUTPATIENT
Start: 2022-06-03

## 2022-06-03 RX ADMIN — KETAMINE HYDROCHLORIDE 50 MG: 50 INJECTION, SOLUTION INTRAMUSCULAR; INTRAVENOUS at 09:22

## 2022-06-05 ASSESSMENT — PATIENT HEALTH QUESTIONNAIRE - PHQ9
10. IF YOU CHECKED OFF ANY PROBLEMS, HOW DIFFICULT HAVE THESE PROBLEMS MADE IT FOR YOU TO DO YOUR WORK, TAKE CARE OF THINGS AT HOME, OR GET ALONG WITH OTHER PEOPLE: VERY DIFFICULT
SUM OF ALL RESPONSES TO PHQ QUESTIONS 1-9: 12
SUM OF ALL RESPONSES TO PHQ QUESTIONS 1-9: 12

## 2022-06-06 ENCOUNTER — VIRTUAL VISIT (OUTPATIENT)
Dept: PSYCHOLOGY | Facility: CLINIC | Age: 62
End: 2022-06-06
Attending: PSYCHIATRY & NEUROLOGY
Payer: COMMERCIAL

## 2022-06-06 DIAGNOSIS — F33.2 SEVERE EPISODE OF RECURRENT MAJOR DEPRESSIVE DISORDER, WITHOUT PSYCHOTIC FEATURES (H): ICD-10-CM

## 2022-06-06 PROCEDURE — 90839 PSYTX CRISIS INITIAL 60 MIN: CPT | Mod: 95 | Performed by: SOCIAL WORKER

## 2022-06-06 NOTE — PROGRESS NOTES
"[facility]                                     Progress Note    Patient Name: Janelle White  Date: 2022         Service Type: Individual      Session Start Time: 10:35 am  Session End Time: 11:26 am      Session Length: 51 minutes     Session #: 1st session     Attendees: Client attended alone    Service Modality:  Video Visit:      Provider verified identity through the following two step process.  Patient provided:  Patient photo, Patient  and Patient was verified at admission/transfer    Telemedicine Visit: The patient's condition can be safely assessed and treated via synchronous audio and visual telemedicine encounter.      Reason for Telemedicine Visit: Patient has requested telehealth visit    Originating Site (Patient Location): Patient's home    Distant Site (Provider Location): Provider Remote Setting- Home Office    Consent:  The patient/guardian has verbally consented to: the potential risks and benefits of telemedicine (video visit) versus in person care; bill my insurance or make self-payment for services provided; and responsibility for payment of non-covered services.     Patient would like the video invitation sent by:  My Chart    Mode of Communication:  Video Conference via [employer]well    As the provider I attest to compliance with applicable laws and regulations related to telemedicine.    DATA  Interactive Complexity: No  Crisis: Yes, visit entailed Crisis Management / Stabilization requiring urgent assessment and history of the crisis state, mental status exam and disposition        Progress Since Last Session (Related to Symptoms / Goals / Homework):   Symptoms:   The client is very tearful throughout the session. She reports a lot of physical pain, feeling \"toxic\", uncertain how she is feeling. She reports she feels \"so burnt out\".     Homework: None first session       Episode of Care Goals: No improvement - PREPARATION (Decided to change - considering " "how); Intervened by negotiating a change plan and determining options / strategies for behavior change, identifying triggers, exploring social supports, and working towards setting a date to begin behavior change     Current / Ongoing Stressors and Concerns:   She reports they need to downsize at their home and she cannot physically help with things without being \"down  for days\". She reports she will try to do things (physical) and then she will be down for days.      Treatment Objective(s) Addressed in This Session:   Worked on reviewing safety planning and talking about crisis      Intervention:   Solution Focused: Focused on safety planning, client reports she can go to [facility] for [facility] care, utilize saftey plan, etc. She reports when she was suicidal she has gotten together with friends and her mom for support. She denies that she talks to anyone when she feels suicidal. She reports that typically she will isolate or \"get away and walk it out\" when she feels suicidal\". At times a nap can also be helpful.     Assessments completed prior to visit:  PHQ9:   PHQ-9 SCORE 2/16/2022 2/17/2022 2/18/2022 3/9/2022 4/12/2022 5/17/2022 6/5/2022   PHQ-9 Total Score - - - - - - -   PHQ-9 Total Score MyChart - - - - 18 (Moderately severe depression) - 12 (Moderate depression)   PHQ-9 Total Score 19 26 18 19 18 12 12     GAD2:   STACIE-2 1/13/2022 4/7/2022 4/7/2022 4/7/2022 6/3/2022   Feeling nervous, anxious, or on edge 1 1 1 1 1   Not being able to stop or control worrying 1 0 0 0 1   STACIE-2 Total Score 2 1 1 1 2     CAGE-AID:   CAGE-AID Total Score 6/3/2022   Total Score 3   Total Score MyChart 3 (A total score of 2 or greater is considered clinically significant)     PROMIS 10-Global Health (all questions and answers displayed):   PROMIS 10 1/13/2022 6/3/2022   In general, would you say your health is: Good Good   In general, would you say your quality of life is: Fair Fair   In general, how would you rate your " physical health? Good Fair   In general, how would you rate your mental health, including your mood and your ability to think? Poor Poor   In general, how would you rate your satisfaction with your social activities and relationships? Poor Fair   In general, please rate how well you carry out your usual social activities and roles Poor Poor   To what extent are you able to carry out your everyday physical activities such as walking, climbing stairs, carrying groceries, or moving a chair? A little A little   How often have you been bothered by emotional problems such as feeling anxious, depressed or irritable? Always Sometimes   How would you rate your fatigue on average? Severe Moderate   How would you rate your pain on average?   0 = No Pain  to  10 = Worst Imaginable Pain 4 6   In general, would you say your health is: 3 3   In general, would you say your quality of life is: 2 2   In general, how would you rate your physical health? 3 2   In general, how would you rate your mental health, including your mood and your ability to think? 1 1   In general, how would you rate your satisfaction with your social activities and relationships? 1 2   In general, please rate how well you carry out your usual social activities and roles. (This includes activities at home, at work and in your community, and responsibilities as a parent, child, spouse, employee, friend, etc.) 1 1   To what extent are you able to carry out your everyday physical activities such as walking, climbing stairs, carrying groceries, or moving a chair? 2 2   In the past 7 days, how often have you been bothered by emotional problems such as feeling anxious, depressed, or irritable? 5 3   In the past 7 days, how would you rate your fatigue on average? 4 3   In the past 7 days, how would you rate your pain on average, where 0 means no pain, and 10 means worst imaginable pain? 4 6   Global Mental Health Score 5 8   Global Physical Health Score 10 10    PROMIS TOTAL - SUBSCORES 15 18   Some recent data might be hidden     [place] Suicide Severity Rating Scale (Lifetime/Recent)  Union Suicide Severity Rating (Lifetime/Recent) 3/11/2021   Wish to be Dead (Lifetime) Yes   Non-Specific Active Suicidal Thoughts (Lifetime) Yes   Most Severe Ideation Rating (Lifetime) 5   Controllability (Lifetime) 4   RETIRED: 1. Wish to be Dead (Recent) Yes   RETIRED: 2. Non-Specific Active Suicidal Thoughts (Recent) Yes   3. Active Suicidal Ideation with any Methods (Not Plan) Without Intent to Act (Lifetime) No   RETIRED: 3. Active Suicidal Ideation with any Methods (Not Plan) Without Intent to Act (Recent) No   RETIRE: 4. Active Suicidal Ideation with Some Intent to Act, Without Specific Plan (Lifetime) Yes   4. Active Suicidal Ideation with Some Intent to Act, Without Specific Plan (Recent) Yes   RETIRE: 5. Active Suicidal Ideation with Specific Plan and Intent (Lifetime) Yes   RETIRED: 5. Active Suicidal Ideation with Specific Plan and Intent (Recent) Yes   RETIRED: Active Suicidal Ideation with Specific Plan and Intent Description (Recent) has a current plan but will not disclose and would follow through with plan but has not determined how to do it without her family finding her body   Most Severe Ideation Rating (Past Month) 4   Frequency (Past Month) 4   Controllability (Past Month) 3   Protective Factors (Past Month) 3   Actual Attempt (Lifetime) No   Actual Attempt (Past 3 Months) No   Has subject engaged in non-suicidal self-injurious behavior? (Lifetime) Yes   Has subject engaged in non-suicidal self-injurious behavior? (Past 3 Months) No   Interrupted Attempts (Lifetime) No   Interrupted Attempts (Past 3 Months) No   Aborted or Self-Interrupted Attempt (Lifetime) No   Aborted or Self-Interrupted Attempt (Past 3 Months) No   Preparatory Acts or Behavior (Lifetime) No   Preparatory Acts or Behavior (Past 3 Months) No         ASSESSMENT: Current Emotional / Mental  "Status (status of significant symptoms):   Risk status (Self / Other harm or suicidal ideation)   Patient denies current fears or concerns for personal safety.   Patient reports the following current or recent suicidal ideation or behaviors: reports thoughts of suicide with no plans to harm herself. \"I am not going to act on it.\". She reports thoughts of, \"I can't do this anymore. If I have to physically feel this way and mentally I don't have the energy. I don't see the future right now.\" She reports she has a plan, but she hasn't followed through with the plan. The client said if she tells her plan she is afraid it wouldn't be there for her if she wants to end her life. She expressed that having the plan she has is something she doesn't want to have taken from her and says, \"I just am not convinced they wouldn't be better off. I am having a hard time praying and feeling.\" She then said she does not have a plan now and does not plan to act on anything she has thought of. She stated that she does not feel \"the level of threat at this point that she felt a month ago.\" She says she is uncertain she would get to the hospital if she were feeling suicidal in the moment and says, \"hopefully I would be busy, do something, or go to sleep and feel differently\".    Patient denies current or recent homicidal ideation or behaviors.   Patient denies current or recent self injurious behavior or ideation.   Patient denies other safety concerns.   Patient reports there has been no change in risk factors since their last session.     Patient reports there has been no change in protective factors since their last session.       [facility]                                       Patient's Name: Janelle White  March 11, 2021     SAFETY PLAN:  Step 1: Warning signs / cues (Thoughts, images, mood, situation, behavior) that a crisis may be developing:  ? Thoughts: \"I don't matter\", \"People would be better " "off without me\", \"I can't do this anymore\" and \"I just want this to end\"  ? Images: obsessive thoughts of death or dying: thoughts of carrying out plan  ? Thinking Processes: ruminations (can't stop thinking about my problems): ruminate on my plan and highly critical and negative thoughts: if  says something critical i shut down  ? Mood: worsening depression, hopelessness, disinhibited (not caring about things or consequences) and worthlessness  ? Behaviors: isolating/withdrawing , can't stop crying, not taking care of myself and not taking care of my responsibilities  ? Situations: relationship problems   Step 2: Coping strategies - Things I can do to take my mind off of my problems without contacting another person (relaxation technique, physical activity):  ? Distress Tolerance Strategies:  play with my pet  and watch a funny movie:    ? Physical Activities: go for a walk and get in the jacuzzi  ? Focus on helpful thoughts:  \"This is temporary\"  Step 3: People and social settings that provide distraction:                 Name: Alyx         Phone: in my phone                 Name: Raven      Phone: in my phone  ? park                 Step 4: Remind myself of people and things that are important to me and worth living for:  Children, dog, friends  Step 5: When I am in crisis, I can ask these people to help me use my safety plan:                 Name: Alyx         Phone: in my phone                 Name: Raven      Phone: in my phone  Step 6: Making the environment safe:   ? none identified  Step 7: Professionals or agencies I can contact during a crisis:  ? Suicide Prevention Lifeline: 5-789-827-TALK (4557)  ? Crisis Text Line Service: Text   HOME  to 028-486.    Local Crisis Services: [facility]     Call 811 or go to my nearest emergency department.       I helped develop this safety plan and agree to use it when needed.  I have been given a copy of this plan.       Patient signature: " _______________________________________________________________  Today s date:  March 11, 2021  Adapted from Safety Plan Template 2008 Antionette Trevino and Joseph Leon is reprinted with the express permission of the authors.  No portion of the Safety Plan Template may be reproduced without the express, written permission.  You can contact the authors at bhs@[facility] or marleny@mail.[facility].     Appearance:   Appropriate    Eye Contact:   Good    Psychomotor Behavior: Normal    Attitude:   Cooperative  Friendly Pleasant   Orientation:   All   Speech    Rate / Production: Normal/ Responsive    Volume:  Normal    Mood:    Sad    Affect:    Appropriate    Thought Content:  Clear    Thought Form:  Coherent  Logical    Insight:    Good      Medication Review:   No changes to current psychiatric medication(s)     Medication Compliance:   Yes     Changes in Health Issues:   None reported     Chemical Use Review:   Substance Use: Chemical use reviewed, no active concerns identified      Tobacco Use: No current tobacco use.      Diagnosis:  1. Severe episode of recurrent major depressive disorder, without psychotic features (H)    2.      Suicidal ideation     Collateral Reports Completed:   Communicated with Dr. Perez or Melvin Manzo PsyD, LP    PLAN: (Patient Tasks / Therapist Tasks / Other)    This client likely could utilize a higher level of care given suicidal ideation, however given this was our first meeting this writer will continue to assess. Messages have been sent to the client's primary psychiatric providers to ask for input and awaiting to hear back from them.       Janelle Brooks, [facility] CenterSW, [facility] [place] [facility] [place]                                                         Answers for HPI/ROS submitted by the patient on 6/5/2022  If you checked off any problems, how difficult have these problems made it for you to do your work, take care of things at home, or get along with other people?: Very difficult  PHQ9 TOTAL  SCORE: 12

## 2022-06-07 ENCOUNTER — INFUSION THERAPY VISIT (OUTPATIENT)
Dept: INFUSION THERAPY | Facility: CLINIC | Age: 62
End: 2022-06-07
Attending: PSYCHIATRY & NEUROLOGY
Payer: COMMERCIAL

## 2022-06-07 VITALS
SYSTOLIC BLOOD PRESSURE: 113 MMHG | RESPIRATION RATE: 16 BRPM | DIASTOLIC BLOOD PRESSURE: 76 MMHG | HEART RATE: 57 BPM | OXYGEN SATURATION: 97 %

## 2022-06-07 DIAGNOSIS — F33.2 SEVERE EPISODE OF RECURRENT MAJOR DEPRESSIVE DISORDER, WITHOUT PSYCHOTIC FEATURES (H): Primary | ICD-10-CM

## 2022-06-07 PROCEDURE — 250N000009 HC RX 250: Performed by: PSYCHIATRY & NEUROLOGY

## 2022-06-07 PROCEDURE — 258N000003 HC RX IP 258 OP 636: Performed by: PSYCHIATRY & NEUROLOGY

## 2022-06-07 PROCEDURE — 96365 THER/PROPH/DIAG IV INF INIT: CPT

## 2022-06-07 RX ORDER — MEPERIDINE HYDROCHLORIDE 25 MG/ML
25 INJECTION INTRAMUSCULAR; INTRAVENOUS; SUBCUTANEOUS EVERY 30 MIN PRN
Status: CANCELLED | OUTPATIENT
Start: 2022-06-07

## 2022-06-07 RX ORDER — METHYLPREDNISOLONE SODIUM SUCCINATE 125 MG/2ML
125 INJECTION, POWDER, LYOPHILIZED, FOR SOLUTION INTRAMUSCULAR; INTRAVENOUS
Status: CANCELLED
Start: 2022-06-07

## 2022-06-07 RX ORDER — NALOXONE HYDROCHLORIDE 0.4 MG/ML
0.2 INJECTION, SOLUTION INTRAMUSCULAR; INTRAVENOUS; SUBCUTANEOUS
Status: CANCELLED | OUTPATIENT
Start: 2022-06-07

## 2022-06-07 RX ORDER — HYDRALAZINE HYDROCHLORIDE 20 MG/ML
10 INJECTION INTRAMUSCULAR; INTRAVENOUS
Status: CANCELLED | OUTPATIENT
Start: 2022-06-07

## 2022-06-07 RX ORDER — HEPARIN SODIUM (PORCINE) LOCK FLUSH IV SOLN 100 UNIT/ML 100 UNIT/ML
5 SOLUTION INTRAVENOUS
Status: CANCELLED | OUTPATIENT
Start: 2022-06-07

## 2022-06-07 RX ORDER — EPINEPHRINE 1 MG/ML
0.3 INJECTION, SOLUTION, CONCENTRATE INTRAVENOUS EVERY 5 MIN PRN
Status: CANCELLED | OUTPATIENT
Start: 2022-06-07

## 2022-06-07 RX ORDER — ALBUTEROL SULFATE 0.83 MG/ML
2.5 SOLUTION RESPIRATORY (INHALATION)
Status: CANCELLED | OUTPATIENT
Start: 2022-06-07

## 2022-06-07 RX ORDER — HEPARIN SODIUM,PORCINE 10 UNIT/ML
5 VIAL (ML) INTRAVENOUS
Status: CANCELLED | OUTPATIENT
Start: 2022-06-07

## 2022-06-07 RX ORDER — DIPHENHYDRAMINE HYDROCHLORIDE 50 MG/ML
50 INJECTION INTRAMUSCULAR; INTRAVENOUS
Status: CANCELLED
Start: 2022-06-07

## 2022-06-07 RX ORDER — ONDANSETRON 2 MG/ML
4 INJECTION INTRAMUSCULAR; INTRAVENOUS
Status: CANCELLED | OUTPATIENT
Start: 2022-06-07

## 2022-06-07 RX ORDER — ALBUTEROL SULFATE 90 UG/1
1-2 AEROSOL, METERED RESPIRATORY (INHALATION)
Status: CANCELLED
Start: 2022-06-07

## 2022-06-07 RX ADMIN — KETAMINE HYDROCHLORIDE 50 MG: 50 INJECTION, SOLUTION INTRAMUSCULAR; INTRAVENOUS at 09:12

## 2022-06-07 NOTE — PROGRESS NOTES
Infusion Nursing Note:  Janelle White presents today for scheduled Ketamine infusion.    Patient seen by provider today: No   present during visit today: Not Applicable.    Note: Patient states that she continues to do well with Ketamine treatments.      Intravenous Access:  Peripheral IV placed.    Treatment Conditions:  Not Applicable.      Post Infusion Assessment:  Patient tolerated infusion without incident.  Patient observed for 60 minutes post Ketamine per protocol.  Blood return noted pre and post infusion.  Site patent and intact, free from redness, edema or discomfort.  No evidence of extravasations.  Access discontinued per protocol.       Discharge Plan:   Discharge instructions reviewed with: Patient.  Patient and/or family verbalized understanding of discharge instructions and all questions answered.  Patient discharged in stable condition accompanied by: self.  Departure Mode: Ambulatory.      Kathia Quinn RN

## 2022-06-10 ENCOUNTER — MYC REFILL (OUTPATIENT)
Dept: PSYCHIATRY | Facility: CLINIC | Age: 62
End: 2022-06-10
Payer: COMMERCIAL

## 2022-06-10 DIAGNOSIS — F33.2 SEVERE EPISODE OF RECURRENT MAJOR DEPRESSIVE DISORDER, WITHOUT PSYCHOTIC FEATURES (H): ICD-10-CM

## 2022-06-10 DIAGNOSIS — F33.9 RECURRENT MAJOR DEPRESSION RESISTANT TO TREATMENT (H): ICD-10-CM

## 2022-06-10 ASSESSMENT — ANXIETY QUESTIONNAIRES
7. FEELING AFRAID AS IF SOMETHING AWFUL MIGHT HAPPEN: NOT AT ALL
4. TROUBLE RELAXING: NEARLY EVERY DAY
GAD7 TOTAL SCORE: 12
1. FEELING NERVOUS, ANXIOUS, OR ON EDGE: NEARLY EVERY DAY
6. BECOMING EASILY ANNOYED OR IRRITABLE: NEARLY EVERY DAY
2. NOT BEING ABLE TO STOP OR CONTROL WORRYING: SEVERAL DAYS
GAD7 TOTAL SCORE: 12
3. WORRYING TOO MUCH ABOUT DIFFERENT THINGS: SEVERAL DAYS
5. BEING SO RESTLESS THAT IT IS HARD TO SIT STILL: SEVERAL DAYS
8. IF YOU CHECKED OFF ANY PROBLEMS, HOW DIFFICULT HAVE THESE MADE IT FOR YOU TO DO YOUR WORK, TAKE CARE OF THINGS AT HOME, OR GET ALONG WITH OTHER PEOPLE?: SOMEWHAT DIFFICULT
7. FEELING AFRAID AS IF SOMETHING AWFUL MIGHT HAPPEN: NOT AT ALL
GAD7 TOTAL SCORE: 12

## 2022-06-10 NOTE — TELEPHONE ENCOUNTER
Patient would like to pick this medication up on 6/10 instead of 6/14 when it is due to be released from the pharmacy. Provider to advise.

## 2022-06-11 RX ORDER — DEXTROAMPHETAMINE SACCHARATE, AMPHETAMINE ASPARTATE MONOHYDRATE, DEXTROAMPHETAMINE SULFATE AND AMPHETAMINE SULFATE 5; 5; 5; 5 MG/1; MG/1; MG/1; MG/1
20 CAPSULE, EXTENDED RELEASE ORAL DAILY
Qty: 30 CAPSULE | Refills: 0 | Status: SHIPPED | OUTPATIENT
Start: 2022-06-11 | End: 2022-06-23

## 2022-06-11 RX ORDER — DEXTROAMPHETAMINE SACCHARATE, AMPHETAMINE ASPARTATE, DEXTROAMPHETAMINE SULFATE AND AMPHETAMINE SULFATE 5; 5; 5; 5 MG/1; MG/1; MG/1; MG/1
20 TABLET ORAL DAILY
Qty: 30 TABLET | Refills: 0 | Status: SHIPPED | OUTPATIENT
Start: 2022-06-11 | End: 2022-06-23

## 2022-06-13 ENCOUNTER — VIRTUAL VISIT (OUTPATIENT)
Dept: FAMILY MEDICINE | Facility: CLINIC | Age: 62
End: 2022-06-13
Payer: COMMERCIAL

## 2022-06-13 ENCOUNTER — VIRTUAL VISIT (OUTPATIENT)
Dept: PSYCHOLOGY | Facility: CLINIC | Age: 62
End: 2022-06-13
Payer: COMMERCIAL

## 2022-06-13 DIAGNOSIS — F33.2 MAJOR DEPRESSIVE DISORDER, RECURRENT, SEVERE W/O PSYCHOTIC BEHAVIOR (H): Primary | ICD-10-CM

## 2022-06-13 DIAGNOSIS — G89.4 CHRONIC PAIN SYNDROME: Primary | ICD-10-CM

## 2022-06-13 PROCEDURE — 99214 OFFICE O/P EST MOD 30 MIN: CPT | Mod: 95 | Performed by: NURSE PRACTITIONER

## 2022-06-13 PROCEDURE — 90837 PSYTX W PT 60 MINUTES: CPT | Mod: 95 | Performed by: SOCIAL WORKER

## 2022-06-13 RX ORDER — CYCLOBENZAPRINE HCL 10 MG
10 TABLET ORAL
Qty: 30 TABLET | Refills: 0 | Status: SHIPPED | OUTPATIENT
Start: 2022-06-13 | End: 2022-06-30

## 2022-06-13 ASSESSMENT — PATIENT HEALTH QUESTIONNAIRE - PHQ9
SUM OF ALL RESPONSES TO PHQ QUESTIONS 1-9: 18
SUM OF ALL RESPONSES TO PHQ QUESTIONS 1-9: 18
10. IF YOU CHECKED OFF ANY PROBLEMS, HOW DIFFICULT HAVE THESE PROBLEMS MADE IT FOR YOU TO DO YOUR WORK, TAKE CARE OF THINGS AT HOME, OR GET ALONG WITH OTHER PEOPLE: EXTREMELY DIFFICULT

## 2022-06-13 NOTE — PROGRESS NOTES
"Janelle is a 61 year old who is being evaluated via a billable video visit.      How would you like to obtain your AVS? MyChart  If the video visit is dropped, the invitation should be resent by: Text to cell phone: [phone]  Will anyone else be joining your video visit? No        Assessment & Plan     Chronic pain syndrome  Janelle has made tremendous progress in reducing her narcotic pain medication.  We did discuss that some of her increased pain and fatigue feelings are likely related to cutting her MS Contin cold turkey.  At this time she feels that she is made significant progress and is motivated to continue without the narcotics, though she still has a few Norco available if her pain increases significantly.  She has found Flexeril more beneficial at nighttime and I am fine with her taking additional doses as she adjust to coming off of her MS Contin.  She can continue her increased dose of 1 g of Robaxin 3 times daily as well.  Overall she feels this is mindset adjustment she realizes that she can survive without his medications.  She had discussed possibly starting Suboxone, however, hopefully these rebound pain symptoms will continue to improve.  She has in person visit scheduled with me in 2 weeks but was instructed to contact me sooner if her symptoms are worsening.  - cyclobenzaprine (FLEXERIL) 10 MG tablet; Take 1 tablet (10 mg) by mouth nightly as needed for muscle spasms          Return in about 2 weeks (around 6/27/2022) for Pain follow up.    BRIONNA Jordan [facility]    Subjective   Janelle is a 61 year old who presents for the following health issues     Initially was to discuss another refill of MS Contin  Has been without MS Contin for 7 days  Has not been taking Norco     Wasn't easy, the quality of pain is worse without it. MS Contin allowed it to be \"noise in the background\".  Suprisingly, pain is not exponentially worse.  Having some difficulty " "moving through the day.  Constantly needing something to alleviate throughout the day.    Robaxin 1gm TID for last 2 days.  Three days of bad sleep.  Flexeril usually helps better with sleep, robaxin during the day as she does not feel as tired.    Will be seeing a podiatrist.  Not sure if back, hip- not a new feeling.  Right hip down into leg.  Big toe feels like \"fireworks going off\"  Has \"electrical burning\" neuropathy.  Doesn't feel like in flare, but more noticeable.        Has been hesitant about suboxone- but wondering if this should be considered? Ketamine has been very helpful getting to this place, allowing her to feel.  Mind is improved.  Will be going to every 2 week administration after tomorrow's infusion      Really only has about 4 hours of productivity through the day right now since going off MS Contin.  Feels it is a good thing to realize that can survive without these medications.      History of Present Illness       Reason for visit:  Discuss plan    She eats 4 or more servings of fruits and vegetables daily.She consumes 0 sweetened beverage(s) daily.She exercises with enough effort to increase her heart rate 30 to 60 minutes per day.  She exercises with enough effort to increase her heart rate 6 days per week.   She is taking medications regularly.             Review of Systems   Constitutional, HEENT, cardiovascular, pulmonary, gi and gu systems are negative, except as otherwise noted.      Objective           Vitals:  No vitals were obtained today due to virtual visit.    Physical Exam   GENERAL: Healthy, alert and no distress  EYES: Eyes grossly normal to inspection.  No discharge or erythema, or obvious scleral/conjunctival abnormalities.  RESP: No audible wheeze, cough, or visible cyanosis.  No visible retractions or increased work of breathing.    SKIN: Visible skin clear. No significant rash, abnormal pigmentation or lesions.  NEURO: Cranial nerves grossly intact.  Mentation and speech " appropriate for age.  PSYCH: mentation appears normal, affect flat and judgement and insight intact                Phone call duration: 20 minutes

## 2022-06-14 ENCOUNTER — INFUSION THERAPY VISIT (OUTPATIENT)
Dept: INFUSION THERAPY | Facility: CLINIC | Age: 62
End: 2022-06-14
Attending: PSYCHIATRY & NEUROLOGY
Payer: COMMERCIAL

## 2022-06-14 VITALS
SYSTOLIC BLOOD PRESSURE: 110 MMHG | TEMPERATURE: 98 F | RESPIRATION RATE: 16 BRPM | DIASTOLIC BLOOD PRESSURE: 74 MMHG | HEART RATE: 61 BPM | OXYGEN SATURATION: 100 %

## 2022-06-14 DIAGNOSIS — F33.2 SEVERE EPISODE OF RECURRENT MAJOR DEPRESSIVE DISORDER, WITHOUT PSYCHOTIC FEATURES (H): Primary | ICD-10-CM

## 2022-06-14 PROCEDURE — 250N000009 HC RX 250: Performed by: PSYCHIATRY & NEUROLOGY

## 2022-06-14 PROCEDURE — 96365 THER/PROPH/DIAG IV INF INIT: CPT

## 2022-06-14 PROCEDURE — 258N000003 HC RX IP 258 OP 636: Performed by: PSYCHIATRY & NEUROLOGY

## 2022-06-14 RX ORDER — METHYLPREDNISOLONE SODIUM SUCCINATE 125 MG/2ML
125 INJECTION, POWDER, LYOPHILIZED, FOR SOLUTION INTRAMUSCULAR; INTRAVENOUS
Status: CANCELLED
Start: 2022-06-14

## 2022-06-14 RX ORDER — HEPARIN SODIUM,PORCINE 10 UNIT/ML
5 VIAL (ML) INTRAVENOUS
Status: CANCELLED | OUTPATIENT
Start: 2022-06-14

## 2022-06-14 RX ORDER — ONDANSETRON 2 MG/ML
4 INJECTION INTRAMUSCULAR; INTRAVENOUS
Status: CANCELLED | OUTPATIENT
Start: 2022-06-14

## 2022-06-14 RX ORDER — MEPERIDINE HYDROCHLORIDE 25 MG/ML
25 INJECTION INTRAMUSCULAR; INTRAVENOUS; SUBCUTANEOUS EVERY 30 MIN PRN
Status: CANCELLED | OUTPATIENT
Start: 2022-06-14

## 2022-06-14 RX ORDER — ALBUTEROL SULFATE 0.83 MG/ML
2.5 SOLUTION RESPIRATORY (INHALATION)
Status: CANCELLED | OUTPATIENT
Start: 2022-06-14

## 2022-06-14 RX ORDER — EPINEPHRINE 1 MG/ML
0.3 INJECTION, SOLUTION, CONCENTRATE INTRAVENOUS EVERY 5 MIN PRN
Status: CANCELLED | OUTPATIENT
Start: 2022-06-14

## 2022-06-14 RX ORDER — HEPARIN SODIUM (PORCINE) LOCK FLUSH IV SOLN 100 UNIT/ML 100 UNIT/ML
5 SOLUTION INTRAVENOUS
Status: CANCELLED | OUTPATIENT
Start: 2022-06-14

## 2022-06-14 RX ORDER — HYDRALAZINE HYDROCHLORIDE 20 MG/ML
10 INJECTION INTRAMUSCULAR; INTRAVENOUS
Status: CANCELLED | OUTPATIENT
Start: 2022-06-14

## 2022-06-14 RX ORDER — NALOXONE HYDROCHLORIDE 0.4 MG/ML
0.2 INJECTION, SOLUTION INTRAMUSCULAR; INTRAVENOUS; SUBCUTANEOUS
Status: CANCELLED | OUTPATIENT
Start: 2022-06-14

## 2022-06-14 RX ORDER — ALBUTEROL SULFATE 90 UG/1
1-2 AEROSOL, METERED RESPIRATORY (INHALATION)
Status: CANCELLED
Start: 2022-06-14

## 2022-06-14 RX ORDER — DIPHENHYDRAMINE HYDROCHLORIDE 50 MG/ML
50 INJECTION INTRAMUSCULAR; INTRAVENOUS
Status: CANCELLED
Start: 2022-06-14

## 2022-06-14 RX ADMIN — KETAMINE HYDROCHLORIDE 50 MG: 50 INJECTION, SOLUTION INTRAMUSCULAR; INTRAVENOUS at 09:00

## 2022-06-14 NOTE — PROGRESS NOTES
Infusion Nursing Note:  Janelle White presents today for scheduled Ketamine infusion.    Patient seen by provider today: No   present during visit today: Not Applicable.    Note: Patient states she continues to do well with Ketamine treatments.    Intravenous Access:  Peripheral IV placed.    Treatment Conditions:  Not Applicable.    Post Infusion Assessment:  Patient tolerated infusion without incident.  Patient observed for 60 minutes post Ketamine per protocol.  Blood return noted pre and post infusion.  Site patent and intact, free from redness, edema or discomfort.  No evidence of extravasations.  Access discontinued per protocol.     Discharge Plan:   Discharge instructions reviewed with: Patient.  Patient and/or family verbalized understanding of discharge instructions and all questions answered.  Patient discharged in stable condition accompanied by: self.  Departure Mode: Ambulatory.      Kathia Quinn RN

## 2022-06-15 ENCOUNTER — DOCUMENTATION ONLY (OUTPATIENT)
Dept: PSYCHOLOGY | Facility: CLINIC | Age: 62
End: 2022-06-15
Payer: COMMERCIAL

## 2022-06-15 NOTE — PROGRESS NOTES
Discussed this client in case consultation for feedback. Discussed risk for the client, support the client has or supportive experiences in the client in her life.     Janelle Brooks, [facility], [facility]

## 2022-06-21 ENCOUNTER — VIRTUAL VISIT (OUTPATIENT)
Dept: PSYCHOLOGY | Facility: CLINIC | Age: 62
End: 2022-06-21
Payer: COMMERCIAL

## 2022-06-21 DIAGNOSIS — R45.851 SUICIDAL IDEATION: ICD-10-CM

## 2022-06-21 DIAGNOSIS — F33.2 MAJOR DEPRESSIVE DISORDER, RECURRENT, SEVERE W/O PSYCHOTIC BEHAVIOR (H): Primary | ICD-10-CM

## 2022-06-21 PROCEDURE — 90839 PSYTX CRISIS INITIAL 60 MIN: CPT | Mod: 95 | Performed by: SOCIAL WORKER

## 2022-06-21 NOTE — PROGRESS NOTES
"[facility]                                       Progress Note    Patient Name: Janelle White  Date: 06/21/2022         Service Type: Individual      Session Start Time: 8:38 am   Session End Time: 9:29 am     Session Length: 51 minutes     Session #: 3rd session     Attendees: Client attended alone    Service Modality:  Video Visit:      Provider verified identity through the following two step process.  Patient provided:  Patient photo and Patient was verified at admission/transfer    Telemedicine Visit: The patient's condition can be safely assessed and treated via synchronous audio and visual telemedicine encounter.      Reason for Telemedicine Visit: Patient has requested telehealth visit    Originating Site (Patient Location): Patient's home    Distant Site (Provider Location): Provider Remote Setting- Home Office    Consent:  The patient/guardian has verbally consented to: the potential risks and benefits of telemedicine (video visit) versus in person care; bill my insurance or make self-payment for services provided; and responsibility for payment of non-covered services.     Patient would like the video invitation sent by:  My Chart    Mode of Communication:  Video Conference via Amwell    As the provider I attest to compliance with applicable laws and regulations related to telemedicine.    DATA  Interactive Complexity: No  Crisis: Yes, visit entailed Crisis Management / Stabilization requiring urgent assessment and history of the crisis state, mental status exam and disposition        Progress Since Last Session (Related to Symptoms / Goals / Homework):   Symptoms:  \"Labile, up and down, didn't sleep well\"    Client reports she is doing much better and reports that she is feeling much better.     Homework: Follow safety plan. Discuss plan for PHP, IOP or chronic pain program with mental health services at       Episode of Care Goals: No improvement - PRECONTEMPLATION (Not " "seeing need for change); Intervened by educating the patient about the effects of current behavior on health.  Evoked information about reasons to continue behavior, express concern / recommendations, and explored any change talk     Current / Ongoing Stressors and Concerns:   Family doing \"demolition\" at their house.    Her son got COVID and tested positive after coming to their home.      Treatment Objective(s) Addressed in This Session:   Crisis/Safety Planning.      Intervention:   Solution Focused/Motivational Interviewing:    Planning to go to a girlfriend's house to swim and will take the dogs. \"It is the idea of it (going out)\". With her son in town she contracts for safety, so we will work on a higher level of care as the client reports she is open to a PHP Program or the Empath Unit. This writer messaged Dr. Perez, Dr. Manzo, Dr. Medley and BRIONNA Caldera, CNP, to inform them of this note and discuss recommendation for a higher level of care: PHP, IOP, Empath Unit. The client reports she is willing to do a higher level of care and agrees with this recommendation at this time. Encouraged change talk. Provided validation, support and empathy. Assisted the client in moving towards a higher level of care given SI and limited motivation other than her son being in town to continue to live.     Assessments completed prior to visit:  None      ASSESSMENT: Current Emotional / Mental Status (status of significant symptoms):   Risk status (Self / Other harm or suicidal ideation)   Patient denies current fears or concerns for personal safety.   Patient reports the following current or recent suicidal ideation or behaviors: \"It will enter my thoughts like it'll occur to me that I haven't really been thinking of suicide today or whatever it may be, but then if I am left to my thoughts too long then I will, but there is not an immediacy to it there is more of a timeline.\" This writer asked what does that mean " "(timeline)? and the client said, \"our son is here until July 14th/15th so things will be more balanced until that time\".She talked about the dynamics in the home will tip the balance and she talked about needing to be careful of what she says and that she needs to \"learn to be quiet\".. The client expressed feeling more, \"at odds, claustrophobic, just really a mix of feelings\". She talked about feeling of, \"rejection, not living up to expectations, lack of responsibility, maybe feeling guilty about impulsivity then someone will bring it up then I feel responsible for a whole string of events than thinking the whole world is because I allowed it to crash...maybe it's not entirely true, but things seem to be swirling and now with being retired I don't have the excuse to think of commitments and what I need to do\". This writer did ask the client what she thinks her purpose is and she said she does not really think she has one at times, but thinking of what will be helpful and inspiring to others to get outside of herself. The client said she feels overwhelmed. She talked about hobbies that she has started, but mostly she reports these are messes. She used to do pottery, but it has been four or more years since she has done any pottery and she said she physically cannot do that any longer. She expressed that she cannot do things she used to do due to the physical pain. \"I love the ocean and to travel and I just don't have the stamina to go deep sea diving. The ketamine helps where I feel like I am in the ocean\". With her son in town she contracts for safety, so we will work on a higher level of care as the client reports she is open to a PHP Program or the Empath Unit.    Patient denies current or recent homicidal ideation or behaviors.   Patient denies current or recent self injurious behavior or ideation.   Patient denies other safety concerns.   Patient reports there has been no change in risk factors since their last " "session.     Patient reports there has been no change in protective factors since their last session.  Her son is staying with her and this is a protective factor for her.      [facility]                                       Patient's Name: Janelle White  March 11, 2021     SAFETY PLAN:  Step 1: Warning signs / cues (Thoughts, images, mood, situation, behavior) that a crisis may be developing:  ? Thoughts: \"I don't matter\", \"People would be better off without me\", \"I can't do this anymore\" and \"I just want this to end\"  ? Images: obsessive thoughts of death or dying: thoughts of carrying out plan  ? Thinking Processes: ruminations (can't stop thinking about my problems): ruminate on my plan and highly critical and negative thoughts: if  says something critical i shut down  ? Mood: worsening depression, hopelessness, disinhibited (not caring about things or consequences) and worthlessness  ? Behaviors: isolating/withdrawing , can't stop crying, not taking care of myself and not taking care of my responsibilities  ? Situations: relationship problems   Step 2: Coping strategies - Things I can do to take my mind off of my problems without contacting another person (relaxation technique, physical activity):  ? Distress Tolerance Strategies:  play with my pet  and watch a funny movie:    ? Physical Activities: go for a walk and get in the jacuzzi  ? Focus on helpful thoughts:  \"This is temporary\"  Step 3: People and social settings that provide distraction:                 Name: Alyx         Phone: in my phone                 Name: Raven      Phone: in my phone  ? park                 Step 4: Remind myself of people and things that are important to me and worth living for:  Children, dog, friends  Step 5: When I am in crisis, I can ask these people to help me use my safety plan:                 Name: Alyx         Phone: in my phone                 Name: Raven      Phone: in my " "phone  Step 6: Making the environment safe:   ? none identified  Step 7: Professionals or agencies I can contact during a crisis:  ? Suicide Prevention Lifeline: 5-053-732-GPRS (1952)  ? Crisis Text Line Service: Text   HOME  to 651-980.    Local Crisis Services: [facility]     Call 911 or go to my nearest emergency department.       I helped develop this safety plan and agree to use it when needed.  I have been given a copy of this plan.       Patient signature: _______________________________________________________________  Today s date:  March 11, 2021  Adapted from Safety Plan Template 2008 Antionette Trevino and Joseph Leon is reprinted with the express permission of the authors.  No portion of the Safety Plan Template may be reproduced without the express, written permission.  You can contact the authors at [email or web address] or [email or web address].     Appearance:   Appropriate    Eye Contact:   Good    Psychomotor Behavior: Normal -laying in bed   Attitude:   Cooperative  Friendly Pleasant   Orientation:   All   Speech    Rate / Production: Emotional Normal     Volume:  Normal    Mood:    Anxious  Depressed  Sad    Affect:    Tearful Worrisome    Thought Content:  Rumination    Thought Form:  Coherent    Insight:    Fair      Medication Review:    Reports her PCP recommended using Norco prn and also the Flexeril. She reports the norco did help her with the pain with taking it  yesterday to help with the drive to [place]. She expressed that Norco gave \"me a quality of life of being able to do things and  participate in things\".    She has talked with Dr. Medley and Dr. Perez about taking suboxone. She plans to see Dr. Medley on July 1st. She is considering taking buprenorphine.      Medication Compliance:   Yes     Changes in Health Issues:   Yesterday had an infusion, IVIG, for to improve her immunity.      Chemical Use Review:   Substance Use: Chemical use reviewed, no active " "concerns identified      Tobacco Use: No current tobacco use.      Diagnosis:  1. Major depressive disorder, recurrent, severe w/o psychotic behavior (H)    2. Suicidal ideation        Collateral Reports Completed:   Reviewed chart    PLAN: (Patient Tasks / Therapist Tasks / Other)    This writer recommended a PHP or we also discussed the empathy unit for the client. The client reports she would want to see what her options were to get more support. This writer messaged Kimberly Perez, Psychiatrist, and Melvin Manzo, Psychologist to help with recommendations for PHP or the Empath unit on Thursday when they see her. Given the severity of the client's symptoms this writer does not feel that ongoing therapy may be appropriate. Will discuss this patient again with my colleagues to get more input as to what the best options are. It appears the client needs someone to consistently monitor her symptoms and address safety. She does not report a lot of desire to live and says her son is the reason she contract for safety and he is here for another several weeks. We will meet again next week. Will attempt to work on the treatment plan next session. Thus far sessions have surrounded safety and what the client needs for services.     Janelle Brooks, [facility], [facility]                                                         ______________________________________________________________________     Individual Treatment Plan     Patient's Name: Janelle TORRES Rodolfoabudike                   YOB: 1960     Date of Creation: 06/13/2022  Date Treatment Plan Last Reviewed/Revised: 06/13/2022     DSM5 Diagnoses: 296.33 (F33.2) Major Depressive Disorder, Recurrent Episode, Severe, without psychotic features   Psychosocial / Contextual Factors: Chronic Pain, problems with pain and feeling like a burden on her family and also has some \"toxic\" family members as well, 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   PROMIS (reviewed every 90 days): 14     Referral / Collaboration:  The following referral(s) was/were discussed but patient declines follow up at this time. PHP/IOP, Empathy Unit, but wanting to try therapy for now with her son in town.     Anticipated number of session for this episode of care: 12-16 sessions   Anticipation frequency of session: Biweekly to weekly  Anticipated Duration of each session: 38-52 minutes  Treatment plan will be reviewed in 90 days or when goals have been changed.       Janelle Brooks, [facility], [facility]  June 21, 2022

## 2022-06-21 NOTE — Clinical Note
Hi Everyone~ Just sending my note on Janelle. I think ideally she would benefit from a PHP, IOP or we also talked about the Empath Unit. Also potentially a chronic pain program that is more intensive if there is one? Do you know of any thoughts on this Dr. Medley?   Thank you~   Janelle Brooks, RAMIREZ, [facility]

## 2022-06-23 ENCOUNTER — VIRTUAL VISIT (OUTPATIENT)
Dept: PSYCHIATRY | Facility: CLINIC | Age: 62
End: 2022-06-23
Payer: COMMERCIAL

## 2022-06-23 ENCOUNTER — VIRTUAL VISIT (OUTPATIENT)
Dept: PSYCHOLOGY | Facility: CLINIC | Age: 62
End: 2022-06-23
Payer: COMMERCIAL

## 2022-06-23 DIAGNOSIS — G25.81 RESTLESS LEG SYNDROME: ICD-10-CM

## 2022-06-23 DIAGNOSIS — G89.4 CHRONIC PAIN SYNDROME: ICD-10-CM

## 2022-06-23 DIAGNOSIS — E88.89 CYP2B6 INTERMEDIATE METABOLIZER (H): ICD-10-CM

## 2022-06-23 DIAGNOSIS — F33.2 SEVERE EPISODE OF RECURRENT MAJOR DEPRESSIVE DISORDER, WITHOUT PSYCHOTIC FEATURES (H): Primary | ICD-10-CM

## 2022-06-23 DIAGNOSIS — F33.9 RECURRENT MAJOR DEPRESSION RESISTANT TO TREATMENT (H): ICD-10-CM

## 2022-06-23 DIAGNOSIS — E88.89 CYP2D6 POOR METABOLIZER (H): ICD-10-CM

## 2022-06-23 PROCEDURE — 99215 OFFICE O/P EST HI 40 MIN: CPT | Mod: 95 | Performed by: PSYCHIATRY & NEUROLOGY

## 2022-06-23 PROCEDURE — 90832 PSYTX W PT 30 MINUTES: CPT | Mod: 95 | Performed by: PSYCHOLOGIST

## 2022-06-23 RX ORDER — DEXTROAMPHETAMINE SACCHARATE, AMPHETAMINE ASPARTATE, DEXTROAMPHETAMINE SULFATE AND AMPHETAMINE SULFATE 5; 5; 5; 5 MG/1; MG/1; MG/1; MG/1
20 TABLET ORAL DAILY
Qty: 30 TABLET | Refills: 0 | Status: SHIPPED | OUTPATIENT
Start: 2022-07-13 | End: 2022-08-08

## 2022-06-23 RX ORDER — DEXTROAMPHETAMINE SACCHARATE, AMPHETAMINE ASPARTATE MONOHYDRATE, DEXTROAMPHETAMINE SULFATE AND AMPHETAMINE SULFATE 5; 5; 5; 5 MG/1; MG/1; MG/1; MG/1
20 CAPSULE, EXTENDED RELEASE ORAL DAILY
Qty: 30 CAPSULE | Refills: 0 | Status: SHIPPED | OUTPATIENT
Start: 2022-07-13 | End: 2022-08-08

## 2022-06-23 RX ORDER — PRAMIPEXOLE DIHYDROCHLORIDE 0.25 MG/1
0.25 TABLET ORAL AT BEDTIME
Qty: 30 TABLET | Refills: 1 | Status: SHIPPED | OUTPATIENT
Start: 2022-06-23 | End: 2022-08-04

## 2022-06-23 NOTE — Clinical Note
Please call this patient to get them scheduled for a follow-up visit in 4-6 weeks. Please schedule with me and the [facility]. Thanks!

## 2022-06-23 NOTE — PATIENT INSTRUCTIONS
Treatment Plan:  Continue Pristiq 50 mg daily for mood, anxiety.   Continue methyl folate 7.5 mg daily as supplementation.  Continue Adderall XR 20 mg daily for mood augmentation  Continue Adderall IR 20 mg daily as needed for mood augmentation and daytime fatigue/hypersomnia  Start Mirapex/pramipexole 0.25 mg at bedtime for restless leg syndrome/treatment resistant depression.  We will titrate as necessary.  Continue benzodiazepine per primary care prescriber.  Continue ketamine with interventional psychiatry clinic.  Continue to work with your specialist from [facility] as indicated.    Continue working with addiction medicine clinic as needed/as indicated.  Recommend individual psychotherapy.    Continue all other cares per primary care provider.   Continue all other medications as reviewed per electronic medical record today.   Safety plan reviewed. To the Emergency Department as needed or call after hours crisis line at [phone] or [phone]. [place] Crisis Text Line. Text [place] to 536884 or Suicide LifeLine Chat: suicidepreventionvirtual tweens ltdline.org/chat  Schedule an appointment with me in 4-6 weeks, or sooner as needed. Call [facility] at [phone] to schedule.  Follow up with primary care provider as planned or for acute medical concerns.  Call the psychiatric nurse line with medication questions or concerns at [phone].  beBetter Healthhart may be used to communicate with your provider, but this is not intended to be used for emergencies.    Therapy resources:  Www.MPSI.org    Risks of benzodiazepine (Ativan, Xanax, Klonopin, Valium, etc) use including, but not limited to, sedation, tolerance, risk for addiction/dependence. Do not drink alcohol while taking benzodiazepines due to risk of trouble breathing and potential death. Do not drive or operate heavy machinery until it is known how the drug affects you. Discuss with physician or pharmacist before ever taking a benzodiazepine with a  narcotic/opioid pain medication.     Have previously discussed risks of stimulant medication including, but not limited to, decreased appetite, risk of tics (and that they may be lasting), trouble sleeping, cardiac risks such as increased heart rate and blood pressure, and rare risk of sudden cardiac death.  Also risk of addiction/tolerance/dependence.

## 2022-06-23 NOTE — PROGRESS NOTES
"Janelle White is a 61 year old year old who is being evaluated via a billable video visit.      How would you like to obtain your AVS? MyChart  If you are dropped from the video visit, the video invite should be resent to: Text to cell phone: see Epic  Will anyone else be joining your video visit? No     Telemedicine Visit: The patient's condition can be safely assessed and treated via synchronous audio and visual telemedicine encounter.      Reason for Telemedicine Visit: Covid-19 Pandemic    Originating Site (Patient Location): Patient's home     Distant Site (Provider Location): Provider Remote Setting    Mode of Communication:  Video Conference via [employer]    As the provider I attest to compliance with applicable laws and regulations related to telemedicine.        Outpatient Psychiatric Progress Note    Name: Janelle White   : 1960                    Primary Care Provider: Emili Ballard MD   Therapist: Janelle Brooks, [facility]    PHQ-9 scores:  PHQ-9 SCORE 2022   PHQ-9 Total Score - - -   PHQ-9 Total Score MyChart - 12 (Moderate depression) 18 (Moderately severe depression)   PHQ-9 Total Score 12 12 18       STACIE-7 scores:  STACIE-7 SCORE 2021 10/5/2021 6/10/2022   Total Score - - -   Total Score 5 (mild anxiety) 3 (minimal anxiety) 12 (moderate anxiety)   Total Score 5 3 12       Patient Identification:  Patient is a 61 year old,   White Choose not to answer female  who presents for return visit with me.  Patient is currently on medical leave from work as NP. Patient attended the phone/video session alone. Patient prefers to be called: \"Janelle\".    Interim History:  I last saw Janelle White for outpatient psychiatry return visit on 2022. During that appointment, we:     Continue Pristiq 50 mg daily for mood, anxiety.     Continue methyl folate 7.5 mg daily as supplementation.    Continue Adderall XR 20 mg daily for mood " "augmentation    Continue Adderall IR 20 mg daily as needed for mood augmentation and daytime fatigue/hypersomnia    Continue benzodiazepine per primary care prescriber.    Continue working with interventional psychiatry clinic and pursuit of ketamine therapy.    Continue to work with your specialist from [facility] as indicated.      Continue working with addiction medicine clinic as needed/as indicated.    Continue working with pain management specialists.    Recommend individual psychotherapy.      6/23: Patient feeling relatively okay today.  Talked about her journey with ketamine therapy so far.  Feels like it has been helpful for her depression up to this point.  Continues to have significant mood symptoms but overall has felt ketamine has been helpful.  Feels like suicidal ideation has been improving.  Had a few pretty dark days after coming off of her narcotic pain medication but reports that has since cleared and she is definitely feeling better now than she did then.  She has been considering possibility of a day treatment program or partial hospital program for her symptoms.  She would like to continue with ketamine therapy.  She has also considered a more intensive pain therapy program.  She does not think she wants to go back on any type of pain medication, at least not daily.  She will be meeting with Dr. Medley again soon to discuss pros and cons of options.  No acute safety concerns today.  She is engaged in therapy.  No problematic drug or alcohol use.  Trying to remain as active as her pain will allow.    Per [facility], Dr. Matt Manzo, during today's team-based visit:  Reported that she tested positive for COVID yesterday. This is her second time and said that it is not as severe as the first time. She spoke about her experience with Ketamine noting that she is very pleased with how peaceful she feels with the treatments. \"It's been very relaxing and affirming experience.\" She feels relaxed and in a good " "mood after the treatment. She finds it easier to bring herself back to that level of relaxation during times of stress. She spoke about her ongoing suicidal ideation explaining that \"when the thoughts enter my mind they are not working thoughts.\" She explained that the suicidal thoughts are just as frequent but less intense, do not last as long, and are less threatening to her than they have been in the past. She was commended for her continued work and efforts to improve herself, and she was encouraged to continue working toward her goals.    Past medication trials include but are not limited to:   Effexor-very flat  Celexa, zoloft, was on wellbutrin a couple years at one point  wellbutrin XL + celexa; 2005ish  tca-a ton of weight gain  depakote maybe for a short time  Abilify/lithium ?  ECT as teen - made me dull  Cytomel-not effective at all, used 50 mcg    Psychiatric ROS:  Janelle White reports mood has been: Still pretty up-and-down  Anxiety has been: up-and-down  Sleep has been: Up-and-down, pain is significant contributory factor  Deepa sxs: None  Psychosis sxs: None  ADHD/ADD sxs: None  PTSD sxs: None  PHQ9 and GAD7 scores were reviewed today if completed.   Medication side effects: Denies  Current stressors include: Symptoms and See HPI above  Coping mechanisms and supports include: Family, Hobbies and Friends    Current medications include:   Current Outpatient Medications   Medication Sig     albuterol (PROAIR HFA/PROVENTIL HFA/VENTOLIN HFA) 108 (90 Base) MCG/ACT inhaler Inhale 2 puffs into the lungs every 6 hours     amphetamine-dextroamphetamine (ADDERALL XR) 20 MG 24 hr capsule Take 1 capsule (20 mg) by mouth daily     amphetamine-dextroamphetamine (ADDERALL XR) 20 MG 24 hr capsule Take 1 capsule (20 mg) by mouth daily     amphetamine-dextroamphetamine (ADDERALL) 20 MG tablet Take 1 tablet (20 mg) by mouth daily     amphetamine-dextroamphetamine (ADDERALL) 20 MG tablet Take 1 tablet (20 mg) by " "mouth daily     ASHWAGANDHA PO Take by mouth daily     calcium citrate (CITRACAL) 950 (200 Ca) MG tablet Take 1 tablet by mouth 2 times daily.     Calcium-Magnesium-Vitamin D 500-250-200 MG-MG-UNIT TABS Take 1 tablet by mouth      colchicine (COLCYRS) 0.6 MG tablet Take 1 tablet (0.6 mg) by mouth daily (Patient not taking: Reported on 6/13/2022)     cyanocobalamin (CYANOCOBALAMIN) 1000 MCG/ML injection Inject 1 mL (1,000 mcg) into the muscle every 30 days (Patient not taking: Reported on 6/13/2022)     cyclobenzaprine (FLEXERIL) 10 MG tablet Take 1 tablet (10 mg) by mouth nightly as needed for muscle spasms     cyclobenzaprine (FLEXERIL) 10 MG tablet Take 1 tablet (10 mg) by mouth 3 times daily as needed for muscle spasms (Patient not taking: Reported on 6/13/2022)     desvenlafaxine (PRISTIQ) 50 MG 24 hr tablet Take 1 tablet (50 mg) by mouth daily     ergocalciferol (ERGOCALCIFEROL) 1.25 MG (89383 UT) capsule Take 50,000 Units by mouth (Patient not taking: Reported on 6/13/2022)     Estradiol (DIVIGEL) 1 MG/GM GEL Place 1 packet onto the skin daily     ferrous sulfate (FEROSUL) 325 (65 Fe) MG tablet  (Patient not taking: Reported on 6/13/2022)     HYDROcodone-acetaminophen (NORCO)  MG per tablet Take 1 tablet by mouth every 4 hours as needed for severe pain max 4 tabs/24 hrs (Patient not taking: Reported on 6/13/2022)     insulin syringe-needle U-100 (30G X 1/2\" 1 ML) 30G X 1/2\" 1 ML miscellaneous Inject 1 ml B12 qmonth (Patient not taking: Reported on 6/13/2022)     lidocaine (LIDODERM) 5 % patch Place 4 patches onto the skin daily Apply up to 4 patches to skin. Wear for 12 hours and remove for 12 hrs.  Refill when patient requests.     LORazepam (ATIVAN) 1 MG tablet Take 1 tablet (1 mg) by mouth every 6 hours as needed for anxiety     medical cannabis (Patient's own supply.  Not a prescription) Medical Cannabis - [place] 4-6 ml by mouth daily. [facility]     methocarbamol (ROBAXIN) 500 MG tablet Take 1 " tablet (500 mg) by mouth 4 times daily as needed for muscle spasms     methylfolate (DEPLIN) 7.5 MG TABS tablet Take 1 tablet (7.5 mg) by mouth daily     morphine (MS CONTIN) 15 MG CR tablet Take 1 tablet (15 mg) by mouth every 12 hours maximum 2 tablet(s) per day (Patient not taking: Reported on 6/13/2022)     Multiple Vitamin (MULTI-VITAMINS) TABS Take 1 tablet by mouth Protein powder     naloxone (NARCAN) 4 MG/0.1ML nasal spray Spray 1 spray (4 mg) into one nostril alternating nostrils as needed for opioid reversal every 2-3 minutes until assistance arrives     ondansetron (ZOFRAN-ODT) 8 MG ODT tab Take 1 tablet (8 mg) by mouth every 8 hours as needed for nausea     Prasterone 6.5 MG INST Place 0.5 suppositories vaginally three times a week     senna-docusate (SENOKOT-S/PERICOLACE) 8.6-50 MG tablet Take 4-6 tablets by mouth daily as needed for constipation     vitamin D3 (CHOLECALCIFEROL) 125 MCG (5000 UT) tablet Take 5,000 Units by mouth daily     No current facility-administered medications for this visit.       The [place] Prescription Monitoring Program has been reviewed and there are no concerns about diversionary activity for controlled substances at this time.   06/13/2022 06/11/2022 06/13/2022 1   Dextroamp-Amphetamin 20 Mg Tab  30.00 30 Al Bau [id number] All (4435) 0/0  [place]  06/11/2022 04/13/2022 06/13/2022 1   Dextroamp-Amphet Er 20 Mg Cap  30.00 30 Al Bau [id number] All (4435[place]  05/21/2022 12/27/2021 05/25/2022 1   Lorazepam 1 Mg Tablet  50.00 13 Ev Wel [id number] All (4435) 3/3 3.85 [place]  05/11/2022 04/13/2022 05/12/2022 1   Dextroamp-Amphet Er 20 Mg Cap  30.00 30 Al Bau [id number] All (4435[place]  05/11/2022 04/13/2022 05/12/2022 1   Dextroamp-Amphetamin 20 Mg Tab  30.00 30 Al Bajose miguel [id number] All (4435[place]  05/09/2022 05/09/2022 05/10/2022 1   Morphine Sulf Er 15 Mg Tablet  60.00 30 Tosin Mar [id number] All (4435) 0/0 30.00  [place]  04/13/2022 04/13/2022 04/13/2022 1   Dextroamp-Amphetamin 20 Mg Tab  30.00 30 Al Bau [id number] All (4435[place]      Past Medical/Surgical History:  Past Medical History:   Diagnosis Date     Anxiety      Cervicalgia 2007    C5-6 disc protrusion     COPD (chronic obstructive pulmonary disease) (H) 2/7/2022     Depressive disorder      ESBL (extended spectrum beta-lactamase) producing bacteria infection      History of blood transfusion 2007    Cervical fusion     Leukemia (H)     CLA large beta     Melanoma (H) 1998     Migraine      Other chronic pain      Rotator cuff tear     s/p injections     Sacroiliac inflammation (H)      Shift work sleep disorder 12/16/2013     Urinary calculi      Vitamin B12 deficiency anemia 2006    started injections      has a past medical history of Anxiety, Cervicalgia (2007), COPD (chronic obstructive pulmonary disease) (H) (2/7/2022), Depressive disorder, ESBL (extended spectrum beta-lactamase) producing bacteria infection, History of blood transfusion (2007), Leukemia (H), Melanoma (H) (1998), Migraine, Other chronic pain, Rotator cuff tear, Sacroiliac inflammation (H), Shift work sleep disorder (12/16/2013), Urinary calculi, and Vitamin B12 deficiency anemia (2006).    She has no past medical history of Cerebral infarction (H), Congestive heart failure (H), Coronary artery disease, Diabetes (H), Heart disease, Hypertension, Thyroid disease, or Uncomplicated asthma.    Social History:  Reviewed. No changes to social history except as noted above in HPI.    Vital Signs:   None. This is phone/video visit.     Labs:  Most recent laboratory results reviewed and the pertinent results include:   Lab Results   Component Value Date    WBC 17.5 03/16/2021     Lab Results   Component Value Date    RBC 4.50 03/16/2021     Lab Results   Component Value Date    HGB 13.3 03/16/2021     Lab Results   Component Value Date    HCT 42.1 03/16/2021     No components  found for: MCT  Lab Results   Component Value Date    MCV 94 03/16/2021     Lab Results   Component Value Date    MCH 29.6 03/16/2021     Lab Results   Component Value Date    MCHC 31.6 03/16/2021     Lab Results   Component Value Date    RDW 13.7 03/16/2021     Lab Results   Component Value Date     03/16/2021     Last Comprehensive Metabolic Panel:  Sodium   Date Value Ref Range Status   04/26/2022 141 133 - 144 mmol/L Final   05/24/2021 141 133 - 144 mmol/L Final     Potassium   Date Value Ref Range Status   04/26/2022 3.9 3.4 - 5.3 mmol/L Final   05/24/2021 3.9 3.4 - 5.3 mmol/L Final     Chloride   Date Value Ref Range Status   04/26/2022 109 94 - 109 mmol/L Final   05/24/2021 108 94 - 109 mmol/L Final     Carbon Dioxide   Date Value Ref Range Status   05/24/2021 25 20 - 32 mmol/L Final     Carbon Dioxide (CO2)   Date Value Ref Range Status   04/26/2022 27 20 - 32 mmol/L Final     Anion Gap   Date Value Ref Range Status   04/26/2022 5 3 - 14 mmol/L Final   05/24/2021 8 3 - 14 mmol/L Final     Glucose   Date Value Ref Range Status   04/26/2022 107 (H) 70 - 99 mg/dL Final   05/24/2021 87 70 - 99 mg/dL Final     Urea Nitrogen   Date Value Ref Range Status   04/26/2022 13 7 - 30 mg/dL Final   05/24/2021 15 7 - 30 mg/dL Final     Creatinine   Date Value Ref Range Status   04/26/2022 0.73 0.52 - 1.04 mg/dL Final   05/24/2021 0.64 0.52 - 1.04 mg/dL Final     GFR Estimate   Date Value Ref Range Status   04/26/2022 >90 >60 mL/min/1.73m2 Final     Comment:     Effective December 21, 2021 eGFRcr in adults is calculated using the 2021 CKD-EPI creatinine equation which includes age and gender (Zhou peter al., NEJM, DOI: 10.1056/EKJCbt8644549)   05/24/2021 >90 >60 mL/min/[1.73_m2] Final     Comment:     Non  GFR Calc  Starting 12/18/2018, serum creatinine based estimated GFR (eGFR) will be   calculated using the Chronic Kidney Disease Epidemiology Collaboration   (CKD-EPI) equation.       Calcium   Date  Value Ref Range Status   04/26/2022 8.7 8.5 - 10.1 mg/dL Final   05/24/2021 8.4 (L) 8.5 - 10.1 mg/dL Final     Bilirubin Total   Date Value Ref Range Status   04/26/2022 0.4 0.2 - 1.3 mg/dL Final   03/16/2021 0.4 0.2 - 1.3 mg/dL Final     Alkaline Phosphatase   Date Value Ref Range Status   04/26/2022 82 40 - 150 U/L Final   03/16/2021 87 40 - 150 U/L Final     ALT   Date Value Ref Range Status   04/26/2022 21 0 - 50 U/L Final   03/16/2021 26 0 - 50 U/L Final     AST   Date Value Ref Range Status   04/26/2022 18 0 - 45 U/L Final   03/16/2021 44 0 - 45 U/L Final     Review of Systems:  10 systems (general, cardiovascular, respiratory, eyes, ENT, endocrine, GI, , M/S, neurological) were reviewed. Most pertinent finding(s) is/are: Severe chronic pain. The remaining systems are all unremarkable.    Mental Status Examination (limited as this is by phone/video):  Appearance: Awake, alert, appears stated age, well-groomed, no acute distress  Attitude:  cooperative, pleasant  Motor: No gross abnormalities observed via video, not formally tested  Oriented to:  person, place, time, and situation  Attention Span and Concentration:  normal  Speech:  clear, coherent, regular rate, rhythm, and volume  Language: intact  Mood: ok, up-and-down  Affect: appropriate, mood congruent  Associations:  no loose associations  Thought Process:  logical, linear and goal oriented  Thought Content: passive suicidal ideation today-improving, no homicidal ideation, no evidence of psychotic thought, no auditory hallucinations present and no visual hallucinations present  Recent and Remote Memory:  Intact to interview. Not formally assessed. No amnesia.  Fund of Knowledge: appropriate  Insight:  good  Judgment:  intact, adequate for safety  Impulse Control:  intact    Suicide Risk Assessment:  Today Janelle White reports chronic/intermittent suicidal ideation.There are notable risk factors for self-harm, including anxiety, comorbid medical  condition of Chronic pain, suicidal ideation, purposelessness/no reason for living, hopelessness, withdrawing and mood change. However, risk is mitigated by commitment to family, absence of past attempts, ability to volunteer a safety plan, history of seeking help when needed, future oriented and denies suicidal intent or plan. Therefore, based on all available evidence including the factors cited above, Janelle White does not appear to be at imminent risk for self-harm, does not meet criteria for a 72-hr hold, and therefore remains appropriate for ongoing outpatient level of care.  A thorough assessment of risk factors related to suicide and self-harm have been reviewed and are noted above. Local community safety resources printed and reviewed for patient to use if needed. There was no deceit detected, and the patient presented in a manner that was believable.     DSM5 Diagnosis:  Major Depressive Disorder, Recurrent Episode, severe, without psychotic features  Treatment resistant depression  CYP2D6 poor metabolizer  CYP2B6 intermediate metabolizer  Homozygous for C677T polymorphism of MTHFR    Medical comorbidities include:   Patient Active Problem List    Diagnosis Date Noted     Severe episode of recurrent major depressive disorder, without psychotic features (H) 04/17/2022     Priority: Medium     COPD (chronic obstructive pulmonary disease) (H) 02/07/2022     Priority: Medium     Tension type headache 11/04/2021     Priority: Medium     Rotator cuff injury 11/04/2021     Priority: Medium     Spinal stenosis of lumbar region with radiculopathy 09/02/2021     Priority: Medium     COVID-19 08/29/2021     Priority: Medium     Polyarthralgia 08/11/2021     Priority: Medium     Cellulitis, unspecified cellulitis site 08/11/2021     Priority: Medium     Sepsis, due to unspecified organism, unspecified whether acute organ dysfunction present (H) 08/11/2021     Priority: Medium     Cellulitis 08/11/2021      Priority: Medium     STACIE (generalized anxiety disorder) 07/27/2021     Priority: Medium     MTHFR gene mutation 04/12/2021     Priority: Medium     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     Priority: Medium     CYP2D6 poor metabolizer (H) 04/12/2021     Priority: Medium     Status post blepharoplasty 11/18/2020     Priority: Medium     Regular astigmatism, bilateral 11/18/2020     Priority: Medium     Prediabetes 09/19/2019     Priority: Medium     Hyperlipidemia LDL goal <130 09/19/2019     Priority: Medium     CLARE (obstructive sleep apnea) 02/13/2019     Priority: Medium     Controlled substance agreement signed 08/14/2018     Priority: Medium     Chronic pain syndrome 12/21/2017     Priority: Medium     CLL (chronic lymphocytic leukemia) (H) 12/21/2017     Priority: Medium     Hypotension 09/14/2017     Priority: Medium     History of laser assisted in situ keratomileusis 10/14/2014     Priority: Medium     Pain medication agreement 04/23/2014     Priority: Medium     Formatting of this note might be different from the original.  Patient takes morphine 15 mg ER BID for chronic neck and back pain.  She is also on cymbalta, ibuprofen, flexaril prn       Shift work sleep disorder 12/16/2013     Priority: Medium     Vitamin D deficiency 11/08/2012     Priority: Medium     Moderate recurrent major depression (H) 01/06/2011     Priority: Medium     DDD (degenerative disc disease), cervical 10/07/2010     Priority: Medium     CARDIOVASCULAR SCREENING; LDL GOAL LESS THAN 160 02/10/2010     Priority: Medium     Chronic Low Back Pain 10/01/2009     Priority: Medium     S/p AP L3-S1 fusion 12/2010 - referred to [facility].   Orthopedics writing scripts for narcotics post-op.       Migraine      Priority: Medium     Problem list name updated by automated process. Provider to review       B-complex deficiency 10/10/2006     Priority: Medium     Problem list name updated by automated process. Provider to review        PERSONAL HX OF  MELANOMA 12/04/2003     Priority: Low       Psychosocial & Contextual Factors: see HPI above    Assessment:  6/15/2021:  Janelle TORRES Franchescamag reports overall some significant worsening of mood symptoms.  Increased anxiety with her depression.  Poor motivation and poor energy.  Symptoms severe enough to prevent her from being able to utilize typical coping skills and strategies.  No current motivation or energy to participate in PHP/day treatment program.  Continues to take medication as scheduled.  Recent surgery and general anesthesia could be contributing.  Discussed discontinuing stimulant medication as an augmentation strategy.  She is agreeable to moving forward with T3 as an augmentation for treatment resistant depression.  Discussed risks and benefits at length.  Will get baseline thyroid labs prior to starting T3.  Hoping she will experience increased energy and motivation and that her mood will lift.  Also discussed setting up an appointment with her interventional psychiatry clinic to discuss other potential options such as ketamine therapy, ECT, TMS.  Does have history of ECT for depression when she was quite young.  I am hopeful to stay ahead of her symptoms before things get too severe.  Has chronic suicidal ideation and is at high risk for suicide.  Continues to deny any plan or intent.  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  See below for risk/benefit conversation regarding T3 had with the patient:    GeneSight testing Info:  GeneSight testing revealed she is a poor 2D6 metabolizer and an intermediate 2B6 metabolizer.  This would explain her multiple failed medication trials due to the negative side effects.  She also has a genotype that would suggest a phenotype sensitivity to serotonin. She also was found to have significantly reduced folic acid conversion.      Starting T3 as augment to antidepressant therapy for treatment resistant  depression:  Start T3 at 25 mcg per day for one to two weeks, and if there is little or no improvement, increase the dose to 50 mcg per day; this is consistent with practice guidelines from the American Psychiatric Association and [place] [employer] for Mood and Anxiety Treatments.     Adverse effects consistent with hyperthyroidism may occur, including tremor, palpitations, heat intolerance, sweating, anxiety, increased frequency of bowel movements, shortness of breath, and exacerbation of cardiac arrhythmia. In addition, hyperthyroidism that emerges during long-term treatment may lead to bone demineralization, osteoporosis, and an increased risk of fracture    Following a normal baseline TSH concentration, no other laboratory monitoring during a four to six week trial of adjunctive T3 is necessary. However, if T3 is continued longer, a serum TSH concentration should be checked after the first one to three months of treatment and then every six months.    Today, 7/27/21:   Patient overall with little change in her symptoms.  Did not do as well on Cytomel and had limited to no improvement at all after weeks on 50 mcg.  Labs were unremarkable prior to starting Cytomel.  Opted to go back to stimulant augmentation since patient does find it quite helpful.  She has been taking 15 mg of immediate release most afternoons.  Due to good tolerability and some efficacy, we will bump up her immediate release dose slightly to 20 mg daily as needed in addition to her 20 mg extended release dose. I have no concerns about misuse or diversion at this time.  Tolerating well with no negative side effects.  Last blood pressure normal 7/2.  Patient has noted some efficacy from Pristiq more than other antidepressant trials and so we will continue to titrate further to 100 mg daily.  She is tolerating well.  Continues to use lorazepam for anxiety, pain medicine adjunct, and for sleep as prescribed by primary care provider.  Has been  utilizing roughly 100 tablets of 0.5 mg every 1.5 months.  Patient with ongoing chronic intermittent passive suicidal ideation with no plan or intent.  Denies safety concerns today.  No problematic drug or alcohol use.  I am hopeful the interventional psychiatry clinic might be able to initiate ketamine therapy or another therapy they would recommend as potentially being more helpful than patient's multiple medication/augmentation trials.    10/6/2021:  Patient with some improved depression symptoms and much more hopeful.  Seeing specialist at [place]-feels very hurt and listened to.  Also is hopeful there will be some helpful treatments.  Visit to [place] was also very good and instilled a lot of hope in her abilities.  She was encouraged to continue to push herself to do the things she enjoys doing.  No medication changes today.  She will follow-up with getting TMS rescheduled, possibly when things slow down after the holidays.  Does not need to be seen until after the holidays.  No acute safety concerns today.  No problematic drug or alcohol use.    1/14/2022:  Overall patient feeling a little more down lately.  Tolerating TMS well.  Encouraged to continue TMS.  No medication changes today since undergoing TMS treatment.  Talked about life stages today generativity versus stagnation and also integrity versus despair.  Talked about consideration for acceptance and commitment therapy.  She is encouraged to continue to be active.  No acute suicidal ideation today.  No acute safety concerns today.  No problematic drug or alcohol use.    4/13/2022:   Patient continues to have symptoms that wax and wane.  Feels like she tolerates Pristiq 50 mg better than 100 and will continue on this dose.  Last week was particularly difficult.  Pretty intense suicidal ideation but she was able to manage these thoughts and remain safe.  She does report she would come to the hospital if necessary.  We discussed the empath unit today  and other resources if she felt she could not keep herself safe.  She continues to work on tapering her narcotic pain medication regimen.  She is working with addiction medicine and also pain management.  She is starting Pilates therapy.  Pool therapy will begin in July.  Discussed possibility of vestibular rehabilitation.  Individual therapy will also start soon.  She was strongly encouraged to continue to pursue ketamine therapy.  No acute suicidality today.  No problematic drug or alcohol use.    6/23/2022:  Overall reports doing relatively okay.  Some pretty down days still, but ketamine therapy going well.  Feels like continuing to move in the right direction.  Suicidal ideation improving.  Off all narcotic pain medication.  Feels good about her progress.  Had some really down days after off pain medication but improved since those few dark days.  Hopeful about where ketamine therapy may lead.  Discussed some of her restlessness at bedtime and will start pramipexole.  Also some evidence to suggest pramipexole could be helpful for treatment resistant depression symptoms.  Discussed risks and benefits of therapy, including watching for any impulsive behaviors.  Continues to have suicidal thoughts but no acute suicidality today.  Her suicidality overall is improving from her baseline suicidality.  She continues to consider the idea of a partial hospital program.  We will send her information for [facility] program.  Also encouraged her to discuss possibility of a pain program through [facility] with Dr. Medley.  No acute safety concerns today.  No problematic drug or alcohol use.    Medication side effects and alternatives were reviewed. Health promotion activities recommended and reviewed today. All questions addressed. Education and counseling completed regarding risks and benefits of medications and psychotherapy options. Recommend therapy for additional support.     Treatment Plan:    Continue Pristiq 50 mg  daily for mood, anxiety.     Continue methyl folate 7.5 mg daily as supplementation.    Continue Adderall XR 20 mg daily for mood augmentation    Continue Adderall IR 20 mg daily as needed for mood augmentation and daytime fatigue/hypersomnia    Start Mirapex/pramipexole 0.25 mg at bedtime for restless leg syndrome/treatment resistant depression.  We will titrate as necessary.    Continue benzodiazepine per primary care prescriber.    Continue ketamine with interventional psychiatry clinic.    Continue to work with your specialist from [facility] as indicated.      Continue working with addiction medicine clinic as needed/as indicated.    Recommend individual psychotherapy.      Continue all other cares per primary care provider.     Continue all other medications as reviewed per electronic medical record today.     Safety plan reviewed. To the Emergency Department as needed or call after hours crisis line at [phone] or [phone]. [place] Crisis Text Line. Text [place] to 536500 or Suicide LifeLine Chat: suicidepreventionGotoTelline.org/chat    Schedule an appointment with me in 4-6 weeks, or sooner as needed. Call [facility] at [phone] to schedule.    Follow up with primary care provider as planned or for acute medical concerns.    Call the psychiatric nurse line with medication questions or concerns at [phone].    [employer] may be used to communicate with your provider, but this is not intended to be used for emergencies.    Therapy resources:  Www.[facility].org    https://www.[facility].com/mental-health/thrive/thrive-mental-health-and-chronic-pain-management/    Risks of benzodiazepine (Ativan, Xanax, Klonopin, Valium, etc) use including, but not limited to, sedation, tolerance, risk for addiction/dependence. Do not drink alcohol while taking benzodiazepines due to risk of trouble breathing and potential death. Do not drive or operate heavy machinery until it is known how the drug affects  you. Discuss with physician or pharmacist before ever taking a benzodiazepine with a narcotic/opioid pain medication.     Have previously discussed risks of stimulant medication including, but not limited to, decreased appetite, risk of tics (and that they may be lasting), trouble sleeping, cardiac risks such as increased heart rate and blood pressure, and rare risk of sudden cardiac death.  Also risk of addiction/tolerance/dependence.    Administrative Billing:   Phone Call/Video Duration: 43 Minutes  8:40a- 9:23a    Patient Status:  Patient will continue to be seen for ongoing consultation and stabilization.    Signed:   Kimberly Perez DO  [facility]    Disclaimer: This note consists of symbols derived from keyboarding, dictation and/or voice recognition software. As a result, there may be errors in the script that have gone undetected. Please consider this when interpreting information found in this chart.

## 2022-06-23 NOTE — Clinical Note
Hi team! We saw Janelle today. She was actually looking pretty good today (apart from illness with Covid). Sounds like ketamine has started to be more helpful perhaps and she is getting further out from stopping pain meds. She continues to consider option of day treatment/php program (vs more intensive pain program somewhere, ie [facility] program or [facility] Thrive program, etc). No referrals placed today since she is not sure what she is ready to commit to yet. I did start pramipexole at bedtime (will titrate as tolerated/indicated) since she continues to have some restless legs and it can double as an augment for difficult to tx depression sxs. Let me know if there are any questions or concerns! -Kimberly

## 2022-06-25 ENCOUNTER — HEALTH MAINTENANCE LETTER (OUTPATIENT)
Age: 62
End: 2022-06-25

## 2022-06-28 ENCOUNTER — INFUSION THERAPY VISIT (OUTPATIENT)
Dept: INFUSION THERAPY | Facility: CLINIC | Age: 62
End: 2022-06-28
Attending: PSYCHIATRY & NEUROLOGY
Payer: COMMERCIAL

## 2022-06-28 VITALS
HEART RATE: 64 BPM | DIASTOLIC BLOOD PRESSURE: 63 MMHG | OXYGEN SATURATION: 96 % | SYSTOLIC BLOOD PRESSURE: 104 MMHG | TEMPERATURE: 98.1 F | RESPIRATION RATE: 16 BRPM

## 2022-06-28 DIAGNOSIS — F33.2 SEVERE EPISODE OF RECURRENT MAJOR DEPRESSIVE DISORDER, WITHOUT PSYCHOTIC FEATURES (H): Primary | ICD-10-CM

## 2022-06-28 PROCEDURE — 250N000009 HC RX 250: Performed by: PSYCHIATRY & NEUROLOGY

## 2022-06-28 PROCEDURE — 96365 THER/PROPH/DIAG IV INF INIT: CPT

## 2022-06-28 PROCEDURE — 258N000003 HC RX IP 258 OP 636: Performed by: PSYCHIATRY & NEUROLOGY

## 2022-06-28 RX ORDER — ONDANSETRON 2 MG/ML
4 INJECTION INTRAMUSCULAR; INTRAVENOUS
Status: CANCELLED | OUTPATIENT
Start: 2022-06-28

## 2022-06-28 RX ORDER — ALBUTEROL SULFATE 0.83 MG/ML
2.5 SOLUTION RESPIRATORY (INHALATION)
Status: CANCELLED | OUTPATIENT
Start: 2022-06-28

## 2022-06-28 RX ORDER — MEPERIDINE HYDROCHLORIDE 25 MG/ML
25 INJECTION INTRAMUSCULAR; INTRAVENOUS; SUBCUTANEOUS EVERY 30 MIN PRN
Status: CANCELLED | OUTPATIENT
Start: 2022-06-28

## 2022-06-28 RX ORDER — ALBUTEROL SULFATE 90 UG/1
1-2 AEROSOL, METERED RESPIRATORY (INHALATION)
Status: CANCELLED
Start: 2022-06-28

## 2022-06-28 RX ORDER — HEPARIN SODIUM,PORCINE 10 UNIT/ML
5 VIAL (ML) INTRAVENOUS
Status: CANCELLED | OUTPATIENT
Start: 2022-06-28

## 2022-06-28 RX ORDER — NALOXONE HYDROCHLORIDE 0.4 MG/ML
0.2 INJECTION, SOLUTION INTRAMUSCULAR; INTRAVENOUS; SUBCUTANEOUS
Status: CANCELLED | OUTPATIENT
Start: 2022-06-28

## 2022-06-28 RX ORDER — HYDRALAZINE HYDROCHLORIDE 20 MG/ML
10 INJECTION INTRAMUSCULAR; INTRAVENOUS
Status: CANCELLED | OUTPATIENT
Start: 2022-06-28

## 2022-06-28 RX ORDER — DIPHENHYDRAMINE HYDROCHLORIDE 50 MG/ML
50 INJECTION INTRAMUSCULAR; INTRAVENOUS
Status: CANCELLED
Start: 2022-06-28

## 2022-06-28 RX ORDER — METHYLPREDNISOLONE SODIUM SUCCINATE 125 MG/2ML
125 INJECTION, POWDER, LYOPHILIZED, FOR SOLUTION INTRAMUSCULAR; INTRAVENOUS
Status: CANCELLED
Start: 2022-06-28

## 2022-06-28 RX ORDER — HEPARIN SODIUM (PORCINE) LOCK FLUSH IV SOLN 100 UNIT/ML 100 UNIT/ML
5 SOLUTION INTRAVENOUS
Status: CANCELLED | OUTPATIENT
Start: 2022-06-28

## 2022-06-28 RX ORDER — EPINEPHRINE 1 MG/ML
0.3 INJECTION, SOLUTION, CONCENTRATE INTRAVENOUS EVERY 5 MIN PRN
Status: CANCELLED | OUTPATIENT
Start: 2022-06-28

## 2022-06-28 RX ADMIN — KETAMINE HYDROCHLORIDE 50 MG: 50 INJECTION, SOLUTION INTRAMUSCULAR; INTRAVENOUS at 09:09

## 2022-06-29 ENCOUNTER — VIRTUAL VISIT (OUTPATIENT)
Dept: PSYCHOLOGY | Facility: CLINIC | Age: 62
End: 2022-06-29
Payer: COMMERCIAL

## 2022-06-29 ENCOUNTER — DOCUMENTATION ONLY (OUTPATIENT)
Dept: PSYCHOLOGY | Facility: CLINIC | Age: 62
End: 2022-06-29
Payer: COMMERCIAL

## 2022-06-29 ENCOUNTER — CARE COORDINATION (OUTPATIENT)
Dept: PSYCHOLOGY | Facility: CLINIC | Age: 62
End: 2022-06-29

## 2022-06-29 DIAGNOSIS — F33.2 SEVERE EPISODE OF RECURRENT MAJOR DEPRESSIVE DISORDER, WITHOUT PSYCHOTIC FEATURES (H): Primary | ICD-10-CM

## 2022-06-29 DIAGNOSIS — Z03.89 NO DIAGNOSIS ON AXIS I: Primary | ICD-10-CM

## 2022-06-29 DIAGNOSIS — R45.851 SUICIDAL IDEATION: ICD-10-CM

## 2022-06-29 PROCEDURE — 90839 PSYTX CRISIS INITIAL 60 MIN: CPT | Mod: 95 | Performed by: SOCIAL WORKER

## 2022-06-29 NOTE — PROGRESS NOTES
Case Consultation Record       Client Name: Janelle White   Date:  06/29/2022    Diagnosis: Major Depressive Disorder, Severe, Recurrent, Suicidal Ideation.     Therapist: RAMIREZ Scanlon, [facility]       Team Members Present:  Vishnu Tate,  MSW LICSW, EMDR Certified Therapist (EMDJAYLEEN), EMDR Consultant-In-Training (EMDRIA), Sotero Sanchez, MSW, LGSW, Raven Finch, MSW, LICSW, Spencer Naranjo, MSW, LICSW, Nicole Murcia MSW, LICSW,, Areli Floyd, MSW, LICSW, Bin Becerra, MS, [facility], [facility]    Purpose:   Risk Management    Recommendations:  Consulted on this client in our case consultation meeting. Recommendation from the team of providers if a higher level of care for this client based on client's suicidal thoughts, not forthcoming with the plan to end her life, feeling like she is burden and not seeing the point of life. The client has agreed [facility] start the process of entering their [facility] Program. This writer will meet with the client on an ongoing basis until she can get into a higher level of care. At this time she says she wants to live for her dog and also references that her son is in town, which is a reason for her to continue to want ot live so we will continue to meet until she can get into a higher level of care.       RAMIREZ Scanlon, [facility]  June 29, 2022

## 2022-06-29 NOTE — PROGRESS NOTES
"[facility]                                     Progress Note    Patient Name: Janelle White  Date: 06/29/2022         Service Type: Individual      Session Start Time: 8:37 am  Session End Time: 9:31 am      Session Length: 54 minutes    Session #: 4th session     Attendees: Client attended alone    Service Modality:  Video Visit:      Provider verified identity through the following two step process.  Patient provided:  Patient photo and Patient was verified at admission/transfer    Telemedicine Visit: The patient's condition can be safely assessed and treated via synchronous audio and visual telemedicine encounter.      Reason for Telemedicine Visit: Patient has requested telehealth visit    Originating Site (Patient Location): Patient's home    Distant Site (Provider Location): Provider Remote Setting- Home Office    Consent:  The patient/guardian has verbally consented to: the potential risks and benefits of telemedicine (video visit) versus in person care; bill my insurance or make self-payment for services provided; and responsibility for payment of non-covered services.     Patient would like the video invitation sent by:  My Chart    Mode of Communication:  Video Conference via [employer]    As the provider I attest to compliance with applicable laws and regulations related to telemedicine.    DATA  Interactive Complexity: No  Crisis: Yes, visit entailed Crisis Management / Stabilization requiring urgent assessment and history of the crisis state, mental status exam and disposition        Progress Since Last Session (Related to Symptoms / Goals / Homework):   Symptoms: Racing thoughts, \"up and down\", \"circling the drain\", \"I am uptight right now\" \"the extremes are extremes\". She reports that suicidal thoughts have been present more related to the chronic pain. \"Mostly frustrated with timing...nothing seems real easy or convenient\". This writer clarified that the timing of ending her " "life/suicide does not seem convieniet or easy for her. She says at this point she feels that it is, \"so inevitable and I don't see a way around it\". She acknowledges when I clarify what she is saying that she is referencing \"a burden\" \"I have lost everything\" \"The life events coming down the pike I don't know if I have the energy to deal with\" \"I have had my life that were wonderful and I can't do those things anymore\" \"I had such expectations for what life would be at this point and I am not hitting any one of those and I don't see that happening with the way things are going\" \"It is pretty bleak\".     Homework: Contact [facility]. This writer offered to assist the client, however she reported she would call them today and work on this.       Episode of Care Goals: Minimal progress - CONTEMPLATION (Considering change and yet undecided); Intervened by assessing the negative and positive thinking (ambivalence) about behavior change     Current / Ongoing Stressors and Concerns:   The client reported that she needs to go to her hometown to see her mom/son's Grandmother due  to her son being in town and also for the holiday. She reports that her mother's house is stressful and says, \"I get so stressed out about being over there.\"      Treatment Objective(s) Addressed in This Session:   Focusing on suicidal ideation as the main focus of care for the client.      Intervention:   Solution Focused; Focused on safety planning and need for a higher level of care based on the client's suicidal ideation.    She is able to identify that Ketamine is helpful at times and she receives a benefit from it. She says, \"sometimes it is like wow. On a scale I cannot call some of it pain. It is very different from a few weeks ago or a month ago. I still feel it, but in a different way. Yesterday my body felt numb and like not a part of myself.\" She reports resting, stretching or limiting activities will help her to relax and calm her " "body. She reports is is a continual mindfulness to help her manage the pain. The client reports that she feels her worst when she sits then her mind goes and she is \"swirling\". She reports trying to stay busy helps, but there are limits for her.    We did process her plan for suicide and she said, \"I need it to know it is there\". She continued to not want to share it with me her plan. \"The timing has not been right and I have not been able to take the final step. I need to shit or get off the pot\".    Assessments completed prior to visit: None      ASSESSMENT: Current Emotional / Mental Status (status of significant symptoms):   Risk status (Self / Other harm or suicidal ideation)   Patient denies current fears or concerns for personal safety.   Patient reports the following current or recent suicidal ideation or behaviors: has a plan that she will not share, reports that she wants the pain to end, does not see a purpose for living, feels like a burden on her family, however reports she does not have anywhere for her dog to go. She also has her son in town and reports she would not end her life with him in town. Aside from these two protective factors the client is very high risk.   Patient denies current or recent homicidal ideation or behaviors.   Patient denies current or recent self injurious behavior or ideation.   Patient denies other safety concerns.   Patient reports there has been a change in risk factors since their last session.  The risk factors have changed as the client is reporting more pain with reducing narcotic medications, which is impacting her quality of life and her desire to live   Patient reports there has been no change in protective factors since their last session.     Safety plan on file and the client has a copy of this plan.      Appearance:   Appropriate    Eye Contact:   Fair    Psychomotor Behavior: Normal -in session, reporting she spends a lot of time in bed. " "   Attitude:   Cooperative  Pleasant   Orientation:   All   Speech    Rate / Production: Emotional    Volume:  Soft    Mood:    Depressed  Sad    Affect:    Flat  Tearful   Thought Content:  Perservative  Suicidal   Thought Form:  Coherent    Insight:    Fair      Medication Review:   No changes to current psychiatric medication(s)    Ketamine therapy for chronic pain. Reports Ketamine injection was really helpful with coming off of the medications she was prescribed for pain, but says, \"I cannot hold my head up. My head is too big for my neck. I have a hard time finding a good position for my neck.\"      Medication Compliance:   Yes     Changes in Health Issues:   \"Even yesterday was just a real and it's not pain until the end of the day. My arms, neck, head and the pressure. I feel like my eyes are going to pop out. I cannot listen. I cannot talk. I am not in control in my body. There is something that is moving my body, but it is so loose and unbalanced. My phone will ask me if I want to record an eliptical work out and I am just walking because of my gait.\"   She reports that she can barely get up the stairs and needs to crawl up the stairs.    She had COVID and was treated with Paxlovid.   The client talked about taking medications for RLS and this helps with the RLS, but then she cannot sleep at night due to the medication.      Chemical Use Review:   Substance Use: Chemical use reviewed, no active concerns identified      Tobacco Use: No current tobacco use.      Diagnosis:  1. Severe episode of recurrent major depressive disorder, without psychotic features (H)    2. Suicidal ideation        Collateral Reports Completed:   Continuing to update the client's care team    PLAN: (Patient Tasks / Therapist Tasks / Other)  This writer is recommending a higher level of care for this client. She has multiple risk factors for suicide, reports a plan she is not wanting to share and also does not contract for safety " "that she would seek care of needed. I think outpatient therapy is not the level of care this client needs at this time. She plans to call the Thrive Program:  [email or web address]-[email or web address]-mental-health-and-chronic-pain-management/. This writer offered to assist the client in calling the Thrive Unit today, however the client declined.   We will add in treatment goals hopefully next session if able. The client and this writer's main goal at this time are a higher level of care and to ensure the client's safety.       Janelle Brooks, [facility], 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   PROMIS (reviewed every 90 days): 14     Referral / Collaboration:  The following referral(s) was/were discussed but patient declines follow up at this time. PHP/IOP, Empathy Unit, but wanting to try therapy for now with her son in town.     Anticipated number of session for this episode of care: 12-16 sessions   Anticipation frequency of session: Biweekly to weekly  Anticipated Duration of each session: 38-52 minutes  Treatment plan will be reviewed in 90 days or when goals have been changed.     Goal Client will have a desire to live.      I will know I've met my goal when I have more reasons and desire to live then to not live.      Objective #A (Client Action)    Client will agree to contact therapist or the after-hours support " number prior to any self harming behavior.  Status: New - Date: 06/29/2022     Intervention(s)  Therapist will assign homework of calling after hours numbers, therapists or other providers with suicidal ideation.    Objective #B  Client will make a list of people, places and activities  skills or activities that you will to use to distract from urges to harm self or suicidal thoughts.     Status: New - Date: 06/29/2022     Intervention(s)  Therapist will teach distraction skills. Help the client work on ways to distract herself when she has thoughts of suicide.    Objective #C  Client will enter into a higher level of care in order to receive more treatment for her chronic pain and mental health symptoms.  Status: New - Date: 06/29/2022     Intervention(s)  Therapist will assign homework of contacting locations for a higher level of care and assist the client as she is willing to allow me to assist her.            Janelle Brooks, [facility], [facility]                       June 21, 2022

## 2022-06-30 ENCOUNTER — OFFICE VISIT (OUTPATIENT)
Dept: FAMILY MEDICINE | Facility: CLINIC | Age: 62
End: 2022-06-30
Payer: COMMERCIAL

## 2022-06-30 ENCOUNTER — HOSPITAL ENCOUNTER (INPATIENT)
Facility: CLINIC | Age: 62
LOS: 5 days | Discharge: HOME | End: 2022-07-05
Attending: EMERGENCY MEDICINE | Admitting: HOSPITALIST
Payer: COMMERCIAL

## 2022-06-30 VITALS
HEIGHT: 65 IN | SYSTOLIC BLOOD PRESSURE: 106 MMHG | OXYGEN SATURATION: 100 % | RESPIRATION RATE: 14 BRPM | DIASTOLIC BLOOD PRESSURE: 71 MMHG | TEMPERATURE: 97.3 F | BODY MASS INDEX: 23.99 KG/M2 | WEIGHT: 144 LBS | HEART RATE: 78 BPM

## 2022-06-30 DIAGNOSIS — U07.1 INFECTION DUE TO 2019 NOVEL CORONAVIRUS: ICD-10-CM

## 2022-06-30 DIAGNOSIS — G89.4 CHRONIC PAIN SYNDROME: ICD-10-CM

## 2022-06-30 DIAGNOSIS — D84.9 IMMUNOCOMPROMISED (H): ICD-10-CM

## 2022-06-30 DIAGNOSIS — R45.851 SUICIDAL IDEATION: ICD-10-CM

## 2022-06-30 DIAGNOSIS — R45.851 VERBALIZES SUICIDAL THOUGHTS: ICD-10-CM

## 2022-06-30 DIAGNOSIS — F33.2 SEVERE EPISODE OF RECURRENT MAJOR DEPRESSIVE DISORDER, WITHOUT PSYCHOTIC FEATURES (H): Primary | ICD-10-CM

## 2022-06-30 LAB
ANION GAP SERPL CALCULATED.3IONS-SCNC: 6 MMOL/L (ref 3–14)
BASOPHILS # BLD AUTO: 0 10E3/UL (ref 0–0.2)
BASOPHILS NFR BLD AUTO: 0 %
BUN SERPL-MCNC: 20 MG/DL (ref 7–30)
CALCIUM SERPL-MCNC: 8.7 MG/DL (ref 8.5–10.1)
CHLORIDE BLD-SCNC: 105 MMOL/L (ref 94–109)
CO2 SERPL-SCNC: 25 MMOL/L (ref 20–32)
CREAT SERPL-MCNC: 0.59 MG/DL (ref 0.52–1.04)
EOSINOPHIL # BLD AUTO: 0 10E3/UL (ref 0–0.7)
EOSINOPHIL NFR BLD AUTO: 1 %
ERYTHROCYTE [DISTWIDTH] IN BLOOD BY AUTOMATED COUNT: 11.9 % (ref 10–15)
GFR SERPL CREATININE-BSD FRML MDRD: >90 ML/MIN/1.73M2
GLUCOSE BLD-MCNC: 91 MG/DL (ref 70–99)
HCT VFR BLD AUTO: 43.6 % (ref 35–47)
HGB BLD-MCNC: 14.2 G/DL (ref 11.7–15.7)
HOLD SPECIMEN: NORMAL
IMM GRANULOCYTES # BLD: 0.1 10E3/UL
IMM GRANULOCYTES NFR BLD: 1 %
LYMPHOCYTES # BLD AUTO: 1.9 10E3/UL (ref 0.8–5.3)
LYMPHOCYTES NFR BLD AUTO: 34 %
MCH RBC QN AUTO: 30.8 PG (ref 26.5–33)
MCHC RBC AUTO-ENTMCNC: 32.6 G/DL (ref 31.5–36.5)
MCV RBC AUTO: 95 FL (ref 78–100)
MONOCYTES # BLD AUTO: 0.5 10E3/UL (ref 0–1.3)
MONOCYTES NFR BLD AUTO: 9 %
NEUTROPHILS # BLD AUTO: 3.1 10E3/UL (ref 1.6–8.3)
NEUTROPHILS NFR BLD AUTO: 55 %
NRBC # BLD AUTO: 0 10E3/UL
NRBC BLD AUTO-RTO: 0 /100
PLATELET # BLD AUTO: 175 10E3/UL (ref 150–450)
POTASSIUM BLD-SCNC: 3.4 MMOL/L (ref 3.4–5.3)
RBC # BLD AUTO: 4.61 10E6/UL (ref 3.8–5.2)
SODIUM SERPL-SCNC: 136 MMOL/L (ref 133–144)
WBC # BLD AUTO: 5.6 10E3/UL (ref 4–11)

## 2022-06-30 PROCEDURE — 99222 1ST HOSP IP/OBS MODERATE 55: CPT | Mod: AI | Performed by: HOSPITALIST

## 2022-06-30 PROCEDURE — 36415 COLL VENOUS BLD VENIPUNCTURE: CPT | Performed by: EMERGENCY MEDICINE

## 2022-06-30 PROCEDURE — 120N000001 HC R&B MED SURG/OB

## 2022-06-30 PROCEDURE — 96127 BRIEF EMOTIONAL/BEHAV ASSMT: CPT | Performed by: NURSE PRACTITIONER

## 2022-06-30 PROCEDURE — 99215 OFFICE O/P EST HI 40 MIN: CPT | Performed by: NURSE PRACTITIONER

## 2022-06-30 PROCEDURE — 99285 EMERGENCY DEPT VISIT HI MDM: CPT | Mod: 25

## 2022-06-30 PROCEDURE — 99417 PROLNG OP E/M EACH 15 MIN: CPT | Performed by: NURSE PRACTITIONER

## 2022-06-30 PROCEDURE — 90791 PSYCH DIAGNOSTIC EVALUATION: CPT

## 2022-06-30 PROCEDURE — 99207 PR MOONLIGHTING INDICATOR: CPT | Performed by: HOSPITALIST

## 2022-06-30 PROCEDURE — 85025 COMPLETE CBC W/AUTO DIFF WBC: CPT | Performed by: EMERGENCY MEDICINE

## 2022-06-30 PROCEDURE — 82310 ASSAY OF CALCIUM: CPT | Performed by: EMERGENCY MEDICINE

## 2022-06-30 ASSESSMENT — ACTIVITIES OF DAILY LIVING (ADL)
ADLS_ACUITY_SCORE: 35

## 2022-06-30 NOTE — H&P
[facility]    History and Physical - Hospitalist Service       Date of Admission:  6/30/2022    Assessment & Plan      Janelle White is a 61 year old female admitted on 6/30/2022. She has a past medical history most remarkable for CLL and recent COVID-19 infection (started 6/22/2022).  She presented to the emergency department with SI, psychiatric evaluation recommended inpatient admission.  Unfortunately patient was not able to be placed due to COVID-19 infection requiring 20 days of isolation due to underlying immunocompromise state.  She is admitted to medicine for further care.    1.  History of depression with current suicidal ideation; with 72-hour hold placed.  2.  History of chronic lymphocytic leukemia follows with the oncology group at [facility].  3.  COVID-19 infection that started 6/22/2022. Minimally Symptomatic Right Now.  Due to underlying immunocompromise state (CLL plus intermittent IVIG infusion, last received 6/20 /2022), she needs 20 days of isolation per [place] policy.  - Admit to medicine is unable to get isolation bed and psych  - Sitter  - Continue a 72-hour hold  - Psych consult, social work consult  - Home psych meds ordered    Chronic Issues:  1.  Chronic pain syndrome.  Home pain meds ordered  2.  ADHD  3.  Constipation.  Home bowel regimen ordered     Diet:   General  DVT Prophylaxis: Low Risk/Ambulatory with no VTE prophylaxis indicated  [place] Catheter: Not present  Central Lines: None  Cardiac Monitoring: None  Code Status:   Full    Clinically Significant Risk Factors Present on Admission                          Disposition Plan    patient currently on a 72-hour hold, secondary to's suicidal ideation.  She unfortunately has a 20-day period of isolation which she needs before she can be considered COVID recovered.  At some point she needs to get inpatient psych.  We will see how her hospitalization goes     The patient's care was  discussed with the Bedside Nurse and Patient.    Hi Campos MD PhD  Hospitalist Service  [facility]  Securely message with the [employer] Web Console (learn more here)  Text page via [facility] Paging/Directory     ______________________________________________________________________    Chief Complaint   Suicidal ideation    History is obtained from the patient    History of Present Illness   Janelle White is a 61 year old female admitted on 6/30/2022. She has a past medical history most remarkable for CLL and recent COVID-19 infection (started 6/22/2022).  She presented to the emergency department with SI, psychiatric evaluation recommended inpatient admission.  Unfortunately patient was not able to be placed due to COVID-19 infection requiring 20 days of isolation due to underlying immunocompromise state.  She is admitted to medicine for further care.    Patient tells me that for nearly 2 years now she has had intermittent symptoms of depression including irritability, difficulty interacting, fatigue, and difficulty sleeping.  She notes that the symptoms wax and wane, and that she frequently follows up with her outpatient psychiatrist.  She tells me that she has never attempted suicide (although chart review suggest she did this once in her teenage years).  She does frequently think about suicide though.  She tells me that she has gotten to the point where she feels she just is not safe at home because she feels she may attempt to kill herself.      She does not have a specific plan although when I inquire about this she tells me that there are many ways she can think of that she would kill herself but is not sure which when she would pick.  She presented to the ER for evaluation today after being urged by her outpatient team.  In the ER she was found to be acutely suicidal and a 72-hour hold was placed.  There are plans to admit her to inpatient psychiatry, but because she had  been diagnosed with coronavirus on  she needs 20 days before she can be considered COVID recovered.  She is admitted to medicine for further assessment and planning and isolation.    Review of Systems    The 10 point Review of Systems is negative other than noted in the HPI or here.    Past Medical History    I have reviewed this patient's medical history and updated it with pertinent information if needed.   Past Medical History:   Diagnosis Date     Anxiety      Cervicalgia     C5-6 disc protrusion     COPD (chronic obstructive pulmonary disease) (H) 2022     Depressive disorder      ESBL (extended spectrum beta-lactamase) producing bacteria infection      History of blood transfusion     Cervical fusion     Leukemia (H)     CLA large beta     Melanoma (H)      Migraine      Other chronic pain      Rotator cuff tear     s/p injections     Sacroiliac inflammation (H)      Shift work sleep disorder 2013     Urinary calculi      Vitamin B12 deficiency anemia     started injections       Past Surgical History   I have reviewed this patient's surgical history and updated it with pertinent information if needed.  Past Surgical History:   Procedure Laterality Date     anterior cervical discectomy C4-5 ,Fusion C5-6-7  10/2007     APPENDECTOMY       BACK SURGERY       BLEPHAROPLASTY BILATERAL  2013    Procedure: BLEPHAROPLASTY BILATERAL;  BILATERAL UPPER LID BLEPHAROPLASTY AND BROWPEXY ;  Surgeon: Godrfey Miguel MD;  [facility]      SECTION      x2     COLONOSCOPY N/A 2020    Procedure: COLONOSCOPY;  Surgeon: Butch Lockhart MD;  Location:  GI     ESOPHAGOSCOPY, GASTROSCOPY, DUODENOSCOPY (EGD), COMBINED N/A 2020    Procedure: ESOPHAGOGASTRODUODENOSCOPY, WITH BIOPSY biosies by cold forceps;  Surgeon: Butch Lockhart MD;  Location:  GI     EYE SURGERY       FUSION LUMBAR ANTERIOR, FUSION LUMBAR POSTERIOR TWO LEVELS, COMBINED  2010    L3-S1 anterior  posterior fusion     GENITOURINARY SURGERY  Hysterectomy    2006     HEAD & NECK SURGERY      Cervical spine- c2-T2     HYSTERECTOMY  2006    ovaries intact     HYSTERECTOMY       HYSTERECTOMY, PAP NO LONGER INDICATED       Partial vulvectomy for NEENA III  2009     Pubovaginal sling, post op durasphere injections  2006    wears pad     ROTATOR CUFF REPAIR RT/LT  2011    right     SOFT TISSUE SURGERY  2019    Bilateral carpal/ulnar release     SPINAL FUSION C3-4  2009    C3-4, anterior spinal fusion     TUBAL LIGATION       ZZC APPENDECTOMY  2006       Social History   I have reviewed this patient's social history and updated it with pertinent information if needed.  Social History     Tobacco Use     Smoking status: Former Smoker     Packs/day: 1.00     Years: 5.00     Pack years: 5.00     Types: Cigarettes, Clove cigarettes or kreteks     Quit date: 1984     Years since quittin.5     Smokeless tobacco: Never Used   Substance Use Topics     Alcohol use: Yes     Comment: rare     Drug use: Not Currently     Types: Cocaine, Marijuana, LSD, Psilocybin     Comment: college experimentation --none since       Family History   I have reviewed this patient's family history and updated it with pertinent information if needed.  Family History   Problem Relation Age of Onset     Hypertension Mother      Alcohol/Drug Mother      Osteoporosis Mother         borderline     Hypertension Father      Diabetes Father         Diet controlled     Alcohol/Drug Father         remote use     Coronary Artery Disease Father         Blood clots- decreased EF/ Eliquis     C.A.D. Maternal Grandmother          late 50s     C.A.D. Maternal Grandfather         bone and brain cancer mets as well     Diabetes Paternal Grandfather          from diabetic complications     Alcohol/Drug Brother      Hypertension Brother      Coronary Artery Disease Brother         Afib, internal defibrillator     Depression Brother       Breast Cancer Cousin         Stage 3- age 61, her mom dcis/late 70     Breast Cancer No family hx of         Dcis     Cancer - colorectal No family hx of      Cerebrovascular Disease No family hx of      Colon Cancer No family hx of        Prior to Admission Medications   Prior to Admission Medications   Prescriptions Last Dose Informant Patient Reported? Taking?   ASHWAGANDHA PO   Yes No   Sig: Take by mouth daily   Calcium-Magnesium-Vitamin D 500-250-200 MG-MG-UNIT TABS   Yes No   Sig: Take 1 tablet by mouth    Estradiol (DIVIGEL) 1 MG/GM GEL   No No   Sig: Place 1 packet onto the skin daily   HYDROcodone-acetaminophen (NORCO)  MG per tablet   No No   Sig: Take 1 tablet by mouth every 4 hours as needed for severe pain max 4 tabs/24 hrs   Patient not taking: Reported on 6/13/2022   LORazepam (ATIVAN) 1 MG tablet   No No   Sig: Take 1 tablet (1 mg) by mouth every 6 hours as needed for anxiety   Multiple Vitamin (MULTI-VITAMINS) TABS   Yes No   Sig: Take 1 tablet by mouth Protein powder   Prasterone 6.5 MG INST   No No   Sig: Place 0.5 suppositories vaginally three times a week   albuterol (PROAIR HFA/PROVENTIL HFA/VENTOLIN HFA) 108 (90 Base) MCG/ACT inhaler   No No   Sig: Inhale 2 puffs into the lungs every 6 hours   amphetamine-dextroamphetamine (ADDERALL XR) 20 MG 24 hr capsule   No No   Sig: Take 1 capsule (20 mg) by mouth daily   amphetamine-dextroamphetamine (ADDERALL XR) 20 MG 24 hr capsule   No No   Sig: Take 1 capsule (20 mg) by mouth daily   amphetamine-dextroamphetamine (ADDERALL) 20 MG tablet   No No   Sig: Take 1 tablet (20 mg) by mouth daily   amphetamine-dextroamphetamine (ADDERALL) 20 MG tablet   No No   Sig: Take 1 tablet (20 mg) by mouth daily   calcium citrate (CITRACAL) 950 (200 Ca) MG tablet   Yes No   Sig: Take 1 tablet by mouth 2 times daily.   colchicine (COLCYRS) 0.6 MG tablet   No No   Sig: Take 1 tablet (0.6 mg) by mouth daily   Patient not taking: Reported on 6/13/2022  "  cyanocobalamin (CYANOCOBALAMIN) 1000 MCG/ML injection   No No   Sig: Inject 1 mL (1,000 mcg) into the muscle every 30 days   Patient not taking: Reported on 6/13/2022   cyclobenzaprine (FLEXERIL) 10 MG tablet   No No   Sig: Take 1 tablet (10 mg) by mouth 3 times daily as needed for muscle spasms   Patient not taking: Reported on 6/13/2022   cyclobenzaprine (FLEXERIL) 10 MG tablet   No No   Sig: Take 1 tablet (10 mg) by mouth nightly as needed for muscle spasms   Patient not taking: Reported on 6/30/2022   desvenlafaxine (PRISTIQ) 50 MG 24 hr tablet   No No   Sig: Take 1 tablet (50 mg) by mouth daily   ergocalciferol (ERGOCALCIFEROL) 1.25 MG (42624 UT) capsule   Yes No   Sig: Take 50,000 Units by mouth   Patient not taking: Reported on 6/13/2022   ferrous sulfate (FEROSUL) 325 (65 Fe) MG tablet   Yes No   Patient not taking: Reported on 6/13/2022   insulin syringe-needle U-100 (30G X 1/2\" 1 ML) 30G X 1/2\" 1 ML miscellaneous   No No   Sig: Inject 1 ml B12 qmonth   Patient not taking: Reported on 6/13/2022   lidocaine (LIDODERM) 5 % patch   No No   Sig: Place 4 patches onto the skin daily Apply up to 4 patches to skin. Wear for 12 hours and remove for 12 hrs.  Refill when patient requests.   medical cannabis (Patient's own supply.  Not a prescription)   Yes No   Sig: Medical Cannabis - [place] 4-6 ml by mouth daily. [facility]   methocarbamol (ROBAXIN) 500 MG tablet   No No   Sig: Take 1 tablet (500 mg) by mouth 4 times daily as needed for muscle spasms   methylfolate (DEPLIN) 7.5 MG TABS tablet   No No   Sig: Take 1 tablet (7.5 mg) by mouth daily   morphine (MS CONTIN) 15 MG CR tablet   No No   Sig: Take 1 tablet (15 mg) by mouth every 12 hours maximum 2 tablet(s) per day   Patient not taking: Reported on 6/13/2022   naloxone (NARCAN) 4 MG/0.1ML nasal spray   No No   Sig: Spray 1 spray (4 mg) into one nostril alternating nostrils as needed for opioid reversal every 2-3 minutes until assistance arrives " "  ondansetron (ZOFRAN-ODT) 8 MG ODT tab   No No   Sig: Take 1 tablet (8 mg) by mouth every 8 hours as needed for nausea   pramipexole (MIRAPEX) 0.25 MG tablet   No No   Sig: Take 1 tablet (0.25 mg) by mouth At Bedtime   senna-docusate (SENOKOT-S/PERICOLACE) 8.6-50 MG tablet   Yes No   Sig: Take 4-6 tablets by mouth daily as needed for constipation   vitamin D3 (CHOLECALCIFEROL) 125 MCG (5000 UT) tablet   Yes No   Sig: Take 5,000 Units by mouth daily      Facility-Administered Medications: None     Allergies   Allergies   Allergen Reactions     Bupropion Other (See Comments) and Swelling     Ineffective, name brand works best  Ineffective, name brand works best     Codeine Other (See Comments)     \"Feels like electricity running through body\"  No reaction noted in [employer]ner.  Shaky  With Tylenol     Effexor [Venlafaxine Hydrochloride]      Affect too flat     Escitalopram      Other reaction(s): *Unknown  Sexual dysfunction     Fluoxetine Other (See Comments)     Sexual dysfunction     Levaquin [Levofloxacin] Other (See Comments)     Suicidal thoughts  Dark thoughts- mood changes     Lexapro      Sexual dysfunction     Methylphenidate Hives     Milnacipran      12.5 MG is fine. 25 MG caused side effects. \"Dark thoughts\"     Pregabalin Other (See Comments)     Hallucinations  Audiovisual hallucinations     Prozac [Fluoxetine Hcl]      Sexual dysfunction     Tramadol Hives     Hives       Venlafaxine      Other reaction(s): *Unknown  Affect too flat       Physical Exam   Vital Signs: Temp: 98.3  F (36.8  C) Temp src: Temporal BP: 122/72 Pulse: 68   Resp: 18 SpO2: 98 % O2 Device: None (Room air)    Weight: 144 lbs 0 oz    General Appearance: Well appearing, no distress  Eyes: JONI, EOMI  HEENT: NC, AT. MMM.   Respiratory: CTAB, normal work of breathing  Cardiovascular: Regular Rate and Rhythm, normal S1, S2. No murmurs, rubs, gallops  GI: Soft, non-tender, non-distended  Lymph/Hematologic: No asymetric swelling, edema. No " bruising.  Genitourinary: Deferred  Skin: No rashes, lesions, wounds.  Musculoskeletal: Warm, well perfused  Neurologic: AOx4, CNII-XII intact.  Psychiatric: Euthymic. Mood appropriate.     Data   Data reviewed today: I reviewed all medications, new labs and imaging results over the last 24 hours. I personally reviewed no images or EKG's today.    Recent Labs   Lab 06/30/22  1658   WBC 5.6   HGB 14.2   MCV 95         POTASSIUM 3.4   CHLORIDE 105   CO2 25   BUN 20   CR 0.59   ANIONGAP 6   BERYL 8.7   GLC 91

## 2022-06-30 NOTE — Clinical Note
Janelle agreed to go to [facility] today- quite worried about her suicide plan.  I think she has given this a lot more thought than she has let on.

## 2022-06-30 NOTE — PROGRESS NOTES
Assessment & Plan     Severe episode of recurrent major depressive disorder, without psychotic features (H)  Verbalizes suicidal thoughts  Chronic Pain Syndrome  I am very concerned about Janelle's suicidal ideations.  We discussed her pain and she is quite adamant that she does not want to back on any medication to treat pain, including suboxone.  Given that she has a detailed plan, I would be reluctant to prescribe any additional narcotics due to the risk of overdose.  She did not go into detail about her plan other than to say that she would have to take her dog with her, which suggested to me that her plan may be escalating as her dog is the one thing keeping her from acting on her plan.  She reports she has kept these feelings hidden from her family and it sounds as though she is struggling with the relationships in her family.  She does not seem to understand the impact her suicide would have on her family, especially her children.    Janelle was agreeable to present to [facility] today.  She has been working with psychology and this was previously discussed in their visits as well.  I agree with their plan that Janelle needs a higher level of care and I think she would benefit from inpatient treatment or at the very least intensive outpatient therapy.  At some point she may benefit from family therapy, but she is not open to that suggestion right now.  I offered to arrange transport to [facility] for her today and she declined.  I do not think she meets criteria for a hold at this time or calling EMS for transport.    I provided her with address and phone number, Clinic RN notified Celia of her arrival.  Her other active team members have been copied on this chart so we can continue to partner in providing supportive care         I spent a total of 70 minutes on the day of the visit.   Time spent doing chart review, history and exam, documentation and further activities per the note           No follow-ups on  "file.    Carmen Garcia, APRN CNP  [facility]    Giorgio Luis is a 61 year old, presenting for the following health issues:  Follow Up      History of Present Illness       Reason for visit:  Follow up    She eats 4 or more servings of fruits and vegetables daily.She consumes 0 sweetened beverage(s) daily.She exercises with enough effort to increase her heart rate 30 to 60 minutes per day.  She exercises with enough effort to increase her heart rate 6 days per week.   She is taking medications regularly.     Ketamine was very benficial for pain    Mental health- When head not in right place, causes a lot of symptoms, ringing pain, neck tight.  \"Hard to explain the energy in my body\".  More irritable.      Feel like I lost my mind- not present   Comes out of exahustion.      Gets up at 6-7, if wake up out of sorts, takes ibuprofen/tylenol.  Then will lay down for a bit.  Then go about the day- walks dog in the morning.    Depending on how the walk goes- needs to rest/down time.  A lot of housing projects going on- but not really helping with it.  Finds other details to focus on to keep mind busy/be helpful.        Only feels like a couple hours of the day are productive.  Then up and down the rest of the day.      Haven't involved family in \"thoughts\" regarding feelings. Feels like \"I have burned my family out- so many things in life have been put on hold\".  One good day, the next 3 days are bad    \"Everything I have enjoyed in life has fallen away... not enough in life that I look forward to\".  Built myself back too many times.  After second shoulder surgery- whole life started unraveling, \"I dont' know who I am anymore\".        When asked if there was one small thing we could fix today \"If I could recognize before those thoughts hit me would help\".  Tries to push through.  Loss of autonomy- dependiong on too many people is difficult.  WHe things get bad- I tend to hide and run.  " "Feel like my dog is my only friend.      Daughter is struggling with a lot of things- hasn't found her way, can be helpful and supportive, also \"most prying and annoying.\"  Has SW background- \"I don't have the tolerance or patience for it, she is persistent and doesn't give up\".   doesn't deal with stress at all, \"not a good working relationship\"- he does support by taking to appointments.  First one to complain too, doesn't believe in therapy.  Will say something that sets me off.      Admits to \"Really well thought out plans that I can't act on immediately because the timing doesn't work out.\"  Something always comes up  \"Mostly I think about leaving my dog\".  She feels like \"they would be able to move on and do things\" if she was gone.  Very close to my son, he goes through ups and downs.  Has brought me out of this so many times.  \"If I go, I have to take my dog too\" and not ready to do that.  We are very dependent on each other.  \"If he werent around, I probably wouldn't be here\".  Family does not know how bad things are.      Has thought about going into [facility], but feels \"catatonic\" when it comes to doing it.  \"I don't know if [facility] would be the program for me\".      Review of Systems   Constitutional, HEENT, cardiovascular, pulmonary, gi and gu systems are negative, except as otherwise noted.      Objective    /71 (BP Location: Left arm, Patient Position: Sitting, Cuff Size: Adult Regular)   Pulse 78   Temp 97.3  F (36.3  C) (Temporal)   Resp 14   Ht 1.651 m (5' 5\")   Wt 65.3 kg (144 lb)   LMP 05/01/2005 (LMP Unknown)   SpO2 100%   BMI 23.96 kg/m    Body mass index is 23.96 kg/m .  Physical Exam   GENERAL: alert, mild distress and fatigued  MS: no gross musculoskeletal defects noted, no edema  PSYCH: mentation appears normal, tearful and fatigued                    .  ..  "

## 2022-06-30 NOTE — ED NOTES
"[facility]  ED Nurse Handoff Report    ED Chief complaint: Psychiatric Evaluation      ED Diagnosis:   Final diagnoses:   None       Code Status: Full Code    Allergies:   Allergies   Allergen Reactions     Bupropion Other (See Comments) and Swelling     Ineffective, name brand works best  Ineffective, name brand works best     Codeine Other (See Comments)     \"Feels like electricity running through body\"  No reaction noted in [employer].  Shaky  With Tylenol     Effexor [Venlafaxine Hydrochloride]      Affect too flat     Escitalopram      Other reaction(s): *Unknown  Sexual dysfunction     Fluoxetine Other (See Comments)     Sexual dysfunction     Levaquin [Levofloxacin] Other (See Comments)     Suicidal thoughts  Dark thoughts- mood changes     Lexapro      Sexual dysfunction     Methylphenidate Hives     Milnacipran      12.5 MG is fine. 25 MG caused side effects. \"Dark thoughts\"     Pregabalin Other (See Comments)     Hallucinations  Audiovisual hallucinations     Prozac [Fluoxetine Hcl]      Sexual dysfunction     Tramadol Hives     Hives       Venlafaxine      Other reaction(s): *Unknown  Affect too flat       Patient Story: pt coming to ER with c/o suicidal ideation. Alert and oriented. Hx of covid sx started 10 days ago, pt is immunocompromised, needs to be 20 days clear before coming out of isolation. Pt needs to be medically admitted due to this with psych consult. Pt has been calm and cooperative.   Focused Assessment:  See chart    Treatments and/or interventions provided: see chart  Patient's response to treatments and/or interventions: see chart    To be done/followed up on inpatient unit:  see chart    Does this patient have any cognitive concerns?: none    Activity level - Baseline/Home:  Independent  Activity Level - Current:   Independent    Patient's Preferred language: English   Needed?: No    Isolation: None and COVID r/o and special precautions  Infection: Not " "Applicable  COVID r/o and special precautions  Patient tested for COVID 19 prior to admission: NO  Bariatric?: No    Vital Signs:   Vitals:    06/30/22 1558   BP: 122/72   Pulse: 68   Resp: 18   Temp: 98.3  F (36.8  C)   TempSrc: Temporal   SpO2: 98%   Weight: 65.3 kg (144 lb)   Height: 1.651 m (5' 5\")       Cardiac Rhythm:     Was the PSS-3 completed:   Yes  What interventions are required if any?               Family Comments: none here  OBS brochure/video discussed/provided to patient/family: N/A              Name of person given brochure if not patient: na              Relationship to patient: na    For the majority of the shift this patient's behavior was Green.   Behavioral interventions performed were none.    ED NURSE PHONE NUMBER: [phone]       "

## 2022-06-30 NOTE — ED PROVIDER NOTES
History   Chief Complaint:  Psychiatric Evaluation     The history is provided by the patient.   History supplemented by electronic chart review    Janelle White is a 61 year old female with history of chronic lymphocytic leukemia not in remission, COPD, hyperlipidemia, depression, STACIE, and a positive COVID-19 test on 06/22/22 who presents for psychiatric evaluation. The patient reports she has been having thoughts of wanting to harm herself everyday that come and go throughout the day. She states that her spleen feels enlarged, limited appetite, and has had difficulty with bowel movements, all of which she attributes to her previously diagnosed CLL for which she continues to get oncology care through [facility].  Her recent cough has since resolved, she has not had fever in recent days, and she feels that overall she has significantly improved with regard to her recent symptomatic COVID, with symptoms having started on June 20.  She denies drinking alcohol but notes she uses medical cannabis. She adds she is not on chemotherapy anymore for leukemia but has been doing monthly IVIG infusions at the [facility]. She states she had her last IVIG 10 days ago at the [facility]. She notes that she has one ketamine therapy left. She adds she has not taken her norco or morphine after weaning herself off of this, and previously had TMS though did not find this helpful for her psychiatric symptoms.  She has been feeling suicidal and has plans for self-harm.    Review of Systems   All other systems reviewed and are negative.    Allergies:  Bupropion  Codeine  Effexor [Venlafaxine Hydrochloride]  Escitalopram  Fluoxetine  Levaquin [Levofloxacin]  Lexapro  Methylphenidate  Milnacipran  Pregabalin  Prozac [Fluoxetine Hcl]  Tramadol  Venlafaxine    Medications:  Albuterol inhaler  Adderall  Ashwagandha  Desvenlafaxine  Estradiol  Ativan  Medical cannabis (Patient's own supply, not a  "prescription)  Robaxin  Narcan  Mirapex  Prasterone  Senna-docusate  Norco  Deplin  Morphine  Zofran    Past Medical History:     Melanoma  B-complex deficiency   Migraine  DDD, cervical  Moderate recurrent major depression  Vitamin D deficiency   Chronic pain syndrome  Chronic lymphocytic leukemia  CLARE  Prediabetes  Hyperlipidemia  MTHFR gene mutation   CYP2B8 intermediate metabolizer  CYP2D6 poor metabolizer   STACIE  Polyarthralgia  Cellulitis  Sepsis   Tension headache  Rotator cuff injury   Bilateral regular astigmatism   Spinal stenosis of lumbar region with radiculopathy   COPD  Suicidal ideation   Immunocompromised   COVID-19     Past Surgical History:    Anterior cervical discectomy C4-5, fusion C5-6-7  Appendectomy  Bilateral upper lid blepharoplasty    section x2  Colonoscopy  EGD  L3-S1 anterior posterior fusion   Hysterectomy  C2-T2 surgery   Partial vulvectomy for NEENA III  Pubovaginal sling   Rotator cuff repair   Bilateral carpal tunnel release  C3-4 anterior spinal fusion   Tubal ligation      Family History:    Mother: hypertension, alcohol/drug, osteoporosis  Father: hypertension, diabetes, alcohol/drug, CAD  Brother: alcohol/drug, CAD, atrial fibrillation, depression     Social History:  Patient presents alone.   Patient arrives via private vehicle.   Retired nurse practitioner    Physical Exam     Patient Vitals for the past 24 hrs:   BP Temp Temp src Pulse Resp SpO2 Height Weight   22 1558 122/72 98.3  F (36.8  C) Temporal 68 18 98 % 1.651 m (5' 5\") 65.3 kg (144 lb)     Physical Exam  General: Nontoxic-appearing woman sitting upright in room 20  HENT: wearing mask, no major facial deformity noted  Eyes: PERRL without nystagmus  CV: extremities well perfused, regular rhythm  Resp: normal effort, speaks in full phrases, no stridor, no cough observed  GI: abdomen soft and nontender, no guarding  MSK: no bony tenderness   Skin: appropriately warm and dry  Neuro: alert, clear speech, " oriented, normal tone in extremities, ambulatory  Psych: calm, cooperative, reports feeling suicidal with plan, no evidence of hallucinations    Emergency Department Course     Laboratory:  Labs Ordered and Resulted from Time of ED Arrival to Time of ED Departure   BASIC METABOLIC PANEL - Normal       Result Value    Sodium 136      Potassium 3.4      Chloride 105      Carbon Dioxide (CO2) 25      Anion Gap 6      Urea Nitrogen 20      Creatinine 0.59      Calcium 8.7      Glucose 91      GFR Estimate >90     CBC WITH PLATELETS AND DIFFERENTIAL    WBC Count 5.6      RBC Count 4.61      Hemoglobin 14.2      Hematocrit 43.6      MCV 95      MCH 30.8      MCHC 32.6      RDW 11.9      Platelet Count 175      % Neutrophils 55      % Lymphocytes 34      % Monocytes 9      % Eosinophils 1      % Basophils 0      % Immature Granulocytes 1      NRBCs per 100 WBC 0      Absolute Neutrophils 3.1      Absolute Lymphocytes 1.9      Absolute Monocytes 0.5      Absolute Eosinophils 0.0      Absolute Basophils 0.0      Absolute Immature Granulocytes 0.1      Absolute NRBCs 0.0          Procedures  Emergency Department Course:     Reviewed:  I reviewed nursing notes, vitals, past medical history, and Care Everywhere.     Assessments:  1645 I obtained history and examined the patient as noted above.   1652 I rechecked the patient.    Consults:  1845 I spoke with Dr. Campos, hospitalist, who agreed to accept the patient.     Disposition:  The patient was admitted to the hospital under the care of Dr. Campos.     Impression & Plan   Medical Decision Making:  She presents with primary concern for suicidal ideation and deteriorating psychiatric symptoms despite reasonable efforts at outpatient treatment.  She has a history of CLL which is specifically described as not being in remission and for which she is receiving ongoing active treatment through her male oncologist as recently as earlier this month.  According to the [facility]  algorithm for COVID precautions and isolation, she is considered severely immunocompromise due to this active hematologic malignancy and therefore requires 20 days of isolation, which will require quite a few more days, and which makes her unfortunately ineligible for primary admission directly to an inpatient psychiatric unit.  She was evaluated by DEC, as she was also not a candidate for EmPATH due to her COVID-positive status and need for ongoing isolation, and hospitalization was strongly advised.  She was placed on a hold due to her suicidal ideation with a plan.  I have arranged for admission to the hospital service here at [facility] as again she is in eligible for primary psychiatric services due to her infectious status.  However, I note that with regard to COVID, she is not in respiratory distress, not hypoxic, and has excellent vital signs and basic laboratory studies, and seems to be very near the end of the symptomatic stage of this illness.    Diagnosis:    ICD-10-CM    1. Suicidal ideation  R45.851    2. Infection due to 2019 novel coronavirus  U07.1    3. Immunocompromised (H)  D84.9        Scribe Disclosure:  I, Janeth oNrton, am serving as a scribe at 4:20 PM on 6/30/2022 to document services personally performed by Vince Barajas MD based on my observations and the provider's statements to me.     I, Fiona Sky, am serving as a scribe on 6/30/2022 at 11:07 PM to personally document services performed by Mack Minaya MD based on my observations and the provider's statements to me.          Vince Barajas MD  07/01/22 0013

## 2022-06-30 NOTE — ED TRIAGE NOTES
"Pt states that she has been having a hard time with mental health lately.  States that she does have SI with a plan; did not divulge that information to this RN.  States that she does have a \"whole team of mental health support.\"  Was told to come in by her primary today.  States that she can contract for her safety while being here.     Triage Assessment     Row Name 06/30/22 1600       Triage Assessment (Adult)    Airway WDL WDL       Respiratory WDL    Respiratory WDL WDL       Skin Circulation/Temperature WDL    Skin Circulation/Temperature WDL WDL       Cardiac WDL    Cardiac WDL WDL       Peripheral/Neurovascular WDL    Peripheral Neurovascular WDL WDL       Cognitive/Neuro/Behavioral WDL    Cognitive/Neuro/Behavioral WDL WDL              "

## 2022-07-01 PROCEDURE — 99232 SBSQ HOSP IP/OBS MODERATE 35: CPT | Performed by: STUDENT IN AN ORGANIZED HEALTH CARE EDUCATION/TRAINING PROGRAM

## 2022-07-01 PROCEDURE — 250N000013 HC RX MED GY IP 250 OP 250 PS 637: Performed by: STUDENT IN AN ORGANIZED HEALTH CARE EDUCATION/TRAINING PROGRAM

## 2022-07-01 PROCEDURE — 250N000013 HC RX MED GY IP 250 OP 250 PS 637: Performed by: HOSPITALIST

## 2022-07-01 PROCEDURE — 120N000001 HC R&B MED SURG/OB

## 2022-07-01 RX ORDER — ESTRADIOL 1 MG/G
1 GEL TOPICAL DAILY
Status: DISCONTINUED | OUTPATIENT
Start: 2022-07-01 | End: 2022-07-05 | Stop reason: HOSPADM

## 2022-07-01 RX ORDER — METHOCARBAMOL 500 MG/1
500-1000 TABLET, FILM COATED ORAL 3 TIMES DAILY
Status: DISCONTINUED | OUTPATIENT
Start: 2022-07-01 | End: 2022-07-05 | Stop reason: HOSPADM

## 2022-07-01 RX ORDER — PRAMIPEXOLE DIHYDROCHLORIDE 0.25 MG/1
0.25 TABLET ORAL AT BEDTIME
Status: DISCONTINUED | OUTPATIENT
Start: 2022-07-01 | End: 2022-07-05 | Stop reason: HOSPADM

## 2022-07-01 RX ORDER — LORAZEPAM 1 MG/1
1 TABLET ORAL AT BEDTIME
Status: DISCONTINUED | OUTPATIENT
Start: 2022-07-01 | End: 2022-07-05 | Stop reason: HOSPADM

## 2022-07-01 RX ORDER — ACETAMINOPHEN 325 MG/1
650 TABLET ORAL EVERY 6 HOURS PRN
Status: DISCONTINUED | OUTPATIENT
Start: 2022-07-01 | End: 2022-07-05 | Stop reason: HOSPADM

## 2022-07-01 RX ORDER — HYDROCODONE BITARTRATE AND ACETAMINOPHEN 5; 325 MG/1; MG/1
2 TABLET ORAL EVERY 4 HOURS PRN
Status: DISCONTINUED | OUTPATIENT
Start: 2022-07-01 | End: 2022-07-05 | Stop reason: HOSPADM

## 2022-07-01 RX ORDER — ONDANSETRON 2 MG/ML
4 INJECTION INTRAMUSCULAR; INTRAVENOUS EVERY 6 HOURS PRN
Status: DISCONTINUED | OUTPATIENT
Start: 2022-07-01 | End: 2022-07-05 | Stop reason: HOSPADM

## 2022-07-01 RX ORDER — LIDOCAINE 4 G/G
3 PATCH TOPICAL DAILY
Status: DISCONTINUED | OUTPATIENT
Start: 2022-07-01 | End: 2022-07-05 | Stop reason: HOSPADM

## 2022-07-01 RX ORDER — AMOXICILLIN 250 MG
6-9 CAPSULE ORAL EVERY EVENING
Status: DISCONTINUED | OUTPATIENT
Start: 2022-07-01 | End: 2022-07-05 | Stop reason: HOSPADM

## 2022-07-01 RX ORDER — DEXTROAMPHETAMINE SACCHARATE, AMPHETAMINE ASPARTATE, DEXTROAMPHETAMINE SULFATE AND AMPHETAMINE SULFATE 2.5; 2.5; 2.5; 2.5 MG/1; MG/1; MG/1; MG/1
10 TABLET ORAL 2 TIMES DAILY
Status: DISCONTINUED | OUTPATIENT
Start: 2022-07-01 | End: 2022-07-05 | Stop reason: HOSPADM

## 2022-07-01 RX ORDER — IBUPROFEN 400 MG/1
800 TABLET, FILM COATED ORAL ONCE
Status: COMPLETED | OUTPATIENT
Start: 2022-07-01 | End: 2022-07-01

## 2022-07-01 RX ORDER — ACETAMINOPHEN 650 MG/1
650 SUPPOSITORY RECTAL EVERY 6 HOURS PRN
Status: DISCONTINUED | OUTPATIENT
Start: 2022-07-01 | End: 2022-07-05 | Stop reason: HOSPADM

## 2022-07-01 RX ORDER — ALBUTEROL SULFATE 90 UG/1
2 AEROSOL, METERED RESPIRATORY (INHALATION) EVERY 6 HOURS PRN
Status: DISCONTINUED | OUTPATIENT
Start: 2022-07-01 | End: 2022-07-05 | Stop reason: HOSPADM

## 2022-07-01 RX ORDER — DEXTROAMPHETAMINE SACCHARATE, AMPHETAMINE ASPARTATE MONOHYDRATE, DEXTROAMPHETAMINE SULFATE AND AMPHETAMINE SULFATE 2.5; 2.5; 2.5; 2.5 MG/1; MG/1; MG/1; MG/1
20 CAPSULE, EXTENDED RELEASE ORAL DAILY
Status: DISCONTINUED | OUTPATIENT
Start: 2022-07-13 | End: 2022-07-05 | Stop reason: HOSPADM

## 2022-07-01 RX ORDER — ONDANSETRON 4 MG/1
4 TABLET, ORALLY DISINTEGRATING ORAL EVERY 6 HOURS PRN
Status: DISCONTINUED | OUTPATIENT
Start: 2022-07-01 | End: 2022-07-05 | Stop reason: HOSPADM

## 2022-07-01 RX ORDER — DESVENLAFAXINE 50 MG/1
50 TABLET, FILM COATED, EXTENDED RELEASE ORAL DAILY
Status: DISCONTINUED | OUTPATIENT
Start: 2022-07-01 | End: 2022-07-05 | Stop reason: HOSPADM

## 2022-07-01 RX ADMIN — DEXTROAMPHETAMINE SACCHARATE, AMPHETAMINE ASPARTATE, DEXTROAMPHETAMINE SULFATE AND AMPHETAMINE SULFATE 10 MG: 2.5; 2.5; 2.5; 2.5 TABLET ORAL at 17:39

## 2022-07-01 RX ADMIN — Medication 1 MG: at 00:38

## 2022-07-01 RX ADMIN — ACETAMINOPHEN 650 MG: 325 TABLET ORAL at 00:38

## 2022-07-01 RX ADMIN — ACETAMINOPHEN 650 MG: 325 TABLET ORAL at 05:56

## 2022-07-01 RX ADMIN — PRAMIPEXOLE DIHYDROCHLORIDE 0.25 MG: 0.25 TABLET ORAL at 00:38

## 2022-07-01 RX ADMIN — IBUPROFEN 800 MG: 400 TABLET, FILM COATED ORAL at 09:12

## 2022-07-01 RX ADMIN — LORAZEPAM 1 MG: 1 TABLET ORAL at 21:42

## 2022-07-01 RX ADMIN — METHOCARBAMOL 1000 MG: 500 TABLET ORAL at 16:03

## 2022-07-01 RX ADMIN — METHOCARBAMOL 1000 MG: 500 TABLET ORAL at 00:38

## 2022-07-01 RX ADMIN — ACETAMINOPHEN 650 MG: 325 TABLET ORAL at 22:51

## 2022-07-01 RX ADMIN — PRAMIPEXOLE DIHYDROCHLORIDE 0.25 MG: 0.25 TABLET ORAL at 21:43

## 2022-07-01 RX ADMIN — METHOCARBAMOL 1000 MG: 500 TABLET ORAL at 21:42

## 2022-07-01 RX ADMIN — LIDOCAINE 3 PATCH: 560 PATCH PERCUTANEOUS; TOPICAL; TRANSDERMAL at 09:13

## 2022-07-01 RX ADMIN — SENNOSIDES AND DOCUSATE SODIUM 6 TABLET: 50; 8.6 TABLET ORAL at 21:42

## 2022-07-01 RX ADMIN — METHOCARBAMOL 1000 MG: 500 TABLET ORAL at 09:12

## 2022-07-01 ASSESSMENT — ACTIVITIES OF DAILY LIVING (ADL)
WALKING_OR_CLIMBING_STAIRS_DIFFICULTY: NO
DOING_ERRANDS_INDEPENDENTLY_DIFFICULTY: NO
CHANGE_IN_FUNCTIONAL_STATUS_SINCE_ONSET_OF_CURRENT_ILLNESS/INJURY: NO
ADLS_ACUITY_SCORE: 35
CONCENTRATING,_REMEMBERING_OR_MAKING_DECISIONS_DIFFICULTY: NO
ADLS_ACUITY_SCORE: 35
FALL_HISTORY_WITHIN_LAST_SIX_MONTHS: NO
ADLS_ACUITY_SCORE: 18
DIFFICULTY_COMMUNICATING: NO
WEAR_GLASSES_OR_BLIND: NO
DRESSING/BATHING_DIFFICULTY: NO
HEARING_DIFFICULTY_OR_DEAF: NO
ADLS_ACUITY_SCORE: 35
ADLS_ACUITY_SCORE: 35
DIFFICULTY_EATING/SWALLOWING: NO
ADLS_ACUITY_SCORE: 35
ADLS_ACUITY_SCORE: 18
TOILETING_ISSUES: NO

## 2022-07-01 NOTE — ED PROVIDER NOTES
Date & Time: 1/6/2025, 6:08 PM  Patient: You Ochoa  Encounter Provider(s):    Terrence Greenwood MD       To Whom It May Concern:    You Ochoa was seen and treated in our department on 1/6/2025. He is excused from work for next 3 days.    If you have any questions or concerns, please do not hesitate to call.        _____________________________  Physician/APC Signature            "Sign Out Note     Pt accepted in sign out from: Dr. Dwyer    Briefly pt presented to the ED for:  C 1-year-old female with history of CLL, COPD, COVID-19 infection, who presents with depression.  She requires mental health stabilization for ongoing suicidal ideation, but is admitted medically given positive COVID-19 and need for isolation given her immunosuppressed status.     Plan at time of sign out:  With transfer to inpatient unit.     Care of patient during my shift: ED nurse manager states that infection prevention, Jesica, declared the patient \"COVID recovered,\" which means she can transfer to the EmPATH unit.      Patient then deemed again to need additional isolation, so admitted back to medical service.     Plan for patient at this time: Care of patient will be signed out to the oncoming physician, MD Bhumi Leblanc Tracy Lynn, MD  07/01/22 0971    "

## 2022-07-01 NOTE — PROGRESS NOTES
[facility]    Medicine Progress Note - Hospitalist Service    Date of Admission:  6/30/2022  Date of Service: 07/01/2022    Assessment & Plan          Janelle White is a 61 year old female admitted on 6/30/2022. She has a past medical history most remarkable for CLL and recent COVID-19 infection (started 6/22/2022).  She presented to the emergency department with SI, psychiatric evaluation recommended inpatient admission.  Unfortunately patient was not able to be placed due to COVID-19 infection requiring 20 days of isolation due to underlying immunocompromise state.  She is admitted to medicine for further care.    1.  History of depression with current suicidal ideation; with 72-hour hold placed.  2.  History of chronic lymphocytic leukemia follows with the oncology group at [facility].  3.  COVID-19 infection that started 6/22/2022. Minimally Symptomatic Right Now.  Due to underlying immunocompromise state (CLL plus intermittent IVIG infusion, last received 6/20 /2022), she needs 20 days of isolation per [facility] policy.  Plan:  - Sitter as needed  - Continue a 72-hour hold  - Psych consulted, social work consulted  - Home psych meds resumed     Chronic Issues:  1.  Chronic pain syndrome.  Home pain meds ordered  2.  ADHD  3.  Constipation.  Home bowel regimen ordered       Diet: Combination Diet Regular Diet Adult  Diet    DVT Prophylaxis: Ambulate every shift  [place] Catheter: Not present  Central Lines: None  Cardiac Monitoring: None  Code Status: Full Code      Disposition Plan      Expected Discharge Date: 07/01/2022, 12:00 PM              The patient's care was discussed with the Bedside Nurse and Patient.    Mack Minaya MD  Hospitalist Service  [facility]  Securely message with the Vocera Web Console (learn more here)  Text page via [employer] Paging/Directory         Clinically Significant Risk Factors Present on Admission                           ______________________________________________________________________    Interval History     No acute events overnight  No CP/SOB  No fevers or chills    Data reviewed today: I reviewed all medications, new labs and imaging results over the last 24 hours. I personally reviewed no images or EKG's today.    Physical Exam   Vital Signs: Temp: 98.4  F (36.9  C) Temp src: Oral BP: 125/81 Pulse: 71   Resp: 18 SpO2: 100 % O2 Device: None (Room air)    Weight: 144 lbs 0 oz      General Appearance: no apparent distress  Respiratory: CTAB, normal work of breathing  Cardiovascular: Regular Rate and Rhythm, normal S1, S2. No murmurs, rubs, gallops  GI: Soft, non-tender, non-distended  Musculoskeletal: Warm, well perfused  Neurologic: AOx4, Monique  Psychiatric: Euthymic. Mood appropriate.     Data   Recent Labs   Lab 06/30/22  1658   WBC 5.6   HGB 14.2   MCV 95         POTASSIUM 3.4   CHLORIDE 105   CO2 25   BUN 20   CR 0.59   ANIONGAP 6   BERYL 8.7   GLC 91     No results found for this or any previous visit (from the past 24 hour(s)).  Medications       [START ON 7/13/2022] amphetamine-dextroamphetamine  20 mg Oral Daily     amphetamine-dextroamphetamine  10 mg Oral BID     desvenlafaxine  50 mg Oral Daily     Lidocaine  3 patch Transdermal Daily     lidocaine   Transdermal Q8H MERCEDEZ     LORazepam  1 mg Oral At Bedtime     methocarbamol  500-1,000 mg Oral TID     pramipexole  0.25 mg Oral At Bedtime     senna-docusate  6-9 tablet Oral QPM

## 2022-07-01 NOTE — PHARMACY-ADMISSION MEDICATION HISTORY
Pharmacy Medication History  Admission medication history interview status for the 6/30/2022  admission is complete. See EPIC admission navigator for prior to admission medications     Location of Interview: Phone  Medication history sources: Patient    Significant changes made to the medication list:  Delete: Colchicine, Cyclobenzaprine, ergocalciferol, ferrous sulfate  Modified: Adderall tabs    In the past week, patient estimated taking medication this percent of the time: greater than 90% but does seem to be out of a few of her meds    Additional medication history information:       Medication reconciliation completed by provider prior to medication history? No    Time spent in this activity: 20 minutes    Prior to Admission medications    Medication Sig Last Dose Taking? Auth Provider Long Term End Date   albuterol (PROAIR HFA/PROVENTIL HFA/VENTOLIN HFA) 108 (90 Base) MCG/ACT inhaler Inhale 2 puffs into the lungs every 6 hours Past Week at PRN Yes Hakeem Concepcion PA-C Yes    amphetamine-dextroamphetamine (ADDERALL XR) 20 MG 24 hr capsule Take 1 capsule (20 mg) by mouth daily 6/30/2022 at AM Yes Kimberly Perez DO     amphetamine-dextroamphetamine (ADDERALL) 20 MG tablet Take 1 tablet (20 mg) by mouth daily  Patient taking differently: Take 10 mg by mouth 2 times daily 1/2 x 20 mg tablet at 1100 and 1400 6/30/2022 at 1100 Yes Kimberly Perez DO     ASHWAGANDHA PO Take 1 tablet by mouth daily 6/30/2022 at AM Yes Reported, Patient     desvenlafaxine (PRISTIQ) 50 MG 24 hr tablet Take 1 tablet (50 mg) by mouth daily 6/30/2022 at AM Yes Kimberly Perez DO Yes    Estradiol (DIVIGEL) 1 MG/GM GEL Place 1 packet onto the skin daily 6/30/2022 at AM Yes Hakeem Concepcion PA-C Yes    lidocaine (LIDODERM) 5 % patch Place 4 patches onto the skin daily Apply up to 4 patches to skin. Wear for 12 hours and remove for 12 hrs.  Refill when patient requests. 6/29/2022 at qPM Yes Hakeem Concepcion PA-C     LORazepam (ATIVAN) 1 MG tablet Take 1  "tablet (1 mg) by mouth every 6 hours as needed for anxiety  Patient taking differently: Take 1 mg by mouth At Bedtime 6/29/2022 at HS Yes Hakeem Concepcion PA-C     medical cannabis (Patient's own supply.  Not a prescription) Medical Cannabis - [place] 4-6 ml by mouth daily. [facility]  Yes Reported, Patient     methocarbamol (ROBAXIN) 500 MG tablet Take 1 tablet (500 mg) by mouth 4 times daily as needed for muscle spasms  Patient taking differently: Take 500-1,000 mg by mouth 3 times daily 500 mg qAM the 1000 mg qAfternoon and qPM 6/30/2022 at AM Yes Joslyn Cherry MD     naloxone (NARCAN) 4 MG/0.1ML nasal spray Spray 1 spray (4 mg) into one nostril alternating nostrils as needed for opioid reversal every 2-3 minutes until assistance arrives  at PRN Yes Emili Ballard MD Yes    NONFORMULARY Take 2 Scoops by mouth daily Protein and Vitamin shake mix - Nutritional supplement 6/29/2022 at Unknown time Yes Unknown, Entered By History     pramipexole (MIRAPEX) 0.25 MG tablet Take 1 tablet (0.25 mg) by mouth At Bedtime 6/29/2022 at HS Yes Kimberly Perez DO Yes    Prasterone 6.5 MG INST Place 0.5 suppositories vaginally three times a week Past Week at Unknown time Yes Hakeem Concepcion PA-C     senna-docusate (SENOKOT-S/PERICOLACE) 8.6-50 MG tablet Take 6-9 tablets by mouth every evening 6/29/2022 at HS Yes Unknown, Entered By History     vitamin D3 (CHOLECALCIFEROL) 125 MCG (5000 UT) tablet Take 5,000 Units by mouth daily Past Week at AM Yes Reported, Patient No    HYDROcodone-acetaminophen (NORCO)  MG per tablet Take 1 tablet by mouth every 4 hours as needed for severe pain max 4 tabs/24 hrs  at PRN  Carmen Garcia APRN CNP     insulin syringe-needle U-100 (30G X 1/2\" 1 ML) 30G X 1/2\" 1 ML miscellaneous Inject 1 ml B12 qmonth  Patient not taking: No sig reported   Emili Ballard MD     methylfolate (DEPLIN) 7.5 MG TABS tablet Take 1 tablet (7.5 mg) by mouth daily   Ana, " DO Kimberly     morphine (MS CONTIN) 15 MG CR tablet Take 1 tablet (15 mg) by mouth every 12 hours maximum 2 tablet(s) per day  Patient not taking: No sig reported   Carmen Garcia, APRN CNP Yes    ondansetron (ZOFRAN-ODT) 8 MG ODT tab Take 1 tablet (8 mg) by mouth every 8 hours as needed for nausea  at PRN  Hakeem Concepcion, PA-C         The information provided in this note is only as accurate as the sources available at the time of update(s)

## 2022-07-01 NOTE — SAFE
Janelle MELISSA Reynoldsabyvette  June 30, 2022  SAFE Note    Critical Safety Issues: Suicidal       Current Suicidal Ideation/Self-Injurious Concerns/Methods: Ingestion Pt did not disclose but PCP resports Pt has mentioned this      Current or Historical Inappropriate Sexual Behavior: No      Current or Historical Aggression/Homicidal Ideation: None - N/A      Triggers: Unknown, Pt struggling to cope with chronic pain    Guardianship Status: is her own guardian.. Guardianship paperwork is not required.    This patient is a child/adolescent: No    This patient has additional special visitor precautions: No    Updated care team: Yes: Spoke with MD and RN    For additional details see full LMHP assessment.       Stephanie Sosa, MARINASW

## 2022-07-01 NOTE — ED NOTES
Triage & Transition Services, Extended Care     Janelle TORRES Rodolfoabudimag  July 1, 2022    Janelle is followed related to Placement delay: COVID+ thru 7/11/22. Please see initial DEC Crisis Assessment completed for complete assessment information. Medical record is reviewed. There are not significant status changes. While patient is in the ED, care team is working towards stabilizing mental health. Recommend ongoing assessment of need for psychiatric hospitalization and to explore potential for lower level of care. Extended Care will follow and meet with patient/family/care team as able or requested.     Carissa Logan, [facility]  [facility], Extended Care   [phone]

## 2022-07-01 NOTE — PROVIDER NOTIFICATION
Patient requesting  to order her home meds, Divigel and Estradiol 1gm packet for hot hot flash. Web paged Dr Minaya regarding this. Dr Minaya ordered patient meds.

## 2022-07-01 NOTE — ED NOTES
ED to EmPATH Checklist    Patient searched and belongings removed: Yes/No: No   Patient informed about EmPATH: Yes/No: Yes  Medically stable at this time: Yes/No: Yes  COVID-19 status: Covid recovered  Independent Ambulation at Baseline: Yes/No: Yes  Follows Directions: Yes/No: Yes  PRN Medications Order/ Order Set: Yes/No: No  Bedside Handoff: Yes/No: Yes, to Héctor.

## 2022-07-01 NOTE — PROGRESS NOTES
6/20 symptom onset. 6/22/22 positive.    Patient is no longer symptomatic. However, Patient is currently not in remission (per note) and technically meets the severe COVID-19 category. Per policy patient is eligable for recovered COVID 7/11/22 as long as they have a negative COVID PCR per policy.

## 2022-07-02 LAB
CRP SERPL-MCNC: <2.9 MG/L (ref 0–8)
D DIMER PPP FEU-MCNC: <0.27 UG/ML FEU (ref 0–0.5)
FIBRINOGEN PPP-MCNC: 348 MG/DL (ref 170–490)

## 2022-07-02 PROCEDURE — 250N000013 HC RX MED GY IP 250 OP 250 PS 637: Performed by: HOSPITALIST

## 2022-07-02 PROCEDURE — 85384 FIBRINOGEN ACTIVITY: CPT

## 2022-07-02 PROCEDURE — 86140 C-REACTIVE PROTEIN: CPT

## 2022-07-02 PROCEDURE — 120N000001 HC R&B MED SURG/OB

## 2022-07-02 PROCEDURE — 36415 COLL VENOUS BLD VENIPUNCTURE: CPT

## 2022-07-02 PROCEDURE — 99232 SBSQ HOSP IP/OBS MODERATE 35: CPT | Performed by: HOSPITALIST

## 2022-07-02 PROCEDURE — 85379 FIBRIN DEGRADATION QUANT: CPT

## 2022-07-02 RX ORDER — IBUPROFEN 200 MG
400 TABLET ORAL EVERY 8 HOURS PRN
Status: DISCONTINUED | OUTPATIENT
Start: 2022-07-02 | End: 2022-07-05 | Stop reason: HOSPADM

## 2022-07-02 RX ADMIN — METHOCARBAMOL 1000 MG: 500 TABLET ORAL at 22:18

## 2022-07-02 RX ADMIN — LIDOCAINE 3 PATCH: 560 PATCH PERCUTANEOUS; TOPICAL; TRANSDERMAL at 09:02

## 2022-07-02 RX ADMIN — METHOCARBAMOL 1000 MG: 500 TABLET ORAL at 14:29

## 2022-07-02 RX ADMIN — DEXTROAMPHETAMINE SACCHARATE, AMPHETAMINE ASPARTATE, DEXTROAMPHETAMINE SULFATE AND AMPHETAMINE SULFATE 10 MG: 2.5; 2.5; 2.5; 2.5 TABLET ORAL at 10:35

## 2022-07-02 RX ADMIN — SENNOSIDES AND DOCUSATE SODIUM 6 TABLET: 50; 8.6 TABLET ORAL at 22:17

## 2022-07-02 RX ADMIN — DEXTROAMPHETAMINE SACCHARATE, AMPHETAMINE ASPARTATE, DEXTROAMPHETAMINE SULFATE AND AMPHETAMINE SULFATE 10 MG: 2.5; 2.5; 2.5; 2.5 TABLET ORAL at 14:29

## 2022-07-02 RX ADMIN — ACETAMINOPHEN 650 MG: 325 TABLET ORAL at 22:17

## 2022-07-02 RX ADMIN — DESVENLAFAXINE SUCCINATE 50 MG: 50 TABLET, EXTENDED RELEASE ORAL at 09:05

## 2022-07-02 RX ADMIN — METHOCARBAMOL 1000 MG: 500 TABLET ORAL at 09:05

## 2022-07-02 RX ADMIN — LORAZEPAM 1 MG: 1 TABLET ORAL at 22:18

## 2022-07-02 RX ADMIN — PRAMIPEXOLE DIHYDROCHLORIDE 0.25 MG: 0.25 TABLET ORAL at 22:18

## 2022-07-02 RX ADMIN — IBUPROFEN 400 MG: 200 TABLET, FILM COATED ORAL at 10:35

## 2022-07-02 ASSESSMENT — ANXIETY QUESTIONNAIRES
6. BECOMING EASILY ANNOYED OR IRRITABLE: NEARLY EVERY DAY
5. BEING SO RESTLESS THAT IT IS HARD TO SIT STILL: MORE THAN HALF THE DAYS
2. NOT BEING ABLE TO STOP OR CONTROL WORRYING: MORE THAN HALF THE DAYS
1. FEELING NERVOUS, ANXIOUS, OR ON EDGE: SEVERAL DAYS
GAD7 TOTAL SCORE: 11
3. WORRYING TOO MUCH ABOUT DIFFERENT THINGS: MORE THAN HALF THE DAYS
4. TROUBLE RELAXING: SEVERAL DAYS
7. FEELING AFRAID AS IF SOMETHING AWFUL MIGHT HAPPEN: NOT AT ALL
IF YOU CHECKED OFF ANY PROBLEMS ON THIS QUESTIONNAIRE, HOW DIFFICULT HAVE THESE PROBLEMS MADE IT FOR YOU TO DO YOUR WORK, TAKE CARE OF THINGS AT HOME, OR GET ALONG WITH OTHER PEOPLE: VERY DIFFICULT
GAD7 TOTAL SCORE: 11

## 2022-07-02 ASSESSMENT — ACTIVITIES OF DAILY LIVING (ADL)
ADLS_ACUITY_SCORE: 18

## 2022-07-02 ASSESSMENT — PATIENT HEALTH QUESTIONNAIRE - PHQ9: SUM OF ALL RESPONSES TO PHQ QUESTIONS 1-9: 22

## 2022-07-02 NOTE — PLAN OF CARE
Goal Outcome Evaluation:    Plan of Care Reviewed With: patient     Overall Patient Progress: improving         Summary: Suicidal Ideation. COVID 19 positive.    DATE & TIME: 7/1/22 pm shift.   Cognitive Concerns/ Orientation : A&O x 4   BEHAVIOR & AGGRESSION TOOL COLOR: Green  CIWA SCORE: NA   ABNL VS/O2: VSS, RA  MOBILITY: independent  PAIN MANAGMENT: PRN  Tylenol x 1. Schedule Robaxin. Lidocaine patches removed this night ( patient has hx of chronic pain syndrome).   DIET: Regular  BOWEL/BLADDER: Continent.  ABNL LAB/BG: none today.  DRAIN/DEVICES: PIV SL  TELEMETRY RHYTHM: NA  SKIN: intact  TESTS/PROCEDURES: none  D/C DAY/GOALS/PLACE: pending. Patient on 72 hr hold.  OTHER IMPORTANT INFO: Psych consult, Care management/SW consult. Patient on suicidal precaution. Sitter at bedside. Found small container with meds on patient scrub during skin assessment on her arrival to the unit. Patient belongings and small container with meds locked up in [facility] locker # 4.

## 2022-07-02 NOTE — PROGRESS NOTES
[facility]  Hospitalist Progress Note  [facility]        Date of Service (when I saw the patient): 07/02/2022        Interval History:      Patient pleasant and conversive this morning, discussed plan of care. Requesting ibuprofen for chronic neck pain.          Assessment and Plan:        Janelle White is a 61 year old female admitted on 6/30/2022. She has a past medical history most remarkable for CLL and recent COVID-19 infection (started 6/22/2022).  She presented to the emergency department with SI, psychiatric evaluation recommended inpatient admission.  Unfortunately patient was not able to be placed due to COVID-19 infection requiring 20 days of isolation due to underlying immunocompromise state.  She is admitted to medicine for further care.     History of depression with current suicidal ideation; with 72-hour hold placed.  - Sitter as needed  - Continue a 72-hour hold  - Psych consulted, social work consulted  - Home psych meds resumed     History of chronic lymphocytic leukemia follows with the oncology group at [facility].    COVID-19 infection that started 6/22/2022. Due to underlying immunocompromise state (CLL plus intermittent IVIG infusion, last received 6/20 /2022), she needs 20 days of isolation per [place] policy.  - Isolation.      Chronic Issues:  1.  Chronic pain syndrome.  Home pain meds ordered  2.  ADHD  3.  Constipation.  Home bowel regimen ordered     DVT Prophylaxis: Ambulate every shift    Disposition: Pending mental health evaluation recommendations.     Diet: Orders Placed This Encounter      Diet      Combination Diet Regular Diet Adult; Safe Tray - with utensils      Code: Full Code    Length of Stay:  LOS: 2 days /LOS: 2    Dr. Carlos Harris DO, NICHELLE, MHA  [facility] Hospitalist  Pager [phone]    Securely message with the Vocera Web Console (learn more here)  Text page via Isabella Oliver  "Paging/Directory      Clinically Significant Risk Factors Present on Admission                                      Physical Exam:           Blood pressure 95/65, pulse 59, temperature 97.5  F (36.4  C), temperature source Oral, resp. rate 16, height 1.651 m (5' 5\"), weight 65.3 kg (144 lb), last menstrual period 2005, SpO2 99 %, not currently breastfeeding.    Vital Sign Ranges  Temperature Temp  Av.1  F (36.7  C)  Min: 97.5  F (36.4  C)  Max: 98.4  F (36.9  C)   Blood pressure Systolic (24hrs), Av , Min:95 , Max:125        Diastolic (24hrs), Av, Min:65, Max:81      Pulse Pulse  Av.7  Min: 58  Max: 71   Respirations Resp  Av.7  Min: 16  Max: 18   Pulse oximetry SpO2  Av %  Min: 98 %  Max: 100 %     Vital Signs with Ranges  Temp:  [97.5  F (36.4  C)-98.4  F (36.9  C)] 97.5  F (36.4  C)  Pulse:  [58-71] 59  Resp:  [16-18] 16  BP: ()/(65-81) 95/65  SpO2:  [98 %-100 %] 99 %  Temp:  [97.5  F (36.4  C)-98.4  F (36.9  C)] 97.5  F (36.4  C)  Pulse:  [58-71] 59  Resp:  [16-18] 16  BP: ()/(65-81) 95/65  SpO2:  [98 %-100 %] 99 %    I/O Last 3 Shifts:   I/O last 3 completed shifts:  In: 240 [P.O.:240]  Out: -     I/O past 24 hours:     Intake/Output Summary (Last 24 hours) at 2022 0728  Last data filed at 2022 2100  Gross per 24 hour   Intake 240 ml   Output --   Net 240 ml       Oxygen  Temp: 97.5  F (36.4  C) Temp src: Oral BP: 95/65 Pulse: 59   Resp: 16 SpO2: 99 % O2 Device: None (Room air)    Vitals:    22 1558   Weight: 65.3 kg (144 lb)       Lines  Peripheral IV 22 Anterior;Left Lower forearm (Active)   Number of days: 18     GENERAL: NAD. Conversational, appropriate.   HEENT: Normocephalic. EOMI. No icterus or injection. Nares normal.   LUNGS: Clear to auscultation. No dyspnea at rest.   HEART: Regular rate. Extremities perfused.   ABDOMEN: Soft, nontender, and nondistended. Positive bowel sounds.   EXTREMITIES: No LE edema noted.   NEUROLOGIC: Moves " extremities x4, follows commands.          Prior to Admission Medications:        Medications Prior to Admission   Medication Sig Dispense Refill Last Dose     albuterol (PROAIR HFA/PROVENTIL HFA/VENTOLIN HFA) 108 (90 Base) MCG/ACT inhaler Inhale 2 puffs into the lungs every 6 hours (Patient taking differently: Inhale 2 puffs into the lungs every 6 hours as needed for shortness of breath / dyspnea or wheezing) 8.5 g 4 Past Week at PRN     [START ON 7/13/2022] amphetamine-dextroamphetamine (ADDERALL XR) 20 MG 24 hr capsule Take 1 capsule (20 mg) by mouth daily 30 capsule 0 6/30/2022 at AM     [START ON 7/13/2022] amphetamine-dextroamphetamine (ADDERALL) 20 MG tablet Take 1 tablet (20 mg) by mouth daily (Patient taking differently: Take 10 mg by mouth 2 times daily 1/2 x 20 mg tablet at 1100 and 1400) 30 tablet 0 6/30/2022 at 1100     ASHWAGANDHA PO Take 1 tablet by mouth daily   6/30/2022 at AM     desvenlafaxine (PRISTIQ) 50 MG 24 hr tablet Take 1 tablet (50 mg) by mouth daily 90 tablet 1 6/30/2022 at AM     Estradiol (DIVIGEL) 1 MG/GM GEL Place 1 packet onto the skin daily 30 g 11 6/30/2022 at AM     lidocaine (LIDODERM) 5 % patch Place 4 patches onto the skin daily Apply up to 4 patches to skin. Wear for 12 hours and remove for 12 hrs.  Refill when patient requests. 120 patch 3 6/29/2022 at qPM     LORazepam (ATIVAN) 1 MG tablet Take 1 tablet (1 mg) by mouth every 6 hours as needed for anxiety (Patient taking differently: Take 1 mg by mouth At Bedtime) 50 tablet 3 6/29/2022 at HS     medical cannabis (Patient's own supply.  Not a prescription) Medical Cannabis - Tangerine 4-6 ml by mouth daily. [facility]        methocarbamol (ROBAXIN) 500 MG tablet Take 1 tablet (500 mg) by mouth 4 times daily as needed for muscle spasms (Patient taking differently: Take 500-1,000 mg by mouth 3 times daily 500 mg qAM the 1000 mg qAfternoon and qPM) 120 tablet 0 6/30/2022 at AM     naloxone (NARCAN) 4 MG/0.1ML nasal spray Spray  "1 spray (4 mg) into one nostril alternating nostrils as needed for opioid reversal every 2-3 minutes until assistance arrives 0.2 mL 1  at PRN     NONFORMULARY Take 2 Scoops by mouth daily Protein and Vitamin shake mix - Nutritional supplement   6/29/2022 at Unknown time     pramipexole (MIRAPEX) 0.25 MG tablet Take 1 tablet (0.25 mg) by mouth At Bedtime 30 tablet 1 6/29/2022 at HS     Prasterone 6.5 MG INST Place 0.5 suppositories vaginally three times a week 28 each 11 Past Week at Unknown time     senna-docusate (SENOKOT-S/PERICOLACE) 8.6-50 MG tablet Take 6-9 tablets by mouth every evening   6/29/2022 at HS     vitamin D3 (CHOLECALCIFEROL) 125 MCG (5000 UT) tablet Take 5,000 Units by mouth daily   Past Week at AM     HYDROcodone-acetaminophen (NORCO)  MG per tablet Take 1 tablet by mouth every 4 hours as needed for severe pain max 4 tabs/24 hrs 10 tablet 0  at PRN     insulin syringe-needle U-100 (30G X 1/2\" 1 ML) 30G X 1/2\" 1 ML miscellaneous Inject 1 ml B12 qmonth (Patient not taking: No sig reported) 10 each 1      methylfolate (DEPLIN) 7.5 MG TABS tablet Take 1 tablet (7.5 mg) by mouth daily 30 tablet 1      ondansetron (ZOFRAN-ODT) 8 MG ODT tab Take 1 tablet (8 mg) by mouth every 8 hours as needed for nausea 30 tablet 4  at PRN            Medications:        Current Facility-Administered Medications   Medication Last Rate     Current Facility-Administered Medications   Medication Dose Route Frequency     [START ON 7/13/2022] amphetamine-dextroamphetamine  20 mg Oral Daily     amphetamine-dextroamphetamine  10 mg Oral BID     desvenlafaxine  50 mg Oral Daily     Estradiol  1 packet Topical Daily     Lidocaine  3 patch Transdermal Daily     lidocaine   Transdermal Q8H MERCEDEZ     LORazepam  1 mg Oral At Bedtime     methocarbamol  500-1,000 mg Oral TID     pramipexole  0.25 mg Oral At Bedtime     Prasterone  0.5 suppository Vaginal Once per day on Mon Wed Fri     senna-docusate  6-9 tablet Oral QPM "     Current Facility-Administered Medications   Medication Dose Route Frequency     acetaminophen  650 mg Oral Q6H PRN    Or     acetaminophen  650 mg Rectal Q6H PRN     albuterol  2 puff Inhalation Q6H PRN     HYDROcodone-acetaminophen  2 tablet Oral Q4H PRN     melatonin  1 mg Oral At Bedtime PRN     ondansetron  4 mg Oral Q6H PRN    Or     ondansetron  4 mg Intravenous Q6H PRN     ___________________________________________________________________________  Medications       [START ON 7/13/2022] amphetamine-dextroamphetamine  20 mg Oral Daily     amphetamine-dextroamphetamine  10 mg Oral BID     desvenlafaxine  50 mg Oral Daily     Estradiol  1 packet Topical Daily     Lidocaine  3 patch Transdermal Daily     lidocaine   Transdermal Q8H MERCEDEZ     LORazepam  1 mg Oral At Bedtime     methocarbamol  500-1,000 mg Oral TID     pramipexole  0.25 mg Oral At Bedtime     Prasterone  0.5 suppository Vaginal Once per day on Mon Wed Fri     senna-docusate  6-9 tablet Oral QPM          Data:      Lab data reviewed.     Data   Recent Labs   Lab 06/30/22  1658   WBC 5.6   HGB 14.2   MCV 95         POTASSIUM 3.4   CHLORIDE 105   CO2 25   BUN 20   CR 0.59   ANIONGAP 6   BERYL 8.7   GLC 91           Imaging:      Imaging data reviewed.     No results found for this or any previous visit (from the past 24 hour(s)).    Dr. Carlos Harris DO, NICHELLE, [facility] Hospitalist  Pager [phone]    Securely message with the Vocera Web Console (learn more here)  Text page via [facility] Paging/Directory

## 2022-07-02 NOTE — PROGRESS NOTES
Current condition (current mood & behavior): Calm and coperative  Sitter present: yes  Every 15 minute documentation by NA/RN completed for Shift: yes  Room safety Suicide Checklist completed in Epic: yes  Patient's color of severity (suicide scale): YELLOW  Order for psych consult placed (if appropriate): yes  Suicide care plan added: yes      Radha Hummel RN

## 2022-07-02 NOTE — PROGRESS NOTES
"SPIRITUAL HEALTH SERVICES: Tele-Encounter  Patient Location: [place] [id number]  Spoke with: Manohar Janelle    Referral Source: SH Request.    SUMMARY:  I rang Janelle at bedside phone number. She shared a wide-ranging personal narrative incorporating health, career, carolyn, and emotional themes. 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      Illness Narrative - Janelle told of this being the second time she has Covid, of a history of repeated surgeries on her spine, shoulders, hands, and a history of leukemia. She was tearful throughout the call, named feeling hopeless and frustrated with wanting to do things she physically cannot any more, and being someone who's \"impatient\" and has used work to create routine, control and order for herself.      Distress - Janelle spoke of not knowing how to pace herself physically, feeling guilt, and grieving the loss of work identity and purpose in addition to the physical changes. She said she's finding it hard to cope with not being active like she was, including diving and skiing. She wondered about how to allow herself to rest and about her family relationships - she expressed concern that her family not be \"put through things again because of her.\" She also named societal issues and many people suffering as things she thinks about often.      Coping - Janelle said she was raised Restorationism and the Sabianist still holds meaning for her, but she considers herself spiritual and having an active relationship with God where she \"brings spirit with her everywhere.\" She said her family is very supportive, while also contemplating her relationships and ways she may have hurt others and wondering if they understand her. She named Box-Breathing, meditation cyril's, music, ketamine therapy for deep relaxation as very helpful.      Meaning-Making - We explored notions of: cycles in life, fallow time and rebirth; selfishness vs " self-centeredness; prayer and spirituality manifesting in new and different ways; her artwork in mark and new ways to pursue creativity ( incl. kintsugi); curiosity coexisting with not-knowing and grief.      Plan - I offered reflective conversation and emotional support, affirmed experiences, validated resilience, encouraged self-care, and said a blessing.    I invited Janelle to reach out to  as desired.     remains available.    Rev Milagros Martinez  Associate   Spiritual Health Phone Line [phone]  Spiritual Health Pager [phone]    ______________________________    Type of service:  Telephone Visit     has received verbal consent for a TelephoneVisit from the patient? Yes    Distance Provider Location: designated [place] office or home office (secure setting)    Mode of Communication: telephone (via [employer] phone or [employer] tele-call-number ([phone]))

## 2022-07-02 NOTE — PLAN OF CARE
Goal Outcome Evaluation:    Summary: Suicidal Ideation. COVID 19 positive.    DATE & TIME: 7/1/22-7/2/22 9183-5752   Cognitive Concerns/ Orientation : A&O x 4   BEHAVIOR & AGGRESSION TOOL COLOR: Green  CIWA SCORE: NA   ABNL VS/O2: VSS, RA  MOBILITY: independent  PAIN MANAGMENT: Denies/sleeping.  Lidocaine patch free during night.    DIET: Regular  BOWEL/BLADDER: Continent.  ABNL LAB/BG: none   DRAIN/DEVICES: PIV SL  TELEMETRY RHYTHM: NA  SKIN: intact  TESTS/PROCEDURES: none  D/C DAY/GOALS/PLACE: pending. Patient on 72 hr hold.  OTHER IMPORTANT INFO: Psych consult, Care management/SW consult. Patient on suicidal precaution. Sitter at bedside. Patient belongings and small container with meds locked up in Blue Pod locker # 4.

## 2022-07-02 NOTE — PLAN OF CARE
Date: July 2, 2022  Shift: 0700-1930  Name: Janelle White  Age: 61 year old  YOB: 1960    Reason for Admission: Suicidal ideation [R45.851]  Immunocompromised (H) [D84.9]  Infection due to 2019 novel coronavirus [U07.1]     Cognitive/Mentation: A/Ox4, tearful at start of shift due to pain and situation. Mental Health Specialist and Spiritual Care talked with patient along with pain being controlled with medications that patient has been pleasant, cooperative, and calm.   Neuros/CMS: WNL    VS: VSS on RA  Cardiac: WNL  GI: WNL  : WNL  Pulmonary: WNL  Pain: Chronic pain in neck/back. Lidocaine patches x3 in place, prn ibuprofen given with relief.     Drains: PIV - SL  Skin: WNL  Activity: Independent    Diet: Regular     Discharge: 72-hour hold expires 7/6     Shift summary: Continues 1:1 sitter for suicide watch. Patient approved to have essential oils, ear buds, phone, , and eye mask - this has helped patient significantly.

## 2022-07-02 NOTE — CONSULTS
Triage and Transition - Consult and Liaison     Janelle White  July 2, 2022    Session start: 1023  Session end: 1047  Session duration in minutes: 24 minutes   CPT utilized: 30686 - Psychotherapy (with patient) - 30 (16-37*) min  Patient was seen virtually ([employer] cart or other teleconferencing device).  Anticipated number of sessions or this episode of care: 1-4    Diagnosis:   296.33 (F33.2) Major Depressive Disorder, Recurrent Episode, Severe _  By Hx and Present    Plan/Recommendations:     Continue care coordination with care team.     Maintain current transition plan.     She reports that some successful coping strategies have been meditation, music, essential oils, stretching, piliates.   o If possible would benefit from having access to her phone, air pods and essential oils. She also reports wanting to shower.       Writer has been informed that it is likely that medication reviews from psych med providers will be reviewed 7/5/22. Placed re-consult for 7/3/22 (ongoing support) and 7/5/22 (meds).     Continue with 72 hour hold (reminder weekend and federal holidays do not count toward 72 hour hold time).     There are currently no [facility] in [facility] that will take a patient that has tested positive for COVID-19.     Reason for consult:  Ms. White presented to the Emergency Room with suicidal ideation with intention to participate in the EMPATH Unit programming however was noted to be COVID+.  Unit is requesting support from the Triage and Transitions Consult and Liaison Team for support for her mental health at this time.     Presenting problem: Janelle White is a 61 year old female admitted on 6/30/2022. She has a past medical history most remarkable for CLL and recent COVID-19 infection (started 6/22/2022).  She presented to the emergency department with SI, psychiatric evaluation recommended inpatient admission.  Unfortunately patient was not able to be placed due  "to COVID-19 infection requiring 20 days of isolation due to underlying immunocompromise state.  She is admitted to medicine for further care.     Patient states for 2 years she has had intermittent symptoms of depression including irritability, difficulty interacting, fatigue, and difficulty sleeping.  She notes that the symptoms wax and wane, and that she frequently follows up with her outpatient psychiatrist.  Declines previous attempts, but there is not of an attempt during her teens.  She frequently thinks about suicide.  She reported upon arrival that she is not safe at home because she feels she may attempt to kill herself.       She does not have a specific plan although states that there are many ways she can think of that she would kill herself but is not sure which when she would pick. In the ER she was found to be acutely suicidal and a 72-hour hold was placed.  There are plans to admit her to inpatient psychiatry, but because she had been diagnosed with coronavirus on 6-22 she needs 20 days before she can be considered COVID recovered.  She is admitted to medicine for further assessment and planning and isolation.    Session Summary: Met with patient this morning. Notes that she is not suicidal but her misery is a 10 out of 10.  States \"If I knew it was going to go like this I would not have come in.\" Notes her sleep and appetite to be variable. She continues to endorse feelings of hopelessness, helplessness and restlessness.  She reports slight anhedonia and increased irritability. Notes when she has times to sit and thing then her anxiety increases and at times she struggles with ability to control the worry.     Discussed possibly coping strategies while in the hospital setting. States she is no comfortable with journals or worksheets as she feels that leaves her vulnerable to others to read her thoughts and emotions. She reports that some successful coping strategies have been meditation, music, " essential oils, stretching, piliates. She is not interested in distraction techniques of reading, jigsaw puzzles or work/number puzzles at this time.      Primary Care Provider:   Primary Physician: Carmen Garcia Primary Address: [address], [place] [address]   Primary Phone: [phone] Primary Fax: [phone]     Psychiatrist: Kimberly Perez DO [facility]  Therapist: Janelle Brooks [facility].   : No  CTSS or ARMHS: No  ACT Team: No  Other: No    Mental Status Exam   Affect: Flat  Appearance: Appropriate   Attention Span/Concentration: Attentive    Eye Contact: Engaged  Fund of Knowledge: Appropriate   Language /Speech Content: Fluent  Language /Speech Volume: Normal   Language /Speech Rate/Productions: Normal   Recent Memory: Intact  Remote Memory: Intact  Mood: Depressed   Orientation:   Person: Yes   Place: Yes  Time of Day: Yes   Date: Yes   Situation (Do they understand why they are here?): Yes   Psychomotor Behavior: Normal   Thought Content: Clear  Thought Form: Intact    Current medications:  Current Facility-Administered Medications   Medication     acetaminophen (TYLENOL) tablet 650 mg    Or     acetaminophen (TYLENOL) Suppository 650 mg     albuterol (PROVENTIL HFA/VENTOLIN HFA) inhaler     [START ON 7/13/2022] amphetamine-dextroamphetamine (ADDERALL XR) ER cap 20 mg     amphetamine-dextroamphetamine (ADDERALL) per tablet 10 mg     desvenlafaxine (PRISTIQ) 24 hr tablet 50 mg     Estradiol GEL 1 packet     HYDROcodone-acetaminophen (NORCO) 5-325 MG per tablet 2 tablet     Lidocaine (LIDOCARE) 4 % Patch 3 patch     lidocaine patch in PLACE     LORazepam (ATIVAN) tablet 1 mg     melatonin tablet 1 mg     methocarbamol (ROBAXIN) tablet 500-1,000 mg     ondansetron (ZOFRAN ODT) ODT tab 4 mg    Or     ondansetron (ZOFRAN) injection 4 mg     pramipexole (MIRAPEX) tablet 0.25 mg     Prasterone INST 0.5 suppository     senna-docusate (SENOKOT-S/PERICOLACE)  8.6-50 MG per tablet 6-9 tablet         MeasurableTreatment Goal(s) related to diagnosis / functional impairment(s)    Goal 1: Patient will develop healthy thinking patterns and beliefs about self, others, and the world that   lead to the alleviation and help prevent the relapse of depression.    Objective A   Patient will describe current and past experiences with depression including their impact on functioning   and attempts to resolve it.     Status: New as of July 2, 2022    Intervention(s)  LMHP will assess current and past mood episodes including their features, frequency, severity, and   Duration.    LMHP will encourage the patient to share their thoughts and feelings of depression, express empathy   and build rapport by identifying primary cognitive, behavioral, interpersonal, or other contributors to   depression.     Goal 2: Patient will recognize, accept, and cope with feelings of depression     Objective A   Patient will verbalize an accurate understanding of depression.     Objective B  Patient will identify and replace thoughts and beliefs that support depression    Status: New as of July 2, 2022    Intervention(s)  LMHP will conduct Cognitive Behavioral Therapy beginning with helping the client learn the connection   among cognition, depressed feelings, and actions.    LMHP will facilitate and reinforce the client s shift from biased depressive self-talk and beliefs to realitybased cognitive message that enhances self-confidence and increases adaptive actions.     LMHP will explore and restructure underlying assumptions and beliefs reflected in biased self-talk that   may put the client at risk for relapse or recurrence           Therapeutic intervention and progress:  Therapeutic intervention consisted of building therapeutic rapport and validation. Patient is making progress towards treatment goals as evidenced by participation in visit today.     Collateral information:   Reviewed chart and  coordinated with nursing.    MINE MARLEY MSW, LICSW  Triage and Transition - Consult and Liaison   [phone]   Statement Selected

## 2022-07-03 LAB
CREAT SERPL-MCNC: 0.67 MG/DL (ref 0.52–1.04)
GFR SERPL CREATININE-BSD FRML MDRD: >90 ML/MIN/1.73M2

## 2022-07-03 PROCEDURE — 82565 ASSAY OF CREATININE: CPT | Performed by: HOSPITALIST

## 2022-07-03 PROCEDURE — 120N000001 HC R&B MED SURG/OB

## 2022-07-03 PROCEDURE — 250N000013 HC RX MED GY IP 250 OP 250 PS 637: Performed by: HOSPITALIST

## 2022-07-03 PROCEDURE — 250N000011 HC RX IP 250 OP 636: Performed by: HOSPITALIST

## 2022-07-03 PROCEDURE — 36415 COLL VENOUS BLD VENIPUNCTURE: CPT | Performed by: HOSPITALIST

## 2022-07-03 PROCEDURE — 99231 SBSQ HOSP IP/OBS SF/LOW 25: CPT | Performed by: HOSPITALIST

## 2022-07-03 RX ORDER — ENOXAPARIN SODIUM 100 MG/ML
40 INJECTION SUBCUTANEOUS EVERY 24 HOURS
Status: DISCONTINUED | OUTPATIENT
Start: 2022-07-03 | End: 2022-07-04

## 2022-07-03 RX ORDER — NALOXONE HYDROCHLORIDE 0.4 MG/ML
0.2 INJECTION, SOLUTION INTRAMUSCULAR; INTRAVENOUS; SUBCUTANEOUS
Status: DISCONTINUED | OUTPATIENT
Start: 2022-07-03 | End: 2022-07-05 | Stop reason: HOSPADM

## 2022-07-03 RX ORDER — NALOXONE HYDROCHLORIDE 0.4 MG/ML
0.4 INJECTION, SOLUTION INTRAMUSCULAR; INTRAVENOUS; SUBCUTANEOUS
Status: DISCONTINUED | OUTPATIENT
Start: 2022-07-03 | End: 2022-07-05 | Stop reason: HOSPADM

## 2022-07-03 RX ADMIN — METHOCARBAMOL 500 MG: 500 TABLET ORAL at 21:08

## 2022-07-03 RX ADMIN — METHOCARBAMOL 1000 MG: 500 TABLET ORAL at 09:21

## 2022-07-03 RX ADMIN — IBUPROFEN 400 MG: 200 TABLET, FILM COATED ORAL at 02:59

## 2022-07-03 RX ADMIN — DEXTROAMPHETAMINE SACCHARATE, AMPHETAMINE ASPARTATE, DEXTROAMPHETAMINE SULFATE AND AMPHETAMINE SULFATE 10 MG: 2.5; 2.5; 2.5; 2.5 TABLET ORAL at 14:01

## 2022-07-03 RX ADMIN — DEXTROAMPHETAMINE SACCHARATE, AMPHETAMINE ASPARTATE, DEXTROAMPHETAMINE SULFATE AND AMPHETAMINE SULFATE 10 MG: 2.5; 2.5; 2.5; 2.5 TABLET ORAL at 11:01

## 2022-07-03 RX ADMIN — ACETAMINOPHEN 650 MG: 325 TABLET ORAL at 09:20

## 2022-07-03 RX ADMIN — METHOCARBAMOL 1000 MG: 500 TABLET ORAL at 14:01

## 2022-07-03 RX ADMIN — LORAZEPAM 1 MG: 1 TABLET ORAL at 21:08

## 2022-07-03 RX ADMIN — ENOXAPARIN SODIUM 40 MG: 40 INJECTION SUBCUTANEOUS at 14:01

## 2022-07-03 RX ADMIN — DESVENLAFAXINE SUCCINATE 50 MG: 50 TABLET, EXTENDED RELEASE ORAL at 09:21

## 2022-07-03 RX ADMIN — DICLOFENAC SODIUM 2 G: 10 GEL TOPICAL at 21:09

## 2022-07-03 RX ADMIN — IBUPROFEN 400 MG: 200 TABLET, FILM COATED ORAL at 14:01

## 2022-07-03 RX ADMIN — LIDOCAINE 3 PATCH: 560 PATCH PERCUTANEOUS; TOPICAL; TRANSDERMAL at 09:20

## 2022-07-03 RX ADMIN — PRAMIPEXOLE DIHYDROCHLORIDE 0.25 MG: 0.25 TABLET ORAL at 21:09

## 2022-07-03 RX ADMIN — SENNOSIDES AND DOCUSATE SODIUM 8.6 TABLET: 50; 8.6 TABLET ORAL at 21:08

## 2022-07-03 ASSESSMENT — ACTIVITIES OF DAILY LIVING (ADL)
ADLS_ACUITY_SCORE: 18

## 2022-07-03 NOTE — PLAN OF CARE
Date & Time: 7/3 3113-1612  Diagnosis: Suicidal ideation   Procedures: NA  Orientation/Cognitive: AOx4   VS/O2: VSS, RA  Mobility: Independent   Diet: Regular   Pain Management: PRN tylenol & ibuprofen, scheduled lidocaine patch, robaxin  Bowel & Bladder: Continent   Skin: WDL   Abnormal Labs: WDL  Tele: NA  IV Access/Drips/Fluids: L PIV SL   Drains: NA  Tests: NA  Consults: Psych, hospitalist   Discharge Plan: Pending - IP psych, needs 20 days of isolation - over 7/11  Other: No SI reported, safety checks done, sitter at bedside, asymptomatic for COVID     Current condition (current mood & behavior): Cheerful, hopeful  Sitter present: yes  Every 15 minute documentation by NA/RN completed for Shift: yes  Room safety Suicide Checklist completed in Epic: yes  Patient's color of severity (suicide scale): YELLOW  Order for psych consult placed (if appropriate): yes  Suicide care plan added: yes

## 2022-07-03 NOTE — PROGRESS NOTES
[facility]  Hospitalist Progress Note  [facility]        Date of Service (when I saw the patient): 07/03/2022        Interval History:      Patient states she is doing better today.          Assessment and Plan:        Janelle White is a 61 year old female admitted on 6/30/2022. She has a past medical history most remarkable for CLL and recent COVID-19 infection (started 6/22/2022).  She presented to the emergency department with SI, psychiatric evaluation recommended inpatient admission.  Unfortunately patient was not able to be placed due to COVID-19 infection requiring 20 days of isolation due to underlying immunocompromise state.  She is admitted to medicine for further care.     History of depression with current suicidal ideation; with 72-hour hold placed.  - Sitter as needed  - Continue a 72-hour hold  - Psych consulted, social work consulted  - Home psych meds resumed      History of chronic lymphocytic leukemia follows with the oncology group at [facility].     COVID-19 infection that started 6/22/2022. Due to underlying immunocompromise state (CLL plus intermittent IVIG infusion, last received 6/20 /2022), she needs 20 days of isolation per [place] policy.  - Isolation.      Chronic Issues:  1.  Chronic pain syndrome.  Home pain meds ordered  2.  ADHD  3.  Constipation.  Home bowel regimen ordered     DVT Prophylaxis: Lovenox subcutaneous     Disposition: Pending mental health evaluation recommendations.     Diet: Orders Placed This Encounter      Diet      Combination Diet Regular Diet Adult; Safe Tray - with utensils      Code: Full Code    Length of Stay:  LOS: 3 days /LOS: 3    Dr. Carlos Harris DO, NICHELLE, MHA  [facility] Hospitalist  Pager [phone]    Securely message with the Vocera Web Console (learn more here)  Text page via [email or web address] Paging/Directory      Clinically Significant Risk Factors Present on Admission                  "                     Physical Exam:           Blood pressure 101/74, pulse 71, temperature 97.9  F (36.6  C), temperature source Oral, resp. rate 16, height 1.651 m (5' 5\"), weight 65.3 kg (144 lb), last menstrual period 2005, SpO2 98 %, not currently breastfeeding.    Vital Sign Ranges  Temperature Temp  Av.2  F (36.8  C)  Min: 97.9  F (36.6  C)  Max: 98.6  F (37  C)   Blood pressure Systolic (24hrs), Av , Min:101 , Max:118        Diastolic (24hrs), Av, Min:71, Max:74      Pulse Pulse  Av.3  Min: 67  Max: 71   Respirations Resp  Av  Min: 16  Max: 16   Pulse oximetry SpO2  Av.3 %  Min: 98 %  Max: 99 %     Vital Signs with Ranges  Temp:  [97.9  F (36.6  C)-98.6  F (37  C)] 97.9  F (36.6  C)  Pulse:  [67-71] 71  Resp:  [16] 16  BP: (101-118)/(71-74) 101/74  SpO2:  [98 %-99 %] 98 %  Temp:  [97.9  F (36.6  C)-98.6  F (37  C)] 97.9  F (36.6  C)  Pulse:  [67-71] 71  Resp:  [16] 16  BP: (101-118)/(71-74) 101/74  SpO2:  [98 %-99 %] 98 %    I/O Last 3 Shifts:   I/O last 3 completed shifts:  In: 240 [P.O.:240]  Out: -     I/O past 24 hours:   No intake or output data in the 24 hours ending 22 0811    Oxygen  Temp: 97.9  F (36.6  C) Temp src: Oral BP: 101/74 Pulse: 71   Resp: 16 SpO2: 98 % O2 Device: None (Room air)    Vitals:    22 1558   Weight: 65.3 kg (144 lb)       Lines  Peripheral IV 22 Anterior;Left Lower forearm (Active)   Site Assessment WDL 22 0900   Line Status Saline locked 22 0900   Number of days: 1     GENERAL: NAD. Conversational, appropriate. Pleasant.   HEENT: Normocephalic. EOMI. No icterus or injection. Nares normal.   LUNGS: Clear to auscultation. No dyspnea at rest.   HEART: Regular rate. Extremities perfused.   ABDOMEN: Soft, nontender, and nondistended. Positive bowel sounds.   EXTREMITIES: No LE edema noted.   NEUROLOGIC: Moves extremities x4, follows commands.          Prior to Admission Medications:        Medications Prior to " Admission   Medication Sig Dispense Refill Last Dose     albuterol (PROAIR HFA/PROVENTIL HFA/VENTOLIN HFA) 108 (90 Base) MCG/ACT inhaler Inhale 2 puffs into the lungs every 6 hours (Patient taking differently: Inhale 2 puffs into the lungs every 6 hours as needed for shortness of breath / dyspnea or wheezing) 8.5 g 4 Past Week at PRN     [START ON 7/13/2022] amphetamine-dextroamphetamine (ADDERALL XR) 20 MG 24 hr capsule Take 1 capsule (20 mg) by mouth daily 30 capsule 0 6/30/2022 at AM     [START ON 7/13/2022] amphetamine-dextroamphetamine (ADDERALL) 20 MG tablet Take 1 tablet (20 mg) by mouth daily (Patient taking differently: Take 10 mg by mouth 2 times daily 1/2 x 20 mg tablet at 1100 and 1400) 30 tablet 0 6/30/2022 at 1100     ASHWAGANDHA PO Take 1 tablet by mouth daily   6/30/2022 at AM     desvenlafaxine (PRISTIQ) 50 MG 24 hr tablet Take 1 tablet (50 mg) by mouth daily 90 tablet 1 6/30/2022 at AM     Estradiol (DIVIGEL) 1 MG/GM GEL Place 1 packet onto the skin daily 30 g 11 6/30/2022 at AM     lidocaine (LIDODERM) 5 % patch Place 4 patches onto the skin daily Apply up to 4 patches to skin. Wear for 12 hours and remove for 12 hrs.  Refill when patient requests. 120 patch 3 6/29/2022 at qPM     LORazepam (ATIVAN) 1 MG tablet Take 1 tablet (1 mg) by mouth every 6 hours as needed for anxiety (Patient taking differently: Take 1 mg by mouth At Bedtime) 50 tablet 3 6/29/2022 at HS     medical cannabis (Patient's own supply.  Not a prescription) Medical Cannabis - Tangerine 4-6 ml by mouth daily. [employer]        methocarbamol (ROBAXIN) 500 MG tablet Take 1 tablet (500 mg) by mouth 4 times daily as needed for muscle spasms (Patient taking differently: Take 500-1,000 mg by mouth 3 times daily 500 mg qAM the 1000 mg qAfternoon and qPM) 120 tablet 0 6/30/2022 at AM     naloxone (NARCAN) 4 MG/0.1ML nasal spray Spray 1 spray (4 mg) into one nostril alternating nostrils as needed for opioid reversal every 2-3 minutes  "until assistance arrives 0.2 mL 1  at PRN     NONFORMULARY Take 2 Scoops by mouth daily Protein and Vitamin shake mix - Nutritional supplement   6/29/2022 at Unknown time     pramipexole (MIRAPEX) 0.25 MG tablet Take 1 tablet (0.25 mg) by mouth At Bedtime 30 tablet 1 6/29/2022 at HS     Prasterone 6.5 MG INST Place 0.5 suppositories vaginally three times a week 28 each 11 Past Week at Unknown time     senna-docusate (SENOKOT-S/PERICOLACE) 8.6-50 MG tablet Take 6-9 tablets by mouth every evening   6/29/2022 at HS     vitamin D3 (CHOLECALCIFEROL) 125 MCG (5000 UT) tablet Take 5,000 Units by mouth daily   Past Week at AM     HYDROcodone-acetaminophen (NORCO)  MG per tablet Take 1 tablet by mouth every 4 hours as needed for severe pain max 4 tabs/24 hrs 10 tablet 0  at PRN     insulin syringe-needle U-100 (30G X 1/2\" 1 ML) 30G X 1/2\" 1 ML miscellaneous Inject 1 ml B12 qmonth (Patient not taking: No sig reported) 10 each 1      methylfolate (DEPLIN) 7.5 MG TABS tablet Take 1 tablet (7.5 mg) by mouth daily 30 tablet 1      ondansetron (ZOFRAN-ODT) 8 MG ODT tab Take 1 tablet (8 mg) by mouth every 8 hours as needed for nausea 30 tablet 4  at PRN            Medications:        Current Facility-Administered Medications   Medication Last Rate     Current Facility-Administered Medications   Medication Dose Route Frequency     [START ON 7/13/2022] amphetamine-dextroamphetamine  20 mg Oral Daily     amphetamine-dextroamphetamine  10 mg Oral BID     desvenlafaxine  50 mg Oral Daily     Estradiol  1 packet Topical Daily     Lidocaine  3 patch Transdermal Daily     lidocaine   Transdermal Q8H MERCEDEZ     LORazepam  1 mg Oral At Bedtime     methocarbamol  500-1,000 mg Oral TID     pramipexole  0.25 mg Oral At Bedtime     Prasterone  0.5 suppository Vaginal Once per day on Mon Wed Fri     senna-docusate  6-9 tablet Oral QPM     Current Facility-Administered Medications   Medication Dose Route Frequency     acetaminophen  650 mg Oral " Q6H PRN    Or     acetaminophen  650 mg Rectal Q6H PRN     albuterol  2 puff Inhalation Q6H PRN     HYDROcodone-acetaminophen  2 tablet Oral Q4H PRN     ibuprofen  400 mg Oral Q8H PRN     melatonin  1 mg Oral At Bedtime PRN     ondansetron  4 mg Oral Q6H PRN    Or     ondansetron  4 mg Intravenous Q6H PRN     ___________________________________________________________________________  Medications       [START ON 7/13/2022] amphetamine-dextroamphetamine  20 mg Oral Daily     amphetamine-dextroamphetamine  10 mg Oral BID     desvenlafaxine  50 mg Oral Daily     Estradiol  1 packet Topical Daily     Lidocaine  3 patch Transdermal Daily     lidocaine   Transdermal Q8H MERCEDEZ     LORazepam  1 mg Oral At Bedtime     methocarbamol  500-1,000 mg Oral TID     pramipexole  0.25 mg Oral At Bedtime     Prasterone  0.5 suppository Vaginal Once per day on Mon Wed Fri     senna-docusate  6-9 tablet Oral QPM          Data:      Lab data reviewed.     Data   Recent Labs   Lab 06/30/22  1658   WBC 5.6   HGB 14.2   MCV 95         POTASSIUM 3.4   CHLORIDE 105   CO2 25   BUN 20   CR 0.59   ANIONGAP 6   BERYL 8.7   GLC 91           Imaging:      Imaging data reviewed.     No results found for this or any previous visit (from the past 24 hour(s)).    Dr. Carlos Harris DO, MBA, A  [facility] Hospitalist  Pager [phone]    Securely message with the Vocera Web Console (learn more here)  Text page via [employer] Paging/Directory

## 2022-07-03 NOTE — CONSULTS
Care Management Initial Consult    General Information  Assessment completed with: Patient,    Type of CM/SW Visit: Initial Assessment    Primary Care Provider verified and updated as needed:     Readmission within the last 30 days: no previous admission in last 30 days      Reason for Consult: discharge planning  Advance Care Planning: Advance Care Planning Reviewed: no concerns identified          Communication Assessment  Patient's communication style: spoken language (English or Bilingual)    Hearing Difficulty or Deaf: no   Wear Glasses or Blind: no    Cognitive  Cognitive/Neuro/Behavioral: WDL  Level of Consciousness: alert  Arousal Level: opens eyes spontaneously  Orientation: oriented x 4  Mood/Behavior: cooperative, sad  Best Language: 0 - No aphasia  Speech: fluent    Living Environment:   People in home: child(isiah), adult, spouse     Current living Arrangements: house      Able to return to prior arrangements: other (see comments) ( is recommending IP , possibly intensive outpatient)       Family/Social Support:  Care provided by: self  Provides care for: no one  Marital Status:   , Children          Description of Support System: Supportive, Involved    Support Assessment: Adequate family and caregiver support, Adequate social supports    Current Resources:   Patient receiving home care services:       Community Resources:    Equipment currently used at home: none  Supplies currently used at home:      Employment/Financial:  Employment Status:          Financial Concerns:             Lifestyle & Psychosocial Needs:  Social Determinants of Health     Tobacco Use: Medium Risk     Smoking Tobacco Use: Former Smoker     Smokeless Tobacco Use: Never Used   Alcohol Use: Not At Risk     Frequency of Alcohol Consumption: 2-3 times a week     Average Number of Drinks: 1 or 2     Frequency of Binge Drinking: Never   Financial Resource Strain: Low Risk      Difficulty of Paying Living Expenses:  Not hard at all   Food Insecurity: No Food Insecurity     Worried About Running Out of Food in the Last Year: Never true     Ran Out of Food in the Last Year: Never true   Transportation Needs: No Transportation Needs     Lack of Transportation (Medical): No     Lack of Transportation (Non-Medical): No   Physical Activity: Not on file   Stress: Not on file   Social Connections: Not on file   Intimate Partner Violence: Not At Risk     Fear of Current or Ex-Partner: No     Emotionally Abused: No     Physically Abused: No     Sexually Abused: No   Depression: At risk     PHQ-2 Score: 4   Housing Stability: Not on file       Functional Status:  Prior to admission patient needed assistance:              Mental Health Status:  Mental Health Status: Current Concern       Chemical Dependency Status:                Values/Beliefs:  Spiritual, Cultural Beliefs, Yarsanism Practices, Values that affect care:  (Evangelical)               Additional Information:    Pt was admitted on 6/30/22 with current suicidal ideation, 72 hour hold placed and expires Wed, 7/6/22.  Pt is COVID+ and needs 20 days of isolation; COVID recovered date is 7/11/22.   recommends inpatient treatment or at the very least intensive outpatient therapy.   notes that there is not an IP program in [place] that will admit COVID+ patients.  Sw consult ordered for discharge planning.    Sw called pt to discuss discharge plans and to answer any questions regarding recommendation.  Pt reports that she has a therapist (Janelle Brooks) that she sees with next virtual appt being 7/5/22.  Pt reports that she has a psychiatrist, Kimberly Perez, with next appt scheduled 7/11/22.  Pt states that she has been pursuing ketamine as treatment, prior to admitting into [facility].  Pt describes 'significant improvement' with 3 of the treatment sessions she has had with this medication.  Pt reports that the catalyst to her admission into [facility] was stress of an upcoming visit to her  hometown for this holiday weekend, however, pt now reports feeling stable and would like to return home once discharged.  Sw explained MH recommendations (IP MH) but stated that due to COVID+ status, it would be earliest admit date of 7/11/22.  Pt stated she was not aware this was the recommendation; pt did not adamantly refuse to attend IP but stated that she would prefer to discharge home with family.  Sw encouraged pt to further discuss recommendation with MH.  Pt reports that she resides with  and adult daughter and feels she has adequate support.  Sw to continue to follow for any discharge planning needs.    Chantal Mayorga, LICSW

## 2022-07-03 NOTE — PLAN OF CARE
Goal Outcome Evaluation:    Summary: Suicidal Ideation. COVID 19 positive.    DATE & TIME:7/2/22-7/3/22 [id number]   Cognitive Concerns/ Orientation : A&O x 4   BEHAVIOR & AGGRESSION TOOL COLOR: Green  CIWA SCORE: NA   ABNL VS/O2: VSS, RA  MOBILITY: independent  PAIN MANAGMENT: Pain in back. Tylenol given x1, Ibuprofen given x1.  Lidocaine patch free during night.    DIET: Regular  BOWEL/BLADDER: Continent.  ABNL LAB/BG: none   DRAIN/DEVICES: PIV SL  TELEMETRY RHYTHM: NA  SKIN: intact  TESTS/PROCEDURES: none  D/C DAY/GOALS/PLACE: pending covid recovery. Patient on 72 hr hold. Pt being discharge to  unit, but per MH note, they will not take her with positive covid.  OTHER IMPORTANT INFO: Psych following, Care management/SW consult. Patient on suicidal precaution. Sitter at bedside. Patient belongings and small container with meds locked up in Blue Pod locker # 4. MD approved pt to have cell phone, ear pods, and essential oils in room.

## 2022-07-03 NOTE — CONSULTS
.  Triage and Transition - Consult and Liaison     Janelle White  July 3, 2022    Session start: 0958  Session end: 1016  Session duration in minutes: 18 minutes   [facility] utilized: 83032 - Psychotherapy (with patient) - 30 (16-37*) min  Patient was seen virtually ([employer] cart or other teleconferencing device).  Anticipated number of sessions or this episode of care: 1-4    Diagnosis:   296.33 (F33.2) Major Depressive Disorder, Recurrent Episode, Severe _  By Hx and Present    Plan/Recommendations:     Continue care coordination with care team.     Maintain current transition plan.     She reports that some successful coping strategies have been meditation, music, essential oils, stretching, piliates.   o Reports benefit from having access to her phone, air pods, shower , new room and essential oils.    Writer has been informed that it is likely that medication reviews from psych med providers will be reviewed 7/5/22. Placed re-consult for 7/4/22 (ongoing support) and 7/5/22 (meds).     Continue with 72 hour hold (reminder weekend and federal holidays do not count toward 72hour hold time).     There are currently no IP mental health units in the [facility] that will take a patient that has tested positive for COVID-19.     Reason for consult:  Ms. White presented to the Emergency Room with suicidal ideation with intention to participate in the EMPATH Unit programming however was noted to be COVID+.  Unit is requesting support from the Triage and Transitions Consult and Liaison Team for support for her mental health at this time.     Presenting problem: Janelle White is a 61 year old female admitted on 6/30/2022. She has a past medical history most remarkable for CLL and recent COVID-19 infection (started 6/22/2022).  She presented to the emergency department with SI, psychiatric evaluation recommended inpatient admission.  Unfortunately patient was not able to be placed due to COVID-19 infection  "requiring 20 days of isolation due to underlying immunocompromise state.  She is admitted to medicine for further care.     Patient states for 2 years she has had intermittent symptoms of depression including irritability, difficulty interacting, fatigue, and difficulty sleeping.  She notes that the symptoms wax and wane, and that she frequently follows up with her outpatient psychiatrist.  Declines previous attempts, but there is not of an attempt during her teens.  She frequently thinks about suicide.  She reported upon arrival that she is not safe at home because she feels she may attempt to kill herself.       She does not have a specific plan although states that there are many ways she can think of that she would kill herself but is not sure which when she would pick. In the ER she was found to be acutely suicidal and a 72-hour hold was placed.  There are plans to admit her to inpatient psychiatry, but because she had been diagnosed with coronavirus on 6-22 she needs 20 days before she can be considered COVID recovered.  She is admitted to medicine for further assessment and planning and isolation.    Session Summary: Writer met with Janelle White this morning. She reports she is in much better spirits and has been appreciative of the care she has received in the hospital. Reports that having access to her phone for meditation and access to her supports has been helpful.  She indicates all staff have been helpful and kind.  She has taken time to reflect and change her view of the situation from negative to positive. Reports that she is radically accepting the circumstances and attempting to find the silver lining. Feels that spiritual care was supportive and most helpful yesterday when she was feeling very negative.      Discusses conversations with her family and that she is happy with a room change. Reports there was \"so much noise in my head I couldn't make sense of anything\". Notes being away from " home has helped her take time to reflect and plan for changes in the future. She is hopeful that her mood improvement is for the better and that she is able to maintain her positive spirits at this time.  Discussed her dog and children with use of humor and laughter during conversation.       Primary Care Provider:   Primary Physician: Carmen Garcia Primary Address: [address], [place] [address]   Primary Phone: [phone] Primary Fax: [phone]     Psychiatrist: Kimberly Perez DO [facility]  Therapist: Janelle Brooks [facility].   : No  CTSS or ARMHS: No  ACT Team: No  Other: No    Mental Status Exam   Affect: Appropriate  Appearance: Appropriate   Attention Span/Concentration: Attentive    Eye Contact: Engaged  Fund of Knowledge: Appropriate   Language /Speech Content: Fluent  Language /Speech Volume: Normal   Language /Speech Rate/Productions: Normal   Recent Memory: Intact  Remote Memory: Intact  Mood: Normal   Orientation:   Person: Yes   Place: Yes  Time of Day: Yes   Date: Yes   Situation (Do they understand why they are here?): Yes   Psychomotor Behavior: Normal   Thought Content: Clear  Thought Form: Intact    Current medications:  Current Facility-Administered Medications   Medication     acetaminophen (TYLENOL) tablet 650 mg    Or     acetaminophen (TYLENOL) Suppository 650 mg     albuterol (PROVENTIL HFA/VENTOLIN HFA) inhaler     [START ON 7/13/2022] amphetamine-dextroamphetamine (ADDERALL XR) ER cap 20 mg     amphetamine-dextroamphetamine (ADDERALL) per tablet 10 mg     desvenlafaxine (PRISTIQ) 24 hr tablet 50 mg     Estradiol GEL 1 packet     HYDROcodone-acetaminophen (NORCO) 5-325 MG per tablet 2 tablet     ibuprofen (ADVIL/MOTRIN) tablet 400 mg     Lidocaine (LIDOCARE) 4 % Patch 3 patch     lidocaine patch in PLACE     LORazepam (ATIVAN) tablet 1 mg     melatonin tablet 1 mg     methocarbamol (ROBAXIN) tablet 500-1,000 mg     ondansetron  (ZOFRAN ODT) ODT tab 4 mg    Or     ondansetron (ZOFRAN) injection 4 mg     pramipexole (MIRAPEX) tablet 0.25 mg     Prasterone INST 0.5 suppository     senna-docusate (SENOKOT-S/PERICOLACE) 8.6-50 MG per tablet 6-9 tablet     MeasurableTreatment Goal(s) related to diagnosis / functional impairment(s)    Goal 1: Patient will develop healthy thinking patterns and beliefs about self, others, and the world that   lead to the alleviation and help prevent the relapse of depression.    Objective A   Patient will describe current and past experiences with depression including their impact on functioning   and attempts to resolve it.     Status: New as of July 2, 2022    Intervention(s)  LMHP will assess current and past mood episodes including their features, frequency, severity, and   Duration.    LMHP will encourage the patient to share their thoughts and feelings of depression, express empathy   and build rapport by identifying primary cognitive, behavioral, interpersonal, or other contributors to   depression.     Goal 2: Patient will recognize, accept, and cope with feelings of depression     Objective A   Patient will verbalize an accurate understanding of depression.     Objective B  Patient will identify and replace thoughts and beliefs that support depression    Status: New as of July 2, 2022    Intervention(s)  LMHP will conduct Cognitive Behavioral Therapy beginning with helping the client learn the connection   among cognition, depressed feelings, and actions.    LMHP will facilitate and reinforce the client s shift from biased depressive self-talk and beliefs to realitybased cognitive message that enhances self-confidence and increases adaptive actions.     LMHP will explore and restructure underlying assumptions and beliefs reflected in biased self-talk that   may put the client at risk for relapse or recurrence           Therapeutic intervention and progress:  Therapeutic intervention consisted of building  therapeutic rapport and validation. Patient is making progress towards treatment goals as evidenced by participation in visit today.     Collateral information:   Reviewed chart and coordinated with nursing.    MINE MOLINA, [facility]  Triage and Transition - Consult and Liaison   [phone]

## 2022-07-04 LAB — PLATELET # BLD AUTO: 166 10E3/UL (ref 150–450)

## 2022-07-04 PROCEDURE — 250N000011 HC RX IP 250 OP 636: Performed by: HOSPITALIST

## 2022-07-04 PROCEDURE — 120N000001 HC R&B MED SURG/OB

## 2022-07-04 PROCEDURE — 85049 AUTOMATED PLATELET COUNT: CPT | Performed by: HOSPITALIST

## 2022-07-04 PROCEDURE — 250N000013 HC RX MED GY IP 250 OP 250 PS 637: Performed by: HOSPITALIST

## 2022-07-04 PROCEDURE — 36415 COLL VENOUS BLD VENIPUNCTURE: CPT | Performed by: HOSPITALIST

## 2022-07-04 PROCEDURE — 99231 SBSQ HOSP IP/OBS SF/LOW 25: CPT | Performed by: HOSPITALIST

## 2022-07-04 RX ORDER — ENOXAPARIN SODIUM 100 MG/ML
0.5 INJECTION SUBCUTANEOUS EVERY 12 HOURS
Status: DISCONTINUED | OUTPATIENT
Start: 2022-07-04 | End: 2022-07-05 | Stop reason: HOSPADM

## 2022-07-04 RX ADMIN — ACETAMINOPHEN 650 MG: 325 TABLET ORAL at 03:07

## 2022-07-04 RX ADMIN — METHOCARBAMOL 1000 MG: 500 TABLET ORAL at 14:37

## 2022-07-04 RX ADMIN — ENOXAPARIN SODIUM 30 MG: 30 INJECTION SUBCUTANEOUS at 09:29

## 2022-07-04 RX ADMIN — DESVENLAFAXINE SUCCINATE 50 MG: 50 TABLET, EXTENDED RELEASE ORAL at 09:06

## 2022-07-04 RX ADMIN — IBUPROFEN 400 MG: 200 TABLET, FILM COATED ORAL at 19:55

## 2022-07-04 RX ADMIN — ENOXAPARIN SODIUM 30 MG: 30 INJECTION SUBCUTANEOUS at 21:58

## 2022-07-04 RX ADMIN — PRAMIPEXOLE DIHYDROCHLORIDE 0.25 MG: 0.25 TABLET ORAL at 21:58

## 2022-07-04 RX ADMIN — LORAZEPAM 1 MG: 1 TABLET ORAL at 21:58

## 2022-07-04 RX ADMIN — HYDROCODONE BITARTRATE AND ACETAMINOPHEN 2 TABLET: 5; 325 TABLET ORAL at 12:11

## 2022-07-04 RX ADMIN — DEXTROAMPHETAMINE SACCHARATE, AMPHETAMINE ASPARTATE, DEXTROAMPHETAMINE SULFATE AND AMPHETAMINE SULFATE 10 MG: 2.5; 2.5; 2.5; 2.5 TABLET ORAL at 12:02

## 2022-07-04 RX ADMIN — LIDOCAINE 3 PATCH: 560 PATCH PERCUTANEOUS; TOPICAL; TRANSDERMAL at 09:00

## 2022-07-04 RX ADMIN — ACETAMINOPHEN 650 MG: 325 TABLET ORAL at 09:28

## 2022-07-04 RX ADMIN — SENNOSIDES AND DOCUSATE SODIUM 7 TABLET: 50; 8.6 TABLET ORAL at 20:04

## 2022-07-04 RX ADMIN — METHOCARBAMOL 1000 MG: 500 TABLET ORAL at 19:55

## 2022-07-04 RX ADMIN — METHOCARBAMOL 500 MG: 500 TABLET ORAL at 09:06

## 2022-07-04 ASSESSMENT — ACTIVITIES OF DAILY LIVING (ADL)
ADLS_ACUITY_SCORE: 18

## 2022-07-04 NOTE — CONSULTS
.  Triage and Transition - Consult and Liaison     Janelle White  July 4, 2022    Session start: 0930  Session end: 0955  Session duration in minutes: 25 minutes   CPT utilized: 49945 - Psychotherapy (with patient) - 30 (16-37*) min  Patient was seen virtually ([employer] cart or other teleconferencing device).  Anticipated number of sessions or this episode of care: 1-4    Diagnosis:   296.33 (F33.2) Major Depressive Disorder, Recurrent Episode, Severe _  By Hx and Present    Plan/Recommendations:     Continue care coordination with care team.     Janelle White appears psychiatrically stable for discharge once medically cleared with resumptions of psychiatric OP services for therapy and psychiatry. She reports no desire to have her medication adjusted at this time. Notes specific concerns for allergy list and her sensitivity to medication that at times can make her mental health decline and difficult/long time to recover.  Spoke to hospitalist whom reports preference for psych NP or MD to see on 7/5/22 for final disposition recommendations.      She reports that some successful coping strategies have been meditation, music, essential oils, stretching, piliates.   o Reports benefit from having access to her phone, air pods, shower , new room and essential oils.    Writer has been informed that it is likely that medication reviews from psych med providers will be reviewed 7/5/22. Placed re-consult 7/5/22 for NP/MD.     Continue with 72 hour hold (reminder weekend and federal holidays do not count toward 72hour hold time).       Reason for consult:  Ms. White presented to the Emergency Room with suicidal ideation with intention to participate in the EMPATH Unit programming however was noted to be COVID+.  Unit is requesting support from the Triage and Transitions Consult and Liaison Team for support for her mental health at this time.     Presenting problem: Janelle White is a 61 year old female  admitted on 6/30/2022. She has a past medical history most remarkable for CLL and recent COVID-19 infection (started 6/22/2022).  She presented to the emergency department with SI, psychiatric evaluation recommended inpatient admission.  Unfortunately patient was not able to be placed due to COVID-19 infection requiring 20 days of isolation due to underlying immunocompromise state.  She is admitted to medicine for further care.     Patient states for 2 years she has had intermittent symptoms of depression including irritability, difficulty interacting, fatigue, and difficulty sleeping.  She notes that the symptoms wax and wane, and that she frequently follows up with her outpatient psychiatrist.  Declines previous attempts, but there is not of an attempt during her teens.  She frequently thinks about suicide.  She reported upon arrival that she is not safe at home because she feels she may attempt to kill herself.       She does not have a specific plan although states that there are many ways she can think of that she would kill herself but is not sure which when she would pick. In the ER she was found to be acutely suicidal and a 72-hour hold was placed.  There are plans to admit her to inpatient psychiatry, but because she had been diagnosed with coronavirus on 6-22 she needs 20 days before she can be considered COVID recovered.  She is admitted to medicine for further assessment and planning and isolation.    Session Summary: Janelle White and Writer met this morning. She was just concluding a call with her mother and reports it was a good conversation. Her affect remains bright and euthymic.  She notes that today her mental health is doing very good. Indicates she woke up slightly achy but that has sense concluded. She denies SI, HI or NSSIB plans or ideations.  Reports her depressive symptoms to be a 1/2 out of 10 at this time. Indicates that she continue to be happy with care and support provided by  unit.  She is understanding of an NP/MD seeing her on 7/5/22 to discuss disposition.  She is currently not wanting any medications to be adjusted and that she is planning to resume OP care as she has established prior to admission.  She indicates at this time she feels safe for discharge to the community.     She wishes for providers on 7/5 to know about her allergy list and that she is sensitive to medications.      Primary Care Provider:   Primary Physician: Carmen Garcia Primary Address: [address], [place] [address]   Primary Phone: [phone] Primary Fax: [phone]     Psychiatrist: Kimberly Perez DO [facility]  Therapist: Janelle Brooks [facility].   : No  CTSS or ARMHS: No  ACT Team: No  Other: No    Mental Status Exam   Affect: Appropriate  Appearance: Appropriate   Attention Span/Concentration: Attentive    Eye Contact: Engaged  Fund of Knowledge: Appropriate   Language /Speech Content: Fluent  Language /Speech Volume: Normal   Language /Speech Rate/Productions: Normal   Recent Memory: Intact  Remote Memory: Intact  Mood: Normal   Orientation:   Person: Yes   Place: Yes  Time of Day: Yes   Date: Yes   Situation (Do they understand why they are here?): Yes   Psychomotor Behavior: Normal   Thought Content: Clear  Thought Form: Intact    Current medications:  Current Facility-Administered Medications   Medication     acetaminophen (TYLENOL) tablet 650 mg    Or     acetaminophen (TYLENOL) Suppository 650 mg     albuterol (PROVENTIL HFA/VENTOLIN HFA) inhaler     [START ON 7/13/2022] amphetamine-dextroamphetamine (ADDERALL XR) ER cap 20 mg     amphetamine-dextroamphetamine (ADDERALL) per tablet 10 mg     desvenlafaxine (PRISTIQ) 24 hr tablet 50 mg     diclofenac (VOLTAREN) 1 % topical gel 2 g     enoxaparin ANTICOAGULANT (LOVENOX) injection 40 mg     Estradiol GEL 1 packet     HYDROcodone-acetaminophen (NORCO) 5-325 MG per tablet 2 tablet     ibuprofen  (ADVIL/MOTRIN) tablet 400 mg     Lidocaine (LIDOCARE) 4 % Patch 3 patch     lidocaine patch in PLACE     LORazepam (ATIVAN) tablet 1 mg     melatonin tablet 1 mg     methocarbamol (ROBAXIN) tablet 500-1,000 mg     naloxone (NARCAN) injection 0.2 mg    Or     naloxone (NARCAN) injection 0.4 mg    Or     naloxone (NARCAN) injection 0.2 mg    Or     naloxone (NARCAN) injection 0.4 mg     ondansetron (ZOFRAN ODT) ODT tab 4 mg    Or     ondansetron (ZOFRAN) injection 4 mg     pramipexole (MIRAPEX) tablet 0.25 mg     Prasterone INST 0.5 suppository     senna-docusate (SENOKOT-S/PERICOLACE) 8.6-50 MG per tablet 6-9 tablet     MeasurableTreatment Goal(s) related to diagnosis / functional impairment(s)    Goal 1: Patient will develop healthy thinking patterns and beliefs about self, others, and the world that   lead to the alleviation and help prevent the relapse of depression.    Objective A   Patient will describe current and past experiences with depression including their impact on functioning   and attempts to resolve it.     Status: New as of July 2, 2022    Intervention(s)  LMHP will assess current and past mood episodes including their features, frequency, severity, and   Duration.    LMHP will encourage the patient to share their thoughts and feelings of depression, express empathy   and build rapport by identifying primary cognitive, behavioral, interpersonal, or other contributors to   depression.     Goal 2: Patient will recognize, accept, and cope with feelings of depression     Objective A   Patient will verbalize an accurate understanding of depression.     Objective B  Patient will identify and replace thoughts and beliefs that support depression    Status: New as of July 2, 2022    Intervention(s)  LMHP will conduct Cognitive Behavioral Therapy beginning with helping the client learn the connection   among cognition, depressed feelings, and actions.    LMHP will facilitate and reinforce the client s shift  from biased depressive self-talk and beliefs to realitybased cognitive message that enhances self-confidence and increases adaptive actions.     LMHP will explore and restructure underlying assumptions and beliefs reflected in biased self-talk that   may put the client at risk for relapse or recurrence           Therapeutic intervention and progress:  Therapeutic intervention consisted of building therapeutic rapport and validation. Patient is making progress towards treatment goals as evidenced by participation in visit today.     Collateral information:   Reviewed chart and coordinated with nursing.    MINE MARLEY MSW, [facility]  Triage and Transition - Consult and Liaison   [phone]

## 2022-07-04 NOTE — PLAN OF CARE
Date & Time: 7/4/2022 7373-3464  Diagnosis: Suicidal ideation   Procedures: NA  Orientation/Cognitive: AOx4   VS/O2: VSS, RA  Mobility: Independent   Diet: Regular; appetite good   Pain Management: C/o neck & back pain; given PRN Tylenol, PRN Norco x2, Lidocaine patches intact, & Voltaren applied.   Bowel & Bladder: Continent   Skin: WDL   Abnormal Labs: WDL  Tele: NA  IV Access/Drips/Fluids: L PIV SL   Drains: NA  Tests: NA  Consults: Pending psych NP or MD final disposition on 7/5/2022  Discharge Plan: Pending -20 days of isolation d/t immunocompromised - over 7/11  Other: No SI reported, safety checks done, sitter at bedside, asymptomatic for COVID - currently on hold

## 2022-07-04 NOTE — PLAN OF CARE
Date & Time: 7/3/22 9187-0312  Diagnosis: Suicidal ideation   Procedures: NA  Orientation/Cognitive: AOx4   VS/O2: VSS, RA  Mobility: Independent   Diet: Regular   Pain Management: c/o head and neck pain. Prn tylenol given w/ relief.  Bowel & Bladder: Continent   Skin: WDL   Abnormal Labs: WDL  Tele: NA  IV Access/Drips/Fluids: L PIV SL   Drains: NA  Tests: NA  Consults: none this shift - previous= Psych, hospitalist   Discharge Plan: Pending - IP psych, needs 20 days of isolation d/t immunocompromised - over 7/11  Other: No SI reported, safety checks done, sitter at bedside, asymptomatic for COVID - currently on hold

## 2022-07-04 NOTE — PROGRESS NOTES
[facility]  Hospitalist Progress Note  [facility]        Date of Service (when I saw the patient): 07/04/2022        Interval History:      Patient states that she is doing well today, patient seems to be in a good mood.         Assessment and Plan:        Janelle White is a 61 year old female admitted on 6/30/2022. She has a past medical history most remarkable for CLL and recent COVID-19 infection (started 6/22/2022).  She presented to the emergency department with SI, psychiatric evaluation recommended inpatient admission.  Unfortunately patient was not able to be placed due to COVID-19 infection requiring 20 days of isolation due to underlying immunocompromise state.  She is admitted to medicine for further care.     History of depression with current suicidal ideation; with 72-hour hold placed.  - Sitter as needed  - Continue a 72-hour hold  - Psych consulted, social work   - Home psych meds      History of chronic lymphocytic leukemia follows with the oncology group at [facility].     COVID-19 infection that started 6/22/2022. Due to underlying immunocompromise state (CLL plus intermittent IVIG infusion, last received 6/20 /2022), she needs 20 days of isolation per [facility] policy.  - Isolation.   - Lovenox.     Chronic Issues:  1.  Chronic pain syndrome.  Home pain meds   2.  ADHD  3.  Constipation.  Home bowel regimen      DVT Prophylaxis: Lovenox      Disposition: Pending mental health evaluation recommendations.     Diet: Orders Placed This Encounter      Diet      Combination Diet Regular Diet Adult; Safe Tray - with utensils      Code: Full Code    Length of Stay:  LOS: 4 days /LOS: 4    Dr. Carlos Harris DO, NICHELLE, MHA  [facility] Hospitalist  Pager [phone]    Securely message with the Vocera Web Console (learn more here)  Text page via [employer] Paging/Directory      Clinically Significant Risk Factors Present on Admission             "                          Physical Exam:           Blood pressure 97/64, pulse 68, temperature 98.5  F (36.9  C), temperature source Oral, resp. rate 16, height 1.651 m (5' 5\"), weight 65.3 kg (144 lb), last menstrual period 2005, SpO2 97 %, not currently breastfeeding.    Vital Sign Ranges  Temperature Temp  Av.2  F (36.8  C)  Min: 98  F (36.7  C)  Max: 98.5  F (36.9  C)   Blood pressure Systolic (24hrs), Av , Min:97 , Max:115        Diastolic (24hrs), Av, Min:64, Max:81      Pulse Pulse  Av.3  Min: 68  Max: 81   Respirations Resp  Av.4  Min: 16  Max: 18   Pulse oximetry SpO2  Av %  Min: 97 %  Max: 100 %     Vital Signs with Ranges  Temp:  [98  F (36.7  C)-98.5  F (36.9  C)] 98.5  F (36.9  C)  Pulse:  [68-81] 68  Resp:  [16-18] 16  BP: ()/(64-81) 97/64  SpO2:  [97 %-100 %] 97 %  Temp:  [98  F (36.7  C)-98.5  F (36.9  C)] 98.5  F (36.9  C)  Pulse:  [68-81] 68  Resp:  [16-18] 16  BP: ()/(64-81) 97/64  SpO2:  [97 %-100 %] 97 %    I/O Last 3 Shifts:   I/O last 3 completed shifts:  In: 240 [P.O.:240]  Out: -     I/O past 24 hours:     Intake/Output Summary (Last 24 hours) at 2022 0807  Last data filed at 2022 0746  Gross per 24 hour   Intake 480 ml   Output --   Net 480 ml       Oxygen  Temp: 98.5  F (36.9  C) Temp src: Oral BP: 97/64 Pulse: 68   Resp: 16 SpO2: 97 % O2 Device: None (Room air)    Vitals:    22 1558   Weight: 65.3 kg (144 lb)       Lines  Peripheral IV 22 Anterior;Left Lower forearm (Active)   Site Assessment WDL 22   Line Status Saline locked 22   Phlebitis Scale 0-->no symptoms 22   Infiltration Scale 0 22   Number of days: 2     GENERAL: NAD. Conversational, appropriate.   HEENT: Normocephalic. EOMI. No icterus or injection. Nares normal.   LUNGS: Clear to auscultation. No dyspnea at rest.   HEART: Regular rate. Extremities perfused.   ABDOMEN: Soft, nontender, and nondistended. Positive bowel " sounds.   EXTREMITIES: No LE edema noted.   NEUROLOGIC: Moves extremities x4, follows commands.          Prior to Admission Medications:        Medications Prior to Admission   Medication Sig Dispense Refill Last Dose     albuterol (PROAIR HFA/PROVENTIL HFA/VENTOLIN HFA) 108 (90 Base) MCG/ACT inhaler Inhale 2 puffs into the lungs every 6 hours (Patient taking differently: Inhale 2 puffs into the lungs every 6 hours as needed for shortness of breath / dyspnea or wheezing) 8.5 g 4 Past Week at PRN     [START ON 7/13/2022] amphetamine-dextroamphetamine (ADDERALL XR) 20 MG 24 hr capsule Take 1 capsule (20 mg) by mouth daily 30 capsule 0 6/30/2022 at AM     [START ON 7/13/2022] amphetamine-dextroamphetamine (ADDERALL) 20 MG tablet Take 1 tablet (20 mg) by mouth daily (Patient taking differently: Take 10 mg by mouth 2 times daily 1/2 x 20 mg tablet at 1100 and 1400) 30 tablet 0 6/30/2022 at 1100     ASHWAGANDHA PO Take 1 tablet by mouth daily   6/30/2022 at AM     desvenlafaxine (PRISTIQ) 50 MG 24 hr tablet Take 1 tablet (50 mg) by mouth daily 90 tablet 1 6/30/2022 at AM     Estradiol (DIVIGEL) 1 MG/GM GEL Place 1 packet onto the skin daily 30 g 11 6/30/2022 at AM     lidocaine (LIDODERM) 5 % patch Place 4 patches onto the skin daily Apply up to 4 patches to skin. Wear for 12 hours and remove for 12 hrs.  Refill when patient requests. 120 patch 3 6/29/2022 at qPM     LORazepam (ATIVAN) 1 MG tablet Take 1 tablet (1 mg) by mouth every 6 hours as needed for anxiety (Patient taking differently: Take 1 mg by mouth At Bedtime) 50 tablet 3 6/29/2022 at HS     medical cannabis (Patient's own supply.  Not a prescription) Medical Cannabis - Tangerine 4-6 ml by mouth daily. [facility]        methocarbamol (ROBAXIN) 500 MG tablet Take 1 tablet (500 mg) by mouth 4 times daily as needed for muscle spasms (Patient taking differently: Take 500-1,000 mg by mouth 3 times daily 500 mg qAM the 1000 mg qAfternoon and qPM) 120 tablet 0  "6/30/2022 at AM     naloxone (NARCAN) 4 MG/0.1ML nasal spray Spray 1 spray (4 mg) into one nostril alternating nostrils as needed for opioid reversal every 2-3 minutes until assistance arrives 0.2 mL 1  at PRN     NONFORMULARY Take 2 Scoops by mouth daily Protein and Vitamin shake mix - Nutritional supplement   6/29/2022 at Unknown time     pramipexole (MIRAPEX) 0.25 MG tablet Take 1 tablet (0.25 mg) by mouth At Bedtime 30 tablet 1 6/29/2022 at HS     Prasterone 6.5 MG INST Place 0.5 suppositories vaginally three times a week 28 each 11 Past Week at Unknown time     senna-docusate (SENOKOT-S/PERICOLACE) 8.6-50 MG tablet Take 6-9 tablets by mouth every evening   6/29/2022 at HS     vitamin D3 (CHOLECALCIFEROL) 125 MCG (5000 UT) tablet Take 5,000 Units by mouth daily   Past Week at AM     HYDROcodone-acetaminophen (NORCO)  MG per tablet Take 1 tablet by mouth every 4 hours as needed for severe pain max 4 tabs/24 hrs 10 tablet 0  at PRN     insulin syringe-needle U-100 (30G X 1/2\" 1 ML) 30G X 1/2\" 1 ML miscellaneous Inject 1 ml B12 qmonth (Patient not taking: No sig reported) 10 each 1      methylfolate (DEPLIN) 7.5 MG TABS tablet Take 1 tablet (7.5 mg) by mouth daily 30 tablet 1      ondansetron (ZOFRAN-ODT) 8 MG ODT tab Take 1 tablet (8 mg) by mouth every 8 hours as needed for nausea 30 tablet 4  at PRN            Medications:        Current Facility-Administered Medications   Medication Last Rate     Current Facility-Administered Medications   Medication Dose Route Frequency     [START ON 7/13/2022] amphetamine-dextroamphetamine  20 mg Oral Daily     amphetamine-dextroamphetamine  10 mg Oral BID     desvenlafaxine  50 mg Oral Daily     enoxaparin ANTICOAGULANT  40 mg Subcutaneous Q24H     Estradiol  1 packet Topical Daily     Lidocaine  3 patch Transdermal Daily     lidocaine   Transdermal Q8H MERCEDEZ     LORazepam  1 mg Oral At Bedtime     methocarbamol  500-1,000 mg Oral TID     pramipexole  0.25 mg Oral At " Bedtime     Prasterone  0.5 suppository Vaginal Once per day on Mon Wed Fri     senna-docusate  6-9 tablet Oral QPM     Current Facility-Administered Medications   Medication Dose Route Frequency     acetaminophen  650 mg Oral Q6H PRN    Or     acetaminophen  650 mg Rectal Q6H PRN     albuterol  2 puff Inhalation Q6H PRN     diclofenac  2 g Topical BID PRN     HYDROcodone-acetaminophen  2 tablet Oral Q4H PRN     ibuprofen  400 mg Oral Q8H PRN     melatonin  1 mg Oral At Bedtime PRN     naloxone  0.2 mg Intravenous Q2 Min PRN    Or     naloxone  0.4 mg Intravenous Q2 Min PRN    Or     naloxone  0.2 mg Intramuscular Q2 Min PRN    Or     naloxone  0.4 mg Intramuscular Q2 Min PRN     ondansetron  4 mg Oral Q6H PRN    Or     ondansetron  4 mg Intravenous Q6H PRN     ___________________________________________________________________________  Medications       [START ON 7/13/2022] amphetamine-dextroamphetamine  20 mg Oral Daily     amphetamine-dextroamphetamine  10 mg Oral BID     desvenlafaxine  50 mg Oral Daily     enoxaparin ANTICOAGULANT  40 mg Subcutaneous Q24H     Estradiol  1 packet Topical Daily     Lidocaine  3 patch Transdermal Daily     lidocaine   Transdermal Q8H MERCEDEZ     LORazepam  1 mg Oral At Bedtime     methocarbamol  500-1,000 mg Oral TID     pramipexole  0.25 mg Oral At Bedtime     Prasterone  0.5 suppository Vaginal Once per day on Mon Wed Fri     senna-docusate  6-9 tablet Oral QPM          Data:      Lab data reviewed.     Data   Recent Labs   Lab 07/03/22  1236 06/30/22  1658   WBC  --  5.6   HGB  --  14.2   MCV  --  95   PLT  --  175   NA  --  136   POTASSIUM  --  3.4   CHLORIDE  --  105   CO2  --  25   BUN  --  20   CR 0.67 0.59   ANIONGAP  --  6   BERYL  --  8.7   GLC  --  91           Imaging:      Imaging data reviewed.     No results found for this or any previous visit (from the past 24 hour(s)).    Dr. Carlos Harris DO, NICHELLE, MHA  [facility] Hospitalist  Pager [phone]    Securely  message with the Vocera Web Console (learn more here)  Text page via [employer] Paging/Directory

## 2022-07-04 NOTE — PLAN OF CARE
Neuro: A&O x4, no suicidal ideations reported  Tele/cardiac: NA  Respiration: on RA, denies SOB  Activity: independent, sitter at bedside  Pain: denies  Drips: none, PIV SL  Drains/tubes: none  Skin: intact  GI/: regular diet  Aggression color: green  Isolation: covid precautions, off isolation on 07/11  Plan:  72 hour hold,(began on 7/1, weekend and holiday excluded) Pending psych NP or MD final disposition on 7/5/2022

## 2022-07-05 ENCOUNTER — MYC MEDICAL ADVICE (OUTPATIENT)
Dept: PSYCHIATRY | Facility: CLINIC | Age: 62
End: 2022-07-05

## 2022-07-05 VITALS
BODY MASS INDEX: 23.99 KG/M2 | WEIGHT: 144 LBS | TEMPERATURE: 97.5 F | OXYGEN SATURATION: 98 % | HEART RATE: 85 BPM | HEIGHT: 65 IN | DIASTOLIC BLOOD PRESSURE: 72 MMHG | SYSTOLIC BLOOD PRESSURE: 111 MMHG | RESPIRATION RATE: 16 BRPM

## 2022-07-05 PROCEDURE — 250N000013 HC RX MED GY IP 250 OP 250 PS 637: Performed by: HOSPITALIST

## 2022-07-05 PROCEDURE — 250N000011 HC RX IP 250 OP 636: Performed by: HOSPITALIST

## 2022-07-05 PROCEDURE — 99239 HOSP IP/OBS DSCHRG MGMT >30: CPT | Performed by: HOSPITALIST

## 2022-07-05 PROCEDURE — 99231 SBSQ HOSP IP/OBS SF/LOW 25: CPT | Performed by: PSYCHIATRY & NEUROLOGY

## 2022-07-05 RX ADMIN — DESVENLAFAXINE SUCCINATE 50 MG: 50 TABLET, EXTENDED RELEASE ORAL at 09:52

## 2022-07-05 RX ADMIN — METHOCARBAMOL 1000 MG: 500 TABLET ORAL at 09:53

## 2022-07-05 RX ADMIN — HYDROCODONE BITARTRATE AND ACETAMINOPHEN 2 TABLET: 5; 325 TABLET ORAL at 02:49

## 2022-07-05 RX ADMIN — ENOXAPARIN SODIUM 30 MG: 30 INJECTION SUBCUTANEOUS at 09:53

## 2022-07-05 RX ADMIN — DEXTROAMPHETAMINE SACCHARATE, AMPHETAMINE ASPARTATE, DEXTROAMPHETAMINE SULFATE AND AMPHETAMINE SULFATE 10 MG: 2.5; 2.5; 2.5; 2.5 TABLET ORAL at 10:43

## 2022-07-05 RX ADMIN — HYDROCODONE BITARTRATE AND ACETAMINOPHEN 2 TABLET: 5; 325 TABLET ORAL at 06:56

## 2022-07-05 RX ADMIN — LIDOCAINE 3 PATCH: 560 PATCH PERCUTANEOUS; TOPICAL; TRANSDERMAL at 09:54

## 2022-07-05 ASSESSMENT — ACTIVITIES OF DAILY LIVING (ADL)
ADLS_ACUITY_SCORE: 18

## 2022-07-05 NOTE — PLAN OF CARE
Pt discharged to home via  1430.     Pt A&OX4. VSS at RA. Up independently. Denies pain. No Suicidal ideations. Psych cleared for discharge with F/U. Pt has appointment with her psychiatrist.     Discharge AVS, reviewed with pt. No new meds. Verbalizes understanding and no questions. IV removed. Belongings returned from locker.

## 2022-07-05 NOTE — PLAN OF CARE
Goal Outcome Evaluation:    Date & Time: 7/5/2022 2306-1208  Diagnosis: Suicidal ideation   Procedures: NA  Orientation/Cognitive: A/O x4, calm   VS/O2: VSS, RA  Mobility: Independent   Diet: Regular  Pain Management: C/o neck & back pain; given PRN ibuprofen x1, PRN Norco x2 and tylenol available, Lidocaine patch free period, & has Voltaren    Bowel & Bladder: Continent , up to BR  Skin: WDL   Abnormal Labs: WDL  IV Access/Drips/Fluids: L PIV SL   Tests: NA  Consults: Awaiting re-evaluation from psych MD tomorrow (7/5/2022)  Discharge Plan: Pending -20 days of isolation d/t immunocompromised - over on 7/11  Other: No SI reported, safety checks done, sitter at bedside, asymptomatic for COVID special precautions followed. Currently on a hold. Had shower tonight. Lungs clear. BS active. CMS intact. Slept most the night.

## 2022-07-05 NOTE — CONSULTS
[facility] Psychiatric Consult Progress Note    Interval History:   Pt seen, chart reviewed, case discussed with nursing staff and treating clinicians. Patient reports she continues to improve from mental health standpoint. Reports depression continues to lessen. She is not feeling anxious, and denies any further suicidal ideation. In hindsight last felt suicidal on Friday last week. She is confident in abiliyt to stay safe. Denies planning/intent. She is willing to crisis plan, including use 911, ED, Crisis line if needed. She denies psychosis symptoms. Denies any edward phase symptoms. Sleeping fair, but believes she would sleep a lot better at home. Not getting restful sleep in these beds. As such physically not feeling that well again noting this would be better if at home. She describes general aches and discomfort physically. She reports medications are doing ok. She prefers against making changes in this setting. She reports she has ketamine scheduled next week and believes they help. She has another psychiatrist through [place] that manages her other medications and believes the Pristiq is a good fit at 50 mg rather than 100 mg> She reports she is sensitive to medications and prefers against making changes, noting she has Genesight results as well that need to be taken into consideration.      Review of systems:   10 point Review of Systems completed by Dr. Abarca, and is  is negative other than noted in the HPI     Medications:       [START ON 7/13/2022] amphetamine-dextroamphetamine  20 mg Oral Daily     amphetamine-dextroamphetamine  10 mg Oral BID     desvenlafaxine  50 mg Oral Daily     enoxaparin ANTICOAGULANT  0.5 mg/kg Subcutaneous Q12H     Estradiol  1 packet Topical Daily     Lidocaine  3 patch Transdermal Daily     lidocaine   Transdermal Q8H MERCEDEZ     LORazepam  1 mg Oral At Bedtime     methocarbamol  500-1,000 mg Oral TID     pramipexole  0.25 mg Oral At Bedtime     Prasterone   0.5 suppository Vaginal Once per day on Mon Wed Fri     senna-docusate  6-9 tablet Oral QPM     acetaminophen **OR** acetaminophen, albuterol, diclofenac, HYDROcodone-acetaminophen, ibuprofen, melatonin, naloxone **OR** naloxone **OR** naloxone **OR** naloxone, ondansetron **OR** ondansetron    Mental Status Examination:     Appearance:  awake, alert  Eye Contact:  good  Speech:  clear, coherent  Language:Normal  Psychomotor Behavior:  no evidence of tardive dyskinesia, dystonia, or tics  Mood:  better  Affect:  appropriate and in normal range, mood congruent and intensity is normal  Thought Process:  logical, linear and goal oriented no loose associations  Thought Content:  no evidence of suicidal ideation or homicidal ideation, no evidence of psychotic thought, no auditory hallucinations present and no visual hallucinations present  Oriented to:  time, person, and place  Attention Span and Concentration:  intact  Recent and Remote Memory:  intact  Fund of Knowledge: appropriate  Muscle Strength and Tone: normal  Gait and Station: Normal  Insight:  good  Judgment:  intact, future oriented, help seeking, engaging in mental health treatment        Labs/Vitals:   No results found for this or any previous visit (from the past 24 hour(s)).  B/P: 111/72, T: 97.5, P: 85, R: 16    Impression:   Janelle reports good improvement over recent days, no longer having any suicidal thoughts. SHe was assessed yeterday by Clayton Leon and not assessed to need inpatient admissoin for mental health. I agree on reassessment today. Patient is reporting good improvement and is seemingly genuine and honest. She appears to have improved affect and has an excellent outpatient plan in place including therapy (appointment this evening) and has outpatient medication management for psychiatry and interventional psychiatry in place. No suicidal planning/intent. Not demonstrating immediate danger. Not assessed to meet criteria for  commitment. Thus hold can be lifted at this time and she may discharge when medically stable.       DIagnoses:   #1 Major Depressive Disorder, Recurrent Episode, Severe         Plan:   1. Patient no longer meets critiera for 72 hour hold or consideration of commitment. She has outpatient psychiatry in place and is no longer having suicidal ideation now for several days. Crisis has stabilized. Protective factors include she is future oriented, help seeking, no agitation, no suicidal thoughts/behaviors, willing to crisis plan, engaged in mental health treatment, good support system. Thus overall risks despite risk factors of prior SI, depression are low at this time.  2. No medication changes. Defer to outpatient providers who know her better since patient is reporting good improvement with stabilization in the hospital and crisis stabilizing. Continue therapy and medication management   Attestation:  Patient has been seen and evaluated by me,  Hans Abarca MD

## 2022-07-06 ENCOUNTER — VIRTUAL VISIT (OUTPATIENT)
Dept: PSYCHOLOGY | Facility: CLINIC | Age: 62
End: 2022-07-06
Payer: COMMERCIAL

## 2022-07-06 ENCOUNTER — MYC REFILL (OUTPATIENT)
Dept: ANESTHESIOLOGY | Facility: CLINIC | Age: 62
End: 2022-07-06

## 2022-07-06 DIAGNOSIS — M79.18 MYOFASCIAL PAIN: ICD-10-CM

## 2022-07-06 DIAGNOSIS — F33.2 SEVERE EPISODE OF RECURRENT MAJOR DEPRESSIVE DISORDER, WITHOUT PSYCHOTIC FEATURES (H): Primary | ICD-10-CM

## 2022-07-06 DIAGNOSIS — M47.812 CERVICAL SPONDYLOSIS: ICD-10-CM

## 2022-07-06 DIAGNOSIS — Z71.89 OTHER SPECIFIED COUNSELING: ICD-10-CM

## 2022-07-06 PROCEDURE — 90834 PSYTX W PT 45 MINUTES: CPT | Mod: 95 | Performed by: SOCIAL WORKER

## 2022-07-06 NOTE — PROGRESS NOTES
"    [facility]                                     Progress Note    Patient Name: Janelle Sorensenke  Date: 07/06/2022         Service Type: Individual      Session Start Time: 1:24 pm  Session End Time: 2:13 pm      Session Length: 51 minutes     Session #: 5th session     Attendees: Client attended alone    Service Modality:  Phone Visit:      Provider verified identity through the following two step process.  Patient provided:  Patient was verified at admission/transfer    The patient has been notified of the following:      \"We have found that certain health care needs can be provided without the need for a face to face visit.  This service lets us provide the care you need with a phone conversation.       I will have full access to your [facility] medical record during this entire phone call.   I will be taking notes for your medical record.      Since this is like an office visit, we will bill your insurance company for this service.       There are potential benefits and risks of telephone visits (e.g. limits to patient confidentiality) that differ from in-person visits.?Confidentiality still applies for telephone services, and nobody will record the visit.  It is important to be in a quiet, private space that is free of distractions (including cell phone or other devices) during the visit.??      If during the course of the call I believe a telephone visit is not appropriate, you will not be charged for this service\"     Consent has been obtained for this service by care team member: Yes     DATA  Interactive Complexity: No  Crisis: No        Progress Since Last Session (Related to Symptoms / Goals / Homework):   Symptoms:Reports symptoms of PMDD symptoms throughout her life. \"I need to know my limits and pace myself. When I have the desire I overdo then I have set backs and then my mind starts going and I cannot seem to control my mind.\"    Homework: Achieved / completed to " "satisfaction- Intake appointment on July 22nd to see what they have to offer for her.       Episode of Care Goals: Minimal progress - CONTEMPLATION (Considering change and yet undecided); Intervened by assessing the negative and positive thinking (ambivalence) about behavior change     Current / Ongoing Stressors and Concerns:   Reports that her relationship with her  is toxic   Physical/medical problems with chronic pain.    Reports \"I have always had a dysmorphic idea of who I am\"   Processes no longer working in her profession and being home with her  which is a stressor.      Treatment Objective(s) Addressed in This Session:    Goal Client will have a desire to live.                I will know I've met my goal when I have more reasons and desire to live then to not live.       Objective #A (Client Action)                Client will agree to contact therapist or the after-hours support number prior to any self harming behavior.  Status: New - Date: 06/29/2022      Intervention(s)  Therapist will assign homework of calling after hours numbers, therapists or other providers with suicidal ideation.     Objective #B  Client will make a list of people, places and activities  skills or activities that you will to use to distract from urges to harm self or suicidal thoughts.                Status: New - Date: 06/29/2022      Intervention(s)  Therapist will teach distraction skills. Help the client work on ways to distract herself when she has thoughts of suicide.     Objective #C  Client will enter into a higher level of care in order to receive more treatment for her chronic pain and mental health symptoms.  Status: New - Date: 06/29/2022      Intervention(s)  Therapist will assign homework of contacting locations for a higher level of care and assist the client as she is willing to allow me to assist her.        Intervention:   Solution Focused: \"I know what tools there are I just need to utilize them\". " "She is going to do an intake at [facility] and I need to do the intake. As far as an inpatient program goes and being cooped up in the hospital I would have gone crazy. I had no access and would not be able to do things I need to do\". Also talked about goal development and what goals to add.    Motivational Interviewing: Provided support, empathy and validation. She is able to gain support from talking to her friends and children. With her  she reports that he will say, \"If you do this and something happens we all suffer. The hovering that goes with that. It is like I have no say in what happens to me. I either have to go against the grain and upset everyone. If I get exhausted because I overexerted myself then I get told you. It even comes from my mom. My mom is not critical and doesn't  me. He might kindly say lets move on or lets try to do something else or ask if I am okay; it isn't harsh and comes from a good place. I try to acknowledge that people are trying to help me, but that their anxiety doesn't help me. It doesn't go anywhere. It is the first time in my life that I have to address it. I cannot go off to work and pretend I don't have to address it.\"    Psychodynamic: \"My mother has kind of fat shamed. 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 She talked about how this has impacted her and expressed that she has had a difficult time with her weight, aging and how her body has changed overtime.     Assessments completed prior to visit:  None this session.      ASSESSMENT: Current Emotional / Mental Status (status of significant symptoms):   Risk status (Self / Other harm or suicidal ideation)   Patient denies current fears or concerns for personal safety.   Patient reports the following current or recent suicidal ideation or behaviors: \"I don't have intent. I feel I " "can keep myself safe.\".She says these thoughts are in the background. She says on Friday she didn't really have intent or thoughts, but then \"it just came out\". She reports time away from her dog and being \"closed in\" caused her to \"Barrow the drain\". She reports that a program she would do needs to \"be very accelerated\".    Patient denies current or recent homicidal ideation or behaviors.   Patient denies current or recent self injurious behavior or ideation.   Patient denies other safety concerns.   Patient reports there has been no change in risk factors since their last session.     Patient reports there has been no change in protective factors since their last session.       [facility]                                       Patient's Name: Janelle White  March 11, 2021     SAFETY PLAN:  Step 1: Warning signs / cues (Thoughts, images, mood, situation, behavior) that a crisis may be developing:  ? Thoughts: \"I don't matter\", \"People would be better off without me\", \"I can't do this anymore\" and \"I just want this to end\"  ? Images: obsessive thoughts of death or dying: thoughts of carrying out plan  ? Thinking Processes: ruminations (can't stop thinking about my problems): ruminate on my plan and highly critical and negative thoughts: if  says something critical i shut down  ? Mood: worsening depression, hopelessness, disinhibited (not caring about things or consequences) and worthlessness  ? Behaviors: isolating/withdrawing , can't stop crying, not taking care of myself and not taking care of my responsibilities  ? Situations: relationship problems   Step 2: Coping strategies - Things I can do to take my mind off of my problems without contacting another person (relaxation technique, physical activity):  ? Distress Tolerance Strategies:  play with my pet  and watch a funny movie:    ? Physical Activities: go for a walk and get in the [place]  ? Focus on helpful " "thoughts:  \"This is temporary\"  Step 3: People and social settings that provide distraction:                 Name: Alyx         Phone: in my phone                 Name: Raven      Phone: in my phone  ? park                 Step 4: Remind myself of people and things that are important to me and worth living for:  Children, dog, friends  Step 5: When I am in crisis, I can ask these people to help me use my safety plan:                 Name: Alyx         Phone: in my phone                 Name: Raven      Phone: in my phone  Step 6: Making the environment safe:   ? none identified  Step 7: Professionals or agencies I can contact during a crisis:  ? Suicide Prevention Lifeline: 4-946-355-TALK (8143)  ? Crisis Text Line Service: Text   HOME  to 906-609.    Local Crisis Services: [facility]     Call 911 or go to my nearest emergency department.       I helped develop this safety plan and agree to use it when needed.  I have been given a copy of this plan.       Patient signature: _______________________________________________________________  Today s date:  March 11, 2021  Adapted from Safety Plan Template 2008 Antionette Trevino and Joseph Leon is reprinted with the express permission of the authors.  No portion of the Safety Plan Template may be reproduced without the express, written permission.  You can contact the authors at [email or web address] or [email or web address].     Appearance:   NA-phone visit    Eye Contact:   NA-phone visit    Psychomotor Behavior: NA-phone visit    Attitude:   Cooperative  Friendly Pleasant   Orientation:   All   Speech    Rate / Production: Normal/ Responsive    Volume:  Normal    Mood:    Anxious    Affect:    Appropriate    Thought Content:  Clear    Thought Form:  Coherent  Logical    Insight:    Good      Medication Review:   No changes to current psychiatric medication(s)     Medication Compliance:   Yes     Changes in Health Issues:   Ongoing chronic " "pain.      Chemical Use Review:   Substance Use: Chemical use reviewed, no active concerns identified      Tobacco Use: No current tobacco use.      Diagnosis:  1. Severe episode of recurrent major depressive disorder, without psychotic features (H)        Collateral Reports Completed:   Not Applicable-nothing completed today.    Planning to continue with Ketamine for ongoing/maintenance ketamine per discussion with Dr. Yanes and this is helping with her mental health. Also plans to meet with Dr. Perez and Dr. Manzo next week.     PLAN: (Patient Tasks / Therapist Tasks / Other)    We will meet next week again to check in. The client is looking into the [facility]. After going to the [facility]ath Unit and being \"locked up\" she reports that she is not feeling as suicidal and she is seeing reasons to keep going at this time. We will keep working together and check in on suicidal thoughts on an ongoing basis. Also can work on developing coping skills to manage chronic pain symptoms anxiety and depressive symptoms. Also relationship goals would be beneficial to work on with the client.     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   PROMIS (reviewed every 90 days): 14     Referral / Collaboration:  The following referral(s) was/were discussed but patient declines follow up at this time. PHP/IOP, Empathy Unit, but wanting to try therapy " for now with her son in town.     Anticipated number of session for this episode of care: 12-16 sessions   Anticipation frequency of session: Biweekly to weekly  Anticipated Duration of each session: 38-52 minutes  Treatment plan will be reviewed in 90 days or when goals have been changed.      Goal Client will have a desire to live.                I will know I've met my goal when I have more reasons and desire to live then to not live.       Objective #A (Client Action)                Client will agree to contact therapist or the after-hours support number prior to any self harming behavior.  Status: New - Date: 06/29/2022      Intervention(s)  Therapist will assign homework of calling after hours numbers, therapists or other providers with suicidal ideation.     Objective #B  Client will make a list of people, places and activities  skills or activities that you will to use to distract from urges to harm self or suicidal thoughts.                Status: New - Date: 06/29/2022      Intervention(s)  Therapist will teach distraction skills. Help the client work on ways to distract herself when she has thoughts of suicide.     Objective #C  Client will enter into a higher level of care in order to receive more treatment for her chronic pain and mental health symptoms.  Status: New - Date: 06/29/2022      Intervention(s)  Therapist will assign homework of contacting locations for a higher level of care and assist the client as she is willing to allow me to assist her.              Janelle Brooks, [facility], [facility]                       June 21, 2022

## 2022-07-08 RX ORDER — METHOCARBAMOL 500 MG/1
500 TABLET, FILM COATED ORAL 4 TIMES DAILY PRN
Qty: 120 TABLET | Refills: 0 | Status: SHIPPED | OUTPATIENT
Start: 2022-07-08 | End: 2022-08-15

## 2022-07-11 ENCOUNTER — VIRTUAL VISIT (OUTPATIENT)
Dept: PSYCHIATRY | Facility: CLINIC | Age: 62
End: 2022-07-11
Payer: COMMERCIAL

## 2022-07-11 ENCOUNTER — VIRTUAL VISIT (OUTPATIENT)
Dept: PSYCHOLOGY | Facility: CLINIC | Age: 62
End: 2022-07-11
Payer: COMMERCIAL

## 2022-07-11 DIAGNOSIS — G25.81 RESTLESS LEG SYNDROME: ICD-10-CM

## 2022-07-11 DIAGNOSIS — R45.851 SUICIDAL IDEATION: ICD-10-CM

## 2022-07-11 DIAGNOSIS — F33.2 SEVERE EPISODE OF RECURRENT MAJOR DEPRESSIVE DISORDER, WITHOUT PSYCHOTIC FEATURES (H): Primary | ICD-10-CM

## 2022-07-11 DIAGNOSIS — G89.4 CHRONIC PAIN SYNDROME: ICD-10-CM

## 2022-07-11 DIAGNOSIS — E88.89 CYP2D6 POOR METABOLIZER (H): ICD-10-CM

## 2022-07-11 DIAGNOSIS — E88.89 CYP2B6 INTERMEDIATE METABOLIZER (H): ICD-10-CM

## 2022-07-11 DIAGNOSIS — R41.89 SUBJECTIVE COGNITIVE IMPAIRMENT: ICD-10-CM

## 2022-07-11 DIAGNOSIS — F33.9 RECURRENT MAJOR DEPRESSION RESISTANT TO TREATMENT (H): ICD-10-CM

## 2022-07-11 PROCEDURE — 90832 PSYTX W PT 30 MINUTES: CPT | Mod: 95 | Performed by: PSYCHOLOGIST

## 2022-07-11 PROCEDURE — 99214 OFFICE O/P EST MOD 30 MIN: CPT | Mod: 95 | Performed by: PSYCHIATRY & NEUROLOGY

## 2022-07-11 NOTE — Clinical Note
Please call this patient to get them scheduled for a follow-up visit in 3 weeks. Please schedule with me and the [facility]. Thanks!

## 2022-07-11 NOTE — PATIENT INSTRUCTIONS
Treatment Plan:  Continue Pristiq 50 mg daily for mood, anxiety.   Continue methyl folate 7.5 mg daily as supplementation.  Continue Adderall XR 20 mg daily for mood augmentation  Continue Adderall IR 20 mg daily as needed for mood augmentation and daytime fatigue/hypersomnia  Continue Mirapex/pramipexole 0.25 mg at bedtime for restless leg syndrome/treatment resistant depression.  Continue to watch for impulsive behaviors/problematic shopping behaviors.  Continue benzodiazepine per primary care prescriber.  Continue ketamine with interventional psychiatry clinic.  Continue to work with your specialist from [facility] as indicated.    Continue working with addiction medicine clinic as needed/as indicated.  Discussed possibility of DBT therapy with your therapist.  Discussed book recommendation, Building A Life [facility] (by Elizabeth Kumar).  Discussed future consideration for lithium if suicidal ideation continues to persist.  Neuropsychological testing referral placed due to ongoing subjective cognitive concerns.  Recommend individual psychotherapy.    Continue all other cares per primary care provider.   Continue all other medications as reviewed per electronic medical record today.   Safety plan reviewed. To the Emergency Department as needed or call after hours crisis line at [phone] or [phone]. [place] Crisis Text Line. Text [place] to 911761 or Suicide LifeLine Chat: suicidepreventionlifeline.org/chat  Schedule an appointment with me in 3 weeks, or sooner as needed. Call [facility] at [phone] to schedule.  Follow up with primary care provider as planned or for acute medical concerns.  Call the psychiatric nurse line with medication questions or concerns at [phone].  MyChart may be used to communicate with your provider, but this is not intended to be used for emergencies.    Therapy  resources:  Www.MPSI.org    https://www.mhs-dbt.com/mental-health/thrive/thrive-mental-health-and-chronic-pain-management/  [place] DBT resources:  https://[place].gov[place]dhs/partners-and-providers/policies-procedures/adult-mental-health/dialectical-behavior-therapy/dbt-certified-providers/    Some [facility] DBT resources:  Oumou Booker   [phone]   https://Scre[employer] Daily Deals/faqs/     [facility]   [phone]   [email or web address]     [facility]   [phone]   [email or web address]     [facility]   [phone]   [email or web address]      Risks of benzodiazepine (Ativan, Xanax, Klonopin, Valium, etc) use including, but not limited to, sedation, tolerance, risk for addiction/dependence. Do not drink alcohol while taking benzodiazepines due to risk of trouble breathing and potential death. Do not drive or operate heavy machinery until it is known how the drug affects you. Discuss with physician or pharmacist before ever taking a benzodiazepine with a narcotic/opioid pain medication.     Have previously discussed risks of stimulant medication including, but not limited to, decreased appetite, risk of tics (and that they may be lasting), trouble sleeping, cardiac risks such as increased heart rate and blood pressure, and rare risk of sudden cardiac death.  Also risk of addiction/tolerance/dependence.

## 2022-07-11 NOTE — Clinical Note
Hi! Janelle is doing better today. SI back to baseline-maybe a little better for now/definitely not as intense as was at time of ER presentation. No med changes while hospitalized (and was hospitalized on medical unit due to isolation protocols given her positive Covid test and hx of CLL). Not sure what your thoughts would be on DBT for her given the chronic SI? I think it could be really good. Also, not sure what your comfort level might be working with her long-term with her ongoing SI? Keep me/us posted. :) Thanks!

## 2022-07-11 NOTE — Clinical Note
Just FYI. No action needed.   Kimberly Perez, DO Collaborative Care Psychiatry [facility]

## 2022-07-11 NOTE — PROGRESS NOTES
[facility]  July 11, 2022      Behavioral Health Clinician Progress Note    Patient Name: Janelle White           Service Type:  Individual      Service Location:   [facility]     Session Start Time: 08:00 am  Session End Time: 08:25 am      Session Length: 16 - 37      Attendees: Patient     Service Modality:  Video Visit:      Provider verified identity through the following two step process.  Patient provided:  Patient is known previously to provider    Telemedicine Visit: The patient's condition can be safely assessed and treated via synchronous audio and visual telemedicine encounter.      Reason for Telemedicine Visit: Services only offered telehealth    Originating Site (Patient Location): Patient's home    Distant Site (Provider Location): Provider Remote Setting- Home Office    Consent:  The patient/guardian has verbally consented to: the potential risks and benefits of telemedicine (video visit) versus in person care; bill my insurance or make self-payment for services provided; and responsibility for payment of non-covered services.     Patient would like the video invitation sent by:  My Chart    Mode of Communication:  Video Conference via [facility]    As the provider I attest to compliance with applicable laws and regulations related to telemedicine.    Visit Activities (Refresh list every visit): [facility] Only    Diagnostic Assessment Date: 03/11/2021  Treatment Plan Review Date: 10/13/2022  See Flowsheets for today's PHQ-9 and STACIE-7 results  Previous PHQ-9:   PHQ-9 SCORE 7/1/2022 7/2/2022 7/11/2022   PHQ-9 Total Score - - -   PHQ-9 Total Score MyChart 12 (Moderate depression) - 13 (Moderate depression)   PHQ-9 Total Score - 22 13   Some encounter information is confidential and restricted. Go to Review Flowsheets activity to see all data.     Previous STACIE-7:   STACIE-7 SCORE 10/5/2021 6/10/2022 7/2/2022   Total Score - - -   Total Score 3 (minimal  "anxiety) 12 (moderate anxiety) -   Total Score 3 12 11       JAYRO LEVEL:  JAYRO Score (Last Two) 7/9/2009 1/27/2011   JAYRO Raw Score 44 50   Activation Score 70.8 86.3   JAYRO Level 4 4       DATA  Extended Session (60+ minutes): No  Interactive Complexity: No  Crisis: No  [facility] Patient: No    Treatment Objective(s) Addressed in This Session:  Target Behavior(s): disease management/lifestyle changes pain management    Depressed Mood:    Anxiety: will develop more effective coping skills to manage anxiety symptoms  Psychological distress related to Pain    Current Stressors / Issues:  Spoke about her recent psychiatric hospitalization. She noted that it was helpful and she is glad it occurred but did not like feeling \"locked up.\" She spoke about recognizing some behaviors that have made her question if she was having manic episodes. She noted having periods of significant over-spending and very talkative in the hospital, \"up, unleashed, blabbering\", she said her mood was up \"but not euphoric,\" needing less sleep, impatience/irritability, and saying things to people without thinking of the impact. She noted that it was awkward coming back home with her family. Her family initially was home but all had to leave to do various things and she was left alone. She took some time in the [facility] and had an \"outside experience\" questioning if she felt that she did not belong. It was very brief and she felt better later. She reported that on reflection, she believes she may have been having manic/hypomanic episodes as early as college. She spoke about being able to work 24 hour shifts without significant problems. She said that she is recognizing \"episodic\" periods in her life when there has been a change in her mood/energy suggesting possible hypomanic episodes.      06/23/2022:  Reported that she tested positive for COVID yesterday. This is her second time and said that it is not as severe as the first time. She spoke about her " "experience with Ketamine noting that she is very pleased with how peaceful she feels with the treatments. \"It's been very relaxing and affirming experience.\" She feels relaxed and in a good mood after the treatment. She finds it easier to bring herself back to that level of relaxation during times of stress. She spoke about her ongoing suicidal ideation explaining that \"when the thoughts enter my mind they are not working thoughts.\" She explained that the suicidal thoughts are just as frequent but less intense, do not last as long, and are less threatening to her than they have been in the past. She was commended for her continued work and efforts to improve herself, and she was encouraged to continue working toward her goals.      04/13/2022:  Does not feel like she received a significant benefit from TMS. She feels she was not having as frequent ruminative thoughts of suicide but that has started to return recently. It tends to happen more often when her physical pain is more intense. She got to a point where she set a date to kill herself but she changed her mind because her son came to spend time with her and she changed her mind saying, \"It just wasn't right yet.\" She noted that she has a few plans but will not discuss them saying, \"I don't want anyone to take those options away from me.\"       Progress on Treatment Objective(s) / Homework:  Stable - ACTION (Actively working towards change); Intervened by reinforcing change plan / affirming steps taken    Motivational Interviewing    MI Intervention: Expressed Empathy/Understanding, Permission to raise concern or advise, Open-ended questions and Reflections: simple and complex     Change Talk Expressed by the Patient: Desire to change Reasons to change    Provider Response to Change Talk: E - Evoked more info from patient about behavior change, A - Affirmed patient's thoughts, decisions, or attempts at behavior change, R - Reflected patient's change talk and S " - Summarized patient's change talk statements    Also provided psychoeducation about behavioral health condition, symptoms, and treatment options    Care Plan review completed: Yes    Medication Review:  Changes to psychiatric medications, see updated Medication List in EPIC.     Medication Compliance:  Yes    Changes in Health Issues:   None reported    Chemical Use Review:   Substance Use: Chemical use reviewed, no active concerns identified      Tobacco Use: No current tobacco use.      Assessment: Current Emotional / Mental Status (status of significant symptoms):  Risk status (Self / Other harm or suicidal ideation)  Patient has had a history of suicidal ideation: ongoing  Patient denies current fears or concerns for personal safety.  Patient reports the following current or recent suicidal ideation or behaviors: ongoing thoughts, had a plan, but changed her mind last week.  Patient denies current or recent homicidal ideation or behaviors.  Patient denies current or recent self injurious behavior or ideation.  Patient denies other safety concerns.  A safety and risk management plan has been developed including: Patient consented to co-developed safety plan.  A safety and risk management plan was completed.  Patient agreed to use safety plan should any safety concerns arise.  A copy was given to the patient.    Appearance:   Appropriate   Eye Contact:   Good   Psychomotor Behavior: Normal   Attitude:   Cooperative   Orientation:   All  Speech   Rate / Production: Talkative Normal    Volume:  Normal   Mood:    Anxious  Normal  Affect:    Appropriate   Thought Content:  Clear   Thought Form:  Coherent  Logical   Insight:    Fair     Diagnoses:  1. Severe episode of recurrent major depressive disorder, without psychotic features (H)    2. Chronic pain syndrome    3. Suicidal ideation        Collateral Reports Completed:  Communicated with: Dr. Perez    Plan: (Homework, other):  Patient was given information about  behavioral services and encouraged to schedule a follow up appointment with the clinic [facility]  She was also given information about mental health symptoms and treatment options .  CD Recommendations: No indications of CD issues.  Matt Manzo, Alicia, LP      ______________________________________________________________________    [facility] [place] : Treatment Plan    Patient's Name: Janelle White  YOB: 1960    Date of Creation: April 13, 2022  Date Treatment Plan Last Reviewed/Revised: July 11, 2022    DSM5 Diagnoses: 296.33 (F33.2) Major Depressive Disorder, Recurrent Episode, Severe _  Psychosocial / Contextual Factors: chronic pain  PROMIS (reviewed every 90 days):   PROMIS 10-Global Health (only subscores and total score):   PROMIS-10 Scores Only 1/13/2022 6/3/2022 6/10/2022 7/8/2022   Global Mental Health Score 5 8 5 6   Global Physical Health Score 10 10 9 11   PROMIS TOTAL - SUBSCORES 15 18 14 17       Referral / Collaboration:  Referral to another professional/service is not indicated at this time..    Anticipated number of session for this episode of care: 5-6  Anticipation frequency of session: As determined by Dr. Perez  Anticipated Duration of each session: 16-37 minutes  Treatment plan will be reviewed in 90 days or when goals have been changed.       MeasurableTreatment Goal(s) related to diagnosis / functional impairment(s)  Goal 1: Patient will work with providers to manage symptoms    I will know I've met my goal when I can enjoy my life again.      Objective #A (Patient Action)  Patient will attend all appointments, take medication as prescribed.  Status: Continued - Date(s): 07/11/2022    Intervention(s)  [facility] will Monitor and assist in overcoming barriers to treatment adherence    Objective #B  Patient will consider all recommendations offered.  Status: Continued - Date(s): 07/11/2022    "  Intervention(s)  [facility] will educate patient on treatment options, clarify concerns, work with pt to overcome any resistance to compliance.      Goal 2: Patient will replace self-harm thoughts/behaviors with self-care activities    I will know I've met my goal when I stop having those thoughts.      Objective #A (Patient Action)                          Status: Continued - Date(s): 07/11/2022  Patient will assist in creating safety plan.     Intervention(s)  [facility] will assist in creation of safety plan and provide copy to patient.     Objective #B  Patient will report using safety plan as needed.                       Status: Continued - Date(s): 07/11/2022     Intervention(s)  [facility] will monitor safety, use of plan, adjust plan as needed, work through any identified barriers/resistance to following her plan.      Patient has reviewed and agreed to the above plan.      Melvin Manzo PsyD  July 11, 2022          [facility]                                       Patient's Name: Janelle White  July 11, 2022     SAFETY PLAN:  Step 1: Warning signs / cues (Thoughts, images, mood, situation, behavior) that a crisis may be developing:  ? Thoughts: \"I don't matter\", \"People would be better off without me\", \"I can't do this anymore\" and \"I just want this to end\"  ? Images: obsessive thoughts of death or dying: thoughts of carrying out plan  ? Thinking Processes: ruminations (can't stop thinking about my problems): ruminate on my plan and highly critical and negative thoughts: if  says something critical i shut down  ? Mood: worsening depression, hopelessness, disinhibited (not caring about things or consequences) and worthlessness  ? Behaviors: isolating/withdrawing , can't stop crying, not taking care of myself and not taking care of my responsibilities  ? Situations: relationship problems   Step 2: Coping strategies - Things I can do to take my mind off of my problems without " "contacting another person (relaxation technique, physical activity):  ? Distress Tolerance Strategies:  play with my pet  and watch a funny movie:    ? Physical Activities: go for a walk and get in the jacuzzi  ? Focus on helpful thoughts:  \"This is temporary\"  Step 3: People and social settings that provide distraction:              Name: Alyx     Phone: in my phone              Name: Raven   Phone: in my phone  ? park              Step 4: Remind myself of people and things that are important to me and worth living for:  Children, dog, friends  Step 5: When I am in crisis, I can ask these people to help me use my safety plan:              Name: Alyx     Phone: in my phone              Name: Raven   Phone: in my phone  Step 6: Making the environment safe:   ? none identified  Step 7: Professionals or agencies I can contact during a crisis:  ? Suicide Prevention Lifeline: 1-296-166-TALK (3319)  ? Crisis Text Line Service: Text   HOME  to 958-605.    Local Crisis Services: [facility]     Call 911 or go to my nearest emergency department.       I helped develop this safety plan and agree to use it when needed.  I have been given a copy of this plan.       Patient signature: _______________________________________________________________  Today s date:  July 11, 2022    Adapted from Safety Plan Template 2008 Antionette Trevnio and Joseph Leon is reprinted with the express permission of the authors.  No portion of the Safety Plan Template may be reproduced without the express, written permission.  You can contact the authors at [email or web address] or [email or web address]  "

## 2022-07-11 NOTE — PROGRESS NOTES
"Janelle White is a 61 year old year old who is being evaluated via a billable video visit.      How would you like to obtain your AVS? MyChart  If you are dropped from the video visit, the video invite should be resent to: Text to cell phone: see Epic  Will anyone else be joining your video visit? No     Telemedicine Visit: The patient's condition can be safely assessed and treated via synchronous audio and visual telemedicine encounter.      Reason for Telemedicine Visit: Covid-19 Pandemic    Originating Site (Patient Location): Patient's home     Distant Site (Provider Location): Provider Remote Setting    Mode of Communication:  Video Conference via [employer]    As the provider I attest to compliance with applicable laws and regulations related to telemedicine.        Outpatient Psychiatric Progress Note    Name: Janelle White   : 1960                    Primary Care Provider: Emili Ballard MD   Therapist: Janelle Brooks, [facility]    PHQ-9 scores:  PHQ-9 SCORE 2022   PHQ-9 Total Score - - -   PHQ-9 Total Score MyChart 12 (Moderate depression) - 13 (Moderate depression)   PHQ-9 Total Score - 22 13   Some encounter information is confidential and restricted. Go to Review Flowsheets activity to see all data.       STACIE-7 scores:  STACIE-7 SCORE 10/5/2021 6/10/2022 2022   Total Score - - -   Total Score 3 (minimal anxiety) 12 (moderate anxiety) -   Total Score 3 12 11       Patient Identification:  Patient is a 61 year old,   White Choose not to answer female  who presents for return visit with me.  Patient is currently on medical leave from work as NP. Patient attended the phone/video session alone. Patient prefers to be called: \"Janelle\".    Interim History:  I last saw Janelle White for outpatient psychiatry return visit on 2022. During that appointment, we:     Continue Pristiq 50 mg daily for mood, anxiety.     Continue methyl folate " 7.5 mg daily as supplementation.    Continue Adderall XR 20 mg daily for mood augmentation    Continue Adderall IR 20 mg daily as needed for mood augmentation and daytime fatigue/hypersomnia    Start Mirapex/pramipexole 0.25 mg at bedtime for restless leg syndrome/treatment resistant depression.  We will titrate as necessary.    Continue benzodiazepine per primary care prescriber.    Continue ketamine with interventional psychiatry clinic.    Continue to work with your specialist from [facility] as indicated.      Continue working with addiction medicine clinic as needed/as indicated.    Recommend individual psychotherapy.      7/11: Patient overall feeling a little better than past visit.  Presented to the emergency room for psychiatric crisis due to worsening suicidal ideation and unsure if could keep self safe.  Was medically hospitalized since positive for COVID and required isolation due to history of CLL.  Saw psychiatry consult service while on the unit.  No medication changes but symptoms improved and patient was discharged home.  Continuing ketamine therapy.  Pramipexole is been helpful possibly for both mood symptoms and also sleeping better due to improved restless legs.  Suicidal ideation has continued to be improved.  Interventional psychiatry clinic talking about increasing ketamine dose and treating every few weeks.  Patient will continue in therapy.  Continues off pain medication.  No acute safety concerns today.  No acute suicidality.  No plan or intent to harm self today.  No problematic drug or alcohol use.  Taking medications as prescribed.    Per [facility], Dr. Matt Manzo, during today's team-based visit:  See [facility] note for details.    Past medication trials include but are not limited to:   Effexor-very flat  Celexa, zoloft, was on wellbutrin a couple years at one point  wellbutrin XL + celexa; 2005ish  tca-a ton of weight gain  depakote maybe for a short time  Abilify/lithium ?  ECT as teen - made me  "dull  Cytomel-not effective at all, used 50 mcg    Psychiatric ROS:  Janelle TORRES Christopher reports mood has been: Still pretty up-and-down, but improved since last visit  Anxiety has been: up-and-down, but improved since last visit  Sleep has been: Improved some on pramipexole  Deepa sxs: Increased shopping, more talkative  Psychosis sxs: None  ADHD/ADD sxs: None  PTSD sxs: None  PHQ9 and GAD7 scores were reviewed today if completed.   Medication side effects: Increased shopping-continues to monitor her symptoms  Current stressors include: Symptoms and See HPI above  Coping mechanisms and supports include: Family, Hobbies and Friends, therapy    Current medications include:   Current Outpatient Medications   Medication Sig     albuterol (PROAIR HFA/PROVENTIL HFA/VENTOLIN HFA) 108 (90 Base) MCG/ACT inhaler Inhale 2 puffs into the lungs every 6 hours (Patient taking differently: Inhale 2 puffs into the lungs every 6 hours as needed for shortness of breath / dyspnea or wheezing)     [START ON 7/13/2022] amphetamine-dextroamphetamine (ADDERALL XR) 20 MG 24 hr capsule Take 1 capsule (20 mg) by mouth daily     [START ON 7/13/2022] amphetamine-dextroamphetamine (ADDERALL) 20 MG tablet Take 1 tablet (20 mg) by mouth daily (Patient taking differently: Take 10 mg by mouth 2 times daily 1/2 x 20 mg tablet at 1100 and 1400)     ASHWAGANDHA PO Take 1 tablet by mouth daily     desvenlafaxine (PRISTIQ) 50 MG 24 hr tablet Take 1 tablet (50 mg) by mouth daily     Estradiol (DIVIGEL) 1 MG/GM GEL Place 1 packet onto the skin daily     HYDROcodone-acetaminophen (NORCO)  MG per tablet Take 1 tablet by mouth every 4 hours as needed for severe pain max 4 tabs/24 hrs     insulin syringe-needle U-100 (30G X 1/2\" 1 ML) 30G X 1/2\" 1 ML miscellaneous Inject 1 ml B12 qmonth (Patient not taking: No sig reported)     lidocaine (LIDODERM) 5 % patch Place 4 patches onto the skin daily Apply up to 4 patches to skin. Wear for 12 hours and remove " for 12 hrs.  Refill when patient requests.     LORazepam (ATIVAN) 1 MG tablet Take 1 tablet (1 mg) by mouth every 6 hours as needed for anxiety (Patient taking differently: Take 1 mg by mouth At Bedtime)     medical cannabis (Patient's own supply.  Not a prescription) Medical Cannabis - [place] 4-6 ml by mouth daily. [facility]     methocarbamol (ROBAXIN) 500 MG tablet Take 1 tablet (500 mg) by mouth 4 times daily as needed for muscle spasms     methylfolate (DEPLIN) 7.5 MG TABS tablet Take 1 tablet (7.5 mg) by mouth daily     naloxone (NARCAN) 4 MG/0.1ML nasal spray Spray 1 spray (4 mg) into one nostril alternating nostrils as needed for opioid reversal every 2-3 minutes until assistance arrives     NONFORMULARY Take 2 [place] by mouth daily Protein and Vitamin shake mix - Nutritional supplement     ondansetron (ZOFRAN-ODT) 8 MG ODT tab Take 1 tablet (8 mg) by mouth every 8 hours as needed for nausea     pramipexole (MIRAPEX) 0.25 MG tablet Take 1 tablet (0.25 mg) by mouth At Bedtime     Prasterone 6.5 MG INST Place 0.5 suppositories vaginally three times a week     senna-docusate (SENOKOT-S/PERICOLACE) 8.6-50 MG tablet Take 6-9 tablets by mouth every evening     vitamin D3 (CHOLECALCIFEROL) 125 MCG (5000 UT) tablet Take 5,000 Units by mouth daily     No current facility-administered medications for this visit.       The [place] Prescription Monitoring Program has been reviewed and there are no concerns about diversionary activity for controlled substances at this time.   06/13/2022 06/11/2022 06/13/2022 1   Dextroamp-Amphetamin 20 Mg Tab  30.00 30 Al [facility] [id number] All (4435) 0/0  [place]  06/11/2022 04/13/2022 06/13/2022 1   Dextroamp-Amphet Er 20 Mg Cap  30.00 30 Al [facility] [id number] All ([id number][place]  05/21/2022 12/27/2021 05/25/2022 1   Lorazepam 1 Mg Tablet  50.00 13 Ev [facility] [id number] All (4435) 3/3 3.85 L[place]  05/11/2022 04/13/2022 05/12/2022 1   Dextroamp-Amphet Er 20 Mg  Cap  30.00 30 Al [facility] [id number] All ([id number][place]  05/11/2022 04/13/2022 05/12/2022 1   Dextroamp-Amphetamin 20 Mg Tab  30.00 30 Al [facility] [id number] All ([id number][place]  05/09/2022 05/09/2022 05/10/2022 1   Morphine Sulf Er 15 Mg Tablet  60.00 30 [facility] [id number] All ([id number]) 0/0 30.00 [place]  04/13/2022 04/13/2022 04/13/2022 1   Dextroamp-Amphetamin 20 Mg Tab  30.00 30 Al [facility] [id number] All (4435[place] 0/[place]      Past Medical/Surgical History:  Past Medical History:   Diagnosis Date     Anxiety      Cervicalgia 2007    C5-6 disc protrusion     COPD (chronic obstructive pulmonary disease) (H) 2/7/2022     Depressive disorder      ESBL (extended spectrum beta-lactamase) producing bacteria infection      History of blood transfusion 2007    Cervical fusion     Leukemia (H)     CLA large beta     Melanoma (H) 1998     Migraine      Other chronic pain      Rotator cuff tear     s/p injections     Sacroiliac inflammation (H)      Shift work sleep disorder 12/16/2013     Urinary calculi      Vitamin B12 deficiency anemia 2006    started injections      has a past medical history of Anxiety, Cervicalgia (2007), COPD (chronic obstructive pulmonary disease) (H) (2/7/2022), Depressive disorder, ESBL (extended spectrum beta-lactamase) producing bacteria infection, History of blood transfusion (2007), Leukemia (H), Melanoma (H) (1998), Migraine, Other chronic pain, Rotator cuff tear, Sacroiliac inflammation (H), Shift work sleep disorder (12/16/2013), Urinary calculi, and Vitamin B12 deficiency anemia (2006).    She has no past medical history of Cerebral infarction (H), Congestive heart failure (H), Coronary artery disease, Diabetes (H), Heart disease, Hypertension, Thyroid disease, or Uncomplicated asthma.    Social History:  Reviewed. No changes to social history except as noted above in HPI.    Vital Signs:   None. This is phone/video visit.     Labs:  Most recent laboratory results  reviewed and the pertinent results include:   Lab Results   Component Value Date    WBC 5.6 06/30/2022    WBC 3.2 05/24/2021     Lab Results   Component Value Date    RBC 4.61 06/30/2022    RBC 3.74 05/24/2021     Lab Results   Component Value Date    HGB 14.2 06/30/2022    HGB 11.0 05/24/2021     Lab Results   Component Value Date    HCT 43.6 06/30/2022    HCT 33.6 05/24/2021     No components found for: MCT  Lab Results   Component Value Date    MCV 95 06/30/2022    MCV 90 05/24/2021     Lab Results   Component Value Date    MCH 30.8 06/30/2022    MCH 29.4 05/24/2021     Lab Results   Component Value Date    MCHC 32.6 06/30/2022    MCHC 32.7 05/24/2021     Lab Results   Component Value Date    RDW 11.9 06/30/2022    RDW 13.4 05/24/2021     Lab Results   Component Value Date     07/04/2022     05/24/2021     Last Comprehensive Metabolic Panel:  Sodium   Date Value Ref Range Status   06/30/2022 136 133 - 144 mmol/L Final   05/24/2021 141 133 - 144 mmol/L Final     Potassium   Date Value Ref Range Status   06/30/2022 3.4 3.4 - 5.3 mmol/L Final   05/24/2021 3.9 3.4 - 5.3 mmol/L Final     Chloride   Date Value Ref Range Status   06/30/2022 105 94 - 109 mmol/L Final   05/24/2021 108 94 - 109 mmol/L Final     Carbon Dioxide   Date Value Ref Range Status   05/24/2021 25 20 - 32 mmol/L Final     Carbon Dioxide (CO2)   Date Value Ref Range Status   06/30/2022 25 20 - 32 mmol/L Final     Anion Gap   Date Value Ref Range Status   06/30/2022 6 3 - 14 mmol/L Final   05/24/2021 8 3 - 14 mmol/L Final     Glucose   Date Value Ref Range Status   06/30/2022 91 70 - 99 mg/dL Final   05/24/2021 87 70 - 99 mg/dL Final     Urea Nitrogen   Date Value Ref Range Status   06/30/2022 20 7 - 30 mg/dL Final   05/24/2021 15 7 - 30 mg/dL Final     Creatinine   Date Value Ref Range Status   07/03/2022 0.67 0.52 - 1.04 mg/dL Final   05/24/2021 0.64 0.52 - 1.04 mg/dL Final     GFR Estimate   Date Value Ref Range Status   07/03/2022  >90 >60 mL/min/1.73m2 Final     Comment:     Effective December 21, 2021 eGFRcr in adults is calculated using the 2021 CKD-EPI creatinine equation which includes age and gender (Zhou et al., NE, DOI: 10.1056/IRWShq8468178)   05/24/2021 >90 >60 mL/min/[1.73_m2] Final     Comment:     Non  GFR Calc  Starting 12/18/2018, serum creatinine based estimated GFR (eGFR) will be   calculated using the Chronic Kidney Disease Epidemiology Collaboration   (CKD-EPI) equation.       Calcium   Date Value Ref Range Status   06/30/2022 8.7 8.5 - 10.1 mg/dL Final   05/24/2021 8.4 (L) 8.5 - 10.1 mg/dL Final     Bilirubin Total   Date Value Ref Range Status   04/26/2022 0.4 0.2 - 1.3 mg/dL Final   03/16/2021 0.4 0.2 - 1.3 mg/dL Final     Alkaline Phosphatase   Date Value Ref Range Status   04/26/2022 82 40 - 150 U/L Final   03/16/2021 87 40 - 150 U/L Final     ALT   Date Value Ref Range Status   04/26/2022 21 0 - 50 U/L Final   03/16/2021 26 0 - 50 U/L Final     AST   Date Value Ref Range Status   04/26/2022 18 0 - 45 U/L Final   03/16/2021 44 0 - 45 U/L Final     Review of Systems:  10 systems (general, cardiovascular, respiratory, eyes, ENT, endocrine, GI, , M/S, neurological) were reviewed. Most pertinent finding(s) is/are: Severe chronic pain. The remaining systems are all unremarkable.    Mental Status Examination (limited as this is by phone/video):  Appearance: Awake, alert, appears stated age, well-groomed, no acute distress  Attitude:  cooperative, pleasant  Motor: No gross abnormalities observed via video, not formally tested  Oriented to:  person, place, time, and situation  Attention Span and Concentration:  normal  Speech:  clear, coherent, regular rate, rhythm, and volume  Language: intact  Mood: A little better, up-and-down  Affect: Brighter today than last visit, appropriate, mood congruent  Associations:  no loose associations  Thought Process:  logical, linear and goal oriented  Thought Content:  passive suicidal ideation today-improving, no homicidal ideation, no evidence of psychotic thought, no auditory hallucinations present and no visual hallucinations present  Recent and Remote Memory:  Intact to interview. Not formally assessed. No amnesia.  Fund of Knowledge: appropriate  Insight:  good  Judgment:  intact, adequate for safety  Impulse Control:  intact    Suicide Risk Assessment:  Today Janelle White reports chronic/intermittent suicidal ideation.There are notable risk factors for self-harm, including anxiety, comorbid medical condition of Chronic pain, suicidal ideation, purposelessness/no reason for living, hopelessness, withdrawing and mood change. However, risk is mitigated by commitment to family, absence of past attempts, ability to volunteer a safety plan, history of seeking help when needed, future oriented and denies suicidal intent today. Therefore, based on all available evidence including the factors cited above, Janelle White does not appear to be at imminent risk for self-harm, does not meet criteria for a 72-hr hold, and therefore remains appropriate for ongoing outpatient level of care.  A thorough assessment of risk factors related to suicide and self-harm have been reviewed and are noted above. Local community safety resources printed and reviewed for patient to use if needed. There was no deceit detected, and the patient presented in a manner that was believable.     DSM5 Diagnosis:  Major Depressive Disorder, Recurrent Episode, severe, without psychotic features  Treatment resistant depression  CYP2D6 poor metabolizer  CYP2B6 intermediate metabolizer  Homozygous for C677T polymorphism of MTHFR    Medical comorbidities include:   Patient Active Problem List    Diagnosis Date Noted     Suicidal ideation 06/30/2022     Priority: Medium     Immunocompromised (H) 06/30/2022     Priority: Medium     Infection due to 2019 novel coronavirus 06/30/2022     Priority: Medium      Severe episode of recurrent major depressive disorder, without psychotic features (H) 04/17/2022     Priority: Medium     COPD (chronic obstructive pulmonary disease) (H) 02/07/2022     Priority: Medium     Tension type headache 11/04/2021     Priority: Medium     Rotator cuff injury 11/04/2021     Priority: Medium     Spinal stenosis of lumbar region with radiculopathy 09/02/2021     Priority: Medium     COVID-19 08/29/2021     Priority: Medium     Polyarthralgia 08/11/2021     Priority: Medium     Cellulitis, unspecified cellulitis site 08/11/2021     Priority: Medium     Sepsis, due to unspecified organism, unspecified whether acute organ dysfunction present (H) 08/11/2021     Priority: Medium     Cellulitis 08/11/2021     Priority: Medium     STACIE (generalized anxiety disorder) 07/27/2021     Priority: Medium     MTHFR gene mutation 04/12/2021     Priority: Medium     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     Priority: Medium     CYP2D6 poor metabolizer (H) 04/12/2021     Priority: Medium     Status post blepharoplasty 11/18/2020     Priority: Medium     Regular astigmatism, bilateral 11/18/2020     Priority: Medium     Prediabetes 09/19/2019     Priority: Medium     Hyperlipidemia LDL goal <130 09/19/2019     Priority: Medium     CLARE (obstructive sleep apnea) 02/13/2019     Priority: Medium     Controlled substance agreement signed 08/14/2018     Priority: Medium     Chronic pain syndrome 12/21/2017     Priority: Medium     CLL (chronic lymphocytic leukemia) (H) 12/21/2017     Priority: Medium     Hypotension 09/14/2017     Priority: Medium     History of laser assisted in situ keratomileusis 10/14/2014     Priority: Medium     Pain medication agreement 04/23/2014     Priority: Medium     Formatting of this note might be different from the original.  Patient takes morphine 15 mg ER BID for chronic neck and back pain.  She is also on cymbalta, ibuprofen, flexaril prn       Shift work sleep disorder  12/16/2013     Priority: Medium     Vitamin D deficiency 11/08/2012     Priority: Medium     Moderate recurrent major depression (H) 01/06/2011     Priority: Medium     DDD (degenerative disc disease), cervical 10/07/2010     Priority: Medium     CARDIOVASCULAR SCREENING; LDL GOAL LESS THAN 160 02/10/2010     Priority: Medium     Chronic Low Back Pain 10/01/2009     Priority: Medium     S/p AP L3-S1 fusion 12/2010 - referred to [facility].   Orthopedics writing scripts for narcotics post-op.       Migraine      Priority: Medium     Problem list name updated by automated process. Provider to review       B-complex deficiency 10/10/2006     Priority: Medium     Problem list name updated by automated process. Provider to review       PERSONAL HX OF  MELANOMA 12/04/2003     Priority: Low       Psychosocial & Contextual Factors: see HPI above    Assessment:  6/15/2021:  Janelle TORRES Christopher reports overall some significant worsening of mood symptoms.  Increased anxiety with her depression.  Poor motivation and poor energy.  Symptoms severe enough to prevent her from being able to utilize typical coping skills and strategies.  No current motivation or energy to participate in PHP/day treatment program.  Continues to take medication as scheduled.  Recent surgery and general anesthesia could be contributing.  Discussed discontinuing stimulant medication as an augmentation strategy.  She is agreeable to moving forward with T3 as an augmentation for treatment resistant depression.  Discussed risks and benefits at length.  Will get baseline thyroid labs prior to starting T3.  Hoping she will experience increased energy and motivation and that her mood will lift.  Also discussed setting up an appointment with her interventional psychiatry clinic to discuss other potential options such as ketamine therapy, ECT, TMS.  Does have history of ECT for depression when she was quite young.  I am hopeful to stay ahead of her symptoms  before things get too severe.  Has chronic suicidal ideation and is at high risk for suicide.  Continues to deny any plan or intent.  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  See below for risk/benefit conversation regarding T3 had with the patient:    GeneSight testing Info:  GeneSight testing revealed she is a poor 2D6 metabolizer and an intermediate 2B6 metabolizer.  This would explain her multiple failed medication trials due to the negative side effects.  She also has a genotype that would suggest a phenotype sensitivity to serotonin. She also was found to have significantly reduced folic acid conversion.      Starting T3 as augment to antidepressant therapy for treatment resistant depression:  Start T3 at 25 mcg per day for one to two weeks, and if there is little or no improvement, increase the dose to 50 mcg per day; this is consistent with practice guidelines from the American Psychiatric Association and [place] Network for Mood and Anxiety Treatments.     Adverse effects consistent with hyperthyroidism may occur, including tremor, palpitations, heat intolerance, sweating, anxiety, increased frequency of bowel movements, shortness of breath, and exacerbation of cardiac arrhythmia. In addition, hyperthyroidism that emerges during long-term treatment may lead to bone demineralization, osteoporosis, and an increased risk of fracture    Following a normal baseline TSH concentration, no other laboratory monitoring during a four to six week trial of adjunctive T3 is necessary. However, if T3 is continued longer, a serum TSH concentration should be checked after the first one to three months of treatment and then every six months.    Today, 7/27/21:   Patient overall with little change in her symptoms.  Did not do as well on Cytomel and had limited to no improvement at all after weeks on 50 mcg.  Labs were unremarkable prior to starting Cytomel.  Opted to go back to stimulant  augmentation since patient does find it quite helpful.  She has been taking 15 mg of immediate release most afternoons.  Due to good tolerability and some efficacy, we will bump up her immediate release dose slightly to 20 mg daily as needed in addition to her 20 mg extended release dose. I have no concerns about misuse or diversion at this time.  Tolerating well with no negative side effects.  Last blood pressure normal 7/2.  Patient has noted some efficacy from Pristiq more than other antidepressant trials and so we will continue to titrate further to 100 mg daily.  She is tolerating well.  Continues to use lorazepam for anxiety, pain medicine adjunct, and for sleep as prescribed by primary care provider.  Has been utilizing roughly 100 tablets of 0.5 mg every 1.5 months.  Patient with ongoing chronic intermittent passive suicidal ideation with no plan or intent.  Denies safety concerns today.  No problematic drug or alcohol use.  I am hopeful the interventional psychiatry clinic might be able to initiate ketamine therapy or another therapy they would recommend as potentially being more helpful than patient's multiple medication/augmentation trials.    10/6/2021:  Patient with some improved depression symptoms and much more hopeful.  Seeing specialist at [place]-feels very hurt and listened to.  Also is hopeful there will be some helpful treatments.  Visit to [place] was also very good and instilled a lot of hope in her abilities.  She was encouraged to continue to push herself to do the things she enjoys doing.  No medication changes today.  She will follow-up with getting TMS rescheduled, possibly when things slow down after the holidays.  Does not need to be seen until after the holidays.  No acute safety concerns today.  No problematic drug or alcohol use.    1/14/2022:  Overall patient feeling a little more down lately.  Tolerating TMS well.  Encouraged to continue TMS.  No medication changes today since  undergoing TMS treatment.  Talked about life stages today generativity versus stagnation and also integrity versus despair.  Talked about consideration for acceptance and commitment therapy.  She is encouraged to continue to be active.  No acute suicidal ideation today.  No acute safety concerns today.  No problematic drug or alcohol use.    4/13/2022:   Patient continues to have symptoms that wax and wane.  Feels like she tolerates Pristiq 50 mg better than 100 and will continue on this dose.  Last week was particularly difficult.  Pretty intense suicidal ideation but she was able to manage these thoughts and remain safe.  She does report she would come to the hospital if necessary.  We discussed the empath unit today and other resources if she felt she could not keep herself safe.  She continues to work on tapering her narcotic pain medication regimen.  She is working with addiction medicine and also pain management.  She is starting Pilates therapy.  Pool therapy will begin in July.  Discussed possibility of vestibular rehabilitation.  Individual therapy will also start soon.  She was strongly encouraged to continue to pursue ketamine therapy.  No acute suicidality today.  No problematic drug or alcohol use.    6/23/2022:  Overall reports doing relatively okay.  Some pretty down days still, but ketamine therapy going well.  Feels like continuing to move in the right direction.  Suicidal ideation improving.  Off all narcotic pain medication.  Feels good about her progress.  Had some really down days after off pain medication but improved since those few dark days.  Hopeful about where ketamine therapy may lead.  Discussed some of her restlessness at bedtime and will start pramipexole.  Also some evidence to suggest pramipexole could be helpful for treatment resistant depression symptoms.  Discussed risks and benefits of therapy, including watching for any impulsive behaviors.  Continues to have suicidal thoughts  but no acute suicidality today.  Her suicidality overall is improving from her baseline suicidality.  She continues to consider the idea of a partial hospital program.  We will send her information for [facility] program.  Also encouraged her to discuss possibility of a pain program through [facility] with Dr. Medley.  No acute safety concerns today.  No problematic drug or alcohol use.    7/11/2022:  Patient with some recent more intensive struggles.  Depression much more severe recently.  Led to hospitalization.  Patient medically hospitalized since was positive for COVID and has history of CLL and [place] protocol requires isolation.  Patient seen by psychiatry consult service.  No medication changes made.  Patient continues with interventional psychiatry clinic.  They will be increasing ketamine dose and treatments will be every few weeks.  We discussed patient's symptom history a little more today and possible bipolar diagnosis came into question.  Patient does recognize possible hypomanic episodes in the past.  Patient is currently monitoring symptoms closely and pramipexole.  She is feeling better since starting pramipexole but is watching shopping behaviors closely.  Suicidal ideation is improving since hospitalization.  Restless legs improved at night on pramipexole.  Discussed we could consider adding lower dose lithium as an augment to her Pristiq and to help stabilize moods a little bit more and to further help with some of her chronic suicidal ideation.  We also discussed DBT for chronic suicidal ideation and ongoing mood instability.  Continues off of pain medication.  No acute suicidality or safety concerns today.  Patient will follow up in a few weeks.  No medication changes today since we will wait to see how ketamine dose change goes.  No drug or alcohol use concerns.  Patient noted some ongoing cognitive/memory concerns and requested testing.  Neuropsychological referral placed.    Medication  side effects and alternatives were reviewed. Health promotion activities recommended and reviewed today. All questions addressed. Education and counseling completed regarding risks and benefits of medications and psychotherapy options. Recommend therapy for additional support.     Treatment Plan:    Continue Pristiq 50 mg daily for mood, anxiety.     Continue methyl folate 7.5 mg daily as supplementation.    Continue Adderall XR 20 mg daily for mood augmentation    Continue Adderall IR 20 mg daily as needed for mood augmentation and daytime fatigue/hypersomnia    Continue Mirapex/pramipexole 0.25 mg at bedtime for restless leg syndrome/treatment resistant depression.  Continue to watch for impulsive behaviors/problematic shopping behaviors.    Continue benzodiazepine per primary care prescriber.    Continue ketamine with interventional psychiatry clinic.    Continue to work with your specialist from [facility] as indicated.      Continue working with addiction medicine clinic as needed/as indicated.    Discussed possibility of DBT therapy with your therapist.  Discussed book recommendation, Building A Life Ivor Living (by Elizabeth Kumar).    Discussed future consideration for lithium if suicidal ideation continues to persist.    Neuropsychological testing referral placed due to ongoing subjective cognitive concerns.    Recommend individual psychotherapy.      Continue all other cares per primary care provider.     Continue all other medications as reviewed per electronic medical record today.     Safety plan reviewed. To the Emergency Department as needed or call after hours crisis line at [phone] or [phone]. [place] Crisis Text Line. Text [place] to 955584 or Suicide LifeLine Chat: suicidepreventionlifeline.org/chat    Schedule an appointment with me in 3 weeks, or sooner as needed. Call [facility] at [phone] to schedule.    Follow up with primary care provider as planned or for acute  medical concerns.    Call the psychiatric nurse line with medication questions or concerns at [phone].    MyChart may be used to communicate with your provider, but this is not intended to be used for emergencies.    Therapy resources:  Www.MPSI.org    https://www.mhs-dbt.com/mental-health/thrive/thrive-mental-health-and-chronic-pain-management/    [place] DBT resources:  https://[place].gov/dhs/partners-and-providers/policies-procedures/adult-mental-health/dialectical-behavior-therapy/dbt-certified-providers/    Some [facility] DBT resources:  Oumou Booker   [phone]   https://[employer]/faqs/     [facility]   [phone]   https://[employer].Modern Message     [facility]   [phone]   https://www.mhsApplied Optoelectronicsdbt.Modern Message     [facility]   [phone]   https://dbtassociates.Modern Message     Risks of benzodiazepine (Ativan, Xanax, Klonopin, Valium, etc) use including, but not limited to, sedation, tolerance, risk for addiction/dependence. Do not drink alcohol while taking benzodiazepines due to risk of trouble breathing and potential death. Do not drive or operate heavy machinery until it is known how the drug affects you. Discuss with physician or pharmacist before ever taking a benzodiazepine with a narcotic/opioid pain medication.     Have previously discussed risks of stimulant medication including, but not limited to, decreased appetite, risk of tics (and that they may be lasting), trouble sleeping, cardiac risks such as increased heart rate and blood pressure, and rare risk of sudden cardiac death.  Also risk of addiction/tolerance/dependence.    Administrative Billing:   Phone Call/Video Duration: 21 Minutes  8:43a-9:04a    Patient Status:  Patient will continue to be seen for ongoing consultation and stabilization.    Signed:   Kimberly Perez DO  [facility]iat[facility]    Disclaimer: This note consists of symbols derived from keyboarding, dictation and/or voice recognition software. As a result,  there may be errors in the script that have gone undetected. Please consider this when interpreting information found in this chart.

## 2022-07-12 ENCOUNTER — MYC MEDICAL ADVICE (OUTPATIENT)
Dept: FAMILY MEDICINE | Facility: CLINIC | Age: 62
End: 2022-07-12

## 2022-07-12 ENCOUNTER — INFUSION THERAPY VISIT (OUTPATIENT)
Dept: INFUSION THERAPY | Facility: CLINIC | Age: 62
End: 2022-07-12
Attending: PSYCHIATRY & NEUROLOGY
Payer: COMMERCIAL

## 2022-07-12 VITALS
SYSTOLIC BLOOD PRESSURE: 113 MMHG | DIASTOLIC BLOOD PRESSURE: 70 MMHG | RESPIRATION RATE: 16 BRPM | OXYGEN SATURATION: 99 % | HEART RATE: 61 BPM

## 2022-07-12 DIAGNOSIS — F33.2 SEVERE EPISODE OF RECURRENT MAJOR DEPRESSIVE DISORDER, WITHOUT PSYCHOTIC FEATURES (H): Primary | ICD-10-CM

## 2022-07-12 PROCEDURE — 258N000003 HC RX IP 258 OP 636: Performed by: PSYCHIATRY & NEUROLOGY

## 2022-07-12 PROCEDURE — 250N000009 HC RX 250: Performed by: PSYCHIATRY & NEUROLOGY

## 2022-07-12 PROCEDURE — 96365 THER/PROPH/DIAG IV INF INIT: CPT

## 2022-07-12 RX ORDER — METHYLPREDNISOLONE SODIUM SUCCINATE 125 MG/2ML
125 INJECTION, POWDER, LYOPHILIZED, FOR SOLUTION INTRAMUSCULAR; INTRAVENOUS
Status: CANCELLED
Start: 2022-07-12

## 2022-07-12 RX ORDER — DIPHENHYDRAMINE HYDROCHLORIDE 50 MG/ML
50 INJECTION INTRAMUSCULAR; INTRAVENOUS
Status: CANCELLED
Start: 2022-07-12

## 2022-07-12 RX ORDER — HEPARIN SODIUM (PORCINE) LOCK FLUSH IV SOLN 100 UNIT/ML 100 UNIT/ML
5 SOLUTION INTRAVENOUS
Status: CANCELLED | OUTPATIENT
Start: 2022-07-12

## 2022-07-12 RX ORDER — MEPERIDINE HYDROCHLORIDE 25 MG/ML
25 INJECTION INTRAMUSCULAR; INTRAVENOUS; SUBCUTANEOUS EVERY 30 MIN PRN
Status: CANCELLED | OUTPATIENT
Start: 2022-07-12

## 2022-07-12 RX ORDER — EPINEPHRINE 1 MG/ML
0.3 INJECTION, SOLUTION, CONCENTRATE INTRAVENOUS EVERY 5 MIN PRN
Status: CANCELLED | OUTPATIENT
Start: 2022-07-12

## 2022-07-12 RX ORDER — NALOXONE HYDROCHLORIDE 0.4 MG/ML
0.2 INJECTION, SOLUTION INTRAMUSCULAR; INTRAVENOUS; SUBCUTANEOUS
Status: CANCELLED | OUTPATIENT
Start: 2022-07-12

## 2022-07-12 RX ORDER — HEPARIN SODIUM,PORCINE 10 UNIT/ML
5 VIAL (ML) INTRAVENOUS
Status: CANCELLED | OUTPATIENT
Start: 2022-07-12

## 2022-07-12 RX ORDER — HYDRALAZINE HYDROCHLORIDE 20 MG/ML
10 INJECTION INTRAMUSCULAR; INTRAVENOUS
Status: CANCELLED | OUTPATIENT
Start: 2022-07-12

## 2022-07-12 RX ORDER — ONDANSETRON 2 MG/ML
4 INJECTION INTRAMUSCULAR; INTRAVENOUS
Status: CANCELLED | OUTPATIENT
Start: 2022-07-12

## 2022-07-12 RX ORDER — ALBUTEROL SULFATE 90 UG/1
1-2 AEROSOL, METERED RESPIRATORY (INHALATION)
Status: CANCELLED
Start: 2022-07-12

## 2022-07-12 RX ORDER — ALBUTEROL SULFATE 0.83 MG/ML
2.5 SOLUTION RESPIRATORY (INHALATION)
Status: CANCELLED | OUTPATIENT
Start: 2022-07-12

## 2022-07-12 RX ADMIN — KETAMINE HYDROCHLORIDE 65 MG: 50 INJECTION, SOLUTION INTRAMUSCULAR; INTRAVENOUS at 08:48

## 2022-07-12 NOTE — PROGRESS NOTES
Infusion Nursing Note:  Janelle White presents today for scheduled Ketamine infusion.    Patient seen by provider today: No   present during visit today: Not Applicable.    Note: Patient states she is doing well.    Intravenous Access:  Peripheral IV placed.    Treatment Conditions:  Not Applicable.    Post Infusion Assessment:  Patient tolerated infusion without incident.  Patient observed for 60 minutes post Ketamine per protocol.  Blood return noted pre and post infusion.  Site patent and intact, free from redness, edema or discomfort.  No evidence of extravasations.  Access discontinued per protocol.     Discharge Plan:   Discharge instructions reviewed with: Patient.  Patient and/or family verbalized understanding of discharge instructions and all questions answered.  Patient discharged in stable condition accompanied by: self.  Departure Mode: Ambulatory.      Kathia Quinn RN

## 2022-07-13 NOTE — TELEPHONE ENCOUNTER
Lorazepam-    Last Written Prescription Date:  1/3/2022  Last Fill Quantity: 50,  # refills: 3   Last office visit: 6/30/2022 with prescribing provider: Carmen Garcia  Future Office Visit:  None.      Sending [employer] message to Carmen Garcia. Update and lorazepam request in [employer].  ARTEMIO Tyson

## 2022-07-18 NOTE — TELEPHONE ENCOUNTER
This should be discussed in a virtual visit at the very least- preferably, psychiatry stabilizes her medications and then I can help manage from there.  If she would like to try a non-benzodiazapine for sleep aid, I can assist with that.

## 2022-07-19 ENCOUNTER — VIRTUAL VISIT (OUTPATIENT)
Dept: PSYCHOLOGY | Facility: CLINIC | Age: 62
End: 2022-07-19
Payer: COMMERCIAL

## 2022-07-19 DIAGNOSIS — F33.2 MAJOR DEPRESSIVE DISORDER, RECURRENT, SEVERE W/O PSYCHOTIC BEHAVIOR (H): Primary | ICD-10-CM

## 2022-07-19 PROCEDURE — 90834 PSYTX W PT 45 MINUTES: CPT | Mod: 95 | Performed by: SOCIAL WORKER

## 2022-07-19 NOTE — PROGRESS NOTES
"    [facility]                                     Progress Note    Patient Name: Janelle TORRES Nwabrockyke  Date: 07/19/2022         Service Type: Individual      Session Start Time: 8:39am  Session End Time: 9:28 am      Session Length: 49 minutes     Session #: 6th session    Attendees: Client attended alone    Service Modality:  Phone Visit:      Provider verified identity through the following two step process.  Patient provided:  Patient was verified at admission/transfer    The patient has been notified of the following:      \"We have found that certain health care needs can be provided without the need for a face to face visit.  This service lets us provide the care you need with a phone conversation.       I will have full access to your [facility] medical record during this entire phone call.   I will be taking notes for your medical record.      Since this is like an office visit, we will bill your insurance company for this service.       There are potential benefits and risks of telephone visits (e.g. limits to patient confidentiality) that differ from in-person visits.?Confidentiality still applies for telephone services, and nobody will record the visit.  It is important to be in a quiet, private space that is free of distractions (including cell phone or other devices) during the visit.??      If during the course of the call I believe a telephone visit is not appropriate, you will not be charged for this service\"     Consent has been obtained for this service by care team member: Yes     DATA  Interactive Complexity: No  Crisis: No        Progress Since Last Session (Related to Symptoms / Goals / Homework):   Symptoms:     Symptoms started October of 2020   \"I don't feel really cognitive. I feel like there is a lot of information going in, but I am trying to process it but I am not. I am not  processing like I would like to. I cannot remember shit. I am in a state of being. Kind " "of don't know where am at.\"   She reports impulsivity. \"I have to stay busy or my mind starts swirling.\"    Homework:   Recommended journaling to note symptoms given the client reports problems with remembering things.   She reports writing things down takes away the privacy and she wouldn't want anyone else to run across it.   She mainly notes her feelings, how her body is doing and any events, but not to process things.   Reading a book on DBT and listening to DBT skills per Dr. Perez's request.       Episode of Care Goals: Minimal progress - CONTEMPLATION (Considering change and yet undecided); Intervened by assessing the negative and positive thinking (ambivalence) about behavior change     Current / Ongoing Stressors and Concerns:   Health Problems/Medical Problems.    \"Creating a new life\"     Treatment Objective(s) Addressed in This Session:   Objective #C  Client will enter into a higher level of care in order to receive more treatment for her chronic pain and mental health symptoms.  Status: New - Date: 06/29/2022     Discussed adding new goals next visit given the client is reporting no intent to end her life since being in the emergency room for mental health      Intervention:   Solution Focused/Motivational Interviewing:    Thinking about things. Listening to music to help calm her mind. \"It takes the ringing out of my ears.\" She expressed  she listens to this music when she gets ketamine injections, so her mind and body have this association of calming  her.    \"I can go then when I go I burn out and need to pull back, but if I don't pull back then it might take me days to recover.\"   Maybe I need to lay down and rest for a bit, but it it is so ya know coming from doing so many things to trying to decide  how much I can do make accommodations or find new activities and interests and trying to fit into the world I am so  disgusted about what is going on in the world today and I feel like things are " "coming around full [place] again. If I get too  riled up I need to get it in and I start damming myself for not having my certification and it keeps going on and on and  On.\"    Tries to go weed the lawn and then her fingers stop grasping and she sees things and thinks it is just a little more and  keeps doing until she \"collapses/hits a wall\". She then goes backwards with her progress. We processed where this  threshold is and how difficult this is with the change from being fully physically capable to not able to do much  physically.     She mentioned maybe putting things in blocks of time and trying to figure out what she can do and how much time she can spend.    She plans to talk with the [employer] [facility] next week to see if this is an option for her.    She does to PT, does stretches, listens to calming music, breathing exercises, ice/heat and also taking medication to help with the pain. \"Accepting where I am at and trying to build on that.\"   DBT: Self-awareness, mindfulness, processed the balance.     Assessments completed prior to visit:  None this visit      ASSESSMENT: Current Emotional / Mental Status (status of significant symptoms):   Risk status (Self / Other harm or suicidal ideation)   Patient denies current fears or concerns for personal safety.   Patient reports the following current or recent suicidal ideation or behaviors: \"I have thoughts, but no intent. I am looking at what I need to do to improve and not have these thoughts. I don't know what that is yet.\".   Patient denies current or recent homicidal ideation or behaviors.   Patient denies current or recent self injurious behavior or ideation.   Patient denies other safety concerns.   Patient reports there has been no change in risk factors since their last session.     Patient reports there has been no change in protective factors since their last session.          Integrated Behavioral Health " "Services                                       Patient's Name: Janelle TORRES Nwabyvette  March 11, 2021     SAFETY PLAN:  Step 1: Warning signs / cues (Thoughts, images, mood, situation, behavior) that a crisis may be developing:  ? Thoughts: \"I don't matter\", \"People would be better off without me\", \"I can't do this anymore\" and \"I just want this to end\"  ? Images: obsessive thoughts of death or dying: thoughts of carrying out plan  ? Thinking Processes: ruminations (can't stop thinking about my problems): ruminate on my plan and highly critical and negative thoughts: if  says something critical i shut down  ? Mood: worsening depression, hopelessness, disinhibited (not caring about things or consequences) and worthlessness  ? Behaviors: isolating/withdrawing , can't stop crying, not taking care of myself and not taking care of my responsibilities  ? Situations: relationship problems   Step 2: Coping strategies - Things I can do to take my mind off of my problems without contacting another person (relaxation technique, physical activity):  ? Distress Tolerance Strategies:  play with my pet  and watch a funny movie:    ? Physical Activities: go for a walk and get in the [place]  ? Focus on helpful thoughts:  \"This is temporary\"  Step 3: People and social settings that provide distraction:                 Name: Alyx         Phone: in my phone                 Name: Raven      Phone: in my phone  ? park                 Step 4: Remind myself of people and things that are important to me and worth living for:  Children, dog, friends  Step 5: When I am in crisis, I can ask these people to help me use my safety plan:                 Name: Alyx         Phone: in my phone                 Name: Raven      Phone: in my phone  Step 6: Making the environment safe:   ? none identified  Step 7: Professionals or agencies I can contact during a crisis:  ? Suicide Prevention Lifeline: 1-911-841-QVTB (4755)  ? Crisis Text Line " Service: Text   HOME  to 536-300.    Local Crisis Services: [facility]     Call 911 or go to my nearest emergency department.       I helped develop this safety plan and agree to use it when needed.  I have been given a copy of this plan.       Patient signature: _______________________________________________________________  Today s date:  March 11, 2021  Adapted from Safety Plan Template 2008 Antionette Trevino and Joseph Leon is reprinted with the express permission of the authors.  No portion of the Safety Plan Template may be reproduced without the express, written permission.  You can contact the authors at bhs@[email or web address] or [email or web address].     Appearance:   Appropriate    Eye Contact:   Good    Psychomotor Behavior: Normal    Attitude:   Cooperative  Friendly Pleasant   Orientation:   All   Speech    Rate / Production: Monotone     Volume:  Normal    Mood:    Anxious    Affect:    Worrisome    Thought Content:  Rumination    Thought Form:  Coherent    Insight:    Good      Medication Review:   Switched from lorazepam to take flexeril due to her new PCP did not hayley her a refill.    She questioned if a medication to help with RLS was causing some manic symptoms in her.     Medication Compliance:   Yes     Changes in Health Issues:   None reported     Chemical Use Review:   Substance Use: Chemical use reviewed, no active concerns identified      Tobacco Use: No current tobacco use.      Diagnosis:  1. Major depressive disorder, recurrent, severe w/o psychotic behavior (H)        Collateral Reports Completed:    None at this time    PLAN: (Patient Tasks / Therapist Tasks / Other)  We will meet again August 9th, 2022. We are meeting based on the client's preferences. We discussed today adding in more goals related to figuring out when she needs to stop doing physical activities in order to preserve her energy and when she can keep going. The client will also try to  "document more of what is going on for her. She is reading a book on DBT, so these skills may be beneficial to add into to our treatment plan if it is relevant. She was not having any plans to end her life.    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   PROMIS (reviewed every 90 days): 14     Referral / Collaboration:  The following referral(s) was/were discussed but patient declines follow up at this time. PHP/IOP, Empathy Unit, but wanting to try therapy for now with her son in town.     Anticipated number of session for this episode of care: 12-16 sessions   Anticipation frequency of session: Biweekly to weekly  Anticipated Duration of each session: 38-52 minutes  Treatment plan will be reviewed in 90 days or when goals have been changed.      Goal Client will have a desire to live.                I will know I've met my goal when I have more reasons and desire to live then to not live.       Objective #A (Client Action)                Client will agree to contact therapist or the after-hours support number prior to any self harming behavior.  Status: New - Date: 06/29/2022      Intervention(s)  Therapist will assign homework of calling after hours numbers, therapists or other providers with suicidal ideation.     Objective #B  Client will make a list of people, places and activities  skills or activities that you will to use " to distract from urges to harm self or suicidal thoughts.                Status: New - Date: 06/29/2022      Intervention(s)  Therapist will teach distraction skills. Help the client work on ways to distract herself when she has thoughts of suicide.     Objective #C  Client will enter into a higher level of care in order to receive more treatment for her chronic pain and mental health symptoms.  Status: New - Date: 06/29/2022      Intervention(s)  Therapist will assign homework of contacting locations for a higher level of care and assist the client as she is willing to allow me to assist her.              Janelle Brooks, [facility], [facility]                       June 29, 2022

## 2022-08-02 ENCOUNTER — INFUSION THERAPY VISIT (OUTPATIENT)
Dept: INFUSION THERAPY | Facility: CLINIC | Age: 62
End: 2022-08-02
Attending: PSYCHIATRY & NEUROLOGY
Payer: COMMERCIAL

## 2022-08-02 VITALS
BODY MASS INDEX: 24.79 KG/M2 | HEART RATE: 60 BPM | SYSTOLIC BLOOD PRESSURE: 101 MMHG | OXYGEN SATURATION: 98 % | WEIGHT: 149 LBS | TEMPERATURE: 97.7 F | RESPIRATION RATE: 16 BRPM | DIASTOLIC BLOOD PRESSURE: 63 MMHG

## 2022-08-02 DIAGNOSIS — F33.2 SEVERE EPISODE OF RECURRENT MAJOR DEPRESSIVE DISORDER, WITHOUT PSYCHOTIC FEATURES (H): Primary | ICD-10-CM

## 2022-08-02 PROCEDURE — 250N000009 HC RX 250: Performed by: PSYCHIATRY & NEUROLOGY

## 2022-08-02 PROCEDURE — 258N000003 HC RX IP 258 OP 636: Performed by: PSYCHIATRY & NEUROLOGY

## 2022-08-02 PROCEDURE — 96365 THER/PROPH/DIAG IV INF INIT: CPT

## 2022-08-02 RX ORDER — DIPHENHYDRAMINE HYDROCHLORIDE 50 MG/ML
50 INJECTION INTRAMUSCULAR; INTRAVENOUS
Status: CANCELLED
Start: 2022-08-02

## 2022-08-02 RX ORDER — NALOXONE HYDROCHLORIDE 0.4 MG/ML
0.2 INJECTION, SOLUTION INTRAMUSCULAR; INTRAVENOUS; SUBCUTANEOUS
Status: CANCELLED | OUTPATIENT
Start: 2022-08-02

## 2022-08-02 RX ORDER — ONDANSETRON 2 MG/ML
4 INJECTION INTRAMUSCULAR; INTRAVENOUS
Status: CANCELLED | OUTPATIENT
Start: 2022-08-02

## 2022-08-02 RX ORDER — ALBUTEROL SULFATE 0.83 MG/ML
2.5 SOLUTION RESPIRATORY (INHALATION)
Status: CANCELLED | OUTPATIENT
Start: 2022-08-02

## 2022-08-02 RX ORDER — HEPARIN SODIUM,PORCINE 10 UNIT/ML
5 VIAL (ML) INTRAVENOUS
Status: CANCELLED | OUTPATIENT
Start: 2022-08-02

## 2022-08-02 RX ORDER — ALBUTEROL SULFATE 90 UG/1
1-2 AEROSOL, METERED RESPIRATORY (INHALATION)
Status: CANCELLED
Start: 2022-08-02

## 2022-08-02 RX ORDER — HEPARIN SODIUM,PORCINE 10 UNIT/ML
5 VIAL (ML) INTRAVENOUS
Status: DISCONTINUED | OUTPATIENT
Start: 2022-08-02 | End: 2022-08-02 | Stop reason: HOSPADM

## 2022-08-02 RX ORDER — HEPARIN SODIUM (PORCINE) LOCK FLUSH IV SOLN 100 UNIT/ML 100 UNIT/ML
5 SOLUTION INTRAVENOUS
Status: DISCONTINUED | OUTPATIENT
Start: 2022-08-02 | End: 2022-08-02 | Stop reason: HOSPADM

## 2022-08-02 RX ORDER — METHYLPREDNISOLONE SODIUM SUCCINATE 125 MG/2ML
125 INJECTION, POWDER, LYOPHILIZED, FOR SOLUTION INTRAMUSCULAR; INTRAVENOUS
Status: CANCELLED
Start: 2022-08-02

## 2022-08-02 RX ORDER — EPINEPHRINE 1 MG/ML
0.3 INJECTION, SOLUTION, CONCENTRATE INTRAVENOUS EVERY 5 MIN PRN
Status: CANCELLED | OUTPATIENT
Start: 2022-08-02

## 2022-08-02 RX ORDER — HEPARIN SODIUM (PORCINE) LOCK FLUSH IV SOLN 100 UNIT/ML 100 UNIT/ML
5 SOLUTION INTRAVENOUS
Status: CANCELLED | OUTPATIENT
Start: 2022-08-02

## 2022-08-02 RX ORDER — MEPERIDINE HYDROCHLORIDE 25 MG/ML
25 INJECTION INTRAMUSCULAR; INTRAVENOUS; SUBCUTANEOUS EVERY 30 MIN PRN
Status: CANCELLED | OUTPATIENT
Start: 2022-08-02

## 2022-08-02 RX ORDER — HYDRALAZINE HYDROCHLORIDE 20 MG/ML
10 INJECTION INTRAMUSCULAR; INTRAVENOUS
Status: CANCELLED | OUTPATIENT
Start: 2022-08-02

## 2022-08-02 RX ADMIN — KETAMINE HYDROCHLORIDE 65 MG: 50 INJECTION, SOLUTION INTRAMUSCULAR; INTRAVENOUS at 09:13

## 2022-08-02 NOTE — PROGRESS NOTES
Infusion Nursing Note:  Janelle White presents today for Ketamine.    Patient seen by provider today: No   present during visit today: Not Applicable.    Note: Patient reports Ketamine is helping her Depression.     Intravenous Access:  Peripheral IV placed right lower forearm.     Treatment Conditions:  Not Applicable.    Post Infusion Assessment:  Patient tolerated infusion without incident.  Patient observed for 60 minutes post Ketamine per Therapy Plan protocol.  No evidence of extravasations.  Access discontinued per protocol.     Discharge Plan:   Discharge instructions reviewed with: Patient.  Patient and/or family verbalized understanding of discharge instructions and all questions answered.  Patient discharged in stable condition accompanied by: self.  Departure Mode: Ambulatory.      Tabatha Small RN

## 2022-08-03 ENCOUNTER — OFFICE VISIT (OUTPATIENT)
Dept: PSYCHIATRY | Facility: CLINIC | Age: 62
End: 2022-08-03
Payer: COMMERCIAL

## 2022-08-03 VITALS
DIASTOLIC BLOOD PRESSURE: 79 MMHG | SYSTOLIC BLOOD PRESSURE: 119 MMHG | BODY MASS INDEX: 24.66 KG/M2 | HEIGHT: 65 IN | WEIGHT: 148 LBS | HEART RATE: 94 BPM | TEMPERATURE: 98.1 F

## 2022-08-03 DIAGNOSIS — F33.2 SEVERE EPISODE OF RECURRENT MAJOR DEPRESSIVE DISORDER, WITHOUT PSYCHOTIC FEATURES (H): Primary | ICD-10-CM

## 2022-08-03 ASSESSMENT — PATIENT HEALTH QUESTIONNAIRE - PHQ9: SUM OF ALL RESPONSES TO PHQ QUESTIONS 1-9: 17

## 2022-08-03 NOTE — PROGRESS NOTES
"  Psychiatry Clinic Progress Note                                                                   Janelle White is a 61 year old female   Therapist: None (stopped September 2021)   PCP: Carmen Garcia  Other Providers:  Kimberly Perez DO (psychiatrist)       Interim History                                                                                                        4, 4     The patient is a good historian and reports good treatment adherence.      \"If I were a blow up doll I would be deflated\". Feels a lot of fatigue, mentally flat.     Stopped ativan before bedtime and started Flexaril. Feels tired as a result and has cognitive slowing until afternoon. Stopped taking mirapex fro RLS. Mirapex helps with RLS but has racing thoughts, increased goal directed behavior.     \"Trying to stay in the game, do what I know I need to do every day\".   Mental confusion, disorientation comes when physically exhausted.     No recent iron labs although HBG normal.     Ringing in ears starts out ok and then gets worse throughout day.     Continues to find benefit from ketamine.     ----------------------------------------------    She stopped going to therapy last September (2021) after her therapist retired after being ill. She would likely benefit from CBT and ACT. She see's Kimberly Perez at [facility] for medication management who can recommend therapy providers.     She said she has been taking 50 mg of desvenlafaxine daily though her list shows it as 100 mg.     Considered MAOI (plan to discuss with Kimberly Perez) though patient hesitant due to past issues with oral medications. Expressed concerns about treatment that potential interacts with her pain medications. She feels that her pain has been well under control. Her PCP, Carmen Garcia ([place]) manages her opioids. Janelle gave consent for Dr. Yanes to speak with Carmen Garcia about medications and possible interactions. She was informed that ketamine is unlikely " "to interact with her medications on her list. Ketamine was discussed with Janelle. She is open to the idea of trying ketamine stating \"I have nothing to lose.\"     Recent Symptoms:   Depression:  suicidal ideation, depressed mood, anhedonia, low energy, poor concentration /memory, feeling hopeless, excessive crying, overwhelmed and mood dysregulation  Anxiety:  excessive worry and feeling fearful  Panic Attack:  dizziness, flushing, SOB, trouble taking deep breath and chest tightness     Recent Substance Use:  none reported        Social/ Family History                                  [per patient report]                                 1ea,1ea   No changes     Medical / Surgical History                                                                                                                  Patient Active Problem List   Diagnosis     PERSONAL HX OF  MELANOMA     B-complex deficiency     Migraine     Chronic Low Back Pain     CARDIOVASCULAR SCREENING; LDL GOAL LESS THAN 160     DDD (degenerative disc disease), cervical     Moderate recurrent major depression (H)     Vitamin D deficiency     Shift work sleep disorder     Chronic pain syndrome     CLL (chronic lymphocytic leukemia) (H)     Controlled substance agreement signed     CLARE (obstructive sleep apnea)     Prediabetes     Hyperlipidemia LDL goal <130     MTHFR gene mutation     CYP2B6 intermediate metabolizer (H)     CYP2D6 poor metabolizer (H)     STACIE (generalized anxiety disorder)     Polyarthralgia     Cellulitis, unspecified cellulitis site     Sepsis, due to unspecified organism, unspecified whether acute organ dysfunction present (H)     Cellulitis     Tension type headache     Status post blepharoplasty     Rotator cuff injury     Regular astigmatism, bilateral     Pain medication agreement     Hypotension     History of laser assisted in situ keratomileusis     COVID-19     Spinal stenosis of lumbar region with radiculopathy     COPD (chronic " obstructive pulmonary disease) (H)     Severe episode of recurrent major depressive disorder, without psychotic features (H)     Suicidal ideation     Immunocompromised (H)     Infection due to 2019 novel coronavirus       Past Surgical History:   Procedure Laterality Date     anterior cervical discectomy C4-5 ,Fusion C5-6-7  10/2007     APPENDECTOMY       BACK SURGERY       BLEPHAROPLASTY BILATERAL  2013    Procedure: BLEPHAROPLASTY BILATERAL;  BILATERAL UPPER LID BLEPHAROPLASTY AND BROWPEXY ;  Surgeon: Godfrey Miguel MD;  Location:  [facility]      SECTION      x2     COLONOSCOPY N/A 2020    Procedure: COLONOSCOPY;  Surgeon: Butch Lockhart MD;  Location:  GI     ESOPHAGOSCOPY, GASTROSCOPY, DUODENOSCOPY (EGD), COMBINED N/A 2020    Procedure: ESOPHAGOGASTRODUODENOSCOPY, WITH BIOPSY biosies by cold forceps;  Surgeon: Butch Lockhart MD;  Location:  GI     EYE SURGERY       FUSION LUMBAR ANTERIOR, FUSION LUMBAR POSTERIOR TWO LEVELS, COMBINED  2010    L3-S1 anterior posterior fusion     GENITOURINARY SURGERY  Hysterectomy         HEAD & NECK SURGERY      Cervical spine- c2-T2     HYSTERECTOMY  2006    ovaries intact     HYSTERECTOMY       HYSTERECTOMY, PAP NO LONGER INDICATED       Partial vulvectomy for NEENA III  2009     Pubovaginal sling, post op durasphere injections  2006    wears pad     ROTATOR CUFF REPAIR RT/LT  2011    right     SOFT TISSUE SURGERY  2019    Bilateral carpal/ulnar release     SPINAL FUSION C3-4  2009    C3-4, anterior spinal fusion     TUBAL LIGATION       ZZC APPENDECTOMY  2006        Medical Review of Systems                                                                                                    2,10   none in addition to that documented above    Allergy                                Bupropion, Codeine, Effexor [venlafaxine hydrochloride], Escitalopram, Fluoxetine, Levaquin [levofloxacin], Lexapro, Methylphenidate, Milnacipran,  "Pregabalin, Prozac [fluoxetine hcl], Tramadol, and Venlafaxine    Current Medications                                                                                                       Current Outpatient Medications   Medication Sig Dispense Refill     albuterol (PROAIR HFA/PROVENTIL HFA/VENTOLIN HFA) 108 (90 Base) MCG/ACT inhaler Inhale 2 puffs into the lungs every 6 hours (Patient taking differently: Inhale 2 puffs into the lungs every 6 hours as needed for shortness of breath / dyspnea or wheezing) 8.5 g 4     amphetamine-dextroamphetamine (ADDERALL XR) 20 MG 24 hr capsule Take 1 capsule (20 mg) by mouth daily 30 capsule 0     amphetamine-dextroamphetamine (ADDERALL) 20 MG tablet Take 1 tablet (20 mg) by mouth daily (Patient taking differently: Take 10 mg by mouth 2 times daily 1/2 x 20 mg tablet at 1100 and 1400) 30 tablet 0     ASHWAGANDHA PO Take 1 tablet by mouth daily       desvenlafaxine (PRISTIQ) 50 MG 24 hr tablet Take 1 tablet (50 mg) by mouth daily 90 tablet 1     Estradiol (DIVIGEL) 1 MG/GM GEL Place 1 packet onto the skin daily 30 g 11     HYDROcodone-acetaminophen (NORCO)  MG per tablet Take 1 tablet by mouth every 4 hours as needed for severe pain max 4 tabs/24 hrs 10 tablet 0     insulin syringe-needle U-100 (30G X 1/2\" 1 ML) 30G X 1/2\" 1 ML miscellaneous Inject 1 ml B12 qmonth 10 each 1     lidocaine (LIDODERM) 5 % patch Place 4 patches onto the skin daily Apply up to 4 patches to skin. Wear for 12 hours and remove for 12 hrs.  Refill when patient requests. 120 patch 3     LORazepam (ATIVAN) 1 MG tablet Take 1 tablet (1 mg) by mouth every 6 hours as needed for anxiety (Patient taking differently: Take 1 mg by mouth At Bedtime) 50 tablet 3     medical cannabis (Patient's own supply.  Not a prescription) Medical Cannabis - Tangerine 4-6 ml by mouth daily. [facility]       methocarbamol (ROBAXIN) 500 MG tablet Take 1 tablet (500 mg) by mouth 4 times daily as needed for muscle spasms 120 " tablet 0     methylfolate (DEPLIN) 7.5 MG TABS tablet Take 1 tablet (7.5 mg) by mouth daily 30 tablet 1     naloxone (NARCAN) 4 MG/0.1ML nasal spray Spray 1 spray (4 mg) into one nostril alternating nostrils as needed for opioid reversal every 2-3 minutes until assistance arrives 0.2 mL 1     NONFORMULARY Take 2 [place]oops by mouth daily Protein and Vitamin shake mix - Nutritional supplement       ondansetron (ZOFRAN-ODT) 8 MG ODT tab Take 1 tablet (8 mg) by mouth every 8 hours as needed for nausea 30 tablet 4     pramipexole (MIRAPEX) 0.25 MG tablet Take 1 tablet (0.25 mg) by mouth At Bedtime 30 tablet 1     Prasterone 6.5 MG INST Place 0.5 suppositories vaginally three times a week 28 each 11     senna-docusate (SENOKOT-S/PERICOLACE) 8.6-50 MG tablet Take 6-9 tablets by mouth every evening       vitamin D3 (CHOLECALCIFEROL) 125 MCG (5000 UT) tablet Take 5,000 Units by mouth daily         Vitals                                                                                                                       3, 3   LMP 05/01/2005 (LMP Unknown)      Mental Status Exam                                                                                    9, 14 cog gs     Alertness: alert  and oriented  Appearance: well groomed  Behavior/Demeanor: cooperative and tearful and in some anxious distress, with good  eye contact   Speech: normal rate/rythm though at times rapid and/or quivering  Language: intact  Psychomotor: restless  Mood: depressed and anxious  Affect: tearful; was congruent to mood; was congruent to content  Thought Process/Associations: unremarkable  Thought Content:  Reports suicidal ideation  Perception:  No apparent psychotic symptoms observed or reported  Insight: good  Judgment: good  Cognition: (6) does  appear grossly intact; formal cognitive testing was not done  Gait/Station and/or Muscle Strength/Tone: Not observed due to  Video visit     Labs and Data                                                                                                                  Rating Scales:    N/A    PHQ9:    PHQ 6/13/2022 7/2/2022 7/11/2022   PHQ-9 Total Score 18 22 13   Q9: Thoughts of better off dead/self-harm past 2 weeks More than half the days Nearly every day More than half the days   F/U: Thoughts of suicide or self-harm Yes - Yes   F/U: Self harm-plan Yes - Yes   F/U: Self-harm action No - No   F/U: Safety concerns No - No   Some encounter information is confidential and restricted. Go to Review Flowsheets activity to see all data.         Diagnosis and Assessment                                                                             m2, h3     Some interval improvement with ketamine, remains with some ffective instability and distress. Recommended ACT therapy.     Background:  Janelle White is a 60 year old female with previous psychiatric history of MDD, recurrent, severe, chronic pain syndrome, h/o CLL. On initial presentation it was thought that Janelle was suffering from an episode of depression that is closely related to her pain syndrome and the resultant physically inability to engage in activities or work. Her pain management was improved and she was treated with TMS though continues to endorse significant depression and anxiety symptoms. She is hesitant to trial MAOIs or other oral agents that could potentially interfere with her current pain management as she feels it is well under control. She is interested in pursuing ketamine therapy, which should not interfere with her medications and may actually help with her pain. She gave consent to speak with her PCP about medication management and potential interventions for continued depression.      Today the following issues were addressed:    1) Major depressive disorder, recurrent, severe  2) chronic pain   3) treatment response   4) fatigue     [place] Prescription Monitoring Program [] review was not needed today.    PSYCHOTROPIC DRUG  INTERACTIONS: none clinically relevant    Plan                                                                                                                    m2, h3      1) [place] depressive disorder, recurrent, severe   -- Medication: continue current outpatient medications    -- Psychotherapy: recommended patient speak with her psychiatry provider to be referred for individual psychotherapy, specifically ACT and/or CBT    -- Procedures:               - continue ketamine    -- Referrals: None      RTC: 6 weeks  CRISIS NUMBERS:   Provided routinely in AVS.    Treatment Risk Statement:  The patient understands the risks, benefits, adverse effects and alternatives. Agrees to treatment with the capacity to do so. No medical contraindications to treatment. Agrees to call clinic for any problems. The patient understands to call 911 or go to the nearest ED if life threatening or urgent symptoms occur.     Zafar Yanes MD, PhD

## 2022-08-04 ENCOUNTER — VIRTUAL VISIT (OUTPATIENT)
Dept: FAMILY MEDICINE | Facility: CLINIC | Age: 62
End: 2022-08-04
Payer: COMMERCIAL

## 2022-08-04 DIAGNOSIS — D64.9 ANEMIA, UNSPECIFIED TYPE: ICD-10-CM

## 2022-08-04 DIAGNOSIS — M25.50 POLYARTHRALGIA: ICD-10-CM

## 2022-08-04 DIAGNOSIS — D84.9 IMMUNOCOMPROMISED (H): Primary | ICD-10-CM

## 2022-08-04 DIAGNOSIS — E55.9 VITAMIN D DEFICIENCY: ICD-10-CM

## 2022-08-04 DIAGNOSIS — G43.919 INTRACTABLE MIGRAINE WITHOUT STATUS MIGRAINOSUS, UNSPECIFIED MIGRAINE TYPE: ICD-10-CM

## 2022-08-04 DIAGNOSIS — G25.81 RESTLESS LEGS SYNDROME (RLS): ICD-10-CM

## 2022-08-04 DIAGNOSIS — E53.9 B-COMPLEX DEFICIENCY: ICD-10-CM

## 2022-08-04 PROCEDURE — 99214 OFFICE O/P EST MOD 30 MIN: CPT | Mod: 95 | Performed by: NURSE PRACTITIONER

## 2022-08-04 RX ORDER — ROPINIROLE 0.25 MG/1
.25-.5 TABLET, FILM COATED ORAL AT BEDTIME
Qty: 90 TABLET | Refills: 0 | Status: SHIPPED | OUTPATIENT
Start: 2022-08-04 | End: 2023-03-09

## 2022-08-04 NOTE — PROGRESS NOTES
Janelle is a 61 year old who is being evaluated via a billable video visit.      How would you like to obtain your AVS? MyChart  If the video visit is dropped, the invitation should be resent by: Text to cell phone: [phone]   Will anyone else be joining your video visit? No          Assessment & Plan     Immunocompromised (H)  Follows at [place].  Stable overall    B-complex deficiency  - Vitamin B12; Future    Vitamin D deficiency  - Vitamin D Deficiency; Future    Polyarthralgia  - rOPINIRole (REQUIP) 0.25 MG tablet; Take 1-2 tablets (0.25-0.5 mg) by mouth At Bedtime  - Adult Neurology  Referral; Future    Restless legs syndrome (RLS)  - rOPINIRole (REQUIP) 0.25 MG tablet; Take 1-2 tablets (0.25-0.5 mg) by mouth At Bedtime    Intractable migraine without status migrainosus, unspecified migraine type  - Adult Neurology  Referral; Future    Anemia, unspecified type  - Ferritin; Future    Overall, Janelle seems to be doing much better today.  Her thought process was very clear.  I agree with stopping the mirapex since she is feeling impulsive on it.  She has not tried Requip in the past and we can see if this helps her RLS.  We discussed we may have to titrate up the dose, but we would want to start slow and stay at the lowest dose possible.  Also instructed to stop the flexeril since she is already on Robaxin.   I agree with Dr. Yanes's recommendation for a neurology referral.  I also agree with the recommendation to resume individual therapy.  I am happy to place a referral and she will talk to Dr. Perez at their appointment next week to see if she has any recommended therapists.                  Return for Follow up if symptoms worsen or fail to improve.    [facility]    Subjective   Janelle is a 61 year old, presenting for the following health issues:  Follow Up      History of Present Illness       Reason for visit:  Coordination of  "care    She eats 4 or more servings of fruits and vegetables daily.She consumes 0 sweetened beverage(s) daily.She exercises with enough effort to increase her heart rate 60 or more minutes per day.  She exercises with enough effort to increase her heart rate 6 days per week.   She is taking medications regularly.       Met with Dr. Yanes yesterday.  Started Group through [facility] today.      Most difficulty with the Mirapex- impacts tremendously.  Feels very impulsive while on it. Eating, shopping, thoughts racing.      Flexeril is causing a lot of fatigue- wakes up in worse shape.  Is also using the robaxin.  This was prescribed by pain clinic.  Didn't take last night, was awake all night.  Lorazepam had previously helped.      Has not had good experience with gabapentin for the RLS    Continuing ketamine.    Dr. Yanes wants her to see neurology, have Ferritin level.        Has ENT appointment next week- for CPAP and tinnitus.  Tinnitus gets worse as the day goes on.          Review of Systems   Constitutional, HEENT, cardiovascular, pulmonary, gi and gu systems are negative, except as otherwise noted.      Objective    Vitals - Patient Reported  Weight (Patient Reported): 67.1 kg (148 lb)  Height (Patient Reported): 165.1 cm (5' 5\")  BMI (Based on Pt Reported Ht/Wt): 24.63      Vitals:  No vitals were obtained today due to virtual visit.    Physical Exam   GENERAL: alert and no distress  EYES: Eyes grossly normal to inspection.  No discharge or erythema, or obvious scleral/conjunctival abnormalities.  RESP: No audible wheeze, cough, or visible cyanosis.  No visible retractions or increased work of breathing.    SKIN: Visible skin clear. No significant rash, abnormal pigmentation or lesions.  NEURO: Cranial nerves grossly intact.  Mentation and speech appropriate for age.  PSYCH: Mentation appears normal, affect normal/bright, judgement and insight intact, normal speech and appearance well-groomed.            "     Video-Visit Details    Video Start Time: 1339    Type of service:  Video Visit    Video End Time:1601    Originating Location (pt. Location): Home    Distant Location (provider location):  [facility]     Platform used for Video Visit: Funny Or Die    Sterling Alberto

## 2022-08-05 ASSESSMENT — ANXIETY QUESTIONNAIRES
GAD7 TOTAL SCORE: 7
GAD7 TOTAL SCORE: 7
5. BEING SO RESTLESS THAT IT IS HARD TO SIT STILL: SEVERAL DAYS
IF YOU CHECKED OFF ANY PROBLEMS ON THIS QUESTIONNAIRE, HOW DIFFICULT HAVE THESE PROBLEMS MADE IT FOR YOU TO DO YOUR WORK, TAKE CARE OF THINGS AT HOME, OR GET ALONG WITH OTHER PEOPLE: SOMEWHAT DIFFICULT
6. BECOMING EASILY ANNOYED OR IRRITABLE: MORE THAN HALF THE DAYS
8. IF YOU CHECKED OFF ANY PROBLEMS, HOW DIFFICULT HAVE THESE MADE IT FOR YOU TO DO YOUR WORK, TAKE CARE OF THINGS AT HOME, OR GET ALONG WITH OTHER PEOPLE?: SOMEWHAT DIFFICULT
4. TROUBLE RELAXING: SEVERAL DAYS
7. FEELING AFRAID AS IF SOMETHING AWFUL MIGHT HAPPEN: NOT AT ALL
2. NOT BEING ABLE TO STOP OR CONTROL WORRYING: SEVERAL DAYS
GAD7 TOTAL SCORE: 7
7. FEELING AFRAID AS IF SOMETHING AWFUL MIGHT HAPPEN: NOT AT ALL
3. WORRYING TOO MUCH ABOUT DIFFERENT THINGS: SEVERAL DAYS
1. FEELING NERVOUS, ANXIOUS, OR ON EDGE: SEVERAL DAYS

## 2022-08-05 ASSESSMENT — PATIENT HEALTH QUESTIONNAIRE - PHQ9
10. IF YOU CHECKED OFF ANY PROBLEMS, HOW DIFFICULT HAVE THESE PROBLEMS MADE IT FOR YOU TO DO YOUR WORK, TAKE CARE OF THINGS AT HOME, OR GET ALONG WITH OTHER PEOPLE: VERY DIFFICULT
SUM OF ALL RESPONSES TO PHQ QUESTIONS 1-9: 17
SUM OF ALL RESPONSES TO PHQ QUESTIONS 1-9: 17

## 2022-08-05 NOTE — PROGRESS NOTES
RE: Janelle White  MR#: [id number]  : 1960  DOS: Aug 11, 2022      NEUROPSYCHOLOGICAL CONSULTATION      REASON FOR REFERRAL:  Janelle White is a 61 year old female with 18 years of education. The patient was referred for a neuropsychological evaluation by Dr. Perez secondary to memory concerns in the context of COVID-19 infection and a history of depression. The evaluation was requested in order to document her cognitive and emotional functioning, assist with the differential diagnosis, and provide appropriate recommendations. The patient was informed that the evaluation included multiple measures of performance and symptom validity, and she was encouraged to provide her best effort at all times.  The nature of the neuropsychological evaluation was also discussed, including limits of confidentiality (for suspected child or elder abuse, potential homicide or suicide, and court orders). The patient was also informed that the report would be placed on the electronic medical records system.  The patient was also given an opportunity to ask questions. The patient indicated that she understood the information and consented to participate in the evaluation.    PROCEDURES:   Review of records and clinical interview,  Mental Status-orientation, Wide Range Achievement Test-4, Wechsler Adult Intelligence Scale-IV, Bello Verbal Learning Test-Revised, Clock Drawing Test, Verbal Fluency Test, Joseph Depression Inventory, Joseph Anxiety Inventory, Personality Assessment Inventory, Mata Complex Figure Test, Trailmaking Test, NAB Naming Test, TOMM, Judgment of Line Orientation Test, Stroop Test, Wechsler Memory Scale-IV (selected subtests) and Eppworth Sleepiness Scale    REVIEW OF RECORDS: Records from 22 noted that the patient has memory concerns and presented  to the emergency room for psychiatric crisis due to worsening suicidal ideation and unsure if could keep self safe.  Was medically hospitalized  since positive for COVID and required isolation due to history of CLL.  Saw psychiatry consult service while on the unit.  No medication changes but symptoms improved and patient was discharged home.  Continuing ketamine therapy.  Pramipexole is been helpful possibly for both mood symptoms and also sleeping better due to improved restless legs.  Suicidal ideation has continued to be improved.  Interventional psychiatry clinic talking about increasing ketamine dose and treating every few weeks.  Patient will continue in therapy.  Continues off pain medication.  No acute safety concerns today.  No acute suicidality.  No plan or intent to harm self today.  No problematic drug or alcohol use.  Taking medications as prescribed.  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 She has no past medical history of Cerebral infarction (H), Congestive heart failure (H), Coronary artery disease, Diabetes (H), Heart disease, Hypertension, Thyroid disease, or Uncomplicated asthma.  These recors concluded that she   reports overall some significant worsening of mood symptoms.  Increased anxiety with her depression.  Poor motivation and poor energy.  Symptoms severe enough to prevent her from being able to utilize typical coping skills and strategies.  No current motivation or energy to participate in PHP/day treatment program.  Continues to take medication as scheduled.  Recent surgery and general anesthesia could be contributing.  Discussed discontinuing stimulant medication as an augmentation  "strategy.  She is agreeable to moving forward with T3 as an augmentation for treatment resistant depression.  Discussed risks and benefits at length.  Will get baseline thyroid labs prior to starting T3.  Hoping she will experience increased energy and motivation and that her mood will lift.  Also discussed setting up an appointment with her interventional psychiatry clinic to discuss other potential options such as ketamine therapy, ECT, TMS.  Does have history of ECT for depression when she was quite young.  I am hopeful to stay ahead of her symptoms before things get too severe.  Has chronic suicidal ideation and is at high risk for suicide.  Continues to deny any plan or intent.  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  Records noted other history included CYP2D6 poor/intermediate metabolize and homozygous for C677Y polymorphism MTHFR. A CT scan of the head report from 3/16/22 noted  no acute intracranial process.  Records noted that the patient was being treated with Requip, Adderall, Robaxin, Pristiq, Norco, lorazepam, Prasterone, Estradiol, albuterol, lidocaine, ondansetron, Deplin, insulin, naloxone, medical cannabis, vitamin D3, ashwagandha, and senna-docusate.     From 8/8/22:  CLINICAL INTERVIEW: The patient was interviewed via video platform and she was interviewed at home.  On interview, the patient reported several cognitive difficulties began approximately 2 years ago.  Further, she stated that she feels like she goes \"in and out of clarity.\" The patient reported difficulties with her memory, verbal comprehension, and attention/concentration.  She also noted difficulty with word finding.  She stated that she gets \"brain zaps\" which were worse when she was on high doses of Cymbalta for neuropathy and depression. She noted that her cognitive difficulties began when she was not feeling well and had multiple surgeries, including shoulder surgery and 2 hand surgeries at " "about the same time.  She also reported a number of other medical issues and weight loss related to nausea and vomiting.  The patient indicated that in February, 2021 she went on disability because she could not function at work.  She also related that she started a new medication, Rituxan, at the same time.  The patient also reported difficulty with fatigue, which became worse after she had COVID-19.  Functionally, the patient reported that when she drives she has lost track of what she is doing and gotten lost.  She reported her  has had to pick her up at times.  She noted her  has always managed the household finances, but she denied significant problems with medication management.  The patient said that she has difficulty completing household chores due to fatigue and decreased stamina.  In terms of other difficulties, she also noted problems with balance and nausea.    In terms of her mood, she reported it was very labile and she has \"huge\" ups and downs in her mood.  She reported that when she is feeling exhausted and in pain her mood will be worse.  She reported that during these times her brain is \"fuzzy\" and she becomes irritable and dizzy.  In terms of her sleep, she noted that it is \"not the best.\"  She elaborated that she wakes up approximately 2 times per night and also has a history of sleep apnea, but indicated that she has had difficulty with her CPAP machine.  She reported that she has an appointment to evaluate her CPAP next week.  She also noted difficulty with neuropathy that affects her sleep.  She reported that her appetite is variable, and she tends to have the best appetite in the morning.  She stated that she has delayed gastric emptying, which adversely affects her appetite.    The patient reported that she works with an interventional psychiatrist at the [facility].  She reported she has been participating in ketamine therapy and has also had transcranial " magnetic stimulation (TMS), which she completed last winter.  She noted the ketamine therapy has been helpful because she has been able to get off narcotics, which she has been on since 2010.  The patient reported that she is also been participating in the [facility] program, but feels she cannot continue because of her cognitive issues so she is looking for an individual psychotherapist.  In terms of suicidal thoughts, she acknowledged having passive ideation, but she denied any plan or intention of harming herself.  She noted that ketamine has been helpful as well.  She noted a suicide attempt at age 16 with cutting but she denied any suicide attempts since then.  She denied any homicidal ideation.  She denied any hallucinations or delusions, except when she was taking Lyrica so this is not an ongoing issue.  She denied any use of alcohol or tobacco.  She reported that she uses medical cannabis in [facility], and has 1-2 per day.    On a medical history questionnaire, she noted a history of concussions.  During the interview, the patient elaborated that she has never had a loss of consciousness from a concussion, but has a history of 3 head injuries related to falls.  She noted after one head injury she experienced vomiting and severe headache.  The patient also confirmed her history of chronic pain, and noted that her baseline pain level is about a 4-5 on a 1-10 scale with 10 being most severe.  She elaborated that her pain is worse later in the day, and the most severe pain is an 8 on the pain scale.  The patient also reported that she has and inability to tolerate a lot of medications.  In terms of Covid-19, she confirmed that she was first diagnosed with COVID-19 in September, 2021 and had a second diagnosis in June, 2022.  She said that she was never hospitalized.  She also reported she has never sought care from a long COVID clinic.  She also denied any history of liver or kidney disease.  She noted that she have  ringing in her ear but said her hearing is good and she does not need a hearing aid.  She reported that she wears eyeglasses and has had LASEK.  She noted the medications listed in the electronic medical record were up-to-date.  On the medical history questionnaire, she denied any history of stroke, central nervous system infection, seizures, tumor, heart disease, pulmonary disease, hypertension, diabetes, hypothyroidism, hypercholesterolemia, or exposure to hazardous materials.  However, she acknowledged a history of depression and anxiety on this form.    Academically, the patient reported that she  a masters degree in art, and subsequently returned to school to get degrees in nursing.  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  She reported that she was a good student in school and was never in special education classes or had to repeat a grade.  The patient reported that she has been  for 38 years and has 2 children, including a son and a daughter.  She noted that she lives at home with her  and daughter.    BEHAVIORAL OBSERVATIONS:  On examination, the patient was casually but appropriately dressed and groomed. The patient was cooperative with the evaluation and was talkative.  Speech was normal in volume, rate and tone.  The patient also appeared to put forth their best effort on all tasks. Thus, the results of this evaluation are a reasonably valid reflection of the patient's current level of functioning. There were no indications of hallucinations, delusions or unusual thought processes and the patient was generally well oriented to personal information, place, and time. There were no demonstrations of a practical memory problem. The patient was a good historian, with a self-report consistent with medical records. During formal testing, the patient occasionally displayed impulsive behaviors such as starting assessment tasks before the  directions were finished. Affect was also generally appropriate.      RESULTS: Formal performance validity measures were within expected limits and consistent with the above noted observations.  Psychometric estimates of the patient's premorbid global cognitive functioning based upon single word reading ability were in the upper half of the average range, which is consistent with her educational and occupational history.    Measures of attention/concentration were mildly variable.  For example, a digit span task was at the lower end of the average range.  However, a visual scanning task that integrates attention was in the below average range.  Speed of information processing was also mildly variable for example, psychomotor speed was in the average range.  However, motor-free processing speed was variable and ranged from low average to average.    Measures of the patient's memory functioning were mildly variable.  For example, immediate and delayed verbal recall on a story memory task were in the average range.  However, immediate and delayed verbal recall on a word list learning task were in the below average range.  Verbal recognition memory was within expected limits, and delayed recall was consistent with initial encoding.  Visual memory was also mildly variable.  For example, immediate recall on a complex figure drawing task was in the average range, but delayed recall was in the below average range.  Visual recognition was also below expected limits.  Thus, there was evidence for mild variability with encoding of information, but no consistent evidence for rapid forgetting of previously learned information.    Expressive language functioning was within expected limits.  For example, confrontation naming was in the average range and completed without error.  Further, semantic and phonemic fluency were in the average range.  Verbal abstract reasoning was also in the average range.  Receptive language  functioning, based upon her performance during the interview, was likewise within expected limits.    Visual-spatial and visual constructional abilities were likewise within expected limits.  For example, motor-free line orientation judgment was within expected limits.  Further, motor-free visual reasoning was in the average range.  Clock drawing and complex figure drawing tasks did not indicate evidence for significant visual-spatial distortions.    Measures of the patient's executive functioning were within expected limits.  For example, a measure of response inhibition that integrates processing speed was in the average range.  Further, there was no evidence for significant planning or organizational difficulties on complex figure drawing or clock drawing tasks.  A complex visual scanning task that integrates attention was in the low average range, but her performance was better then on a simpler version that integrated only attention.  Verbal and visual reasoning abilities were in the average range as well.    Formal personality evaluation was completed utilizing the clinical interview, self-report measures of depression, anxiety, and fatigue, and an objective measure of emotional functioning.  On the self-report measures, the patient endorsed moderate depressive symptoms, and also noted experiencing suicidal thoughts but also indicated she would not act on them.  However, she did not endorse clinically significant anxiety symptoms, but endorsed mild excessive daytime fatigue.  On the objective measure of emotional functioning, she responded in an inconsistent fashion to items of similar content, therefore this measure could not be interpreted.    SUMMARY:  In summary, the neuropsychological assessment results indicated no evidence for significant change or decline in global cognitive functioning.  There was evidence for mild variability with attention/concentration and speed of information processing, which  likely resulted in mild variability with memory functioning, particularly with encoding of new information.  However, there was no consistent evidence for rapid forgetting of previously learned information.  Other cognitive domains, including executive functioning, language processing ability, and visual-spatial abilities were within expected limits.  Additionally, the patient endorsed significant depressive symptoms, although she did not endorse anxiety symptoms at this time.  These results are consistent with her history of major depressive disorder. The pattern of results indicated that neurologically based cognitive deficits, including progressive dementia, were unlikely.  More likely, multiple factors, including mood/anxiety, sleep difficulties, sleep apnea, medications effects (cannabis),  pain, and fatigue are contributing to the cognitive concerns. Other issues, such as some vitamin deficiencies, could also contribute to cognitive concerns if not well managed.     RECOMMENDATIONS:   1.  Given these results, a referral for individual psychotherapeutic intervention is recommended. A highly structured cognitive-behavioral intervention focused on coping and stress management would likely be the most helpful. One option for this service is through the [facility] at [email or web address] or [phone].    2. Addressing sleep related difficulties may also be useful focus of psychotherapy.  Working with a health psychologist with expertise in helping individuals with long-term health and medical conditions would likely be particular helpful.  3.  Additionally, continued psychiatric consultation is strongly recommended.  4. A suicide risk assessment was conducted with this patient, and it was determined that the patient likely was not an imminent danger to herself. Nonetheless, ongoing close monitoring of the patient s suicidal ideation by [facility]  care providers is recommended.   5. Given the reported sleep difficulties, addressing sleep hygiene to improve the quality and duration of sleep may be beneficial. The following are recommendations from the National Sleep Foundation that may be helpful:     Avoid napping during the day; it can disturb the normal pattern of sleep and wakefulness.    Avoid stimulants such as caffeine, nicotine, and alcohol too close to bedtime. While alcohol is well known to speed the onset of sleep, it disrupts sleep in the second half as the body begins to metabolize the alcohol, causing arousal.    Exercise can promote good sleep. Vigorous exercise should be taken in the morning or late afternoon. A relaxing exercise, like yoga, can be done before bed to help initiate a restful night's sleep.    Food can be disruptive right before sleep; stay away from large meals close to bedtime. Also dietary changes can cause sleep problems, if someone is struggling with a sleep problem, it's not a good time to start experimenting with spicy dishes. And, remember, chocolate has caffeine.    Ensure adequate exposure to natural light. This is particularly important for older people who may not venture outside as frequently as children and adults. Light exposure helps maintain a healthy sleep-wake cycle.    Establish a regular relaxing bedtime routine. Try to avoid emotionally upsetting conversations and activities before trying to go to sleep. Don't dwell on, or bring your problems to bed.    Associate your bed with sleep. It's not a good idea to use your bed to watch TV, listen to the radio, or read.    Make sure that the sleep environment is pleasant and relaxing. The bed should be comfortable, the room should not be too hot or cold, or too bright.  6. The patient may find the following attention and organizational strategies helpful:     Use cell phone reminders to help monitor upcoming appointments and due dates as well as other important  tasks (e.g., when to take medications). Set the reminder to go off several times prior to the event with advanced notice (e.g., one week before, two days before, the day before, and the morning of the event).     Attempt to reduce distractions at home. Having a clutter-free work environment and removing or at least making it harder to access potential distractions would help optimize attention. Also consider facing away from high traffic areas and using earplugs or noise cancellation headphones when desiring a quiet work environment.    Taking short breaks during the day may help optimize and maintain concentration.     Make to-do lists and prioritize tasks. Break down large tasks into smaller steps and check off each small step when completed.   7.  A referral for cognitive rehabilitation therapy, such as with a speech therapist, may be helpful to assist with compensatory strategies for the cognitive concerns.  One option for this service is [facility] at [email or web address]/sitecore/[email or web address]/[email or web address]/[email or web address]/speech-language-and-swallowing-therapy  8. Working closely with treating healthcare providers to develop a medically appropriate exercise program is recommended, given the mental health and physical benefits of regular exercise.  9. A referral to the  [facility] might be considered if the patient is continuing to experience physical symptoms secondary to her Covid-19 infection.  The contact phone number for this is [phone].  10. Chronic pain can interfere with cognitive functioning in daily life. The following strategies can be helpful in minimizing the impact of pain on concentration and memory:    Get an adequate amount of restorative sleep each night     Practice mindfulness exercises often      Maintain a schedule that allows for sufficient time to engage in pleasurable activities.     A good self-help resource for managing pain and instruction in  "relaxation techniques is  Managing Pain Before it Manages You  by Alina Lang M.D., Ph.D., [employer].     \"Chronic Pain Control Workbook: A Step-by-Step Guide for Coping with and Overcoming Pain\" By Carina Broderick and Millie Wagoner. [employer].     \"Chronic Pain Control Workbook: A step by step guide for coping with and overcoming pain\" by Carina Broderick and Millie Wagoner. [employer]     DARNELL Wilson (1987). Mastering Pain: A Twelve-Step Program for Coping with Chronic Pain. [employer]     The following online resources can provide additional helpful information about pain management generally.     www.ninds.nih.gov/health_and_medical/disorders/chronic_pain.htm      American Pain Society at www.ampainsoc.org      International Association for the Study of Pain at www.iasp-pain.org     American Academy of Pain Medicine at www.painmed.org    11.  Given her reported difficulties with sleep apnea and the CPAP machine, referral for sleep medicine consultation is also recommended, particularly given the impact of untreated sleep apnea on cognitive functioning.  12.  The results of the evaluation now constitute a baseline of the patient's cognitive and emotional functioning. If further cognitive difficulties are observed in the future, a referral for a neuropsychological re-evaluation at that time might be considered.     Results and recommendations were discussed with the patient via telephone on 8/11/2022 and her questions were answered.  I encouraged her to follow-up with her treating healthcare providers to help facilitate the recommendations.  Thank you for referring this interesting individual. If you have any questions, please feel free to contact me.      Atilio Palumbo, PhD, ABPP, LP  Professor and Licensed Psychologist [id number]  Board Certified Clinical Neuropsychologist    Time Spent:      Minutes Date Code Units   Total Time-Neuropsychologist " Professional 247 8/11/22 62412 1     8/11/22 67668 3   Neuropsychologist Admin/scoring 0 8/11/22 88838 0     8/11/22 40401 0   Diagnostic Interview (billed on 8/922) 30 8/9/22 47615 1   Psychometrician Time-Test Administration/Score 242 8/11/22 47074 1   (62 min on 8/922 and 180 min on 8/11/22)  8/11/22 18178 7   Diagnosis R41.3 U07.10 F32.1 G47.33

## 2022-08-08 ENCOUNTER — VIRTUAL VISIT (OUTPATIENT)
Dept: NEUROPSYCHOLOGY | Facility: CLINIC | Age: 62
End: 2022-08-08
Payer: COMMERCIAL

## 2022-08-08 ENCOUNTER — VIRTUAL VISIT (OUTPATIENT)
Dept: PSYCHOLOGY | Facility: CLINIC | Age: 62
End: 2022-08-08
Payer: COMMERCIAL

## 2022-08-08 ENCOUNTER — VIRTUAL VISIT (OUTPATIENT)
Dept: PSYCHIATRY | Facility: CLINIC | Age: 62
End: 2022-08-08
Payer: COMMERCIAL

## 2022-08-08 DIAGNOSIS — F33.9 RECURRENT MAJOR DEPRESSION RESISTANT TO TREATMENT (H): ICD-10-CM

## 2022-08-08 DIAGNOSIS — E88.89 CYP2D6 POOR METABOLIZER (H): ICD-10-CM

## 2022-08-08 DIAGNOSIS — U07.1 COVID-19: ICD-10-CM

## 2022-08-08 DIAGNOSIS — G89.4 CHRONIC PAIN SYNDROME: ICD-10-CM

## 2022-08-08 DIAGNOSIS — F33.2 SEVERE EPISODE OF RECURRENT MAJOR DEPRESSIVE DISORDER, WITHOUT PSYCHOTIC FEATURES (H): Primary | ICD-10-CM

## 2022-08-08 DIAGNOSIS — E88.89 CYP2B6 INTERMEDIATE METABOLIZER (H): ICD-10-CM

## 2022-08-08 DIAGNOSIS — R41.89 SUBJECTIVE COGNITIVE IMPAIRMENT: ICD-10-CM

## 2022-08-08 DIAGNOSIS — G25.81 RESTLESS LEG SYNDROME: ICD-10-CM

## 2022-08-08 DIAGNOSIS — R41.3 MEMORY LOSS: Primary | ICD-10-CM

## 2022-08-08 DIAGNOSIS — R45.851 SUICIDAL IDEATION: ICD-10-CM

## 2022-08-08 DIAGNOSIS — F32.A DEPRESSION, UNSPECIFIED DEPRESSION TYPE: ICD-10-CM

## 2022-08-08 PROCEDURE — 99207 PR PSYCHIATRIC DIAGNOSTIC EVALUATION: CPT | Mod: 95

## 2022-08-08 PROCEDURE — 99214 OFFICE O/P EST MOD 30 MIN: CPT | Mod: 95 | Performed by: PSYCHIATRY & NEUROLOGY

## 2022-08-08 PROCEDURE — 90832 PSYTX W PT 30 MINUTES: CPT | Mod: 95 | Performed by: PSYCHOLOGIST

## 2022-08-08 RX ORDER — DEXTROAMPHETAMINE SACCHARATE, AMPHETAMINE ASPARTATE MONOHYDRATE, DEXTROAMPHETAMINE SULFATE AND AMPHETAMINE SULFATE 5; 5; 5; 5 MG/1; MG/1; MG/1; MG/1
20 CAPSULE, EXTENDED RELEASE ORAL DAILY
Qty: 30 CAPSULE | Refills: 0 | Status: SHIPPED | OUTPATIENT
Start: 2022-08-08 | End: 2022-10-09

## 2022-08-08 RX ORDER — DEXTROAMPHETAMINE SACCHARATE, AMPHETAMINE ASPARTATE, DEXTROAMPHETAMINE SULFATE AND AMPHETAMINE SULFATE 3.75; 3.75; 3.75; 3.75 MG/1; MG/1; MG/1; MG/1
15 TABLET ORAL 2 TIMES DAILY PRN
Qty: 60 TABLET | Refills: 0 | Status: SHIPPED | OUTPATIENT
Start: 2022-08-08 | End: 2022-10-09

## 2022-08-08 NOTE — Clinical Note
Please call this patient to get them scheduled for a follow-up visit in 4 weeks. Please schedule with me and the [facility]. Thanks!

## 2022-08-08 NOTE — PATIENT INSTRUCTIONS
Treatment Plan:  Continue Pristiq 50 mg daily for mood, anxiety.   Continue methyl folate 7.5 mg daily as supplementation.  Continue Adderall XR 20 mg daily for mood augmentation  Increase Adderall IR to 15 mg twice daily as needed for mood augmentation and daytime fatigue/hypersomnia  Continue benzodiazepine per primary care prescriber.  Continue ketamine with interventional psychiatry clinic.  Continue to work with your specialist from [facility] as indicated.    Discussed possibility of individual DBT therapy today.  I will reach out to your therapist to discuss this as an option since group therapy is not a current possibility.  Discussed book recommendation, Building A Life [place] [facility] (by Elizabeth Kumar) at past visit.  Could continue to consider lithium if suicidal ideation continues to persist.  Keep neuropsychological testing appointment due to ongoing subjective cognitive concerns.  Recommend ongoing individual psychotherapy.    Continue all other cares per primary care provider.   Continue all other medications as reviewed per electronic medical record today.   Safety plan reviewed. To the Emergency Department as needed or call after hours crisis line at [phone] or [phone]. [place] Crisis Text Line. Text [place] to 530669 or Suicide LifeLine Chat: suicidepreventionlifeline.org/chat  Schedule an appointment with me in 4 weeks, or sooner as needed. Call [facility] at [phone] to schedule.  Follow up with primary care provider as planned or for acute medical concerns.  Call the psychiatric nurse line with medication questions or concerns at [phone].  MyChart may be used to communicate with your provider, but this is not intended to be used for emergencies.    Therapy resources:  Www.MPSI.org    https://www.mhs-dbt.com/mental-health/thrive/thrive-mental-health-and-chronic-pain-management/    [place] DBT  resources:  https://[place].gov/dhs/partners-and-providers/policies-procedures/adult-mental-health/dialectical-behavior-therapy/dbt-certified-providers/    Risks of benzodiazepine (Ativan, Xanax, Klonopin, Valium, etc) use including, but not limited to, sedation, tolerance, risk for addiction/dependence. Do not drink alcohol while taking benzodiazepines due to risk of trouble breathing and potential death. Do not drive or operate heavy machinery until it is known how the drug affects you. Discuss with physician or pharmacist before ever taking a benzodiazepine with a narcotic/opioid pain medication.     Have previously discussed risks of stimulant medication including, but not limited to, decreased appetite, risk of tics (and that they may be lasting), trouble sleeping, cardiac risks such as increased heart rate and blood pressure, and rare risk of sudden cardiac death.  Also risk of addiction/tolerance/dependence.  Catia Padilla tried to claim is already

## 2022-08-08 NOTE — Clinical Note
Kd Luis - Since group is not really an option for Janelle right now, I am wondering what your thoughts would be on trying to get Janelle set up with an individual certified DBT therapist? Someone who could be available nearly 24/7 to help manage SI crises and also move at a very slow pace through DBT material with her to not overwhelm her. Think that could be helpful for her situation and case? Thanks for your thoughts. -Kimberly

## 2022-08-08 NOTE — NURSING NOTE
Pt was seen for neuropsychological evaluation at the request of Dr. Perez for the purposes of diagnostic clarification and treatment planning. 62 minutes of test administration and scoring were provided by this writer. Please see Dr. Atilio Palumbo's report for a full interpretation of the findings.    Video Start 1: 1:43PM  Video Stop 1: 1:45PM    Video Start 2: 2:32PM  Video Stop 2: 3:29PM    Keysha Tran  Psychometrist

## 2022-08-08 NOTE — PROGRESS NOTES
[facility] Psychiatry Services [facility]  August 8, 2022      Behavioral Health Clinician Progress Note    Patient Name: Janelle White           Service Type:  Individual      Service Location:   [facility]     Session Start Time: 08:00 am  Session End Time: 08:25 am      Session Length: 16 - 37      Attendees: Patient     Service Modality:  Video Visit:      Provider verified identity through the following two step process.  Patient provided:  Patient is known previously to provider    Telemedicine Visit: The patient's condition can be safely assessed and treated via synchronous audio and visual telemedicine encounter.      Reason for Telemedicine Visit: Services only offered telehealth    Originating Site (Patient Location): Patient's home    Distant Site (Provider Location): Provider Remote Setting- Home Office    Consent:  The patient/guardian has verbally consented to: the potential risks and benefits of telemedicine (video visit) versus in person care; bill my insurance or make self-payment for services provided; and responsibility for payment of non-covered services.     Patient would like the video invitation sent by:  My Chart    Mode of Communication:  Video Conference via [facility]    As the provider I attest to compliance with applicable laws and regulations related to telemedicine.    Visit Activities (Refresh list every visit): [facility] Only    Diagnostic Assessment Date: 03/11/2021  Treatment Plan Review Date: 10/13/2022  See Flowsheets for today's PHQ-9 and STACIE-7 results  Previous PHQ-9:   PHQ-9 SCORE 7/11/2022 8/3/2022 8/5/2022   PHQ-9 Total Score - - -   PHQ-9 Total Score MyChart 13 (Moderate depression) - 17 (Moderately severe depression)   PHQ-9 Total Score 13 17 17   Some encounter information is confidential and restricted. Go to Review Flowsheets activity to see all data.     Previous STACIE-7:   STACIE-7 SCORE 6/10/2022 7/2/2022 8/5/2022   Total Score - - -   Total Score 12  "(moderate anxiety) - 7 (mild anxiety)   Total Score 12 11 7       JYARO LEVEL:  JAYRO Score (Last Two) 7/9/2009 1/27/2011   JAYRO Raw Score 44 50   Activation Score 70.8 86.3   JAYRO Level 4 4       DATA  Extended Session (60+ minutes): No  Interactive Complexity: No  Crisis: No  [facility] Patient: No    Treatment Objective(s) Addressed in This Session:  Target Behavior(s): disease management/lifestyle changes pain management    Depressed Mood:    Anxiety: will develop more effective coping skills to manage anxiety symptoms  Psychological distress related to Pain    Current Stressors / Issues:  Reported that she has been practicing activity pacing more often. She finds that it helps her to take breaks more often than in the past and cumulatively she has been completing more tasks. Despite having more situations for her to deal with recently, she finds that she has been calm which allows her to be more productive. She noted that she is tolerating requip better than mirapex. She had an intake with the Thrive program at [facility] and attended her first group meeting. She noted that she does not feel ready for groups right now. She finds that she gets overwhelmed leading to difficulty comprehending information. \"I can't make sense of things.\" She has an interview with neuropsychology coming up soon and wants to finish that before doing more intensive therapy like the Thrive program.       07/11/2022:  Spoke about her recent psychiatric hospitalization. She noted that it was helpful and she is glad it occurred but did not like feeling \"locked up.\" She spoke about recognizing some behaviors that have made her question if she was having manic episodes. She noted having periods of significant over-spending and very talkative in the hospital, \"up, unleashed, blabbering\", she said her mood was up \"but not euphoric,\" needing less sleep, impatience/irritability, and saying things to people without thinking of the impact. She noted that it was " "awkward coming back home with her family. Her family initially was home but all had to leave to do various things and she was left alone. [facility] and had an \"outside experience\" questioning if she felt that she did not belong. It was very brief and she felt better later. She reported that on reflection, she believes she may have been having manic/hypomanic episodes as early as college. She spoke about being able to work 24 hour shifts without significant problems. She said that she is recognizing \"episodic\" periods in her life when there has been a change in her mood/energy suggesting possible hypomanic episodes.      06/23/2022:  Reported that she tested positive for COVID yesterday. This is her second time and said that it is not as severe as the first time. She spoke about her experience with Ketamine noting that she is very pleased with how peaceful she feels with the treatments. \"It's been very relaxing and affirming experience.\" She feels relaxed and in a good mood after the treatment. She finds it easier to bring herself back to that level of relaxation during times of stress. She spoke about her ongoing suicidal ideation explaining that \"when the thoughts enter my mind they are not working thoughts.\" She explained that the suicidal thoughts are just as frequent but less intense, do not last as long, and are less threatening to her than they have been in the past. She was commended for her continued work and efforts to improve herself, and she was encouraged to continue working toward her goals.      04/13/2022:  Does not feel like she received a significant benefit from TMS. She feels she was not having as frequent ruminative thoughts of suicide but that has started to return recently. It tends to happen more often when her physical pain is more intense. She got to a point where she set a date to kill herself but she changed her mind because her son came to spend time with her and " "she changed her mind saying, \"It just wasn't right yet.\" She noted that she has a few plans but will not discuss them saying, \"I don't want anyone to take those options away from me.\"       Progress on Treatment Objective(s) / Homework:  Stable - ACTION (Actively working towards change); Intervened by reinforcing change plan / affirming steps taken    Motivational Interviewing    MI Intervention: Expressed Empathy/Understanding, Permission to raise concern or advise, Open-ended questions and Reflections: simple and complex     Change Talk Expressed by the Patient: Desire to change Reasons to change    Provider Response to Change Talk: E - Evoked more info from patient about behavior change, A - Affirmed patient's thoughts, decisions, or attempts at behavior change, R - Reflected patient's change talk and S - Summarized patient's change talk statements    Also provided psychoeducation about behavioral health condition, symptoms, and treatment options    Care Plan review completed: Yes    Medication Review:  Changes to psychiatric medications, see updated Medication List in EPIC.     Medication Compliance:  Yes    Changes in Health Issues:   None reported    Chemical Use Review:   Substance Use: Chemical use reviewed, no active concerns identified      Tobacco Use: No current tobacco use.      Assessment: Current Emotional / Mental Status (status of significant symptoms):  Risk status (Self / Other harm or suicidal ideation)  Patient has had a history of suicidal ideation: ongoing  Patient denies current fears or concerns for personal safety.  Patient reports the following current or recent suicidal ideation or behaviors: ongoing thoughts, had a plan, but changed her mind last week.  Patient denies current or recent homicidal ideation or behaviors.  Patient denies current or recent self injurious behavior or ideation.  Patient denies other safety concerns.  A safety and risk management plan has been developed " including: Patient consented to co-developed safety plan.  A safety and risk management plan was completed.  Patient agreed to use safety plan should any safety concerns arise.  A copy was given to the patient.    Appearance:   Appropriate   Eye Contact:   Good   Psychomotor Behavior: Normal   Attitude:   Cooperative   Orientation:   All  Speech   Rate / Production: Talkative Normal    Volume:  Normal   Mood:    Anxious  Normal  Affect:    Appropriate   Thought Content:  Clear   Thought Form:  Coherent  Logical   Insight:    Fair     Diagnoses:  1. Severe episode of recurrent major depressive disorder, without psychotic features (H)    2. Chronic pain syndrome    3. Suicidal ideation        Collateral Reports Completed:  Communicated with: Dr. Perez    Plan: (Homework, other):  Patient was given information about behavioral services and encouraged to schedule a follow up appointment with the clinic [facility]  She was also given information about mental health symptoms and treatment options .  CD Recommendations: No indications of CD issues.  Matt Manzo PsyD, LP      ______________________________________________________________________    [facility] Psychiatry Services - [place] : Treatment Plan    Patient's Name: Janelle White  YOB: 1960    Date of Creation: April 13, 2022  Date Treatment Plan Last Reviewed/Revised: July 11, 2022    DSM5 Diagnoses: 296.33 (F33.2) Major Depressive Disorder, Recurrent Episode, Severe _  Psychosocial / Contextual Factors: chronic pain  PROMIS (reviewed every 90 days):   PROMIS 10-Global Health (only subscores and total score):   PROMIS-10 Scores Only 1/13/2022 6/3/2022 6/10/2022 7/8/2022 8/5/2022   Global Mental Health Score 5 8 5 6 8   Global Physical Health Score 10 10 9 11 9   PROMIS TOTAL - SUBSCORES 15 18 14 17 17       Referral / Collaboration:  Referral to another professional/service is not  indicated at this time..    Anticipated number of session for this episode of care: 5-6  Anticipation frequency of session: As determined by Dr. Perez  Anticipated Duration of each session: 16-37 minutes  Treatment plan will be reviewed in 90 days or when goals have been changed.       MeasurableTreatment Goal(s) related to diagnosis / functional impairment(s)  Goal 1: Patient will work with providers to manage symptoms    I will know I've met my goal when I can enjoy my life again.      Objective #A (Patient Action)  Patient will attend all appointments, take medication as prescribed.  Status: Continued - Date(s): 07/11/2022    Intervention(s)  [facility] will Monitor and assist in overcoming barriers to treatment adherence    Objective #B  Patient will consider all recommendations offered.  Status: Continued - Date(s): 07/11/2022     Intervention(s)  [facility] will educate patient on treatment options, clarify concerns, work with pt to overcome any resistance to compliance.      Goal 2: Patient will replace self-harm thoughts/behaviors with self-care activities    I will know I've met my goal when I stop having those thoughts.      Objective #A (Patient Action)                          Status: Continued - Date(s): 07/11/2022  Patient will assist in creating safety plan.     Intervention(s)  [facility] will assist in creation of safety plan and provide copy to patient.     Objective #B  Patient will report using safety plan as needed.                       Status: Continued - Date(s): 07/11/2022     Intervention(s)  [facility] will monitor safety, use of plan, adjust plan as needed, work through any identified barriers/resistance to following her plan.      Patient has reviewed and agreed to the above plan.      Melvin Manzo PsyD  July 11, 2022          [facility]                                       Patient's Name: Janelle TORRES Franchescamag  July 11, 2022     SAFETY PLAN:  Step 1: Warning signs / cues (Thoughts,  "images, mood, situation, behavior) that a crisis may be developing:  ? Thoughts: \"I don't matter\", \"People would be better off without me\", \"I can't do this anymore\" and \"I just want this to end\"  ? Images: obsessive thoughts of death or dying: thoughts of carrying out plan  ? Thinking Processes: ruminations (can't stop thinking about my problems): ruminate on my plan and highly critical and negative thoughts: if  says something critical i shut down  ? Mood: worsening depression, hopelessness, disinhibited (not caring about things or consequences) and worthlessness  ? Behaviors: isolating/withdrawing , can't stop crying, not taking care of myself and not taking care of my responsibilities  ? Situations: relationship problems   Step 2: Coping strategies - Things I can do to take my mind off of my problems without contacting another person (relaxation technique, physical activity):  ? Distress Tolerance Strategies:  play with my pet  and watch a funny movie:    ? Physical Activities: go for a walk and get in the "[employer]"  ? Focus on helpful thoughts:  \"This is temporary\"  Step 3: People and social settings that provide distraction:              Name: Alyx     Phone: in my phone              Name: Raven   Phone: in my phone  ? park              Step 4: Remind myself of people and things that are important to me and worth living for:  Children, dog, friends  Step 5: When I am in crisis, I can ask these people to help me use my safety plan:              Name: Alyx     Phone: in my phone              Name: Raven   Phone: in my phone  Step 6: Making the environment safe:   ? none identified  Step 7: Professionals or agencies I can contact during a crisis:  ? Suicide Prevention Lifeline: 1-713-734-TALK (3931)  ? Crisis Text Line Service: Text   HOME  to 107-637.    Local Crisis Services: [facility]     Call 911 or go to my nearest emergency department.       I helped develop this safety plan and " agree to use it when needed.  I have been given a copy of this plan.       Patient signature: _______________________________________________________________  Today s date:  July 11, 2022    Adapted from Safety Plan Template 2008 Antionette Trevino and Joseph Leon is reprinted with the express permission of the authors.  No portion of the Safety Plan Template may be reproduced without the express, written permission.  You can contact the authors at [email or web address] or [email or web address]

## 2022-08-08 NOTE — PROGRESS NOTES
"Janelle White is a 61 year old year old who is being evaluated via a billable video visit.      How would you like to obtain your AVS? MyChart  If you are dropped from the video visit, the video invite should be resent to: Text to cell phone: see Epic  Will anyone else be joining your video visit? No     Telemedicine Visit: The patient's condition can be safely assessed and treated via synchronous audio and visual telemedicine encounter.      Reason for Telemedicine Visit: Covid-19 Pandemic    Originating Site (Patient Location): Patient's home     Distant Site (Provider Location): Provider Remote Setting    Mode of Communication:  Video Conference via [employer]    As the provider I attest to compliance with applicable laws and regulations related to telemedicine.        Outpatient Psychiatric Progress Note    Name: Janelle White   : 1960                    Primary Care Provider: Emili Ballard MD   Therapist: Janelle Brooks, [facility] [place]    PHQ-9 scores:  PHQ-9 SCORE 2022 8/3/2022 2022   PHQ-9 Total Score - - -   PHQ-9 Total Score MyChart 13 (Moderate depression) - 17 (Moderately severe depression)   PHQ-9 Total Score 13 17 17   Some encounter information is confidential and restricted. Go to Review Flowsheets activity to see all data.       STACIE-7 scores:  STACIE-7 SCORE 6/10/2022 2022 2022   Total Score - - -   Total Score 12 (moderate anxiety) - 7 (mild anxiety)   Total Score 12 11 7       Patient Identification:  Patient is a 61 year old,   White Choose not to answer female  who presents for return visit with me.  Patient is currently on medical leave from work as NP. Patient attended the phone/video session alone. Patient prefers to be called: \"Janelle\".    Interim History:  I last saw Janelle White for outpatient psychiatry return visit on 2022. During that appointment, we:     Continue Pristiq 50 mg daily for mood, anxiety.     Continue methyl " "folate 7.5 mg daily as supplementation.    Continue Adderall XR 20 mg daily for mood augmentation    Continue Adderall IR 20 mg daily as needed for mood augmentation and daytime fatigue/hypersomnia    Continue Mirapex/pramipexole 0.25 mg at bedtime for restless leg syndrome/treatment resistant depression.  Continue to watch for impulsive behaviors/problematic shopping behaviors.    Continue benzodiazepine per primary care prescriber.    Continue ketamine with interventional psychiatry clinic.    Continue to work with your specialist from [facility] as indicated.      Continue working with addiction medicine clinic as needed/as indicated.    Discussed possibility of DBT therapy with your therapist.  Discussed book recommendation, [facility] [place] [facility] (by Elizabeth Kumar).    Discussed future consideration for lithium if suicidal ideation continues to persist.    Neuropsychological testing referral placed due to ongoing subjective cognitive concerns.    Recommend individual psychotherapy.      8/8: Patient overall doing relatively okay.  Still with profound depression.  Ketamine has been somewhat helpful.  Stopped Mirapex due to impulsivity side effects.  Now on Requip which has been somewhat helpful for restless legs.  Patient trying to pace herself with activities.  Has been somewhat productive.  Off of Flexeril for at least the past 4 days.  Getting some of her energy back.  Worse cognitive clouding on Flexeril.  Had intake for the [place] [facility] program.  Group setting far too overwhelming.  Patient will be doing neuropsychological testing coming up soon.  Continues to have baseline passive suicidal ideations.  No current acute suicidality.  No plan or intent to act on her passive suicidal thoughts today.  She does say they continue to \"marybel on the edge\" of her wanting to act versus not act on them.  Wonders if she can increase Adderall slightly.    Per [facility], Dr. Matt Manzo, during today's team-based " "visit:  Reported that she has been practicing activity pacing more often. She finds that it helps her to take breaks more often than in the past and cumulatively she has been completing more tasks. Despite having more situations for her to deal with recently, she finds that she has been calm which allows her to be more productive. She noted that she is tolerating requip better than mirapex. She had an intake with the [facility] program at [facility] and attended her first group meeting. She noted that she does not feel ready for groups right now. She finds that she gets overwhelmed leading to difficulty comprehending information. \"I can't make sense of things.\" She has an interview with neuropsychology coming up soon and wants to finish that before doing more intensive therapy like the [facility] program.     Past medication trials include but are not limited to:   Effexor-very flat  Celexa, zoloft, was on wellbutrin a couple years at one point  wellbutrin XL + celexa; 2005ish  tca-a ton of weight gain  depakote maybe for a short time  Abilify/lithium ?  ECT as teen - made me dull  Cytomel-not effective at all, used 50 mcg    Psychiatric ROS:  Janelle White reports mood has been: Still pretty up-and-down  Anxiety has been: up-and-down  Sleep has been: Relatively okay  Deepa sxs: Denies  Psychosis sxs: None  ADHD/ADD sxs: None  PTSD sxs: None  PHQ9 and GAD7 scores were reviewed today if completed.   Medication side effects: Denies anything major related to current psychiatric medications  Current stressors include: Symptoms and See HPI above  Coping mechanisms and supports include: Family, Hobbies and Friends, therapy    Current medications include:   Current Outpatient Medications   Medication Sig     albuterol (PROAIR HFA/PROVENTIL HFA/VENTOLIN HFA) 108 (90 Base) MCG/ACT inhaler Inhale 2 puffs into the lungs every 6 hours (Patient taking differently: Inhale 2 puffs into the lungs every 6 hours as needed for shortness of " "breath / dyspnea or wheezing)     amphetamine-dextroamphetamine (ADDERALL XR) 20 MG 24 hr capsule Take 1 capsule (20 mg) by mouth daily     amphetamine-dextroamphetamine (ADDERALL) 20 MG tablet Take 1 tablet (20 mg) by mouth daily (Patient taking differently: Take 10 mg by mouth 2 times daily 1/2 x 20 mg tablet at 1100 and 1400)     ASHWAGANDHA PO Take 1 tablet by mouth daily     desvenlafaxine (PRISTIQ) 50 MG 24 hr tablet Take 1 tablet (50 mg) by mouth daily     Estradiol (DIVIGEL) 1 MG/GM GEL Place 1 packet onto the skin daily     HYDROcodone-acetaminophen (NORCO)  MG per tablet Take 1 tablet by mouth every 4 hours as needed for severe pain max 4 tabs/24 hrs     insulin syringe-needle U-100 (30G X 1/2\" 1 ML) 30G X 1/2\" 1 ML miscellaneous Inject 1 ml B12 qmonth     lidocaine (LIDODERM) 5 % patch Place 4 patches onto the skin daily Apply up to 4 patches to skin. Wear for 12 hours and remove for 12 hrs.  Refill when patient requests.     LORazepam (ATIVAN) 1 MG tablet Take 1 tablet (1 mg) by mouth every 6 hours as needed for anxiety (Patient taking differently: Take 1 mg by mouth At Bedtime)     medical cannabis (Patient's own supply.  Not a prescription) Medical Cannabis - [place] 4-6 ml by mouth daily. [facility]     methocarbamol (ROBAXIN) 500 MG tablet Take 1 tablet (500 mg) by mouth 4 times daily as needed for muscle spasms     methylfolate (DEPLIN) 7.5 MG TABS tablet Take 1 tablet (7.5 mg) by mouth daily     naloxone (NARCAN) 4 MG/0.1ML nasal spray Spray 1 spray (4 mg) into one nostril alternating nostrils as needed for opioid reversal every 2-3 minutes until assistance arrives     NONFORMULARY Take 2 Scoops by mouth daily Protein and Vitamin shake mix - Nutritional supplement     ondansetron (ZOFRAN-ODT) 8 MG ODT tab Take 1 tablet (8 mg) by mouth every 8 hours as needed for nausea     Prasterone 6.5 MG INST Place 0.5 suppositories vaginally three times a week     rOPINIRole (REQUIP) 0.25 MG tablet " Take 1-2 tablets (0.25-0.5 mg) by mouth At Bedtime     senna-docusate (SENOKOT-S/PERICOLACE) 8.6-50 MG tablet Take 6-9 tablets by mouth every evening     vitamin D3 (CHOLECALCIFEROL) 125 MCG (5000 UT) tablet Take 5,000 Units by mouth daily     No current facility-administered medications for this visit.       The [place] Prescription Monitoring Program has been reviewed and there are no concerns about diversionary activity for controlled substances at this time.   07/15/2022 04/13/2022 07/15/2022 1   Dextroamp-Amphetamin 20 Mg Tab  30.00 30 [place]au [id number] All (4435) 0/0  [place]  07/15/2022 06/11/2022 07/15/2022 1   Dextroamp-Amphet Er 20 Mg Cap  30.00 30 Al Bau [id number] All (4435[place]  06/13/2022 06/11/2022 06/13/2022 1   Dextroamp-Amphetamin 20 Mg Tab  30.00 30 Al Bau [id number] All (4435[place]  06/11/2022 04/13/2022 06/13/2022 1   Dextroamp-Amphet Er 20 Mg Cap  30.00 30 Al Bau [id number] All (4435[place]  05/21/2022 12/27/2021 05/25/2022 1   Lorazepam 1 Mg Tablet  50.00 13  Wel [id number] All (4435) 3/3 3.85 L[place]  05/11/2022 04/13/2022 05/12/2022 1   Dextroamp-Amphet Er 20 Mg Cap  30.00 30 Al Bau [id number] All (4435[place]  05/11/2022 04/13/2022 05/12/2022 1   Dextroamp-Amphetamin 20 Mg Tab  30.00 30 Al Bau [id number] All (4435[place]  05/09/2022 05/09/2022 05/10/2022 1   Morphine Sulf Er 15 Mg Tablet  60.00 30 Tosin Mar [id number] All (4435) 0/0 30.00 [place]    Past Medical/Surgical History:  Past Medical History:   Diagnosis Date     Anxiety      Cervicalgia 2007    C5-6 disc protrusion     COPD (chronic obstructive pulmonary disease) (H) 2/7/2022     Depressive disorder      ESBL (extended spectrum beta-lactamase) producing bacteria infection      History of blood transfusion 2007    Cervical fusion     Leukemia (H)     CLA large beta     Melanoma (H) 1998     Migraine      Other chronic pain      Rotator  cuff tear     s/p injections     Sacroiliac inflammation (H)      Shift work sleep disorder 12/16/2013     Urinary calculi      Vitamin B12 deficiency anemia 2006    started injections      has a past medical history of Anxiety, Cervicalgia (2007), COPD (chronic obstructive pulmonary disease) (H) (2/7/2022), Depressive disorder, ESBL (extended spectrum beta-lactamase) producing bacteria infection, History of blood transfusion (2007), Leukemia (H), Melanoma (H) (1998), Migraine, Other chronic pain, Rotator cuff tear, Sacroiliac inflammation (H), Shift work sleep disorder (12/16/2013), Urinary calculi, and Vitamin B12 deficiency anemia (2006).    She has no past medical history of Cerebral infarction (H), Congestive heart failure (H), Coronary artery disease, Diabetes (H), Heart disease, Hypertension, Thyroid disease, or Uncomplicated asthma.    Social History:  Reviewed. No changes to social history except as noted above in HPI.    Vital Signs:   None. This is phone/video visit.     Labs:  Most recent laboratory results reviewed and the pertinent results include:   Lab Results   Component Value Date    WBC 5.6 06/30/2022    WBC 3.2 05/24/2021     Lab Results   Component Value Date    RBC 4.61 06/30/2022    RBC 3.74 05/24/2021     Lab Results   Component Value Date    HGB 14.2 06/30/2022    HGB 11.0 05/24/2021     Lab Results   Component Value Date    HCT 43.6 06/30/2022    HCT 33.6 05/24/2021     No components found for: MCT  Lab Results   Component Value Date    MCV 95 06/30/2022    MCV 90 05/24/2021     Lab Results   Component Value Date    MCH 30.8 06/30/2022    MCH 29.4 05/24/2021     Lab Results   Component Value Date    MCHC 32.6 06/30/2022    MCHC 32.7 05/24/2021     Lab Results   Component Value Date    RDW 11.9 06/30/2022    RDW 13.4 05/24/2021     Lab Results   Component Value Date     07/04/2022     05/24/2021     Last Comprehensive Metabolic Panel:  Sodium   Date Value Ref Range Status    06/30/2022 136 133 - 144 mmol/L Final   05/24/2021 141 133 - 144 mmol/L Final     Potassium   Date Value Ref Range Status   06/30/2022 3.4 3.4 - 5.3 mmol/L Final   05/24/2021 3.9 3.4 - 5.3 mmol/L Final     Chloride   Date Value Ref Range Status   06/30/2022 105 94 - 109 mmol/L Final   05/24/2021 108 94 - 109 mmol/L Final     Carbon Dioxide   Date Value Ref Range Status   05/24/2021 25 20 - 32 mmol/L Final     Carbon Dioxide (CO2)   Date Value Ref Range Status   06/30/2022 25 20 - 32 mmol/L Final     Anion Gap   Date Value Ref Range Status   06/30/2022 6 3 - 14 mmol/L Final   05/24/2021 8 3 - 14 mmol/L Final     Glucose   Date Value Ref Range Status   06/30/2022 91 70 - 99 mg/dL Final   05/24/2021 87 70 - 99 mg/dL Final     Urea Nitrogen   Date Value Ref Range Status   06/30/2022 20 7 - 30 mg/dL Final   05/24/2021 15 7 - 30 mg/dL Final     Creatinine   Date Value Ref Range Status   07/03/2022 0.67 0.52 - 1.04 mg/dL Final   05/24/2021 0.64 0.52 - 1.04 mg/dL Final     GFR Estimate   Date Value Ref Range Status   07/03/2022 >90 >60 mL/min/1.73m2 Final     Comment:     Effective December 21, 2021 eGFRcr in adults is calculated using the 2021 CKD-EPI creatinine equation which includes age and gender (Zhou et al., NEJM, DOI: 10.1056/MMRMqz5481916)   05/24/2021 >90 >60 mL/min/[1.73_m2] Final     Comment:     Non  GFR Calc  Starting 12/18/2018, serum creatinine based estimated GFR (eGFR) will be   calculated using the Chronic Kidney Disease Epidemiology Collaboration   (CKD-EPI) equation.       Calcium   Date Value Ref Range Status   06/30/2022 8.7 8.5 - 10.1 mg/dL Final   05/24/2021 8.4 (L) 8.5 - 10.1 mg/dL Final     Bilirubin Total   Date Value Ref Range Status   04/26/2022 0.4 0.2 - 1.3 mg/dL Final   03/16/2021 0.4 0.2 - 1.3 mg/dL Final     Alkaline Phosphatase   Date Value Ref Range Status   04/26/2022 82 40 - 150 U/L Final   03/16/2021 87 40 - 150 U/L Final     ALT   Date Value Ref Range Status    04/26/2022 21 0 - 50 U/L Final   03/16/2021 26 0 - 50 U/L Final     AST   Date Value Ref Range Status   04/26/2022 18 0 - 45 U/L Final   03/16/2021 44 0 - 45 U/L Final     Review of Systems:  10 systems (general, cardiovascular, respiratory, eyes, ENT, endocrine, GI, , M/S, neurological) were reviewed. Most pertinent finding(s) is/are: Severe chronic pain. The remaining systems are all unremarkable.    Mental Status Examination (limited as this is by phone/video):  Appearance: Awake, alert, appears stated age, well-groomed, no acute distress  Attitude:  cooperative, pleasant  Motor: No gross abnormalities observed via video, not formally tested  Oriented to:  person, place, time, and situation  Attention Span and Concentration:  normal  Speech:  clear, coherent, regular rate, rhythm, and volume  Language: intact  Mood: up-and-down, depressed  Affect: Mood congruent  Associations:  no loose associations  Thought Process:  logical, linear and goal oriented  Thought Content: Chronic passive suicidal ideation-at baseline today, no homicidal ideation, no evidence of psychotic thought, no auditory hallucinations present and no visual hallucinations present  Recent and Remote Memory:  Intact to interview. Not formally assessed. No amnesia.  Fund of Knowledge: appropriate  Insight:  good  Judgment:  intact, adequate for safety  Impulse Control:  intact    Suicide Risk Assessment:  Today Janelle White reports chronic/intermittent suicidal ideation-at baseline today.There are notable risk factors for self-harm, including anxiety, comorbid medical condition of Chronic pain, suicidal ideation, purposelessness/no reason for living, hopelessness, withdrawing and mood change. However, risk is mitigated by commitment to family, absence of past attempts, ability to volunteer a safety plan, history of seeking help when needed, future oriented and denies suicidal intent today. Therefore, based on all available evidence  including the factors cited above, Janelle White does not appear to be at imminent risk for self-harm, does not meet criteria for a 72-hr hold, and therefore remains appropriate for ongoing outpatient level of care.  A thorough assessment of risk factors related to suicide and self-harm have been reviewed and are noted above. Local community safety resources reviewed for patient to use if needed. There was no deceit detected, and the patient presented in a manner that was believable.     DSM5 Diagnosis:  Major Depressive Disorder, Recurrent Episode, severe, without psychotic features  Treatment resistant depression  CYP2D6 poor metabolizer  CYP2B6 intermediate metabolizer  Homozygous for C677T polymorphism of MTHFR    Medical comorbidities include:   Patient Active Problem List    Diagnosis Date Noted     Suicidal ideation 06/30/2022     Priority: Medium     Immunocompromised (H) 06/30/2022     Priority: Medium     Infection due to 2019 novel coronavirus 06/30/2022     Priority: Medium     Severe episode of recurrent major depressive disorder, without psychotic features (H) 04/17/2022     Priority: Medium     COPD (chronic obstructive pulmonary disease) (H) 02/07/2022     Priority: Medium     Tension type headache 11/04/2021     Priority: Medium     Rotator cuff injury 11/04/2021     Priority: Medium     Spinal stenosis of lumbar region with radiculopathy 09/02/2021     Priority: Medium     COVID-19 08/29/2021     Priority: Medium     Polyarthralgia 08/11/2021     Priority: Medium     Cellulitis, unspecified cellulitis site 08/11/2021     Priority: Medium     Sepsis, due to unspecified organism, unspecified whether acute organ dysfunction present (H) 08/11/2021     Priority: Medium     Cellulitis 08/11/2021     Priority: Medium     STACIE (generalized anxiety disorder) 07/27/2021     Priority: Medium     MTHFR gene mutation 04/12/2021     Priority: Medium     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      Priority: Medium     CYP2D6 poor metabolizer (H) 04/12/2021     Priority: Medium     Status post blepharoplasty 11/18/2020     Priority: Medium     Regular astigmatism, bilateral 11/18/2020     Priority: Medium     Prediabetes 09/19/2019     Priority: Medium     Hyperlipidemia LDL goal <130 09/19/2019     Priority: Medium     CLARE (obstructive sleep apnea) 02/13/2019     Priority: Medium     Controlled substance agreement signed 08/14/2018     Priority: Medium     Chronic pain syndrome 12/21/2017     Priority: Medium     CLL (chronic lymphocytic leukemia) (H) 12/21/2017     Priority: Medium     Hypotension 09/14/2017     Priority: Medium     History of laser assisted in situ keratomileusis 10/14/2014     Priority: Medium     Pain medication agreement 04/23/2014     Priority: Medium     Formatting of this note might be different from the original.  Patient takes morphine 15 mg ER BID for chronic neck and back pain.  She is also on cymbalta, ibuprofen, flexaril prn       Shift work sleep disorder 12/16/2013     Priority: Medium     Vitamin D deficiency 11/08/2012     Priority: Medium     Moderate recurrent major depression (H) 01/06/2011     Priority: Medium     DDD (degenerative disc disease), cervical 10/07/2010     Priority: Medium     CARDIOVASCULAR SCREENING; LDL GOAL LESS THAN 160 02/10/2010     Priority: Medium     Chronic Low Back Pain 10/01/2009     Priority: Medium     S/p AP L3-S1 fusion 12/2010 - referred to [facility].   Orthopedics writing scripts for narcotics post-op.       Migraine      Priority: Medium     Problem list name updated by automated process. Provider to review       B-complex deficiency 10/10/2006     Priority: Medium     Problem list name updated by automated process. Provider to review       PERSONAL HX OF  MELANOMA 12/04/2003     Priority: Low       Psychosocial & Contextual Factors: see HPI above    Assessment:  6/15/2021:  Janelle White reports overall some significant  worsening of mood symptoms.  Increased anxiety with her depression.  Poor motivation and poor energy.  Symptoms severe enough to prevent her from being able to utilize typical coping skills and strategies.  No current motivation or energy to participate in PHP/day treatment program.  Continues to take medication as scheduled.  Recent surgery and general anesthesia could be contributing.  Discussed discontinuing stimulant medication as an augmentation strategy.  She is agreeable to moving forward with T3 as an augmentation for treatment resistant depression.  Discussed risks and benefits at length.  Will get baseline thyroid labs prior to starting T3.  Hoping she will experience increased energy and motivation and that her mood will lift.  Also discussed setting up an appointment with her interventional psychiatry clinic to discuss other potential options such as ketamine therapy, ECT, TMS.  Does have history of ECT for depression when she was quite young.  I am hopeful to stay ahead of her symptoms before things get too severe.  Has chronic suicidal ideation and is at high risk for suicide.  Continues to deny any plan or intent.  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  See below for risk/benefit conversation regarding T3 had with the patient:    GeneSight testing Info:  GeneSight testing revealed she is a poor 2D6 metabolizer and an intermediate 2B6 metabolizer.  This would explain her multiple failed medication trials due to the negative side effects.  She also has a genotype that would suggest a phenotype sensitivity to serotonin. She also was found to have significantly reduced folic acid conversion.      Starting T3 as augment to antidepressant therapy for treatment resistant depression:  Start T3 at 25 mcg per day for one to two weeks, and if there is little or no improvement, increase the dose to 50 mcg per day; this is consistent with practice guidelines from the American  Psychiatric Association and [place] Network for Mood and Anxiety Treatments.     Adverse effects consistent with hyperthyroidism may occur, including tremor, palpitations, heat intolerance, sweating, anxiety, increased frequency of bowel movements, shortness of breath, and exacerbation of cardiac arrhythmia. In addition, hyperthyroidism that emerges during long-term treatment may lead to bone demineralization, osteoporosis, and an increased risk of fracture    Following a normal baseline TSH concentration, no other laboratory monitoring during a four to six week trial of adjunctive T3 is necessary. However, if T3 is continued longer, a serum TSH concentration should be checked after the first one to three months of treatment and then every six months.    Today, 7/27/21:   Patient overall with little change in her symptoms.  Did not do as well on Cytomel and had limited to no improvement at all after weeks on 50 mcg.  Labs were unremarkable prior to starting Cytomel.  Opted to go back to stimulant augmentation since patient does find it quite helpful.  She has been taking 15 mg of immediate release most afternoons.  Due to good tolerability and some efficacy, we will bump up her immediate release dose slightly to 20 mg daily as needed in addition to her 20 mg extended release dose. I have no concerns about misuse or diversion at this time.  Tolerating well with no negative side effects.  Last blood pressure normal 7/2.  Patient has noted some efficacy from Pristiq more than other antidepressant trials and so we will continue to titrate further to 100 mg daily.  She is tolerating well.  Continues to use lorazepam for anxiety, pain medicine adjunct, and for sleep as prescribed by primary care provider.  Has been utilizing roughly 100 tablets of 0.5 mg every 1.5 months.  Patient with ongoing chronic intermittent passive suicidal ideation with no plan or intent.  Denies safety concerns today.  No problematic drug or  alcohol use.  I am hopeful the interventional psychiatry clinic might be able to initiate ketamine therapy or another therapy they would recommend as potentially being more helpful than patient's multiple medication/augmentation trials.    10/6/2021:  Patient with some improved depression symptoms and much more hopeful.  Seeing specialist at [place]-feels very hurt and listened to.  Also is hopeful there will be some helpful treatments.  Visit to [place] was also very good and instilled a lot of hope in her abilities.  She was encouraged to continue to push herself to do the things she enjoys doing.  No medication changes today.  She will follow-up with getting TMS rescheduled, possibly when things slow down after the holidays.  Does not need to be seen until after the holidays.  No acute safety concerns today.  No problematic drug or alcohol use.    1/14/2022:  Overall patient feeling a little more down lately.  Tolerating TMS well.  Encouraged to continue TMS.  No medication changes today since undergoing TMS treatment.  Talked about life stages today generativity versus stagnation and also integrity versus despair.  Talked about consideration for acceptance and commitment therapy.  She is encouraged to continue to be active.  No acute suicidal ideation today.  No acute safety concerns today.  No problematic drug or alcohol use.    4/13/2022:   Patient continues to have symptoms that wax and wane.  Feels like she tolerates Pristiq 50 mg better than 100 and will continue on this dose.  Last week was particularly difficult.  Pretty intense suicidal ideation but she was able to manage these thoughts and remain safe.  She does report she would come to the hospital if necessary.  We discussed the empath unit today and other resources if she felt she could not keep herself safe.  She continues to work on tapering her narcotic pain medication regimen.  She is working with addiction medicine and also pain management.  She  is starting Pilates therapy.  Pool therapy will begin in July.  Discussed possibility of vestibular rehabilitation.  Individual therapy will also start soon.  She was strongly encouraged to continue to pursue ketamine therapy.  No acute suicidality today.  No problematic drug or alcohol use.    6/23/2022:  Overall reports doing relatively okay.  Some pretty down days still, but ketamine therapy going well.  Feels like continuing to move in the right direction.  Suicidal ideation improving.  Off all narcotic pain medication.  Feels good about her progress.  Had some really down days after off pain medication but improved since those few dark days.  Hopeful about where ketamine therapy may lead.  Discussed some of her restlessness at bedtime and will start pramipexole.  Also some evidence to suggest pramipexole could be helpful for treatment resistant depression symptoms.  Discussed risks and benefits of therapy, including watching for any impulsive behaviors.  Continues to have suicidal thoughts but no acute suicidality today.  Her suicidality overall is improving from her baseline suicidality.  She continues to consider the idea of a partial hospital program.  We will send her information for [facility] program.  Also encouraged her to discuss possibility of a pain program through [facility] with Dr. Medley.  No acute safety concerns today.  No problematic drug or alcohol use.    7/11/2022:  Patient with some recent more intensive struggles.  Depression much more severe recently.  Led to hospitalization.  Patient medically hospitalized since was positive for COVID and has history of CLL and [facility] protocol requires isolation.  Patient seen by psychiatry consult service.  No medication changes made.  Patient continues with interventional psychiatry clinic.  They will be increasing ketamine dose and treatments will be every few weeks.  We discussed patient's symptom history a little more today and possible bipolar  diagnosis came into question.  Patient does recognize possible hypomanic episodes in the past.  Patient is currently monitoring symptoms closely and pramipexole.  She is feeling better since starting pramipexole but is watching shopping behaviors closely.  Suicidal ideation is improving since hospitalization.  Restless legs improved at night on pramipexole.  Discussed we could consider adding lower dose lithium as an augment to her Pristiq and to help stabilize moods a little bit more and to further help with some of her chronic suicidal ideation.  We also discussed DBT for chronic suicidal ideation and ongoing mood instability.  Continues off of pain medication.  No acute suicidality or safety concerns today.  Patient will follow up in a few weeks.  No medication changes today since we will wait to see how ketamine dose change goes.  No drug or alcohol use concerns.  Patient noted some ongoing cognitive/memory concerns and requested testing.  Neuropsychological referral placed.    8/8/2022:  Patient with ongoing severe depression, resistant to treatment.  She is agreeable to increasing her stimulant medication.  This could hopefully further improve mood slightly.  May also help with some additional energy and also help with some of her ongoing cognitive concerns.  She has neuropsychological testing coming up soon.  Discussed possibly considering individual DBT therapy with a DBT certified therapist given her ongoing chronic suicidal ideation and inability to participate in group therapy currently.  We will discuss this possibility with her current therapist.  Also discussed possibility of increasing Pristiq by 25 mg only 2 or 3 days a week to decrease risk of negative side effects (25 mg on Tuesdays/Thursdays for Monday/Wednesday/Fridays).  Patient also encouraged to continue ketamine treatment.  No acute suicidality today.  Patient denies any intent or plan to act on any of her suicidal thoughts today.  No  problematic drug or alcohol use.    Medication side effects and alternatives were reviewed. Health promotion activities recommended and reviewed today. All questions addressed. Education and counseling completed regarding risks and benefits of medications and psychotherapy options. Recommend therapy for additional support.     Treatment Plan:    Continue Pristiq 50 mg daily for mood, anxiety.     Continue methyl folate 7.5 mg daily as supplementation.    Continue Adderall XR 20 mg daily for mood augmentation    Increase Adderall IR to 15 mg twice daily as needed for mood augmentation and daytime fatigue/hypersomnia    Continue benzodiazepine per primary care prescriber.    Continue ketamine with interventional psychiatry clinic.    Continue to work with your specialist from [facility] as indicated.      Discussed possibility of individual DBT therapy today.  I will reach out to your therapist to discuss this as an option since group therapy is not a current possibility.  Discussed book recommendation, [facility] [place] [facility] (by Elizabeth Kumar) at past visit.    Could continue to consider lithium if suicidal ideation continues to persist.    Keep neuropsychological testing appointment due to ongoing subjective cognitive concerns.    Recommend ongoing individual psychotherapy.      Continue all other cares per primary care provider.     Continue all other medications as reviewed per electronic medical record today.     Safety plan reviewed. To the Emergency Department as needed or call after hours crisis line at [phone] or [phone]. [place] Crisis Text Line. Text [place] to 902485 or Suicide LifeLine Chat: suicidepreventionlifeline.org/chat    Schedule an appointment with me in 4 weeks, or sooner as needed. Call [facility] at [phone] to schedule.    Follow up with primary care provider as planned or for acute medical concerns.    Call the psychiatric nurse line with medication  questions or concerns at [phone].    MyChart may be used to communicate with your provider, but this is not intended to be used for emergencies.    Therapy resources:  Www.[facility].org    https://www.mhs-dbt.com/mental-health/thrive/thrive-mental-health-and-chronic-pain-management/    [place] DBT resources:  https://[place].gov/dhs/partners-and-providers/policies-procedures/adult-mental-health/dialectical-behavior-therapy/dbt-certified-providers/    Risks of benzodiazepine (Ativan, Xanax, Klonopin, Valium, etc) use including, but not limited to, sedation, tolerance, risk for addiction/dependence. Do not drink alcohol while taking benzodiazepines due to risk of trouble breathing and potential death. Do not drive or operate heavy machinery until it is known how the drug affects you. Discuss with physician or pharmacist before ever taking a benzodiazepine with a narcotic/opioid pain medication.     Have previously discussed risks of stimulant medication including, but not limited to, decreased appetite, risk of tics (and that they may be lasting), trouble sleeping, cardiac risks such as increased heart rate and blood pressure, and rare risk of sudden cardiac death.  Also risk of addiction/tolerance/dependence.    Administrative Billing:   Phone Call/Video Duration: 25 Minutes  8:41a-9:06a    Patient Status:  Patient is a continuous care patient and refills will continue to come from this provider until otherwise noted.    Signed:   Kimberly Perez DO  [facility]    Disclaimer: This note consists of symbols derived from keyboarding, dictation and/or voice recognition software. As a result, there may be errors in the script that have gone undetected. Please consider this when interpreting information found in this chart.

## 2022-08-08 NOTE — PROGRESS NOTES
"RE: Janelle White  MR#: [id number]  : 1960  DOS: Aug 8, 2022  LUL:  2022    CLINICAL INTERVIEW      REASON FOR REFERRAL:  Janelle White is a 61 year old female with 18 years of education. The patient was referred for a neuropsychological evaluation by Dr. Perez secondary to memory concerns in the context of COVID-19 infection and a history of depression. The evaluation was requested in order to document her cognitive and emotional functioning, assist with the differential diagnosis, and provide appropriate recommendations. The patient was informed that the evaluation included multiple measures of performance and symptom validity, and she was encouraged to provide her best effort at all times.  The nature of the neuropsychological evaluation was also discussed, including limits of confidentiality (for suspected child or elder abuse, potential homicide or suicide, and court orders). The patient was also informed that the report would be placed on the electronic medical records system.  The patient was also given an opportunity to ask questions. The patient indicated that she understood the information and consented to participate in the evaluation. Due to circumstances that limit in-person clinical visits, this interview was conducted using telehealth methods     PROCEDURES:   Clinical interview    CLINICAL INTERVIEW: The patient was interviewed via video platform and she was interviewed at home.  On interview, the patient reported several cognitive difficulties began approximately 2 years ago.  Further, she stated that she feels like she goes \"in and out of clarity.\" The patient reported difficulties with her memory, verbal comprehension, and attention/concentration.  She also noted difficulty with word finding.  She stated that she gets \"brain zaps\" which were worse when she was on high doses of Cymbalta for neuropathy and depression. She noted that her cognitive difficulties began " "when she was not feeling well and had multiple surgeries, including shoulder surgery and 2 hand surgeries at about the same time.  She also reported a number of other medical issues and weight loss related to nausea and vomiting.  The patient indicated that in February, 2021 she went on disability because she could not function at work.  She also related that she started a new medication, Rituxan, at the same time.  The patient also reported difficulty with fatigue, which became worse after she had COVID-19.  Functionally, the patient reported that when she drives she has lost track of what she is doing and gotten lost.  She reported her  has had to pick her up at times.  She noted her  has always managed the household finances, but she denied significant problems with medication management.  The patient said that she has difficulty completing household chores due to fatigue and decreased stamina.  In terms of other difficulties, she also noted problems with balance and nausea.    In terms of her mood, she reported it was very labile and she has \"huge\" ups and downs in her mood.  She reported that when she is feeling exhausted and in pain her mood will be worse.  She reported that during these times her brain is \"fuzzy\" and she becomes irritable and dizzy.  In terms of her sleep, she noted that it is \"not the best.\"  She elaborated that she wakes up approximately 2 times per night and also has a history of sleep apnea, but indicated that she has had difficulty with her CPAP machine.  She reported that she has an appointment to evaluate her CPAP next week.  She also noted difficulty with neuropathy that affects her sleep.  She reported that her appetite is variable, and she tends to have the best appetite in the morning.  She stated that she has delayed gastric emptying, which adversely affects her appetite.    The patient reported that she works with an interventional psychiatrist at the [facility] [place].  She reported she has been participating in ketamine therapy and has also had transcranial magnetic stimulation (TMS), which she completed last winter.  She noted the ketamine therapy has been helpful because she has been able to get off narcotics, which she has been on since 2010.  The patient reported that she is also been participating in the [facility] program, but feels she cannot continue because of her cognitive issues so she is looking for an individual psychotherapist.  In terms of suicidal thoughts, she acknowledged having passive ideation, but she denied any plan or intention of harming herself.  She noted that ketamine has been helpful as well.  She noted a suicide attempt at age 16 with cutting but she denied any suicide attempts since then.  She denied any homicidal ideation.  She denied any hallucinations or delusions, except when she was taking Lyrica so this is not an ongoing issue.  She denied any use of alcohol or tobacco.  She reported that she uses medical cannabis in [facility], and has 1-2 per day.    On a medical history questionnaire, she noted a history of concussions.  During the interview, the patient elaborated that she has never had a loss of consciousness from a concussion, but has a history of 3 head injuries related to falls.  She noted after one head injury she experienced vomiting and severe headache.  The patient also confirmed her history of chronic pain, and noted that her baseline pain level is about a 4-5 on a 1-10 scale with 10 being most severe.  She elaborated that her pain is worse later in the day, and the most severe pain is an 8 on the pain scale.  The patient also reported that she has and inability to tolerate a lot of medications.  In terms of Covid-19, she confirmed that she was first diagnosed with COVID-19 in September, 2021 and had a second diagnosis in June, 2022.  She said that she was never hospitalized.  She also reported she has never sought care from a  [facility].  She also denied any history of liver or kidney disease.  She noted that she have ringing in her ear but said her hearing is good and she does not need a hearing aid.  She reported that she wears eyeglasses and has had LASEK.  She noted the medications listed in the electronic medical record were up-to-date.  On the medical history questionnaire, she denied any history of stroke, central nervous system infection, seizures, tumor, heart disease, pulmonary disease, hypertension, diabetes, hypothyroidism, hypercholesterolemia, or exposure to hazardous materials.  However, she acknowledged a history of depression and anxiety on this form.    Academically, the patient reported that she  a masters degree in art, and subsequently returned to school to get degrees in nursing.  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  She reported that she was a good student in school and was never in special education classes or had to repeat a grade.  The patient reported that she has been  for 38 years and has 2 children, including a son and a daughter.  She noted that she lives at home with her  and daughter.    PLAN: Plan [facility] [place]  8[place]11/22. Full report to follow.    Diagnosis Codes: R41.3, U07.10, F32.A  Procedure Code: 12221    Thank you for referring this interesting individual. If you have any questions, please feel free to contact me.      Atilio Palumbo, PhD, ABPP, LP  Professor and Licensed Psychologist [id number]  Board Certified Clinical Neuropsychologist

## 2022-08-09 ENCOUNTER — LAB (OUTPATIENT)
Dept: LAB | Facility: CLINIC | Age: 62
End: 2022-08-09
Payer: COMMERCIAL

## 2022-08-09 ENCOUNTER — VIRTUAL VISIT (OUTPATIENT)
Dept: PSYCHOLOGY | Facility: CLINIC | Age: 62
End: 2022-08-09
Payer: COMMERCIAL

## 2022-08-09 DIAGNOSIS — F33.2 SEVERE EPISODE OF RECURRENT MAJOR DEPRESSIVE DISORDER, WITHOUT PSYCHOTIC FEATURES (H): Primary | ICD-10-CM

## 2022-08-09 DIAGNOSIS — D64.9 ANEMIA, UNSPECIFIED TYPE: ICD-10-CM

## 2022-08-09 DIAGNOSIS — E55.9 VITAMIN D DEFICIENCY: ICD-10-CM

## 2022-08-09 DIAGNOSIS — F41.1 GAD (GENERALIZED ANXIETY DISORDER): ICD-10-CM

## 2022-08-09 DIAGNOSIS — E53.9 B-COMPLEX DEFICIENCY: ICD-10-CM

## 2022-08-09 DIAGNOSIS — G89.4 CHRONIC PAIN SYNDROME: ICD-10-CM

## 2022-08-09 LAB — VIT B12 SERPL-MCNC: 603 PG/ML (ref 232–1245)

## 2022-08-09 PROCEDURE — 82306 VITAMIN D 25 HYDROXY: CPT

## 2022-08-09 PROCEDURE — 82728 ASSAY OF FERRITIN: CPT

## 2022-08-09 PROCEDURE — 82607 VITAMIN B-12: CPT

## 2022-08-09 PROCEDURE — 36415 COLL VENOUS BLD VENIPUNCTURE: CPT

## 2022-08-09 PROCEDURE — 90837 PSYTX W PT 60 MINUTES: CPT | Mod: 95 | Performed by: SOCIAL WORKER
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
## 2022-08-10 LAB
DEPRECATED CALCIDIOL+CALCIFEROL SERPL-MC: 35 UG/L (ref 20–75)
FERRITIN SERPL-MCNC: 26 NG/ML (ref 8–252)

## 2022-08-11 ENCOUNTER — OFFICE VISIT (OUTPATIENT)
Dept: NEUROPSYCHOLOGY | Facility: CLINIC | Age: 62
End: 2022-08-11
Attending: PSYCHIATRY & NEUROLOGY
Payer: COMMERCIAL

## 2022-08-11 DIAGNOSIS — U07.1 COVID-19: ICD-10-CM

## 2022-08-11 DIAGNOSIS — F32.1 CURRENT MODERATE EPISODE OF MAJOR DEPRESSIVE DISORDER, UNSPECIFIED WHETHER RECURRENT (H): ICD-10-CM

## 2022-08-11 DIAGNOSIS — R41.3 MEMORY LOSS: Primary | ICD-10-CM

## 2022-08-11 DIAGNOSIS — G47.33 OSA (OBSTRUCTIVE SLEEP APNEA): ICD-10-CM

## 2022-08-11 PROCEDURE — 90791 PSYCH DIAGNOSTIC EVALUATION: CPT | Performed by: PSYCHOLOGIST

## 2022-08-11 PROCEDURE — 96138 PSYCL/NRPSYC TECH 1ST: CPT | Performed by: PSYCHOLOGIST

## 2022-08-11 PROCEDURE — 96133 NRPSYC TST EVAL PHYS/QHP EA: CPT | Performed by: PSYCHOLOGIST

## 2022-08-11 PROCEDURE — 96139 PSYCL/NRPSYC TST TECH EA: CPT | Performed by: PSYCHOLOGIST

## 2022-08-11 PROCEDURE — 96132 NRPSYC TST EVAL PHYS/QHP 1ST: CPT | Performed by: PSYCHOLOGIST

## 2022-08-11 NOTE — PROGRESS NOTES
Pt was seen for neuropsychological evaluation at the request of Dr. Kimberly Perez for the purposes of diagnostic clarification and treatment planning. 180 minutes of test administration and scoring were provided by this writer. Please see Dr. Atilio Palumbo's report for a full interpretation of the findings.    Enrique Regan MA, CSP

## 2022-08-11 NOTE — PROGRESS NOTES
NAME  Janelle White    MRN  [id number]      [birth year]     AGE  61     SEX  Female     HANDEDNESS Right     EDUCATION 18     EWING  22     PROVIDER       TECH  SH/LF     STATION  OP            ORIENTATION      Time  0     Personal Info.     Place  2 /    Presidents             TOMM       Trial 1  49     Trial 2  0     Trial 3  0            DCT       E-Score  7            WAIS-IV       Digit Span RDS 8            WRAT READING   5   SS  109     %ile  73     Grade Equivalence >12.9            WAIS-IV         Raw SSa    Digit Span  23 8    Similarities 27 11    Matrix Reasoning 12 8    Coding  67 11           TRAIL MAKING TEST       Time Errors SSa T   A 40 0 7 38   B 79 2 9 43           HVLT   1     Raw T    Trial 1  7     Trial 2  6     Trial 3  8     Learning  1     Total Recall 21 34    Delayed Recall 7 35    Percent Retention 88% 47    True Positives 12     False Positives 0     Disc. Index  12 60           WOLF-O COMPLEX FIGURE       Raw T %ile   Time to Copy 252  >16   Copy  31  11-16   Short Delay Recall 15 47 38   Long Delay Recall 11.5 39 14   Recognition Total 18 37 18           WMS-IV LOGICAL MEMORY YA     Raw SSa/%ile    LM I  21 8    LM II  21 10    Recognition 23 26-50            NAB NAMING  1   Raw 31 /31     z 0.33      T 54             COWAT FAS       Raw 42      SS 11      T 47             ANIMAL FLUENCY      Raw 26      SS 13      T 57              ANUP   H   Raw 30      %ile 56             CLOCK DRAWING      Command  NML     Copy  NML            STROOP        Raw T     Word 99 42     Color 76 48     C/W 45 52     Intf. 3 53            PETTY       Raw  2     Interpretation Minimal            BDI       Raw  24     Interpretation Moderate            ESS       Raw  11     Interpretation Mild            GDS       Raw  0     Interpretation 0            DAVID

## 2022-08-11 NOTE — LETTER
2022      RE: Janelle White  [id number] [facility] [id number]     RE: Janelle White  MR#: [id number]  : 1960  DOS: Aug 11, 2022      NEUROPSYCHOLOGICAL CONSULTATION      REASON FOR REFERRAL:  Janelle White is a 61 year old female with 18 years of education. The patient was referred for a neuropsychological evaluation by Dr. Perez secondary to memory concerns in the context of COVID-19 infection and a history of depression. The evaluation was requested in order to document her cognitive and emotional functioning, assist with the differential diagnosis, and provide appropriate recommendations. The patient was informed that the evaluation included multiple measures of performance and symptom validity, and she was encouraged to provide her best effort at all times.  The nature of the neuropsychological evaluation was also discussed, including limits of confidentiality (for suspected child or elder abuse, potential homicide or suicide, and court orders). The patient was also informed that the report would be placed on the electronic medical records system.  The patient was also given an opportunity to ask questions. The patient indicated that she understood the information and consented to participate in the evaluation.    PROCEDURES:   Review of records and clinical interview,  Mental Status-orientation, Wide Range Achievement Test-4, Wechsler Adult Intelligence Scale-IV, Bello Verbal Learning Test-Revised, Clock Drawing Test, Verbal Fluency Test, Joseph Depression Inventory, Joseph Anxiety Inventory, Personality Assessment Inventory, Mata Complex Figure Test, Trailmaking Test, NAB Naming Test, TOMM, Judgment of Line Orientation Test, Stroop Test, Wechsler Memory Scale-IV (selected subtests) and Eppworth Sleepiness Scale    REVIEW OF RECORDS: Records from 22 noted that the patient has memory concerns and presented  to the emergency room for psychiatric crisis due to  worsening suicidal ideation and unsure if could keep self safe.  Was medically hospitalized since positive for COVID and required isolation due to history of CLL.  Saw psychiatry consult service while on the unit.  No medication changes but symptoms improved and patient was discharged home.  Continuing ketamine therapy.  Pramipexole is been helpful possibly for both mood symptoms and also sleeping better due to improved restless legs.  Suicidal ideation has continued to be improved.  Interventional psychiatry clinic talking about increasing ketamine dose and treating every few weeks.  Patient will continue in therapy.  Continues off pain medication.  No acute safety concerns today.  No acute suicidality.  No plan or intent to harm self today.  No problematic drug or alcohol use.  Taking medications as prescribed.  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 She has no past medical history of Cerebral infarction (H), Congestive heart failure (H), Coronary artery disease, Diabetes (H), Heart disease, Hypertension, Thyroid disease, or Uncomplicated asthma.  These recors concluded that she   reports overall some significant worsening of mood symptoms.  Increased anxiety with her depression.  Poor motivation and poor energy.  Symptoms severe enough to prevent her from being able to utilize typical coping skills and strategies.  No current motivation or energy to participate in PHP/day treatment program.  Continues to take medication as scheduled.  Recent surgery and general  "anesthesia could be contributing.  Discussed discontinuing stimulant medication as an augmentation strategy.  She is agreeable to moving forward with T3 as an augmentation for treatment resistant depression.  Discussed risks and benefits at length.  Will get baseline thyroid labs prior to starting T3.  Hoping she will experience increased energy and motivation and that her mood will lift.  Also discussed setting up an appointment with her interventional psychiatry clinic to discuss other potential options such as ketamine therapy, ECT, TMS.  Does have history of ECT for depression when she was quite young.  I am hopeful to stay ahead of her symptoms before things get too severe.  Has chronic suicidal ideation and is at high risk for suicide.  Continues to deny any plan or intent.  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  Records noted other history included CYP2D6 poor/intermediate metabolize and homozygous for C677Y polymorphism MTHFR. A CT scan of the head report from 3/16/22 noted  no acute intracranial process.  Records noted that the patient was being treated with Requip, Adderall, Robaxin, Pristiq, Norco, lorazepam, Prasterone, Estradiol, albuterol, lidocaine, ondansetron, Deplin, insulin, naloxone, medical cannabis, vitamin D3, ashwagandha, and senna-docusate.     From 8/8/22:  CLINICAL INTERVIEW: The patient was interviewed via video platform and she was interviewed at home.  On interview, the patient reported several cognitive difficulties began approximately 2 years ago.  Further, she stated that she feels like she goes \"in and out of clarity.\" The patient reported difficulties with her memory, verbal comprehension, and attention/concentration.  She also noted difficulty with word finding.  She stated that she gets \"brain zaps\" which were worse when she was on high doses of Cymbalta for neuropathy and depression. She noted that her cognitive difficulties began when she was " "not feeling well and had multiple surgeries, including shoulder surgery and 2 hand surgeries at about the same time.  She also reported a number of other medical issues and weight loss related to nausea and vomiting.  The patient indicated that in February, 2021 she went on disability because she could not function at work.  She also related that she started a new medication, Rituxan, at the same time.  The patient also reported difficulty with fatigue, which became worse after she had COVID-19.  Functionally, the patient reported that when she drives she has lost track of what she is doing and gotten lost.  She reported her  has had to pick her up at times.  She noted her  has always managed the household finances, but she denied significant problems with medication management.  The patient said that she has difficulty completing household chores due to fatigue and decreased stamina.  In terms of other difficulties, she also noted problems with balance and nausea.    In terms of her mood, she reported it was very labile and she has \"huge\" ups and downs in her mood.  She reported that when she is feeling exhausted and in pain her mood will be worse.  She reported that during these times her brain is \"fuzzy\" and she becomes irritable and dizzy.  In terms of her sleep, she noted that it is \"not the best.\"  She elaborated that she wakes up approximately 2 times per night and also has a history of sleep apnea, but indicated that she has had difficulty with her CPAP machine.  She reported that she has an appointment to evaluate her CPAP next week.  She also noted difficulty with neuropathy that affects her sleep.  She reported that her appetite is variable, and she tends to have the best appetite in the morning.  She stated that she has delayed gastric emptying, which adversely affects her appetite.    The patient reported that she works with an interventional psychiatrist at the [facility].  " She reported she has been participating in ketamine therapy and has also had transcranial magnetic stimulation (TMS), which she completed last winter.  She noted the ketamine therapy has been helpful because she has been able to get off narcotics, which she has been on since 2010.  The patient reported that she is also been participating in the [facility] program, but feels she cannot continue because of her cognitive issues so she is looking for an individual psychotherapist.  In terms of suicidal thoughts, she acknowledged having passive ideation, but she denied any plan or intention of harming herself.  She noted that ketamine has been helpful as well.  She noted a suicide attempt at age 16 with cutting but she denied any suicide attempts since then.  She denied any homicidal ideation.  She denied any hallucinations or delusions, except when she was taking Lyrica so this is not an ongoing issue.  She denied any use of alcohol or tobacco.  She reported that she uses medical cannabis in [facility], and has 1-2 per day.    On a medical history questionnaire, she noted a history of concussions.  During the interview, the patient elaborated that she has never had a loss of consciousness from a concussion, but has a history of 3 head injuries related to falls.  She noted after one head injury she experienced vomiting and severe headache.  The patient also confirmed her history of chronic pain, and noted that her baseline pain level is about a 4-5 on a 1-10 scale with 10 being most severe.  She elaborated that her pain is worse later in the day, and the most severe pain is an 8 on the pain scale.  The patient also reported that she has and inability to tolerate a lot of medications.  In terms of Covid-19, she confirmed that she was first diagnosed with COVID-19 in September, 2021 and had a second diagnosis in June, 2022.  She said that she was never hospitalized.  She also reported she has never sought care from a long COVID  clinic.  She also denied any history of liver or kidney disease.  She noted that she have ringing in her ear but said her hearing is good and she does not need a hearing aid.  She reported that she wears eyeglasses and has had LASEK.  She noted the medications listed in the electronic medical record were up-to-date.  On the medical history questionnaire, she denied any history of stroke, central nervous system infection, seizures, tumor, heart disease, pulmonary disease, hypertension, diabetes, hypothyroidism, hypercholesterolemia, or exposure to hazardous materials.  However, she acknowledged a history of depression and anxiety on this form.    Academically, the patient reported that she  a masters degree in art, and subsequently returned to school to get degrees in nursing.  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  She reported that she was a good student in school and was never in special education classes or had to repeat a grade.  The patient reported that she has been  for 38 years and has 2 children, including a son and a daughter.  She noted that she lives at home with her  and daughter.    BEHAVIORAL OBSERVATIONS:  On examination, the patient was casually but appropriately dressed and groomed. The patient was cooperative with the evaluation and was talkative.  Speech was normal in volume, rate and tone.  The patient also appeared to put forth their best effort on all tasks. Thus, the results of this evaluation are a reasonably valid reflection of the patient's current level of functioning. There were no indications of hallucinations, delusions or unusual thought processes and the patient was generally well oriented to personal information, place, and time. There were no demonstrations of a practical memory problem. The patient was a good historian, with a self-report consistent with medical records. During formal testing, the patient  occasionally displayed impulsive behaviors such as starting assessment tasks before the directions were finished. Affect was also generally appropriate.      RESULTS: Formal performance validity measures were within expected limits and consistent with the above noted observations.  Psychometric estimates of the patient's premorbid global cognitive functioning based upon single word reading ability were in the upper half of the average range, which is consistent with her educational and occupational history.    Measures of attention/concentration were mildly variable.  For example, a digit span task was at the lower end of the average range.  However, a visual scanning task that integrates attention was in the below average range.  Speed of information processing was also mildly variable for example, psychomotor speed was in the average range.  However, motor-free processing speed was variable and ranged from low average to average.    Measures of the patient's memory functioning were mildly variable.  For example, immediate and delayed verbal recall on a story memory task were in the average range.  However, immediate and delayed verbal recall on a word list learning task were in the below average range.  Verbal recognition memory was within expected limits, and delayed recall was consistent with initial encoding.  Visual memory was also mildly variable.  For example, immediate recall on a complex figure drawing task was in the average range, but delayed recall was in the below average range.  Visual recognition was also below expected limits.  Thus, there was evidence for mild variability with encoding of information, but no consistent evidence for rapid forgetting of previously learned information.    Expressive language functioning was within expected limits.  For example, confrontation naming was in the average range and completed without error.  Further, semantic and phonemic fluency were in the average range.   Verbal abstract reasoning was also in the average range.  Receptive language functioning, based upon her performance during the interview, was likewise within expected limits.    Visual-spatial and visual constructional abilities were likewise within expected limits.  For example, motor-free line orientation judgment was within expected limits.  Further, motor-free visual reasoning was in the average range.  Clock drawing and complex figure drawing tasks did not indicate evidence for significant visual-spatial distortions.    Measures of the patient's executive functioning were within expected limits.  For example, a measure of response inhibition that integrates processing speed was in the average range.  Further, there was no evidence for significant planning or organizational difficulties on complex figure drawing or clock drawing tasks.  A complex visual scanning task that integrates attention was in the low average range, but her performance was better then on a simpler version that integrated only attention.  Verbal and visual reasoning abilities were in the average range as well.    Formal personality evaluation was completed utilizing the clinical interview, self-report measures of depression, anxiety, and fatigue, and an objective measure of emotional functioning.  On the self-report measures, the patient endorsed moderate depressive symptoms, and also noted experiencing suicidal thoughts but also indicated she would not act on them.  However, she did not endorse clinically significant anxiety symptoms, but endorsed mild excessive daytime fatigue.  On the objective measure of emotional functioning, she responded in an inconsistent fashion to items of similar content, therefore this measure could not be interpreted.    SUMMARY:  In summary, the neuropsychological assessment results indicated no evidence for significant change or decline in global cognitive functioning.  There was evidence for mild  variability with attention/concentration and speed of information processing, which likely resulted in mild variability with memory functioning, particularly with encoding of new information.  However, there was no consistent evidence for rapid forgetting of previously learned information.  Other cognitive domains, including executive functioning, language processing ability, and visual-spatial abilities were within expected limits.  Additionally, the patient endorsed significant depressive symptoms, although she did not endorse anxiety symptoms at this time.  These results are consistent with her history of major depressive disorder. The pattern of results indicated that neurologically based cognitive deficits, including progressive dementia, were unlikely.  More likely, multiple factors, including mood/anxiety, sleep difficulties, sleep apnea, medications effects (cannabis),  pain, and fatigue are contributing to the cognitive concerns. Other issues, such as some vitamin deficiencies, could also contribute to cognitive concerns if not well managed.     RECOMMENDATIONS:   1.  Given these results, a referral for individual psychotherapeutic intervention is recommended. A highly structured cognitive-behavioral intervention focused on coping and stress management would likely be the most helpful. One option for this service is through the [facility] at [email or web address] or [phone].    2. Addressing sleep related difficulties may also be useful focus of psychotherapy.  Working with a health psychologist with expertise in helping individuals with long-term health and medical conditions would likely be particular helpful.  3.  Additionally, continued psychiatric consultation is strongly recommended.  4. A suicide risk assessment was conducted with this patient, and it was determined that the patient likely was not an imminent danger to herself.  Nonetheless, ongoing close monitoring of the patient s suicidal ideation by health care providers is recommended.   5. Given the reported sleep difficulties, addressing sleep hygiene to improve the quality and duration of sleep may be beneficial. The following are recommendations from the National Sleep Foundation that may be helpful:     Avoid napping during the day; it can disturb the normal pattern of sleep and wakefulness.    Avoid stimulants such as caffeine, nicotine, and alcohol too close to bedtime. While alcohol is well known to speed the onset of sleep, it disrupts sleep in the second half as the body begins to metabolize the alcohol, causing arousal.    Exercise can promote good sleep. Vigorous exercise should be taken in the morning or late afternoon. A relaxing exercise, like yoga, can be done before bed to help initiate a restful night's sleep.    Food can be disruptive right before sleep; stay away from large meals close to bedtime. Also dietary changes can cause sleep problems, if someone is struggling with a sleep problem, it's not a good time to start experimenting with spicy dishes. And, remember, chocolate has caffeine.    Ensure adequate exposure to natural light. This is particularly important for older people who may not venture outside as frequently as children and adults. Light exposure helps maintain a healthy sleep-wake cycle.    Establish a regular relaxing bedtime routine. Try to avoid emotionally upsetting conversations and activities before trying to go to sleep. Don't dwell on, or bring your problems to bed.    Associate your bed with sleep. It's not a good idea to use your bed to watch TV, listen to the radio, or read.    Make sure that the sleep environment is pleasant and relaxing. The bed should be comfortable, the room should not be too hot or cold, or too bright.  6. The patient may find the following attention and organizational strategies helpful:     Use cell phone reminders  to help monitor upcoming appointments and due dates as well as other important tasks (e.g., when to take medications). Set the reminder to go off several times prior to the event with advanced notice (e.g., one week before, two days before, the day before, and the morning of the event).     Attempt to reduce distractions at home. Having a clutter-free work environment and removing or at least making it harder to access potential distractions would help optimize attention. Also consider facing away from high traffic areas and using earplugs or noise cancellation headphones when desiring a quiet work environment.    Taking short breaks during the day may help optimize and maintain concentration.     Make to-do lists and prioritize tasks. Break down large tasks into smaller steps and check off each small step when completed.   7.  A referral for cognitive rehabilitation therapy, such as with a speech therapist, may be helpful to assist with compensatory strategies for the cognitive concerns.  One option for this service is [employer] at [email or web address]  8. Working closely with treating healthcare providers to develop a medically appropriate exercise program is recommended, given the mental health and physical benefits of regular exercise.  9. A referral to the [facility] might be considered if the patient is continuing to experience physical symptoms secondary to her Covid-19 infection.  The contact phone number for this is [phone].  10. Chronic pain can interfere with cognitive functioning in daily life. The following strategies can be helpful in minimizing the impact of pain on concentration and memory:    Get an adequate amount of restorative sleep each night     Practice mindfulness exercises often      Maintain a schedule that allows for sufficient time to engage in pleasurable  "activities.     A good self-help resource for managing pain and instruction in relaxation techniques is  Managing Pain Before it Manages You  by Alina Lang M.D., Ph.D., [employer].     \"Chronic Pain Control Workbook: A Step-by-Step Guide for Coping with and Overcoming Pain\" By Carina Broderick and Millie Wagoner. [employer].     \"Chronic Pain Control Workbook: A step by step guide for coping with and overcoming pain\" by Carina Broderick and Millie Wagoner. [employer]     DARNELL Wilson (1987). Mastering Pain: A Twelve-Step Program for Coping with Chronic Pain. [employer].     The following online resources can provide additional helpful information about pain management generally.     www.ninds.nih.gov/health_and_medical/disorders/chronic_pain.htm      American Pain Society at www.ampainsoc.org      International Association for the Study of Pain at www.iasp-pain.org     American Academy of Pain Medicine at www.painmed.org    11.  Given her reported difficulties with sleep apnea and the CPAP machine, referral for sleep medicine consultation is also recommended, particularly given the impact of untreated sleep apnea on cognitive functioning.  12.  The results of the evaluation now constitute a baseline of the patient's cognitive and emotional functioning. If further cognitive difficulties are observed in the future, a referral for a neuropsychological re-evaluation at that time might be considered.     Results and recommendations were discussed with the patient via telephone on 8/11/2022 and her questions were answered.  I encouraged her to follow-up with her treating healthcare providers to help facilitate the recommendations.  Thank you for referring this interesting individual. If you have any questions, please feel free to contact me.      Atilio Palumbo, PhD, ABPP, LP  Professor and Licensed Psychologist [id number]  Board Certified Clinical " Neuropsychologist    Time Spent:      Minutes Date Code Units   Total Time-Neuropsychologist Professional 247 8/11/22 40871 1     8/11/22 37437 3   Neuropsychologist Admin/scoring 0 8/11/22 08600 0     8/11/22 18667 0   Diagnostic Interview (billed on 8/922) 30 8/9/22 51951 1   Psychometrician Time-Test Administration/Score 242 8/11/22 54218 1   (62 min on 8/922 and 180 min on 8/11/22)  8/11/22 80003 7   Diagnosis R41.3 U07.10 F32.1 G47.33

## 2022-08-12 NOTE — CONSULTS
Agree with the lasix and potassium and follow up labs with PCP for now    Diagnostic Evaluation Consultation  Crisis Assessment    Patient was assessed: remote  Patient location: [facility]  Was a release of information signed: No. Reason: Pt.;'s providers are within the  system      Referral Data and Chief Complaint  Janelle White  is a 61 year old, who uses she/her pronouns, and presents to the ED alone. Patient is referred to the ED by community provider(s). Patient is presenting to the ED for the following concerns: suicidal ideation and hopelessness.      Informed Consent and Assessment Methods     Patient is her own guardian. Writer met with patient and explained the crisis assessment process, including applicable information disclosures and limits to confidentiality, assessed understanding of the process, and obtained consent to proceed with the assessment. Patient was observed to be able to participate in the assessment as evidenced by Verbal consent. Assessment methods included conducting a formal interview with patient, review of medical records, collaboration with medical staff, and obtaining relevant collateral information from family and community providers when available..     Over the course of this crisis assessment provided reassurance, offered validation, provided psychoeducation and Valadadted Patience feelings.. Patient's response to interventions was positive, Pt appeared grateful to be able to talk about what was going on for her.      Summary of Patient Situation    Pt presents to the ED at the encouragement of her PCP. She had an appointment today and expressed severe mental health symptoms. Pt.';s PCP is worried about Pt.'s lack of desire to continue living, suicidal ideation, and intense anxiety. Pt was tearful throughout assessment, she expresses no hope for the future and no desire to keep trying new things to help with her chronic pain. Pt expresses a need to have physical movement for her mental health but she is unable stop herself from pushing her  body to hard then she is unable to move or have any desire to do anything for days after. This creates a cycle of depression and anhedonia. She is very sad and depressed about not being able to do things she once previously loved (diving or skiing) she is struggling to cope and see a reason to go on living. Pt. Also feels like a burden to her family and feels that she is the cause of a lot of pain. She feels if she is dead they would be able to move on with there lives. She feels selfish that her family has had to help support her so much over the years and can not see any value in her remaining alive for them. Pt also expressed a short fuse and is quick to become angry or irritated with her family which adds to tension in her house. Pt did not express a specific plan to [facility] but did express tp [facility] that she has a plan but her dog is the only reason she has not acted on it. She reported to PCP that she would have to take her dog with her and she is not prepared to do that yet. Pt appears very hopeless and deflated.          Brief Psychosocial History    Pt currently lives with her  and daughter. 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 Having to leave her job before she was ready was very emotionally hard on Pt. Pt was planning on working for several more years. Her current hobbys are hanging out with her beloved dog and spending time with friends.     Significant Clinical History      Pt reports she was hospitalized after a suicide when she was a teenager but has not attempted since then. She has had chronic pain for years which has exacerbated her mental health symptoms. She has been working closely with her PCP and Interventional psychiatry. She has tried ECT when she was a teen but does not have a lot of memory from that time. What she does remember is very dark and she is very scared to try it again. Recently she was trying  TMS. She also was doing [place] therapy, She has one session left and found that very helpful. Helped her decrease her medication. She is stressed that all the things she has tried have lead to temporary relief but nothing has given her lasting relief.      Collateral Information  Note from PCP today:   I am very concerned about Janelle's suicidal ideations.  We discussed her pain and she is quite adamant that she does not want to back on any medication to treat pain, including suboxone.  Given that she has a detailed plan, I would be reluctant to prescribe any additional narcotics due to the risk of overdose.  She did not go into detail about her plan other than to say that she would have to take her dog with her, which suggested to me that her plan may be escalating as her dog is the one thing keeping her from acting on her plan.  She reports she has kept these feelings hidden from her family and it sounds as though she is struggling with the relationships in her family.  She does not seem to understand the impact her suicide would have on her family, especially her children.     Janelle was agreeable to present to [facility] today.  She has been working with psychology and this was previously discussed in their visits as well.  I agree with their plan that Janelle needs a higher level of care and I think she would benefit from inpatient treatment or at the very least intensive outpatient therapy.  At some point she may benefit from family therapy, but she is not open to that suggestion right now.  I offered to arrange transport to [facility] for her today and she declined.  I do not think she meets criteria for a hold at this time or calling EMS for transport.    I provided her with address and phone number, Clinic RN notified [facility] of her arrival.  Her other active team members have been copied on this chart so we can continue to partner in providing supportive care      [facility] called Pt.'s  and left a VM.    Risk Assessment  ESS-6  1.a. Over the past 2 weeks, have you had thoughts of killing yourself? Yes  1.b. Have you ever attempted to kill yourself and, if yes, when did this last happen? Yes when I was a teen   2. Recent or current suicide plan? No   3. Recent or current intent to act on ideation? No  4. Lifetime psychiatric hospitalization? Yes  5. Pattern of excessive substance use? No  6. Current irritability, agitation, or aggression? No  Scoring note: BOTH 1a and 1b must be yes for it to score 1 point, if both are not yes it is zero. All others are 1 point per number. If all questions 1a/1b - 6 are no, risk is negligible. If one of 1a/1b is yes, then risk is mild. If either question 2 or 3, but not both, is yes, then risk is automatically moderate regardless of total score. If both 2 and 3 are yes, risk is automatically high regardless of total score.      Score: 2, high risk      Does the patient have access to lethal means? No     Does the patient engage in non-suicidal self-injurious behavior (NSSI/SIB)? no     Does the patient have thoughts of harming others? No     Is the patient engaging in sexually inappropriate behavior?  no        Current Substance Abuse     Is there recent substance abuse? no     Was a urine drug screen or blood alcohol level obtained: No       Mental Status Exam     Affect: Labile   Appearance: Appropriate    Attention Span/Concentration: Attentive  Eye Contact: Engaged   Fund of Knowledge: Appropriate    Language /Speech Content: Fluent   Language /Speech Volume: Normal    Language /Speech Rate/Productions: Normal    Recent Memory: Intact   Remote Memory: Intact   Mood: Anxious, Depressed, Irritable and Sad    Orientation to Person: Yes    Orientation to Place: Yes   Orientation to Time of Day: Yes    Orientation to Date: Yes    Situation (Do they understand why they are here?): Yes    Psychomotor Behavior: Normal    Thought Content: Suicidal   Thought Form: Intact      History of  "commitment: No       Medication    Psychotropic medications:   No current facility-administered medications for this encounter.     Current Outpatient Medications   Medication     albuterol (PROAIR HFA/PROVENTIL HFA/VENTOLIN HFA) 108 (90 Base) MCG/ACT inhaler     [START ON 7/13/2022] amphetamine-dextroamphetamine (ADDERALL XR) 20 MG 24 hr capsule     amphetamine-dextroamphetamine (ADDERALL XR) 20 MG 24 hr capsule     [START ON 7/13/2022] amphetamine-dextroamphetamine (ADDERALL) 20 MG tablet     amphetamine-dextroamphetamine (ADDERALL) 20 MG tablet     ASHWAGANDHA PO     calcium citrate (CITRACAL) 950 (200 Ca) MG tablet     Calcium-Magnesium-Vitamin D 500-250-200 MG-MG-UNIT TABS     colchicine (COLCYRS) 0.6 MG tablet     cyanocobalamin (CYANOCOBALAMIN) 1000 MCG/ML injection     cyclobenzaprine (FLEXERIL) 10 MG tablet     cyclobenzaprine (FLEXERIL) 10 MG tablet     desvenlafaxine (PRISTIQ) 50 MG 24 hr tablet     ergocalciferol (ERGOCALCIFEROL) 1.25 MG (57575 UT) capsule     Estradiol (DIVIGEL) 1 MG/GM GEL     ferrous sulfate (FEROSUL) 325 (65 Fe) MG tablet     HYDROcodone-acetaminophen (NORCO)  MG per tablet     insulin syringe-needle U-100 (30G X 1/2\" 1 ML) 30G X 1/2\" 1 ML miscellaneous     lidocaine (LIDODERM) 5 % patch     LORazepam (ATIVAN) 1 MG tablet     medical cannabis (Patient's own supply.  Not a prescription)     methocarbamol (ROBAXIN) 500 MG tablet     methylfolate (DEPLIN) 7.5 MG TABS tablet     morphine (MS CONTIN) 15 MG CR tablet     Multiple Vitamin (MULTI-VITAMINS) TABS     naloxone (NARCAN) 4 MG/0.1ML nasal spray     ondansetron (ZOFRAN-ODT) 8 MG ODT tab     pramipexole (MIRAPEX) 0.25 MG tablet     Prasterone 6.5 MG INST     senna-docusate (SENOKOT-S/PERICOLACE) 8.6-50 MG tablet     vitamin D3 (CHOLECALCIFEROL) 125 MCG (5000 UT) tablet       Medication changes made in the last two weeks: No       Current Care Team    Primary Care Provider: Carmen Gonzalez  Psychiatrist: Kimberly Perez  Therapist: " "Janelle Brooks, [facility]    : No     CTSS or ARMHS: No  ACT Team: No  Other: No      Diagnosis    296.33 (F33.2) Major Depressive Disorder, Recurrent Episode, Severe _  By Hx        Clinical Summary and Substantiation of Recommendations    Pt works very closely with her medical provider team to help assist her with her physical pain and mental health. Per providers are expressing a significant increase in the hopelessness, lack of will/hope to try new treatments, and suicidal thoughts. Pt appears very suicidal and expresses little stopping her from completing. She appears to not want to \"fight\" anymore to remain alive. She also appears to think that if she were gone her family would be happier. Pt is already getting a lot of support but is still really struggling. She is expressing symptoms of severe depression: she is unable concentrate and struggles to find the right words, she expresses little or no pleasure in activies she used to find roxy in, she is unable to sleep because she cant turn her mind off but is trying a new medication to help with that, she expresses a fatigue for living or any activity and she is having suicidal ideation. Pt will be hospitalized for safety and stabilization.     Disposition    Recommended disposition: Inpatient Mental Health       Reviewed case and recommendations with attending provider. Attending Name: Dr. Barajas       Attending concurs with disposition: Yes       Patient concurs with disposition: Yes       Guardian concurs with disposition: NA      Final disposition: Inpatient mental health .     Inpatient Details (if applicable):  Is patient admitted voluntarily:Yes      Patient aware of potential for transfer if there is not appropriate placement? NA       Patient is willing to travel outside of the [facility] for placement? NA      Behavioral Intake Notified? NA Pt has COVID so she will be admitted medically and seen on a psych floor.       Assessment " Details    Patient interview started at: 5:30 PM and completed at: 6:15 PM.     Total duration spent on the patient case in minutes: 1.0 hrs      CPT code(s) utilized: 80014 - Psychotherapy for Crisis - 60 (30-74*) min       Stephanie MOLINA, [facility]  DEC - Triage & Transition Services

## 2022-08-15 ENCOUNTER — MYC REFILL (OUTPATIENT)
Dept: ANESTHESIOLOGY | Facility: CLINIC | Age: 62
End: 2022-08-15

## 2022-08-15 DIAGNOSIS — M47.812 CERVICAL SPONDYLOSIS: ICD-10-CM

## 2022-08-15 DIAGNOSIS — M79.18 MYOFASCIAL PAIN: ICD-10-CM

## 2022-08-15 RX ORDER — METHOCARBAMOL 500 MG/1
500 TABLET, FILM COATED ORAL 4 TIMES DAILY PRN
Qty: 120 TABLET | Refills: 0 | Status: SHIPPED | OUTPATIENT
Start: 2022-08-15 | End: 2022-10-04

## 2022-08-15 ASSESSMENT — ENCOUNTER SYMPTOMS
MYALGIAS: 1
POSTURAL DYSPNEA: 0
FLANK PAIN: 0
BACK PAIN: 1
SINUS CONGESTION: 0
HEMATURIA: 0
HOARSE VOICE: 0
CONSTIPATION: 1
NUMBNESS: 1
STIFFNESS: 1
INSOMNIA: 1
SEIZURES: 0
HEARTBURN: 0
WHEEZING: 0
NAUSEA: 1
JOINT SWELLING: 0
BLOATING: 1
DEPRESSION: 0
COUGH: 0
TREMORS: 0
TINGLING: 1
ABDOMINAL PAIN: 1
SMELL DISTURBANCE: 0
MUSCLE WEAKNESS: 1
SPUTUM PRODUCTION: 0
SORE THROAT: 0
LOSS OF CONSCIOUSNESS: 0
DYSURIA: 0
TROUBLE SWALLOWING: 0
HEMOPTYSIS: 0
DECREASED CONCENTRATION: 1
NECK PAIN: 1
PANIC: 0
HEADACHES: 1
NERVOUS/ANXIOUS: 0
DIFFICULTY URINATING: 0
COUGH DISTURBING SLEEP: 1
DIZZINESS: 1
SPEECH CHANGE: 0
MUSCLE CRAMPS: 0
MEMORY LOSS: 1
PARALYSIS: 0
ARTHRALGIAS: 1
NECK MASS: 0
DIARRHEA: 0
SINUS PAIN: 0
SNORES LOUDLY: 0
VOMITING: 1
DYSPNEA ON EXERTION: 1
WEAKNESS: 1
SHORTNESS OF BREATH: 0
RECTAL PAIN: 0
BOWEL INCONTINENCE: 0
BLOOD IN STOOL: 0
TASTE DISTURBANCE: 0
JAUNDICE: 0
DISTURBANCES IN COORDINATION: 1

## 2022-08-15 ASSESSMENT — PAIN SCALES - PAIN ENJOYMENT GENERAL ACTIVITY SCALE (PEG)
AVG_PAIN_PASTWEEK: 6
INTERFERED_ENJOYMENT_LIFE: 8
INTERFERED_GENERAL_ACTIVITY: 6
PEG_TOTALSCORE: 6.67

## 2022-08-15 NOTE — TELEPHONE ENCOUNTER
Methocarbamol requested. Chart reviewed, No changes noted. Medication refilled and sent to the pharmacy.   Pt was last seen on 3/23/22. Pt is scheduled for follow up on 8/17/22.     Francisca Norwood LPN

## 2022-08-17 ENCOUNTER — OFFICE VISIT (OUTPATIENT)
Dept: ANESTHESIOLOGY | Facility: CLINIC | Age: 62
End: 2022-08-17
Payer: COMMERCIAL

## 2022-08-17 VITALS — HEART RATE: 71 BPM | SYSTOLIC BLOOD PRESSURE: 102 MMHG | OXYGEN SATURATION: 100 % | DIASTOLIC BLOOD PRESSURE: 72 MMHG

## 2022-08-17 DIAGNOSIS — M79.18 MYOFASCIAL PAIN: Primary | ICD-10-CM

## 2022-08-17 DIAGNOSIS — M47.812 CERVICAL SPONDYLOSIS: ICD-10-CM

## 2022-08-17 PROCEDURE — 99214 OFFICE O/P EST MOD 30 MIN: CPT | Performed by: ANESTHESIOLOGY

## 2022-08-17 ASSESSMENT — PAIN SCALES - GENERAL: PAINLEVEL: SEVERE PAIN (6)

## 2022-08-17 NOTE — PROGRESS NOTES
[facility]    Date of visit: 8/17/2022    Chief complaint:   Chief Complaint   Patient presents with     Follow Up     Follow-up RM 8 Back Pain Level 6/10       Interval history:  Janelle White was last seen by me on 3/23/2022.      Recommendations/plan at the last visit included:  1. Medications: We are prescribing the patient Robaxin prn. Dosing, side effects, risks/benefits/alternatives were discussed with the patient in detail.  The patient can continue using Lidocaine patch alternating with the Diclofenac/Menthol patches. She is trying to reduce her use of Norco and will encourage to continue coming down on that.     2. Physical therapy: I have refered the patient for outpatient physical therapy for stretching, strengthening and home exercise program for neck pain. The patient will also discuss spine care and posture. I have also referred her for pool therapy.    Since her last visit, Janelle White reports:    Has been taking Robaxin daily, up to 2500 mg/day  Is off narcotics (weaned off by PCP)  Had issues with restless leg that has improved with Ropinirole    Has been having pain in lower back at fusion area in region of SIJ which is also fused  She has the most pain in the neck area, making it difficult to do her hair  Has been doing physical therapy and working with therapist for mindfulness therapies.     Pain scores:  Pain intensity on average is 6 on a scale of 0-10.     Current pain treatments:   Lidocaine 5% Patch  Robaxin 500 mg QID PRN  Ropinirole 0.25mg - taking 1 to 2 tabs in the evening - is very helpful for restless leg    Denies any current side effects    Medications:  Current Outpatient Medications   Medication Sig Dispense Refill     albuterol (PROAIR HFA/PROVENTIL HFA/VENTOLIN HFA) 108 (90 Base) MCG/ACT inhaler Inhale 2 puffs into the lungs every 6 hours (Patient taking differently: Inhale 2 puffs into the lungs every 6 hours as needed for shortness of  "breath / dyspnea or wheezing) 8.5 g 4     amphetamine-dextroamphetamine (ADDERALL XR) 20 MG 24 hr capsule Take 1 capsule (20 mg) by mouth daily 30 capsule 0     amphetamine-dextroamphetamine (ADDERALL) 15 MG tablet Take 1 tablet (15 mg) by mouth 2 times daily as needed (focus/concentration) 60 tablet 0     ASHWAGANDHA PO Take 1 tablet by mouth daily       desvenlafaxine (PRISTIQ) 50 MG 24 hr tablet Take 1 tablet (50 mg) by mouth daily 90 tablet 1     Estradiol (DIVIGEL) 1 MG/GM GEL Place 1 packet onto the skin daily 30 g 11     HYDROcodone-acetaminophen (NORCO)  MG per tablet Take 1 tablet by mouth every 4 hours as needed for severe pain max 4 tabs/24 hrs 10 tablet 0     insulin syringe-needle U-100 (30G X 1/2\" 1 ML) 30G X 1/2\" 1 ML miscellaneous Inject 1 ml B12 qmonth 10 each 1     lidocaine (LIDODERM) 5 % patch Place 4 patches onto the skin daily Apply up to 4 patches to skin. Wear for 12 hours and remove for 12 hrs.  Refill when patient requests. 120 patch 3     LORazepam (ATIVAN) 1 MG tablet Take 1 tablet (1 mg) by mouth every 6 hours as needed for anxiety (Patient taking differently: Take 1 mg by mouth At Bedtime) 50 tablet 3     medical cannabis (Patient's own supply.  Not a prescription) Medical Cannabis - [place] 4-6 ml by mouth daily. [facility]       methocarbamol (ROBAXIN) 500 MG tablet Take 1 tablet (500 mg) by mouth 4 times daily as needed for muscle spasms 120 tablet 0     methylfolate (DEPLIN) 7.5 MG TABS tablet Take 1 tablet (7.5 mg) by mouth daily 30 tablet 1     naloxone (NARCAN) 4 MG/0.1ML nasal spray Spray 1 spray (4 mg) into one nostril alternating nostrils as needed for opioid reversal every 2-3 minutes until assistance arrives 0.2 mL 1     NONFORMULARY Take 2 [place] by mouth daily Protein and Vitamin shake mix - Nutritional supplement       ondansetron (ZOFRAN-ODT) 8 MG ODT tab Take 1 tablet (8 mg) by mouth every 8 hours as needed for nausea 30 tablet 4     Prasterone 6.5 MG INST " Place 0.5 suppositories vaginally three times a week 28 each 11     rOPINIRole (REQUIP) 0.25 MG tablet Take 1-2 tablets (0.25-0.5 mg) by mouth At Bedtime 90 tablet 0     senna-docusate (SENOKOT-S/PERICOLACE) 8.6-50 MG tablet Take 6-9 tablets by mouth every evening       vitamin D3 (CHOLECALCIFEROL) 125 MCG (5000 UT) tablet Take 5,000 Units by mouth daily         Medical History: any changes in medical history since they were last seen? No    Review of Systems:  The 14 system ROS was reviewed from the intake questionnaire, and is positive for: neck pain, back pain  Any bowel or bladder problems: denies  Mood: stable    Physical Exam:  Blood pressure 102/72, pulse 71, last menstrual period 05/01/2005, SpO2 100 %, not currently breastfeeding.  General: AAOx3, NAD  Gait: Normal  MSK exam: multiple trigger points identified in cervical and thoracic region bilaterally    Assessment:   #Cervical Myofascial Pain  #h/o Cervical Spondylosis Fusion C2-T2  #Headache    Janelle White is a 61 year old female who is seen at the pain clinic for follow up for management of her chronic neck pain. We discussed further treatment options and will refer to acupuncture as well as will schedule for in clinic trigger point injections    Plan:  1. Physical Therapy:  Continue current PT  2. Clinical Health Psychologist to address issues of relaxation, behavioral change, coping style, and other factors important to improvement.  Not at this time  3. Diagnostic Studies:  none  4. Medication Management:  Refill for robaxin as needed  5. Further procedures recommended: In Clinic TPIs  6. Recommendations to PCP: none  7. Follow up: for TPIs in clinic    Joslyn Cherry MD    Pain Medicine  Department of Anesthesiology  [facility]            Answers for HPI/ROS submitted by the patient on 8/15/2022  General Symptoms: No  Skin Symptoms: No  HENT Symptoms: Yes  EYE SYMPTOMS: No  HEART SYMPTOMS: No  LUNG  SYMPTOMS: Yes  INTESTINAL SYMPTOMS: Yes  URINARY SYMPTOMS: Yes  GYNECOLOGIC SYMPTOMS: No  BREAST SYMPTOMS: No  SKELETAL SYMPTOMS: Yes  BLOOD SYMPTOMS: No  NERVOUS SYSTEM SYMPTOMS: Yes  MENTAL HEALTH SYMPTOMS: Yes  Ear pain: No  Ear discharge: No  Tinnitus: Yes  Nosebleeds: No  Congestion: No  Sinus pain: No  Trouble swallowing: No   Voice hoarseness: No  Mouth sores: No  Sore throat: No  Tooth pain: No  Gum tenderness: No  Bleeding gums: No  Change in taste: No  Change in sense of smell: No  Dry mouth: No  Hearing aid used: No  Neck lump: No  Cough: No  Sputum or phlegm: No  Coughing up blood: No  Difficulty breating or shortness of breath: No  Snoring: No  Wheezing: No  Difficulty breathing on exertion: Yes  Nighttime Cough: Yes  Difficulty breathing when lying flat: No  Heart burn or indigestion: No  Nausea: Yes  Vomiting: Yes  Abdominal pain: Yes  Bloating: Yes  Constipation: Yes  Diarrhea: No  Blood in stool: No  Black stools: No  Rectal or Anal pain: No  Fecal incontinence: No  Yellowing of skin or eyes: No  Vomit with blood: No  Change in stools: No  Trouble holding urine or incontinence: Yes  Pain or burning: No  Trouble starting or stopping: No  Increased frequency of urination: No  Blood in urine: No  Decreased frequency of urination: No  Frequent nighttime urination: Yes  Flank pain: No  Difficulty emptying bladder: No  Back pain: Yes  Muscle aches: Yes  Neck pain: Yes  Swollen joints: No  Joint pain: Yes  Bone pain: Yes  Muscle cramps: No  Muscle weakness: Yes  Joint stiffness: Yes  Bone fracture: No  Trouble with coordination: Yes  Dizziness or trouble with balance: Yes  Fainting or black-out spells: No  Memory loss: Yes  Headache: Yes  Seizures: No  Speech problems: No  Tingling: Yes  Tremor: No  Weakness: Yes  Difficulty walking: Yes  Paralysis: No  Numbness: Yes  Nervous or Anxious: No  Depression: No  Trouble sleeping: Yes  Trouble thinking or concentrating: Yes  Mood changes: Yes  Panic attacks:  No

## 2022-08-17 NOTE — LETTER
8/17/2022       RE: Janelle White  [id number] [facility] [id number]     Dear Colleague,    Thank you for referring your patient, Janelle White, to the [facility] [place] at [facility]. Please see a copy of my visit note below.    [facility]    Date of visit: 8/17/2022    Chief complaint:   Chief Complaint   Patient presents with     Follow Up     Follow-up RM 8 Back Pain Level 6/10       Interval history:  Janelle White was last seen by me on 3/23/2022.      Recommendations/plan at the last visit included:  1. Medications: We are prescribing the patient Robaxin prn. Dosing, side effects, risks/benefits/alternatives were discussed with the patient in detail.  The patient can continue using Lidocaine patch alternating with the Diclofenac/Menthol patches. She is trying to reduce her use of Norco and will encourage to continue coming down on that.     2. Physical therapy: I have refered the patient for outpatient physical therapy for stretching, strengthening and home exercise program for neck pain. The patient will also discuss spine care and posture. I have also referred her for pool therapy.    Since her last visit, Janelle White reports:    Has been taking Robaxin daily, up to 2500 mg/day  Is off narcotics (weaned off by PCP)  Had issues with restless leg that has improved with Ropinirole    Has been having pain in lower back at fusion area in region of SIJ which is also fused  She has the most pain in the neck area, making it difficult to do her hair  Has been doing physical therapy and working with therapist for mindfulness therapies.     Pain scores:  Pain intensity on average is 6 on a scale of 0-10.     Current pain treatments:   Lidocaine 5% Patch  Robaxin 500 mg QID PRN  Ropinirole 0.25mg - taking 1 to 2 tabs in the evening - is very helpful for restless  "leg    Denies any current side effects    Medications:  Current Outpatient Medications   Medication Sig Dispense Refill     albuterol (PROAIR HFA/PROVENTIL HFA/VENTOLIN HFA) 108 (90 Base) MCG/ACT inhaler Inhale 2 puffs into the lungs every 6 hours (Patient taking differently: Inhale 2 puffs into the lungs every 6 hours as needed for shortness of breath / dyspnea or wheezing) 8.5 g 4     amphetamine-dextroamphetamine (ADDERALL XR) 20 MG 24 hr capsule Take 1 capsule (20 mg) by mouth daily 30 capsule 0     amphetamine-dextroamphetamine (ADDERALL) 15 MG tablet Take 1 tablet (15 mg) by mouth 2 times daily as needed (focus/concentration) 60 tablet 0     ASHWAGANDHA PO Take 1 tablet by mouth daily       desvenlafaxine (PRISTIQ) 50 MG 24 hr tablet Take 1 tablet (50 mg) by mouth daily 90 tablet 1     Estradiol (DIVIGEL) 1 MG/GM GEL Place 1 packet onto the skin daily 30 g 11     HYDROcodone-acetaminophen (NORCO)  MG per tablet Take 1 tablet by mouth every 4 hours as needed for severe pain max 4 tabs/24 hrs 10 tablet 0     insulin syringe-needle U-100 (30G X 1/2\" 1 ML) 30G X 1/2\" 1 ML miscellaneous Inject 1 ml B12 qmonth 10 each 1     lidocaine (LIDODERM) 5 % patch Place 4 patches onto the skin daily Apply up to 4 patches to skin. Wear for 12 hours and remove for 12 hrs.  Refill when patient requests. 120 patch 3     LORazepam (ATIVAN) 1 MG tablet Take 1 tablet (1 mg) by mouth every 6 hours as needed for anxiety (Patient taking differently: Take 1 mg by mouth At Bedtime) 50 tablet 3     medical cannabis (Patient's own supply.  Not a prescription) Medical Cannabis - [place] 4-6 ml by mouth daily. [employer]       methocarbamol (ROBAXIN) 500 MG tablet Take 1 tablet (500 mg) by mouth 4 times daily as needed for muscle spasms 120 tablet 0     methylfolate (DEPLIN) 7.5 MG TABS tablet Take 1 tablet (7.5 mg) by mouth daily 30 tablet 1     naloxone (NARCAN) 4 MG/0.1ML nasal spray Spray 1 spray (4 mg) into one nostril " alternating nostrils as needed for opioid reversal every 2-3 minutes until assistance arrives 0.2 mL 1     NONFORMULARY Take 2 Scoops by mouth daily Protein and Vitamin shake mix - Nutritional supplement       ondansetron (ZOFRAN-ODT) 8 MG ODT tab Take 1 tablet (8 mg) by mouth every 8 hours as needed for nausea 30 tablet 4     Prasterone 6.5 MG INST Place 0.5 suppositories vaginally three times a week 28 each 11     rOPINIRole (REQUIP) 0.25 MG tablet Take 1-2 tablets (0.25-0.5 mg) by mouth At Bedtime 90 tablet 0     senna-docusate (SENOKOT-S/PERICOLACE) 8.6-50 MG tablet Take 6-9 tablets by mouth every evening       vitamin D3 (CHOLECALCIFEROL) 125 MCG (5000 UT) tablet Take 5,000 Units by mouth daily         Medical History: any changes in medical history since they were last seen? No    Review of Systems:  The 14 system ROS was reviewed from the intake questionnaire, and is positive for: neck pain, back pain  Any bowel or bladder problems: denies  Mood: stable    Physical Exam:  Blood pressure 102/72, pulse 71, last menstrual period 05/01/2005, SpO2 100 %, not currently breastfeeding.  General: AAOx3, NAD  Gait: Normal  MSK exam: multiple trigger points identified in cervical and thoracic region bilaterally    Assessment:   #Cervical Myofascial Pain  #h/o Cervical Spondylosis Fusion C2-T2  #Headache    Janelle White is a 61 year old female who is seen at the pain clinic for follow up for management of her chronic neck pain. We discussed further treatment options and will refer to acupuncture as well as will schedule for in clinic trigger point injections    Plan:  1. Physical Therapy:  Continue current PT  2. Clinical Health Psychologist to address issues of relaxation, behavioral change, coping style, and other factors important to improvement.  Not at this time  3. Diagnostic Studies:  none  4. Medication Management:  Refill for robaxin as needed  5. Further procedures recommended: In Clinic  TPIs  6. Recommendations to PCP: none  7. Follow up: for TPIs in clinic      Joslyn Cherry MD    Pain Medicine  Department of Anesthesiology  [facility]      Answers for HPI/ROS submitted by the patient on 8/15/2022  General Symptoms: No  Skin Symptoms: No  HENT Symptoms: Yes  EYE SYMPTOMS: No  HEART SYMPTOMS: No  LUNG SYMPTOMS: Yes  INTESTINAL SYMPTOMS: Yes  URINARY SYMPTOMS: Yes  GYNECOLOGIC SYMPTOMS: No  BREAST SYMPTOMS: No  SKELETAL SYMPTOMS: Yes  BLOOD SYMPTOMS: No  NERVOUS SYSTEM SYMPTOMS: Yes  MENTAL HEALTH SYMPTOMS: Yes  Ear pain: No  Ear discharge: No  Tinnitus: Yes  Nosebleeds: No  Congestion: No  Sinus pain: No  Trouble swallowing: No   Voice hoarseness: No  Mouth sores: No  Sore throat: No  Tooth pain: No  Gum tenderness: No  Bleeding gums: No  Change in taste: No  Change in sense of smell: No  Dry mouth: No  Hearing aid used: No  Neck lump: No  Cough: No  Sputum or phlegm: No  Coughing up blood: No  Difficulty breating or shortness of breath: No  Snoring: No  Wheezing: No  Difficulty breathing on exertion: Yes  Nighttime Cough: Yes  Difficulty breathing when lying flat: No  Heart burn or indigestion: No  Nausea: Yes  Vomiting: Yes  Abdominal pain: Yes  Bloating: Yes  Constipation: Yes  Diarrhea: No  Blood in stool: No  Black stools: No  Rectal or Anal pain: No  Fecal incontinence: No  Yellowing of skin or eyes: No  Vomit with blood: No  Change in stools: No  Trouble holding urine or incontinence: Yes  Pain or burning: No  Trouble starting or stopping: No  Increased frequency of urination: No  Blood in urine: No  Decreased frequency of urination: No  Frequent nighttime urination: Yes  Flank pain: No  Difficulty emptying bladder: No  Back pain: Yes  Muscle aches: Yes  Neck pain: Yes  Swollen joints: No  Joint pain: Yes  Bone pain: Yes  Muscle cramps: No  Muscle weakness: Yes  Joint stiffness: Yes  Bone fracture: No  Trouble with coordination: Yes  Dizziness or trouble  with balance: Yes  Fainting or black-out spells: No  Memory loss: Yes  Headache: Yes  Seizures: No  Speech problems: No  Tingling: Yes  Tremor: No  Weakness: Yes  Difficulty walking: Yes  Paralysis: No  Numbness: Yes  Nervous or Anxious: No  Depression: No  Trouble sleeping: Yes  Trouble thinking or concentrating: Yes  Mood changes: Yes  Panic attacks: No        Sincerely,    Joslyn Cherry MD

## 2022-08-17 NOTE — PATIENT INSTRUCTIONS
Referrals:     Acupuncture Referral.  -Please call your insurance provider to find out about acupuncture coverage, being that not all policies cover acupuncture services.       ACUPUNCTURE OPTIONS (outpatient)    [facility]  Scheduling:    Call the [facility] at [phone]  Insurance Coverage:    Please check with your insurance plan to determine available coverage and benefits covered by a Licensed Acupuncturist, including, but not limited to out of pocket costs, conditions covered and visit limits  HSA and FSA are also accepted.  Availability:  Acupuncture appointments are available Monday-Friday.  A provider referral is required for all patients to be seen at the [facility]  Employees with [employer] insurance do not need a referral.     Intranet page:  [email or web address]    [facility] [place] [facility]- PEDIATRICS ONLY   Scheduling:  Call [facility] at [phone]   Schedule with Dr. Lilian Rodriguez. This is scheduled as part of an Integrative Medicine Consultation and is not scheduled as acupuncture-only at this time.     Insurance Coverage:  Please check with your insurance plan to determine available coverage and benefits covered by a physician, including, but not limited to out of pocket costs, conditions covered and visit limits  HSA and FSA are also accepted.  Availability:  Appointments are available Monday afternoons and Thursday mornings.  A provider referral may be required for  patients to be seen by Dr. Lilian Rodriguez depending on your insurance.    Intranet page:    Dr. Lilian Rodriguez - Integrative Medicine Consults    [facility]   Scheduling:    Only seeing staff at the [facility] at this time.    [facility]  Scheduling: Call [phone]  Insurance Coverage:    Please check with your insurance plan to determine available coverage and benefits  HSA  and FSA are also accepted.  Cash pay available: $124/session  Availability:  Acupuncture appointments are available Mondays and Wednesdays  Provider referral not required. Do not need to be a patient within Psychiatry to schedule.  [facility]  Scheduling: Call [phone]  Insurance Coverage:    Please check with your insurance plan to determine available coverage and benefits  HSA and FSA are also accepted  Cash pay available: $124/session  Availability:  Acupuncture appointments are available Tuesdays  Provider referral not required. Do not need to be a patient within Psychiatry to schedule.      Outside of [facility]:  While we don't have specific Acupuncturists names or clinics to share with you, we want to offer you some tips in finding an Acupuncturist.      In order to best find an Acupuncturist that is a fit for you, we recommend starting with this website:  [employer] - Find a practitioner  We recommend cross-referencing that list with your insurance plan to ensure you have coverage, if that is important to you, and with the list for the location that is a fit for you.   Additionally, we recommend that you ask a trusted friend, family member or colleague if they have utilized an Acupuncturist in your area that they would recommend.  However, keep in mind that just like finding a primary care physician or a , you simply may need to shop around until you find one that is a fit for you.      Recommended Follow up:      Follow up with trigger point injections in clinic (20 min appt)         Please call [phone] to schedule your clinic appointment if you don't already have an appointment scheduled.        To speak with a nurse, schedule/reschedule/cancel a clinic appointment, or request a medication refill call: [phone]    You can also reach us by [employer]: [email or web address]

## 2022-08-17 NOTE — NURSING NOTE
Patient presents with:  Follow Up: Follow-up RM 8 Back Pain Level 6/10      Severe Pain (6)     Pain Medications     Opioid Combinations Refills Start End     HYDROcodone-acetaminophen (NORCO)  MG per tablet    0 4/8/2022     Sig - Route: Take 1 tablet by mouth every 4 hours as needed for severe pain max 4 tabs/24 hrs - Oral    Class: E-Prescribe    Earliest Fill Date: 4/8/2022          What medications are you using for pain? Robaxin,Voltaren Gel, Lidocaine Gel and Patches, Ibuprofen, Tylenol    (New patients only) Have you been seen by another pain clinic/ provider? no    (Return Patients only) What refills are you needing today? No

## 2022-08-22 ENCOUNTER — PATIENT OUTREACH (OUTPATIENT)
Dept: CARE COORDINATION | Facility: CLINIC | Age: 62
End: 2022-08-22

## 2022-08-22 ENCOUNTER — INFUSION THERAPY VISIT (OUTPATIENT)
Dept: INFUSION THERAPY | Facility: CLINIC | Age: 62
End: 2022-08-22
Attending: PSYCHIATRY & NEUROLOGY
Payer: COMMERCIAL

## 2022-08-22 VITALS
BODY MASS INDEX: 24.91 KG/M2 | WEIGHT: 149.7 LBS | DIASTOLIC BLOOD PRESSURE: 59 MMHG | TEMPERATURE: 98.2 F | OXYGEN SATURATION: 96 % | RESPIRATION RATE: 16 BRPM | HEART RATE: 57 BPM | SYSTOLIC BLOOD PRESSURE: 103 MMHG

## 2022-08-22 DIAGNOSIS — F33.2 SEVERE EPISODE OF RECURRENT MAJOR DEPRESSIVE DISORDER, WITHOUT PSYCHOTIC FEATURES (H): Primary | ICD-10-CM

## 2022-08-22 PROCEDURE — 96365 THER/PROPH/DIAG IV INF INIT: CPT

## 2022-08-22 PROCEDURE — 258N000003 HC RX IP 258 OP 636: Performed by: PSYCHIATRY & NEUROLOGY

## 2022-08-22 PROCEDURE — 250N000009 HC RX 250: Performed by: PSYCHIATRY & NEUROLOGY

## 2022-08-22 RX ORDER — ALBUTEROL SULFATE 0.83 MG/ML
2.5 SOLUTION RESPIRATORY (INHALATION)
Status: CANCELLED | OUTPATIENT
Start: 2022-08-22

## 2022-08-22 RX ORDER — MEPERIDINE HYDROCHLORIDE 25 MG/ML
25 INJECTION INTRAMUSCULAR; INTRAVENOUS; SUBCUTANEOUS EVERY 30 MIN PRN
Status: CANCELLED | OUTPATIENT
Start: 2022-08-22

## 2022-08-22 RX ORDER — DIPHENHYDRAMINE HYDROCHLORIDE 50 MG/ML
50 INJECTION INTRAMUSCULAR; INTRAVENOUS
Status: CANCELLED
Start: 2022-08-22

## 2022-08-22 RX ORDER — HYDRALAZINE HYDROCHLORIDE 20 MG/ML
10 INJECTION INTRAMUSCULAR; INTRAVENOUS
Status: CANCELLED | OUTPATIENT
Start: 2022-08-22

## 2022-08-22 RX ORDER — NALOXONE HYDROCHLORIDE 0.4 MG/ML
0.2 INJECTION, SOLUTION INTRAMUSCULAR; INTRAVENOUS; SUBCUTANEOUS
Status: CANCELLED | OUTPATIENT
Start: 2022-08-22

## 2022-08-22 RX ORDER — METHYLPREDNISOLONE SODIUM SUCCINATE 125 MG/2ML
125 INJECTION, POWDER, LYOPHILIZED, FOR SOLUTION INTRAMUSCULAR; INTRAVENOUS
Status: CANCELLED
Start: 2022-08-22

## 2022-08-22 RX ORDER — ALBUTEROL SULFATE 90 UG/1
1-2 AEROSOL, METERED RESPIRATORY (INHALATION)
Status: CANCELLED
Start: 2022-08-22

## 2022-08-22 RX ORDER — HEPARIN SODIUM,PORCINE 10 UNIT/ML
5 VIAL (ML) INTRAVENOUS
Status: CANCELLED | OUTPATIENT
Start: 2022-08-22

## 2022-08-22 RX ORDER — HEPARIN SODIUM (PORCINE) LOCK FLUSH IV SOLN 100 UNIT/ML 100 UNIT/ML
5 SOLUTION INTRAVENOUS
Status: CANCELLED | OUTPATIENT
Start: 2022-08-22

## 2022-08-22 RX ORDER — ONDANSETRON 2 MG/ML
4 INJECTION INTRAMUSCULAR; INTRAVENOUS
Status: CANCELLED | OUTPATIENT
Start: 2022-08-22

## 2022-08-22 RX ORDER — EPINEPHRINE 1 MG/ML
0.3 INJECTION, SOLUTION, CONCENTRATE INTRAVENOUS EVERY 5 MIN PRN
Status: CANCELLED | OUTPATIENT
Start: 2022-08-22

## 2022-08-22 RX ADMIN — KETAMINE HYDROCHLORIDE 65 MG: 50 INJECTION, SOLUTION INTRAMUSCULAR; INTRAVENOUS at 10:25

## 2022-08-22 NOTE — PROGRESS NOTES
Clinic Care Coordination Contact      [facility] called out to patient regarding CC. Patient is doing well. She is having ketamine treatments and recently had a neuropsych completed. Patient continues to see psychology and psychiatry. Patient declined CC stating she is in a good place. [facility] encouraged her to talk with PCP or call out to [facility] should she ever need CC.    AD Wheeler   [facility] - Clinic Care Coordination  [phone]

## 2022-08-22 NOTE — PROGRESS NOTES
Infusion Nursing Note:  Janelle White presents today for Ketamine.    Patient seen by provider today: No   present during visit today: Not Applicable.    Note: Patient reports Ketamine is helping her Depression.    Intravenous Access:  Peripheral IV placed right lower forearm    Treatment Conditions:  Not Applicable.    Post Infusion Assessment:  Patient tolerated infusion without incident.  Patient observed for 60 minutes post Ketamine per protocol.  No evidence of extravasations.  Access discontinued per protocol.     Discharge Plan:   Discharge instructions reviewed with: Patient.  Patient and/or family verbalized understanding of discharge instructions and all questions answered.  Patient discharged in stable condition accompanied by: self.  Departure Mode: Ambulatory.      Tabatha Small RN

## 2022-08-23 ENCOUNTER — TRANSFERRED RECORDS (OUTPATIENT)
Dept: HEALTH INFORMATION MANAGEMENT | Facility: CLINIC | Age: 62
End: 2022-08-23

## 2022-08-25 ENCOUNTER — TELEPHONE (OUTPATIENT)
Dept: ANESTHESIOLOGY | Facility: CLINIC | Age: 62
End: 2022-08-25

## 2022-08-25 NOTE — TELEPHONE ENCOUNTER
Pt called and would like to proceed with trigger point injections. Pt stated that she is not interested in doing accupunture at this time. Sent message to Dr. Cherry/Nurses

## 2022-09-01 ASSESSMENT — ANXIETY QUESTIONNAIRES
GAD7 TOTAL SCORE: 7
8. IF YOU CHECKED OFF ANY PROBLEMS, HOW DIFFICULT HAVE THESE MADE IT FOR YOU TO DO YOUR WORK, TAKE CARE OF THINGS AT HOME, OR GET ALONG WITH OTHER PEOPLE?: SOMEWHAT DIFFICULT
6. BECOMING EASILY ANNOYED OR IRRITABLE: SEVERAL DAYS
4. TROUBLE RELAXING: MORE THAN HALF THE DAYS
IF YOU CHECKED OFF ANY PROBLEMS ON THIS QUESTIONNAIRE, HOW DIFFICULT HAVE THESE PROBLEMS MADE IT FOR YOU TO DO YOUR WORK, TAKE CARE OF THINGS AT HOME, OR GET ALONG WITH OTHER PEOPLE: SOMEWHAT DIFFICULT
7. FEELING AFRAID AS IF SOMETHING AWFUL MIGHT HAPPEN: NOT AT ALL
1. FEELING NERVOUS, ANXIOUS, OR ON EDGE: SEVERAL DAYS
3. WORRYING TOO MUCH ABOUT DIFFERENT THINGS: SEVERAL DAYS
5. BEING SO RESTLESS THAT IT IS HARD TO SIT STILL: SEVERAL DAYS
GAD7 TOTAL SCORE: 7
2. NOT BEING ABLE TO STOP OR CONTROL WORRYING: SEVERAL DAYS
7. FEELING AFRAID AS IF SOMETHING AWFUL MIGHT HAPPEN: NOT AT ALL

## 2022-09-01 ASSESSMENT — PAIN SCALES - PAIN ENJOYMENT GENERAL ACTIVITY SCALE (PEG)
INTERFERED_GENERAL_ACTIVITY: 6
INTERFERED_ENJOYMENT_LIFE: 7
AVG_PAIN_PASTWEEK: 6
AVG_PAIN_PASTWEEK: 6
INTERFERED_GENERAL_ACTIVITY: 6
INTERFERED_ENJOYMENT_LIFE: 7
PEG_TOTALSCORE: 6.33
PEG_TOTALSCORE: 6.33

## 2022-09-06 ASSESSMENT — PATIENT HEALTH QUESTIONNAIRE - PHQ9
10. IF YOU CHECKED OFF ANY PROBLEMS, HOW DIFFICULT HAVE THESE PROBLEMS MADE IT FOR YOU TO DO YOUR WORK, TAKE CARE OF THINGS AT HOME, OR GET ALONG WITH OTHER PEOPLE: SOMEWHAT DIFFICULT
SUM OF ALL RESPONSES TO PHQ QUESTIONS 1-9: 13
SUM OF ALL RESPONSES TO PHQ QUESTIONS 1-9: 13

## 2022-09-07 ENCOUNTER — VIRTUAL VISIT (OUTPATIENT)
Dept: PSYCHIATRY | Facility: CLINIC | Age: 62
End: 2022-09-07
Payer: COMMERCIAL

## 2022-09-07 ENCOUNTER — VIRTUAL VISIT (OUTPATIENT)
Dept: BEHAVIORAL HEALTH | Facility: CLINIC | Age: 62
End: 2022-09-07
Payer: COMMERCIAL

## 2022-09-07 DIAGNOSIS — F33.2 SEVERE EPISODE OF RECURRENT MAJOR DEPRESSIVE DISORDER, WITHOUT PSYCHOTIC FEATURES (H): Primary | ICD-10-CM

## 2022-09-07 DIAGNOSIS — E72.12 HOMOZYGOUS FOR C677T POLYMORPHISM OF MTHFR (H): ICD-10-CM

## 2022-09-07 DIAGNOSIS — F33.9 RECURRENT MAJOR DEPRESSION RESISTANT TO TREATMENT (H): ICD-10-CM

## 2022-09-07 DIAGNOSIS — E88.89 CYP2B6 INTERMEDIATE METABOLIZER (H): ICD-10-CM

## 2022-09-07 DIAGNOSIS — G89.4 CHRONIC PAIN SYNDROME: ICD-10-CM

## 2022-09-07 DIAGNOSIS — E88.89 CYP2D6 POOR METABOLIZER (H): ICD-10-CM

## 2022-09-07 DIAGNOSIS — F41.1 GAD (GENERALIZED ANXIETY DISORDER): ICD-10-CM

## 2022-09-07 DIAGNOSIS — R41.89 SUBJECTIVE COGNITIVE IMPAIRMENT: ICD-10-CM

## 2022-09-07 DIAGNOSIS — F33.1 MODERATE RECURRENT MAJOR DEPRESSION (H): Primary | ICD-10-CM

## 2022-09-07 PROCEDURE — 99214 OFFICE O/P EST MOD 30 MIN: CPT | Mod: 95 | Performed by: PSYCHIATRY & NEUROLOGY

## 2022-09-07 PROCEDURE — 90832 PSYTX W PT 30 MINUTES: CPT | Mod: 95 | Performed by: SOCIAL WORKER

## 2022-09-07 NOTE — PROGRESS NOTES
"Janelle Whtie is a 61 year old year old who is being evaluated via a billable video visit.      How would you like to obtain your AVS? MyChart  If you are dropped from the video visit, the video invite should be resent to: Text to cell phone: see Epic  Will anyone else be joining your video visit? No     Telemedicine Visit: The patient's condition can be safely assessed and treated via synchronous audio and visual telemedicine encounter.      Reason for Telemedicine Visit: Covid-19 Pandemic    Originating Site (Patient Location): Patient's home     Distant Site (Provider Location): Provider Remote Setting    Mode of Communication:  Video Conference via [facility]    As the provider I attest to compliance with applicable laws and regulations related to telemedicine.        Outpatient Psychiatric Progress Note    Name: Janelle White   : 1960                    Primary Care Provider: Emili Ballard MD   Therapist: Janelle Brooks, [facility]    PHQ-9 scores:  PHQ-9 SCORE 8/3/2022 2022 2022   PHQ-9 Total Score - - -   PHQ-9 Total Score MyChart - 17 (Moderately severe depression) 13 (Moderate depression)   PHQ-9 Total Score 17 17 13   Some encounter information is confidential and restricted. Go to Review Flowsheets activity to see all data.       STACIE-7 scores:  STACIE-7 SCORE 2022   Total Score - - -   Total Score - 7 (mild anxiety) 7 (mild anxiety)   Total Score 11 7 7       Patient Identification:  Patient is a 61 year old,   White Choose not to answer female  who presents for return visit with me.  Patient is currently on medical leave from work as NP. Patient attended the phone/video session alone. Patient prefers to be called: \"Janelle\".    Interim History:  I last saw Janelle White for outpatient psychiatry return visit on 2022. During that appointment, we:     Continue Pristiq 50 mg daily for mood, anxiety.     Continue methyl folate " "7.5 mg daily as supplementation.    Continue Adderall XR 20 mg daily for mood augmentation    Continue Adderall IR 20 mg daily as needed for mood augmentation and daytime fatigue/hypersomnia    Continue Mirapex/pramipexole 0.25 mg at bedtime for restless leg syndrome/treatment resistant depression.  Continue to watch for impulsive behaviors/problematic shopping behaviors.    Continue benzodiazepine per primary care prescriber.    Continue ketamine with interventional psychiatry clinic.    Continue to work with your specialist from [facility] as indicated.      Continue working with addiction medicine clinic as needed/as indicated.    Discussed possibility of DBT therapy with your therapist.  Discussed book recommendation, Building A [facility] (by Elizabeth Kumar).    Discussed future consideration for lithium if suicidal ideation continues to persist.    Neuropsychological testing referral placed due to ongoing subjective cognitive concerns.    Recommend individual psychotherapy.      9/7: Patient continuing to struggle with up-and-down, and overall depressed, mood.  Ketamine continues to be quite helpful, particularly right after treatments.  Continues in therapy.  Pain continues to be \"a constant saunders.\"  Patient feels like \"my toolbox is not huge\" regarding her pain.  Denies acute suicide concerns today.  No acute safety concerns today.  No plan or intent to harm self noted today.  See below [facility] note for much more detail regarding today's joint visit.    Per [facility], RAMIREZ Zheng, during today's team-based visit:  MH Update: \"pretty good.\" Not feeling as down as before and less labile. Trying to stay busy and is using pacing as discussed in prior sessions. Some days are still hard and feels like constantly trying to do things to feel better: stretching, walking, mindfulness, breathing, self-talk. Does help but not feel like a quality of life.  Patient expresses frustration that she has been dealing " "with chronic pain for so long and feels that it will never improve.  She feels that taking care of the pain is exhausting in itself.  Patient expresses wanting to be able to do things and live her life without needing to stop and take care of her pain, feeling limited in what she can do.  Patient also expresses frustration that she has so many providers and feels that she is not getting any improvement.  Patient is aware of the mind body link and has been working to stabilize and minimize her stressors.  Patient feels that her mental health is more stable than it has been in a long time and yet is frustrated that her body continues to suffer.  Patient did complete her neuropsych testing and found it did provide some answers/confirmation of what she is actually experiencing.  Patient continues to do her ketamine treatments as she feels they are helpful as well.  Patient admits that she stopped her individual therapy as she did not find it beneficial at this time.  [facility] validated patient for her experience and the difficulties of managing chronic pain.  [facility] discussed the idea of patient trying to simplify her care network and focus on having some hard conversations with 1 or 2 of her core providers instead of having more providers added on.  [facility] processed patient being ready to accept things that she may not want to hear but knows are reality and how this relates to grieving the changes in her life.  [facility] provided encouragement the patient is doing all the positive things that she can and to continue doing so, even with her frustrations.  [facility] assessed for safety.     SI: thoughts of not feeling a point to living but not \"intentional.\" No plan or intention to harm self. Aware of crisis resources to use as needed.     Tx: Janelle Brooks with [facility]. Decided to put on hold as not feel helpful for now.  [facility] will look into DBT/health psychology referrals that could be a better fit for patient's needs at this time.     Most " "Important: keep medication the same, feeling stable with mental health    Past medication trials include but are not limited to:   Effexor-very flat  Celexa, zoloft, was on wellbutrin a couple years at one point  wellbutrin XL + celexa; 2005ish  tca-a ton of weight gain  depakote maybe for a short time  Abilify/lithium ?  ECT as teen - made me dull  Cytomel-not effective at all, used 50 mcg    Psychiatric ROS:  Janelle White reports mood has been: Still pretty up-and-down, depressed  Anxiety has been: up-and-down  Sleep has been: Relatively okay, still struggles at times  Deepa sxs: Denies  Psychosis sxs: None  ADHD/ADD sxs: None  PTSD sxs: None  PHQ9 and GAD7 scores were reviewed today if completed.   Medication side effects: Denies anything major related to current psychiatric medications  Current stressors include: Symptoms and See HPI above  Coping mechanisms and supports include: Family, Hobbies and Friends, therapy    Current medications include:   Current Outpatient Medications   Medication Sig     albuterol (PROAIR HFA/PROVENTIL HFA/VENTOLIN HFA) 108 (90 Base) MCG/ACT inhaler Inhale 2 puffs into the lungs every 6 hours (Patient taking differently: Inhale 2 puffs into the lungs every 6 hours as needed for shortness of breath / dyspnea or wheezing)     amphetamine-dextroamphetamine (ADDERALL XR) 20 MG 24 hr capsule Take 1 capsule (20 mg) by mouth daily     amphetamine-dextroamphetamine (ADDERALL) 15 MG tablet Take 1 tablet (15 mg) by mouth 2 times daily as needed (focus/concentration)     ASHWAGANDHA PO Take 1 tablet by mouth daily     desvenlafaxine (PRISTIQ) 50 MG 24 hr tablet Take 1 tablet (50 mg) by mouth daily     Estradiol (DIVIGEL) 1 MG/GM GEL Place 1 packet onto the skin daily     HYDROcodone-acetaminophen (NORCO)  MG per tablet Take 1 tablet by mouth every 4 hours as needed for severe pain max 4 tabs/24 hrs     insulin syringe-needle U-100 (30G X 1/2\" 1 ML) 30G X 1/2\" 1 ML miscellaneous " Inject 1 ml B12 qmonth     lidocaine (LIDODERM) 5 % patch Place 4 patches onto the skin daily Apply up to 4 patches to skin. Wear for 12 hours and remove for 12 hrs.  Refill when patient requests.     LORazepam (ATIVAN) 1 MG tablet Take 1 tablet (1 mg) by mouth every 6 hours as needed for anxiety (Patient taking differently: Take 1 mg by mouth At Bedtime)     medical cannabis (Patient's own supply.  Not a prescription) Medical Cannabis - [place] 4-6 ml by mouth daily. [facility]     methocarbamol (ROBAXIN) 500 MG tablet Take 1 tablet (500 mg) by mouth 4 times daily as needed for muscle spasms     methylfolate (DEPLIN) 7.5 MG TABS tablet Take 1 tablet (7.5 mg) by mouth daily     naloxone (NARCAN) 4 MG/0.1ML nasal spray Spray 1 spray (4 mg) into one nostril alternating nostrils as needed for opioid reversal every 2-3 minutes until assistance arrives     NONFORMULARY Take 2 Scoops by mouth daily Protein and Vitamin shake mix - Nutritional supplement     ondansetron (ZOFRAN-ODT) 8 MG ODT tab Take 1 tablet (8 mg) by mouth every 8 hours as needed for nausea     Prasterone 6.5 MG INST Place 0.5 suppositories vaginally three times a week     rOPINIRole (REQUIP) 0.25 MG tablet Take 1-2 tablets (0.25-0.5 mg) by mouth At Bedtime     senna-docusate (SENOKOT-S/PERICOLACE) 8.6-50 MG tablet Take 6-9 tablets by mouth every evening     vitamin D3 (CHOLECALCIFEROL) 125 MCG (5000 UT) tablet Take 5,000 Units by mouth daily     No current facility-administered medications for this visit.       The [place] Prescription Monitoring Program has been reviewed and there are no concerns about diversionary activity for controlled substances at this time.   08/11/2022  1   08/08/2022  Dextroamp-Amphet Er 20 MG Cap    30.00  30 Al Bau   [id number]   All (4435)   0/0   [place]   08/08/2022  1   08/08/2022  Dextroamp-Amphetamin 15 MG Tab    60.00  30 Al Bau   [id number]   All (4435)   0/[place]   07/15/2022  1    06/11/2022  Dextroamp-Amphet Er 20 MG Cap    30.00  30 Al Bau   [id number]   All (4435)   0/[place]   07/15/2022  1   04/13/2022  Dextroamp-Amphetamin 20 MG Tab    30.00  30 Al Bau   [id number]   All (4435)   0/[place]   06/13/2022  1   06/11/2022  Dextroamp-Amphetamin 20 MG Tab    30.00  30 Al Bau   [id number]     [id number] All (4435)   0/[place]   06/11/2022  1   04/13/2022  Dextroamp-Amphet Er 20 MG Cap    30.00  30 Al Bau   [id number]   All (4435)   0/[place]     Past Medical/Surgical History:  Past Medical History:   Diagnosis Date     Anxiety      Cervicalgia 2007    C5-6 disc protrusion     COPD (chronic obstructive pulmonary disease) (H) 2/7/2022     Depressive disorder      ESBL (extended spectrum beta-lactamase) producing bacteria infection      History of blood transfusion 2007    Cervical fusion     Leukemia (H)     CLA large beta     Melanoma (H) 1998     Migraine      Other chronic pain      Rotator cuff tear     s/p injections     Sacroiliac inflammation (H)      Shift work sleep disorder 12/16/2013     Urinary calculi      Vitamin B12 deficiency anemia 2006    started injections      has a past medical history of Anxiety, Cervicalgia (2007), COPD (chronic obstructive pulmonary disease) (H) (2/7/2022), Depressive disorder, ESBL (extended spectrum beta-lactamase) producing bacteria infection, History of blood transfusion (2007), Leukemia (H), Melanoma (H) (1998), Migraine, Other chronic pain, Rotator cuff tear, Sacroiliac inflammation (H), Shift work sleep disorder (12/16/2013), Urinary calculi, and Vitamin B12 deficiency anemia (2006).    She has no past medical history of Cerebral infarction (H), Congestive heart failure (H), Coronary artery disease, Diabetes (H), Heart disease, Hypertension, Thyroid disease, or Uncomplicated asthma.    Social History:  Reviewed. No changes to social history except as noted above in HPI.    Vital Signs:   None. This  is phone/video visit.     Labs:  Most recent laboratory results reviewed and the pertinent results include:   Lab Results   Component Value Date    WBC 5.6 06/30/2022    WBC 3.2 05/24/2021     Lab Results   Component Value Date    RBC 4.61 06/30/2022    RBC 3.74 05/24/2021     Lab Results   Component Value Date    HGB 14.2 06/30/2022    HGB 11.0 05/24/2021     Lab Results   Component Value Date    HCT 43.6 06/30/2022    HCT 33.6 05/24/2021     No components found for: MCT  Lab Results   Component Value Date    MCV 95 06/30/2022    MCV 90 05/24/2021     Lab Results   Component Value Date    MCH 30.8 06/30/2022    MCH 29.4 05/24/2021     Lab Results   Component Value Date    MCHC 32.6 06/30/2022    MCHC 32.7 05/24/2021     Lab Results   Component Value Date    RDW 11.9 06/30/2022    RDW 13.4 05/24/2021     Lab Results   Component Value Date     07/04/2022     05/24/2021     Last Comprehensive Metabolic Panel:  Sodium   Date Value Ref Range Status   06/30/2022 136 133 - 144 mmol/L Final   05/24/2021 141 133 - 144 mmol/L Final     Potassium   Date Value Ref Range Status   06/30/2022 3.4 3.4 - 5.3 mmol/L Final   05/24/2021 3.9 3.4 - 5.3 mmol/L Final     Chloride   Date Value Ref Range Status   06/30/2022 105 94 - 109 mmol/L Final   05/24/2021 108 94 - 109 mmol/L Final     Carbon Dioxide   Date Value Ref Range Status   05/24/2021 25 20 - 32 mmol/L Final     Carbon Dioxide (CO2)   Date Value Ref Range Status   06/30/2022 25 20 - 32 mmol/L Final     Anion Gap   Date Value Ref Range Status   06/30/2022 6 3 - 14 mmol/L Final   05/24/2021 8 3 - 14 mmol/L Final     Glucose   Date Value Ref Range Status   06/30/2022 91 70 - 99 mg/dL Final   05/24/2021 87 70 - 99 mg/dL Final     Urea Nitrogen   Date Value Ref Range Status   06/30/2022 20 7 - 30 mg/dL Final   05/24/2021 15 7 - 30 mg/dL Final     Creatinine   Date Value Ref Range Status   07/03/2022 0.67 0.52 - 1.04 mg/dL Final   05/24/2021 0.64 0.52 - 1.04 mg/dL  Final     GFR Estimate   Date Value Ref Range Status   07/03/2022 >90 >60 mL/min/1.73m2 Final     Comment:     Effective December 21, 2021 eGFRcr in adults is calculated using the 2021 CKD-EPI creatinine equation which includes age and gender (Zhou peter al., NEJM, DOI: 10.1056/HXODjb9760137)   05/24/2021 >90 >60 mL/min/[1.73_m2] Final     Comment:     Non  GFR Calc  Starting 12/18/2018, serum creatinine based estimated GFR (eGFR) will be   calculated using the Chronic Kidney Disease Epidemiology Collaboration   (CKD-EPI) equation.       Calcium   Date Value Ref Range Status   06/30/2022 8.7 8.5 - 10.1 mg/dL Final   05/24/2021 8.4 (L) 8.5 - 10.1 mg/dL Final     Bilirubin Total   Date Value Ref Range Status   04/26/2022 0.4 0.2 - 1.3 mg/dL Final   03/16/2021 0.4 0.2 - 1.3 mg/dL Final     Alkaline Phosphatase   Date Value Ref Range Status   04/26/2022 82 40 - 150 U/L Final   03/16/2021 87 40 - 150 U/L Final     ALT   Date Value Ref Range Status   04/26/2022 21 0 - 50 U/L Final   03/16/2021 26 0 - 50 U/L Final     AST   Date Value Ref Range Status   04/26/2022 18 0 - 45 U/L Final   03/16/2021 44 0 - 45 U/L Final     Review of Systems:  10 systems (general, cardiovascular, respiratory, eyes, ENT, endocrine, GI, , M/S, neurological) were reviewed. Most pertinent finding(s) is/are: Severe chronic pain. The remaining systems are all unremarkable.    Mental Status Examination (limited as this is by phone/video):  Appearance: Awake, alert, appears stated age, well-groomed, no acute distress  Attitude:  cooperative, pleasant  Motor: No gross abnormalities observed via video, not formally tested  Oriented to:  person, place, time, and situation  Attention Span and Concentration:  normal  Speech:  clear, coherent, regular rate, rhythm, and volume  Language: intact  Mood: up-and-down, depressed  Affect: Mood congruent  Associations:  no loose associations  Thought Process:  logical, linear and goal  oriented  Thought Content: Chronic passive suicidal ideation-at baseline today-no current plan or intent to harm self today, no homicidal ideation, no evidence of psychotic thought, no auditory hallucinations present and no visual hallucinations present  Recent and Remote Memory:  Intact to interview. Not formally assessed. No amnesia.  Fund of Knowledge: appropriate  Insight:  good  Judgment:  intact, adequate for safety  Impulse Control:  intact    Suicide Risk Assessment:  Today Janelle White reports chronic/intermittent suicidal ideation-at baseline today.There are notable risk factors for self-harm, including anxiety, comorbid medical condition of Chronic pain, suicidal ideation, purposelessness/no reason for living, hopelessness, withdrawing and mood change. However, risk is mitigated by commitment to family, absence of past attempts, ability to volunteer a safety plan, history of seeking help when needed, future oriented and denies suicidal intent today. Therefore, based on all available evidence including the factors cited above, Janelle White does not appear to be at imminent risk for self-harm, does not meet criteria for a 72-hr hold, and therefore remains appropriate for ongoing outpatient level of care.  A thorough assessment of risk factors related to suicide and self-harm have been reviewed and are noted above. Local community safety resources reviewed for patient to use if needed. There was no deceit detected, and the patient presented in a manner that was believable.     DSM5 Diagnosis:  Major Depressive Disorder, Recurrent Episode, severe, without psychotic features  Treatment resistant depression  CYP2D6 poor metabolizer  CYP2B6 intermediate metabolizer  Homozygous for C677T polymorphism of MTHFR    Medical comorbidities include:   Patient Active Problem List    Diagnosis Date Noted     Suicidal ideation 06/30/2022     Priority: Medium     Immunocompromised (H) 06/30/2022      Priority: Medium     Infection due to 2019 novel coronavirus 06/30/2022     Priority: Medium     Severe episode of recurrent major depressive disorder, without psychotic features (H) 04/17/2022     Priority: Medium     COPD (chronic obstructive pulmonary disease) (H) 02/07/2022     Priority: Medium     Tension type headache 11/04/2021     Priority: Medium     Rotator cuff injury 11/04/2021     Priority: Medium     Spinal stenosis of lumbar region with radiculopathy 09/02/2021     Priority: Medium     COVID-19 08/29/2021     Priority: Medium     Polyarthralgia 08/11/2021     Priority: Medium     Cellulitis, unspecified cellulitis site 08/11/2021     Priority: Medium     Sepsis, due to unspecified organism, unspecified whether acute organ dysfunction present (H) 08/11/2021     Priority: Medium     Cellulitis 08/11/2021     Priority: Medium     STACIE (generalized anxiety disorder) 07/27/2021     Priority: Medium     MTHFR gene mutation 04/12/2021     Priority: Medium     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     Priority: Medium     CYP2D6 poor metabolizer (H) 04/12/2021     Priority: Medium     Status post blepharoplasty 11/18/2020     Priority: Medium     Regular astigmatism, bilateral 11/18/2020     Priority: Medium     Prediabetes 09/19/2019     Priority: Medium     Hyperlipidemia LDL goal <130 09/19/2019     Priority: Medium     CLARE (obstructive sleep apnea) 02/13/2019     Priority: Medium     Controlled substance agreement signed 08/14/2018     Priority: Medium     Chronic pain syndrome 12/21/2017     Priority: Medium     CLL (chronic lymphocytic leukemia) (H) 12/21/2017     Priority: Medium     Hypotension 09/14/2017     Priority: Medium     History of laser assisted in situ keratomileusis 10/14/2014     Priority: Medium     Pain medication agreement 04/23/2014     Priority: Medium     Formatting of this note might be different from the original.  Patient takes morphine 15 mg ER BID for chronic neck and  back pain.  She is also on cymbalta, ibuprofen, flexaril prn       Shift work sleep disorder 12/16/2013     Priority: Medium     Vitamin D deficiency 11/08/2012     Priority: Medium     Moderate recurrent major depression (H) 01/06/2011     Priority: Medium     DDD (degenerative disc disease), cervical 10/07/2010     Priority: Medium     CARDIOVASCULAR SCREENING; LDL GOAL LESS THAN 160 02/10/2010     Priority: Medium     Chronic Low Back Pain 10/01/2009     Priority: Medium     S/p AP L3-S1 fusion 12/2010 - referred to [facility].   Orthopedics writing scripts for narcotics post-op.       Migraine      Priority: Medium     Problem list name updated by automated process. Provider to review       B-complex deficiency 10/10/2006     Priority: Medium     Problem list name updated by automated process. Provider to review       PERSONAL HX OF  MELANOMA 12/04/2003     Priority: Low       Psychosocial & Contextual Factors: see HPI above    Assessment:  6/15/2021:  Janelle TORRES Christopher reports overall some significant worsening of mood symptoms.  Increased anxiety with her depression.  Poor motivation and poor energy.  Symptoms severe enough to prevent her from being able to utilize typical coping skills and strategies.  No current motivation or energy to participate in PHP/day treatment program.  Continues to take medication as scheduled.  Recent surgery and general anesthesia could be contributing.  Discussed discontinuing stimulant medication as an augmentation strategy.  She is agreeable to moving forward with T3 as an augmentation for treatment resistant depression.  Discussed risks and benefits at length.  Will get baseline thyroid labs prior to starting T3.  Hoping she will experience increased energy and motivation and that her mood will lift.  Also discussed setting up an appointment with her interventional psychiatry clinic to discuss other potential options such as ketamine therapy, ECT, TMS.  Does have  history of ECT for depression when she was quite young.  I am hopeful to stay ahead of her symptoms before things get too severe.  Has chronic suicidal ideation and is at high risk for suicide.  Continues to deny any plan or intent.  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  See below for risk/benefit conversation regarding T3 had with the patient:    GeneSight testing Info:  GeneSight testing revealed she is a poor 2D6 metabolizer and an intermediate 2B6 metabolizer.  This would explain her multiple failed medication trials due to the negative side effects.  She also has a genotype that would suggest a phenotype sensitivity to serotonin. She also was found to have significantly reduced folic acid conversion.      Starting T3 as augment to antidepressant therapy for treatment resistant depression:  Start T3 at 25 mcg per day for one to two weeks, and if there is little or no improvement, increase the dose to 50 mcg per day; this is consistent with practice guidelines from the American Psychiatric Association and [place] Network for Mood and Anxiety Treatments.     Adverse effects consistent with hyperthyroidism may occur, including tremor, palpitations, heat intolerance, sweating, anxiety, increased frequency of bowel movements, shortness of breath, and exacerbation of cardiac arrhythmia. In addition, hyperthyroidism that emerges during long-term treatment may lead to bone demineralization, osteoporosis, and an increased risk of fracture    Following a normal baseline TSH concentration, no other laboratory monitoring during a four to six week trial of adjunctive T3 is necessary. However, if T3 is continued longer, a serum TSH concentration should be checked after the first one to three months of treatment and then every six months.    Today, 7/27/21:   Patient overall with little change in her symptoms.  Did not do as well on Cytomel and had limited to no improvement at all after  weeks on 50 mcg.  Labs were unremarkable prior to starting Cytomel.  Opted to go back to stimulant augmentation since patient does find it quite helpful.  She has been taking 15 mg of immediate release most afternoons.  Due to good tolerability and some efficacy, we will bump up her immediate release dose slightly to 20 mg daily as needed in addition to her 20 mg extended release dose. I have no concerns about misuse or diversion at this time.  Tolerating well with no negative side effects.  Last blood pressure normal 7/2.  Patient has noted some efficacy from Pristiq more than other antidepressant trials and so we will continue to titrate further to 100 mg daily.  She is tolerating well.  Continues to use lorazepam for anxiety, pain medicine adjunct, and for sleep as prescribed by primary care provider.  Has been utilizing roughly 100 tablets of 0.5 mg every 1.5 months.  Patient with ongoing chronic intermittent passive suicidal ideation with no plan or intent.  Denies safety concerns today.  No problematic drug or alcohol use.  I am hopeful the interventional psychiatry clinic might be able to initiate ketamine therapy or another therapy they would recommend as potentially being more helpful than patient's multiple medication/augmentation trials.    10/6/2021:  Patient with some improved depression symptoms and much more hopeful.  Seeing specialist at [facility]-feels very hurt and listened to.  Also is hopeful there will be some helpful treatments.  Visit to [place] was also very good and instilled a lot of hope in her abilities.  She was encouraged to continue to push herself to do the things she enjoys doing.  No medication changes today.  She will follow-up with getting TMS rescheduled, possibly when things slow down after the holidays.  Does not need to be seen until after the holidays.  No acute safety concerns today.  No problematic drug or alcohol use.    1/14/2022:  Overall patient feeling a little more down  lately.  Tolerating TMS well.  Encouraged to continue TMS.  No medication changes today since undergoing TMS treatment.  Talked about life stages today generativity versus stagnation and also integrity versus despair.  Talked about consideration for acceptance and commitment therapy.  She is encouraged to continue to be active.  No acute suicidal ideation today.  No acute safety concerns today.  No problematic drug or alcohol use.    4/13/2022:   Patient continues to have symptoms that wax and wane.  Feels like she tolerates Pristiq 50 mg better than 100 and will continue on this dose.  Last week was particularly difficult.  Pretty intense suicidal ideation but she was able to manage these thoughts and remain safe.  She does report she would come to the hospital if necessary.  We discussed the empath unit today and other resources if she felt she could not keep herself safe.  She continues to work on tapering her narcotic pain medication regimen.  She is working with addiction medicine and also pain management.  She is starting Pilates therapy.  Pool therapy will begin in July.  Discussed possibility of vestibular rehabilitation.  Individual therapy will also start soon.  She was strongly encouraged to continue to pursue ketamine therapy.  No acute suicidality today.  No problematic drug or alcohol use.    6/23/2022:  Overall reports doing relatively okay.  Some pretty down days still, but ketamine therapy going well.  Feels like continuing to move in the right direction.  Suicidal ideation improving.  Off all narcotic pain medication.  Feels good about her progress.  Had some really down days after off pain medication but improved since those few dark days.  Hopeful about where ketamine therapy may lead.  Discussed some of her restlessness at bedtime and will start pramipexole.  Also some evidence to suggest pramipexole could be helpful for treatment resistant depression symptoms.  Discussed risks and benefits of  therapy, including watching for any impulsive behaviors.  Continues to have suicidal thoughts but no acute suicidality today.  Her suicidality overall is improving from her baseline suicidality.  She continues to consider the idea of a partial hospital program.  We will send her information for [facility] program.  Also encouraged her to discuss possibility of a pain program through [facility] with Dr. Medley.  No acute safety concerns today.  No problematic drug or alcohol use.    7/11/2022:  Patient with some recent more intensive struggles.  Depression much more severe recently.  Led to hospitalization.  Patient medically hospitalized since was positive for COVID and has history of CLL and Conrad protocol requires isolation.  Patient seen by psychiatry consult service.  No medication changes made.  Patient continues with interventional psychiatry clinic.  They will be increasing ketamine dose and treatments will be every few weeks.  We discussed patient's symptom history a little more today and possible bipolar diagnosis came into question.  Patient does recognize possible hypomanic episodes in the past.  Patient is currently monitoring symptoms closely and pramipexole.  She is feeling better since starting pramipexole but is watching shopping behaviors closely.  Suicidal ideation is improving since hospitalization.  Restless legs improved at night on pramipexole.  Discussed we could consider adding lower dose lithium as an augment to her Pristiq and to help stabilize moods a little bit more and to further help with some of her chronic suicidal ideation.  We also discussed DBT for chronic suicidal ideation and ongoing mood instability.  Continues off of pain medication.  No acute suicidality or safety concerns today.  Patient will follow up in a few weeks.  No medication changes today since we will wait to see how ketamine dose change goes.  No drug or alcohol use concerns.  Patient noted some ongoing  cognitive/memory concerns and requested testing.  Neuropsychological referral placed.    8/8/2022:  Patient with ongoing severe depression, resistant to treatment.  She is agreeable to increasing her stimulant medication.  This could hopefully further improve mood slightly.  May also help with some additional energy and also help with some of her ongoing cognitive concerns.  She has neuropsychological testing coming up soon.  Discussed possibly considering individual DBT therapy with a DBT certified therapist given her ongoing chronic suicidal ideation and inability to participate in group therapy currently.  We will discuss this possibility with her current therapist.  Also discussed possibility of increasing Pristiq by 25 mg only 2 or 3 days a week to decrease risk of negative side effects (25 mg on Tuesdays/Thursdays for Monday/Wednesday/Fridays).  Patient also encouraged to continue ketamine treatment.  No acute suicidality today.  Patient denies any intent or plan to act on any of her suicidal thoughts today.  No problematic drug or alcohol use.    9/7/2022:  Patient doing relatively okay today.  Pain continues to feel a little bit worse.  Emotionally though, despite worsening pain symptoms, patient feels like she is doing relatively okay.  Discussed various psychotherapy approaches that could be taken to address her symptoms.  We discussed that health psychology could be an optimal approach to help with her symptoms but that her suicidal ideation is often still quite intense and may be a distractor to her treatment with a health psychologist (discussed she may be referred often to the emergency room or to other more intensive programs).  We discussed working with an individual DBT therapist as a possibility to continue addressing her ongoing chronic suicidal ideation (individual therapy would allow patient to move at her own pace since group therapy is much too intense at this time).  Patient was open to this  idea.  [facility] today we will send patient some resources/options.  Depending on patient's financial situation, there is also a possibility patient could work with a health psychologist in conjunction with a DBT therapist.  Ketamine therapy has proven to be helpful, although I do wish the effects lasted a little longer than what they currently are for the patient.  I recommend patient continue her ketamine treatments.  No medication changes today.  She denies any acute suicidality today.  No plan or intent to harm herself noted today.  She denies being in a bad place today.  No problematic drug or alcohol use.     Medication side effects and alternatives were reviewed. Health promotion activities recommended and reviewed today. All questions addressed. Education and counseling completed regarding risks and benefits of medications and psychotherapy options. Recommend therapy for additional support.     Treatment Plan:    Continue Pristiq 50 mg daily for mood, anxiety.     Continue methyl folate 7.5 mg daily as supplementation.    Continue Adderall XR 20 mg daily for mood augmentation    Continue Adderall IR 15 mg twice daily as needed for mood augmentation and daytime fatigue/hypersomnia    Continue benzodiazepine per primary care prescriber.    Continue ketamine with interventional psychiatry clinic.    Continue to work with your specialist from [facility] as indicated.      Discussed possibility of individual DBT therapy again today.  [facility] will send some options/resources.    Could continue to consider lithium if suicidal ideation continues to persist.    Recommend ongoing individual psychotherapy.      Continue all other cares per primary care provider.     Continue all other medications as reviewed per electronic medical record today.     Safety plan reviewed. To the Emergency Department as needed or call after hours crisis line at [phone] or [phone]. [place] Crisis Text Line. Text [place] to 006806 or Suicide  LifeLine Chat: suicidepreventionlifeline.org/chat    Schedule an appointment with me in 4 weeks, or sooner as needed. Call [facility] at [phone] to schedule.    Follow up with primary care provider as planned or for acute medical concerns.    Call the psychiatric nurse line with medication questions or concerns at [phone].    MyChart may be used to communicate with your provider, but this is not intended to be used for emergencies.    Therapy resources:  Www.[facility].org    https://www.[employer]co.uks-dbt.com/mental-health/thrive/thrive-mental-health-and-chronic-pain-management/    [place] DBT resources:  https://[place].gov/dhs/partners-and-providers/policies-procedures/adult-mental-health/dialectical-behavior-therapy/dbt-certified-providers/    Risks of benzodiazepine (Ativan, Xanax, Klonopin, Valium, etc) use including, but not limited to, sedation, tolerance, risk for addiction/dependence. Do not drink alcohol while taking benzodiazepines due to risk of trouble breathing and potential death. Do not drive or operate heavy machinery until it is known how the drug affects you. Discuss with physician or pharmacist before ever taking a benzodiazepine with a narcotic/opioid pain medication.     Have previously discussed risks of stimulant medication including, but not limited to, decreased appetite, risk of tics (and that they may be lasting), trouble sleeping, cardiac risks such as increased heart rate and blood pressure, and rare risk of sudden cardiac death.  Also risk of addiction/tolerance/dependence.    Administrative Billing:   Phone Call/Video Duration: 35 Minutes  8:12a - 8:47a    Patient Status:  Patient is a continuous care patient and refills will continue to come from this provider until otherwise noted.    Signed:   Kimberly Perez DO  [facility]    Disclaimer: This note consists of symbols derived from keyboarding, dictation and/or voice recognition software. As a result, there may be errors in the  script that have gone undetected. Please consider this when interpreting information found in this chart.

## 2022-09-07 NOTE — PROGRESS NOTES
[facility]  2022      Behavioral Health Clinician Progress Note    Patient Name: Janelle White           Service Type:  Individual      Service Location:   [facility]     Session Start Time: 07:35 am  Session End Time: 08:00 am      Session Length: 16 - 37      Attendees: Patient     Service Modality:  Video Visit:      Provider verified identity through the following two step process.  Patient provided:  Patient photo and Patient     Telemedicine Visit: The patient's condition can be safely assessed and treated via synchronous audio and visual telemedicine encounter.      Reason for Telemedicine Visit: Services only offered telehealth    Originating Site (Patient Location): Patient's home (in car)    Distant Site (Provider Location): Provider Remote Setting- Home Office    Consent:  The patient/guardian has verbally consented to: the potential risks and benefits of telemedicine (video visit) versus in person care; bill my insurance or make self-payment for services provided; and responsibility for payment of non-covered services.     Patient would like the video invitation sent by:  My Chart    Mode of Communication:  Video Conference via [facility]    As the provider I attest to compliance with applicable laws and regulations related to telemedicine.    Visit Activities (Refresh list every visit): [facility] Only    Diagnostic Assessment Date: 2021  Treatment Plan Review Date: 10/13/2022  See Flowsheets for today's PHQ-9 and STACIE-7 results  Previous PHQ-9:   PHQ-9 SCORE 8/3/2022 2022 2022   PHQ-9 Total Score - - -   PHQ-9 Total Score MyChart - 17 (Moderately severe depression) 13 (Moderate depression)   PHQ-9 Total Score 17 17 13   Some encounter information is confidential and restricted. Go to Review Flowsheets activity to see all data.     Previous STACIE-7:   STACIE-7 SCORE 2022   Total Score - - -   Total Score - 7  "(mild anxiety) 7 (mild anxiety)   Total Score 11 7 7       JAYRO LEVEL:  JAYRO Score (Last Two) 7/9/2009 1/27/2011   JAYRO Raw Score 44 50   Activation Score 70.8 86.3   JAYRO Level 4 4       DATA  Extended Session (60+ minutes): No  Interactive Complexity: No  Crisis: No  [facility] Patient: No    Treatment Objective(s) Addressed in This Session:  Target Behavior(s): disease management/lifestyle changes pain management    Depressed Mood:    Anxiety: will develop more effective coping skills to manage anxiety symptoms  Psychological distress related to Pain    Current Stressors / Issues:  MH Update: \"pretty good.\" Not feeling as down as before and less labile. Trying to stay busy and is using pacing as discussed in prior sessions. Some days are still hard and feels like constantly trying to do things to feel better: stretching, walking, mindfulness, breathing, self-talk. Does help but not feel like a quality of life.  Patient expresses frustration that she has been dealing with chronic pain for so long and feels that it will never improve.  She feels that taking care of the pain is exhausting in itself.  Patient expresses wanting to be able to do things and live her life without needing to stop and take care of her pain, feeling limited in what she can do.  Patient also expresses frustration that she has so many providers and feels that she is not getting any improvement.  Patient is aware of the mind body link and has been working to stabilize and minimize her stressors.  Patient feels that her mental health is more stable than it has been in a long time and yet is frustrated that her body continues to suffer.  Patient did complete her neuropsych testing and found it did provide some answers/confirmation of what she is actually experiencing.  Patient continues to do her ketamine treatments as she feels they are helpful as well.  Patient admits that she stopped her individual therapy as she did not find it beneficial at this " "time.  [facility] validated patient for her experience and the difficulties of managing chronic pain.  [facility] discussed the idea of patient trying to simplify her care network and focus on having some hard conversations with 1 or 2 of her core providers instead of having more providers added on.  [facility] processed patient being ready to accept things that she may not want to hear but knows are reality and how this relates to grieving the changes in her life.  [facility] provided encouragement the patient is doing all the positive things that she can and to continue doing so, even with her frustrations.  [facility] assessed for safety.    SI: thoughts of not feeling a point to living but not \"intentional.\" No plan or intention to harm self. Aware of crisis resources to use as needed.    Tx: Janelle [place] [facility]. Decided to put on hold as not feel helpful for now.  [facility] will look into DBT/health psychology referrals that could be a better fit for patient's needs at this time.    Most Important: keep medication the same, feeling stable with mental health    08/08/2022:  Reported that she has been practicing activity pacing more often. She finds that it helps her to take breaks more often than in the past and cumulatively she has been completing more tasks. Despite having more situations for her to deal with recently, she finds that she has been calm which allows her to be more productive. She noted that she is tolerating requip better than mirapex. She had an intake with the [facility] program at [facility] and attended her first group meeting. She noted that she does not feel ready for groups right now. She finds that she gets overwhelmed leading to difficulty comprehending information. \"I can't make sense of things.\" She has an interview with neuropsychology coming up soon and wants to finish that before doing more intensive therapy like the [facility] program.       07/11/2022:  Spoke about her recent psychiatric hospitalization. She noted that it was " "helpful and she is glad it occurred but did not like feeling \"locked up.\" She spoke about recognizing some behaviors that have made her question if she was having manic episodes. She noted having periods of significant over-spending and very talkative in the hospital, \"up, unleashed, blabbering\", she said her mood was up \"but not euphoric,\" needing less sleep, impatience/irritability, and saying things to people without thinking of the impact. She noted that it was awkward coming back home with her family. Her family initially was home but all had to leave to do various things and she was left alone. She took some time in the [facility] and had an \"outside experience\" questioning if she felt that she did not belong. It was very brief and she felt better later. She reported that on reflection, she believes she may have been having manic/hypomanic episodes as early as college. She spoke about being able to work 24 hour shifts without significant problems. She said that she is recognizing \"episodic\" periods in her life when there has been a change in her mood/energy suggesting possible hypomanic episodes.      06/23/2022:  Reported that she tested positive for COVID yesterday. This is her second time and said that it is not as severe as the first time. She spoke about her experience with Ketamine noting that she is very pleased with how peaceful she feels with the treatments. \"It's been very relaxing and affirming experience.\" She feels relaxed and in a good mood after the treatment. She finds it easier to bring herself back to that level of relaxation during times of stress. She spoke about her ongoing suicidal ideation explaining that \"when the thoughts enter my mind they are not working thoughts.\" She explained that the suicidal thoughts are just as frequent but less intense, do not last as long, and are less threatening to her than they have been in the past. She was commended for her continued work and efforts to " "improve herself, and she was encouraged to continue working toward her goals.      04/13/2022:  Does not feel like she received a significant benefit from TMS. She feels she was not having as frequent ruminative thoughts of suicide but that has started to return recently. It tends to happen more often when her physical pain is more intense. She got to a point where she set a date to kill herself but she changed her mind because her son came to spend time with her and she changed her mind saying, \"It just wasn't right yet.\" She noted that she has a few plans but will not discuss them saying, \"I don't want anyone to take those options away from me.\"       Progress on Treatment Objective(s) / Homework:  Satisfactory progress - ACTION (Actively working towards change); Intervened by reinforcing change plan / affirming steps taken    Motivational Interviewing    MI Intervention: Expressed Empathy/Understanding, Permission to raise concern or advise, Open-ended questions and Reflections: simple and complex     Change Talk Expressed by the Patient: Desire to change Reasons to change Activation Taking steps    Provider Response to Change Talk: E - Evoked more info from patient about behavior change, A - Affirmed patient's thoughts, decisions, or attempts at behavior change, R - Reflected patient's change talk and S - Summarized patient's change talk statements    Also provided psychoeducation about behavioral health condition, symptoms, and treatment options    Care Plan review completed: No    Medication Review:  Changes to psychiatric medications, see updated Medication List in EPIC.     Medication Compliance:  Yes    Changes in Health Issues:   Yes: Pain, Associated Psychological Distress    Chemical Use Review:   Substance Use: Chemical use reviewed, no active concerns identified      Tobacco Use: No current tobacco use.      Assessment: Current Emotional / Mental Status (status of significant symptoms):  Risk status " (Self / Other harm or suicidal ideation)  Patient has had a history of suicidal ideation: ongoing  Patient denies current fears or concerns for personal safety.  Patient reports the following current or recent suicidal ideation or behaviors: Patient reports she continues to have thoughts of suicide but denies that there is anything active or intentional at this time.  Patient does feel safe..  Patient denies current or recent homicidal ideation or behaviors.  Patient denies current or recent self injurious behavior or ideation.  Patient denies other safety concerns.  A safety and risk management plan has been developed including: Patient consented to co-developed safety plan.  A safety and risk management plan was completed.  Patient agreed to use safety plan should any safety concerns arise.  A copy was given to the patient.  Patient was reminded to access her safety plan and crisis resources as needed.    Appearance:   Appropriate   Eye Contact:   Good   Psychomotor Behavior: Normal   Attitude:   Cooperative   Orientation:   All  Speech   Rate / Production: Talkative Normal    Volume:  Normal   Mood:    Anxious  Depressed  Irritable   Affect:    Appropriate   Thought Content:  Clear   Thought Form:  Coherent  Logical   Insight:    Fair     Diagnoses:  1. Moderate recurrent major depression (H)    2. STACIE (generalized anxiety disorder)        Collateral Reports Completed:  Communicated with: Dr. Perez    Plan: (Homework, other):  Patient was given information about behavioral services and encouraged to schedule a follow up appointment with the clinic [facility]  She was also given information about mental health symptoms and treatment options .  CD Recommendations: No indications of CD issues.    ______________________________________________________________________    [facility] [place] : Treatment Plan    Patient's Name: Janelle MELISSA  [place]  YOB: 1960    Date of Creation: April 13, 2022  Date Treatment Plan Last Reviewed/Revised: July 11, 2022    DSM5 Diagnoses: 296.33 (F33.2) Major Depressive Disorder, Recurrent Episode, Severe _  Psychosocial / Contextual Factors: chronic pain  PROMIS (reviewed every 90 days):   PROMIS 10-Global Health (only subscores and total score):   PROMIS-10 Scores Only 1/13/2022 6/3/2022 6/10/2022 7/8/2022 8/5/2022   Global Mental Health Score 5 8 5 6 8   Global Physical Health Score 10 10 9 11 9   PROMIS TOTAL - SUBSCORES 15 18 14 17 17       Referral / Collaboration:  Referral to another professional/service is not indicated at this time..    Anticipated number of session for this episode of care: 5-6  Anticipation frequency of session: As determined by Dr. Perez  Anticipated Duration of each session: 16-37 minutes  Treatment plan will be reviewed in 90 days or when goals have been changed.       MeasurableTreatment Goal(s) related to diagnosis / functional impairment(s)  Goal 1: Patient will work with providers to manage symptoms    I will know I've met my goal when I can enjoy my life again.      Objective #A (Patient Action)  Patient will attend all appointments, take medication as prescribed.  Status: Continued - Date(s): 07/11/2022    Intervention(s)  [facility] will Monitor and assist in overcoming barriers to treatment adherence    Objective #B  Patient will consider all recommendations offered.  Status: Continued - Date(s): 07/11/2022     Intervention(s)  [facility] will educate patient on treatment options, clarify concerns, work with pt to overcome any resistance to compliance.      Goal 2: Patient will replace self-harm thoughts/behaviors with self-care activities    I will know I've met my goal when I stop having those thoughts.      Objective #A (Patient Action)                          Status: Continued - Date(s): 07/11/2022  Patient will assist in creating safety plan.     Intervention(s)  [facility] will  "assist in creation of safety plan and provide copy to patient.     Objective #B  Patient will report using safety plan as needed.                       Status: Continued - Date(s): 07/11/2022     Intervention(s)  [facility] will monitor safety, use of plan, adjust plan as needed, work through any identified barriers/resistance to following her plan.      Patient has reviewed and agreed to the above plan.      Janeth Jaquez, [facility]  September 7, 2022          Integrated Behavioral Health Services                                       Patient's Name: Janelle TORRES [place]  July 11, 2022     SAFETY PLAN:  Step 1: Warning signs / cues (Thoughts, images, mood, situation, behavior) that a crisis may be developing:  ? Thoughts: \"I don't matter\", \"People would be better off without me\", \"I can't do this anymore\" and \"I just want this to end\"  ? Images: obsessive thoughts of death or dying: thoughts of carrying out plan  ? Thinking Processes: ruminations (can't stop thinking about my problems): ruminate on my plan and highly critical and negative thoughts: if  says something critical i shut down  ? Mood: worsening depression, hopelessness, disinhibited (not caring about things or consequences) and worthlessness  ? Behaviors: isolating/withdrawing , can't stop crying, not taking care of myself and not taking care of my responsibilities  ? Situations: relationship problems   Step 2: Coping strategies - Things I can do to take my mind off of my problems without contacting another person (relaxation technique, physical activity):  ? Distress Tolerance Strategies:  play with my pet  and watch a funny movie:    ? Physical Activities: go for a walk and get in the [employer] [place]  ? Focus on helpful thoughts:  \"This is temporary\"  Step 3: People and social settings that provide distraction:              Name: Alyx     Phone: in my phone              Name: Raven   Phone: in my phone  ? park              Step 4: Remind myself of people and " things that are important to me and worth living for:  Children, dog, friends  Step 5: When I am in crisis, I can ask these people to help me use my safety plan:              Name: Alyx     Phone: in my phone              Name: Raven   Phone: in my phone  Step 6: Making the environment safe:   ? none identified  Step 7: Professionals or agencies I can contact during a crisis:  ? Suicide Prevention Lifeline: 1-275-780-TALK (2912)  ? Crisis Text Line Service: Text   HOME  to 067-701.    Local Crisis Services: [facility]     Call 911 or go to my nearest emergency department.       I helped develop this safety plan and agree to use it when needed.  I have been given a copy of this plan.       Patient signature: _______________________________________________________________  Today s date:  July 11, 2022    Adapted from Safety Plan Template 2008 Antionette Trevino and Joseph Leon is reprinted with the express permission of the authors.  No portion of the Safety Plan Template may be reproduced without the express, written permission.  You can contact the authors at [email or web address] or [email or web address]

## 2022-09-07 NOTE — Clinical Note
Kd Luis! Just FYI we saw Janelle the other day. Janeth Jaquez, [facility], [facility], was filling in for Matt and was going to send Janelle some therapy resources. Have a great week. -Kimberly

## 2022-09-07 NOTE — Clinical Note
Please call this patient to get them scheduled for a follow-up visit in 4 weeks. Please schedule with me and the [facility]. Thanks!

## 2022-09-08 ENCOUNTER — OFFICE VISIT (OUTPATIENT)
Dept: ANESTHESIOLOGY | Facility: CLINIC | Age: 62
End: 2022-09-08
Payer: COMMERCIAL

## 2022-09-08 VITALS — SYSTOLIC BLOOD PRESSURE: 105 MMHG | HEART RATE: 61 BPM | OXYGEN SATURATION: 98 % | DIASTOLIC BLOOD PRESSURE: 70 MMHG

## 2022-09-08 DIAGNOSIS — M79.18 MYOFASCIAL PAIN: Primary | ICD-10-CM

## 2022-09-08 PROCEDURE — 20553 NJX 1/MLT TRIGGER POINTS 3/>: CPT | Performed by: ANESTHESIOLOGY

## 2022-09-08 ASSESSMENT — PAIN SCALES - GENERAL: PAINLEVEL: MODERATE PAIN (4)

## 2022-09-08 NOTE — NURSING NOTE
Patient presents with:  Follow Up: Follow-up Lower back, Neck pain 6/10      Moderate Pain (4)     Pain Medications     Opioid Combinations Refills Start End     HYDROcodone-acetaminophen (NORCO)  MG per tablet    0 4/8/2022     Sig - Route: Take 1 tablet by mouth every 4 hours as needed for severe pain max 4 tabs/24 hrs - Oral    Class: E-Prescribe    Earliest Fill Date: 4/8/2022          What medications are you using for pain? Robaxin, Ibuprofen, lodocaine Gel and patches, Voltaren Gel     (New patients only) Have you been seen by another pain clinic/ provider? no    (Return Patients only) What refills are you needing today? yes

## 2022-09-08 NOTE — PATIENT INSTRUCTIONS
Treatment planning:    Trigger Point Injections completed today-   Call us with any concerns for infection: redness and drainage at the injection site, fever, chills, sweats       Recommended Follow up:      May return in 12 weeks for TPI's with steroid, may return in 8 weeks if no steroid used.         Please call [phone] to schedule your clinic appointment if you don't already have an appointment scheduled.        To speak with a nurse, schedule/reschedule/cancel a clinic appointment, or request a medication refill call: [phone]    You can also reach us by [employer]: [email or web address]

## 2022-09-08 NOTE — PROGRESS NOTES
Pre procedure Diagnosis: myofascial pain   Post procedure Diagnosis: Same  Procedure performed: trigger point injections  Anesthesia: none  Complications: none  Operators: Joslyn Cherry    Indications:   Janelle White is a 61 year old female with a history of myofascial pain.  Exam shows myofascial pain of the muscle groups listed below and they have tried conservative treatment including physical therapy and medications.    Options/alternatives, benefits and risks were discussed with the patient including bleeding, infection, tissue trauma and pnuemothorax.  Questions were answered to her satisfaction and she agrees to proceed. Voluntary informed consent was obtained and signed.     Vitals were reviewed: Yes  Allergies were reviewed:  Yes   Medications were reviewed:  Yes   Pre-procedure pain score: Moderate Pain (4)    Procedure:  After getting informed consent, a Pause for the Cause was performed.    Trigger points were identified by patient, and marked when appropriate.  The area was prepped with Chloroprep.    Using clean technique, injections were completed using a 25G, 1.5 inch needle.  After negative aspiration, injection was completed.  A total of 8 locations were injected.  When possible, tissue was retracted from the chest wall to avoid lung injury.    Muscle groups injected:  Bilateral Cervical Paraspinal  Bilateral Trapezius    Injection solution contained:  5ml of 2% lidocaine and 5ml of 0.25% bupivacaine and 40 mg of Triamcinolone.    Hemostasis was achieved, the area was cleaned, and bandaids were placed when appropriate.  The patient tolerated the procedure well.      Post-procedure pain score: 3/10  Follow-up includes:           Answers for HPI/ROS submitted by the patient on 9/1/2022  STACIE 7 TOTAL SCORE: 7

## 2022-09-08 NOTE — LETTER
9/8/2022       RE: Janelle White  [id number] [place] [id number]     Dear Colleague,    Thank you for referring your patient, Janelle White, to the [facility] [place] at [facility]. Please see a copy of my visit note below.    Pre procedure Diagnosis: myofascial pain   Post procedure Diagnosis: Same  Procedure performed: trigger point injections  Anesthesia: none  Complications: none  Operators: Joslyn Cherry    Indications:   Janelle White is a 61 year old female with a history of myofascial pain.  Exam shows myofascial pain of the muscle groups listed below and they have tried conservative treatment including physical therapy and medications.    Options/alternatives, benefits and risks were discussed with the patient including bleeding, infection, tissue trauma and pnuemothorax.  Questions were answered to her satisfaction and she agrees to proceed. Voluntary informed consent was obtained and signed.     Vitals were reviewed: Yes  Allergies were reviewed:  Yes   Medications were reviewed:  Yes   Pre-procedure pain score: Moderate Pain (4)    Procedure:  After getting informed consent, a Pause for the Cause was performed.    Trigger points were identified by patient, and marked when appropriate.  The area was prepped with Chloroprep.    Using clean technique, injections were completed using a 25G, 1.5 inch needle.  After negative aspiration, injection was completed.  A total of 8 locations were injected.  When possible, tissue was retracted from the chest wall to avoid lung injury.    Muscle groups injected:  Bilateral Cervical Paraspinal  Bilateral Trapezius    Injection solution contained:  5ml of 2% lidocaine and 5ml of 0.25% bupivacaine and 40 mg of Triamcinolone.    Hemostasis was achieved, the area was cleaned, and bandaids were placed when appropriate.  The patient tolerated the  procedure well.      Post-procedure pain score: 3/10  Follow-up includes:           Answers for HPI/ROS submitted by the patient on 9/1/2022  STACIE 7 TOTAL SCORE: 7          Sincerely,    Joslyn Cherry MD

## 2022-09-09 ENCOUNTER — INFUSION THERAPY VISIT (OUTPATIENT)
Dept: INFUSION THERAPY | Facility: CLINIC | Age: 62
End: 2022-09-09
Attending: PSYCHIATRY & NEUROLOGY
Payer: COMMERCIAL

## 2022-09-09 VITALS
SYSTOLIC BLOOD PRESSURE: 106 MMHG | DIASTOLIC BLOOD PRESSURE: 62 MMHG | OXYGEN SATURATION: 98 % | HEART RATE: 73 BPM | RESPIRATION RATE: 16 BRPM | WEIGHT: 150.13 LBS | TEMPERATURE: 98.1 F | BODY MASS INDEX: 24.98 KG/M2

## 2022-09-09 DIAGNOSIS — F33.2 SEVERE EPISODE OF RECURRENT MAJOR DEPRESSIVE DISORDER, WITHOUT PSYCHOTIC FEATURES (H): Primary | ICD-10-CM

## 2022-09-09 PROCEDURE — 250N000009 HC RX 250: Performed by: PSYCHIATRY & NEUROLOGY

## 2022-09-09 PROCEDURE — 258N000003 HC RX IP 258 OP 636: Performed by: PSYCHIATRY & NEUROLOGY

## 2022-09-09 PROCEDURE — 96365 THER/PROPH/DIAG IV INF INIT: CPT

## 2022-09-09 RX ORDER — ALBUTEROL SULFATE 90 UG/1
1-2 AEROSOL, METERED RESPIRATORY (INHALATION)
Status: CANCELLED
Start: 2022-09-09

## 2022-09-09 RX ORDER — ALBUTEROL SULFATE 0.83 MG/ML
2.5 SOLUTION RESPIRATORY (INHALATION)
Status: CANCELLED | OUTPATIENT
Start: 2022-09-09

## 2022-09-09 RX ORDER — HYDRALAZINE HYDROCHLORIDE 20 MG/ML
10 INJECTION INTRAMUSCULAR; INTRAVENOUS
Status: CANCELLED | OUTPATIENT
Start: 2022-09-09

## 2022-09-09 RX ORDER — HEPARIN SODIUM,PORCINE 10 UNIT/ML
5 VIAL (ML) INTRAVENOUS
Status: CANCELLED | OUTPATIENT
Start: 2022-09-09

## 2022-09-09 RX ORDER — HEPARIN SODIUM (PORCINE) LOCK FLUSH IV SOLN 100 UNIT/ML 100 UNIT/ML
5 SOLUTION INTRAVENOUS
Status: CANCELLED | OUTPATIENT
Start: 2022-09-09

## 2022-09-09 RX ORDER — ONDANSETRON 2 MG/ML
4 INJECTION INTRAMUSCULAR; INTRAVENOUS
Status: CANCELLED | OUTPATIENT
Start: 2022-09-09

## 2022-09-09 RX ORDER — MEPERIDINE HYDROCHLORIDE 25 MG/ML
25 INJECTION INTRAMUSCULAR; INTRAVENOUS; SUBCUTANEOUS EVERY 30 MIN PRN
Status: CANCELLED | OUTPATIENT
Start: 2022-09-09

## 2022-09-09 RX ORDER — EPINEPHRINE 1 MG/ML
0.3 INJECTION, SOLUTION, CONCENTRATE INTRAVENOUS EVERY 5 MIN PRN
Status: CANCELLED | OUTPATIENT
Start: 2022-09-09

## 2022-09-09 RX ORDER — NALOXONE HYDROCHLORIDE 0.4 MG/ML
0.2 INJECTION, SOLUTION INTRAMUSCULAR; INTRAVENOUS; SUBCUTANEOUS
Status: CANCELLED | OUTPATIENT
Start: 2022-09-09

## 2022-09-09 RX ORDER — METHYLPREDNISOLONE SODIUM SUCCINATE 125 MG/2ML
125 INJECTION, POWDER, LYOPHILIZED, FOR SOLUTION INTRAMUSCULAR; INTRAVENOUS
Status: CANCELLED
Start: 2022-09-09

## 2022-09-09 RX ORDER — DIPHENHYDRAMINE HYDROCHLORIDE 50 MG/ML
50 INJECTION INTRAMUSCULAR; INTRAVENOUS
Status: CANCELLED
Start: 2022-09-09

## 2022-09-09 RX ADMIN — KETAMINE HYDROCHLORIDE 65 MG: 50 INJECTION, SOLUTION INTRAMUSCULAR; INTRAVENOUS at 08:35

## 2022-09-09 NOTE — PROGRESS NOTES
Infusion Nursing Note:  Janelle MELISSA White presents today for Ketamine.    Patient seen by provider today: No   present during visit today: Not Applicable.    Note: Patient reports Ketamine infusions are helping her, especially the last one in which she experienced beneficial relaxation.    Intravenous Access:  Peripheral IV placed right AC.    Treatment Conditions:  Not Applicable.    Post Infusion Assessment:  Patient tolerated infusion without incident.  Patient observed for 60 minutes post Ketamine per Therapy Plan protocol.  No evidence of extravasations.  Access discontinued per protocol.     Discharge Plan:   Discharge instructions reviewed with: Patient.  Patient and/or family verbalized understanding of discharge instructions and all questions answered.  Patient discharged in stable condition accompanied by: self. Patient's  gives patient a ride home.   Departure Mode: Ambulatory.       Tabatha Small RN

## 2022-09-10 NOTE — PROGRESS NOTES
"    Assessment & Plan     Chronic pain syndrome  Tear of left rotator cuff, unspecified tear extent, unspecified whether traumatic  Myofascial pain  DDD (degenerative disc disease), cervical  Cervicalgia  Chronic and stable. Hoping with [facility] will have more time to dedicate toward rehab and recovery. Will continue with yoga and pilates, regularly going for walks with dog. Continue to follow with Dr. Angulo. Considering nerve ablation in the future. Refills today.   - morphine (MS CONTIN) 15 MG CR tablet; Take 1 tablet (15 mg) by mouth every 12 hours maximum 2 tablet(s) per day  - HYDROcodone-acetaminophen (NORCO)  MG per tablet; take 1 tabs q 4hrs, max 4 tabs/24 hrs    CLL (chronic lymphocytic leukemia) (H)  Epigastric pain  Other fatigue  Night sweats  Started weekly chemo May 6 planning 4-6 treatments. Hoping this will help from a quality of life standpoint -- improve splenomegaly which they think has led to nausea, epigastric discomfort, fatigue, and night sweats.     Moderate recurrent major depression (H)  Chronic, stable. Continue to follow with psych. Genesight testing reviewed. Showing MTHFR gene mutation and poor metabolizer of DPS036 meds.     Screening for hyperlipidemia  - Lipid panel reflex to direct LDL Fasting    Review of prior external note(s) from - Outside records from [facility]  45 minutes spent on the date of the encounter doing chart review, history and exam, documentation and further activities per the note     BMI:   Estimated body mass index is 26.17 kg/m  as calculated from the following:    Height as of this encounter: 1.645 m (5' 4.75\").    Weight as of this encounter: 70.8 kg (156 lb 0.6 oz).   Weight management plan: Discussed healthy diet and exercise guidelines    Return for Preoperative exam.    Hakeem Concepcion PA-C  [facility]    Giorgio Luis is a 60 year old who presents for the following health issues     HPI   Encounter to establish " "vikas Luis is a very pleasant 60 year old female with a history of CLL, melanoma, chronic pain syndrome, cervical DDD, carpal and ulnar tunnel syndromes s/p surgery bilateral in fall 2020, left rotator cuff tear and repair August 2020, prediabetes, vitamin d deficiency, vitamin b12 deficiency, depression and anxiety presenting to establish care/medication check.     She previously followed with Dr. Ballard for many years.  Recently retired NP in OB/GYN.     CLL - Following with [facility]. Has been suffering from nausea thought to be related to splenomegaly. Also had GI workup showing gastroparesis. Night sweats on a nightly basis. Also with fatigue. With meals noticing early satiety and some epigastric discomfort. Thinking CLL contributing to fatigue and some of GI symptoms. Had first chemo last week -- hoping this improves quality of life. Plan is Rituxan/Medrol weekly x 4-6.      Chronic pain - Has history of chronic neck and back issues and multiple surgeries to manage. More recently has followed with [facility] for bilateral carpal and ulnar tunnel. Since surgery in fall 2020 this has improved. Following with Dr. Angulo for pain. Hoping for nerve ablation in neck still working out details with insurance. Left shoulder suffered a re-tear of the rotator cuff in December after a slip and fall. Planning surgery in near future.     Depression - Stable. Follows with Dr. Perez of psychiatry and Melvin Manzo PsyD for counseling.      Review of Systems   Constitutional, HEENT, cardiovascular, pulmonary, gi and gu systems are negative, except as otherwise noted.      Objective    /64 (BP Location: Left arm, Patient Position: Chair, Cuff Size: Adult Regular)   Pulse 80   Temp 97.7  F (36.5  C) (Oral)   Resp 16   Ht 1.645 m (5' 4.75\")   Wt 70.8 kg (156 lb 0.6 oz)   LMP 05/01/2005 (LMP Unknown)   BMI 26.17 kg/m    Body mass index is 26.17 kg/m .  Physical Exam  Vitals signs and nursing note reviewed. "   Constitutional:       Appearance: Normal appearance.   HENT:      Head: Normocephalic.   Pulmonary:      Effort: Pulmonary effort is normal.   Neurological:      General: No focal deficit present.      Mental Status: She is alert.   Psychiatric:         Mood and Affect: Mood normal.         Behavior: Behavior normal.          Results for orders placed or performed in visit on 05/10/21   Lipid panel reflex to direct LDL Fasting     Status: Abnormal   Result Value Ref Range    Cholesterol 175 <200 mg/dL    Triglycerides 168 (H) <150 mg/dL    HDL Cholesterol 23 (L) >49 mg/dL    LDL Cholesterol Calculated 118 (H) <100 mg/dL    Non HDL Cholesterol 152 (H) <130 mg/dL              EKG

## 2022-09-10 NOTE — PATIENT INSTRUCTIONS
Treatment Plan:  Continue Pristiq 50 mg daily for mood, anxiety.   Continue methyl folate 7.5 mg daily as supplementation.  Continue Adderall XR 20 mg daily for mood augmentation  Continue Adderall IR 15 mg twice daily as needed for mood augmentation and daytime fatigue/hypersomnia  Continue benzodiazepine per primary care prescriber.  Continue ketamine with interventional psychiatry clinic.  Continue to work with your specialist from [facility] as indicated.    Discussed possibility of individual DBT therapy again today.  [facility] will send some options/resources.  Could continue to consider lithium if suicidal ideation continues to persist.  Recommend ongoing individual psychotherapy.    Continue all other cares per primary care provider.   Continue all other medications as reviewed per electronic medical record today.   Safety plan reviewed. To the Emergency Department as needed or call after hours crisis line at [phone] or [phone]. [place] Crisis Text Line. Text [place] to 460596 or Suicide LifeLine Chat: suicidepreventionMain Street Hubline.org/chat  Schedule an appointment with me in 4 weeks, or sooner as needed. Call [facility] at [phone] to schedule.  Follow up with primary care provider as planned or for acute medical concerns.  Call the psychiatric nurse line with medication questions or concerns at [phone].  Fliporahart may be used to communicate with your provider, but this is not intended to be used for emergencies.    Therapy resources:  Www.[facility].org    https://www.mhs-dbt.com/mental-health/thrive/thrive-mental-health-and-chronic-pain-management/    [place] DBT resources:  https://[place].gov/dhs/partners-and-providers/policies-procedures/adult-mental-health/dialectical-behavior-therapy/dbt-certified-providers/    Risks of benzodiazepine (Ativan, Xanax, Klonopin, Valium, etc) use including, but not limited to, sedation, tolerance, risk for addiction/dependence. Do not drink alcohol while taking  benzodiazepines due to risk of trouble breathing and potential death. Do not drive or operate heavy machinery until it is known how the drug affects you. Discuss with physician or pharmacist before ever taking a benzodiazepine with a narcotic/opioid pain medication.     Have previously discussed risks of stimulant medication including, but not limited to, decreased appetite, risk of tics (and that they may be lasting), trouble sleeping, cardiac risks such as increased heart rate and blood pressure, and rare risk of sudden cardiac death.  Also risk of addiction/tolerance/dependence.

## 2022-09-12 ENCOUNTER — FCC EXTENDED DOCUMENTATION (OUTPATIENT)
Dept: PSYCHOLOGY | Facility: CLINIC | Age: 62
End: 2022-09-12

## 2022-09-12 NOTE — PROGRESS NOTES
"                    Discharge Summary  Multiple Sessions    Client Name: Janelle White MRN#: [id number] YOB: 1960      Intake / Discharge Date: 09/12/2022      DSM5 Diagnoses: (Sustained by DSM5 Criteria Listed Above)  Diagnoses:  1. Severe episode of recurrent major depressive disorder, without psychotic features (H)    2. STACIE (generalized anxiety disorder)    3. Chronic pain syndrome      Psychosocial & Contextual Factors: Relationship problems, out of work due to medical and mental health concerns.   WHODAS 2.0 (12 item) Score: None at this time.           Presenting Concern:  Anxiety, depression, chronic pain, emotional dysregulation      Reason for Discharge:  Referred to : DBT, Pain Specialist, higher level of care-Client pursuing      Disposition at Time of Last Encounter:   Comments:        Risk Management:   Client has had a history of suicidal ideation: None reported at last session    [facility]                                       Patient's Name: Janelle White  March 11, 2021     SAFETY PLAN:  Step 1: Warning signs / cues (Thoughts, images, mood, situation, behavior) that a crisis may be developing:  ? Thoughts: \"I don't matter\", \"People would be better off without me\", \"I can't do this anymore\" and \"I just want this to end\"  ? Images: obsessive thoughts of death or dying: thoughts of carrying out plan  ? Thinking Processes: ruminations (can't stop thinking about my problems): ruminate on my plan and highly critical and negative thoughts: if  says something critical i shut down  ? Mood: worsening depression, hopelessness, disinhibited (not caring about things or consequences) and worthlessness  ? Behaviors: isolating/withdrawing , can't stop crying, not taking care of myself and not taking care of my responsibilities  ? Situations: relationship problems   Step 2: Coping strategies - Things I can do to take my mind off of my problems without " "contacting another person (relaxation technique, physical activity):  ? Distress Tolerance Strategies:  play with my pet  and watch a funny movie:    ? Physical Activities: go for a walk and get in the jacuzzi  ? Focus on helpful thoughts:  \"This is temporary\"  Step 3: People and social settings that provide distraction:                 Name: Alyx         Phone: in my phone                 Name: Raven      Phone: in my phone  ? park                 Step 4: Remind myself of people and things that are important to me and worth living for:  Children, dog, friends  Step 5: When I am in crisis, I can ask these people to help me use my safety plan:                 Name: Alyx         Phone: in my phone                 Name: Raven      Phone: in my phone  Step 6: Making the environment safe:   ? none identified  Step 7: Professionals or agencies I can contact during a crisis:  ? Suicide Prevention Lifeline: 8-877-224-TALK (6082)  ? Crisis Text Line Service: Text   HOME  to 313-644.    Local Crisis Services: [facility]     Call 911 or go to my nearest emergency department.       I helped develop this safety plan and agree to use it when needed.  I have been given a copy of this plan.       Patient signature: _______________________________________________________________  Today s date:  March 11, 2021  Adapted from Safety Plan Template 2008 Antionette Trevino and Joseph Leon is reprinted with the express permission of the authors.  No portion of the Safety Plan Template may be reproduced without the express, written permission.  You can contact the authors at [email or web address] or [email or web address].         Referred To:  DBT, higher level of care        RAMIREZ Scanlon, [facility]   9/12/2022    "

## 2022-10-04 ENCOUNTER — INFUSION THERAPY VISIT (OUTPATIENT)
Dept: INFUSION THERAPY | Facility: CLINIC | Age: 62
End: 2022-10-04
Attending: PSYCHIATRY & NEUROLOGY
Payer: COMMERCIAL

## 2022-10-04 VITALS
TEMPERATURE: 97.5 F | BODY MASS INDEX: 24.8 KG/M2 | WEIGHT: 149.03 LBS | SYSTOLIC BLOOD PRESSURE: 102 MMHG | HEART RATE: 63 BPM | DIASTOLIC BLOOD PRESSURE: 70 MMHG | OXYGEN SATURATION: 99 %

## 2022-10-04 DIAGNOSIS — F33.2 SEVERE EPISODE OF RECURRENT MAJOR DEPRESSIVE DISORDER, WITHOUT PSYCHOTIC FEATURES (H): Primary | ICD-10-CM

## 2022-10-04 PROCEDURE — 96365 THER/PROPH/DIAG IV INF INIT: CPT

## 2022-10-04 PROCEDURE — 258N000003 HC RX IP 258 OP 636: Performed by: PSYCHIATRY & NEUROLOGY

## 2022-10-04 PROCEDURE — 250N000009 HC RX 250: Performed by: PSYCHIATRY & NEUROLOGY

## 2022-10-04 RX ORDER — ONDANSETRON 2 MG/ML
4 INJECTION INTRAMUSCULAR; INTRAVENOUS
Status: DISCONTINUED | OUTPATIENT
Start: 2022-10-04 | End: 2022-10-04 | Stop reason: HOSPADM

## 2022-10-04 RX ORDER — NALOXONE HYDROCHLORIDE 0.4 MG/ML
0.2 INJECTION, SOLUTION INTRAMUSCULAR; INTRAVENOUS; SUBCUTANEOUS
Status: CANCELLED | OUTPATIENT
Start: 2022-10-04

## 2022-10-04 RX ORDER — HEPARIN SODIUM (PORCINE) LOCK FLUSH IV SOLN 100 UNIT/ML 100 UNIT/ML
5 SOLUTION INTRAVENOUS
Status: CANCELLED | OUTPATIENT
Start: 2022-10-04

## 2022-10-04 RX ORDER — METHYLPREDNISOLONE SODIUM SUCCINATE 125 MG/2ML
125 INJECTION, POWDER, LYOPHILIZED, FOR SOLUTION INTRAMUSCULAR; INTRAVENOUS
Status: CANCELLED
Start: 2022-10-04

## 2022-10-04 RX ORDER — HEPARIN SODIUM,PORCINE 10 UNIT/ML
5 VIAL (ML) INTRAVENOUS
Status: CANCELLED | OUTPATIENT
Start: 2022-10-04

## 2022-10-04 RX ORDER — ONDANSETRON 2 MG/ML
4 INJECTION INTRAMUSCULAR; INTRAVENOUS
Status: CANCELLED | OUTPATIENT
Start: 2022-10-04

## 2022-10-04 RX ORDER — HYDRALAZINE HYDROCHLORIDE 20 MG/ML
10 INJECTION INTRAMUSCULAR; INTRAVENOUS
Status: CANCELLED | OUTPATIENT
Start: 2022-10-04

## 2022-10-04 RX ORDER — ALBUTEROL SULFATE 0.83 MG/ML
2.5 SOLUTION RESPIRATORY (INHALATION)
Status: CANCELLED | OUTPATIENT
Start: 2022-10-04

## 2022-10-04 RX ORDER — MEPERIDINE HYDROCHLORIDE 25 MG/ML
25 INJECTION INTRAMUSCULAR; INTRAVENOUS; SUBCUTANEOUS EVERY 30 MIN PRN
Status: CANCELLED | OUTPATIENT
Start: 2022-10-04

## 2022-10-04 RX ORDER — EPINEPHRINE 1 MG/ML
0.3 INJECTION, SOLUTION, CONCENTRATE INTRAVENOUS EVERY 5 MIN PRN
Status: CANCELLED | OUTPATIENT
Start: 2022-10-04

## 2022-10-04 RX ORDER — DIPHENHYDRAMINE HYDROCHLORIDE 50 MG/ML
50 INJECTION INTRAMUSCULAR; INTRAVENOUS
Status: CANCELLED
Start: 2022-10-04

## 2022-10-04 RX ORDER — ALBUTEROL SULFATE 90 UG/1
1-2 AEROSOL, METERED RESPIRATORY (INHALATION)
Status: CANCELLED
Start: 2022-10-04

## 2022-10-04 RX ADMIN — KETAMINE HYDROCHLORIDE 65 MG: 50 INJECTION, SOLUTION INTRAMUSCULAR; INTRAVENOUS at 08:41

## 2022-10-04 ASSESSMENT — PAIN SCALES - GENERAL: PAINLEVEL: SEVERE PAIN (6)

## 2022-10-04 NOTE — PROGRESS NOTES
Infusion Nursing Note:  Janelle TORRES Franchescamag presents today for ketamine infusion.    Patient seen by provider today: No   present during visit today: Not Applicable.    Note: Took a fall at home recently and has a large bruise on lateral right thigh, and has slightly swollen finger on R hand (which she is able to move/use).  States she is still able to do her pilates excercises.    Intravenous Access:  Peripheral IV placed.    Treatment Conditions:  Not Applicable.    Post Infusion Assessment:  Patient tolerated infusion without incident.  Patient observed for 60 minutes post ketamine infusion, per protocol.  Blood return noted pre and post infusion.  Site patent and intact, free from redness, edema or discomfort.  No evidence of extravasations.  Access discontinued per protocol.     Discharge Plan:   Patient discharged in stable condition accompanied by: self.  Departure Mode: Ambulatory.  No infusion appointment made at this time.      Viktoria Mcdaniel

## 2022-10-07 ENCOUNTER — VIRTUAL VISIT (OUTPATIENT)
Dept: PSYCHIATRY | Facility: CLINIC | Age: 62
End: 2022-10-07
Payer: COMMERCIAL

## 2022-10-07 ENCOUNTER — VIRTUAL VISIT (OUTPATIENT)
Dept: BEHAVIORAL HEALTH | Facility: CLINIC | Age: 62
End: 2022-10-07
Payer: COMMERCIAL

## 2022-10-07 DIAGNOSIS — F41.1 GAD (GENERALIZED ANXIETY DISORDER): ICD-10-CM

## 2022-10-07 DIAGNOSIS — E88.89 CYP2B6 INTERMEDIATE METABOLIZER (H): ICD-10-CM

## 2022-10-07 DIAGNOSIS — E88.89 CYP2D6 POOR METABOLIZER (H): ICD-10-CM

## 2022-10-07 DIAGNOSIS — G89.4 CHRONIC PAIN SYNDROME: ICD-10-CM

## 2022-10-07 DIAGNOSIS — E72.12 HOMOZYGOUS FOR C677T POLYMORPHISM OF MTHFR (H): ICD-10-CM

## 2022-10-07 DIAGNOSIS — F33.2 SEVERE EPISODE OF RECURRENT MAJOR DEPRESSIVE DISORDER, WITHOUT PSYCHOTIC FEATURES (H): Primary | ICD-10-CM

## 2022-10-07 DIAGNOSIS — F33.9 RECURRENT MAJOR DEPRESSION RESISTANT TO TREATMENT (H): ICD-10-CM

## 2022-10-07 DIAGNOSIS — G25.81 RESTLESS LEG SYNDROME: ICD-10-CM

## 2022-10-07 DIAGNOSIS — F33.1 MODERATE RECURRENT MAJOR DEPRESSION (H): Primary | ICD-10-CM

## 2022-10-07 PROCEDURE — 99214 OFFICE O/P EST MOD 30 MIN: CPT | Mod: 95 | Performed by: PSYCHIATRY & NEUROLOGY

## 2022-10-07 PROCEDURE — 90832 PSYTX W PT 30 MINUTES: CPT | Mod: 95 | Performed by: PSYCHOLOGIST

## 2022-10-07 RX ORDER — DESVENLAFAXINE 50 MG/1
50 TABLET, FILM COATED, EXTENDED RELEASE ORAL DAILY
Qty: 90 TABLET | Refills: 1 | Status: SHIPPED | OUTPATIENT
Start: 2022-10-07 | End: 2023-03-20

## 2022-10-07 ASSESSMENT — PATIENT HEALTH QUESTIONNAIRE - PHQ9
SUM OF ALL RESPONSES TO PHQ QUESTIONS 1-9: 13
10. IF YOU CHECKED OFF ANY PROBLEMS, HOW DIFFICULT HAVE THESE PROBLEMS MADE IT FOR YOU TO DO YOUR WORK, TAKE CARE OF THINGS AT HOME, OR GET ALONG WITH OTHER PEOPLE: VERY DIFFICULT
SUM OF ALL RESPONSES TO PHQ QUESTIONS 1-9: 13

## 2022-10-07 NOTE — Clinical Note
Please call this patient to get them scheduled for a follow-up visit in 8-12 weeks. Please schedule with me and the [facility]. Thanks!

## 2022-10-07 NOTE — PROGRESS NOTES
[facility] Psychiatry Services [facility]  2022      Behavioral Health Clinician Progress Note    Patient Name: Janelle White           Service Type:  Individual      Service Location:   Phone call (patient / identified key support person reached)     Session Start Time: 10:00 am  Session End Time: 10:20 am      Session Length: 16 - 37      Attendees: Patient     Service Modality:  Video Visit:      Provider verified identity through the following two step process.  Patient provided:  Patient photo and Patient     Telemedicine Visit: The patient's condition can be safely assessed and treated via synchronous audio and visual telemedicine encounter.      Reason for Telemedicine Visit: Services only offered telehealth    Originating Site (Patient Location): Patient's home (in car)    Distant Site (Provider Location): Provider Remote Setting- Home Office    Consent:  The patient/guardian has verbally consented to: the potential risks and benefits of telemedicine (video visit) versus in person care; bill my insurance or make self-payment for services provided; and responsibility for payment of non-covered services.     Patient would like the video invitation sent by:  My Chart    Mode of Communication:  Video Conference via [employer]    As the provider I attest to compliance with applicable laws and regulations related to telemedicine.    Visit Activities (Refresh list every visit): [facility] Only    Diagnostic Assessment Date: 2021  Treatment Plan Review Date: 2023  See Flowsheets for today's PHQ-9 and STACIE-7 results  Previous PHQ-9:   PHQ-9 SCORE 8/3/2022 2022 2022   PHQ-9 Total Score - - -   PHQ-9 Total Score MyChart - 17 (Moderately severe depression) 13 (Moderate depression)   PHQ-9 Total Score 17 17 13   Some encounter information is confidential and restricted. Go to Review Flowsheets activity to see all data.     Previous STACIE-7:   STACIE-7 SCORE 2022  "8/5/2022 9/1/2022   Total Score - - -   Total Score - 7 (mild anxiety) 7 (mild anxiety)   Total Score 11 7 7       JAYRO LEVEL:  JAYRO Score (Last Two) 7/9/2009 1/27/2011   JAYRO Raw Score 44 50   Activation Score 70.8 86.3   JAYRO Level 4 4       DATA  Extended Session (60+ minutes): No  Interactive Complexity: No  Crisis: No  H Patient: No    Treatment Objective(s) Addressed in This Session:  Target Behavior(s): disease management/lifestyle changes pain management    Depressed Mood:    Anxiety: will develop more effective coping skills to manage anxiety symptoms  Psychological distress related to Pain    Current Stressors / Issues:  Reported that she is doing well. She finds ketamine has been very helpful. She noted that she is more active and doing more with family/friends. She is finding that she is dealing with more physical pain and more frustrated by that, but is doing what she needs to do to manage that better. She decided to not pursue DBT at this time citing finances and limited time available for more appointments. Her concerns were acknowledged and she was encouraged to consider ways to make room for therapy in the future which she acknowledged would be helpful when other physical concerns were better addressed. She has no requests for today.      09/07/2022 (Janeth Jaquez, [facility]):  MH Update: \"pretty good.\" Not feeling as down as before and less labile. Trying to stay busy and is using pacing as discussed in prior sessions. Some days are still hard and feels like constantly trying to do things to feel better: stretching, walking, mindfulness, breathing, self-talk. Does help but not feel like a quality of life.  Patient expresses frustration that she has been dealing with chronic pain for so long and feels that it will never improve.  She feels that taking care of the pain is exhausting in itself.  Patient expresses wanting to be able to do things and live her life without needing to stop and take care of her " "pain, feeling limited in what she can do.  Patient also expresses frustration that she has so many providers and feels that she is not getting any improvement.  Patient is aware of the mind body link and has been working to stabilize and minimize her stressors.  Patient feels that her mental health is more stable than it has been in a long time and yet is frustrated that her body continues to suffer.  Patient did complete her neuropsych testing and found it did provide some answers/confirmation of what she is actually experiencing.  Patient continues to do her ketamine treatments as she feels they are helpful as well.  Patient admits that she stopped her individual therapy as she did not find it beneficial at this time.  [facility] validated patient for her experience and the difficulties of managing chronic pain.  [facility] discussed the idea of patient trying to simplify her care network and focus on having some hard conversations with 1 or 2 of her core providers instead of having more providers added on.  [facility] processed patient being ready to accept things that she may not want to hear but knows are reality and how this relates to grieving the changes in her life.  [facility] provided encouragement the patient is doing all the positive things that she can and to continue doing so, even with her frustrations.  [facility] assessed for safety.    SI: thoughts of not feeling a point to living but not \"intentional.\" No plan or intention to harm self. Aware of crisis resources to use as needed.    Tx: Janelle Brooks with [facility]. Decided to put on hold as not feel helpful for now.  [facility] will look into DBT/health psychology referrals that could be a better fit for patient's needs at this time.    Most Important: keep medication the same, feeling stable with mental health      08/08/2022:  Reported that she has been practicing activity pacing more often. She finds that it helps her to take breaks more often than in the past and cumulatively she has " "been completing more tasks. Despite having more situations for her to deal with recently, she finds that she has been calm which allows her to be more productive. She noted that she is tolerating requip better than mirapex. She had an intake with the [facility] program at [facility] and attended her first group meeting. She noted that she does not feel ready for groups right now. She finds that she gets overwhelmed leading to difficulty comprehending information. \"I can't make sense of things.\" She has an interview with neuropsychology coming up soon and wants to finish that before doing more intensive therapy like the [facility] program.       07/11/2022:  Spoke about her recent psychiatric hospitalization. She noted that it was helpful and she is glad it occurred but did not like feeling \"locked up.\" She spoke about recognizing some behaviors that have made her question if she was having manic episodes. She noted having periods of significant over-spending and very talkative in the hospital, \"up, unleashed, blabbering\", she said her mood was up \"but not euphoric,\" needing less sleep, impatience/irritability, and saying things to people without thinking of the impact. She noted that it was awkward coming back home with her family. Her family initially was home but all had to leave to do various things and she was left alone. She took some time in the [facility] and had an \"outside experience\" questioning if she felt that she did not belong. It was very brief and she felt better later. She reported that on reflection, she believes she may have been having manic/hypomanic episodes as early as college. She spoke about being able to work 24 hour shifts without significant problems. She said that she is recognizing \"episodic\" periods in her life when there has been a change in her mood/energy suggesting possible hypomanic episodes.      06/23/2022:  Reported that she tested positive for COVID yesterday. This is her second time and said " "that it is not as severe as the first time. She spoke about her experience with Ketamine noting that she is very pleased with how peaceful she feels with the treatments. \"It's been very relaxing and affirming experience.\" She feels relaxed and in a good mood after the treatment. She finds it easier to bring herself back to that level of relaxation during times of stress. She spoke about her ongoing suicidal ideation explaining that \"when the thoughts enter my mind they are not working thoughts.\" She explained that the suicidal thoughts are just as frequent but less intense, do not last as long, and are less threatening to her than they have been in the past. She was commended for her continued work and efforts to improve herself, and she was encouraged to continue working toward her goals.      04/13/2022:  Does not feel like she received a significant benefit from TMS. She feels she was not having as frequent ruminative thoughts of suicide but that has started to return recently. It tends to happen more often when her physical pain is more intense. She got to a point where she set a date to kill herself but she changed her mind because her son came to spend time with her and she changed her mind saying, \"It just wasn't right yet.\" She noted that she has a few plans but will not discuss them saying, \"I don't want anyone to take those options away from me.\"       Progress on Treatment Objective(s) / Homework:  Satisfactory progress - ACTION (Actively working towards change); Intervened by reinforcing change plan / affirming steps taken    Motivational Interviewing    MI Intervention: Expressed Empathy/Understanding, Permission to raise concern or advise, Open-ended questions and Reflections: simple and complex     Change Talk Expressed by the Patient: Desire to change Reasons to change Activation Taking steps    Provider Response to Change Talk: E - Evoked more info from patient about behavior change, A - Affirmed " patient's thoughts, decisions, or attempts at behavior change, R - Reflected patient's change talk and S - Summarized patient's change talk statements    Also provided psychoeducation about behavioral health condition, symptoms, and treatment options    Care Plan review completed: No    Medication Review:  Changes to psychiatric medications, see updated Medication List in EPIC.     Medication Compliance:  Yes    Changes in Health Issues:   Yes: Pain, Associated Psychological Distress    Chemical Use Review:   Substance Use: Chemical use reviewed, no active concerns identified      Tobacco Use: No current tobacco use.      Assessment: Current Emotional / Mental Status (status of significant symptoms):  Risk status (Self / Other harm or suicidal ideation)  Patient has had a history of suicidal ideation: ongoing  Patient denies current fears or concerns for personal safety.  Patient reports the following current or recent suicidal ideation or behaviors: Patient reports she continues to have thoughts of suicide but denies that there is anything active or intentional at this time.  Patient does feel safe..  Patient denies current or recent homicidal ideation or behaviors.  Patient denies current or recent self injurious behavior or ideation.  Patient denies other safety concerns.  A safety and risk management plan has been developed including: Patient consented to co-developed safety plan.  A safety and risk management plan was completed.  Patient agreed to use safety plan should any safety concerns arise.  A copy was given to the patient.  Patient was reminded to access her safety plan and crisis resources as needed.    Appearance:   Appropriate   Eye Contact:   Good   Psychomotor Behavior: Normal   Attitude:   Cooperative   Orientation:   All  Speech   Rate / Production: Talkative Normal    Volume:  Normal   Mood:    Anxious  Depressed  Irritable   Affect:    Appropriate   Thought Content:  Clear   Thought  Form:  Coherent  Logical   Insight:    Fair     Diagnoses:  1. Moderate recurrent major depression (H)    2. STACIE (generalized anxiety disorder)        Collateral Reports Completed:  Communicated with: Dr. Perez    Plan: (Homework, other):  Patient was given information about behavioral services and encouraged to schedule a follow up appointment with the clinic [facility].  She was also given information about mental health symptoms and treatment options .  CD Recommendations: No indications of CD issues.  Matt Manzo, Alicia, LP    ______________________________________________________________________    [facility] Psychiatry Services - [place] : Treatment Plan    Patient's Name: Janelle White  YOB: 1960    Date of Creation: April 13, 2022  Date Treatment Plan Last Reviewed/Revised: October 7, 2022    DSM5 Diagnoses: 296.33 (F33.2) Major Depressive Disorder, Recurrent Episode, Severe _  Psychosocial / Contextual Factors: chronic pain  PROMIS (reviewed every 90 days):   PROMIS 10-Global Health (only subscores and total score):   PROMIS-10 Scores Only 1/13/2022 6/3/2022 6/10/2022 7/8/2022 8/5/2022   Global Mental Health Score 5 8 5 6 8   Global Physical Health Score 10 10 9 11 9   PROMIS TOTAL - SUBSCORES 15 18 14 17 17       Referral / Collaboration:  Referral to another professional/service is not indicated at this time..    Anticipated number of session for this episode of care: 5-6  Anticipation frequency of session: As determined by Dr. Perez  Anticipated Duration of each session: 16-37 minutes  Treatment plan will be reviewed in 90 days or when goals have been changed.       MeasurableTreatment Goal(s) related to diagnosis / functional impairment(s)  Goal 1: Patient will work with providers to manage symptoms    I will know I've met my goal when I can enjoy my life again.      Objective #A (Patient Action)  Patient will attend all  "appointments, take medication as prescribed.  Status: Continued - Date(s): 10/07/22    Intervention(s)  [facility] will Monitor and assist in overcoming barriers to treatment adherence    Objective #B  Patient will consider all recommendations offered.  Status: Continued - Date(s): 10/07/22     Intervention(s)  [facility] will educate patient on treatment options, clarify concerns, work with pt to overcome any resistance to compliance.      Goal 2: Patient will replace self-harm thoughts/behaviors with self-care activities    I will know I've met my goal when I stop having those thoughts.      Objective #A (Patient Action)                          Status: Continued - Date(s): 10/07/22  Patient will assist in creating safety plan.     Intervention(s)  [facility] will assist in creation of safety plan and provide copy to patient.     Objective #B  Patient will report using safety plan as needed.                       Status: Continued - Date(s): 10/07/22     Intervention(s)  [facility] will monitor safety, use of plan, adjust plan as needed, work through any identified barriers/resistance to following her plan.      Patient has reviewed and agreed to the above plan.      Melvin Manzo PsyD  10/07/2022          [facility] Behavioral Health Services                                       Patient's Name: Janelle White  October 7, 2022     SAFETY PLAN:  Step 1: Warning signs / cues (Thoughts, images, mood, situation, behavior) that a crisis may be developing:  ? Thoughts: \"I don't matter\", \"People would be better off without me\", \"I can't do this anymore\" and \"I just want this to end\"  ? Images: obsessive thoughts of death or dying: thoughts of carrying out plan  ? Thinking Processes: ruminations (can't stop thinking about my problems): ruminate on my plan and highly critical and negative thoughts: if  says something critical i shut down  ? Mood: worsening depression, hopelessness, disinhibited (not caring about things or " "consequences) and worthlessness  ? Behaviors: isolating/withdrawing , can't stop crying, not taking care of myself and not taking care of my responsibilities  ? Situations: relationship problems   Step 2: Coping strategies - Things I can do to take my mind off of my problems without contacting another person (relaxation technique, physical activity):  ? Distress Tolerance Strategies:  play with my pet  and watch a funny movie:    ? Physical Activities: go for a walk and get in the jacuzzi  ? Focus on helpful thoughts:  \"This is temporary\"  Step 3: People and social settings that provide distraction:              Name: Alyx     Phone: in my phone              Name: Raven   Phone: in my phone  ? park              Step 4: Remind myself of people and things that are important to me and worth living for:  Children, dog, friends  Step 5: When I am in crisis, I can ask these people to help me use my safety plan:              Name: Alyx     Phone: in my phone              Name: Raven   Phone: in my phone  Step 6: Making the environment safe:   ? none identified  Step 7: Professionals or agencies I can contact during a crisis:  ? Suicide Prevention Lifeline: 4-265-264-KBUM (1301)  ? Crisis Text Line Service: Text   HOME  to 970-039.    Local Crisis Services: [facility]     Call 911 or go to my nearest emergency department.       I helped develop this safety plan and agree to use it when needed.  I have been given a copy of this plan.       Patient signature: _______________________________________________________________  Today s date:  October 7, 2022    Adapted from Safety Plan Template 2008 Antionette Trevino and Joseph Leon is reprinted with the express permission of the authors.  No portion of the Safety Plan Template may be reproduced without the express, written permission.  You can contact the authors at [email or web address] or [email or web address]  "

## 2022-10-07 NOTE — PROGRESS NOTES
"Telemedicine Visit: The patient's condition can be safely assessed and treated via synchronous audio and visual telemedicine encounter.      Reason for Telemedicine Visit: Patient has requested telehealth visit    Originating Site (Patient Location): Patient's home    Distant Location (provider location):  Off-site    Consent:  The patient/guardian has verbally consented to: the potential risks and benefits of telemedicine (video visit) versus in person care; bill my insurance or make self-payment for services provided; and responsibility for payment of non-covered services.     Mode of Communication:  Video Conference via [employer]    As the provider I attest to compliance with applicable laws and regulations related to telemedicine.      Outpatient Psychiatric Progress Note    Name: Janelle TORRES Christopher   : 1960                    Primary Care Provider: Emili Ballard MD   Therapist: Janelle Brooks, [facility]    PHQ-9 scores:  PHQ-9 SCORE 2022 2022 10/7/2022   PHQ-9 Total Score - - -   PHQ-9 Total Score MyChart 17 (Moderately severe depression) 13 (Moderate depression) 13 (Moderate depression)   PHQ-9 Total Score 17 13 13   Some encounter information is confidential and restricted. Go to Review Flowsheets activity to see all data.       STACIE-7 scores:  STACIE-7 SCORE 2022   Total Score - - -   Total Score - 7 (mild anxiety) 7 (mild anxiety)   Total Score 11 7 7       Patient Identification:  Patient is a 61 year old,   White Choose not to answer female  who presents for return visit with me.  Patient is currently on medical leave from work as NP. Patient attended the phone/video session alone. Patient prefers to be called: \"Janelle\".    Interim History:  I last saw Janelle White for outpatient psychiatry return visit on 2022. During that appointment, we:     Continue Pristiq 50 mg daily for mood, anxiety.     Continue methyl folate 7.5 mg daily as " supplementation.    Continue Adderall XR 20 mg daily for mood augmentation    Continue Adderall IR 15 mg twice daily as needed for mood augmentation and daytime fatigue/hypersomnia    Continue benzodiazepine per primary care prescriber.    Continue ketamine with interventional psychiatry clinic.    Continue to work with your specialist from [facility] as indicated.      Discussed possibility of individual DBT therapy again today.  [facility] will send some options/resources.    Could continue to consider lithium if suicidal ideation continues to persist.    Recommend ongoing individual psychotherapy.      Continue all other cares per primary care provider.      10/7: Pt overall doing a little better today compared to last visit.  It was like ketamine therapy has been helpful.  Continues to struggle with chronic pain.  Waiting on DBT due to finances and time.  Continuing in individual psychotherapy at this time.  Continues to appreciate her current medication regimen.  No concerns or medication change requests today besides refills.  Has acute safety concerns today.  No acute suicidality.  No problematic drug or alcohol use.    Per [facility], Dr. Matt Manzo, during today's team-based visit:  Reported that she is doing well. She finds ketamine has been very helpful. She noted that she is more active and doing more with family/friends. She is finding that she is dealing with more physical pain and more frustrated by that, but is doing what she needs to do to manage that better. She decided to not pursue DBT at this time citing finances and limited time available for more appointments. Her concerns were acknowledged and she was encouraged to consider ways to make room for therapy in the future which she acknowledged would be helpful when other physical concerns were better addressed. She has no requests for today.    Past medication trials include but are not limited to:   Effexor-very flat  Celexa, zoloft, was on wellbutrin a  "couple years at one point  wellbutrin XL + celexa; 2005ish  tca-a ton of weight gain  depakote maybe for a short time  Abilify/lithium ?  ECT as teen - made me dull  Cytomel-not effective at all, used 50 mcg    Psychiatric ROS:  Janelle White reports mood has been: A little better  Anxiety has been: More manageable  Sleep has been: Relatively okay, still struggles at times  Deepa sxs: Denies  Psychosis sxs: None  ADHD/ADD sxs: None  PTSD sxs: None  PHQ9 and GAD7 scores were reviewed today if completed.   Medication side effects: Denies anything major related to current psychiatric medications  Current stressors include: Symptoms and See HPI above  Coping mechanisms and supports include: Family, Hobbies and Friends, therapy    Current medications include:   Current Outpatient Medications   Medication Sig     albuterol (PROAIR HFA/PROVENTIL HFA/VENTOLIN HFA) 108 (90 Base) MCG/ACT inhaler Inhale 2 puffs into the lungs every 6 hours (Patient taking differently: Inhale 2 puffs into the lungs every 6 hours as needed for shortness of breath / dyspnea or wheezing)     amphetamine-dextroamphetamine (ADDERALL XR) 20 MG 24 hr capsule Take 1 capsule (20 mg) by mouth daily     amphetamine-dextroamphetamine (ADDERALL) 15 MG tablet Take 1 tablet (15 mg) by mouth 2 times daily as needed (focus/concentration)     ASHWAGANDHA PO Take 1 tablet by mouth daily     desvenlafaxine (PRISTIQ) 50 MG 24 hr tablet Take 1 tablet (50 mg) by mouth daily     Estradiol (DIVIGEL) 1 MG/GM GEL Place 1 packet onto the skin daily     HYDROcodone-acetaminophen (NORCO)  MG per tablet Take 1 tablet by mouth every 4 hours as needed for severe pain max 4 tabs/24 hrs     insulin syringe-needle U-100 (30G X 1/2\" 1 ML) 30G X 1/2\" 1 ML miscellaneous Inject 1 ml B12 qmonth     lidocaine (LIDODERM) 5 % patch Place 4 patches onto the skin daily Apply up to 4 patches to skin. Wear for 12 hours and remove for 12 hrs.  Refill when patient requests.     " LORazepam (ATIVAN) 1 MG tablet Take 1 tablet (1 mg) by mouth every 6 hours as needed for anxiety (Patient taking differently: Take 1 mg by mouth At Bedtime)     medical cannabis (Patient's own supply.  Not a prescription) Medical Cannabis - [place] 4-6 ml by mouth daily. [facility]     methocarbamol (ROBAXIN) 500 MG tablet Take 1 tablet (500 mg) by mouth 4 times daily as needed for muscle spasms     methylfolate (DEPLIN) 7.5 MG TABS tablet Take 1 tablet (7.5 mg) by mouth daily     naloxone (NARCAN) 4 MG/0.1ML nasal spray Spray 1 spray (4 mg) into one nostril alternating nostrils as needed for opioid reversal every 2-3 minutes until assistance arrives     NONFORMULARY Take 2 [place] by mouth daily Protein and Vitamin shake mix - Nutritional supplement     ondansetron (ZOFRAN-ODT) 8 MG ODT tab Take 1 tablet (8 mg) by mouth every 8 hours as needed for nausea     Prasterone 6.5 MG INST Place 0.5 suppositories vaginally three times a week     rOPINIRole (REQUIP) 0.25 MG tablet Take 1-2 tablets (0.25-0.5 mg) by mouth At Bedtime     senna-docusate (SENOKOT-S/PERICOLACE) 8.6-50 MG tablet Take 6-9 tablets by mouth every evening     vitamin D3 (CHOLECALCIFEROL) 125 MCG (5000 UT) tablet Take 5,000 Units by mouth daily     No current facility-administered medications for this visit.       The [place] Prescription Monitoring Program has been reviewed and there are no concerns about diversionary activity for controlled substances at this time.   09/09/2022  2   06/23/2022  Dextroamp-Amphetamin 20 MG Tab  30.00  30 Al Bau   [id number]   All (4435)   0/0   [place]   09/09/2022  2   06/23/2022  Dextroamp-Amphet Er 20 MG Cap  30.00  30 Al Bau   [id number]   All (4435)   0/0   [place]   08/11/2022  2   08/08/2022  Dextroamp-Amphet Er 20 MG Cap  30.00  30 Al Bau   [id number]   All (4435)   0/0   [place]   08/08/2022  2   08/08/2022  Dextroamp-Amphetamin 15 MG Tab  60.00  30 Al [facility]   [id number]   All  (4435)   0/0   [place]   07/15/2022  2   06/11/2022  Dextroamp-Amphet Er 20 MG Cap  30.00  30 Al Bau   [id number]   All (4435)   0/0   [place]   07/15/2022  2   04/13/2022  Dextroamp-Amphetamin 20 MG Tab  30.00  30 Al Bau   [id number]   All (4435)   0/0           Past Medical/Surgical History:  Past Medical History:   Diagnosis Date     Anxiety      Cervicalgia 2007    C5-6 disc protrusion     COPD (chronic obstructive pulmonary disease) (H) 2/7/2022     Depressive disorder      ESBL (extended spectrum beta-lactamase) producing bacteria infection      History of blood transfusion 2007    Cervical fusion     Leukemia (H)     CLA large beta     Melanoma (H) 1998     Migraine      Other chronic pain      Rotator cuff tear     s/p injections     Sacroiliac inflammation (H)      Shift work sleep disorder 12/16/2013     Urinary calculi      Vitamin B12 deficiency anemia 2006    started injections      has a past medical history of Anxiety, Cervicalgia (2007), COPD (chronic obstructive pulmonary disease) (H) (2/7/2022), Depressive disorder, ESBL (extended spectrum beta-lactamase) producing bacteria infection, History of blood transfusion (2007), Leukemia (H), Melanoma (H) (1998), Migraine, Other chronic pain, Rotator cuff tear, Sacroiliac inflammation (H), Shift work sleep disorder (12/16/2013), Urinary calculi, and Vitamin B12 deficiency anemia (2006).    She has no past medical history of Cerebral infarction (H), Congestive heart failure (H), Coronary artery disease, Diabetes (H), Heart disease, Hypertension, Thyroid disease, or Uncomplicated asthma.    Social History:  Reviewed. No changes to social history except as noted above in HPI.    Vital Signs:   None. This is phone/video visit.     Labs:  Most recent laboratory results reviewed and the pertinent results include:   Lab Results   Component Value Date    WBC 5.6 06/30/2022    WBC 3.2 05/24/2021     Lab Results   Component Value Date    RBC 4.61  06/30/2022    RBC 3.74 05/24/2021     Lab Results   Component Value Date    HGB 14.2 06/30/2022    HGB 11.0 05/24/2021     Lab Results   Component Value Date    HCT 43.6 06/30/2022    HCT 33.6 05/24/2021     No components found for: MCT  Lab Results   Component Value Date    MCV 95 06/30/2022    MCV 90 05/24/2021     Lab Results   Component Value Date    MCH 30.8 06/30/2022    MCH 29.4 05/24/2021     Lab Results   Component Value Date    MCHC 32.6 06/30/2022    MCHC 32.7 05/24/2021     Lab Results   Component Value Date    RDW 11.9 06/30/2022    RDW 13.4 05/24/2021     Lab Results   Component Value Date     07/04/2022     05/24/2021     Last Comprehensive Metabolic Panel:  Sodium   Date Value Ref Range Status   06/30/2022 136 133 - 144 mmol/L Final   05/24/2021 141 133 - 144 mmol/L Final     Potassium   Date Value Ref Range Status   06/30/2022 3.4 3.4 - 5.3 mmol/L Final   05/24/2021 3.9 3.4 - 5.3 mmol/L Final     Chloride   Date Value Ref Range Status   06/30/2022 105 94 - 109 mmol/L Final   05/24/2021 108 94 - 109 mmol/L Final     Carbon Dioxide   Date Value Ref Range Status   05/24/2021 25 20 - 32 mmol/L Final     Carbon Dioxide (CO2)   Date Value Ref Range Status   06/30/2022 25 20 - 32 mmol/L Final     Anion Gap   Date Value Ref Range Status   06/30/2022 6 3 - 14 mmol/L Final   05/24/2021 8 3 - 14 mmol/L Final     Glucose   Date Value Ref Range Status   06/30/2022 91 70 - 99 mg/dL Final   05/24/2021 87 70 - 99 mg/dL Final     Urea Nitrogen   Date Value Ref Range Status   06/30/2022 20 7 - 30 mg/dL Final   05/24/2021 15 7 - 30 mg/dL Final     Creatinine   Date Value Ref Range Status   07/03/2022 0.67 0.52 - 1.04 mg/dL Final   05/24/2021 0.64 0.52 - 1.04 mg/dL Final     GFR Estimate   Date Value Ref Range Status   07/03/2022 >90 >60 mL/min/1.73m2 Final     Comment:     Effective December 21, 2021 eGFRcr in adults is calculated using the 2021 CKD-EPI creatinine equation which includes age and gender  (Zhou peter al., [facility], DOI: 10.1056/FXZPco7828190)   05/24/2021 >90 >60 mL/min/[1.73_m2] Final     Comment:     Non  GFR Calc  Starting 12/18/2018, serum creatinine based estimated GFR (eGFR) will be   calculated using the Chronic Kidney Disease Epidemiology Collaboration   (CKD-EPI) equation.       Calcium   Date Value Ref Range Status   06/30/2022 8.7 8.5 - 10.1 mg/dL Final   05/24/2021 8.4 (L) 8.5 - 10.1 mg/dL Final     Bilirubin Total   Date Value Ref Range Status   04/26/2022 0.4 0.2 - 1.3 mg/dL Final   03/16/2021 0.4 0.2 - 1.3 mg/dL Final     Alkaline Phosphatase   Date Value Ref Range Status   04/26/2022 82 40 - 150 U/L Final   03/16/2021 87 40 - 150 U/L Final     ALT   Date Value Ref Range Status   04/26/2022 21 0 - 50 U/L Final   03/16/2021 26 0 - 50 U/L Final     AST   Date Value Ref Range Status   04/26/2022 18 0 - 45 U/L Final   03/16/2021 44 0 - 45 U/L Final     Review of Systems:  10 systems (general, cardiovascular, respiratory, eyes, ENT, endocrine, GI, , M/S, neurological) were reviewed. Most pertinent finding(s) is/are: Severe chronic pain. The remaining systems are all unremarkable.    Mental Status Examination (limited as this is by phone/video):  Appearance: Awake, alert, appears stated age, no acute distress  Attitude:  cooperative, pleasant   Motor: No gross abnormalities observed via video, not formally tested.  Oriented to:  person, place, time, and situation  Attention Span and Concentration:  normal  Speech:  clear, coherent, regular rate, rhythm, and volume  Language: intact  Mood: up-and-down, depressed  Affect: Mood congruent  Associations:  no loose associations  Thought Process:  logical, linear and goal oriented  Thought Content: Chronic passive suicidal ideation-at baseline today-no current plan or intent to harm self today, no homicidal ideation, no evidence of psychotic thought, no auditory hallucinations present and no visual hallucinations present  Recent and  Remote Memory:  Intact to interview. Not formally assessed. No amnesia.  Fund of Knowledge: appropriate  Insight:  good  Judgment:  intact, adequate for safety  Impulse Control:  intact    Suicide Risk Assessment:  Today Janelle White reports chronic/intermittent suicidal ideation-at baseline today.There are notable risk factors for self-harm, including anxiety, comorbid medical condition of Chronic pain, suicidal ideation, purposelessness/no reason for living, hopelessness, withdrawing and mood change. However, risk is mitigated by commitment to family, absence of past attempts, ability to volunteer a safety plan, history of seeking help when needed, future oriented and denies suicidal intent today. Therefore, based on all available evidence including the factors cited above, Janelle White does not appear to be at imminent risk for self-harm, does not meet criteria for a 72-hr hold, and therefore remains appropriate for ongoing outpatient level of care.  A thorough assessment of risk factors related to suicide and self-harm have been reviewed and are noted above. Local community safety resources reviewed for patient to use if needed. There was no deceit detected, and the patient presented in a manner that was believable.     DSM5 Diagnosis:  Major Depressive Disorder, Recurrent Episode, severe, without psychotic features  Treatment resistant depression  CYP2D6 poor metabolizer  CYP2B6 intermediate metabolizer  Homozygous for C677T polymorphism of MTHFR    Medical comorbidities include:   Patient Active Problem List    Diagnosis Date Noted     Suicidal ideation 06/30/2022     Priority: Medium     Immunocompromised (H) 06/30/2022     Priority: Medium     Infection due to 2019 novel coronavirus 06/30/2022     Priority: Medium     Severe episode of recurrent major depressive disorder, without psychotic features (H) 04/17/2022     Priority: Medium     COPD (chronic obstructive pulmonary disease) (H)  02/07/2022     Priority: Medium     Tension type headache 11/04/2021     Priority: Medium     Rotator cuff injury 11/04/2021     Priority: Medium     Spinal stenosis of lumbar region with radiculopathy 09/02/2021     Priority: Medium     COVID-19 08/29/2021     Priority: Medium     Polyarthralgia 08/11/2021     Priority: Medium     Cellulitis, unspecified cellulitis site 08/11/2021     Priority: Medium     Sepsis, due to unspecified organism, unspecified whether acute organ dysfunction present (H) 08/11/2021     Priority: Medium     Cellulitis 08/11/2021     Priority: Medium     STACIE (generalized anxiety disorder) 07/27/2021     Priority: Medium     MTHFR gene mutation 04/12/2021     Priority: Medium     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     Priority: Medium     CYP2D6 poor metabolizer (H) 04/12/2021     Priority: Medium     Status post blepharoplasty 11/18/2020     Priority: Medium     Regular astigmatism, bilateral 11/18/2020     Priority: Medium     Prediabetes 09/19/2019     Priority: Medium     Hyperlipidemia LDL goal <130 09/19/2019     Priority: Medium     CLARE (obstructive sleep apnea) 02/13/2019     Priority: Medium     Controlled substance agreement signed 08/14/2018     Priority: Medium     Chronic pain syndrome 12/21/2017     Priority: Medium     CLL (chronic lymphocytic leukemia) (H) 12/21/2017     Priority: Medium     Hypotension 09/14/2017     Priority: Medium     History of laser assisted in situ keratomileusis 10/14/2014     Priority: Medium     Pain medication agreement 04/23/2014     Priority: Medium     Formatting of this note might be different from the original.  Patient takes morphine 15 mg ER BID for chronic neck and back pain.  She is also on cymbalta, ibuprofen, flexaril prn       Shift work sleep disorder 12/16/2013     Priority: Medium     Vitamin D deficiency 11/08/2012     Priority: Medium     Moderate recurrent major depression (H) 01/06/2011     Priority: Medium     DDD  (degenerative disc disease), cervical 10/07/2010     Priority: Medium     CARDIOVASCULAR SCREENING; LDL GOAL LESS THAN 160 02/10/2010     Priority: Medium     Chronic Low Back Pain 10/01/2009     Priority: Medium     S/p AP L3-S1 fusion 12/2010 - referred to [facility].   Orthopedics writing scripts for narcotics post-op.       Migraine      Priority: Medium     Problem list name updated by automated process. Provider to review       B-complex deficiency 10/10/2006     Priority: Medium     Problem list name updated by automated process. Provider to review       PERSONAL HX OF  MELANOMA 12/04/2003     Priority: Low       Psychosocial & Contextual Factors: see HPI above    Assessment:  6/15/2021:  Janelle TORRES Christopher reports overall some significant worsening of mood symptoms.  Increased anxiety with her depression.  Poor motivation and poor energy.  Symptoms severe enough to prevent her from being able to utilize typical coping skills and strategies.  No current motivation or energy to participate in PHP/day treatment program.  Continues to take medication as scheduled.  Recent surgery and general anesthesia could be contributing.  Discussed discontinuing stimulant medication as an augmentation strategy.  She is agreeable to moving forward with T3 as an augmentation for treatment resistant depression.  Discussed risks and benefits at length.  Will get baseline thyroid labs prior to starting T3.  Hoping she will experience increased energy and motivation and that her mood will lift.  Also discussed setting up an appointment with her interventional psychiatry clinic to discuss other potential options such as ketamine therapy, ECT, TMS.  Does have history of ECT for depression when she was quite young.  I am hopeful to stay ahead of her symptoms before things get too severe.  Has chronic suicidal ideation and is at high risk for suicide.  Continues to deny any plan or intent.  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  See below for risk/benefit conversation regarding T3 had with the patient:    GeneSight testing Info:  GeneSight testing revealed she is a poor 2D6 metabolizer and an intermediate 2B6 metabolizer.  This would explain her multiple failed medication trials due to the negative side effects.  She also has a genotype that would suggest a phenotype sensitivity to serotonin. She also was found to have significantly reduced folic acid conversion.      Starting T3 as augment to antidepressant therapy for treatment resistant depression:  Start T3 at 25 mcg per day for one to two weeks, and if there is little or no improvement, increase the dose to 50 mcg per day; this is consistent with practice guidelines from the American Psychiatric Association and [place] [employer] for Mood and Anxiety Treatments.     Adverse effects consistent with hyperthyroidism may occur, including tremor, palpitations, heat intolerance, sweating, anxiety, increased frequency of bowel movements, shortness of breath, and exacerbation of cardiac arrhythmia. In addition, hyperthyroidism that emerges during long-term treatment may lead to bone demineralization, osteoporosis, and an increased risk of fracture    Following a normal baseline TSH concentration, no other laboratory monitoring during a four to six week trial of adjunctive T3 is necessary. However, if T3 is continued longer, a serum TSH concentration should be checked after the first one to three months of treatment and then every six months.    Today, 7/27/21:   Patient overall with little change in her symptoms.  Did not do as well on Cytomel and had limited to no improvement at all after weeks on 50 mcg.  Labs were unremarkable prior to starting Cytomel.  Opted to go back to stimulant augmentation since patient does find it quite helpful.  She has been taking 15 mg of immediate release most afternoons.  Due to good tolerability and some efficacy, we will bump  up her immediate release dose slightly to 20 mg daily as needed in addition to her 20 mg extended release dose. I have no concerns about misuse or diversion at this time.  Tolerating well with no negative side effects.  Last blood pressure normal 7/2.  Patient has noted some efficacy from Pristiq more than other antidepressant trials and so we will continue to titrate further to 100 mg daily.  She is tolerating well.  Continues to use lorazepam for anxiety, pain medicine adjunct, and for sleep as prescribed by primary care provider.  Has been utilizing roughly 100 tablets of 0.5 mg every 1.5 months.  Patient with ongoing chronic intermittent passive suicidal ideation with no plan or intent.  Denies safety concerns today.  No problematic drug or alcohol use.  I am hopeful the interventional psychiatry clinic might be able to initiate ketamine therapy or another therapy they would recommend as potentially being more helpful than patient's multiple medication/augmentation trials.    10/6/2021:  Patient with some improved depression symptoms and much more hopeful.  [facility]  Also is hopeful there will be some helpful treatments.  Visit to [place] was also very good and instilled a lot of hope in her abilities.  She was encouraged to continue to push herself to do the things she enjoys doing.  No medication changes today.  She will follow-up with getting TMS rescheduled, possibly when things slow down after the holidays.  Does not need to be seen until after the holidays.  No acute safety concerns today.  No problematic drug or alcohol use.    1/14/2022:  Overall patient feeling a little more down lately.  Tolerating TMS well.  Encouraged to continue TMS.  No medication changes today since undergoing TMS treatment.  Talked about life stages today generativity versus stagnation and also integrity versus despair.  Talked about consideration for acceptance and commitment  therapy.  She is encouraged to continue to be active.  No acute suicidal ideation today.  No acute safety concerns today.  No problematic drug or alcohol use.    4/13/2022:   Patient continues to have symptoms that wax and wane.  Feels like she tolerates Pristiq 50 mg better than 100 and will continue on this dose.  Last week was particularly difficult.  Pretty intense suicidal ideation but she was able to manage these thoughts and remain safe.  She does report she would come to the hospital if necessary.  We discussed the empath unit today and other resources if she felt she could not keep herself safe.  She continues to work on tapering her narcotic pain medication regimen.  She is working with addiction medicine and also pain management.  She is starting Pilates therapy.  Pool therapy will begin in July.  Discussed possibility of vestibular rehabilitation.  Individual therapy will also start soon.  She was strongly encouraged to continue to pursue ketamine therapy.  No acute suicidality today.  No problematic drug or alcohol use.    6/23/2022:  Overall reports doing relatively okay.  Some pretty down days still, but ketamine therapy going well.  Feels like continuing to move in the right direction.  Suicidal ideation improving.  Off all narcotic pain medication.  Feels good about her progress.  Had some really down days after off pain medication but improved since those few dark days.  Hopeful about where ketamine therapy may lead.  Discussed some of her restlessness at bedtime and will start pramipexole.  Also some evidence to suggest pramipexole could be helpful for treatment resistant depression symptoms.  Discussed risks and benefits of therapy, including watching for any impulsive behaviors.  Continues to have suicidal thoughts but no acute suicidality today.  Her suicidality overall is improving from her baseline suicidality.  She continues to consider the idea of a partial hospital program.  We will send  her information for [facility] program.  Also encouraged her to discuss possibility of a pain program through [facility] with Dr. Medley.  No acute safety concerns today.  No problematic drug or alcohol use.    7/11/2022:  Patient with some recent more intensive struggles.  Depression much more severe recently.  Led to hospitalization.  Patient medically hospitalized since was positive for COVID and has history of CLL and [place] protocol requires isolation.  Patient seen by psychiatry consult service.  No medication changes made.  Patient continues with interventional psychiatry clinic.  They will be increasing ketamine dose and treatments will be every few weeks.  We discussed patient's symptom history a little more today and possible bipolar diagnosis came into question.  Patient does recognize possible hypomanic episodes in the past.  Patient is currently monitoring symptoms closely and pramipexole.  She is feeling better since starting pramipexole but is watching shopping behaviors closely.  Suicidal ideation is improving since hospitalization.  Restless legs improved at night on pramipexole.  Discussed we could consider adding lower dose lithium as an augment to her Pristiq and to help stabilize moods a little bit more and to further help with some of her chronic suicidal ideation.  We also discussed DBT for chronic suicidal ideation and ongoing mood instability.  Continues off of pain medication.  No acute suicidality or safety concerns today.  Patient will follow up in a few weeks.  No medication changes today since we will wait to see how ketamine dose change goes.  No drug or alcohol use concerns.  Patient noted some ongoing cognitive/memory concerns and requested testing.  Neuropsychological referral placed.    8/8/2022:  Patient with ongoing severe depression, resistant to treatment.  She is agreeable to increasing her stimulant medication.  This could hopefully further improve mood slightly.  May  also help with some additional energy and also help with some of her ongoing cognitive concerns.  She has neuropsychological testing coming up soon.  Discussed possibly considering individual DBT therapy with a DBT certified therapist given her ongoing chronic suicidal ideation and inability to participate in group therapy currently.  We will discuss this possibility with her current therapist.  Also discussed possibility of increasing Pristiq by 25 mg only 2 or 3 days a week to decrease risk of negative side effects (25 mg on Tuesdays/Thursdays for Monday/Wednesday/Fridays).  Patient also encouraged to continue ketamine treatment.  No acute suicidality today.  Patient denies any intent or plan to act on any of her suicidal thoughts today.  No problematic drug or alcohol use.    9/7/2022:  Patient doing relatively okay today.  Pain continues to feel a little bit worse.  Emotionally though, despite worsening pain symptoms, patient feels like she is doing relatively okay.  Discussed various psychotherapy approaches that could be taken to address her symptoms.  We discussed that health psychology could be an optimal approach to help with her symptoms but that her suicidal ideation is often still quite intense and may be a distractor to her treatment with a health psychologist (discussed she may be referred often to the emergency room or to other more intensive programs).  We discussed working with an individual DBT therapist as a possibility to continue addressing her ongoing chronic suicidal ideation (individual therapy would allow patient to move at her own pace since group therapy is much too intense at this time).  Patient was open to this idea.  [facility] today we will send patient some resources/options.  Depending on patient's financial situation, there is also a possibility patient could work with a health psychologist in conjunction with a DBT therapist.  Ketamine therapy has proven to be helpful, although I do wish  the effects lasted a little longer than what they currently are for the patient.  I recommend patient continue her ketamine treatments.  No medication changes today.  She denies any acute suicidality today.  No plan or intent to harm herself noted today.  She denies being in a bad place today.  No problematic drug or alcohol use.     10/7/2022:  Patient overall relatively stable at this time.  Mood improving slightly with ketamine therapy.  Patient encouraged to continue with treatment.  Anxiety a little more manageable.  Continuing to struggle with severe chronic pain.  Tolerating current medications well with no negative side effects noted.  No changes today.  Patient will continue in individual psychotherapy.  No acute suicidality today.  Suicidal thoughts at baseline, maybe even a little improved.    Medication side effects and alternatives were reviewed. Health promotion activities recommended and reviewed today. All questions addressed. Education and counseling completed regarding risks and benefits of medications and psychotherapy options. Recommend therapy for additional support.     Treatment Plan:    Continue Pristiq 50 mg daily for mood, anxiety.     Continue methyl folate 7.5 mg daily as supplementation.    Continue Adderall XR 20 mg daily for mood augmentation    Continue Adderall IR 15 mg twice daily as needed for mood augmentation and daytime fatigue/hypersomnia    Continue benzodiazepine per primary care prescriber.    Continue ketamine with interventional psychiatry clinic.    Continue to work with your specialist from [facility] as indicated.      Discussed possibility of individual DBT therapy again today.  [facility] will send some options/resources.    Could continue to consider lithium if suicidal ideation continues to persist.    Recommend ongoing individual psychotherapy.      Continue all other cares per primary care provider.     Continue all other medications as reviewed per electronic medical  record today.     Safety plan reviewed. To the Emergency Department as needed or call after hours crisis line at [phone] or [phone]. [place] Crisis Text Line. Text [place] to 468577 or Suicide LifeLine Chat: suicidepreventionlifeline.org/chat    Schedule an appointment with me in 8-12 weeks, or sooner as needed. Call [facility] at [phone] to schedule.    Follow up with primary care provider as planned or for acute medical concerns.    Call the psychiatric nurse line with medication questions or concerns at [phone].    Ciklumhart may be used to communicate with your provider, but this is not intended to be used for emergencies.    Therapy resources:  Www.MPSI.org    https://www.[employer]dbt.com/mental-health/thrive/thrive-mental-health-and-chronic-pain-management/    [place] DBT resources:  [place]://[place].gov[place]dhs/partners-and-providers/policies-procedures/adult-mental-health/dialectical-behavior-therapy/dbt-certified-providers/    Risks of benzodiazepine (Ativan, Xanax, Klonopin, Valium, etc) use including, but not limited to, sedation, tolerance, risk for addiction/dependence. Do not drink alcohol while taking benzodiazepines due to risk of trouble breathing and potential death. Do not drive or operate heavy machinery until it is known how the drug affects you. Discuss with physician or pharmacist before ever taking a benzodiazepine with a narcotic/opioid pain medication.     Have previously discussed risks of stimulant medication including, but not limited to, decreased appetite, risk of tics (and that they may be lasting), trouble sleeping, cardiac risks such as increased heart rate and blood pressure, and rare risk of sudden cardiac death.  Also risk of addiction/tolerance/dependence.    Administrative Billing:   Phone Call/Video Duration: 19 Minutes  10:39a - 10:58a    Patient Status:  Patient is a continuous care patient and refills will continue to come from this provider until otherwise  noted.    Signed:   Kimberly Perez DO  [facility]    Disclaimer: This note consists of symbols derived from keyboarding, dictation and/or voice recognition software. As a result, there may be errors in the script that have gone undetected. Please consider this when interpreting information found in this chart.

## 2022-10-09 RX ORDER — DEXTROAMPHETAMINE SACCHARATE, AMPHETAMINE ASPARTATE MONOHYDRATE, DEXTROAMPHETAMINE SULFATE AND AMPHETAMINE SULFATE 5; 5; 5; 5 MG/1; MG/1; MG/1; MG/1
20 CAPSULE, EXTENDED RELEASE ORAL DAILY
Qty: 30 CAPSULE | Refills: 0 | Status: SHIPPED | OUTPATIENT
Start: 2022-12-10 | End: 2023-01-09

## 2022-10-09 RX ORDER — DEXTROAMPHETAMINE SACCHARATE, AMPHETAMINE ASPARTATE MONOHYDRATE, DEXTROAMPHETAMINE SULFATE AND AMPHETAMINE SULFATE 5; 5; 5; 5 MG/1; MG/1; MG/1; MG/1
20 CAPSULE, EXTENDED RELEASE ORAL DAILY
Qty: 30 CAPSULE | Refills: 0 | Status: SHIPPED | OUTPATIENT
Start: 2022-11-09 | End: 2022-12-09

## 2022-10-09 RX ORDER — DEXTROAMPHETAMINE SACCHARATE, AMPHETAMINE ASPARTATE, DEXTROAMPHETAMINE SULFATE AND AMPHETAMINE SULFATE 3.75; 3.75; 3.75; 3.75 MG/1; MG/1; MG/1; MG/1
15 TABLET ORAL 2 TIMES DAILY
Qty: 60 TABLET | Refills: 0 | Status: SHIPPED | OUTPATIENT
Start: 2022-12-10 | End: 2023-01-09

## 2022-10-09 RX ORDER — DEXTROAMPHETAMINE SACCHARATE, AMPHETAMINE ASPARTATE MONOHYDRATE, DEXTROAMPHETAMINE SULFATE AND AMPHETAMINE SULFATE 5; 5; 5; 5 MG/1; MG/1; MG/1; MG/1
20 CAPSULE, EXTENDED RELEASE ORAL DAILY
Qty: 30 CAPSULE | Refills: 0 | Status: SHIPPED | OUTPATIENT
Start: 2022-10-09 | End: 2022-11-08

## 2022-10-09 RX ORDER — DEXTROAMPHETAMINE SACCHARATE, AMPHETAMINE ASPARTATE, DEXTROAMPHETAMINE SULFATE AND AMPHETAMINE SULFATE 3.75; 3.75; 3.75; 3.75 MG/1; MG/1; MG/1; MG/1
15 TABLET ORAL 2 TIMES DAILY
Qty: 60 TABLET | Refills: 0 | Status: SHIPPED | OUTPATIENT
Start: 2022-11-09 | End: 2023-01-09

## 2022-10-09 RX ORDER — DEXTROAMPHETAMINE SACCHARATE, AMPHETAMINE ASPARTATE, DEXTROAMPHETAMINE SULFATE AND AMPHETAMINE SULFATE 3.75; 3.75; 3.75; 3.75 MG/1; MG/1; MG/1; MG/1
15 TABLET ORAL 2 TIMES DAILY
Qty: 60 TABLET | Refills: 0 | Status: SHIPPED | OUTPATIENT
Start: 2022-10-09 | End: 2023-01-09

## 2022-10-25 ENCOUNTER — INFUSION THERAPY VISIT (OUTPATIENT)
Dept: INFUSION THERAPY | Facility: CLINIC | Age: 62
End: 2022-10-25
Attending: NURSE PRACTITIONER
Payer: COMMERCIAL

## 2022-10-25 VITALS
HEART RATE: 71 BPM | DIASTOLIC BLOOD PRESSURE: 63 MMHG | RESPIRATION RATE: 16 BRPM | SYSTOLIC BLOOD PRESSURE: 106 MMHG | OXYGEN SATURATION: 97 %

## 2022-10-25 DIAGNOSIS — F33.2 SEVERE EPISODE OF RECURRENT MAJOR DEPRESSIVE DISORDER, WITHOUT PSYCHOTIC FEATURES (H): Primary | ICD-10-CM

## 2022-10-25 PROCEDURE — 250N000009 HC RX 250: Performed by: PSYCHIATRY & NEUROLOGY

## 2022-10-25 PROCEDURE — 258N000003 HC RX IP 258 OP 636: Performed by: NURSE PRACTITIONER

## 2022-10-25 PROCEDURE — 96365 THER/PROPH/DIAG IV INF INIT: CPT

## 2022-10-25 PROCEDURE — 258N000003 HC RX IP 258 OP 636: Performed by: PSYCHIATRY & NEUROLOGY

## 2022-10-25 RX ORDER — NALOXONE HYDROCHLORIDE 0.4 MG/ML
0.2 INJECTION, SOLUTION INTRAMUSCULAR; INTRAVENOUS; SUBCUTANEOUS
Status: CANCELLED | OUTPATIENT
Start: 2022-10-25

## 2022-10-25 RX ORDER — EPINEPHRINE 1 MG/ML
0.3 INJECTION, SOLUTION, CONCENTRATE INTRAVENOUS EVERY 5 MIN PRN
Status: CANCELLED | OUTPATIENT
Start: 2022-10-25

## 2022-10-25 RX ORDER — DIPHENHYDRAMINE HYDROCHLORIDE 50 MG/ML
50 INJECTION INTRAMUSCULAR; INTRAVENOUS
Status: CANCELLED
Start: 2022-10-25

## 2022-10-25 RX ORDER — HEPARIN SODIUM (PORCINE) LOCK FLUSH IV SOLN 100 UNIT/ML 100 UNIT/ML
5 SOLUTION INTRAVENOUS
Status: CANCELLED | OUTPATIENT
Start: 2022-10-25

## 2022-10-25 RX ORDER — ONDANSETRON 2 MG/ML
4 INJECTION INTRAMUSCULAR; INTRAVENOUS
Status: CANCELLED | OUTPATIENT
Start: 2022-10-25

## 2022-10-25 RX ORDER — MEPERIDINE HYDROCHLORIDE 25 MG/ML
25 INJECTION INTRAMUSCULAR; INTRAVENOUS; SUBCUTANEOUS EVERY 30 MIN PRN
Status: CANCELLED | OUTPATIENT
Start: 2022-10-25

## 2022-10-25 RX ORDER — ALBUTEROL SULFATE 90 UG/1
1-2 AEROSOL, METERED RESPIRATORY (INHALATION)
Status: CANCELLED
Start: 2022-10-25

## 2022-10-25 RX ORDER — METHYLPREDNISOLONE SODIUM SUCCINATE 125 MG/2ML
125 INJECTION, POWDER, LYOPHILIZED, FOR SOLUTION INTRAMUSCULAR; INTRAVENOUS
Status: CANCELLED
Start: 2022-10-25

## 2022-10-25 RX ORDER — ALBUTEROL SULFATE 0.83 MG/ML
2.5 SOLUTION RESPIRATORY (INHALATION)
Status: CANCELLED | OUTPATIENT
Start: 2022-10-25

## 2022-10-25 RX ORDER — HEPARIN SODIUM,PORCINE 10 UNIT/ML
5 VIAL (ML) INTRAVENOUS
Status: CANCELLED | OUTPATIENT
Start: 2022-10-25

## 2022-10-25 RX ORDER — HYDRALAZINE HYDROCHLORIDE 20 MG/ML
10 INJECTION INTRAMUSCULAR; INTRAVENOUS
Status: CANCELLED | OUTPATIENT
Start: 2022-10-25

## 2022-10-25 RX ORDER — ONDANSETRON 2 MG/ML
4 INJECTION INTRAMUSCULAR; INTRAVENOUS
Status: DISCONTINUED | OUTPATIENT
Start: 2022-10-25 | End: 2022-10-25 | Stop reason: HOSPADM

## 2022-10-25 RX ADMIN — KETAMINE HYDROCHLORIDE 65 MG: 50 INJECTION, SOLUTION INTRAMUSCULAR; INTRAVENOUS at 11:01

## 2022-10-25 RX ADMIN — SODIUM CHLORIDE 250 ML: 9 INJECTION, SOLUTION INTRAVENOUS at 11:28

## 2022-10-25 NOTE — PATIENT INSTRUCTIONS
Treatment Plan:  Continue Pristiq 50 mg daily for mood, anxiety.   Continue methyl folate 7.5 mg daily as supplementation.  Continue Adderall XR 20 mg daily for mood augmentation  Continue Adderall IR 15 mg twice daily as needed for mood augmentation and daytime fatigue/hypersomnia  Continue benzodiazepine per primary care prescriber.  Continue ketamine with interventional psychiatry clinic.  Continue to work with your specialist from [facility] as indicated.    Discussed possibility of individual DBT therapy again today.  [facility] will send some options/resources.  Could continue to consider lithium if suicidal ideation continues to persist.  Recommend ongoing individual psychotherapy.    Continue all other cares per primary care provider.   Continue all other medications as reviewed per electronic medical record today.   Safety plan reviewed. To the Emergency Department as needed or call after hours crisis line at [phone] or [phone]. [place] Crisis Text Line. Text [place] to 750395 or Suicide LifeLine Chat: suicidepreventionAria Innovationsline.org/chat  Schedule an appointment with me in 8-12 weeks, or sooner as needed. Call [facility] at [phone] to schedule.  Follow up with primary care provider as planned or for acute medical concerns.  Call the psychiatric nurse line with medication questions or concerns at [phone].  GeneExcelhart may be used to communicate with your provider, but this is not intended to be used for emergencies.    Therapy resources:  Www.MPSI.org    https://www.mhs-dbt.com/mental-health/thrive/thrive-mental-health-and-chronic-pain-management/    [place] DBT resources:  [place]://[place]gov[place]dhs/partners-and-providers/policies-procedures/adult-mental-health/dialectical-behavior-therapy/dbt-certified-providers/    Risks of benzodiazepine (Ativan, Xanax, Klonopin, Valium, etc) use including, but not limited to, sedation, tolerance, risk for addiction/dependence. Do not drink alcohol while  taking benzodiazepines due to risk of trouble breathing and potential death. Do not drive or operate heavy machinery until it is known how the drug affects you. Discuss with physician or pharmacist before ever taking a benzodiazepine with a narcotic/opioid pain medication.     Have previously discussed risks of stimulant medication including, but not limited to, decreased appetite, risk of tics (and that they may be lasting), trouble sleeping, cardiac risks such as increased heart rate and blood pressure, and rare risk of sudden cardiac death.  Also risk of addiction/tolerance/dependence.

## 2022-11-01 ENCOUNTER — TRANSFERRED RECORDS (OUTPATIENT)
Dept: HEALTH INFORMATION MANAGEMENT | Facility: CLINIC | Age: 62
End: 2022-11-01

## 2022-11-03 ENCOUNTER — MYC MEDICAL ADVICE (OUTPATIENT)
Dept: FAMILY MEDICINE | Facility: CLINIC | Age: 62
End: 2022-11-03

## 2022-11-04 NOTE — TELEPHONE ENCOUNTER
"Carmen-     See pt update that she has covid 19 for the 3rd time, not interested in paxlovid,     She asks if you can order \"IgG and labs\"    [place] [facility]-On[facility] not covered by her insurance- do you recommend seeing [facility] hem-onc for this?    Rona Cummings, SHANELN RN  [facility]    "

## 2022-11-07 NOTE — TELEPHONE ENCOUNTER
I would recommend Heme/onc referral as these are not routine labs in the primary care setting.  If she would like me to place a referral, I would be happy to do so!

## 2022-11-15 ENCOUNTER — INFUSION THERAPY VISIT (OUTPATIENT)
Dept: INFUSION THERAPY | Facility: CLINIC | Age: 62
End: 2022-11-15
Attending: NURSE PRACTITIONER
Payer: COMMERCIAL

## 2022-11-15 VITALS
HEART RATE: 68 BPM | TEMPERATURE: 97.7 F | SYSTOLIC BLOOD PRESSURE: 103 MMHG | OXYGEN SATURATION: 97 % | BODY MASS INDEX: 25.53 KG/M2 | WEIGHT: 153.44 LBS | DIASTOLIC BLOOD PRESSURE: 65 MMHG

## 2022-11-15 DIAGNOSIS — F33.2 SEVERE EPISODE OF RECURRENT MAJOR DEPRESSIVE DISORDER, WITHOUT PSYCHOTIC FEATURES (H): Primary | ICD-10-CM

## 2022-11-15 PROCEDURE — 250N000009 HC RX 250: Performed by: PSYCHIATRY & NEUROLOGY

## 2022-11-15 PROCEDURE — 258N000003 HC RX IP 258 OP 636: Performed by: PSYCHIATRY & NEUROLOGY

## 2022-11-15 PROCEDURE — 96365 THER/PROPH/DIAG IV INF INIT: CPT

## 2022-11-15 RX ORDER — ALBUTEROL SULFATE 90 UG/1
1-2 AEROSOL, METERED RESPIRATORY (INHALATION)
Status: CANCELLED
Start: 2022-11-15

## 2022-11-15 RX ORDER — ALBUTEROL SULFATE 0.83 MG/ML
2.5 SOLUTION RESPIRATORY (INHALATION)
Status: CANCELLED | OUTPATIENT
Start: 2022-11-15

## 2022-11-15 RX ORDER — HEPARIN SODIUM (PORCINE) LOCK FLUSH IV SOLN 100 UNIT/ML 100 UNIT/ML
5 SOLUTION INTRAVENOUS
Status: CANCELLED | OUTPATIENT
Start: 2022-11-15

## 2022-11-15 RX ORDER — HYDRALAZINE HYDROCHLORIDE 20 MG/ML
10 INJECTION INTRAMUSCULAR; INTRAVENOUS
Status: CANCELLED | OUTPATIENT
Start: 2022-11-15

## 2022-11-15 RX ORDER — NALOXONE HYDROCHLORIDE 0.4 MG/ML
0.2 INJECTION, SOLUTION INTRAMUSCULAR; INTRAVENOUS; SUBCUTANEOUS
Status: CANCELLED | OUTPATIENT
Start: 2022-11-15

## 2022-11-15 RX ORDER — HEPARIN SODIUM,PORCINE 10 UNIT/ML
5 VIAL (ML) INTRAVENOUS
Status: CANCELLED | OUTPATIENT
Start: 2022-11-15

## 2022-11-15 RX ORDER — ONDANSETRON 2 MG/ML
4 INJECTION INTRAMUSCULAR; INTRAVENOUS
Status: CANCELLED | OUTPATIENT
Start: 2022-11-15

## 2022-11-15 RX ORDER — MEPERIDINE HYDROCHLORIDE 25 MG/ML
25 INJECTION INTRAMUSCULAR; INTRAVENOUS; SUBCUTANEOUS EVERY 30 MIN PRN
Status: CANCELLED | OUTPATIENT
Start: 2022-11-15

## 2022-11-15 RX ORDER — DIPHENHYDRAMINE HYDROCHLORIDE 50 MG/ML
50 INJECTION INTRAMUSCULAR; INTRAVENOUS
Status: CANCELLED
Start: 2022-11-15

## 2022-11-15 RX ORDER — EPINEPHRINE 1 MG/ML
0.3 INJECTION, SOLUTION, CONCENTRATE INTRAVENOUS EVERY 5 MIN PRN
Status: CANCELLED | OUTPATIENT
Start: 2022-11-15

## 2022-11-15 RX ORDER — METHYLPREDNISOLONE SODIUM SUCCINATE 125 MG/2ML
125 INJECTION, POWDER, LYOPHILIZED, FOR SOLUTION INTRAMUSCULAR; INTRAVENOUS
Status: CANCELLED
Start: 2022-11-15

## 2022-11-15 RX ADMIN — KETAMINE HYDROCHLORIDE 65 MG: 50 INJECTION, SOLUTION INTRAMUSCULAR; INTRAVENOUS at 08:38

## 2022-11-22 ENCOUNTER — MYC REFILL (OUTPATIENT)
Dept: PSYCHIATRY | Facility: CLINIC | Age: 62
End: 2022-11-22

## 2022-11-22 DIAGNOSIS — F33.2 SEVERE EPISODE OF RECURRENT MAJOR DEPRESSIVE DISORDER, WITHOUT PSYCHOTIC FEATURES (H): ICD-10-CM

## 2022-11-22 DIAGNOSIS — F33.9 RECURRENT MAJOR DEPRESSION RESISTANT TO TREATMENT (H): ICD-10-CM

## 2022-11-22 RX ORDER — DEXTROAMPHETAMINE SACCHARATE, AMPHETAMINE ASPARTATE MONOHYDRATE, DEXTROAMPHETAMINE SULFATE AND AMPHETAMINE SULFATE 5; 5; 5; 5 MG/1; MG/1; MG/1; MG/1
20 CAPSULE, EXTENDED RELEASE ORAL DAILY
Qty: 30 CAPSULE | Refills: 0 | OUTPATIENT
Start: 2022-11-22

## 2022-12-02 ENCOUNTER — TRANSFERRED RECORDS (OUTPATIENT)
Dept: HEALTH INFORMATION MANAGEMENT | Facility: CLINIC | Age: 62
End: 2022-12-02

## 2022-12-06 ENCOUNTER — INFUSION THERAPY VISIT (OUTPATIENT)
Dept: INFUSION THERAPY | Facility: CLINIC | Age: 62
End: 2022-12-06
Attending: NURSE PRACTITIONER
Payer: COMMERCIAL

## 2022-12-06 VITALS
DIASTOLIC BLOOD PRESSURE: 62 MMHG | SYSTOLIC BLOOD PRESSURE: 104 MMHG | HEART RATE: 73 BPM | WEIGHT: 155.4 LBS | RESPIRATION RATE: 16 BRPM | TEMPERATURE: 98.1 F | OXYGEN SATURATION: 98 % | BODY MASS INDEX: 25.86 KG/M2

## 2022-12-06 DIAGNOSIS — F33.2 SEVERE EPISODE OF RECURRENT MAJOR DEPRESSIVE DISORDER, WITHOUT PSYCHOTIC FEATURES (H): Primary | ICD-10-CM

## 2022-12-06 PROCEDURE — 258N000003 HC RX IP 258 OP 636: Performed by: PSYCHIATRY & NEUROLOGY

## 2022-12-06 PROCEDURE — 250N000009 HC RX 250: Performed by: PSYCHIATRY & NEUROLOGY

## 2022-12-06 PROCEDURE — 96374 THER/PROPH/DIAG INJ IV PUSH: CPT

## 2022-12-06 RX ORDER — HEPARIN SODIUM,PORCINE 10 UNIT/ML
5 VIAL (ML) INTRAVENOUS
Status: CANCELLED | OUTPATIENT
Start: 2022-12-06

## 2022-12-06 RX ORDER — ALBUTEROL SULFATE 90 UG/1
1-2 AEROSOL, METERED RESPIRATORY (INHALATION)
Status: CANCELLED
Start: 2022-12-06

## 2022-12-06 RX ORDER — DIPHENHYDRAMINE HYDROCHLORIDE 50 MG/ML
50 INJECTION INTRAMUSCULAR; INTRAVENOUS
Status: CANCELLED
Start: 2022-12-06

## 2022-12-06 RX ORDER — MEPERIDINE HYDROCHLORIDE 25 MG/ML
25 INJECTION INTRAMUSCULAR; INTRAVENOUS; SUBCUTANEOUS EVERY 30 MIN PRN
Status: CANCELLED | OUTPATIENT
Start: 2022-12-06

## 2022-12-06 RX ORDER — NALOXONE HYDROCHLORIDE 0.4 MG/ML
0.2 INJECTION, SOLUTION INTRAMUSCULAR; INTRAVENOUS; SUBCUTANEOUS
Status: CANCELLED | OUTPATIENT
Start: 2022-12-06

## 2022-12-06 RX ORDER — EPINEPHRINE 1 MG/ML
0.3 INJECTION, SOLUTION, CONCENTRATE INTRAVENOUS EVERY 5 MIN PRN
Status: CANCELLED | OUTPATIENT
Start: 2022-12-06

## 2022-12-06 RX ORDER — METHYLPREDNISOLONE SODIUM SUCCINATE 125 MG/2ML
125 INJECTION, POWDER, LYOPHILIZED, FOR SOLUTION INTRAMUSCULAR; INTRAVENOUS
Status: CANCELLED
Start: 2022-12-06

## 2022-12-06 RX ORDER — HYDRALAZINE HYDROCHLORIDE 20 MG/ML
10 INJECTION INTRAMUSCULAR; INTRAVENOUS
Status: CANCELLED | OUTPATIENT
Start: 2022-12-06

## 2022-12-06 RX ORDER — ALBUTEROL SULFATE 0.83 MG/ML
2.5 SOLUTION RESPIRATORY (INHALATION)
Status: CANCELLED | OUTPATIENT
Start: 2022-12-06

## 2022-12-06 RX ORDER — HEPARIN SODIUM (PORCINE) LOCK FLUSH IV SOLN 100 UNIT/ML 100 UNIT/ML
5 SOLUTION INTRAVENOUS
Status: CANCELLED | OUTPATIENT
Start: 2022-12-06

## 2022-12-06 RX ORDER — ONDANSETRON 2 MG/ML
4 INJECTION INTRAMUSCULAR; INTRAVENOUS
Status: CANCELLED | OUTPATIENT
Start: 2022-12-06

## 2022-12-06 RX ADMIN — KETAMINE HYDROCHLORIDE 65 MG: 50 INJECTION, SOLUTION INTRAMUSCULAR; INTRAVENOUS at 08:42

## 2022-12-06 RX ADMIN — SODIUM CHLORIDE 250 ML: 9 INJECTION, SOLUTION INTRAVENOUS at 09:23

## 2022-12-06 NOTE — PROGRESS NOTES
"Infusion Nursing Note:  Janelle MELISSA White presents today for Ketamine.    Patient seen by provider today: No   present during visit today: Not Applicable.    Note: Patient reports Ketamine infusions remain \"very beneficial\" as stated by patient, but the effects does not last the entire time between infusions.      Intravenous Access:  Peripheral IV placed right forearm.    Treatment Conditions:  Not Applicable.    Post Infusion Assessment:  Patient tolerated infusion without incident.  Patient observed for 60 minutes post Ketamine per protocol.  No evidence of extravasations.  Access discontinued per protocol.     Discharge Plan:   Discharge instructions reviewed with: Patient.  Patient and/or family verbalized understanding of discharge instructions and all questions answered.  Patient discharged in stable condition accompanied by: self, patients  gives patient a ride home after infusion.   Departure Mode: Ambulatory.      Tabatha Small RN                      "

## 2022-12-12 DIAGNOSIS — R05.9 COUGH: ICD-10-CM

## 2022-12-13 RX ORDER — ALBUTEROL SULFATE 90 UG/1
2 AEROSOL, METERED RESPIRATORY (INHALATION) EVERY 6 HOURS PRN
Qty: 8.5 G | Refills: 11 | Status: SHIPPED | OUTPATIENT
Start: 2022-12-13 | End: 2023-03-13

## 2022-12-27 ENCOUNTER — INFUSION THERAPY VISIT (OUTPATIENT)
Dept: INFUSION THERAPY | Facility: CLINIC | Age: 62
End: 2022-12-27
Attending: NURSE PRACTITIONER
Payer: COMMERCIAL

## 2022-12-27 VITALS
OXYGEN SATURATION: 95 % | DIASTOLIC BLOOD PRESSURE: 62 MMHG | RESPIRATION RATE: 16 BRPM | TEMPERATURE: 97.9 F | BODY MASS INDEX: 26.39 KG/M2 | WEIGHT: 158.6 LBS | SYSTOLIC BLOOD PRESSURE: 114 MMHG | HEART RATE: 66 BPM

## 2022-12-27 DIAGNOSIS — F33.2 SEVERE EPISODE OF RECURRENT MAJOR DEPRESSIVE DISORDER, WITHOUT PSYCHOTIC FEATURES (H): Primary | ICD-10-CM

## 2022-12-27 PROCEDURE — 96361 HYDRATE IV INFUSION ADD-ON: CPT

## 2022-12-27 PROCEDURE — 258N000003 HC RX IP 258 OP 636: Performed by: PSYCHIATRY & NEUROLOGY

## 2022-12-27 PROCEDURE — 250N000009 HC RX 250: Performed by: PSYCHIATRY & NEUROLOGY

## 2022-12-27 PROCEDURE — 96365 THER/PROPH/DIAG IV INF INIT: CPT

## 2022-12-27 RX ORDER — EPINEPHRINE 1 MG/ML
0.3 INJECTION, SOLUTION, CONCENTRATE INTRAVENOUS EVERY 5 MIN PRN
Status: CANCELLED | OUTPATIENT
Start: 2022-12-27

## 2022-12-27 RX ORDER — HEPARIN SODIUM,PORCINE 10 UNIT/ML
5 VIAL (ML) INTRAVENOUS
Status: CANCELLED | OUTPATIENT
Start: 2022-12-27

## 2022-12-27 RX ORDER — HYDRALAZINE HYDROCHLORIDE 20 MG/ML
10 INJECTION INTRAMUSCULAR; INTRAVENOUS
Status: DISCONTINUED | OUTPATIENT
Start: 2022-12-27 | End: 2022-12-27 | Stop reason: HOSPADM

## 2022-12-27 RX ORDER — MEPERIDINE HYDROCHLORIDE 25 MG/ML
25 INJECTION INTRAMUSCULAR; INTRAVENOUS; SUBCUTANEOUS EVERY 30 MIN PRN
Status: CANCELLED | OUTPATIENT
Start: 2022-12-27

## 2022-12-27 RX ORDER — HYDRALAZINE HYDROCHLORIDE 20 MG/ML
10 INJECTION INTRAMUSCULAR; INTRAVENOUS
Status: CANCELLED | OUTPATIENT
Start: 2022-12-27

## 2022-12-27 RX ORDER — HEPARIN SODIUM (PORCINE) LOCK FLUSH IV SOLN 100 UNIT/ML 100 UNIT/ML
5 SOLUTION INTRAVENOUS
Status: CANCELLED | OUTPATIENT
Start: 2022-12-27

## 2022-12-27 RX ORDER — ONDANSETRON 2 MG/ML
4 INJECTION INTRAMUSCULAR; INTRAVENOUS
Status: CANCELLED | OUTPATIENT
Start: 2022-12-27

## 2022-12-27 RX ORDER — NALOXONE HYDROCHLORIDE 0.4 MG/ML
0.2 INJECTION, SOLUTION INTRAMUSCULAR; INTRAVENOUS; SUBCUTANEOUS
Status: CANCELLED | OUTPATIENT
Start: 2022-12-27

## 2022-12-27 RX ORDER — METHYLPREDNISOLONE SODIUM SUCCINATE 125 MG/2ML
125 INJECTION, POWDER, LYOPHILIZED, FOR SOLUTION INTRAMUSCULAR; INTRAVENOUS
Status: CANCELLED
Start: 2022-12-27

## 2022-12-27 RX ORDER — ALBUTEROL SULFATE 0.83 MG/ML
2.5 SOLUTION RESPIRATORY (INHALATION)
Status: CANCELLED | OUTPATIENT
Start: 2022-12-27

## 2022-12-27 RX ORDER — ONDANSETRON 2 MG/ML
4 INJECTION INTRAMUSCULAR; INTRAVENOUS
Status: DISCONTINUED | OUTPATIENT
Start: 2022-12-27 | End: 2022-12-27 | Stop reason: HOSPADM

## 2022-12-27 RX ORDER — ALBUTEROL SULFATE 90 UG/1
1-2 AEROSOL, METERED RESPIRATORY (INHALATION)
Status: CANCELLED
Start: 2022-12-27

## 2022-12-27 RX ORDER — DIPHENHYDRAMINE HYDROCHLORIDE 50 MG/ML
50 INJECTION INTRAMUSCULAR; INTRAVENOUS
Status: CANCELLED
Start: 2022-12-27

## 2022-12-27 RX ADMIN — SODIUM CHLORIDE 250 ML: 9 INJECTION, SOLUTION INTRAVENOUS at 09:47

## 2022-12-27 RX ADMIN — KETAMINE HYDROCHLORIDE 65 MG: 50 INJECTION, SOLUTION INTRAMUSCULAR; INTRAVENOUS at 09:06

## 2022-12-27 NOTE — PROGRESS NOTES
"\Infusion Nursing Note:  Janelle TORRES Christopher presents today for Ketamine.    Patient seen by provider today: No   present during visit today: Not Applicable.    Note: Patient reports Ketamine is still helping. Patient states \"I feel like I need to be here\".     Intravenous Access:  Peripheral IV placed left lower forearm.    Treatment Conditions:  Not Applicable.    Post Infusion Assessment:  Patient tolerated infusion without incident.  Patient observed for 60 minutes post Ketamine per Therapy Plan protocol.  No evidence of extravasations.  Access discontinued per protocol.     Discharge Plan:   Discharge instructions reviewed with: Patient.  Patient and/or family verbalized understanding of discharge instructions and all questions answered.  Patient discharged in stable condition accompanied by: self, patient gets a ride home from her    Departure Mode: Ambulatory.      Tabatha Small RN                        "

## 2023-01-08 ASSESSMENT — PATIENT HEALTH QUESTIONNAIRE - PHQ9
SUM OF ALL RESPONSES TO PHQ QUESTIONS 1-9: 13
10. IF YOU CHECKED OFF ANY PROBLEMS, HOW DIFFICULT HAVE THESE PROBLEMS MADE IT FOR YOU TO DO YOUR WORK, TAKE CARE OF THINGS AT HOME, OR GET ALONG WITH OTHER PEOPLE: SOMEWHAT DIFFICULT
SUM OF ALL RESPONSES TO PHQ QUESTIONS 1-9: 13

## 2023-01-09 ENCOUNTER — VIRTUAL VISIT (OUTPATIENT)
Dept: PSYCHIATRY | Facility: CLINIC | Age: 63
End: 2023-01-09
Payer: COMMERCIAL

## 2023-01-09 ENCOUNTER — VIRTUAL VISIT (OUTPATIENT)
Dept: BEHAVIORAL HEALTH | Facility: CLINIC | Age: 63
End: 2023-01-09
Payer: COMMERCIAL

## 2023-01-09 DIAGNOSIS — E88.89 CYP2D6 POOR METABOLIZER (H): ICD-10-CM

## 2023-01-09 DIAGNOSIS — G25.81 RESTLESS LEG SYNDROME: ICD-10-CM

## 2023-01-09 DIAGNOSIS — E72.12 HOMOZYGOUS FOR C677T POLYMORPHISM OF MTHFR (H): ICD-10-CM

## 2023-01-09 DIAGNOSIS — F33.1 MODERATE RECURRENT MAJOR DEPRESSION (H): Primary | ICD-10-CM

## 2023-01-09 DIAGNOSIS — F33.41 RECURRENT MAJOR DEPRESSIVE DISORDER, IN PARTIAL REMISSION (H): Primary | ICD-10-CM

## 2023-01-09 DIAGNOSIS — R41.89 SUBJECTIVE COGNITIVE IMPAIRMENT: ICD-10-CM

## 2023-01-09 DIAGNOSIS — F33.9 RECURRENT MAJOR DEPRESSION RESISTANT TO TREATMENT (H): ICD-10-CM

## 2023-01-09 DIAGNOSIS — F41.1 GAD (GENERALIZED ANXIETY DISORDER): ICD-10-CM

## 2023-01-09 DIAGNOSIS — E88.89 CYP2B6 INTERMEDIATE METABOLIZER (H): ICD-10-CM

## 2023-01-09 DIAGNOSIS — G89.4 CHRONIC PAIN SYNDROME: ICD-10-CM

## 2023-01-09 PROCEDURE — 90832 PSYTX W PT 30 MINUTES: CPT | Mod: 95 | Performed by: PSYCHOLOGIST

## 2023-01-09 PROCEDURE — 99214 OFFICE O/P EST MOD 30 MIN: CPT | Mod: 95 | Performed by: PSYCHIATRY & NEUROLOGY

## 2023-01-09 RX ORDER — DEXTROAMPHETAMINE SACCHARATE, AMPHETAMINE ASPARTATE, DEXTROAMPHETAMINE SULFATE AND AMPHETAMINE SULFATE 3.75; 3.75; 3.75; 3.75 MG/1; MG/1; MG/1; MG/1
15 TABLET ORAL 2 TIMES DAILY
Qty: 60 TABLET | Refills: 0 | Status: SHIPPED | OUTPATIENT
Start: 2023-03-12 | End: 2023-04-11

## 2023-01-09 RX ORDER — DEXTROAMPHETAMINE SACCHARATE, AMPHETAMINE ASPARTATE, DEXTROAMPHETAMINE SULFATE AND AMPHETAMINE SULFATE 3.75; 3.75; 3.75; 3.75 MG/1; MG/1; MG/1; MG/1
15 TABLET ORAL 2 TIMES DAILY
Qty: 60 TABLET | Refills: 0 | Status: SHIPPED | OUTPATIENT
Start: 2023-01-09 | End: 2023-02-08

## 2023-01-09 RX ORDER — DEXTROAMPHETAMINE SACCHARATE, AMPHETAMINE ASPARTATE MONOHYDRATE, DEXTROAMPHETAMINE SULFATE AND AMPHETAMINE SULFATE 6.25; 6.25; 6.25; 6.25 MG/1; MG/1; MG/1; MG/1
25 CAPSULE, EXTENDED RELEASE ORAL DAILY
Qty: 30 CAPSULE | Refills: 0 | Status: SHIPPED | OUTPATIENT
Start: 2023-01-09 | End: 2023-02-08

## 2023-01-09 RX ORDER — DEXTROAMPHETAMINE SACCHARATE, AMPHETAMINE ASPARTATE, DEXTROAMPHETAMINE SULFATE AND AMPHETAMINE SULFATE 3.75; 3.75; 3.75; 3.75 MG/1; MG/1; MG/1; MG/1
15 TABLET ORAL 2 TIMES DAILY
Qty: 60 TABLET | Refills: 0 | Status: SHIPPED | OUTPATIENT
Start: 2023-02-09 | End: 2023-03-11

## 2023-01-09 RX ORDER — DEXTROAMPHETAMINE SACCHARATE, AMPHETAMINE ASPARTATE MONOHYDRATE, DEXTROAMPHETAMINE SULFATE AND AMPHETAMINE SULFATE 6.25; 6.25; 6.25; 6.25 MG/1; MG/1; MG/1; MG/1
25 CAPSULE, EXTENDED RELEASE ORAL DAILY
Qty: 30 CAPSULE | Refills: 0 | Status: SHIPPED | OUTPATIENT
Start: 2023-02-09 | End: 2023-03-11

## 2023-01-09 RX ORDER — DEXTROAMPHETAMINE SACCHARATE, AMPHETAMINE ASPARTATE MONOHYDRATE, DEXTROAMPHETAMINE SULFATE AND AMPHETAMINE SULFATE 6.25; 6.25; 6.25; 6.25 MG/1; MG/1; MG/1; MG/1
25 CAPSULE, EXTENDED RELEASE ORAL DAILY
Qty: 30 CAPSULE | Refills: 0 | Status: SHIPPED | OUTPATIENT
Start: 2023-03-12 | End: 2023-04-11

## 2023-01-09 NOTE — PATIENT INSTRUCTIONS
Treatment Plan:  Continue Pristiq 50 mg daily for mood, anxiety.   Continue methylfolate 7.5 mg daily as supplementation.  Increase Adderall XR to 25 mg daily for mood augmentation  Continue Adderall IR 15 mg twice daily as needed for mood augmentation and daytime fatigue/hypersomnia  Continue benzodiazepine per primary care prescriber.  Continue ketamine with interventional psychiatry clinic.  Continue to work with your specialist from [facility] as indicated.    Could consider individual DBT therapy.  Recommend ongoing individual psychotherapy.    Continue all other cares per primary care provider.   Continue all other medications as reviewed per electronic medical record today.   Safety plan reviewed. To the Emergency Department as needed or call after hours crisis line at [phone] or [phone]. [place] Crisis Text Line. Text [place] to 325277 or Suicide LifeLine Chat: suicidepreBlue Horizon Organic Seafoodline.org/chat  Schedule an appointment with me in 3 months, or sooner as needed. Call [facility] at [phone] to schedule.  Follow up with primary care provider as planned or for acute medical concerns.  Call the psychiatric nurse line with medication questions or concerns at [phone].  TRUECarhart may be used to communicate with your provider, but this is not intended to be used for emergencies.    Therapy resources:  Www.MPSI.org    https://www.mhs-dbt.com/mental-health/thrive/thrive-mental-health-and-chronic-pain-management/    [place] DBT resources:  [place]://[place].gov/dhs/partners-and-providers/policies-procedures/adult-mental-health/dialectical-behavior-therapy/dbt-certified-providers/    Risks of benzodiazepine (Ativan, Xanax, Klonopin, Valium, etc) use including, but not limited to, sedation, tolerance, risk for addiction/dependence. Do not drink alcohol while taking benzodiazepines due to risk of trouble breathing and potential death. Do not drive or operate heavy machinery until it is known how the  drug affects you. Discuss with physician or pharmacist before ever taking a benzodiazepine with a narcotic/opioid pain medication.     Have previously discussed risks of stimulant medication including, but not limited to, decreased appetite, risk of tics (and that they may be lasting), trouble sleeping, cardiac risks such as increased heart rate and blood pressure, and rare risk of sudden cardiac death.  Also risk of addiction/tolerance/dependence.

## 2023-01-09 NOTE — PROGRESS NOTES
"Telemedicine Visit: The patient's condition can be safely assessed and treated via synchronous audio and visual telemedicine encounter.      Reason for Telemedicine Visit: Patient has requested telehealth visit    Originating Site (Patient Location): Patient's home    Distant Location (provider location):  Off-site    Consent:  The patient/guardian has verbally consented to: the potential risks and benefits of telemedicine (video visit) versus in person care; bill my insurance or make self-payment for services provided; and responsibility for payment of non-covered services.     Mode of Communication:  Video Conference via Lurn[employer]    As the provider I attest to compliance with applicable laws and regulations related to telemedicine.      Outpatient Psychiatric Progress Note    Name: Janelle MELISSA White   : 1960                    Primary Care Provider: Emili Ballard MD   Therapist: None    PHQ-9 scores:  PHQ-9 SCORE 2022 10/7/2022 2023   PHQ-9 Total Score - - -   PHQ-9 Total Score MyChart 13 (Moderate depression) 13 (Moderate depression) 13 (Moderate depression)   PHQ-9 Total Score 13 13 13   Some encounter information is confidential and restricted. Go to Review Flowsheets activity to see all data.       STACIE-7 scores:  STACIE-7 SCORE 2022   Total Score - - -   Total Score - 7 (mild anxiety) 7 (mild anxiety)   Total Score 11 7 7       Patient Identification:  Patient is a 62 year old,   White Choose not to answer female  who presents for return visit with me. Patient attended the phone/video session alone. Patient prefers to be called: \"Janelle\".    Interim History:  I last saw Janelle White for outpatient psychiatry return visit on 10/7/2022. During that appointment, we:    Continue Pristiq 50 mg daily for mood, anxiety.     Continue methyl folate 7.5 mg daily as supplementation.    Continue Adderall XR 20 mg daily for mood augmentation    Continue " "Adderall IR 15 mg twice daily as needed for mood augmentation and daytime fatigue/hypersomnia    Continue benzodiazepine per primary care prescriber.    Continue ketamine with interventional psychiatry clinic.    Continue to work with your specialist from [facility] as indicated.      Discussed possibility of individual DBT therapy again today.  [facility] will send some options/resources.    Could continue to consider lithium if suicidal ideation continues to persist.    Recommend ongoing individual psychotherapy.      1/9: Patient reports doing okay.  Relative improvement of mood and anxiety symptoms.  Chronic pain has been a little easier to manage.  Patient planning to travel in March to visit her father.  Tolerating medications well.  No major negative side effects.  Continues to have poor fatigue and low energy often.  No acute safety concerns today.  Feels like passive suicidal ideation has improved some.  No plan or intent to harm self.  No problematic drug or alcohol use.    Per [facility], Dr. Matt Manzo, during today's team-based visit:  Overall, I think my mental status is a lot better.\" She spoke about some things that she experienced with her family over the holidays but managed it much better this year than she typically does. She spoke about the tools she is using and how she feels ketamine has been so helpful. She still has some problems with sleep and feeling tired throughout the day but it is still a little better then it once was. She likes her current medication regimen but is struggling with how to get her prescription of Adderall due to the national shortage. Otherwise, she is pleased with her progress and spoke about how active she is trying to be. She still has some SI but noted that it is not as intense or frequent as it once was and feels good about that.    Past medication trials include but are not limited to:   Effexor-very flat  Celexa, zoloft, was on wellbutrin a couple years at one " "point  wellbutrin XL + celexa; 2005ish  tca-a ton of weight gain  depakote maybe for a short time  Abilify/lithium ?  ECT as teen - made me dull  Cytomel-not effective at all, used 50 mcg    Psychiatric ROS:  Janelle White reports mood has been: A little better  Anxiety has been: More manageable  Sleep has been: Relatively okay, still struggles at times  Deepa sxs: Denies  Psychosis sxs: None  ADHD/ADD sxs: None  PTSD sxs: None  PHQ9 and GAD7 scores were reviewed today if completed.   Medication side effects: Denies anything major related to current psychiatric medications  Current stressors include: Symptoms and See HPI above  Coping mechanisms and supports include: Family, Hobbies and Friends, therapy    Current medications include:   Current Outpatient Medications   Medication Sig     albuterol (PROAIR HFA/PROVENTIL HFA/VENTOLIN HFA) 108 (90 Base) MCG/ACT inhaler Inhale 2 puffs into the lungs every 6 hours as needed for shortness of breath or wheezing     amphetamine-dextroamphetamine (ADDERALL XR) 20 MG 24 hr capsule Take 1 capsule (20 mg) by mouth daily for 30 days     amphetamine-dextroamphetamine (ADDERALL) 15 MG tablet Take 1 tablet (15 mg) by mouth 2 times daily     amphetamine-dextroamphetamine (ADDERALL) 15 MG tablet Take 1 tablet (15 mg) by mouth 2 times daily     amphetamine-dextroamphetamine (ADDERALL) 15 MG tablet Take 1 tablet (15 mg) by mouth 2 times daily     ASHWAGANDHA PO Take 1 tablet by mouth daily     desvenlafaxine (PRISTIQ) 50 MG 24 hr tablet Take 1 tablet (50 mg) by mouth daily     Estradiol (DIVIGEL) 1 MG/GM GEL Place 1 packet onto the skin daily     HYDROcodone-acetaminophen (NORCO)  MG per tablet Take 1 tablet by mouth every 4 hours as needed for severe pain max 4 tabs/24 hrs     insulin syringe-needle U-100 (30G X 1/2\" 1 ML) 30G X 1/2\" 1 ML miscellaneous Inject 1 ml B12 qmonth     lidocaine (LIDODERM) 5 % patch Place 4 patches onto the skin daily Apply up to 4 patches to " skin. Wear for 12 hours and remove for 12 hrs.  Refill when patient requests.     LORazepam (ATIVAN) 1 MG tablet Take 1 tablet (1 mg) by mouth every 6 hours as needed for anxiety (Patient taking differently: Take 1 mg by mouth At Bedtime)     medical cannabis (Patient's own supply.  Not a prescription) Medical Cannabis - [place] 4-6 ml by mouth daily. [facility]     methocarbamol (ROBAXIN) 500 MG tablet Take 1 tablet (500 mg) by mouth 4 times daily as needed for muscle spasms     methylfolate (DEPLIN) 7.5 MG TABS tablet Take 1 tablet (7.5 mg) by mouth daily     naloxone (NARCAN) 4 MG/0.1ML nasal spray Spray 1 spray (4 mg) into one nostril alternating nostrils as needed for opioid reversal every 2-3 minutes until assistance arrives     NONFORMULARY Take 2 Scoops by mouth daily Protein and Vitamin shake mix - Nutritional supplement     ondansetron (ZOFRAN-ODT) 8 MG ODT tab Take 1 tablet (8 mg) by mouth every 8 hours as needed for nausea     Prasterone 6.5 MG INST Place 0.5 suppositories vaginally three times a week     rOPINIRole (REQUIP) 0.25 MG tablet Take 1-2 tablets (0.25-0.5 mg) by mouth At Bedtime     senna-docusate (SENOKOT-S/PERICOLACE) 8.6-50 MG tablet Take 6-9 tablets by mouth every evening     vitamin D3 (CHOLECALCIFEROL) 125 MCG (5000 UT) tablet Take 5,000 Units by mouth daily     No current facility-administered medications for this visit.       The [place] Prescription Monitoring Program has been reviewed and there are no concerns about diversionary activity for controlled substances at this time.   12/16/2022  1   10/09/2022  Dextroamp-Amphet Er 20 MG Cap  30.00  30 Al Bau   [id number]   All (4435)   0/0   [place]   12/10/2022  1   10/09/2022  Dextroamp-Amphetamin 15 MG Tab  60.00  30 Al Bau   [id number]   All (4435)   0/[place]   11/07/2022  1   10/09/2022  Dextroamp-Amphetamin 15 MG Tab  24.00  12 Al Bau   [id number]   All (4435)   0/[place]   11/07/2022  1    10/09/2022  Dextroamp-Amphetamin 15 MG Tab  36.00  18 Al Bandaru   [id number]   All (4435)   0/0   [place]         Past Medical/Surgical History:  Past Medical History:   Diagnosis Date     Anxiety      Cervicalgia 2007    C5-6 disc protrusion     COPD (chronic obstructive pulmonary disease) (H) 2/7/2022     Depressive disorder      ESBL (extended spectrum beta-lactamase) producing bacteria infection      History of blood transfusion 2007    Cervical fusion     Leukemia (H)     CLA large beta     Melanoma (H) 1998     Migraine      Other chronic pain      Rotator cuff tear     s/p injections     Sacroiliac inflammation (H)      Shift work sleep disorder 12/16/2013     Urinary calculi      Vitamin B12 deficiency anemia 2006    started injections      has a past medical history of Anxiety, Cervicalgia (2007), COPD (chronic obstructive pulmonary disease) (H) (2/7/2022), Depressive disorder, ESBL (extended spectrum beta-lactamase) producing bacteria infection, History of blood transfusion (2007), Leukemia (H), Melanoma (H) (1998), Migraine, Other chronic pain, Rotator cuff tear, Sacroiliac inflammation (H), Shift work sleep disorder (12/16/2013), Urinary calculi, and Vitamin B12 deficiency anemia (2006).    She has no past medical history of Cerebral infarction (H), Congestive heart failure (H), Coronary artery disease, Diabetes (H), Heart disease, Hypertension, Thyroid disease, or Uncomplicated asthma.    Social History:  Reviewed. No changes to social history except as noted above in HPI.    Vital Signs:   None. This is phone/video visit.     Labs:  Most recent laboratory results reviewed and the pertinent results include:   Lab Results   Component Value Date    WBC 5.6 06/30/2022    WBC 3.2 05/24/2021     Lab Results   Component Value Date    RBC 4.61 06/30/2022    RBC 3.74 05/24/2021     Lab Results   Component Value Date    HGB 14.2 06/30/2022    HGB 11.0 05/24/2021     Lab Results   Component Value Date    HCT  43.6 06/30/2022    HCT 33.6 05/24/2021     No components found for: MCT  Lab Results   Component Value Date    MCV 95 06/30/2022    MCV 90 05/24/2021     Lab Results   Component Value Date    MCH 30.8 06/30/2022    MCH 29.4 05/24/2021     Lab Results   Component Value Date    MCHC 32.6 06/30/2022    MCHC 32.7 05/24/2021     Lab Results   Component Value Date    RDW 11.9 06/30/2022    RDW 13.4 05/24/2021     Lab Results   Component Value Date     07/04/2022     05/24/2021     Last Comprehensive Metabolic Panel:  Sodium   Date Value Ref Range Status   06/30/2022 136 133 - 144 mmol/L Final   05/24/2021 141 133 - 144 mmol/L Final     Potassium   Date Value Ref Range Status   06/30/2022 3.4 3.4 - 5.3 mmol/L Final   05/24/2021 3.9 3.4 - 5.3 mmol/L Final     Chloride   Date Value Ref Range Status   06/30/2022 105 94 - 109 mmol/L Final   05/24/2021 108 94 - 109 mmol/L Final     Carbon Dioxide   Date Value Ref Range Status   05/24/2021 25 20 - 32 mmol/L Final     Carbon Dioxide (CO2)   Date Value Ref Range Status   06/30/2022 25 20 - 32 mmol/L Final     Anion Gap   Date Value Ref Range Status   06/30/2022 6 3 - 14 mmol/L Final   05/24/2021 8 3 - 14 mmol/L Final     Glucose   Date Value Ref Range Status   06/30/2022 91 70 - 99 mg/dL Final   05/24/2021 87 70 - 99 mg/dL Final     Urea Nitrogen   Date Value Ref Range Status   06/30/2022 20 7 - 30 mg/dL Final   05/24/2021 15 7 - 30 mg/dL Final     Creatinine   Date Value Ref Range Status   07/03/2022 0.67 0.52 - 1.04 mg/dL Final   05/24/2021 0.64 0.52 - 1.04 mg/dL Final     GFR Estimate   Date Value Ref Range Status   07/03/2022 >90 >60 mL/min/1.73m2 Final     Comment:     Effective December 21, 2021 eGFRcr in adults is calculated using the 2021 CKD-EPI creatinine equation which includes age and gender (Zhou et al., NEJM, DOI: 10.1056/GCSFfl9638212)   05/24/2021 >90 >60 mL/min/[1.73_m2] Final     Comment:     Non  GFR Calc  Starting 12/18/2018,  serum creatinine based estimated GFR (eGFR) will be   calculated using the Chronic Kidney Disease Epidemiology Collaboration   (CKD-EPI) equation.       Calcium   Date Value Ref Range Status   06/30/2022 8.7 8.5 - 10.1 mg/dL Final   05/24/2021 8.4 (L) 8.5 - 10.1 mg/dL Final     Bilirubin Total   Date Value Ref Range Status   04/26/2022 0.4 0.2 - 1.3 mg/dL Final   03/16/2021 0.4 0.2 - 1.3 mg/dL Final     Alkaline Phosphatase   Date Value Ref Range Status   04/26/2022 82 40 - 150 U/L Final   03/16/2021 87 40 - 150 U/L Final     ALT   Date Value Ref Range Status   04/26/2022 21 0 - 50 U/L Final   03/16/2021 26 0 - 50 U/L Final     AST   Date Value Ref Range Status   04/26/2022 18 0 - 45 U/L Final   03/16/2021 44 0 - 45 U/L Final     Review of Systems:  10 systems (general, cardiovascular, respiratory, eyes, ENT, endocrine, GI, , M/S, neurological) were reviewed. Most pertinent finding(s) is/are: Severe chronic pain. The remaining systems are all unremarkable.    Mental Status Examination (limited as this is by phone/video):  Appearance: Awake, alert, appears stated age, no acute distress  Attitude:  cooperative, pleasant   Motor: No gross abnormalities observed via video, not formally tested.  Oriented to:  person, place, time, and situation  Attention Span and Concentration:  normal  Speech:  clear, coherent, regular rate, rhythm, and volume  Language: intact  Mood: Okay  Affect: Mood congruent  Associations:  no loose associations  Thought Process:  logical, linear and goal oriented  Thought Content: Chronic passive suicidal ideation-at baseline today, may be improved some-no current plan or intent to harm self today, no homicidal ideation, no evidence of psychotic thought, no auditory hallucinations present and no visual hallucinations present  Recent and Remote Memory:  Intact to interview. Not formally assessed. No amnesia.  Fund of Knowledge: appropriate  Insight:  good  Judgment:  intact, adequate for  safety  Impulse Control:  intact    Suicide Risk Assessment:  Today Janelle White reports chronic/intermittent suicidal ideation-at baseline today, and maybe even improved.There are notable risk factors for self-harm, including anxiety, comorbid medical condition of Chronic pain, suicidal ideation, purposelessness/no reason for living, hopelessness, withdrawing and mood change. However, risk is mitigated by commitment to family, absence of past attempts, ability to volunteer a safety plan, history of seeking help when needed, future oriented and denies suicidal intent today. Therefore, based on all available evidence including the factors cited above, Janelle White does not appear to be at imminent risk for self-harm, does not meet criteria for a 72-hr hold, and therefore remains appropriate for ongoing outpatient level of care.  A thorough assessment of risk factors related to suicide and self-harm have been reviewed and are noted above. Local community safety resources reviewed for patient to use if needed. There was no deceit detected, and the patient presented in a manner that was believable.     DSM5 Diagnosis:  Major Depressive Disorder, Recurrent Episode, in partial remission  Treatment resistant depression  CYP2D6 poor metabolizer  CYP2B6 intermediate metabolizer  Homozygous for C677T polymorphism of MTHFR    Medical comorbidities include:   Patient Active Problem List    Diagnosis Date Noted     Suicidal ideation 06/30/2022     Priority: Medium     Immunocompromised (H) 06/30/2022     Priority: Medium     Infection due to 2019 novel coronavirus 06/30/2022     Priority: Medium     Severe episode of recurrent major depressive disorder, without psychotic features (H) 04/17/2022     Priority: Medium     COPD (chronic obstructive pulmonary disease) (H) 02/07/2022     Priority: Medium     Tension type headache 11/04/2021     Priority: Medium     Rotator cuff injury 11/04/2021     Priority: Medium      Spinal stenosis of lumbar region with radiculopathy 09/02/2021     Priority: Medium     COVID-19 08/29/2021     Priority: Medium     Polyarthralgia 08/11/2021     Priority: Medium     Cellulitis, unspecified cellulitis site 08/11/2021     Priority: Medium     Sepsis, due to unspecified organism, unspecified whether acute organ dysfunction present (H) 08/11/2021     Priority: Medium     Cellulitis 08/11/2021     Priority: Medium     STACIE (generalized anxiety disorder) 07/27/2021     Priority: Medium     MTHFR gene mutation 04/12/2021     Priority: Medium     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     Priority: Medium     CYP2D6 poor metabolizer (H) 04/12/2021     Priority: Medium     Status post blepharoplasty 11/18/2020     Priority: Medium     Regular astigmatism, bilateral 11/18/2020     Priority: Medium     Prediabetes 09/19/2019     Priority: Medium     Hyperlipidemia LDL goal <130 09/19/2019     Priority: Medium     CLARE (obstructive sleep apnea) 02/13/2019     Priority: Medium     Controlled substance agreement signed 08/14/2018     Priority: Medium     Chronic pain syndrome 12/21/2017     Priority: Medium     CLL (chronic lymphocytic leukemia) (H) 12/21/2017     Priority: Medium     Hypotension 09/14/2017     Priority: Medium     History of laser assisted in situ keratomileusis 10/14/2014     Priority: Medium     Pain medication agreement 04/23/2014     Priority: Medium     Formatting of this note might be different from the original.  Patient takes morphine 15 mg ER BID for chronic neck and back pain.  She is also on cymbalta, ibuprofen, flexaril prn       Shift work sleep disorder 12/16/2013     Priority: Medium     Vitamin D deficiency 11/08/2012     Priority: Medium     Moderate recurrent major depression (H) 01/06/2011     Priority: Medium     DDD (degenerative disc disease), cervical 10/07/2010     Priority: Medium     CARDIOVASCULAR SCREENING; LDL GOAL LESS THAN 160 02/10/2010     Priority:  Medium     Chronic Low Back Pain 10/01/2009     Priority: Medium     S/p AP L3-S1 fusion 12/2010 - referred to [facility].   Orthopedics writing scripts for narcotics post-op.       Migraine      Priority: Medium     Problem list name updated by automated process. Provider to review       B-complex deficiency 10/10/2006     Priority: Medium     Problem list name updated by automated process. Provider to review       PERSONAL HX OF  MELANOMA 12/04/2003     Priority: Low       Psychosocial & Contextual Factors: see HPI above    Assessment:  6/15/2021:  Janelle TORRES Franchescamag reports overall some significant worsening of mood symptoms.  Increased anxiety with her depression.  Poor motivation and poor energy.  Symptoms severe enough to prevent her from being able to utilize typical coping skills and strategies.  No current motivation or energy to participate in PHP/day treatment program.  Continues to take medication as scheduled.  Recent surgery and general anesthesia could be contributing.  Discussed discontinuing stimulant medication as an augmentation strategy.  She is agreeable to moving forward with T3 as an augmentation for treatment resistant depression.  Discussed risks and benefits at length.  Will get baseline thyroid labs prior to starting T3.  Hoping she will experience increased energy and motivation and that her mood will lift.  Also discussed setting up an appointment with her interventional psychiatry clinic to discuss other potential options such as ketamine therapy, ECT, TMS.  Does have history of ECT for depression when she was quite young.  I am hopeful to stay ahead of her symptoms before things get too severe.  Has chronic suicidal ideation and is at high risk for suicide.  Continues to deny any plan or intent.  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  See below for risk/benefit conversation regarding T3 had with the patient:    Vakast testing Info:  Vakast  testing revealed she is a poor 2D6 metabolizer and an intermediate 2B6 metabolizer.  This would explain her multiple failed medication trials due to the negative side effects.  She also has a genotype that would suggest a phenotype sensitivity to serotonin. She also was found to have significantly reduced folic acid conversion.      Starting T3 as augment to antidepressant therapy for treatment resistant depression:  Start T3 at 25 mcg per day for one to two weeks, and if there is little or no improvement, increase the dose to 50 mcg per day; this is consistent with practice guidelines from the American Psychiatric Association and [place] [facility] for Mood and Anxiety Treatments.     Adverse effects consistent with hyperthyroidism may occur, including tremor, palpitations, heat intolerance, sweating, anxiety, increased frequency of bowel movements, shortness of breath, and exacerbation of cardiac arrhythmia. In addition, hyperthyroidism that emerges during long-term treatment may lead to bone demineralization, osteoporosis, and an increased risk of fracture    Following a normal baseline TSH concentration, no other laboratory monitoring during a four to six week trial of adjunctive T3 is necessary. However, if T3 is continued longer, a serum TSH concentration should be checked after the first one to three months of treatment and then every six months.    Today, 7/27/21:   Patient overall with little change in her symptoms.  Did not do as well on Cytomel and had limited to no improvement at all after weeks on 50 mcg.  Labs were unremarkable prior to starting Cytomel.  Opted to go back to stimulant augmentation since patient does find it quite helpful.  She has been taking 15 mg of immediate release most afternoons.  Due to good tolerability and some efficacy, we will bump up her immediate release dose slightly to 20 mg daily as needed in addition to her 20 mg extended release dose. I have no concerns about misuse or  diversion at this time.  Tolerating well with no negative side effects.  Last blood pressure normal 7/2.  Patient has noted some efficacy from Pristiq more than other antidepressant trials and so we will continue to titrate further to 100 mg daily.  She is tolerating well.  Continues to use lorazepam for anxiety, pain medicine adjunct, and for sleep as prescribed by primary care provider.  Has been utilizing roughly 100 tablets of 0.5 mg every 1.5 months.  Patient with ongoing chronic intermittent passive suicidal ideation with no plan or intent.  Denies safety concerns today.  No problematic drug or alcohol use.  I am hopeful the interventional psychiatry clinic might be able to initiate ketamine therapy or another therapy they would recommend as potentially being more helpful than patient's multiple medication/augmentation trials.    10/6/2021:  Patient with some improved depression symptoms and much more hopeful.  Seeing specialist at [facility]-feels very hurt and listened to.  Also is hopeful there will be some helpful treatments.  Visit to [place] was also very good and instilled a lot of hope in her abilities.  She was encouraged to continue to push herself to do the things she enjoys doing.  No medication changes today.  She will follow-up with getting TMS rescheduled, possibly when things slow down after the holidays.  Does not need to be seen until after the holidays.  No acute safety concerns today.  No problematic drug or alcohol use.    1/14/2022:  Overall patient feeling a little more down lately.  Tolerating TMS well.  Encouraged to continue TMS.  No medication changes today since undergoing TMS treatment.  Talked about life stages today generativity versus stagnation and also integrity versus despair.  Talked about consideration for acceptance and commitment therapy.  She is encouraged to continue to be active.  No acute suicidal ideation today.  No acute safety concerns today.  No problematic drug or  alcohol use.    4/13/2022:   Patient continues to have symptoms that wax and wane.  Feels like she tolerates Pristiq 50 mg better than 100 and will continue on this dose.  Last week was particularly difficult.  Pretty intense suicidal ideation but she was able to manage these thoughts and remain safe.  She does report she would come to the hospital if necessary.  We discussed the empath unit today and other resources if she felt she could not keep herself safe.  She continues to work on tapering her narcotic pain medication regimen.  She is working with addiction medicine and also pain management.  She is starting Pilates therapy.  Pool therapy will begin in July.  Discussed possibility of vestibular rehabilitation.  Individual therapy will also start soon.  She was strongly encouraged to continue to pursue ketamine therapy.  No acute suicidality today.  No problematic drug or alcohol use.    6/23/2022:  Overall reports doing relatively okay.  Some pretty down days still, but ketamine therapy going well.  Feels like continuing to move in the right direction.  Suicidal ideation improving.  Off all narcotic pain medication.  Feels good about her progress.  Had some really down days after off pain medication but improved since those few dark days.  Hopeful about where ketamine therapy may lead.  Discussed some of her restlessness at bedtime and will start pramipexole.  Also some evidence to suggest pramipexole could be helpful for treatment resistant depression symptoms.  Discussed risks and benefits of therapy, including watching for any impulsive behaviors.  Continues to have suicidal thoughts but no acute suicidality today.  Her suicidality overall is improving from her baseline suicidality.  She continues to consider the idea of a partial hospital program.  [facility]  Also encouraged her to discuss possibility of a pain program through [facility] with Dr. Medley.  No  acute safety concerns today.  No problematic drug or alcohol use.    7/11/2022:  Patient with some recent more intensive struggles.  Depression much more severe recently.  Led to hospitalization.  Patient medically hospitalized since was positive for COVID and has history of CLL and [facility] protocol requires isolation.  Patient seen by psychiatry consult service.  No medication changes made.  Patient continues with interventional psychiatry clinic.  They will be increasing ketamine dose and treatments will be every few weeks.  We discussed patient's symptom history a little more today and possible bipolar diagnosis came into question.  Patient does recognize possible hypomanic episodes in the past.  Patient is currently monitoring symptoms closely and pramipexole.  She is feeling better since starting pramipexole but is watching shopping behaviors closely.  Suicidal ideation is improving since hospitalization.  Restless legs improved at night on pramipexole.  Discussed we could consider adding lower dose lithium as an augment to her Pristiq and to help stabilize moods a little bit more and to further help with some of her chronic suicidal ideation.  We also discussed DBT for chronic suicidal ideation and ongoing mood instability.  Continues off of pain medication.  No acute suicidality or safety concerns today.  Patient will follow up in a few weeks.  No medication changes today since we will wait to see how ketamine dose change goes.  No drug or alcohol use concerns.  Patient noted some ongoing cognitive/memory concerns and requested testing.  Neuropsychological referral placed.    8/8/2022:  Patient with ongoing severe depression, resistant to treatment.  She is agreeable to increasing her stimulant medication.  This could hopefully further improve mood slightly.  May also help with some additional energy and also help with some of her ongoing cognitive concerns.  She has neuropsychological testing coming up  soon.  Discussed possibly considering individual DBT therapy with a DBT certified therapist given her ongoing chronic suicidal ideation and inability to participate in group therapy currently.  We will discuss this possibility with her current therapist.  Also discussed possibility of increasing Pristiq by 25 mg only 2 or 3 days a week to decrease risk of negative side effects (25 mg on Tuesdays/Thursdays for Monday/Wednesday/Fridays).  Patient also encouraged to continue ketamine treatment.  No acute suicidality today.  Patient denies any intent or plan to act on any of her suicidal thoughts today.  No problematic drug or alcohol use.    9/7/2022:  Patient doing relatively okay today.  Pain continues to feel a little bit worse.  Emotionally though, despite worsening pain symptoms, patient feels like she is doing relatively okay.  Discussed various psychotherapy approaches that could be taken to address her symptoms.  We discussed that health psychology could be an optimal approach to help with her symptoms but that her suicidal ideation is often still quite intense and may be a distractor to her treatment with a health psychologist (discussed she may be referred often to the emergency room or to other more intensive programs).  We discussed working with an individual DBT therapist as a possibility to continue addressing her ongoing chronic suicidal ideation (individual therapy would allow patient to move at her own pace since group therapy is much too intense at this time).  Patient was open to this idea.  [facility] today we will send patient some resources/options.  Depending on patient's financial situation, there is also a possibility patient could work with a health psychologist in conjunction with a DBT therapist.  Ketamine therapy has proven to be helpful, although I do wish the effects lasted a little longer than what they currently are for the patient.  I recommend patient continue her ketamine treatments.  No  medication changes today.  She denies any acute suicidality today.  No plan or intent to harm herself noted today.  She denies being in a bad place today.  No problematic drug or alcohol use.     10/7/2022:  Patient overall relatively stable at this time.  Mood improving slightly with ketamine therapy.  Patient encouraged to continue with treatment.  Anxiety a little more manageable.  Continuing to struggle with severe chronic pain.  Tolerating current medications well with no negative side effects noted.  No changes today.  Patient will continue in individual psychotherapy.  No acute suicidality today.  Suicidal thoughts at baseline, maybe even a little improved.    1/9/2023:  Patient overall doing quite well.  Some days still worse than others and chronic pain continues to be a big factor influencing her mental health.  Ketamine has been very helpful.  Still some poor energy and fatigue.  Adderall has been very helpful.  We will increase the dose just slightly.  Extended release dose will increase from 20 mg to 25 mg to further address her symptoms.  We will continue with the 15 mg twice daily immediate release doses.  No other medication changes today.  Patient encouraged to stay the course.  No acute safety concerns.  Suicidal thoughts continue to be passive in nature and have overall improved since starting ketamine.  No problematic drug or alcohol use.    Medication side effects and alternatives were reviewed. Health promotion activities recommended and reviewed today. All questions addressed. Education and counseling completed regarding risks and benefits of medications and psychotherapy options. Recommend therapy for additional support.     Treatment Plan:    Continue Pristiq 50 mg daily for mood, anxiety.     Continue methylfolate 7.5 mg daily as supplementation.    Increase Adderall XR to 25 mg daily for mood augmentation    Continue Adderall IR 15 mg twice daily as needed for mood augmentation and daytime  fatigue/hypersomnia    Continue benzodiazepine per primary care prescriber.    Continue ketamine with interventional psychiatry clinic.    Continue to work with your specialist from [facility] as indicated.      Could consider individual DBT therapy.    Recommend ongoing individual psychotherapy.      Continue all other cares per primary care provider.     Continue all other medications as reviewed per electronic medical record today.     Safety plan reviewed. To the Emergency Department as needed or call after hours crisis line at [phone] or [phone]. [place] Crisis Text Line. Text [place] to 602633 or Suicide LifeLine Chat: suicidepreventionlifeline.org/chat    Schedule an appointment with me in 3 months, or sooner as needed. Call [facility] at [phone] to schedule.    Follow up with primary care provider as planned or for acute medical concerns.    Call the psychiatric nurse line with medication questions or concerns at [phone].    MyChart may be used to communicate with your provider, but this is not intended to be used for emergencies.    Therapy resources:  Www.[facility].org    https://www.[facility].com/mental-health/thrive/thrive-mental-health-and-chronic-pain-management/    [place] DBT resources:  https://[place].gov/dhs/partners-and-providers/policies-procedures/adult-mental-health/dialectical-behavior-therapy/dbt-certified-providers/    Risks of benzodiazepine (Ativan, Xanax, Klonopin, Valium, etc) use including, but not limited to, sedation, tolerance, risk for addiction/dependence. Do not drink alcohol while taking benzodiazepines due to risk of trouble breathing and potential death. Do not drive or operate heavy machinery until it is known how the drug affects you. Discuss with physician or pharmacist before ever taking a benzodiazepine with a narcotic/opioid pain medication.     Have previously discussed risks of stimulant medication including, but not limited to, decreased appetite,  risk of tics (and that they may be lasting), trouble sleeping, cardiac risks such as increased heart rate and blood pressure, and rare risk of sudden cardiac death.  Also risk of addiction/tolerance/dependence.    Administrative Billing:   Phone Call/Video Duration: 21 Minutes  11:17a - 11:38a    Patient Status:  Patient is a continuous care patient and refills will continue to come from this provider until otherwise noted.    Signed:   Kimberly Perez DO  [facility]    Disclaimer: This note consists of symbols derived from keyboarding, dictation and/or voice recognition software. As a result, there may be errors in the script that have gone undetected. Please consider this when interpreting information found in this chart.

## 2023-01-09 NOTE — PROGRESS NOTES
[facility]  January 9, 2023      Behavioral Health Clinician Progress Note    Patient Name: Janelle White           Service Type:  Individual      Service Location:   [facility]     Session Start Time: 10:30 am  Session End Time: 10:50 am      Session Length: 16 - 37      Attendees: Patient     Service Modality:  Video Visit:      Provider verified identity through the following two step process.  Patient provided:  Patient is known previously to provider    Telemedicine Visit: The patient's condition can be safely assessed and treated via synchronous audio and visual telemedicine encounter.      Reason for Telemedicine Visit: Services only offered telehealth    Originating Site (Patient Location): Patient's home    Distant Site (Provider Location): Provider Remote Setting- Home Office    Consent:  The patient/guardian has verbally consented to: the potential risks and benefits of telemedicine (video visit) versus in person care; bill my insurance or make self-payment for services provided; and responsibility for payment of non-covered services.     Patient would like the video invitation sent by:  My Chart    Mode of Communication:  Video Conference via [facility]    As the provider I attest to compliance with applicable laws and regulations related to telemedicine.    Visit Activities (Refresh list every visit): [facility] Only    Diagnostic Assessment Date: 03/11/2021  Treatment Plan Review Date: 04/09/2023  See Flowsheets for today's PHQ-9 and STACIE-7 results  Previous PHQ-9:   PHQ-9 SCORE 9/6/2022 10/7/2022 1/8/2023   PHQ-9 Total Score - - -   PHQ-9 Total Score MyChart 13 (Moderate depression) 13 (Moderate depression) 13 (Moderate depression)   PHQ-9 Total Score 13 13 13   Some encounter information is confidential and restricted. Go to Review Flowsheets activity to see all data.     Previous STACIE-7:   STACIE-7 SCORE 7/2/2022 8/5/2022 9/1/2022   Total Score - - -   Total  "Score - 7 (mild anxiety) 7 (mild anxiety)   Total Score 11 7 7       JAYRO LEVEL:  JAYRO Score (Last Two) 7/9/2009 1/27/2011   JAYRO Raw Score 44 50   Activation Score 70.8 86.3   JAYRO Level 4 4       DATA  Extended Session (60+ minutes): No  Interactive Complexity: No  Crisis: No  [facility] Patient: No    Treatment Objective(s) Addressed in This Session:  Target Behavior(s): disease management/lifestyle changes pain management    Depressed Mood:    Anxiety: will develop more effective coping skills to manage anxiety symptoms  Psychological distress related to Pain    Current Stressors / Issues:  \"Overall, I think my mental status is a lot better.\" She spoke about some things that she experienced with her family over the holidays but managed it much better this year than she typically does. She spoke about the tools she is using and how she feels ketamine has been so helpful. She still has some problems with sleep and feeling tired throughout the day but it is still a little better then it once was. She likes her current medication regimen but is struggling with how to get her prescription of Adderall due to the national shortage. Otherwise, she is pleased with her progress and spoke about how active she is trying to be. She still has some SI but noted that it is not as intense or frequent as it once was and feels good about that.      10/07/2022:  Reported that she is doing well. She finds ketamine has been very helpful. She noted that she is more active and doing more with family/friends. She is finding that she is dealing with more physical pain and more frustrated by that, but is doing what she needs to do to manage that better. She decided to not pursue DBT at this time citing finances and limited time available for more appointments. Her concerns were acknowledged and she was encouraged to consider ways to make room for therapy in the future which she acknowledged would be helpful when other physical concerns were better " "addressed. She has no requests for today.      09/07/2022 (Janeth Jaquez, [facility]):  MH Update: \"pretty good.\" Not feeling as down as before and less labile. Trying to stay busy and is using pacing as discussed in prior sessions. Some days are still hard and feels like constantly trying to do things to feel better: stretching, walking, mindfulness, breathing, self-talk. Does help but not feel like a quality of life.  Patient expresses frustration that she has been dealing with chronic pain for so long and feels that it will never improve.  She feels that taking care of the pain is exhausting in itself.  Patient expresses wanting to be able to do things and live her life without needing to stop and take care of her pain, feeling limited in what she can do.  Patient also expresses frustration that she has so many providers and feels that she is not getting any improvement.  Patient is aware of the mind body link and has been working to stabilize and minimize her stressors.  Patient feels that her mental health is more stable than it has been in a long time and yet is frustrated that her body continues to suffer.  Patient did complete her neuropsych testing and found it did provide some answers/confirmation of what she is actually experiencing.  Patient continues to do her ketamine treatments as she feels they are helpful as well.  Patient admits that she stopped her individual therapy as she did not find it beneficial at this time.  [facility] validated patient for her experience and the difficulties of managing chronic pain.  [facility] discussed the idea of patient trying to simplify her care network and focus on having some hard conversations with 1 or 2 of her core providers instead of having more providers added on.  [facility] processed patient being ready to accept things that she may not want to hear but knows are reality and how this relates to grieving the changes in her life.  [facility] provided encouragement the patient is doing all " "the positive things that she can and to continue doing so, even with her frustrations.  [facility] assessed for safety.    SI: thoughts of not feeling a point to living but not \"intentional.\" No plan or intention to harm self. Aware of crisis resources to use as needed.    Tx: Janelle Brooks with [facility]. Decided to put on hold as not feel helpful for now.  [facility] will look into DBT/health psychology referrals that could be a better fit for patient's needs at this time.    Most Important: keep medication the same, feeling stable with mental health      08/08/2022:  Reported that she has been practicing activity pacing more often. She finds that it helps her to take breaks more often than in the past and cumulatively she has been completing more tasks. Despite having more situations for her to deal with recently, she finds that she has been calm which allows her to be more productive. She noted that she is tolerating requip better than mirapex. She had an intake with the [facility] program at [facility] and attended her first group meeting. She noted that she does not feel ready for groups right now. She finds that she gets overwhelmed leading to difficulty comprehending information. \"I can't make sense of things.\" She has an interview with neuropsychology coming up soon and wants to finish that before doing more intensive therapy like the [facility] program.       07/11/2022:  Spoke about her recent psychiatric hospitalization. She noted that it was helpful and she is glad it occurred but did not like feeling \"locked up.\" She spoke about recognizing some behaviors that have made her question if she was having manic episodes. She noted having periods of significant over-spending and very talkative in the hospital, \"up, unleashed, blabbering\", she said her mood was up \"but not euphoric,\" needing less sleep, impatience/irritability, and saying things to people without thinking of the impact. She noted that it was awkward coming back home with her " "family. Her family initially was home but all had to leave to do various things and she was left alone. She took some time in the [facility] and had an \"outside experience\" questioning if she felt that she did not belong. It was very brief and she felt better later. She reported that on reflection, she believes she may have been having manic/hypomanic episodes as early as college. She spoke about being able to work 24 hour shifts without significant problems. She said that she is recognizing \"episodic\" periods in her life when there has been a change in her mood/energy suggesting possible hypomanic episodes.      06/23/2022:  Reported that she tested positive for COVID yesterday. This is her second time and said that it is not as severe as the first time. She spoke about her experience with Ketamine noting that she is very pleased with how peaceful she feels with the treatments. \"It's been very relaxing and affirming experience.\" She feels relaxed and in a good mood after the treatment. She finds it easier to bring herself back to that level of relaxation during times of stress. She spoke about her ongoing suicidal ideation explaining that \"when the thoughts enter my mind they are not working thoughts.\" She explained that the suicidal thoughts are just as frequent but less intense, do not last as long, and are less threatening to her than they have been in the past. She was commended for her continued work and efforts to improve herself, and she was encouraged to continue working toward her goals.      04/13/2022:  Does not feel like she received a significant benefit from TMS. She feels she was not having as frequent ruminative thoughts of suicide but that has started to return recently. It tends to happen more often when her physical pain is more intense. She got to a point where she set a date to kill herself but she changed her mind because her son came to spend time with her and she changed her mind saying, \"It " "just wasn't right yet.\" She noted that she has a few plans but will not discuss them saying, \"I don't want anyone to take those options away from me.\"       Progress on Treatment Objective(s) / Homework:  Satisfactory progress - ACTION (Actively working towards change); Intervened by reinforcing change plan / affirming steps taken     Motivational Interviewing    MI Intervention: Expressed Empathy/Understanding, Permission to raise concern or advise, Open-ended questions and Reflections: simple and complex     Change Talk Expressed by the Patient: Desire to change Reasons to change Activation Taking steps    Provider Response to Change Talk: E - Evoked more info from patient about behavior change, A - Affirmed patient's thoughts, decisions, or attempts at behavior change, R - Reflected patient's change talk and S - Summarized patient's change talk statements    Also provided psychoeducation about behavioral health condition, symptoms, and treatment options    Care Plan review completed: No    Medication Review:  Changes to psychiatric medications, see updated Medication List in EPIC.     Medication Compliance:  Yes    Changes in Health Issues:   Yes: Pain, Associated Psychological Distress    Chemical Use Review:   Substance Use: Chemical use reviewed, no active concerns identified      Tobacco Use: No current tobacco use.      Assessment: Current Emotional / Mental Status (status of significant symptoms):  Risk status (Self / Other harm or suicidal ideation)  Patient has had a history of suicidal ideation: ongoing  Patient denies current fears or concerns for personal safety.  Patient reports the following current or recent suicidal ideation or behaviors: Patient reports she continues to have thoughts of suicide but denies that there is anything active or intentional at this time.  Patient does feel safe..  Patient denies current or recent homicidal ideation or behaviors.  Patient denies current or recent self " injurious behavior or ideation.  Patient denies other safety concerns.  A safety and risk management plan has been developed including: Patient consented to co-developed safety plan.  A safety and risk management plan was completed.  Patient agreed to use safety plan should any safety concerns arise.  A copy was given to the patient.  Patient was reminded to access her safety plan and crisis resources as needed.    Appearance:   Appropriate   Eye Contact:   Good   Psychomotor Behavior: Normal   Attitude:   Cooperative   Orientation:   All  Speech   Rate / Production: Talkative Normal    Volume:  Normal   Mood:    Anxious  Irritable  but stabilizing  Affect:    Appropriate   Thought Content:  Clear   Thought Form:  Coherent  Logical   Insight:    Fair     Diagnoses:  1. Moderate recurrent major depression (H)    2. STACIE (generalized anxiety disorder)        Collateral Reports Completed:  Communicated with: Dr. Perez    Plan: (Homework, other):  Patient was given information about behavioral services and encouraged to schedule a follow up appointment with the clinic [facility].  She was also given information about mental health symptoms and treatment options .  CD Recommendations: No indications of CD issues.  Matt Manzo PsyD, LP    ______________________________________________________________________    [facility] : Treatment Plan    Patient's Name: Janelle White  YOB: 1960    Date of Creation: April 13, 2022  Date Treatment Plan Last Reviewed/Revised: January 9, 2023    DSM5 Diagnoses: 296.33 (F33.2) Major Depressive Disorder, Recurrent Episode, Severe _  Psychosocial / Contextual Factors: chronic pain  PROMIS (reviewed every 90 days):   PROMIS 10-Global Health (only subscores and total score):   PROMIS-10 Scores Only 1/13/2022 6/3/2022 6/10/2022 7/8/2022 8/5/2022 1/8/2023   Global Mental Health Score 5  8 5 6 8 11   Global Physical Health Score 10 10 9 11 9 11   PROMIS TOTAL - SUBSCORES 15 18 14 17 17 22       Referral / Collaboration:  Referral to another professional/service is not indicated at this time..    Anticipated number of session for this episode of care: Continuous care [facility]PS patient  Anticipation frequency of session: As determined by Dr. Perez  Anticipated Duration of each session: 16-37 minutes  Treatment plan will be reviewed in 90 days or when goals have been changed.       MeasurableTreatment Goal(s) related to diagnosis / functional impairment(s)  Goal 1: Patient will work with providers to manage symptoms    I will know I've met my goal when I can enjoy my life again.      Objective #A (Patient Action)  Patient will attend all appointments, take medication as prescribed.  Status: Continued - Date(s): 01/09/2023    Intervention(s)  [facility] will Monitor and assist in overcoming barriers to treatment adherence    Objective #B  Patient will consider all recommendations offered.  Status: Continued - Date(s): 01/09/2023     Intervention(s)  [employer] [facility] will educate patient on treatment options, clarify concerns, work with pt to overcome any resistance to compliance.      Goal 2: Patient will replace self-harm thoughts/behaviors with self-care activities    I will know I've met my goal when I stop having those thoughts.      Objective #A (Patient Action)                          Status: Continued - Date(s): 01/09/2023  Patient will assist in creating safety plan.     Intervention(s)  [employer] will assist in creation of safety plan and provide copy to patient.     Objective #B  Patient will report using safety plan as needed.                       Status: Continued - Date(s): 01/09/2023     Intervention(s)  [employer] [facility] will monitor safety, use of plan, adjust plan as needed, work through any identified barriers/resistance to following her plan.      Patient has reviewed and agreed to the above plan.      Melvin Manzo,  "Alicia  01/09/2023          [facility]                                       Patient's Name: Janelle White    January 9, 2023     SAFETY PLAN:  Step 1: Warning signs / cues (Thoughts, images, mood, situation, behavior) that a crisis may be developing:  ? Thoughts: \"I don't matter\", \"People would be better off without me\", \"I can't do this anymore\" and \"I just want this to end\"  ? Images: obsessive thoughts of death or dying: thoughts of carrying out plan  ? Thinking Processes: ruminations (can't stop thinking about my problems): ruminate on my plan and highly critical and negative thoughts: if  says something critical i shut down  ? Mood: worsening depression, hopelessness, disinhibited (not caring about things or consequences) and worthlessness  ? Behaviors: isolating/withdrawing , can't stop crying, not taking care of myself and not taking care of my responsibilities  ? Situations: relationship problems     Step 2: Coping strategies - Things I can do to take my mind off of my problems without contacting another person (relaxation technique, physical activity):  ? Distress Tolerance Strategies:  play with my pet  and watch a funny movie:    ? Physical Activities: go for a walk and get in the [employer]  ? Focus on helpful thoughts:  \"This is temporary\"    Step 3: People and social settings that provide distraction:              Name: Alyx     Phone: in my phone              Name: Raven   Phone: in my phone  ? park                Step 4: Remind myself of people and things that are important to me and worth living for:  Children, dog, friends    Step 5: When I am in crisis, I can ask these people to help me use my safety plan:              Name: Alyx     Phone: in my phone              Name: Raven   Phone: in my phone    Step 6: Making the environment safe:   ? none identified    Step 7: Professionals or agencies I can contact during a crisis:  ? Suicide Prevention Lifeline: " 1-134-995-TALK (5692)  ? Crisis Text Line Service: Text   HOME  to 046-243.    Local Crisis Services: [facility]     Call 911 or go to my nearest emergency department.       I helped develop this safety plan and agree to use it when needed.  I have been given a copy of this plan.       Patient signature: _______________________________________________________________  Today s date: January 9, 2023    Adapted from Safety Plan Template 2008 Antionette Trevino and Joseph Leon is reprinted with the express permission of the authors.  No portion of the Safety Plan Template may be reproduced without the express, written permission.  You can contact the authors at [email or web address] or [email or web address]

## 2023-01-09 NOTE — Clinical Note
Please call this patient to get them scheduled for a follow-up visit in 3 months. Please schedule with me and the [facility]. Thanks!

## 2023-01-26 ENCOUNTER — MYC MEDICAL ADVICE (OUTPATIENT)
Dept: FAMILY MEDICINE | Facility: CLINIC | Age: 63
End: 2023-01-26
Payer: COMMERCIAL

## 2023-01-26 DIAGNOSIS — N95.2 ATROPHIC VAGINITIS: ICD-10-CM

## 2023-01-30 NOTE — TELEPHONE ENCOUNTER
Carmen: pended from historical list. Last visit virtual 8/4/22, future appt 3/9/23.    I am unable to unlock the Rx request for the Intrarosa (Prasterone) vaginal suppositories.   Please renew and send prescription to [address]/[place].    LINK Arriaga RN  [facility]

## 2023-02-07 ENCOUNTER — TRANSFERRED RECORDS (OUTPATIENT)
Dept: HEALTH INFORMATION MANAGEMENT | Facility: CLINIC | Age: 63
End: 2023-02-07

## 2023-02-07 ENCOUNTER — INFUSION THERAPY VISIT (OUTPATIENT)
Dept: INFUSION THERAPY | Facility: CLINIC | Age: 63
End: 2023-02-07
Attending: NURSE PRACTITIONER
Payer: COMMERCIAL

## 2023-02-07 VITALS
HEART RATE: 64 BPM | DIASTOLIC BLOOD PRESSURE: 77 MMHG | OXYGEN SATURATION: 95 % | SYSTOLIC BLOOD PRESSURE: 118 MMHG | TEMPERATURE: 97.6 F

## 2023-02-07 DIAGNOSIS — F33.2 SEVERE EPISODE OF RECURRENT MAJOR DEPRESSIVE DISORDER, WITHOUT PSYCHOTIC FEATURES (H): Primary | ICD-10-CM

## 2023-02-07 PROCEDURE — 250N000009 HC RX 250: Performed by: NURSE PRACTITIONER

## 2023-02-07 PROCEDURE — 96365 THER/PROPH/DIAG IV INF INIT: CPT

## 2023-02-07 PROCEDURE — 258N000003 HC RX IP 258 OP 636: Performed by: NURSE PRACTITIONER

## 2023-02-07 RX ORDER — NALOXONE HYDROCHLORIDE 0.4 MG/ML
0.2 INJECTION, SOLUTION INTRAMUSCULAR; INTRAVENOUS; SUBCUTANEOUS
Status: CANCELLED | OUTPATIENT
Start: 2023-02-07

## 2023-02-07 RX ORDER — DIPHENHYDRAMINE HYDROCHLORIDE 50 MG/ML
50 INJECTION INTRAMUSCULAR; INTRAVENOUS
Status: CANCELLED
Start: 2023-02-07

## 2023-02-07 RX ORDER — MEPERIDINE HYDROCHLORIDE 25 MG/ML
25 INJECTION INTRAMUSCULAR; INTRAVENOUS; SUBCUTANEOUS EVERY 30 MIN PRN
Status: CANCELLED | OUTPATIENT
Start: 2023-02-07

## 2023-02-07 RX ORDER — HEPARIN SODIUM (PORCINE) LOCK FLUSH IV SOLN 100 UNIT/ML 100 UNIT/ML
5 SOLUTION INTRAVENOUS
Status: CANCELLED | OUTPATIENT
Start: 2023-02-07

## 2023-02-07 RX ORDER — HEPARIN SODIUM,PORCINE 10 UNIT/ML
5 VIAL (ML) INTRAVENOUS
Status: CANCELLED | OUTPATIENT
Start: 2023-02-07

## 2023-02-07 RX ORDER — METHYLPREDNISOLONE SODIUM SUCCINATE 125 MG/2ML
125 INJECTION, POWDER, LYOPHILIZED, FOR SOLUTION INTRAMUSCULAR; INTRAVENOUS
Status: CANCELLED
Start: 2023-02-07

## 2023-02-07 RX ORDER — HYDRALAZINE HYDROCHLORIDE 20 MG/ML
10 INJECTION INTRAMUSCULAR; INTRAVENOUS
Status: CANCELLED | OUTPATIENT
Start: 2023-02-07

## 2023-02-07 RX ORDER — ALBUTEROL SULFATE 90 UG/1
1-2 AEROSOL, METERED RESPIRATORY (INHALATION)
Status: CANCELLED
Start: 2023-02-07

## 2023-02-07 RX ORDER — ALBUTEROL SULFATE 0.83 MG/ML
2.5 SOLUTION RESPIRATORY (INHALATION)
Status: CANCELLED | OUTPATIENT
Start: 2023-02-07

## 2023-02-07 RX ORDER — EPINEPHRINE 1 MG/ML
0.3 INJECTION, SOLUTION, CONCENTRATE INTRAVENOUS EVERY 5 MIN PRN
Status: CANCELLED | OUTPATIENT
Start: 2023-02-07

## 2023-02-07 RX ORDER — ONDANSETRON 2 MG/ML
4 INJECTION INTRAMUSCULAR; INTRAVENOUS
Status: CANCELLED | OUTPATIENT
Start: 2023-02-07

## 2023-02-07 RX ADMIN — SODIUM CHLORIDE 65 MG: 9 INJECTION, SOLUTION INTRAVENOUS at 08:41

## 2023-02-07 ASSESSMENT — PAIN SCALES - GENERAL: PAINLEVEL: EXTREME PAIN (8)

## 2023-02-07 NOTE — PROGRESS NOTES
Infusion Nursing Note:  Janelle White presents today for ketamine infusion.    Patient seen by provider today: No   present during visit today: Not Applicable.    Note: NA.    Intravenous Access:  Peripheral IV placed.    Treatment Conditions:  Not Applicable.    Post Infusion Assessment:  Patient tolerated infusion without incident.  Patient observed for 60 minutes post ketamine infusion, per protocol.  Blood return noted pre and post infusion.  Site patent and intact, free from redness, edema or discomfort.  No evidence of extravasations.  Access discontinued per protocol.     Discharge Plan:   Patient discharged in stable condition accompanied by: self.  Departure Mode: Ambulatory.  RTC 2/28/23.      Viktoria Mcdaniel

## 2023-02-20 ENCOUNTER — E-VISIT (OUTPATIENT)
Dept: FAMILY MEDICINE | Facility: CLINIC | Age: 63
End: 2023-02-20
Payer: COMMERCIAL

## 2023-02-20 DIAGNOSIS — R21 GENERALIZED RASH: ICD-10-CM

## 2023-02-20 DIAGNOSIS — L20.9 ATOPIC DERMATITIS, UNSPECIFIED TYPE: Primary | ICD-10-CM

## 2023-02-20 PROCEDURE — 99421 OL DIG E/M SVC 5-10 MIN: CPT | Performed by: NURSE PRACTITIONER

## 2023-02-20 RX ORDER — PREDNISONE 20 MG/1
40 TABLET ORAL DAILY
Qty: 10 TABLET | Refills: 0 | Status: SHIPPED | OUTPATIENT
Start: 2023-02-20 | End: 2023-02-25

## 2023-02-23 ENCOUNTER — E-VISIT (OUTPATIENT)
Dept: FAMILY MEDICINE | Facility: CLINIC | Age: 63
End: 2023-02-23
Payer: COMMERCIAL

## 2023-02-23 ENCOUNTER — MYC MEDICAL ADVICE (OUTPATIENT)
Dept: FAMILY MEDICINE | Facility: CLINIC | Age: 63
End: 2023-02-23

## 2023-02-23 DIAGNOSIS — L30.9 DERMATITIS: Primary | ICD-10-CM

## 2023-02-23 PROCEDURE — 99421 OL DIG E/M SVC 5-10 MIN: CPT | Performed by: NURSE PRACTITIONER

## 2023-02-23 RX ORDER — TRIAMCINOLONE ACETONIDE 1 MG/G
OINTMENT TOPICAL 2 TIMES DAILY
Qty: 80 G | Refills: 1 | Status: SHIPPED | OUTPATIENT
Start: 2023-02-23 | End: 2023-11-17

## 2023-02-25 ENCOUNTER — OFFICE VISIT (OUTPATIENT)
Dept: URGENT CARE | Facility: URGENT CARE | Age: 63
End: 2023-02-25
Payer: COMMERCIAL

## 2023-02-25 VITALS — HEART RATE: 85 BPM | OXYGEN SATURATION: 99 % | RESPIRATION RATE: 16 BRPM | TEMPERATURE: 98.4 F

## 2023-02-25 DIAGNOSIS — L50.9 URTICARIA: Primary | ICD-10-CM

## 2023-02-25 DIAGNOSIS — L50.9 URTICARIA: ICD-10-CM

## 2023-02-25 PROCEDURE — 99214 OFFICE O/P EST MOD 30 MIN: CPT | Performed by: FAMILY MEDICINE

## 2023-02-25 RX ORDER — HYDROXYZINE PAMOATE 25 MG/1
25 CAPSULE ORAL 3 TIMES DAILY PRN
Qty: 30 CAPSULE | Refills: 0 | Status: SHIPPED | OUTPATIENT
Start: 2023-02-25 | End: 2023-03-13

## 2023-02-25 RX ORDER — PREDNISONE 20 MG/1
TABLET ORAL
Qty: 20 TABLET | Refills: 0 | Status: SHIPPED | OUTPATIENT
Start: 2023-02-25 | End: 2023-03-13

## 2023-02-25 RX ORDER — FAMOTIDINE 20 MG/1
20 TABLET, FILM COATED ORAL 2 TIMES DAILY
Qty: 60 TABLET | Refills: 0 | Status: SHIPPED | OUTPATIENT
Start: 2023-02-25 | End: 2023-02-27

## 2023-02-25 NOTE — PROGRESS NOTES
SUBJECTIVE:  Chief Complaint   Patient presents with     Derm Problem     Per pt report she has been having a rash that is spreading. Pt was originally prescribed prednisone. Pt finished prednisone rx yesterday but rash has been persistent. Pt reports rash over neck, arms, under breasts, abdomen and groin area (where skin touches). Rash is both spotty, and spots that appear as hives. Pt was prescribed triamcinolone cream yesterday which she states helps but is wondering if there an alternative that may make her rash go away.      Janelle White is a 62 year old female who presents with a chief complaint of rash.    Unsure of trigger.  Initially developed on face, was splotchy and itchy.  Was given RX prednisone and this did help decrease symptoms, now noticed that having more all over her body.  Contact clinic again and was given RX triamcinolone cream which does help decrease symptoms.    No changes in products or medications.    Rash is worse at areas where pressure - abdomen/waist, groin.    Past Medical History:   Diagnosis Date     Anxiety      Cervicalgia 2007    C5-6 disc protrusion     COPD (chronic obstructive pulmonary disease) (H) 2/7/2022     Depressive disorder      ESBL (extended spectrum beta-lactamase) producing bacteria infection      History of blood transfusion 2007    Cervical fusion     Leukemia (H)     CLA large beta     Melanoma (H) 1998     Migraine      Other chronic pain      Rotator cuff tear     s/p injections     Sacroiliac inflammation (H)      Shift work sleep disorder 12/16/2013     Urinary calculi      Vitamin B12 deficiency anemia 2006    started injections     Current Outpatient Medications   Medication Sig Dispense Refill     albuterol (PROAIR HFA/PROVENTIL HFA/VENTOLIN HFA) 108 (90 Base) MCG/ACT inhaler Inhale 2 puffs into the lungs every 6 hours as needed for shortness of breath or wheezing 8.5 g 11     amphetamine-dextroamphetamine (ADDERALL XR) 25 MG 24 hr capsule Take 1  "capsule (25 mg) by mouth daily for 30 days 30 capsule 0     [START ON 3/12/2023] amphetamine-dextroamphetamine (ADDERALL XR) 25 MG 24 hr capsule Take 1 capsule (25 mg) by mouth daily for 30 days 30 capsule 0     amphetamine-dextroamphetamine (ADDERALL) 15 MG tablet Take 1 tablet (15 mg) by mouth 2 times daily for 30 days 60 tablet 0     [START ON 3/12/2023] amphetamine-dextroamphetamine (ADDERALL) 15 MG tablet Take 1 tablet (15 mg) by mouth 2 times daily for 30 days 60 tablet 0     ASHWAGANDHA PO Take 1 tablet by mouth daily       desvenlafaxine (PRISTIQ) 50 MG 24 hr tablet Take 1 tablet (50 mg) by mouth daily 90 tablet 1     Estradiol (DIVIGEL) 1 MG/GM GEL Place 1 packet onto the skin daily 30 g 11     HYDROcodone-acetaminophen (NORCO)  MG per tablet Take 1 tablet by mouth every 4 hours as needed for severe pain max 4 tabs/24 hrs 10 tablet 0     insulin syringe-needle U-100 (30G X 1/2\" 1 ML) 30G X 1/2\" 1 ML miscellaneous Inject 1 ml B12 qmonth 10 each 1     Levomefolate Glucosamine (METHYLFOLATE PO) Take 7.5 mg by mouth daily       lidocaine (LIDODERM) 5 % patch Place 4 patches onto the skin daily Apply up to 4 patches to skin. Wear for 12 hours and remove for 12 hrs.  Refill when patient requests. 120 patch 3     LORazepam (ATIVAN) 1 MG tablet Take 1 tablet (1 mg) by mouth every 6 hours as needed for anxiety (Patient taking differently: Take 1 mg by mouth At Bedtime) 50 tablet 3     medical cannabis (Patient's own supply.  Not a prescription) Medical Cannabis - [place] 4-6 ml by mouth daily. [facility]       methocarbamol (ROBAXIN) 500 MG tablet Take 1 tablet (500 mg) by mouth 4 times daily as needed for muscle spasms 120 tablet 1     naloxone (NARCAN) 4 MG/0.1ML nasal spray Spray 1 spray (4 mg) into one nostril alternating nostrils as needed for opioid reversal every 2-3 minutes until assistance arrives 0.2 mL 1     NONFORMULARY Take 2 Scoops by mouth daily Protein and Vitamin shake mix - Nutritional " supplement       ondansetron (ZOFRAN-ODT) 8 MG ODT tab Take 1 tablet (8 mg) by mouth every 8 hours as needed for nausea 30 tablet 4     Prasterone 6.5 MG INST Place 0.5 suppositories vaginally three times a week 28 each 11     predniSONE (DELTASONE) 20 MG tablet Take 2 tablets (40 mg) by mouth daily for 5 days 10 tablet 0     rOPINIRole (REQUIP) 0.25 MG tablet Take 1-2 tablets (0.25-0.5 mg) by mouth At Bedtime 90 tablet 0     senna-docusate (SENOKOT-S/PERICOLACE) 8.6-50 MG tablet Take 6-9 tablets by mouth every evening       triamcinolone (KENALOG) 0.1 % external ointment Apply topically 2 times daily 80 g 1     vitamin D3 (CHOLECALCIFEROL) 125 MCG (5000 UT) tablet Take 5,000 Units by mouth daily       Social History     Tobacco Use     Smoking status: Former     Packs/day: 1.00     Years: 5.00     Pack years: 5.00     Types: Cigarettes, Clove cigarettes or kreteks     Quit date: 1984     Years since quittin.1     Smokeless tobacco: Never   Substance Use Topics     Alcohol use: Yes     Comment: rare       ROS:  Review of systems negative except as stated above.    EXAM:   Pulse 85   Temp 98.4  F (36.9  C) (Tympanic)   Resp 16   LMP 2005 (LMP Unknown)   SpO2 99%   GENERAL APPEARANCE: healthy, alert and no distress  SKIN: raised urticaria irregular redden patchiness on abdomen, multiple scattered redden macules on face, trunk, some areas more confluent.  No scaling, no vesicles, or pustules.  PSYCH:alert, affect bright      ASSESSMENT/PLAN:  (L50.9) Urticaria  (primary encounter diagnosis)  Plan: predniSONE (DELTASONE) 20 MG tablet, famotidine        (PEPCID) 20 MG tablet, hydrOXYzine (VISTARIL)         25 MG capsule            Discussed urticaria rash and if able to identify trigger, then avoid.  RX prednisone taper given due to generalized rash, RX pepcid 20 mg given for delayed histamine blocking effect, RX hydroxyzine given to take to help with itchiness    Follow up with primary provider if no  improvement of symptoms in 1-2 weeks    Marcos Garcia MD  February 25, 2023 3:25 PM

## 2023-02-27 RX ORDER — FAMOTIDINE 20 MG/1
TABLET, FILM COATED ORAL
Qty: 180 TABLET | Refills: 1 | Status: SHIPPED | OUTPATIENT
Start: 2023-02-27 | End: 2023-12-01

## 2023-02-28 ENCOUNTER — INFUSION THERAPY VISIT (OUTPATIENT)
Dept: INFUSION THERAPY | Facility: CLINIC | Age: 63
End: 2023-02-28
Attending: PSYCHIATRY & NEUROLOGY
Payer: COMMERCIAL

## 2023-02-28 VITALS
DIASTOLIC BLOOD PRESSURE: 75 MMHG | WEIGHT: 168 LBS | HEART RATE: 76 BPM | SYSTOLIC BLOOD PRESSURE: 126 MMHG | BODY MASS INDEX: 27.96 KG/M2 | OXYGEN SATURATION: 96 % | RESPIRATION RATE: 16 BRPM

## 2023-02-28 DIAGNOSIS — F33.2 SEVERE EPISODE OF RECURRENT MAJOR DEPRESSIVE DISORDER, WITHOUT PSYCHOTIC FEATURES (H): Primary | ICD-10-CM

## 2023-02-28 PROCEDURE — 96365 THER/PROPH/DIAG IV INF INIT: CPT

## 2023-02-28 PROCEDURE — 250N000009 HC RX 250: Performed by: NURSE PRACTITIONER

## 2023-02-28 PROCEDURE — 258N000003 HC RX IP 258 OP 636: Performed by: NURSE PRACTITIONER

## 2023-02-28 RX ORDER — ALBUTEROL SULFATE 90 UG/1
1-2 AEROSOL, METERED RESPIRATORY (INHALATION)
Status: CANCELLED
Start: 2023-02-28

## 2023-02-28 RX ORDER — METHYLPREDNISOLONE SODIUM SUCCINATE 125 MG/2ML
125 INJECTION, POWDER, LYOPHILIZED, FOR SOLUTION INTRAMUSCULAR; INTRAVENOUS
Status: CANCELLED
Start: 2023-02-28

## 2023-02-28 RX ORDER — NALOXONE HYDROCHLORIDE 0.4 MG/ML
0.2 INJECTION, SOLUTION INTRAMUSCULAR; INTRAVENOUS; SUBCUTANEOUS
Status: CANCELLED | OUTPATIENT
Start: 2023-02-28

## 2023-02-28 RX ORDER — ALBUTEROL SULFATE 0.83 MG/ML
2.5 SOLUTION RESPIRATORY (INHALATION)
Status: CANCELLED | OUTPATIENT
Start: 2023-02-28

## 2023-02-28 RX ORDER — MEPERIDINE HYDROCHLORIDE 25 MG/ML
25 INJECTION INTRAMUSCULAR; INTRAVENOUS; SUBCUTANEOUS EVERY 30 MIN PRN
Status: CANCELLED | OUTPATIENT
Start: 2023-02-28

## 2023-02-28 RX ORDER — HEPARIN SODIUM (PORCINE) LOCK FLUSH IV SOLN 100 UNIT/ML 100 UNIT/ML
5 SOLUTION INTRAVENOUS
Status: CANCELLED | OUTPATIENT
Start: 2023-02-28

## 2023-02-28 RX ORDER — ONDANSETRON 2 MG/ML
4 INJECTION INTRAMUSCULAR; INTRAVENOUS
Status: CANCELLED | OUTPATIENT
Start: 2023-02-28

## 2023-02-28 RX ORDER — HYDRALAZINE HYDROCHLORIDE 20 MG/ML
10 INJECTION INTRAMUSCULAR; INTRAVENOUS
Status: CANCELLED | OUTPATIENT
Start: 2023-02-28

## 2023-02-28 RX ORDER — HEPARIN SODIUM,PORCINE 10 UNIT/ML
5 VIAL (ML) INTRAVENOUS
Status: CANCELLED | OUTPATIENT
Start: 2023-02-28

## 2023-02-28 RX ORDER — EPINEPHRINE 1 MG/ML
0.3 INJECTION, SOLUTION, CONCENTRATE INTRAVENOUS EVERY 5 MIN PRN
Status: CANCELLED | OUTPATIENT
Start: 2023-02-28

## 2023-02-28 RX ORDER — DIPHENHYDRAMINE HYDROCHLORIDE 50 MG/ML
50 INJECTION INTRAMUSCULAR; INTRAVENOUS
Status: CANCELLED
Start: 2023-02-28

## 2023-02-28 RX ADMIN — SODIUM CHLORIDE 65 MG: 9 INJECTION, SOLUTION INTRAVENOUS at 09:52

## 2023-03-02 ASSESSMENT — ENCOUNTER SYMPTOMS
NERVOUS/ANXIOUS: 0
SORE THROAT: 0
MYALGIAS: 1
FEVER: 0
COUGH: 0
WEAKNESS: 0
DIZZINESS: 0
HEMATOCHEZIA: 0
DYSURIA: 0
CHILLS: 0
HEMATURIA: 0
ARTHRALGIAS: 1
JOINT SWELLING: 0
BREAST MASS: 0
HEADACHES: 0
ABDOMINAL PAIN: 0
NAUSEA: 0
DIARRHEA: 0
PALPITATIONS: 0
HEARTBURN: 0
CONSTIPATION: 0
SHORTNESS OF BREATH: 0
EYE PAIN: 0
FREQUENCY: 0
PARESTHESIAS: 1

## 2023-03-02 ASSESSMENT — ACTIVITIES OF DAILY LIVING (ADL)
CURRENT_FUNCTION: SHOPPING REQUIRES ASSISTANCE
CURRENT_FUNCTION: HOUSEWORK REQUIRES ASSISTANCE
CURRENT_FUNCTION: MONEY MANAGEMENT REQUIRES ASSISTANCE
CURRENT_FUNCTION: PREPARING MEALS REQUIRES ASSISTANCE
CURRENT_FUNCTION: LAUNDRY REQUIRES ASSISTANCE

## 2023-03-09 ENCOUNTER — OFFICE VISIT (OUTPATIENT)
Dept: FAMILY MEDICINE | Facility: CLINIC | Age: 63
End: 2023-03-09
Payer: COMMERCIAL

## 2023-03-09 VITALS
BODY MASS INDEX: 26.01 KG/M2 | TEMPERATURE: 98.1 F | HEIGHT: 65 IN | DIASTOLIC BLOOD PRESSURE: 80 MMHG | WEIGHT: 156.1 LBS | SYSTOLIC BLOOD PRESSURE: 110 MMHG | HEART RATE: 71 BPM | RESPIRATION RATE: 17 BRPM | OXYGEN SATURATION: 100 %

## 2023-03-09 DIAGNOSIS — G25.81 RESTLESS LEGS SYNDROME (RLS): ICD-10-CM

## 2023-03-09 DIAGNOSIS — L50.9 URTICARIA: ICD-10-CM

## 2023-03-09 DIAGNOSIS — R53.82 CHRONIC FATIGUE: ICD-10-CM

## 2023-03-09 DIAGNOSIS — M25.50 POLYARTHRALGIA: ICD-10-CM

## 2023-03-09 DIAGNOSIS — Z13.220 LIPID SCREENING: ICD-10-CM

## 2023-03-09 DIAGNOSIS — G89.4 CHRONIC PAIN SYNDROME: ICD-10-CM

## 2023-03-09 DIAGNOSIS — Z00.00 ENCOUNTER FOR MEDICARE ANNUAL WELLNESS EXAM: Primary | ICD-10-CM

## 2023-03-09 DIAGNOSIS — Z78.0 POSTMENOPAUSAL STATUS: ICD-10-CM

## 2023-03-09 DIAGNOSIS — R11.0 NAUSEA: ICD-10-CM

## 2023-03-09 DIAGNOSIS — R05.9 COUGH: ICD-10-CM

## 2023-03-09 PROCEDURE — 99214 OFFICE O/P EST MOD 30 MIN: CPT | Mod: 25 | Performed by: NURSE PRACTITIONER

## 2023-03-09 PROCEDURE — G0402 INITIAL PREVENTIVE EXAM: HCPCS | Performed by: NURSE PRACTITIONER

## 2023-03-09 RX ORDER — ROPINIROLE 0.25 MG/1
.25-.5 TABLET, FILM COATED ORAL AT BEDTIME
Qty: 90 TABLET | Refills: 0 | Status: SHIPPED | OUTPATIENT
Start: 2023-03-09 | End: 2023-03-13

## 2023-03-09 ASSESSMENT — ENCOUNTER SYMPTOMS
COUGH: 0
HEMATOCHEZIA: 0
ABDOMINAL PAIN: 0
MYALGIAS: 1
JOINT SWELLING: 0
SORE THROAT: 0
SHORTNESS OF BREATH: 0
PARESTHESIAS: 1
HEARTBURN: 0
BREAST MASS: 0
EYE PAIN: 0
WEAKNESS: 0
DIZZINESS: 0
DIARRHEA: 0
FREQUENCY: 0
ARTHRALGIAS: 1
PALPITATIONS: 0
HEMATURIA: 0
NAUSEA: 0
FEVER: 0
CONSTIPATION: 0
DYSURIA: 0
NERVOUS/ANXIOUS: 0
HEADACHES: 0
CHILLS: 0

## 2023-03-09 ASSESSMENT — PATIENT HEALTH QUESTIONNAIRE - PHQ9
10. IF YOU CHECKED OFF ANY PROBLEMS, HOW DIFFICULT HAVE THESE PROBLEMS MADE IT FOR YOU TO DO YOUR WORK, TAKE CARE OF THINGS AT HOME, OR GET ALONG WITH OTHER PEOPLE: SOMEWHAT DIFFICULT
SUM OF ALL RESPONSES TO PHQ QUESTIONS 1-9: 17
SUM OF ALL RESPONSES TO PHQ QUESTIONS 1-9: 17

## 2023-03-09 ASSESSMENT — ACTIVITIES OF DAILY LIVING (ADL)
CURRENT_FUNCTION: HOUSEWORK REQUIRES ASSISTANCE
CURRENT_FUNCTION: SHOPPING REQUIRES ASSISTANCE
CURRENT_FUNCTION: LAUNDRY REQUIRES ASSISTANCE
CURRENT_FUNCTION: PREPARING MEALS REQUIRES ASSISTANCE
CURRENT_FUNCTION: MONEY MANAGEMENT REQUIRES ASSISTANCE

## 2023-03-09 NOTE — PROGRESS NOTES
"SUBJECTIVE:   Janelle is a 62 year old who presents for Preventive Visit.  {Split Bill scripting  The purpose of this visit is to discuss your medical history and prevent health problems before you are sick. You may be responsible for a co-pay, coinsurance, or deductible if your visit today includes services such as checking on a sore throat, having an x-ray or lab test, or treating and evaluating a new or existing condition :155867}  {Patient advised of split billing (Optional):538855}  Are you in the first 12 months of your Medicare coverage?  { :112060::\"No\"}    Healthy Habits:     In general, how would you rate your overall health?  Fair    Frequency of exercise:  6-7 days/week    Duration of exercise:  45-60 minutes    Do you usually eat at least 4 servings of fruit and vegetables a day, include whole grains    & fiber and avoid regularly eating high fat or \"junk\" foods?  Yes    Taking medications regularly:  Yes    Medication side effects:  None    Ability to successfully perform activities of daily living:  Shopping requires assistance, preparing meals requires assistance, housework requires assistance, laundry requires assistance and money management requires assistance    Home Safety:  No safety concerns identified    Hearing Impairment:  Difficulty following a conversation in a noisy restaurant or crowded room and need to ask people to speak up or repeat themselves    In the past 6 months, have you been bothered by leaking of urine? Yes    In general, how would you rate your overall mental or emotional health?  Fair      PHQ-2 Total Score: 1    Additional concerns today:  No      Have you ever done Advance Care Planning? (For example, a Health Directive, POLST, or a discussion with a medical provider or your loved ones about your wishes): { :604636}    {Hearing Test Done (Optional):844847}   Fall risk  { :291976}  {If any of the above assessments are answered yes, consider ordering appropriate referrals " "(Optional):852612::\"click delete button to remove this line now\"}  Cognitive Screening { :131930}    {Do you have sleep apnea, excessive snoring or daytime drowsiness? (Optional):669761}    Reviewed and updated as needed this visit by clinical staff                  Reviewed and updated as needed this visit by Provider                 Social History     Tobacco Use     Smoking status: Former     Packs/day: 1.00     Years: 5.00     Pack years: 5.00     Types: Cigarettes, Clove cigarettes or kreteks     Quit date: 1984     Years since quittin.2     Smokeless tobacco: Never   Substance Use Topics     Alcohol use: Yes     Comment: rare     {Rooming staff  Click this link to complete the Prescreen if response below is not for today's visit  Alcohol Use Prescreen >3 drinks/day or > 7 drinks/week.  If the prescreen question answer is YES, complete the full AUDIT  :869993}    Alcohol Use 3/2/2023   Prescreen: >3 drinks/day or >7 drinks/week? No   AUDIT SCORE  -   {add AUDIT responses (Optional) (A score of 7 for adult men is an indication of hazardous drinking; a score of 8 or more is an indication of an alcohol use disorder.  A score of 7 or more for adult women is an indication of hazardous drinking or an alchohol use disorder):198605}    {Outside tests to abstract? :406255}    {additional problems to add (Optional):801014}    Current providers sharing in care for this patient include: {Rooming staff:  Please update Care Team from storyboard, refresh this note and then delete this statement}  Patient Care Team:  Carmen Garcia APRN CNP as PCP - General (Nurse Practitioner Primary Care)  Kimberly Perez DO as Assigned Behavioral Health Provider  Carmen Garcia APRN CNP as Assigned PCP    The following health maintenance items are reviewed in Epic and correct as of today:  Health Maintenance   Topic Date Due     SPIROMETRY  Never done     COPD ACTION PLAN  Never done     HIV SCREENING  Never done     MEDICARE " "ANNUAL WELLNESS VISIT  02/07/2023     LIPID  02/07/2023     ANNUAL REVIEW OF HM ORDERS  02/07/2023     URINE DRUG SCREEN  04/26/2023     MAMMO SCREENING  07/18/2023     PHQ-9  09/09/2023     ADVANCE CARE PLANNING  10/16/2023     Pneumococcal Vaccine: Pediatrics (0 to 5 Years) and At-Risk Patients (6 to 64 Years) (3 - PPSV23 if available, else PCV20) 02/07/2027     DTAP/TDAP/TD IMMUNIZATION (5 - Td or Tdap) 08/27/2029     COLORECTAL CANCER SCREENING  02/18/2030     HEPATITIS C SCREENING  Completed     DEPRESSION ACTION PLAN  Completed     MIGRAINE ACTION PLAN  Completed     INFLUENZA VACCINE  Completed     ZOSTER IMMUNIZATION  Completed     COVID-19 Vaccine  Completed     IPV IMMUNIZATION  Aged Out     MENINGITIS IMMUNIZATION  Aged Out     PAP  Discontinued     {Chronicprobdata (optional):891831}  {Decision Support (Optional):183704}    Breast CA Risk Assessment (FHS-7) 2/7/2022 3/2/2023   Do you have a family history of breast, colon, or ovarian cancer? Yes No / Unknown       {If any of the questions to the BCRA (FHS-7) are answered yes, consider ordering referral for genetic counseling (Optional) :300415::\"click delete button to remove this line now\"}  {AMB Mammogram Decision Support (Optional) :600248}  Pertinent mammograms are reviewed under the imaging tab.    Review of Systems   Constitutional: Negative for chills and fever.   HENT: Negative for congestion, ear pain, hearing loss and sore throat.    Eyes: Negative for pain and visual disturbance.   Respiratory: Negative for cough and shortness of breath.    Cardiovascular: Negative for chest pain, palpitations and peripheral edema.   Gastrointestinal: Negative for abdominal pain, constipation, diarrhea, heartburn, hematochezia and nausea.   Breasts:  Negative for tenderness, breast mass and discharge.   Genitourinary: Negative for dysuria, frequency, genital sores, hematuria, pelvic pain, urgency, vaginal bleeding and vaginal discharge.   Musculoskeletal: " "Positive for arthralgias and myalgias. Negative for joint swelling.   Skin: Positive for rash.   Neurological: Positive for paresthesias. Negative for dizziness, weakness and headaches.   Psychiatric/Behavioral: Negative for mood changes. The patient is not nervous/anxious.      {ROS COMP (Optional):215505}    OBJECTIVE:   LMP 2005 (LMP Unknown)  Estimated body mass index is 27.96 kg/m  as calculated from the following:    Height as of 8/3/22: 1.651 m (5' 5\").    Weight as of 23: 76.2 kg (168 lb).  Physical Exam  {Exam (Optional) :760768}    {Diagnostic Test Results (Optional):558731::\"Diagnostic Test Results:\",\"Labs reviewed in Epic\"}    ASSESSMENT / PLAN:   {Diag Picklist:722087}    {Patient advised of split billing (Optional):255836}      COUNSELING:  {Medicare Counselin}        She reports that she quit smoking about 39 years ago. Her smoking use included cigarettes and clove cigarettes or kreteks. She has a 5.00 pack-year smoking history. She has never used smokeless tobacco.      Appropriate preventive services were discussed with this patient, including applicable screening as appropriate for cardiovascular disease, diabetes, osteopenia/osteoporosis, and glaucoma.  As appropriate for age/gender, discussed screening for colorectal cancer, prostate cancer, breast cancer, and cervical cancer. Checklist reviewing preventive services available has been given to the patient.    Reviewed patients plan of care and provided an AVS. The {CarePlan:831238} for Janelle meets the Care Plan requirement. This Care Plan has been established and reviewed with the {PATIENT, FAMILY MEMBER, CAREGIVER:325195}.    {Counseling Resources  [place] Preventive Services Task Force  Cholesterol Screening  Health diet/nutrition  Pooled Cohorts Equation Calculator  ""'s MyPlate  ASA Prophylaxis  Lung CA Screening  Osteoporosis prevention/bone health :221698}  {Breast Cancer Risk Calculator  BRCA-Related Cancer Risk " Assessment FHS-7 Tool :505560}    BRIONNA Jordan CNP  [facility]    Identified Health Risks:  Answers for HPI/ROS submitted by the patient on 3/9/2023  If you checked off any problems, how difficult have these problems made it for you to do your work, take care of things at home, or get along with other people?: Somewhat difficult  PHQ9 TOTAL SCORE: 17

## 2023-03-09 NOTE — PATIENT INSTRUCTIONS
"  Patient Education   Personalized Prevention Plan  You are due for the preventive services outlined below.  Your care team is available to assist you in scheduling these services.  If you have already completed any of these items, please share that information with your care team to update in your medical record.  Health Maintenance Due   Topic Date Due     Breathing Capacity Test  Never done     COPD Action Plan  Never done     HIV Screening  Never done     Annual Wellness Visit  02/07/2023     Cholesterol Lab  02/07/2023     ANNUAL REVIEW OF HM ORDERS  02/07/2023       Depression and Suicide in Older Adults    Nearly 2 million older Americans have some type of depression. Some of them even take their own lives. Yet depression among older adults is often ignored. Learn the warning signs. You may help spare a loved one needless pain. You may also save a life.   What is depression?  Depression is a common and serious illness that affects the way you think and feel. It is not a normal part of aging, nor is it a sign of weakness, a character flaw, or something you can snap out of. Most people with depression need treatment to get better. The most common symptom is a feeling of deep sadness. People who are depressed also may seem tired and listless. And nothing seems to give them pleasure. It s normal to grieve or be sad sometimes. But sadness lessens or passes with time. Depression rarely goes away or improves on its own. A person with clinical depression can't \"snap out of it.\" Other symptoms of depression are:     Sleeping more or less than normal    Eating more or less than normal    Having headaches, stomachaches, or other pains that don t go away    Feeling nervous,  empty,  or worthless    Crying a great deal    Thinking or talking about suicide or death    Loss of interest in activities previously enjoyed    Social isolation    Feeling confused or forgetful  What causes it?  The causes of depression aren t " fully known. But it is thought to result from a complex blend of these factors:     Biochemistry. Certain chemicals in the brain play a role.    Genes. Depression does run in families.    Life stress. Life stresses can also trigger depression in some people. Older adults often face many stressors, such as death of friends or a spouse, health problems, and financial concerns.    Chronic conditions. This includes conditions such as diabetes, heart disease, or cancer. These can cause symptoms of depression. Medicine side effects can cause changes in thoughts and behaviors.  How you can help  Often, depressed people may not want to ask for help. When they do, they may be ignored. Or, they may receive the wrong treatment. You can help by showing parents and older friends love and support. If they seem depressed, don t lecture the person, ignore the symptoms, or discount the symptoms as a  normal  part of aging -which they are not. Get involved, listen, and show interest and support.   Help them understand that depression is a treatable illness. Tell them you can help them find the right treatment. Offer to go to their healthcare provider's appointment with them for support when the symptoms are discussed. With their approval, contact a local mental health center, social service agency, or hospital about services.   You can be an advocate for him or her at healthcare appointments. Many older adults have chronic illnesses that can cause symptoms of depression. Medicine side effects can change thoughts and behaviors. You can help make sure that the healthcare provider looks at all of these factors. He or she should refer your family member or friend to a mental healthcare provider when needed. in some cases, untreated depression can lead to a misdiagnosis. A person may be diagnosed with a brain disorder such as dementia. If the healthcare provider does not take the issue of depression seriously, help your family member or  friend to find another provider.   Don't be afraid to ask  If you think an older person you care about could be suicidal, ask,  Have you thought about suicide?  Most people will tell you the truth. If they say  yes,  they may already have a plan for how and when they will attempt it. Find out as much as you can. The more detailed the plan, and the easier it is to carry out, the more danger the person is in right now. Tell the person you are there for them and do not want them to harm him or herself. Don't wait to get help for the person. Call the person's healthcare provider, local hospital, or emergency services.   To learn more    National Suicide Prevention Lifeline (crisis hotline) 677-833-HCWM (950-020-1017)    National [place] of Mental Cdimvb122-280-9185eig.[facility].nih.gov    National [place] on Mental Umnuvpc868-676-6076onv.[place].org    Mental Health Hehpavm696-696-8442imn.[facility].org    National Suicide Bzibfox986-YGGSMYT (750-779-0002)    Call 911  Never leave the person alone. A person who is actively suicidal needs psychiatric care right away. They will need constant supervision. Never leave the person out of sight. Call 911 or the national 24-hour suicide crisis hotline at 314-462-CBIC (096-838-7152). You can also take the person to the closest emergency room.   Elisa last reviewed this educational content on 5/1/2020 2000-2021 [employer]. All rights reserved. This information is not intended as a substitute for professional medical care. Always follow your healthcare professional's instructions.

## 2023-03-09 NOTE — PROGRESS NOTES
"SUBJECTIVE:   Janelle is a 62 year old who presents for Preventive Visit.  Patient has been advised of split billing requirements and indicates understanding: Yes  Are you in the first 12 months of your Medicare coverage?  Yes,  Visual Acuity:  Right Eye: 20/13  Left Eye: 20/13  Both Eyes: 20/10    Healthy Habits:     In general, how would you rate your overall health?  Fair    Frequency of exercise:  6-7 days/week    Duration of exercise:  45-60 minutes    Do you usually eat at least 4 servings of fruit and vegetables a day, include whole grains    & fiber and avoid regularly eating high fat or \"junk\" foods?  Yes    Taking medications regularly:  Yes    Medication side effects:  None    Ability to successfully perform activities of daily living:  Shopping requires assistance, preparing meals requires assistance, housework requires assistance, laundry requires assistance and money management requires assistance    Home Safety:  No safety concerns identified    Hearing Impairment:  Difficulty following a conversation in a noisy restaurant or crowded room and need to ask people to speak up or repeat themselves    In the past 6 months, have you been bothered by leaking of urine? Yes    In general, how would you rate your overall mental or emotional health?  Fair      PHQ-2 Total Score: 1    Additional concerns today:  No      Have you ever done Advance Care Planning? (For example, a Health Directive, POLST, or a discussion with a medical provider or your loved ones about your wishes): Yes, patient states has an Advance Care Planning document and will bring a copy to the clinic.       Fall risk  Fallen 2 or more times in the past year?: Yes  Any fall with injury in the past year?: Yes  Timed Up and Go Test (>13.5 is fall risk; contact physician) : 8    Cognitive Screening   1) Repeat 3 items (Leader, Season, Table)    2) Clock draw: NORMAL  3) 3 item recall: Recalls NO objects   Results: Normal Clock but NO Recall 3 " Words    Mini-CogTM Copyright ETHEL Jamison. Licensed by the author for use in [facility]; reprinted with permission ([email or web address]@.[email or web address]). All rights reserved.      Do you have sleep apnea, excessive snoring or daytime drowsiness?: yes    Reviewed and updated as needed this visit by clinical staff    Allergies  Meds              Reviewed and updated as needed this visit by Provider                 Social History     Tobacco Use     Smoking status: Former     Packs/day: 1.00     Years: 5.00     Pack years: 5.00     Types: Cigarettes, Clove cigarettes or kreteks     Quit date: 1984     Years since quittin.2     Smokeless tobacco: Never   Substance Use Topics     Alcohol use: Yes     Comment: rare         Alcohol Use 3/2/2023   Prescreen: >3 drinks/day or >7 drinks/week? No   AUDIT SCORE  -           PROBLEMS TO ADD ON...  Overall, doing ok.  Still lacks energy.  Doing monthly ketamine injections which are not as profound as when she first started them.  Has follow up with Oncology tomorrow through [facility].    Current providers sharing in care for this patient include:   Patient Care Team:  Carmen Garcia APRN CNP as PCP - General (Nurse Practitioner Primary Care)  Kimberly Perez DO as Assigned Behavioral Health Provider  Carmen Garcia APRN CNP as Assigned PCP    The following health maintenance items are reviewed in Epic and correct as of today:  Health Maintenance   Topic Date Due     SPIROMETRY  Never done     COPD ACTION PLAN  Never done     HIV SCREENING  Never done     MEDICARE ANNUAL WELLNESS VISIT  2023     LIPID  2023     ANNUAL REVIEW OF HM ORDERS  2023     URINE DRUG SCREEN  2023     MAMMO SCREENING  2023     PHQ-9  2023     ADVANCE CARE PLANNING  10/16/2023     Pneumococcal Vaccine: Pediatrics (0 to 5 Years) and At-Risk Patients (6 to 64 Years) (3 - PPSV23 if available, else PCV20) 2027     DTAP/TDAP/TD IMMUNIZATION (5 - Td or Tdap) 2029      COLORECTAL CANCER SCREENING  02/18/2030     HEPATITIS C SCREENING  Completed     DEPRESSION ACTION PLAN  Completed     MIGRAINE ACTION PLAN  Completed     INFLUENZA VACCINE  Completed     ZOSTER IMMUNIZATION  Completed     COVID-19 Vaccine  Completed     IPV IMMUNIZATION  Aged Out     MENINGITIS IMMUNIZATION  Aged Out     PAP  Discontinued     Lab work is in process      Breast CA Risk Assessment (FHS-7) 2/7/2022 3/2/2023   Do you have a family history of breast, colon, or ovarian cancer? Yes No / Unknown         Mammogram Screening: Recommended mammography every 1-2 years with patient discussion and risk factor consideration  Pertinent mammograms are reviewed under the imaging tab.    Review of Systems   Constitutional: Negative for chills and fever.   HENT: Negative for congestion, ear pain, hearing loss and sore throat.    Eyes: Negative for pain and visual disturbance.   Respiratory: Negative for cough and shortness of breath.    Cardiovascular: Negative for chest pain, palpitations and peripheral edema.   Gastrointestinal: Negative for abdominal pain, constipation, diarrhea, heartburn, hematochezia and nausea.   Breasts:  Negative for tenderness, breast mass and discharge.   Genitourinary: Negative for dysuria, frequency, genital sores, hematuria, pelvic pain, urgency, vaginal bleeding and vaginal discharge.   Musculoskeletal: Positive for arthralgias and myalgias. Negative for joint swelling.   Skin: Positive for rash.   Neurological: Positive for paresthesias. Negative for dizziness, weakness and headaches.   Psychiatric/Behavioral: Negative for mood changes. The patient is not nervous/anxious.    Answers for HPI/ROS submitted by the patient on 3/9/2023  If you checked off any problems, how difficult have these problems made it for you to do your work, take care of things at home, or get along with other people?: Somewhat difficult  PHQ9 TOTAL SCORE: 17          OBJECTIVE:   LMP 05/01/2005 (LMP Unknown)   "Estimated body mass index is 27.96 kg/m  as calculated from the following:    Height as of 8/3/22: 1.651 m (5' 5\").    Weight as of 2/28/23: 76.2 kg (168 lb).  Physical Exam  GENERAL: healthy, alert and no distress  EYES: Eyes grossly normal to inspection, PERRL and conjunctivae and sclerae normal  HENT: ear canals and TM's normal, nose and mouth without ulcers or lesions  NECK: no adenopathy, no asymmetry, masses, or scars and thyroid normal to palpation  RESP: lungs clear to auscultation - no rales, rhonchi or wheezes  CV: regular rate and rhythm, normal S1 S2, no S3 or S4, no murmur, click or rub, no peripheral edema and peripheral pulses strong  ABDOMEN: soft, nontender, no hepatosplenomegaly, no masses and bowel sounds normal  MS: no gross musculoskeletal defects noted, no edema        ASSESSMENT / PLAN:   (Z00.00) Encounter for Medicare annual wellness exam  (primary encounter diagnosis)  Comment: Reviewed medical/social/family history and health maintenance  Plan:     (R53.82) Chronic fatigue  Comment: Stable overall, continues with Ketamine injections.    Plan: Ferritin            (G25.81) Restless legs syndrome (RLS)  Comment: Requip is helpful.  Will check her ferritin levels which have historically low.  Plan: Ferritin, rOPINIRole (REQUIP) 0.25 MG tablet            (Z13.220) Lipid screening  Comment:   Plan: Lipid panel reflex to direct LDL Fasting            (M25.50) Polyarthralgia  Comment: Stable overall.    Plan: Ferritin, rOPINIRole (REQUIP) 0.25 MG tablet              Patient has been advised of split billing requirements and indicates understanding: Yes      COUNSELING:  Reviewed preventive health counseling, as reflected in patient instructions        She reports that she quit smoking about 39 years ago. Her smoking use included cigarettes and clove cigarettes or kreteks. She has a 5.00 pack-year smoking history. She has never used smokeless tobacco.      Appropriate preventive services were " discussed with this patient, including applicable screening as appropriate for cardiovascular disease, diabetes, osteopenia/osteoporosis, and glaucoma.  As appropriate for age/gender, discussed screening for colorectal cancer, prostate cancer, breast cancer, and cervical cancer. Checklist reviewing preventive services available has been given to the patient.    Reviewed patients plan of care and provided an AVS. The Complex Care Plan (for patients with higher acuity and needing more deliberate coordination of services) for Janelle meets the Care Plan requirement. This Care Plan has been established and reviewed with the Patient.          [facility]    Identified Health Risks:

## 2023-03-10 ENCOUNTER — ALLIED HEALTH/NURSE VISIT (OUTPATIENT)
Dept: FAMILY MEDICINE | Facility: CLINIC | Age: 63
End: 2023-03-10
Payer: COMMERCIAL

## 2023-03-10 DIAGNOSIS — Z00.00 ENCOUNTER FOR MEDICARE ANNUAL WELLNESS EXAM: Primary | ICD-10-CM

## 2023-03-10 PROCEDURE — 99207 PR NO CHARGE NURSE ONLY: CPT

## 2023-03-13 RX ORDER — ESTRADIOL 1 MG/G
GEL TOPICAL
Qty: 30 G | Refills: 11 | Status: SHIPPED | OUTPATIENT
Start: 2023-03-13 | End: 2023-03-22

## 2023-03-13 RX ORDER — ALBUTEROL SULFATE 90 UG/1
2 AEROSOL, METERED RESPIRATORY (INHALATION) EVERY 6 HOURS PRN
Qty: 8.5 G | Refills: 11 | Status: SHIPPED | OUTPATIENT
Start: 2023-03-13 | End: 2024-03-21

## 2023-03-13 RX ORDER — HYDROXYZINE PAMOATE 25 MG/1
25 CAPSULE ORAL 3 TIMES DAILY PRN
Qty: 30 CAPSULE | Refills: 3 | Status: SHIPPED | OUTPATIENT
Start: 2023-03-13 | End: 2023-08-25

## 2023-03-13 RX ORDER — ONDANSETRON 8 MG/1
8 TABLET, ORALLY DISINTEGRATING ORAL EVERY 8 HOURS PRN
Qty: 30 TABLET | Refills: 4 | Status: SHIPPED | OUTPATIENT
Start: 2023-03-13 | End: 2024-03-21

## 2023-03-13 RX ORDER — HYDROCODONE BITARTRATE AND ACETAMINOPHEN 10; 325 MG/1; MG/1
1 TABLET ORAL EVERY 4 HOURS PRN
Qty: 10 TABLET | Refills: 0 | Status: SHIPPED | OUTPATIENT
Start: 2023-03-13 | End: 2023-11-17

## 2023-03-13 RX ORDER — ROPINIROLE 0.25 MG/1
TABLET, FILM COATED ORAL
Qty: 180 TABLET | Refills: 3 | Status: SHIPPED | OUTPATIENT
Start: 2023-03-13 | End: 2024-03-21

## 2023-03-13 NOTE — PROGRESS NOTES
"Janelle is a 61 year old who is being evaluated via a billable video visit.      How would you like to obtain your AVS? MyChart  If the video visit is dropped, the invitation should be resent by: Text to cell phone: [phone]  Will anyone else be joining your video visit? No  {If patient encounters technical issues they should call [phone] :[id number]}    Video Start Time: {video visit start/end time for provider to select:152948}  Video-Visit Details    Type of service:  Video Visit    Video End Time:{video visit start/end time for provider to select:152948}    Originating Location (pt. Location): {video visit patient location:910829::\"Home\"}    Distant Location (provider location):  [facility] [place] [facility]     Platform used for Video Visit: {Virtual Visit Platforms:765540::\"[employer]\"}     Depression Response    Patient completed the PHQ-9 assessment for depression and scored >9? Yes  Question 9 on the PHQ-9 was positive for suicidality? Yes  Does patient have current mental health provider? No, Would like a referral for a Therapist.  Is this a virtual visit? Yes   Does patient have suicidal ideation (positive question 9)? Yes, thoughts no actions.  Would like a referral to a therapist.           " Anesthesia Pre Eval Note    Anesthesia ROS/Med Hx    Overall Review:  EKG was reviewed and Stress test was reviewed     Anesthetic Complication History:  Patient does not have a history of anesthetic complications      Pulmonary Review:    Negative for sleep apnea   Negative for COPD   Negative for asthma  The patient is not a smoker.    Neuro/Psych Review:  Patient does not have a neuro/psych history   Negative for seizures   Negative for TIA  Negative for CVA    Cardiovascular Review:    Negative for past MI  Negative for ICD   Negative for pacemaker   Negative for CAD  Negative for CABG/stent  Negative for angina  Negative for valvular problems/murmurs    Negative for dysrhythmias  Negative for hypertension    GI/HEPATIC/RENAL Review:  Negative for liver disease  Negative for renal disease  Negative for bowel prep    End/Other Review:    Negative for obesity   Positive for cancer  Additional Results:  EKG:  Encounter Date: 23  -ELECTROCARDIOGRAM 12-LEAD:                                                                 Narrative    Test was performed.    :1959  AGE:64 year old    Ordering provider: Dr. Hernández    Diagnosis: Preop cardiovascular exam (Z01.810)         Result approved by She Hernández MD on 3/8/23    ALLERGIES:   -- Penicillins -- RASH    --  Patient has had keflex previously        Relevant Problems   No relevant active problems       Physical Exam     Airway   Mallampati: II  TM Distance: >3 FB  Neck ROM: Full  TMJ Mobility: Good    Cardiovascular  Cardiovascular exam normal  Cardio Rhythm: Regular  Cardio Rate: Normal    Head Assessment  Head assessment: Atraumatic and Normocephalic    General Assessment  General Assessment: Alert and oriented and No acute distress    Dental Exam  Dental exam normal  Patient has:  Denied broken/chipped/loose teeth    Pulmonary Exam  Pulmonary exam normal  Breath sounds clear to auscultation:  Yes    Abdominal Exam  Abdominal exam  normal      Anesthesia Plan:    Phone call attempted:   Case discussed with Patient or Representative    ASA Status: 2  Anesthesia Type: General    Induction: Intravenous  Preferred Airway Type: ETTPatient does not have a difficult airway or is not at risk of aspiration.   Maintenance: Combined  Premedication: Oral      Post-op Pain Management: Per Surgeon  Postoperative analgesia plan does NOT include opiods    Checklist  Reviewed: Lab Results, Nursing Notes, Allergies, Anesthesia Record, EKG, Care Everywhere, Medications, DNR Status, Problem list, Past Med History, NPO Status and Patient Summary  Consent/Risks Discussed Statement:  The proposed anesthetic plan, including its risks and benefits, have been discussed with the Patient along with the risks and benefits of alternatives. Questions were encouraged and answered and the patient and/or representative understands and agrees to proceed.        I discussed with the patient (and/or patient's legal representative) the risks and benefits of the proposed anesthesia plan, General, which may include services performed by other anesthesia providers.    Alternative anesthesia plans, if available, were reviewed with the patient (and/or patient's legal representative). Discussion has been held with the patient (and/or patient's legal representative) regarding risks of anesthesia, which include sore throat, vomiting, nausea, persistent pain, intra-operative awareness, memory loss and depressed breathing and emergent situations that may require change in anesthesia plan.    The patient (and/or patient's legal representative) has indicated understanding, his/her questions have been answered, and he/she wishes to proceed with the planned anesthetic.    Blood Products: Not Anticipated

## 2023-03-16 ENCOUNTER — LAB (OUTPATIENT)
Dept: LAB | Facility: CLINIC | Age: 63
End: 2023-03-16
Payer: COMMERCIAL

## 2023-03-16 DIAGNOSIS — R53.82 CHRONIC FATIGUE: ICD-10-CM

## 2023-03-16 DIAGNOSIS — Z13.220 LIPID SCREENING: ICD-10-CM

## 2023-03-16 DIAGNOSIS — M25.50 POLYARTHRALGIA: ICD-10-CM

## 2023-03-16 DIAGNOSIS — G25.81 RESTLESS LEGS SYNDROME (RLS): ICD-10-CM

## 2023-03-16 LAB
CHOLEST SERPL-MCNC: 203 MG/DL
FERRITIN SERPL-MCNC: 68 NG/ML (ref 11–328)
HDLC SERPL-MCNC: 50 MG/DL
LDLC SERPL CALC-MCNC: 127 MG/DL
NONHDLC SERPL-MCNC: 153 MG/DL
TRIGL SERPL-MCNC: 132 MG/DL

## 2023-03-16 PROCEDURE — 80061 LIPID PANEL: CPT | Performed by: INTERNAL MEDICINE

## 2023-03-16 PROCEDURE — 36415 COLL VENOUS BLD VENIPUNCTURE: CPT | Performed by: INTERNAL MEDICINE

## 2023-03-16 PROCEDURE — 82728 ASSAY OF FERRITIN: CPT | Performed by: INTERNAL MEDICINE

## 2023-03-20 ENCOUNTER — VIRTUAL VISIT (OUTPATIENT)
Dept: BEHAVIORAL HEALTH | Facility: CLINIC | Age: 63
End: 2023-03-20
Payer: MEDICARE

## 2023-03-20 ENCOUNTER — VIRTUAL VISIT (OUTPATIENT)
Dept: PSYCHIATRY | Facility: CLINIC | Age: 63
End: 2023-03-20
Payer: MEDICARE

## 2023-03-20 DIAGNOSIS — F34.1 PERSISTENT DEPRESSIVE DISORDER: Primary | ICD-10-CM

## 2023-03-20 DIAGNOSIS — F33.2 SEVERE EPISODE OF RECURRENT MAJOR DEPRESSIVE DISORDER, WITHOUT PSYCHOTIC FEATURES (H): ICD-10-CM

## 2023-03-20 DIAGNOSIS — F12.10 CANNABIS ABUSE: ICD-10-CM

## 2023-03-20 DIAGNOSIS — F33.1 MODERATE RECURRENT MAJOR DEPRESSION (H): Primary | ICD-10-CM

## 2023-03-20 DIAGNOSIS — E88.89 CYP2D6 POOR METABOLIZER (H): ICD-10-CM

## 2023-03-20 DIAGNOSIS — F41.1 GAD (GENERALIZED ANXIETY DISORDER): ICD-10-CM

## 2023-03-20 DIAGNOSIS — F33.9 RECURRENT MAJOR DEPRESSION RESISTANT TO TREATMENT (H): ICD-10-CM

## 2023-03-20 DIAGNOSIS — E88.89 CYP2B6 INTERMEDIATE METABOLIZER (H): ICD-10-CM

## 2023-03-20 DIAGNOSIS — E72.12 HOMOZYGOUS FOR C677T POLYMORPHISM OF MTHFR (H): ICD-10-CM

## 2023-03-20 DIAGNOSIS — R45.851 SUICIDAL IDEATION: ICD-10-CM

## 2023-03-20 PROCEDURE — 99214 OFFICE O/P EST MOD 30 MIN: CPT | Mod: VID | Performed by: PSYCHIATRY & NEUROLOGY

## 2023-03-20 PROCEDURE — 90832 PSYTX W PT 30 MINUTES: CPT | Mod: VID | Performed by: PSYCHOLOGIST

## 2023-03-20 RX ORDER — DEXTROAMPHETAMINE SACCHARATE, AMPHETAMINE ASPARTATE, DEXTROAMPHETAMINE SULFATE AND AMPHETAMINE SULFATE 3.75; 3.75; 3.75; 3.75 MG/1; MG/1; MG/1; MG/1
15 TABLET ORAL 2 TIMES DAILY
Qty: 60 TABLET | Refills: 0 | Status: SHIPPED | OUTPATIENT
Start: 2023-05-21 | End: 2023-06-20

## 2023-03-20 RX ORDER — DEXTROAMPHETAMINE SACCHARATE, AMPHETAMINE ASPARTATE MONOHYDRATE, DEXTROAMPHETAMINE SULFATE AND AMPHETAMINE SULFATE 6.25; 6.25; 6.25; 6.25 MG/1; MG/1; MG/1; MG/1
25 CAPSULE, EXTENDED RELEASE ORAL DAILY
Qty: 30 CAPSULE | Refills: 0 | Status: SHIPPED | OUTPATIENT
Start: 2023-04-20 | End: 2023-05-19

## 2023-03-20 RX ORDER — DESVENLAFAXINE 50 MG/1
50 TABLET, FILM COATED, EXTENDED RELEASE ORAL DAILY
Qty: 90 TABLET | Refills: 1 | Status: SHIPPED | OUTPATIENT
Start: 2023-03-20 | End: 2023-08-25

## 2023-03-20 RX ORDER — DEXTROAMPHETAMINE SACCHARATE, AMPHETAMINE ASPARTATE, DEXTROAMPHETAMINE SULFATE AND AMPHETAMINE SULFATE 3.75; 3.75; 3.75; 3.75 MG/1; MG/1; MG/1; MG/1
15 TABLET ORAL 2 TIMES DAILY
Qty: 60 TABLET | Refills: 0 | Status: SHIPPED | OUTPATIENT
Start: 2023-04-20 | End: 2023-05-19

## 2023-03-20 RX ORDER — DEXTROAMPHETAMINE SACCHARATE, AMPHETAMINE ASPARTATE MONOHYDRATE, DEXTROAMPHETAMINE SULFATE AND AMPHETAMINE SULFATE 6.25; 6.25; 6.25; 6.25 MG/1; MG/1; MG/1; MG/1
25 CAPSULE, EXTENDED RELEASE ORAL DAILY
Qty: 30 CAPSULE | Refills: 0 | Status: SHIPPED | OUTPATIENT
Start: 2023-05-21 | End: 2023-06-20

## 2023-03-20 ASSESSMENT — ANXIETY QUESTIONNAIRES
GAD7 TOTAL SCORE: 7
3. WORRYING TOO MUCH ABOUT DIFFERENT THINGS: SEVERAL DAYS
8. IF YOU CHECKED OFF ANY PROBLEMS, HOW DIFFICULT HAVE THESE MADE IT FOR YOU TO DO YOUR WORK, TAKE CARE OF THINGS AT HOME, OR GET ALONG WITH OTHER PEOPLE?: SOMEWHAT DIFFICULT
7. FEELING AFRAID AS IF SOMETHING AWFUL MIGHT HAPPEN: NOT AT ALL
7. FEELING AFRAID AS IF SOMETHING AWFUL MIGHT HAPPEN: NOT AT ALL
4. TROUBLE RELAXING: SEVERAL DAYS
IF YOU CHECKED OFF ANY PROBLEMS ON THIS QUESTIONNAIRE, HOW DIFFICULT HAVE THESE PROBLEMS MADE IT FOR YOU TO DO YOUR WORK, TAKE CARE OF THINGS AT HOME, OR GET ALONG WITH OTHER PEOPLE: SOMEWHAT DIFFICULT
GAD7 TOTAL SCORE: 7
2. NOT BEING ABLE TO STOP OR CONTROL WORRYING: SEVERAL DAYS
5. BEING SO RESTLESS THAT IT IS HARD TO SIT STILL: SEVERAL DAYS
6. BECOMING EASILY ANNOYED OR IRRITABLE: SEVERAL DAYS
1. FEELING NERVOUS, ANXIOUS, OR ON EDGE: MORE THAN HALF THE DAYS
GAD7 TOTAL SCORE: 7

## 2023-03-20 NOTE — PATIENT INSTRUCTIONS
Treatment Plan:  Continue Pristiq 50 mg daily for mood, anxiety.   Continue methylfolate 7.5 mg daily as supplementation.  Continue Adderall XR 25 mg daily for mood augmentation  Continue Adderall IR 15 mg twice daily as needed for mood augmentation and daytime fatigue/hypersomnia  Continue benzodiazepine per primary care prescriber.  Continue ketamine with interventional psychiatry clinic.  Continue to work with your specialist from [facility] as indicated.    Could consider individual DBT therapy.  Recommend ongoing individual psychotherapy.    Continue all other cares per primary care provider.   Continue all other medications as reviewed per electronic medical record today.   Safety plan reviewed. To the Emergency Department as needed or call after hours crisis line at [phone] or [phone]. [place] Crisis Text Line. Text [place] to 202088 or Suicide LifeLine Chat: [email or web address] [email or web address].[email or web address]/chat  Schedule an appointment with me in 8 weeks, or sooner as needed. Call [facility] at [phone] to schedule.  Follow up with primary care provider as planned or for acute medical concerns.  Call the psychiatric nurse line with medication questions or concerns at [phone].  [employer] may be used to communicate with your provider, but this is not intended to be used for emergencies.    Therapy resources:  Www.[facility].org    https://www.mhs-dbt.com/mental-health/thrive/thrive-mental-health-and-chronic-pain-management/    [place] DBT resources:  [place]://[place].gov/dhs/partners-and-providers/policies-procedures/adult-mental-health/dialectical-behavior-therapy/dbt-certified-providers/    Risks of benzodiazepine (Ativan, Xanax, Klonopin, Valium, etc) use including, but not limited to, sedation, tolerance, risk for addiction/dependence. Do not drink alcohol while taking benzodiazepines due to risk of trouble breathing and potential death. Do not drive or operate heavy machinery until it is known how the drug  affects you. Discuss with physician or pharmacist before ever taking a benzodiazepine with a narcotic/opioid pain medication.     Have previously discussed risks of stimulant medication including, but not limited to, decreased appetite, risk of tics (and that they may be lasting), trouble sleeping, cardiac risks such as increased heart rate and blood pressure, and rare risk of sudden cardiac death.  Also risk of addiction/tolerance/dependence.

## 2023-03-20 NOTE — PROGRESS NOTES
[facility]  March 20, 2023      Behavioral Health Clinician Progress Note    Patient Name: Janelle White           Service Type:  Individual      Service Location:   [facility]     Session Start Time: 07:30 am  Session End Time: 07:55 am      Session Length: 16 - 37      Attendees: Patient     Service Modality:  Video Visit:      Provider verified identity through the following two step process.  Patient provided:  Patient is known previously to provider    Telemedicine Visit: The patient's condition can be safely assessed and treated via synchronous audio and visual telemedicine encounter.      Reason for Telemedicine Visit: Services only offered telehealth    Originating Site (Patient Location): Patient's home    Distant Site (Provider Location): Provider Remote Setting- Home Office    Consent:  The patient/guardian has verbally consented to: the potential risks and benefits of telemedicine (video visit) versus in person care; bill my insurance or make self-payment for services provided; and responsibility for payment of non-covered services.     Patient would like the video invitation sent by:  My Chart    Mode of Communication:  Video Conference via [facility]    As the provider I attest to compliance with applicable laws and regulations related to telemedicine.    Visit Activities (Refresh list every visit): [facility] Only    Diagnostic Assessment Date: 03/11/2021  Treatment Plan Review Date: 04/09/2023  See Flowsheets for today's PHQ-9 and STACIE-7 results  Previous PHQ-9:   PHQ-9 SCORE 1/8/2023 3/9/2023 3/19/2023   PHQ-9 Total Score - - -   PHQ-9 Total Score MyChart 13 (Moderate depression) 17 (Moderately severe depression) 18 (Moderately severe depression)   PHQ-9 Total Score 13 17 18   Some encounter information is confidential and restricted. Go to Review Flowsheets activity to see all data.     Previous STACIE-7:   STACIE-7 SCORE 7/2/2022 8/5/2022 9/1/2022   Total  "Score - - -   Total Score - 7 (mild anxiety) 7 (mild anxiety)   Total Score 11 7 7       JAYRO LEVEL:  JAYRO Score (Last Two) 7/9/2009 1/27/2011   JAYRO Raw Score 44 50   Activation Score 70.8 86.3   JAYRO Level 4 4       DATA  Extended Session (60+ minutes): No  Interactive Complexity: No  Crisis: No  [facility] Patient: No    Treatment Objective(s) Addressed in This Session:  Target Behavior(s): disease management/lifestyle changes pain management    Depressed Mood:    Anxiety: will develop more effective coping skills to manage anxiety symptoms  Psychological distress related to Pain    Current Stressors / Issues:  Reported that she is feeling essentially the same. Things are \"fine\" and her emotions are \"relatively stable.\" She said that it is \"not great but not terrible.\" She spoke about her routine to get herself moving in the morning and doing the things she knows she are helpful for her. She is struggling with significant anxiety which she is realizing is probably related to her cannabis use. She spoke about her SI saying that she is not ruminating on self-harm and she does not have them daily. They are brief and not as intense as they were in the past. She feels stable in that respect compared to her past when those thoughts felt more threatening. She noted that she is continuing to stay connected with her family which helps.       01/09/2023:  \"Overall, I think my mental status is a lot better.\" She spoke about some things that she experienced with her family over the holidays but managed it much better this year than she typically does. She spoke about the tools she is using and how she feels ketamine has been so helpful. She still has some problems with sleep and feeling tired throughout the day but it is still a little better then it once was. She likes her current medication regimen but is struggling with how to get her prescription of Adderall due to the national shortage. Otherwise, she is pleased with her progress " "and spoke about how active she is trying to be. She still has some SI but noted that it is not as intense or frequent as it once was and feels good about that.      10/07/2022:  Reported that she is doing well. She finds ketamine has been very helpful. She noted that she is more active and doing more with family/friends. She is finding that she is dealing with more physical pain and more frustrated by that, but is doing what she needs to do to manage that better. She decided to not pursue DBT at this time citing finances and limited time available for more appointments. Her concerns were acknowledged and she was encouraged to consider ways to make room for therapy in the future which she acknowledged would be helpful when other physical concerns were better addressed. She has no requests for today.      09/07/2022 (Janeth Jaquez, [facility]):  MH Update: \"pretty good.\" Not feeling as down as before and less labile. Trying to stay busy and is using pacing as discussed in prior sessions. Some days are still hard and feels like constantly trying to do things to feel better: stretching, walking, mindfulness, breathing, self-talk. Does help but not feel like a quality of life.  Patient expresses frustration that she has been dealing with chronic pain for so long and feels that it will never improve.  She feels that taking care of the pain is exhausting in itself.  Patient expresses wanting to be able to do things and live her life without needing to stop and take care of her pain, feeling limited in what she can do.  Patient also expresses frustration that she has so many providers and feels that she is not getting any improvement.  Patient is aware of the mind body link and has been working to stabilize and minimize her stressors.  Patient feels that her mental health is more stable than it has been in a long time and yet is frustrated that her body continues to suffer.  Patient did complete her neuropsych testing and found " "it did provide some answers/confirmation of what she is actually experiencing.  Patient continues to do her ketamine treatments as she feels they are helpful as well.  Patient admits that she stopped her individual therapy as she did not find it beneficial at this time.  [facility] validated patient for her experience and the difficulties of managing chronic pain.  [facility] discussed the idea of patient trying to simplify her care network and focus on having some hard conversations with 1 or 2 of her core providers instead of having more providers added on.  [facility] processed patient being ready to accept things that she may not want to hear but knows are reality and how this relates to grieving the changes in her life.  [facility] provided encouragement the patient is doing all the positive things that she can and to continue doing so, even with her frustrations.  [facility] assessed for safety.    SI: thoughts of not feeling a point to living but not \"intentional.\" No plan or intention to harm self. Aware of crisis resources to use as needed.    [place] [facility] Decided to put on hold as not feel helpful for now.  [facility] will look into DBT/health psychology referrals that could be a better fit for patient's needs at this time.    Most Important: keep medication the same, feeling stable with mental health      08/08/2022:  Reported that she has been practicing activity pacing more often. She finds that it helps her to take breaks more often than in the past and cumulatively she has been completing more tasks. Despite having more situations for her to deal with recently, she finds that she has been calm which allows her to be more productive. She noted that she is tolerating requip better than mirapex. She had an intake with the [facility] and attended her first group meeting. She noted that she does not feel ready for groups right now. She finds that she gets overwhelmed leading to difficulty comprehending information. \"I " "can't make sense of things.\" She has an interview with neuropsychology coming up soon and wants to finish that before doing more intensive therapy like the [facility] program.       07/11/2022:  Spoke about her recent psychiatric hospitalization. She noted that it was helpful and she is glad it occurred but did not like feeling \"locked up.\" She spoke about recognizing some behaviors that have made her question if she was having manic episodes. She noted having periods of significant over-spending and very talkative in the hospital, \"up, unleashed, blabbering\", she said her mood was up \"but not euphoric,\" needing less sleep, impatience/irritability, and saying things to people without thinking of the impact. She noted that it was awkward coming back home with her family. Her family initially was home but all had to leave to do various things and she was left alone. She took some time in the [facility] and had an \"outside experience\" questioning if she felt that she did not belong. It was very brief and she felt better later. She reported that on reflection, she believes she may have been having manic/hypomanic episodes as early as college. She spoke about being able to work 24 hour shifts without significant problems. She said that she is recognizing \"episodic\" periods in her life when there has been a change in her mood/energy suggesting possible hypomanic episodes.      06/23/2022:  Reported that she tested positive for COVID yesterday. This is her second time and said that it is not as severe as the first time. She spoke about her experience with Ketamine noting that she is very pleased with how peaceful she feels with the treatments. \"It's been very relaxing and affirming experience.\" She feels relaxed and in a good mood after the treatment. She finds it easier to bring herself back to that level of relaxation during times of stress. She spoke about her ongoing suicidal ideation explaining that \"when the thoughts enter " "my mind they are not working thoughts.\" She explained that the suicidal thoughts are just as frequent but less intense, do not last as long, and are less threatening to her than they have been in the past. She was commended for her continued work and efforts to improve herself, and she was encouraged to continue working toward her goals.      04/13/2022:  Does not feel like she received a significant benefit from TMS. She feels she was not having as frequent ruminative thoughts of suicide but that has started to return recently. It tends to happen more often when her physical pain is more intense. She got to a point where she set a date to kill herself but she changed her mind because her son came to spend time with her and she changed her mind saying, \"It just wasn't right yet.\" She noted that she has a few plans but will not discuss them saying, \"I don't want anyone to take those options away from me.\"       Progress on Treatment Objective(s) / Homework:  Satisfactory progress - ACTION (Actively working towards change); Intervened by reinforcing change plan / affirming steps taken     Motivational Interviewing    MI Intervention: Expressed Empathy/Understanding, Permission to raise concern or advise, Open-ended questions and Reflections: simple and complex     Change Talk Expressed by the Patient: Desire to change Reasons to change Activation Taking steps    Provider Response to Change Talk: E - Evoked more info from patient about behavior change, A - Affirmed patient's thoughts, decisions, or attempts at behavior change, R - Reflected patient's change talk and S - Summarized patient's change talk statements    Also provided psychoeducation about behavioral health condition, symptoms, and treatment options    Care Plan review completed: No    Medication Review:  Changes to psychiatric medications, see updated Medication List in EPIC.     Medication Compliance:  Yes    Changes in Health Issues:   Yes: Pain, " Associated Psychological Distress    Chemical Use Review:   Substance Use: Chemical use reviewed, no active concerns identified      Tobacco Use: No current tobacco use.      Assessment: Current Emotional / Mental Status (status of significant symptoms):  Risk status (Self / Other harm or suicidal ideation)  Patient has had a history of suicidal ideation: ongoing  Patient denies current fears or concerns for personal safety.  Patient reports the following current or recent suicidal ideation or behaviors: Patient reports she continues to have thoughts of suicide but denies that there is anything active or intentional at this time.  Patient does feel safe..  Patient denies current or recent homicidal ideation or behaviors.  Patient denies current or recent self injurious behavior or ideation.  Patient denies other safety concerns.  A safety and risk management plan has been developed including: Patient consented to co-developed safety plan.  A safety and risk management plan was completed.  Patient agreed to use safety plan should any safety concerns arise.  A copy was given to the patient.  Patient was reminded to access her safety plan and crisis resources as needed.    Appearance:   Appropriate   Eye Contact:   Good   Psychomotor Behavior: Normal   Attitude:   Cooperative   Orientation:   All  Speech   Rate / Production: Talkative Normal    Volume:  Normal   Mood:    Anxious  Irritable  but stabilizing  Affect:    Appropriate   Thought Content:  Clear   Thought Form:  Coherent  Logical   Insight:    Fair     Diagnoses:  1. Moderate recurrent major depression (H)    2. STACIE (generalized anxiety disorder)    3. Suicidal ideation    4. Cannabis abuse        Collateral Reports Completed:  Communicated with: Dr. Perez    Plan: (Homework, other):  Patient was given information about behavioral services and encouraged to schedule a follow up appointment with the clinic [facility] in conjunction with next [facility] appointment.  She  was also given information about mental health symptoms and treatment options .  CD Recommendations: No indications of CD issues.  Matt Manzo, Alicia, LP    ______________________________________________________________________    [facility] [place] : Treatment Plan    Patient's Name: Janelle White  YOB: 1960    Date of Creation: April 13, 2022  Date Treatment Plan Last Reviewed/Revised: January 9, 2023    DSM5 Diagnoses: 296.33 (F33.2) Major Depressive Disorder, Recurrent Episode, Severe _, 300.02 (F41.1) Generalized Anxiety Disorder or Substance-Related & Addictive Disorders 292.9 (F12.99) Unspecified Cannabis Related Disorder  Psychosocial / Contextual Factors: chronic pain  PROMIS (reviewed every 90 days):   PROMIS 10-Global Health (only subscores and total score):   PROMIS-10 Scores Only 1/13/2022 6/3/2022 6/10/2022 7/8/2022 8/5/2022 1/8/2023   Global Mental Health Score 5 8 5 6 8 11   Global Physical Health Score 10 10 9 11 9 11   PROMIS TOTAL - SUBSCORES 15 18 14 17 17 22       Referral / Collaboration:  Referral to another professional/service is not indicated at this time..    Anticipated number of session for this episode of care: Continuous care [facility] patient  Anticipation frequency of session: As determined by Dr. Perez  Anticipated Duration of each session: 16-37 minutes  Treatment plan will be reviewed in 90 days or when goals have been changed.       MeasurableTreatment Goal(s) related to diagnosis / functional impairment(s)  Goal 1: Patient will work with providers to manage symptoms    I will know I've met my goal when I can enjoy my life again.      Objective #A (Patient Action)  Patient will attend all appointments, take medication as prescribed.  Status: Continued - Date(s): 01/09/2023    Intervention(s)  [facility] will Monitor and assist in overcoming barriers to treatment adherence    Objective #B  Patient will consider all  "recommendations offered.  Status: Continued - Date(s): 01/09/2023     Intervention(s)  [facility] will educate patient on treatment options, clarify concerns, work with pt to overcome any resistance to compliance.      Goal 2: Patient will replace self-harm thoughts/behaviors with self-care activities    I will know I've met my goal when I stop having those thoughts.      Objective #A (Patient Action)                          Status: Continued - Date(s): 01/09/2023  Patient will assist in creating safety plan.     Intervention(s)  [facility] will assist in creation of safety plan and provide copy to patient.     Objective #B  Patient will report using safety plan as needed.                       Status: Continued - Date(s): 01/09/2023     Intervention(s)  [facility] will monitor safety, use of plan, adjust plan as needed, work through any identified barriers/resistance to following her plan.      Patient has reviewed and agreed to the above plan.      Melvin Manzo PsyD  03/20/2023          [facility]                                       Patient's Name: Janelle TORRES Christopher    March 20, 2023     SAFETY PLAN:  Step 1: Warning signs / cues (Thoughts, images, mood, situation, behavior) that a crisis may be developing:  ? Thoughts: \"I don't matter\", \"People would be better off without me\", \"I can't do this anymore\" and \"I just want this to end\"  ? Images: obsessive thoughts of death or dying: thoughts of carrying out plan  ? Thinking Processes: ruminations (can't stop thinking about my problems): ruminate on my plan and highly critical and negative thoughts: if  says something critical i shut down  ? Mood: worsening depression, hopelessness, disinhibited (not caring about things or consequences) and worthlessness  ? Behaviors: isolating/withdrawing , can't stop crying, not taking care of myself and not taking care of my responsibilities  ? Situations: relationship problems     Step 2: Coping strategies " "- Things I can do to take my mind off of my problems without contacting another person (relaxation technique, physical activity):  ? Distress Tolerance Strategies:  play with my pet  and watch a funny movie:    ? Physical Activities: go for a walk and get in the jacuzzi  ? Focus on helpful thoughts:  \"This is temporary\"    Step 3: People and social settings that provide distraction:              Name: Alxy     Phone: in my phone              Name: Raven   Phone: in my phone  ? park                Step 4: Remind myself of people and things that are important to me and worth living for:  Children, dog, friends    Step 5: When I am in crisis, I can ask these people to help me use my safety plan:              Name: Alyx     Phone: in my phone              Name: Raven   Phone: in my phone    Step 6: Making the environment safe:   ? none identified    Step 7: Professionals or agencies I can contact during a crisis:  ? Suicide Prevention Lifeline: 1-605-251-TALK (9199)  ? Crisis Text Line Service: Text   HOME  to 746-707.    Local Crisis Services: [facility]     Call 911 or go to my nearest emergency department.       I helped develop this safety plan and agree to use it when needed.  I have been given a copy of this plan.       Patient signature: _______________________________________________________________  Today s date: March 20, 2023    Adapted from Safety Plan Template 2008 Antionette Trevino and Joseph Leon is reprinted with the express permission of the authors.  No portion of the Safety Plan Template may be reproduced without the express, written permission.  You can contact the authors at [email or web address] or [email or web address]  "

## 2023-03-20 NOTE — PROGRESS NOTES
"Telemedicine Visit: The patient's condition can be safely assessed and treated via synchronous audio and visual telemedicine encounter.      Reason for Telemedicine Visit: Patient has requested telehealth visit    Originating Site (Patient Location): Patient's home    Distant Location (provider location):  Off-site    Consent:  The patient/guardian has verbally consented to: the potential risks and benefits of telemedicine (video visit) versus in person care; bill my insurance or make self-payment for services provided; and responsibility for payment of non-covered services.     Mode of Communication:  Video Conference via [employer]    As the provider I attest to compliance with applicable laws and regulations related to telemedicine.        Outpatient Psychiatric Progress Note    Name: Janelle TORRES Christopher   : 1960                    Primary Care Provider: Emili Ballard MD   Therapist: None    PHQ-9 scores:  PHQ-9 SCORE 2023 3/9/2023 3/19/2023   PHQ-9 Total Score - - -   PHQ-9 Total Score MyChart 13 (Moderate depression) 17 (Moderately severe depression) 18 (Moderately severe depression)   PHQ-9 Total Score 13 17 18   Some encounter information is confidential and restricted. Go to Review Flowsheets activity to see all data.       STACIE-7 scores:  STACIE-7 SCORE 2022 2022 3/20/2023   Total Score - - -   Total Score 7 (mild anxiety) 7 (mild anxiety) 7 (mild anxiety)   Total Score 7 7 7       Patient Identification:  Patient is a 62 year old,   White Choose not to answer female  who presents for return visit with me. Patient attended the phone/video session alone. Patient prefers to be called: \"Janelle\".    Interim History:  I last saw Janelle White for outpatient psychiatry return visit on 2023. During that appointment, we:    Continue Pristiq 50 mg daily for mood, anxiety.     Continue methylfolate 7.5 mg daily as supplementation.    Increase Adderall XR to 25 mg daily " "for mood augmentation    Continue Adderall IR 15 mg twice daily as needed for mood augmentation and daytime fatigue/hypersomnia    Continue benzodiazepine per primary care prescriber.    Continue ketamine with interventional psychiatry clinic.    Continue to work with your specialist from [facility] as indicated.      Could consider individual DBT therapy.    Recommend ongoing individual psychotherapy.      3/20: Struggling with some anxiety related to feeling more dependent on medicinal cannabis. Continues to find ways to use it to be helpful for her pain. Mood relatively stable overall. Tolerating medications well. No acute safety concerns. No acute suicidality. Denies problematic cannabis or alcohol use. Trying to stay busy. Trying to take walks.     Per [facility], Dr. Matt Manzo, during today's team-based visit:  Reported that she is feeling essentially the same. Things are \"fine\" and her emotions are \"relatively stable.\" She said that it is \"not great but not terrible.\" She spoke about her routine to get herself moving in the morning and doing the things she knows she are helpful for her. She is struggling with significant anxiety which she is realizing is probably related to her cannabis use. She spoke about her SI saying that she is not ruminating on self-harm and she does not have them daily. They are brief and not as intense as they were in the past. She feels stable in that respect compared to her past when those thoughts felt more threatening. She noted that she is continuing to stay connected with her family which helps.     Past medication trials include but are not limited to:   Effexor-very flat  Celexa, zoloft, was on wellbutrin a couple years at one point  wellbutrin XL + celexa; 2005ish  tca-a ton of weight gain  depakote maybe for a short time  Abilify/lithium ?  ECT as teen - made me dull  Cytomel-not effective at all, used 50 mcg    Psychiatric ROS:  Janelle White reports mood has been: " "Relatively stable  Anxiety has been: See HPI above  Sleep has been: Relatively okay, still struggles at times  Deepa sxs: Denies  Psychosis sxs: None  ADHD/ADD sxs: None  PTSD sxs: None  PHQ9 and GAD7 scores were reviewed today if completed.   Medication side effects: Denies anything major related to current psychiatric medications  Current stressors include: Symptoms and See HPI above  Coping mechanisms and supports include: Family, Hobbies and Friends, therapy    Current medications include:   Current Outpatient Medications   Medication Sig     albuterol (PROAIR HFA/PROVENTIL HFA/VENTOLIN HFA) 108 (90 Base) MCG/ACT inhaler Inhale 2 puffs into the lungs every 6 hours as needed for shortness of breath or wheezing     amphetamine-dextroamphetamine (ADDERALL XR) 25 MG 24 hr capsule Take 1 capsule (25 mg) by mouth daily for 30 days     amphetamine-dextroamphetamine (ADDERALL) 15 MG tablet Take 1 tablet (15 mg) by mouth 2 times daily for 30 days     ASHWAGANDHA PO Take 1 tablet by mouth daily     desvenlafaxine (PRISTIQ) 50 MG 24 hr tablet Take 1 tablet (50 mg) by mouth daily     Estradiol (DIVIGEL) 1 MG/GM GEL Place 1 packet onto the skin daily     famotidine (PEPCID) 20 MG tablet TAKE 1 TABLET(20 MG) BY MOUTH TWICE DAILY     HYDROcodone-acetaminophen (NORCO)  MG per tablet Take 1 tablet by mouth every 4 hours as needed for severe pain (7-10) max 4 tabs/24 hrs     hydrOXYzine (VISTARIL) 25 MG capsule Take 1 capsule (25 mg) by mouth 3 times daily as needed for itching     insulin syringe-needle U-100 (30G X 1/2\" 1 ML) 30G X 1/2\" 1 ML miscellaneous Inject 1 ml B12 qmonth     Levomefolate Glucosamine (METHYLFOLATE PO) Take 7.5 mg by mouth daily     lidocaine (LIDODERM) 5 % patch Place 4 patches onto the skin daily Apply up to 4 patches to skin. Wear for 12 hours and remove for 12 hrs.  Refill when patient requests.     LORazepam (ATIVAN) 1 MG tablet Take 1 tablet (1 mg) by mouth every 6 hours as needed for anxiety " (Patient taking differently: Take 1 mg by mouth At Bedtime)     medical cannabis (Patient's own supply.  Not a prescription) Medical Cannabis - [place] 4-6 ml by mouth daily. [facility]     methocarbamol (ROBAXIN) 500 MG tablet Take 1 tablet (500 mg) by mouth 4 times daily as needed for muscle spasms     naloxone (NARCAN) 4 MG/0.1ML nasal spray Spray 1 spray (4 mg) into one nostril alternating nostrils as needed for opioid reversal every 2-3 minutes until assistance arrives     NONFORMULARY Take 2 Scoops by mouth daily Protein and Vitamin shake mix - Nutritional supplement     ondansetron (ZOFRAN ODT) 8 MG ODT tab Take 1 tablet (8 mg) by mouth every 8 hours as needed for nausea     Prasterone 6.5 MG INST Place 0.5 suppositories vaginally three times a week     rOPINIRole (REQUIP) 0.25 MG tablet TAKE 1 TO 2 TABLETS(0.25 TO 0.5 MG) BY MOUTH AT BEDTIME     senna-docusate (SENOKOT-S/PERICOLACE) 8.6-50 MG tablet Take 6-9 tablets by mouth every evening     triamcinolone (KENALOG) 0.1 % external ointment Apply topically 2 times daily     vitamin D3 (CHOLECALCIFEROL) 125 MCG (5000 UT) tablet Take 5,000 Units by mouth daily     No current facility-administered medications for this visit.       The [place] Prescription Monitoring Program has been reviewed and there are no concerns about diversionary activity for controlled substances at this time.   02/10/2023  1   01/09/2023  Dextroamp-Amphetamin 15 MG Tab  60.00  30 Al Bau   [id number]   [place] ([id number])   0/0   Med[place]are [place]   02/10/2023  1   01/09/2023  Dextroamp-Amphet Er 25 MG Cap  30.00  30 Al Bau   [id number]   [facility] ([id number])   0/0   [place]   01/09/2023  1   01/09/2023  Dextroamp-Amphetamin 15 MG Tab  60.00  30 Al Bau   [id number]   Wal ([id number])   0/0   [place]   01/09/2023  1   01/09/2023  Dextroamp-Amphet Er 25 MG Cap  30.00  30 Al Bau   [id number]   [place] ([id number][place]   0/[place]   12/16/2022  1   10/09/2022  Dextroamp-Amphet Er 20 MG Cap  30.00  30 Al Bandaru    [id number]   All ([id number])   0/0   [place]       Past Medical/Surgical History:  Past Medical History:   Diagnosis Date     Anxiety      Cervicalgia 2007    C5-6 disc protrusion     COPD (chronic obstructive pulmonary disease) (H) 2/7/2022     Depressive disorder      ESBL (extended spectrum beta-lactamase) producing bacteria infection      History of blood transfusion 2007    Cervical fusion     Leukemia (H)     CLA large beta     Melanoma (H) 1998     Migraine      Other chronic pain      Rotator cuff tear     s/p injections     Sacroiliac inflammation (H)      Shift work sleep disorder 12/16/2013     Urinary calculi      Vitamin B12 deficiency anemia 2006    started injections      has a past medical history of Anxiety, Cervicalgia (2007), COPD (chronic obstructive pulmonary disease) (H) (2/7/2022), Depressive disorder, ESBL (extended spectrum beta-lactamase) producing bacteria infection, History of blood transfusion (2007), Leukemia (H), Melanoma (H) (1998), Migraine, Other chronic pain, Rotator cuff tear, Sacroiliac inflammation (H), Shift work sleep disorder (12/16/2013), Urinary calculi, and Vitamin B12 deficiency anemia (2006).    She has no past medical history of Cerebral infarction (H), Congestive heart failure (H), Coronary artery disease, Diabetes (H), Heart disease, Hypertension, Thyroid disease, or Uncomplicated asthma.    Social History:  Reviewed. No changes to social history except as noted above in HPI.    Vital Signs:   None. This is phone/video visit.     Labs:  Most recent laboratory results reviewed and the pertinent results include:   Lab Results   Component Value Date    WBC 5.6 06/30/2022    WBC 3.2 05/24/2021     Lab Results   Component Value Date    RBC 4.61 06/30/2022    RBC 3.74 05/24/2021     Lab Results   Component Value Date    HGB 14.2 06/30/2022    HGB 11.0 05/24/2021     Lab Results   Component Value Date    HCT 43.6 06/30/2022    HCT 33.6 05/24/2021     No components found  for: MCT  Lab Results   Component Value Date    MCV 95 06/30/2022    MCV 90 05/24/2021     Lab Results   Component Value Date    MCH 30.8 06/30/2022    MCH 29.4 05/24/2021     Lab Results   Component Value Date    MCHC 32.6 06/30/2022    MCHC 32.7 05/24/2021     Lab Results   Component Value Date    RDW 11.9 06/30/2022    RDW 13.4 05/24/2021     Lab Results   Component Value Date     07/04/2022     05/24/2021     Last Comprehensive Metabolic Panel:  Sodium   Date Value Ref Range Status   06/30/2022 136 133 - 144 mmol/L Final   05/24/2021 141 133 - 144 mmol/L Final     Potassium   Date Value Ref Range Status   06/30/2022 3.4 3.4 - 5.3 mmol/L Final   05/24/2021 3.9 3.4 - 5.3 mmol/L Final     Chloride   Date Value Ref Range Status   06/30/2022 105 94 - 109 mmol/L Final   05/24/2021 108 94 - 109 mmol/L Final     Carbon Dioxide   Date Value Ref Range Status   05/24/2021 25 20 - 32 mmol/L Final     Carbon Dioxide (CO2)   Date Value Ref Range Status   06/30/2022 25 20 - 32 mmol/L Final     Anion Gap   Date Value Ref Range Status   06/30/2022 6 3 - 14 mmol/L Final   05/24/2021 8 3 - 14 mmol/L Final     Glucose   Date Value Ref Range Status   06/30/2022 91 70 - 99 mg/dL Final   05/24/2021 87 70 - 99 mg/dL Final     Urea Nitrogen   Date Value Ref Range Status   06/30/2022 20 7 - 30 mg/dL Final   05/24/2021 15 7 - 30 mg/dL Final     Creatinine   Date Value Ref Range Status   07/03/2022 0.67 0.52 - 1.04 mg/dL Final   05/24/2021 0.64 0.52 - 1.04 mg/dL Final     GFR Estimate   Date Value Ref Range Status   07/03/2022 >90 >60 mL/min/1.73m2 Final     Comment:     Effective December 21, 2021 eGFRcr in adults is calculated using the 2021 CKD-EPI creatinine equation which includes age and gender (Zhou et al., NEJM, DOI: 10.1056/OSIJfa1202572)   05/24/2021 >90 >60 mL/min/[1.73_m2] Final     Comment:     Non  GFR Calc  Starting 12/18/2018, serum creatinine based estimated GFR (eGFR) will be   calculated  using the Chronic Kidney Disease Epidemiology Collaboration   (CKD-EPI) equation.       Calcium   Date Value Ref Range Status   06/30/2022 8.7 8.5 - 10.1 mg/dL Final   05/24/2021 8.4 (L) 8.5 - 10.1 mg/dL Final     Bilirubin Total   Date Value Ref Range Status   04/26/2022 0.4 0.2 - 1.3 mg/dL Final   03/16/2021 0.4 0.2 - 1.3 mg/dL Final     Alkaline Phosphatase   Date Value Ref Range Status   04/26/2022 82 40 - 150 U/L Final   03/16/2021 87 40 - 150 U/L Final     ALT   Date Value Ref Range Status   04/26/2022 21 0 - 50 U/L Final   03/16/2021 26 0 - 50 U/L Final     AST   Date Value Ref Range Status   04/26/2022 18 0 - 45 U/L Final   03/16/2021 44 0 - 45 U/L Final     Review of Systems:  10 systems (general, cardiovascular, respiratory, eyes, ENT, endocrine, GI, , M/S, neurological) were reviewed. Most pertinent finding(s) is/are: Severe chronic pain. The remaining systems are all unremarkable.    Mental Status Examination (limited as this is by phone/video):  Appearance: Awake, alert, appears stated age, no acute distress  Attitude:  cooperative, pleasant   Motor: No gross abnormalities observed via video, not formally tested.  Oriented to:  person, place, time, and situation  Attention Span and Concentration:  normal  Speech:  clear, coherent, regular rate, rhythm, and volume  Language: intact  Mood: Okay  Affect: Mood congruent  Associations:  no loose associations  Thought Process:  logical, linear and goal oriented  Thought Content: Chronic passive suicidal ideation-at baseline today, may be improved some-no current plan or intent to harm self today, no homicidal ideation, no evidence of psychotic thought, no auditory hallucinations present and no visual hallucinations present  Recent and Remote Memory:  Intact to interview. Not formally assessed. No amnesia.  Fund of Knowledge: appropriate  Insight:  good  Judgment:  intact, adequate for safety  Impulse Control:  intact    Suicide Risk Assessment:  Today  Janelle White reports chronic/intermittent suicidal ideation-at baseline today and overall improved since first visit with this provider.There are notable risk factors for self-harm, including anxiety, comorbid medical condition of Chronic pain, suicidal ideation, purposelessness/no reason for living, hopelessness, withdrawing and mood change. However, risk is mitigated by commitment to family, absence of past attempts, ability to volunteer a safety plan, history of seeking help when needed, future oriented and denies suicidal intent today. Therefore, based on all available evidence including the factors cited above, Janelle White does not appear to be at imminent risk for self-harm, does not meet criteria for a 72-hr hold, and therefore remains appropriate for ongoing outpatient level of care.  A thorough assessment of risk factors related to suicide and self-harm have been reviewed and are noted above. Local community safety resources reviewed for patient to use if needed. There was no deceit detected, and the patient presented in a manner that was believable.     DSM5 Diagnosis:  Persistent depressive disorder  Treatment resistant depression  CYP2D6 poor metabolizer  CYP2B6 intermediate metabolizer  Homozygous for C677T polymorphism of MTHFR    Medical comorbidities include:   Patient Active Problem List    Diagnosis Date Noted     Suicidal ideation 06/30/2022     Priority: Medium     Immunocompromised (H) 06/30/2022     Priority: Medium     Infection due to 2019 novel coronavirus 06/30/2022     Priority: Medium     Severe episode of recurrent major depressive disorder, without psychotic features (H) 04/17/2022     Priority: Medium     COPD (chronic obstructive pulmonary disease) (H) 02/07/2022     Priority: Medium     Tension type headache 11/04/2021     Priority: Medium     Rotator cuff injury 11/04/2021     Priority: Medium     Spinal stenosis of lumbar region with radiculopathy 09/02/2021      Priority: Medium     COVID-19 08/29/2021     Priority: Medium     Polyarthralgia 08/11/2021     Priority: Medium     Cellulitis, unspecified cellulitis site 08/11/2021     Priority: Medium     Sepsis, due to unspecified organism, unspecified whether acute organ dysfunction present (H) 08/11/2021     Priority: Medium     Cellulitis 08/11/2021     Priority: Medium     STCAIE (generalized anxiety disorder) 07/27/2021     Priority: Medium     MTHFR gene mutation 04/12/2021     Priority: Medium     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     Priority: Medium     CYP2D6 poor metabolizer (H) 04/12/2021     Priority: Medium     Status post blepharoplasty 11/18/2020     Priority: Medium     Regular astigmatism, bilateral 11/18/2020     Priority: Medium     Prediabetes 09/19/2019     Priority: Medium     Hyperlipidemia LDL goal <130 09/19/2019     Priority: Medium     CLARE (obstructive sleep apnea) 02/13/2019     Priority: Medium     Controlled substance agreement signed 08/14/2018     Priority: Medium     Chronic pain syndrome 12/21/2017     Priority: Medium     CLL (chronic lymphocytic leukemia) (H) 12/21/2017     Priority: Medium     Hypotension 09/14/2017     Priority: Medium     History of laser assisted in situ keratomileusis 10/14/2014     Priority: Medium     Pain medication agreement 04/23/2014     Priority: Medium     Formatting of this note might be different from the original.  Patient takes morphine 15 mg ER BID for chronic neck and back pain.  She is also on cymbalta, ibuprofen, flexaril prn       Shift work sleep disorder 12/16/2013     Priority: Medium     Vitamin D deficiency 11/08/2012     Priority: Medium     Moderate recurrent major depression (H) 01/06/2011     Priority: Medium     DDD (degenerative disc disease), cervical 10/07/2010     Priority: Medium     CARDIOVASCULAR SCREENING; LDL GOAL LESS THAN 160 02/10/2010     Priority: Medium     Chronic Low Back Pain 10/01/2009     Priority: Medium     S/p  AP L3-S1 fusion 12/2010 - referred to  Pain clinic.   Orthopedics writing scripts for narcotics post-op.       Migraine      Priority: Medium     Problem list name updated by automated process. Provider to review       B-complex deficiency 10/10/2006     Priority: Medium     Problem list name updated by automated process. Provider to review       PERSONAL HX OF  MELANOMA 12/04/2003     Priority: Low       Psychosocial & Contextual Factors: see HPI above    Assessment:  6/15/2021:  Janelle TORRES Christopher reports overall some significant worsening of mood symptoms.  Increased anxiety with her depression.  Poor motivation and poor energy.  Symptoms severe enough to prevent her from being able to utilize typical coping skills and strategies.  No current motivation or energy to participate in PHP/day treatment program.  Continues to take medication as scheduled.  Recent surgery and general anesthesia could be contributing.  Discussed discontinuing stimulant medication as an augmentation strategy.  She is agreeable to moving forward with T3 as an augmentation for treatment resistant depression.  Discussed risks and benefits at length.  Will get baseline thyroid labs prior to starting T3.  Hoping she will experience increased energy and motivation and that her mood will lift.  Also discussed setting up an appointment with her interventional psychiatry clinic to discuss other potential options such as ketamine therapy, ECT, TMS.  Does have history of ECT for depression when she was quite young.  I am hopeful to stay ahead of her symptoms before things get too severe.  Has chronic suicidal ideation and is at high risk for suicide.  Continues to deny any plan or intent.  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  See below for risk/benefit conversation regarding T3 had with the patient:    GeneSight testing Info:  GeneSight testing revealed she is a poor 2D6 metabolizer and an intermediate 2B6  metabolizer.  This would explain her multiple failed medication trials due to the negative side effects.  She also has a genotype that would suggest a phenotype sensitivity to serotonin. She also was found to have significantly reduced folic acid conversion.      Starting T3 as augment to antidepressant therapy for treatment resistant depression:  Start T3 at 25 mcg per day for one to two weeks, and if there is little or no improvement, increase the dose to 50 mcg per day; this is consistent with practice guidelines from the American Psychiatric Association and [place] [facility] for Mood and Anxiety Treatments.     Adverse effects consistent with hyperthyroidism may occur, including tremor, palpitations, heat intolerance, sweating, anxiety, increased frequency of bowel movements, shortness of breath, and exacerbation of cardiac arrhythmia. In addition, hyperthyroidism that emerges during long-term treatment may lead to bone demineralization, osteoporosis, and an increased risk of fracture    Following a normal baseline TSH concentration, no other laboratory monitoring during a four to six week trial of adjunctive T3 is necessary. However, if T3 is continued longer, a serum TSH concentration should be checked after the first one to three months of treatment and then every six months.    Today, 7/27/21:   Patient overall with little change in her symptoms.  Did not do as well on Cytomel and had limited to no improvement at all after weeks on 50 mcg.  Labs were unremarkable prior to starting Cytomel.  Opted to go back to stimulant augmentation since patient does find it quite helpful.  She has been taking 15 mg of immediate release most afternoons.  Due to good tolerability and some efficacy, we will bump up her immediate release dose slightly to 20 mg daily as needed in addition to her 20 mg extended release dose. I have no concerns about misuse or diversion at this time.  Tolerating well with no negative side effects.   Last blood pressure normal 7/2.  Patient has noted some efficacy from Pristiq more than other antidepressant trials and so we will continue to titrate further to 100 mg daily.  She is tolerating well.  Continues to use lorazepam for anxiety, pain medicine adjunct, and for sleep as prescribed by primary care provider.  Has been utilizing roughly 100 tablets of 0.5 mg every 1.5 months.  Patient with ongoing chronic intermittent passive suicidal ideation with no plan or intent.  Denies safety concerns today.  No problematic drug or alcohol use.  I am hopeful the interventional psychiatry clinic might be able to initiate ketamine therapy or another therapy they would recommend as potentially being more helpful than patient's multiple medication/augmentation trials.    10/6/2021:  Patient with some improved depression symptoms and much more hopeful.  [facility]  Also is hopeful there will be some helpful treatments.  Visit to [place] was also very good and instilled a lot of hope in her abilities.  She was encouraged to continue to push herself to do the things she enjoys doing.  No medication changes today.  She will follow-up with getting TMS rescheduled, possibly when things slow down after the holidays.  Does not need to be seen until after the holidays.  No acute safety concerns today.  No problematic drug or alcohol use.    1/14/2022:  Overall patient feeling a little more down lately.  Tolerating TMS well.  Encouraged to continue TMS.  No medication changes today since undergoing TMS treatment.  Talked about life stages today generativity versus stagnation and also integrity versus despair.  Talked about consideration for acceptance and commitment therapy.  She is encouraged to continue to be active.  No acute suicidal ideation today.  No acute safety concerns today.  No problematic drug or alcohol use.    4/13/2022:   Patient continues to have symptoms that wax  and wane.  Feels like she tolerates Pristiq 50 mg better than 100 and will continue on this dose.  Last week was particularly difficult.  Pretty intense suicidal ideation but she was able to manage these thoughts and remain safe.  She does report she would come to the hospital if necessary.  We discussed the empath unit today and other resources if she felt she could not keep herself safe.  She continues to work on tapering her narcotic pain medication regimen.  She is working with addiction medicine and also pain management.  She is starting Pilates therapy.  Pool therapy will begin in July.  Discussed possibility of vestibular rehabilitation.  Individual therapy will also start soon.  She was strongly encouraged to continue to pursue ketamine therapy.  No acute suicidality today.  No problematic drug or alcohol use.    6/23/2022:  Overall reports doing relatively okay.  Some pretty down days still, but ketamine therapy going well.  Feels like continuing to move in the right direction.  Suicidal ideation improving.  Off all narcotic pain medication.  Feels good about her progress.  Had some really down days after off pain medication but improved since those few dark days.  Hopeful about where ketamine therapy may lead.  Discussed some of her restlessness at bedtime and will start pramipexole.  Also some evidence to suggest pramipexole could be helpful for treatment resistant depression symptoms.  Discussed risks and benefits of therapy, including watching for any impulsive behaviors.  Continues to have suicidal thoughts but no acute suicidality today.  Her suicidality overall is improving from her baseline suicidality.  She continues to consider the idea of a partial hospital program.  We will send her information for [facility] program.  Also encouraged her to discuss possibility of a pain program through [facility] with Dr. Medley.  No acute safety concerns today.  No problematic drug or alcohol  use.    7/11/2022:  Patient with some recent more intensive struggles.  Depression much more severe recently.  Led to hospitalization.  Patient medically hospitalized since was positive for COVID and has history of CLL and [place] protocol requires isolation.  Patient seen by psychiatry consult service.  No medication changes made.  Patient continues with interventional psychiatry clinic.  They will be increasing ketamine dose and treatments will be every few weeks.  We discussed patient's symptom history a little more today and possible bipolar diagnosis came into question.  Patient does recognize possible hypomanic episodes in the past.  Patient is currently monitoring symptoms closely and pramipexole.  She is feeling better since starting pramipexole but is watching shopping behaviors closely.  Suicidal ideation is improving since hospitalization.  Restless legs improved at night on pramipexole.  Discussed we could consider adding lower dose lithium as an augment to her Pristiq and to help stabilize moods a little bit more and to further help with some of her chronic suicidal ideation.  We also discussed DBT for chronic suicidal ideation and ongoing mood instability.  Continues off of pain medication.  No acute suicidality or safety concerns today.  Patient will follow up in a few weeks.  No medication changes today since we will wait to see how ketamine dose change goes.  No drug or alcohol use concerns.  Patient noted some ongoing cognitive/memory concerns and requested testing.  Neuropsychological referral placed.    8/8/2022:  Patient with ongoing severe depression, resistant to treatment.  She is agreeable to increasing her stimulant medication.  This could hopefully further improve mood slightly.  May also help with some additional energy and also help with some of her ongoing cognitive concerns.  She has neuropsychological testing coming up soon.  Discussed possibly considering individual DBT therapy with  a DBT certified therapist given her ongoing chronic suicidal ideation and inability to participate in group therapy currently.  We will discuss this possibility with her current therapist.  Also discussed possibility of increasing Pristiq by 25 mg only 2 or 3 days a week to decrease risk of negative side effects (25 mg on Tuesdays/Thursdays for Monday/Wednesday/Fridays).  Patient also encouraged to continue ketamine treatment.  No acute suicidality today.  Patient denies any intent or plan to act on any of her suicidal thoughts today.  No problematic drug or alcohol use.    9/7/2022:  Patient doing relatively okay today.  Pain continues to feel a little bit worse.  Emotionally though, despite worsening pain symptoms, patient feels like she is doing relatively okay.  Discussed various psychotherapy approaches that could be taken to address her symptoms.  We discussed that health psychology could be an optimal approach to help with her symptoms but that her suicidal ideation is often still quite intense and may be a distractor to her treatment with a health psychologist (discussed she may be referred often to the emergency room or to other more intensive programs).  We discussed working with an individual DBT therapist as a possibility to continue addressing her ongoing chronic suicidal ideation (individual therapy would allow patient to move at her own pace since group therapy is much too intense at this time).  Patient was open to this idea.  [facility] [place] today we will send patient some resources/options.  Depending on patient's financial situation, there is also a possibility patient could work with a health psychologist in conjunction with a DBT therapist.  Ketamine therapy has proven to be helpful, although I do wish the effects lasted a little longer than what they currently are for the patient.  I recommend patient continue her ketamine treatments.  No medication changes today.  She denies any acute suicidality today.   No plan or intent to harm herself noted today.  She denies being in a bad place today.  No problematic drug or alcohol use.     10/7/2022:  Patient overall relatively stable at this time.  Mood improving slightly with ketamine therapy.  Patient encouraged to continue with treatment.  Anxiety a little more manageable.  Continuing to struggle with severe chronic pain.  Tolerating current medications well with no negative side effects noted.  No changes today.  Patient will continue in individual psychotherapy.  No acute suicidality today.  Suicidal thoughts at baseline, maybe even a little improved.    1/9/2023:  Patient overall doing quite well.  Some days still worse than others and chronic pain continues to be a big factor influencing her mental health.  Ketamine has been very helpful.  Still some poor energy and fatigue.  Adderall has been very helpful.  We will increase the dose just slightly.  Extended release dose will increase from 20 mg to 25 mg to further address her symptoms.  We will continue with the 15 mg twice daily immediate release doses.  No other medication changes today.  Patient encouraged to stay the course.  No acute safety concerns.  Suicidal thoughts continue to be passive in nature and have overall improved since starting ketamine.  No problematic drug or alcohol use.    3/20/2023:  Patient remains overall relatively stable.  Having some anxiety over feeling more dependent on cannabis for her pain.  Discussed continuing gratitude journal.  Patient has come quite a ways with relative stability.  No medication changes today.  Denies that cannabis use is causing any problems apart from feeling a little anxious about her use.  Patient even participating in more activities this spring compared to last year.  No acute safety concerns at this time.  No acute suicidality.    Medication side effects and alternatives were reviewed. Health promotion activities recommended and reviewed today. All  questions addressed. Education and counseling completed regarding risks and benefits of medications and psychotherapy options. Recommend therapy for additional support.     Treatment Plan:    Continue Pristiq 50 mg daily for mood, anxiety.     Continue methylfolate 7.5 mg daily as supplementation.    Continue Adderall XR 25 mg daily for mood augmentation    Continue Adderall IR 15 mg twice daily as needed for mood augmentation and daytime fatigue/hypersomnia    Continue benzodiazepine per primary care prescriber.    Continue ketamine with interventional psychiatry clinic.    Continue to work with your specialist from [facility] as indicated.      Could consider individual DBT therapy.    Recommend ongoing individual psychotherapy.      Continue all other cares per primary care provider.     Continue all other medications as reviewed per electronic medical record today.     Safety plan reviewed. To the Emergency Department as needed or call after hours crisis line at [phone] or [phone]. [place] Crisis Text Line. Text [place] to 607756 or Suicide LifeLine Chat: suicidepreventionZyncdline.org/chat    Schedule an appointment with me in 8 weeks, or sooner as needed. Call [facility] at [phone] to schedule.    Follow up with primary care provider as planned or for acute medical concerns.    Call the psychiatric nurse line with medication questions or concerns at [phone].    Adpepshart may be used to communicate with your provider, but this is not intended to be used for emergencies.    Therapy resources:  Www.[facility].org    https://www.mhs-dbt.com/mental-health/thrive/thrive-mental-health-and-chronic-pain-management/    [place] DBT resources:  https://[place].gov/dhs/partners-and-providers/policies-procedures/adult-mental-health/dialectical-behavior-therapy/dbt-certified-providers/    Risks of benzodiazepine (Ativan, Xanax, Klonopin, Valium, etc) use including, but not limited to, sedation, tolerance,  risk for addiction/dependence. Do not drink alcohol while taking benzodiazepines due to risk of trouble breathing and potential death. Do not drive or operate heavy machinery until it is known how the drug affects you. Discuss with physician or pharmacist before ever taking a benzodiazepine with a narcotic/opioid pain medication.     Have previously discussed risks of stimulant medication including, but not limited to, decreased appetite, risk of tics (and that they may be lasting), trouble sleeping, cardiac risks such as increased heart rate and blood pressure, and rare risk of sudden cardiac death.  Also risk of addiction/tolerance/dependence.    Administrative Billing:   Phone Call/Video Duration: 19 Minutes  8:03a - 8:22a    Patient Status:  Patient is a continuous care patient and refills will continue to come from this provider until otherwise noted.    Signed:   Kimberly Perez DO  [facility]    Disclaimer: This note consists of symbols derived from keyboarding, dictation and/or voice recognition software. As a result, there may be errors in the script that have gone undetected. Please consider this when interpreting information found in this chart.

## 2023-03-20 NOTE — Clinical Note
Please call this patient to get them scheduled for a follow-up visit in 8 weeks. Please schedule with me and the [facility] [place]. Thanks!

## 2023-03-21 ENCOUNTER — INFUSION THERAPY VISIT (OUTPATIENT)
Dept: INFUSION THERAPY | Facility: CLINIC | Age: 63
End: 2023-03-21
Attending: PSYCHIATRY & NEUROLOGY
Payer: COMMERCIAL

## 2023-03-21 VITALS
OXYGEN SATURATION: 97 % | RESPIRATION RATE: 18 BRPM | HEART RATE: 78 BPM | SYSTOLIC BLOOD PRESSURE: 102 MMHG | DIASTOLIC BLOOD PRESSURE: 70 MMHG

## 2023-03-21 DIAGNOSIS — F33.2 SEVERE EPISODE OF RECURRENT MAJOR DEPRESSIVE DISORDER, WITHOUT PSYCHOTIC FEATURES (H): Primary | ICD-10-CM

## 2023-03-21 PROCEDURE — 250N000009 HC RX 250: Performed by: NURSE PRACTITIONER

## 2023-03-21 PROCEDURE — 258N000003 HC RX IP 258 OP 636: Performed by: NURSE PRACTITIONER

## 2023-03-21 PROCEDURE — 96365 THER/PROPH/DIAG IV INF INIT: CPT

## 2023-03-21 RX ORDER — HEPARIN SODIUM (PORCINE) LOCK FLUSH IV SOLN 100 UNIT/ML 100 UNIT/ML
5 SOLUTION INTRAVENOUS
Status: CANCELLED | OUTPATIENT
Start: 2023-03-21

## 2023-03-21 RX ORDER — METHYLPREDNISOLONE SODIUM SUCCINATE 125 MG/2ML
125 INJECTION, POWDER, LYOPHILIZED, FOR SOLUTION INTRAMUSCULAR; INTRAVENOUS
Status: CANCELLED
Start: 2023-03-21

## 2023-03-21 RX ORDER — DIPHENHYDRAMINE HYDROCHLORIDE 50 MG/ML
50 INJECTION INTRAMUSCULAR; INTRAVENOUS
Status: CANCELLED
Start: 2023-03-21

## 2023-03-21 RX ORDER — ALBUTEROL SULFATE 0.83 MG/ML
2.5 SOLUTION RESPIRATORY (INHALATION)
Status: CANCELLED | OUTPATIENT
Start: 2023-03-21

## 2023-03-21 RX ORDER — MEPERIDINE HYDROCHLORIDE 25 MG/ML
25 INJECTION INTRAMUSCULAR; INTRAVENOUS; SUBCUTANEOUS EVERY 30 MIN PRN
Status: CANCELLED | OUTPATIENT
Start: 2023-03-21

## 2023-03-21 RX ORDER — EPINEPHRINE 1 MG/ML
0.3 INJECTION, SOLUTION, CONCENTRATE INTRAVENOUS EVERY 5 MIN PRN
Status: CANCELLED | OUTPATIENT
Start: 2023-03-21

## 2023-03-21 RX ORDER — HYDRALAZINE HYDROCHLORIDE 20 MG/ML
10 INJECTION INTRAMUSCULAR; INTRAVENOUS
Status: CANCELLED | OUTPATIENT
Start: 2023-03-21

## 2023-03-21 RX ORDER — ONDANSETRON 2 MG/ML
4 INJECTION INTRAMUSCULAR; INTRAVENOUS
Status: CANCELLED | OUTPATIENT
Start: 2023-03-21

## 2023-03-21 RX ORDER — ALBUTEROL SULFATE 90 UG/1
1-2 AEROSOL, METERED RESPIRATORY (INHALATION)
Status: CANCELLED
Start: 2023-03-21

## 2023-03-21 RX ORDER — NALOXONE HYDROCHLORIDE 0.4 MG/ML
0.2 INJECTION, SOLUTION INTRAMUSCULAR; INTRAVENOUS; SUBCUTANEOUS
Status: CANCELLED | OUTPATIENT
Start: 2023-03-21

## 2023-03-21 RX ORDER — HEPARIN SODIUM,PORCINE 10 UNIT/ML
5 VIAL (ML) INTRAVENOUS
Status: CANCELLED | OUTPATIENT
Start: 2023-03-21

## 2023-03-21 RX ADMIN — SODIUM CHLORIDE 65 MG: 9 INJECTION, SOLUTION INTRAVENOUS at 09:37

## 2023-03-21 NOTE — PROGRESS NOTES
Infusion Nursing Note:  Janelle White presents today for scheduled Ketamine infusion.    Patient seen by provider today: No   present during visit today: Not Applicable.    Note: Patient states that she has been doing well lately with no significant changes to mental health.    Intravenous Access:  Peripheral IV placed.    Treatment Conditions:  Not Applicable.    Post Infusion Assessment:  Patient tolerated infusion without incident.  Patient observed for 60 minutes post Ketamine per protocol.  Blood return noted pre and post infusion.  Site patent and intact, free from redness, edema or discomfort.  No evidence of extravasations.  Access discontinued per protocol.     Discharge Plan:   Discharge instructions reviewed with: Patient.  Patient and/or family verbalized understanding of discharge instructions and all questions answered.  Patient discharged in stable condition accompanied by: self.  Departure Mode: Ambulatory.      Kathia Quinn RN

## 2023-03-22 ENCOUNTER — TELEPHONE (OUTPATIENT)
Dept: FAMILY MEDICINE | Facility: CLINIC | Age: 63
End: 2023-03-22
Payer: COMMERCIAL

## 2023-03-22 DIAGNOSIS — Z78.0 POSTMENOPAUSAL STATUS: ICD-10-CM

## 2023-03-22 RX ORDER — ESTRADIOL 1 MG/G
GEL TOPICAL
Qty: 30 G | Refills: 11 | Status: SHIPPED | OUTPATIENT
Start: 2023-03-22 | End: 2024-03-21

## 2023-03-22 NOTE — TELEPHONE ENCOUNTER
Rx for Estradiol (DIVIGEL) 1 MG/GM GEL from 3-13-23 was not received.  Please refax this Rx.  Thanks   Fx # [phone]

## 2023-03-23 RX ORDER — ESTRADIOL 1 MG/G
GEL TOPICAL
Qty: 90 G | OUTPATIENT
Start: 2023-03-23

## 2023-04-04 ENCOUNTER — E-VISIT (OUTPATIENT)
Dept: FAMILY MEDICINE | Facility: CLINIC | Age: 63
End: 2023-04-04
Payer: COMMERCIAL

## 2023-04-04 DIAGNOSIS — S91.339A PENETRATING WOUND OF FOOT, UNSPECIFIED LATERALITY, INITIAL ENCOUNTER: Primary | ICD-10-CM

## 2023-04-04 PROCEDURE — 99207 PR NON-BILLABLE SERV PER CHARTING: CPT | Performed by: NURSE PRACTITIONER

## 2023-04-04 NOTE — PATIENT INSTRUCTIONS
Thank you for choosing us for your care. I think an in-clinic visit would be best next steps based on your symptoms. I am concerned you could have cellulitis but we should make sure that you don't have any retained foreign objects.  You may want to consider urgent care for more timely treatment.       You can schedule an appointment right here in [facility], or call [phone]

## 2023-04-05 ENCOUNTER — ANCILLARY PROCEDURE (OUTPATIENT)
Dept: GENERAL RADIOLOGY | Facility: CLINIC | Age: 63
End: 2023-04-05
Attending: FAMILY MEDICINE
Payer: COMMERCIAL

## 2023-04-05 ENCOUNTER — OFFICE VISIT (OUTPATIENT)
Dept: URGENT CARE | Facility: URGENT CARE | Age: 63
End: 2023-04-05
Payer: COMMERCIAL

## 2023-04-05 VITALS
HEART RATE: 69 BPM | SYSTOLIC BLOOD PRESSURE: 112 MMHG | TEMPERATURE: 97.7 F | DIASTOLIC BLOOD PRESSURE: 72 MMHG | RESPIRATION RATE: 12 BRPM | OXYGEN SATURATION: 100 %

## 2023-04-05 DIAGNOSIS — M79.672 LEFT FOOT PAIN: ICD-10-CM

## 2023-04-05 DIAGNOSIS — M79.672 LEFT FOOT PAIN: Primary | ICD-10-CM

## 2023-04-05 DIAGNOSIS — S91.332A PUNCTURE WOUND OF LEFT FOOT, INITIAL ENCOUNTER: ICD-10-CM

## 2023-04-05 PROCEDURE — 73630 X-RAY EXAM OF FOOT: CPT | Mod: TC | Performed by: RADIOLOGY

## 2023-04-05 PROCEDURE — 99214 OFFICE O/P EST MOD 30 MIN: CPT | Performed by: FAMILY MEDICINE

## 2023-04-05 NOTE — PROGRESS NOTES
ICD-10-CM    1. Left foot pain  M79.672 XR Foot Left G/E 3 Views      2. Puncture wound of left foot, initial encounter  S91.332A amoxicillin-clavulanate (AUGMENTIN) 875-125 MG tablet        Evidence of localized cellulitis around puncture wound site.  X-ray done due to brittle nature of cow knuckle and fairly rapid worsening of symptoms.  No foreign body noted on x-rays today.  Pt UTD on tetanus vaccination already.    PLAN:  Patient Instructions   Take Augmentin twice daily for 10 days.  I do recommend using a probiotic while on this medication.    Continue to watch the area carefully.  If not improving within 72 hours, return for recheck.  To ER if any sudden and severe worsening    SUBJECTIVE:  Janelle White is a 62 year old female who presents to  today with possible infection in L foot after accidentally stepping hard on her dog's cow knuckle chew toy on 4/2/23.  It has been increasingly painful to put weight on that foot, and she's noticed increasing redness around the puncture site.  No drainage.  No fevers.    OBJECTIVE:  /72   Pulse 69   Temp 97.7  F (36.5  C) (Tympanic)   Resp 12   LMP 05/01/2005 (LMP Unknown)   SpO2 100%   GEN: well-appearing, in NAD  EXT: ~3 cm diameter area of erythema on the plantar surface of the left midfoot with central puncture wound already healing.  No fluctuance.  +tenderness.  No drainage.  No red streaks extending proximally.  Normal sensation in all dermatomes of the L foot.    Results for orders placed or performed in visit on 04/05/23   XR Foot Left G/E 3 Views     Status: None    Narrative    FOOT LEFT THREE OR MORE VIEWS  DATE/TIME: 4/5/2023 2:28 PM    INDICATION: Stepped on a cow knuckle dog treat 3 days ago, rule out  foreign body in foot; Left foot pain.    COMPARISON: None available.       Impression    IMPRESSION: No acute displaced left foot fracture or dislocation  identified. No advanced joint space narrowing identified. No  localizing soft  tissue swelling. Moderate-sized heel spur. No  radiopaque soft tissue left foot foreign body identified.       SAIRA MOTTA MD         SYSTEM ID:  [id number]

## 2023-04-05 NOTE — PATIENT INSTRUCTIONS
Take Augmentin twice daily for 10 days.  I do recommend using a probiotic while on this medication.    Continue to watch the area carefully.  If not improving within 72 hours, return for recheck.  To ER if any sudden and severe worsening

## 2023-04-12 ENCOUNTER — INFUSION THERAPY VISIT (OUTPATIENT)
Dept: INFUSION THERAPY | Facility: CLINIC | Age: 63
End: 2023-04-12
Attending: PSYCHIATRY & NEUROLOGY
Payer: COMMERCIAL

## 2023-04-12 VITALS
RESPIRATION RATE: 16 BRPM | HEART RATE: 80 BPM | TEMPERATURE: 98 F | SYSTOLIC BLOOD PRESSURE: 113 MMHG | OXYGEN SATURATION: 98 % | DIASTOLIC BLOOD PRESSURE: 64 MMHG

## 2023-04-12 DIAGNOSIS — F33.2 SEVERE EPISODE OF RECURRENT MAJOR DEPRESSIVE DISORDER, WITHOUT PSYCHOTIC FEATURES (H): Primary | ICD-10-CM

## 2023-04-12 PROCEDURE — 250N000009 HC RX 250: Performed by: PSYCHIATRY & NEUROLOGY

## 2023-04-12 PROCEDURE — 96365 THER/PROPH/DIAG IV INF INIT: CPT

## 2023-04-12 PROCEDURE — 258N000003 HC RX IP 258 OP 636: Performed by: PSYCHIATRY & NEUROLOGY

## 2023-04-12 RX ORDER — HYDRALAZINE HYDROCHLORIDE 20 MG/ML
10 INJECTION INTRAMUSCULAR; INTRAVENOUS
Status: CANCELLED | OUTPATIENT
Start: 2023-04-12

## 2023-04-12 RX ORDER — MEPERIDINE HYDROCHLORIDE 25 MG/ML
25 INJECTION INTRAMUSCULAR; INTRAVENOUS; SUBCUTANEOUS EVERY 30 MIN PRN
Status: CANCELLED | OUTPATIENT
Start: 2023-04-12

## 2023-04-12 RX ORDER — HEPARIN SODIUM,PORCINE 10 UNIT/ML
5 VIAL (ML) INTRAVENOUS
Status: CANCELLED | OUTPATIENT
Start: 2023-04-12

## 2023-04-12 RX ORDER — ONDANSETRON 2 MG/ML
4 INJECTION INTRAMUSCULAR; INTRAVENOUS
Status: CANCELLED | OUTPATIENT
Start: 2023-04-12

## 2023-04-12 RX ORDER — HEPARIN SODIUM (PORCINE) LOCK FLUSH IV SOLN 100 UNIT/ML 100 UNIT/ML
5 SOLUTION INTRAVENOUS
Status: CANCELLED | OUTPATIENT
Start: 2023-04-12

## 2023-04-12 RX ORDER — EPINEPHRINE 1 MG/ML
0.3 INJECTION, SOLUTION, CONCENTRATE INTRAVENOUS EVERY 5 MIN PRN
Status: CANCELLED | OUTPATIENT
Start: 2023-04-12

## 2023-04-12 RX ORDER — DIPHENHYDRAMINE HYDROCHLORIDE 50 MG/ML
50 INJECTION INTRAMUSCULAR; INTRAVENOUS
Status: CANCELLED
Start: 2023-04-12

## 2023-04-12 RX ORDER — ALBUTEROL SULFATE 0.83 MG/ML
2.5 SOLUTION RESPIRATORY (INHALATION)
Status: CANCELLED | OUTPATIENT
Start: 2023-04-12

## 2023-04-12 RX ORDER — NALOXONE HYDROCHLORIDE 0.4 MG/ML
0.2 INJECTION, SOLUTION INTRAMUSCULAR; INTRAVENOUS; SUBCUTANEOUS
Status: CANCELLED | OUTPATIENT
Start: 2023-04-12

## 2023-04-12 RX ORDER — ALBUTEROL SULFATE 90 UG/1
1-2 AEROSOL, METERED RESPIRATORY (INHALATION)
Status: CANCELLED
Start: 2023-04-12

## 2023-04-12 RX ORDER — METHYLPREDNISOLONE SODIUM SUCCINATE 125 MG/2ML
125 INJECTION, POWDER, LYOPHILIZED, FOR SOLUTION INTRAMUSCULAR; INTRAVENOUS
Status: CANCELLED
Start: 2023-04-12

## 2023-04-12 RX ADMIN — KETAMINE HYDROCHLORIDE 65 MG: 50 INJECTION INTRAMUSCULAR; INTRAVENOUS at 10:38

## 2023-04-12 ASSESSMENT — PAIN SCALES - GENERAL: PAINLEVEL: SEVERE PAIN (6)

## 2023-04-12 NOTE — PROGRESS NOTES
Infusion Nursing Note:  Janelle MELISSA White presents today for ketamine.    Patient seen by provider today: No   present during visit today: Not Applicable.    Note: Patient is doing ok, she had IVIG yesterday.  Having a lot of spinal pain, which the ketamine seems to help.    Intravenous Access:  Peripheral IV placed.    Treatment Conditions:  VS monitored per protocol  Patient has transportation home.    Post Infusion Assessment:  Patient tolerated infusion without incident.  Patient observed for 60 minutes post ketamine per protocol.  Blood return noted pre and post infusion.  Site patent and intact, free from redness, edema or discomfort.  No evidence of extravasations.  Access discontinued per protocol.     Discharge Plan:   Patient discharged in stable condition accompanied by: self.  Departure Mode: Ambulatory.  Will return to clinic in 3 weeks.    Shayna Salcedo RN

## 2023-04-19 ENCOUNTER — TELEPHONE (OUTPATIENT)
Dept: FAMILY MEDICINE | Facility: CLINIC | Age: 63
End: 2023-04-19
Payer: COMMERCIAL

## 2023-04-19 NOTE — TELEPHONE ENCOUNTER
Plan does not cover Estradiol (DIVIGEL) 1 MG/GM GEL.  Please start a new PA.    Reason for denial:      Insurance plan:    Patient ID: [id number]

## 2023-04-23 NOTE — TELEPHONE ENCOUNTER
Central Prior Authorization Team   Phone: [phone]      PA Initiation    Medication: Estradiol (DIVIGEL) 1 MG/GM GEL PA Initiated  Insurance Company: [employer] ([facility]) - Phone [phone] Fax [phone]  Pharmacy Filling the Rx: [id number] - [place], [place] - [address] [facility] [address]  Filling Pharmacy Phone: [phone]  Filling Pharmacy Fax:    Start Date: 4/23/2023

## 2023-04-25 NOTE — TELEPHONE ENCOUNTER
PRIOR AUTHORIZATION DENIED    Medication: Estradiol (DIVIGEL) 1 MG/GM GEL PA denied    Denial Date: 4/24/2023    Denial Rational:           Appeal Information:

## 2023-04-25 NOTE — TELEPHONE ENCOUNTER
Recommend she follow up with insurance to see what formulary might be covered and we can send that in.

## 2023-04-26 NOTE — TELEPHONE ENCOUNTER
Per patient the amount is the same to pay with or without insurance so she is paying out of pocket for med. States the pharmacy continues to try to have insurance cover which is why we are continuing to get the PA requests. Says to disregard.

## 2023-05-02 ENCOUNTER — INFUSION THERAPY VISIT (OUTPATIENT)
Dept: INFUSION THERAPY | Facility: CLINIC | Age: 63
End: 2023-05-02
Attending: PSYCHIATRY & NEUROLOGY
Payer: COMMERCIAL

## 2023-05-02 VITALS
DIASTOLIC BLOOD PRESSURE: 75 MMHG | HEART RATE: 66 BPM | TEMPERATURE: 97.8 F | OXYGEN SATURATION: 98 % | SYSTOLIC BLOOD PRESSURE: 117 MMHG

## 2023-05-02 DIAGNOSIS — F33.2 SEVERE EPISODE OF RECURRENT MAJOR DEPRESSIVE DISORDER, WITHOUT PSYCHOTIC FEATURES (H): Primary | ICD-10-CM

## 2023-05-02 PROCEDURE — 250N000009 HC RX 250: Performed by: PSYCHIATRY & NEUROLOGY

## 2023-05-02 PROCEDURE — 258N000003 HC RX IP 258 OP 636: Performed by: PSYCHIATRY & NEUROLOGY

## 2023-05-02 PROCEDURE — 96365 THER/PROPH/DIAG IV INF INIT: CPT

## 2023-05-02 RX ORDER — ONDANSETRON 2 MG/ML
4 INJECTION INTRAMUSCULAR; INTRAVENOUS
Status: CANCELLED | OUTPATIENT
Start: 2023-05-02

## 2023-05-02 RX ORDER — MEPERIDINE HYDROCHLORIDE 25 MG/ML
25 INJECTION INTRAMUSCULAR; INTRAVENOUS; SUBCUTANEOUS EVERY 30 MIN PRN
Status: CANCELLED | OUTPATIENT
Start: 2023-05-02

## 2023-05-02 RX ORDER — HEPARIN SODIUM (PORCINE) LOCK FLUSH IV SOLN 100 UNIT/ML 100 UNIT/ML
5 SOLUTION INTRAVENOUS
Status: CANCELLED | OUTPATIENT
Start: 2023-05-02

## 2023-05-02 RX ORDER — DIPHENHYDRAMINE HYDROCHLORIDE 50 MG/ML
50 INJECTION INTRAMUSCULAR; INTRAVENOUS
Status: CANCELLED
Start: 2023-05-02

## 2023-05-02 RX ORDER — EPINEPHRINE 1 MG/ML
0.3 INJECTION, SOLUTION, CONCENTRATE INTRAVENOUS EVERY 5 MIN PRN
Status: CANCELLED | OUTPATIENT
Start: 2023-05-02

## 2023-05-02 RX ORDER — ALBUTEROL SULFATE 0.83 MG/ML
2.5 SOLUTION RESPIRATORY (INHALATION)
Status: CANCELLED | OUTPATIENT
Start: 2023-05-02

## 2023-05-02 RX ORDER — HEPARIN SODIUM,PORCINE 10 UNIT/ML
5 VIAL (ML) INTRAVENOUS
Status: CANCELLED | OUTPATIENT
Start: 2023-05-02

## 2023-05-02 RX ORDER — HYDRALAZINE HYDROCHLORIDE 20 MG/ML
10 INJECTION INTRAMUSCULAR; INTRAVENOUS
Status: CANCELLED | OUTPATIENT
Start: 2023-05-02

## 2023-05-02 RX ORDER — METHYLPREDNISOLONE SODIUM SUCCINATE 125 MG/2ML
125 INJECTION, POWDER, LYOPHILIZED, FOR SOLUTION INTRAMUSCULAR; INTRAVENOUS
Status: CANCELLED
Start: 2023-05-02

## 2023-05-02 RX ORDER — ALBUTEROL SULFATE 90 UG/1
1-2 AEROSOL, METERED RESPIRATORY (INHALATION)
Status: CANCELLED
Start: 2023-05-02

## 2023-05-02 RX ORDER — NALOXONE HYDROCHLORIDE 0.4 MG/ML
0.2 INJECTION, SOLUTION INTRAMUSCULAR; INTRAVENOUS; SUBCUTANEOUS
Status: CANCELLED | OUTPATIENT
Start: 2023-05-02

## 2023-05-02 RX ADMIN — KETAMINE HYDROCHLORIDE 65 MG: 50 INJECTION INTRAMUSCULAR; INTRAVENOUS at 09:32

## 2023-05-02 ASSESSMENT — PAIN SCALES - GENERAL: PAINLEVEL: SEVERE PAIN (7)

## 2023-05-02 NOTE — PROGRESS NOTES
Infusion Nursing Note:  Janelle White presents today for ketamine infusion.    Patient seen by provider today: No   present during visit today: Not Applicable.    Note: N/A.      Intravenous Access:  Peripheral IV placed.    Treatment Conditions:  Not Applicable.      Post Infusion Assessment:  Patient tolerated infusion without incident.  Patient observed for 60 minutes post infusion, per protocol.  Blood return noted pre and post infusion.  Site patent and intact, free from redness, edema or discomfort.  No evidence of extravasations.  Access discontinued per protocol.       Discharge Plan:   Patient discharged in stable condition accompanied by: self.  Departure Mode: Ambulatory.  RTC 5/23.      Viktoria Mcdaniel

## 2023-05-05 ENCOUNTER — OFFICE VISIT (OUTPATIENT)
Dept: URGENT CARE | Facility: URGENT CARE | Age: 63
End: 2023-05-05
Payer: COMMERCIAL

## 2023-05-05 VITALS
WEIGHT: 150 LBS | BODY MASS INDEX: 24.84 KG/M2 | SYSTOLIC BLOOD PRESSURE: 128 MMHG | HEART RATE: 82 BPM | RESPIRATION RATE: 14 BRPM | OXYGEN SATURATION: 99 % | DIASTOLIC BLOOD PRESSURE: 86 MMHG | TEMPERATURE: 98.3 F

## 2023-05-05 DIAGNOSIS — N30.00 ACUTE CYSTITIS WITHOUT HEMATURIA: Primary | ICD-10-CM

## 2023-05-05 DIAGNOSIS — R30.0 DYSURIA: ICD-10-CM

## 2023-05-05 LAB
ALBUMIN UR-MCNC: ABNORMAL MG/DL
APPEARANCE UR: CLEAR
BACTERIA #/AREA URNS HPF: ABNORMAL /HPF
BILIRUB UR QL STRIP: NEGATIVE
CLUE CELLS: ABNORMAL
COLOR UR AUTO: YELLOW
GLUCOSE UR STRIP-MCNC: NEGATIVE MG/DL
HGB UR QL STRIP: NEGATIVE
KETONES UR STRIP-MCNC: NEGATIVE MG/DL
LEUKOCYTE ESTERASE UR QL STRIP: ABNORMAL
NITRATE UR QL: NEGATIVE
PH UR STRIP: 5.5 [PH] (ref 5–7)
RBC #/AREA URNS AUTO: ABNORMAL /HPF
SP GR UR STRIP: 1.02 (ref 1–1.03)
SQUAMOUS #/AREA URNS AUTO: ABNORMAL /LPF
TRICHOMONAS, WET PREP: ABNORMAL
UROBILINOGEN UR STRIP-ACNC: 0.2 E.U./DL
WBC #/AREA URNS AUTO: ABNORMAL /HPF
WBC'S/HIGH POWER FIELD, WET PREP: ABNORMAL
YEAST, WET PREP: ABNORMAL

## 2023-05-05 PROCEDURE — 99213 OFFICE O/P EST LOW 20 MIN: CPT | Performed by: FAMILY MEDICINE

## 2023-05-05 PROCEDURE — 81001 URINALYSIS AUTO W/SCOPE: CPT | Performed by: FAMILY MEDICINE

## 2023-05-05 PROCEDURE — 87086 URINE CULTURE/COLONY COUNT: CPT | Performed by: FAMILY MEDICINE

## 2023-05-05 PROCEDURE — 87210 SMEAR WET MOUNT SALINE/INK: CPT | Performed by: FAMILY MEDICINE

## 2023-05-05 PROCEDURE — 87186 SC STD MICRODIL/AGAR DIL: CPT | Performed by: FAMILY MEDICINE

## 2023-05-05 RX ORDER — NITROFURANTOIN 25; 75 MG/1; MG/1
100 CAPSULE ORAL 2 TIMES DAILY
Qty: 10 CAPSULE | Refills: 0 | Status: SHIPPED | OUTPATIENT
Start: 2023-05-05 | End: 2023-05-10

## 2023-05-05 NOTE — PROGRESS NOTES
Assessment & Plan     Dysuria  - Wet prep - Clinic Collect  - UA Macroscopic with reflex to Microscopic and Culture  - Urine Microscopic Exam  - Urine Culture    Acute cystitis without hematuria  - nitroFURantoin macrocrystal-monohydrate (MACROBID) 100 MG capsule  Dispense: 10 capsule; Refill: 0  UA suggestive of a UTI patient is tolerated Macrobid in the past we will start with this and await results of urine culture.  Symptoms for pyelonephritis or not suspected at this time.  Good oral hydration recommended.  Wet prep unremarkable.        Darinel Davila MD   [facility]    Subjective     Janelle is a 62 year old female who presents to clinic today for the following health issues:  Chief Complaint   Patient presents with     Consult     Antibiotic last month, some discomfort last week, escalating this week. Did 3 day OTC yeast treatment, OTC pyridium. Symptoms: urethritis/dysuria, frequency, bladder spasms.  X 10 days. May have UA or a yeast infection     HPI    Patient reports lower abdominal discomfort consistent with previous bladder infections.  She also notes that she self treated with a vaginal suppository for possible yeast infection after she used an antibiotic to treat a course of cellulitis of the lower extremity.  She denies any fevers.  No history of kidney infections.  Her last bladder infection was more than a year ago.  She notes a history of multidrug-resistant UTI.      Patient Active Problem List    Diagnosis Date Noted     Suicidal ideation 06/30/2022     Priority: Medium     Immunocompromised (H) 06/30/2022     Priority: Medium     Infection due to 2019 novel coronavirus 06/30/2022     Priority: Medium     Severe episode of recurrent major depressive disorder, without psychotic features (H) 04/17/2022     Priority: Medium     COPD (chronic obstructive pulmonary disease) (H) 02/07/2022     Priority: Medium     Tension type headache 11/04/2021     Priority: Medium     Rotator cuff  injury 11/04/2021     Priority: Medium     Spinal stenosis of lumbar region with radiculopathy 09/02/2021     Priority: Medium     COVID-19 08/29/2021     Priority: Medium     Polyarthralgia 08/11/2021     Priority: Medium     Cellulitis, unspecified cellulitis site 08/11/2021     Priority: Medium     Sepsis, due to unspecified organism, unspecified whether acute organ dysfunction present (H) 08/11/2021     Priority: Medium     Cellulitis 08/11/2021     Priority: Medium     STACIE (generalized anxiety disorder) 07/27/2021     Priority: Medium     MTHFR gene mutation 04/12/2021     Priority: Medium     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     Priority: Medium     CYP2D6 poor metabolizer (H) 04/12/2021     Priority: Medium     Status post blepharoplasty 11/18/2020     Priority: Medium     Regular astigmatism, bilateral 11/18/2020     Priority: Medium     Prediabetes 09/19/2019     Priority: Medium     Hyperlipidemia LDL goal <130 09/19/2019     Priority: Medium     CLARE (obstructive sleep apnea) 02/13/2019     Priority: Medium     Controlled substance agreement signed 08/14/2018     Priority: Medium     Chronic pain syndrome 12/21/2017     Priority: Medium     CLL (chronic lymphocytic leukemia) (H) 12/21/2017     Priority: Medium     Hypotension 09/14/2017     Priority: Medium     History of laser assisted in situ keratomileusis 10/14/2014     Priority: Medium     Pain medication agreement 04/23/2014     Priority: Medium     Formatting of this note might be different from the original.  Patient takes morphine 15 mg ER BID for chronic neck and back pain.  She is also on cymbalta, ibuprofen, flexaril prn       Shift work sleep disorder 12/16/2013     Priority: Medium     Vitamin D deficiency 11/08/2012     Priority: Medium     Moderate recurrent major depression (H) 01/06/2011     Priority: Medium     DDD (degenerative disc disease), cervical 10/07/2010     Priority: Medium     CARDIOVASCULAR SCREENING; LDL GOAL  "LESS THAN 160 02/10/2010     Priority: Medium     Chronic Low Back Pain 10/01/2009     Priority: Medium     S/p AP L3-S1 fusion 12/2010 - referred to [facility].   Orthopedics writing scripts for narcotics post-op.       Migraine      Priority: Medium     Problem list name updated by automated process. Provider to review       B-complex deficiency 10/10/2006     Priority: Medium     Problem list name updated by automated process. Provider to review       PERSONAL HX OF  MELANOMA 12/04/2003     Priority: Low       Current Outpatient Medications   Medication     albuterol (PROAIR HFA/PROVENTIL HFA/VENTOLIN HFA) 108 (90 Base) MCG/ACT inhaler     amphetamine-dextroamphetamine (ADDERALL XR) 25 MG 24 hr capsule     amphetamine-dextroamphetamine (ADDERALL) 15 MG tablet     desvenlafaxine (PRISTIQ) 50 MG 24 hr tablet     Estradiol (DIVIGEL) 1 MG/GM GEL     insulin syringe-needle U-100 (30G X 1/2\" 1 ML) 30G X 1/2\" 1 ML miscellaneous     Levomefolate Glucosamine (METHYLFOLATE PO)     lidocaine (LIDODERM) 5 % patch     medical cannabis (Patient's own supply.  Not a prescription)     methocarbamol (ROBAXIN) 500 MG tablet     nitroFURantoin macrocrystal-monohydrate (MACROBID) 100 MG capsule     NONFORMULARY     ondansetron (ZOFRAN ODT) 8 MG ODT tab     Prasterone 6.5 MG INST     senna-docusate (SENOKOT-S/PERICOLACE) 8.6-50 MG tablet     vitamin D3 (CHOLECALCIFEROL) 125 MCG (5000 UT) tablet     [START ON 5/21/2023] amphetamine-dextroamphetamine (ADDERALL XR) 25 MG 24 hr capsule     [START ON 5/21/2023] amphetamine-dextroamphetamine (ADDERALL) 15 MG tablet     ASHWAGANDHA PO     famotidine (PEPCID) 20 MG tablet     HYDROcodone-acetaminophen (NORCO)  MG per tablet     hydrOXYzine (VISTARIL) 25 MG capsule     LORazepam (ATIVAN) 1 MG tablet     naloxone (NARCAN) 4 MG/0.1ML nasal spray     rOPINIRole (REQUIP) 0.25 MG tablet     triamcinolone (KENALOG) 0.1 % external ointment     No current facility-administered medications " for this visit.           Objective    /86 (BP Location: Right arm, Patient Position: Chair, Cuff Size: Adult Regular)   Pulse 82   Temp 98.3  F (36.8  C) (Oral)   Resp 14   Wt 68 kg (150 lb)   LMP 05/01/2005 (LMP Unknown)   SpO2 99%   BMI 24.84 kg/m    Physical Exam         Results for orders placed or performed in visit on 05/05/23   UA Macroscopic with reflex to Microscopic and Culture     Status: Abnormal    Specimen: Urine, Midstream   Result Value Ref Range    Color Urine Yellow Colorless, Straw, Light Yellow, Yellow    Appearance Urine Clear Clear    Glucose Urine Negative Negative mg/dL    Bilirubin Urine Negative Negative    Ketones Urine Negative Negative mg/dL    Specific Gravity Urine 1.025 1.003 - 1.035    Blood Urine Negative Negative    pH Urine 5.5 5.0 - 7.0    Protein Albumin Urine Trace (A) Negative mg/dL    Urobilinogen Urine 0.2 0.2, 1.0 E.U./dL    Nitrite Urine Negative Negative    Leukocyte Esterase Urine Moderate (A) Negative   Urine Microscopic Exam     Status: Abnormal   Result Value Ref Range    Bacteria Urine Many (A) None Seen /HPF    RBC Urine 2-5 (A) 0-2 /HPF /HPF    WBC Urine  (A) 0-5 /HPF /HPF    Squamous Epithelials Urine Few (A) None Seen /LPF   Wet prep - Clinic Collect     Status: Abnormal    Specimen: Vagina; Swab   Result Value Ref Range    Trichomonas Absent Absent    Yeast Absent Absent    Clue Cells Absent Absent    WBCs/high power field 1+ (A) None                     The use of Dragon/Meridian dictation services may have been used to construct the content in this note; any grammatical or spelling errors are non-intentional. Please contact the author of this note directly if you are in need of any clarification.

## 2023-05-05 NOTE — PATIENT INSTRUCTIONS
Take medication Macrobid/nitrofurantoin twice a day for 5 days    Symptoms should improve within the next 2-3 days. If no improvement, call clinic to discuss or return for re-evaluation    Drink approximately 60-80 ounces of water a day to stay hydrated.     If you develop flank pain, fevers, nausea/vomiting call immediately for assistance

## 2023-05-07 LAB — BACTERIA UR CULT: ABNORMAL

## 2023-05-17 ENCOUNTER — MYC MEDICAL ADVICE (OUTPATIENT)
Dept: FAMILY MEDICINE | Facility: CLINIC | Age: 63
End: 2023-05-17
Payer: COMMERCIAL

## 2023-05-17 DIAGNOSIS — M47.812 CERVICAL SPONDYLOSIS: ICD-10-CM

## 2023-05-17 DIAGNOSIS — M79.18 MYOFASCIAL PAIN: ICD-10-CM

## 2023-05-17 ASSESSMENT — ANXIETY QUESTIONNAIRES
3. WORRYING TOO MUCH ABOUT DIFFERENT THINGS: SEVERAL DAYS
GAD7 TOTAL SCORE: 11
8. IF YOU CHECKED OFF ANY PROBLEMS, HOW DIFFICULT HAVE THESE MADE IT FOR YOU TO DO YOUR WORK, TAKE CARE OF THINGS AT HOME, OR GET ALONG WITH OTHER PEOPLE?: SOMEWHAT DIFFICULT
4. TROUBLE RELAXING: NEARLY EVERY DAY
GAD7 TOTAL SCORE: 11
7. FEELING AFRAID AS IF SOMETHING AWFUL MIGHT HAPPEN: NOT AT ALL
3. WORRYING TOO MUCH ABOUT DIFFERENT THINGS: SEVERAL DAYS
6. BECOMING EASILY ANNOYED OR IRRITABLE: NEARLY EVERY DAY
IF YOU CHECKED OFF ANY PROBLEMS ON THIS QUESTIONNAIRE, HOW DIFFICULT HAVE THESE PROBLEMS MADE IT FOR YOU TO DO YOUR WORK, TAKE CARE OF THINGS AT HOME, OR GET ALONG WITH OTHER PEOPLE: SOMEWHAT DIFFICULT
7. FEELING AFRAID AS IF SOMETHING AWFUL MIGHT HAPPEN: NOT AT ALL
1. FEELING NERVOUS, ANXIOUS, OR ON EDGE: MORE THAN HALF THE DAYS
GAD7 TOTAL SCORE: 11
8. IF YOU CHECKED OFF ANY PROBLEMS, HOW DIFFICULT HAVE THESE MADE IT FOR YOU TO DO YOUR WORK, TAKE CARE OF THINGS AT HOME, OR GET ALONG WITH OTHER PEOPLE?: SOMEWHAT DIFFICULT
6. BECOMING EASILY ANNOYED OR IRRITABLE: NEARLY EVERY DAY
1. FEELING NERVOUS, ANXIOUS, OR ON EDGE: MORE THAN HALF THE DAYS
GAD7 TOTAL SCORE: 11
2. NOT BEING ABLE TO STOP OR CONTROL WORRYING: SEVERAL DAYS
7. FEELING AFRAID AS IF SOMETHING AWFUL MIGHT HAPPEN: NOT AT ALL
5. BEING SO RESTLESS THAT IT IS HARD TO SIT STILL: SEVERAL DAYS
GAD7 TOTAL SCORE: 11
4. TROUBLE RELAXING: NEARLY EVERY DAY
5. BEING SO RESTLESS THAT IT IS HARD TO SIT STILL: SEVERAL DAYS
GAD7 TOTAL SCORE: 11
IF YOU CHECKED OFF ANY PROBLEMS ON THIS QUESTIONNAIRE, HOW DIFFICULT HAVE THESE PROBLEMS MADE IT FOR YOU TO DO YOUR WORK, TAKE CARE OF THINGS AT HOME, OR GET ALONG WITH OTHER PEOPLE: SOMEWHAT DIFFICULT
7. FEELING AFRAID AS IF SOMETHING AWFUL MIGHT HAPPEN: NOT AT ALL
2. NOT BEING ABLE TO STOP OR CONTROL WORRYING: SEVERAL DAYS

## 2023-05-18 RX ORDER — METHOCARBAMOL 500 MG/1
500 TABLET, FILM COATED ORAL 4 TIMES DAILY PRN
Qty: 120 TABLET | Refills: 1 | Status: SHIPPED | OUTPATIENT
Start: 2023-05-18 | End: 2023-09-29

## 2023-05-18 NOTE — TELEPHONE ENCOUNTER
"Carmen-Please advise if you defer to patient's Pain Clinic to refill Robaxin, or if virtual or eVisit with you needed to address this refill request?    \"Kd Morlaes,   I am not able to message Dr. Jon for refills of Robaxin or request refill from MyChart. My pharmacy has requested refills but no response?     Are you able to fill this for me? Much appreciated.  Thank you!  Janelle \"    Thank you!  SHANEL MooneyN, RN-[facility]      "

## 2023-05-19 ENCOUNTER — VIRTUAL VISIT (OUTPATIENT)
Dept: PSYCHIATRY | Facility: CLINIC | Age: 63
End: 2023-05-19
Payer: COMMERCIAL

## 2023-05-19 ENCOUNTER — VIRTUAL VISIT (OUTPATIENT)
Dept: BEHAVIORAL HEALTH | Facility: CLINIC | Age: 63
End: 2023-05-19
Payer: COMMERCIAL

## 2023-05-19 DIAGNOSIS — F34.1 PERSISTENT DEPRESSIVE DISORDER: Primary | ICD-10-CM

## 2023-05-19 DIAGNOSIS — F33.9 RECURRENT MAJOR DEPRESSION RESISTANT TO TREATMENT (H): ICD-10-CM

## 2023-05-19 DIAGNOSIS — G89.4 CHRONIC PAIN SYNDROME: ICD-10-CM

## 2023-05-19 DIAGNOSIS — E88.89 CYP2D6 POOR METABOLIZER (H): ICD-10-CM

## 2023-05-19 DIAGNOSIS — F41.1 GAD (GENERALIZED ANXIETY DISORDER): ICD-10-CM

## 2023-05-19 DIAGNOSIS — E88.89 CYP2B6 INTERMEDIATE METABOLIZER (H): ICD-10-CM

## 2023-05-19 DIAGNOSIS — E72.12 HOMOZYGOUS FOR C677T POLYMORPHISM OF MTHFR (H): ICD-10-CM

## 2023-05-19 PROCEDURE — 90832 PSYTX W PT 30 MINUTES: CPT | Mod: VID | Performed by: PSYCHOLOGIST

## 2023-05-19 PROCEDURE — 99214 OFFICE O/P EST MOD 30 MIN: CPT | Mod: VID | Performed by: PSYCHIATRY & NEUROLOGY

## 2023-05-19 RX ORDER — DEXTROAMPHETAMINE SACCHARATE, AMPHETAMINE ASPARTATE, DEXTROAMPHETAMINE SULFATE AND AMPHETAMINE SULFATE 3.75; 3.75; 3.75; 3.75 MG/1; MG/1; MG/1; MG/1
15 TABLET ORAL 2 TIMES DAILY
Qty: 60 TABLET | Refills: 0 | Status: SHIPPED | OUTPATIENT
Start: 2023-06-19 | End: 2023-07-19

## 2023-05-19 RX ORDER — DEXTROAMPHETAMINE SACCHARATE, AMPHETAMINE ASPARTATE MONOHYDRATE, DEXTROAMPHETAMINE SULFATE AND AMPHETAMINE SULFATE 6.25; 6.25; 6.25; 6.25 MG/1; MG/1; MG/1; MG/1
25 CAPSULE, EXTENDED RELEASE ORAL DAILY
Qty: 30 CAPSULE | Refills: 0 | Status: SHIPPED | OUTPATIENT
Start: 2023-07-20 | End: 2023-08-19

## 2023-05-19 RX ORDER — DEXTROAMPHETAMINE SACCHARATE, AMPHETAMINE ASPARTATE, DEXTROAMPHETAMINE SULFATE AND AMPHETAMINE SULFATE 3.75; 3.75; 3.75; 3.75 MG/1; MG/1; MG/1; MG/1
15 TABLET ORAL 2 TIMES DAILY
Qty: 60 TABLET | Refills: 0 | Status: SHIPPED | OUTPATIENT
Start: 2023-07-20 | End: 2023-08-19

## 2023-05-19 RX ORDER — DEXTROAMPHETAMINE SACCHARATE, AMPHETAMINE ASPARTATE MONOHYDRATE, DEXTROAMPHETAMINE SULFATE AND AMPHETAMINE SULFATE 6.25; 6.25; 6.25; 6.25 MG/1; MG/1; MG/1; MG/1
25 CAPSULE, EXTENDED RELEASE ORAL DAILY
Qty: 30 CAPSULE | Refills: 0 | Status: SHIPPED | OUTPATIENT
Start: 2023-06-19 | End: 2023-07-19

## 2023-05-19 NOTE — PROGRESS NOTES
"Telemedicine Visit: The patient's condition can be safely assessed and treated via synchronous audio and visual telemedicine encounter.      Reason for Telemedicine Visit: Patient has requested telehealth visit    Originating Site (Patient Location): Patient's home    Distant Location (provider location):  Off-site    Consent:  The patient/guardian has verbally consented to: the potential risks and benefits of telemedicine (video visit) versus in person care; bill my insurance or make self-payment for services provided; and responsibility for payment of non-covered services.     Mode of Communication:  Video Conference via Transmit Promo    As the provider I attest to compliance with applicable laws and regulations related to telemedicine.        Outpatient Psychiatric Progress Note    Name: Janelle White   : 1960                    Primary Care Provider: Emili Ballard MD   Therapist: None    PHQ-9 scores:      3/9/2023     8:29 AM 3/19/2023    10:24 PM 2023     6:34 AM   PHQ-9 SCORE   PHQ-9 Total Score MyChart 17 (Moderately severe depression) 18 (Moderately severe depression) 13 (Moderate depression)   PHQ-9 Total Score 17 18 13       STACIE-7 scores:      2022     8:20 AM 3/20/2023     7:59 AM 2023    10:39 PM   STACIE-7 SCORE   Total Score 7 (mild anxiety) 7 (mild anxiety) 11 (moderate anxiety)   Total Score 7 7 11    11       Patient Identification:  Patient is a 62 year old,   White Choose not to answer female  who presents for return visit with me. Patient attended the phone/video session alone. Patient prefers to be called: \"Janelle\".    Interim History:  I last saw Janelle White for outpatient psychiatry return visit on 3/20/2023. During that appointment, we:    Continue Pristiq 50 mg daily for mood, anxiety.     Continue methylfolate 7.5 mg daily as supplementation.    Continue Adderall XR 25 mg daily for mood augmentation    Continue Adderall IR 15 mg twice " "daily as needed for mood augmentation and daytime fatigue/hypersomnia    Continue benzodiazepine per primary care prescriber.    Continue ketamine with interventional psychiatry clinic.    Continue to work with your specialist from [facility] as indicated.      Could consider individual DBT therapy.    Recommend ongoing individual psychotherapy.      5/19: Patient overall continuing to manage quite well.  Trying to stay busy and distracted with planting season/gardening and mushroom hunting.  Continuing ketamine treatments and finding them helpful.  Tolerating well.  Receiving monthly ketamine treatments and finding that is sufficient for her symptoms.  No acute safety concerns today.  No acute suicidality.  Managing moods well.  No problematic drug or alcohol use.    Per [facility], Dr. Matt Manzo, during today's team-based visit:  Reported that she is doing well. She spoke about going out hunting for arroyo mushrooms. She also has been very active with her  tending her garden. Her mood has been \"all over the place but as long as I don't think too much I'm ok.\" She said that her physical health continues to be a challenge and she has difficulty finding meaning to her life. Despite this, she feels she is better than she had been in the past. She does not have any particular requests or needs for today. She is still getting ketamine treatments once a month which she appreciates.     Past medication trials include but are not limited to:   Effexor-very flat  Celexa, zoloft, was on wellbutrin a couple years at one point  wellbutrin XL + celexa; 2005ish  tca-a ton of weight gain  depakote maybe for a short time  Abilify/lithium ?  ECT as teen - made me dull  Cytomel-not effective at all, used 50 mcg    Psychiatric ROS:  Janelle White reports mood has been: Relatively stable  Anxiety has been: See HPI above  Sleep has been: Relatively okay, still struggles at times, especially due to pain  Deepa sxs: " Denies  Psychosis sxs: None  ADHD/ADD sxs: None  PTSD sxs: None  PHQ9 and GAD7 scores were reviewed today if completed.   Medication side effects: Denies anything major related to current psychiatric medications  Current stressors include: Symptoms and See HPI above  Coping mechanisms and supports include: Family, Hobbies and Friends, therapy    Current medications include:   Current Outpatient Medications   Medication Sig     albuterol (PROAIR HFA/PROVENTIL HFA/VENTOLIN HFA) 108 (90 Base) MCG/ACT inhaler Inhale 2 puffs into the lungs every 6 hours as needed for shortness of breath or wheezing     amphetamine-dextroamphetamine (ADDERALL XR) 25 MG 24 hr capsule Take 1 capsule (25 mg) by mouth daily for 30 days     [START ON 5/21/2023] amphetamine-dextroamphetamine (ADDERALL XR) 25 MG 24 hr capsule Take 1 capsule (25 mg) by mouth daily for 30 days (Patient not taking: Reported on 5/5/2023)     amphetamine-dextroamphetamine (ADDERALL) 15 MG tablet Take 1 tablet (15 mg) by mouth 2 times daily for 30 days     [START ON 5/21/2023] amphetamine-dextroamphetamine (ADDERALL) 15 MG tablet Take 1 tablet (15 mg) by mouth 2 times daily for 30 days (Patient not taking: Reported on 5/5/2023)     ASHWAGANDHA PO Take 1 tablet by mouth daily (Patient not taking: Reported on 5/5/2023)     desvenlafaxine (PRISTIQ) 50 MG 24 hr tablet Take 1 tablet (50 mg) by mouth daily     Estradiol (DIVIGEL) 1 MG/GM GEL Place 1 packet onto the skin daily     famotidine (PEPCID) 20 MG tablet TAKE 1 TABLET(20 MG) BY MOUTH TWICE DAILY (Patient not taking: Reported on 5/2/2023)     HYDROcodone-acetaminophen (NORCO)  MG per tablet Take 1 tablet by mouth every 4 hours as needed for severe pain (7-10) max 4 tabs/24 hrs (Patient not taking: Reported on 5/5/2023)     hydrOXYzine (VISTARIL) 25 MG capsule Take 1 capsule (25 mg) by mouth 3 times daily as needed for itching (Patient not taking: Reported on 5/2/2023)     insulin syringe-needle U-100 (30G X  "1/2\" 1 ML) 30G X 1/2\" 1 ML miscellaneous Inject 1 ml B12 qmonth     Levomefolate Glucosamine (METHYLFOLATE PO) Take 7.5 mg by mouth daily     lidocaine (LIDODERM) 5 % patch Place 4 patches onto the skin daily Apply up to 4 patches to skin. Wear for 12 hours and remove for 12 hrs.  Refill when patient requests.     LORazepam (ATIVAN) 1 MG tablet Take 1 tablet (1 mg) by mouth every 6 hours as needed for anxiety (Patient not taking: Reported on 5/5/2023)     medical cannabis (Patient's own supply.  Not a prescription) Medical Cannabis - [place] 4-6 ml by mouth daily. [facility]     methocarbamol (ROBAXIN) 500 MG tablet Take 1 tablet (500 mg) by mouth 4 times daily as needed for muscle spasms     naloxone (NARCAN) 4 MG/0.1ML nasal spray Spray 1 spray (4 mg) into one nostril alternating nostrils as needed for opioid reversal every 2-3 minutes until assistance arrives (Patient not taking: Reported on 5/5/2023)     NONFORMULARY Take 2 Scoops by mouth daily Protein and Vitamin shake mix - Nutritional supplement     ondansetron (ZOFRAN ODT) 8 MG ODT tab Take 1 tablet (8 mg) by mouth every 8 hours as needed for nausea     Prasterone 6.5 MG INST Place 0.5 suppositories vaginally three times a week     rOPINIRole (REQUIP) 0.25 MG tablet TAKE 1 TO 2 TABLETS(0.25 TO 0.5 MG) BY MOUTH AT BEDTIME (Patient not taking: Reported on 5/5/2023)     senna-docusate (SENOKOT-S/PERICOLACE) 8.6-50 MG tablet Take 6-9 tablets by mouth every evening     triamcinolone (KENALOG) 0.1 % external ointment Apply topically 2 times daily (Patient not taking: Reported on 5/5/2023)     vitamin D3 (CHOLECALCIFEROL) 125 MCG (5000 UT) tablet Take 5,000 Units by mouth daily     No current facility-administered medications for this visit.       The [place] Prescription Monitoring Program has been reviewed and there are no concerns about diversionary activity for controlled substances at this time.   04/18/2023  1   03/20/2023  Dextroamp-Amphetamin 15 MG " Tab  60.00  30 Al Bau   [id number]   [facility] ([id number])   0/0   [place]are [place]   04/18/2023  1   03/20/2023  Dextroamp-Amphet Er 25 MG Cap  30.00  30 Al Bau   [id number]   [facility] ([id number])   0/0   [place]   03/21/2023  1   03/13/2023  Hydrocodone-Acetamin  MG  10.00  1 Tosin Mar   [id number]   [facility] ([id number])   0/0  100.00 MME  [place]   03/13/2023  1   01/09/2023  Dextroamp-Amphet Er 25 MG Cap  30.00  30 Al Bau   [id number]   [facility] ([id number])   0/0   [place]   03/11/2023  1   01/09/2023  Dextroamp-Amphetamin 15 MG Tab  60.00  30 Al Bau   [id number]   [facility] ([id number])   0/[place]   [place]   02/10/2023  1   01/09/2023  Dextroamp-Amphet Er 25 MG Cap  30.00  30 Al Bau   [id number]   [facility] ([id number])   0/0   Medicare [place] Insurance [place]         Past Medical/Surgical History:  Past Medical History:   Diagnosis Date     Anxiety      Cervicalgia 2007    C5-6 disc protrusion     COPD (chronic obstructive pulmonary disease) (H) 2/7/2022     Depressive disorder      ESBL (extended spectrum beta-lactamase) producing bacteria infection      History of blood transfusion 2007    Cervical fusion     Leukemia (H)     CLA large beta     Melanoma (H) 1998     Migraine      Other chronic pain      Rotator cuff tear     s/p injections     Sacroiliac inflammation (H)      Shift work sleep disorder 12/16/2013     Urinary calculi      Vitamin B12 deficiency anemia 2006    started injections      has a past medical history of Anxiety, Cervicalgia (2007), COPD (chronic obstructive pulmonary disease) (H) (2/7/2022), Depressive disorder, ESBL (extended spectrum beta-lactamase) producing bacteria infection, History of blood transfusion (2007), Leukemia (H), Melanoma (H) (1998), Migraine, Other chronic pain, Rotator cuff tear, Sacroiliac inflammation (H), Shift work sleep disorder (12/16/2013), Urinary calculi, and Vitamin B12 deficiency anemia (2006).    She has no past medical history of Cerebral infarction (H), Congestive heart failure (H), Coronary artery  disease, Diabetes (H), Heart disease, Hypertension, Thyroid disease, or Uncomplicated asthma.    Social History:  Reviewed. No changes to social history except as noted above in HPI.    Vital Signs:   None. This is phone/video visit.     Labs:  Most recent laboratory results reviewed and the pertinent results include:   Lab Results   Component Value Date    WBC 5.6 06/30/2022    WBC 3.2 05/24/2021     Lab Results   Component Value Date    RBC 4.61 06/30/2022    RBC 3.74 05/24/2021     Lab Results   Component Value Date    HGB 14.2 06/30/2022    HGB 11.0 05/24/2021     Lab Results   Component Value Date    HCT 43.6 06/30/2022    HCT 33.6 05/24/2021     No components found for: MCT  Lab Results   Component Value Date    MCV 95 06/30/2022    MCV 90 05/24/2021     Lab Results   Component Value Date    MCH 30.8 06/30/2022    MCH 29.4 05/24/2021     Lab Results   Component Value Date    MCHC 32.6 06/30/2022    MCHC 32.7 05/24/2021     Lab Results   Component Value Date    RDW 11.9 06/30/2022    RDW 13.4 05/24/2021     Lab Results   Component Value Date     07/04/2022     05/24/2021     Last Comprehensive Metabolic Panel:  Sodium   Date Value Ref Range Status   06/30/2022 136 133 - 144 mmol/L Final   05/24/2021 141 133 - 144 mmol/L Final     Potassium   Date Value Ref Range Status   06/30/2022 3.4 3.4 - 5.3 mmol/L Final   05/24/2021 3.9 3.4 - 5.3 mmol/L Final     Chloride   Date Value Ref Range Status   06/30/2022 105 94 - 109 mmol/L Final   05/24/2021 108 94 - 109 mmol/L Final     Carbon Dioxide   Date Value Ref Range Status   05/24/2021 25 20 - 32 mmol/L Final     Carbon Dioxide (CO2)   Date Value Ref Range Status   06/30/2022 25 20 - 32 mmol/L Final     Anion Gap   Date Value Ref Range Status   06/30/2022 6 3 - 14 mmol/L Final   05/24/2021 8 3 - 14 mmol/L Final     Glucose   Date Value Ref Range Status   06/30/2022 91 70 - 99 mg/dL Final   05/24/2021 87 70 - 99 mg/dL Final     Urea Nitrogen   Date Value  Ref Range Status   06/30/2022 20 7 - 30 mg/dL Final   05/24/2021 15 7 - 30 mg/dL Final     Creatinine   Date Value Ref Range Status   07/03/2022 0.67 0.52 - 1.04 mg/dL Final   05/24/2021 0.64 0.52 - 1.04 mg/dL Final     GFR Estimate   Date Value Ref Range Status   07/03/2022 >90 >60 mL/min/1.73m2 Final     Comment:     Effective December 21, 2021 eGFRcr in adults is calculated using the 2021 CKD-EPI creatinine equation which includes age and gender (Zhou et al., NEJM, DOI: 10.1056/OQBHhk4153197)   05/24/2021 >90 >60 mL/min/[1.73_m2] Final     Comment:     Non  GFR Calc  Starting 12/18/2018, serum creatinine based estimated GFR (eGFR) will be   calculated using the Chronic Kidney Disease Epidemiology Collaboration   (CKD-EPI) equation.       Calcium   Date Value Ref Range Status   06/30/2022 8.7 8.5 - 10.1 mg/dL Final   05/24/2021 8.4 (L) 8.5 - 10.1 mg/dL Final     Bilirubin Total   Date Value Ref Range Status   04/26/2022 0.4 0.2 - 1.3 mg/dL Final   03/16/2021 0.4 0.2 - 1.3 mg/dL Final     Alkaline Phosphatase   Date Value Ref Range Status   04/26/2022 82 40 - 150 U/L Final   03/16/2021 87 40 - 150 U/L Final     ALT   Date Value Ref Range Status   04/26/2022 21 0 - 50 U/L Final   03/16/2021 26 0 - 50 U/L Final     AST   Date Value Ref Range Status   04/26/2022 18 0 - 45 U/L Final   03/16/2021 44 0 - 45 U/L Final     Review of Systems:  10 systems (general, cardiovascular, respiratory, eyes, ENT, endocrine, GI, , M/S, neurological) were reviewed. Most pertinent finding(s) is/are: Severe chronic pain. The remaining systems are all unremarkable.    Mental Status Examination (limited as this is by phone/video):  Appearance: Awake, alert, appears stated age, no acute distress  Attitude:  cooperative, pleasant   Motor: No gross abnormalities observed via video, not formally tested.  Oriented to:  person, place, time, and situation  Attention Span and Concentration:  normal  Speech:  clear, coherent,  regular rate, rhythm, and volume  Language: intact  Mood: Okay  Affect: Mood congruent  Associations:  no loose associations  Thought Process:  logical, linear and goal oriented  Thought Content: Chronic passive suicidal ideation-at baseline today, may be improved some-no current plan or intent to harm self today, no homicidal ideation, no evidence of psychotic thought, no auditory hallucinations present and no visual hallucinations present  Recent and Remote Memory:  Intact to interview. Not formally assessed. No amnesia.  Fund of Knowledge: appropriate  Insight:  good  Judgment:  intact, adequate for safety  Impulse Control:  intact    Suicide Risk Assessment:  Today Janelle White reports chronic/intermittent suicidal ideation-at baseline today and overall improved since first visit with this provider.There are notable risk factors for self-harm, including anxiety, comorbid medical condition of Chronic pain, suicidal ideation, purposelessness/no reason for living, hopelessness, withdrawing and mood change. However, risk is mitigated by commitment to family, absence of past attempts, ability to volunteer a safety plan, history of seeking help when needed, future oriented and denies suicidal intent today. Therefore, based on all available evidence including the factors cited above, Janelle White does not appear to be at imminent risk for self-harm, does not meet criteria for a 72-hr hold, and therefore remains appropriate for ongoing outpatient level of care.  A thorough assessment of risk factors related to suicide and self-harm have been reviewed and are noted above. Local community safety resources reviewed for patient to use if needed. There was no deceit detected, and the patient presented in a manner that was believable.     DSM5 Diagnosis:  Persistent depressive disorder  Treatment resistant depression  CYP2D6 poor metabolizer  CYP2B6 intermediate metabolizer  Homozygous for C677T polymorphism  of MTHFR    Medical comorbidities include:   Patient Active Problem List    Diagnosis Date Noted     Suicidal ideation 06/30/2022     Priority: Medium     Immunocompromised (H) 06/30/2022     Priority: Medium     Infection due to 2019 novel coronavirus 06/30/2022     Priority: Medium     Severe episode of recurrent major depressive disorder, without psychotic features (H) 04/17/2022     Priority: Medium     COPD (chronic obstructive pulmonary disease) (H) 02/07/2022     Priority: Medium     Tension type headache 11/04/2021     Priority: Medium     Rotator cuff injury 11/04/2021     Priority: Medium     Spinal stenosis of lumbar region with radiculopathy 09/02/2021     Priority: Medium     COVID-19 08/29/2021     Priority: Medium     Polyarthralgia 08/11/2021     Priority: Medium     Cellulitis, unspecified cellulitis site 08/11/2021     Priority: Medium     Sepsis, due to unspecified organism, unspecified whether acute organ dysfunction present (H) 08/11/2021     Priority: Medium     Cellulitis 08/11/2021     Priority: Medium     STACIE (generalized anxiety disorder) 07/27/2021     Priority: Medium     MTHFR gene mutation 04/12/2021     Priority: Medium     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     Priority: Medium     CYP2D6 poor metabolizer (H) 04/12/2021     Priority: Medium     Status post blepharoplasty 11/18/2020     Priority: Medium     Regular astigmatism, bilateral 11/18/2020     Priority: Medium     Prediabetes 09/19/2019     Priority: Medium     Hyperlipidemia LDL goal <130 09/19/2019     Priority: Medium     CLARE (obstructive sleep apnea) 02/13/2019     Priority: Medium     Controlled substance agreement signed 08/14/2018     Priority: Medium     Chronic pain syndrome 12/21/2017     Priority: Medium     CLL (chronic lymphocytic leukemia) (H) 12/21/2017     Priority: Medium     Hypotension 09/14/2017     Priority: Medium     History of laser assisted in situ keratomileusis 10/14/2014     Priority:  Medium     Pain medication agreement 04/23/2014     Priority: Medium     Formatting of this note might be different from the original.  Patient takes morphine 15 mg ER BID for chronic neck and back pain.  She is also on cymbalta, ibuprofen, flexaril prn       Shift work sleep disorder 12/16/2013     Priority: Medium     Vitamin D deficiency 11/08/2012     Priority: Medium     Moderate recurrent major depression (H) 01/06/2011     Priority: Medium     DDD (degenerative disc disease), cervical 10/07/2010     Priority: Medium     CARDIOVASCULAR SCREENING; LDL GOAL LESS THAN 160 02/10/2010     Priority: Medium     Chronic Low Back Pain 10/01/2009     Priority: Medium     S/p AP L3-S1 fusion 12/2010 - referred to [facility].   Orthopedics writing scripts for narcotics post-op.       Migraine      Priority: Medium     Problem list name updated by automated process. Provider to review       B-complex deficiency 10/10/2006     Priority: Medium     Problem list name updated by automated process. Provider to review       PERSONAL HX OF  MELANOMA 12/04/2003     Priority: Low       Psychosocial & Contextual Factors: see HPI above    Assessment:  6/15/2021:  Janelle MELISSA White reports overall some significant worsening of mood symptoms.  Increased anxiety with her depression.  Poor motivation and poor energy.  Symptoms severe enough to prevent her from being able to utilize typical coping skills and strategies.  No current motivation or energy to participate in PHP/day treatment program.  Continues to take medication as scheduled.  Recent surgery and general anesthesia could be contributing.  Discussed discontinuing stimulant medication as an augmentation strategy.  She is agreeable to moving forward with T3 as an augmentation for treatment resistant depression.  Discussed risks and benefits at length.  Will get baseline thyroid labs prior to starting T3.  Hoping she will experience increased energy and motivation and that  her mood will lift.  Also discussed setting up an appointment with her interventional psychiatry clinic to discuss other potential options such as ketamine therapy, ECT, TMS.  Does have history of ECT for depression when she was quite young.  I am hopeful to stay ahead of her symptoms before things get too severe.  Has chronic suicidal ideation and is at high risk for suicide.  Continues to deny any plan or intent.  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  See below for risk/benefit conversation regarding T3 had with the patient:    GeneSight testing Info:  GeneSight testing revealed she is a poor 2D6 metabolizer and an intermediate 2B6 metabolizer.  This would explain her multiple failed medication trials due to the negative side effects.  She also has a genotype that would suggest a phenotype sensitivity to serotonin. She also was found to have significantly reduced folic acid conversion.      Starting T3 as augment to antidepressant therapy for treatment resistant depression:  Start T3 at 25 mcg per day for one to two weeks, and if there is little or no improvement, increase the dose to 50 mcg per day; this is consistent with practice guidelines from the American Psychiatric Association and [place] [employer] for Mood and Anxiety Treatments.     Adverse effects consistent with hyperthyroidism may occur, including tremor, palpitations, heat intolerance, sweating, anxiety, increased frequency of bowel movements, shortness of breath, and exacerbation of cardiac arrhythmia. In addition, hyperthyroidism that emerges during long-term treatment may lead to bone demineralization, osteoporosis, and an increased risk of fracture    Following a normal baseline TSH concentration, no other laboratory monitoring during a four to six week trial of adjunctive T3 is necessary. However, if T3 is continued longer, a serum TSH concentration should be checked after the first one to three months of  treatment and then every six months.    Today, 7/27/21:   Patient overall with little change in her symptoms.  Did not do as well on Cytomel and had limited to no improvement at all after weeks on 50 mcg.  Labs were unremarkable prior to starting Cytomel.  Opted to go back to stimulant augmentation since patient does find it quite helpful.  She has been taking 15 mg of immediate release most afternoons.  Due to good tolerability and some efficacy, we will bump up her immediate release dose slightly to 20 mg daily as needed in addition to her 20 mg extended release dose. I have no concerns about misuse or diversion at this time.  Tolerating well with no negative side effects.  Last blood pressure normal 7/2.  Patient has noted some efficacy from Pristiq more than other antidepressant trials and so we will continue to titrate further to 100 mg daily.  She is tolerating well.  Continues to use lorazepam for anxiety, pain medicine adjunct, and for sleep as prescribed by primary care provider.  Has been utilizing roughly 100 tablets of 0.5 mg every 1.5 months.  Patient with ongoing chronic intermittent passive suicidal ideation with no plan or intent.  Denies safety concerns today.  No problematic drug or alcohol use.  I am hopeful the interventional psychiatry clinic might be able to initiate ketamine therapy or another therapy they would recommend as potentially being more helpful than patient's multiple medication/augmentation trials.    10/6/2021:  Patient with some improved depression symptoms and much more hopeful.  Seeing specialist at [place]-feels very hurt and listened to.  Also is hopeful there will be some helpful treatments.  Visit to [place] was also very good and instilled a lot of hope in her abilities.  She was encouraged to continue to push herself to do the things she enjoys doing.  No medication changes today.  She will follow-up with getting TMS rescheduled, possibly when things slow down after the  holidays.  Does not need to be seen until after the holidays.  No acute safety concerns today.  No problematic drug or alcohol use.    1/14/2022:  Overall patient feeling a little more down lately.  Tolerating TMS well.  Encouraged to continue TMS.  No medication changes today since undergoing TMS treatment.  Talked about life stages today generativity versus stagnation and also integrity versus despair.  Talked about consideration for acceptance and commitment therapy.  She is encouraged to continue to be active.  No acute suicidal ideation today.  No acute safety concerns today.  No problematic drug or alcohol use.    4/13/2022:   Patient continues to have symptoms that wax and wane.  Feels like she tolerates Pristiq 50 mg better than 100 and will continue on this dose.  Last week was particularly difficult.  Pretty intense suicidal ideation but she was able to manage these thoughts and remain safe.  She does report she would come to the hospital if necessary.  We discussed the empath unit today and other resources if she felt she could not keep herself safe.  She continues to work on tapering her narcotic pain medication regimen.  She is working with addiction medicine and also pain management.  She is starting Pilates therapy.  Pool therapy will begin in July.  Discussed possibility of vestibular rehabilitation.  Individual therapy will also start soon.  She was strongly encouraged to continue to pursue ketamine therapy.  No acute suicidality today.  No problematic drug or alcohol use.    6/23/2022:  Overall reports doing relatively okay.  Some pretty down days still, but ketamine therapy going well.  Feels like continuing to move in the right direction.  Suicidal ideation improving.  Off all narcotic pain medication.  Feels good about her progress.  Had some really down days after off pain medication but improved since those few dark days.  Hopeful about where ketamine therapy may lead.  Discussed some of her  restlessness at bedtime and will start pramipexole.  Also some evidence to suggest pramipexole could be helpful for treatment resistant depression symptoms.  Discussed risks and benefits of therapy, including watching for any impulsive behaviors.  Continues to have suicidal thoughts but no acute suicidality today.  Her suicidality overall is improving from her baseline suicidality.  She continues to consider the idea of a partial hospital program.  We [facility]  Also encouraged her to discuss possibility of a pain program through [facility] with Dr. Medley.  No acute safety concerns today.  No problematic drug or alcohol use.    7/11/2022:  Patient with some recent more intensive struggles.  Depression much more severe recently.  Led to hospitalization.  Patient medically hospitalized since was positive for COVID and has history of CLL and [place] protocol requires isolation.  Patient seen by psychiatry consult service.  No medication changes made.  Patient continues with interventional psychiatry clinic.  They will be increasing ketamine dose and treatments will be every few weeks.  We discussed patient's symptom history a little more today and possible bipolar diagnosis came into question.  Patient does recognize possible hypomanic episodes in the past.  Patient is currently monitoring symptoms closely and pramipexole.  She is feeling better since starting pramipexole but is watching shopping behaviors closely.  Suicidal ideation is improving since hospitalization.  Restless legs improved at night on pramipexole.  Discussed we could consider adding lower dose lithium as an augment to her Pristiq and to help stabilize moods a little bit more and to further help with some of her chronic suicidal ideation.  We also discussed DBT for chronic suicidal ideation and ongoing mood instability.  Continues off of pain medication.  No acute suicidality or safety concerns today.   Patient will follow up in a few weeks.  No medication changes today since we will wait to see how ketamine dose change goes.  No drug or alcohol use concerns.  Patient noted some ongoing cognitive/memory concerns and requested testing.  Neuropsychological referral placed.    8/8/2022:  Patient with ongoing severe depression, resistant to treatment.  She is agreeable to increasing her stimulant medication.  This could hopefully further improve mood slightly.  May also help with some additional energy and also help with some of her ongoing cognitive concerns.  She has neuropsychological testing coming up soon.  Discussed possibly considering individual DBT therapy with a DBT certified therapist given her ongoing chronic suicidal ideation and inability to participate in group therapy currently.  We will discuss this possibility with her current therapist.  Also discussed possibility of increasing Pristiq by 25 mg only 2 or 3 days a week to decrease risk of negative side effects (25 mg on Tuesdays/Thursdays for Monday/Wednesday/Fridays).  Patient also encouraged to continue ketamine treatment.  No acute suicidality today.  Patient denies any intent or plan to act on any of her suicidal thoughts today.  No problematic drug or alcohol use.    9/7/2022:  Patient doing relatively okay today.  Pain continues to feel a little bit worse.  Emotionally though, despite worsening pain symptoms, patient feels like she is doing relatively okay.  Discussed various psychotherapy approaches that could be taken to address her symptoms.  We discussed that health psychology could be an optimal approach to help with her symptoms but that her suicidal ideation is often still quite intense and may be a distractor to her treatment with a health psychologist (discussed she may be referred often to the emergency room or to other more intensive programs).  We discussed working with an individual DBT therapist as a possibility to continue  addressing her ongoing chronic suicidal ideation (individual therapy would allow patient to move at her own pace since group therapy is much too intense at this time).  Patient was open to this idea.  [facility] today we will send patient some resources/options.  Depending on patient's financial situation, there is also a possibility patient could work with a health psychologist in conjunction with a DBT therapist.  Ketamine therapy has proven to be helpful, although I do wish the effects lasted a little longer than what they currently are for the patient.  I recommend patient continue her ketamine treatments.  No medication changes today.  She denies any acute suicidality today.  No plan or intent to harm herself noted today.  She denies being in a bad place today.  No problematic drug or alcohol use.     10/7/2022:  Patient overall relatively stable at this time.  Mood improving slightly with ketamine therapy.  Patient encouraged to continue with treatment.  Anxiety a little more manageable.  Continuing to struggle with severe chronic pain.  Tolerating current medications well with no negative side effects noted.  No changes today.  Patient will continue in individual psychotherapy.  No acute suicidality today.  Suicidal thoughts at baseline, maybe even a little improved.    1/9/2023:  Patient overall doing quite well.  Some days still worse than others and chronic pain continues to be a big factor influencing her mental health.  Ketamine has been very helpful.  Still some poor energy and fatigue.  Adderall has been very helpful.  We will increase the dose just slightly.  Extended release dose will increase from 20 mg to 25 mg to further address her symptoms.  We will continue with the 15 mg twice daily immediate release doses.  No other medication changes today.  Patient encouraged to stay the course.  No acute safety concerns.  Suicidal thoughts continue to be passive in nature and have overall improved since starting  ketamine.  No problematic drug or alcohol use.    3/20/2023:  Patient remains overall relatively stable.  Having some anxiety over feeling more dependent on cannabis for her pain.  Discussed continuing gratitude journal.  Patient has come quite a ways with relative stability.  No medication changes today.  Denies that cannabis use is causing any problems apart from feeling a little anxious about her use.  Patient even participating in more activities this spring compared to last year.  No acute safety concerns at this time.  No acute suicidality.    5/19/2023:  Patient overall continuing to manage okay.  Feels relatively stable.  Continues ketamine therapy.  No med changes today.  No acute safety concerns.  No acute suicidality today.  Is pleased that she is finding some roxy in pleasurable activities this spring.  Continues to seek purpose in life.  No problematic drug or alcohol use.  Patient will be seen back in 3 months.    Medication side effects and alternatives were reviewed. Health promotion activities recommended and reviewed today. All questions addressed. Education and counseling completed regarding risks and benefits of medications and psychotherapy options. Recommend therapy for additional support.     Treatment Plan:    Continue Pristiq 50 mg daily for mood, anxiety.     Continue methylfolate 7.5 mg daily as supplementation.    Continue Adderall XR 25 mg daily for mood augmentation    Continue Adderall IR 15 mg twice daily as needed for mood augmentation and daytime fatigue/hypersomnia    Continue benzodiazepine per primary care prescriber.    Continue ketamine with interventional psychiatry clinic.    Continue to work with your specialist from [facility] as indicated.      Could consider individual DBT therapy.    Recommend ongoing individual psychotherapy.      Continue all other cares per primary care provider.     Continue all other medications as reviewed per electronic medical record today.      Safety plan reviewed. To the Emergency Department as needed or call after hours crisis line at [phone] or [phone]. [place] Crisis Text Line. Text [place] to 114929 or Suicide LifeLine Chat: suicidepreventionlifeline.org/chat    Schedule an appointment with me in 3 months, or sooner as needed. Call [facility] at [phone] to schedule.    Follow up with primary care provider as planned or for acute medical concerns.    Call the psychiatric nurse line with medication questions or concerns at [phone].    [employer] may be used to communicate with your provider, but this is not intended to be used for emergencies.    Therapy resources:  Www.MPSI.org    https://www.[employer].com/mental-health/thrive/thrive-mental-health-and-chronic-pain-management/    [place] DBT resources:  [place]://[place].gov/dhs/partners-and-providers/policies-procedures/adult-mental-health/dialectical-behavior-therapy/dbt-certified-providers/    Risks of benzodiazepine (Ativan, Xanax, Klonopin, Valium, etc) use including, but not limited to, sedation, tolerance, risk for addiction/dependence. Do not drink alcohol while taking benzodiazepines due to risk of trouble breathing and potential death. Do not drive or operate heavy machinery until it is known how the drug affects you. Discuss with physician or pharmacist before ever taking a benzodiazepine with a narcotic/opioid pain medication.     Have previously discussed risks of stimulant medication including, but not limited to, decreased appetite, risk of tics (and that they may be lasting), trouble sleeping, cardiac risks such as increased heart rate and blood pressure, and rare risk of sudden cardiac death.  Also risk of addiction/tolerance/dependence.    Administrative Billing:   Phone Call/Video Duration: 13 Minutes  8:03a-8:16a    Patient Status:  Patient is a continuous care patient and refills will continue to come from this provider until otherwise noted.    Signed:    Kimberly Perez, DO  [facility]    Disclaimer: This note consists of symbols derived from keyboarding, dictation and/or voice recognition software. As a result, there may be errors in the script that have gone undetected. Please consider this when interpreting information found in this chart.

## 2023-05-19 NOTE — PATIENT INSTRUCTIONS
Treatment Plan:  Continue Pristiq 50 mg daily for mood, anxiety.   Continue methylfolate 7.5 mg daily as supplementation.  Continue Adderall XR 25 mg daily for mood augmentation  Continue Adderall IR 15 mg twice daily as needed for mood augmentation and daytime fatigue/hypersomnia  Continue benzodiazepine per primary care prescriber.  Continue ketamine with interventional psychiatry clinic.  Continue to work with your specialist from [facility] as indicated.    Could consider individual DBT therapy.  Recommend ongoing individual psychotherapy.    Continue all other cares per primary care provider.   Continue all other medications as reviewed per electronic medical record today.   Safety plan reviewed. To the Emergency Department as needed or call after hours crisis line at [phone] or [phone]. [place] Crisis Text Line. Text [place] to 922876 or Suicide LifeLine Chat: suicideprecWyze.org/chat  Schedule an appointment with me in 3 months, or sooner as needed. Call [facility] at [phone] to schedule.  Follow up with primary care provider as planned or for acute medical concerns.  Call the psychiatric nurse line with medication questions or concerns at [phone].  [employer] may be used to communicate with your provider, but this is not intended to be used for emergencies.    Therapy resources:  Www.MPSI.org    https://www.mhs-dbt.com/mental-health/thrive/thrive-mental-health-and-chronic-pain-management/    [place] DBT resources:  [place]://[place].gov[place]dhs/partners-and-providers/policies-procedures/adult-mental-health/dialectical-behavior-therapy/dbt-certified-providers/    Risks of benzodiazepine (Ativan, Xanax, Klonopin, Valium, etc) use including, but not limited to, sedation, tolerance, risk for addiction/dependence. Do not drink alcohol while taking benzodiazepines due to risk of trouble breathing and potential death. Do not drive or operate heavy machinery until it is known how the drug  "affects you. Discuss with physician or pharmacist before ever taking a benzodiazepine with a narcotic/opioid pain medication.     Have previously discussed risks of stimulant medication including, but not limited to, decreased appetite, risk of tics (and that they may be lasting), trouble sleeping, cardiac risks such as increased heart rate and blood pressure, and rare risk of sudden cardiac death.  Also risk of addiction/tolerance/dependence.    Patient Education   Collaborative Care Psychiatry Service  What to Expect  Here's what to expect from your Collaborative Care Psychiatry Service (CCPS).   About CCPS  CCPS means 2 people work together to help you get better. You'll meet with a behavioral health clinician and a psychiatric doctor. A behavioral health clinician helps people with mental health problems by talking with them. A psychiatric doctor helps people by giving them medicine.  How it works  At every visit, you'll see the behavioral health clinician (BHC) first. They'll talk with you about how you're doing and teach you how to feel better.   Then you'll see the psychiatric doctor. This doctor can help you deal with troubling thoughts and feelings by giving you medicine. They'll make sure you know the plan for your care.   CCPS usually takes 3 to 6 visits. If you need more visits, we may have you start seeing a different psychiatric doctor for ongoing care.  If you have any questions or concerns, we'll be glad to talk with you.  About visits  Be open  At your visits, please talk openly about your problems. It may feel hard, but it's the best way for us to help you.  Cancelling visits  If you can't come to your visit, please call us right away at [phone]. If you don't cancel at least 24 hours (1 full day) before your visit, that's \"late cancellation.\"  Being late to visits  Being very late is the same as not showing up. You will be a \"no show\" if:  Your appointment starts with a BHC, and you're more " than 15 minutes late for a 30-minute (half hour) visit. This will also cancel your appointment with the psychiatric doctor.  Your appointment is with a psychiatric doctor only, and you're more than 15 minutes late for a 30-minute (half hour) visit.  Your appointment is with a psychiatric doctor only, and you're more than 30 minutes late for a 60-minute (full hour) visit.  If you cancel late or don't show up 2 times within 6 months, we may end your care.   Getting help between visits  If you need help between visits, you can call us Monday to Friday from 8 a.m. to 4:30 p.m. at [phone].  Emergency care  Call 911 or go to the nearest emergency department if your life or someone else's life is in danger.  Call 968 anytime to reach the national Suicide and Crisis hotline.  Medicine refills  To refill your medicine, call your pharmacy. You can also call [facility]'s Behavioral Access at [phone], Monday to Friday, 8 a.m. to 4:30 p.m. It can take 1 to 3 business days to get a refill.   Forms, letters, and tests  You may have papers to fill out, like FMLA, short-term disability, and workability. We can help you with these forms at your visits, but you must have an appointment. You may need more than 1 visit for this, to be in an intensive therapy program, or both.  Before we can give you medicine for ADHD, we may refer you to get tested for it or confirm it another way.  We may not be able to give you an emotional support animal letter.  We don't do mental health checks ordered by the court.   We don't do mental health testing, but we can refer you to get tested.   Thank you for choosing us for your care.  For informational purposes only. Not to replace the advice of your health care provider. Copyright   2022 [facility]. All rights reserved. [employer] [id number] - 12/22.

## 2023-05-19 NOTE — PROGRESS NOTES
[facility]  May 19, 2023      Behavioral Health Clinician Progress Note    Patient Name: Janelle White           Service Type:  Individual      Service Location:   [facility]     Session Start Time: 07:30 am  Session End Time: 07:50 am      Session Length: 16 - 37      Attendees: Patient     Service Modality:  Video Visit:      Provider verified identity through the following two step process.  Patient provided:  Patient is known previously to provider    Telemedicine Visit: The patient's condition can be safely assessed and treated via synchronous audio and visual telemedicine encounter.      Reason for Telemedicine Visit: Services only offered telehealth    Originating Site (Patient Location): Patient's home    Distant Site (Provider Location): Provider Remote Setting- Home Office    Consent:  The patient/guardian has verbally consented to: the potential risks and benefits of telemedicine (video visit) versus in person care; bill my insurance or make self-payment for services provided; and responsibility for payment of non-covered services.     Patient would like the video invitation sent by:  My Chart    Mode of Communication:  Video Conference via [facility]    As the provider I attest to compliance with applicable laws and regulations related to telemedicine.    Visit Activities (Refresh list every visit): [facility] Only    Diagnostic Assessment Date: 03/11/2021  Treatment Plan Review Date: 04/09/2023  See Flowsheets for today's PHQ-9 and STACIE-7 results  Previous PHQ-9:       3/9/2023     8:29 AM 3/19/2023    10:24 PM 5/19/2023     6:34 AM   PHQ-9 SCORE   PHQ-9 Total Score MyChart 17 (Moderately severe depression) 18 (Moderately severe depression) 13 (Moderate depression)   PHQ-9 Total Score 17 18 13     Previous STACIE-7:       9/1/2022     8:20 AM 3/20/2023     7:59 AM 5/17/2023    10:39 PM   STACIE-7 SCORE   Total Score 7 (mild anxiety) 7 (mild anxiety) 11 (moderate  "anxiety)   Total Score 7 7 11    11       JAYRO LEVEL:      7/9/2009     9:00 AM 1/27/2011     3:00 PM   JAYRO Score (Last Two)   JAYRO Raw Score 44 50   Activation Score 70.8 86.3   JAYRO Level 4 4       DATA  Extended Session (60+ minutes): No  Interactive Complexity: No  Crisis: No  [facility] Patient: No    Treatment Objective(s) Addressed in This Session:  Target Behavior(s): disease management/lifestyle changes pain management    Depressed Mood:    Anxiety: will develop more effective coping skills to manage anxiety symptoms  Psychological distress related to Pain    Current Stressors / Issues:  Reported that she is doing well. She spoke about going out hunting for arrooy mushrooms. She also has been very active with her  tending her garden. Her mood has been \"all over the place but as long as I don't think too much I'm ok.\" She said that her physical health continues to be a challenge and she has difficulty finding meaning to her life. Despite this, she feels she is better than she had been in the past. She does not have any particular requests or needs for today. She is still getting ketamine treatments once a month which she appreciates.       03/20/2023:  Reported that she is feeling essentially the same. Things are \"fine\" and her emotions are \"relatively stable.\" She said that it is \"not great but not terrible.\" She spoke about her routine to get herself moving in the morning and doing the things she knows she are helpful for her. She is struggling with significant anxiety which she is realizing is probably related to her cannabis use. She spoke about her SI saying that she is not ruminating on self-harm and she does not have them daily. They are brief and not as intense as they were in the past. She feels stable in that respect compared to her past when those thoughts felt more threatening. She noted that she is continuing to stay connected with her family which helps.       01/09/2023:  \"Overall, I think my " "mental status is a lot better.\" She spoke about some things that she experienced with her family over the holidays but managed it much better this year than she typically does. She spoke about the tools she is using and how she feels ketamine has been so helpful. She still has some problems with sleep and feeling tired throughout the day but it is still a little better then it once was. She likes her current medication regimen but is struggling with how to get her prescription of Adderall due to the national shortage. Otherwise, she is pleased with her progress and spoke about how active she is trying to be. She still has some SI but noted that it is not as intense or frequent as it once was and feels good about that.      10/07/2022:  Reported that she is doing well. She finds ketamine has been very helpful. She noted that she is more active and doing more with family/friends. She is finding that she is dealing with more physical pain and more frustrated by that, but is doing what she needs to do to manage that better. She decided to not pursue DBT at this time citing finances and limited time available for more appointments. Her concerns were acknowledged and she was encouraged to consider ways to make room for therapy in the future which she acknowledged would be helpful when other physical concerns were better addressed. She has no requests for today.      09/07/2022 (Janeth Jaquez, [facility]):  MH Update: \"pretty good.\" Not feeling as down as before and less labile. Trying to stay busy and is using pacing as discussed in prior sessions. Some days are still hard and feels like constantly trying to do things to feel better: stretching, walking, mindfulness, breathing, self-talk. Does help but not feel like a quality of life.  Patient expresses frustration that she has been dealing with chronic pain for so long and feels that it will never improve.  She feels that taking care of the pain is exhausting in itself.  " "Patient expresses wanting to be able to do things and live her life without needing to stop and take care of her pain, feeling limited in what she can do.  Patient also expresses frustration that she has so many providers and feels that she is not getting any improvement.  Patient is aware of the mind body link and has been working to stabilize and minimize her stressors.  Patient feels that her mental health is more stable than it has been in a long time and yet is frustrated that her body continues to suffer.  Patient did complete her neuropsych testing and found it did provide some answers/confirmation of what she is actually experiencing.  Patient continues to do her ketamine treatments as she feels they are helpful as well.  Patient admits that she stopped her individual therapy as she did not find it beneficial at this time.  [facility] validated patient for her experience and the difficulties of managing chronic pain.  [facility] discussed the idea of patient trying to simplify her care network and focus on having some hard conversations with 1 or 2 of her core providers instead of having more providers added on.  [facility] processed patient being ready to accept things that she may not want to hear but knows are reality and how this relates to grieving the changes in her life.  [facility] provided encouragement the patient is doing all the positive things that she can and to continue doing so, even with her frustrations.  [facility] assessed for safety.    SI: thoughts of not feeling a point to living but not \"intentional.\" No plan or intention to harm self. Aware of crisis resources to use as needed.    Tx: Janelle Brooks with [facility]. Decided to put on hold as not feel helpful for now.  [facility] will look into DBT/health psychology referrals that could be a better fit for patient's needs at this time.    Most Important: keep medication the same, feeling stable with mental health      08/08/2022:  Reported that she has been practicing activity " "pacing more often. She finds that it helps her to take breaks more often than in the past and cumulatively she has been completing more tasks. Despite having more situations for her to deal with recently, she finds that she has been calm which allows her to be more productive. She noted that she is tolerating requip better than mirapex. She had an intake with the [facility] program at [facility] and attended her first group meeting. She noted that she does not feel ready for groups right now. She finds that she gets overwhelmed leading to difficulty comprehending information. \"I can't make sense of things.\" She has an interview with neuropsychology coming up soon and wants to finish that before doing more intensive therapy like the [facility] program.       07/11/2022:  Spoke about her recent psychiatric hospitalization. She noted that it was helpful and she is glad it occurred but did not like feeling \"locked up.\" She spoke about recognizing some behaviors that have made her question if she was having manic episodes. She noted having periods of significant over-spending and very talkative in the hospital, \"up, unleashed, blabbering\", she said her mood was up \"but not euphoric,\" needing less sleep, impatience/irritability, and saying things to people without thinking of the impact. She noted that it was awkward coming back home with her family. Her family initially was home but all had to leave to do various things and she was left alone. She took some time in the [facility] and had an \"outside experience\" questioning if she felt that she did not belong. It was very brief and she felt better later. She reported that on reflection, she believes she may have been having manic/hypomanic episodes as early as college. She spoke about being able to work 24 hour shifts without significant problems. She said that she is recognizing \"episodic\" periods in her life when there has been a change in her mood/energy suggesting possible hypomanic " "episodes.      06/23/2022:  Reported that she tested positive for COVID yesterday. This is her second time and said that it is not as severe as the first time. She spoke about her experience with Ketamine noting that she is very pleased with how peaceful she feels with the treatments. \"It's been very relaxing and affirming experience.\" She feels relaxed and in a good mood after the treatment. She finds it easier to bring herself back to that level of relaxation during times of stress. She spoke about her ongoing suicidal ideation explaining that \"when the thoughts enter my mind they are not working thoughts.\" She explained that the suicidal thoughts are just as frequent but less intense, do not last as long, and are less threatening to her than they have been in the past. She was commended for her continued work and efforts to improve herself, and she was encouraged to continue working toward her goals.      04/13/2022:  Does not feel like she received a significant benefit from TMS. She feels she was not having as frequent ruminative thoughts of suicide but that has started to return recently. It tends to happen more often when her physical pain is more intense. She got to a point where she set a date to kill herself but she changed her mind because her son came to spend time with her and she changed her mind saying, \"It just wasn't right yet.\" She noted that she has a few plans but will not discuss them saying, \"I don't want anyone to take those options away from me.\"       Progress on Treatment Objective(s) / Homework:  Satisfactory progress - ACTION (Actively working towards change); Intervened by reinforcing change plan / affirming steps taken     Motivational Interviewing    MI Intervention: Expressed Empathy/Understanding, Permission to raise concern or advise, Open-ended questions and Reflections: simple and complex     Change Talk Expressed by the Patient: Desire to change Reasons to change Activation " Taking steps    Provider Response to Change Talk: E - Evoked more info from patient about behavior change, A - Affirmed patient's thoughts, decisions, or attempts at behavior change, R - Reflected patient's change talk and S - Summarized patient's change talk statements    Also provided psychoeducation about behavioral health condition, symptoms, and treatment options    Care Plan review completed: No    Medication Review:  Changes to psychiatric medications, see updated Medication List in EPIC.     Medication Compliance:  Yes    Changes in Health Issues:   Yes: Pain, Associated Psychological Distress    Chemical Use Review:   Substance Use: Chemical use reviewed, no active concerns identified      Tobacco Use: No current tobacco use.      Assessment: Current Emotional / Mental Status (status of significant symptoms):  Risk status (Self / Other harm or suicidal ideation)  Patient has had a history of suicidal ideation: ongoing  Patient denies current fears or concerns for personal safety.  Patient reports the following current or recent suicidal ideation or behaviors: Patient reports she continues to have thoughts of suicide but denies that there is anything active or intentional at this time.  Patient does feel safe..  Patient denies current or recent homicidal ideation or behaviors.  Patient denies current or recent self injurious behavior or ideation.  Patient denies other safety concerns.  A safety and risk management plan has been developed including: Patient consented to co-developed safety plan.  A safety and risk management plan was completed.  Patient agreed to use safety plan should any safety concerns arise.  A copy was given to the patient.  Patient was reminded to access her safety plan and crisis resources as needed.    Appearance:   Appropriate   Eye Contact:   Good   Psychomotor Behavior: Normal   Attitude:   Cooperative   Orientation:   All  Speech   Rate / Production: Talkative Normal     Volume:  Normal   Mood:    Anxious  Dysphoric  Affect:    Appropriate   Thought Content:  Clear   Thought Form:  Coherent  Logical   Insight:    Fair     Diagnoses:  1. Persistent depressive disorder    2. STACIE (generalized anxiety disorder)        Collateral Reports Completed:  Communicated with: Dr. Perez    Plan: (Homework, other):  Patient was given information about behavioral services and encouraged to schedule a follow up appointment with the clinic [facility] in conjunction with next [facility] appointment.  She was also given information about mental health symptoms and treatment options .  CD Recommendations: No indications of CD issues.  Matt Manzo PsyD, LP    ______________________________________________________________________    [facility] [place] : Treatment Plan    Patient's Name: Janelle hWite  YOB: 1960    Date of Creation: April 13, 2022  Date Treatment Plan Last Reviewed/Revised: May 19, 2023  DSM5 Diagnoses: 296.33 (F33.2) Major Depressive Disorder, Recurrent Episode, Severe _, 300.02 (F41.1) Generalized Anxiety Disorder or Substance-Related & Addictive Disorders 292.9 (F12.99) Unspecified Cannabis Related Disorder  Psychosocial / Contextual Factors: chronic pain  PROMIS (reviewed every 90 days):   PROMIS 10-Global Health (only subscores and total score):       1/13/2022     7:58 PM 6/3/2022     8:03 AM 6/10/2022     3:24 PM 7/8/2022     1:08 PM 8/5/2022     1:06 PM 1/8/2023     6:09 PM 5/17/2023    10:43 PM   PROMIS-10 Scores Only   Global Mental Health Score 5 8 5 6 8 11 8    8   Global Physical Health Score 10 10 9 11 9 11 8    8   PROMIS TOTAL - SUBSCORES 15 18 14 17 17 22 16    16       Referral / Collaboration:  Referral to another professional/service is not indicated at this time..    Anticipated number of session for this episode of care: Continuous care [facility] patient  Anticipation frequency of session: As determined by   "Ana  Anticipated Duration of each session: 16-37 minutes  Treatment plan will be reviewed in 90 days or when goals have been changed.       MeasurableTreatment Goal(s) related to diagnosis / functional impairment(s)  Goal 1: Patient will work with providers to manage symptoms    I will know I've met my goal when I can enjoy my life again.      Objective #A (Patient Action)  Patient will attend all appointments, take medication as prescribed.  Status: Continued - Date(s): May 19, 2023    Intervention(s)  [facility] will Monitor and assist in overcoming barriers to treatment adherence    Objective #B  Patient will consider all recommendations offered.  Status: Continued - Date(s): May 19, 2023     Intervention(s)  [facility] will educate patient on treatment options, clarify concerns, work with pt to overcome any resistance to compliance.      Goal 2: Patient will replace self-harm thoughts/behaviors with self-care activities    I will know I've met my goal when I stop having those thoughts.      Objective #A (Patient Action)                          Status: Continued - Date(s): 01/09/2023  Patient will assist in creating safety plan.     Intervention(s)  [facility] will assist in creation of safety plan and provide copy to patient.     Objective #B  Patient will report using safety plan as needed.                       Status: Continued - Date(s): 01/09/2023     Intervention(s)  [facility] will monitor safety, use of plan, adjust plan as needed, work through any identified barriers/resistance to following her plan.      Patient has reviewed and agreed to the above plan.      Melvin Manzo PsyD  05/19/2023          [facility]                                       Patient's Name: Janelle TORRES Christopher    March 20, 2023     SAFETY PLAN:  Step 1: Warning signs / cues (Thoughts, images, mood, situation, behavior) that a crisis may be developing:  ? Thoughts: \"I don't matter\", \"People would be better off without me\", " "\"I can't do this anymore\" and \"I just want this to end\"  ? Images: obsessive thoughts of death or dying: thoughts of carrying out plan  ? Thinking Processes: ruminations (can't stop thinking about my problems): ruminate on my plan and highly critical and negative thoughts: if  says something critical i shut down  ? Mood: worsening depression, hopelessness, disinhibited (not caring about things or consequences) and worthlessness  ? Behaviors: isolating/withdrawing , can't stop crying, not taking care of myself and not taking care of my responsibilities  ? Situations: relationship problems     Step 2: Coping strategies - Things I can do to take my mind off of my problems without contacting another person (relaxation technique, physical activity):  ? Distress Tolerance Strategies:  play with my pet  and watch a funny movie:    ? Physical Activities: go for a walk and get in the jacuzzi  ? Focus on helpful thoughts:  \"This is temporary\"    Step 3: People and social settings that provide distraction:              Name: Alyx     Phone: in my phone              Name: Raven   Phone: in my phone  ? park                Step 4: Remind myself of people and things that are important to me and worth living for:  Children, dog, friends    Step 5: When I am in crisis, I can ask these people to help me use my safety plan:              Name: Alyx     Phone: in my phone              Name: Raven   Phone: in my phone    Step 6: Making the environment safe:   ? none identified    Step 7: Professionals or agencies I can contact during a crisis:  ? Suicide Prevention Lifeline: 8-526-987-TALK (4998)  ? Crisis Text Line Service: Text   HOME  to 246-132.    Local Crisis Services: [facility]     Call 481 or go to my nearest emergency department.       I helped develop this safety plan and agree to use it when needed.  I have been given a copy of this plan.       Patient signature: " _______________________________________________________________  Today s date: March 20, 2023    Adapted from Safety Plan Template 2008 Antionette Trevnio and Joseph Leon is reprinted with the express permission of the authors.  No portion of the Safety Plan Template may be reproduced without the express, written permission.  You can contact the authors at [email or web address] or [email or web address]

## 2023-05-23 ENCOUNTER — INFUSION THERAPY VISIT (OUTPATIENT)
Dept: INFUSION THERAPY | Facility: CLINIC | Age: 63
End: 2023-05-23
Attending: PSYCHIATRY & NEUROLOGY
Payer: COMMERCIAL

## 2023-05-23 VITALS
WEIGHT: 158.9 LBS | DIASTOLIC BLOOD PRESSURE: 60 MMHG | SYSTOLIC BLOOD PRESSURE: 102 MMHG | OXYGEN SATURATION: 98 % | TEMPERATURE: 97.9 F | BODY MASS INDEX: 26.32 KG/M2 | RESPIRATION RATE: 16 BRPM | HEART RATE: 58 BPM

## 2023-05-23 DIAGNOSIS — F33.2 SEVERE EPISODE OF RECURRENT MAJOR DEPRESSIVE DISORDER, WITHOUT PSYCHOTIC FEATURES (H): Primary | ICD-10-CM

## 2023-05-23 PROCEDURE — 96365 THER/PROPH/DIAG IV INF INIT: CPT

## 2023-05-23 PROCEDURE — 258N000003 HC RX IP 258 OP 636: Performed by: PSYCHIATRY & NEUROLOGY

## 2023-05-23 PROCEDURE — 250N000009 HC RX 250: Performed by: PSYCHIATRY & NEUROLOGY

## 2023-05-23 RX ORDER — ALBUTEROL SULFATE 0.83 MG/ML
2.5 SOLUTION RESPIRATORY (INHALATION)
Status: CANCELLED | OUTPATIENT
Start: 2023-05-23

## 2023-05-23 RX ORDER — ALBUTEROL SULFATE 90 UG/1
1-2 AEROSOL, METERED RESPIRATORY (INHALATION)
Status: CANCELLED
Start: 2023-05-23

## 2023-05-23 RX ORDER — DIPHENHYDRAMINE HYDROCHLORIDE 50 MG/ML
50 INJECTION INTRAMUSCULAR; INTRAVENOUS
Status: CANCELLED
Start: 2023-05-23

## 2023-05-23 RX ORDER — HEPARIN SODIUM (PORCINE) LOCK FLUSH IV SOLN 100 UNIT/ML 100 UNIT/ML
5 SOLUTION INTRAVENOUS
Status: CANCELLED | OUTPATIENT
Start: 2023-05-23

## 2023-05-23 RX ORDER — HYDRALAZINE HYDROCHLORIDE 20 MG/ML
10 INJECTION INTRAMUSCULAR; INTRAVENOUS
Status: CANCELLED | OUTPATIENT
Start: 2023-05-23

## 2023-05-23 RX ORDER — HEPARIN SODIUM,PORCINE 10 UNIT/ML
5 VIAL (ML) INTRAVENOUS
Status: CANCELLED | OUTPATIENT
Start: 2023-05-23

## 2023-05-23 RX ORDER — NALOXONE HYDROCHLORIDE 0.4 MG/ML
0.2 INJECTION, SOLUTION INTRAMUSCULAR; INTRAVENOUS; SUBCUTANEOUS
Status: CANCELLED | OUTPATIENT
Start: 2023-05-23

## 2023-05-23 RX ORDER — EPINEPHRINE 1 MG/ML
0.3 INJECTION, SOLUTION, CONCENTRATE INTRAVENOUS EVERY 5 MIN PRN
Status: CANCELLED | OUTPATIENT
Start: 2023-05-23

## 2023-05-23 RX ORDER — MEPERIDINE HYDROCHLORIDE 25 MG/ML
25 INJECTION INTRAMUSCULAR; INTRAVENOUS; SUBCUTANEOUS EVERY 30 MIN PRN
Status: CANCELLED | OUTPATIENT
Start: 2023-05-23

## 2023-05-23 RX ORDER — ONDANSETRON 2 MG/ML
4 INJECTION INTRAMUSCULAR; INTRAVENOUS
Status: CANCELLED | OUTPATIENT
Start: 2023-05-23

## 2023-05-23 RX ORDER — METHYLPREDNISOLONE SODIUM SUCCINATE 125 MG/2ML
125 INJECTION, POWDER, LYOPHILIZED, FOR SOLUTION INTRAMUSCULAR; INTRAVENOUS
Status: CANCELLED
Start: 2023-05-23

## 2023-05-23 RX ADMIN — KETAMINE HYDROCHLORIDE 65 MG: 50 INJECTION, SOLUTION INTRAMUSCULAR; INTRAVENOUS at 09:36

## 2023-05-23 ASSESSMENT — PAIN SCALES - GENERAL: PAINLEVEL: MODERATE PAIN (5)

## 2023-05-23 NOTE — PROGRESS NOTES
Infusion Nursing Note:  Janelle TORRES Jaimeyvette presents today for a ketamine infusion.    Patient seen by provider today: No   present during visit today: Not Applicable.    Note: pt denies any suicidal ideation at this time..      Intravenous Access:  Peripheral IV placed.      Post Infusion Assessment:  Patient tolerated infusion without incident.       Discharge Plan:   Patient and/or family verbalized understanding of discharge instructions and all questions answered.      Anais Mosley RN    Infusion given over 40 min followed by a 1 hour observation.  Vitals remained stable throughout pt's time here.    Administrations This Visit     ketamine (KETALAR) 1.1 mg/kg = 65 mg in sodium chloride 0.9 % 56.3 mL intermittent infusion     Admin Date  05/23/2023 Action  $New Bag Dose  65 mg Rate  84.5 mL/hr Route  Intravenous Administered By  Anais Mosley RN

## 2023-06-07 ENCOUNTER — OFFICE VISIT (OUTPATIENT)
Dept: PSYCHIATRY | Facility: CLINIC | Age: 63
End: 2023-06-07
Payer: COMMERCIAL

## 2023-06-07 VITALS
TEMPERATURE: 98.2 F | HEART RATE: 78 BPM | SYSTOLIC BLOOD PRESSURE: 148 MMHG | BODY MASS INDEX: 26.49 KG/M2 | HEIGHT: 65 IN | WEIGHT: 159 LBS | DIASTOLIC BLOOD PRESSURE: 70 MMHG

## 2023-06-07 DIAGNOSIS — F33.41 RECURRENT MAJOR DEPRESSIVE DISORDER, IN PARTIAL REMISSION (H): Primary | ICD-10-CM

## 2023-06-07 ASSESSMENT — PATIENT HEALTH QUESTIONNAIRE - PHQ9: SUM OF ALL RESPONSES TO PHQ QUESTIONS 1-9: 17

## 2023-06-07 NOTE — PROGRESS NOTES
"  Psychiatry Clinic Progress Note                                                                   Janelle White is a 62 year old female   Therapist: None (stopped September 2021)   PCP: Carmen Garcia  Other Providers:  Kimberly Perez DO (psychiatrist)       Interim History                                                                                                        4, 4     The patient is a good historian and reports good treatment adherence.      \"I've made a great deal of progress in many areas\". However, has more \"woefull days\". \"Ihave to continually recenter myself\".     Things she feels good about are \"short lived\". Has a hard time pacing herself, burns out quickly. If feels uncomfortable, instead of resting finds that she \"almost dissociates\".    One of hobbies is Greek ceramics, specifically putting back together broken pottery \"you can't neal it\"    Flowers aren't coming up like she wished the would    \"Staying busy is key for me\"     -Consider VNS, repeat TMS        ----------------------------------------------    She stopped going to therapy last September (2021) after her therapist retired after being ill. She would likely benefit from CBT and ACT. She see's Kimberly Perez at [facility] for medication management who can recommend therapy providers.     She said she has been taking 50 mg of desvenlafaxine daily though her list shows it as 100 mg.     Considered MAOI (plan to discuss with Kimberly Perez) though patient hesitant due to past issues with oral medications. Expressed concerns about treatment that potential interacts with her pain medications. She feels that her pain has been well under control. Her PCP, Carmen Garcia ([place]) manages her opioids. Janelle gave consent for Dr. Yanes to speak with Carmen Garcia about medications and possible interactions. She was informed that ketamine is unlikely to interact with her medications on her list. Ketamine was discussed with Janelle. She " "is open to the idea of trying ketamine stating \"I have nothing to lose.\"     Recent Symptoms:   Depression:  suicidal ideation, depressed mood, anhedonia, low energy, poor concentration /memory, feeling hopeless, excessive crying, overwhelmed and mood dysregulation  Anxiety:  excessive worry and feeling fearful  Panic Attack:  dizziness, flushing, SOB, trouble taking deep breath and chest tightness     Recent Substance Use:  none reported        Social/ Family History                                  [per patient report]                                 1ea,1ea   No changes     Medical / Surgical History                                                                                                                  Patient Active Problem List   Diagnosis    PERSONAL HX OF  MELANOMA    B-complex deficiency    Migraine    Chronic Low Back Pain    CARDIOVASCULAR SCREENING; LDL GOAL LESS THAN 160    DDD (degenerative disc disease), cervical    Moderate recurrent major depression (H)    Vitamin D deficiency    Shift work sleep disorder    Chronic pain syndrome    CLL (chronic lymphocytic leukemia) (H)    Controlled substance agreement signed    CLARE (obstructive sleep apnea)    Prediabetes    Hyperlipidemia LDL goal <130    MTHFR gene mutation    CYP2B6 intermediate metabolizer (H)    CYP2D6 poor metabolizer (H)    STACIE (generalized anxiety disorder)    Polyarthralgia    Cellulitis, unspecified cellulitis site    Sepsis, due to unspecified organism, unspecified whether acute organ dysfunction present (H)    Cellulitis    Tension type headache    Status post blepharoplasty    Rotator cuff injury    Regular astigmatism, bilateral    Pain medication agreement    Hypotension    History of laser assisted in situ keratomileusis    COVID-19    Spinal stenosis of lumbar region with radiculopathy    COPD (chronic obstructive pulmonary disease) (H)    Severe episode of recurrent major depressive disorder, without psychotic features " (H)    Suicidal ideation    Immunocompromised (H)    Infection due to 2019 novel coronavirus       Past Surgical History:   Procedure Laterality Date    anterior cervical discectomy C4-5 ,Fusion C5-6-7  10/2007    APPENDECTOMY      BACK SURGERY      BLEPHAROPLASTY BILATERAL  2013    Procedure: BLEPHAROPLASTY BILATERAL;  BILATERAL UPPER LID BLEPHAROPLASTY AND BROWPEXY ;  Surgeon: Godfrey Miguel MD;  Location: [facility]     SECTION      x2    COLONOSCOPY N/A 2020    Procedure: COLONOSCOPY;  Surgeon: Butch Lockhart MD;  Location:  GI    ESOPHAGOSCOPY, GASTROSCOPY, DUODENOSCOPY (EGD), COMBINED N/A 2020    Procedure: ESOPHAGOGASTRODUODENOSCOPY, WITH BIOPSY biosies by cold forceps;  Surgeon: Butch Lockhart MD;  Location:  GI    EYE SURGERY      FUSION LUMBAR ANTERIOR, FUSION LUMBAR POSTERIOR TWO LEVELS, COMBINED  2010    L3-S1 anterior posterior fusion    GENITOURINARY SURGERY  Hysterectomy        HEAD & NECK SURGERY      Cervical spine- c2-T2    HYSTERECTOMY  2006    ovaries intact    HYSTERECTOMY      HYSTERECTOMY, PAP NO LONGER INDICATED      Partial vulvectomy for NEENA III  2009    Pubovaginal sling, post op durasphere injections  2006    wears pad    ROTATOR CUFF REPAIR RT/LT  2011    right    SOFT TISSUE SURGERY  2019    Bilateral carpal/ulnar release    SPINAL FUSION C3-4  2009    C3-4, anterior spinal fusion    TUBAL LIGATION      ZZC APPENDECTOMY  2006        Medical Review of Systems                                                                                                    2,10   none in addition to that documented above    Allergy                                Bupropion, Codeine, Effexor [venlafaxine hydrochloride], Escitalopram, Escitalopram oxalate, Fluoxetine, Levaquin [levofloxacin], Methylphenidate, Milnacipran, Pregabalin, Prozac [fluoxetine hcl], Tramadol, and Venlafaxine    Current Medications                                                                                                        Current Outpatient Medications   Medication Sig Dispense Refill    albuterol (PROAIR HFA/PROVENTIL HFA/VENTOLIN HFA) 108 (90 Base) MCG/ACT inhaler Inhale 2 puffs into the lungs every 6 hours as needed for shortness of breath or wheezing 8.5 g 11    [START ON 6/19/2023] amphetamine-dextroamphetamine (ADDERALL XR) 25 MG 24 hr capsule Take 1 capsule (25 mg) by mouth daily for 30 days 30 capsule 0    [START ON 7/20/2023] amphetamine-dextroamphetamine (ADDERALL XR) 25 MG 24 hr capsule Take 1 capsule (25 mg) by mouth daily for 30 days 30 capsule 0    amphetamine-dextroamphetamine (ADDERALL XR) 25 MG 24 hr capsule Take 1 capsule (25 mg) by mouth daily for 30 days 30 capsule 0    [START ON 6/19/2023] amphetamine-dextroamphetamine (ADDERALL) 15 MG tablet Take 1 tablet (15 mg) by mouth 2 times daily for 30 days 60 tablet 0    [START ON 7/20/2023] amphetamine-dextroamphetamine (ADDERALL) 15 MG tablet Take 1 tablet (15 mg) by mouth 2 times daily for 30 days 60 tablet 0    amphetamine-dextroamphetamine (ADDERALL) 15 MG tablet Take 1 tablet (15 mg) by mouth 2 times daily for 30 days 60 tablet 0    ASHWAGANDHA PO Take 1 tablet by mouth daily (Patient not taking: Reported on 5/5/2023)      desvenlafaxine (PRISTIQ) 50 MG 24 hr tablet Take 1 tablet (50 mg) by mouth daily 90 tablet 1    Estradiol (DIVIGEL) 1 MG/GM GEL Place 1 packet onto the skin daily 30 g 11    famotidine (PEPCID) 20 MG tablet TAKE 1 TABLET(20 MG) BY MOUTH TWICE DAILY (Patient not taking: Reported on 5/2/2023) 180 tablet 1    HYDROcodone-acetaminophen (NORCO)  MG per tablet Take 1 tablet by mouth every 4 hours as needed for severe pain (7-10) max 4 tabs/24 hrs (Patient not taking: Reported on 5/5/2023) 10 tablet 0    hydrOXYzine (VISTARIL) 25 MG capsule Take 1 capsule (25 mg) by mouth 3 times daily as needed for itching (Patient not taking: Reported on 5/2/2023) 30 capsule 3    insulin syringe-needle U-100  "(30G X 1/2\" 1 ML) 30G X 1/2\" 1 ML miscellaneous Inject 1 ml B12 qmonth 10 each 1    Levomefolate Glucosamine (METHYLFOLATE PO) Take 7.5 mg by mouth daily      lidocaine (LIDODERM) 5 % patch Place 4 patches onto the skin daily Apply up to 4 patches to skin. Wear for 12 hours and remove for 12 hrs.  Refill when patient requests. 120 patch 3    LORazepam (ATIVAN) 1 MG tablet Take 1 tablet (1 mg) by mouth every 6 hours as needed for anxiety (Patient not taking: Reported on 5/5/2023) 50 tablet 3    medical cannabis (Patient's own supply.  Not a prescription) Medical Cannabis - Tangerine 4-6 ml by mouth daily. [employer]      methocarbamol (ROBAXIN) 500 MG tablet Take 1 tablet (500 mg) by mouth 4 times daily as needed for muscle spasms 120 tablet 1    naloxone (NARCAN) 4 MG/0.1ML nasal spray Spray 1 spray (4 mg) into one nostril alternating nostrils as needed for opioid reversal every 2-3 minutes until assistance arrives (Patient not taking: Reported on 5/5/2023) 0.2 mL 1    NONFORMULARY Take 2 Scoops by mouth daily Protein and Vitamin shake mix - Nutritional supplement      ondansetron (ZOFRAN ODT) 8 MG ODT tab Take 1 tablet (8 mg) by mouth every 8 hours as needed for nausea 30 tablet 4    Prasterone 6.5 MG INST Place 0.5 suppositories vaginally three times a week 28 each 11    rOPINIRole (REQUIP) 0.25 MG tablet TAKE 1 TO 2 TABLETS(0.25 TO 0.5 MG) BY MOUTH AT BEDTIME (Patient not taking: Reported on 5/5/2023) 180 tablet 3    senna-docusate (SENOKOT-S/PERICOLACE) 8.6-50 MG tablet Take 6-9 tablets by mouth every evening      triamcinolone (KENALOG) 0.1 % external ointment Apply topically 2 times daily (Patient not taking: Reported on 5/5/2023) 80 g 1    vitamin D3 (CHOLECALCIFEROL) 125 MCG (5000 UT) tablet Take 5,000 Units by mouth daily         Vitals                                                                                                                       3, 3   BP (!) 148/70 (BP Location: Right arm, " "Patient Position: Sitting, Cuff Size: Adult Regular)   Pulse 78   Temp 98.2  F (36.8  C) (Temporal)   Ht 1.651 m (5' 5\")   Wt 72.1 kg (159 lb)   LMP 05/01/2005 (LMP Unknown)   BMI 26.46 kg/m       Mental Status Exam                                                                                    9, 14 cog gs     Alertness: alert  and oriented  Appearance: well groomed  Behavior/Demeanor: cooperative and tearful and in some anxious distress , with good  eye contact   Speech:  normal rate/rythm though at times rapid and/or quivering  Language: intact  Psychomotor: restless  Mood: \"I've made a great deal of progress in many areas\"  Affect: tearful; was congruent to mood; was congruent to content  Thought Process/Associations: unremarkable  Thought Content:  Reports suicidal ideation  Perception:  No apparent psychotic symptoms observed or reported  Insight: good  Judgment: good  Cognition: (6) does  appear grossly intact; formal cognitive testing was not done  Gait/Station and/or Muscle Strength/Tone: Not observed due to  Video visit     Labs and Data                                                                                                                 Rating Scales:    N/A    PHQ9:        3/19/2023    10:24 PM 5/19/2023     6:34 AM 6/7/2023     2:00 PM   PHQ   PHQ-9 Total Score 18 13 17   Q9: Thoughts of better off dead/self-harm past 2 weeks More than half the days Nearly every day More than half the days   F/U: Thoughts of suicide or self-harm Yes Yes    F/U: Self harm-plan Yes Yes    F/U: Self-harm action No No    F/U: Safety concerns No No          Diagnosis and Assessment                                                                             m2, h3     continued improvement with ketamine, remains with some ffective instability and distress. Recommended ACT therapy. No changes in plan today.     Background:  Janelle White is a 60 year old female with previous psychiatric history of " MDD, recurrent, severe, chronic pain syndrome, h/o CLL. On initial presentation it was thought that Janelle was suffering from an episode of depression that is closely related to her pain syndrome and the resultant physically inability to engage in activities or work. Her pain management was improved and she was treated with TMS though continues to endorse significant depression and anxiety symptoms. She is hesitant to trial MAOIs or other oral agents that could potentially interfere with her current pain management as she feels it is well under control. She is interested in pursuing ketamine therapy, which should not interfere with her medications and may actually help with her pain. She gave consent to speak with her PCP about medication management and potential interventions for continued depression.      Today the following issues were addressed:    1) Major depressive disorder, recurrent, severe  2) chronic pain   3) treatment response   4) fatigue     [place] Prescription Monitoring Program [] review was not needed today.    PSYCHOTROPIC DRUG INTERACTIONS: none clinically relevant    Plan                                                                                                                    m2, h3      1) Majro depressive disorder, recurrent, severe   -- Medication: continue current outpatient medications    -- Psychotherapy: recommended patient speak with her psychiatry provider to be referred for individual psychotherapy, specifically ACT and/or CBT    -- Procedures:               - continue ketamine    -- Referrals: None      RTC: 6 weeks  CRISIS NUMBERS:   Provided routinely in AVS.    Treatment Risk Statement:  The patient understands the risks, benefits, adverse effects and alternatives. Agrees to treatment with the capacity to do so. No medical contraindications to treatment. Agrees to call clinic for any problems. The patient understands to call 911 or go to the nearest ED if life threatening  or urgent symptoms occur.     Zafar Yanes MD, PhD

## 2023-06-13 ENCOUNTER — INFUSION THERAPY VISIT (OUTPATIENT)
Dept: INFUSION THERAPY | Facility: CLINIC | Age: 63
End: 2023-06-13
Attending: PSYCHIATRY & NEUROLOGY
Payer: COMMERCIAL

## 2023-06-13 VITALS
DIASTOLIC BLOOD PRESSURE: 62 MMHG | SYSTOLIC BLOOD PRESSURE: 102 MMHG | HEART RATE: 66 BPM | TEMPERATURE: 97.9 F | OXYGEN SATURATION: 99 %

## 2023-06-13 DIAGNOSIS — F33.2 SEVERE EPISODE OF RECURRENT MAJOR DEPRESSIVE DISORDER, WITHOUT PSYCHOTIC FEATURES (H): Primary | ICD-10-CM

## 2023-06-13 PROCEDURE — 258N000003 HC RX IP 258 OP 636: Performed by: PSYCHIATRY & NEUROLOGY

## 2023-06-13 PROCEDURE — 250N000009 HC RX 250: Performed by: PSYCHIATRY & NEUROLOGY

## 2023-06-13 PROCEDURE — 96365 THER/PROPH/DIAG IV INF INIT: CPT

## 2023-06-13 RX ORDER — MEPERIDINE HYDROCHLORIDE 25 MG/ML
25 INJECTION INTRAMUSCULAR; INTRAVENOUS; SUBCUTANEOUS EVERY 30 MIN PRN
Status: CANCELLED | OUTPATIENT
Start: 2023-06-13

## 2023-06-13 RX ORDER — DIPHENHYDRAMINE HYDROCHLORIDE 50 MG/ML
50 INJECTION INTRAMUSCULAR; INTRAVENOUS
Status: CANCELLED
Start: 2023-06-13

## 2023-06-13 RX ORDER — HEPARIN SODIUM,PORCINE 10 UNIT/ML
5 VIAL (ML) INTRAVENOUS
Status: CANCELLED | OUTPATIENT
Start: 2023-06-13

## 2023-06-13 RX ORDER — HEPARIN SODIUM (PORCINE) LOCK FLUSH IV SOLN 100 UNIT/ML 100 UNIT/ML
5 SOLUTION INTRAVENOUS
Status: CANCELLED | OUTPATIENT
Start: 2023-06-13

## 2023-06-13 RX ORDER — HYDRALAZINE HYDROCHLORIDE 20 MG/ML
10 INJECTION INTRAMUSCULAR; INTRAVENOUS
Status: CANCELLED | OUTPATIENT
Start: 2023-06-13

## 2023-06-13 RX ORDER — ONDANSETRON 2 MG/ML
4 INJECTION INTRAMUSCULAR; INTRAVENOUS
Status: CANCELLED | OUTPATIENT
Start: 2023-06-13

## 2023-06-13 RX ORDER — NALOXONE HYDROCHLORIDE 0.4 MG/ML
0.2 INJECTION, SOLUTION INTRAMUSCULAR; INTRAVENOUS; SUBCUTANEOUS
Status: CANCELLED | OUTPATIENT
Start: 2023-06-13

## 2023-06-13 RX ORDER — METHYLPREDNISOLONE SODIUM SUCCINATE 125 MG/2ML
125 INJECTION, POWDER, LYOPHILIZED, FOR SOLUTION INTRAMUSCULAR; INTRAVENOUS
Status: CANCELLED
Start: 2023-06-13

## 2023-06-13 RX ORDER — ALBUTEROL SULFATE 90 UG/1
1-2 AEROSOL, METERED RESPIRATORY (INHALATION)
Status: CANCELLED
Start: 2023-06-13

## 2023-06-13 RX ORDER — ALBUTEROL SULFATE 0.83 MG/ML
2.5 SOLUTION RESPIRATORY (INHALATION)
Status: CANCELLED | OUTPATIENT
Start: 2023-06-13

## 2023-06-13 RX ORDER — EPINEPHRINE 1 MG/ML
0.3 INJECTION, SOLUTION, CONCENTRATE INTRAVENOUS EVERY 5 MIN PRN
Status: CANCELLED | OUTPATIENT
Start: 2023-06-13

## 2023-06-13 RX ADMIN — SODIUM CHLORIDE 65 MG: 9 INJECTION, SOLUTION INTRAVENOUS at 09:35

## 2023-06-13 NOTE — PROGRESS NOTES
Infusion Nursing Note:  Janelle White presents today for ketamine infusion.    Patient seen by provider today: No   present during visit today: Not Applicable.    Note: Pt reports baseline pain. Slept through most of infusion.      Intravenous Access:  Peripheral IV placed.    Treatment Conditions:  Not Applicable.      Post Infusion Assessment:  Patient tolerated infusion without incident.  Patient observed for 60 minutes post ketamine per protocol.  Blood return noted pre and post infusion.  Site patent and intact, free from redness, edema or discomfort.  No evidence of extravasations.  Access discontinued per protocol.       Discharge Plan:   Discharge instructions reviewed with: Patient.  Patient and/or family verbalized understanding of discharge instructions and all questions answered.  Patient discharged in stable condition accompanied by: self.  Departure Mode: Ambulatory.      Kasey Anand RN

## 2023-06-20 ENCOUNTER — TELEPHONE (OUTPATIENT)
Dept: PSYCHIATRY | Facility: CLINIC | Age: 63
End: 2023-06-20
Payer: COMMERCIAL

## 2023-06-20 NOTE — TELEPHONE ENCOUNTER
Left voicemail for Zenia with [facility]:    Zenia requested that [facility] secure auth for pt's infusion. We do not secure auth's for outside clinics and [facility] will need to secure their own.     Isis Mathis, [place]  [facility]fusion [facility] Supervisor

## 2023-07-11 ENCOUNTER — INFUSION THERAPY VISIT (OUTPATIENT)
Dept: INFUSION THERAPY | Facility: CLINIC | Age: 63
End: 2023-07-11
Attending: PSYCHIATRY & NEUROLOGY
Payer: COMMERCIAL

## 2023-07-11 ENCOUNTER — PATIENT OUTREACH (OUTPATIENT)
Dept: CARE COORDINATION | Facility: CLINIC | Age: 63
End: 2023-07-11

## 2023-07-11 VITALS
SYSTOLIC BLOOD PRESSURE: 97 MMHG | DIASTOLIC BLOOD PRESSURE: 61 MMHG | HEART RATE: 69 BPM | HEIGHT: 65 IN | BODY MASS INDEX: 26.48 KG/M2 | RESPIRATION RATE: 16 BRPM | WEIGHT: 158.95 LBS | OXYGEN SATURATION: 94 %

## 2023-07-11 DIAGNOSIS — F33.2 SEVERE EPISODE OF RECURRENT MAJOR DEPRESSIVE DISORDER, WITHOUT PSYCHOTIC FEATURES (H): Primary | ICD-10-CM

## 2023-07-11 PROCEDURE — 258N000003 HC RX IP 258 OP 636: Performed by: PSYCHIATRY & NEUROLOGY

## 2023-07-11 PROCEDURE — 96365 THER/PROPH/DIAG IV INF INIT: CPT

## 2023-07-11 PROCEDURE — 250N000009 HC RX 250: Performed by: PSYCHIATRY & NEUROLOGY

## 2023-07-11 RX ORDER — HEPARIN SODIUM,PORCINE 10 UNIT/ML
5 VIAL (ML) INTRAVENOUS
Status: CANCELLED | OUTPATIENT
Start: 2023-07-11

## 2023-07-11 RX ORDER — EPINEPHRINE 1 MG/ML
0.3 INJECTION, SOLUTION, CONCENTRATE INTRAVENOUS EVERY 5 MIN PRN
Status: CANCELLED | OUTPATIENT
Start: 2023-07-11

## 2023-07-11 RX ORDER — ALBUTEROL SULFATE 90 UG/1
1-2 AEROSOL, METERED RESPIRATORY (INHALATION)
Status: CANCELLED
Start: 2023-07-11

## 2023-07-11 RX ORDER — ONDANSETRON 2 MG/ML
4 INJECTION INTRAMUSCULAR; INTRAVENOUS
Status: CANCELLED | OUTPATIENT
Start: 2023-07-11

## 2023-07-11 RX ORDER — DIPHENHYDRAMINE HYDROCHLORIDE 50 MG/ML
50 INJECTION INTRAMUSCULAR; INTRAVENOUS
Status: CANCELLED
Start: 2023-07-11

## 2023-07-11 RX ORDER — METHYLPREDNISOLONE SODIUM SUCCINATE 125 MG/2ML
125 INJECTION, POWDER, LYOPHILIZED, FOR SOLUTION INTRAMUSCULAR; INTRAVENOUS
Status: CANCELLED
Start: 2023-07-11

## 2023-07-11 RX ORDER — HEPARIN SODIUM (PORCINE) LOCK FLUSH IV SOLN 100 UNIT/ML 100 UNIT/ML
5 SOLUTION INTRAVENOUS
Status: CANCELLED | OUTPATIENT
Start: 2023-07-11

## 2023-07-11 RX ORDER — MEPERIDINE HYDROCHLORIDE 25 MG/ML
25 INJECTION INTRAMUSCULAR; INTRAVENOUS; SUBCUTANEOUS EVERY 30 MIN PRN
Status: CANCELLED | OUTPATIENT
Start: 2023-07-11

## 2023-07-11 RX ORDER — HYDRALAZINE HYDROCHLORIDE 20 MG/ML
10 INJECTION INTRAMUSCULAR; INTRAVENOUS
Status: CANCELLED | OUTPATIENT
Start: 2023-07-11

## 2023-07-11 RX ORDER — NALOXONE HYDROCHLORIDE 0.4 MG/ML
0.2 INJECTION, SOLUTION INTRAMUSCULAR; INTRAVENOUS; SUBCUTANEOUS
Status: CANCELLED | OUTPATIENT
Start: 2023-07-11

## 2023-07-11 RX ORDER — ALBUTEROL SULFATE 0.83 MG/ML
2.5 SOLUTION RESPIRATORY (INHALATION)
Status: CANCELLED | OUTPATIENT
Start: 2023-07-11

## 2023-07-11 RX ADMIN — KETAMINE HYDROCHLORIDE 65 MG: 50 INJECTION, SOLUTION INTRAMUSCULAR; INTRAVENOUS at 09:51

## 2023-07-18 ENCOUNTER — HOSPITAL ENCOUNTER (OUTPATIENT)
Dept: MAMMOGRAPHY | Facility: CLINIC | Age: 63
Discharge: HOME | End: 2023-07-18
Attending: NURSE PRACTITIONER | Admitting: NURSE PRACTITIONER
Payer: COMMERCIAL

## 2023-07-18 DIAGNOSIS — Z12.31 VISIT FOR SCREENING MAMMOGRAM: ICD-10-CM

## 2023-07-18 PROCEDURE — 77067 SCR MAMMO BI INCL CAD: CPT

## 2023-07-28 ENCOUNTER — OFFICE VISIT (OUTPATIENT)
Dept: PSYCHIATRY | Facility: CLINIC | Age: 63
End: 2023-07-28
Payer: COMMERCIAL

## 2023-07-28 DIAGNOSIS — F34.1 PERSISTENT DEPRESSIVE DISORDER: Primary | ICD-10-CM
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
## 2023-07-31 ASSESSMENT — PATIENT HEALTH QUESTIONNAIRE - PHQ9: SUM OF ALL RESPONSES TO PHQ QUESTIONS 1-9: 13

## 2023-08-01 ENCOUNTER — TELEPHONE (OUTPATIENT)
Dept: PSYCHIATRY | Facility: CLINIC | Age: 63
End: 2023-08-01

## 2023-08-01 ENCOUNTER — INFUSION THERAPY VISIT (OUTPATIENT)
Dept: INFUSION THERAPY | Facility: CLINIC | Age: 63
End: 2023-08-01
Attending: PSYCHIATRY & NEUROLOGY
Payer: COMMERCIAL

## 2023-08-01 VITALS
DIASTOLIC BLOOD PRESSURE: 74 MMHG | RESPIRATION RATE: 16 BRPM | SYSTOLIC BLOOD PRESSURE: 110 MMHG | OXYGEN SATURATION: 98 % | HEART RATE: 62 BPM

## 2023-08-01 DIAGNOSIS — F33.2 SEVERE EPISODE OF RECURRENT MAJOR DEPRESSIVE DISORDER, WITHOUT PSYCHOTIC FEATURES (H): Primary | ICD-10-CM

## 2023-08-01 PROCEDURE — 250N000009 HC RX 250: Performed by: PSYCHIATRY & NEUROLOGY

## 2023-08-01 PROCEDURE — 258N000003 HC RX IP 258 OP 636: Performed by: PSYCHIATRY & NEUROLOGY

## 2023-08-01 PROCEDURE — 96365 THER/PROPH/DIAG IV INF INIT: CPT

## 2023-08-01 RX ORDER — EPINEPHRINE 1 MG/ML
0.3 INJECTION, SOLUTION, CONCENTRATE INTRAVENOUS EVERY 5 MIN PRN
Status: CANCELLED | OUTPATIENT
Start: 2023-08-01

## 2023-08-01 RX ORDER — NALOXONE HYDROCHLORIDE 0.4 MG/ML
0.2 INJECTION, SOLUTION INTRAMUSCULAR; INTRAVENOUS; SUBCUTANEOUS
Status: CANCELLED | OUTPATIENT
Start: 2023-08-01

## 2023-08-01 RX ORDER — ALBUTEROL SULFATE 0.83 MG/ML
2.5 SOLUTION RESPIRATORY (INHALATION)
Status: CANCELLED | OUTPATIENT
Start: 2023-08-01

## 2023-08-01 RX ORDER — METHYLPREDNISOLONE SODIUM SUCCINATE 125 MG/2ML
125 INJECTION, POWDER, LYOPHILIZED, FOR SOLUTION INTRAMUSCULAR; INTRAVENOUS
Status: CANCELLED
Start: 2023-08-01

## 2023-08-01 RX ORDER — HYDRALAZINE HYDROCHLORIDE 20 MG/ML
10 INJECTION INTRAMUSCULAR; INTRAVENOUS
Status: CANCELLED | OUTPATIENT
Start: 2023-08-01

## 2023-08-01 RX ORDER — DIPHENHYDRAMINE HYDROCHLORIDE 50 MG/ML
50 INJECTION INTRAMUSCULAR; INTRAVENOUS
Status: CANCELLED
Start: 2023-08-01

## 2023-08-01 RX ORDER — HEPARIN SODIUM,PORCINE 10 UNIT/ML
5 VIAL (ML) INTRAVENOUS
Status: CANCELLED | OUTPATIENT
Start: 2023-08-01

## 2023-08-01 RX ORDER — ALBUTEROL SULFATE 90 UG/1
1-2 AEROSOL, METERED RESPIRATORY (INHALATION)
Status: CANCELLED
Start: 2023-08-01

## 2023-08-01 RX ORDER — ONDANSETRON 2 MG/ML
4 INJECTION INTRAMUSCULAR; INTRAVENOUS
Status: CANCELLED | OUTPATIENT
Start: 2023-08-01

## 2023-08-01 RX ORDER — MEPERIDINE HYDROCHLORIDE 25 MG/ML
25 INJECTION INTRAMUSCULAR; INTRAVENOUS; SUBCUTANEOUS EVERY 30 MIN PRN
Status: CANCELLED | OUTPATIENT
Start: 2023-08-01

## 2023-08-01 RX ORDER — HEPARIN SODIUM (PORCINE) LOCK FLUSH IV SOLN 100 UNIT/ML 100 UNIT/ML
5 SOLUTION INTRAVENOUS
Status: CANCELLED | OUTPATIENT
Start: 2023-08-01

## 2023-08-01 RX ADMIN — KETAMINE HYDROCHLORIDE 65 MG: 50 INJECTION, SOLUTION INTRAMUSCULAR; INTRAVENOUS at 10:25

## 2023-08-01 NOTE — PROGRESS NOTES
Infusion Nursing Note:  Janelle White presents today for ketamine infusion.    Patient seen by provider today: No   present during visit today: Not Applicable.    Note: No acute issues.      Intravenous Access:  Peripheral IV placed.    Treatment Conditions:  Not Applicable.      Post Infusion Assessment:  Patient tolerated infusion without incident.  Patient observed for 60 minutes post ketamine infusion per protocol.  No evidence of extravasations.  Access discontinued per protocol.       Discharge Plan:   Discharge instructions reviewed with: Patient.  Patient and/or family verbalized understanding of discharge instructions and all questions answered.  Patient discharged in stable condition accompanied by: self.  Departure Mode: Ambulatory.      Kasey Anand RN

## 2023-08-03 ASSESSMENT — ENCOUNTER SYMPTOMS
PANIC: 0
TREMORS: 0
HYPERTENSION: 0
BACK PAIN: 1
PARALYSIS: 0
COUGH: 0
SPUTUM PRODUCTION: 0
LIGHT-HEADEDNESS: 1
JOINT SWELLING: 1
STIFFNESS: 1
DECREASED CONCENTRATION: 1
WEAKNESS: 1
SINUS CONGESTION: 1
INSOMNIA: 1
HOARSE VOICE: 0
WHEEZING: 0
DYSPNEA ON EXERTION: 1
SORE THROAT: 0
HYPOTENSION: 0
PALPITATIONS: 1
SNORES LOUDLY: 1
MUSCLE CRAMPS: 1
TROUBLE SWALLOWING: 0
NUMBNESS: 1
SPEECH CHANGE: 1
HEADACHES: 1
LOSS OF CONSCIOUSNESS: 0
SMELL DISTURBANCE: 0
COUGH DISTURBING SLEEP: 1
LEG PAIN: 1
SEIZURES: 0
SYNCOPE: 0
NECK PAIN: 1
MYALGIAS: 1
TINGLING: 1
SHORTNESS OF BREATH: 1
SLEEP DISTURBANCES DUE TO BREATHING: 0
MUSCLE WEAKNESS: 1
POSTURAL DYSPNEA: 0
ARTHRALGIAS: 1
TASTE DISTURBANCE: 0
MEMORY LOSS: 1
EXERCISE INTOLERANCE: 1
HEMOPTYSIS: 0
SINUS PAIN: 0
DEPRESSION: 1
DIZZINESS: 1
NECK MASS: 0
ORTHOPNEA: 0
DISTURBANCES IN COORDINATION: 1
NERVOUS/ANXIOUS: 0

## 2023-08-04 ENCOUNTER — OFFICE VISIT (OUTPATIENT)
Dept: ANESTHESIOLOGY | Facility: CLINIC | Age: 63
End: 2023-08-04
Payer: COMMERCIAL

## 2023-08-04 VITALS
DIASTOLIC BLOOD PRESSURE: 64 MMHG | OXYGEN SATURATION: 100 % | HEART RATE: 72 BPM | SYSTOLIC BLOOD PRESSURE: 124 MMHG | WEIGHT: 158 LBS | BODY MASS INDEX: 26.33 KG/M2 | HEIGHT: 65 IN

## 2023-08-04 DIAGNOSIS — M46.1 SACROILIITIS (H): Primary | ICD-10-CM

## 2023-08-04 PROCEDURE — 99214 OFFICE O/P EST MOD 30 MIN: CPT | Performed by: ANESTHESIOLOGY

## 2023-08-04 ASSESSMENT — PAIN SCALES - GENERAL: PAINLEVEL: MODERATE PAIN (4)

## 2023-08-04 NOTE — PATIENT INSTRUCTIONS
Procedures:    Call to schedule your procedure: [phone] option #2  Right Sacroiliac Joint Injection    Your pre-procedure instructions are below, please call our clinic if you have any questions.      Recommended Follow up:      Follow up 6 weeks after procedure.       To speak with a nurse, schedule/reschedule/cancel a clinic appointment, or request a medication refill call: [phone].    You can also reach us by [employer]: [email or web address]      Procedure Information related to COVID-19     Please call [phone] option #2 to schedule, reschedule, or cancel your procedure appointment.   Phones are answered Monday - Friday from 08:00 - 4:30pm.  Leave a voicemail with your name, birth date, and phone number if no one is available to take your call.        You no longer need to test for COVID- 19 prior to your procedure/surgery, unless your physician specifically requests that you test. If you experience COVID symptoms or have tested positive for COVID-19 within 14 days of your scheduled surgery or procedure, please update our office right away and your procedure may have to be postponed.       The procedure center staff will call you several days before the procedure to review important information that you will need to know for the day of the procedure.     Please contact the clinic if you have further questions about this information [phone].        Information related to Scheduling and Pre-Procedure Instructions:    If you must reschedule your procedure more than two times, you must follow up in clinic before rescheduling again.    Preparing for your procedure    CAUTION - FAILURE TO FOLLOW THESE PRE-PROCEDURE INSTRUCTIONS WILL RESULT IN YOUR PROCEDURE BEING RESCHEDULED.    Your Procedure: Right Sacroiliac Joint Injection        You must have a  take you home after your procedure. Transportation by taxi or para-transit is okay as long as you have a responsible adult  accompany you. You must provide your 's full name and contact number at time of check in.     Fasting Protocol Please have nothing to eat or drink 1 hour prior to arrival.     Medications If you take any medications, DO NOT STOP. Take your medications as usual the day of your procedure with a sip of water AT LEAST 2 HOURS PRIOR TO ARRIVAL.    Antibiotics If you are currently taking antibiotics, you must complete the entire dose 7 days prior to your scheduled procedure. You must be clear of any signs or symptoms of infection. If you begin antibiotics, please contact our clinic for instructions.     Fever, Chills, or Rash If you experience a fever of higher than 100 degrees, chills, rash, or open wounds during the one week before your procedure, please call the clinic to see if you may proceed with your procedure.      Medication Hold List  **Patients under Cardiology/Neurology care should consult their provider prior to the pain procedure to verify pre-procedure medication instructions. The information below contains general guidelines.**        Blood Thinners If you are taking daily ASPIRIN, PLAVIX, OR OTHER BLOOD THINNERS SUCH AS COUMADIN/WARFARIN, we will need your prescribing doctor to sign a release permitting you to stop these medications. Once approved by your prescribing doctor - STOP ALL BLOOD THINNERS BASED ON THE TIME TABLE BELOW PRIOR TO YOUR PROCEDURE. If you have been instructed to stop WARFARIN(COUMADIN), you must have an INR lab drawn the day before your procedure. Your INR must be within normal limits before we can perform your injection. MEDICATIONS CAN BE RESTARTED AFTER YOUR PROCEDURE.    24 HOUR HOLD  Lovenox (enoxaparin)  Agrylin (Anagrelide)    3 DAY HOLD  Xarelto (rivaroxaban)    5 DAY HOLD  Coumadin (Warfarin)  Brilinta (ticagrelor) 7 DAY HOLD  Anacin, Bufferin, Ecotrin, Excedrin, Aggrenox (Aspirin)  Pradexa (Dabigatran)  Elmiron (Pentosan)  Plavix (Clopidogrel Bisulfate)  Pletal  (Cilostazol)    10 DAY HOLD  Effient (Prasugel)    14 DAY HOLD  Ticlid (ticlopidine)        Non-steroidal Anti-inflammatories (NSAIDs) DO NOT TAKE any non-steroidal anti-inflammatory medications (NSAIDs) listed on the table below. MEDICATIONS CAN BE RESTARTED AFTER YOUR PROCEDURE. Celebrex is OK to take and does not need to be discontinued.     Medications to stop:  1 DAY HOLD  Advil, Motrin (Ibuprofen)  Voltaren (Diclofenac)  Toradol (Ketorolac)    3 DAY HOLD  Arthrotec (diciofenac sodium/misoprostol)  Clinoril (Sulindac)  Indocin (Indomethacin)  Lodine (Etodolac)  Vicoprofen (Hydrocodone and Ibuprofen)  Apixaban (Eliquis)    4 DAY HOLD  Mobic (Meloxicam)  Naprosyn (Naproxen)   7 DAY HOLD  Aleve (Naproxen sodium)  Darvon compound (contains aspirin)  Norgesic Forte (contains aspirin)  Oruvall (Ketoprofen)  Percodan (contains aspirin)  Relafen (Nabumetone)  Salsalate  Trilisate  Vitamin E (more than 400 mg per day)  Any medication containing aspirin    14 DAY HOLD  Daypro (Oxaprozin)  Feldene (Piroxicam)            To speak with a nurse, schedule/reschedule/cancel a clinic appointment, or request a medication refill call: [phone]    You can also reach us by [employer]: [email or web address]

## 2023-08-04 NOTE — NURSING NOTE
Patient presents with:  Follow Up: Follow-up Neck, lower back ,hip,leg pain-multi sites      Moderate Pain (4)     Pain Medications       Opioid Combinations Refills Start End     HYDROcodone-acetaminophen (NORCO)  MG per tablet    0 3/13/2023     Sig - Route: Take 1 tablet by mouth every 4 hours as needed for severe pain (7-10) max 4 tabs/24 hrs - Oral    Class: E-Prescribe    Earliest Fill Date: 3/13/2023            What medications are you using for pain? Methocarbamol, Hydrocodone,Ibuprofen, Tylenol Voltaren Gel, Lidocaine gel and Patches    (New patients only) Have you been seen by another pain clinic/ provider? no    (Return Patients only) What refills are you needing today? no

## 2023-08-04 NOTE — PROGRESS NOTES
"[facility]    Date of visit: 8/4/2023    Chief complaint:   Chief Complaint   Patient presents with    Follow Up     Follow-up Neck, lower back ,hip,leg pain-multi sites       Interval history:  Janelle TORRES Christopher was last seen by me on ***.      Recommendations/plan at the last visit included:  ***    Since her last visit, Janelle White reports:  ***    Did well after TPIs  Doing PT and pilates  Feels like there is always pain in some region of her back    Pain scores:  Pain intensity on average is {NUMBERS 0-10:260596} on a scale of 0-10.     Current pain treatments:   ***    Past pain treatments:  ***    Side Effects: {SIDE EFFECTS:985032}    Medications:  Current Outpatient Medications   Medication Sig Dispense Refill    albuterol (PROAIR HFA/PROVENTIL HFA/VENTOLIN HFA) 108 (90 Base) MCG/ACT inhaler Inhale 2 puffs into the lungs every 6 hours as needed for shortness of breath or wheezing 8.5 g 11    amphetamine-dextroamphetamine (ADDERALL XR) 25 MG 24 hr capsule Take 1 capsule (25 mg) by mouth daily for 30 days 30 capsule 0    amphetamine-dextroamphetamine (ADDERALL) 15 MG tablet Take 1 tablet (15 mg) by mouth 2 times daily for 30 days 60 tablet 0    ASHWAGANDHA PO Take 1 tablet by mouth daily      desvenlafaxine (PRISTIQ) 50 MG 24 hr tablet Take 1 tablet (50 mg) by mouth daily 90 tablet 1    Estradiol (DIVIGEL) 1 MG/GM GEL Place 1 packet onto the skin daily 30 g 11    famotidine (PEPCID) 20 MG tablet TAKE 1 TABLET(20 MG) BY MOUTH TWICE DAILY 180 tablet 1    HYDROcodone-acetaminophen (NORCO)  MG per tablet Take 1 tablet by mouth every 4 hours as needed for severe pain (7-10) max 4 tabs/24 hrs 10 tablet 0    hydrOXYzine (VISTARIL) 25 MG capsule Take 1 capsule (25 mg) by mouth 3 times daily as needed for itching 30 capsule 3    insulin syringe-needle U-100 (30G X 1/2\" 1 ML) 30G X 1/2\" 1 ML miscellaneous Inject 1 ml B12 qmonth 10 each 1    Levomefolate Glucosamine (METHYLFOLATE " "PO) Take 7.5 mg by mouth daily      lidocaine (LIDODERM) 5 % patch Place 4 patches onto the skin daily Apply up to 4 patches to skin. Wear for 12 hours and remove for 12 hrs.  Refill when patient requests. 120 patch 3    medical cannabis (Patient's own supply.  Not a prescription) Medical Cannabis - Tangerine 4-6 ml by mouth daily. [facility]      methocarbamol (ROBAXIN) 500 MG tablet Take 1 tablet (500 mg) by mouth 4 times daily as needed for muscle spasms 120 tablet 1    naloxone (NARCAN) 4 MG/0.1ML nasal spray Spray 1 spray (4 mg) into one nostril alternating nostrils as needed for opioid reversal every 2-3 minutes until assistance arrives 0.2 mL 1    NONFORMULARY Take 2 Scoops by mouth daily Protein and Vitamin shake mix - Nutritional supplement      ondansetron (ZOFRAN ODT) 8 MG ODT tab Take 1 tablet (8 mg) by mouth every 8 hours as needed for nausea 30 tablet 4    Prasterone 6.5 MG INST Place 0.5 suppositories vaginally three times a week 28 each 11    rOPINIRole (REQUIP) 0.25 MG tablet TAKE 1 TO 2 TABLETS(0.25 TO 0.5 MG) BY MOUTH AT BEDTIME 180 tablet 3    senna-docusate (SENOKOT-S/PERICOLACE) 8.6-50 MG tablet Take 6-9 tablets by mouth every evening      triamcinolone (KENALOG) 0.1 % external ointment Apply topically 2 times daily 80 g 1    vitamin D3 (CHOLECALCIFEROL) 125 MCG (5000 UT) tablet Take 5,000 Units by mouth daily      LORazepam (ATIVAN) 1 MG tablet Take 1 tablet (1 mg) by mouth every 6 hours as needed for anxiety (Patient not taking: Reported on 5/5/2023) 50 tablet 3       Medical History: any changes in medical history since they were last seen? { :558596::\"No\"}    Review of Systems:  The 14 system ROS was reviewed from the intake questionnaire, and is positive for: ***  Any bowel or bladder problems: ***  Mood: ***    Physical Exam:  Blood pressure 124/64, pulse 72, height 1.651 m (5' 5\"), weight 71.7 kg (158 lb), last menstrual period 05/01/2005, SpO2 100 %, not currently " breastfeeding.  General: ***  Gait: {GAIT:280515}  MSK exam: ***    Assessment:   ***    Janelle White is a 62 year old female who is seen at the pain clinic for ***.    Plan:  Physical Therapy:  ***  Clinical Health Psychologist to address issues of relaxation, behavioral change, coping style, and other factors important to improvement.  ***  Diagnostic Studies:  ***  Medication Management:  ***  Further procedures recommended: ***  Recommendations to PCP: ***  Follow up: ***    Joslyn Cherry MD    Pain Medicine  Department of Anesthesiology  [facility]          Answers submitted by the patient for this visit:  Symptoms you have experienced in the last 30 days (Submitted on 8/3/2023)  General Symptoms: No  Skin Symptoms: No  HENT Symptoms: Yes  EYE SYMPTOMS: No  HEART SYMPTOMS: Yes  LUNG SYMPTOMS: Yes  INTESTINAL SYMPTOMS: No  URINARY SYMPTOMS: No  GYNECOLOGIC SYMPTOMS: No  BREAST SYMPTOMS: No  SKELETAL SYMPTOMS: Yes  BLOOD SYMPTOMS: No  NERVOUS SYSTEM SYMPTOMS: Yes  MENTAL HEALTH SYMPTOMS: Yes  Please answer the questions below to tell us what conditions you are experiencing: (Submitted on 8/3/2023)  Ear pain: No  Ear discharge: No  Hearing loss: No  Tinnitus: Yes  Nosebleeds: No  Congestion: Yes  Sinus pain: No  Trouble swallowing: No   Voice hoarseness: No  Mouth sores: No  Sore throat: No  Tooth pain: No  Gum tenderness: No  Bleeding gums: No  Change in taste: No  Change in sense of smell: No  Dry mouth: No  Hearing aid used: No  Neck lump: No  Please answer the questions below to tell us what condition you are experiencing: (Submitted on 8/3/2023)  Chest pain or pressure: No  Fast or irregular heartbeat: Yes  Pain in legs with walking: Yes  Trouble breathing while lying down: No  Fingers or toes appear blue: No  High blood pressure: No  Low blood pressure: No  Fainting: No  Murmurs: No  Pacemaker: No  Varicose veins: No  Edema or swelling: Yes  Wake up at night with  shortness of breath: No  Light-headedness: Yes  Exercise intolerance: Yes  Please answer the questions below to tell us what condition you are experiencing: (Submitted on 8/3/2023)  Cough: No  Sputum or phlegm: No  Coughing up blood: No  Difficulty breating or shortness of breath: Yes  Snoring: Yes  Wheezing: No  Difficulty breathing on exertion: Yes  Nighttime Cough: Yes  Difficulty breathing when lying flat: No  Please answer the questions below to tell us what condition you are experiencing: (Submitted on 8/3/2023)  Back pain: Yes  Muscle aches: Yes  Neck pain: Yes  Swollen joints: Yes  Joint pain: Yes  Bone pain: Yes  Muscle cramps: Yes  Muscle weakness: Yes  Joint stiffness: Yes  Bone fracture: No  Please answer the questions below to tell us what condition you are experiencing: (Submitted on 8/3/2023)  Trouble with coordination: Yes  Dizziness or trouble with balance: Yes  Fainting or black-out spells: No  Memory loss: Yes  Headache: Yes  Seizures: No  Speech problems: Yes  Tingling: Yes  Tremor: No  Weakness: Yes  Difficulty walking: Yes  Paralysis: No  Numbness: Yes  Please answer the questions below to tell us what condition you are experiencing: (Submitted on 8/3/2023)  Nervous or Anxious: No  Depression: Yes  Trouble sleeping: Yes  Trouble thinking or concentrating: Yes  Mood changes: Yes  Panic attacks: No

## 2023-08-04 NOTE — LETTER
8/4/2023       RE: Janelle White  [id number] [address] [id number]     Dear Colleague,    Thank you for referring your patient, Janelle White, to the [facility] [place] at [facility]. Please see a copy of my visit note below.    [facility]    Date of visit: 8/4/2023    Chief complaint:   Chief Complaint   Patient presents with    Follow Up     Follow-up Neck, lower back ,hip,leg pain-multi sites       Interval history:  Janelle White was last seen by me on 9/8/2022.        Since her last visit, Janelle White reports:    Did well after TPIs  Doing PT and pilates  Feels like there is always pain in some region of her back    [place] worsening pain in the low back, previously seen her for neck issues  Had TPIs last time and were helpful  Discussed right SIJ injection (has fusion there)  Discussed that this may be technically difficult but can be done below the hardware  Will have her schedule an SIJ injection      Medications:  Current Outpatient Medications   Medication Sig Dispense Refill    albuterol (PROAIR HFA/PROVENTIL HFA/VENTOLIN HFA) 108 (90 Base) MCG/ACT inhaler Inhale 2 puffs into the lungs every 6 hours as needed for shortness of breath or wheezing 8.5 g 11    amphetamine-dextroamphetamine (ADDERALL XR) 25 MG 24 hr capsule Take 1 capsule (25 mg) by mouth daily for 30 days 30 capsule 0    amphetamine-dextroamphetamine (ADDERALL) 15 MG tablet Take 1 tablet (15 mg) by mouth 2 times daily for 30 days 60 tablet 0    ASHWAGANDHA PO Take 1 tablet by mouth daily      desvenlafaxine (PRISTIQ) 50 MG 24 hr tablet Take 1 tablet (50 mg) by mouth daily 90 tablet 1    Estradiol (DIVIGEL) 1 MG/GM GEL Place 1 packet onto the skin daily 30 g 11    famotidine (PEPCID) 20 MG tablet TAKE 1 TABLET(20 MG) BY MOUTH TWICE DAILY 180 tablet 1    HYDROcodone-acetaminophen (NORCO)  " MG per tablet Take 1 tablet by mouth every 4 hours as needed for severe pain (7-10) max 4 tabs/24 hrs 10 tablet 0    hydrOXYzine (VISTARIL) 25 MG capsule Take 1 capsule (25 mg) by mouth 3 times daily as needed for itching 30 capsule 3    insulin syringe-needle U-100 (30G X 1/2\" 1 ML) 30G X 1/2\" 1 ML miscellaneous Inject 1 ml B12 qmonth 10 each 1    Levomefolate Glucosamine (METHYLFOLATE PO) Take 7.5 mg by mouth daily      lidocaine (LIDODERM) 5 % patch Place 4 patches onto the skin daily Apply up to 4 patches to skin. Wear for 12 hours and remove for 12 hrs.  Refill when patient requests. 120 patch 3    medical cannabis (Patient's own supply.  Not a prescription) Medical Cannabis - Tangerine 4-6 ml by mouth daily. [facility]      methocarbamol (ROBAXIN) 500 MG tablet Take 1 tablet (500 mg) by mouth 4 times daily as needed for muscle spasms 120 tablet 1    naloxone (NARCAN) 4 MG/0.1ML nasal spray Spray 1 spray (4 mg) into one nostril alternating nostrils as needed for opioid reversal every 2-3 minutes until assistance arrives 0.2 mL 1    NONFORMULARY Take 2 Scoops by mouth daily Protein and Vitamin shake mix - Nutritional supplement      ondansetron (ZOFRAN ODT) 8 MG ODT tab Take 1 tablet (8 mg) by mouth every 8 hours as needed for nausea 30 tablet 4    Prasterone 6.5 MG INST Place 0.5 suppositories vaginally three times a week 28 each 11    rOPINIRole (REQUIP) 0.25 MG tablet TAKE 1 TO 2 TABLETS(0.25 TO 0.5 MG) BY MOUTH AT BEDTIME 180 tablet 3    senna-docusate (SENOKOT-S/PERICOLACE) 8.6-50 MG tablet Take 6-9 tablets by mouth every evening      triamcinolone (KENALOG) 0.1 % external ointment Apply topically 2 times daily 80 g 1    vitamin D3 (CHOLECALCIFEROL) 125 MCG (5000 UT) tablet Take 5,000 Units by mouth daily      LORazepam (ATIVAN) 1 MG tablet Take 1 tablet (1 mg) by mouth every 6 hours as needed for anxiety (Patient not taking: Reported on 5/5/2023) 50 tablet 3       Medical History: any changes in " "medical history since they were last seen? No    Review of Systems:  The 14 system ROS was reviewed from the intake questionnaire, and is positive for: back pain  Any bowel or bladder problems: denies  Mood: stable    Physical Exam:  Blood pressure 124/64, pulse 72, height 1.651 m (5' 5\"), weight 71.7 kg (158 lb), last menstrual period 05/01/2005, SpO2 100 %, not currently breastfeeding.  General: AAOx3, NAD  Gait: Normal  MSK exam: PSIS tenderness, decreased ROM with f/e/bl rotation lumbar spine    Assessment:   Sacroiliitis  Lumbar Radiculopathy    Janelle White is a 62 year old female who is seen at the pain clinic for follow up to discuss next steps in care. We will schedule her for an SIJ injection due to sacroiliitis    Plan:  Physical Therapy:  continue current regimen  Clinical Health Psychologist to address issues of relaxation, behavioral change, coping style, and other factors important to improvement.  Not indicated  Diagnostic Studies:  none  Medication Management:  continue current regimen  Further procedures recommended: Right SIJ Injection  Recommendations to PCP: none  Follow up: 6 weeks after procedure      Joslyn Cherry MD    Pain Medicine  Department of Anesthesiology  [facility]                Answers submitted by the patient for this visit:  Symptoms you have experienced in the last 30 days (Submitted on 8/3/2023)  General Symptoms: No  Skin Symptoms: No  HENT Symptoms: Yes  EYE SYMPTOMS: No  HEART SYMPTOMS: Yes  LUNG SYMPTOMS: Yes  INTESTINAL SYMPTOMS: No  URINARY SYMPTOMS: No  GYNECOLOGIC SYMPTOMS: No  BREAST SYMPTOMS: No  SKELETAL SYMPTOMS: Yes  BLOOD SYMPTOMS: No  NERVOUS SYSTEM SYMPTOMS: Yes  MENTAL HEALTH SYMPTOMS: Yes  Please answer the questions below to tell us what conditions you are experiencing: (Submitted on 8/3/2023)  Ear pain: No  Ear discharge: No  Hearing loss: No  Tinnitus: Yes  Nosebleeds: No  Congestion: Yes  Sinus pain: No  Trouble " swallowing: No   Voice hoarseness: No  Mouth sores: No  Sore throat: No  Tooth pain: No  Gum tenderness: No  Bleeding gums: No  Change in taste: No  Change in sense of smell: No  Dry mouth: No  Hearing aid used: No  Neck lump: No  Please answer the questions below to tell us what condition you are experiencing: (Submitted on 8/3/2023)  Chest pain or pressure: No  Fast or irregular heartbeat: Yes  Pain in legs with walking: Yes  Trouble breathing while lying down: No  Fingers or toes appear blue: No  High blood pressure: No  Low blood pressure: No  Fainting: No  Murmurs: No  Pacemaker: No  Varicose veins: No  Edema or swelling: Yes  Wake up at night with shortness of breath: No  Light-headedness: Yes  Exercise intolerance: Yes  Please answer the questions below to tell us what condition you are experiencing: (Submitted on 8/3/2023)  Cough: No  Sputum or phlegm: No  Coughing up blood: No  Difficulty breating or shortness of breath: Yes  Snoring: Yes  Wheezing: No  Difficulty breathing on exertion: Yes  Nighttime Cough: Yes  Difficulty breathing when lying flat: No  Please answer the questions below to tell us what condition you are experiencing: (Submitted on 8/3/2023)  Back pain: Yes  Muscle aches: Yes  Neck pain: Yes  Swollen joints: Yes  Joint pain: Yes  Bone pain: Yes  Muscle cramps: Yes  Muscle weakness: Yes  Joint stiffness: Yes  Bone fracture: No  Please answer the questions below to tell us what condition you are experiencing: (Submitted on 8/3/2023)  Trouble with coordination: Yes  Dizziness or trouble with balance: Yes  Fainting or black-out spells: No  Memory loss: Yes  Headache: Yes  Seizures: No  Speech problems: Yes  Tingling: Yes  Tremor: No  Weakness: Yes  Difficulty walking: Yes  Paralysis: No  Numbness: Yes  Please answer the questions below to tell us what condition you are experiencing: (Submitted on 8/3/2023)  Nervous or Anxious: No  Depression: Yes  Trouble sleeping: Yes  Trouble thinking or  concentrating: Yes  Mood changes: Yes  Panic attacks: No      Again, thank you for allowing me to participate in the care of your patient.      Sincerely,    Josyln Cherry MD

## 2023-08-04 NOTE — PROGRESS NOTES
"[facility]    Date of visit: 8/4/2023    Chief complaint:   Chief Complaint   Patient presents with    Follow Up     Follow-up Neck, lower back ,hip,leg pain-multi sites       Interval history:  Janelle White was last seen by me on 9/8/2022.        Since her last visit, Janelle White reports:    Did well after TPIs  Doing PT and pilates  Feels like there is always pain in some region of her back    [place] worsening pain in the low back, previously seen her for neck issues  Had TPIs last time and were helpful  Discussed right SIJ injection (has fusion there)  Discussed that this may be technically difficult but can be done below the hardware  Will have her schedule an SIJ injection      Medications:  Current Outpatient Medications   Medication Sig Dispense Refill    albuterol (PROAIR HFA/PROVENTIL HFA/VENTOLIN HFA) 108 (90 Base) MCG/ACT inhaler Inhale 2 puffs into the lungs every 6 hours as needed for shortness of breath or wheezing 8.5 g 11    amphetamine-dextroamphetamine (ADDERALL XR) 25 MG 24 hr capsule Take 1 capsule (25 mg) by mouth daily for 30 days 30 capsule 0    amphetamine-dextroamphetamine (ADDERALL) 15 MG tablet Take 1 tablet (15 mg) by mouth 2 times daily for 30 days 60 tablet 0    ASHWAGANDHA PO Take 1 tablet by mouth daily      desvenlafaxine (PRISTIQ) 50 MG 24 hr tablet Take 1 tablet (50 mg) by mouth daily 90 tablet 1    Estradiol (DIVIGEL) 1 MG/GM GEL Place 1 packet onto the skin daily 30 g 11    famotidine (PEPCID) 20 MG tablet TAKE 1 TABLET(20 MG) BY MOUTH TWICE DAILY 180 tablet 1    HYDROcodone-acetaminophen (NORCO)  MG per tablet Take 1 tablet by mouth every 4 hours as needed for severe pain (7-10) max 4 tabs/24 hrs 10 tablet 0    hydrOXYzine (VISTARIL) 25 MG capsule Take 1 capsule (25 mg) by mouth 3 times daily as needed for itching 30 capsule 3    insulin syringe-needle U-100 (30G X 1/2\" 1 ML) 30G X 1/2\" 1 ML miscellaneous Inject 1 ml B12 qmonth 10 " "each 1    Levomefolate Glucosamine (METHYLFOLATE PO) Take 7.5 mg by mouth daily      lidocaine (LIDODERM) 5 % patch Place 4 patches onto the skin daily Apply up to 4 patches to skin. Wear for 12 hours and remove for 12 hrs.  Refill when patient requests. 120 patch 3    medical cannabis (Patient's own supply.  Not a prescription) Medical Cannabis - [place] 4-6 ml by mouth daily. [facility]      methocarbamol (ROBAXIN) 500 MG tablet Take 1 tablet (500 mg) by mouth 4 times daily as needed for muscle spasms 120 tablet 1    naloxone (NARCAN) 4 MG/0.1ML nasal spray Spray 1 spray (4 mg) into one nostril alternating nostrils as needed for opioid reversal every 2-3 minutes until assistance arrives 0.2 mL 1    NONFORMULARY Take 2 [place] by mouth daily Protein and Vitamin shake mix - Nutritional supplement      ondansetron (ZOFRAN ODT) 8 MG ODT tab Take 1 tablet (8 mg) by mouth every 8 hours as needed for nausea 30 tablet 4    Prasterone 6.5 MG INST Place 0.5 suppositories vaginally three times a week 28 each 11    rOPINIRole (REQUIP) 0.25 MG tablet TAKE 1 TO 2 TABLETS(0.25 TO 0.5 MG) BY MOUTH AT BEDTIME 180 tablet 3    senna-docusate (SENOKOT-S/PERICOLACE) 8.6-50 MG tablet Take 6-9 tablets by mouth every evening      triamcinolone (KENALOG) 0.1 % external ointment Apply topically 2 times daily 80 g 1    vitamin D3 (CHOLECALCIFEROL) 125 MCG (5000 UT) tablet Take 5,000 Units by mouth daily      LORazepam (ATIVAN) 1 MG tablet Take 1 tablet (1 mg) by mouth every 6 hours as needed for anxiety (Patient not taking: Reported on 5/5/2023) 50 tablet 3       Medical History: any changes in medical history since they were last seen? No    Review of Systems:  The 14 system ROS was reviewed from the intake questionnaire, and is positive for: back pain  Any bowel or bladder problems: denies  Mood: stable    Physical Exam:  Blood pressure 124/64, pulse 72, height 1.651 m (5' 5\"), weight 71.7 kg (158 lb), last menstrual period " 05/01/2005, SpO2 100 %, not currently breastfeeding.  General: AAOx3, NAD  Gait: Normal  MSK exam: PSIS tenderness, decreased ROM with f/e/bl rotation lumbar spine    Assessment:   Sacroiliitis  Lumbar Radiculopathy    Janelle White is a 62 year old female who is seen at the pain clinic for follow up to discuss next steps in care. We will schedule her for an SIJ injection due to sacroiliitis    Plan:  Physical Therapy:  continue current regimen  Clinical Health Psychologist to address issues of relaxation, behavioral change, coping style, and other factors important to improvement.  Not indicated  Diagnostic Studies:  none  Medication Management:  continue current regimen  Further procedures recommended: Right SIJ Injection  Recommendations to PCP: none  Follow up: 6 weeks after procedure      Joslyn Cherry MD    Pain Medicine  Department of Anesthesiology  [facility]                Answers submitted by the patient for this visit:  Symptoms you have experienced in the last 30 days (Submitted on 8/3/2023)  General Symptoms: No  Skin Symptoms: No  HENT Symptoms: Yes  EYE SYMPTOMS: No  HEART SYMPTOMS: Yes  LUNG SYMPTOMS: Yes  INTESTINAL SYMPTOMS: No  URINARY SYMPTOMS: No  GYNECOLOGIC SYMPTOMS: No  BREAST SYMPTOMS: No  SKELETAL SYMPTOMS: Yes  BLOOD SYMPTOMS: No  NERVOUS SYSTEM SYMPTOMS: Yes  MENTAL HEALTH SYMPTOMS: Yes  Please answer the questions below to tell us what conditions you are experiencing: (Submitted on 8/3/2023)  Ear pain: No  Ear discharge: No  Hearing loss: No  Tinnitus: Yes  Nosebleeds: No  Congestion: Yes  Sinus pain: No  Trouble swallowing: No   Voice hoarseness: No  Mouth sores: No  Sore throat: No  Tooth pain: No  Gum tenderness: No  Bleeding gums: No  Change in taste: No  Change in sense of smell: No  Dry mouth: No  Hearing aid used: No  Neck lump: No  Please answer the questions below to tell us what condition you are experiencing: (Submitted on  8/3/2023)  Chest pain or pressure: No  Fast or irregular heartbeat: Yes  Pain in legs with walking: Yes  Trouble breathing while lying down: No  Fingers or toes appear blue: No  High blood pressure: No  Low blood pressure: No  Fainting: No  Murmurs: No  Pacemaker: No  Varicose veins: No  Edema or swelling: Yes  Wake up at night with shortness of breath: No  Light-headedness: Yes  Exercise intolerance: Yes  Please answer the questions below to tell us what condition you are experiencing: (Submitted on 8/3/2023)  Cough: No  Sputum or phlegm: No  Coughing up blood: No  Difficulty breating or shortness of breath: Yes  Snoring: Yes  Wheezing: No  Difficulty breathing on exertion: Yes  Nighttime Cough: Yes  Difficulty breathing when lying flat: No  Please answer the questions below to tell us what condition you are experiencing: (Submitted on 8/3/2023)  Back pain: Yes  Muscle aches: Yes  Neck pain: Yes  Swollen joints: Yes  Joint pain: Yes  Bone pain: Yes  Muscle cramps: Yes  Muscle weakness: Yes  Joint stiffness: Yes  Bone fracture: No  Please answer the questions below to tell us what condition you are experiencing: (Submitted on 8/3/2023)  Trouble with coordination: Yes  Dizziness or trouble with balance: Yes  Fainting or black-out spells: No  Memory loss: Yes  Headache: Yes  Seizures: No  Speech problems: Yes  Tingling: Yes  Tremor: No  Weakness: Yes  Difficulty walking: Yes  Paralysis: No  Numbness: Yes  Please answer the questions below to tell us what condition you are experiencing: (Submitted on 8/3/2023)  Nervous or Anxious: No  Depression: Yes  Trouble sleeping: Yes  Trouble thinking or concentrating: Yes  Mood changes: Yes  Panic attacks: No

## 2023-08-07 ENCOUNTER — OFFICE VISIT (OUTPATIENT)
Dept: PSYCHIATRY | Facility: CLINIC | Age: 63
End: 2023-08-07
Payer: COMMERCIAL

## 2023-08-07 ENCOUNTER — TELEPHONE (OUTPATIENT)
Dept: PALLIATIVE MEDICINE | Facility: CLINIC | Age: 63
End: 2023-08-07
Payer: COMMERCIAL

## 2023-08-07 DIAGNOSIS — F33.2 SEVERE EPISODE OF RECURRENT MAJOR DEPRESSIVE DISORDER, WITHOUT PSYCHOTIC FEATURES (H): Primary | ICD-10-CM

## 2023-08-07 PROBLEM — M46.1 SACROILIITIS (H): Status: ACTIVE | Noted: 2023-08-04

## 2023-08-07 NOTE — TELEPHONE ENCOUNTER
RN reviewed patient chart. Pre procedure instructions were reviewed with patient.    Joelle Snyder RNCC

## 2023-08-07 NOTE — TELEPHONE ENCOUNTER
Phoned the patient and left VM. Stated to call and schedule procedure with dr. Cherry.          Esperanza Selby MA on 8/7/2023 at 8:33 AM

## 2023-08-07 NOTE — TELEPHONE ENCOUNTER
Patient is scheduled for surgery with Dr. Cherry    Spoke with: Patient    Date of Surgery: 09/08/23    Location: [facility] [place]    Informed patient they will need an adult  yes    Additional comments: Patient is aware of date and time of the procedure.        Esperanza Selby MA on 8/7/2023 at 3:11 PM
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
## 2023-08-14 ENCOUNTER — OFFICE VISIT (OUTPATIENT)
Dept: PSYCHIATRY | Facility: CLINIC | Age: 63
End: 2023-08-14
Payer: COMMERCIAL

## 2023-08-14 DIAGNOSIS — F33.2 SEVERE EPISODE OF RECURRENT MAJOR DEPRESSIVE DISORDER, WITHOUT PSYCHOTIC FEATURES (H): Primary | ICD-10-CM

## 2023-08-14 ASSESSMENT — PATIENT HEALTH QUESTIONNAIRE - PHQ9: SUM OF ALL RESPONSES TO PHQ QUESTIONS 1-9: 17

## 2023-08-15 ENCOUNTER — BEH TREATMENT PLAN (OUTPATIENT)
Dept: PSYCHIATRY | Facility: CLINIC | Age: 63
End: 2023-08-15

## 2023-08-15 NOTE — PROGRESS NOTES
Outpatient Treatment Plan     Today's Date: Aug 15, 2023                 90-Day Review Date: Aug 15, 2023 +90 days (11/13/23)    Date of Initial Service: 7/28/23    Diagnosis:  Major depressive disorder, severe, recurrent    Current symptoms and circumstances that substantiate the diagnosis:   Persistent dysphoria, anhedonia, sleep disturbance, low energy/fatigue, trouble concentrating, and interfering anxiety.     How symptoms and/or behaviors are affecting level of function:   Challenges at occupational functioning, IADLs, social isolation    Risk Assessment:  Suicide:  Assessed Level of Immediate Risk: Low  Ideation: Passive  Plan:  Denies  Means: Denies  Intent: Denies     Homicide/Violence:  Assessed Level of Immediate Risk: Low  Ideation: Denies  Plan: n/a  Means: No  Intent: No          SYMPTOMS; PROBLEMS   MEASURABLE GOALS;    FUNCTIONAL IMPROVEMENT INTERVENTIONS Gains Made:     Depression / Anxiety reduce depressive and anxiety symptoms, reduce depressive episodes, find enjoyment at least once a day, report feeling more positive about self , develop strategies for thought distraction when ruminating, reduce feeling overwhelmed/ improve decision making skills, and learn 2 new ways of coping with routine stressors Unified Protocol N/A Onset of treatment       UP addresses neuroticism by targeting aversive, avoidant reactions to emotions    Strategies:  Fostering mindful awareness  > Practice of nonjudgmental, present-focused attention toward emotional experience  Reevaluating automatic cognitive appraisals  > Challenging automatic thoughts related to external threats and internal threats  Increasing cognitive flexibility  > Two fundamental misappraisals:  1. Probability overestimation  2. Catastrophizing    Changing action tendencies associated with disordered emotions  > Reduce patterns of avoidance    Utilizing exposure procedures  For Physical sensations  > Increase awareness and tolerance of physical  sensations through interoceptive exposures  For Emotions  > Engagement in emotion exercises  > Emphasize elicitation of emotional experiences in step-wise fashion                                   Frequency of Sessions: Weekly to bi-weekly     Discharge and Aftercare Goals: see measurable goals above     Expected duration of treatment: 2-6 months     Participants in therapy plan (family, friends, support network): self     See Consents for Acknowledgement of Current Treatment Plan  Patient verbally consented to the treatment plan above.    Falvio Murcia, PhD LP on 8/15/2023 at 10:31 AM

## 2023-08-15 NOTE — PROGRESS NOTES
Clinician Contact & Progress Note  Department of Psychiatry & Behavioral Sciences  [facility]    Patient: Janelle White (1960)     MRN: [id number]  Date of Treatment:  Aug 14, 2023  Duration of Treatment: Start Time: 3:05pm End Time: 3:50pm  Provider: Flavio Murcia, PhD LP     People present:   Provider: Flavio Murcia, Ph.D., L.P.  Patient: Janelle White  Others: n/a      Diagnoses:  Major depressive disorder, severe, recurrent      Assessment (current symptoms):      6/7/2023     2:00 PM 7/31/2023     2:43 PM 8/14/2023     3:06 PM   PHQ   PHQ-9 Total Score 17 13 17   Q9: Thoughts of better off dead/self-harm past 2 weeks More than half the days Nearly every day Nearly every day         9/1/2022     8:20 AM 3/20/2023     7:59 AM 5/17/2023    10:39 PM   TSACIE-7 SCORE   Total Score 7 (mild anxiety) 7 (mild anxiety) 11 (moderate anxiety)   Total Score 7 7 11    11       Session content: Pt presented for follow-up in Treatment Resistant Depression clinic for psychotherapy visit with writer. Today focused on introduction to the Unified Protocol (UP). Writer gave pt physical copies of chapter 1, 2, and 5 and reviewed rationale for the UP and the outline of chapters. We then spent time in discussion about Ch. 5 (Understanding your Emotions). Pt brought up difficulties with guilt / shame and writer shared perspective of adaptive theory of emotions as it relates to these.    Pt confirmed plan for follow-up on Mondays at 3pm.      Homework:  Read Ch. 1, 2, and 5 of UP    Treatment Plan/ Disposition:   Continue with weekly CBT with writer  Next appointment: Mondays at 1500  KJ with other (eg., psychiatric) treatment providers  Treatment goals: see plan on 8/15/23  Treatment Plan Review Due: 11/13/23 (every 90 days)  Expected duration of treatment: 3-6 months  Criteria for termination: met > 50% of treatment goals, PHQ-9 reduced by 50%      Mental Status Exam:  Alertness: alert  and  "oriented  Appearance: adequately groomed  Behavior/Demeanor: cooperative, pleasant, and calm, with good  eye contact   Speech: normal  Language: intact. Preferred language identified as English.  Psychomotor: normal or unremarkable  Mood: balanced  Affect: full range; was congruent to mood; was congruent to content  Thought Process/Associations: unremarkable  Thought Content:  unremarkable  -Suicidal ideation: reports SI, denies intent, and denies plan  -Homicidal Ideation: denies  Perception:  intact  Insight: good  Judgment: good  Cognition: does  appear grossly intact      Billing for \"Interactive Complexity\"?    No    Flavio uMrcia, PhD LP     "

## 2023-08-16 ASSESSMENT — PATIENT HEALTH QUESTIONNAIRE - PHQ9
10. IF YOU CHECKED OFF ANY PROBLEMS, HOW DIFFICULT HAVE THESE PROBLEMS MADE IT FOR YOU TO DO YOUR WORK, TAKE CARE OF THINGS AT HOME, OR GET ALONG WITH OTHER PEOPLE: SOMEWHAT DIFFICULT
SUM OF ALL RESPONSES TO PHQ QUESTIONS 1-9: 15
SUM OF ALL RESPONSES TO PHQ QUESTIONS 1-9: 15

## 2023-08-17 ENCOUNTER — OFFICE VISIT (OUTPATIENT)
Dept: FAMILY MEDICINE | Facility: CLINIC | Age: 63
End: 2023-08-17
Payer: COMMERCIAL

## 2023-08-17 VITALS
BODY MASS INDEX: 25.73 KG/M2 | DIASTOLIC BLOOD PRESSURE: 78 MMHG | HEIGHT: 66 IN | WEIGHT: 160.1 LBS | HEART RATE: 74 BPM | OXYGEN SATURATION: 98 % | SYSTOLIC BLOOD PRESSURE: 122 MMHG | TEMPERATURE: 97.5 F | RESPIRATION RATE: 16 BRPM

## 2023-08-17 DIAGNOSIS — G89.4 CHRONIC PAIN SYNDROME: ICD-10-CM

## 2023-08-17 DIAGNOSIS — J44.9 CHRONIC OBSTRUCTIVE PULMONARY DISEASE, UNSPECIFIED COPD TYPE (H): ICD-10-CM

## 2023-08-17 DIAGNOSIS — Z82.49 FAMILY HISTORY OF CHF (CONGESTIVE HEART FAILURE): Primary | ICD-10-CM

## 2023-08-17 LAB — CREAT UR-MCNC: 113 MG/DL

## 2023-08-17 PROCEDURE — G0480 DRUG TEST DEF 1-7 CLASSES: HCPCS | Performed by: NURSE PRACTITIONER

## 2023-08-17 PROCEDURE — 99214 OFFICE O/P EST MOD 30 MIN: CPT | Performed by: NURSE PRACTITIONER

## 2023-08-17 ASSESSMENT — PAIN SCALES - GENERAL: PAINLEVEL: NO PAIN (0)

## 2023-08-17 NOTE — PROGRESS NOTES
"  Assessment & Plan     Family history of CHF (congestive heart failure)  Will refer to Dr. Mary for further cardiac risk assessment.  She has multiple family members on her father's side that have  from CHF.  Certainly could be a component of Ehalanna's Danlos as well and question whether there could be history of Type 4 in her family.  Certainly possible all of her previous injuries could be secondary to a hypermobility disorder.  As far as the A-fib, we discussed there is no way to determine if she will develop it, but we can consider cardiac monitor vs stress test if her dyspnea persists.  Will defer need for echocardiogram at this time to cardiology.    - Adult Cardiology Eval [facility] Referral; Future    COPD (chronic obstructive pulmonary disease) (H)  Could be contributing to her dyspnea, however, with strong family history of CHF would recommend ruling out cardiac etiology for shortness of breath.    Chronic pain syndrome  - [id number] - Urine Drug Confirmation Panel (Comprehensive); Future  - [id number] - Urine Drug Confirmation Panel (Comprehensive)             BMI:   Estimated body mass index is 26.24 kg/m  as calculated from the following:    Height as of this encounter: 1.664 m (5' 5.5\").    Weight as of this encounter: 72.6 kg (160 lb 1.6 oz).       Depression Screening Follow Up        2023     3:12 PM   PHQ   PHQ-9 Total Score 15   Q9: Thoughts of better off dead/self-harm past 2 weeks Nearly every day   F/U: Thoughts of suicide or self-harm Yes   F/U: Self harm-plan No   F/U: Self-harm action No   F/U: Safety concerns No         Follow Up    Follow Up Actions Taken  Crisis resource information provided in the After Visit Summary  Referred patient back to mental health provider    Discussed the following ways the patient can remain in a safe environment:        [facility]    Giorgio Luis is a 62 year old, presenting for the following " health issues:  office visit (Pt. Stated that mother recent dx afib- started Eliquis and father has afib/ heart failure/ implanted device and younger brother has afib, device, meds. Would like to discuss options regarding to family health history. )      2023     1:57 PM   Additional Questions   Roomed by Rosy Caballero   Accompanied by self         2023     1:57 PM   Patient Reported Additional Medications   Patient reports taking the following new medications none       History of Present Illness       Reason for visit:  Discuss family Afib- develop plan, recommendations?  Symptom onset:  More than a month  Symptoms include:  Extreme exertional fatigue.  Early exhaustion with any activity, pounding heart, high rate, short of breath  Symptom intensity:  Moderate  Symptom progression:  Worsening  Had these symptoms before:  No  What makes it worse:  Physical challenges  What makes it better:  Intermittent low paced activity    She eats 4 or more servings of fruits and vegetables daily.She consumes 0 sweetened beverage(s) daily.She exercises with enough effort to increase her heart rate 30 to 60 minutes per day.  She exercises with enough effort to increase her heart rate 6 days per week.   She is taking medications regularly.           Family history of A-Fib, father was hospitalized for prolonged period for CHF  Brother's might be more related to alcohol use.  Mom recently diagnosed at 83.    Dads sisters had CHF, a couple  young.  Paternal uncle had a valve replacement.    Has ongoing chronic fatigue and winded, wondering if this is related.  Sometimes even going up the stairs will make her out of breath.  Feels like she is needing to push more than she shoul  History of PAC's- thinks it could have been more stress related.  Cut back on caffeine, hydration, rest helped.    Watch is not showing any fast rhythm.    She does note she has some hypermobility, wondering if there is a possibility that she has  "Diomedes' danlos?    Review of Systems   Constitutional, HEENT, cardiovascular, pulmonary, gi and gu systems are negative, except as otherwise noted.      Objective    /78 (BP Location: Right arm, Patient Position: Sitting, Cuff Size: Adult Regular)   Pulse 74   Temp 97.5  F (36.4  C) (Temporal)   Resp 16   Ht 1.664 m (5' 5.5\")   Wt 72.6 kg (160 lb 1.6 oz)   LMP 05/01/2005 (LMP Unknown)   SpO2 98%   BMI 26.24 kg/m    Body mass index is 26.24 kg/m .  Physical Exam                         "

## 2023-08-21 ENCOUNTER — MYC REFILL (OUTPATIENT)
Dept: PSYCHIATRY | Facility: CLINIC | Age: 63
End: 2023-08-21
Payer: COMMERCIAL

## 2023-08-21 ENCOUNTER — OFFICE VISIT (OUTPATIENT)
Dept: PSYCHIATRY | Facility: CLINIC | Age: 63
End: 2023-08-21
Payer: COMMERCIAL

## 2023-08-21 DIAGNOSIS — F34.1 PERSISTENT DEPRESSIVE DISORDER: ICD-10-CM

## 2023-08-21 DIAGNOSIS — F33.9 RECURRENT MAJOR DEPRESSION RESISTANT TO TREATMENT (H): ICD-10-CM

## 2023-08-21 DIAGNOSIS — F33.2 SEVERE EPISODE OF RECURRENT MAJOR DEPRESSIVE DISORDER, WITHOUT PSYCHOTIC FEATURES (H): Primary | ICD-10-CM

## 2023-08-21 LAB
AMPHET UR CFM-MCNC: ABNORMAL NG/ML
AMPHET/CREAT UR: ABNORMAL

## 2023-08-21 RX ORDER — DEXTROAMPHETAMINE SACCHARATE, AMPHETAMINE ASPARTATE, DEXTROAMPHETAMINE SULFATE AND AMPHETAMINE SULFATE 3.75; 3.75; 3.75; 3.75 MG/1; MG/1; MG/1; MG/1
15 TABLET ORAL 2 TIMES DAILY
Qty: 60 TABLET | Refills: 0 | Status: CANCELLED | OUTPATIENT
Start: 2023-08-21

## 2023-08-21 RX ORDER — DEXTROAMPHETAMINE SACCHARATE, AMPHETAMINE ASPARTATE MONOHYDRATE, DEXTROAMPHETAMINE SULFATE AND AMPHETAMINE SULFATE 6.25; 6.25; 6.25; 6.25 MG/1; MG/1; MG/1; MG/1
25 CAPSULE, EXTENDED RELEASE ORAL DAILY
Qty: 30 CAPSULE | Refills: 0 | Status: CANCELLED | OUTPATIENT
Start: 2023-08-21

## 2023-08-21 ASSESSMENT — PATIENT HEALTH QUESTIONNAIRE - PHQ9: SUM OF ALL RESPONSES TO PHQ QUESTIONS 1-9: 12

## 2023-08-21 NOTE — TELEPHONE ENCOUNTER
Adderall not on active med list.     Per review of the , patient should have enough Adderall immediate release to get through to next appointment on 8/25/23. Patient may need Adderall XR. Attempted to call patient to see if she needed refills before her appointment LVM to return call.   Date of Last Office Visit: 5/19/23  Date of Next Office Visit: 8/25/23  No shows since last visit: 0  Cancellations since last visit: 0    Medication requested: amphetamine-dextroamphetamine (ADDERALL XR) 25 MG 24 hr capsule  Date last ordered: 7/20/23 Qty: 30 Refills: 0  Medication requested: amphetamine-dextroamphetamine (ADDERALL) 15 MG tablet Date last ordered: 7/20/23 Qty: 60 Refills: 0     Review of [place] ?: yes  amphetamine-dextroamphetamine (ADDERALL XR) 25 MG 24 hr capsule    last sold date: 07/21/23 Qty filled: 30  amphetamine-dextroamphetamine (ADDERALL) 15 MG tablet  last sold date: 7/26/23  Qty filled: 60  Other controlled substance on [place] ?: yes  If yes, is this a new medication?: no  If yes, name of medication: Hydrocodone and date filled: 3/21/23    Lapse in medication a  dherence greater than 5 days?: no  If yes, call patient and gather details: NA  Medication refill request verified as identical to current order?: unable to verify as medication is not on active medication list  Result of Last DAM, VPA, Li+ Level, CBC, or Carbamazepine Level (at or since last visit): N/A    Last visit treatment plan:        Treatment Plan:  Continue Pristiq 50 mg daily for mood, anxiety.   Continue methylfolate 7.5 mg daily as supplementation.  Continue Adderall XR 25 mg daily for mood augmentation  Continue Adderall IR 15 mg twice daily as needed for mood augmentation and daytime fatigue/hypersomnia  Continue benzodiazepine per primary care prescriber.  Continue ketamine with interventional psychiatry clinic.  Continue to work with your specialist from [facility] as indicated.    Could consider individual DBT  therapy.  Recommend ongoing individual psychotherapy.    Continue all other cares per primary care provider.   Continue all other medications as reviewed per electronic medical record today.   Safety plan reviewed. To the Emergency Department as needed or call after hours crisis line at [phone] or [phone]. [place] Crisis Text Line. Text [place] to 220194 or Suicide LifeLine Chat: suicidepreventionUnisense [employer]line.org/chat  Schedule an appointment with me in 3 months, or sooner as needed. Call [facility] at [phone] to schedule.  Follow up with primary care provider as planned or for acute medical concerns.  Call the psychiatric nurse line with medication questions or concerns at [phone].  [employer] may be used to communicate with your provider, but this is not intended to be used for emergencies.    []Medication refilled per  Medication Refill in Ambulatory Care  policy.  [x]Medication unable to be refilled by RN due to criteria not met as indicated below:    []Eligibility - not seen in the last year   []Supervision - no future appointment   []Compliance - no shows, cancellations or lapse in therapy   []Verification - order discrepancy   [x]Controlled medication   []Medication not included in policy   []90-day supply request   []Other

## 2023-08-21 NOTE — PROGRESS NOTES
Clinician Contact & Progress Note  Department of Psychiatry & Behavioral Sciences  [facility]    Patient: Janelle White (1960)     MRN: [id number]  Date of Treatment:  Aug 21, 2023  Duration of Treatment: Start Time: 3:05pm End Time: 4:05pm  Provider: Flavio Murcia, PhD LP     People present:   Provider: Flavio Murcia, Ph.D., L.P.  Patient: Janelle White  Others: n/a      Diagnoses:  Major depressive disorder, severe, recurrent      Assessment (current symptoms):      8/16/2023     3:12 PM 8/17/2023     3:43 PM 8/21/2023     3:08 PM   PHQ   PHQ-9 Total Score 15 12 12   Q9: Thoughts of better off dead/self-harm past 2 weeks Nearly every day More than half the days More than half the days   F/U: Thoughts of suicide or self-harm Yes Yes    F/U: Self harm-plan No No    F/U: Self-harm action No No    F/U: Safety concerns No No          3/20/2023     7:59 AM 5/17/2023    10:39 PM 8/17/2023     3:44 PM   STACIE-7 SCORE   Total Score 7 (mild anxiety) 11 (moderate anxiety) 2 (minimal anxiety)   Total Score 7 11    11 2       Session content: Pt presented for follow-up in Treatment Resistant Depression clinic for psychotherapy visit with writer. Today shifted focus to interpersonal effectiveness as pt noted frustration in interpersonal situations as key difficulty for them.    Writer introduced DBT interpersonal effectiveness theory including three priorities (objective, relationship, self-respect). Used examples to illustrate objective and self-respect in particular.    Pt reflected on ways for them to try [employer]. We will review relationship effectiveness and practice each at next session.      Homework:  Read through [employer] FAST    Treatment Plan/ Disposition:   Continue with weekly CBT with writer  Next appointment: Mondays at 1500  KJ with other (eg., psychiatric) treatment providers  Treatment goals: see plan on 8/15/23  Treatment Plan Review Due: 11/13/23  "(every 90 days)  Expected duration of treatment: 3-6 months  Criteria for termination: met > 50% of treatment goals, PHQ-9 reduced by 50%      Mental Status Exam:  Alertness: alert  and oriented  Appearance: adequately groomed  Behavior/Demeanor: cooperative, pleasant, and calm, with good  eye contact   Speech: normal  Language: intact. Preferred language identified as English.  Psychomotor: normal or unremarkable  Mood: balanced  Affect: full range; was congruent to mood; was congruent to content  Thought Process/Associations: unremarkable  Thought Content:  unremarkable  -Suicidal ideation: reports SI, denies intent, and denies plan  -Homicidal Ideation: denies  Perception:  intact  Insight: good  Judgment: good  Cognition: does  appear grossly intact      Billing for \"Interactive Complexity\"?    No    Flavio Murcia, PhD LP   "

## 2023-08-22 ENCOUNTER — INFUSION THERAPY VISIT (OUTPATIENT)
Dept: INFUSION THERAPY | Facility: CLINIC | Age: 63
End: 2023-08-22
Attending: PSYCHIATRY & NEUROLOGY
Payer: COMMERCIAL

## 2023-08-22 VITALS
TEMPERATURE: 98.6 F | SYSTOLIC BLOOD PRESSURE: 98 MMHG | OXYGEN SATURATION: 96 % | HEART RATE: 60 BPM | DIASTOLIC BLOOD PRESSURE: 64 MMHG

## 2023-08-22 DIAGNOSIS — F33.2 SEVERE EPISODE OF RECURRENT MAJOR DEPRESSIVE DISORDER, WITHOUT PSYCHOTIC FEATURES (H): Primary | ICD-10-CM

## 2023-08-22 PROCEDURE — 258N000003 HC RX IP 258 OP 636: Performed by: PSYCHIATRY & NEUROLOGY

## 2023-08-22 PROCEDURE — 96365 THER/PROPH/DIAG IV INF INIT: CPT

## 2023-08-22 PROCEDURE — 250N000009 HC RX 250: Performed by: PSYCHIATRY & NEUROLOGY

## 2023-08-22 RX ORDER — DEXTROAMPHETAMINE SACCHARATE, AMPHETAMINE ASPARTATE, DEXTROAMPHETAMINE SULFATE AND AMPHETAMINE SULFATE 3.75; 3.75; 3.75; 3.75 MG/1; MG/1; MG/1; MG/1
15 TABLET ORAL 2 TIMES DAILY
Qty: 60 TABLET | Refills: 0 | Status: SHIPPED | OUTPATIENT
Start: 2023-08-22 | End: 2023-09-21

## 2023-08-22 RX ORDER — ALBUTEROL SULFATE 90 UG/1
1-2 AEROSOL, METERED RESPIRATORY (INHALATION)
Status: CANCELLED
Start: 2023-08-22

## 2023-08-22 RX ORDER — MEPERIDINE HYDROCHLORIDE 25 MG/ML
25 INJECTION INTRAMUSCULAR; INTRAVENOUS; SUBCUTANEOUS EVERY 30 MIN PRN
Status: CANCELLED | OUTPATIENT
Start: 2023-08-22

## 2023-08-22 RX ORDER — DIPHENHYDRAMINE HYDROCHLORIDE 50 MG/ML
50 INJECTION INTRAMUSCULAR; INTRAVENOUS
Status: CANCELLED
Start: 2023-08-22

## 2023-08-22 RX ORDER — DEXTROAMPHETAMINE SACCHARATE, AMPHETAMINE ASPARTATE MONOHYDRATE, DEXTROAMPHETAMINE SULFATE AND AMPHETAMINE SULFATE 6.25; 6.25; 6.25; 6.25 MG/1; MG/1; MG/1; MG/1
25 CAPSULE, EXTENDED RELEASE ORAL DAILY
Qty: 30 CAPSULE | Refills: 0 | Status: SHIPPED | OUTPATIENT
Start: 2023-10-23 | End: 2023-11-22

## 2023-08-22 RX ORDER — DEXTROAMPHETAMINE SACCHARATE, AMPHETAMINE ASPARTATE MONOHYDRATE, DEXTROAMPHETAMINE SULFATE AND AMPHETAMINE SULFATE 6.25; 6.25; 6.25; 6.25 MG/1; MG/1; MG/1; MG/1
25 CAPSULE, EXTENDED RELEASE ORAL DAILY
Qty: 30 CAPSULE | Refills: 0 | Status: SHIPPED | OUTPATIENT
Start: 2023-09-22 | End: 2023-10-22

## 2023-08-22 RX ORDER — METHYLPREDNISOLONE SODIUM SUCCINATE 125 MG/2ML
125 INJECTION, POWDER, LYOPHILIZED, FOR SOLUTION INTRAMUSCULAR; INTRAVENOUS
Status: CANCELLED
Start: 2023-08-22

## 2023-08-22 RX ORDER — DEXTROAMPHETAMINE SACCHARATE, AMPHETAMINE ASPARTATE MONOHYDRATE, DEXTROAMPHETAMINE SULFATE AND AMPHETAMINE SULFATE 6.25; 6.25; 6.25; 6.25 MG/1; MG/1; MG/1; MG/1
25 CAPSULE, EXTENDED RELEASE ORAL DAILY
Qty: 30 CAPSULE | Refills: 0 | Status: SHIPPED | OUTPATIENT
Start: 2023-08-22 | End: 2023-09-21

## 2023-08-22 RX ORDER — DEXTROAMPHETAMINE SACCHARATE, AMPHETAMINE ASPARTATE, DEXTROAMPHETAMINE SULFATE AND AMPHETAMINE SULFATE 3.75; 3.75; 3.75; 3.75 MG/1; MG/1; MG/1; MG/1
15 TABLET ORAL 2 TIMES DAILY
Qty: 60 TABLET | Refills: 0 | Status: SHIPPED | OUTPATIENT
Start: 2023-09-22 | End: 2023-10-22

## 2023-08-22 RX ORDER — HEPARIN SODIUM (PORCINE) LOCK FLUSH IV SOLN 100 UNIT/ML 100 UNIT/ML
5 SOLUTION INTRAVENOUS
Status: CANCELLED | OUTPATIENT
Start: 2023-08-22

## 2023-08-22 RX ORDER — DEXTROAMPHETAMINE SACCHARATE, AMPHETAMINE ASPARTATE, DEXTROAMPHETAMINE SULFATE AND AMPHETAMINE SULFATE 3.75; 3.75; 3.75; 3.75 MG/1; MG/1; MG/1; MG/1
15 TABLET ORAL 2 TIMES DAILY
Qty: 60 TABLET | Refills: 0 | Status: SHIPPED | OUTPATIENT
Start: 2023-10-23 | End: 2023-11-17

## 2023-08-22 RX ORDER — HEPARIN SODIUM,PORCINE 10 UNIT/ML
5 VIAL (ML) INTRAVENOUS
Status: CANCELLED | OUTPATIENT
Start: 2023-08-22

## 2023-08-22 RX ORDER — EPINEPHRINE 1 MG/ML
0.3 INJECTION, SOLUTION, CONCENTRATE INTRAVENOUS EVERY 5 MIN PRN
Status: CANCELLED | OUTPATIENT
Start: 2023-08-22

## 2023-08-22 RX ORDER — ONDANSETRON 2 MG/ML
4 INJECTION INTRAMUSCULAR; INTRAVENOUS
Status: CANCELLED | OUTPATIENT
Start: 2023-08-22

## 2023-08-22 RX ORDER — HYDRALAZINE HYDROCHLORIDE 20 MG/ML
10 INJECTION INTRAMUSCULAR; INTRAVENOUS
Status: CANCELLED | OUTPATIENT
Start: 2023-08-22

## 2023-08-22 RX ORDER — ALBUTEROL SULFATE 0.83 MG/ML
2.5 SOLUTION RESPIRATORY (INHALATION)
Status: CANCELLED | OUTPATIENT
Start: 2023-08-22

## 2023-08-22 RX ORDER — NALOXONE HYDROCHLORIDE 0.4 MG/ML
0.2 INJECTION, SOLUTION INTRAMUSCULAR; INTRAVENOUS; SUBCUTANEOUS
Status: CANCELLED | OUTPATIENT
Start: 2023-08-22

## 2023-08-22 RX ADMIN — KETAMINE HYDROCHLORIDE 65 MG: 50 INJECTION, SOLUTION INTRAMUSCULAR; INTRAVENOUS at 09:29

## 2023-08-22 ASSESSMENT — PAIN SCALES - GENERAL: PAINLEVEL: MODERATE PAIN (4)

## 2023-08-22 NOTE — PROGRESS NOTES
Infusion Nursing Note:  Janelle White presents today for ketamine infusion.    Patient seen by provider today: No   present during visit today: Not Applicable.    Note: N/A.      Intravenous Access:  Peripheral IV placed.    Treatment Conditions:  Not Applicable.      Post Infusion Assessment:  Patient tolerated infusion without incident.  Patient observed for 60 minutes post ketamine infusion, per protocol.  Blood return noted pre and post infusion.  Site patent and intact, free from redness, edema or discomfort.  No evidence of extravasations.  Access discontinued per protocol.       Discharge Plan:   Patient discharged in stable condition accompanied by: self.  Departure Mode: Ambulatory.  RTC 9/12/23.      Viktoria Mcdaniel

## 2023-08-25 ENCOUNTER — VIRTUAL VISIT (OUTPATIENT)
Dept: PSYCHIATRY | Facility: CLINIC | Age: 63
End: 2023-08-25
Payer: COMMERCIAL

## 2023-08-25 DIAGNOSIS — E88.89 CYP2D6 POOR METABOLIZER (H): ICD-10-CM

## 2023-08-25 DIAGNOSIS — F34.1 PERSISTENT DEPRESSIVE DISORDER: Primary | ICD-10-CM

## 2023-08-25 DIAGNOSIS — E88.89 CYP2B6 INTERMEDIATE METABOLIZER (H): ICD-10-CM

## 2023-08-25 DIAGNOSIS — F33.9 RECURRENT MAJOR DEPRESSION RESISTANT TO TREATMENT (H): ICD-10-CM

## 2023-08-25 DIAGNOSIS — G89.29 OTHER CHRONIC PAIN: ICD-10-CM

## 2023-08-25 DIAGNOSIS — E72.12 HOMOZYGOUS FOR C677T POLYMORPHISM OF MTHFR (H): ICD-10-CM

## 2023-08-25 DIAGNOSIS — G25.81 RESTLESS LEG SYNDROME: ICD-10-CM

## 2023-08-25 PROCEDURE — 99214 OFFICE O/P EST MOD 30 MIN: CPT | Mod: GT | Performed by: PSYCHIATRY & NEUROLOGY

## 2023-08-25 RX ORDER — DESVENLAFAXINE 50 MG/1
50 TABLET, FILM COATED, EXTENDED RELEASE ORAL DAILY
Qty: 90 TABLET | Refills: 1 | Status: SHIPPED | OUTPATIENT
Start: 2023-08-25 | End: 2023-11-17

## 2023-08-25 RX ORDER — HYDROXYZINE PAMOATE 25 MG/1
25 CAPSULE ORAL 3 TIMES DAILY PRN
Qty: 90 CAPSULE | Refills: 3 | Status: SHIPPED | OUTPATIENT
Start: 2023-08-25

## 2023-08-25 NOTE — PATIENT INSTRUCTIONS
Treatment Plan:  Continue Pristiq 50 mg daily for mood, anxiety.   Continue methylfolate 7.5 mg daily as supplementation.  Continue Adderall XR 25 mg daily for mood augmentation  Continue Adderall IR 15 mg twice daily as needed for mood augmentation and daytime fatigue/hypersomnia  Continue benzodiazepine per primary care prescriber.  Continue ketamine therapy as planned.   Continue to work with your specialist from [facility] as indicated.    Could consider individual DBT therapy.  Recommend ongoing individual psychotherapy.    Continue all other cares per primary care provider.   Continue all other medications as reviewed per electronic medical record today.   Safety plan reviewed. To the Emergency Department as needed or call after hours crisis line at [phone] or [phone]. [place] Crisis Text Line. Text [place] to 098594 or Suicide LifeLine Chat: suicidepre"[email or web address].org/chat  Schedule an appointment with me in 3 months, or sooner as needed. Call [facility] at [phone] to schedule.  Follow up with primary care provider as planned or for acute medical concerns.  Call the psychiatric nurse line with medication questions or concerns at [phone].  [employer] may be used to communicate with your provider, but this is not intended to be used for emergencies.    Therapy resources:  Www.[facility].org    https://www.mhs-dbt.com/mental-health/thrive/thrive-mental-health-and-chronic-pain-management/    [place] DBT resources:  [place]://[place].gov/dhs/partners-and-providers/policies-procedures/adult-mental-health/dialectical-behavior-therapy/dbt-certified-providers/    Risks of benzodiazepine (Ativan, Xanax, Klonopin, Valium, etc) use including, but not limited to, sedation, tolerance, risk for addiction/dependence. Do not drink alcohol while taking benzodiazepines due to risk of trouble breathing and potential death. Do not drive or operate heavy machinery until it is known how the drug affects you.  Discuss with physician or pharmacist before ever taking a benzodiazepine with a narcotic/opioid pain medication.     Have previously discussed risks of stimulant medication including, but not limited to, decreased appetite, risk of tics (and that they may be lasting), trouble sleeping, cardiac risks such as increased heart rate and blood pressure, and rare risk of sudden cardiac death.  Also risk of addiction/tolerance/dependence.

## 2023-08-25 NOTE — Clinical Note
Hi there! Sending a referral your way for this pt who has struggled VERY significantly with chronic pain. Is no longer on chronic/continuous opioid therapy and receives ketamine infusions for chronic depression/SI every 2-3 weeks I believe. I thought she might be a good candidate for you to see if you might be able to roll ketamine tx into one for both pain and mood for her? 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 Has been unable to work due to pain which has caused a tremendous blow to her mental health and every other aspect of her life. Feel free to reach out with any questions or concerns. I have been working with her for awhile now. Thank you!

## 2023-08-25 NOTE — NURSING NOTE
Is the patient currently in the state of [place]? YES    Visit mode:VIDEO    If the visit is dropped, the patient can be reconnected by: VIDEO VISIT: Text to cell phone:   Telephone Information:   Mobile [phone]       Will anyone else be joining the visit? No  (If patient encounters technical issues they should call [phone])    How would you like to obtain your AVS? MyChart    Are changes needed to the allergy or medication list? No    Rooming Documentation: Patient will complete qnrs in mychart.    Reason for visit: MATT Irby

## 2023-08-25 NOTE — PROGRESS NOTES
"Telemedicine Visit: The patient's condition can be safely assessed and treated via synchronous audio and visual telemedicine encounter.      Reason for Telemedicine Visit: Patient has requested telehealth visit    Originating Site (Patient Location): Patient's home    Distant Location (provider location):  Off-site    Consent:  The patient/guardian has verbally consented to: the potential risks and benefits of telemedicine (video visit) versus in person care; bill my insurance or make self-payment for services provided; and responsibility for payment of non-covered services.     Mode of Communication:  Video Conference via [email or web address]    As the provider I attest to compliance with applicable laws and regulations related to telemedicine.        Outpatient Psychiatric Progress Note    Name: Janelle TORRES Christopher   : 1960                    Primary Care Provider: Emili Ballard MD   Therapist: Flavio Murcia, PhD,     PHQ-9 scores:      2023     3:12 PM 2023     3:43 PM 2023     3:08 PM   PHQ-9 SCORE   PHQ-9 Total Score MyChart 15 (Moderately severe depression) 12 (Moderate depression)    PHQ-9 Total Score 15 12 12       STACIE-7 scores:      3/20/2023     7:59 AM 2023    10:39 PM 2023     3:44 PM   STACIE-7 SCORE   Total Score 7 (mild anxiety) 11 (moderate anxiety) 2 (minimal anxiety)   Total Score 7 11    11 2       Patient Identification:  Patient is a 62 year old,   White Choose not to answer female  who presents for return visit with me. Patient attended the phone/video session alone. Patient prefers to be called: \"Janelle\".    Interim History:  I last saw Janelle White for outpatient psychiatry return visit on 2023. During that appointment, we:  Continue Pristiq 50 mg daily for mood, anxiety.   Continue methylfolate 7.5 mg daily as supplementation.  Continue Adderall XR 25 mg daily for mood augmentation  Continue Adderall IR 15 mg twice daily as needed for " mood augmentation and daytime fatigue/hypersomnia  Continue benzodiazepine per primary care prescriber.  Continue ketamine with interventional psychiatry clinic.  Continue to work with your specialist from [facility] as indicated.    Could consider individual DBT therapy.  Recommend ongoing individual psychotherapy.      8/25: Patient overall feeling relatively stable.  Mood okay.  Continues to work on finding distractions.  Had an aunt pass away so will be traveling to [place] soon for celebration of life gathering.  Grieving appropriately and managing symptoms okay.  No acute suicidality today.  Working with new therapist.  Continues to address chronic pain.  No medication side effects.  No problematic drug or alcohol use.    Past medication trials include but are not limited to:   Effexor-very flat  Celexa, zoloft, was on wellbutrin a couple years at one point  wellbutrin XL + celexa; 2005ish  tca-a ton of weight gain  depakote maybe for a short time  Abilify/lithium ?  ECT as teen - made me dull  Cytomel-not effective at all, used 50 mcg    Psychiatric ROS:  Janelle White reports mood has been: Relatively stable  Anxiety has been: See HPI above  Sleep has been: Relatively okay, still struggles at times, especially due to pain  Deepa sxs: Denies  Psychosis sxs: None  ADHD/ADD sxs: None  PTSD sxs: None  PHQ9 and GAD7 scores were reviewed today if completed.   Medication side effects: Denies anything major related to current psychiatric medications  Current stressors include: Symptoms and See HPI above  Coping mechanisms and supports include: Family, Hobbies and Friends, therapy    Current medications include:   Current Outpatient Medications   Medication Sig    albuterol (PROAIR HFA/PROVENTIL HFA/VENTOLIN HFA) 108 (90 Base) MCG/ACT inhaler Inhale 2 puffs into the lungs every 6 hours as needed for shortness of breath or wheezing    amphetamine-dextroamphetamine (ADDERALL XR) 25 MG 24 hr capsule Take 1  "capsule (25 mg) by mouth daily for 30 days    [START ON 9/22/2023] amphetamine-dextroamphetamine (ADDERALL XR) 25 MG 24 hr capsule Take 1 capsule (25 mg) by mouth daily for 30 days    [START ON 10/23/2023] amphetamine-dextroamphetamine (ADDERALL XR) 25 MG 24 hr capsule Take 1 capsule (25 mg) by mouth daily for 30 days    amphetamine-dextroamphetamine (ADDERALL) 15 MG tablet Take 1 tablet (15 mg) by mouth 2 times daily for 30 days    [START ON 9/22/2023] amphetamine-dextroamphetamine (ADDERALL) 15 MG tablet Take 1 tablet (15 mg) by mouth 2 times daily for 30 days    [START ON 10/23/2023] amphetamine-dextroamphetamine (ADDERALL) 15 MG tablet Take 1 tablet (15 mg) by mouth 2 times daily for 30 days    desvenlafaxine (PRISTIQ) 50 MG 24 hr tablet Take 1 tablet (50 mg) by mouth daily    Estradiol (DIVIGEL) 1 MG/GM GEL Place 1 packet onto the skin daily    famotidine (PEPCID) 20 MG tablet TAKE 1 TABLET(20 MG) BY MOUTH TWICE DAILY    HYDROcodone-acetaminophen (NORCO)  MG per tablet Take 1 tablet by mouth every 4 hours as needed for severe pain (7-10) max 4 tabs/24 hrs    hydrOXYzine (VISTARIL) 25 MG capsule Take 1 capsule (25 mg) by mouth 3 times daily as needed for itching    insulin syringe-needle U-100 (30G X 1/2\" 1 ML) 30G X 1/2\" 1 ML miscellaneous Inject 1 ml B12 qmonth    Levomefolate Glucosamine (METHYLFOLATE PO) Take 7.5 mg by mouth daily    lidocaine (LIDODERM) 5 % patch Place 4 patches onto the skin daily Apply up to 4 patches to skin. Wear for 12 hours and remove for 12 hrs.  Refill when patient requests.    medical cannabis (Patient's own supply.  Not a prescription) Medical Cannabis - [place] 4-6 ml by mouth daily. [facility]    methocarbamol (ROBAXIN) 500 MG tablet Take 1 tablet (500 mg) by mouth 4 times daily as needed for muscle spasms    naloxone (NARCAN) 4 MG/0.1ML nasal spray Spray 1 spray (4 mg) into one nostril alternating nostrils as needed for opioid reversal every 2-3 minutes until " assistance arrives    NONFORMULARY Take 2 Scoops by mouth daily Protein and Vitamin shake mix - Nutritional supplement    ondansetron (ZOFRAN ODT) 8 MG ODT tab Take 1 tablet (8 mg) by mouth every 8 hours as needed for nausea    Prasterone 6.5 MG INST Place 0.5 suppositories vaginally three times a week    rOPINIRole (REQUIP) 0.25 MG tablet TAKE 1 TO 2 TABLETS(0.25 TO 0.5 MG) BY MOUTH AT BEDTIME    senna-docusate (SENOKOT-S/PERICOLACE) 8.6-50 MG tablet Take 6-9 tablets by mouth every evening    triamcinolone (KENALOG) 0.1 % external ointment Apply topically 2 times daily    vitamin D3 (CHOLECALCIFEROL) 125 MCG (5000 UT) tablet Take 5,000 Units by mouth daily     No current facility-administered medications for this visit.       The [place] Prescription Monitoring Program has been reviewed and there are no concerns about diversionary activity for controlled substances at this time.   08/22/2023 08/22/2023 1 Dextroamp-Amphet Er 25 Mg Cap 30.00 30 Al u [id number] [facility] ([id number]) 0/0  [place]are [place]   08/22/2023 08/22/2023 1 Dextroamp-Amphetamin 15 Mg Tab 60.00 30 Al u [id number] [facility] ([id number]) 0/0  [place]   07/25/2023 05/19/2023 1 Dextroamp-Amphetamin 15 Mg Tab 60.00 30 Al u [id number] [facility] ([id number]) 0/0  [place]   07/20/2023 05/19/2023 1 Dextroamp-Amphet Er 25 Mg Cap 30.00 30 Al u [id number] [facility] ([id number]) 0/0  [place]   06/27/2023 05/19/2023 1 Dextroamp-Amphetamin 15 Mg Tab 60.00 30 Al u [id number] [facility] ([id number]) 0/0  [place]       Past Medical/Surgical History:  Past Medical History:   Diagnosis Date    Anxiety     Cervicalgia 2007    C5-6 disc protrusion    COPD (chronic obstructive pulmonary disease) (H) 2/7/2022    Depressive disorder     ESBL (extended spectrum beta-lactamase) producing bacteria infection     History of blood transfusion 2007    Cervical fusion    Leukemia (H)     CLA large beta    Melanoma (H) 1998    Migraine     Other chronic pain     Rotator cuff tear     s/p injections    Sacroiliac inflammation (H)      Shift work sleep disorder 12/16/2013    Urinary calculi     Vitamin B12 deficiency anemia 2006    started injections      has a past medical history of Anxiety, Cervicalgia (2007), COPD (chronic obstructive pulmonary disease) (H) (2/7/2022), Depressive disorder, ESBL (extended spectrum beta-lactamase) producing bacteria infection, History of blood transfusion (2007), Leukemia (H), Melanoma (H) (1998), Migraine, Other chronic pain, Rotator cuff tear, Sacroiliac inflammation (H), Shift work sleep disorder (12/16/2013), Urinary calculi, and Vitamin B12 deficiency anemia (2006).    She has no past medical history of Cerebral infarction (H), Congestive heart failure (H), Coronary artery disease, Diabetes (H), Heart disease, Hypertension, Thyroid disease, or Uncomplicated asthma.    Social History:  Reviewed. No changes to social history except as noted above in HPI.    Vital Signs:   None. This is phone/video visit.     Labs:  Most recent laboratory results reviewed and the pertinent results include:   Lab Results   Component Value Date    WBC 5.6 06/30/2022    WBC 3.2 05/24/2021     Lab Results   Component Value Date    RBC 4.61 06/30/2022    RBC 3.74 05/24/2021     Lab Results   Component Value Date    HGB 14.2 06/30/2022    HGB 11.0 05/24/2021     Lab Results   Component Value Date    HCT 43.6 06/30/2022    HCT 33.6 05/24/2021     No components found for: MCT  Lab Results   Component Value Date    MCV 95 06/30/2022    MCV 90 05/24/2021     Lab Results   Component Value Date    MCH 30.8 06/30/2022    MCH 29.4 05/24/2021     Lab Results   Component Value Date    MCHC 32.6 06/30/2022    MCHC 32.7 05/24/2021     Lab Results   Component Value Date    RDW 11.9 06/30/2022    RDW 13.4 05/24/2021     Lab Results   Component Value Date     07/04/2022     05/24/2021     Last Comprehensive Metabolic Panel:  Sodium   Date Value Ref Range Status   06/30/2022 136 133 - 144 mmol/L Final   05/24/2021 141 133 - 144  mmol/L Final     Potassium   Date Value Ref Range Status   06/30/2022 3.4 3.4 - 5.3 mmol/L Final   05/24/2021 3.9 3.4 - 5.3 mmol/L Final     Chloride   Date Value Ref Range Status   06/30/2022 105 94 - 109 mmol/L Final   05/24/2021 108 94 - 109 mmol/L Final     Carbon Dioxide   Date Value Ref Range Status   05/24/2021 25 20 - 32 mmol/L Final     Carbon Dioxide (CO2)   Date Value Ref Range Status   06/30/2022 25 20 - 32 mmol/L Final     Anion Gap   Date Value Ref Range Status   06/30/2022 6 3 - 14 mmol/L Final   05/24/2021 8 3 - 14 mmol/L Final     Glucose   Date Value Ref Range Status   06/30/2022 91 70 - 99 mg/dL Final   05/24/2021 87 70 - 99 mg/dL Final     Urea Nitrogen   Date Value Ref Range Status   06/30/2022 20 7 - 30 mg/dL Final   05/24/2021 15 7 - 30 mg/dL Final     Creatinine   Date Value Ref Range Status   07/03/2022 0.67 0.52 - 1.04 mg/dL Final   05/24/2021 0.64 0.52 - 1.04 mg/dL Final     GFR Estimate   Date Value Ref Range Status   07/03/2022 >90 >60 mL/min/1.73m2 Final     Comment:     Effective December 21, 2021 eGFRcr in adults is calculated using the 2021 CKD-EPI creatinine equation which includes age and gender (Zhou peter al., [place], DOI: 10.1056/PQZTjl9657747)   05/24/2021 >90 >60 mL/min/[1.73_m2] Final     Comment:     Non  GFR Calc  Starting 12/18/2018, serum creatinine based estimated GFR (eGFR) will be   calculated using the Chronic Kidney Disease Epidemiology Collaboration   (CKD-EPI) equation.       Calcium   Date Value Ref Range Status   06/30/2022 8.7 8.5 - 10.1 mg/dL Final   05/24/2021 8.4 (L) 8.5 - 10.1 mg/dL Final     Bilirubin Total   Date Value Ref Range Status   04/26/2022 0.4 0.2 - 1.3 mg/dL Final   03/16/2021 0.4 0.2 - 1.3 mg/dL Final     Alkaline Phosphatase   Date Value Ref Range Status   04/26/2022 82 40 - 150 U/L Final   03/16/2021 87 40 - 150 U/L Final     ALT   Date Value Ref Range Status   04/26/2022 21 0 - 50 U/L Final   03/16/2021 26 0 - 50 U/L Final     AST    Date Value Ref Range Status   04/26/2022 18 0 - 45 U/L Final   03/16/2021 44 0 - 45 U/L Final     Review of Systems:  10 systems (general, cardiovascular, respiratory, eyes, ENT, endocrine, GI, , M/S, neurological) were reviewed. Most pertinent finding(s) is/are: Severe chronic pain. The remaining systems are all unremarkable.    Mental Status Examination (limited as this is by phone/video):  Appearance: Awake, alert, appears stated age, no acute distress  Attitude:  cooperative, pleasant   Motor: No gross abnormalities observed via video, not formally tested.  Oriented to:  person, place, time, and situation  Attention Span and Concentration:  normal  Speech:  clear, coherent, regular rate, rhythm, and volume  Language: intact  Mood: Okay  Affect: Mood congruent  Associations:  no loose associations  Thought Process:  logical, linear and goal oriented  Thought Content: Chronic passive suicidal ideation-at baseline today, may be overall improved some still-no current plan or intent to harm self today, no homicidal ideation, no evidence of psychotic thought, no auditory hallucinations present and no visual hallucinations present  Recent and Remote Memory:  Intact to interview. Not formally assessed. No amnesia.  Fund of Knowledge: appropriate  Insight:  good  Judgment:  intact, adequate for safety  Impulse Control:  intact    Suicide Risk Assessment:  Today Janelle White reports chronic/intermittent suicidal ideation-at baseline today and overall improved since first visit with this provider.There are notable risk factors for self-harm, including anxiety, comorbid medical condition of Chronic pain, suicidal ideation, purposelessness/no reason for living, hopelessness, withdrawing and mood change. However, risk is mitigated by commitment to family, absence of past attempts, ability to volunteer a safety plan, history of seeking help when needed, future oriented and denies suicidal intent today. Therefore,  based on all available evidence including the factors cited above, Janelle White does not appear to be at imminent risk for self-harm, does not meet criteria for a 72-hr hold, and therefore remains appropriate for ongoing outpatient level of care.  A thorough assessment of risk factors related to suicide and self-harm have been reviewed and are noted above. Local community safety resources reviewed for patient to use if needed. There was no deceit detected, and the patient presented in a manner that was believable.     DSM5 Diagnosis:  Persistent depressive disorder  Treatment resistant depression  CYP2D6 poor metabolizer  CYP2B6 intermediate metabolizer  Homozygous for C677T polymorphism of MTHFR    Medical comorbidities include:   Patient Active Problem List    Diagnosis Date Noted    Sacroiliitis (H) 08/04/2023     Priority: Medium    Suicidal ideation 06/30/2022     Priority: Medium    Immunocompromised (H) 06/30/2022     Priority: Medium    Infection due to 2019 novel coronavirus 06/30/2022     Priority: Medium    Severe episode of recurrent major depressive disorder, without psychotic features (H) 04/17/2022     Priority: Medium    COPD (chronic obstructive pulmonary disease) (H) 02/07/2022     Priority: Medium    Tension type headache 11/04/2021     Priority: Medium    Rotator cuff injury 11/04/2021     Priority: Medium    Spinal stenosis of lumbar region with radiculopathy 09/02/2021     Priority: Medium    COVID-19 08/29/2021     Priority: Medium    Polyarthralgia 08/11/2021     Priority: Medium    Cellulitis, unspecified cellulitis site 08/11/2021     Priority: Medium    Sepsis, due to unspecified organism, unspecified whether acute organ dysfunction present (H) 08/11/2021     Priority: Medium    Cellulitis 08/11/2021     Priority: Medium    STACIE (generalized anxiety disorder) 07/27/2021     Priority: Medium    MTHFR gene mutation 04/12/2021     Priority: Medium    CYP2B6 intermediate metabolizer (H)  04/12/2021     Priority: Medium    CYP2D6 poor metabolizer (H) 04/12/2021     Priority: Medium    Status post blepharoplasty 11/18/2020     Priority: Medium    Regular astigmatism, bilateral 11/18/2020     Priority: Medium    Prediabetes 09/19/2019     Priority: Medium    Hyperlipidemia LDL goal <130 09/19/2019     Priority: Medium    CLARE (obstructive sleep apnea) 02/13/2019     Priority: Medium    Controlled substance agreement signed 08/14/2018     Priority: Medium    Chronic pain syndrome 12/21/2017     Priority: Medium    CLL (chronic lymphocytic leukemia) (H) 12/21/2017     Priority: Medium    Hypotension 09/14/2017     Priority: Medium    History of laser assisted in situ keratomileusis 10/14/2014     Priority: Medium    Pain medication agreement 04/23/2014     Priority: Medium     Formatting of this note might be different from the original.  Patient takes morphine 15 mg ER BID for chronic neck and back pain.  She is also on cymbalta, ibuprofen, flexaril prn      Shift work sleep disorder 12/16/2013     Priority: Medium    Vitamin D deficiency 11/08/2012     Priority: Medium    Moderate recurrent major depression (H) 01/06/2011     Priority: Medium    DDD (degenerative disc disease), cervical 10/07/2010     Priority: Medium    CARDIOVASCULAR SCREENING; LDL GOAL LESS THAN 160 02/10/2010     Priority: Medium    Chronic Low Back Pain 10/01/2009     Priority: Medium     S/p AP L3-S1 fusion 12/2010 - referred to [facility].   Orthopedics writing scripts for narcotics post-op.      Migraine      Priority: Medium     Problem list name updated by automated process. Provider to review      B-complex deficiency 10/10/2006     Priority: Medium     Problem list name updated by automated process. Provider to review      PERSONAL HX OF  MELANOMA 12/04/2003     Priority: Low       Psychosocial & Contextual Factors: see HPI above    Assessment:  6/15/2021:  Janelle White reports overall some significant worsening  of mood symptoms.  Increased anxiety with her depression.  Poor motivation and poor energy.  Symptoms severe enough to prevent her from being able to utilize typical coping skills and strategies.  No current motivation or energy to participate in PHP/day treatment program.  Continues to take medication as scheduled.  Recent surgery and general anesthesia could be contributing.  Discussed discontinuing stimulant medication as an augmentation strategy.  She is agreeable to moving forward with T3 as an augmentation for treatment resistant depression.  Discussed risks and benefits at length.  Will get baseline thyroid labs prior to starting T3.  Hoping she will experience increased energy and motivation and that her mood will lift.  Also discussed setting up an appointment with her interventional psychiatry clinic to discuss other potential options such as ketamine therapy, ECT, TMS.  Does have history of ECT for depression when she was quite young.  I am hopeful to stay ahead of her symptoms before things get too severe.  Has chronic suicidal ideation and is at high risk for suicide.  Continues to deny any plan or intent.  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  See below for risk/benefit conversation regarding T3 had with the patient:    GeneSight testing Info:  GeneSight testing revealed she is a poor 2D6 metabolizer and an intermediate 2B6 metabolizer.  This would explain her multiple failed medication trials due to the negative side effects.  She also has a genotype that would suggest a phenotype sensitivity to serotonin. She also was found to have significantly reduced folic acid conversion.      Starting T3 as augment to antidepressant therapy for treatment resistant depression:  Start T3 at 25 mcg per day for one to two weeks, and if there is little or no improvement, increase the dose to 50 mcg per day; this is consistent with practice guidelines from the American Psychiatric  Association and [place] Network for Mood and Anxiety Treatments.     Adverse effects consistent with hyperthyroidism may occur, including tremor, palpitations, heat intolerance, sweating, anxiety, increased frequency of bowel movements, shortness of breath, and exacerbation of cardiac arrhythmia. In addition, hyperthyroidism that emerges during long-term treatment may lead to bone demineralization, osteoporosis, and an increased risk of fracture    Following a normal baseline TSH concentration, no other laboratory monitoring during a four to six week trial of adjunctive T3 is necessary. However, if T3 is continued longer, a serum TSH concentration should be checked after the first one to three months of treatment and then every six months.    Today, 7/27/21:   Patient overall with little change in her symptoms.  Did not do as well on Cytomel and had limited to no improvement at all after weeks on 50 mcg.  Labs were unremarkable prior to starting Cytomel.  Opted to go back to stimulant augmentation since patient does find it quite helpful.  She has been taking 15 mg of immediate release most afternoons.  Due to good tolerability and some efficacy, we will bump up her immediate release dose slightly to 20 mg daily as needed in addition to her 20 mg extended release dose. I have no concerns about misuse or diversion at this time.  Tolerating well with no negative side effects.  Last blood pressure normal 7/2.  Patient has noted some efficacy from Pristiq more than other antidepressant trials and so we will continue to titrate further to 100 mg daily.  She is tolerating well.  Continues to use lorazepam for anxiety, pain medicine adjunct, and for sleep as prescribed by primary care provider.  Has been utilizing roughly 100 tablets of 0.5 mg every 1.5 months.  Patient with ongoing chronic intermittent passive suicidal ideation with no plan or intent.  Denies safety concerns today.  No problematic drug or alcohol use.  I  am hopeful the interventional psychiatry clinic might be able to initiate ketamine therapy or another therapy they would recommend as potentially being more helpful than patient's multiple medication/augmentation trials.    10/6/2021:  Patient with some improved depression symptoms and much more hopeful.  Seeing specialist at [facility]-feels very hurt and listened to.  Also is hopeful there will be some helpful treatments.  Visit to [place] was also very good and instilled a lot of hope in her abilities.  She was encouraged to continue to push herself to do the things she enjoys doing.  No medication changes today.  She will follow-up with getting TMS rescheduled, possibly when things slow down after the holidays.  Does not need to be seen until after the holidays.  No acute safety concerns today.  No problematic drug or alcohol use.    1/14/2022:  Overall patient feeling a little more down lately.  Tolerating TMS well.  Encouraged to continue TMS.  No medication changes today since undergoing TMS treatment.  Talked about life stages today generativity versus stagnation and also integrity versus despair.  Talked about consideration for acceptance and commitment therapy.  She is encouraged to continue to be active.  No acute suicidal ideation today.  No acute safety concerns today.  No problematic drug or alcohol use.    4/13/2022:   Patient continues to have symptoms that wax and wane.  Feels like she tolerates Pristiq 50 mg better than 100 and will continue on this dose.  Last week was particularly difficult.  Pretty intense suicidal ideation but she was able to manage these thoughts and remain safe.  She does report she would come to the hospital if necessary.  We discussed the empath unit today and other resources if she felt she could not keep herself safe.  She continues to work on tapering her narcotic pain medication regimen.  She is working with addiction medicine and also pain management.  She is starting  Pilates therapy.  Pool therapy will begin in July.  Discussed possibility of vestibular rehabilitation.  Individual therapy will also start soon.  She was strongly encouraged to continue to pursue ketamine therapy.  No acute suicidality today.  No problematic drug or alcohol use.    6/23/2022:  Overall reports doing relatively okay.  Some pretty down days still, but ketamine therapy going well.  Feels like continuing to move in the right direction.  Suicidal ideation improving.  Off all narcotic pain medication.  Feels good about her progress.  Had some really down days after off pain medication but improved since those few dark days.  Hopeful about where ketamine therapy may lead.  Discussed some of her restlessness at bedtime and will start pramipexole.  Also some evidence to suggest pramipexole could be helpful for treatment resistant depression symptoms.  Discussed risks and benefits of therapy, including watching for any impulsive behaviors.  Continues to have suicidal thoughts but no acute suicidality today.  Her suicidality overall is improving from her baseline suicidality.  She continues to consider the idea of a partial hospital program.  We will send her information for [facility] program.  Also encouraged her to discuss possibility of a pain program through [facility] with Dr. Medley.  No acute safety concerns today.  No problematic drug or alcohol use.    7/11/2022:  Patient with some recent more intensive struggles.  Depression much more severe recently.  Led to hospitalization.  Patient medically hospitalized since was positive for COVID and has history of CLL and Harrison protocol requires isolation.  Patient seen by psychiatry consult service.  No medication changes made.  Patient continues with interventional psychiatry clinic.  They will be increasing ketamine dose and treatments will be every few weeks.  We discussed patient's symptom history a little more today and possible bipolar diagnosis  came into question.  Patient does recognize possible hypomanic episodes in the past.  Patient is currently monitoring symptoms closely and pramipexole.  She is feeling better since starting pramipexole but is watching shopping behaviors closely.  Suicidal ideation is improving since hospitalization.  Restless legs improved at night on pramipexole.  Discussed we could consider adding lower dose lithium as an augment to her Pristiq and to help stabilize moods a little bit more and to further help with some of her chronic suicidal ideation.  We also discussed DBT for chronic suicidal ideation and ongoing mood instability.  Continues off of pain medication.  No acute suicidality or safety concerns today.  Patient will follow up in a few weeks.  No medication changes today since we will wait to see how ketamine dose change goes.  No drug or alcohol use concerns.  Patient noted some ongoing cognitive/memory concerns and requested testing.  Neuropsychological referral placed.    8/8/2022:  Patient with ongoing severe depression, resistant to treatment.  She is agreeable to increasing her stimulant medication.  This could hopefully further improve mood slightly.  May also help with some additional energy and also help with some of her ongoing cognitive concerns.  She has neuropsychological testing coming up soon.  Discussed possibly considering individual DBT therapy with a DBT certified therapist given her ongoing chronic suicidal ideation and inability to participate in group therapy currently.  We will discuss this possibility with her current therapist.  Also discussed possibility of increasing Pristiq by 25 mg only 2 or 3 days a week to decrease risk of negative side effects (25 mg on Tuesdays/Thursdays for Monday/Wednesday/Fridays).  Patient also encouraged to continue ketamine treatment.  No acute suicidality today.  Patient denies any intent or plan to act on any of her suicidal thoughts today.  No problematic drug  or alcohol use.    9/7/2022:  Patient doing relatively okay today.  Pain continues to feel a little bit worse.  Emotionally though, despite worsening pain symptoms, patient feels like she is doing relatively okay.  Discussed various psychotherapy approaches that could be taken to address her symptoms.  We discussed that health psychology could be an optimal approach to help with her symptoms but that her suicidal ideation is often still quite intense and may be a distractor to her treatment with a health psychologist (discussed she may be referred often to the emergency room or to other more intensive programs).  We discussed working with an individual DBT therapist as a possibility to continue addressing her ongoing chronic suicidal ideation (individual therapy would allow patient to move at her own pace since group therapy is much too intense at this time).  Patient was open to this idea.  [facility] today we will send patient some resources/options.  Depending on patient's financial situation, there is also a possibility patient could work with a health psychologist in conjunction with a DBT therapist.  Ketamine therapy has proven to be helpful, although I do wish the effects lasted a little longer than what they currently are for the patient.  I recommend patient continue her ketamine treatments.  No medication changes today.  She denies any acute suicidality today.  No plan or intent to harm herself noted today.  She denies being in a bad place today.  No problematic drug or alcohol use.     10/7/2022:  Patient overall relatively stable at this time.  Mood improving slightly with ketamine therapy.  Patient encouraged to continue with treatment.  Anxiety a little more manageable.  Continuing to struggle with severe chronic pain.  Tolerating current medications well with no negative side effects noted.  No changes today.  Patient will continue in individual psychotherapy.  No acute suicidality today.  Suicidal thoughts  at baseline, maybe even a little improved.    1/9/2023:  Patient overall doing quite well.  Some days still worse than others and chronic pain continues to be a big factor influencing her mental health.  Ketamine has been very helpful.  Still some poor energy and fatigue.  Adderall has been very helpful.  We will increase the dose just slightly.  Extended release dose will increase from 20 mg to 25 mg to further address her symptoms.  We will continue with the 15 mg twice daily immediate release doses.  No other medication changes today.  Patient encouraged to stay the course.  No acute safety concerns.  Suicidal thoughts continue to be passive in nature and have overall improved since starting ketamine.  No problematic drug or alcohol use.    3/20/2023:  Patient remains overall relatively stable.  Having some anxiety over feeling more dependent on cannabis for her pain.  Discussed continuing gratitude journal.  Patient has come quite a ways with relative stability.  No medication changes today.  Denies that cannabis use is causing any problems apart from feeling a little anxious about her use.  Patient even participating in more activities this spring compared to last year.  No acute safety concerns at this time.  No acute suicidality.    5/19/2023:  Patient overall continuing to manage okay.  Feels relatively stable.  Continues ketamine therapy.  No med changes today.  No acute safety concerns.  No acute suicidality today.  Is pleased that she is finding some roxy in pleasurable activities this spring.  Continues to seek purpose in life.  No problematic drug or alcohol use.  Patient will be seen back in 3 months.    8/25/2023:  Continues to manage relatively okay on current regimen.  No major questions or concerns today.  Interested in keeping things status quo/the same.  Continues to receive ketamine infusions.  Pain continues to be forefront.  We discussed referral to a provider who could provide an evaluation to  discuss ketamine for pain and possibly mood to replace her infusions.  Referral sent and discussed with patient.  Patient is very agreeable and interested in what ever options might be available for her.  No acute safety concerns today.  No problematic drug or alcohol use.  No acute suicidality today.  Patient continues to find ways to stay distracted.  Has started with new therapist.    Medication side effects and alternatives were reviewed. Health promotion activities recommended and reviewed today. All questions addressed. Education and counseling completed regarding risks and benefits of medications and psychotherapy options. Recommend therapy for additional support.     Treatment Plan:  Continue Pristiq 50 mg daily for mood, anxiety.   Continue methylfolate 7.5 mg daily as supplementation.  Continue Adderall XR 25 mg daily for mood augmentation  Continue Adderall IR 15 mg twice daily as needed for mood augmentation and daytime fatigue/hypersomnia  Continue benzodiazepine per primary care prescriber.  Continue ketamine therapy as planned.   Continue to work with your specialist from [facility] as indicated.    Could consider individual DBT therapy.  Recommend ongoing individual psychotherapy.    Continue all other cares per primary care provider.   Continue all other medications as reviewed per electronic medical record today.   Safety plan reviewed. To the Emergency Department as needed or call after hours crisis line at [phone] or [phone]. [place] Crisis Text Line. Text [place] to 668720 or Suicide LifeLine Chat: suicidepreventionlifeline.org/chat  Schedule an appointment with me in 3 months, or sooner as needed. Call [facility] at [phone] to schedule.  Follow up with primary care provider as planned or for acute medical concerns.  Call the psychiatric nurse line with medication questions or concerns at [phone].  Attentive.lyt may be used to communicate with your provider, but this  is not intended to be used for emergencies.    Therapy resources:  Www.[facility]PSI.org    https://www.mhs-dbt.com/mental-health/thrive/thrive-mental-health-and-chronic-pain-management/    [place] DBT resources:  https://[place].gov/dhs/partners-and-providers/policies-procedures/adult-mental-health/dialectical-behavior-therapy/dbt-certified-providers/    Risks of benzodiazepine (Ativan, Xanax, Klonopin, Valium, etc) use including, but not limited to, sedation, tolerance, risk for addiction/dependence. Do not drink alcohol while taking benzodiazepines due to risk of trouble breathing and potential death. Do not drive or operate heavy machinery until it is known how the drug affects you. Discuss with physician or pharmacist before ever taking a benzodiazepine with a narcotic/opioid pain medication.     Have previously discussed risks of stimulant medication including, but not limited to, decreased appetite, risk of tics (and that they may be lasting), trouble sleeping, cardiac risks such as increased heart rate and blood pressure, and rare risk of sudden cardiac death.  Also risk of addiction/tolerance/dependence.    Administrative Billing:   Phone Call/Video Duration: 30 Minutes  8:03a-8:33a    Patient Status:  Patient is a continuous care patient and refills will continue to come from this provider until otherwise noted.    Signed:   Kimberly Perez DO  [facility]    Disclaimer: This note consists of symbols derived from keyboarding, dictation and/or voice recognition software. As a result, there may be errors in the script that have gone undetected. Please consider this when interpreting information found in this chart.

## 2023-08-28 ENCOUNTER — OFFICE VISIT (OUTPATIENT)
Dept: PSYCHIATRY | Facility: CLINIC | Age: 63
End: 2023-08-28
Payer: COMMERCIAL

## 2023-08-28 DIAGNOSIS — F33.9 RECURRENT MAJOR DEPRESSION RESISTANT TO TREATMENT (H): Primary | ICD-10-CM

## 2023-08-31 NOTE — PROGRESS NOTES
Clinician Contact & Progress Note  Department of Psychiatry & Behavioral Sciences  [facility]    Patient: Janelle White (1960)     MRN: [id number]  Date of Treatment:  Aug 28, 2023  Duration of Treatment: Start Time: 3:05pm End Time: 4:00pm  Provider: Flavio Murcia, PhD LP     People present:   Provider: Flavio Murcia, Ph.D., L.P.  Patient: Janelle White  Others: n/a      Diagnoses:  Major depressive disorder, severe, recurrent      Assessment (current symptoms):      8/16/2023     3:12 PM 8/17/2023     3:43 PM 8/21/2023     3:08 PM   PHQ   PHQ-9 Total Score 15 12 12   Q9: Thoughts of better off dead/self-harm past 2 weeks Nearly every day More than half the days More than half the days   F/U: Thoughts of suicide or self-harm Yes Yes    F/U: Self harm-plan No No    F/U: Self-harm action No No    F/U: Safety concerns No No          3/20/2023     7:59 AM 5/17/2023    10:39 PM 8/17/2023     3:44 PM   STACIE-7 SCORE   Total Score 7 (mild anxiety) 11 (moderate anxiety) 2 (minimal anxiety)   Total Score 7 11    11 2       Session content: Pt presented for follow-up in Treatment Resistant Depression clinic for psychotherapy visit with writer. Today continued focus on interpersonal effectiveness.     We reviewed theory around DBT interpersonal effectiveness skills and how they fit together. Writer used examples to illustrate these and we talked through what makes being skillful in certain interpersonal situations difficult. Pt brought up an example that we used to illustrate points as well.    Writer introduced GIVE for relationship effectiveness and we focused on validation skill in particular. We made a plan for pt to take this material home and share it with their spouse and possibly daughter. We also made a plan for potential use of the skills when addressing an example situation pt brought up (household chores).      Homework:  Read through [employer] and FAST    Treatment Plan/  "Disposition:   Continue with weekly CBT with writer  Next appointment: Mondays at 1500 (next 9.05 virtual because of Labor Day)  KJ with other (eg., psychiatric) treatment providers  Treatment goals: see plan on 8/15/23  Treatment Plan Review Due: 11/13/23 (every 90 days)  Expected duration of treatment: 3-6 months  Criteria for termination: met > 50% of treatment goals, PHQ-9 reduced by 50%      Mental Status Exam:  Alertness: alert  and oriented  Appearance: adequately groomed  Behavior/Demeanor: cooperative, pleasant, and calm, with good  eye contact   Speech: normal  Language: intact. Preferred language identified as English.  Psychomotor: normal or unremarkable  Mood: balanced  Affect: full range; was congruent to mood; was congruent to content  Thought Process/Associations: unremarkable  Thought Content:  unremarkable  -Suicidal ideation: reports SI, denies intent, and denies plan  -Homicidal Ideation: denies  Perception:  intact  Insight: good  Judgment: good  Cognition: does  appear grossly intact      Billing for \"Interactive Complexity\"?    No    Flavio Murcia, PhD LP   "

## 2023-09-05 ENCOUNTER — VIRTUAL VISIT (OUTPATIENT)
Dept: PSYCHIATRY | Facility: CLINIC | Age: 63
End: 2023-09-05
Payer: COMMERCIAL

## 2023-09-05 DIAGNOSIS — F33.9 RECURRENT MAJOR DEPRESSION RESISTANT TO TREATMENT (H): Primary | ICD-10-CM

## 2023-09-05 NOTE — PROGRESS NOTES
"Clinician Contact & Progress Note  Department of Psychiatry & Behavioral Sciences  [facility]    Patient: Janelle White (1960)     MRN: [id number]  Date of Treatment:  Sep 5, 2023  Duration of Treatment: Start Time: 1:31pm End Time: 2:14pm  Provider: Flavio Murcia, PhD LP     People present:   Provider: Flavio Murcia, Ph.D., L.P.  Patient: Janelle White  Others: n/a    Mode of communication: [employer] (HIPAA compliant, secure platform). Patient consented verbally to this mode of therapy today.  Reason for telehealth: pt convenience    Originating Location (patient location): home, located in [place], [place]  Distant Location (provider location): Home office, located in [place], [place], using appropriate privacy considerations and procedures    Diagnoses:  Major depressive disorder, severe, recurrent      Assessment (current symptoms):      8/16/2023     3:12 PM 8/17/2023     3:43 PM 8/21/2023     3:08 PM   PHQ   PHQ-9 Total Score 15 12 12   Q9: Thoughts of better off dead/self-harm past 2 weeks Nearly every day More than half the days More than half the days   F/U: Thoughts of suicide or self-harm Yes Yes    F/U: Self harm-plan No No    F/U: Self-harm action No No    F/U: Safety concerns No No          3/20/2023     7:59 AM 5/17/2023    10:39 PM 8/17/2023     3:44 PM   STACIE-7 SCORE   Total Score 7 (mild anxiety) 11 (moderate anxiety) 2 (minimal anxiety)   Total Score 7 11    11 2       Session content: Pt presented for follow-up in Treatment Resistant Depression clinic for psychotherapy visit with writer. Today continued focus on interpersonal effectiveness.     Pt started visit expressing some hesitation about method of previous sessions, in particular \"worksheets and acronyms\". Writer validated pt's concern and normalized that it's a common experience. Also shared more of rationale for method and talked about the challenge of translating what is being " "discussed in therapy session into real life situations for pt outside of session. Pt expressed understanding - writer resolved to look to be flexible in utilizing technique to aid pt in applying skills to real life situations.    Pt shared that they have been focused on \"listening more, and giving more attention\" to the person talking to them. We talked about where pt sees opportunities for improvement in interpersonal effectiveness - they shared about the way they and their  discuss financial matters like purchasing items. Writer gave pt main ideas to consider in the upcoming weeks as [place] trav[place]) Consider thoughtfully what it is that you want from the other person - that is, what they could do that would make you happy.    2) Consider ways that you are asking the other person to change and whether there are ways for you to be flexible and change that would make them happy.    3) Utilize validation including repeating back what the person has said and clarifying; (e.g., 'I hear you saying this' and 'does this mean we are going to').    We confirmed plan for visits on  and .      Homework:  Read through [employer] and RJ    Treatment Plan/ Disposition:   Continue with weekly CBT with writer  Next appointment:  at 1500   KJ with other (eg., psychiatric) treatment providers  Treatment goals: see plan on 8/15/23  Treatment Plan Review Due: 23 (every 90 days)  Expected duration of treatment: 3-6 months  Criteria for termination: met > 50% of treatment goals, PHQ-9 reduced by 50%      Mental Status Exam:  Alertness: alert  and oriented  Appearance: adequately groomed  Behavior/Demeanor: cooperative, pleasant, and calm, with good  eye contact   Speech: normal  Language: intact. Preferred language identified as English.  Psychomotor: normal or unremarkable  Mood: balanced  Affect: full range; was congruent to mood; was congruent to content  Thought " "Process/Associations: unremarkable  Thought Content:  unremarkable  -Suicidal ideation: reports SI, denies intent, and denies plan  -Homicidal Ideation: denies  Perception:  intact  Insight: good  Judgment: good  Cognition: does  appear grossly intact      Billing for \"Interactive Complexity\"?    No    Flavio Murcia, PhD LP       "

## 2023-09-08 ENCOUNTER — ANCILLARY PROCEDURE (OUTPATIENT)
Dept: RADIOLOGY | Facility: AMBULATORY SURGERY CENTER | Age: 63
End: 2023-09-08
Attending: ANESTHESIOLOGY
Payer: COMMERCIAL

## 2023-09-08 ENCOUNTER — HOSPITAL ENCOUNTER (OUTPATIENT)
Facility: AMBULATORY SURGERY CENTER | Age: 63
Discharge: HOME | End: 2023-09-08
Attending: ANESTHESIOLOGY | Admitting: ANESTHESIOLOGY
Payer: COMMERCIAL

## 2023-09-08 VITALS
HEIGHT: 65 IN | SYSTOLIC BLOOD PRESSURE: 105 MMHG | RESPIRATION RATE: 16 BRPM | WEIGHT: 160 LBS | TEMPERATURE: 97 F | DIASTOLIC BLOOD PRESSURE: 68 MMHG | OXYGEN SATURATION: 99 % | HEART RATE: 64 BPM | BODY MASS INDEX: 26.66 KG/M2

## 2023-09-08 DIAGNOSIS — M46.1 SACROILIITIS (H): ICD-10-CM

## 2023-09-08 PROBLEM — Z91.81 HISTORY OF FALLING: Status: ACTIVE | Noted: 2023-03-13

## 2023-09-08 PROCEDURE — G0260 INJ FOR SACROILIAC JT ANESTH: HCPCS | Mod: RT

## 2023-09-08 RX ORDER — IOPAMIDOL 408 MG/ML
INJECTION, SOLUTION INTRATHECAL PRN
Status: DISCONTINUED | OUTPATIENT
Start: 2023-09-08 | End: 2023-09-08 | Stop reason: HOSPADM

## 2023-09-08 RX ORDER — METHYLPREDNISOLONE ACETATE 40 MG/ML
INJECTION, SUSPENSION INTRA-ARTICULAR; INTRALESIONAL; INTRAMUSCULAR; SOFT TISSUE PRN
Status: DISCONTINUED | OUTPATIENT
Start: 2023-09-08 | End: 2023-09-08 | Stop reason: HOSPADM

## 2023-09-08 RX ORDER — BUPIVACAINE HYDROCHLORIDE 2.5 MG/ML
INJECTION, SOLUTION EPIDURAL; INFILTRATION; INTRACAUDAL PRN
Status: DISCONTINUED | OUTPATIENT
Start: 2023-09-08 | End: 2023-09-08 | Stop reason: HOSPADM

## 2023-09-08 RX ORDER — MORPHINE SULFATE 30 MG/1
TABLET, FILM COATED, EXTENDED RELEASE ORAL
COMMUNITY
End: 2023-11-17

## 2023-09-08 RX ORDER — PREDNISONE 10 MG/1
TABLET ORAL
COMMUNITY
End: 2023-11-17

## 2023-09-08 RX ORDER — CYCLOBENZAPRINE HCL 10 MG
TABLET ORAL
COMMUNITY
End: 2023-11-17

## 2023-09-08 RX ORDER — LIDOCAINE HYDROCHLORIDE 10 MG/ML
INJECTION, SOLUTION EPIDURAL; INFILTRATION; INTRACAUDAL; PERINEURAL PRN
Status: DISCONTINUED | OUTPATIENT
Start: 2023-09-08 | End: 2023-09-08 | Stop reason: HOSPADM

## 2023-09-08 RX ORDER — METHYLPHENIDATE HYDROCHLORIDE 10 MG/1
TABLET ORAL
COMMUNITY
End: 2023-11-17 | Stop reason: ALTCHOICE

## 2023-09-08 NOTE — DISCHARGE INSTRUCTIONS
Home Care Instructions after a Sacroiliac Joint Injection        Activity  -You may resume most normal activity levels with the exception of strenuous activity. It is important for us to know if your pain with normal activity is relieved after this injection.  -DO NOT shower for 24 hours  -DO NOT remove bandaid for 24 hours    Pain  -You may experience soreness at the injection site for one or two days  -You may use an ice pack for 20 minutes every 2 hours for the first 24 hours  -You may use a heating pad after the first 24 hours  -You may use Tylenol (acetaminophen) every 4 hours or other pain medicines as directed by your physician    You may experience numbness radiating into your legs or arms (depending on the procedure location). This numbness may last several hours. Until sensation returns to normal; please use caution in walking, climbing stairs, and stepping out of your vehicle, etc.    DID YOU RECEIVE STEROIDS TODAY?  Yes    Common side effects of steroids:  Not everyone will experience corticosteroid side effects. If side effects are experienced, they will gradually subside in the 7-10 day period following an injection. Most common side effects include:  -Flushed face and/or chest  -Feeling of warmth, particularly in the face but could be an overall feeling of warmth  -Increased blood sugar in diabetic patients  -Menstrual irregularities my occur. If taking hormone-based birth control an alternate method of birth control is recommended  -Sleep disturbances and/or mood swings are possible  -Leg cramps      PLEASE KEEP TRACK OF YOUR SYMPTOMS AND NOTE YOUR IMPROVEMENT FOR YOUR DOCTOR.     Please contact us if you have:  -Severe pain  -Fever more than 101.5 degrees Fahrenheit  -Signs of infection at the injection site (redness, swelling, or drainage)    FOR PAIN CENTER PATIENTS:  If you have questions, please contact the Pain Clinic at [phone] Option #1 between the hours of 7:00 am and 3:00 pm Monday  through Friday. After office hours you can contact the on call provider by dialing [phone]. If you need immediate attention, we recommend that you go to a hospital emergency room or dial [phone].      FOR PM&R PATIENTS:  For patients seen by the PM and R service, please call [phone]. (Monday through Friday 8a-5p.  After business hours and weekends call [phone] and ask for the PM and R doctor on call). If you need to fax a pain diary to PM&R the fax number is [phone]. If you are unable to fax, uploading to [employer] is encouraged, then send to provider. If you have procedure scheduling questions please call [phone] Option #2.

## 2023-09-15 NOTE — OP NOTE
Patient: Janelle White Age: 62 year old   MRN: [id number] Attending: Dr. Cherry     Date of Visit: September 8, 2023      PAIN MEDICINE CLINIC PROCEDURE NOTE    ATTENDING CLINICIAN:    Joslyn Cherry MD      PREPROCEDURE DIAGNOSES:  1.  Right low back pain   2.  Sacroiliitis - Right    PROCEDURE(S) PERFORMED:  1.  Right sacroiliac joint injection  3.  Fluoroscopic guidance for the above-named procedure(s)      ANESTHESIA:  Local.    BLOOD LOSS:  Minimal.    DRAINS AND SPECIMENS:  None.    COMPLICATIONS:  None.    INDICATIONS:  Janelle White is a 62 year old female with a history of  chronic low back pain secondary to sacrolitis .  The patient stated that the patient was in their usual state of health and denied recent anticoagulant use or recent infections.  Therefore, the plan is to perform above mentioned procedures.     Procedure Details:  The patient was met in the procedure room, where the patient was identified by name, medical record number and date of birth.  All of the patient s last minute questions were answered. Written informed consent was obtained and saved in the electronic medical record, after the risks, benefits, and alternatives were discussed with the patient.      A formal time-out procedure was performed, as per protocol, including patient name, title of procedure, and site of procedure, and all in the room concurred.  Routine monitors were applied.      The patient was placed in the prone position on the procedure room table.  All pressure points were checked and comfortably padded.  Routine monitors were placed.  Vital signs were stable.    A chlorhexidine prep was completed followed by sterile draping per standard procedure.     AP fluoroscopic guidance was used to identify the right SI joint(s), with slight contralateral oblique tilt, until the joint was maximally visualized.   After 1% lidocaine infiltration using a 25 gauge 1.5 inch needle, a 3.5 inch spinal needle was  introduced and advanced through the anesthetized plane and advanced to the joint space.  Depth was confirmed with lateral fluoroscopic guidance. After negative aspiration for heme, we injected 0.5 ml of Isovue contrast into SI joint. Images obtained.      After negative aspiration for heme, 3 mL of a treatment mixture containing 1 mL of depomedrol (40 mg/ml) and 2 mL of bupivacaine 0.25% was injected into the SI joint. The needle was then removed.      Light pressure was held at the puncture site(s) to prevent ecchymosis and oozing.  The patient's skin was cleansed, and hemostasis was confirmed.  Band-aids were applied to the needle injection site(s).      Condition:    The patient remained awake and alert throughout the procedure.  The patient tolerated the procedure well and was monitored for approximately 15 minutes afterward in the post procedure area.  There were no immediate post procedure complications noted.  The patient was then discharged to home as per protocol.      Pre-procedure pain score: 6/10  Post-procedure pain score: 2/10

## 2023-09-15 NOTE — H&P
ABBREVIATED H&P [facility]UL[facility]ORY SURGERY CENTER      Patient Name: Janelle White   MRN: [id number]   YOB: 1960     1. Reason for Procedure:  Procedure Summary       Date: 09/08/23 Room / Location: [facility] [place] PROCEDURE ROOM 06 / [facility]    Anesthesia Start:  Anesthesia Stop:     Procedure: Sacroiliac Joint Injection (Right: Sacrum) Diagnosis:       Sacroiliitis (H)      (Sacroiliitis (H) [M46.1])    Providers: Joslyn Cherry MD Responsible Provider:     Anesthesia Type: Not recorded ASA Status: Not recorded            2. History:   Past Medical History:   Diagnosis Date    Anxiety     Cervicalgia 2007    C5-6 disc protrusion    COPD (chronic obstructive pulmonary disease) (H) 2/7/2022    Depressive disorder     ESBL (extended spectrum beta-lactamase) producing bacteria infection     History of blood transfusion 2007    Cervical fusion    Leukemia (H)     CLA large beta    Melanoma (H) 1998    Migraine     Other chronic pain     Rotator cuff tear     s/p injections    Sacroiliac inflammation (H)     Shift work sleep disorder 12/16/2013    Urinary calculi     Vitamin B12 deficiency anemia 2006    started injections       Comorbidities: None    Any history of sleep apnea? No    Any history of problems with sedation? No    3. Physical:    General: Normal  Skin:  Normal.  Respiratory: Clear to auscultation bilateral, no wheezing  Cardio:  Regular rate and rhythm  Abdomen: Soft, nontender, nondistended, no palpable masses.  Musculoskeletal: Normal  Neuro: Sensory exam normal, motor exam 5/5, bilateral upper and lower extremities       4. Current Medications (if not in Epic):   Current Outpatient Medications   Medication Sig Dispense Refill    albuterol (PROAIR HFA/PROVENTIL HFA/VENTOLIN HFA) 108 (90 Base) MCG/ACT inhaler Inhale 2 puffs into the lungs every 6 hours as needed for shortness of breath or wheezing 8.5 g 11     "amphetamine-dextroamphetamine (ADDERALL XR) 25 MG 24 hr capsule Take 1 capsule (25 mg) by mouth daily for 30 days 30 capsule 0    cyclobenzaprine (FLEXERIL) 10 MG tablet       desvenlafaxine (PRISTIQ) 50 MG 24 hr tablet Take 1 tablet (50 mg) by mouth daily 90 tablet 1    Estradiol (DIVIGEL) 1 MG/GM GEL Place 1 packet onto the skin daily 30 g 11    famotidine (PEPCID) 20 MG tablet TAKE 1 TABLET(20 MG) BY MOUTH TWICE DAILY 180 tablet 1    HYDROcodone-acetaminophen (NORCO)  MG per tablet Take 1 tablet by mouth every 4 hours as needed for severe pain (7-10) max 4 tabs/24 hrs 10 tablet 0    hydrOXYzine (VISTARIL) 25 MG capsule Take 1 capsule (25 mg) by mouth 3 times daily as needed for itching or anxiety 90 capsule 3    insulin syringe-needle U-100 (30G X 1/2\" 1 ML) 30G X 1/2\" 1 ML miscellaneous Inject 1 ml B12 qmonth 10 each 1    Levomefolate Glucosamine (METHYLFOLATE PO) Take 7.5 mg by mouth daily      lidocaine (LIDODERM) 5 % patch Place 4 patches onto the skin daily Apply up to 4 patches to skin. Wear for 12 hours and remove for 12 hrs.  Refill when patient requests. 120 patch 3    medical cannabis (Patient's own supply.  Not a prescription) Medical Cannabis - [place] 4-6 ml by mouth daily. [employer]      methocarbamol (ROBAXIN) 500 MG tablet Take 1 tablet (500 mg) by mouth 4 times daily as needed for muscle spasms 120 tablet 1    methylphenidate (RITALIN) 10 MG tablet       morphine (MS CONTIN) 30 MG CR tablet       naloxone (NARCAN) 4 MG/0.1ML nasal spray Spray 1 spray (4 mg) into one nostril alternating nostrils as needed for opioid reversal every 2-3 minutes until assistance arrives 0.2 mL 1    ondansetron (ZOFRAN ODT) 8 MG ODT tab Take 1 tablet (8 mg) by mouth every 8 hours as needed for nausea 30 tablet 4    Prasterone 6.5 MG INST Place 0.5 suppositories vaginally three times a week 28 each 11    predniSONE (DELTASONE) 10 MG tablet       rOPINIRole (REQUIP) 0.25 MG tablet TAKE 1 TO 2 TABLETS(0.25 TO " 0.5 MG) BY MOUTH AT BEDTIME 180 tablet 3    senna-docusate (SENOKOT-S/PERICOLACE) 8.6-50 MG tablet Take 6-9 tablets by mouth every evening      triamcinolone (KENALOG) 0.1 % external ointment Apply topically 2 times daily 80 g 1    vitamin D3 (CHOLECALCIFEROL) 125 MCG (5000 UT) tablet Take 5,000 Units by mouth daily      [START ON 9/22/2023] amphetamine-dextroamphetamine (ADDERALL XR) 25 MG 24 hr capsule Take 1 capsule (25 mg) by mouth daily for 30 days 30 capsule 0    [START ON 10/23/2023] amphetamine-dextroamphetamine (ADDERALL XR) 25 MG 24 hr capsule Take 1 capsule (25 mg) by mouth daily for 30 days 30 capsule 0    amphetamine-dextroamphetamine (ADDERALL) 15 MG tablet Take 1 tablet (15 mg) by mouth 2 times daily for 30 days 60 tablet 0    [START ON 9/22/2023] amphetamine-dextroamphetamine (ADDERALL) 15 MG tablet Take 1 tablet (15 mg) by mouth 2 times daily for 30 days 60 tablet 0    [START ON 10/23/2023] amphetamine-dextroamphetamine (ADDERALL) 15 MG tablet Take 1 tablet (15 mg) by mouth 2 times daily for 30 days 60 tablet 0    NONFORMULARY Take 2 Scoops by mouth daily Protein and Vitamin shake mix - Nutritional supplement          5. Allergies and Reactions:  is allergic to bupropion, codeine, effexor [venlafaxine hydrochloride], escitalopram, escitalopram oxalate, fluoxetine, levaquin [levofloxacin], methylphenidate, milnacipran, pregabalin, prozac [fluoxetine hcl], tramadol, and venlafaxine.

## 2023-09-27 ENCOUNTER — LAB (OUTPATIENT)
Dept: LAB | Facility: CLINIC | Age: 63
End: 2023-09-27
Payer: COMMERCIAL

## 2023-09-27 DIAGNOSIS — C91.10 CHRONIC LYMPHOID LEUKEMIA, WITHOUT MENTION OF HAVING ACHIEVED REMISSION(204.10) (H): Primary | ICD-10-CM

## 2023-09-27 DIAGNOSIS — Z91.81 HISTORY OF FALL: ICD-10-CM

## 2023-09-27 DIAGNOSIS — U07.1 COVID: ICD-10-CM

## 2023-09-27 LAB
ALP SERPL-CCNC: 76 U/L (ref 35–104)
AST SERPL W P-5'-P-CCNC: 26 U/L (ref 0–45)
BASOPHILS # BLD AUTO: 0 10E3/UL (ref 0–0.2)
BASOPHILS NFR BLD AUTO: 0 %
BILIRUB SERPL-MCNC: 0.3 MG/DL
CREAT SERPL-MCNC: 0.71 MG/DL (ref 0.51–0.95)
EGFRCR SERPLBLD CKD-EPI 2021: >90 ML/MIN/1.73M2
EOSINOPHIL # BLD AUTO: 0.1 10E3/UL (ref 0–0.7)
EOSINOPHIL NFR BLD AUTO: 1 %
ERYTHROCYTE [DISTWIDTH] IN BLOOD BY AUTOMATED COUNT: 12.1 % (ref 10–15)
HCT VFR BLD AUTO: 40.1 % (ref 35–47)
HGB BLD-MCNC: 13.3 G/DL (ref 11.7–15.7)
IMM GRANULOCYTES # BLD: 0 10E3/UL
IMM GRANULOCYTES NFR BLD: 0 %
LDH SERPL L TO P-CCNC: 220 U/L (ref 0–250)
LYMPHOCYTES # BLD AUTO: 2.3 10E3/UL (ref 0.8–5.3)
LYMPHOCYTES NFR BLD AUTO: 30 %
MCH RBC QN AUTO: 31.5 PG (ref 26.5–33)
MCHC RBC AUTO-ENTMCNC: 33.2 G/DL (ref 31.5–36.5)
MCV RBC AUTO: 95 FL (ref 78–100)
MONOCYTES # BLD AUTO: 0.6 10E3/UL (ref 0–1.3)
MONOCYTES NFR BLD AUTO: 8 %
NEUTROPHILS # BLD AUTO: 4.7 10E3/UL (ref 1.6–8.3)
NEUTROPHILS NFR BLD AUTO: 60 %
PLATELET # BLD AUTO: 171 10E3/UL (ref 150–450)
RBC # BLD AUTO: 4.22 10E6/UL (ref 3.8–5.2)
RETICS # AUTO: 0.06 10E6/UL (ref 0.03–0.1)
RETICS/RBC NFR AUTO: 1.5 % (ref 0.5–2)
WBC # BLD AUTO: 7.8 10E3/UL (ref 4–11)

## 2023-09-27 PROCEDURE — 83615 LACTATE (LD) (LDH) ENZYME: CPT

## 2023-09-27 PROCEDURE — 82565 ASSAY OF CREATININE: CPT

## 2023-09-27 PROCEDURE — 82247 BILIRUBIN TOTAL: CPT

## 2023-09-27 PROCEDURE — 84075 ASSAY ALKALINE PHOSPHATASE: CPT

## 2023-09-27 PROCEDURE — 82784 ASSAY IGA/IGD/IGG/IGM EACH: CPT

## 2023-09-27 PROCEDURE — 36415 COLL VENOUS BLD VENIPUNCTURE: CPT

## 2023-09-27 PROCEDURE — 85045 AUTOMATED RETICULOCYTE COUNT: CPT

## 2023-09-27 PROCEDURE — 84450 TRANSFERASE (AST) (SGOT): CPT

## 2023-09-27 PROCEDURE — 85025 COMPLETE CBC W/AUTO DIFF WBC: CPT

## 2023-09-28 LAB — IGG SERPL-MCNC: 282 MG/DL (ref 610–1616)

## 2023-09-29 DIAGNOSIS — M79.18 MYOFASCIAL PAIN: ICD-10-CM

## 2023-09-29 DIAGNOSIS — M47.812 CERVICAL SPONDYLOSIS: ICD-10-CM

## 2023-09-29 NOTE — TELEPHONE ENCOUNTER
Refill request    Medication: methocarbamol (ROBAXIN) 500 MG tablet     Sig: Take 1 tablet (500 mg) by mouth 4 times daily as needed for muscle spasms - Oral     Dispensed: 120  Refills: 1  SOLD to the pt on: 5/18/23     Last clinic appointment: 8/4/23  Next clinic appointment: 10/26/23    Last Drug Screen Collected: not on file  Controlled Substance Agreement signed: not on file      Preferred pharmacy:      [id number] [place], [place] - [address] [place] [facility] [place] [address]       Refill request routed to the provider to review.

## 2023-09-29 NOTE — TELEPHONE ENCOUNTER
[facility] Call Center    Phone Message    May a detailed message be left on voicemail: yes     Reason for Call: Other: Please see patient's mychart request below:    I am not able to release the request for refill of my Robaxin. Could you please send a request for refills to Dr Cherry? Thanks!  Janelle     Action Taken: Message routed to:  [facility]    Travel Screening: Not Applicable

## 2023-10-03 ENCOUNTER — INFUSION THERAPY VISIT (OUTPATIENT)
Dept: INFUSION THERAPY | Facility: CLINIC | Age: 63
End: 2023-10-03
Attending: PSYCHIATRY & NEUROLOGY
Payer: COMMERCIAL

## 2023-10-03 VITALS
HEART RATE: 61 BPM | OXYGEN SATURATION: 96 % | RESPIRATION RATE: 16 BRPM | DIASTOLIC BLOOD PRESSURE: 62 MMHG | SYSTOLIC BLOOD PRESSURE: 106 MMHG

## 2023-10-03 DIAGNOSIS — F33.2 SEVERE EPISODE OF RECURRENT MAJOR DEPRESSIVE DISORDER, WITHOUT PSYCHOTIC FEATURES (H): Primary | ICD-10-CM

## 2023-10-03 PROCEDURE — 96365 THER/PROPH/DIAG IV INF INIT: CPT

## 2023-10-03 PROCEDURE — 258N000003 HC RX IP 258 OP 636: Performed by: PSYCHIATRY & NEUROLOGY

## 2023-10-03 PROCEDURE — 250N000009 HC RX 250: Performed by: PSYCHIATRY & NEUROLOGY

## 2023-10-03 RX ORDER — METHYLPREDNISOLONE SODIUM SUCCINATE 125 MG/2ML
125 INJECTION, POWDER, LYOPHILIZED, FOR SOLUTION INTRAMUSCULAR; INTRAVENOUS
Status: CANCELLED
Start: 2023-10-03

## 2023-10-03 RX ORDER — EPINEPHRINE 1 MG/ML
0.3 INJECTION, SOLUTION, CONCENTRATE INTRAVENOUS EVERY 5 MIN PRN
Status: CANCELLED | OUTPATIENT
Start: 2023-10-03

## 2023-10-03 RX ORDER — ALBUTEROL SULFATE 0.83 MG/ML
2.5 SOLUTION RESPIRATORY (INHALATION)
Status: CANCELLED | OUTPATIENT
Start: 2023-10-03

## 2023-10-03 RX ORDER — DIPHENHYDRAMINE HYDROCHLORIDE 50 MG/ML
50 INJECTION INTRAMUSCULAR; INTRAVENOUS
Status: CANCELLED
Start: 2023-10-03

## 2023-10-03 RX ORDER — NALOXONE HYDROCHLORIDE 0.4 MG/ML
0.2 INJECTION, SOLUTION INTRAMUSCULAR; INTRAVENOUS; SUBCUTANEOUS
Status: CANCELLED | OUTPATIENT
Start: 2023-10-03

## 2023-10-03 RX ORDER — HEPARIN SODIUM (PORCINE) LOCK FLUSH IV SOLN 100 UNIT/ML 100 UNIT/ML
5 SOLUTION INTRAVENOUS
Status: CANCELLED | OUTPATIENT
Start: 2023-10-03

## 2023-10-03 RX ORDER — HYDRALAZINE HYDROCHLORIDE 20 MG/ML
10 INJECTION INTRAMUSCULAR; INTRAVENOUS
Status: CANCELLED | OUTPATIENT
Start: 2023-10-03

## 2023-10-03 RX ORDER — ALBUTEROL SULFATE 90 UG/1
1-2 AEROSOL, METERED RESPIRATORY (INHALATION)
Status: CANCELLED
Start: 2023-10-03

## 2023-10-03 RX ORDER — HEPARIN SODIUM,PORCINE 10 UNIT/ML
5 VIAL (ML) INTRAVENOUS
Status: CANCELLED | OUTPATIENT
Start: 2023-10-03

## 2023-10-03 RX ORDER — ONDANSETRON 2 MG/ML
4 INJECTION INTRAMUSCULAR; INTRAVENOUS
Status: CANCELLED | OUTPATIENT
Start: 2023-10-03

## 2023-10-03 RX ORDER — MEPERIDINE HYDROCHLORIDE 25 MG/ML
25 INJECTION INTRAMUSCULAR; INTRAVENOUS; SUBCUTANEOUS EVERY 30 MIN PRN
Status: CANCELLED | OUTPATIENT
Start: 2023-10-03

## 2023-10-03 RX ADMIN — KETAMINE HYDROCHLORIDE 65 MG: 50 INJECTION INTRAMUSCULAR; INTRAVENOUS at 09:45

## 2023-10-03 NOTE — PROGRESS NOTES
Infusion Nursing Note:  Janelle White presents today for ketamine infusion.    Patient seen by provider today: No   present during visit today: Not Applicable.    Note: Patient states that she has had a hard time over the last 6 weeks, related to stress with travelling and ongoing pain issues in her back.      Intravenous Access:  Peripheral IV placed.    Treatment Conditions:  Not Applicable.      Post Infusion Assessment:  Patient tolerated infusion without incident.  Patient observed for 60 minutes post Ketamine per protocol.  Blood return noted pre and post infusion.  Site patent and intact, free from redness, edema or discomfort.  No evidence of extravasations.  Access discontinued per protocol.       Discharge Plan:   Discharge instructions reviewed with: Patient.  Patient and/or family verbalized understanding of discharge instructions and all questions answered.  Patient discharged in stable condition accompanied by: self.  Departure Mode: Ambulatory.      Kathia Quinn RN

## 2023-10-04 RX ORDER — METHOCARBAMOL 500 MG/1
500 TABLET, FILM COATED ORAL 4 TIMES DAILY PRN
Qty: 120 TABLET | Refills: 1 | Status: SHIPPED | OUTPATIENT
Start: 2023-10-04

## 2023-10-06 ENCOUNTER — OFFICE VISIT (OUTPATIENT)
Dept: CARDIOLOGY | Facility: CLINIC | Age: 63
End: 2023-10-06
Payer: COMMERCIAL

## 2023-10-06 VITALS
BODY MASS INDEX: 26.49 KG/M2 | SYSTOLIC BLOOD PRESSURE: 118 MMHG | HEART RATE: 70 BPM | HEIGHT: 65 IN | OXYGEN SATURATION: 100 % | WEIGHT: 159 LBS | DIASTOLIC BLOOD PRESSURE: 83 MMHG

## 2023-10-06 DIAGNOSIS — R00.2 PALPITATIONS: Primary | ICD-10-CM

## 2023-10-06 DIAGNOSIS — Z82.49 FAMILY HISTORY OF CHF (CONGESTIVE HEART FAILURE): ICD-10-CM

## 2023-10-06 PROCEDURE — 99204 OFFICE O/P NEW MOD 45 MIN: CPT | Performed by: INTERNAL MEDICINE

## 2023-10-06 NOTE — LETTER
10/6/2023    Carmen Garcia, APRN CNP  [address] [facility] [address]  [place] [address]    RE: Janelle White       Dear Colleague,     I had the pleasure of seeing Janelle White in the [facility].  CARDIOLOGY CLINIC CONSULTATION    PRIMARY CARE PHYSICIAN:  Carmen Garcia    HISTORY OF PRESENT ILLNESS:  This is an extremely pleasant 62-year-old female who is here with her significant other.  The patient personally denies any prior cardiovascular history.  She has a prior history of alcohol and smoking however she has quit that.  The reason she is being seen in cardiology consultation because multiple members in her family both on her paternal and maternal side have significant heart disease and have  of cardiac events.  Most on her paternal side were elderly but a lot of members on the maternal side of her family had cardiac events in the 60s.  Her brother has atrial fibrillation in the 40s.  She does not know of any genetic cardiomyopathies in her family.  The patient has a history of palpitations and PACs.  Denies any typical symptoms of angina or heart failure.  Given her family history of heart failure A-fib and coronary disease she is seeking preventative cardiology consultation.    PAST MEDICAL HISTORY:  Past Medical History:   Diagnosis Date    Anxiety     Cervicalgia     C5-6 disc protrusion    COPD (chronic obstructive pulmonary disease) (H) 2022    Depressive disorder     ESBL (extended spectrum beta-lactamase) producing bacteria infection     History of blood transfusion 2007    Cervical fusion    Leukemia (H)     CLA large beta    Melanoma (H)     Migraine     Other chronic pain     Rotator cuff tear     s/p injections    Sacroiliac inflammation (H24)     Shift work sleep disorder 2013    Urinary calculi     Vitamin B12 deficiency anemia 2006    started injections       MEDICATIONS:  Current Outpatient Medications   Medication    albuterol (PROAIR  "HFA/PROVENTIL HFA/VENTOLIN HFA) 108 (90 Base) MCG/ACT inhaler    amphetamine-dextroamphetamine (ADDERALL XR) 25 MG 24 hr capsule    [START ON 10/23/2023] amphetamine-dextroamphetamine (ADDERALL XR) 25 MG 24 hr capsule    amphetamine-dextroamphetamine (ADDERALL) 15 MG tablet    [START ON 10/23/2023] amphetamine-dextroamphetamine (ADDERALL) 15 MG tablet    desvenlafaxine (PRISTIQ) 50 MG 24 hr tablet    Estradiol (DIVIGEL) 1 MG/GM GEL    hydrOXYzine (VISTARIL) 25 MG capsule    Levomefolate Glucosamine (METHYLFOLATE PO)    lidocaine (LIDODERM) 5 % patch    medical cannabis (Patient's own supply.  Not a prescription)    methocarbamol (ROBAXIN) 500 MG tablet    NONFORMULARY    ondansetron (ZOFRAN ODT) 8 MG ODT tab    Prasterone 6.5 MG INST    rOPINIRole (REQUIP) 0.25 MG tablet    senna-docusate (SENOKOT-S/PERICOLACE) 8.6-50 MG tablet    vitamin D3 (CHOLECALCIFEROL) 125 MCG (5000 UT) tablet    cyclobenzaprine (FLEXERIL) 10 MG tablet    famotidine (PEPCID) 20 MG tablet    HYDROcodone-acetaminophen (NORCO)  MG per tablet    insulin syringe-needle U-100 (30G X 1/2\" 1 ML) 30G X 1/2\" 1 ML miscellaneous    methylphenidate (RITALIN) 10 MG tablet    morphine (MS CONTIN) 30 MG CR tablet    naloxone (NARCAN) 4 MG/0.1ML nasal spray    predniSONE (DELTASONE) 10 MG tablet    triamcinolone (KENALOG) 0.1 % external ointment     No current facility-administered medications for this visit.       SOCIAL HISTORY:  I have reviewed this patient's social history and updated it with pertinent information if needed. Janelle TORRES Christopher  reports that she quit smoking about 39 years ago. Her smoking use included cigarettes and clove cigarettes or kreteks. She has a 5.00 pack-year smoking history. She has never used smokeless tobacco. She reports current alcohol use. She reports that she does not currently use drugs after having used the following drugs: Cocaine, Marijuana, LSD, and Psilocybin.    PHYSICAL EXAM:  Pulse:  [70] 70  BP: " (118)/(83) 118/83  SpO2:  [100 %] 100 %  159 lbs 0 oz    Constitutional: alert, no distress  Respiratory: Good bilateral air entry  Cardiovascular: Normal regular heart sounds no edema no carotid bruits normal JVP  GI: nondistended  Neuropsychiatric: appropriate affact    ASSESSMENT: Pertinent issues addressed/ reviewed during this cardiology visit  Family history of heart failure CAD and A-fib  Palpitations    RECOMMENDATIONS:  Patient has normal physical exam and her ECG is normal.  She has mild hyperlipidemia prior history of smoking and alcohol use.  Numerous members in her family with significant cardiovascular history however the pattern does not sound genetic in nature given varying different diagnoses and multiple members.  I recommend getting a 14-day Zio monitor and echocardiogram and a calcium score for preventative evaluation and her symptoms.  If these results are benign, but I would recommend following up again with cardiology in 3 to 5 years for ongoing routine preventative care.  If these tests are abnormal, further decisions on follow-up will be determined based on that.    It was a pleasure seeing this patient in clinic today. Please do not hesitate to contact me with any future questions.     OLIVE Everett, [facility]  Cardiology - [facility]  October 6, 2023    Review of the result(s) of each unique test - Last lipid CBC ECG     The level of medical decision making during this visit was of moderate complexity.    This note was completed in part using dictation via the Dragon voice recognition software. Some word and grammatical errors may occur and must be interpreted in the appropriate clinical context.  If there are any questions pertaining to this issue, please contact me for further clarification.      Thank you for allowing me to participate in the care of your patient.      Sincerely,     Leyla Garcia MD     [facility]  cc:   Carmen L  Jose, APRN CNP  [address]  [place] [address]

## 2023-10-06 NOTE — PROGRESS NOTES
CARDIOLOGY CLINIC CONSULTATION    PRIMARY CARE PHYSICIAN:  Carmen Garcia    HISTORY OF PRESENT ILLNESS:  This is an extremely pleasant 62-year-old female who is here with her significant other.  The patient personally denies any prior cardiovascular history.  She has a prior history of alcohol and smoking however she has quit that.  The reason she is being seen in cardiology consultation because multiple members in her family both on her paternal and maternal side have significant heart disease and have  of cardiac events.  Most on her paternal side were elderly but a lot of members on the maternal side of her family had cardiac events in the 60s.  Her brother has atrial fibrillation in the 40s.  She does not know of any genetic cardiomyopathies in her family.  The patient has a history of palpitations and PACs.  Denies any typical symptoms of angina or heart failure.  Given her family history of heart failure A-fib and coronary disease she is seeking preventative cardiology consultation.    PAST MEDICAL HISTORY:  Past Medical History:   Diagnosis Date    Anxiety     Cervicalgia     C5-6 disc protrusion    COPD (chronic obstructive pulmonary disease) (H) 2022    Depressive disorder     ESBL (extended spectrum beta-lactamase) producing bacteria infection     History of blood transfusion     Cervical fusion    Leukemia (H)     CLA large beta    Melanoma (H)     Migraine     Other chronic pain     Rotator cuff tear     s/p injections    Sacroiliac inflammation (H24)     Shift work sleep disorder 2013    Urinary calculi     Vitamin B12 deficiency anemia     started injections       MEDICATIONS:  Current Outpatient Medications   Medication    albuterol (PROAIR HFA/PROVENTIL HFA/VENTOLIN HFA) 108 (90 Base) MCG/ACT inhaler    amphetamine-dextroamphetamine (ADDERALL XR) 25 MG 24 hr capsule    [START ON 10/23/2023] amphetamine-dextroamphetamine (ADDERALL XR) 25 MG 24 hr capsule     "amphetamine-dextroamphetamine (ADDERALL) 15 MG tablet    [START ON 10/23/2023] amphetamine-dextroamphetamine (ADDERALL) 15 MG tablet    desvenlafaxine (PRISTIQ) 50 MG 24 hr tablet    Estradiol (DIVIGEL) 1 MG/GM GEL    hydrOXYzine (VISTARIL) 25 MG capsule    Levomefolate Glucosamine (METHYLFOLATE PO)    lidocaine (LIDODERM) 5 % patch    medical cannabis (Patient's own supply.  Not a prescription)    methocarbamol (ROBAXIN) 500 MG tablet    NONFORMULARY    ondansetron (ZOFRAN ODT) 8 MG ODT tab    Prasterone 6.5 MG INST    rOPINIRole (REQUIP) 0.25 MG tablet    senna-docusate (SENOKOT-S/PERICOLACE) 8.6-50 MG tablet    vitamin D3 (CHOLECALCIFEROL) 125 MCG (5000 UT) tablet    cyclobenzaprine (FLEXERIL) 10 MG tablet    famotidine (PEPCID) 20 MG tablet    HYDROcodone-acetaminophen (NORCO)  MG per tablet    insulin syringe-needle U-100 (30G X 1/2\" 1 ML) 30G X 1/2\" 1 ML miscellaneous    methylphenidate (RITALIN) 10 MG tablet    morphine (MS CONTIN) 30 MG CR tablet    naloxone (NARCAN) 4 MG/0.1ML nasal spray    predniSONE (DELTASONE) 10 MG tablet    triamcinolone (KENALOG) 0.1 % external ointment     No current facility-administered medications for this visit.       SOCIAL HISTORY:  I have reviewed this patient's social history and updated it with pertinent information if needed. Janelle TORRES Christopher  reports that she quit smoking about 39 years ago. Her smoking use included cigarettes and clove cigarettes or kreteks. She has a 5.00 pack-year smoking history. She has never used smokeless tobacco. She reports current alcohol use. She reports that she does not currently use drugs after having used the following drugs: Cocaine, Marijuana, LSD, and Psilocybin.    PHYSICAL EXAM:  Pulse:  [70] 70  BP: (118)/(83) 118/83  SpO2:  [100 %] 100 %  159 lbs 0 oz    Constitutional: alert, no distress  Respiratory: Good bilateral air entry  Cardiovascular: Normal regular heart sounds no edema no carotid bruits normal JVP  GI: " nondistended  Neuropsychiatric: appropriate affact    ASSESSMENT: Pertinent issues addressed/ reviewed during this cardiology visit  Family history of heart failure CAD and A-fib  Palpitations    RECOMMENDATIONS:  Patient has normal physical exam and her ECG is normal.  She has mild hyperlipidemia prior history of smoking and alcohol use.  Numerous members in her family with significant cardiovascular history however the pattern does not sound genetic in nature given varying different diagnoses and multiple members.  I recommend getting a 14-day Zio monitor and echocardiogram and a calcium score for preventative evaluation and her symptoms.  If these results are benign, but I would recommend following up again with cardiology in 3 to 5 years for ongoing routine preventative care.  If these tests are abnormal, further decisions on follow-up will be determined based on that.    It was a pleasure seeing this patient in clinic today. Please do not hesitate to contact me with any future questions.     OLIVE Everett, [facility]  Cardiology - [facility]  October 6, 2023    Review of the result(s) of each unique test - Last lipid CBC ECG     The level of medical decision making during this visit was of moderate complexity.    This note was completed in part using dictation via the Dragon voice recognition software. Some word and grammatical errors may occur and must be interpreted in the appropriate clinical context.  If there are any questions pertaining to this issue, please contact me for further clarification.

## 2023-10-12 ENCOUNTER — HOSPITAL ENCOUNTER (OUTPATIENT)
Dept: CARDIOLOGY | Facility: CLINIC | Age: 63
Discharge: HOME | End: 2023-10-12
Attending: INTERNAL MEDICINE
Payer: COMMERCIAL

## 2023-10-12 DIAGNOSIS — R00.2 PALPITATIONS: ICD-10-CM

## 2023-10-12 LAB — LVEF ECHO: NORMAL

## 2023-10-12 PROCEDURE — 93306 TTE W/DOPPLER COMPLETE: CPT

## 2023-10-12 PROCEDURE — 93246 EXT ECG>7D<15D RECORDING: CPT

## 2023-10-12 PROCEDURE — 93306 TTE W/DOPPLER COMPLETE: CPT | Mod: 26 | Performed by: INTERNAL MEDICINE

## 2023-10-12 PROCEDURE — 93248 EXT ECG>7D<15D REV&INTERPJ: CPT | Performed by: INTERNAL MEDICINE

## 2023-10-20 ENCOUNTER — TELEPHONE (OUTPATIENT)
Dept: FAMILY MEDICINE | Facility: CLINIC | Age: 63
End: 2023-10-20
Payer: COMMERCIAL

## 2023-10-20 ASSESSMENT — PAIN SCALES - PAIN ENJOYMENT GENERAL ACTIVITY SCALE (PEG)
INTERFERED_ENJOYMENT_LIFE: 7
AVG_PAIN_PASTWEEK: 8
PEG_TOTALSCORE: 7.67
INTERFERED_GENERAL_ACTIVITY: 8

## 2023-10-20 NOTE — TELEPHONE ENCOUNTER
Forms/Letter Request    Type of form/letter:  DISABILITY    Have you been seen for this request: N/A    Do we have the form/letter: Yes: CARE TEAM 2    Who is the form from? Patient    Where did/will the form come from? Patient or family brought in       When is form/letter needed by: 11/14/2023    How would you like the form/letter returned: Fax : [facility] [employer] -[phone]    Patient Notified form requests are processed in 3-5 business days:Yes    Could we send this information to you in [email or web address] or would you prefer to receive a phone call?:   Patient would prefer a phone call   Okay to leave a detailed message?: Yes at Cell number on file:    Telephone Information:   Mobile [phone]

## 2023-10-24 ENCOUNTER — INFUSION THERAPY VISIT (OUTPATIENT)
Dept: INFUSION THERAPY | Facility: CLINIC | Age: 63
End: 2023-10-24
Attending: PSYCHIATRY & NEUROLOGY
Payer: COMMERCIAL

## 2023-10-24 VITALS
DIASTOLIC BLOOD PRESSURE: 62 MMHG | HEART RATE: 67 BPM | RESPIRATION RATE: 16 BRPM | SYSTOLIC BLOOD PRESSURE: 94 MMHG | OXYGEN SATURATION: 95 %

## 2023-10-24 DIAGNOSIS — F33.2 SEVERE EPISODE OF RECURRENT MAJOR DEPRESSIVE DISORDER, WITHOUT PSYCHOTIC FEATURES (H): Primary | ICD-10-CM

## 2023-10-24 PROCEDURE — 96365 THER/PROPH/DIAG IV INF INIT: CPT

## 2023-10-24 PROCEDURE — 258N000003 HC RX IP 258 OP 636: Performed by: PSYCHIATRY & NEUROLOGY

## 2023-10-24 PROCEDURE — 250N000009 HC RX 250: Performed by: PSYCHIATRY & NEUROLOGY

## 2023-10-24 RX ORDER — HEPARIN SODIUM,PORCINE 10 UNIT/ML
5 VIAL (ML) INTRAVENOUS
Status: CANCELLED | OUTPATIENT
Start: 2023-10-24

## 2023-10-24 RX ORDER — DIPHENHYDRAMINE HYDROCHLORIDE 50 MG/ML
50 INJECTION INTRAMUSCULAR; INTRAVENOUS
Status: CANCELLED
Start: 2023-10-24

## 2023-10-24 RX ORDER — ALBUTEROL SULFATE 90 UG/1
1-2 AEROSOL, METERED RESPIRATORY (INHALATION)
Status: CANCELLED
Start: 2023-10-24

## 2023-10-24 RX ORDER — NALOXONE HYDROCHLORIDE 0.4 MG/ML
0.2 INJECTION, SOLUTION INTRAMUSCULAR; INTRAVENOUS; SUBCUTANEOUS
Status: CANCELLED | OUTPATIENT
Start: 2023-10-24

## 2023-10-24 RX ORDER — ALBUTEROL SULFATE 0.83 MG/ML
2.5 SOLUTION RESPIRATORY (INHALATION)
Status: CANCELLED | OUTPATIENT
Start: 2023-10-24

## 2023-10-24 RX ORDER — EPINEPHRINE 1 MG/ML
0.3 INJECTION, SOLUTION, CONCENTRATE INTRAVENOUS EVERY 5 MIN PRN
Status: CANCELLED | OUTPATIENT
Start: 2023-10-24

## 2023-10-24 RX ORDER — ONDANSETRON 2 MG/ML
4 INJECTION INTRAMUSCULAR; INTRAVENOUS
Status: CANCELLED | OUTPATIENT
Start: 2023-10-24

## 2023-10-24 RX ORDER — MEPERIDINE HYDROCHLORIDE 25 MG/ML
25 INJECTION INTRAMUSCULAR; INTRAVENOUS; SUBCUTANEOUS EVERY 30 MIN PRN
Status: CANCELLED | OUTPATIENT
Start: 2023-10-24

## 2023-10-24 RX ORDER — METHYLPREDNISOLONE SODIUM SUCCINATE 125 MG/2ML
125 INJECTION, POWDER, LYOPHILIZED, FOR SOLUTION INTRAMUSCULAR; INTRAVENOUS
Status: CANCELLED
Start: 2023-10-24

## 2023-10-24 RX ORDER — HYDRALAZINE HYDROCHLORIDE 20 MG/ML
10 INJECTION INTRAMUSCULAR; INTRAVENOUS
Status: CANCELLED | OUTPATIENT
Start: 2023-10-24

## 2023-10-24 RX ORDER — HEPARIN SODIUM (PORCINE) LOCK FLUSH IV SOLN 100 UNIT/ML 100 UNIT/ML
5 SOLUTION INTRAVENOUS
Status: CANCELLED | OUTPATIENT
Start: 2023-10-24

## 2023-10-24 RX ADMIN — KETAMINE HYDROCHLORIDE 65 MG: 50 INJECTION INTRAMUSCULAR; INTRAVENOUS at 09:54

## 2023-10-24 NOTE — PROGRESS NOTES
Infusion Nursing Note:  Janelle TORRES Rodolfopetey presents today for ketamine infusion.    Patient seen by provider today: No   present during visit today: Not Applicable.    Note: Patient states that she has been having difficulty with ongoing pain issues.      Intravenous Access:  Peripheral IV placed.    Treatment Conditions:  Not Applicable.      Post Infusion Assessment:  Patient tolerated infusion without incident.  Patient observed for 60 minutes post Ketamine per protocol.  Blood return noted pre and post infusion.  Site patent and intact, free from redness, edema or discomfort.  No evidence of extravasations.  Access discontinued per protocol.       Discharge Plan:   Discharge instructions reviewed with: Patient.  Patient and/or family verbalized understanding of discharge instructions and all questions answered.  Patient discharged in stable condition accompanied by: self.  Departure Mode: Ambulatory.      Kathia Quinn RN

## 2023-10-26 ENCOUNTER — OFFICE VISIT (OUTPATIENT)
Dept: ANESTHESIOLOGY | Facility: CLINIC | Age: 63
End: 2023-10-26
Payer: COMMERCIAL

## 2023-10-26 ENCOUNTER — TELEPHONE (OUTPATIENT)
Dept: PALLIATIVE MEDICINE | Facility: CLINIC | Age: 63
End: 2023-10-26

## 2023-10-26 VITALS — SYSTOLIC BLOOD PRESSURE: 116 MMHG | OXYGEN SATURATION: 98 % | HEART RATE: 75 BPM | DIASTOLIC BLOOD PRESSURE: 81 MMHG

## 2023-10-26 DIAGNOSIS — M79.18 MYOFASCIAL PAIN: ICD-10-CM

## 2023-10-26 DIAGNOSIS — M47.812 CERVICAL SPONDYLOSIS: Primary | ICD-10-CM

## 2023-10-26 DIAGNOSIS — M96.1 FAILED BACK SURGICAL SYNDROME: ICD-10-CM

## 2023-10-26 DIAGNOSIS — M54.2 CERVICALGIA: ICD-10-CM

## 2023-10-26 DIAGNOSIS — M54.16 LUMBAR RADICULOPATHY: ICD-10-CM

## 2023-10-26 DIAGNOSIS — M46.1 SACROILIITIS (H): ICD-10-CM

## 2023-10-26 PROCEDURE — 99214 OFFICE O/P EST MOD 30 MIN: CPT | Mod: GC | Performed by: ANESTHESIOLOGY

## 2023-10-26 RX ORDER — METHOCARBAMOL 750 MG/1
750 TABLET, FILM COATED ORAL 4 TIMES DAILY PRN
Qty: 120 TABLET | Refills: 4 | Status: SHIPPED | OUTPATIENT
Start: 2023-10-26 | End: 2024-03-21

## 2023-10-26 RX ORDER — LIDOCAINE 50 MG/G
4 PATCH TOPICAL DAILY
Qty: 120 PATCH | Refills: 3 | Status: SHIPPED | OUTPATIENT
Start: 2023-10-26 | End: 2023-11-14

## 2023-10-26 ASSESSMENT — PAIN SCALES - GENERAL: PAINLEVEL: SEVERE PAIN (6)

## 2023-10-26 NOTE — LETTER
"10/26/2023       RE: Janelle White  [address] [id number]     Dear Colleague,    Thank you for referring your patient, Janelle White, to the [facility] [place] at [facility]. Please see a copy of my visit note below.    [facility]    Date of visit: 10/26/2023    Chief complaint:   Chief Complaint   Patient presents with    RECHECK     [facility] RETURN, 6/10 pain in low back and pelvic region       Interval history:  Janelle White was last seen by me on 8/4/2023.      Recommendations/plan at the last visit included:  Physical Therapy:  continue current regimen  Clinical Health Psychologist to address issues of relaxation, behavioral change, coping style, and other factors important to improvement.  Not indicated  Diagnostic Studies:  none  Medication Management:  continue current regimen  Further procedures recommended: Right SIJ Injection  Recommendations to PCP: none  Follow up: 6 weeks after procedure      Since her last visit, Janelle White reports:    Brief relief from pain and paresthesias following injection for few days. Gets shooting pain down her right leg and to her groin was improved as were the \"shock\" feelings in her foot. Pain is worse in the right gluteal region, wrapping around to the groin and radiating down the lateral aspect of the right leg. Stepping triggers jolts of pain. She also has some pain in the left gluteal region but not as bad as right.     She is able to walk approximately 1-3 miles with \"a lot of stop and start, it's miserable\". She would like to be more active. She is unable to sleep on the right side. She is awake several hours each night due to pain. No changes to urinary incontinence.     Of note, she is struggling to afford infusions related to CLL treatment.     Pain scores:  Pain intensity on average is 6 on a scale of " 0-10     Current pain treatments:   Pilates, physical therapy.   Lidocaine patches  Uses medical marijuana daily in edible form  Methocarbamol 500 mg QID PRN    Past pain treatments:  Right SIJ injection  Gabapentin/Pregabalin (hallucinations)  Morphine/norco (discontinued approximately 1-2 years ago after long term use)  Ketamine infusion for past 2 years (helped her wean off morphine)  Lidocaine patches (helpful)  Tizanidine (unhelpful)  TCAs (side effects)   Acupuncture (no effect)  Massage   TPI cervical  Had trial of SCS      Side Effects: no side effect    Medications:  Current Outpatient Medications   Medication Sig Dispense Refill    albuterol (PROAIR HFA/PROVENTIL HFA/VENTOLIN HFA) 108 (90 Base) MCG/ACT inhaler Inhale 2 puffs into the lungs every 6 hours as needed for shortness of breath or wheezing 8.5 g 11    amphetamine-dextroamphetamine (ADDERALL XR) 25 MG 24 hr capsule Take 1 capsule (25 mg) by mouth daily for 30 days 30 capsule 0    desvenlafaxine (PRISTIQ) 50 MG 24 hr tablet Take 1 tablet (50 mg) by mouth daily 90 tablet 1    Estradiol (DIVIGEL) 1 MG/GM GEL Place 1 packet onto the skin daily 30 g 11    hydrOXYzine (VISTARIL) 25 MG capsule Take 1 capsule (25 mg) by mouth 3 times daily as needed for itching or anxiety 90 capsule 3    Levomefolate Glucosamine (METHYLFOLATE PO) Take 7.5 mg by mouth daily      medical cannabis (Patient's own supply.  Not a prescription) Medical Cannabis - Tangerine 4-6 ml by mouth daily. [employer]      methocarbamol (ROBAXIN) 500 MG tablet Take 1 tablet (500 mg) by mouth 4 times daily as needed for muscle spasms 120 tablet 1    NONFORMULARY Take 2 Scoops by mouth daily Protein and Vitamin shake mix - Nutritional supplement      ondansetron (ZOFRAN ODT) 8 MG ODT tab Take 1 tablet (8 mg) by mouth every 8 hours as needed for nausea 30 tablet 4    Prasterone 6.5 MG INST Place 0.5 suppositories vaginally three times a week 28 each 11    rOPINIRole (REQUIP) 0.25 MG tablet  "TAKE 1 TO 2 TABLETS(0.25 TO 0.5 MG) BY MOUTH AT BEDTIME 180 tablet 3    senna-docusate (SENOKOT-S/PERICOLACE) 8.6-50 MG tablet Take 6-9 tablets by mouth every evening      vitamin D3 (CHOLECALCIFEROL) 125 MCG (5000 UT) tablet Take 5,000 Units by mouth daily      amphetamine-dextroamphetamine (ADDERALL) 15 MG tablet Take 1 tablet (15 mg) by mouth 2 times daily for 30 days (Patient not taking: Reported on 10/26/2023) 60 tablet 0    cyclobenzaprine (FLEXERIL) 10 MG tablet  (Patient not taking: Reported on 10/6/2023)      famotidine (PEPCID) 20 MG tablet TAKE 1 TABLET(20 MG) BY MOUTH TWICE DAILY (Patient not taking: Reported on 10/6/2023) 180 tablet 1    HYDROcodone-acetaminophen (NORCO)  MG per tablet Take 1 tablet by mouth every 4 hours as needed for severe pain (7-10) max 4 tabs/24 hrs (Patient not taking: Reported on 10/6/2023) 10 tablet 0    insulin syringe-needle U-100 (30G X 1/2\" 1 ML) 30G X 1/2\" 1 ML miscellaneous Inject 1 ml B12 qmonth (Patient not taking: Reported on 10/6/2023) 10 each 1    lidocaine (LIDODERM) 5 % patch Place 4 patches onto the skin daily Apply up to 4 patches to skin. Wear for 12 hours and remove for 12 hrs.  Refill when patient requests. (Patient not taking: Reported on 10/26/2023) 120 patch 3    methylphenidate (RITALIN) 10 MG tablet  (Patient not taking: Reported on 10/6/2023)      morphine (MS CONTIN) 30 MG CR tablet  (Patient not taking: Reported on 10/6/2023)      naloxone (NARCAN) 4 MG/0.1ML nasal spray Spray 1 spray (4 mg) into one nostril alternating nostrils as needed for opioid reversal every 2-3 minutes until assistance arrives (Patient not taking: Reported on 10/6/2023) 0.2 mL 1    predniSONE (DELTASONE) 10 MG tablet  (Patient not taking: Reported on 10/6/2023)      triamcinolone (KENALOG) 0.1 % external ointment Apply topically 2 times daily (Patient not taking: Reported on 10/6/2023) 80 g 1       Medical History: any changes in medical history since they were last seen? " No    Review of Systems:  The 14 system ROS was reviewed from the intake questionnaire, and is negative other than what is included in HPI  Any bowel or bladder problems: no  Mood: stable    Physical Exam:  General: Alert, appropriate affect  Gait: Normal  MSK exam: Extension of spine limited by pain. Strength 4/5 RL extremity, 5/5 left lower extremity. Patellar reflexes 2+ bilaterally, no clonus.   Negative straight leg test bilaterally.   Positive FAIR bilaterally.   Pain with SI compression   Moderate TTP of right SI joint, mild TTP left SI joint  Sensation decreased on right lateral calf below the knee and over the dorsal aspect of the right foot.     Assessment:   Sacroiliitis  DDD  Chronic pain syndrome  Spinal stenosis of lumbar region with radiculopathy  L3-S1 Anterior Posterior Fusion  C4-C5 Anterior Cervical Discectomy, Fusion C5-C7   Moderate recurrent depression  CLL on chemo  CLARE     Janelle White is a 62 year old female who is seen at the pain clinic for chronic right buttock and leg pain. Has extensive back surgery history and has tried a vast variety of treatments and therapies to control her pain. Plan to schedule caudal epidural steroid injection, referral to pain psychology, obtain MRI of thoracic spine, and plan for trial SCS. Will increase her Robaxin dose to 750 mg QID and re-order lido patches    Plan:  Physical Therapy:  Continue, does once weekly with good benefit  Clinical Health Psychologist to address issues of relaxation, behavioral change, coping style, and other factors important to improvement. Will place referral for neuropsych in preparation for SCS trial  Diagnostic Studies:  Thoracic MRI for planning possible SCS  Medication Management:  Look into switching ketamine infusions to ketamine lozenges  Further procedures recommended: Plan for caudal epidural steroid injection  Recommendations to PCP: none  Follow up: 4-6 weeks after caudal epidural steroid injection      Harmeet  MD Tito  Anesthesiology Resident, CA-2, PGY-3    I saw and examined the patient with the Pain Fellow/Resident. I have reviewed and agree with the resident's note and plan of care and made changes and corrections directly to the body of the note.    TIME SPENT:  BY FELLOW/RESIDENT ALONE 20 MIN  BY MYSELF AND FELLOW/RESIDENT TOGETHER 30 MIN  seling her about her diagnosis and treatment options and coordination of care with the primary team        Again, thank you for allowing me to participate in the care of your patient.      Sincerely,    Joslyn Cherry MD

## 2023-10-26 NOTE — TELEPHONE ENCOUNTER
Patient is scheduled for surgery with Dr. Cherry    Spoke with: patient    Date of Surgery: 12/01/23    Location: [facility]    Informed patient they will need an adult  yes    Additional comments: Patient is aware of date and time of the procedure.        Esperanza Selby MA on 10/26/2023 at 11:31 AM

## 2023-10-26 NOTE — PATIENT INSTRUCTIONS
Medications:      lidocaine (LIDODERM) 5 % patch     methocarbamol (ROBAXIN) 750 MG tablet       *Please provide the clinic with a minium of 1 week notice, on all prescription refills.     Referrals:      Pain Psychology Referral placed for SCS evaluation -  Please contact their scheduling office at [phone] to schedule, if you have not heard from them within 2 business days.     Pain psychology Therapies Requested- Biofeedback, Mindfulness training, CBT, Coping and relaxation therapy.         Imaging:    MRI Thoracic Spine W/O Contrast    IMAGING SERVICES HOURS:    All imaging modalities are available from 7 a.m. - 9 p.m. Monday through Friday  X-ray, CT, MRI, and General Ultrasound appointments are available from 7 a.m. -3:30 p.m. on Saturdays  X-ray, CT and MRI appointments are available from 8 a.m. - 4:30 p.m. on Sundays  Please call [phone] to schedule imaging exams         Procedures:    Call to schedule your procedure: [phone] option #2    Caudal Epidural Steroid Injection    Your pre-procedure instructions are below, please call our clinic if you have any questions.        Recommended Follow up:      Follow up after procedure or pain psychology evaluation.          Please call [phone] to schedule your clinic appointment if you don't already have an appointment scheduled.      Procedure Information:     Please call [phone] option #2 to schedule, reschedule, or cancel your procedure appointment.   Phones are answered Monday - Friday from 08:00 - 4:30pm.  Leave a voicemail with your name, birth date, and phone number if no one is available to take your call.        You no longer need to test for COVID- 19 prior to your procedure/surgery, unless your physician specifically requests that you test. If you experience COVID symptoms or have tested positive for COVID-19 within 14 days of your scheduled surgery or procedure, please update our office right away and your procedure may have to be  postponed.       The procedure center staff will call you several days before the procedure to review important information that you will need to know for the day of the procedure.     Please contact the clinic if you have further questions about this information [phone].        Information related to Scheduling and Pre-Procedure Instructions:    If you must reschedule your procedure more than two times, you must follow up in clinic before rescheduling again.    Preparing for your procedure    CAUTION - FAILURE TO FOLLOW THESE PRE-PROCEDURE INSTRUCTIONS WILL RESULT IN YOUR PROCEDURE BEING RESCHEDULED.    Your Procedure: Caudal Epidural Steroid Injection        You must have a  take you home after your procedure. Transportation by taxi or para-transit is okay as long as you have a responsible adult accompany you. You must provide your 's full name and contact number at time of check in.     Fasting Protocol  Please have nothing to eat or drink 1 hour prior to arrival.     Medications If you take any medications, DO NOT STOP. Take your medications as usual the day of your procedure with a sip of water AT LEAST 2 HOURS PRIOR TO ARRIVAL.    Antibiotics If you are currently taking antibiotics, you must complete the entire dose 7 days prior to your scheduled procedure. You must be clear of any signs or symptoms of infection. If you begin antibiotics, please contact our clinic for instructions.     Fever, Chills, or Rash If you experience a fever of higher than 100 degrees, chills, rash, or open wounds during the one week before your procedure, please call the clinic to see if you may proceed with your procedure.      Medication Hold List  **Patients under Cardiology/Neurology care should consult their provider prior to the pain procedure to verify pre-procedure medication instructions. The information below contains general guidelines.**      Blood Thinners If you are taking daily ASPIRIN, PLAVIX,  OR OTHER BLOOD THINNERS SUCH AS COUMADIN/WARFARIN, we will need your prescribing doctor to sign a release permitting you to stop these medications. Once approved by your prescribing doctor - STOP ALL BLOOD THINNERS BASED ON THE TIME TABLE BELOW PRIOR TO YOUR PROCEDURE. If you have been instructed to stop WARFARIN(COUMADIN), you must have an INR lab drawn the day before your procedure. Your INR must be within normal limits before we can perform your injection. MEDICATIONS CAN BE RESTARTED AFTER YOUR PROCEDURE.    24 HOUR HOLD  Lovenox (enoxaparin)  Agrylin (Anagrelide)    3 DAY HOLD  Xarelto (rivaroxaban)    5 DAY HOLD  Coumadin (Warfarin)  Brilinta (ticagrelor) 7 DAY HOLD  Anacin, Bufferin, Ecotrin, Excedrin, Aggrenox (Aspirin)  Pradexa (Dabigatran)  Elmiron (Pentosan)  Plavix (Clopidogrel Bisulfate)  Pletal (Cilostazol)    10 DAY HOLD  Effient (Prasugel)    14 DAY HOLD  Ticlid (ticlopidine)        Non-steroidal Anti-inflammatories (NSAIDs) DO NOT TAKE any non-steroidal anti-inflammatory medications (NSAIDs) listed on the table below. MEDICATIONS CAN BE RESTARTED AFTER YOUR PROCEDURE. Celebrex is OK to take and does not need to be discontinued.     Medications to stop:  1 DAY HOLD  Advil, Motrin (Ibuprofen)  Voltaren (Diclofenac)  Toradol (Ketorolac)    3 DAY HOLD  Arthrotec (diciofenac sodium/misoprostol)  Clinoril (Sulindac)  Indocin (Indomethacin)  Lodine (Etodolac)  Vicoprofen (Hydrocodone and Ibuprofen)  Apixaban (Eliquis)    4 DAY HOLD  Mobic (Meloxicam)  Naprosyn (Naproxen)   7 DAY HOLD  Aleve (Naproxen sodium)  Darvon compound (contains aspirin)  Norgesic Forte (contains aspirin)  Oruvall (Ketoprofen)  Percodan (contains aspirin)  Relafen (Nabumetone)  Salsalate  Trilisate  Vitamin E (more than 400 mg per day)  Any medication containing aspirin    14 DAY HOLD  Daypro (Oxaprozin)  Feldene (Piroxicam)            To speak with a nurse, schedule/reschedule/cancel a clinic appointment, or request a medication  refill call: [phone]    You can also reach us by MyChart: [email or web address]

## 2023-10-26 NOTE — CONFIDENTIAL NOTE
RN reviewed pre-procedure instructions reviewed at office visit 10/26/23.     Michaelle Stout RN

## 2023-10-26 NOTE — NURSING NOTE
RN read through the instructions with the patient for the recommended procedure: Caudal Epidural Steroid Injection  Patient verbalized understanding to holding appropriate medication per protocol and was agreeable to NPO policy and needing a .    Anticoagulant: None reported    Recommended Follow Up:  After procedure and pain psych visit    Michaelle Stout RN

## 2023-10-26 NOTE — NURSING NOTE
Patient presents with:  RECHECK: [facility] RETURN, 6/10 pain in low back and pelvic region      Severe Pain (6)         What medications are you using for pain? Lidocaine cream and patches, ibuprofen 800mg x3/day, tylenol extra strength, robaxin, vistaril, requip, TENS unit    (Return Patients only) What refills are you needing today? No    Adali Nelson, EMT

## 2023-10-26 NOTE — PROGRESS NOTES
"[facility]    Date of visit: 10/26/2023    Chief complaint:   Chief Complaint   Patient presents with    RECHECK     [facility] RETURN, 6/10 pain in low back and pelvic region       Interval history:  Janelle White was last seen by me on 8/4/2023.      Recommendations/plan at the last visit included:  Physical Therapy:  continue current regimen  Clinical Health Psychologist to address issues of relaxation, behavioral change, coping style, and other factors important to improvement.  Not indicated  Diagnostic Studies:  none  Medication Management:  continue current regimen  Further procedures recommended: Right SIJ Injection  Recommendations to PCP: none  Follow up: 6 weeks after procedure      Since her last visit, Janelle White reports:    Brief relief from pain and paresthesias following injection for few days. Gets shooting pain down her right leg and to her groin was improved as were the \"shock\" feelings in her foot. Pain is worse in the right gluteal region, wrapping around to the groin and radiating down the lateral aspect of the right leg. Stepping triggers jolts of pain. She also has some pain in the left gluteal region but not as bad as right.     She is able to walk approximately 1-3 miles with \"a lot of stop and start, it's miserable\". She would like to be more active. She is unable to sleep on the right side. She is awake several hours each night due to pain. No changes to urinary incontinence.     Of note, she is struggling to afford infusions related to CLL treatment.     Pain scores:  Pain intensity on average is 6 on a scale of 0-10     Current pain treatments:   Pilates, physical therapy.   Lidocaine patches  Uses medical marijuana daily in edible form  Methocarbamol 500 mg QID PRN    Past pain treatments:  Right SIJ injection  Gabapentin/Pregabalin (hallucinations)  Morphine/norco (discontinued approximately 1-2 years ago after long term use)  Ketamine infusion for past 2 " years (helped her wean off morphine)  Lidocaine patches (helpful)  Tizanidine (unhelpful)  TCAs (side effects)   Acupuncture (no effect)  Massage   TPI cervical  Had trial of SCS      Side Effects: no side effect    Medications:  Current Outpatient Medications   Medication Sig Dispense Refill    albuterol (PROAIR HFA/PROVENTIL HFA/VENTOLIN HFA) 108 (90 Base) MCG/ACT inhaler Inhale 2 puffs into the lungs every 6 hours as needed for shortness of breath or wheezing 8.5 g 11    amphetamine-dextroamphetamine (ADDERALL XR) 25 MG 24 hr capsule Take 1 capsule (25 mg) by mouth daily for 30 days 30 capsule 0    desvenlafaxine (PRISTIQ) 50 MG 24 hr tablet Take 1 tablet (50 mg) by mouth daily 90 tablet 1    Estradiol (DIVIGEL) 1 MG/GM GEL Place 1 packet onto the skin daily 30 g 11    hydrOXYzine (VISTARIL) 25 MG capsule Take 1 capsule (25 mg) by mouth 3 times daily as needed for itching or anxiety 90 capsule 3    Levomefolate Glucosamine (METHYLFOLATE PO) Take 7.5 mg by mouth daily      medical cannabis (Patient's own supply.  Not a prescription) Medical Cannabis - Tangerine 4-6 ml by mouth daily. [employer]      methocarbamol (ROBAXIN) 500 MG tablet Take 1 tablet (500 mg) by mouth 4 times daily as needed for muscle spasms 120 tablet 1    NONFORMULARY Take 2 Scoops by mouth daily Protein and Vitamin shake mix - Nutritional supplement      ondansetron (ZOFRAN ODT) 8 MG ODT tab Take 1 tablet (8 mg) by mouth every 8 hours as needed for nausea 30 tablet 4    Prasterone 6.5 MG INST Place 0.5 suppositories vaginally three times a week 28 each 11    rOPINIRole (REQUIP) 0.25 MG tablet TAKE 1 TO 2 TABLETS(0.25 TO 0.5 MG) BY MOUTH AT BEDTIME 180 tablet 3    senna-docusate (SENOKOT-S/PERICOLACE) 8.6-50 MG tablet Take 6-9 tablets by mouth every evening      vitamin D3 (CHOLECALCIFEROL) 125 MCG (5000 UT) tablet Take 5,000 Units by mouth daily      amphetamine-dextroamphetamine (ADDERALL) 15 MG tablet Take 1 tablet (15 mg) by mouth 2  "times daily for 30 days (Patient not taking: Reported on 10/26/2023) 60 tablet 0    cyclobenzaprine (FLEXERIL) 10 MG tablet  (Patient not taking: Reported on 10/6/2023)      famotidine (PEPCID) 20 MG tablet TAKE 1 TABLET(20 MG) BY MOUTH TWICE DAILY (Patient not taking: Reported on 10/6/2023) 180 tablet 1    HYDROcodone-acetaminophen (NORCO)  MG per tablet Take 1 tablet by mouth every 4 hours as needed for severe pain (7-10) max 4 tabs/24 hrs (Patient not taking: Reported on 10/6/2023) 10 tablet 0    insulin syringe-needle U-100 (30G X 1/2\" 1 ML) 30G X 1/2\" 1 ML miscellaneous Inject 1 ml B12 qmonth (Patient not taking: Reported on 10/6/2023) 10 each 1    lidocaine (LIDODERM) 5 % patch Place 4 patches onto the skin daily Apply up to 4 patches to skin. Wear for 12 hours and remove for 12 hrs.  Refill when patient requests. (Patient not taking: Reported on 10/26/2023) 120 patch 3    methylphenidate (RITALIN) 10 MG tablet  (Patient not taking: Reported on 10/6/2023)      morphine (MS CONTIN) 30 MG CR tablet  (Patient not taking: Reported on 10/6/2023)      naloxone (NARCAN) 4 MG/0.1ML nasal spray Spray 1 spray (4 mg) into one nostril alternating nostrils as needed for opioid reversal every 2-3 minutes until assistance arrives (Patient not taking: Reported on 10/6/2023) 0.2 mL 1    predniSONE (DELTASONE) 10 MG tablet  (Patient not taking: Reported on 10/6/2023)      triamcinolone (KENALOG) 0.1 % external ointment Apply topically 2 times daily (Patient not taking: Reported on 10/6/2023) 80 g 1       Medical History: any changes in medical history since they were last seen? No    Review of Systems:  The 14 system ROS was reviewed from the intake questionnaire, and is negative other than what is included in HPI  Any bowel or bladder problems: no  Mood: stable    Physical Exam:  General: Alert, appropriate affect  Gait: Normal  MSK exam: Extension of spine limited by pain. Strength 4/5 RL extremity, 5/5 left lower " extremity. Patellar reflexes 2+ bilaterally, no clonus.   Negative straight leg test bilaterally.   Positive FAIR bilaterally.   Pain with SI compression   Moderate TTP of right SI joint, mild TTP left SI joint  Sensation decreased on right lateral calf below the knee and over the dorsal aspect of the right foot.     Assessment:   Sacroiliitis  DDD  Chronic pain syndrome  Spinal stenosis of lumbar region with radiculopathy  L3-S1 Anterior Posterior Fusion  C4-C5 Anterior Cervical Discectomy, Fusion C5-C7   Moderate recurrent depression  CLL on chemo  CLARE     Janelle White is a 62 year old female who is seen at the pain clinic for chronic right buttock and leg pain. Has extensive back surgery history and has tried a vast variety of treatments and therapies to control her pain. Plan to schedule caudal epidural steroid injection, referral to pain psychology, obtain MRI of thoracic spine, and plan for trial SCS. Will increase her Robaxin dose to 750 mg QID and re-order lido patches    Plan:  Physical Therapy:  Continue, does once weekly with good benefit  Clinical Health Psychologist to address issues of relaxation, behavioral change, coping style, and other factors important to improvement. Will place referral for neuropsych in preparation for SCS trial  Diagnostic Studies:  Thoracic MRI for planning possible SCS  Medication Management:  Look into switching ketamine infusions to ketamine lozenges  Further procedures recommended: Plan for caudal epidural steroid injection  Recommendations to PCP: none  Follow up: 4-6 weeks after caudal epidural steroid injection      Harmeet Francis MD  Anesthesiology Resident, CA-2, PGY-3    I saw and examined the patient with the Pain Fellow/Resident. I have reviewed and agree with the resident's note and plan of care and made changes and corrections directly to the body of the note.    TIME SPENT:  BY FELLOW/RESIDENT ALONE 20 MIN  BY MYSELF AND FELLOW/RESIDENT TOGETHER 30  MIN  seling her about her diagnosis and treatment options and coordination of care with the primary team    Joslyn Cherry MD  Pain Medicine, Department of Anesthesiology  , [facility]
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
## 2023-11-03 ENCOUNTER — VIRTUAL VISIT (OUTPATIENT)
Dept: FAMILY MEDICINE | Facility: CLINIC | Age: 63
End: 2023-11-03
Payer: COMMERCIAL

## 2023-11-03 DIAGNOSIS — G89.29 CHRONIC BILATERAL LOW BACK PAIN WITH RIGHT-SIDED SCIATICA: ICD-10-CM

## 2023-11-03 DIAGNOSIS — F33.1 MODERATE RECURRENT MAJOR DEPRESSION (H): ICD-10-CM

## 2023-11-03 DIAGNOSIS — G89.4 CHRONIC PAIN SYNDROME: ICD-10-CM

## 2023-11-03 DIAGNOSIS — M54.41 CHRONIC BILATERAL LOW BACK PAIN WITH RIGHT-SIDED SCIATICA: ICD-10-CM

## 2023-11-03 DIAGNOSIS — Z02.89 ENCOUNTER FOR COMPLETION OF FORM WITH PATIENT: Primary | ICD-10-CM

## 2023-11-03 DIAGNOSIS — C91.10 CLL (CHRONIC LYMPHOCYTIC LEUKEMIA) (H): ICD-10-CM

## 2023-11-03 DIAGNOSIS — J42 CHRONIC BRONCHITIS, UNSPECIFIED CHRONIC BRONCHITIS TYPE (H): ICD-10-CM

## 2023-11-03 PROCEDURE — 99214 OFFICE O/P EST MOD 30 MIN: CPT | Mod: VID | Performed by: NURSE PRACTITIONER

## 2023-11-03 ASSESSMENT — PATIENT HEALTH QUESTIONNAIRE - PHQ9
10. IF YOU CHECKED OFF ANY PROBLEMS, HOW DIFFICULT HAVE THESE PROBLEMS MADE IT FOR YOU TO DO YOUR WORK, TAKE CARE OF THINGS AT HOME, OR GET ALONG WITH OTHER PEOPLE: SOMEWHAT DIFFICULT
SUM OF ALL RESPONSES TO PHQ QUESTIONS 1-9: 12
SUM OF ALL RESPONSES TO PHQ QUESTIONS 1-9: 12

## 2023-11-03 NOTE — PROGRESS NOTES
Janelle is a 62 year old who is being evaluated via a billable video visit.      How would you like to obtain your AVS? MyChart  If the video visit is dropped, the invitation should be resent by: Text to cell phone: [phone]  Will anyone else be joining your video visit? No          Assessment & Plan     Encounter for completion of form with patient  Form completed today during her visit.  Placed in care team 2 14 coordinators to fax as requested    Chronic pain syndrome  Contributing diagnoses to her chronic disability    CLL (chronic lymphocytic leukemia) (H)  Contributing diagnosis to her chronic disability.    Chronic bilateral low back pain with right-sided sciatica  Contributing diagnoses to her chronic disability    Moderate recurrent major depression (H)  Contributing diagnoses to her chronic disability    COPD (chronic obstructive pulmonary disease) (H)  Contributing diagnoses to her chronic disability    Review of external notes as documented elsewhere in note  33 minutes spent by me on the date of the encounter doing chart review, history and exam, documentation and further activities per the note       Depression Screening Follow Up        11/3/2023    10:31 AM   PHQ   PHQ-9 Total Score 12   Q9: Thoughts of better off dead/self-harm past 2 weeks Several days   F/U: Thoughts of suicide or self-harm Yes   F/U: Self harm-plan No   F/U: Self-harm action No   F/U: Safety concerns No                 Follow Up    Follow Up Actions Taken  Crisis resource information provided in the After Visit Summary  Referred patient back to mental health provider    Discussed the following ways the patient can remain in a safe environment:        BRIONNA Jordan [facility]   Janelle is a 62 year old, presenting for the following health issues:  paperwork (disability)      11/3/2023     2:43 PM   Additional Questions   Roomed by Charla MEHTA       History of Present Illness  "      Reason for visit:  Disability forms    She eats 4 or more servings of fruits and vegetables daily.She consumes 0 sweetened beverage(s) daily.She exercises with enough effort to increase her heart rate 30 to 60 minutes per day.  She exercises with enough effort to increase her heart rate 6 days per week.   She is taking medications regularly.         Patient presents today for annual completion of her disability form through [facility].      Review of Systems   Constitutional, HEENT, cardiovascular, pulmonary, gi and gu systems are negative, except as otherwise noted.      Objective    Vitals - Patient Reported  Weight (Patient Reported): 72.6 kg (160 lb)  Height (Patient Reported): 166.4 cm (5' 5.5\")  BMI (Based on Pt Reported Ht/Wt): 26.22        Physical Exam   GENERAL: Healthy, alert and no distress  EYES: Eyes grossly normal to inspection.  No discharge or erythema, or obvious scleral/conjunctival abnormalities.  RESP: No audible wheeze, cough, or visible cyanosis.  No visible retractions or increased work of breathing.    SKIN: Visible skin clear. No significant rash, abnormal pigmentation or lesions.  NEURO: Cranial nerves grossly intact.  Mentation and speech appropriate for age.  PSYCH: Mentation appears normal, affect normal/bright, judgement and insight intact, normal speech and appearance well-groomed.                Video-Visit Details    Type of service:  Video Visit   Video Start Time:  1629  Video End Time: 1651    Originating Location (pt. Location): Home    Distant Location (provider location):  On-site  Platform used for Video Visit: Janis      "

## 2023-11-09 NOTE — TELEPHONE ENCOUNTER
Disability forms that you filled out already was missing the first page.  Placed new forms along with what you did fill out in care team 2 providers form folder  When completed fax to [phone]

## 2023-11-14 ENCOUNTER — MYC MEDICAL ADVICE (OUTPATIENT)
Dept: ANESTHESIOLOGY | Facility: CLINIC | Age: 63
End: 2023-11-14

## 2023-11-14 ENCOUNTER — INFUSION THERAPY VISIT (OUTPATIENT)
Dept: INFUSION THERAPY | Facility: CLINIC | Age: 63
End: 2023-11-14
Attending: PSYCHIATRY & NEUROLOGY
Payer: COMMERCIAL

## 2023-11-14 ENCOUNTER — TELEPHONE (OUTPATIENT)
Dept: FAMILY MEDICINE | Facility: CLINIC | Age: 63
End: 2023-11-14

## 2023-11-14 VITALS
RESPIRATION RATE: 16 BRPM | DIASTOLIC BLOOD PRESSURE: 75 MMHG | SYSTOLIC BLOOD PRESSURE: 119 MMHG | OXYGEN SATURATION: 96 % | HEART RATE: 67 BPM

## 2023-11-14 DIAGNOSIS — F33.2 SEVERE EPISODE OF RECURRENT MAJOR DEPRESSIVE DISORDER, WITHOUT PSYCHOTIC FEATURES (H): Primary | ICD-10-CM

## 2023-11-14 DIAGNOSIS — M54.2 CERVICALGIA: ICD-10-CM

## 2023-11-14 PROCEDURE — 250N000009 HC RX 250: Performed by: PSYCHIATRY & NEUROLOGY

## 2023-11-14 PROCEDURE — 258N000003 HC RX IP 258 OP 636: Performed by: PSYCHIATRY & NEUROLOGY

## 2023-11-14 PROCEDURE — 96365 THER/PROPH/DIAG IV INF INIT: CPT

## 2023-11-14 RX ORDER — MEPERIDINE HYDROCHLORIDE 25 MG/ML
25 INJECTION INTRAMUSCULAR; INTRAVENOUS; SUBCUTANEOUS EVERY 30 MIN PRN
Status: CANCELLED | OUTPATIENT
Start: 2023-11-14

## 2023-11-14 RX ORDER — ALBUTEROL SULFATE 0.83 MG/ML
2.5 SOLUTION RESPIRATORY (INHALATION)
Status: CANCELLED | OUTPATIENT
Start: 2023-11-14

## 2023-11-14 RX ORDER — NALOXONE HYDROCHLORIDE 0.4 MG/ML
0.2 INJECTION, SOLUTION INTRAMUSCULAR; INTRAVENOUS; SUBCUTANEOUS
Status: CANCELLED | OUTPATIENT
Start: 2023-11-14

## 2023-11-14 RX ORDER — ONDANSETRON 2 MG/ML
4 INJECTION INTRAMUSCULAR; INTRAVENOUS
Status: CANCELLED | OUTPATIENT
Start: 2023-11-14

## 2023-11-14 RX ORDER — HEPARIN SODIUM (PORCINE) LOCK FLUSH IV SOLN 100 UNIT/ML 100 UNIT/ML
5 SOLUTION INTRAVENOUS
Status: CANCELLED | OUTPATIENT
Start: 2023-11-14

## 2023-11-14 RX ORDER — HYDRALAZINE HYDROCHLORIDE 20 MG/ML
10 INJECTION INTRAMUSCULAR; INTRAVENOUS
Status: CANCELLED | OUTPATIENT
Start: 2023-11-14

## 2023-11-14 RX ORDER — ALBUTEROL SULFATE 90 UG/1
1-2 AEROSOL, METERED RESPIRATORY (INHALATION)
Status: CANCELLED
Start: 2023-11-14

## 2023-11-14 RX ORDER — DIPHENHYDRAMINE HYDROCHLORIDE 50 MG/ML
50 INJECTION INTRAMUSCULAR; INTRAVENOUS
Status: CANCELLED
Start: 2023-11-14

## 2023-11-14 RX ORDER — METHYLPREDNISOLONE SODIUM SUCCINATE 125 MG/2ML
125 INJECTION, POWDER, LYOPHILIZED, FOR SOLUTION INTRAMUSCULAR; INTRAVENOUS
Status: CANCELLED
Start: 2023-11-14

## 2023-11-14 RX ORDER — EPINEPHRINE 1 MG/ML
0.3 INJECTION, SOLUTION, CONCENTRATE INTRAVENOUS EVERY 5 MIN PRN
Status: CANCELLED | OUTPATIENT
Start: 2023-11-14

## 2023-11-14 RX ORDER — HEPARIN SODIUM,PORCINE 10 UNIT/ML
5 VIAL (ML) INTRAVENOUS
Status: CANCELLED | OUTPATIENT
Start: 2023-11-14

## 2023-11-14 RX ADMIN — KETAMINE HYDROCHLORIDE 65 MG: 50 INJECTION INTRAMUSCULAR; INTRAVENOUS at 09:21

## 2023-11-14 NOTE — TELEPHONE ENCOUNTER
Refill request    Message from the patient:   Kd Cherry,     Will you please resubmit the Lidocaine 5%patches with a wear limit of 3 patches /24 hr.     My insurance will pay for a dosage of 3 at a time vs 4 at a time without prior authorization.    Medication: lidocaine (LIDODERM) 5 % patch     Sig: Place 4 patches onto the skin daily Apply up to 4 patches to skin. Wear for 12 hours and remove for 12 hrs.     LPN will change this to 3 patches.    Dispensed: 120  Refills: 3    Last prescribed to patient: 10/26/23     Last clinic appointment: 10/26/23  Next clinic appointment: Not scheduled.     Preferred pharmacy:      [id number] - [place], [place] - [address] AT [facility] [address]           Refill request routed to the provider to review.

## 2023-11-14 NOTE — TELEPHONE ENCOUNTER
Disability ins company is call regarding the status of getting the one page that was not with the original completed forms that was faxed. They only need that one page to be faxed back

## 2023-11-15 RX ORDER — LIDOCAINE 50 MG/G
3 PATCH TOPICAL DAILY
Qty: 120 PATCH | Refills: 3 | Status: SHIPPED | OUTPATIENT
Start: 2023-11-15 | End: 2024-03-21

## 2023-11-16 ENCOUNTER — HOSPITAL ENCOUNTER (OUTPATIENT)
Dept: CT IMAGING | Facility: CLINIC | Age: 63
Discharge: HOME | End: 2023-11-16
Attending: INTERNAL MEDICINE | Admitting: INTERNAL MEDICINE
Payer: COMMERCIAL

## 2023-11-16 DIAGNOSIS — R00.2 PALPITATIONS: ICD-10-CM

## 2023-11-16 PROCEDURE — 75571 CT HRT W/O DYE W/CA TEST: CPT

## 2023-11-16 PROCEDURE — 75571 CT HRT W/O DYE W/CA TEST: CPT | Mod: 26 | Performed by: INTERNAL MEDICINE

## 2023-11-17 ENCOUNTER — CARE COORDINATION (OUTPATIENT)
Dept: CARDIOLOGY | Facility: CLINIC | Age: 63
End: 2023-11-17
Payer: COMMERCIAL

## 2023-11-17 ENCOUNTER — VIRTUAL VISIT (OUTPATIENT)
Dept: PSYCHIATRY | Facility: CLINIC | Age: 63
End: 2023-11-17
Payer: COMMERCIAL

## 2023-11-17 DIAGNOSIS — E72.12 HOMOZYGOUS FOR C677T POLYMORPHISM OF MTHFR (H): ICD-10-CM

## 2023-11-17 DIAGNOSIS — E88.89 CYP2B6 INTERMEDIATE METABOLIZER (H): ICD-10-CM

## 2023-11-17 DIAGNOSIS — F34.1 PERSISTENT DEPRESSIVE DISORDER: Primary | ICD-10-CM

## 2023-11-17 DIAGNOSIS — G89.29 OTHER CHRONIC PAIN: ICD-10-CM

## 2023-11-17 DIAGNOSIS — F33.9 RECURRENT MAJOR DEPRESSION RESISTANT TO TREATMENT (H): ICD-10-CM

## 2023-11-17 DIAGNOSIS — I34.0 MITRAL REGURGITATION: Primary | ICD-10-CM

## 2023-11-17 DIAGNOSIS — E88.89 CYP2D6 POOR METABOLIZER (H): ICD-10-CM

## 2023-11-17 PROCEDURE — 99214 OFFICE O/P EST MOD 30 MIN: CPT | Mod: 95 | Performed by: PSYCHIATRY & NEUROLOGY

## 2023-11-17 RX ORDER — DEXTROAMPHETAMINE SACCHARATE, AMPHETAMINE ASPARTATE MONOHYDRATE, DEXTROAMPHETAMINE SULFATE AND AMPHETAMINE SULFATE 6.25; 6.25; 6.25; 6.25 MG/1; MG/1; MG/1; MG/1
25 CAPSULE, EXTENDED RELEASE ORAL DAILY
Qty: 30 CAPSULE | Refills: 0 | Status: SHIPPED | OUTPATIENT
Start: 2023-11-17 | End: 2023-12-17

## 2023-11-17 RX ORDER — DEXTROAMPHETAMINE SACCHARATE, AMPHETAMINE ASPARTATE MONOHYDRATE, DEXTROAMPHETAMINE SULFATE AND AMPHETAMINE SULFATE 6.25; 6.25; 6.25; 6.25 MG/1; MG/1; MG/1; MG/1
25 CAPSULE, EXTENDED RELEASE ORAL DAILY
Qty: 30 CAPSULE | Refills: 0 | Status: SHIPPED | OUTPATIENT
Start: 2023-12-18 | End: 2024-01-17

## 2023-11-17 RX ORDER — DEXTROAMPHETAMINE SACCHARATE, AMPHETAMINE ASPARTATE, DEXTROAMPHETAMINE SULFATE AND AMPHETAMINE SULFATE 3.75; 3.75; 3.75; 3.75 MG/1; MG/1; MG/1; MG/1
15 TABLET ORAL 2 TIMES DAILY
Qty: 60 TABLET | Refills: 0 | Status: SHIPPED | OUTPATIENT
Start: 2023-12-18 | End: 2024-01-17

## 2023-11-17 RX ORDER — DEXTROAMPHETAMINE SACCHARATE, AMPHETAMINE ASPARTATE MONOHYDRATE, DEXTROAMPHETAMINE SULFATE AND AMPHETAMINE SULFATE 6.25; 6.25; 6.25; 6.25 MG/1; MG/1; MG/1; MG/1
25 CAPSULE, EXTENDED RELEASE ORAL DAILY
Qty: 30 CAPSULE | Refills: 0 | Status: SHIPPED | OUTPATIENT
Start: 2024-01-18 | End: 2024-02-17

## 2023-11-17 RX ORDER — DEXTROAMPHETAMINE SACCHARATE, AMPHETAMINE ASPARTATE, DEXTROAMPHETAMINE SULFATE AND AMPHETAMINE SULFATE 3.75; 3.75; 3.75; 3.75 MG/1; MG/1; MG/1; MG/1
15 TABLET ORAL 2 TIMES DAILY
Qty: 60 TABLET | Refills: 0 | Status: SHIPPED | OUTPATIENT
Start: 2024-01-18 | End: 2024-02-17

## 2023-11-17 RX ORDER — DEXTROAMPHETAMINE SACCHARATE, AMPHETAMINE ASPARTATE, DEXTROAMPHETAMINE SULFATE AND AMPHETAMINE SULFATE 3.75; 3.75; 3.75; 3.75 MG/1; MG/1; MG/1; MG/1
15 TABLET ORAL 2 TIMES DAILY
Qty: 60 TABLET | Refills: 0 | Status: SHIPPED | OUTPATIENT
Start: 2023-11-17 | End: 2023-12-17

## 2023-11-17 RX ORDER — DESVENLAFAXINE 50 MG/1
50 TABLET, FILM COATED, EXTENDED RELEASE ORAL DAILY
Qty: 90 TABLET | Refills: 1 | Status: SHIPPED | OUTPATIENT
Start: 2023-11-17 | End: 2024-04-15

## 2023-11-17 ASSESSMENT — PATIENT HEALTH QUESTIONNAIRE - PHQ9: SUM OF ALL RESPONSES TO PHQ QUESTIONS 1-9: 17

## 2023-11-17 ASSESSMENT — PAIN SCALES - GENERAL: PAINLEVEL: SEVERE PAIN (7)

## 2023-11-17 NOTE — PATIENT INSTRUCTIONS
Treatment Plan:  Continue Pristiq 50 mg daily for mood, anxiety.  Discussed consideration for increasing to 75 mg daily.  We will wait.  Continue methylfolate 7.5 mg daily as supplementation.  Continue Adderall XR 25 mg daily for mood augmentation  Continue Adderall IR 15 mg twice daily as needed for mood augmentation and daytime fatigue/hypersomnia  Continue hydroxyzine 25-50 mg up to three times a day as needed for anxiety/sleep.   Continue ketamine therapy as planned.   Continue to work with your specialist from [facility] as indicated.    This provider sent message to your current pain provider and Dr. Jennings requesting an update on collaboration efforts regarding ketamine use for your pain.  Could consider individual DBT therapy.  Recommend ongoing individual psychotherapy.    Continue all other cares per primary care provider.   Continue all other medications as reviewed per electronic medical record today.   Safety plan reviewed. To the Emergency Department as needed or call after hours crisis line at [phone] or [phone]. [place] Crisis Text Line. Text [place] to 512630 or Suicide LifeLine Chat: suicidepreventionregrob.comline.org/chat  Schedule an appointment with me in 3 months, or sooner as needed. Call [facility] at [phone] to schedule.  Follow up with primary care provider as planned or for acute medical concerns.  Call the psychiatric nurse line with medication questions or concerns at [phone].  MyChart may be used to communicate with your provider, but this is not intended to be used for emergencies.    Therapy resources:  Www.[facility].org    https://www.[facility]bt.com/mental-health/thrive/thrive-mental-health-and-chronic-pain-management/    [place] DBT resources:  [place]://[place].gov/dhs/partners-and-providers/policies-procedures/adult-mental-health/dialectical-behavior-therapy/dbt-certified-providers/    Risks of benzodiazepine (Ativan, Xanax, Klonopin, Valium, etc) use including, but  not limited to, sedation, tolerance, risk for addiction/dependence. Do not drink alcohol while taking benzodiazepines due to risk of trouble breathing and potential death. Do not drive or operate heavy machinery until it is known how the drug affects you. Discuss with physician or pharmacist before ever taking a benzodiazepine with a narcotic/opioid pain medication.     Have previously discussed risks of stimulant medication including, but not limited to, decreased appetite, risk of tics (and that they may be lasting), trouble sleeping, cardiac risks such as increased heart rate and blood pressure, and rare risk of sudden cardiac death.  Also risk of addiction/tolerance/dependence.    Patient Education   Collaborative Care Psychiatry Service  What to Expect  Here's what to expect from your Collaborative Care Psychiatry Service (CCPS).   About CCPS  CCPS means 2 people work together to help you get better. You'll meet with a behavioral health clinician and a psychiatric doctor. A behavioral health clinician helps people with mental health problems by talking with them. A psychiatric doctor helps people by giving them medicine.  How it works  At every visit, you'll see the behavioral health clinician (BHC) first. They'll talk with you about how you're doing and teach you how to feel better.   Then you'll see the psychiatric doctor. This doctor can help you deal with troubling thoughts and feelings by giving you medicine. They'll make sure you know the plan for your care.   CCPS usually takes 3 to 6 visits. If you need more visits, we may have you start seeing a different psychiatric doctor for ongoing care.  If you have any questions or concerns, we'll be glad to talk with you.  About visits  Be open  At your visits, please talk openly about your problems. It may feel hard, but it's the best way for us to help you.  Cancelling visits  If you can't come to your visit, please call us right away at [phone]. If you  "don't cancel at least 24 hours (1 full day) before your visit, that's \"late cancellation.\"  Being late to visits  Being very late is the same as not showing up. You will be a \"no show\" if:  Your appointment starts with a C, and you're more than 15 minutes late for a 30-minute (half hour) visit. This will also cancel your appointment with the psychiatric doctor.  Your appointment is with a psychiatric doctor only, and you're more than 15 minutes late for a 30-minute (half hour) visit.  Your appointment is with a psychiatric doctor only, and you're more than 30 minutes late for a 60-minute (full hour) visit.  If you cancel late or don't show up 2 times within 6 months, we may end your care.   Getting help between visits  If you need help between visits, you can call us Monday to Friday from 8 a.m. to 4:30 p.m. at [phone].  Emergency care  Call 911 or go to the nearest emergency department if your life or someone else's life is in danger.  Call 478 anytime to reach the national Suicide and Crisis hotline.  Medicine refills  To refill your medicine, call your pharmacy. You can also call [facility]'s [facility] at [phone], Monday to Friday, 8 a.m. to 4:30 p.m. It can take 1 to 3 business days to get a refill.   Forms, letters, and tests  You may have papers to fill out, like FMLA, short-term disability, and workability. We can help you with these forms at your visits, but you must have an appointment. You may need more than 1 visit for this, to be in an intensive therapy program, or both.  Before we can give you medicine for ADHD, we may refer you to get tested for it or confirm it another way.  We may not be able to give you an emotional support animal letter.  We don't do mental health checks ordered by the court.   We don't do mental health testing, but we can refer you to get tested.   Thank you for choosing us for your care.  For informational purposes only. Not to replace the advice of " your health care provider. Copyright   2022 [facility]. All rights reserved. [employer] [id number] - 12/22.

## 2023-11-17 NOTE — PROGRESS NOTES
"Telemedicine Visit: The patient's condition can be safely assessed and treated via synchronous audio and visual telemedicine encounter.      Reason for Telemedicine Visit: Patient has requested telehealth visit    Originating Site (Patient Location): Patient's home    Distant Location (provider location):  Off-site    Consent:  The patient/guardian has verbally consented to: the potential risks and benefits of telemedicine (video visit) versus in person care; bill my insurance or make self-payment for services provided; and responsibility for payment of non-covered services.     Mode of Communication:  Video Conference via [employer]    As the provider I attest to compliance with applicable laws and regulations related to telemedicine.        Outpatient Psychiatric Progress Note    Name: Janelle TORRES Christopher   : 1960                    Primary Care Provider: Emili Ballard MD   Therapist: Flavio Murcia, PhD,     PHQ-9 scores:      2023     3:43 PM 2023     3:08 PM 11/3/2023    10:31 AM   PHQ-9 SCORE   PHQ-9 Total Score MyChart 12 (Moderate depression)  12 (Moderate depression)   PHQ-9 Total Score 12 12 12       STACIE-7 scores:      3/20/2023     7:59 AM 2023    10:39 PM 2023     3:44 PM   STACIE-7 SCORE   Total Score 7 (mild anxiety) 11 (moderate anxiety) 2 (minimal anxiety)   Total Score 7 11    11 2       Patient Identification:  Patient is a 62 year old,   White Choose not to answer female  who presents for return visit with me. Patient attended the phone/video session alone. Patient prefers to be called: \"Janelle\".    Interim History:  I last saw Janelle White for outpatient psychiatry return visit on 2023. During that appointment, we:  Continue Pristiq 50 mg daily for mood, anxiety.   Continue methylfolate 7.5 mg daily as supplementation.  Continue Adderall XR 25 mg daily for mood augmentation  Continue Adderall IR 15 mg twice daily as needed for mood " augmentation and daytime fatigue/hypersomnia  Continue benzodiazepine per primary care prescriber.  Continue ketamine therapy as planned.   Continue to work with your specialist from [facility] as indicated.    Could consider individual DBT therapy.  Recommend ongoing individual psychotherapy.      11/17: Patient unfortunately reports overall worsening pain.  Patient's back pain has been significantly worse.  Patient is incredibly fearful she will need another surgery.  She feels as though it takes a long time to rebound after her surgeries and she is not looking forward to this possibility.  Patient having some difficulty sleeping due to the pain.  She continues ketamine treatments with outside clinic and feels as though it continues to be very helpful.  The day she gets her infusion and for a few days afterwards she has more energy, feels much better, can do more, and has some relief of her unrelenting chronic pain.  Continues to have passive suicidal thoughts but nothing too overwhelming and no plan or intent to harm self.  Despite increased pain and distress, patient is pleasantly surprised her suicidal ideations have not worsened.  She feels ketamine has been very helpful for this.  Continues to take medications as prescribed.  No new notable medication-related side effects.  No problematic drug or alcohol use.    Past medication trials include but are not limited to:   Effexor-very flat  Celexa, zoloft, was on wellbutrin a couple years at one point  wellbutrin XL + celexa; 2005ish  tca-a ton of weight gain  depakote maybe for a short time  Abilify/lithium ?  ECT as teen - made me dull  Cytomel-not effective at all, used 50 mcg    Psychiatric ROS:  Janelle White reports mood has been: See HPI above  Anxiety has been: See HPI above  Sleep has been: struggles at times, especially due to pain  Deepa sxs: Denies  Psychosis sxs: None  ADHD/ADD sxs: None  PTSD sxs: None  PHQ9 and GAD7 scores were reviewed  "today if completed.   Medication side effects: Denies anything major related to current psychiatric medications  Current stressors include: Symptoms and See HPI above  Coping mechanisms and supports include: Family, Hobbies and Friends, therapy    Current medications include:   Current Outpatient Medications   Medication Sig    albuterol (PROAIR HFA/PROVENTIL HFA/VENTOLIN HFA) 108 (90 Base) MCG/ACT inhaler Inhale 2 puffs into the lungs every 6 hours as needed for shortness of breath or wheezing    amphetamine-dextroamphetamine (ADDERALL XR) 25 MG 24 hr capsule Take 1 capsule (25 mg) by mouth daily for 30 days    amphetamine-dextroamphetamine (ADDERALL) 15 MG tablet Take 1 tablet (15 mg) by mouth 2 times daily for 30 days (Patient not taking: Reported on 10/26/2023)    cyclobenzaprine (FLEXERIL) 10 MG tablet  (Patient not taking: Reported on 10/6/2023)    desvenlafaxine (PRISTIQ) 50 MG 24 hr tablet Take 1 tablet (50 mg) by mouth daily    Estradiol (DIVIGEL) 1 MG/GM GEL Place 1 packet onto the skin daily    famotidine (PEPCID) 20 MG tablet TAKE 1 TABLET(20 MG) BY MOUTH TWICE DAILY (Patient not taking: Reported on 10/6/2023)    HYDROcodone-acetaminophen (NORCO)  MG per tablet Take 1 tablet by mouth every 4 hours as needed for severe pain (7-10) max 4 tabs/24 hrs (Patient not taking: Reported on 10/6/2023)    hydrOXYzine (VISTARIL) 25 MG capsule Take 1 capsule (25 mg) by mouth 3 times daily as needed for itching or anxiety    insulin syringe-needle U-100 (30G X 1/2\" 1 ML) 30G X 1/2\" 1 ML miscellaneous Inject 1 ml B12 qmonth (Patient not taking: Reported on 10/6/2023)    Levomefolate Glucosamine (METHYLFOLATE PO) Take 7.5 mg by mouth daily    lidocaine (LIDODERM) 5 % patch Place 3 patches onto the skin daily Apply up to 3 patches to skin. Wear for 12 hours and remove for 12 hrs.  Refill when patient requests.    medical cannabis (Patient's own supply.  Not a prescription) Medical Cannabis - [place] 4-6 ml by " mouth daily. [facility]    methocarbamol (ROBAXIN) 500 MG tablet Take 1 tablet (500 mg) by mouth 4 times daily as needed for muscle spasms    methocarbamol (ROBAXIN) 750 MG tablet Take 1 tablet (750 mg) by mouth 4 times daily as needed for muscle spasms    methylphenidate (RITALIN) 10 MG tablet  (Patient not taking: Reported on 10/6/2023)    morphine (MS CONTIN) 30 MG CR tablet  (Patient not taking: Reported on 10/6/2023)    naloxone (NARCAN) 4 MG/0.1ML nasal spray Spray 1 spray (4 mg) into one nostril alternating nostrils as needed for opioid reversal every 2-3 minutes until assistance arrives (Patient not taking: Reported on 10/6/2023)    NONFORMULARY Take 2 Scoops by mouth daily Protein and Vitamin shake mix - Nutritional supplement    ondansetron (ZOFRAN ODT) 8 MG ODT tab Take 1 tablet (8 mg) by mouth every 8 hours as needed for nausea    Prasterone 6.5 MG INST Place 0.5 suppositories vaginally three times a week    predniSONE (DELTASONE) 10 MG tablet  (Patient not taking: Reported on 10/6/2023)    rOPINIRole (REQUIP) 0.25 MG tablet TAKE 1 TO 2 TABLETS(0.25 TO 0.5 MG) BY MOUTH AT BEDTIME    senna-docusate (SENOKOT-S/PERICOLACE) 8.6-50 MG tablet Take 6-9 tablets by mouth every evening    triamcinolone (KENALOG) 0.1 % external ointment Apply topically 2 times daily (Patient not taking: Reported on 10/6/2023)    vitamin D3 (CHOLECALCIFEROL) 125 MCG (5000 UT) tablet Take 5,000 Units by mouth daily     No current facility-administered medications for this visit.     The [place] Prescription Monitoring Program has been reviewed and there are no concerns about diversionary activity for controlled substances at this time.   10/21/2023 08/22/2023 1 Dextroamp-Amphet Er 25 Mg Cap 30.00 30 Al [facility] [id number] [facility] ([id number]) 0/0  [place]are [place]   09/21/2023 08/22/2023 1 Dextroamp-Amphetamin 15 Mg Tab 60.00 30 Al [facility] [id number] [facility] ([id number]) 0/0  [place]   09/21/2023 08/22/2023 1 Dextroamp-Amphet Er 25 Mg Cap 30.00 30 Al [facility] [id number]  [facility] ([id number]) 0/0  [place]   08/22/2023 08/22/2023 1 Dextroamp-Amphetamin 15 Mg Tab 60.00 30 Al [facility] [id number] [facility] ([id number]) 0/0  [place]     Past Medical/Surgical History:  Past Medical History:   Diagnosis Date    Anxiety     Cervicalgia 2007    C5-6 disc protrusion    COPD (chronic obstructive pulmonary disease) (H) 2/7/2022    Depressive disorder     ESBL (extended spectrum beta-lactamase) producing bacteria infection     History of blood transfusion 2007    Cervical fusion    Leukemia (H)     CLA large beta    Melanoma (H) 1998    Migraine     Other chronic pain     Rotator cuff tear     s/p injections    Sacroiliac inflammation (H24)     Shift work sleep disorder 12/16/2013    Urinary calculi     Vitamin B12 deficiency anemia 2006    started injections      has a past medical history of Anxiety, Cervicalgia (2007), COPD (chronic obstructive pulmonary disease) (H) (2/7/2022), Depressive disorder, ESBL (extended spectrum beta-lactamase) producing bacteria infection, History of blood transfusion (2007), Leukemia (H), Melanoma (H) (1998), Migraine, Other chronic pain, Rotator cuff tear, Sacroiliac inflammation (H24), Shift work sleep disorder (12/16/2013), Urinary calculi, and Vitamin B12 deficiency anemia (2006).    She has no past medical history of Cerebral infarction (H), Congestive heart failure (H), Coronary artery disease, Diabetes (H), Heart disease, Hypertension, Thyroid disease, or Uncomplicated asthma.    Social History:  Reviewed. No changes to social history except as noted above in HPI.    Vital Signs:   None. This is phone/video visit.     Labs:  Most recent laboratory results reviewed and the pertinent results include:   Lab Results   Component Value Date    WBC 5.6 06/30/2022    WBC 3.2 05/24/2021     Lab Results   Component Value Date    RBC 4.61 06/30/2022    RBC 3.74 05/24/2021     Lab Results   Component Value Date    HGB 14.2 06/30/2022    HGB 11.0 05/24/2021     Lab Results   Component Value  Date    HCT 43.6 06/30/2022    HCT 33.6 05/24/2021     No components found for: MCT  Lab Results   Component Value Date    MCV 95 06/30/2022    MCV 90 05/24/2021     Lab Results   Component Value Date    MCH 30.8 06/30/2022    MCH 29.4 05/24/2021     Lab Results   Component Value Date    MCHC 32.6 06/30/2022    MCHC 32.7 05/24/2021     Lab Results   Component Value Date    RDW 11.9 06/30/2022    RDW 13.4 05/24/2021     Lab Results   Component Value Date     07/04/2022     05/24/2021     Last Comprehensive Metabolic Panel:  Sodium   Date Value Ref Range Status   06/30/2022 136 133 - 144 mmol/L Final   05/24/2021 141 133 - 144 mmol/L Final     Potassium   Date Value Ref Range Status   06/30/2022 3.4 3.4 - 5.3 mmol/L Final   05/24/2021 3.9 3.4 - 5.3 mmol/L Final     Chloride   Date Value Ref Range Status   06/30/2022 105 94 - 109 mmol/L Final   05/24/2021 108 94 - 109 mmol/L Final     Carbon Dioxide   Date Value Ref Range Status   05/24/2021 25 20 - 32 mmol/L Final     Carbon Dioxide (CO2)   Date Value Ref Range Status   06/30/2022 25 20 - 32 mmol/L Final     Anion Gap   Date Value Ref Range Status   06/30/2022 6 3 - 14 mmol/L Final   05/24/2021 8 3 - 14 mmol/L Final     Glucose   Date Value Ref Range Status   06/30/2022 91 70 - 99 mg/dL Final   05/24/2021 87 70 - 99 mg/dL Final     Urea Nitrogen   Date Value Ref Range Status   06/30/2022 20 7 - 30 mg/dL Final   05/24/2021 15 7 - 30 mg/dL Final     Creatinine   Date Value Ref Range Status   09/27/2023 0.71 0.51 - 0.95 mg/dL Final   05/24/2021 0.64 0.52 - 1.04 mg/dL Final     GFR Estimate   Date Value Ref Range Status   09/27/2023 >90 >60 mL/min/1.73m2 Final   05/24/2021 >90 >60 mL/min/[1.73_m2] Final     Comment:     Non  GFR Calc  Starting 12/18/2018, serum creatinine based estimated GFR (eGFR) will be   calculated using the Chronic Kidney Disease Epidemiology Collaboration   (CKD-EPI) equation.       Calcium   Date Value Ref Range Status    06/30/2022 8.7 8.5 - 10.1 mg/dL Final   05/24/2021 8.4 (L) 8.5 - 10.1 mg/dL Final     Bilirubin Total   Date Value Ref Range Status   09/27/2023 0.3 <=1.2 mg/dL Final   03/16/2021 0.4 0.2 - 1.3 mg/dL Final     Alkaline Phosphatase   Date Value Ref Range Status   09/27/2023 76 35 - 104 U/L Final   03/16/2021 87 40 - 150 U/L Final     ALT   Date Value Ref Range Status   04/26/2022 21 0 - 50 U/L Final   03/16/2021 26 0 - 50 U/L Final     AST   Date Value Ref Range Status   09/27/2023 26 0 - 45 U/L Final     Comment:     Reference intervals for this test were updated on 6/12/2023 to more accurately reflect our healthy population. There may be differences in the flagging of prior results with similar values performed with this method. Interpretation of those prior results can be made in the context of the updated reference intervals.   03/16/2021 44 0 - 45 U/L Final     Review of Systems:  10 systems (general, cardiovascular, respiratory, eyes, ENT, endocrine, GI, , M/S, neurological) were reviewed. Most pertinent finding(s) is/are: Severe chronic pain. The remaining systems are all unremarkable.    Mental Status Examination (limited as this is by phone/video):  Appearance: Awake, alert, appears stated age, no acute distress  Attitude:  cooperative, pleasant   Motor: No gross abnormalities observed via video, not formally tested.  Oriented to:  person, place, time, and situation  Attention Span and Concentration:  normal  Speech:  clear, coherent, regular rate, rhythm, and volume  Language: intact  Mood: a little worse  Affect: Mood congruent, tearful   Associations:  no loose associations  Thought Process:  logical, linear and goal oriented  Thought Content: Chronic passive suicidal ideation-remains at baseline today, no current plan or intent to harm self today, no homicidal ideation, no evidence of psychotic thought, no auditory hallucinations present and no visual hallucinations present  Recent and Remote Memory:   Intact to interview. Not formally assessed. No amnesia.  Fund of Knowledge: appropriate  Insight:  good  Judgment:  intact, adequate for safety  Impulse Control:  intact    Suicide Risk Assessment:  Today Janelle White reports chronic/intermittent suicidal ideation-at baseline today and overall improved since first visit with this provider and since starting ketamine therapy.There are notable risk factors for self-harm, including anxiety, comorbid medical condition of Chronic pain, suicidal ideation, purposelessness/no reason for living, hopelessness, withdrawing and mood change. However, risk is mitigated by commitment to family, absence of past attempts, ability to volunteer a safety plan, history of seeking help when needed, future oriented and denies suicidal intent today. Therefore, based on all available evidence including the factors cited above, Janelle White does not appear to be at imminent risk for self-harm, does not meet criteria for a 72-hr hold, and therefore remains appropriate for ongoing outpatient level of care.  A thorough assessment of risk factors related to suicide and self-harm have been reviewed and are noted above. Local community safety resources reviewed for patient to use if needed. There was no deceit detected, and the patient presented in a manner that was believable.     DSM5 Diagnosis:  Persistent depressive disorder  Treatment resistant depression  CYP2D6 poor metabolizer  CYP2B6 intermediate metabolizer  Homozygous for C677T polymorphism of MTHFR    Medical comorbidities include:   Patient Active Problem List    Diagnosis Date Noted    Lumbar radiculopathy 10/26/2023     Priority: Medium    Sacroiliitis (H24) 08/04/2023     Priority: Medium    History of falling 03/13/2023     Priority: Medium    Suicidal ideation 06/30/2022     Priority: Medium    Immunocompromised (H24) 06/30/2022     Priority: Medium    Infection due to 2019 novel coronavirus 06/30/2022      Priority: Medium    Severe episode of recurrent major depressive disorder, without psychotic features (H) 04/17/2022     Priority: Medium    COPD (chronic obstructive pulmonary disease) (H) 02/07/2022     Priority: Medium    Tension type headache 11/04/2021     Priority: Medium    Rotator cuff injury 11/04/2021     Priority: Medium    Spinal stenosis of lumbar region with radiculopathy 09/02/2021     Priority: Medium    COVID-19 08/29/2021     Priority: Medium    Polyarthralgia 08/11/2021     Priority: Medium    Cellulitis, unspecified cellulitis site 08/11/2021     Priority: Medium    Sepsis, due to unspecified organism, unspecified whether acute organ dysfunction present (H) 08/11/2021     Priority: Medium    Cellulitis 08/11/2021     Priority: Medium    STACIE (generalized anxiety disorder) 07/27/2021     Priority: Medium    MTHFR gene mutation 04/12/2021     Priority: Medium    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     Priority: Medium    CYP2D6 poor metabolizer (H) 04/12/2021     Priority: Medium    Status post blepharoplasty 11/18/2020     Priority: Medium    Regular astigmatism, bilateral 11/18/2020     Priority: Medium    Prediabetes 09/19/2019     Priority: Medium    Hyperlipidemia LDL goal <130 09/19/2019     Priority: Medium    CLARE (obstructive sleep apnea) 02/13/2019     Priority: Medium    Controlled substance agreement signed 08/14/2018     Priority: Medium    Chronic pain syndrome 12/21/2017     Priority: Medium    CLL (chronic lymphocytic leukemia) (H) 12/21/2017     Priority: Medium    Hypotension 09/14/2017     Priority: Medium    History of laser assisted in situ keratomileusis 10/14/2014     Priority: Medium    Pain medication agreement 04/23/2014     Priority: Medium     Formatting of this note might be different from the original.  Patient takes morphine 15 mg ER BID for chronic neck and back pain.  She is also on cymbalta, ibuprofen, flexaril prn      Shift work sleep disorder 12/16/2013      Priority: Medium    Vitamin D deficiency 11/08/2012     Priority: Medium    Moderate recurrent major depression (H) 01/06/2011     Priority: Medium    DDD (degenerative disc disease), cervical 10/07/2010     Priority: Medium    CARDIOVASCULAR SCREENING; LDL GOAL LESS THAN 160 02/10/2010     Priority: Medium    Chronic Low Back Pain 10/01/2009     Priority: Medium     S/p AP L3-S1 fusion 12/2010 - referred to [facility].   Orthopedics writing scripts for narcotics post-op.      Migraine      Priority: Medium     Problem list name updated by automated process. Provider to review      B-complex deficiency 10/10/2006     Priority: Medium     Problem list name updated by automated process. Provider to review      PERSONAL HX OF  MELANOMA 12/04/2003     Priority: Low       Psychosocial & Contextual Factors: see HPI above    Assessment:  6/15/2021:  Janelle TORRES Christopher reports overall some significant worsening of mood symptoms.  Increased anxiety with her depression.  Poor motivation and poor energy.  Symptoms severe enough to prevent her from being able to utilize typical coping skills and strategies.  No current motivation or energy to participate in PHP/day treatment program.  Continues to take medication as scheduled.  Recent surgery and general anesthesia could be contributing.  Discussed discontinuing stimulant medication as an augmentation strategy.  She is agreeable to moving forward with T3 as an augmentation for treatment resistant depression.  Discussed risks and benefits at length.  Will get baseline thyroid labs prior to starting T3.  Hoping she will experience increased energy and motivation and that her mood will lift.  Also discussed setting up an appointment with her interventional psychiatry clinic to discuss other potential options such as ketamine therapy, ECT, TMS.  Does have history of ECT for depression when she was quite young.  I am hopeful to stay ahead of her symptoms before things get  too severe.  Has chronic suicidal ideation and is at high risk for suicide.  Continues to deny any plan or intent.  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  See below for risk/benefit conversation regarding T3 had with the patient:    GeneSight testing Info:  GeneSight testing revealed she is a poor 2D6 metabolizer and an intermediate 2B6 metabolizer.  This would explain her multiple failed medication trials due to the negative side effects.  She also has a genotype that would suggest a phenotype sensitivity to serotonin. She also was found to have significantly reduced folic acid conversion.      Starting T3 as augment to antidepressant therapy for treatment resistant depression:  Start T3 at 25 mcg per day for one to two weeks, and if there is little or no improvement, increase the dose to 50 mcg per day; this is consistent with practice guidelines from the American Psychiatric Association and [place] [facility] for Mood and Anxiety Treatments.     Adverse effects consistent with hyperthyroidism may occur, including tremor, palpitations, heat intolerance, sweating, anxiety, increased frequency of bowel movements, shortness of breath, and exacerbation of cardiac arrhythmia. In addition, hyperthyroidism that emerges during long-term treatment may lead to bone demineralization, osteoporosis, and an increased risk of fracture    Following a normal baseline TSH concentration, no other laboratory monitoring during a four to six week trial of adjunctive T3 is necessary. However, if T3 is continued longer, a serum TSH concentration should be checked after the first one to three months of treatment and then every six months.    Today, 7/27/21:   Patient overall with little change in her symptoms.  Did not do as well on Cytomel and had limited to no improvement at all after weeks on 50 mcg.  Labs were unremarkable prior to starting Cytomel.  Opted to go back to stimulant augmentation since  patient does find it quite helpful.  She has been taking 15 mg of immediate release most afternoons.  Due to good tolerability and some efficacy, we will bump up her immediate release dose slightly to 20 mg daily as needed in addition to her 20 mg extended release dose. I have no concerns about misuse or diversion at this time.  Tolerating well with no negative side effects.  Last blood pressure normal 7/2.  Patient has noted some efficacy from Pristiq more than other antidepressant trials and so we will continue to titrate further to 100 mg daily.  She is tolerating well.  Continues to use lorazepam for anxiety, pain medicine adjunct, and for sleep as prescribed by primary care provider.  Has been utilizing roughly 100 tablets of 0.5 mg every 1.5 months.  Patient with ongoing chronic intermittent passive suicidal ideation with no plan or intent.  Denies safety concerns today.  No problematic drug or alcohol use.  I am hopeful the interventional psychiatry clinic might be able to initiate ketamine therapy or another therapy they would recommend as potentially being more helpful than patient's multiple medication/augmentation trials.    10/6/2021:  Patient with some improved depression symptoms and much more hopeful.  [facility]  Also is hopeful there will be some helpful treatments.  Visit to [place] was also very good and instilled a lot of hope in her abilities.  She was encouraged to continue to push herself to do the things she enjoys doing.  No medication changes today.  She will follow-up with getting TMS rescheduled, possibly when things slow down after the holidays.  Does not need to be seen until after the holidays.  No acute safety concerns today.  No problematic drug or alcohol use.    1/14/2022:  Overall patient feeling a little more down lately.  Tolerating TMS well.  Encouraged to continue TMS.  No medication changes today since undergoing TMS  treatment.  Talked about life stages today generativity versus stagnation and also integrity versus despair.  Talked about consideration for acceptance and commitment therapy.  She is encouraged to continue to be active.  No acute suicidal ideation today.  No acute safety concerns today.  No problematic drug or alcohol use.    4/13/2022:   Patient continues to have symptoms that wax and wane.  Feels like she tolerates Pristiq 50 mg better than 100 and will continue on this dose.  Last week was particularly difficult.  Pretty intense suicidal ideation but she was able to manage these thoughts and remain safe.  She does report she would come to the hospital if necessary.  We discussed the empath unit today and other resources if she felt she could not keep herself safe.  She continues to work on tapering her narcotic pain medication regimen.  She is working with addiction medicine and also pain management.  She is starting Pilates therapy.  Pool therapy will begin in July.  Discussed possibility of vestibular rehabilitation.  Individual therapy will also start soon.  She was strongly encouraged to continue to pursue ketamine therapy.  No acute suicidality today.  No problematic drug or alcohol use.    6/23/2022:  Overall reports doing relatively okay.  Some pretty down days still, but ketamine therapy going well.  Feels like continuing to move in the right direction.  Suicidal ideation improving.  Off all narcotic pain medication.  Feels good about her progress.  Had some really down days after off pain medication but improved since those few dark days.  Hopeful about where ketamine therapy may lead.  Discussed some of her restlessness at bedtime and will start pramipexole.  Also some evidence to suggest pramipexole could be helpful for treatment resistant depression symptoms.  Discussed risks and benefits of therapy, including watching for any impulsive behaviors.  Continues to have suicidal thoughts but no acute  suicidality today.  Her suicidality overall is improving from her baseline suicidality.  She continues to consider the idea of a partial hospital program.  We will send her information for [facility] Thrive program.  Also encouraged her to discuss possibility of a pain program through [facility] with Dr. Medley.  No acute safety concerns today.  No problematic drug or alcohol use.    7/11/2022:  Patient with some recent more intensive struggles.  Depression much more severe recently.  Led to hospitalization.  Patient medically hospitalized since was positive for COVID and has history of CLL and Babb protocol requires isolation.  Patient seen by psychiatry consult service.  No medication changes made.  Patient continues with interventional psychiatry clinic.  They will be increasing ketamine dose and treatments will be every few weeks.  We discussed patient's symptom history a little more today and possible bipolar diagnosis came into question.  Patient does recognize possible hypomanic episodes in the past.  Patient is currently monitoring symptoms closely and pramipexole.  She is feeling better since starting pramipexole but is watching shopping behaviors closely.  Suicidal ideation is improving since hospitalization.  Restless legs improved at night on pramipexole.  Discussed we could consider adding lower dose lithium as an augment to her Pristiq and to help stabilize moods a little bit more and to further help with some of her chronic suicidal ideation.  We also discussed DBT for chronic suicidal ideation and ongoing mood instability.  Continues off of pain medication.  No acute suicidality or safety concerns today.  Patient will follow up in a few weeks.  No medication changes today since we will wait to see how ketamine dose change goes.  No drug or alcohol use concerns.  Patient noted some ongoing cognitive/memory concerns and requested testing.  Neuropsychological referral placed.    8/8/2022:  Patient with  ongoing severe depression, resistant to treatment.  She is agreeable to increasing her stimulant medication.  This could hopefully further improve mood slightly.  May also help with some additional energy and also help with some of her ongoing cognitive concerns.  She has neuropsychological testing coming up soon.  Discussed possibly considering individual DBT therapy with a DBT certified therapist given her ongoing chronic suicidal ideation and inability to participate in group therapy currently.  We will discuss this possibility with her current therapist.  Also discussed possibility of increasing Pristiq by 25 mg only 2 or 3 days a week to decrease risk of negative side effects (25 mg on Tuesdays/Thursdays for Monday/Wednesday/Fridays).  Patient also encouraged to continue ketamine treatment.  No acute suicidality today.  Patient denies any intent or plan to act on any of her suicidal thoughts today.  No problematic drug or alcohol use.    9/7/2022:  Patient doing relatively okay today.  Pain continues to feel a little bit worse.  Emotionally though, despite worsening pain symptoms, patient feels like she is doing relatively okay.  Discussed various psychotherapy approaches that could be taken to address her symptoms.  We discussed that health psychology could be an optimal approach to help with her symptoms but that her suicidal ideation is often still quite intense and may be a distractor to her treatment with a health psychologist (discussed she may be referred often to the emergency room or to other more intensive programs).  We discussed working with an individual DBT therapist as a possibility to continue addressing her ongoing chronic suicidal ideation (individual therapy would allow patient to move at her own pace since group therapy is much too intense at this time).  Patient was open to this idea.  [facility] today we will send patient some resources/options.  Depending on patient's financial situation, there  is also a possibility patient could work with a health psychologist in conjunction with a DBT therapist.  Ketamine therapy has proven to be helpful, although I do wish the effects lasted a little longer than what they currently are for the patient.  I recommend patient continue her ketamine treatments.  No medication changes today.  She denies any acute suicidality today.  No plan or intent to harm herself noted today.  She denies being in a bad place today.  No problematic drug or alcohol use.     10/7/2022:  Patient overall relatively stable at this time.  Mood improving slightly with ketamine therapy.  Patient encouraged to continue with treatment.  Anxiety a little more manageable.  Continuing to struggle with severe chronic pain.  Tolerating current medications well with no negative side effects noted.  No changes today.  Patient will continue in individual psychotherapy.  No acute suicidality today.  Suicidal thoughts at baseline, maybe even a little improved.    1/9/2023:  Patient overall doing quite well.  Some days still worse than others and chronic pain continues to be a big factor influencing her mental health.  Ketamine has been very helpful.  Still some poor energy and fatigue.  Adderall has been very helpful.  We will increase the dose just slightly.  Extended release dose will increase from 20 mg to 25 mg to further address her symptoms.  We will continue with the 15 mg twice daily immediate release doses.  No other medication changes today.  Patient encouraged to stay the course.  No acute safety concerns.  Suicidal thoughts continue to be passive in nature and have overall improved since starting ketamine.  No problematic drug or alcohol use.    3/20/2023:  Patient remains overall relatively stable.  Having some anxiety over feeling more dependent on cannabis for her pain.  Discussed continuing gratitude journal.  Patient has come quite a ways with relative stability.  No medication changes today.   Denies that cannabis use is causing any problems apart from feeling a little anxious about her use.  Patient even participating in more activities this spring compared to last year.  No acute safety concerns at this time.  No acute suicidality.    5/19/2023:  Patient overall continuing to manage okay.  Feels relatively stable.  Continues ketamine therapy.  No med changes today.  No acute safety concerns.  No acute suicidality today.  Is pleased that she is finding some roxy in pleasurable activities this spring.  Continues to seek purpose in life.  No problematic drug or alcohol use.  Patient will be seen back in 3 months.    8/25/2023:  Continues to manage relatively okay on current regimen.  No major questions or concerns today.  Interested in keeping things status quo/the same.  Continues to receive ketamine infusions.  Pain continues to be forefront.  We discussed referral to a provider who could provide an evaluation to discuss ketamine for pain and possibly mood to replace her infusions.  Referral sent and discussed with patient.  Patient is very agreeable and interested in what ever options might be available for her.  No acute safety concerns today.  No problematic drug or alcohol use.  No acute suicidality today.  Patient continues to find ways to stay distracted.  Has started with new therapist.    11/17/2023:   Patient overall with some worsening symptoms due to some significantly worsening chronic pain.  Patient does have some relief of her pain symptoms after ketamine treatments-for up to a few days.  Patient reports referral to Dr. Rancho Jennings canceled by her current pain medicine provider since patient is already seeing this pain medicine provider within the same system.  Patient did report her pain medicine provider was going to reach out to Dr. Jennings to have a discussion about ketamine.  This provider will send staff message to both providers to see if we can get an update on the collaboration.   No medication changes made today.  Tolerating well overall.  No problematic drug or alcohol use.  Passive suicidal ideation at baseline.  No acute suicidality or plan or intent to harm self today.    Medication side effects and alternatives were reviewed. Health promotion activities recommended and reviewed today. All questions addressed. Education and counseling completed regarding risks and benefits of medications and psychotherapy options. Recommend therapy for additional support.     Treatment Plan:  Continue Pristiq 50 mg daily for mood, anxiety.  Discussed consideration for increasing to 75 mg daily.  We will wait.  Continue methylfolate 7.5 mg daily as supplementation.  Continue Adderall XR 25 mg daily for mood augmentation  Continue Adderall IR 15 mg twice daily as needed for mood augmentation and daytime fatigue/hypersomnia  Continue hydroxyzine 25-50 mg up to three times a day as needed for anxiety/sleep.   Continue ketamine therapy as planned.   Continue to work with your specialist from [facility] as indicated.    This provider sent message to your current pain provider and Dr. Jennings requesting an update on collaboration efforts regarding ketamine use for your pain.  Could consider individual DBT therapy.  Recommend ongoing individual psychotherapy.    Continue all other cares per primary care provider.   Continue all other medications as reviewed per electronic medical record today.   Safety plan reviewed. To the Emergency Department as needed or call after hours crisis line at [phone] or [phone]. [place] Crisis Text Line. Text [place] to 410535 or Suicide LifeLine Chat: suicidepreventionlifeline.org/chat  Schedule an appointment with me in 3 months, or sooner as needed. Call [facility] at [phone] to schedule.  Follow up with primary care provider as planned or for acute medical concerns.  Call the psychiatric nurse line with medication questions or concerns at  [phone].  MyChart may be used to communicate with your provider, but this is not intended to be used for emergencies.    Therapy resources:  Www.[facility].org    https://www.[facility].com/mental-health/thrive/thrive-mental-health-and-chronic-pain-management/    [place] DBT resources:  [place]://[place].gov/dhs/partners-and-providers/policies-procedures/adult-mental-health/dialectical-behavior-therapy/dbt-certified-providers/    Risks of benzodiazepine (Ativan, Xanax, Klonopin, Valium, etc) use including, but not limited to, sedation, tolerance, risk for addiction/dependence. Do not drink alcohol while taking benzodiazepines due to risk of trouble breathing and potential death. Do not drive or operate heavy machinery until it is known how the drug affects you. Discuss with physician or pharmacist before ever taking a benzodiazepine with a narcotic/opioid pain medication.     Have previously discussed risks of stimulant medication including, but not limited to, decreased appetite, risk of tics (and that they may be lasting), trouble sleeping, cardiac risks such as increased heart rate and blood pressure, and rare risk of sudden cardiac death.  Also risk of addiction/tolerance/dependence.    Administrative Billing:   Phone Call/Video Duration: 28 Minutes  8:32a - 9:00a    Patient Status:  Patient is a continuous care patient and refills will continue to come from this provider until otherwise noted.    Signed:   Kimberly Perez DO  [facility]    Disclaimer: This note consists of symbols derived from keyboarding, dictation and/or voice recognition software. As a result, there may be errors in the script that have gone undetected. Please consider this when interpreting information found in this chart.

## 2023-11-17 NOTE — NURSING NOTE
Is the patient currently in the state of [place]? YES    Visit mode:VIDEO    If the visit is dropped, the patient can be reconnected by: VIDEO VISIT: Text to cell phone:   Telephone Information:   Mobile [phone]       Will anyone else be joining the visit? No  (If patient encounters technical issues they should call [phone])    How would you like to obtain your AVS? MyChart    Are changes needed to the allergy or medication list? No    Rooming Documentation: Assigned questionnaire(s) completed .    Reason for visit: RECHECK     Cinda Mitchell [facility]      Care team has reviewed attendance agreement with patient. Patient advised that two failed appointments within 6 months may lead to termination of current episode of care.

## 2023-11-17 NOTE — PROGRESS NOTES
Echo, CT calcium, and ziopatch results noted. Pt had 10/6 visit with  for palpitations and family hx of CAD. Will message  with update. No follow up scheduled. Elvis ULLOA November 17, 2023, 2:37 PM          IMPRESSIONS:  1.  No coronary calcifications.  2.  The total Agatston calcium score is 0 placing the patient in the  lowest percentile when compared to age and gender matched control  group.  3.  Recommend aggressive risk factor modification.  4.  Please review Radiology report for incidental noncardiac findings  that will follow separately.                Interpretation Summary     1. The left ventricle is normal in size. There is normal left ventricular wall  thickness. Left ventricular systolic function is normal. The visual ejection  fraction is 60-65%. Diastolic Doppler findings (E/E' ratio and/or other  parameters) suggest left ventricular filling pressures are normal. No regional  wall motion abnormalities noted.  2. The right ventricle is normal size. The right ventricular systolic function  is normal.  3. Mild to moderate mitral regurgitation.  4. No pericardial effusion.  ______________________________________

## 2023-11-17 NOTE — PROGRESS NOTES
"Virtual Visit Details    Type of service:  Video Visit   Video Start Time: {video visit start/end time for provider to select:504494}  Video End Time:{video visit start/end time for provider to select:556489}    Originating Location (pt. Location): {video visit patient location:079158::\"Home\"}  {PROVIDER LOCATION On-site should be selected for visits conducted from your clinic location or adjoining [facility] hospital, academic office, or other nearby [facility] building. Off-site should be selected for all other provider locations, including home:387734}  Distant Location (provider location):  {virtual location provider:363453}  Platform used for Video Visit: {Virtual Visit Platforms:015891::\"[employer]\"}    "

## 2023-11-18 ENCOUNTER — HOSPITAL ENCOUNTER (OUTPATIENT)
Dept: MRI IMAGING | Facility: CLINIC | Age: 63
Discharge: HOME | End: 2023-11-18
Attending: ANESTHESIOLOGY | Admitting: ANESTHESIOLOGY
Payer: COMMERCIAL

## 2023-11-18 DIAGNOSIS — M96.1 FAILED BACK SURGICAL SYNDROME: ICD-10-CM

## 2023-11-18 PROCEDURE — 72146 MRI CHEST SPINE W/O DYE: CPT

## 2023-11-20 ENCOUNTER — VIRTUAL VISIT (OUTPATIENT)
Dept: PALLIATIVE MEDICINE | Facility: CLINIC | Age: 63
End: 2023-11-20
Attending: ANESTHESIOLOGY
Payer: COMMERCIAL

## 2023-11-20 DIAGNOSIS — M96.1 FAILED BACK SURGICAL SYNDROME: ICD-10-CM

## 2023-11-20 PROCEDURE — 96156 HLTH BHV ASSMT/REASSESSMENT: CPT | Mod: VID | Performed by: PSYCHOLOGIST

## 2023-11-22 NOTE — TELEPHONE ENCOUNTER
No significant findings noted.  Recommend repeating echocardiogram in 1 year to evaluate mild-moderate mitral regurgitation and following up with cardiology.

## 2023-11-28 NOTE — PROGRESS NOTES
I called pt and reviewed echo, ziopatch, and CT calcium score results. Pt to follow up in one year with an echo. She will call sooner for any worsening symptoms. She did get a mouth guard for her sleep apnea and has noticed improvement in her symptoms. Elvis ULLOA November 28, 2023, 3:30 PM

## 2023-12-01 ENCOUNTER — HOSPITAL ENCOUNTER (OUTPATIENT)
Facility: AMBULATORY SURGERY CENTER | Age: 63
Discharge: HOME | End: 2023-12-01
Attending: ANESTHESIOLOGY | Admitting: ANESTHESIOLOGY
Payer: COMMERCIAL

## 2023-12-01 ENCOUNTER — ANCILLARY PROCEDURE (OUTPATIENT)
Dept: RADIOLOGY | Facility: AMBULATORY SURGERY CENTER | Age: 63
End: 2023-12-01
Attending: ANESTHESIOLOGY
Payer: COMMERCIAL

## 2023-12-01 VITALS
RESPIRATION RATE: 16 BRPM | OXYGEN SATURATION: 98 % | TEMPERATURE: 97.9 F | SYSTOLIC BLOOD PRESSURE: 96 MMHG | DIASTOLIC BLOOD PRESSURE: 61 MMHG

## 2023-12-01 DIAGNOSIS — M54.16 LUMBAR RADICULOPATHY: ICD-10-CM

## 2023-12-01 DIAGNOSIS — M96.1 FAILED BACK SURGICAL SYNDROME: Primary | ICD-10-CM

## 2023-12-01 PROCEDURE — 62323 NJX INTERLAMINAR LMBR/SAC: CPT

## 2023-12-01 RX ORDER — BUPIVACAINE HYDROCHLORIDE 2.5 MG/ML
INJECTION, SOLUTION EPIDURAL; INFILTRATION; INTRACAUDAL PRN
Status: DISCONTINUED | OUTPATIENT
Start: 2023-12-01 | End: 2023-12-01 | Stop reason: HOSPADM

## 2023-12-01 RX ORDER — SOY PROTEIN
1 POWDER (GRAM) ORAL
COMMUNITY

## 2023-12-01 RX ORDER — PREDNISONE 20 MG/1
TABLET ORAL
COMMUNITY
End: 2024-01-12

## 2023-12-01 RX ORDER — IOPAMIDOL 408 MG/ML
INJECTION, SOLUTION INTRATHECAL PRN
Status: DISCONTINUED | OUTPATIENT
Start: 2023-12-01 | End: 2023-12-01 | Stop reason: HOSPADM

## 2023-12-01 RX ORDER — METHYLPREDNISOLONE ACETATE 40 MG/ML
INJECTION, SUSPENSION INTRA-ARTICULAR; INTRALESIONAL; INTRAMUSCULAR; SOFT TISSUE PRN
Status: DISCONTINUED | OUTPATIENT
Start: 2023-12-01 | End: 2023-12-01 | Stop reason: HOSPADM

## 2023-12-01 RX ORDER — LIDOCAINE HYDROCHLORIDE 10 MG/ML
INJECTION, SOLUTION EPIDURAL; INFILTRATION; INTRACAUDAL; PERINEURAL PRN
Status: DISCONTINUED | OUTPATIENT
Start: 2023-12-01 | End: 2023-12-01 | Stop reason: HOSPADM

## 2023-12-01 NOTE — DISCHARGE INSTRUCTIONS
Caudal Epidural Pain Injection  This procedure can be used to treat a variety of conditions that result in lower back and lower extremity pain.    Activity  -You may resume most normal activity levels with the exception of strenuous activity. It is important for us to know if your pain with normal activity is relieved after this injection.  -DO NOT shower for 24 hours  -DO NOT remove bandaid for 24 hours    Pain  -You may experience soreness at the injection site for one or two days  -You may use an ice pack for 20 minutes every 2 hours for the first 24 hours  -You may use a heating pad after the first 24 hours  -You may use Tylenol (acetaminophen) every 4 hours or other pain medicines as     directed by your physician    You may experience numbness radiating into your legs or arms (depending on the procedure location). This numbness may last several hours. Until sensation returns to normal; please use caution in walking, climbing stairs, and stepping out of your vehicle, etc.      Common side effects of steroids:  Not everyone will experience corticosteroid side effects. If side effects are experienced, they will gradually subside in the 7-10 day period following an injection. Most common side effects include:  -Flushed face and/or chest  -Feeling of warmth, particularly in the face but could be an overall feeling of warmth  -Increased blood sugar in diabetic patients  -Menstrual irregularities my occur. If taking hormone-based birth control an alternate method of birth control is recommended  -Sleep disturbances and/or mood swings are possible  -Leg cramps      PLEASE KEEP TRACK OF YOUR SYMPTOMS AND NOTE YOUR IMPROVEMENT FOR YOUR DOCTOR.     Please contact us if you have:  -Severe pain  -Fever more than 101.5 degrees Fahrenheit  -Signs of infection at the injection site (redness, swelling, or drainage)    FOR PAIN CENTER PATIENTS:  If you have questions, please contact the Pain Clinic at [phone] Option #1  between the hours of 7:00 am and 3:00 pm Monday through Friday. After office hours you can contact the on call provider by dialing [phone]. If you need immediate attention, we recommend that you go to a hospital emergency room or dial [phone].      FOR PM&R PATIENTS:  For patients seen by the PM and R service, please call [phone]. (Monday through Friday 8a-5p.  After business hours and weekends call [phone] and ask for the PM and R doctor on call). If you need to fax a pain diary to PM&R the fax number is [phone]. If you are unable to fax, uploading to [employer] is encouraged, then send to provider. If you have procedure scheduling questions please call [phone] Option #2.

## 2023-12-04 ENCOUNTER — TELEPHONE (OUTPATIENT)
Dept: PALLIATIVE MEDICINE | Facility: CLINIC | Age: 63
End: 2023-12-04
Payer: COMMERCIAL

## 2023-12-05 ENCOUNTER — INFUSION THERAPY VISIT (OUTPATIENT)
Dept: INFUSION THERAPY | Facility: CLINIC | Age: 63
End: 2023-12-05
Attending: PSYCHIATRY & NEUROLOGY
Payer: COMMERCIAL

## 2023-12-05 VITALS — HEART RATE: 72 BPM | OXYGEN SATURATION: 96 % | SYSTOLIC BLOOD PRESSURE: 106 MMHG | DIASTOLIC BLOOD PRESSURE: 71 MMHG

## 2023-12-05 DIAGNOSIS — F33.2 SEVERE EPISODE OF RECURRENT MAJOR DEPRESSIVE DISORDER, WITHOUT PSYCHOTIC FEATURES (H): Primary | ICD-10-CM

## 2023-12-05 PROCEDURE — 96365 THER/PROPH/DIAG IV INF INIT: CPT

## 2023-12-05 PROCEDURE — 250N000009 HC RX 250: Performed by: PSYCHIATRY & NEUROLOGY

## 2023-12-05 PROCEDURE — 258N000003 HC RX IP 258 OP 636: Performed by: PSYCHIATRY & NEUROLOGY

## 2023-12-05 RX ORDER — ALBUTEROL SULFATE 90 UG/1
1-2 AEROSOL, METERED RESPIRATORY (INHALATION)
Status: CANCELLED
Start: 2023-12-05

## 2023-12-05 RX ORDER — ONDANSETRON 2 MG/ML
4 INJECTION INTRAMUSCULAR; INTRAVENOUS
Status: CANCELLED | OUTPATIENT
Start: 2023-12-05

## 2023-12-05 RX ORDER — ALBUTEROL SULFATE 0.83 MG/ML
2.5 SOLUTION RESPIRATORY (INHALATION)
Status: CANCELLED | OUTPATIENT
Start: 2023-12-05

## 2023-12-05 RX ORDER — NALOXONE HYDROCHLORIDE 0.4 MG/ML
0.2 INJECTION, SOLUTION INTRAMUSCULAR; INTRAVENOUS; SUBCUTANEOUS
Status: CANCELLED | OUTPATIENT
Start: 2023-12-05

## 2023-12-05 RX ORDER — HYDRALAZINE HYDROCHLORIDE 20 MG/ML
10 INJECTION INTRAMUSCULAR; INTRAVENOUS
Status: DISCONTINUED | OUTPATIENT
Start: 2023-12-05 | End: 2023-12-05 | Stop reason: HOSPADM

## 2023-12-05 RX ORDER — METHYLPREDNISOLONE SODIUM SUCCINATE 125 MG/2ML
125 INJECTION, POWDER, LYOPHILIZED, FOR SOLUTION INTRAMUSCULAR; INTRAVENOUS
Status: CANCELLED
Start: 2023-12-05

## 2023-12-05 RX ORDER — HEPARIN SODIUM (PORCINE) LOCK FLUSH IV SOLN 100 UNIT/ML 100 UNIT/ML
5 SOLUTION INTRAVENOUS
Status: CANCELLED | OUTPATIENT
Start: 2023-12-05

## 2023-12-05 RX ORDER — HYDRALAZINE HYDROCHLORIDE 20 MG/ML
10 INJECTION INTRAMUSCULAR; INTRAVENOUS
Status: CANCELLED | OUTPATIENT
Start: 2023-12-05

## 2023-12-05 RX ORDER — EPINEPHRINE 1 MG/ML
0.3 INJECTION, SOLUTION, CONCENTRATE INTRAVENOUS EVERY 5 MIN PRN
Status: CANCELLED | OUTPATIENT
Start: 2023-12-05

## 2023-12-05 RX ORDER — MEPERIDINE HYDROCHLORIDE 25 MG/ML
25 INJECTION INTRAMUSCULAR; INTRAVENOUS; SUBCUTANEOUS EVERY 30 MIN PRN
Status: CANCELLED | OUTPATIENT
Start: 2023-12-05

## 2023-12-05 RX ORDER — ONDANSETRON 2 MG/ML
4 INJECTION INTRAMUSCULAR; INTRAVENOUS
Status: DISCONTINUED | OUTPATIENT
Start: 2023-12-05 | End: 2023-12-05 | Stop reason: HOSPADM

## 2023-12-05 RX ORDER — HEPARIN SODIUM,PORCINE 10 UNIT/ML
5 VIAL (ML) INTRAVENOUS
Status: CANCELLED | OUTPATIENT
Start: 2023-12-05

## 2023-12-05 RX ORDER — DIPHENHYDRAMINE HYDROCHLORIDE 50 MG/ML
50 INJECTION INTRAMUSCULAR; INTRAVENOUS
Status: CANCELLED
Start: 2023-12-05

## 2023-12-05 RX ADMIN — KETAMINE HYDROCHLORIDE 65 MG: 50 INJECTION INTRAMUSCULAR; INTRAVENOUS at 10:30

## 2023-12-05 NOTE — PROGRESS NOTES
Infusion Nursing Note:  Janelle White presents today for ketamine.    Patient seen by provider today: No   present during visit today: Not Applicable.    Note: No new issues.      Intravenous Access:  Peripheral IV placed.    Treatment Conditions:  Not Applicable.      Post Infusion Assessment:  Patient tolerated infusion without incident.  Patient observed for 60 minutes post ketamine per protocol.  Site patent and intact, free from redness, edema or discomfort.  No evidence of extravasations.  Access discontinued per protocol.       Discharge Plan:   Discharge instructions reviewed with: Patient.  Patient and/or family verbalized understanding of discharge instructions and all questions answered.  Patient discharged in stable condition accompanied by: self.  Departure Mode: Ambulatory.      Kasey Anand RN

## 2023-12-07 ENCOUNTER — TELEPHONE (OUTPATIENT)
Dept: PALLIATIVE MEDICINE | Facility: CLINIC | Age: 63
End: 2023-12-07
Payer: COMMERCIAL

## 2023-12-07 PROBLEM — M96.1 FAILED BACK SURGICAL SYNDROME: Status: ACTIVE | Noted: 2023-12-01

## 2023-12-07 NOTE — TELEPHONE ENCOUNTER
Date Scheduled: 12-21-23  Facility: Surgery Locations: [facility]  Surgeon: Dr. Cherry   Post-op appointment scheduled:    scheduled?: No  Surgery packet/instructions confirmed received?  No  Pre op physical/PAC appointment: N/A  Special Considerations:     Leigh Mitchell  Surgery Scheduling Coordinator  Ph: [phone]

## 2023-12-08 ENCOUNTER — TELEPHONE (OUTPATIENT)
Dept: PALLIATIVE MEDICINE | Facility: CLINIC | Age: 63
End: 2023-12-08
Payer: COMMERCIAL

## 2023-12-08 NOTE — TELEPHONE ENCOUNTER
Patient is scheduled for surgery with Dr. Cherry    Spoke with: Patient    Date of Surgery: augustin 01/12/24    Location: [facility]    Informed patient they will need an adult  yes    Additional comments: Patient is aware of date and time of the procedure.        Esperanza Selby MA on 12/8/2023 at 10:44 AM

## 2023-12-08 NOTE — H&P
ABBREVIATED H&P [facility]      Patient Name: Janelle White   MRN: [id number]   YOB: 1960     1. Reason for Procedure:  Procedure Summary       Date: 12/01/23 Room / Location: [facility] PROCEDURE ROOM 06 / [facility]    Anesthesia Start:  Anesthesia Stop:     Procedure: Caudal Epidural Steroid Injection (Spine) Diagnosis:       Lumbar radiculopathy      (Lumbar radiculopathy [M54.16])    Providers: Joslyn Cherry MD Responsible Provider:     Anesthesia Type: Not recorded ASA Status: Not recorded            2. History:   Past Medical History:   Diagnosis Date    Anxiety     Cervicalgia 2007    C5-6 disc protrusion    COPD (chronic obstructive pulmonary disease) (H) 2/7/2022    Depressive disorder     ESBL (extended spectrum beta-lactamase) producing bacteria infection     History of blood transfusion 2007    Cervical fusion    Leukemia (H)     CLA large beta    Melanoma (H) 1998    Migraine     Other chronic pain     Rotator cuff tear     s/p injections    Sacroiliac inflammation (H24)     Shift work sleep disorder 12/16/2013    Urinary calculi     Vitamin B12 deficiency anemia 2006    started injections       Comorbidities: None    Any history of sleep apnea? No    Any history of problems with sedation? No    3. Physical:    General: Normal  Skin:  Normal.  Respiratory: Clear to auscultation bilateral, no wheezing  Cardio:  Regular rate and rhythm  Abdomen: Soft, nontender, nondistended, no palpable masses.  Musculoskeletal: Normal  Neuro: Sensory exam normal, motor exam 5/5, bilateral upper and lower extremities       4. Current Medications (if not in Epic):   Current Outpatient Medications   Medication Sig Dispense Refill    albuterol (PROAIR HFA/PROVENTIL HFA/VENTOLIN HFA) 108 (90 Base) MCG/ACT inhaler Inhale 2 puffs into the lungs every 6 hours as needed for shortness of breath or wheezing 8.5 g 11     amphetamine-dextroamphetamine (ADDERALL XR) 25 MG 24 hr capsule Take 1 capsule (25 mg) by mouth daily for 30 days 30 capsule 0    amphetamine-dextroamphetamine (ADDERALL) 15 MG tablet Take 1 tablet (15 mg) by mouth 2 times daily for 30 days 60 tablet 0    Cobalamin Combinations (VITAMIN B12-FOLIC ACID) 500-400 MCG TABS Take 1 tablet by mouth      desvenlafaxine (PRISTIQ) 50 MG 24 hr tablet Take 1 tablet (50 mg) by mouth daily 90 tablet 1    Estradiol (DIVIGEL) 1 MG/GM GEL Place 1 packet onto the skin daily 30 g 11    hydrOXYzine (VISTARIL) 25 MG capsule Take 1 capsule (25 mg) by mouth 3 times daily as needed for itching or anxiety 90 capsule 3    Levomefolate Glucosamine (METHYLFOLATE PO) Take 7.5 mg by mouth daily      lidocaine (LIDODERM) 5 % patch Place 3 patches onto the skin daily Apply up to 3 patches to skin. Wear for 12 hours and remove for 12 hrs.  Refill when patient requests. 120 patch 3    medical cannabis (Patient's own supply.  Not a prescription) Medical Cannabis - Tangerine 4-6 ml by mouth daily. [facility]      methocarbamol (ROBAXIN) 500 MG tablet Take 1 tablet (500 mg) by mouth 4 times daily as needed for muscle spasms 120 tablet 1    methocarbamol (ROBAXIN) 750 MG tablet Take 1 tablet (750 mg) by mouth 4 times daily as needed for muscle spasms 120 tablet 4    naloxone (NARCAN) 4 MG/0.1ML nasal spray Spray 1 spray (4 mg) into one nostril alternating nostrils as needed for opioid reversal every 2-3 minutes until assistance arrives 0.2 mL 1    NONFORMULARY Take 2 Scoops by mouth daily Protein and Vitamin shake mix - Nutritional supplement      ondansetron (ZOFRAN ODT) 8 MG ODT tab Take 1 tablet (8 mg) by mouth every 8 hours as needed for nausea 30 tablet 4    Prasterone 6.5 MG INST Place 0.5 suppositories vaginally three times a week 28 each 11    predniSONE (DELTASONE) 20 MG tablet       rOPINIRole (REQUIP) 0.25 MG tablet TAKE 1 TO 2 TABLETS(0.25 TO 0.5 MG) BY MOUTH AT BEDTIME 180 tablet 3     senna-docusate (SENOKOT-S/PERICOLACE) 8.6-50 MG tablet Take 6-9 tablets by mouth every evening      vitamin D3 (CHOLECALCIFEROL) 125 MCG (5000 UT) tablet Take 5,000 Units by mouth daily      [START ON 12/18/2023] amphetamine-dextroamphetamine (ADDERALL XR) 25 MG 24 hr capsule Take 1 capsule (25 mg) by mouth daily for 30 days 30 capsule 0    [START ON 1/18/2024] amphetamine-dextroamphetamine (ADDERALL XR) 25 MG 24 hr capsule Take 1 capsule (25 mg) by mouth daily for 30 days 30 capsule 0    [START ON 12/18/2023] amphetamine-dextroamphetamine (ADDERALL) 15 MG tablet Take 1 tablet (15 mg) by mouth 2 times daily for 30 days 60 tablet 0    [START ON 1/18/2024] amphetamine-dextroamphetamine (ADDERALL) 15 MG tablet Take 1 tablet (15 mg) by mouth 2 times daily for 30 days 60 tablet 0        5. Allergies and Reactions:  is allergic to bupropion, codeine, effexor [venlafaxine hydrochloride], escitalopram, escitalopram oxalate, fluoxetine, levaquin [levofloxacin], methylphenidate, milnacipran, pregabalin, prozac [fluoxetine hcl], tramadol, and venlafaxine.

## 2023-12-08 NOTE — TELEPHONE ENCOUNTER
RN read through the instructions with the patient for the recommended procedure: Spinal Cord Stimulator Trial   Patient verbalized understanding to holding appropriate medication per protocol and was agreeable to NPO policy and needing a .    Anticoagulant: None reported    Recommended Follow Up: 1 week post op scheduled on 1/19/24.    Joelle Snyder RNCC

## 2023-12-08 NOTE — OP NOTE
CUADAL EPIDURAL STEROID INJECTION    PROCEDURE:  1) Caudal epidural steroid injection  2) Fluoroscopic needle guidance    REASON FOR PROCEDURE:Chronic low back pain pain secondary to lumbosacral radiculopathy    PHYSICIAN: Joslyn Cherry MD    MEDICATIONS INJECTED: A 7 ml solution containing 40mg depomedrol, 3mL 0.25% bupivacaine and 3 mL preservative free normal saline.  LOCAL ANESTHETIC INJECTED: 1 mL of 1% lidocaine per site  CONTRAST: Isovue, 2 mL used.    SEDATION MEDICATIONS: None  ESTIMATED BLOOD LOSS: None  COMPLICATIONS: None      Pre-procedure pain score: 6/10  Post-procedure pain score: 0/10    TECHNIQUE: Time-out was taken to identify the correct patient, procedure and side prior to starting the procedure. Lying in the prone position, the patient was prepped and draped in sterile fashion using chloroprep and sterile towels  Pressure points were checked and padded for patient comfort.  Appropriate landmarks were determined using a lateral fluoroscopic image.  Local anesthetic was given by raising a wheal and going down to the hub of a 25-gauge 1.25-inch needle. A 22-gauge, 3.5-inch Quincke needle was introduced through the sacral hiatus. The needle was advanced cephalad to just caudal to the inferior sacroiliac joint line. Omnipaque 240 was injected to confirm placement in the appropriate epidural space, and to show that there was no run-off. The medication was then injected slowly. The needle was withdrawn after flushing with local anesthetic.    The procedure was completed without complications and was tolerated well. The patient was monitored after the procedure.  The patient (or responsible party) was given post-procedure and discharge instructions to follow at home. The patient was  discharged in stable condition. A follow up appointment was made.    Joslyn Cherry MD    [facility]  Pain Management  Department of Anesthesiology

## 2023-12-14 ENCOUNTER — OFFICE VISIT (OUTPATIENT)
Dept: FAMILY MEDICINE | Facility: CLINIC | Age: 63
End: 2023-12-14
Payer: COMMERCIAL

## 2023-12-14 VITALS
SYSTOLIC BLOOD PRESSURE: 106 MMHG | BODY MASS INDEX: 26.99 KG/M2 | HEIGHT: 65 IN | RESPIRATION RATE: 16 BRPM | OXYGEN SATURATION: 99 % | DIASTOLIC BLOOD PRESSURE: 66 MMHG | TEMPERATURE: 97.3 F | WEIGHT: 162 LBS | HEART RATE: 72 BPM

## 2023-12-14 DIAGNOSIS — G89.4 CHRONIC PAIN SYNDROME: ICD-10-CM

## 2023-12-14 DIAGNOSIS — G47.33 OSA (OBSTRUCTIVE SLEEP APNEA): ICD-10-CM

## 2023-12-14 DIAGNOSIS — F33.1 MODERATE RECURRENT MAJOR DEPRESSION (H): ICD-10-CM

## 2023-12-14 DIAGNOSIS — G89.29 CHRONIC BILATERAL LOW BACK PAIN WITH BILATERAL SCIATICA: ICD-10-CM

## 2023-12-14 DIAGNOSIS — M25.50 POLYARTHRALGIA: ICD-10-CM

## 2023-12-14 DIAGNOSIS — M96.1 FAILED BACK SURGICAL SYNDROME: ICD-10-CM

## 2023-12-14 DIAGNOSIS — R73.03 PREDIABETES: ICD-10-CM

## 2023-12-14 DIAGNOSIS — F41.1 GAD (GENERALIZED ANXIETY DISORDER): ICD-10-CM

## 2023-12-14 DIAGNOSIS — Z01.818 PREOP GENERAL PHYSICAL EXAM: Primary | ICD-10-CM

## 2023-12-14 DIAGNOSIS — M54.41 CHRONIC BILATERAL LOW BACK PAIN WITH BILATERAL SCIATICA: ICD-10-CM

## 2023-12-14 DIAGNOSIS — E78.5 HYPERLIPIDEMIA LDL GOAL <130: ICD-10-CM

## 2023-12-14 DIAGNOSIS — M54.42 CHRONIC BILATERAL LOW BACK PAIN WITH BILATERAL SCIATICA: ICD-10-CM

## 2023-12-14 DIAGNOSIS — G43.909 MIGRAINE WITHOUT STATUS MIGRAINOSUS, NOT INTRACTABLE, UNSPECIFIED MIGRAINE TYPE: ICD-10-CM

## 2023-12-14 DIAGNOSIS — J44.9 CHRONIC OBSTRUCTIVE PULMONARY DISEASE, UNSPECIFIED COPD TYPE (H): ICD-10-CM

## 2023-12-14 DIAGNOSIS — C91.10 CLL (CHRONIC LYMPHOCYTIC LEUKEMIA) (H): ICD-10-CM

## 2023-12-14 DIAGNOSIS — D84.9 IMMUNOCOMPROMISED (H): ICD-10-CM

## 2023-12-14 PROCEDURE — 99214 OFFICE O/P EST MOD 30 MIN: CPT | Performed by: NURSE PRACTITIONER

## 2023-12-14 RX ORDER — RESPIRATORY SYNCYTIAL VIRUS VACCINE 120MCG/0.5
0.5 KIT INTRAMUSCULAR ONCE
Qty: 1 EACH | Refills: 0 | Status: CANCELLED | OUTPATIENT
Start: 2023-12-14 | End: 2023-12-14

## 2023-12-14 ASSESSMENT — PAIN SCALES - GENERAL: PAINLEVEL: MODERATE PAIN (4)

## 2023-12-14 NOTE — PROGRESS NOTES
[address]  [address]  [place] [address]  Phone: [phone]  Fax: [phone]  Primary Provider: Carmen Garcia  Pre-op Performing Provider: CARMEN GARCIA      PREOPERATIVE EVALUATION:  Today's date: 12/14/2023    Janelle is a 63 year old, presenting for the following:  Pre-Op Exam        12/14/2023     2:43 PM   Additional Questions   Roomed by Jay Keslser   Accompanied by Self       Surgical Information:  Surgery/Procedure: Spinal Cord Stimulator Trial   Surgery Location: [facility]   Surgeon: Joslyn Cherry MD   Surgery Date: 1/12/2024   Time of Surgery: 12:00 PM   Where patient plans to recover: At home with family  Fax number for surgical facility: Note does not need to be faxed, will be available electronically in Epic.    Assessment & Plan     The proposed surgical procedure is considered LOW risk.    Preop general physical exam  Reviewed medical/social/family history and health maintenance.  Recent labs were WNL.    Failed back surgical syndrome  Will be undergoing spinal cord stimulator trial.      Chronic pain syndrome  Follows with pain clinic    Chronic bilateral low back pain with bilateral sciatica  Follows with pain clinic.    Chronic obstructive pulmonary disease, unspecified COPD type (H)  Controlled, no recent dyspnea.  Did have URI recently that is resolving.    Polyarthralgia  Follows with pain clinic.    Migraine without status migrainosus, not intractable, unspecified migraine type  Follows with Pain clinic, stable overall.    Moderate recurrent major depression (H)  Stable, follows with psychiatry and has ketamine infusions which have significantly improved mood.  No changes to medications at this time, managed by psych.    CLL (chronic lymphocytic leukemia) (H)  Stable, follows with hematology.  Recent CBC WNL    CLARE (obstructive sleep apnea)  Stable, on CPAP    Prediabetes  Stable,  continue to monitor    Hyperlipidemia LDL goal <130  Diet controlled.    STACIE (generalized anxiety disorder)  Stable, no changes to meds.  Managed by psychiatry    Immunocompromised (H24)  Stable, recent CBC norla.            - No identified additional risk factors other than previously addressed    Antiplatelet or Anticoagulation Medication Instructions:   - Patient is on no antiplatelet or anticoagulation medications.    Additional Medication Instructions:  Patient is to take all scheduled medications on the day of surgery    RECOMMENDATION:  APPROVAL GIVEN to proceed with proposed procedure, without further diagnostic evaluation.            Subjective       HPI related to upcoming procedure: Spinal cord stimulator implant        12/13/2023     9:44 AM   Preop Questions   1. Have you ever had a heart attack or stroke? No   2. Have you ever had surgery on your heart or blood vessels, such as a stent placement, a coronary artery bypass, or surgery on an artery in your head, neck, heart, or legs? No   3. Do you have chest pain with activity? No   4. Do you have a history of  heart failure? No   5. Do you currently have a cold, bronchitis or symptoms of other infection? YES - recent cold/COVID negative.     6. Do you have a cough, shortness of breath, or wheezing? YES - recent cold.   7. Do you or anyone in your family have previous history of blood clots? YES - Follows with hematology, not on AC   8. Do you or does anyone in your family have a serious bleeding problem such as prolonged bleeding following surgeries or cuts? No   9. Have you ever had problems with anemia or been told to take iron pills? YES - History CLL   10. Have you had any abnormal blood loss such as black, tarry or bloody stools, or abnormal vaginal bleeding? No   11. Have you ever had a blood transfusion? YES - No past reactions.   11a. Have you ever had a transfusion reaction? No   12. Are you willing to have a blood transfusion if it is  medically needed before, during, or after your surgery? Yes   13. Have you or any of your relatives ever had problems with anesthesia? No   14. Do you have sleep apnea, excessive snoring or daytime drowsiness? YES - CLARE   14a. Do you have a CPAP machine? Yes   15. Do you have any artifical heart valves or other implanted medical devices like a pacemaker, defibrillator, or continuous glucose monitor? No   16. Do you have artificial joints? No   17. Are you allergic to latex? No       Health Care Directive:  Patient does not have a Health Care Directive or Living Will: Discussed advance care planning with patient; information given to patient to review.    Preoperative Review of :   reviewed - controlled substances reflected in medication list.          Review of Systems  CONSTITUTIONAL: NEGATIVE for fever, chills, change in weight  ENT/MOUTH: NEGATIVE for ear, mouth and throat problems  RESP: NEGATIVE for significant cough or SOB  CV: NEGATIVE for chest pain, palpitations or peripheral edema    Patient Active Problem List    Diagnosis Date Noted    Failed back surgical syndrome 12/01/2023     Priority: Medium    Lumbar radiculopathy 10/26/2023     Priority: Medium    Sacroiliitis (H24) 08/04/2023     Priority: Medium    History of falling 03/13/2023     Priority: Medium    Suicidal ideation 06/30/2022     Priority: Medium    Immunocompromised (H24) 06/30/2022     Priority: Medium    Infection due to 2019 novel coronavirus 06/30/2022     Priority: Medium    Severe episode of recurrent major depressive disorder, without psychotic features (H) 04/17/2022     Priority: Medium    COPD (chronic obstructive pulmonary disease) (H) 02/07/2022     Priority: Medium    Tension type headache 11/04/2021     Priority: Medium    Rotator cuff injury 11/04/2021     Priority: Medium    Spinal stenosis of lumbar region with radiculopathy 09/02/2021     Priority: Medium    COVID-19 08/29/2021     Priority: Medium    Polyarthralgia  08/11/2021     Priority: Medium    Cellulitis, unspecified cellulitis site 08/11/2021     Priority: Medium    Sepsis, due to unspecified organism, unspecified whether acute organ dysfunction present (H) 08/11/2021     Priority: Medium    Cellulitis 08/11/2021     Priority: Medium    STACIE (generalized anxiety disorder) 07/27/2021     Priority: Medium    MTHFR gene mutation 04/12/2021     Priority: Medium    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     Priority: Medium    CYP2D6 poor metabolizer (H) 04/12/2021     Priority: Medium    Status post blepharoplasty 11/18/2020     Priority: Medium    Regular astigmatism, bilateral 11/18/2020     Priority: Medium    Prediabetes 09/19/2019     Priority: Medium    Hyperlipidemia LDL goal <130 09/19/2019     Priority: Medium    CLARE (obstructive sleep apnea) 02/13/2019     Priority: Medium    Controlled substance agreement signed 08/14/2018     Priority: Medium    Chronic pain syndrome 12/21/2017     Priority: Medium    CLL (chronic lymphocytic leukemia) (H) 12/21/2017     Priority: Medium    Hypotension 09/14/2017     Priority: Medium    History of laser assisted in situ keratomileusis 10/14/2014     Priority: Medium    Pain medication agreement 04/23/2014     Priority: Medium     Formatting of this note might be different from the original.  Patient takes morphine 15 mg ER BID for chronic neck and back pain.  She is also on cymbalta, ibuprofen, flexaril prn      Shift work sleep disorder 12/16/2013     Priority: Medium    Vitamin D deficiency 11/08/2012     Priority: Medium    Moderate recurrent major depression (H) 01/06/2011     Priority: Medium    DDD (degenerative disc disease), cervical 10/07/2010     Priority: Medium    CARDIOVASCULAR SCREENING; LDL GOAL LESS THAN 160 02/10/2010     Priority: Medium    Chronic Low Back Pain 10/01/2009     Priority: Medium     S/p AP L3-S1 fusion 12/2010 - referred to [facility].   Orthopedics writing scripts for narcotics  post-op.      Migraine      Priority: Medium     Problem list name updated by automated process. Provider to review      B-complex deficiency 10/10/2006     Priority: Medium     Problem list name updated by automated process. Provider to review      PERSONAL HX OF  MELANOMA 2003     Priority: Low      Past Medical History:   Diagnosis Date    Anxiety     Cervicalgia     C5-6 disc protrusion    COPD (chronic obstructive pulmonary disease) (H) 2022    Depressive disorder     ESBL (extended spectrum beta-lactamase) producing bacteria infection     History of blood transfusion     Cervical fusion    Leukemia (H)     CLA large beta    Melanoma (H)     Migraine     Other chronic pain     Rotator cuff tear     s/p injections    Sacroiliac inflammation (H24)     Shift work sleep disorder 2013    Urinary calculi     Vitamin B12 deficiency anemia 2006    started injections     Past Surgical History:   Procedure Laterality Date    anterior cervical discectomy C4-5 ,Fusion C5-6-7  10/2007    APPENDECTOMY      BACK SURGERY      BLEPHAROPLASTY BILATERAL  2013    Procedure: BLEPHAROPLASTY BILATERAL;  BILATERAL UPPER LID BLEPHAROPLASTY AND BROWPEXY ;  Surgeon: Godfrey Miguel MD;  Location: [facility]     SECTION      x2    COLONOSCOPY N/A 2020    Procedure: COLONOSCOPY;  Surgeon: Butch Lockhart MD;  Location:  GI    ESOPHAGOSCOPY, GASTROSCOPY, DUODENOSCOPY (EGD), COMBINED N/A 2020    Procedure: ESOPHAGOGASTRODUODENOSCOPY, WITH BIOPSY biosies by cold forceps;  Surgeon: Butch Lockhart MD;  Location:  GI    EYE SURGERY      FUSION LUMBAR ANTERIOR, FUSION LUMBAR POSTERIOR TWO LEVELS, COMBINED  2010    L3-S1 anterior posterior fusion    GENITOURINARY SURGERY  Hysterectomy    2006    HEAD & NECK SURGERY      Cervical spine- c2-T2    HYSTERECTOMY  2006    ovaries intact    HYSTERECTOMY      HYSTERECTOMY, PAP NO LONGER INDICATED      INJECT EPIDURAL LUMBAR / SACRAL SINGLE  N/A 12/1/2023    Procedure: Caudal Epidural Steroid Injection;  Surgeon: Joslyn Cherry MD;  Location: [facility] OR    INJECT SACROILIAC JOINT Right 9/8/2023    Procedure: Sacroiliac Joint Injection;  Surgeon: Joslyn Cherry MD;  Location: [facility] OR    Partial vulvectomy for NEENA III  01/2009    Pubovaginal sling, post op durasphere injections  07/2006    wears pad    ROTATOR CUFF REPAIR RT/LT  5/2011    right    SOFT TISSUE SURGERY  2019    Bilateral carpal/ulnar release    SPINAL FUSION C3-4  7/2009    C3-4, anterior spinal fusion    TUBAL LIGATION      ZZC APPENDECTOMY  7/2006     Current Outpatient Medications   Medication Sig Dispense Refill    albuterol (PROAIR HFA/PROVENTIL HFA/VENTOLIN HFA) 108 (90 Base) MCG/ACT inhaler Inhale 2 puffs into the lungs every 6 hours as needed for shortness of breath or wheezing 8.5 g 11    amphetamine-dextroamphetamine (ADDERALL XR) 25 MG 24 hr capsule Take 1 capsule (25 mg) by mouth daily for 30 days 30 capsule 0    [START ON 12/18/2023] amphetamine-dextroamphetamine (ADDERALL XR) 25 MG 24 hr capsule Take 1 capsule (25 mg) by mouth daily for 30 days 30 capsule 0    [START ON 1/18/2024] amphetamine-dextroamphetamine (ADDERALL XR) 25 MG 24 hr capsule Take 1 capsule (25 mg) by mouth daily for 30 days 30 capsule 0    amphetamine-dextroamphetamine (ADDERALL) 15 MG tablet Take 1 tablet (15 mg) by mouth 2 times daily for 30 days 60 tablet 0    [START ON 12/18/2023] amphetamine-dextroamphetamine (ADDERALL) 15 MG tablet Take 1 tablet (15 mg) by mouth 2 times daily for 30 days 60 tablet 0    [START ON 1/18/2024] amphetamine-dextroamphetamine (ADDERALL) 15 MG tablet Take 1 tablet (15 mg) by mouth 2 times daily for 30 days 60 tablet 0    Cobalamin Combinations (VITAMIN B12-FOLIC ACID) 500-400 MCG TABS Take 1 tablet by mouth      desvenlafaxine (PRISTIQ) 50 MG 24 hr tablet Take 1 tablet (50 mg) by mouth daily 90 tablet 1    Estradiol (DIVIGEL) 1 MG/GM GEL Place 1 packet onto the skin daily 30  "g 11    hydrOXYzine (VISTARIL) 25 MG capsule Take 1 capsule (25 mg) by mouth 3 times daily as needed for itching or anxiety 90 capsule 3    Levomefolate Glucosamine (METHYLFOLATE PO) Take 7.5 mg by mouth daily      lidocaine (LIDODERM) 5 % patch Place 3 patches onto the skin daily Apply up to 3 patches to skin. Wear for 12 hours and remove for 12 hrs.  Refill when patient requests. 120 patch 3    medical cannabis (Patient's own supply.  Not a prescription) Medical Cannabis - Tangerine 4-6 ml by mouth daily. [employer]      methocarbamol (ROBAXIN) 500 MG tablet Take 1 tablet (500 mg) by mouth 4 times daily as needed for muscle spasms 120 tablet 1    methocarbamol (ROBAXIN) 750 MG tablet Take 1 tablet (750 mg) by mouth 4 times daily as needed for muscle spasms 120 tablet 4    naloxone (NARCAN) 4 MG/0.1ML nasal spray Spray 1 spray (4 mg) into one nostril alternating nostrils as needed for opioid reversal every 2-3 minutes until assistance arrives 0.2 mL 1    NONFORMULARY Take 2 Scoops by mouth daily Protein and Vitamin shake mix - Nutritional supplement      ondansetron (ZOFRAN ODT) 8 MG ODT tab Take 1 tablet (8 mg) by mouth every 8 hours as needed for nausea 30 tablet 4    Prasterone 6.5 MG INST Place 0.5 suppositories vaginally three times a week 28 each 11    predniSONE (DELTASONE) 20 MG tablet       rOPINIRole (REQUIP) 0.25 MG tablet TAKE 1 TO 2 TABLETS(0.25 TO 0.5 MG) BY MOUTH AT BEDTIME 180 tablet 3    senna-docusate (SENOKOT-S/PERICOLACE) 8.6-50 MG tablet Take 6-9 tablets by mouth every evening      vitamin D3 (CHOLECALCIFEROL) 125 MCG (5000 UT) tablet Take 5,000 Units by mouth daily         Allergies   Allergen Reactions    Bupropion Other (See Comments) and Swelling     Ineffective, name brand works best  Ineffective, name brand works best    Codeine Other (See Comments)     \"Feels like electricity running through body\"  No reaction noted in [employer].  Shaky  With Tylenol    Effexor [Venlafaxine " "Hydrochloride]      Affect too flat    Escitalopram      Other reaction(s): *Unknown  Sexual dysfunction    Escitalopram Oxalate      Sexual dysfunction    Fluoxetine Other (See Comments)     Sexual dysfunction    Levaquin [Levofloxacin] Other (See Comments)     Suicidal thoughts  Dark thoughts- mood changes    Methylphenidate Hives    Milnacipran      12.5 MG is fine. 25 MG caused side effects. \"Dark thoughts\"    Pregabalin Other (See Comments)     Hallucinations  Audiovisual hallucinations    Prozac [Fluoxetine Hcl]      Sexual dysfunction    Tramadol Hives     Hives      Venlafaxine      Other reaction(s): *Unknown  Affect too flat        Social History     Tobacco Use    Smoking status: Former     Packs/day: 1.00     Years: 5.00     Additional pack years: 0.00     Total pack years: 5.00     Types: Cigarettes, Clove cigarettes or kreteks     Quit date: 1984     Years since quittin.9    Smokeless tobacco: Never   Substance Use Topics    Alcohol use: Yes     Comment: rare       History   Drug Use Unknown     Comment: college experimentation --none since         Objective     /66 (BP Location: Right arm, Patient Position: Sitting, Cuff Size: Adult Regular)   Pulse 72   Temp 97.3  F (36.3  C) (Temporal)   Resp 16   Ht 1.645 m (5' 4.76\")   Wt 73.5 kg (162 lb)   LMP 2005 (LMP Unknown)   SpO2 99%   Breastfeeding No   BMI 27.16 kg/m      Physical Exam    GENERAL APPEARANCE: healthy, alert and no distress     EYES: EOMI, PERRL     HENT: ear canals and TM's normal and nose and mouth without ulcers or lesions     NECK: no adenopathy, no asymmetry, masses, or scars and thyroid normal to palpation     RESP: lungs clear to auscultation - no rales, rhonchi or wheezes     CV: regular rates and rhythm, normal S1 S2, no S3 or S4 and no murmur, click or rub     ABDOMEN:  soft, nontender, no HSM or masses and bowel sounds normal     MS: extremities normal- no gross deformities noted, no evidence of " inflammation in joints, FROM in all extremities.     SKIN: no suspicious lesions or rashes     NEURO: Normal strength and tone, sensory exam grossly normal, mentation intact and speech normal     PSYCH: mentation appears normal. and affect normal/bright     LYMPHATICS: No cervical adenopathy    Recent Labs   Lab Test 09/27/23  1211 07/04/22  0800 07/03/22  1236 06/30/22  1658 04/26/22  1036   HGB 13.3  --   --  14.2 13.5    166  --  175 160   NA  --   --   --  136 141   POTASSIUM  --   --   --  3.4 3.9   CR 0.71  --  0.67 0.59 0.73        Diagnostics:  No labs were ordered during this visit.   No EKG required for low risk surgery (cataract, skin procedure, breast biopsy, etc).    Revised Cardiac Risk Index (RCRI):  The patient has the following serious cardiovascular risks for perioperative complications:   - No serious cardiac risks = 0 points     RCRI Interpretation: 0 points: Class I (very low risk - 0.4% complication rate)         Signed Electronically by: BRIONNA Jordan CNP  Copy of this evaluation report is provided to requesting physician.

## 2023-12-14 NOTE — PATIENT INSTRUCTIONS
Preparing for Your Surgery  Getting started  A nurse will call you to review your health history and instructions. They will give you an arrival time based on your scheduled surgery time. Please be ready to share:  Your doctor's clinic name and phone number  Your medical, surgical, and anesthesia history  A list of allergies and sensitivities  A list of medicines, including herbal treatments and over-the-counter drugs  Whether the patient has a legal guardian (ask how to send us the papers in advance)  Please tell us if you're pregnant--or if there's any chance you might be pregnant. Some surgeries may injure a fetus (unborn baby), so they require a pregnancy test. Surgeries that are safe for a fetus don't always need a test, and you can choose whether to have one.   If you have a child who's having surgery, please ask for a copy of Preparing for Your Child's Surgery.    Preparing for surgery  Within 10 to 30 days of surgery: Have a pre-op exam (sometimes called an H&P, or History and Physical). This can be done at a clinic or pre-operative center.  If you're having a , you may not need this exam. Talk to your care team.  At your pre-op exam, talk to your care team about all medicines you take. If you need to stop any medicines before surgery, ask when to start taking them again.  We do this for your safety. Many medicines can make you bleed too much during surgery. Some change how well surgery (anesthesia) drugs work.  Call your insurance company to let them know you're having surgery. (If you don't have insurance, call [phone].)  Call your clinic if there's any change in your health. This includes signs of a cold or flu (sore throat, runny nose, cough, rash, fever). It also includes a scrape or scratch near the surgery site.  If you have questions on the day of surgery, call your hospital or surgery center.  Eating and drinking guidelines  For your safety: Unless your surgeon tells you otherwise,  follow the guidelines below.  Eat and drink as usual until 8 hours before you arrive for surgery. After that, no food or milk.  Drink clear liquids until 2 hours before you arrive. These are liquids you can see through, like water, Gatorade, and Propel Water. They also include plain black coffee and tea (no cream or milk), candy, and breath mints. You can spit out gum when you arrive.  If you drink alcohol: Stop drinking it the night before surgery.  If your care team tells you to take medicine on the morning of surgery, it's okay to take it with a sip of water.  Preventing infection  Shower or bathe the night before and morning of your surgery. Follow the instructions your clinic gave you. (If no instructions, use regular soap.)  Don't shave or clip hair near your surgery site. We'll remove the hair if needed.  Don't smoke or vape the morning of surgery. You may chew nicotine gum up to 2 hours before surgery. A nicotine patch is okay.  Note: Some surgeries require you to completely quit smoking and nicotine. Check with your surgeon.  Your care team will make every effort to keep you safe from infection. We will:  Clean our hands often with soap and water (or an alcohol-based hand rub).  Clean the skin at your surgery site with a special soap that kills germs.  Give you a special gown to keep you warm. (Cold raises the risk of infection.)  Wear special hair covers, masks, gowns and gloves during surgery.  Give antibiotic medicine, if prescribed. Not all surgeries need antibiotics.  What to bring on the day of surgery  Photo ID and insurance card  Copy of your health care directive, if you have one  Glasses and hearing aids (bring cases)  You can't wear contacts during surgery  Inhaler and eye drops, if you use them (tell us about these when you arrive)  CPAP machine or breathing device, if you use them  A few personal items, if spending the night  If you have . . .  A pacemaker, ICD (cardiac defibrillator) or other  implant: Bring the ID card.  An implanted stimulator: Bring the remote control.  A legal guardian: Bring a copy of the certified (court-stamped) guardianship papers.  Please remove any jewelry, including body piercings. Leave jewelry and other valuables at home.  If you're going home the day of surgery  You must have a responsible adult drive you home. They should stay with you overnight as well.  If you don't have someone to stay with you, and you aren't safe to go home alone, we may keep you overnight. Insurance often won't pay for this.  After surgery  If it's hard to control your pain or you need more pain medicine, please call your surgeon's office.  Questions?   If you have any questions for your care team, list them here: _________________________________________________________________________________________________________________________________________________________________________ ____________________________________ ____________________________________ ____________________________________  For informational purposes only. Not to replace the advice of your health care provider. Copyright   2003, 2019 [employer] [facility]. All rights reserved. Clinically reviewed by Deloris Webb MD. [employer] 190967 - REV 12/22.    How to Take Your Medication Before Surgery  - Take all of your medications before surgery as usual

## 2023-12-18 NOTE — CONFIDENTIAL NOTE
Writer replied to patient via my chart letting patient know that writer will send message to Dr Yanes about ketamine increase. Writer also encouraged patient to schedule follow up appointment with Dr Yanes as they are due to be seen.

## 2023-12-26 ENCOUNTER — INFUSION THERAPY VISIT (OUTPATIENT)
Dept: INFUSION THERAPY | Facility: CLINIC | Age: 63
End: 2023-12-26
Attending: PSYCHIATRY & NEUROLOGY
Payer: COMMERCIAL

## 2023-12-26 VITALS
DIASTOLIC BLOOD PRESSURE: 77 MMHG | SYSTOLIC BLOOD PRESSURE: 106 MMHG | OXYGEN SATURATION: 98 % | HEART RATE: 61 BPM | TEMPERATURE: 98.3 F

## 2023-12-26 DIAGNOSIS — F33.2 SEVERE EPISODE OF RECURRENT MAJOR DEPRESSIVE DISORDER, WITHOUT PSYCHOTIC FEATURES (H): Primary | ICD-10-CM

## 2023-12-26 PROCEDURE — 258N000003 HC RX IP 258 OP 636: Performed by: PSYCHIATRY & NEUROLOGY

## 2023-12-26 PROCEDURE — 250N000009 HC RX 250: Performed by: PSYCHIATRY & NEUROLOGY

## 2023-12-26 PROCEDURE — 96365 THER/PROPH/DIAG IV INF INIT: CPT

## 2023-12-26 RX ORDER — MEPERIDINE HYDROCHLORIDE 25 MG/ML
25 INJECTION INTRAMUSCULAR; INTRAVENOUS; SUBCUTANEOUS EVERY 30 MIN PRN
Status: CANCELLED | OUTPATIENT
Start: 2023-12-26

## 2023-12-26 RX ORDER — HEPARIN SODIUM,PORCINE 10 UNIT/ML
5 VIAL (ML) INTRAVENOUS
Status: CANCELLED | OUTPATIENT
Start: 2023-12-26

## 2023-12-26 RX ORDER — HEPARIN SODIUM (PORCINE) LOCK FLUSH IV SOLN 100 UNIT/ML 100 UNIT/ML
5 SOLUTION INTRAVENOUS
Status: CANCELLED | OUTPATIENT
Start: 2023-12-26

## 2023-12-26 RX ORDER — HYDRALAZINE HYDROCHLORIDE 20 MG/ML
10 INJECTION INTRAMUSCULAR; INTRAVENOUS
Status: CANCELLED | OUTPATIENT
Start: 2023-12-26

## 2023-12-26 RX ORDER — ONDANSETRON 2 MG/ML
4 INJECTION INTRAMUSCULAR; INTRAVENOUS
Status: CANCELLED | OUTPATIENT
Start: 2023-12-26

## 2023-12-26 RX ORDER — ALBUTEROL SULFATE 0.83 MG/ML
2.5 SOLUTION RESPIRATORY (INHALATION)
Status: CANCELLED | OUTPATIENT
Start: 2023-12-26

## 2023-12-26 RX ORDER — METHYLPREDNISOLONE SODIUM SUCCINATE 125 MG/2ML
125 INJECTION, POWDER, LYOPHILIZED, FOR SOLUTION INTRAMUSCULAR; INTRAVENOUS
Status: CANCELLED
Start: 2023-12-26

## 2023-12-26 RX ORDER — EPINEPHRINE 1 MG/ML
0.3 INJECTION, SOLUTION, CONCENTRATE INTRAVENOUS EVERY 5 MIN PRN
Status: CANCELLED | OUTPATIENT
Start: 2023-12-26

## 2023-12-26 RX ORDER — NALOXONE HYDROCHLORIDE 0.4 MG/ML
0.2 INJECTION, SOLUTION INTRAMUSCULAR; INTRAVENOUS; SUBCUTANEOUS
Status: CANCELLED | OUTPATIENT
Start: 2023-12-26

## 2023-12-26 RX ORDER — ALBUTEROL SULFATE 90 UG/1
1-2 AEROSOL, METERED RESPIRATORY (INHALATION)
Status: CANCELLED
Start: 2023-12-26

## 2023-12-26 RX ORDER — DIPHENHYDRAMINE HYDROCHLORIDE 50 MG/ML
50 INJECTION INTRAMUSCULAR; INTRAVENOUS
Status: CANCELLED
Start: 2023-12-26

## 2023-12-26 RX ADMIN — KETAMINE HYDROCHLORIDE 65 MG: 50 INJECTION INTRAMUSCULAR; INTRAVENOUS at 09:30

## 2023-12-26 ASSESSMENT — PAIN SCALES - GENERAL: PAINLEVEL: MODERATE PAIN (5)

## 2023-12-26 NOTE — PROGRESS NOTES
Infusion Nursing Note:  Janelle White presents today for ketamine infusion.    Patient seen by provider today: No   present during visit today: Not Applicable.    Note: N/A.      Intravenous Access:  Peripheral IV placed.    Treatment Conditions:  Not Applicable.      Post Infusion Assessment:  Patient tolerated infusion without incident.  Patient observed for 60 minutes post ketamine infusion, per protocol.  Blood return noted pre and post infusion.  Site patent and intact, free from redness, edema or discomfort.  No evidence of extravasations.  Access discontinued per protocol.       Discharge Plan:   Patient discharged in stable condition accompanied by: self.  Departure Mode: Ambulatory.  RTC 1/16/24  .      Viktoria Mcdaniel

## 2023-12-27 ENCOUNTER — OFFICE VISIT (OUTPATIENT)
Dept: PSYCHIATRY | Facility: CLINIC | Age: 63
End: 2023-12-27
Payer: COMMERCIAL

## 2023-12-27 VITALS
DIASTOLIC BLOOD PRESSURE: 80 MMHG | SYSTOLIC BLOOD PRESSURE: 121 MMHG | HEIGHT: 64 IN | HEART RATE: 85 BPM | BODY MASS INDEX: 27.79 KG/M2 | TEMPERATURE: 97.2 F | WEIGHT: 162.8 LBS

## 2023-12-27 DIAGNOSIS — F33.41 RECURRENT MAJOR DEPRESSIVE DISORDER, IN PARTIAL REMISSION (H): Primary | ICD-10-CM

## 2023-12-27 ASSESSMENT — PATIENT HEALTH QUESTIONNAIRE - PHQ9: SUM OF ALL RESPONSES TO PHQ QUESTIONS 1-9: 11

## 2023-12-27 NOTE — PROGRESS NOTES
"  Psychiatry Clinic Progress Note                                                                   Janelle White is a 63 year old female   Therapist: None (stopped September 2021)   PCP: Carmen Garcia  Other Providers:  Kimberly Perez DO (psychiatrist)       Interim History                                                                                                        4, 4     The patient is a good historian and reports good treatment adherence.      Wishes experience of ketamine was more consistent. \"I always feel very relaxed afterwards and it gives me a calmer mind\".     Likes the dissociative part of it. Sometimes feels energized and sometimes \"wiped out\".     Goes to sleep some nights with TNES unit.     Has a trial for a spinal cord stimulator in January with Dr. Jon.             ----------------------------------------------    She stopped going to therapy last September (2021) after her therapist retired after being ill. She would likely benefit from CBT and ACT. She see's Kimberly Perez at [facility] for medication management who can recommend therapy providers.     She said she has been taking 50 mg of desvenlafaxine daily though her list shows it as 100 mg.     Considered MAOI (plan to discuss with Kimberly Perez) though patient hesitant due to past issues with oral medications. Expressed concerns about treatment that potential interacts with her pain medications. She feels that her pain has been well under control. Her PCP, Carmen Garcia ([place]) manages her opioids. Janelle gave consent for Dr. Yanes to speak with Carmen Garcia about medications and possible interactions. She was informed that ketamine is unlikely to interact with her medications on her list. Ketamine was discussed with Janelle. She is open to the idea of trying ketamine stating \"I have nothing to lose.\"     Recent Symptoms:   Depression:  suicidal ideation, depressed mood, anhedonia, low energy, poor concentration " /memory, feeling hopeless, excessive crying, overwhelmed and mood dysregulation  Anxiety:  excessive worry and feeling fearful  Panic Attack:  dizziness, flushing, SOB, trouble taking deep breath and chest tightness     Recent Substance Use:  none reported        Social/ Family History                                  [per patient report]                                 1ea,1ea   No changes     Medical / Surgical History                                                                                                                  Patient Active Problem List   Diagnosis    PERSONAL HX OF  MELANOMA    B-complex deficiency    Migraine    Chronic Low Back Pain    CARDIOVASCULAR SCREENING; LDL GOAL LESS THAN 160    DDD (degenerative disc disease), cervical    Moderate recurrent major depression (H)    Vitamin D deficiency    Shift work sleep disorder    Chronic pain syndrome    CLL (chronic lymphocytic leukemia) (H)    Controlled substance agreement signed    CLARE (obstructive sleep apnea)    Prediabetes    Hyperlipidemia LDL goal <130    MTHFR gene mutation    CYP2B6 intermediate metabolizer (H)    CYP2D6 poor metabolizer (H)    STACIE (generalized anxiety disorder)    Polyarthralgia    Cellulitis, unspecified cellulitis site    Sepsis, due to unspecified organism, unspecified whether acute organ dysfunction present (H)    Cellulitis    Tension type headache    Status post blepharoplasty    Rotator cuff injury    Regular astigmatism, bilateral    Pain medication agreement    Hypotension    History of laser assisted in situ keratomileusis    COVID-19    Spinal stenosis of lumbar region with radiculopathy    COPD (chronic obstructive pulmonary disease) (H)    Severe episode of recurrent major depressive disorder, without psychotic features (H)    Suicidal ideation    Immunocompromised (H24)    Infection due to 2019 novel coronavirus    Sacroiliitis (H24)    History of falling    Lumbar radiculopathy    Failed back surgical  syndrome       Past Surgical History:   Procedure Laterality Date    anterior cervical discectomy C4-5 ,Fusion C5-6-7  10/2007    APPENDECTOMY      BACK SURGERY      BLEPHAROPLASTY BILATERAL  2013    Procedure: BLEPHAROPLASTY BILATERAL;  BILATERAL UPPER LID BLEPHAROPLASTY AND BROWPEXY ;  Surgeon: Godfrey Miguel MD;  Location: [facility]     SECTION      x2    COLONOSCOPY N/A 2020    Procedure: COLONOSCOPY;  Surgeon: Butch Lockhart MD;  Location:  GI    ESOPHAGOSCOPY, GASTROSCOPY, DUODENOSCOPY (EGD), COMBINED N/A 2020    Procedure: ESOPHAGOGASTRODUODENOSCOPY, WITH BIOPSY biosies by cold forceps;  Surgeon: Butch Lockhart MD;  Location:  GI    EYE SURGERY      FUSION LUMBAR ANTERIOR, FUSION LUMBAR POSTERIOR TWO LEVELS, COMBINED  2010    L3-S1 anterior posterior fusion    GENITOURINARY SURGERY  Hysterectomy    2006    HEAD & NECK SURGERY      Cervical spine- c2-T2    HYSTERECTOMY  2006    ovaries intact    HYSTERECTOMY      HYSTERECTOMY, PAP NO LONGER INDICATED      INJECT EPIDURAL LUMBAR / SACRAL SINGLE N/A 2023    Procedure: Caudal Epidural Steroid Injection;  Surgeon: Joslyn Cherry MD;  Location: [facility] OR    INJECT SACROILIAC JOINT Right 2023    Procedure: Sacroiliac Joint Injection;  Surgeon: Joslyn Cherry MD;  Location: [facility] OR    Partial vulvectomy for NEENA III  2009    Pubovaginal sling, post op durasphere injections  2006    wears pad    ROTATOR CUFF REPAIR RT/LT  2011    right    SOFT TISSUE SURGERY  2019    Bilateral carpal/ulnar release    SPINAL FUSION C3-4  2009    C3-4, anterior spinal fusion    TUBAL LIGATION      ZZC APPENDECTOMY  2006        Medical Review of Systems                                                                                                    2,10   none in addition to that documented above    Allergy                                Bupropion, Codeine, Effexor [venlafaxine hydrochloride], Escitalopram, Escitalopram  oxalate, Fluoxetine, Levaquin [levofloxacin], Methylphenidate, Milnacipran, Pregabalin, Prozac [fluoxetine hcl], Tramadol, and Venlafaxine    Current Medications                                                                                                       Current Outpatient Medications   Medication Sig Dispense Refill    albuterol (PROAIR HFA/PROVENTIL HFA/VENTOLIN HFA) 108 (90 Base) MCG/ACT inhaler Inhale 2 puffs into the lungs every 6 hours as needed for shortness of breath or wheezing 8.5 g 11    amphetamine-dextroamphetamine (ADDERALL XR) 25 MG 24 hr capsule Take 1 capsule (25 mg) by mouth daily for 30 days 30 capsule 0    [START ON 1/18/2024] amphetamine-dextroamphetamine (ADDERALL XR) 25 MG 24 hr capsule Take 1 capsule (25 mg) by mouth daily for 30 days 30 capsule 0    amphetamine-dextroamphetamine (ADDERALL) 15 MG tablet Take 1 tablet (15 mg) by mouth 2 times daily for 30 days 60 tablet 0    [START ON 1/18/2024] amphetamine-dextroamphetamine (ADDERALL) 15 MG tablet Take 1 tablet (15 mg) by mouth 2 times daily for 30 days 60 tablet 0    Cobalamin Combinations (VITAMIN B12-FOLIC ACID) 500-400 MCG TABS Take 1 tablet by mouth      desvenlafaxine (PRISTIQ) 50 MG 24 hr tablet Take 1 tablet (50 mg) by mouth daily 90 tablet 1    Estradiol (DIVIGEL) 1 MG/GM GEL Place 1 packet onto the skin daily 30 g 11    hydrOXYzine (VISTARIL) 25 MG capsule Take 1 capsule (25 mg) by mouth 3 times daily as needed for itching or anxiety 90 capsule 3    Levomefolate Glucosamine (METHYLFOLATE PO) Take 7.5 mg by mouth daily      lidocaine (LIDODERM) 5 % patch Place 3 patches onto the skin daily Apply up to 3 patches to skin. Wear for 12 hours and remove for 12 hrs.  Refill when patient requests. 120 patch 3    medical cannabis (Patient's own supply.  Not a prescription) Medical Cannabis - Tangerine 4-6 ml by mouth daily. [employer]      methocarbamol (ROBAXIN) 500 MG tablet Take 1 tablet (500 mg) by mouth 4 times daily as  "needed for muscle spasms 120 tablet 1    methocarbamol (ROBAXIN) 750 MG tablet Take 1 tablet (750 mg) by mouth 4 times daily as needed for muscle spasms 120 tablet 4    naloxone (NARCAN) 4 MG/0.1ML nasal spray Spray 1 spray (4 mg) into one nostril alternating nostrils as needed for opioid reversal every 2-3 minutes until assistance arrives 0.2 mL 1    NONFORMULARY Take 2 Scoops by mouth daily Protein and Vitamin shake mix - Nutritional supplement      ondansetron (ZOFRAN ODT) 8 MG ODT tab Take 1 tablet (8 mg) by mouth every 8 hours as needed for nausea 30 tablet 4    Prasterone 6.5 MG INST Place 0.5 suppositories vaginally three times a week 28 each 11    predniSONE (DELTASONE) 20 MG tablet       rOPINIRole (REQUIP) 0.25 MG tablet TAKE 1 TO 2 TABLETS(0.25 TO 0.5 MG) BY MOUTH AT BEDTIME 180 tablet 3    senna-docusate (SENOKOT-S/PERICOLACE) 8.6-50 MG tablet Take 6-9 tablets by mouth every evening      vitamin D3 (CHOLECALCIFEROL) 125 MCG (5000 UT) tablet Take 5,000 Units by mouth daily         Vitals                                                                                                                       3, 3   /80   Pulse 85   Temp 97.2  F (36.2  C) (Temporal)   Ht 1.626 m (5' 4\")   Wt 73.8 kg (162 lb 12.8 oz)   LMP 05/01/2005 (LMP Unknown)   BMI 27.94 kg/m       Mental Status Exam                                                                                    9, 14 cog gs     Alertness: alert  and oriented  Appearance: well groomed  Behavior/Demeanor: cooperative and tearful and in some anxious distress , with good  eye contact   Speech:  normal rate/rythm though at times rapid and/or quivering  Language: intact  Psychomotor: restless  Mood: \"I've made a great deal of progress in many areas\"  Affect: tearful; was congruent to mood; was congruent to content  Thought Process/Associations: unremarkable  Thought Content:  Reports suicidal ideation  Perception:  No apparent psychotic symptoms " observed or reported  Insight: good  Judgment: good  Cognition: (6) does  appear grossly intact; formal cognitive testing was not done  Gait/Station and/or Muscle Strength/Tone: Not observed due to  Video visit     Labs and Data                                                                                                                 Rating Scales:    N/A    PHQ9:        11/3/2023    10:31 AM 11/17/2023     8:24 AM 12/27/2023     2:58 PM   PHQ   PHQ-9 Total Score 12 17 11   Q9: Thoughts of better off dead/self-harm past 2 weeks Several days Several days Several days   F/U: Thoughts of suicide or self-harm Yes     F/U: Self harm-plan No     F/U: Self-harm action No     F/U: Safety concerns No           Diagnosis and Assessment                                                                             m2, h3     continued improvement with ketamine, remains with some ffective instability and distress. Recommended ACT therapy. No changes in plan today.     Background:  Janelle White is a 60 year old female with previous psychiatric history of MDD, recurrent, severe, chronic pain syndrome, h/o CLL. On initial presentation it was thought that Janelle was suffering from an episode of depression that is closely related to her pain syndrome and the resultant physically inability to engage in activities or work. Her pain management was improved and she was treated with TMS though continues to endorse significant depression and anxiety symptoms. She is hesitant to trial MAOIs or other oral agents that could potentially interfere with her current pain management as she feels it is well under control. She is interested in pursuing ketamine therapy, which should not interfere with her medications and may actually help with her pain. She gave consent to speak with her PCP about medication management and potential interventions for continued depression.      Today the following issues were addressed:    1) Major  depressive disorder, recurrent, severe  2) chronic pain   3) treatment response   4) fatigue     [place] Prescription Monitoring Program [] review was not needed today.    PSYCHOTROPIC DRUG INTERACTIONS: none clinically relevant    Plan                                                                                                                    m2, h3      1) [place] depressive disorder, recurrent, severe   -- Medication: continue current outpatient medications    -- Psychotherapy: recommended patient speak with her psychiatry provider to be referred for individual psychotherapy, specifically ACT and/or CBT    -- Procedures:               - continue ketamine    -- Referrals: None      RTC: 6 weeks  CRISIS NUMBERS:   Provided routinely in AVS.    Treatment Risk Statement:  The patient understands the risks, benefits, adverse effects and alternatives. Agrees to treatment with the capacity to do so. No medical contraindications to treatment. Agrees to call clinic for any problems. The patient understands to call 911 or go to the nearest ED if life threatening or urgent symptoms occur.     Zafar Yanes MD, PhD    Psychiatry Individual Psychotherapy Note   Psychotherapy start time - 310pm  Psychotherapy end time - 330pm  Date treatment plan last reviewed with patient - 01/03/24  Subjective: This supportive psychotherapy session addressed issues related to goals of therapy and current psychosocial stressors. Patient's reaction: Action in the context of mental status appropriate for ambulatory setting.    Interactive complexity indicated? No  Plan: RTC in timeframe noted above  Psychotherapy services during this visit included myself and the patient.   Treatment Plan      SYMPTOMS; PROBLEMS   MEASURABLE GOALS;    FUNCTIONAL IMPROVEMENT / GAINS INTERVENTIONS DISCHARGE CRITERIA   Depression: feeling hopelesss   reduce depressive symptoms Supportive / psychodynamic marked symptom improvement

## 2024-01-12 ENCOUNTER — ANCILLARY PROCEDURE (OUTPATIENT)
Dept: RADIOLOGY | Facility: AMBULATORY SURGERY CENTER | Age: 64
End: 2024-01-12
Attending: ANESTHESIOLOGY
Payer: COMMERCIAL

## 2024-01-12 ENCOUNTER — HOSPITAL ENCOUNTER (OUTPATIENT)
Facility: AMBULATORY SURGERY CENTER | Age: 64
Discharge: HOME | End: 2024-01-12
Attending: ANESTHESIOLOGY | Admitting: ANESTHESIOLOGY
Payer: COMMERCIAL

## 2024-01-12 VITALS
SYSTOLIC BLOOD PRESSURE: 112 MMHG | OXYGEN SATURATION: 99 % | HEART RATE: 61 BPM | RESPIRATION RATE: 16 BRPM | TEMPERATURE: 97.9 F | DIASTOLIC BLOOD PRESSURE: 74 MMHG

## 2024-01-12 DIAGNOSIS — M96.1 FAILED BACK SURGICAL SYNDROME: ICD-10-CM

## 2024-01-12 PROCEDURE — 63650 IMPLANT NEUROELECTRODES: CPT

## 2024-01-12 PROCEDURE — C1897 LEAD, NEUROSTIM TEST KIT: HCPCS

## 2024-01-12 RX ORDER — CEFAZOLIN SODIUM 2 G/50ML
2 SOLUTION INTRAVENOUS ONCE
Status: COMPLETED | OUTPATIENT
Start: 2024-01-12 | End: 2024-01-12

## 2024-01-12 RX ORDER — FENTANYL CITRATE 50 UG/ML
INJECTION, SOLUTION INTRAMUSCULAR; INTRAVENOUS DAILY PRN
Status: DISCONTINUED | OUTPATIENT
Start: 2024-01-12 | End: 2024-01-12 | Stop reason: HOSPADM

## 2024-01-12 RX ORDER — LIDOCAINE HYDROCHLORIDE 10 MG/ML
INJECTION, SOLUTION EPIDURAL; INFILTRATION; INTRACAUDAL; PERINEURAL DAILY PRN
Status: DISCONTINUED | OUTPATIENT
Start: 2024-01-12 | End: 2024-01-12 | Stop reason: HOSPADM

## 2024-01-12 RX ORDER — LIDOCAINE HYDROCHLORIDE 20 MG/ML
INJECTION, SOLUTION INFILTRATION; PERINEURAL DAILY PRN
Status: DISCONTINUED | OUTPATIENT
Start: 2024-01-12 | End: 2024-01-12 | Stop reason: HOSPADM

## 2024-01-12 RX ORDER — SODIUM CHLORIDE, SODIUM LACTATE, POTASSIUM CHLORIDE, CALCIUM CHLORIDE 600; 310; 30; 20 MG/100ML; MG/100ML; MG/100ML; MG/100ML
INJECTION, SOLUTION INTRAVENOUS CONTINUOUS
Status: DISCONTINUED | OUTPATIENT
Start: 2024-01-12 | End: 2024-01-13 | Stop reason: HOSPADM

## 2024-01-12 NOTE — H&P
Janelle TORRES [facility]  [id number]  female  63 year old      Reason for procedure/surgery: failed back surgery syndrome    Patient Active Problem List   Diagnosis    PERSONAL HX OF  MELANOMA    B-complex deficiency    Migraine    Chronic Low Back Pain    CARDIOVASCULAR SCREENING; LDL GOAL LESS THAN 160    DDD (degenerative disc disease), cervical    Moderate recurrent major depression (H)    Vitamin D deficiency    Shift work sleep disorder    Chronic pain syndrome    CLL (chronic lymphocytic leukemia) (H)    Controlled substance agreement signed    CLARE (obstructive sleep apnea)    Prediabetes    Hyperlipidemia LDL goal <130    MTHFR gene mutation    CYP2B6 intermediate metabolizer (H)    CYP2D6 poor metabolizer (H)    STACIE (generalized anxiety disorder)    Polyarthralgia    Cellulitis, unspecified cellulitis site    Sepsis, due to unspecified organism, unspecified whether acute organ dysfunction present (H)    Cellulitis    Tension type headache    Status post blepharoplasty    Rotator cuff injury    Regular astigmatism, bilateral    Pain medication agreement    Hypotension    History of laser assisted in situ keratomileusis    COVID-19    Spinal stenosis of lumbar region with radiculopathy    COPD (chronic obstructive pulmonary disease) (H)    Severe episode of recurrent major depressive disorder, without psychotic features (H)    Suicidal ideation    Immunocompromised (H24)    Infection due to 2019 novel coronavirus    Sacroiliitis (H24)    History of falling    Lumbar radiculopathy    Failed back surgical syndrome       Past Surgical History:    Past Surgical History:   Procedure Laterality Date    anterior cervical discectomy C4-5 ,Fusion C5-6-7  10/2007    APPENDECTOMY      BACK SURGERY      BLEPHAROPLASTY BILATERAL  2013    Procedure: BLEPHAROPLASTY BILATERAL;  BILATERAL UPPER LID BLEPHAROPLASTY AND BROWPEXY ;  Surgeon: Godfrey Miguel MD;  Location:  [facility]     SECTION      x2    COLONOSCOPY N/A  2/18/2020    Procedure: COLONOSCOPY;  Surgeon: Butch Lockhart MD;  Location:  GI    ESOPHAGOSCOPY, GASTROSCOPY, DUODENOSCOPY (EGD), COMBINED N/A 2/18/2020    Procedure: ESOPHAGOGASTRODUODENOSCOPY, WITH BIOPSY biosies by cold forceps;  Surgeon: Butch Lockhart MD;  Location:  GI    EYE SURGERY      FUSION LUMBAR ANTERIOR, FUSION LUMBAR POSTERIOR TWO LEVELS, COMBINED  12/2010    L3-S1 anterior posterior fusion    GENITOURINARY SURGERY  Hysterectomy    2006    HEAD & NECK SURGERY      Cervical spine- c2-T2    HYSTERECTOMY  7/2006    ovaries intact    HYSTERECTOMY      HYSTERECTOMY, PAP NO LONGER INDICATED      INJECT EPIDURAL LUMBAR / SACRAL SINGLE N/A 12/1/2023    Procedure: Caudal Epidural Steroid Injection;  Surgeon: Joslyn Cherry MD;  Location: [facility] OR    INJECT SACROILIAC JOINT Right 9/8/2023    Procedure: Sacroiliac Joint Injection;  Surgeon: Joslyn Cherry MD;  Location: [facility] OR    Partial vulvectomy for NEENA III  01/2009    Pubovaginal sling, post op durasphere injections  07/2006    wears pad    ROTATOR CUFF REPAIR RT/LT  5/2011    right    SOFT TISSUE SURGERY  2019    Bilateral carpal/ulnar release    SPINAL FUSION C3-4  7/2009    C3-4, anterior spinal fusion    TUBAL LIGATION      ZZC APPENDECTOMY  7/2006       Past Medical History:   Past Medical History:   Diagnosis Date    Anxiety     Cervicalgia 2007    C5-6 disc protrusion    COPD (chronic obstructive pulmonary disease) (H) 2/7/2022    Depressive disorder     ESBL (extended spectrum beta-lactamase) producing bacteria infection     History of blood transfusion 2007    Cervical fusion    Leukemia (H)     CLA large beta    Melanoma (H) 1998    Migraine     Other chronic pain     Rotator cuff tear     s/p injections    Sacroiliac inflammation (H24)     Shift work sleep disorder 12/16/2013    Urinary calculi     Vitamin B12 deficiency anemia 2006    started injections       Social History:   Social History     Tobacco Use    Smoking status:  "Former     Packs/day: 1.00     Years: 5.00     Additional pack years: 0.00     Total pack years: 5.00     Types: Cigarettes, Clove cigarettes or kreteks     Quit date: 1984     Years since quittin.0    Smokeless tobacco: Never   Substance Use Topics    Alcohol use: Yes     Comment: rare       Family History:   Family History   Problem Relation Age of Onset    Hypertension Mother     Alcohol/Drug Mother     Osteoporosis Mother         borderline    Hypertension Father     Diabetes Father         Diet controlled    Alcohol/Drug Father         remote use    Coronary Artery Disease Father         Blood clots- decreased EF/ Eliquis    C.A.D. Maternal Grandmother          late 50s    C.A.D. Maternal Grandfather         bone and brain cancer mets as well    Diabetes Paternal Grandfather          from diabetic complications    Alcohol/Drug Brother     Hypertension Brother     Coronary Artery Disease Brother         Afib, internal defibrillator    Depression Brother     Breast Cancer Cousin         Stage 3- age 61, her mom dcis/late 70    Breast Cancer No family hx of         Dcis    Cancer - colorectal No family hx of     Cerebrovascular Disease No family hx of     Colon Cancer No family hx of        Allergies:   Allergies   Allergen Reactions    Bupropion Other (See Comments) and Swelling     Ineffective, name brand works best  Ineffective, name brand works best    Codeine Other (See Comments)     \"Feels like electricity running through body\"  No reaction noted in [employer].  Shaky  With Tylenol    Effexor [Venlafaxine Hydrochloride]      Affect too flat    Escitalopram      Other reaction(s): *Unknown  Sexual dysfunction    Escitalopram Oxalate      Sexual dysfunction    Fluoxetine Other (See Comments)     Sexual dysfunction    Levaquin [Levofloxacin] Other (See Comments)     Suicidal thoughts  Dark thoughts- mood changes    Methylphenidate Hives    Milnacipran      12.5 MG is fine. 25 MG caused side " "effects. \"Dark thoughts\"    Pregabalin Other (See Comments)     Hallucinations  Audiovisual hallucinations    Prozac [Fluoxetine Hcl]      Sexual dysfunction    Tramadol Hives     Hives      Venlafaxine      Other reaction(s): *Unknown  Affect too flat       Active Medications:   Current Outpatient Medications   Medication Sig Dispense Refill    albuterol (PROAIR HFA/PROVENTIL HFA/VENTOLIN HFA) 108 (90 Base) MCG/ACT inhaler Inhale 2 puffs into the lungs every 6 hours as needed for shortness of breath or wheezing 8.5 g 11    amphetamine-dextroamphetamine (ADDERALL XR) 25 MG 24 hr capsule Take 1 capsule (25 mg) by mouth daily for 30 days 30 capsule 0    Cobalamin Combinations (VITAMIN B12-FOLIC ACID) 500-400 MCG TABS Take 1 tablet by mouth      desvenlafaxine (PRISTIQ) 50 MG 24 hr tablet Take 1 tablet (50 mg) by mouth daily 90 tablet 1    Estradiol (DIVIGEL) 1 MG/GM GEL Place 1 packet onto the skin daily 30 g 11    hydrOXYzine (VISTARIL) 25 MG capsule Take 1 capsule (25 mg) by mouth 3 times daily as needed for itching or anxiety 90 capsule 3    Levomefolate Glucosamine (METHYLFOLATE PO) Take 7.5 mg by mouth daily      lidocaine (LIDODERM) 5 % patch Place 3 patches onto the skin daily Apply up to 3 patches to skin. Wear for 12 hours and remove for 12 hrs.  Refill when patient requests. 120 patch 3    methocarbamol (ROBAXIN) 500 MG tablet Take 1 tablet (500 mg) by mouth 4 times daily as needed for muscle spasms 120 tablet 1    methocarbamol (ROBAXIN) 750 MG tablet Take 1 tablet (750 mg) by mouth 4 times daily as needed for muscle spasms 120 tablet 4    naloxone (NARCAN) 4 MG/0.1ML nasal spray Spray 1 spray (4 mg) into one nostril alternating nostrils as needed for opioid reversal every 2-3 minutes until assistance arrives 0.2 mL 1    NONFORMULARY Take 2 Scoops by mouth daily Protein and Vitamin shake mix - Nutritional supplement      ondansetron (ZOFRAN ODT) 8 MG ODT tab Take 1 tablet (8 mg) by mouth every 8 hours as " needed for nausea 30 tablet 4    Prasterone 6.5 MG INST Place 0.5 suppositories vaginally three times a week 28 each 11    rOPINIRole (REQUIP) 0.25 MG tablet TAKE 1 TO 2 TABLETS(0.25 TO 0.5 MG) BY MOUTH AT BEDTIME 180 tablet 3    senna-docusate (SENOKOT-S/PERICOLACE) 8.6-50 MG tablet Take 6-9 tablets by mouth every evening      vitamin D3 (CHOLECALCIFEROL) 125 MCG (5000 UT) tablet Take 5,000 Units by mouth daily      [START ON 1/18/2024] amphetamine-dextroamphetamine (ADDERALL XR) 25 MG 24 hr capsule Take 1 capsule (25 mg) by mouth daily for 30 days 30 capsule 0    amphetamine-dextroamphetamine (ADDERALL) 15 MG tablet Take 1 tablet (15 mg) by mouth 2 times daily for 30 days 60 tablet 0    [START ON 1/18/2024] amphetamine-dextroamphetamine (ADDERALL) 15 MG tablet Take 1 tablet (15 mg) by mouth 2 times daily for 30 days 60 tablet 0    medical cannabis (Patient's own supply.  Not a prescription) Medical Cannabis - [place] 4-6 ml by mouth daily. [facility]         Systemic Review:   CONSTITUTIONAL: NEGATIVE for fever, chills, change in weight  ENT/MOUTH: NEGATIVE for ear, mouth and throat problems  RESP: NEGATIVE for significant cough or SOB  CV: NEGATIVE for chest pain, palpitations or peripheral edema    Physical Examination:   Vital Signs: /78 (Cuff Size: Adult Regular)   Pulse 67   Temp 36.6  C (97.9  F) (Temporal)   Resp 16   LMP 05/01/2005 (LMP Unknown)   SpO2 98%   GENERAL: healthy, alert and no distress  NECK: no adenopathy, no asymmetry, masses, or scars  RESP: lungs clear to auscultation - no rales, rhonchi or wheezes  CV: regular rate and rhythm, normal S1 S2, no S3 or S4, no murmur, click or rub, no peripheral edema and peripheral pulses strong  ABDOMEN: soft, nontender, no hepatosplenomegaly, no masses and bowel sounds normal  MS: no gross musculoskeletal defects noted, no edema    Plan: Appropriate to proceed as scheduled.      Jasper Mejía MD  1/12/2024

## 2024-01-12 NOTE — OP NOTE
Patient: Janelle White Age: 63 year old   MRN: [id number]       Date of Visit: January 12, 2024      SPROCEDURE NOTE    ATTENDING CLINICIAN:    Joslyn Cherry MD    ASSISTANT CLINICIAN:  Jasper Mejía MD, fellow    PREPROCEDURE DIAGNOSES:  1. Failed back surgery syndrome  2.    PROCEDURE(S) PERFORMED:  1) Spinal cord stimulator compact octad lead implantation   2) Complex intraoperative programming of device  3) Fluoroscopic guidance for the above-named procedure(s)    HARDWARE IMPLANTED/USED:  - Medtronic [id number] Trial Screening Lead Kit 60 cm Lot# [id number]  - Med[employer]onic [id number] Trial Screening Lead Kit 60 cm Lot# [id number]  - Leads: entered T12/L1 ; tips Left on bottom of T6 and right on top of T7  - Medtronic wireless external neurostimulator [id number]; SL# [id number] on right flank     ANESTHESIA:  Local and moderate sedation (see notes below).  Please see nursing note for vitals and details    BLOOD LOSS:  Minimal.    DRAINS AND SPECIMENS:  None.    COMPLICATIONS:  None.    INDICATIONS:  Janelle White is a 63 year old female with history of intractable bilateral low back and bilateral lower extremity pain secondary to lumbar radiculopathy and failed back surgery syndrome.  Multiple modalities of treatment were performed without obtaining sufficient lasting relief and for that reason, a spinal cord stimulator trial was offered.   Options, benefits and risks were discussed with the patient.  The risks and potential complications include but are not limited to bleeding, infection, no pain relief, reaction to medications, seizure, stroke, paralysis, nerve injury, spinal cord injury, spinal headache, spinal fluid leak, coma, death.  Questions were answered to her satisfaction and she agrees to proceed. Voluntary informed consent was obtained and signed.     Procedure Details:  The patient was met in the procedure room, where the patient was identified by name, medical record number and date of  "birth.  All of the patient s last minute questions were answered. Written informed consent was obtained and saved in the electronic medical record, after the risks, benefits, and alternatives were discussed with the patient.      IV access was obtained and 2 grams of Cefazolin mixed in 100 ml of normal saline was administered over 10 minutes. During this time, a medical device representative met the with patient to discuss the patient's pain pattern in relation to where they hoped to receive stimulation.    The patient was brought into the procedure room and positioned prone on the fluoroscopy table with four pillows under their abdomen and prepped with Duraprep surgical solution and draped in the usual sterile fashion. All pressure points were checked and comfortably padded.  Routine monitors were placed. The registered nurse initiated a verbal timeout or \"pause for the cause\" stating the patient's name, date of birth, and procedure to be performed. Dr. Navarro then repeated back the procedure and the patient's name in confirmation. Finally, the patient verbalized agreeance.    A chlorhexidine prep was completed followed by sterile draping per standard procedure. IV sedation was administered in increments throughout the procedure to a  which resulted in satisfactory relaxation and sedation.  The patient was monitored throughout the procedure by a registered nurse. Physiologic parameters were observed utilizing pulse oximetry and blood pressure checks every five minutes during the procedure. Monitoring revealed normal oxygen saturation and pulse rate.  The patient remained conversant throughout the procedure.     The area to be injected was determined using fluoroscopy.The patient's skin was anesthetized at the appropriate level and a 14 gauge Tuohy needle was introduced through the skin on the left L2 pedicle. Fluoroscopy was used to identify correct placement. The neurostimulator kit manufactured by [employer] was " used. The needle was advanced through the T12/L1 interlaminar space. The epidural space was penetrated as a loss of resistance was felt with an air filled syringe. The spinal cord stimulator lead with 8 contacts was introduced. The lead was advanced without difficulty under fluoroscopy until the top of the lead was at the bottom of the T6 vertebral level. This lead was left of midline. A lateral fluoroscopy view confirmed appropriate posterior epidural placement.     A second 14 gauge Tuohy needle was introduced through the skin on the right side. Fluoroscopy was used to identify correct placement. The neurostimulator kit manufactured by [employer] was used. The needle was advanced through the T12/L1 interlaminar space. The epidural space was penetrated as a loss of resistance was felt with an air filled syringe. The spinal cord stimulator lead with 8 contacts was introduced. The lead was advanced without difficulty under fluoroscopy until the top of the lead was at the top of the T7 (T6-T7 disc space) vertebral level. This lead was right of midline. A lateral fluoroscopy view confirmed appropriate posterior epidural placement. Once leads are in position, the preparation for complex intraoperative programming was initiated.    The patient was allowed to fully awaken from anesthesia. Testing was carried out by the device representative under the guidance of Dr. Navarro. Once the leads were assured to be in the correct position and coverage of stimulation was sufficient per the patient and the rep, the needles were withdrawn leaving the leads in place and were then secured to the patient s skin using Stay-Fix adhesive bandages and Tegaderm. The patient s back was cleaned. The patient was allowed to fully recover from anesthesia and taken to the recovery room in good position.    The procedure was completed without complications and was tolerated well. The patient was monitored after the procedure. Final stimulation  programming and testing of the device was done in the recovery room by the device representative. The patient (or responsible party) was given post-procedure and discharge instructions to follow at home.      Pre-procedure pain score: 4/10  Post-procedure pain score: 2/10    Notes: Patient will call to report pain level in 3-4 days.         Sedation:      Performed by: Joslyn Cherry MD    Indication:  Sedation is required to allow above mentioned procedure     Consent:  Consent obtained from the patient Janelle White after discussing the risks, benefits and alternatives.    PO Intake:  Appropriately NPO for procedure    Lungs: Lungs Clear with good breath sounds bilaterally.     Heart: Normal heart sounds and rate    Focused history and physical completed prior to procedure. I have reviewed the lab findings, diagnostic data, medications, and the plan for sedation. I have determined this patient to be an appropriate candidate for the planned sedation and procedure and have reassessed the patient IMMEDIATELY PRIOR to sedation and procedure.      Sedation Post Procedure Summary:    Prior to the start of the procedure and with procedural staff participation, I verbally confirmed the patient s identity using two indicators, relevant allergies, that the procedure was appropriate and matched the consent or emergent situation, and that the correct equipment/implants were available. Immediately prior to starting the procedure I conducted the Time Out with the procedural staff and re-confirmed the patient s name, procedure, and site/side. (The Joint Commission universal protocol was followed.)  Yes      Sedatives: Fentanyl and Midazolam (Versed)    Vital signs, airway, End Tidal CO2 and pulse oximetry were monitored and remained stable throughout the procedure and sedation was maintained until the procedure was complete.  The patient was monitored by staff until sedation discharge criteria were met.    Patient  tolerance: Patient tolerated the procedure well with no immediate complications.    Time of sedation in minutes:  60 minutes from beginning to end of physician one to one monitoring.

## 2024-01-12 NOTE — DISCHARGE INSTRUCTIONS
POST-TRIAL IMPLANT INSTRUCTIONS FOR SPINAL CORD STIMULATOR (SCS) PATIENTS      DID YOU RECEIVE SEDATION TODAY?  Yes    Safety  Sedation medicine, if given, may remain active for many hours. It is important for the next 24 hours that you do not:  -Drive a car  -Operate machines or power tools  -Consume alcohol  -Sign any important papers or legal documents      During the trial period:   Do:  No strenuous activity today, gradually return to your normal activity  Continue normal activities, especially ones that cause familiar pain  Build up your physical activity by walking for brief periods each day  Sleep on your back side or on a firm mattress that suppports your back and legs equally  Use caution when changing position  Move your body without twisting by moving your shoulders and hips at the same time  Do Not:  Reach, bend or stretch  Lift more than five pounds  Pull on the trial cable or leads  Disconnect the trial cable from leads  Drive with the system on  Operative vehicles, machinery, or power equipment with the transmitter on  Climb many stairs  Sleep on your stomach  Take showers    To promote healing of your wound:  Small dressings will cover your wounds, do the following:  Keep the dressings clean and dry. Do not remove the dressings; the physician will remove the dressing at your 1 week appointment. If you do not have an appointment scheduled, the office will call you to schedule.  Take sponge baths and avoid getting the dressings wet  Call your physician if you notice any of the following symptoms of an infection: fever, chills, increased pain at the incision site, excessive drainage, pus, or redness    SCS Trial Instructions:  The screening period lasts about 5-10 days  Turn up the controls slowly. This will prevent sudden and uncomfortable stimulation.  During the trial period the stimulation sensation will change depending on body position. You may have to increase or decrease the amplitude for  comfort. These changes are normal  Stimulation needs may be greater when sitting, standing and walking. Amplitude needs will increase as your activity increases  Try turning the stimulator off when pain is relieved, and on again at the onset of pain.  Keep the screener dry    Burst/Dorsal Root Ganglion (DRG) Trial Stimulator: Your device has been turned on. Until your lead pull appointment, please pay attention to pain relief, improvements in quality of life such as sleeping, sitting, standing, walking and daily activities, as well as reduction in pain medication. You may have discomfort at the insertion site; that is normal. An icepack will help alleviate some of the procedure pain. For the duration of the trial, be careful with bending, lifting and twisting so that the leads do not move. No showers; you may sponge bath, but do not get your back wet. When you return for your lead pull appointment, bring your  kit. At that time, your device will be removed and we ll have a discussion on your trial experience. If you have questions about the device, please contact your rep; their business card is on the back of your  kit. If you have questions or concerns that are not device-related, please contact your physician.    PLEASE KEEP TRACK OF YOUR SYMPTOMS AND NOTE YOUR IMPROVEMENT FOR YOUR DOCTOR.     Please contact us if you have:  -Severe pain  -Fever more than 101.5 degrees Fahrenheit  -Signs of infection at the injection site (redness, swelling, or drainage)    FOR PAIN CENTER PATIENTS:  If you have questions, please contact the Pain Clinic at [phone] Option #1 between the hours of 7:00 am and 3:00 pm Monday through Friday. After office hours you can contact the on call provider by dialing [phone]. If you need immediate attention, we recommend that you go to a hospital emergency room or dial [phone].      FOR PM&R PATIENTS:  For patients seen by the PM and R service, please call  [phone]. If you need to fax a pain diary to PM&R the fax number is [phone]. If you are unable to fax, uploading to [employer] is encouraged, then send to provider. If you have procedure scheduling questions please call [phone] Option #2.

## 2024-01-17 ENCOUNTER — TELEPHONE (OUTPATIENT)
Dept: PSYCHIATRY | Facility: CLINIC | Age: 64
End: 2024-01-17

## 2024-01-17 DIAGNOSIS — F33.2 SEVERE EPISODE OF RECURRENT MAJOR DEPRESSIVE DISORDER, WITHOUT PSYCHOTIC FEATURES (H): Primary | ICD-10-CM

## 2024-01-17 NOTE — TELEPHONE ENCOUNTER
Writer called and spoke to patient about her new insurance considering Ketamine experimental and how coverage might not be possible for infusions.  She verbalized understanding of this.      Discussed IM ketamine per Dr. Yanes and how this is covered compared to IV and the differences in the two.  Patient stated that moving to IM sounds like a good idea and scheduled an injection for tomorrow.

## 2024-01-17 NOTE — TELEPHONE ENCOUNTER
----- Message from Zafar Yanes MD sent at 1/16/2024  1:13 PM CST -----  Regarding: RE: Ketamine  Yes probably discussing it thanks.   ----- Message -----  From: María Miller RN  Sent: 1/16/2024  10:20 AM CST  To: Zafar Yanes MD  Subject: FW: Ketamine                                     Do you want us to discuss IM with her?  Looks like she is coming in q3 weeks.      Saniya   ----- Message -----  From: Shilpi Dinero RN  Sent: 1/16/2024   9:42 AM CST  To: Me [facility] Rn  Subject: FW: Ketamine                                       ----- Message -----  From: Zafar Yanes MD  Sent: 1/16/2024   9:37 AM CST  To: Gabriela Burch; Maggie Devi; #  Subject: RE: Ketamine                                     Thank you for the quick response.     ----- Message -----  From: Maggie Devi  Sent: 1/16/2024   9:25 AM CST  To: Zafar Yanes MD; Gabriela Burch; #  Subject: RE: Ketamine                                     Hello,    The decision to not proceed on pending [employer] auths is a systemwide and applies to chemo and non chemo medications. Patient's new insurance considers Ketamine as experimental and investigational. There is no guarantee we will get them to approve the treatment.    Thanks,  Semaj   ----- Message -----  From: Zafar Yanes MD  Sent: 1/16/2024   8:46 AM CST  To: Gabriela Burch; Maggie Devi; #  Subject: RE: Ketamine                                     Thank you, Semaj. Cancelling a scheduled treatment while waiting for a PA makes me very worried. Is this the policy for all of [facility]? Do you know if scheduled chemotherapy infusions that are part of ongoing care for cancer are postponed if the PA is still pending? I am cc'ing a  in risk management Gabriela Bruch.       ----- Message -----  From: Maggie Devi  Sent: 1/15/2024  11:49 AM CST  To: Zfaar Yanes MD; #  Subject: Ketamine                                         Hello,    Patient  is scheduled  to receive their Ketamine on 1/16/24    Due to a policy change [employer] ([facility]) implemented August 1st, 2023, we cannot proceed with treatment until the prior authorization has been approved by [facility].     As of 1/15 the PA was still pending. We will continue to follow up, but 1/16 appointment will need to be postponed.      A member of the care team (Provider/RNCC) must contact patient to postpone/reschedule and answer clinical questions.    Please let us know if you have any questions.    Thanks,  Semaj

## 2024-01-18 ENCOUNTER — ALLIED HEALTH/NURSE VISIT (OUTPATIENT)
Dept: PSYCHIATRY | Facility: CLINIC | Age: 64
End: 2024-01-18
Payer: COMMERCIAL

## 2024-01-18 VITALS — HEART RATE: 81 BPM | SYSTOLIC BLOOD PRESSURE: 121 MMHG | DIASTOLIC BLOOD PRESSURE: 70 MMHG

## 2024-01-18 DIAGNOSIS — F33.2 SEVERE EPISODE OF RECURRENT MAJOR DEPRESSIVE DISORDER, WITHOUT PSYCHOTIC FEATURES (H): Primary | ICD-10-CM

## 2024-01-18 ASSESSMENT — PAIN SCALES - PAIN ENJOYMENT GENERAL ACTIVITY SCALE (PEG)
AVG_PAIN_PASTWEEK: 6
INTERFERED_ENJOYMENT_LIFE: 6
PEG_TOTALSCORE: 5.67
INTERFERED_GENERAL_ACTIVITY: 5

## 2024-01-18 ASSESSMENT — PATIENT HEALTH QUESTIONNAIRE - PHQ9: SUM OF ALL RESPONSES TO PHQ QUESTIONS 1-9: 17

## 2024-01-19 ENCOUNTER — OFFICE VISIT (OUTPATIENT)
Dept: ANESTHESIOLOGY | Facility: CLINIC | Age: 64
End: 2024-01-19
Payer: COMMERCIAL

## 2024-01-19 ENCOUNTER — ANCILLARY PROCEDURE (OUTPATIENT)
Dept: GENERAL RADIOLOGY | Facility: CLINIC | Age: 64
End: 2024-01-19
Attending: ANESTHESIOLOGY
Payer: COMMERCIAL

## 2024-01-19 VITALS
HEIGHT: 64 IN | SYSTOLIC BLOOD PRESSURE: 101 MMHG | BODY MASS INDEX: 27.66 KG/M2 | WEIGHT: 162 LBS | HEART RATE: 76 BPM | DIASTOLIC BLOOD PRESSURE: 68 MMHG | OXYGEN SATURATION: 100 %

## 2024-01-19 DIAGNOSIS — M96.1 FAILED BACK SURGICAL SYNDROME: ICD-10-CM

## 2024-01-19 DIAGNOSIS — M96.1 FAILED BACK SURGICAL SYNDROME: Primary | ICD-10-CM

## 2024-01-19 DIAGNOSIS — Z96.89 SPINAL CORD STIMULATOR STATUS: ICD-10-CM

## 2024-01-19 PROCEDURE — 99024 POSTOP FOLLOW-UP VISIT: CPT | Performed by: ANESTHESIOLOGY

## 2024-01-19 PROCEDURE — 72100 X-RAY EXAM L-S SPINE 2/3 VWS: CPT | Performed by: STUDENT IN AN ORGANIZED HEALTH CARE EDUCATION/TRAINING PROGRAM

## 2024-01-19 ASSESSMENT — PAIN SCALES - GENERAL: PAINLEVEL: MODERATE PAIN (5)

## 2024-01-19 NOTE — LETTER
1/19/2024       RE: Janelle White  [address] [facility] [id number]       Dear Colleague,    Thank you for referring your patient, Janelle White, to the [facility] [place] [facility]. Please see a copy of my visit note below.      PAIN MEDICINE CLINIC PROCEDURE NOTE    Procedure name: Spinal cord stimulator trial lead removal    The patient was brought to the exam room, and the dressings over the percutaneous leads were inspected and found intact.  The sterile Opsites were well adherent. The dressings were removed and the sutures removed from the leads utilizing sterile technique and suture removal scissors and forceps. The leads were then withdrawn easily and without difficulty. The tips were intact and the patient had no symptoms or paresthesias with removal. The puncture sites were covered with sterile dressings.  The patient was advised not to immerse under water today or tomorrow but showering is acceptable. The patient was further advised that if the patient develops any signs of infection, neurologic changes such as new numbness or weakness or any other significant acute change, the patient should call us immediately.    Patient reports 50% reduction in pain. The patient reports that some parts of her pain were covered including the left side of the hip and low back.  The patient's sleeping has been greatly  improved.  Overall mood, emotions and temperament remained stable The patient is thinking about SCS implant.    [facility] [address]  [address] [facility] [id number] [phone]  Dept: [phone]                    Again, thank you for allowing me to participate in the care of your patient.      Sincerely,    Joslyn Cherry MD

## 2024-01-19 NOTE — PROGRESS NOTES
PAIN MEDICINE CLINIC PROCEDURE NOTE    Procedure name: Spinal cord stimulator trial lead removal    The patient was brought to the exam room, and the dressings over the percutaneous leads were inspected and found intact.  The sterile Opsites were well adherent. The dressings were removed and the sutures removed from the leads utilizing sterile technique and suture removal scissors and forceps. The leads were then withdrawn easily and without difficulty. The tips were intact and the patient had no symptoms or paresthesias with removal. The puncture sites were covered with sterile dressings.  The patient was advised not to immerse under water today or tomorrow but showering is acceptable. The patient was further advised that if the patient develops any signs of infection, neurologic changes such as new numbness or weakness or any other significant acute change, the patient should call us immediately.    Patient reports 50% reduction in pain. The patient reports that some parts of her pain were covered including the left side of the hip and low back.  The patient's sleeping has been greatly  improved.  Overall mood, emotions and temperament remained stable The patient is thinking about SCS implant.      Joslyn Cherry MD    Pain Medicine  Department of Anesthesiology  [facility] [address] [id number] [phone]  Dept: [phone]

## 2024-01-19 NOTE — NURSING NOTE
Patient presents with:  Follow Up: Follow-up Lower Back Pain SCS Lead Pull      Moderate Pain (5)         What medications are you using for pain? Methocarbamol, Aleve, Voltaren Gel, Tylenol, Ibuprofen     (New patients only) Have you been seen by another pain clinic/ provider? no    (Return Patients only) What refills are you needing today? no

## 2024-02-07 ASSESSMENT — PATIENT HEALTH QUESTIONNAIRE - PHQ9
SUM OF ALL RESPONSES TO PHQ QUESTIONS 1-9: 14
10. IF YOU CHECKED OFF ANY PROBLEMS, HOW DIFFICULT HAVE THESE PROBLEMS MADE IT FOR YOU TO DO YOUR WORK, TAKE CARE OF THINGS AT HOME, OR GET ALONG WITH OTHER PEOPLE: VERY DIFFICULT
SUM OF ALL RESPONSES TO PHQ QUESTIONS 1-9: 14

## 2024-02-08 ENCOUNTER — ALLIED HEALTH/NURSE VISIT (OUTPATIENT)
Dept: PSYCHIATRY | Facility: CLINIC | Age: 64
End: 2024-02-08
Payer: COMMERCIAL

## 2024-02-08 VITALS — DIASTOLIC BLOOD PRESSURE: 71 MMHG | HEART RATE: 86 BPM | SYSTOLIC BLOOD PRESSURE: 107 MMHG

## 2024-02-08 DIAGNOSIS — F33.2 SEVERE EPISODE OF RECURRENT MAJOR DEPRESSIVE DISORDER, WITHOUT PSYCHOTIC FEATURES (H): Primary | ICD-10-CM

## 2024-02-08 NOTE — PROGRESS NOTES
Janelle White comes into clinic today at the request of LINK Yanes MD Ordering Provider for Med Injection only Ketamine .    Procedure Prep:  Medication double check completed by: Fiona Simon RN  Prior to administration pt identified by name and : yes    Nursing Assessment:  Appearance: casual clothing   Mood: okay  Affect: wnl  Eye contact: good      2024     9:54 PM   PHQ   PHQ-9 Total Score 14   Q9: Thoughts of better off dead/self-harm past 2 weeks More than half the days   F/U: Thoughts of suicide or self-harm Yes   F/U: Self harm-plan Yes   F/U: Self-harm action No   F/U: Safety concerns No     QIDDSR 16 weekly assessment: score (not completed today). Assessment was scanned to EHR.     Procedure Performed:  VSS and mental status WNL. Patient was given ketamine. See MAR for details.     Post Procedure Assessment:  Patient tolerated the treatment with the following side effects: dissociation. Vital signs were monitored, see VS Flowsheet. Patient stated they felt ready to go home prior to discharge. AVS was offered to patient and patient declined. Patient was instructed not to drive for the remainder of the day and to notify clinic if any concerns arise.     Next appt: 3 weeks      This service provided today was under the supervising provider of the day ROSALIA Taveras MD, who was available if needed.        Adali Berg RN

## 2024-02-19 ENCOUNTER — VIRTUAL VISIT (OUTPATIENT)
Dept: PSYCHIATRY | Facility: CLINIC | Age: 64
End: 2024-02-19
Payer: COMMERCIAL

## 2024-02-19 DIAGNOSIS — F33.41 RECURRENT MAJOR DEPRESSIVE DISORDER, IN PARTIAL REMISSION (H): ICD-10-CM

## 2024-02-19 DIAGNOSIS — G89.4 CHRONIC PAIN SYNDROME: ICD-10-CM

## 2024-02-19 DIAGNOSIS — E88.89 CYP2B6 INTERMEDIATE METABOLIZER (H): ICD-10-CM

## 2024-02-19 DIAGNOSIS — E88.89 CYP2D6 POOR METABOLIZER (H): ICD-10-CM

## 2024-02-19 DIAGNOSIS — F34.1 PERSISTENT DEPRESSIVE DISORDER: Primary | ICD-10-CM

## 2024-02-19 DIAGNOSIS — F33.9 RECURRENT MAJOR DEPRESSION RESISTANT TO TREATMENT (H): ICD-10-CM

## 2024-02-19 DIAGNOSIS — E72.12 HOMOZYGOUS FOR C677T POLYMORPHISM OF MTHFR (H): ICD-10-CM

## 2024-02-19 PROCEDURE — 99214 OFFICE O/P EST MOD 30 MIN: CPT | Mod: 95 | Performed by: PSYCHIATRY & NEUROLOGY

## 2024-02-19 RX ORDER — DEXTROAMPHETAMINE SACCHARATE, AMPHETAMINE ASPARTATE MONOHYDRATE, DEXTROAMPHETAMINE SULFATE AND AMPHETAMINE SULFATE 3.75; 3.75; 3.75; 3.75 MG/1; MG/1; MG/1; MG/1
15 CAPSULE, EXTENDED RELEASE ORAL 2 TIMES DAILY
Qty: 60 CAPSULE | Refills: 0 | Status: SHIPPED | OUTPATIENT
Start: 2024-04-21 | End: 2024-03-04

## 2024-02-19 RX ORDER — DEXTROAMPHETAMINE SACCHARATE, AMPHETAMINE ASPARTATE MONOHYDRATE, DEXTROAMPHETAMINE SULFATE AND AMPHETAMINE SULFATE 6.25; 6.25; 6.25; 6.25 MG/1; MG/1; MG/1; MG/1
25 CAPSULE, EXTENDED RELEASE ORAL DAILY
Qty: 30 CAPSULE | Refills: 0 | Status: SHIPPED | OUTPATIENT
Start: 2024-02-19 | End: 2024-03-20

## 2024-02-19 RX ORDER — DEXTROAMPHETAMINE SACCHARATE, AMPHETAMINE ASPARTATE MONOHYDRATE, DEXTROAMPHETAMINE SULFATE AND AMPHETAMINE SULFATE 6.25; 6.25; 6.25; 6.25 MG/1; MG/1; MG/1; MG/1
25 CAPSULE, EXTENDED RELEASE ORAL DAILY
Qty: 30 CAPSULE | Refills: 0 | Status: SHIPPED | OUTPATIENT
Start: 2024-04-21 | End: 2024-06-04

## 2024-02-19 RX ORDER — DEXTROAMPHETAMINE SACCHARATE, AMPHETAMINE ASPARTATE MONOHYDRATE, DEXTROAMPHETAMINE SULFATE AND AMPHETAMINE SULFATE 3.75; 3.75; 3.75; 3.75 MG/1; MG/1; MG/1; MG/1
15 CAPSULE, EXTENDED RELEASE ORAL 2 TIMES DAILY
Qty: 60 CAPSULE | Refills: 0 | Status: SHIPPED | OUTPATIENT
Start: 2024-02-19 | End: 2024-03-04

## 2024-02-19 RX ORDER — DEXTROAMPHETAMINE SACCHARATE, AMPHETAMINE ASPARTATE MONOHYDRATE, DEXTROAMPHETAMINE SULFATE AND AMPHETAMINE SULFATE 3.75; 3.75; 3.75; 3.75 MG/1; MG/1; MG/1; MG/1
15 CAPSULE, EXTENDED RELEASE ORAL 2 TIMES DAILY
Qty: 60 CAPSULE | Refills: 0 | Status: SHIPPED | OUTPATIENT
Start: 2024-03-21 | End: 2024-03-04

## 2024-02-19 RX ORDER — DEXTROAMPHETAMINE SACCHARATE, AMPHETAMINE ASPARTATE MONOHYDRATE, DEXTROAMPHETAMINE SULFATE AND AMPHETAMINE SULFATE 6.25; 6.25; 6.25; 6.25 MG/1; MG/1; MG/1; MG/1
25 CAPSULE, EXTENDED RELEASE ORAL DAILY
Qty: 30 CAPSULE | Refills: 0 | Status: SHIPPED | OUTPATIENT
Start: 2024-03-21 | End: 2024-04-20

## 2024-02-19 NOTE — PROGRESS NOTES
"Virtual Visit Details    Type of service:  Video Visit   Video Start Time: {video visit start/end time for provider to select:730105}  Video End Time:{video visit start/end time for provider to select:511701}    Originating Location (pt. Location): {video visit patient location:350845::\"Home\"}  {PROVIDER LOCATION On-site should be selected for visits conducted from your clinic location or adjoining [facility] hospital, academic office, or other nearby [facility] building. Off-site should be selected for all other provider locations, including home:180356}  Distant Location (provider location):  {virtual location provider:763102}  Platform used for Video Visit: {Virtual Visit Platforms:354862::\"[employer] It - Smartphone RFID\"}    "

## 2024-02-19 NOTE — PROGRESS NOTES
"Telemedicine Visit: The patient's condition can be safely assessed and treated via synchronous audio and visual telemedicine encounter.      Reason for Telemedicine Visit: Patient has requested telehealth visit    Originating Site (Patient Location): Patient's home    Distant Location (provider location):  Off-site    Consent:  The patient/guardian has verbally consented to: the potential risks and benefits of telemedicine (video visit) versus in person care; bill my insurance or make self-payment for services provided; and responsibility for payment of non-covered services.     Mode of Communication:  Video Conference via redealize    As the provider I attest to compliance with applicable laws and regulations related to telemedicine.        Outpatient Psychiatric Progress Note    Name: Janelle TORRES Christopher   : 1960                    Primary Care Provider: Emili Ballard MD   Therapist: Flavio Murcia, PhD,     PHQ-9 scores:      2023     2:58 PM 2024    10:15 AM 2024     9:54 PM   PHQ-9 SCORE   PHQ-9 Total Score MyChart   14 (Moderate depression)   PHQ-9 Total Score 11 17 14       STACIE-7 scores:      3/20/2023     7:59 AM 2023    10:39 PM 2023     3:44 PM   STACIE-7 SCORE   Total Score 7 (mild anxiety) 11 (moderate anxiety) 2 (minimal anxiety)   Total Score 7 11    11 2       Patient Identification:  Patient is a 63 year old,   White Choose not to answer female  who presents for return visit with me. Patient attended the phone/video session alone. Patient prefers to be called: \"Janelle\".    Interim History:  I last saw Janelle White for outpatient psychiatry return visit on 2023. During that appointment, we:  Continue Pristiq 50 mg daily for mood, anxiety.  Discussed consideration for increasing to 75 mg daily.  We will wait.  Continue methylfolate 7.5 mg daily as supplementation.  Continue Adderall XR 25 mg daily for mood augmentation  Continue Adderall " IR 15 mg twice daily as needed for mood augmentation and daytime fatigue/hypersomnia  Continue hydroxyzine 25-50 mg up to three times a day as needed for anxiety/sleep.   Continue ketamine therapy as planned.   Continue to work with your specialist from [facility] as indicated.    This provider sent message to your current pain provider and Dr. Jennings requesting an update on collaboration efforts regarding ketamine use for your pain.  Could consider individual DBT therapy.  Recommend ongoing individual psychotherapy.      2/19: Patient with ongoing chronic pain struggles.  Recent failed procedure to help improve pain.  Patient will next be meeting with a neurosurgeon to discuss any possible surgical interventions that could be helpful.  Patient remains leery about any additional surgeries.  Continues to participate in her hobbies as able.  Relatively no major changes in mood.  Does not endorse acute suicidality today but does have chronic/ongoing intermittent passive thoughts of death and suicide.  Trying to read some self-help books.  No problematic drug or alcohol use.      Past medication trials include but are not limited to:   Effexor-very flat  Celexa, zoloft, was on wellbutrin a couple years at one point  wellbutrin XL + celexa; 2005ish  tca-a ton of weight gain  depakote maybe for a short time  Abilify/lithium ?  ECT as teen - made me dull  Cytomel-not effective at all, used 50 mcg    Psychiatric ROS:  Janelle White reports mood has been: See HPI above  Anxiety has been: See HPI above  Sleep has been: struggles at times, especially due to pain  Deepa sxs: Denies  Psychosis sxs: None  ADHD/ADD sxs: None  PTSD sxs: None  PHQ9 and GAD7 scores were reviewed today if completed.   Medication side effects: Denies anything major related to current psychiatric medications  Current stressors include: Symptoms and See HPI above  Coping mechanisms and supports include: Family, Hobbies and Friends,  therapy    Current medications include:   Current Outpatient Medications   Medication Sig    albuterol (PROAIR HFA/PROVENTIL HFA/VENTOLIN HFA) 108 (90 Base) MCG/ACT inhaler Inhale 2 puffs into the lungs every 6 hours as needed for shortness of breath or wheezing    Cobalamin Combinations (VITAMIN B12-FOLIC ACID) 500-400 MCG TABS Take 1 tablet by mouth    desvenlafaxine (PRISTIQ) 50 MG 24 hr tablet Take 1 tablet (50 mg) by mouth daily    Estradiol (DIVIGEL) 1 MG/GM GEL Place 1 packet onto the skin daily    hydrOXYzine (VISTARIL) 25 MG capsule Take 1 capsule (25 mg) by mouth 3 times daily as needed for itching or anxiety    Levomefolate Glucosamine (METHYLFOLATE PO) Take 7.5 mg by mouth daily    lidocaine (LIDODERM) 5 % patch Place 3 patches onto the skin daily Apply up to 3 patches to skin. Wear for 12 hours and remove for 12 hrs.  Refill when patient requests.    medical cannabis (Patient's own supply.  Not a prescription) Medical Cannabis - Tangerine 4-6 ml by mouth daily. [facility]    methocarbamol (ROBAXIN) 500 MG tablet Take 1 tablet (500 mg) by mouth 4 times daily as needed for muscle spasms    methocarbamol (ROBAXIN) 750 MG tablet Take 1 tablet (750 mg) by mouth 4 times daily as needed for muscle spasms    naloxone (NARCAN) 4 MG/0.1ML nasal spray Spray 1 spray (4 mg) into one nostril alternating nostrils as needed for opioid reversal every 2-3 minutes until assistance arrives    NONFORMULARY Take 2 Scoops by mouth daily Protein and Vitamin shake mix - Nutritional supplement    ondansetron (ZOFRAN ODT) 8 MG ODT tab Take 1 tablet (8 mg) by mouth every 8 hours as needed for nausea    Prasterone 6.5 MG INST Place 0.5 suppositories vaginally three times a week    rOPINIRole (REQUIP) 0.25 MG tablet TAKE 1 TO 2 TABLETS(0.25 TO 0.5 MG) BY MOUTH AT BEDTIME    senna-docusate (SENOKOT-S/PERICOLACE) 8.6-50 MG tablet Take 6-9 tablets by mouth every evening    vitamin D3 (CHOLECALCIFEROL) 125 MCG (5000 UT) tablet Take  5,000 Units by mouth daily     Current Facility-Administered Medications   Medication    NONFORMULARY 1.1 mg/kg (Ideal)     The [place] Prescription Monitoring Program has been reviewed and there are no concerns about diversionary activity for controlled substances at this time.   01/24/2024 11/17/2023 1 Dextroamp-Amphet Er 25 Mg Cap 30.00 30 [facility] Bau [id number] [facility] ([id number]) 0/0  Med[place]are [place]   01/24/2024 08/22/2023 1 Dextroamp-Amphetamin 15 Mg Tab 60.00 30 [facility]au [id number] [facility] ([id number]) 0/0  [place]   12/21/2023 11/17/2023 1 Dextroamp-Amphetamin 15 Mg Tab 60.00 30 [place] Bau [id number] [facility] ([id number]) 0/0  [place]   12/21/2023 11/17/2023 1 Dextroamp-Amphet Er 25 Mg Cap 30.00 30 Al Bau [id number] [facility] ([id number]) 0/0  [place]     Past Medical/Surgical History:  Past Medical History:   Diagnosis Date    Anxiety     Cervicalgia 2007    C5-6 disc protrusion    COPD (chronic obstructive pulmonary disease) (H) 2/7/2022    Depressive disorder     ESBL (extended spectrum beta-lactamase) producing bacteria infection     History of blood transfusion 2007    Cervical fusion    Leukemia (H)     CLA large beta    Melanoma (H) 1998    Migraine     Other chronic pain     Rotator cuff tear     s/p injections    Sacroiliac inflammation (H24)     Shift work sleep disorder 12/16/2013    Urinary calculi     Vitamin B12 deficiency anemia 2006    started injections      has a past medical history of Anxiety, Cervicalgia (2007), COPD (chronic obstructive pulmonary disease) (H) (2/7/2022), Depressive disorder, ESBL (extended spectrum beta-lactamase) producing bacteria infection, History of blood transfusion (2007), Leukemia (H), Melanoma (H) (1998), Migraine, Other chronic pain, Rotator cuff tear, Sacroiliac inflammation (H24), Shift work sleep disorder (12/16/2013), Urinary calculi, and Vitamin B12 deficiency anemia (2006).    She has no past medical history of Cerebral infarction (H), Congestive heart failure (H), Coronary artery disease, Diabetes (H),  Heart disease, Hypertension, Thyroid disease, or Uncomplicated asthma.    Social History:  Reviewed. No changes to social history except as noted above in HPI.    Vital Signs:   None. This is phone/video visit.     Labs:  Most recent laboratory results reviewed and the pertinent results include:   Lab Results   Component Value Date    WBC 5.6 06/30/2022    WBC 3.2 05/24/2021     Lab Results   Component Value Date    RBC 4.61 06/30/2022    RBC 3.74 05/24/2021     Lab Results   Component Value Date    HGB 14.2 06/30/2022    HGB 11.0 05/24/2021     Lab Results   Component Value Date    HCT 43.6 06/30/2022    HCT 33.6 05/24/2021     No components found for: MCT  Lab Results   Component Value Date    MCV 95 06/30/2022    MCV 90 05/24/2021     Lab Results   Component Value Date    MCH 30.8 06/30/2022    MCH 29.4 05/24/2021     Lab Results   Component Value Date    MCHC 32.6 06/30/2022    MCHC 32.7 05/24/2021     Lab Results   Component Value Date    RDW 11.9 06/30/2022    RDW 13.4 05/24/2021     Lab Results   Component Value Date     07/04/2022     05/24/2021     Last Comprehensive Metabolic Panel:  Sodium   Date Value Ref Range Status   06/30/2022 136 133 - 144 mmol/L Final   05/24/2021 141 133 - 144 mmol/L Final     Potassium   Date Value Ref Range Status   06/30/2022 3.4 3.4 - 5.3 mmol/L Final   05/24/2021 3.9 3.4 - 5.3 mmol/L Final     Chloride   Date Value Ref Range Status   06/30/2022 105 94 - 109 mmol/L Final   05/24/2021 108 94 - 109 mmol/L Final     Carbon Dioxide   Date Value Ref Range Status   05/24/2021 25 20 - 32 mmol/L Final     Carbon Dioxide (CO2)   Date Value Ref Range Status   06/30/2022 25 20 - 32 mmol/L Final     Anion Gap   Date Value Ref Range Status   06/30/2022 6 3 - 14 mmol/L Final   05/24/2021 8 3 - 14 mmol/L Final     Glucose   Date Value Ref Range Status   06/30/2022 91 70 - 99 mg/dL Final   05/24/2021 87 70 - 99 mg/dL Final     Urea Nitrogen   Date Value Ref Range Status    06/30/2022 20 7 - 30 mg/dL Final   05/24/2021 15 7 - 30 mg/dL Final     Creatinine   Date Value Ref Range Status   09/27/2023 0.71 0.51 - 0.95 mg/dL Final   05/24/2021 0.64 0.52 - 1.04 mg/dL Final     GFR Estimate   Date Value Ref Range Status   09/27/2023 >90 >60 mL/min/1.73m2 Final   05/24/2021 >90 >60 mL/min/[1.73_m2] Final     Comment:     Non  GFR Calc  Starting 12/18/2018, serum creatinine based estimated GFR (eGFR) will be   calculated using the Chronic Kidney Disease Epidemiology Collaboration   (CKD-EPI) equation.       Calcium   Date Value Ref Range Status   06/30/2022 8.7 8.5 - 10.1 mg/dL Final   05/24/2021 8.4 (L) 8.5 - 10.1 mg/dL Final     Bilirubin Total   Date Value Ref Range Status   09/27/2023 0.3 <=1.2 mg/dL Final   03/16/2021 0.4 0.2 - 1.3 mg/dL Final     Alkaline Phosphatase   Date Value Ref Range Status   09/27/2023 76 35 - 104 U/L Final   03/16/2021 87 40 - 150 U/L Final     ALT   Date Value Ref Range Status   04/26/2022 21 0 - 50 U/L Final   03/16/2021 26 0 - 50 U/L Final     AST   Date Value Ref Range Status   09/27/2023 26 0 - 45 U/L Final     Comment:     Reference intervals for this test were updated on 6/12/2023 to more accurately reflect our healthy population. There may be differences in the flagging of prior results with similar values performed with this method. Interpretation of those prior results can be made in the context of the updated reference intervals.   03/16/2021 44 0 - 45 U/L Final     Review of Systems:  10 systems (general, cardiovascular, respiratory, eyes, ENT, endocrine, GI, , M/S, neurological) were reviewed. Most pertinent finding(s) is/are: Severe chronic pain. The remaining systems are all unremarkable.    Mental Status Examination (limited as this is by phone/video):  Appearance: Awake, alert, appears stated age, no acute distress  Attitude:  cooperative, pleasant   Motor: No gross abnormalities observed via video, not formally  tested.  Oriented to:  person, place, time, and situation  Attention Span and Concentration:  normal  Speech:  clear, coherent, regular rate, rhythm, and volume  Language: intact  Mood: Up and down  Affect: Mood congruent  Associations:  no loose associations  Thought Process:  logical, linear and goal oriented  Thought Content: Chronic passive suicidal ideation-does endorse acute suicidality today, no current plan or intent to harm self today, no homicidal ideation, no evidence of psychotic thought, no auditory hallucinations present and no visual hallucinations present  Recent and Remote Memory:  Intact to interview. Not formally assessed. No amnesia.  Fund of Knowledge: appropriate  Insight:  good  Judgment:  intact, adequate for safety  Impulse Control:  intact    Suicide Risk Assessment:  Today Janelle White is with chronic/intermittent suicidal ideation-no acute suicidality endorsed today.There are notable risk factors for self-harm, including anxiety, comorbid medical condition of Chronic pain, suicidal ideation, purposelessness/no reason for living, hopelessness, withdrawing and mood change. However, risk is mitigated by commitment to family, absence of past attempts, ability to volunteer a safety plan, history of seeking help when needed, future oriented and denies suicidal intent today. Therefore, based on all available evidence including the factors cited above, Janelle White does not appear to be at imminent risk for self-harm, does not meet criteria for a 72-hr hold, and therefore remains appropriate for ongoing outpatient level of care.  A thorough assessment of risk factors related to suicide and self-harm have been reviewed and are noted above. Local community safety resources reviewed for patient to use if needed. There was no deceit detected, and the patient presented in a manner that was believable.     DSM5 Diagnosis:  Persistent depressive disorder  Treatment resistant  depression  CYP2D6 poor metabolizer  CYP2B6 intermediate metabolizer  Homozygous for C677T polymorphism of MTHFR    Medical comorbidities include:   Patient Active Problem List    Diagnosis Date Noted    Failed back surgical syndrome 12/01/2023     Priority: Medium    Lumbar radiculopathy 10/26/2023     Priority: Medium    Sacroiliitis (H24) 08/04/2023     Priority: Medium    History of falling 03/13/2023     Priority: Medium    Suicidal ideation 06/30/2022     Priority: Medium    Immunocompromised (H24) 06/30/2022     Priority: Medium    Infection due to 2019 novel coronavirus 06/30/2022     Priority: Medium    Severe episode of recurrent major depressive disorder, without psychotic features (H) 04/17/2022     Priority: Medium    COPD (chronic obstructive pulmonary disease) (H) 02/07/2022     Priority: Medium    Tension type headache 11/04/2021     Priority: Medium    Rotator cuff injury 11/04/2021     Priority: Medium    Spinal stenosis of lumbar region with radiculopathy 09/02/2021     Priority: Medium    COVID-19 08/29/2021     Priority: Medium    Polyarthralgia 08/11/2021     Priority: Medium    Cellulitis, unspecified cellulitis site 08/11/2021     Priority: Medium    Sepsis, due to unspecified organism, unspecified whether acute organ dysfunction present (H) 08/11/2021     Priority: Medium    Cellulitis 08/11/2021     Priority: Medium    STACIE (generalized anxiety disorder) 07/27/2021     Priority: Medium    MTHFR gene mutation 04/12/2021     Priority: Medium    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     Priority: Medium    CYP2D6 poor metabolizer (H) 04/12/2021     Priority: Medium    Status post blepharoplasty 11/18/2020     Priority: Medium    Regular astigmatism, bilateral 11/18/2020     Priority: Medium    Prediabetes 09/19/2019     Priority: Medium    Hyperlipidemia LDL goal <130 09/19/2019     Priority: Medium    CLARE (obstructive sleep apnea) 02/13/2019     Priority: Medium    Controlled  substance agreement signed 08/14/2018     Priority: Medium    Chronic pain syndrome 12/21/2017     Priority: Medium    CLL (chronic lymphocytic leukemia) (H) 12/21/2017     Priority: Medium    Hypotension 09/14/2017     Priority: Medium    History of laser assisted in situ keratomileusis 10/14/2014     Priority: Medium    Pain medication agreement 04/23/2014     Priority: Medium     Formatting of this note might be different from the original.  Patient takes morphine 15 mg ER BID for chronic neck and back pain.  She is also on cymbalta, ibuprofen, flexaril prn      Shift work sleep disorder 12/16/2013     Priority: Medium    Vitamin D deficiency 11/08/2012     Priority: Medium    Moderate recurrent major depression (H) 01/06/2011     Priority: Medium    DDD (degenerative disc disease), cervical 10/07/2010     Priority: Medium    CARDIOVASCULAR SCREENING; LDL GOAL LESS THAN 160 02/10/2010     Priority: Medium    Chronic Low Back Pain 10/01/2009     Priority: Medium     S/p AP L3-S1 fusion 12/2010 - referred to [facility].   Orthopedics writing scripts for narcotics post-op.      Migraine      Priority: Medium     Problem list name updated by automated process. Provider to review      B-complex deficiency 10/10/2006     Priority: Medium     Problem list name updated by automated process. Provider to review      PERSONAL HX OF  MELANOMA 12/04/2003     Priority: Low       Psychosocial & Contextual Factors: see HPI above    Assessment:  6/15/2021:  Janelle White reports overall some significant worsening of mood symptoms.  Increased anxiety with her depression.  Poor motivation and poor energy.  Symptoms severe enough to prevent her from being able to utilize typical coping skills and strategies.  No current motivation or energy to participate in PHP/day treatment program.  Continues to take medication as scheduled.  Recent surgery and general anesthesia could be contributing.  Discussed discontinuing  stimulant medication as an augmentation strategy.  She is agreeable to moving forward with T3 as an augmentation for treatment resistant depression.  Discussed risks and benefits at length.  Will get baseline thyroid labs prior to starting T3.  Hoping she will experience increased energy and motivation and that her mood will lift.  Also discussed setting up an appointment with her interventional psychiatry clinic to discuss other potential options such as ketamine therapy, ECT, TMS.  Does have history of ECT for depression when she was quite young.  I am hopeful to stay ahead of her symptoms before things get too severe.  Has chronic suicidal ideation and is at high risk for suicide.  Continues to deny any plan or intent.  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  See below for risk/benefit conversation regarding T3 had with the patient:    GeneSight testing Info:  GeneSight testing revealed she is a poor 2D6 metabolizer and an intermediate 2B6 metabolizer.  This would explain her multiple failed medication trials due to the negative side effects.  She also has a genotype that would suggest a phenotype sensitivity to serotonin. She also was found to have significantly reduced folic acid conversion.      Starting T3 as augment to antidepressant therapy for treatment resistant depression:  Start T3 at 25 mcg per day for one to two weeks, and if there is little or no improvement, increase the dose to 50 mcg per day; this is consistent with practice guidelines from the American Psychiatric Association and [place] [employer] for Mood and Anxiety Treatments.     Adverse effects consistent with hyperthyroidism may occur, including tremor, palpitations, heat intolerance, sweating, anxiety, increased frequency of bowel movements, shortness of breath, and exacerbation of cardiac arrhythmia. In addition, hyperthyroidism that emerges during long-term treatment may lead to bone demineralization,  osteoporosis, and an increased risk of fracture    Following a normal baseline TSH concentration, no other laboratory monitoring during a four to six week trial of adjunctive T3 is necessary. However, if T3 is continued longer, a serum TSH concentration should be checked after the first one to three months of treatment and then every six months.    Today, 7/27/21:   Patient overall with little change in her symptoms.  Did not do as well on Cytomel and had limited to no improvement at all after weeks on 50 mcg.  Labs were unremarkable prior to starting Cytomel.  Opted to go back to stimulant augmentation since patient does find it quite helpful.  She has been taking 15 mg of immediate release most afternoons.  Due to good tolerability and some efficacy, we will bump up her immediate release dose slightly to 20 mg daily as needed in addition to her 20 mg extended release dose. I have no concerns about misuse or diversion at this time.  Tolerating well with no negative side effects.  Last blood pressure normal 7/2.  Patient has noted some efficacy from Pristiq more than other antidepressant trials and so we will continue to titrate further to 100 mg daily.  She is tolerating well.  Continues to use lorazepam for anxiety, pain medicine adjunct, and for sleep as prescribed by primary care provider.  Has been utilizing roughly 100 tablets of 0.5 mg every 1.5 months.  Patient with ongoing chronic intermittent passive suicidal ideation with no plan or intent.  Denies safety concerns today.  No problematic drug or alcohol use.  I am hopeful the interventional psychiatry clinic might be able to initiate ketamine therapy or another therapy they would recommend as potentially being more helpful than patient's multiple medication/augmentation trials.    10/6/2021:  Patient with some improved depression symptoms and much more hopeful.  Seeing specialist at [facility]-feels very hurt and listened to.  Also is hopeful there will be some  helpful treatments.  Visit to [place] was also very good and instilled a lot of hope in her abilities.  She was encouraged to continue to push herself to do the things she enjoys doing.  No medication changes today.  She will follow-up with getting TMS rescheduled, possibly when things slow down after the holidays.  Does not need to be seen until after the holidays.  No acute safety concerns today.  No problematic drug or alcohol use.    1/14/2022:  Overall patient feeling a little more down lately.  Tolerating TMS well.  Encouraged to continue TMS.  No medication changes today since undergoing TMS treatment.  Talked about life stages today generativity versus stagnation and also integrity versus despair.  Talked about consideration for acceptance and commitment therapy.  She is encouraged to continue to be active.  No acute suicidal ideation today.  No acute safety concerns today.  No problematic drug or alcohol use.    4/13/2022:   Patient continues to have symptoms that wax and wane.  Feels like she tolerates Pristiq 50 mg better than 100 and will continue on this dose.  Last week was particularly difficult.  Pretty intense suicidal ideation but she was able to manage these thoughts and remain safe.  She does report she would come to the hospital if necessary.  We discussed the empath unit today and other resources if she felt she could not keep herself safe.  She continues to work on tapering her narcotic pain medication regimen.  She is working with addiction medicine and also pain management.  She is starting Pilates therapy.  Pool therapy will begin in July.  Discussed possibility of vestibular rehabilitation.  Individual therapy will also start soon.  She was strongly encouraged to continue to pursue ketamine therapy.  No acute suicidality today.  No problematic drug or alcohol use.    6/23/2022:  Overall reports doing relatively okay.  Some pretty down days still, but ketamine therapy going well.  Feels  like continuing to move in the right direction.  Suicidal ideation improving.  Off all narcotic pain medication.  Feels good about her progress.  Had some really down days after off pain medication but improved since those few dark days.  Hopeful about where ketamine therapy may lead.  Discussed some of her restlessness at bedtime and will start pramipexole.  Also some evidence to suggest pramipexole could be helpful for treatment resistant depression symptoms.  Discussed risks and benefits of therapy, including watching for any impulsive behaviors.  Continues to have suicidal thoughts but no acute suicidality today.  Her suicidality overall is improving from her baseline suicidality.  She continues to consider the idea of a partial hospital program.  We will send her information for [facility] program.  Also encouraged her to discuss possibility of a pain program through [facility] with Dr. Medley.  No acute safety concerns today.  No problematic drug or alcohol use.    7/11/2022:  Patient with some recent more intensive struggles.  Depression much more severe recently.  Led to hospitalization.  Patient medically hospitalized since was positive for COVID and has history of CLL and [place] protocol requires isolation.  Patient seen by psychiatry consult service.  No medication changes made.  Patient continues with interventional psychiatry clinic.  They will be increasing ketamine dose and treatments will be every few weeks.  We discussed patient's symptom history a little more today and possible bipolar diagnosis came into question.  Patient does recognize possible hypomanic episodes in the past.  Patient is currently monitoring symptoms closely and pramipexole.  She is feeling better since starting pramipexole but is watching shopping behaviors closely.  Suicidal ideation is improving since hospitalization.  Restless legs improved at night on pramipexole.  Discussed we could consider adding lower dose lithium  as an augment to her Pristiq and to help stabilize moods a little bit more and to further help with some of her chronic suicidal ideation.  We also discussed DBT for chronic suicidal ideation and ongoing mood instability.  Continues off of pain medication.  No acute suicidality or safety concerns today.  Patient will follow up in a few weeks.  No medication changes today since we will wait to see how ketamine dose change goes.  No drug or alcohol use concerns.  Patient noted some ongoing cognitive/memory concerns and requested testing.  Neuropsychological referral placed.    8/8/2022:  Patient with ongoing severe depression, resistant to treatment.  She is agreeable to increasing her stimulant medication.  This could hopefully further improve mood slightly.  May also help with some additional energy and also help with some of her ongoing cognitive concerns.  She has neuropsychological testing coming up soon.  Discussed possibly considering individual DBT therapy with a DBT certified therapist given her ongoing chronic suicidal ideation and inability to participate in group therapy currently.  We will discuss this possibility with her current therapist.  Also discussed possibility of increasing Pristiq by 25 mg only 2 or 3 days a week to decrease risk of negative side effects (25 mg on Tuesdays/Thursdays for Monday/Wednesday/Fridays).  Patient also encouraged to continue ketamine treatment.  No acute suicidality today.  Patient denies any intent or plan to act on any of her suicidal thoughts today.  No problematic drug or alcohol use.    9/7/2022:  Patient doing relatively okay today.  Pain continues to feel a little bit worse.  Emotionally though, despite worsening pain symptoms, patient feels like she is doing relatively okay.  Discussed various psychotherapy approaches that could be taken to address her symptoms.  We discussed that health psychology could be an optimal approach to help with her symptoms but that  her suicidal ideation is often still quite intense and may be a distractor to her treatment with a health psychologist (discussed she may be referred often to the emergency room or to other more intensive programs).  We discussed working with an individual DBT therapist as a possibility to continue addressing her ongoing chronic suicidal ideation (individual therapy would allow patient to move at her own pace since group therapy is much too intense at this time).  Patient was open to this idea.  [facility] today we will send patient some resources/options.  Depending on patient's financial situation, there is also a possibility patient could work with a health psychologist in conjunction with a DBT therapist.  Ketamine therapy has proven to be helpful, although I do wish the effects lasted a little longer than what they currently are for the patient.  I recommend patient continue her ketamine treatments.  No medication changes today.  She denies any acute suicidality today.  No plan or intent to harm herself noted today.  She denies being in a bad place today.  No problematic drug or alcohol use.     10/7/2022:  Patient overall relatively stable at this time.  Mood improving slightly with ketamine therapy.  Patient encouraged to continue with treatment.  Anxiety a little more manageable.  Continuing to struggle with severe chronic pain.  Tolerating current medications well with no negative side effects noted.  No changes today.  Patient will continue in individual psychotherapy.  No acute suicidality today.  Suicidal thoughts at baseline, maybe even a little improved.    1/9/2023:  Patient overall doing quite well.  Some days still worse than others and chronic pain continues to be a big factor influencing her mental health.  Ketamine has been very helpful.  Still some poor energy and fatigue.  Adderall has been very helpful.  We will increase the dose just slightly.  Extended release dose will increase from 20 mg to 25 mg  to further address her symptoms.  We will continue with the 15 mg twice daily immediate release doses.  No other medication changes today.  Patient encouraged to stay the course.  No acute safety concerns.  Suicidal thoughts continue to be passive in nature and have overall improved since starting ketamine.  No problematic drug or alcohol use.    3/20/2023:  Patient remains overall relatively stable.  Having some anxiety over feeling more dependent on cannabis for her pain.  Discussed continuing gratitude journal.  Patient has come quite a ways with relative stability.  No medication changes today.  Denies that cannabis use is causing any problems apart from feeling a little anxious about her use.  Patient even participating in more activities this spring compared to last year.  No acute safety concerns at this time.  No acute suicidality.    5/19/2023:  Patient overall continuing to manage okay.  Feels relatively stable.  Continues ketamine therapy.  No med changes today.  No acute safety concerns.  No acute suicidality today.  Is pleased that she is finding some roxy in pleasurable activities this spring.  Continues to seek purpose in life.  No problematic drug or alcohol use.  Patient will be seen back in 3 months.    8/25/2023:  Continues to manage relatively okay on current regimen.  No major questions or concerns today.  Interested in keeping things status quo/the same.  Continues to receive ketamine infusions.  Pain continues to be forefront.  We discussed referral to a provider who could provide an evaluation to discuss ketamine for pain and possibly mood to replace her infusions.  Referral sent and discussed with patient.  Patient is very agreeable and interested in what ever options might be available for her.  No acute safety concerns today.  No problematic drug or alcohol use.  No acute suicidality today.  Patient continues to find ways to stay distracted.  Has started with new therapist.    11/17/2023:    Patient overall with some worsening symptoms due to some significantly worsening chronic pain.  Patient does have some relief of her pain symptoms after ketamine treatments-for up to a few days.  Patient reports referral to Dr. Rancho Jennings canceled by her current pain medicine provider since patient is already seeing this pain medicine provider within the same system.  Patient did report her pain medicine provider was going to reach out to Dr. Jennings to have a discussion about ketamine.  This provider will send staff message to both providers to see if we can get an update on the collaboration.  No medication changes made today.  Tolerating well overall.  No problematic drug or alcohol use.  Passive suicidal ideation at baseline.  No acute suicidality or plan or intent to harm self today.    2/19/2024:  Patient with ongoing pain struggles and relatively little to no changes in mood recently.  Patient will be meeting with neurosurgeon to discuss whether there are any appropriate surgical interventions for her pain.  Did not endorse any acute safety concerns today.  Did not nurse acute suicidality.  Continues ketamine therapy which she finds helpful.  No medication changes today.  Discussed DBT recommendation.  No problematic drug or alcohol use.    Medication side effects and alternatives were reviewed. Health promotion activities recommended and reviewed today. All questions addressed. Education and counseling completed regarding risks and benefits of medications and psychotherapy options. Recommend therapy for additional support.     Treatment Plan:  Continue Pristiq 50 mg daily for mood, anxiety.  In past have discussed consideration for increasing to 75 mg daily.  We will wait.  Continue methylfolate 7.5 mg daily as supplementation.  Continue Adderall XR 25 mg daily for mood augmentation  Continue Adderall IR 15 mg twice daily as needed for mood augmentation and daytime fatigue/hypersomnia  Continue  hydroxyzine 25-50 mg up to three times a day as needed for anxiety/sleep.   Continue ketamine therapy as planned.   Continue to work with your specialist from [facility] as indicated.    Recommend DBT therapy - resource given today via [employer] for DBT for professionals program out of [place] [facility].  Recommend individual psychotherapy at a minimum.    Continue all other cares per primary care provider.   Continue all other medications as reviewed per electronic medical record today.   Safety plan reviewed. To the Emergency Department as needed or call after hours crisis line at [phone] or [phone]. [place] Crisis Text Line. Text [place] to 667232 or Suicide LifeLine Chat: suicidepreBandPage.org/chat  Schedule an appointment with me in 2 months, or sooner as needed. Call [facility] at [phone] to schedule.  Follow up with primary care provider as planned or for acute medical concerns.  Call the psychiatric nurse line with medication questions or concerns at [phone].  [employer] may be used to communicate with your provider, but this is not intended to be used for emergencies.    Therapy resources:  Www.MPSI.org    https://www.mhs-dbt.com/mental-health/thrive/thrive-mental-health-and-chronic-pain-management/    [place] DBT resources:  https://www.[place]58.[facility].com/dbt-for-professionals    https://[place].gov/dhs/partners-and-providers/policies-procedures/adult-mental-health/dialectical-behavior-therapy/dbt-certified-providers/    Risks of benzodiazepine (Ativan, Xanax, Klonopin, Valium, etc) use including, but not limited to, sedation, tolerance, risk for addiction/dependence. Do not drink alcohol while taking benzodiazepines due to risk of trouble breathing and potential death. Do not drive or operate heavy machinery until it is known how the drug affects you. Discuss with physician or pharmacist before ever taking a benzodiazepine with a narcotic/opioid pain  medication.     Have previously discussed risks of stimulant medication including, but not limited to, decreased appetite, risk of tics (and that they may be lasting), trouble sleeping, cardiac risks such as increased heart rate and blood pressure, and rare risk of sudden cardiac death.  Also risk of addiction/tolerance/dependence.    Administrative Billing:   Phone Call/Video Duration: 25 Minutes  8:35a-9:00a    Patient Status:  Patient is a continuous care patient and refills will continue to come from this provider until otherwise noted.    Signed:   Kimberly Perez DO  [facility]    Disclaimer: This note consists of symbols derived from keyboarding, dictation and/or voice recognition software. As a result, there may be errors in the script that have gone undetected. Please consider this when interpreting information found in this chart.

## 2024-02-19 NOTE — PATIENT INSTRUCTIONS
Treatment Plan:  Continue Pristiq 50 mg daily for mood, anxiety.  In past have discussed consideration for increasing to 75 mg daily.  We will wait.  Continue methylfolate 7.5 mg daily as supplementation.  Continue Adderall XR 25 mg daily for mood augmentation  Continue Adderall IR 15 mg twice daily as needed for mood augmentation and daytime fatigue/hypersomnia  Continue hydroxyzine 25-50 mg up to three times a day as needed for anxiety/sleep.   Continue ketamine therapy as planned.   Continue to work with your specialist from [facility] as indicated.    Recommend DBT therapy - resource given today via [employer] for DBT for professionals program out of [place] [facility].  Recommend individual psychotherapy at a minimum.    Continue all other cares per primary care provider.   Continue all other medications as reviewed per electronic medical record today.   Safety plan reviewed. To the Emergency Department as needed or call after hours crisis line at [phone] or [phone]. [place] Crisis Text Line. Text [place] to 937874 or Suicide LifeLine Chat: suicidepreventiontheAudienceline.org/chat  Schedule an appointment with me in 2 months, or sooner as needed. Call [facility] at [phone] to schedule.  Follow up with primary care provider as planned or for acute medical concerns.  Call the psychiatric nurse line with medication questions or concerns at [phone].  [employer] may be used to communicate with your provider, but this is not intended to be used for emergencies.    Therapy resources:  Www.MPSI.org    https://www.mhs-dbt.com/mental-health/thrive/thrive-mental-health-and-chronic-pain-management/    [place] DBT resources:  https://www.[place]Crow[facility]apforpsychology.com/dbt-for-professionals    https://[place].gov[place]dhs/partners-and-providers/policies-procedures/adult-mental-health/dialectical-behavior-therapy/dbt-certified-providers/    Risks of benzodiazepine (Ativan, Xanax, Klonopin, Valium, etc) use  including, but not limited to, sedation, tolerance, risk for addiction/dependence. Do not drink alcohol while taking benzodiazepines due to risk of trouble breathing and potential death. Do not drive or operate heavy machinery until it is known how the drug affects you. Discuss with physician or pharmacist before ever taking a benzodiazepine with a narcotic/opioid pain medication.     Have previously discussed risks of stimulant medication including, but not limited to, decreased appetite, risk of tics (and that they may be lasting), trouble sleeping, cardiac risks such as increased heart rate and blood pressure, and rare risk of sudden cardiac death.  Also risk of addiction/tolerance/dependence.    Patient Education   Collaborative Care Psychiatry Service  What to Expect  Here's what to expect from your Collaborative Care Psychiatry Service (CCPS).   About CCPS  CCPS means 2 people work together to help you get better. You'll meet with a behavioral health clinician and a psychiatric doctor. A behavioral health clinician helps people with mental health problems by talking with them. A psychiatric doctor helps people by giving them medicine.  How it works  At every visit, you'll see the behavioral health clinician (BHC) first. They'll talk with you about how you're doing and teach you how to feel better.   Then you'll see the psychiatric doctor. This doctor can help you deal with troubling thoughts and feelings by giving you medicine. They'll make sure you know the plan for your care.   CCPS usually takes 3 to 6 visits. If you need more visits, we may have you start seeing a different psychiatric doctor for ongoing care.  If you have any questions or concerns, we'll be glad to talk with you.  About visits  Be open  At your visits, please talk openly about your problems. It may feel hard, but it's the best way for us to help you.  Cancelling visits  If you can't come to your visit, please call us right away at  "[phone]. If you don't cancel at least 24 hours (1 full day) before your visit, that's \"late cancellation.\"  Being late to visits  Being very late is the same as not showing up. You will be a \"no show\" if:  Your appointment starts with a [facility], and you're more than 15 minutes late for a 30-minute (half hour) visit. This will also cancel your appointment with the psychiatric doctor.  Your appointment is with a psychiatric doctor only, and you're more than 15 minutes late for a 30-minute (half hour) visit.  Your appointment is with a psychiatric doctor only, and you're more than 30 minutes late for a 60-minute (full hour) visit.  If you cancel late or don't show up 2 times within 6 months, we may end your care.   Getting help between visits  If you need help between visits, you can call us Monday to Friday from 8 a.m. to 4:30 p.m. at [phone].  Emergency care  Call 911 or go to the nearest emergency department if your life or someone else's life is in danger.  Call 988 anytime to reach the national Suicide and Crisis hotline.  Medicine refills  To refill your medicine, call your pharmacy. You can also call [facility] at [phone], Monday to Friday, 8 a.m. to 4:30 p.m. It can take 1 to 3 business days to get a refill.   Forms, letters, and tests  You may have papers to fill out, like FMLA, short-term disability, and workability. We can help you with these forms at your visits, but you must have an appointment. You may need more than 1 visit for this, to be in an intensive therapy program, or both.  Before we can give you medicine for ADHD, we may refer you to get tested for it or confirm it another way.  We may not be able to give you an emotional support animal letter.  We don't do mental health checks ordered by the court.   We don't do mental health testing, but we can refer you to get tested.   Thank you for choosing us for your care.  For informational purposes only. Not " to replace the advice of your health care provider. Copyright   2022 [facility]. All rights reserved. [employer] 030620 - 12/22.

## 2024-02-19 NOTE — NURSING NOTE
Is the patient currently in the state of [place]? YES    Visit mode:VIDEO    If the visit is dropped, the patient can be reconnected by: VIDEO VISIT: Send to e-mail at: [email or web address]    Will anyone else be joining the visit? NO  (If patient encounters technical issues they should call [phone])    How would you like to obtain your AVS? MyChart    Are changes needed to the allergy or medication list? No    Reason for visit: RECHECK    Lindy GUTIERREZ

## 2024-02-28 ASSESSMENT — PATIENT HEALTH QUESTIONNAIRE - PHQ9
10. IF YOU CHECKED OFF ANY PROBLEMS, HOW DIFFICULT HAVE THESE PROBLEMS MADE IT FOR YOU TO DO YOUR WORK, TAKE CARE OF THINGS AT HOME, OR GET ALONG WITH OTHER PEOPLE: SOMEWHAT DIFFICULT
SUM OF ALL RESPONSES TO PHQ QUESTIONS 1-9: 10

## 2024-02-29 ENCOUNTER — ALLIED HEALTH/NURSE VISIT (OUTPATIENT)
Dept: PSYCHIATRY | Facility: CLINIC | Age: 64
End: 2024-02-29
Payer: COMMERCIAL

## 2024-02-29 VITALS — DIASTOLIC BLOOD PRESSURE: 71 MMHG | HEART RATE: 86 BPM | SYSTOLIC BLOOD PRESSURE: 110 MMHG

## 2024-02-29 DIAGNOSIS — F33.41 RECURRENT MAJOR DEPRESSIVE DISORDER, IN PARTIAL REMISSION (H): Primary | ICD-10-CM

## 2024-02-29 NOTE — PROGRESS NOTES
Answers submitted by the patient for this visit:  Patient Health Questionnaire (Submitted on 2024)  If you checked off any problems, how difficult have these problems made it for you to do your work, take care of things at home, or get along with other people?: Somewhat difficult  PHQ9 TOTAL SCORE: 10  Janelle White comes into clinic today at the request of LINK Yanes MD Ordering Provider for Med Injection only Ketamine .    Procedure Prep:  Medication double check completed by: Fiona Simon RN  Prior to administration pt identified by name and : yes    Nursing Assessment:  Appearance: casual clothing   Mood: okay  Affect: wnl  Eye contact: good      2024    10:26 AM   PHQ   PHQ-9 Total Score 10   Q9: Thoughts of better off dead/self-harm past 2 weeks Several days   F/U: Thoughts of suicide or self-harm Yes   F/U: Self harm-plan Yes   F/U: Self-harm action No   F/U: Safety concerns No     QIDDSR 16 weekly assessment: score 15. Assessment was scanned to EHR.     Procedure Performed:  VSS and mental status WNL. Patient was given ketamine. See MAR for details.     Post Procedure Assessment:  Patient tolerated the treatment with the following side effects: dissociation. Vital signs were monitored, see VS Flowsheet. Patient stated they felt ready to go home prior to discharge. AVS was offered to patient and patient declined. Patient was instructed not to drive for the remainder of the day and to notify clinic if any concerns arise.     Next appt: 3 weeks      This service provided today was under the supervising provider of the day ROSALIA Taveras MD, who was available if needed.        Fiona Simon RN

## 2024-03-04 ENCOUNTER — MYC MEDICAL ADVICE (OUTPATIENT)
Dept: PSYCHIATRY | Facility: CLINIC | Age: 64
End: 2024-03-04
Payer: COMMERCIAL

## 2024-03-04 DIAGNOSIS — F33.9 RECURRENT MAJOR DEPRESSION RESISTANT TO TREATMENT (H): ICD-10-CM

## 2024-03-04 DIAGNOSIS — R41.89 SUBJECTIVE COGNITIVE IMPAIRMENT: ICD-10-CM

## 2024-03-04 DIAGNOSIS — F34.1 PERSISTENT DEPRESSIVE DISORDER: Primary | ICD-10-CM

## 2024-03-04 RX ORDER — DEXTROAMPHETAMINE SACCHARATE, AMPHETAMINE ASPARTATE, DEXTROAMPHETAMINE SULFATE AND AMPHETAMINE SULFATE 3.75; 3.75; 3.75; 3.75 MG/1; MG/1; MG/1; MG/1
15 TABLET ORAL 2 TIMES DAILY
Qty: 60 TABLET | Refills: 0 | Status: SHIPPED | OUTPATIENT
Start: 2024-05-05 | End: 2024-06-04

## 2024-03-04 RX ORDER — DEXTROAMPHETAMINE SACCHARATE, AMPHETAMINE ASPARTATE, DEXTROAMPHETAMINE SULFATE AND AMPHETAMINE SULFATE 3.75; 3.75; 3.75; 3.75 MG/1; MG/1; MG/1; MG/1
15 TABLET ORAL 2 TIMES DAILY
Qty: 60 TABLET | Refills: 0 | Status: SHIPPED | OUTPATIENT
Start: 2024-04-04 | End: 2024-05-04

## 2024-03-04 RX ORDER — DEXTROAMPHETAMINE SACCHARATE, AMPHETAMINE ASPARTATE, DEXTROAMPHETAMINE SULFATE AND AMPHETAMINE SULFATE 3.75; 3.75; 3.75; 3.75 MG/1; MG/1; MG/1; MG/1
15 TABLET ORAL 2 TIMES DAILY
Qty: 60 TABLET | Refills: 0 | Status: SHIPPED | OUTPATIENT
Start: 2024-03-04 | End: 2024-04-03

## 2024-03-04 NOTE — TELEPHONE ENCOUNTER
"RN reviewed MyC message from the pt -   \"Kd Harris,  I got refills on my Adderall. The 25 mg capsule is correct, but I received 15 mg capsules as well.  It should be 15 mg tablets so that I can do divided doses.  My insurance covered this month but sent me a letter stating the change and limit to one month for the 15 mg capsule.     Will you please cancel the remaining two 15mg capsule prescriptions and place the Adderall 15 mg tablets?  Thanks so much!  Janelle\"      RN reviewed refill history -   Panel Detail for Adderall XR 25 MG (3 MONTH SUPPLY - ORDER PANEL)  Outpatient Medication Detail   Disp Refills Start End RAMIRO   amphetamine-dextroamphetamine (ADDERALL XR) 25 MG 24 hr capsule 30 capsule 0 2/19/2024 3/20/2024 No   Sig - Route: Take 1 capsule (25 mg) by mouth daily for 30 day   AND  Panel Detail for Adderall XR 15 MG (3 MONTH SUPPLY - ORDER PANEL)  Outpatient Medication Detail   Disp Refills Start End RAMIRO   amphetamine-dextroamphetamine (ADDERALL XR) 15 MG 24 hr capsule 60 capsule 0 2/19/2024 3/20/2024 No   Sig - Route: Take 1 capsule (15 mg) by mouth 2 times daily for 30 days        RN reviewed 2/19/2024 treatment plan    2/19/2024:  Patient with ongoing pain struggles and relatively little to no changes in mood recently.  Patient will be meeting with neurosurgeon to discuss whether there are any appropriate surgical interventions for her pain.  Did not endorse any acute safety concerns today.  Did not nurse acute suicidality.  Continues ketamine therapy which she finds helpful.  No medication changes today.  Discussed DBT recommendation.  No problematic drug or alcohol use.     Medication side effects and alternatives were reviewed. Health promotion activities recommended and reviewed today. All questions addressed. Education and counseling completed regarding risks and benefits of medications and psychotherapy options. Recommend therapy for additional support.      Treatment Plan:  Continue Pristiq 50 mg " daily for mood, anxiety.  In past have discussed consideration for increasing to 75 mg daily.  We will wait.  Continue methylfolate 7.5 mg daily as supplementation.  Continue Adderall XR 25 mg daily for mood augmentation  Continue Adderall IR 15 mg twice daily as needed for mood augmentation and daytime fatigue/hypersomnia  Continue hydroxyzine 25-50 mg up to three times a day as needed for anxiety/sleep.   Continue ketamine therapy as planned.   Continue to work with your specialist from [facility] as indicated.    Recommend DBT therapy - resource given today via [employer] for DBT for professionals program out of [place] [facility].  Recommend individual psychotherapy at a minimum.    Continue all other cares per primary care provider.   Continue all other medications as reviewed per electronic medical record today.   Safety plan reviewed. To the Emergency Department as needed or call after hours crisis line at [phone] or [phone]. [place] Crisis Text Line. Text [place] to 664817 or Suicide LifeLine Chat: suicidepreventionlifeline.org/chat  Schedule an appointment with me in 2 months, or sooner as needed. Call [facility] at [phone] to schedule.  Follow up with primary care provider as planned or for acute medical concerns.  Call the psychiatric nurse line with medication questions or concerns at [phone].  [employer] may be used to communicate with your provider, but this is not intended to be used for emergencies.      RN forwarding to Dr. Perez for review.    Tori Huffman RN on 3/4/2024 at 11:41 AM

## 2024-03-20 ASSESSMENT — PATIENT HEALTH QUESTIONNAIRE - PHQ9
10. IF YOU CHECKED OFF ANY PROBLEMS, HOW DIFFICULT HAVE THESE PROBLEMS MADE IT FOR YOU TO DO YOUR WORK, TAKE CARE OF THINGS AT HOME, OR GET ALONG WITH OTHER PEOPLE: VERY DIFFICULT
SUM OF ALL RESPONSES TO PHQ QUESTIONS 1-9: 14
SUM OF ALL RESPONSES TO PHQ QUESTIONS 1-9: 14

## 2024-03-21 ENCOUNTER — ALLIED HEALTH/NURSE VISIT (OUTPATIENT)
Dept: PSYCHIATRY | Facility: CLINIC | Age: 64
End: 2024-03-21
Payer: COMMERCIAL

## 2024-03-21 ENCOUNTER — OFFICE VISIT (OUTPATIENT)
Dept: FAMILY MEDICINE | Facility: CLINIC | Age: 64
End: 2024-03-21
Payer: COMMERCIAL

## 2024-03-21 VITALS
TEMPERATURE: 97.6 F | OXYGEN SATURATION: 100 % | BODY MASS INDEX: 26.69 KG/M2 | RESPIRATION RATE: 16 BRPM | HEIGHT: 65 IN | WEIGHT: 160.2 LBS | HEART RATE: 71 BPM | DIASTOLIC BLOOD PRESSURE: 67 MMHG | SYSTOLIC BLOOD PRESSURE: 100 MMHG

## 2024-03-21 VITALS — DIASTOLIC BLOOD PRESSURE: 69 MMHG | SYSTOLIC BLOOD PRESSURE: 105 MMHG | HEART RATE: 69 BPM

## 2024-03-21 DIAGNOSIS — M46.1 SACROILIITIS (H): ICD-10-CM

## 2024-03-21 DIAGNOSIS — M47.812 CERVICAL SPONDYLOSIS: ICD-10-CM

## 2024-03-21 DIAGNOSIS — G25.81 RESTLESS LEGS SYNDROME (RLS): ICD-10-CM

## 2024-03-21 DIAGNOSIS — Z78.0 POSTMENOPAUSAL STATUS: ICD-10-CM

## 2024-03-21 DIAGNOSIS — Z79.899 CONTROLLED SUBSTANCE AGREEMENT SIGNED: ICD-10-CM

## 2024-03-21 DIAGNOSIS — J41.0 SIMPLE CHRONIC BRONCHITIS (H): ICD-10-CM

## 2024-03-21 DIAGNOSIS — M54.2 CERVICALGIA: ICD-10-CM

## 2024-03-21 DIAGNOSIS — M79.18 MYOFASCIAL PAIN: ICD-10-CM

## 2024-03-21 DIAGNOSIS — R11.0 NAUSEA: ICD-10-CM

## 2024-03-21 DIAGNOSIS — Z00.00 ENCOUNTER FOR MEDICARE ANNUAL WELLNESS EXAM: Primary | ICD-10-CM

## 2024-03-21 DIAGNOSIS — F33.9 RECURRENT MAJOR DEPRESSION RESISTANT TO TREATMENT (H): Primary | ICD-10-CM

## 2024-03-21 DIAGNOSIS — N95.2 ATROPHIC VAGINITIS: ICD-10-CM

## 2024-03-21 DIAGNOSIS — M25.50 POLYARTHRALGIA: ICD-10-CM

## 2024-03-21 PROCEDURE — G0481 DRUG TEST DEF 8-14 CLASSES: HCPCS | Performed by: NURSE PRACTITIONER

## 2024-03-21 PROCEDURE — G0438 PPPS, INITIAL VISIT: HCPCS | Performed by: NURSE PRACTITIONER

## 2024-03-21 PROCEDURE — 99214 OFFICE O/P EST MOD 30 MIN: CPT | Mod: 25 | Performed by: NURSE PRACTITIONER

## 2024-03-21 RX ORDER — LIDOCAINE 50 MG/G
3 PATCH TOPICAL DAILY
Qty: 120 PATCH | Refills: 3 | Status: SHIPPED | OUTPATIENT
Start: 2024-03-21

## 2024-03-21 RX ORDER — ROPINIROLE 0.25 MG/1
TABLET, FILM COATED ORAL
Qty: 180 TABLET | Refills: 3 | Status: SHIPPED | OUTPATIENT
Start: 2024-03-21

## 2024-03-21 RX ORDER — METHOCARBAMOL 750 MG/1
750 TABLET, FILM COATED ORAL 4 TIMES DAILY PRN
Qty: 120 TABLET | Refills: 4 | Status: SHIPPED | OUTPATIENT
Start: 2024-03-21

## 2024-03-21 RX ORDER — ESTRADIOL 1 MG/G
GEL TOPICAL
Qty: 30 G | Refills: 11 | Status: SHIPPED | OUTPATIENT
Start: 2024-03-21

## 2024-03-21 RX ORDER — ALBUTEROL SULFATE 90 UG/1
2 AEROSOL, METERED RESPIRATORY (INHALATION) EVERY 6 HOURS PRN
Qty: 8.5 G | Refills: 11 | Status: SHIPPED | OUTPATIENT
Start: 2024-03-21

## 2024-03-21 RX ORDER — ONDANSETRON 8 MG/1
8 TABLET, ORALLY DISINTEGRATING ORAL EVERY 8 HOURS PRN
Qty: 30 TABLET | Refills: 4 | Status: SHIPPED | OUTPATIENT
Start: 2024-03-21

## 2024-03-21 SDOH — HEALTH STABILITY: PHYSICAL HEALTH: ON AVERAGE, HOW MANY DAYS PER WEEK DO YOU ENGAGE IN MODERATE TO STRENUOUS EXERCISE (LIKE A BRISK WALK)?: 7 DAYS

## 2024-03-21 SDOH — HEALTH STABILITY: PHYSICAL HEALTH: ON AVERAGE, HOW MANY MINUTES DO YOU ENGAGE IN EXERCISE AT THIS LEVEL?: 40 MIN

## 2024-03-21 ASSESSMENT — ANXIETY QUESTIONNAIRES
GAD7 TOTAL SCORE: 8
5. BEING SO RESTLESS THAT IT IS HARD TO SIT STILL: SEVERAL DAYS
8. IF YOU CHECKED OFF ANY PROBLEMS, HOW DIFFICULT HAVE THESE MADE IT FOR YOU TO DO YOUR WORK, TAKE CARE OF THINGS AT HOME, OR GET ALONG WITH OTHER PEOPLE?: VERY DIFFICULT
3. WORRYING TOO MUCH ABOUT DIFFERENT THINGS: MORE THAN HALF THE DAYS
7. FEELING AFRAID AS IF SOMETHING AWFUL MIGHT HAPPEN: NOT AT ALL
6. BECOMING EASILY ANNOYED OR IRRITABLE: NEARLY EVERY DAY
1. FEELING NERVOUS, ANXIOUS, OR ON EDGE: SEVERAL DAYS
4. TROUBLE RELAXING: NOT AT ALL
GAD7 TOTAL SCORE: 8
7. FEELING AFRAID AS IF SOMETHING AWFUL MIGHT HAPPEN: NOT AT ALL
IF YOU CHECKED OFF ANY PROBLEMS ON THIS QUESTIONNAIRE, HOW DIFFICULT HAVE THESE PROBLEMS MADE IT FOR YOU TO DO YOUR WORK, TAKE CARE OF THINGS AT HOME, OR GET ALONG WITH OTHER PEOPLE: VERY DIFFICULT
2. NOT BEING ABLE TO STOP OR CONTROL WORRYING: SEVERAL DAYS

## 2024-03-21 ASSESSMENT — PAIN SCALES - GENERAL: PAINLEVEL: MODERATE PAIN (5)

## 2024-03-21 ASSESSMENT — SOCIAL DETERMINANTS OF HEALTH (SDOH): HOW OFTEN DO YOU GET TOGETHER WITH FRIENDS OR RELATIVES?: ONCE A WEEK

## 2024-03-21 NOTE — PROGRESS NOTES
Preventive Care Visit  [facility]  BRIONNA Jordan CNP, Nurse Practitioner Primary Care  Mar 21, 2024      Assessment & Plan     Encounter for Medicare annual wellness exam  Reviewed medical/social/family history and health maintenance     Postmenopausal status   Stable, tolerating med, no changes at this time  - Estradiol (DIVIGEL) 1 MG/GM GEL; Place 1 packet onto the skin daily    Nausea  Stable, uses infrequently  - ondansetron (ZOFRAN ODT) 8 MG ODT tab; Take 1 tablet (8 mg) by mouth every 8 hours as needed for nausea    Atrophic vaginitis   Stable, tolerating med, no changes at this time  - Prasterone 6.5 MG INST; Place 0.5 suppositories vaginally three times a week    Restless legs syndrome (RLS)   Stable, tolerating med, no changes at this time  - rOPINIRole (REQUIP) 0.25 MG tablet; TAKE 1 TO 2 TABLETS(0.25 TO 0.5 MG) BY MOUTH AT BEDTIME    Polyarthralgia   Stable, tolerating med, no changes at this time  - rOPINIRole (REQUIP) 0.25 MG tablet; TAKE 1 TO 2 TABLETS(0.25 TO 0.5 MG) BY MOUTH AT BEDTIME    Cervicalgia   Stable, tolerating med, no changes at this time.  Continues to follow with Pain.  - methocarbamol (ROBAXIN) 750 MG tablet; Take 1 tablet (750 mg) by mouth 4 times daily as needed for muscle spasms  - lidocaine (LIDODERM) 5 % patch; Place 3 patches onto the skin daily Apply up to 3 patches to skin. Wear for 12 hours and remove for 12 hrs.  Refill when patient requests.    Cervical spondylosis   Stable, tolerating med, no changes at this time  - methocarbamol (ROBAXIN) 750 MG tablet; Take 1 tablet (750 mg) by mouth 4 times daily as needed for muscle spasms    Myofascial pain   Stable, tolerating med, no changes at this time  - methocarbamol (ROBAXIN) 750 MG tablet; Take 1 tablet (750 mg) by mouth 4 times daily as needed for muscle spasms    Sacroiliitis (H24)   Stable, tolerating med, no changes at this time  - methocarbamol (ROBAXIN) 750 MG tablet; Take 1 tablet (750  "mg) by mouth 4 times daily as needed for muscle spasms    Simple chronic bronchitis (H)   Stable, tolerating med, no changes at this time  - albuterol (PROAIR HFA/PROVENTIL HFA/VENTOLIN HFA) 108 (90 Base) MCG/ACT inhaler; Inhale 2 puffs into the lungs every 6 hours as needed for shortness of breath or wheezing    Controlled substance agreement signed  Ketamine and Adderall managed by psychiatry.  Infrequent use of vicodin for pain exacerbations.  Will complete CSA today.    - Drug Confirmation Panel Urine with Creat - lab collect; Future  - Drug Confirmation Panel Urine with Creat - lab collect          Patient has been advised of split billing requirements and indicates understanding: Yes          BMI  Estimated body mass index is 26.76 kg/m  as calculated from the following:    Height as of this encounter: 1.648 m (5' 4.88\").    Weight as of this encounter: 72.7 kg (160 lb 3.2 oz).       Counseling  Appropriate preventive services were discussed with this patient, including applicable screening as appropriate for fall prevention, nutrition, physical activity, Tobacco-use cessation, weight loss and cognition.  Checklist reviewing preventive services available has been given to the patient.  Reviewed patient's diet, addressing concerns and/or questions.   Updated plan of care.  Patient reported difficulty with activities of daily living were addressed today.Information on urinary incontinence and treatment options given to patient.           Subjective   Janelle is a 63 year old, presenting for the following:  Physical and Refill Request        3/21/2024     2:54 PM   Additional Questions   Roomed by Penny MONTAÑO     Health Care Directive  Patient does not have a Health Care Directive or Living Will: Discussed advance care planning with patient; information given to patient to review.    HPI  Feeling like she is in a \"weird mental space\"  Working on finding a somatic counselor  Ketamine this morning  Hard to fully form " her feelings/thoughts  Feeling like she is far our there.  So labile at time          3/21/2024   General Health   How would you rate your overall physical health? Good   Feel stress (tense, anxious, or unable to sleep) Not at all         3/21/2024   Nutrition   Diet: Regular (no restrictions)         3/21/2024   Exercise   Days per week of moderate/strenous exercise 7 days   Average minutes spent exercising at this level 40 min         3/21/2024   Social Factors   Frequency of gathering with friends or relatives Once a week   Worry food won't last until get money to buy more No   Food not last or not have enough money for food? No   Do you have housing?  Yes   Are you worried about losing your housing? No   Lack of transportation? No   Unable to get utilities (heat,electricity)? No         3/21/2024   Activities of Daily Living- Home Safety   Needs help with the following daily activites Transportation    Shopping    Housework   Safety concerns in the home None of the above         3/21/2024   Dental   Dentist two times every year? Yes         3/21/2024   Hearing Screening   Hearing concerns? None of the above         3/21/2024   Driving Risk Screening   Patient/family members have concerns about driving No         3/21/2024   General Alertness/Fatigue Screening   Have you been more tired than usual lately? No         3/21/2024   Urinary Incontinence Screening   Bothered by leaking urine in past 6 months Yes         3/21/2024   TB Screening   Were you born outside of the [place]? No         Today's PHQ-9 Score:       3/21/2024    10:24 AM   PHQ-9 SCORE   PHQ-9 Total Score 18           3/21/2024   Substance Use   Alcohol more than 3/day or more than 7/wk Not Applicable   Do you have a current opioid prescription? No   How severe/bad is pain from 1 to 10? 5/10   Do you use any other substances recreationally? No     Social History     Tobacco Use    Smoking status: Former     Packs/day: 1.00     Years: 5.00     Additional  pack years: 0.00     Total pack years: 5.00     Types: Cigarettes, Clove cigarettes or kreteks     Quit date: 1984     Years since quittin.2    Smokeless tobacco: Never   Vaping Use    Vaping Use: Some days   Substance Use Topics    Alcohol use: Yes     Comment: rare    Drug use: Not Currently     Types: Cocaine, Marijuana, LSD, Psilocybin     Comment: college experimentation --none since             3/18/2024   Breast Cancer Screening   Family history of breast, colon, or ovarian cancer? No / Unknown         2023   LAST FHS-7 RESULTS   1st degree relative breast or ovarian cancer No   Any relative bilateral breast cancer No   Any male have breast cancer No   Any ONE woman have BOTH breast AND ovarian cancer No   Any woman with breast cancer before 50yrs No   2 or more relatives with breast AND/OR ovarian cancer No   2 or more relatives with breast AND/OR bowel cancer No        Mammogram Screening - Mammogram every 1-2 years updated in Health Maintenance based on mutual decision making      History of abnormal Pap smear: NO - age 30-65 PAP every 5 years with negative HPV co-testing recommended       ASCVD Risk   The 10-year ASCVD risk score (Nahomi YOUNG, et al., 2019) is: 3%    Values used to calculate the score:      Age: 63 years      Sex: Female      Is Non- : No      Diabetic: No      Tobacco smoker: No      Systolic Blood Pressure: 100 mmHg      Is BP treated: No      HDL Cholesterol: 50 mg/dL      Total Cholesterol: 203 mg/dL            Reviewed and updated as needed this visit by Provider                      Current providers sharing in care for this patient include:  Patient Care Team:  Carmen Garcia APRN CNP as PCP - General (Nurse Practitioner Primary Care)  Kimberly Perez DO as Assigned Behavioral Health Provider  Leyla Garcia MD as MD (Cardiovascular Disease)  Joslyn Cherry MD as MD (Anesthesiology)  Leyla Garcia MD as Assigned Heart and Vascular  "Provider  Sonia Funk, PhD (PSYCHOLOGIST COUNSELING)  Joslyn Cherry MD as MD (Anesthesiology)  Carmen Garcia APRN CNP as Assigned PCP    The following health maintenance items are reviewed in Epic and correct as of today:  Health Maintenance   Topic Date Due    SPIROMETRY  Never done    COPD ACTION PLAN  Never done    MEDICARE ANNUAL WELLNESS VISIT  03/09/2024    DEPRESSION 6 MO INDEX REPEAT PHQ-9  04/04/2024    MAMMO SCREENING  07/18/2024    URINE DRUG SCREEN  08/17/2024    ANNUAL REVIEW OF HM ORDERS  08/17/2024    TSACIE ASSESSMENT  09/21/2024    PHQ-9  09/21/2024    GLUCOSE  06/30/2025    Pneumococcal Vaccine: Pediatrics (0 to 5 Years) and At-Risk Patients (6 to 64 Years) (3 of 3 - PPSV23 or PCV20) 02/07/2027    ADVANCE CARE PLANNING  03/10/2028    LIPID  03/16/2028    DTAP/TDAP/TD IMMUNIZATION (4 - Td or Tdap) 08/27/2029    COLORECTAL CANCER SCREENING  02/18/2030    HEPATITIS C SCREENING  Completed    DEPRESSION ACTION PLAN  Completed    MIGRAINE ACTION PLAN  Completed    INFLUENZA VACCINE  Completed    ZOSTER IMMUNIZATION  Completed    RSV VACCINE (Pregnancy & 60+)  Completed    COVID-19 Vaccine  Completed    IPV IMMUNIZATION  Aged Out    HPV IMMUNIZATION  Aged Out    MENINGITIS IMMUNIZATION  Aged Out    RSV MONOCLONAL ANTIBODY  Aged Out    HIV SCREENING  Discontinued    PAP  Discontinued            Objective    Exam  /67 (BP Location: Right arm, Patient Position: Sitting, Cuff Size: Adult Regular)   Pulse 71   Temp 97.6  F (36.4  C) (Temporal)   Resp 16   Ht 1.648 m (5' 4.88\")   Wt 72.7 kg (160 lb 3.2 oz)   LMP 05/01/2005 (LMP Unknown)   SpO2 100%   BMI 26.76 kg/m     Estimated body mass index is 26.76 kg/m  as calculated from the following:    Height as of this encounter: 1.648 m (5' 4.88\").    Weight as of this encounter: 72.7 kg (160 lb 3.2 oz).    Physical Exam  GENERAL: alert and no distress  EYES: Eyes grossly normal to inspection, PERRL and conjunctivae and sclerae normal  HENT: " ear canals and TM's normal, nose and mouth without ulcers or lesions  NECK: no adenopathy, no asymmetry, masses, or scars  RESP: lungs clear to auscultation - no rales, rhonchi or wheezes  CV: regular rate and rhythm, normal S1 S2, no S3 or S4, no murmur, click or rub, no peripheral edema  ABDOMEN: soft, nontender, no hepatosplenomegaly, no masses and bowel sounds normal  MS: no gross musculoskeletal defects noted, no edema        3/21/2024   Mini Cog   Clock Draw Score 2 Normal   3 Item Recall 3 objects recalled   Mini Cog Total Score 5              Signed Electronically by: BRIONNA Jordan CNP    Answers submitted by the patient for this visit:  STACIE-7 (Submitted on 3/21/2024)  STACIE 7 TOTAL SCORE: 8

## 2024-03-21 NOTE — PROGRESS NOTES
Janelle White comes into clinic today at the request of LINK Yanes MD Ordering Provider for Med Injection only Ketamine .    Procedure Prep:  Medication double check completed by: Fiona Simon RN  Prior to administration pt identified by name and : yes    Nursing Assessment:  Appearance: casual clothing   Mood: Okay - spoke with patient in regards to high PHQ-9 score. Says she has struggled the last week before coming in for ketamine and that this was a particularly difficult stretch for her. She is happy to have appointment today as she feels she really needs it.   Affect: WNL  Eye contact: good      3/21/2024    10:24 AM   PHQ   PHQ-9 Total Score 18   Q9: Thoughts of better off dead/self-harm past 2 weeks Nearly every day     QIDDSR 16 weekly assessment: score (14). Assessment was scanned to EHR.     Procedure Performed:  VSS and mental status WNL. Patient was given ketamine. See MAR for details.     Post Procedure Assessment:  Patient tolerated the treatment with the following side effects: dissociation. Vital signs were monitored, see VS Flowsheet. Patient stated they felt ready to go home prior to discharge. AVS was offered to patient and patient declined. Patient was instructed not to drive for the remainder of the day and to notify clinic if any concerns arise.     Next appt: 3 weeks      This service provided today was under the supervising provider of the day ROSALIA Taveras MD, who was available if needed.        Adali Berg RN

## 2024-03-21 NOTE — PATIENT INSTRUCTIONS
Preventive Care Advice   This is general advice given by our system to help you stay healthy. However, your care team may have specific advice just for you. Please talk to your care team about your preventive care needs.  Nutrition  Eat 5 or more servings of fruits and vegetables each day.  Try wheat bread, brown rice and whole grain pasta (instead of white bread, rice, and pasta).  Get enough calcium and vitamin D. Check the label on foods and aim for 100% of the RDA (recommended daily allowance).  Lifestyle  Exercise at least 150 minutes each week   (30 minutes a day, 5 days a week).  Do muscle strengthening activities 2 days a week. These help control your weight and prevent disease.  No smoking.  Wear sunscreen to prevent skin cancer.  Have a dental exam and cleaning every 6 months.  Yearly exams  See your health care team every year to talk about:  Any changes in your health.  Any medicines your care team has prescribed.  Preventive care, family planning, and ways to prevent chronic diseases.  Shots (vaccines)   HPV shots (up to age 26), if you've never had them before.  Hepatitis B shots (up to age 59), if you've never had them before.  COVID-19 shot: Get this shot when it's due.  Flu shot: Get a flu shot every year.  Tetanus shot: Get a tetanus shot every 10 years.  Pneumococcal, hepatitis A, and RSV shots: Ask your care team if you need these based on your risk.  Shingles shot (for age 50 and up).  General health tests  Diabetes screening:  Starting at age 35, Get screened for diabetes at least every 3 years.  If you are younger than age 35, ask your care team if you should be screened for diabetes.  Cholesterol test: At age 39, start having a cholesterol test every 5 years, or more often if advised.  Bone density scan (DEXA): At age 50, ask your care team if you should have this scan for osteoporosis (brittle bones).  Hepatitis C: Get tested at least once in your life.  STIs (sexually transmitted  infections)  Before age 24: Ask your care team if you should be screened for STIs.  After age 24: Get screened for STIs if you're at risk. You are at risk for STIs (including HIV) if:  You are sexually active with more than one person.  You don't use condoms every time.  You or a partner was diagnosed with a sexually transmitted infection.  If you are at risk for HIV, ask about PrEP medicine to prevent HIV.  Get tested for HIV at least once in your life, whether you are at risk for HIV or not.  Cancer screening tests  Cervical cancer screening: If you have a cervix, begin getting regular cervical cancer screening tests at age 21. Most people who have regular screenings with normal results can stop after age 65. Talk about this with your provider.  Breast cancer scan (mammogram): If you've ever had breasts, begin having regular mammograms starting at age 40. This is a scan to check for breast cancer.  Colon cancer screening: It is important to start screening for colon cancer at age 45.  Have a colonoscopy test every 10 years (or more often if you're at risk) Or, ask your provider about stool tests like a FIT test every year or Cologuard test every 3 years.  To learn more about your testing options, visit: https://[email or web address] [place]/674972.pdf.  For help making a decision, visit: https://bit.ly/dm37200.  Prostate cancer screening test: If you have a prostate and are age 55 to 69, ask your provider if you would benefit from a yearly prostate cancer screening test.  Lung cancer screening: If you are a current or former smoker age 50 to 80, ask your care team if ongoing lung cancer screenings are right for you.  For informational purposes only. Not to replace the advice of your health care provider. Copyright   2023 [facility]. All rights reserved. Clinically reviewed by the [facility]. [employer] 475224 - REV 01/24.    Learning About Activities of Daily Living  What are activities  of daily living?     Activities of daily living (ADLs) are the basic self-care tasks you do every day. These include eating, bathing, dressing, and moving around.  As you age, and if you have health problems, you may find that it's harder to do some of these tasks. If so, your doctor can suggest ideas that may help.  To measure what kind of help you may need, your doctor will ask how well you are able to do ADLs. Let your doctor know if there are any tasks that you are having trouble doing. This is an important first step to getting help. And when you have the help you need, you can stay as independent as possible.  How will a doctor assess your ADLs?  Asking about ADLs is part of a routine health checkup your doctor will likely do as you age. Your health check might be done in a doctor's office, in your home, or at a hospital. The goal is to find out if you are having any problems that could make it hard to care for yourself or that make it unsafe for you to be on your own.  To measure your ADLs, your doctor will ask how hard it is for you to do routine tasks. Your doctor may also want to know if you have changed the way you do a task because of a health problem. Your doctor may watch how you:  Walk back and forth.  Keep your balance while you stand or walk.  Move from sitting to standing or from a bed to a chair.  Button or unbutton a shirt or sweater.  Remove and put on your shoes.  It's common to feel a little worried or anxious if you find you can't do all the things you used to be able to do. Talking with your doctor about ADLs is a way to make sure you're as safe as possible and able to care for yourself as well as you can. You may want to bring a caregiver, friend, or family member to your checkup. They can help you talk to your doctor.  Follow-up care is a key part of your treatment and safety. Be sure to make and go to all appointments, and call your doctor if you are having problems. It's also a good idea  to know your test results and keep a list of the medicines you take.  Current as of: October 24, 2023               Content Version: 14.0    4115-0394 [employer].   Care instructions adapted under license by your healthcare professional. If you have questions about a medical condition or this instruction, always ask your healthcare professional. [employer] disclaims any warranty or liability for your use of this information.      Preventing Falls: Care Instructions  Injuries and health problems such as trouble walking or poor eyesight can increase your risk of falling. So can some medicines. But there are things you can do to help prevent falls. You can exercise to get stronger. You can also arrange your home to make it safer.    Talk to your doctor about the medicines you take. Ask if any of them increase the risk of falls and whether they can be changed or stopped.   Try to exercise regularly. It can help improve your strength and balance. This can help lower your risk of falling.     Practice fall safety and prevention.    Wear low-heeled shoes that fit well and give your feet good support. Talk to your doctor if you have foot problems that make this hard.  Carry a cellphone or wear a medical alert device that you can use to call for help.  Use stepladders instead of chairs to reach high objects. Don't climb if you're at risk for falls. Ask for help, if needed.  Wear the correct eyeglasses, if you need them.    Make your home safer.    Remove rugs, cords, clutter, and furniture from walkways.  Keep your house well lit. Use night-lights in hallways and bathrooms.  Install and use sturdy handrails on stairways.  Wear nonskid footwear, even inside. Don't walk barefoot or in socks without shoes.    Be safe outside.    Use handrails, curb cuts, and ramps whenever possible.  Keep your hands free by using a shoulder bag or backpack.  Try to walk in well-lit areas. Watch out for uneven ground,  "changes in pavement, and debris.  Be careful in the winter. Walk on the grass or gravel when sidewalks are slippery. Use de-icer on steps and walkways. Add non-slip devices to shoes.    Put grab bars and nonskid mats in your shower or tub and near the toilet. Try to use a shower chair or bath bench when bathing.   Get into a tub or shower by putting in your weaker leg first. Get out with your strong side first. Have a phone or medical alert device in the bathroom with you.   Where can you learn more?  Go to https://www.iCreate.net/patiented  Enter G117 in the search box to learn more about \"Preventing Falls: Care Instructions.\"  Current as of: July 17, 2023               Content Version: 14.0    0359-8347 Where.   Care instructions adapted under license by your healthcare professional. If you have questions about a medical condition or this instruction, always ask your healthcare professional. Where disclaims any warranty or liability for your use of this information.      Bladder Training: Care Instructions  Your Care Instructions     Bladder training is used to treat urge incontinence and stress incontinence. Urge incontinence means that the need to urinate comes on so fast that you can't get to a toilet in time. Stress incontinence means that you leak urine because of pressure on your bladder. For example, it may happen when you laugh, cough, or lift something heavy.  Bladder training can increase how long you can wait before you have to urinate. It can also help your bladder hold more urine. And it can give you better control over the urge to urinate.  It is important to remember that bladder training takes a few weeks to a few months to make a difference. You may not see results right away, but don't give up.  Follow-up care is a key part of your treatment and safety. Be sure to make and go to all appointments, and call your doctor if you are having problems. It's also a " good idea to know your test results and keep a list of the medicines you take.  How can you care for yourself at home?  Work with your doctor to come up with a bladder training program that is right for you. You may use one or more of the following methods.  Delayed urination  In the beginning, try to keep from urinating for 5 minutes after you first feel the need to go.  While you wait, take deep, slow breaths to relax. Kegel exercises can also help you delay the need to go to the bathroom.  After some practice, when you can easily wait 5 minutes to urinate, try to wait 10 minutes before you urinate.  Slowly increase the waiting period until you are able to control when you have to urinate.  Scheduled urination  Empty your bladder when you first wake up in the morning.  Schedule times throughout the day when you will urinate.  Start by going to the bathroom every hour, even if you don't need to go.  Slowly increase the time between trips to the bathroom.  When you have found a schedule that works well for you, keep doing it.  If you wake up during the night and have to urinate, do it. Apply your schedule to waking hours only.  Kegel exercises  These tighten and strengthen pelvic muscles, which can help you control the flow of urine. (If doing these exercises causes pain, stop doing them and talk with your doctor.) To do Kegel exercises:  Squeeze your muscles as if you were trying not to pass gas. Or squeeze your muscles as if you were stopping the flow of urine. Your belly, legs, and buttocks shouldn't move.  Hold the squeeze for 3 seconds, then relax for 5 to 10 seconds.  Start with 3 seconds, then add 1 second each week until you are able to squeeze for 10 seconds.  Repeat the exercise 10 times a session. Do 3 to 8 sessions a day.  When should you call for help?  Watch closely for changes in your health, and be sure to contact your doctor if:    Your incontinence is getting worse.     You do not get better as  "expected.   Where can you learn more?  Go to https://www.[employer].net/patiented  Enter V684 in the search box to learn more about \"Bladder Training: Care Instructions.\"  Current as of: November 15, 2023               Content Version: 14.0    6036-0490 [employer].   Care instructions adapted under license by your healthcare professional. If you have questions about a medical condition or this instruction, always ask your healthcare professional. [employer] disclaims any warranty or liability for your use of this information.      "

## 2024-03-21 NOTE — LETTER
[facility]  03/21/24  Patient: Janelle White  YOB: 1960  Medical Record Number: [id number]                                                                                  Non-Opioid Controlled Substance Agreement    This is an agreement between you and your provider regarding safe and appropriate use of controlled substances prescribed by your care team. Controlled substances are?medicines that can cause physical and mental dependence (abuse).     There are strict laws about having and using these medicines. We here at [facility] are  committed to working with you in your efforts to get better. To support you in this work, we'll help you schedule regular office appointments for medicine refills. If we must cancel or change your appointment for any reason, we'll make sure you have enough medicine to last until your next appointment.     As a Provider, I will:   Listen carefully to your concerns while treating you with respect.   Recommend a treatment plan that I believe is in your best interest and may involve therapies other than medicine.    Talk with you often about the possible benefits and the risk of harm of any medicine that we prescribe for you.  Assess the safety of this medicine and check how well it works.    Provide a plan on how to taper (discontinue or go off) using this medicine if the decision is made to stop its use.      ::  As a Patient, I understand controlled substances:     Are prescribed by my care provider to help me function or work and manage my condition(s).?  Are strong medicines and can cause serious side effects.     Need to be taken exactly as prescribed.?Combining controlled substances with certain medicines or chemicals (such as illegal drugs, alcohol, sedatives, sleeping pills, and benzodiazepines) can be dangerous or even fatal.? If I stop taking my medicines suddenly, I may have severe withdrawal symptoms.     The risks,  benefits, and side effects of these medicine(s) were explained to me. I agree that:    I will take part in other treatments as advised by my care team. This may be psychiatry or counseling, physical therapy, behavioral therapy, group treatment or a referral to specialist.    I will keep all my appointments and understand this is part of the monitoring of controlled substances.?My care team may require an office visit for EVERY controlled substance refill. If I miss appointments or don t follow instructions, my care team may stop my medicine    I will take my medicines as prescribed. I will not change the dose or schedule unless my care team tells me to. There will be no refills if I run out early.      I may be asked to come to the clinic and complete a urine drug test or complete a pill count. If I don t give a urine sample or participate in a pill count, the care team may stop my medicine.    I will only receive controlled substance prescriptions from this clinic. If I am treated by another provider, I will tell them that I am taking controlled substances and that I have a treatment agreement with this provider. I will inform my [facility] care team within one business day if I am given a prescription for any controlled substance by another healthcare provider. My [facility] care team can contact other providers and pharmacists about my use of any medicines.    It is up to me to make sure that I don't run out of my medicines on weekends or holidays.?If my care team is willing to refill my prescription without a visit, I must request refills only during office hours. Refills may take up to 3 business days to process. I will use one pharmacy to fill all my controlled substance prescriptions. I will notify the clinic about any changes to my insurance or medicine availability.    I am responsible for my prescriptions. If the medicine/prescription is lost, stolen or destroyed, it will not be replaced.?I  also agree not to share controlled substance medicines with anyone.     I am aware I should not use any illegal or recreational drugs. I agree not to drink alcohol unless my care team says I can.     If I enroll in the [place] Medical Cannabis program, I will tell my care team before my next refill.    I will tell my care team right away if I become pregnant, have a new medical problem treated outside of my regular clinic, or have a change in my medicines.     I understand that this medicine can affect my thinking, judgment and reaction time.? Alcohol and drugs affect the brain and body, which can affect the safety of my driving. Being under the influence of alcohol or drugs can affect my decision-making, behaviors, personal safety and the safety of others. Driving while impaired (DWI) can occur if a person is driving, operating or in physical control of a car, motorcycle, boat, snowmobile, ATV, motorbike, off-road vehicle or any other motor vehicle ([place] Statute 169A.20). I understand the risk if I choose to drive or operate any vehicle or machinery.    I understand that if I do not follow any of the conditions above, my prescriptions or treatment may be stopped or changed.   I agree that my provider, clinic care team and pharmacy may work with any city, state or federal law enforcement agency that investigates the misuse, sale or other diversion of my controlled medicine. I will allow my provider to discuss my care with, or share a copy of, this agreement with any other treating provider, pharmacy or emergency room where I receive care.     I have read this agreement and have asked questions about anything I did not understand.    ________________________________________________________  Patient Signature - Janelle White     ___________________                   Date     ________________________________________________________  Provider Signature - BRIONNA Jordan CNP       ___________________                    Date     ________________________________________________________  Witness Signature (required if provider not present while patient signing)          ___________________                   Date

## 2024-03-22 ENCOUNTER — TELEPHONE (OUTPATIENT)
Dept: FAMILY MEDICINE | Facility: CLINIC | Age: 64
End: 2024-03-22

## 2024-03-22 LAB — CREAT UR-MCNC: 142 MG/DL

## 2024-03-22 NOTE — TELEPHONE ENCOUNTER
Prior Authorization Retail Medication Request    Medication/Dose:   lidocaine (LIDODERM) 5 % patch   Diagnosis and ICD code (if different than what is on RX):    Cervicalgia [M54.2]          Insurance   Primary: [facility] MEDICARE ADVANTAGE   Insurance ID:  [id number]     Pharmacy Information (if different than what is on RX)  Name:    [id number] [place], [place] - [address] AT [facility] [address]     Phone:  [phone]   Fax:[phone]

## 2024-03-25 DIAGNOSIS — M25.50 POLYARTHRALGIA: ICD-10-CM

## 2024-03-25 DIAGNOSIS — G25.81 RESTLESS LEGS SYNDROME (RLS): ICD-10-CM

## 2024-03-25 LAB
AMPHET UR CFM-MCNC: ABNORMAL NG/ML
AMPHET/CREAT UR: ABNORMAL
KETAMINE UR QL CFM: PRESENT

## 2024-03-25 RX ORDER — ROPINIROLE 0.25 MG/1
TABLET, FILM COATED ORAL
Qty: 180 TABLET | Refills: 3 | OUTPATIENT
Start: 2024-03-25

## 2024-04-04 NOTE — TELEPHONE ENCOUNTER
Prior Authorization Approval    Authorization Effective Date: 1/5/2024  Authorization Expiration Date: 4/4/2025  Medication: lidocaine (LIDODERM) 5 % patch -APPROVED  Reference #:     Insurance Company: Other (see comments)Comment:  [employer] [phone]  Which Pharmacy is filling the prescription (Not needed for infusion/clinic administered): [id number] - [place], [place] - [address] AT [facility] [address]  Pharmacy Notified: Yes  Patient Notified: Instructed pharmacy to notify patient when script is ready to /ship.

## 2024-04-04 NOTE — TELEPHONE ENCOUNTER
Retail Pharmacy Prior Authorization Team   Phone: [phone]    PA Initiation    Medication: LIDOCAINE 5 % EX PTCH  Insurance Company: Other (see comments)Comment:  [employer] [phone]  Pharmacy Filling the Rx: [id number] - [place], [place] - [address] AT [facility] [address]  Filling Pharmacy Phone: [phone]  Filling Pharmacy Fax:    Start Date: 4/4/2024

## 2024-04-09 ENCOUNTER — ALLIED HEALTH/NURSE VISIT (OUTPATIENT)
Dept: PSYCHIATRY | Facility: CLINIC | Age: 64
End: 2024-04-09
Payer: COMMERCIAL

## 2024-04-09 VITALS — SYSTOLIC BLOOD PRESSURE: 118 MMHG | HEART RATE: 61 BPM | DIASTOLIC BLOOD PRESSURE: 78 MMHG

## 2024-04-09 DIAGNOSIS — F33.9 RECURRENT MAJOR DEPRESSION RESISTANT TO TREATMENT (H): Primary | ICD-10-CM

## 2024-04-09 NOTE — PROGRESS NOTES
Janelle White comes into clinic today at the request of LINK Yanes MD Ordering Provider for Med Injection only Ketamine .    Procedure Prep:  Medication double check completed by: Fiona Simon RN  Prior to administration pt identified by name and : yes    Nursing Assessment:  Appearance: casual clothing   Mood: Okay   Affect: WNL  Eye contact: good      2024    10:21 AM   PHQ   PHQ-9 Total Score 11   Q9: Thoughts of better off dead/self-harm past 2 weeks More than half the days     QIDDSR 16 weekly assessment: score (13). Assessment was scanned to EHR.     Procedure Performed:  VSS and mental status WNL. Patient was given ketamine. See MAR for details.     Post Procedure Assessment:  Patient tolerated the treatment with the following side effects: dissociation. Vital signs were monitored, see VS Flowsheet. Patient stated they felt ready to go home prior to discharge. AVS was offered to patient and patient declined. Patient was instructed not to drive for the remainder of the day and to notify clinic if any concerns arise.     Next appt: 3 weeks      This service provided today was under the supervising provider of the day LINK Hopkins MD, who was available if needed.        Adali Berg RN      Answers submitted by the patient for this visit:  Patient Health Questionnaire (Submitted on 2024)  If you checked off any problems, how difficult have these problems made it for you to do your work, take care of things at home, or get along with other people?: Somewhat difficult  PHQ9 TOTAL SCORE: 13

## 2024-04-12 NOTE — PROGRESS NOTES
[facility] Collaborative Care Psychiatry Services [facility]  April 15, 2024      Behavioral Health Clinician Progress Note    Patient Name: Janelle White           Service Type:  Individual      Service Location:   [facility] / Email (patient reached)     Session Start Time: ***  Session End Time: ***      Session Length: 16 - 37      Attendees: Client     Service Modality:  Video Visit:      Provider verified identity through the following two step process.  Patient provided:  Patient photo and Patient     Telemedicine Visit: The patient's condition can be safely assessed and treated via synchronous audio and visual telemedicine encounter.      Reason for Telemedicine Visit: Services only offered telehealth    Originating Site (Patient Location): Patient's home    Distant Site (Provider Location): [facility] MENTAL HEALTH & ADDICTION [facility]    Consent:  The patient/guardian has verbally consented to: the potential risks and benefits of telemedicine (video visit) versus in person care; bill my insurance or make self-payment for services provided; and responsibility for payment of non-covered services.     Patient would like the video invitation sent by:  My Chart    Mode of Communication:  Video Conference via [facility]    Distant Location (Provider):  On-site    As the provider I attest to compliance with applicable laws and regulations related to telemedicine.    Visit Activities (Refresh list every visit): [facility] Only    Diagnostic Assessment Date: [address]  Treatment Plan Review Date: 4/15/24  See Flowsheets for today's PHQ-9 and STACIE-7 results  Previous PHQ-9:       3/21/2024    10:24 AM 2024     3:22 PM 2024    10:21 AM   PHQ-9 SCORE   PHQ-9 Total Score Muulhart  13 (Moderate depression)    PHQ-9 Total Score 18 13 11     Previous STACIE-7:       2023    10:39 PM 2023     3:44 PM 3/21/2024     2:32 PM   SATCIE-7 SCORE   Total Score 11 (moderate anxiety) 2 (minimal anxiety)  "8 (mild anxiety)   Total Score 11    11 2 8       JAYRO LEVEL:      7/9/2009     9:00 AM 1/27/2011     3:00 PM   JAYRO Score (Last Two)   JAYRO Raw Score 44 50   Activation Score 70.8 86.3   JAYRO Level 4 4       DATA  Extended Session (60+ minutes): No  Interactive Complexity: No  Crisis: No  [facility] Patient: No    Treatment Objective(s) Addressed in This Session:  Target Behavior(s): disease management/lifestyle changes reducing sx of depression    Depressed Mood: Increase interest, engagement, and pleasure in doing things  Decrease frequency and intensity of feeling down, depressed, hopeless  Anxiety: will experience a reduction in anxiety and will develop more effective coping skills to manage anxiety symptoms    Current Stressors / Issues:  MH update:  Stresses:  Pending surgery?  Appetite:   Sleep:   Outpatient Provider updates: DBT?  SI/SIB/HI:  Hx of previous attempts (several) and SIB during teenage years.  Chronic passive baseline without intent or plan  AYLA:  THC chronic  Side effects/compliance:  Interventions:  Most important:    Previous hospitalizations, ECT/TMS/Ketamine/Commitment/Programmatic care    Progress on Treatment Objective(s) / Homework:  New Objective established this session - CONTEMPLATION (Considering change and yet undecided); Intervened by assessing the negative and positive thinking (ambivalence) about behavior change    Motivational Interviewing    MI Intervention: Co-Developed Goal: *** and Expressed Empathy/Understanding     Change Talk Expressed by the Patient: Desire to change Ability to change    Provider Response to Change Talk: E - Evoked more info from patient about behavior change and A - Affirmed patient's thoughts, decisions, or attempts at behavior change    Also provided psychoeducation about behavioral health condition, symptoms, and treatment options    Assessments completed prior to visit:  {OP  ASSESSMENTS:173912::\"The following assessments were completed by patient for this " "visit:\"}    Care Plan review completed: Yes    Medication Review:  Changes to psychiatric medications, see updated Medication List in EPIC.     Medication Compliance:  Yes    Changes in Health Issues:   None reported    Chemical Use Review:   Substance Use: Chemical use reviewed, no active concerns identified      Tobacco Use: No current tobacco use.      Assessment: Current Emotional / Mental Status (status of significant symptoms):  Risk status (Self / Other harm or suicidal ideation)  Patient has had a history of suicidal ideation: chronic at baseline, suicide attempts: several as teenager, and self-injurious behavior: as teen  Patient denies current fears or concerns for personal safety.  Patient denies current or recent suicidal ideation or behaviors.  Patient denies current or recent homicidal ideation or behaviors.  Patient denies current or recent self injurious behavior or ideation.  Patient denies other safety concerns.  A safety and risk management plan has been developed including: Patient consented to co-developed safety plan.  A safety and risk management plan was completed.  Patient agreed to use safety plan should any safety concerns arise.  A copy was given to the patient.    Appearance:   Appropriate   Eye Contact:   Good   Psychomotor Behavior: Normal   Attitude:   Cooperative   Orientation:   All  Speech   Rate / Production: Normal    Volume:  Normal   Mood:    Normal  Affect:    Appropriate   Thought Content:  Clear   Thought Form:  Coherent  Logical   Insight:    Good     Diagnoses:  No diagnosis found.    Collateral Reports Completed:  Communicated with: Dr Perez    Plan: (Homework, other):  Patient was given information about behavioral services and encouraged to schedule a follow up appointment with the clinic [facility] as needed.  She was also given information about mental health symptoms and treatment options .  CD Recommendations: No indications of CD issues.       Joelle Viera, " [facility]    ______________________________________________________________________    Integrated Primary Care Behavioral Health Treatment Plan    Patient's Name: Janelle White  YOB: 1960    Date of Creation: 4/15/24  Date Treatment Plan Last Reviewed/Revised: 4/15/24    DSM5 Diagnoses: {DSM5 MH Diagnosis:261415:o}  Psychosocial / Contextual Factors: medical  PROMIS (reviewed every 90 days):   The following assessments were completed by patient for this visit:  PROMIS 10-Global Health (only subscores and total score):       6/10/2022     3:24 PM 7/8/2022     1:08 PM 8/5/2022     1:06 PM 1/8/2023     6:09 PM 5/17/2023    10:43 PM 8/19/2023     6:54 PM 2/12/2024     9:31 AM   PROMIS-10 Scores Only   Global Mental Health Score 5 6 8 11 8    8 10 8   Global Physical Health Score 9 11 9 11 8    8 9 11   PROMIS TOTAL - SUBSCORES 14 17 17 22 16    16 19 19       Referral / Collaboration:  Referral to another professional/service is not indicated at this time..    Anticipated number of session for this episode of care: 5-6  Anticipation frequency of session: Monthly  Anticipated Duration of each session: 16-37 minutes  Treatment plan will be reviewed in 90 days or when goals have been changed.       MeasurableTreatment Goal(s) related to diagnosis / functional impairment(s)  Goal 1: Patient will ***    I will know I've met my goal when ***.      Objective #A (Patient Action)    Patient will {Treatment Objective Groups:722929}.  Status: New - Date: 4/15/24      Intervention(s)  Therapist will provide support through CBT, MI, Acceptance and Commitment Therapy, Dialectic Behavioral Therapy and problem solving model to explore and overcome barriers.        Patient has reviewed and agreed to the above plan.      Joelle Viera [facility]  April 15, 2024

## 2024-04-14 ASSESSMENT — ANXIETY QUESTIONNAIRES
2. NOT BEING ABLE TO STOP OR CONTROL WORRYING: SEVERAL DAYS
4. TROUBLE RELAXING: MORE THAN HALF THE DAYS
GAD7 TOTAL SCORE: 11
3. WORRYING TOO MUCH ABOUT DIFFERENT THINGS: NEARLY EVERY DAY
5. BEING SO RESTLESS THAT IT IS HARD TO SIT STILL: SEVERAL DAYS
4. TROUBLE RELAXING: MORE THAN HALF THE DAYS
2. NOT BEING ABLE TO STOP OR CONTROL WORRYING: SEVERAL DAYS
GAD7 TOTAL SCORE: 11
7. FEELING AFRAID AS IF SOMETHING AWFUL MIGHT HAPPEN: NOT AT ALL
3. WORRYING TOO MUCH ABOUT DIFFERENT THINGS: NEARLY EVERY DAY
7. FEELING AFRAID AS IF SOMETHING AWFUL MIGHT HAPPEN: NOT AT ALL
7. FEELING AFRAID AS IF SOMETHING AWFUL MIGHT HAPPEN: NOT AT ALL
6. BECOMING EASILY ANNOYED OR IRRITABLE: NEARLY EVERY DAY
GAD7 TOTAL SCORE: 11
8. IF YOU CHECKED OFF ANY PROBLEMS, HOW DIFFICULT HAVE THESE MADE IT FOR YOU TO DO YOUR WORK, TAKE CARE OF THINGS AT HOME, OR GET ALONG WITH OTHER PEOPLE?: VERY DIFFICULT
1. FEELING NERVOUS, ANXIOUS, OR ON EDGE: SEVERAL DAYS
8. IF YOU CHECKED OFF ANY PROBLEMS, HOW DIFFICULT HAVE THESE MADE IT FOR YOU TO DO YOUR WORK, TAKE CARE OF THINGS AT HOME, OR GET ALONG WITH OTHER PEOPLE?: VERY DIFFICULT
7. FEELING AFRAID AS IF SOMETHING AWFUL MIGHT HAPPEN: NOT AT ALL
GAD7 TOTAL SCORE: 11
1. FEELING NERVOUS, ANXIOUS, OR ON EDGE: SEVERAL DAYS
IF YOU CHECKED OFF ANY PROBLEMS ON THIS QUESTIONNAIRE, HOW DIFFICULT HAVE THESE PROBLEMS MADE IT FOR YOU TO DO YOUR WORK, TAKE CARE OF THINGS AT HOME, OR GET ALONG WITH OTHER PEOPLE: VERY DIFFICULT
IF YOU CHECKED OFF ANY PROBLEMS ON THIS QUESTIONNAIRE, HOW DIFFICULT HAVE THESE PROBLEMS MADE IT FOR YOU TO DO YOUR WORK, TAKE CARE OF THINGS AT HOME, OR GET ALONG WITH OTHER PEOPLE: VERY DIFFICULT
5. BEING SO RESTLESS THAT IT IS HARD TO SIT STILL: SEVERAL DAYS
GAD7 TOTAL SCORE: 11
6. BECOMING EASILY ANNOYED OR IRRITABLE: NEARLY EVERY DAY

## 2024-04-14 ASSESSMENT — PATIENT HEALTH QUESTIONNAIRE - PHQ9: SUM OF ALL RESPONSES TO PHQ QUESTIONS 1-9: 12

## 2024-04-15 ENCOUNTER — VIRTUAL VISIT (OUTPATIENT)
Dept: BEHAVIORAL HEALTH | Facility: CLINIC | Age: 64
End: 2024-04-15
Payer: COMMERCIAL

## 2024-04-15 ENCOUNTER — VIRTUAL VISIT (OUTPATIENT)
Dept: PSYCHIATRY | Facility: CLINIC | Age: 64
End: 2024-04-15
Payer: COMMERCIAL

## 2024-04-15 DIAGNOSIS — E88.89 CYP2D6 POOR METABOLIZER (H): ICD-10-CM

## 2024-04-15 DIAGNOSIS — F41.1 GAD (GENERALIZED ANXIETY DISORDER): Primary | ICD-10-CM

## 2024-04-15 DIAGNOSIS — E72.12 HOMOZYGOUS FOR C677T POLYMORPHISM OF MTHFR (H): ICD-10-CM

## 2024-04-15 DIAGNOSIS — F33.1 MODERATE RECURRENT MAJOR DEPRESSION (H): ICD-10-CM

## 2024-04-15 DIAGNOSIS — F33.9 RECURRENT MAJOR DEPRESSION RESISTANT TO TREATMENT (H): Primary | ICD-10-CM

## 2024-04-15 DIAGNOSIS — F34.1 PERSISTENT DEPRESSIVE DISORDER: ICD-10-CM

## 2024-04-15 DIAGNOSIS — E88.89 CYP2B6 INTERMEDIATE METABOLIZER (H): ICD-10-CM

## 2024-04-15 PROCEDURE — 90834 PSYTX W PT 45 MINUTES: CPT | Mod: 95 | Performed by: SOCIAL WORKER

## 2024-04-15 PROCEDURE — 99214 OFFICE O/P EST MOD 30 MIN: CPT | Mod: 95 | Performed by: PSYCHIATRY & NEUROLOGY

## 2024-04-15 RX ORDER — DESVENLAFAXINE 25 MG/1
TABLET, EXTENDED RELEASE ORAL
Qty: 90 TABLET | Refills: 1 | Status: SHIPPED | OUTPATIENT
Start: 2024-04-15

## 2024-04-15 RX ORDER — DESVENLAFAXINE 50 MG/1
TABLET, FILM COATED, EXTENDED RELEASE ORAL
Qty: 90 TABLET | Refills: 1 | Status: SHIPPED | OUTPATIENT
Start: 2024-04-15

## 2024-04-15 ASSESSMENT — ANXIETY QUESTIONNAIRES: GAD7 TOTAL SCORE: 11

## 2024-04-15 ASSESSMENT — PATIENT HEALTH QUESTIONNAIRE - PHQ9
10. IF YOU CHECKED OFF ANY PROBLEMS, HOW DIFFICULT HAVE THESE PROBLEMS MADE IT FOR YOU TO DO YOUR WORK, TAKE CARE OF THINGS AT HOME, OR GET ALONG WITH OTHER PEOPLE: SOMEWHAT DIFFICULT
SUM OF ALL RESPONSES TO PHQ QUESTIONS 1-9: 12

## 2024-04-15 NOTE — NURSING NOTE
Is the patient currently in the state of [place]? YES    Visit mode:VIDEO    If the visit is dropped, the patient can be reconnected by: VIDEO VISIT: Text to cell phone:   Telephone Information:   Mobile [phone]       Will anyone else be joining the visit? NO  (If patient encounters technical issues they should call [phone])    How would you like to obtain your AVS? MyChart    Are changes needed to the allergy or medication list? No    Are refills needed on medications prescribed by this physician? NO    Reason for visit: RECHECK    Lindy GUTIERREZ

## 2024-04-15 NOTE — PROGRESS NOTES
"Virtual Visit Details    Type of service:  Video Visit   Video Start Time: {video visit start/end time for provider to select:138827}  Video End Time:{video visit start/end time for provider to select:949530}    Originating Location (pt. Location): {video visit patient location:818008::\"Home\"}  {PROVIDER LOCATION On-site should be selected for visits conducted from your clinic location or adjoining [facility] hospital, academic office, or other nearby [facility] building. Off-site should be selected for all other provider locations, including home:011904}  Distant Location (provider location):  {virtual location provider:552747}  Platform used for Video Visit: {Virtual Visit Platforms:955400::\"Compare And Share\"}  "

## 2024-04-15 NOTE — PATIENT INSTRUCTIONS
Treatment Plan:  Increase Pristiq to 75 mg daily for mood, anxiety.  Okay to alternate 50 mg with 75 mg every couple days or every other day if 75 mg daily not tolerated.  Continue methylfolate 7.5 mg daily as supplementation.  Continue Adderall XR 25 mg daily for mood augmentation  Continue Adderall IR 15 mg twice daily as needed for mood augmentation and daytime fatigue/hypersomnia  Continue hydroxyzine 25-50 mg up to three times a day as needed for anxiety/sleep.   Continue ketamine therapy as planned.   Continue to work with your specialist from [facility] as indicated.    Continue with new somatic psychotherapist as planned.  Continue all other cares per primary care provider.   Continue all other medications as reviewed per electronic medical record today.   Safety plan reviewed. To the Emergency Department as needed or call after hours crisis line at [phone] or [phone]. [place] Crisis Text Line. Text [place] to 819407 or Suicide LifeLine Chat: suicidepreventionMyvu Corporationline.org/chat  Schedule an appointment with me in 3 months, or sooner as needed. Call [facility] at [phone] to schedule.  Follow up with primary care provider as planned or for acute medical concerns.  Call the psychiatric nurse line with medication questions or concerns at [phone].  Growlifehart may be used to communicate with your provider, but this is not intended to be used for emergencies.    Risks of benzodiazepine (Ativan, Xanax, Klonopin, Valium, etc) use including, but not limited to, sedation, tolerance, risk for addiction/dependence. Do not drink alcohol while taking benzodiazepines due to risk of trouble breathing and potential death. Do not drive or operate heavy machinery until it is known how the drug affects you. Discuss with physician or pharmacist before ever taking a benzodiazepine with a narcotic/opioid pain medication.     Have previously discussed risks of stimulant medication including, but not  "limited to, decreased appetite, risk of tics (and that they may be lasting), trouble sleeping, cardiac risks such as increased heart rate and blood pressure, and rare risk of sudden cardiac death.  Also risk of addiction/tolerance/dependence.    Patient Education   Collaborative Care Psychiatry Service  What to Expect  Here's what to expect from your Collaborative Care Psychiatry Service (CCPS).   About CCPS  CCPS means 2 people work together to help you get better. You'll meet with a behavioral health clinician and a psychiatric doctor. A behavioral health clinician helps people with mental health problems by talking with them. A psychiatric doctor helps people by giving them medicine.  How it works  At every visit, you'll see the behavioral health clinician (BHC) first. They'll talk with you about how you're doing and teach you how to feel better.   Then you'll see the psychiatric doctor. This doctor can help you deal with troubling thoughts and feelings by giving you medicine. They'll make sure you know the plan for your care.   CCPS usually takes 3 to 6 visits. If you need more visits, we may have you start seeing a different psychiatric doctor for ongoing care.  If you have any questions or concerns, we'll be glad to talk with you.  About visits  Be open  At your visits, please talk openly about your problems. It may feel hard, but it's the best way for us to help you.  Cancelling visits  If you can't come to your visit, please call us right away at [phone]. If you don't cancel at least 24 hours (1 full day) before your visit, that's \"late cancellation.\"  Being late to visits  Being very late is the same as not showing up. You will be a \"no show\" if:  Your appointment starts with a BHC, and you're more than 15 minutes late for a 30-minute (half hour) visit. This will also cancel your appointment with the psychiatric doctor.  Your appointment is with a psychiatric doctor only, and you're more than 15 " minutes late for a 30-minute (half hour) visit.  Your appointment is with a psychiatric doctor only, and you're more than 30 minutes late for a 60-minute (full hour) visit.  If you cancel late or don't show up 2 times within 6 months, we may end your care.   Getting help between visits  If you need help between visits, you can call us Monday to Friday from 8 a.m. to 4:30 p.m. at [phone].  Emergency care  Call 911 or go to the nearest emergency department if your life or someone else's life is in danger.  Call 988 anytime to reach the national Suicide and Crisis hotline.  Medicine refills  To refill your medicine, call your pharmacy. You can also call [facility]'s Behavioral Access at [phone], Monday to Friday, 8 a.m. to 4:30 p.m. It can take 1 to 3 business days to get a refill.   Forms, letters, and tests  You may have papers to fill out, like FMLA, short-term disability, and workability. We can help you with these forms at your visits, but you must have an appointment. You may need more than 1 visit for this, to be in an intensive therapy program, or both.  Before we can give you medicine for ADHD, we may refer you to get tested for it or confirm it another way.  We may not be able to give you an emotional support animal letter.  We don't do mental health checks ordered by the court.   We don't do mental health testing, but we can refer you to get tested.   Thank you for choosing us for your care.  For informational purposes only. Not to replace the advice of your health care provider. Copyright   2022 [facility]. All rights reserved. [employer] [id number] - 12/22.

## 2024-04-15 NOTE — PROGRESS NOTES
"Telemedicine Visit: The patient's condition can be safely assessed and treated via synchronous audio and visual telemedicine encounter.      Reason for Telemedicine Visit: Patient has requested telehealth visit    Originating Site (Patient Location): Patient's home    Distant Location (provider location):  On-Site    Consent:  The patient/guardian has verbally consented to: the potential risks and benefits of telemedicine (video visit) versus in person care; bill my insurance or make self-payment for services provided; and responsibility for payment of non-covered services.     Mode of Communication:  Video Conference via Peares[employer]ope    As the provider I attest to compliance with applicable laws and regulations related to telemedicine.        Outpatient Psychiatric Progress Note    Name: Janelle White   : 1960                    Primary Care Provider: Emili Ballard MD   Therapist: Lorena Corbin @ [facility]     PHQ-9 scores:      2024     3:22 PM 2024    10:21 AM 2024     9:59 AM   PHQ-9 SCORE   PHQ-9 Total Score MyChart 13 (Moderate depression)  12 (Moderate depression)   PHQ-9 Total Score 13 11 12       STACIE-7 scores:      2023     3:44 PM 3/21/2024     2:32 PM 2024    10:01 AM   STACIE-7 SCORE   Total Score 2 (minimal anxiety) 8 (mild anxiety) 11 (moderate anxiety)   Total Score 2 8 11    11       Patient Identification:  Patient is a 63 year old,   White Choose not to answer female  who presents for return visit with me. Patient attended the phone/video session alone. Patient prefers to be called: \"Janelle\".    Interim History:  I last saw Janelle White for outpatient psychiatry return visit on 2024. During that appointment, we:  Continue Pristiq 50 mg daily for mood, anxiety.  In past have discussed consideration for increasing to 75 mg daily.   Continue methylfolate 7.5 mg daily as supplementation.  Continue Adderall XR 25 mg daily for mood " "augmentation  Continue Adderall IR 15 mg twice daily as needed for mood augmentation and daytime fatigue/hypersomnia  Continue hydroxyzine 25-50 mg up to three times a day as needed for anxiety/sleep.   Continue ketamine injection therapy as planned.   Continue to work with your specialist from [facility] as indicated.    Recommend DBT therapy - resource given today via "[employer] for DBT for professionals program out of [facility].  Recommend individual psychotherapy at a minimum.      4/15: Patient overall doing okay.  Still continues to struggle with chronic pain.  Started working with a new therapist who does somatic therapy.  Patient has also joined a [place] [employer].  The nice weather has been helpful for patient to feel little better.  Continues to get ketamine injections.  No acute suicidality.  Still has intermittent passive suicidal ideation but reports it is much better and not as \"heavy\" as it has been in the past.  See [facility] note below for additional details.    Per [facility] RAMIREZ Leon, during today's team-based visit:  Has been feeling a bit more on edge/labile.  Almost seems to come in 2 week waves though often doesn't realize its happening until she is in it.  Describes feeling suffocated, feels that cannot move like she needs to and gets irritable and feels like she needs to get away.    Feels like  and daughter both don't have good boundaries and that her family always seems to be watching everything she does and encroaching in her space.    Did meet with PA's recently and due to how much she has had done there isn't a lot of room for correction.  Could possibly have a surgery on her neck but pt isn't ready for another surgery.  Is doing pilates 1-2 times a week.  Goal is to find a somatic therapist and did get connected with a therapist at [facility].  Continues with ketamine injections (rather than infusion).    Joined [place] [employer].  " Loves to be in nature and collect rocks from everywhere she has been.  Has access to a studio in [place] where she can work on her own rocks.  Wants to continue to decrease marijuana use and eventually not use it.  Feels like she has cut back to about 1/2 of what she was using.  Has a journal that she records her feelings when she uses marijuana and if she could have done something else.    Discussed [employer] journaling and pt is interested in trying it.        Past medication trials include but are not limited to:   Effexor-very flat  Celexa, zoloft, was on wellbutrin a couple years at one point  wellbutrin XL + celexa; 2005ish  tca-a ton of weight gain  depakote maybe for a short time  Abilify/lithium ?  ECT as teen - made me dull  Cytomel-not effective at all, used 50 mcg    Psychiatric ROS:  Janelle White reports mood has been: See HPI above  Anxiety has been: See HPI above  Sleep has been: struggles at times, especially due to pain  Deepa sxs: Denies  Psychosis sxs: None  ADHD/ADD sxs: None  PTSD sxs: None  PHQ9 and GAD7 scores were reviewed today if completed.   Medication side effects: Denies anything major related to current psychiatric medications  Current stressors include: Symptoms and See HPI above  Coping mechanisms and supports include: Family, Hobbies and Friends, therapy    Current medications include:   Current Outpatient Medications   Medication Sig Dispense Refill    albuterol (PROAIR HFA/PROVENTIL HFA/VENTOLIN HFA) 108 (90 Base) MCG/ACT inhaler Inhale 2 puffs into the lungs every 6 hours as needed for shortness of breath or wheezing 8.5 g 11    amphetamine-dextroamphetamine (ADDERALL XR) 25 MG 24 hr capsule Take 1 capsule (25 mg) by mouth daily for 30 days 30 capsule 0    [START ON 4/21/2024] amphetamine-dextroamphetamine (ADDERALL XR) 25 MG 24 hr capsule Take 1 capsule (25 mg) by mouth daily for 30 days 30 capsule 0    amphetamine-dextroamphetamine (ADDERALL) 15 MG tablet Take 1  tablet (15 mg) by mouth 2 times daily for 30 days 60 tablet 0    [START ON 5/5/2024] amphetamine-dextroamphetamine (ADDERALL) 15 MG tablet Take 1 tablet (15 mg) by mouth 2 times daily for 30 days 60 tablet 0    Cobalamin Combinations (VITAMIN B12-FOLIC ACID) 500-400 MCG TABS Take 1 tablet by mouth      desvenlafaxine (PRISTIQ) 50 MG 24 hr tablet Take 1 tablet (50 mg) by mouth daily 90 tablet 1    Estradiol (DIVIGEL) 1 MG/GM GEL Place 1 packet onto the skin daily 30 g 11    hydrOXYzine (VISTARIL) 25 MG capsule Take 1 capsule (25 mg) by mouth 3 times daily as needed for itching or anxiety 90 capsule 3    Levomefolate Glucosamine (METHYLFOLATE PO) Take 7.5 mg by mouth daily      lidocaine (LIDODERM) 5 % patch Place 3 patches onto the skin daily Apply up to 3 patches to skin. Wear for 12 hours and remove for 12 hrs.  Refill when patient requests. 120 patch 3    medical cannabis (Patient's own supply.  Not a prescription) Medical Cannabis - Tangerine 4-6 ml by mouth daily. [facility]      methocarbamol (ROBAXIN) 500 MG tablet Take 1 tablet (500 mg) by mouth 4 times daily as needed for muscle spasms 120 tablet 1    methocarbamol (ROBAXIN) 750 MG tablet Take 1 tablet (750 mg) by mouth 4 times daily as needed for muscle spasms 120 tablet 4    naloxone (NARCAN) 4 MG/0.1ML nasal spray Spray 1 spray (4 mg) into one nostril alternating nostrils as needed for opioid reversal every 2-3 minutes until assistance arrives 0.2 mL 1    NONFORMULARY Take 2 Scoops by mouth daily Protein and Vitamin shake mix - Nutritional supplement      ondansetron (ZOFRAN ODT) 8 MG ODT tab Take 1 tablet (8 mg) by mouth every 8 hours as needed for nausea 30 tablet 4    Prasterone 6.5 MG INST Place 0.5 suppositories vaginally three times a week 28 each 11    rOPINIRole (REQUIP) 0.25 MG tablet TAKE 1 TO 2 TABLETS(0.25 TO 0.5 MG) BY MOUTH AT BEDTIME 180 tablet 3    senna-docusate (SENOKOT-S/PERICOLACE) 8.6-50 MG tablet Take 6-9 tablets by mouth every  evening      vitamin D3 (CHOLECALCIFEROL) 125 MCG (5000 UT) tablet Take 5,000 Units by mouth daily       Current Facility-Administered Medications   Medication Dose Route Frequency Provider Last Rate Last Admin    NONFORMULARY 1.1 mg/kg (Ideal)  1.1 mg/kg (Ideal) Intramuscular Q21 Days Zafar Yanes MD   60 mg at 04/09/24 1020     The [place] Prescription Monitoring Program has been reviewed and there are no concerns about diversionary activity for controlled substances at this time.   04/03/2024 02/19/2024 1 Dextroamp-Amphet Er 25 Mg Cap 30.00 30 Al Bau [id number] [facility] ([id number]) 0/0  Med[place]are [place]   04/02/2024 11/17/2023 1 Dextroamp-Amphetamin 15 Mg Tab 60.00 30 Al Bau [id number] [facility] ([id number]) 0/0  [place]are [place]   03/04/2024 03/04/2024 1 Dextroamp-Amphetamin 15 Mg Tab 60.00 30 Al Bau [id number] [facility] ([id number]) 0/0  [place]   02/28/2024 02/19/2024 1 Dextroamp-Amphet Er 25 Mg Cap 30.00 30 Al Bau [id number] [facility] ([id number]) 0/0  [place]   02/19/2024 02/19/2024 1 Dextroamp-Amphet Er 15 Mg Cap 60.00 30 Al Bau [id number] [facility] ([id number]) 0/0  [place]are [place]       Past Medical/Surgical History:  Past Medical History:   Diagnosis Date    Anxiety     Cervicalgia 2007    C5-6 disc protrusion    COPD (chronic obstructive pulmonary disease) (H) 2/7/2022    Depressive disorder     ESBL (extended spectrum beta-lactamase) producing bacteria infection     History of blood transfusion 2007    Cervical fusion    Leukemia (H)     CLA large beta    Melanoma (H) 1998    Migraine     Other chronic pain     Rotator cuff tear     s/p injections    Sacroiliac inflammation (H24)     Shift work sleep disorder 12/16/2013    Urinary calculi     Vitamin B12 deficiency anemia 2006    started injections      has a past medical history of Anxiety, Cervicalgia (2007), COPD (chronic obstructive pulmonary disease) (H) (2/7/2022), Depressive disorder, ESBL (extended spectrum beta-lactamase) producing bacteria infection, History of blood transfusion (2007), Leukemia (H), Melanoma  (H) (1998), Migraine, Other chronic pain, Rotator cuff tear, Sacroiliac inflammation (H24), Shift work sleep disorder (12/16/2013), Urinary calculi, and Vitamin B12 deficiency anemia (2006).    She has no past medical history of Cerebral infarction (H), Congestive heart failure (H), Coronary artery disease, Diabetes (H), Heart disease, Hypertension, Thyroid disease, or Uncomplicated asthma.    Social History:  Reviewed. No changes to social history except as noted above in HPI.    Vital Signs:   None. This is phone/video visit.     Labs:  Most recent laboratory results reviewed and the pertinent results include:   Lab Results   Component Value Date    WBC 5.6 06/30/2022    WBC 3.2 05/24/2021     Lab Results   Component Value Date    RBC 4.61 06/30/2022    RBC 3.74 05/24/2021     Lab Results   Component Value Date    HGB 14.2 06/30/2022    HGB 11.0 05/24/2021     Lab Results   Component Value Date    HCT 43.6 06/30/2022    HCT 33.6 05/24/2021     No components found for: MCT  Lab Results   Component Value Date    MCV 95 06/30/2022    MCV 90 05/24/2021     Lab Results   Component Value Date    MCH 30.8 06/30/2022    MCH 29.4 05/24/2021     Lab Results   Component Value Date    MCHC 32.6 06/30/2022    MCHC 32.7 05/24/2021     Lab Results   Component Value Date    RDW 11.9 06/30/2022    RDW 13.4 05/24/2021     Lab Results   Component Value Date     07/04/2022     05/24/2021     Last Comprehensive Metabolic Panel:  Sodium   Date Value Ref Range Status   06/30/2022 136 133 - 144 mmol/L Final   05/24/2021 141 133 - 144 mmol/L Final     Potassium   Date Value Ref Range Status   06/30/2022 3.4 3.4 - 5.3 mmol/L Final   05/24/2021 3.9 3.4 - 5.3 mmol/L Final     Chloride   Date Value Ref Range Status   06/30/2022 105 94 - 109 mmol/L Final   05/24/2021 108 94 - 109 mmol/L Final     Carbon Dioxide   Date Value Ref Range Status   05/24/2021 25 20 - 32 mmol/L Final     Carbon Dioxide (CO2)   Date Value Ref Range  Status   06/30/2022 25 20 - 32 mmol/L Final     Anion Gap   Date Value Ref Range Status   06/30/2022 6 3 - 14 mmol/L Final   05/24/2021 8 3 - 14 mmol/L Final     Glucose   Date Value Ref Range Status   06/30/2022 91 70 - 99 mg/dL Final   05/24/2021 87 70 - 99 mg/dL Final     Urea Nitrogen   Date Value Ref Range Status   06/30/2022 20 7 - 30 mg/dL Final   05/24/2021 15 7 - 30 mg/dL Final     Creatinine   Date Value Ref Range Status   09/27/2023 0.71 0.51 - 0.95 mg/dL Final   05/24/2021 0.64 0.52 - 1.04 mg/dL Final     GFR Estimate   Date Value Ref Range Status   09/27/2023 >90 >60 mL/min/1.73m2 Final   05/24/2021 >90 >60 mL/min/[1.73_m2] Final     Comment:     Non  GFR Calc  Starting 12/18/2018, serum creatinine based estimated GFR (eGFR) will be   calculated using the Chronic Kidney Disease Epidemiology Collaboration   (CKD-EPI) equation.       Calcium   Date Value Ref Range Status   06/30/2022 8.7 8.5 - 10.1 mg/dL Final   05/24/2021 8.4 (L) 8.5 - 10.1 mg/dL Final     Bilirubin Total   Date Value Ref Range Status   09/27/2023 0.3 <=1.2 mg/dL Final   03/16/2021 0.4 0.2 - 1.3 mg/dL Final     Alkaline Phosphatase   Date Value Ref Range Status   09/27/2023 76 35 - 104 U/L Final   03/16/2021 87 40 - 150 U/L Final     ALT   Date Value Ref Range Status   04/26/2022 21 0 - 50 U/L Final   03/16/2021 26 0 - 50 U/L Final     AST   Date Value Ref Range Status   09/27/2023 26 0 - 45 U/L Final     Comment:     Reference intervals for this test were updated on 6/12/2023 to more accurately reflect our healthy population. There may be differences in the flagging of prior results with similar values performed with this method. Interpretation of those prior results can be made in the context of the updated reference intervals.   03/16/2021 44 0 - 45 U/L Final     Review of Systems:  10 systems (general, cardiovascular, respiratory, eyes, ENT, endocrine, GI, , M/S, neurological) were reviewed. Most pertinent  finding(s) is/are: Severe chronic pain. The remaining systems are all unremarkable.    Mental Status Examination (limited as this is by phone/video):  Appearance: Awake, alert, appears stated age, no acute distress  Attitude:  cooperative, pleasant   Motor: No gross abnormalities observed via video, not formally tested.  Oriented to:  person, place, time, and situation  Attention Span and Concentration:  normal  Speech:  clear, coherent, regular rate, rhythm, and volume  Language: intact  Mood: Up and down  Affect: Mood congruent  Associations:  no loose associations  Thought Process:  logical, linear and goal oriented  Thought Content: Chronic passive suicidal ideation-does endorse acute suicidality today, no current plan or intent to harm self today, no homicidal ideation, no evidence of psychotic thought, no auditory hallucinations present and no visual hallucinations present  Recent and Remote Memory:  Intact to interview. Not formally assessed. No amnesia.  Fund of Knowledge: appropriate  Insight:  good  Judgment:  intact, adequate for safety  Impulse Control:  intact    Suicide Risk Assessment:  Today Janelle White is with chronic/intermittent suicidal ideation-no acute suicidality endorsed today.There are notable risk factors for self-harm, including anxiety, comorbid medical condition of Chronic pain, suicidal ideation, purposelessness/no reason for living, hopelessness, withdrawing and mood change. However, risk is mitigated by commitment to family, absence of past attempts, ability to volunteer a safety plan, history of seeking help when needed, future oriented and denies suicidal intent today. Therefore, based on all available evidence including the factors cited above, Janelle White does not appear to be at imminent risk for self-harm, does not meet criteria for a 72-hr hold, and therefore remains appropriate for ongoing outpatient level of care.  A thorough assessment of risk factors  related to suicide and self-harm have been reviewed and are noted above. Local community safety resources reviewed for patient to use if needed. There was no deceit detected, and the patient presented in a manner that was believable.     DSM5 Diagnosis:  Persistent depressive disorder  Treatment resistant depression  CYP2D6 poor metabolizer  CYP2B6 intermediate metabolizer  Homozygous for C677T polymorphism of MTHFR    Medical comorbidities include:   Patient Active Problem List    Diagnosis Date Noted    Failed back surgical syndrome 12/01/2023     Priority: Medium    Lumbar radiculopathy 10/26/2023     Priority: Medium    Sacroiliitis (H24) 08/04/2023     Priority: Medium    History of falling 03/13/2023     Priority: Medium    Suicidal ideation 06/30/2022     Priority: Medium    Immunocompromised (H24) 06/30/2022     Priority: Medium    Infection due to 2019 novel coronavirus 06/30/2022     Priority: Medium    Severe episode of recurrent major depressive disorder, without psychotic features (H) 04/17/2022     Priority: Medium    COPD (chronic obstructive pulmonary disease) (H) 02/07/2022     Priority: Medium    Tension type headache 11/04/2021     Priority: Medium    Rotator cuff injury 11/04/2021     Priority: Medium    Spinal stenosis of lumbar region with radiculopathy 09/02/2021     Priority: Medium    COVID-19 08/29/2021     Priority: Medium    Polyarthralgia 08/11/2021     Priority: Medium    Cellulitis, unspecified cellulitis site 08/11/2021     Priority: Medium    Sepsis, due to unspecified organism, unspecified whether acute organ dysfunction present (H) 08/11/2021     Priority: Medium    Cellulitis 08/11/2021     Priority: Medium    STACIE (generalized anxiety disorder) 07/27/2021     Priority: Medium    MTHFR gene mutation 04/12/2021     Priority: Medium    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     Priority: Medium    CYP2D6 poor metabolizer (H) 04/12/2021     Priority: Medium    Status post  blepharoplasty 11/18/2020     Priority: Medium    Regular astigmatism, bilateral 11/18/2020     Priority: Medium    Prediabetes 09/19/2019     Priority: Medium    Hyperlipidemia LDL goal <130 09/19/2019     Priority: Medium    CLARE (obstructive sleep apnea) 02/13/2019     Priority: Medium    Controlled substance agreement signed 08/14/2018     Priority: Medium    Chronic pain syndrome 12/21/2017     Priority: Medium    CLL (chronic lymphocytic leukemia) (H) 12/21/2017     Priority: Medium    Hypotension 09/14/2017     Priority: Medium    History of laser assisted in situ keratomileusis 10/14/2014     Priority: Medium    Pain medication agreement 04/23/2014     Priority: Medium     Formatting of this note might be different from the original.  Patient takes morphine 15 mg ER BID for chronic neck and back pain.  She is also on cymbalta, ibuprofen, flexaril prn      Shift work sleep disorder 12/16/2013     Priority: Medium    Vitamin D deficiency 11/08/2012     Priority: Medium    Moderate recurrent major depression (H) 01/06/2011     Priority: Medium    DDD (degenerative disc disease), cervical 10/07/2010     Priority: Medium    CARDIOVASCULAR SCREENING; LDL GOAL LESS THAN 160 02/10/2010     Priority: Medium    Chronic Low Back Pain 10/01/2009     Priority: Medium     S/p AP L3-S1 fusion 12/2010 - referred to [facility].   Orthopedics writing scripts for narcotics post-op.      Migraine      Priority: Medium     Problem list name updated by automated process. Provider to review      B-complex deficiency 10/10/2006     Priority: Medium     Problem list name updated by automated process. Provider to review      PERSONAL HX OF  MELANOMA 12/04/2003     Priority: Low       Psychosocial & Contextual Factors: see HPI above    Assessment:  6/15/2021:  Janelle White reports overall some significant worsening of mood symptoms.  Increased anxiety with her depression.  Poor motivation and poor energy.  Symptoms severe  enough to prevent her from being able to utilize typical coping skills and strategies.  No current motivation or energy to participate in PHP/day treatment program.  Continues to take medication as scheduled.  Recent surgery and general anesthesia could be contributing.  Discussed discontinuing stimulant medication as an augmentation strategy.  She is agreeable to moving forward with T3 as an augmentation for treatment resistant depression.  Discussed risks and benefits at length.  Will get baseline thyroid labs prior to starting T3.  Hoping she will experience increased energy and motivation and that her mood will lift.  Also discussed setting up an appointment with her interventional psychiatry clinic to discuss other potential options such as ketamine therapy, ECT, TMS.  Does have history of ECT for depression when she was quite young.  I am hopeful to stay ahead of her symptoms before things get too severe.  Has chronic suicidal ideation and is at high risk for suicide.  Continues to deny any plan or intent.  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  See below for risk/benefit conversation regarding T3 had with the patient:    GeneSight testing Info:  GeneSight testing revealed she is a poor 2D6 metabolizer and an intermediate 2B6 metabolizer.  This would explain her multiple failed medication trials due to the negative side effects.  She also has a genotype that would suggest a phenotype sensitivity to serotonin. She also was found to have significantly reduced folic acid conversion.      Starting T3 as augment to antidepressant therapy for treatment resistant depression:  Start T3 at 25 mcg per day for one to two weeks, and if there is little or no improvement, increase the dose to 50 mcg per day; this is consistent with practice guidelines from the American Psychiatric Association and [place] [employer] for Mood and Anxiety Treatments.     Adverse effects consistent with  hyperthyroidism may occur, including tremor, palpitations, heat intolerance, sweating, anxiety, increased frequency of bowel movements, shortness of breath, and exacerbation of cardiac arrhythmia. In addition, hyperthyroidism that emerges during long-term treatment may lead to bone demineralization, osteoporosis, and an increased risk of fracture    Following a normal baseline TSH concentration, no other laboratory monitoring during a four to six week trial of adjunctive T3 is necessary. However, if T3 is continued longer, a serum TSH concentration should be checked after the first one to three months of treatment and then every six months.    Today, 7/27/21:   Patient overall with little change in her symptoms.  Did not do as well on Cytomel and had limited to no improvement at all after weeks on 50 mcg.  Labs were unremarkable prior to starting Cytomel.  Opted to go back to stimulant augmentation since patient does find it quite helpful.  She has been taking 15 mg of immediate release most afternoons.  Due to good tolerability and some efficacy, we will bump up her immediate release dose slightly to 20 mg daily as needed in addition to her 20 mg extended release dose. I have no concerns about misuse or diversion at this time.  Tolerating well with no negative side effects.  Last blood pressure normal 7/2.  Patient has noted some efficacy from Pristiq more than other antidepressant trials and so we will continue to titrate further to 100 mg daily.  She is tolerating well.  Continues to use lorazepam for anxiety, pain medicine adjunct, and for sleep as prescribed by primary care provider.  Has been utilizing roughly 100 tablets of 0.5 mg every 1.5 months.  Patient with ongoing chronic intermittent passive suicidal ideation with no plan or intent.  Denies safety concerns today.  No problematic drug or alcohol use.  I am hopeful the interventional psychiatry clinic might be able to initiate ketamine therapy or another  therapy they would recommend as potentially being more helpful than patient's multiple medication/augmentation trials.    10/6/2021:  Patient with some improved depression symptoms and much more hopeful.  Seeing specialist at [place]-feels very hurt and listened to.  Also is hopeful there will be some helpful treatments.  Visit to [place] was also very good and instilled a lot of hope in her abilities.  She was encouraged to continue to push herself to do the things she enjoys doing.  No medication changes today.  She will follow-up with getting TMS rescheduled, possibly when things slow down after the holidays.  Does not need to be seen until after the holidays.  No acute safety concerns today.  No problematic drug or alcohol use.    1/14/2022:  Overall patient feeling a little more down lately.  Tolerating TMS well.  Encouraged to continue TMS.  No medication changes today since undergoing TMS treatment.  Talked about life stages today generativity versus stagnation and also integrity versus despair.  Talked about consideration for acceptance and commitment therapy.  She is encouraged to continue to be active.  No acute suicidal ideation today.  No acute safety concerns today.  No problematic drug or alcohol use.    4/13/2022:   Patient continues to have symptoms that wax and wane.  Feels like she tolerates Pristiq 50 mg better than 100 and will continue on this dose.  Last week was particularly difficult.  Pretty intense suicidal ideation but she was able to manage these thoughts and remain safe.  She does report she would come to the hospital if necessary.  We discussed the empath unit today and other resources if she felt she could not keep herself safe.  She continues to work on tapering her narcotic pain medication regimen.  She is working with addiction medicine and also pain management.  She is starting Pilates therapy.  Pool therapy will begin in July.  Discussed possibility of vestibular rehabilitation.   Individual therapy will also start soon.  She was strongly encouraged to continue to pursue ketamine therapy.  No acute suicidality today.  No problematic drug or alcohol use.    6/23/2022:  Overall reports doing relatively okay.  Some pretty down days still, but ketamine therapy going well.  Feels like continuing to move in the right direction.  Suicidal ideation improving.  Off all narcotic pain medication.  Feels good about her progress.  Had some really down days after off pain medication but improved since those few dark days.  Hopeful about where ketamine therapy may lead.  Discussed some of her restlessness at bedtime and will start pramipexole.  Also some evidence to suggest pramipexole could be helpful for treatment resistant depression symptoms.  Discussed risks and benefits of therapy, including watching for any impulsive behaviors.  Continues to have suicidal thoughts but no acute suicidality today.  Her suicidality overall is improving from her baseline suicidality.  She continues to consider the idea of a partial hospital program.  We will send her information for [facility] program.  Also encouraged her to discuss possibility of a pain program through [facility] with Dr. Medley.  No acute safety concerns today.  No problematic drug or alcohol use.    7/11/2022:  Patient with some recent more intensive struggles.  Depression much more severe recently.  Led to hospitalization.  Patient medically hospitalized since was positive for COVID and has history of CLL and [facility] protocol requires isolation.  Patient seen by psychiatry consult service.  No medication changes made.  Patient continues with interventional psychiatry clinic.  They will be increasing ketamine dose and treatments will be every few weeks.  We discussed patient's symptom history a little more today and possible bipolar diagnosis came into question.  Patient does recognize possible hypomanic episodes in the past.  Patient is currently  monitoring symptoms closely and pramipexole.  She is feeling better since starting pramipexole but is watching shopping behaviors closely.  Suicidal ideation is improving since hospitalization.  Restless legs improved at night on pramipexole.  Discussed we could consider adding lower dose lithium as an augment to her Pristiq and to help stabilize moods a little bit more and to further help with some of her chronic suicidal ideation.  We also discussed DBT for chronic suicidal ideation and ongoing mood instability.  Continues off of pain medication.  No acute suicidality or safety concerns today.  Patient will follow up in a few weeks.  No medication changes today since we will wait to see how ketamine dose change goes.  No drug or alcohol use concerns.  Patient noted some ongoing cognitive/memory concerns and requested testing.  Neuropsychological referral placed.    8/8/2022:  Patient with ongoing severe depression, resistant to treatment.  She is agreeable to increasing her stimulant medication.  This could hopefully further improve mood slightly.  May also help with some additional energy and also help with some of her ongoing cognitive concerns.  She has neuropsychological testing coming up soon.  Discussed possibly considering individual DBT therapy with a DBT certified therapist given her ongoing chronic suicidal ideation and inability to participate in group therapy currently.  We will discuss this possibility with her current therapist.  Also discussed possibility of increasing Pristiq by 25 mg only 2 or 3 days a week to decrease risk of negative side effects (25 mg on Tuesdays/Thursdays for Monday/Wednesday/Fridays).  Patient also encouraged to continue ketamine treatment.  No acute suicidality today.  Patient denies any intent or plan to act on any of her suicidal thoughts today.  No problematic drug or alcohol use.    9/7/2022:  Patient doing relatively okay today.  Pain continues to feel a little bit  worse.  Emotionally though, despite worsening pain symptoms, patient feels like she is doing relatively okay.  Discussed various psychotherapy approaches that could be taken to address her symptoms.  We discussed that health psychology could be an optimal approach to help with her symptoms but that her suicidal ideation is often still quite intense and may be a distractor to her treatment with a health psychologist (discussed she may be referred often to the emergency room or to other more intensive programs).  We discussed working with an individual DBT therapist as a possibility to continue addressing her ongoing chronic suicidal ideation (individual therapy would allow patient to move at her own pace since group therapy is much too intense at this time).  Patient was open to this idea.  [facility] today we will send patient some resources/options.  Depending on patient's financial situation, there is also a possibility patient could work with a health psychologist in conjunction with a DBT therapist.  Ketamine therapy has proven to be helpful, although I do wish the effects lasted a little longer than what they currently are for the patient.  I recommend patient continue her ketamine treatments.  No medication changes today.  She denies any acute suicidality today.  No plan or intent to harm herself noted today.  She denies being in a bad place today.  No problematic drug or alcohol use.     10/7/2022:  Patient overall relatively stable at this time.  Mood improving slightly with ketamine therapy.  Patient encouraged to continue with treatment.  Anxiety a little more manageable.  Continuing to struggle with severe chronic pain.  Tolerating current medications well with no negative side effects noted.  No changes today.  Patient will continue in individual psychotherapy.  No acute suicidality today.  Suicidal thoughts at baseline, maybe even a little improved.    1/9/2023:  Patient overall doing quite well.  Some days  still worse than others and chronic pain continues to be a big factor influencing her mental health.  Ketamine has been very helpful.  Still some poor energy and fatigue.  Adderall has been very helpful.  We will increase the dose just slightly.  Extended release dose will increase from 20 mg to 25 mg to further address her symptoms.  We will continue with the 15 mg twice daily immediate release doses.  No other medication changes today.  Patient encouraged to stay the course.  No acute safety concerns.  Suicidal thoughts continue to be passive in nature and have overall improved since starting ketamine.  No problematic drug or alcohol use.    3/20/2023:  Patient remains overall relatively stable.  Having some anxiety over feeling more dependent on cannabis for her pain.  Discussed continuing gratitude journal.  Patient has come quite a ways with relative stability.  No medication changes today.  Denies that cannabis use is causing any problems apart from feeling a little anxious about her use.  Patient even participating in more activities this spring compared to last year.  No acute safety concerns at this time.  No acute suicidality.    5/19/2023:  Patient overall continuing to manage okay.  Feels relatively stable.  Continues ketamine therapy.  No med changes today.  No acute safety concerns.  No acute suicidality today.  Is pleased that she is finding some roxy in pleasurable activities this spring.  Continues to seek purpose in life.  No problematic drug or alcohol use.  Patient will be seen back in 3 months.    8/25/2023:  Continues to manage relatively okay on current regimen.  No major questions or concerns today.  Interested in keeping things status quo/the same.  Continues to receive ketamine infusions.  Pain continues to be forefront.  We discussed referral to a provider who could provide an evaluation to discuss ketamine for pain and possibly mood to replace her infusions.  Referral sent and discussed  with patient.  Patient is very agreeable and interested in what ever options might be available for her.  No acute safety concerns today.  No problematic drug or alcohol use.  No acute suicidality today.  Patient continues to find ways to stay distracted.  Has started with new therapist.    11/17/2023:   Patient overall with some worsening symptoms due to some significantly worsening chronic pain.  Patient does have some relief of her pain symptoms after ketamine treatments-for up to a few days.  Patient reports referral to Dr. Rancho Jennings canceled by her current pain medicine provider since patient is already seeing this pain medicine provider within the same system.  Patient did report her pain medicine provider was going to reach out to Dr. Jennings to have a discussion about ketamine.  This provider will send staff message to both providers to see if we can get an update on the collaboration.  No medication changes made today.  Tolerating well overall.  No problematic drug or alcohol use.  Passive suicidal ideation at baseline.  No acute suicidality or plan or intent to harm self today.    2/19/2024:  Patient with ongoing pain struggles and relatively little to no changes in mood recently.  Patient will be meeting with neurosurgeon to discuss whether there are any appropriate surgical interventions for her pain.  Did not endorse any acute safety concerns today.  Did not nurse acute suicidality.  Continues ketamine therapy which she finds helpful.  No medication changes today.  Discussed DBT recommendation.  No problematic drug or alcohol use.    4/15/2024:   The patient overall doing relatively okay.  Some improvements along with some ongoing struggles.  Given some of these 2-week waves of worsening symptoms, patient is agreeable to a trial of increasing Pristiq.  Depending on how anxiety settles and how increased Pristiq is tolerated, could consider BuSpar or clonidine in the future for anxiety.  No acute  safety concerns today.  No acute suicidality.  Continues ketamine therapy.  Recently started with a new somatic therapist.    Medication side effects and alternatives were reviewed. Health promotion activities recommended and reviewed today. All questions addressed. Education and counseling completed regarding risks and benefits of medications and psychotherapy options. Recommend therapy for additional support.     Treatment Plan:  Increase Pristiq to 75 mg daily for mood, anxiety.  Okay to alternate 50 mg with 75 mg every couple days or every other day if 75 mg daily not tolerated.  Continue methylfolate 7.5 mg daily as supplementation.  Continue Adderall XR 25 mg daily for mood augmentation  Continue Adderall IR 15 mg twice daily as needed for mood augmentation and daytime fatigue/hypersomnia  Continue hydroxyzine 25-50 mg up to three times a day as needed for anxiety/sleep.   Continue ketamine therapy as planned.   Continue to work with your specialist from [facility] as indicated.    Continue with new somatic psychotherapist as planned.  Continue all other cares per primary care provider.   Continue all other medications as reviewed per electronic medical record today.   Safety plan reviewed. To the Emergency Department as needed or call after hours crisis line at [phone] or [phone]. [place] Crisis Text Line. Text [place] to 917985 or Suicide LifeLine Chat: suicidepreventionlifeline.org/chat  Schedule an appointment with me in 3 months, or sooner as needed. Call [facility] at [phone] to schedule.  Follow up with primary care provider as planned or for acute medical concerns.  Call the psychiatric nurse line with medication questions or concerns at [phone].  MyChart may be used to communicate with your provider, but this is not intended to be used for emergencies.    Risks of benzodiazepine (Ativan, Xanax, Klonopin, Valium, etc) use including, but not limited to, sedation,  tolerance, risk for addiction/dependence. Do not drink alcohol while taking benzodiazepines due to risk of trouble breathing and potential death. Do not drive or operate heavy machinery until it is known how the drug affects you. Discuss with physician or pharmacist before ever taking a benzodiazepine with a narcotic/opioid pain medication.     Have previously discussed risks of stimulant medication including, but not limited to, decreased appetite, risk of tics (and that they may be lasting), trouble sleeping, cardiac risks such as increased heart rate and blood pressure, and rare risk of sudden cardiac death.  Also risk of addiction/tolerance/dependence.    Administrative Billing:   Phone Call/Video Duration: 20 Minutes  8:34a- 8:54a    Patient Status:  Patient is a continuous care patient and refills will continue to come from this provider until otherwise noted.    Signed:   Kimberly Perez DO  [facility]    Disclaimer: This note consists of symbols derived from keyboarding, dictation and/or voice recognition software. As a result, there may be errors in the script that have gone undetected. Please consider this when interpreting information found in this chart.

## 2024-04-15 NOTE — PROGRESS NOTES
[facility] Collaborative Care Psychiatry Services [facility]  April 15, 2024        Behavioral Health Clinician Progress Note    Patient Name: Janelle White           Service Type:  Individual      Service Location:   [facility]     Session Start Time: 8:00 am  Session End Time: 8:30 am      Session Length: 16 - 37      Attendees: Patient     Service Modality:  Video Visit:      Provider verified identity through the following two step process.  Patient provided:  Patient is known previously to provider    Telemedicine Visit: The patient's condition can be safely assessed and treated via synchronous audio and visual telemedicine encounter.      Reason for Telemedicine Visit: Services only offered telehealth    Originating Site (Patient Location): Patient's home    Distant Site (Provider Location): [facility] Clin[facility]: Mobile    Consent:  The patient/guardian has verbally consented to: the potential risks and benefits of telemedicine (video visit) versus in person care; bill my insurance or make self-payment for services provided; and responsibility for payment of non-covered services.     Patient would like the video invitation sent by:  My Chart    Mode of Communication:  Video Conference via [facility]    As the provider I attest to compliance with applicable laws and regulations related to telemedicine.    Visit Activities (Refresh list every visit): [facility] Only    Diagnostic Assessment Date: 03/11/2021- Will need updated DA next visit  Treatment Plan Review Date: 7/15/24  See Flowsheets for today's PHQ-9 and STACIE-7 results  Previous PHQ-9:       4/8/2024     3:22 PM 4/9/2024    10:21 AM 4/14/2024     9:59 AM   PHQ-9 SCORE   PHQ-9 Total Score MyChart 13 (Moderate depression)  12 (Moderate depression)   PHQ-9 Total Score 13 11 12     Previous STACIE-7:       8/17/2023     3:44 PM 3/21/2024     2:32 PM 4/14/2024    10:01 AM   STACIE-7 SCORE   Total Score 2 (minimal anxiety) 8 (mild anxiety) 11 (moderate  anxiety)   Total Score 2 8 11    11       JAYRO LEVEL:      7/9/2009     9:00 AM 1/27/2011     3:00 PM   JAYRO Score (Last Two)   JAYRO Raw Score 44 50   Activation Score 70.8 86.3   JAYRO Level 4 4       DATA  Extended Session (60+ minutes): No  Interactive Complexity: No  Crisis: No  [facility] Patient: No    Treatment Objective(s) Addressed in This Session:  Target Behavior(s): disease management/lifestyle changes pain management    Depressed Mood:    Anxiety: will develop more effective coping skills to manage anxiety symptoms  Psychological distress related to Pain    Current Stressors / Issues:  Has been feeling a bit more on edge/labile.  Almost seems to come in 2 week waves though often doesn't realize its happening until she is in it.  Describes feeling suffocated, feels that cannot move like she needs to and gets irritable and feels like she needs to get away.    Feels like  and daughter both don't have good boundaries and that her family always seems to be watching everything she does and encroaching in her space.    Did meet with PA's recently and due to how much she has had done there isn't a lot of room for correction.  Could possibly have a surgery on her neck but pt isn't ready for another surgery.  Is doing pilates 1-2 times a week.  Goal is to find a somatic therapist and did get connected with a therapist at [facility].  Continues with ketamine injections (rather than infusion).    Joined [employer].  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  Has access to a studio in [place] where she can work on her own rocks.  Wants to continue to decrease marijuana use and eventually not use it.  Feels like she has cut back to about 1/2 of what she was using.  Has a journal that she records her feelings when she uses marijuana and if she could have done something else.    Discussed [employer] journaling and pt is interested in trying it.        Progress on  Treatment Objective(s) / Homework:  Satisfactory progress - ACTION (Actively working towards change); Intervened by reinforcing change plan / affirming steps taken     Motivational Interviewing    MI Intervention: Expressed Empathy/Understanding, Permission to raise concern or advise, Open-ended questions and Reflections: simple and complex     Change Talk Expressed by the Patient: Desire to change Reasons to change Activation Taking steps    Provider Response to Change Talk: E - Evoked more info from patient about behavior change, A - Affirmed patient's thoughts, decisions, or attempts at behavior change, R - Reflected patient's change talk and S - Summarized patient's change talk statements    Also provided psychoeducation about behavioral health condition, symptoms, and treatment options    Care Plan review completed: No    Medication Review:  Changes to psychiatric medications, see updated Medication List in EPIC.     Medication Compliance:  Yes    Changes in Health Issues:   Yes: Pain, Associated Psychological Distress    Chemical Use Review:   Substance Use: Chemical use reviewed, no active concerns identified      Tobacco Use: No current tobacco use.      Assessment: Current Emotional / Mental Status (status of significant symptoms):  Risk status (Self / Other harm or suicidal ideation)  Patient has had a history of suicidal ideation: ongoing  Patient denies current fears or concerns for personal safety.  Patient reports the following current or recent suicidal ideation or behaviors: Patient reports she continues to have thoughts of suicide but denies that there is anything active or intentional at this time.  Patient does feel safe..  Patient denies current or recent homicidal ideation or behaviors.  Patient denies current or recent self injurious behavior or ideation.  Patient denies other safety concerns.  A safety and risk management plan has been developed including: Patient consented to co-developed  safety plan.  A safety and risk management plan was completed.  Patient agreed to use safety plan should any safety concerns arise.  A copy was given to the patient.  Patient was reminded to access her safety plan and crisis resources as needed.    Appearance:   Appropriate   Eye Contact:   Good   Psychomotor Behavior: Normal   Attitude:   Cooperative   Orientation:   All  Speech   Rate / Production: Talkative Normal    Volume:  Normal   Mood:    Anxious  Dysphoric  Affect:    Appropriate   Thought Content:  Clear   Thought Form:  Coherent  Logical   Insight:    Fair     Diagnoses:  1. STACIE (generalized anxiety disorder)    2. Moderate recurrent major depression (H)          Collateral Reports Completed:  Communicated with: Dr. Perez    Plan: (Homework, other):  Patient was given information about behavioral services and encouraged to schedule a follow up appointment with the clinic [facility]  [facility] .  She was also given information about mental health symptoms and treatment options .  CD Recommendations: No indications of CD issues.        ______________________________________________________________________    [facility] Psychiatry Services - [place] : Treatment Plan    Patient's Name: Janelle White  YOB: 1960    Date of Creation: April 13, 2022  Date Treatment Plan Last Reviewed/Revised: 4/15/24    DSM5 Diagnoses: 296.33 (F33.2) Major Depressive Disorder, Recurrent Episode, Severe _, 300.02 (F41.1) Generalized Anxiety Disorder or Substance-Related & Addictive Disorders 292.9 (F12.99) Unspecified Cannabis Related Disorder  Psychosocial / Contextual Factors: chronic pain  PROMIS (reviewed every 90 days):   PROMIS 10-Global Health (only subscores and total score):       8/5/2022     1:06 PM 1/8/2023     6:09 PM 5/17/2023    10:43 PM 8/19/2023     6:54 PM 2/12/2024     9:31 AM 4/14/2024    10:02 AM 4/15/2024     7:40 AM   PROMIS-10 Scores  Only   Global Mental Health Score 8 11 8    8 10 8 8 8   Global Physical Health Score 9 11 8    8 9 11 10 10   PROMIS TOTAL - SUBSCORES 17 22 16    16 19 19 18 18       Referral / Collaboration:  Referral to another professional/service is not indicated at this time..    Anticipated number of session for this episode of care: Continuous care CCPS patient  Anticipation frequency of session: As determined by Dr. Perez  Anticipated Duration of each session: 16-37 minutes  Treatment plan will be reviewed in 90 days or when goals have been changed.       MeasurableTreatment Goal(s) related to diagnosis / functional impairment(s)  Goal 1: Patient will work with providers to manage symptoms    I will know I've met my goal when I can enjoy my life again.      Objective #A (Patient Action)  Patient will attend all appointments, take medication as prescribed.  Status: Continued - Date(s): 4/15/24    Intervention(s)  [facility] will Monitor and assist in overcoming barriers to treatment adherence    Objective #B  Patient will consider all recommendations offered.  Status: Continued - Date(s): 4/15/24     Intervention(s)  [facility] will educate patient on treatment options, clarify concerns, work with pt to overcome any resistance to compliance.      Goal 2: Patient will replace self-harm thoughts/behaviors with self-care activities    I will know I've met my goal when I stop having those thoughts.      Objective #A (Patient Action)                          Status: Continued - Date(s): 04/15/24  Patient will assist in creating safety plan.     Intervention(s)  [facility] will assist in creation of safety plan and provide copy to patient.     Objective #B  Patient will report using safety plan as needed.                       Status: Continued - Date(s): 04/15/24     Intervention(s)  [facility] will monitor safety, use of plan, adjust plan as needed, work through any identified barriers/resistance to following her plan.      Patient has reviewed and  "agreed to the above plan.      Lita Bell, TRACY, [facility], [facility]  04/15/2024          Integrated Behavioral Health Services                                       Patient's Name: Janelle White    March 20, 2023     SAFETY PLAN:  Step 1: Warning signs / cues (Thoughts, images, mood, situation, behavior) that a crisis may be developing:  Thoughts: \"I don't matter\", \"People would be better off without me\", \"I can't do this anymore\" and \"I just want this to end\"  Images: obsessive thoughts of death or dying: thoughts of carrying out plan  Thinking Processes: ruminations (can't stop thinking about my problems): ruminate on my plan and highly critical and negative thoughts: if  says something critical i shut down  Mood: worsening depression, hopelessness, disinhibited (not caring about things or consequences) and worthlessness  Behaviors: isolating/withdrawing , can't stop crying, not taking care of myself and not taking care of my responsibilities  Situations: relationship problems     Step 2: Coping strategies - Things I can do to take my mind off of my problems without contacting another person (relaxation technique, physical activity):  Distress Tolerance Strategies:  play with my pet  and watch a funny movie:    Physical Activities: go for a walk and get in the jacuzzi  Focus on helpful thoughts:  \"This is temporary\"    Step 3: People and social settings that provide distraction:              Name: Alyx     Phone: in my phone              Name: Raven   Phone: in my phone  park                Step 4: Remind myself of people and things that are important to me and worth living for:  Children, dog, friends    Step 5: When I am in crisis, I can ask these people to help me use my safety plan:              Name: Alyx     Phone: in my phone              Name: Raven   Phone: in my phone    Step 6: Making the environment safe:   none identified    Step 7: Professionals or agencies I can contact during a " crisis:  Suicide Prevention Lifeline: 9-262-572-TALK (1471)  Crisis Text Line Service: Text   HOME  to 028-083.  Local Crisis Services: [facility]     Call 911 or go to my nearest emergency department.       I helped develop this safety plan and agree to use it when needed.  I have been given a copy of this plan.       Patient signature: _______________________________________________________________  Today s date: March 20, 2023    Adapted from Safety Plan Template 2008 Antionette Trevino and Joseph Leon is reprinted with the express permission of the authors.  No portion of the Safety Plan Template may be reproduced without the express, written permission.  You can contact the authors at [email or web address] or [email or web address]

## 2024-04-29 ASSESSMENT — ANXIETY QUESTIONNAIRES
IF YOU CHECKED OFF ANY PROBLEMS ON THIS QUESTIONNAIRE, HOW DIFFICULT HAVE THESE PROBLEMS MADE IT FOR YOU TO DO YOUR WORK, TAKE CARE OF THINGS AT HOME, OR GET ALONG WITH OTHER PEOPLE: SOMEWHAT DIFFICULT
GAD7 TOTAL SCORE: 8
8. IF YOU CHECKED OFF ANY PROBLEMS, HOW DIFFICULT HAVE THESE MADE IT FOR YOU TO DO YOUR WORK, TAKE CARE OF THINGS AT HOME, OR GET ALONG WITH OTHER PEOPLE?: SOMEWHAT DIFFICULT
5. BEING SO RESTLESS THAT IT IS HARD TO SIT STILL: SEVERAL DAYS
7. FEELING AFRAID AS IF SOMETHING AWFUL MIGHT HAPPEN: NOT AT ALL
7. FEELING AFRAID AS IF SOMETHING AWFUL MIGHT HAPPEN: NOT AT ALL
2. NOT BEING ABLE TO STOP OR CONTROL WORRYING: SEVERAL DAYS
3. WORRYING TOO MUCH ABOUT DIFFERENT THINGS: SEVERAL DAYS
4. TROUBLE RELAXING: SEVERAL DAYS
1. FEELING NERVOUS, ANXIOUS, OR ON EDGE: SEVERAL DAYS
GAD7 TOTAL SCORE: 8
6. BECOMING EASILY ANNOYED OR IRRITABLE: NEARLY EVERY DAY
GAD7 TOTAL SCORE: 8

## 2024-04-29 ASSESSMENT — PATIENT HEALTH QUESTIONNAIRE - PHQ9
10. IF YOU CHECKED OFF ANY PROBLEMS, HOW DIFFICULT HAVE THESE PROBLEMS MADE IT FOR YOU TO DO YOUR WORK, TAKE CARE OF THINGS AT HOME, OR GET ALONG WITH OTHER PEOPLE: VERY DIFFICULT
SUM OF ALL RESPONSES TO PHQ QUESTIONS 1-9: 13
SUM OF ALL RESPONSES TO PHQ QUESTIONS 1-9: 13
10. IF YOU CHECKED OFF ANY PROBLEMS, HOW DIFFICULT HAVE THESE PROBLEMS MADE IT FOR YOU TO DO YOUR WORK, TAKE CARE OF THINGS AT HOME, OR GET ALONG WITH OTHER PEOPLE: VERY DIFFICULT
SUM OF ALL RESPONSES TO PHQ QUESTIONS 1-9: 13
SUM OF ALL RESPONSES TO PHQ QUESTIONS 1-9: 13

## 2024-04-30 ENCOUNTER — ALLIED HEALTH/NURSE VISIT (OUTPATIENT)
Dept: PSYCHIATRY | Facility: CLINIC | Age: 64
End: 2024-04-30
Payer: COMMERCIAL

## 2024-04-30 VITALS — SYSTOLIC BLOOD PRESSURE: 112 MMHG | DIASTOLIC BLOOD PRESSURE: 78 MMHG | HEART RATE: 60 BPM

## 2024-04-30 DIAGNOSIS — F33.9 RECURRENT MAJOR DEPRESSION RESISTANT TO TREATMENT (H): Primary | ICD-10-CM

## 2024-04-30 DIAGNOSIS — F33.2 SEVERE EPISODE OF RECURRENT MAJOR DEPRESSIVE DISORDER, WITHOUT PSYCHOTIC FEATURES (H): Primary | ICD-10-CM

## 2024-04-30 NOTE — PROGRESS NOTES
Ketamine Education     Janelle White MRN# [id number]  Age: 63 year old year old YOB: 1960        Patient is here in clinic for Ketamine education and consenting.  Patient is undergoing Intramuscular ketamine injections for the treatment of their depression.  Writer and patient discussed the synaptogenic model of chronic stress and how it is believed that ketamine works to treat depression in this model.  We discussed side effects of ketamine such as; hallucinations, nausea/vomiting, dizziness, increased heart rate/blood pressure, motor skill changes, injection site pain, injection site skin reactions, sedation, dissociation, allergic reactions, arrhythmias, and cystitis of bladder/other bladder or kidney related issues.  We discussed that patient should continue to follow recommendations of psychiatrist.  We discussed the requirement of lab monitoring for continued ketamine treatment.  We also discussed that if patient is planning to become pregnant at any time this needs to be shared with treatment team.  We reviewed the importance of disclosing all medication changes to treatment team.  We discussed driving restrictions the day of treatment.    Patient was informed of clinic expectations of 10 minute late policy and following the direction of RN's during treatment.     Patient verbalized understanding of everything above and signed consent today.                  María Miller RN

## 2024-04-30 NOTE — PROGRESS NOTES
Janelle White comes into clinic today at the request of LINK Yanes MD Ordering Provider for Med Injection only Ketamine .    Procedure Prep:  Medication double check completed by: Fiona Simon RN  Prior to administration pt identified by name and : yes    Nursing Assessment:  Appearance: casual clothing   Mood: Okay   Affect: WNL  Eye contact: good      2024     5:55 PM   PHQ   PHQ-9 Total Score 13    13   Q9: Thoughts of better off dead/self-harm past 2 weeks Several days   F/U: Thoughts of suicide or self-harm Yes   F/U: Self harm-plan Yes   F/U: Self-harm action No   F/U: Safety concerns No     QIDDSR 16 weekly assessment: score (not completed this session). Assessment was scanned to EHR.     Procedure Performed:  VSS and mental status WNL. Patient was given ketamine. See MAR for details.     Post Procedure Assessment:  Patient tolerated the treatment with the following side effects: dissociation. Vital signs were monitored, see VS Flowsheet. Patient stated they felt ready to go home prior to discharge. AVS was offered to patient and patient declined. Patient was instructed not to drive for the remainder of the day and to notify clinic if any concerns arise.     Next appt: 3 weeks      This service provided today was under the supervising provider of the day LINK Hopkins MD, who was available if needed.        Adali Berg RN    Answers submitted by the patient for this visit:  Patient Health Questionnaire (Submitted on 2024)  If you checked off any problems, how difficult have these problems made it for you to do your work, take care of things at home, or get along with other people?: Very difficult  PHQ9 TOTAL SCORE: 13

## 2024-05-15 ENCOUNTER — OFFICE VISIT (OUTPATIENT)
Dept: URGENT CARE | Facility: URGENT CARE | Age: 64
End: 2024-05-15
Payer: COMMERCIAL

## 2024-05-15 ENCOUNTER — ANCILLARY PROCEDURE (OUTPATIENT)
Dept: GENERAL RADIOLOGY | Facility: CLINIC | Age: 64
End: 2024-05-15
Attending: FAMILY MEDICINE
Payer: COMMERCIAL

## 2024-05-15 VITALS
RESPIRATION RATE: 18 BRPM | OXYGEN SATURATION: 98 % | BODY MASS INDEX: 26.49 KG/M2 | DIASTOLIC BLOOD PRESSURE: 70 MMHG | SYSTOLIC BLOOD PRESSURE: 112 MMHG | TEMPERATURE: 98.6 F | WEIGHT: 158.6 LBS | HEART RATE: 78 BPM

## 2024-05-15 DIAGNOSIS — J02.9 SORE THROAT: ICD-10-CM

## 2024-05-15 DIAGNOSIS — R05.2 SUBACUTE COUGH: ICD-10-CM

## 2024-05-15 DIAGNOSIS — R09.81 SINUS CONGESTION: ICD-10-CM

## 2024-05-15 DIAGNOSIS — J01.40 ACUTE PANSINUSITIS, RECURRENCE NOT SPECIFIED: Primary | ICD-10-CM

## 2024-05-15 LAB
DEPRECATED S PYO AG THROAT QL EIA: NEGATIVE
GROUP A STREP BY PCR: NOT DETECTED

## 2024-05-15 PROCEDURE — 99213 OFFICE O/P EST LOW 20 MIN: CPT | Performed by: FAMILY MEDICINE

## 2024-05-15 PROCEDURE — 71046 X-RAY EXAM CHEST 2 VIEWS: CPT | Mod: TC | Performed by: RADIOLOGY

## 2024-05-15 PROCEDURE — 87651 STREP A DNA AMP PROBE: CPT | Performed by: FAMILY MEDICINE

## 2024-05-15 RX ORDER — CEFDINIR 300 MG/1
600 CAPSULE ORAL DAILY
Qty: 14 CAPSULE | Refills: 0 | Status: SHIPPED | OUTPATIENT
Start: 2024-05-15 | End: 2024-05-22

## 2024-05-15 NOTE — PATIENT INSTRUCTIONS
Sinusitis Take home Instructions:         1.  Plenty of fluids, rest, warm compresses on face  2.  Mucinex twice daily for at least 4 days  3.  Nae Pot or Dawit Med 1x in the morning 1x at night (SALINE MIST SPRAY IS ACCEPTABLE, THOUGH NOT AS EFFECTIVE REPLACEMENT)  4.  Either Claritin (Loratadine), Allegra (Fexofenadine), or Zyrtec (Cetirizine) in the day  5.  Flonase (Fluticasone) or Astelin 2x each nostril twice a day for two weeks, then once each nostril once a day  6. Take antibiotic as directed if prescribed. Take daily probiotic (ex. Culturelle) and yogurt (ex. Activia or greek yogurt) while on antibiotic.

## 2024-05-15 NOTE — PROGRESS NOTES
URGENT CARE VISIT:    ASSESSMENT AND PLAN:      ICD-10-CM    1. Acute pansinusitis, recurrence not specified  J01.40       2. Subacute cough  R05.2 XR Chest 2 Views     cefdinir (OMNICEF) 300 MG capsule      3. Sinus congestion  R09.81 XR Chest 2 Views     cefdinir (OMNICEF) 300 MG capsule      4. Sore throat  J02.9 Streptococcus A Rapid Screen w/Reflex to PCR     Group A Streptococcus PCR Throat Swab     cefdinir (OMNICEF) 300 MG capsule     CANCELED: Streptococcus A Rapid Screen w/Reflex to PCR          RST is negative.  Chest x-ray read by radiologist preliminary report is negative.  Will treat for sinusitis with cefdinir twice daily for 7 days; side effects of medication, finishing full course, and use of probiotics was discussed.  Supportive and OTC measures outlined in AVS and discussed.      During nurse triage visit she also discussing right knee and tick bite concerns but decided to monitor the symptoms at home and did not discuss concerns with provider.    Red flag symptoms for urgent evaluation via ED shared.      Follow up with primary care provider with any problems, questions or concerns or if symptoms worsen or fail to improve. Patient verbalized understanding and is agreeable to plan. The patient was discharged ambulatory and in stable condition.      SUBJECTIVE:   Janelle White is a 63 year old female presenting for chief complaint of cough - non-productive.  Associated symptoms include ore throat, runny/stuffy nose, lymphadenopathy.   Onset was 1 -2 week(s) ago.   She denies the following symptoms: fever, chills, wheezing, shortness of breath, ear pain bilateral, vomiting, and diarrhea  Course of illness is same.    Treatment measures tried include Tylenol/Ibuprofen, Decongestants, and Inhaler (name: albuerol) with some relief of symptoms.  Predisposing factors include Hx of CLL currently undergoing treatment.        PMH:   Past Medical History:   Diagnosis Date    Anxiety     Cervicalgia  2007    C5-6 disc protrusion    COPD (chronic obstructive pulmonary disease) (H) 2/7/2022    Depressive disorder     ESBL (extended spectrum beta-lactamase) producing bacteria infection     History of blood transfusion 2007    Cervical fusion    Leukemia (H)     CLA large beta    Melanoma (H) 1998    Migraine     Other chronic pain     Rotator cuff tear     s/p injections    Sacroiliac inflammation (H24)     Shift work sleep disorder 12/16/2013    Urinary calculi     Vitamin B12 deficiency anemia 2006    started injections     Allergies: Bupropion, Codeine, Effexor [venlafaxine hydrochloride], Escitalopram, Escitalopram oxalate, Fluoxetine, Levaquin [levofloxacin], Methylphenidate, Milnacipran, Pregabalin, Prozac [fluoxetine hcl], Tramadol, and Venlafaxine   Medications:   Current Outpatient Medications   Medication Sig Dispense Refill    albuterol (PROAIR HFA/PROVENTIL HFA/VENTOLIN HFA) 108 (90 Base) MCG/ACT inhaler Inhale 2 puffs into the lungs every 6 hours as needed for shortness of breath or wheezing 8.5 g 11    amphetamine-dextroamphetamine (ADDERALL XR) 25 MG 24 hr capsule Take 1 capsule (25 mg) by mouth daily for 30 days 30 capsule 0    amphetamine-dextroamphetamine (ADDERALL) 15 MG tablet Take 1 tablet (15 mg) by mouth 2 times daily for 30 days 60 tablet 0    cefdinir (OMNICEF) 300 MG capsule Take 2 capsules (600 mg) by mouth daily for 7 days 14 capsule 0    Cobalamin Combinations (VITAMIN B12-FOLIC ACID) 500-400 MCG TABS Take 1 tablet by mouth      desvenlafaxine (PRISTIQ) 50 MG 24 hr tablet Take one tab (50 mg) by mouth daily WITH 25 mg tab for total daily dose 75 mg. 90 tablet 1    desvenlafaxine succinate (PRISTIQ) 25 MG 24 hr tablet Take one tab (25 mg) by mouth daily WITH 50 mg tab for total daily dose 75 mg. 90 tablet 1    Estradiol (DIVIGEL) 1 MG/GM GEL Place 1 packet onto the skin daily 30 g 11    hydrOXYzine (VISTARIL) 25 MG capsule Take 1 capsule (25 mg) by mouth 3 times daily as needed for  itching or anxiety 90 capsule 3    Levomefolate Glucosamine (METHYLFOLATE PO) Take 7.5 mg by mouth daily      lidocaine (LIDODERM) 5 % patch Place 3 patches onto the skin daily Apply up to 3 patches to skin. Wear for 12 hours and remove for 12 hrs.  Refill when patient requests. 120 patch 3    medical cannabis (Patient's own supply.  Not a prescription) Medical Cannabis - Tangerine 4-6 ml by mouth daily. [facility]      methocarbamol (ROBAXIN) 500 MG tablet Take 1 tablet (500 mg) by mouth 4 times daily as needed for muscle spasms 120 tablet 1    methocarbamol (ROBAXIN) 750 MG tablet Take 1 tablet (750 mg) by mouth 4 times daily as needed for muscle spasms 120 tablet 4    naloxone (NARCAN) 4 MG/0.1ML nasal spray Spray 1 spray (4 mg) into one nostril alternating nostrils as needed for opioid reversal every 2-3 minutes until assistance arrives 0.2 mL 1    NONFORMULARY Take 2 Scoops by mouth daily Protein and Vitamin shake mix - Nutritional supplement      ondansetron (ZOFRAN ODT) 8 MG ODT tab Take 1 tablet (8 mg) by mouth every 8 hours as needed for nausea 30 tablet 4    Prasterone 6.5 MG INST Place 0.5 suppositories vaginally three times a week 28 each 11    rOPINIRole (REQUIP) 0.25 MG tablet TAKE 1 TO 2 TABLETS(0.25 TO 0.5 MG) BY MOUTH AT BEDTIME 180 tablet 3    senna-docusate (SENOKOT-S/PERICOLACE) 8.6-50 MG tablet Take 6-9 tablets by mouth every evening      vitamin D3 (CHOLECALCIFEROL) 125 MCG (5000 UT) tablet Take 5,000 Units by mouth daily       Social History:   Social History     Tobacco Use    Smoking status: Former     Current packs/day: 0.00     Average packs/day: 1 pack/day for 5.0 years (5.0 ttl pk-yrs)     Types: Cigarettes, Clove cigarettes or kreteks     Start date: 1979     Quit date: 1984     Years since quittin.3    Smokeless tobacco: Never   Substance Use Topics    Alcohol use: Yes     Comment: rare       ROS:  Review of systems negative except as stated above.    OBJECTIVE:  BP  112/70   Pulse 78   Temp 98.6  F (37  C) (Tympanic)   Resp 18   Wt 71.9 kg (158 lb 9.6 oz)   LMP 05/01/2005 (LMP Unknown)   SpO2 98%   BMI 26.49 kg/m      GENERAL APPEARANCE: healthy, alert and no distress  EYES: EOMI,  PERRL, conjunctiva clear  HENT: ear canals and TM's normal.  Nose and mouth without ulcers, erythema or lesions.  Sinus tenderness upon palpation.  NECK: supple, nontender, no lymphadenopathy  RESP: lungs clear to auscultation - no rales, rhonchi or wheezes  CV: regular rates and rhythm, normal S1 S2, no murmur noted  SKIN: no suspicious lesions or rashes    Labs:      Results for orders placed or performed in visit on 05/15/24 (from the past 24 hour(s))   Streptococcus A Rapid Screen w/Reflex to PCR    Specimen: Throat; Swab   Result Value Ref Range    Group A Strep antigen Negative Negative     *Note: Due to a large number of results and/or encounters for the requested time period, some results have not been displayed. A complete set of results can be found in Results Review.

## 2024-05-17 ENCOUNTER — MYC MEDICAL ADVICE (OUTPATIENT)
Dept: FAMILY MEDICINE | Facility: CLINIC | Age: 64
End: 2024-05-17
Payer: COMMERCIAL

## 2024-05-20 ASSESSMENT — PATIENT HEALTH QUESTIONNAIRE - PHQ9
10. IF YOU CHECKED OFF ANY PROBLEMS, HOW DIFFICULT HAVE THESE PROBLEMS MADE IT FOR YOU TO DO YOUR WORK, TAKE CARE OF THINGS AT HOME, OR GET ALONG WITH OTHER PEOPLE: SOMEWHAT DIFFICULT
SUM OF ALL RESPONSES TO PHQ QUESTIONS 1-9: 11
SUM OF ALL RESPONSES TO PHQ QUESTIONS 1-9: 11

## 2024-05-21 ENCOUNTER — ALLIED HEALTH/NURSE VISIT (OUTPATIENT)
Dept: PSYCHIATRY | Facility: CLINIC | Age: 64
End: 2024-05-21
Payer: COMMERCIAL

## 2024-05-21 VITALS — DIASTOLIC BLOOD PRESSURE: 70 MMHG | SYSTOLIC BLOOD PRESSURE: 109 MMHG | HEART RATE: 78 BPM

## 2024-05-21 DIAGNOSIS — F33.2 SEVERE EPISODE OF RECURRENT MAJOR DEPRESSIVE DISORDER, WITHOUT PSYCHOTIC FEATURES (H): Primary | ICD-10-CM

## 2024-05-21 NOTE — PROGRESS NOTES
Janelle White comes into clinic today at the request of LINK Yanes MD Ordering Provider for Med Injection only Ketamine .    Procedure Prep:  Medication double check completed by: Fiona Simon RN  Prior to administration pt identified by name and : yes    Nursing Assessment:  Appearance: casual clothing   Mood: Okay   Affect: WNL  Eye contact: good      2024     3:44 PM   PHQ   PHQ-9 Total Score 11   Q9: Thoughts of better off dead/self-harm past 2 weeks Several days   F/U: Thoughts of suicide or self-harm Yes   F/U: Self harm-plan Yes   F/U: Self-harm action No   F/U: Safety concerns No     QIDDSR 16 weekly assessment: score 12. Assessment was scanned to EHR.     Procedure Performed:  VSS and mental status WNL. Patient was given ketamine. See MAR for details.     Post Procedure Assessment:  Patient tolerated the treatment with the following side effects: dissociation. Vital signs were monitored, see VS Flowsheet. Patient stated they felt ready to go home prior to discharge. AVS was offered to patient and patient declined. Patient was instructed not to drive for the remainder of the day and to notify clinic if any concerns arise.     Next appt: 3 weeks      This service provided today was under the supervising provider of the day LINK Hopkins MD, who was available if needed.        Adali Berg RN  Answers submitted by the patient for this visit:  Patient Health Questionnaire (Submitted on 2024)  If you checked off any problems, how difficult have these problems made it for you to do your work, take care of things at home, or get along with other people?: Somewhat difficult  PHQ9 TOTAL SCORE: 11

## 2024-06-04 ENCOUNTER — MYC MEDICAL ADVICE (OUTPATIENT)
Dept: PSYCHIATRY | Facility: CLINIC | Age: 64
End: 2024-06-04
Payer: COMMERCIAL

## 2024-06-04 ENCOUNTER — MYC REFILL (OUTPATIENT)
Dept: PSYCHIATRY | Facility: CLINIC | Age: 64
End: 2024-06-04
Payer: COMMERCIAL

## 2024-06-04 DIAGNOSIS — F34.1 PERSISTENT DEPRESSIVE DISORDER: ICD-10-CM

## 2024-06-04 DIAGNOSIS — F33.9 RECURRENT MAJOR DEPRESSION RESISTANT TO TREATMENT (H): ICD-10-CM

## 2024-06-04 RX ORDER — DEXTROAMPHETAMINE SACCHARATE, AMPHETAMINE ASPARTATE MONOHYDRATE, DEXTROAMPHETAMINE SULFATE AND AMPHETAMINE SULFATE 6.25; 6.25; 6.25; 6.25 MG/1; MG/1; MG/1; MG/1
25 CAPSULE, EXTENDED RELEASE ORAL DAILY
Qty: 30 CAPSULE | Refills: 0 | Status: SHIPPED | OUTPATIENT
Start: 2024-06-04 | End: 2024-07-02

## 2024-06-04 NOTE — TELEPHONE ENCOUNTER
MyC refill message -  Refills have been requested for the following medications:         amphetamine-dextroamphetamine (ADDERALL XR) 25 MG 24 hr capsule [Kimberly Perez]       Patient Comment: For some reason this Rx did not have 3rd refill?     Preferred pharmacy: [id number] - [place] [address] [place] [facility] [place] [address]     Date of Last Office Visit: 4/15/2024  Date of Next Office Visit: 7/15/2024  No shows since last visit: none  Cancellations since last visit: none    Medication requested: amphetamine-dextroamphetamine (ADDERALL XR) 25 MG 24 hr capsule  Date last ordered: 2/19/2024 Qty: 30 Refills: 2 with start dates of 3/21 and 4/21/2024  Take 1 capsule (25 mg) by mouth daily     Review of [place] ?: Yes  Medication last sold date: 5/8/2024 Qty filled: 30  Other controlled substance on [place] ?: yes  If yes, is this a new medication?: No  Filled  Written  Sold  ID  Drug  QTY  Days  Prescriber  RX #  Dispenser  Refill  Daily Dose*  Pymt Type      05/07/2024 02/19/2024 05/08/2024 1 Dextroamp-Amphet Er 25 Mg Cap 30.00 30 [facility] [id number] [facility] ([id number]) 0/0  [place]   05/06/2024 03/04/2024 05/06/2024 1 Dextroamp-Amphetamin 15 Mg Tab 60.00 30 [facility] [id number] [facility] ([id number]) 0/0  [place]   04/03/2024 02/19/2024 04/06/2024 1 Dextroamp-Amphet Er 25 Mg Cap 30.00 30 [facility]au [id number] [facility] ([id number]) 0/0  [place]   04/02/2024 11/17/2023 04/03/2024 1 Dextroamp-Amphetamin 15 Mg Tab 60.00 30 [facility]au [id number] [facility] ([id number]) 0/0  [place]   03/04/2024 03/04/2024 03/05/2024 1 Dextroamp-Amphetamin 15 Mg Tab 60.00 30 [facility]au [id number] [facility] ([id number]) 0/0  [place]   02/28/2024 02/19/2024 02/29/2024 1 Dextroamp-Amphet Er 25 Mg Cap 30.00 30 Al Bau [id number] [facility] ([id number]) 0/0  [place]   02/19/2024 02/19/2024 02/21/2024 1 Dextroamp-Amphet Er 15 Mg Cap 60.00 30 Al Bau [id number] [facility] ([id number]) 0/0  [place]   01/24/2024 11/17/2023 01/25/2024 1 Dextroamp-Amphet Er 25 Mg Cap 30.00 30 Al Bau [id number] [facility] ([id number]) 0/0  [place]are  [place]   01/24/2024 08/22/2023 01/25/2024 1 Dextroamp-Amphetamin 15 Mg Tab 60.00 30 Al Bau               Lapse in medication adherence greater than 5 days?: no  If yes, call patient and gather details: n/a  Medication refill request verified as identical to current order?: yes  Result of Last DAM, VPA, Li+ Level, CBC, or Carbamazepine Level (at or since last visit): N/A    Last visit treatment plan:   4/15/2024:   The patient overall doing relatively okay.  Some improvements along with some ongoing struggles.  Given some of these 2-week waves of worsening symptoms, patient is agreeable to a trial of increasing Pristiq.  Depending on how anxiety settles and how increased Pristiq is tolerated, could consider BuSpar or clonidine in the future for anxiety.  No acute safety concerns today.  No acute suicidality.  Continues ketamine therapy.  Recently started with a new somatic therapist.     Medication side effects and alternatives were reviewed. Health promotion activities recommended and reviewed today. All questions addressed. Education and counseling completed regarding risks and benefits of medications and psychotherapy options. Recommend therapy for additional support.      Treatment Plan:  Increase Pristiq to 75 mg daily for mood, anxiety.  Okay to alternate 50 mg with 75 mg every couple days or every other day if 75 mg daily not tolerated.  Continue methylfolate 7.5 mg daily as supplementation.  Continue Adderall XR 25 mg daily for mood augmentation  Continue Adderall IR 15 mg twice daily as needed for mood augmentation and daytime fatigue/hypersomnia  Continue hydroxyzine 25-50 mg up to three times a day as needed for anxiety/sleep.   Continue ketamine therapy as planned.   Continue to work with your specialist from [facility] as indicated.    Continue with new somatic psychotherapist as planned.  Continue all other cares per primary care provider.   Continue all other medications as reviewed per electronic medical  record today.   Safety plan reviewed. To the Emergency Department as needed or call after hours crisis line at [phone] or [phone]. [place] Crisis Text Line. Text [place] to 361097 or Suicide LifeLine Chat: suicidepreventionlifeline.org/chat  Schedule an appointment with me in 3 months, or sooner as needed. Call [facility] at [phone] to schedule.  Follow up with primary care provider as planned or for acute medical concerns.  Call the psychiatric nurse line with medication questions or concerns at [phone].  [place] may be used to communicate with your provider, but this is not intended to be used for emergencies.    []Medication refilled per  Medication Refill in Ambulatory Care  policy.  [x]Medication unable to be refilled by RN due to criteria not met as indicated below:    []Eligibility - not seen in the last year   []Supervision - no future appointment   []Compliance - no shows, cancellations or lapse in therapy   []Verification - order discrepancy   [x]Controlled medication   []Medication not included in policy   []90-day supply request   []Other    A 30-day refill is pended for Dr. Perez's review and approval.    Tori Huffman RN on 6/4/2024 at 9:24 AM

## 2024-06-11 ENCOUNTER — ALLIED HEALTH/NURSE VISIT (OUTPATIENT)
Dept: PSYCHIATRY | Facility: CLINIC | Age: 64
End: 2024-06-11
Payer: COMMERCIAL

## 2024-06-11 VITALS — SYSTOLIC BLOOD PRESSURE: 111 MMHG | HEART RATE: 86 BPM | DIASTOLIC BLOOD PRESSURE: 71 MMHG

## 2024-06-11 DIAGNOSIS — F33.9 RECURRENT MAJOR DEPRESSION RESISTANT TO TREATMENT (H): Primary | ICD-10-CM

## 2024-06-11 NOTE — PROGRESS NOTES
Janelle White comes into clinic today at the request of LINK Yanes MD Ordering Provider for Med Injection only Ketamine .    Procedure Prep:  Medication double check completed by: Leigh Berg RN  Prior to administration pt identified by name and : yes    Nursing Assessment:  Appearance: casual clothing   Mood: Okay   Affect: WNL  Eye contact: good      2024     6:55 AM   PHQ   PHQ-9 Total Score 12   Q9: Thoughts of better off dead/self-harm past 2 weeks More than half the days   F/U: Thoughts of suicide or self-harm Yes   F/U: Self harm-plan Yes   F/U: Self-harm action No   F/U: Safety concerns No     QIDDSR 16 weekly assessment: score 15. Assessment was scanned to EHR.     Procedure Performed:  VSS and mental status WNL. Patient was given ketamine. See MAR for details.     Post Procedure Assessment:  Patient tolerated the treatment with the following side effects: dissociation. Vital signs were monitored, see VS Flowsheet. Patient stated they felt ready to go home prior to discharge. AVS was offered to patient and patient declined. Patient was instructed not to drive for the remainder of the day and to notify clinic if any concerns arise.     Next appt: 3 weeks      This service provided today was under the supervising provider of the day LINK Hopkins MD, who was available if needed.        Fiona Simon RN    Answers submitted by the patient for this visit:  Patient Health Questionnaire (Submitted on 2024)  If you checked off any problems, how difficult have these problems made it for you to do your work, take care of things at home, or get along with other people?: Very difficult  PHQ9 TOTAL SCORE: 12

## 2024-06-12 ENCOUNTER — TRANSFERRED RECORDS (OUTPATIENT)
Dept: HEALTH INFORMATION MANAGEMENT | Facility: CLINIC | Age: 64
End: 2024-06-12
Payer: COMMERCIAL

## 2024-06-21 ENCOUNTER — OFFICE VISIT (OUTPATIENT)
Dept: FAMILY MEDICINE | Facility: CLINIC | Age: 64
End: 2024-06-21
Payer: COMMERCIAL

## 2024-06-21 VITALS
DIASTOLIC BLOOD PRESSURE: 50 MMHG | SYSTOLIC BLOOD PRESSURE: 112 MMHG | BODY MASS INDEX: 25.64 KG/M2 | OXYGEN SATURATION: 97 % | TEMPERATURE: 97.5 F | HEIGHT: 65 IN | HEART RATE: 75 BPM | RESPIRATION RATE: 16 BRPM | WEIGHT: 153.9 LBS

## 2024-06-21 DIAGNOSIS — G89.4 CHRONIC PAIN SYNDROME: ICD-10-CM

## 2024-06-21 DIAGNOSIS — M25.50 MULTIPLE JOINT PAIN: Primary | ICD-10-CM

## 2024-06-21 LAB
CRP SERPL-MCNC: <3 MG/L
ERYTHROCYTE [DISTWIDTH] IN BLOOD BY AUTOMATED COUNT: 12.7 % (ref 10–15)
ERYTHROCYTE [SEDIMENTATION RATE] IN BLOOD BY WESTERGREN METHOD: 6 MM/HR (ref 0–30)
HCT VFR BLD AUTO: 40.4 % (ref 35–47)
HGB BLD-MCNC: 13.1 G/DL (ref 11.7–15.7)
MCH RBC QN AUTO: 31.4 PG (ref 26.5–33)
MCHC RBC AUTO-ENTMCNC: 32.4 G/DL (ref 31.5–36.5)
MCV RBC AUTO: 97 FL (ref 78–100)
PLATELET # BLD AUTO: 178 10E3/UL (ref 150–450)
RBC # BLD AUTO: 4.17 10E6/UL (ref 3.8–5.2)
RHEUMATOID FACT SERPL-ACNC: <10 IU/ML
URATE SERPL-MCNC: 3.3 MG/DL (ref 2.4–5.7)
WBC # BLD AUTO: 7.1 10E3/UL (ref 4–11)

## 2024-06-21 PROCEDURE — 85027 COMPLETE CBC AUTOMATED: CPT | Performed by: NURSE PRACTITIONER

## 2024-06-21 PROCEDURE — 85652 RBC SED RATE AUTOMATED: CPT | Performed by: NURSE PRACTITIONER

## 2024-06-21 PROCEDURE — 86431 RHEUMATOID FACTOR QUANT: CPT | Performed by: NURSE PRACTITIONER

## 2024-06-21 PROCEDURE — 36415 COLL VENOUS BLD VENIPUNCTURE: CPT | Performed by: NURSE PRACTITIONER

## 2024-06-21 PROCEDURE — 99214 OFFICE O/P EST MOD 30 MIN: CPT | Performed by: NURSE PRACTITIONER

## 2024-06-21 PROCEDURE — 86140 C-REACTIVE PROTEIN: CPT | Performed by: NURSE PRACTITIONER

## 2024-06-21 PROCEDURE — 84550 ASSAY OF BLOOD/URIC ACID: CPT | Performed by: NURSE PRACTITIONER

## 2024-06-21 PROCEDURE — 86038 ANTINUCLEAR ANTIBODIES: CPT | Performed by: NURSE PRACTITIONER

## 2024-06-21 RX ORDER — METHYLPREDNISOLONE 4 MG
TABLET, DOSE PACK ORAL
Qty: 21 TABLET | Refills: 0 | Status: SHIPPED | OUTPATIENT
Start: 2024-06-21

## 2024-06-21 NOTE — PROGRESS NOTES
Assessment & Plan     Multiple joint pain  Chronic pain syndrome  Exacerbation of her chronic pain issues, unable to take gabapentin.  Will treat with medrol steroid pack, continue multimodal pain control with schedule tylenol and muscle relaxants.  She may benefit from repeat spinal epidural and will contact her pain provider.  Continue PT and exercise therapy.    - methylPREDNISolone (MEDROL DOSEPAK) 4 MG tablet therapy pack; Follow Package Directions  - CRP, inflammation; Future  - Uric acid; Future  - ESR: Erythrocyte sedimentation rate; Future  - CBC with platelets; Future  - Anti Nuclear Nga IgG by IFA with Reflex; Future  - Rheumatoid factor; Future  - CRP, inflammation  - Uric acid  - ESR: Erythrocyte sedimentation rate  - CBC with platelets  - Anti Nuclear Nga IgG by IFA with Reflex  - Rheumatoid factor          Depression Screening Follow Up               No data to display                    Follow Up Actions Taken  Crisis resource information provided in the After Visit Summary  Referred patient back to mental health provider    Discussed the following ways the patient can remain in a safe environment:          Giorgio Luis is a 63 year old, presenting for the following health issues:  Musculoskeletal Problem (Right leg)      6/21/2024     8:30 AM   Additional Questions   Roomed by candace   Accompanied by self     History of Present Illness       Reason for visit:  Trying to differentiate MSK pain, edema vs vascular symptoms or CLL sxs. Shift in cardio trend by Apple Watch    She eats 4 or more servings of fruits and vegetables daily.She consumes 0 sweetened beverage(s) daily.She exercises with enough effort to increase her heart rate 30 to 60 minutes per day.  She exercises with enough effort to increase her heart rate 6 days per week.   She is taking medications regularly.         Pain down the back of her leg and wrapped around her hip more on the right side.  Sitting hurts and is painful.   Hamstring insertion site hurts.  Piliates helps it.  Interrupting her activity, not sleeping well.  3 days of severe pain.  Almost felt like vasospasms or shannon horses up the middle of the leg.  Skin feels tender to touch in the back of the knee.    Right knee is very swollen, feels similar to pseudogout that she had in the past. Used husbands cholchicine, helped a little.    No skin changes                Objective    LMP 05/01/2005 (LMP Unknown)   There is no height or weight on file to calculate BMI.  Physical Exam   GENERAL: alert and no distress  MS: no gross musculoskeletal defects noted, no edema  SKIN: no suspicious lesions or rashes  NEURO: Normal strength and tone, mentation intact and speech normal  PSYCH: mentation appears normal, affect normal/bright            Signed Electronically by: Carmen Garcia DNP

## 2024-06-24 LAB — ANA SER QL IF: NEGATIVE

## 2024-06-25 ENCOUNTER — MYC MEDICAL ADVICE (OUTPATIENT)
Dept: FAMILY MEDICINE | Facility: CLINIC | Age: 64
End: 2024-06-25
Payer: COMMERCIAL

## 2024-06-27 NOTE — TELEPHONE ENCOUNTER
Glad the steroids helped.  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  That being said, I don't see the harm in an 81mg aspirin, or taking an aspirin if she is going to be traveling or sedentary for a long period of time.

## 2024-07-01 ASSESSMENT — PATIENT HEALTH QUESTIONNAIRE - PHQ9
10. IF YOU CHECKED OFF ANY PROBLEMS, HOW DIFFICULT HAVE THESE PROBLEMS MADE IT FOR YOU TO DO YOUR WORK, TAKE CARE OF THINGS AT HOME, OR GET ALONG WITH OTHER PEOPLE: VERY DIFFICULT
SUM OF ALL RESPONSES TO PHQ QUESTIONS 1-9: 6
SUM OF ALL RESPONSES TO PHQ QUESTIONS 1-9: 6

## 2024-07-02 ENCOUNTER — MYC MEDICAL ADVICE (OUTPATIENT)
Dept: PSYCHIATRY | Facility: CLINIC | Age: 64
End: 2024-07-02
Payer: COMMERCIAL

## 2024-07-02 ENCOUNTER — MYC REFILL (OUTPATIENT)
Dept: PSYCHIATRY | Facility: CLINIC | Age: 64
End: 2024-07-02
Payer: COMMERCIAL

## 2024-07-02 ENCOUNTER — ALLIED HEALTH/NURSE VISIT (OUTPATIENT)
Dept: PSYCHIATRY | Facility: CLINIC | Age: 64
End: 2024-07-02
Payer: COMMERCIAL

## 2024-07-02 VITALS — DIASTOLIC BLOOD PRESSURE: 86 MMHG | HEART RATE: 69 BPM | SYSTOLIC BLOOD PRESSURE: 104 MMHG

## 2024-07-02 DIAGNOSIS — F34.1 PERSISTENT DEPRESSIVE DISORDER: ICD-10-CM

## 2024-07-02 DIAGNOSIS — F33.9 RECURRENT MAJOR DEPRESSION RESISTANT TO TREATMENT (H): ICD-10-CM

## 2024-07-02 DIAGNOSIS — F33.9 RECURRENT MAJOR DEPRESSION RESISTANT TO TREATMENT (H): Primary | ICD-10-CM

## 2024-07-02 RX ORDER — DEXTROAMPHETAMINE SACCHARATE, AMPHETAMINE ASPARTATE MONOHYDRATE, DEXTROAMPHETAMINE SULFATE AND AMPHETAMINE SULFATE 6.25; 6.25; 6.25; 6.25 MG/1; MG/1; MG/1; MG/1
25 CAPSULE, EXTENDED RELEASE ORAL DAILY
Qty: 30 CAPSULE | Refills: 0 | Status: SHIPPED | OUTPATIENT
Start: 2024-07-02 | End: 2024-07-25

## 2024-07-02 NOTE — PROGRESS NOTES
Janelle White comes into clinic today at the request of LINK Yanes MD Ordering Provider for Med Injection only Ketamine .    Procedure Prep:  Medication double check completed by: Leigh Berg RN  Prior to administration pt identified by name and : yes    Nursing Assessment:  Appearance: casual clothing   Mood: Okay   Affect: WNL  Eye contact: good      2024    12:01 PM   PHQ   PHQ-9 Total Score 6   Q9: Thoughts of better off dead/self-harm past 2 weeks Several days   F/U: Thoughts of suicide or self-harm Yes   F/U: Self harm-plan Yes   F/U: Self-harm action No   F/U: Safety concerns No     QIDDSR 16 weekly assessment: score 15. Assessment was scanned to EHR.     Procedure Performed:  VSS and mental status WNL. Patient was given ketamine. See MAR for details.     Post Procedure Assessment:  Patient tolerated the treatment with the following side effects: dissociation. Vital signs were monitored, see VS Flowsheet. Patient stated they felt ready to go home prior to discharge. AVS was offered to patient and patient declined. Patient was instructed not to drive for the remainder of the day and to notify clinic if any concerns arise.     Next appt: 3 weeks      This service provided today was under the supervising provider of the day LINK Hopkins MD, who was available if needed.        Fiona Simon RN      Answers submitted by the patient for this visit:  Patient Health Questionnaire (Submitted on 2024)  If you checked off any problems, how difficult have these problems made it for you to do your work, take care of things at home, or get along with other people?: Very difficult  PHQ9 TOTAL SCORE: 6

## 2024-07-02 NOTE — TELEPHONE ENCOUNTER
Patient requesting refills of Adderall XR and IR 15mg.         Date of Last Office Visit: 4/15/24  Date of Next Office Visit:  7/15/24  No shows since last visit: No  More than 2 cancellations since last visit? No    []Medication refilled per  Medication Refill in Ambulatory Care  policy.  [x]Medication unable to be refilled by RN due to criteria not met as indicated below:    []Eligibility: has not had a provider visit within last 6 months   []Supervision: no future appointment; < 7 days before next appointment   []Compliance: no shows; cancellations; lapse in therapy   []Verification: order discrepancy; may need modification...   []90-day supply request   []Advanced refill request: > 7 days before refill date   [x]Controlled medication   []Medication not included in policy   []Review: new med; med adjusted ? 30 days; safety alert; requires lab monitoring...   []Scope of Practice: refill request processed by LPN/MA   []Other:      Medication(s) requested:     -  amphetamine-dextroamphetamine (ADDERALL XR) 25 MG 24 hr capsule   Date last ordered: 6/4/24  Qty: 30  Refills: 0     -  amphetamine-dextroamphetamine (ADDERALL) 15 MG tablet   Date last ordered: 5/5/24  Qty: 60  Refills: 0        Any Controlled Substance(s)?   Adderall 15mg tablet last sold: 6/5/24 quantity 60  Adderall XR 25mg last sold: 6/5/24 quantity 30    Requested medication(s) verified as identical to current order? Yes - 15mg tablet not pended    Any lapse in adherence to medication(s) greater than 5 days? No      Action required? routing to provider for review.    Last visit treatment plan:       Treatment Plan:  Increase Pristiq to 75 mg daily for mood, anxiety.  Okay to alternate 50 mg with 75 mg every couple days or every other day if 75 mg daily not tolerated.  Continue methylfolate 7.5 mg daily as supplementation.  Continue Adderall XR 25 mg daily for mood augmentation  Continue Adderall IR 15 mg twice daily as needed for mood augmentation and  daytime fatigue/hypersomnia  Continue hydroxyzine 25-50 mg up to three times a day as needed for anxiety/sleep.   Continue ketamine therapy as planned.   Continue to work with your specialist from [facility] as indicated.    Continue with new somatic psychotherapist as planned.  Continue all other cares per primary care provider.   Continue all other medications as reviewed per electronic medical record today.   Safety plan reviewed. To the Emergency Department as needed or call after hours crisis line at [phone] or [phone]. [place] Crisis Text Line. Text [place] to 988384 or Suicide LifeLine Chat: suicidepreventionlifeline.org/chat  Schedule an appointment with me in 3 months, or sooner as needed. Call [facility] at [phone] to schedule.  Follow up with primary care provider as planned or for acute medical concerns.  Call the psychiatric nurse line with medication questions or concerns at [phone].  MyChart may be used to communicate with your provider, but this is not intended to be used for emergencies.       Any medication(s) require lab monitoring? No

## 2024-07-09 RX ORDER — HEPARIN SODIUM (PORCINE) LOCK FLUSH IV SOLN 100 UNIT/ML 100 UNIT/ML
5 SOLUTION INTRAVENOUS
OUTPATIENT
Start: 2024-07-09

## 2024-07-09 RX ORDER — ALBUTEROL SULFATE 0.83 MG/ML
2.5 SOLUTION RESPIRATORY (INHALATION)
OUTPATIENT
Start: 2024-07-09

## 2024-07-09 RX ORDER — HEPARIN SODIUM,PORCINE 10 UNIT/ML
5 VIAL (ML) INTRAVENOUS
OUTPATIENT
Start: 2024-07-09

## 2024-07-09 RX ORDER — ALBUTEROL SULFATE 90 UG/1
1-2 AEROSOL, METERED RESPIRATORY (INHALATION)
Start: 2024-07-09

## 2024-07-09 RX ORDER — DIPHENHYDRAMINE HYDROCHLORIDE 50 MG/ML
50 INJECTION INTRAMUSCULAR; INTRAVENOUS
Start: 2024-07-09

## 2024-07-09 RX ORDER — MEPERIDINE HYDROCHLORIDE 25 MG/ML
25 INJECTION INTRAMUSCULAR; INTRAVENOUS; SUBCUTANEOUS EVERY 30 MIN PRN
OUTPATIENT
Start: 2024-07-09

## 2024-07-09 RX ORDER — HYDRALAZINE HYDROCHLORIDE 20 MG/ML
10 INJECTION INTRAMUSCULAR; INTRAVENOUS
OUTPATIENT
Start: 2024-07-09

## 2024-07-09 RX ORDER — EPINEPHRINE 1 MG/ML
0.3 INJECTION, SOLUTION, CONCENTRATE INTRAVENOUS EVERY 5 MIN PRN
OUTPATIENT
Start: 2024-07-09

## 2024-07-09 RX ORDER — ONDANSETRON 2 MG/ML
4 INJECTION INTRAMUSCULAR; INTRAVENOUS
OUTPATIENT
Start: 2024-07-09

## 2024-07-09 RX ORDER — NALOXONE HYDROCHLORIDE 0.4 MG/ML
0.2 INJECTION, SOLUTION INTRAMUSCULAR; INTRAVENOUS; SUBCUTANEOUS
OUTPATIENT
Start: 2024-07-09

## 2024-07-12 ASSESSMENT — ANXIETY QUESTIONNAIRES
7. FEELING AFRAID AS IF SOMETHING AWFUL MIGHT HAPPEN: NOT AT ALL
8. IF YOU CHECKED OFF ANY PROBLEMS, HOW DIFFICULT HAVE THESE MADE IT FOR YOU TO DO YOUR WORK, TAKE CARE OF THINGS AT HOME, OR GET ALONG WITH OTHER PEOPLE?: VERY DIFFICULT
6. BECOMING EASILY ANNOYED OR IRRITABLE: SEVERAL DAYS
GAD7 TOTAL SCORE: 8
8. IF YOU CHECKED OFF ANY PROBLEMS, HOW DIFFICULT HAVE THESE MADE IT FOR YOU TO DO YOUR WORK, TAKE CARE OF THINGS AT HOME, OR GET ALONG WITH OTHER PEOPLE?: VERY DIFFICULT
4. TROUBLE RELAXING: MORE THAN HALF THE DAYS
GAD7 TOTAL SCORE: 8
2. NOT BEING ABLE TO STOP OR CONTROL WORRYING: SEVERAL DAYS
1. FEELING NERVOUS, ANXIOUS, OR ON EDGE: MORE THAN HALF THE DAYS
GAD7 TOTAL SCORE: 8
6. BECOMING EASILY ANNOYED OR IRRITABLE: SEVERAL DAYS
5. BEING SO RESTLESS THAT IT IS HARD TO SIT STILL: SEVERAL DAYS
GAD7 TOTAL SCORE: 8
3. WORRYING TOO MUCH ABOUT DIFFERENT THINGS: SEVERAL DAYS
GAD7 TOTAL SCORE: 8
1. FEELING NERVOUS, ANXIOUS, OR ON EDGE: MORE THAN HALF THE DAYS
7. FEELING AFRAID AS IF SOMETHING AWFUL MIGHT HAPPEN: NOT AT ALL
7. FEELING AFRAID AS IF SOMETHING AWFUL MIGHT HAPPEN: NOT AT ALL
2. NOT BEING ABLE TO STOP OR CONTROL WORRYING: SEVERAL DAYS
3. WORRYING TOO MUCH ABOUT DIFFERENT THINGS: SEVERAL DAYS
GAD7 TOTAL SCORE: 8
7. FEELING AFRAID AS IF SOMETHING AWFUL MIGHT HAPPEN: NOT AT ALL
5. BEING SO RESTLESS THAT IT IS HARD TO SIT STILL: SEVERAL DAYS
IF YOU CHECKED OFF ANY PROBLEMS ON THIS QUESTIONNAIRE, HOW DIFFICULT HAVE THESE PROBLEMS MADE IT FOR YOU TO DO YOUR WORK, TAKE CARE OF THINGS AT HOME, OR GET ALONG WITH OTHER PEOPLE: VERY DIFFICULT
4. TROUBLE RELAXING: MORE THAN HALF THE DAYS
IF YOU CHECKED OFF ANY PROBLEMS ON THIS QUESTIONNAIRE, HOW DIFFICULT HAVE THESE PROBLEMS MADE IT FOR YOU TO DO YOUR WORK, TAKE CARE OF THINGS AT HOME, OR GET ALONG WITH OTHER PEOPLE: VERY DIFFICULT

## 2024-07-12 NOTE — PROGRESS NOTES
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 (e.g., hypothyroidism).   H. The symptoms cause clinically significant distress or impairment in social, occupational, or other important areas of functioning.     Functional Status:  Patient reports the following functional impairments:  chronic disease management, health maintenance, management of the household and or completion of tasks, relationship(s), self-care, and work / vocational responsibilities.     Nonprogrammatic care:  Patient is requesting basic services to address current mental health concerns.    Clinical Summary:  1. Psychosocial, Cultural and Contextual Factors: medical complexities, trauma, interpersonal concerns  .  2. Principal DSM5 Diagnoses  (Sustained by DSM5 Criteria Listed Above):   300.4 (F34.1) Persistent Depressive Disorder, Severe.  3. Other Diagnoses that is relevant to services:   N/a.  4. Provisional Diagnosis:  N/a  5. Prognosis: Relieve Acute Symptoms.  6. Likely consequences of symptoms if not treated: decompensation of MH.  7. Client strengths include:  goal-focused, has a previous history of therapy, insightful, intelligent, motivated, and open to learning .     Recommendations:     1. Plan for Safety and Risk Management:   Safety and Risk: A safety  and risk management plan has been developed including: Patient consented to co-developed safety plan.  Safety and risk management plan was completed - see below.  Patient agreed to use safety plan should any safety concerns arise.  A copy was given to the patient..          Report to child / adult protection services was NA.     2. Patient's identified mental health concerns with a cultural influence will be addressed by per Pt request .     3. Initial Treatment will focus on:    Depressed Mood - . .     4. Resources/Service Plan:    services are not indicated.   Modifications to assist communication are not indicated.   Additional disability accommodations are not indicated.      5. Collaboration:   Collaboration / coordination of treatment will be initiated with the following  support professionals: psychiatry.      6.  Referrals:   The following referral(s) will be initiated: Psychiatry.       A Release of Information has been obtained for the following:  n/a .     Clinical Substantiation/medical necessity for the above recommendations:  Pt has a hx of depression, anxiety and grief and loss symptoms that are impacting daily functioning in daily living and social settings. Through receiving support through CCPS model for medication and [facility] checking on use of coping skills and therapy to help combat these symptoms may provide Pt with relief. Pt reports that they are struggling to manage depressive and anxiety symptoms and again CCPS model can assist with providing coping skills, following up that pt is using these skills, safety plan or other interventions along with medication to have the best impact to manage symptoms and provide relief. At this time pt's symptoms are able to be managed with OP services and pt will be referred to a higher level of care if there are abrupt changes in presentation or risk of harm  .    7. AYLA:    AYLA:  Discussed the general effects of drugs and alcohol on health and  "well-being. Provider gave patient printed information about the  effects of chemical use on their health and well being. Recommendations:  N/a .     8. Records:   These were reviewed at time of assessment.   Information in this assessment was obtained from the medical record and  provided by patient who is a good historian.    Patient will have open access to their mental health medical record.    9.   Interactive Complexity: No    10. Safety Plan:   Maxi Safety Plan      Creation Date: 7/15/24 Last Update Date: 7/15/24      Step 1: Warning signs:    Warning Signs    Thoughts of \"it doesn't matter, people would be better off without me, I can't do this anymore\"    obsessive thoughts about carrying out a plan    highly critical and negative thoughts    fighting with     worsening depression, hopelessness, isolation      Step 2: Internal coping strategies - Things I can do to take my mind off my problems without contacting another person:    Strategies    being with my dog    watching funny movies    going for a walk    spending time in the jacuzzi    thinking about how the thoughts often lessen with intensity      Step 3: People and social settings that provide distraction:    Name Contact Information    Alyx in phone    Raven in phone    Candace in phone       Places    walking/park    attending rock club    AA group      Step 4: People whom I can ask for help during a crisis:    Name Contact Information    Alyx in phone    aRven in phone    Candace in phone      Step 5: Professionals or agencies I can contact during a crisis:    Clinician/Agency Name Phone Emergency Contact    [facility]      [facility] Emergency Department Emergency Department Address Emergency Department Phone    [facility]        Suicide Prevention Lifeline Phone: Call or Text 702  Crisis Text Line: Text HOME to 838912     Step 6: Making the environment safer (plan for lethal means safety):   Removing " old medications   Ensuring guns remain locked up or removed if necessary     Optional: What is most important to me and worth living for?:   My children  My dog  Friends    Things I am able to do on my own to cope or help me feel better: watching a favorite tv show or movie, listening to music I enjoy, going outside and breathing fresh air, going for a walk or exercising, taking a shower or bath, a cold or hot beverage, a healthy snack, drawing/coloring/painting, journaling, singing or dancing, deep breathing     I can try practicing square breathing when I begin to feel anxious - inhale through the nose for the count of 4 and the first line on the square. Exhale through the mouth for the count of 4 for the second line of the square. Repeat to complete the square. Repeat the square as many times as needed.    I can also use my five senses to practice mindfulness and grounding. What are five things I can see, four things I can hear, three things I can feel, two things I can smell, and one thing I can taste.     Things that I am able to do with others to cope or help me feel better: sometimes just talking or spending time with someone else, sharing a meal or having coffee, watching a movie or playing a game, going for a walk or exercising    I can also use community resources including mental health hotlines, [facility] crisis teams, or apps.     Things I can use or do for distraction: movies/tv, music, reading, games, drawing/coloring/painting or other art, essential oils, exercise, cleaning/organizing, puzzles, crossword puzzles, word search, Sudoku       I can also download a meditation or relaxation cyril, like Calm, Headspace, or Insight Timer (all three offer a free version)    A great website resource is Change to Chill with active coping skills    Changes I can make to support my mental health and wellness: Attend scheduled mental health therapy and psychiatric appointments. Take my medications as prescribed.  "Maintain a daily schedule/routine. Abstain from all mood altering substances, including drugs, alcohol, or medications not currently prescribed to me. Implement a self-care routine.      Your county has a mental health crisis team you can call 24/7: [facility], [phone]    Other things that are important when I'm in crisis: to remember that the feelings I am having right now are temporary, and it won't feel like this forever, and that it is okay and important to ask for help    Community Resources  Fast Tracker  Linking people to mental health and substance use disorder resources  Sightly.Neocis     [place] Mental Health Warm Line  Peer to peer support  Monday thru Saturday, 12 pm to 10 pm  [phone] or 7.991.282.7913  Text \"Support\" to 78257    National [employer] Mental Illness (MICHELE)  000.639.8510 or 1.888.MICHELE.HELPS      Mental Health Apps  My3  https://Deal Decor.Neocis/    VirtualHopeBox  https://Mopapp/apps/virtual-hope-box/      Crisis beds are short term community residential facilities that provide 1-10 day stabilization services.  You can self refer via calling them and inquiring into current openings.  This can be an alternative to hospitalization if you are able to be safe within the community.  This is a voluntary stay.  Some options in the [facility] include:    [facility]  ([place]) [phone]  [facility]) [phone]  [facility] ([place]) [phone]  [facility]) [phone]  [facility] ([place]) [phone]  [facility] ([place]) [phone]  [facility]) [phone]    If you feel like you are in need of more emergent support for safety concerns you can present to your local emergency room for a mental health evaluation.  Once you met with a mental health clinician and emergency room health care provider they will provide level of care recommendations and next steps.    Local Emergency Rooms can " include:  [facility] ([facility]) in [facility] ([facility] ) in [facility] ([place]) in [place].  This [facility] also supports EmPATH   EmPATH (Emergency Psychiatric Assessment, Treatment, and Healing) mental health unit. EmPATH is an innovative approach to emergency mental health care that is designed to guide people safely through a crisis while helping them build coping skills for the future. The unit is designed for acute psychiatric patients to receive assessment and evaluation in a therapeutic and least restrictive setting.  [place] ([place]) in [facility]) in [facility] [place], operated by [facility] ([facility]) in [place]        Maxi Safety Plan. Antionette Trevino and Joseph Leon. Used with permission of the authors.           Provider Name/ Credentials:  Joelle Viera MA [facility]  July 15, 2024

## 2024-07-15 ENCOUNTER — VIRTUAL VISIT (OUTPATIENT)
Dept: BEHAVIORAL HEALTH | Facility: CLINIC | Age: 64
End: 2024-07-15
Payer: COMMERCIAL

## 2024-07-15 ENCOUNTER — VIRTUAL VISIT (OUTPATIENT)
Dept: PSYCHIATRY | Facility: CLINIC | Age: 64
End: 2024-07-15
Payer: COMMERCIAL

## 2024-07-15 DIAGNOSIS — E88.89 CYP2D6 POOR METABOLIZER (H): ICD-10-CM

## 2024-07-15 DIAGNOSIS — G89.29 OTHER CHRONIC PAIN: ICD-10-CM

## 2024-07-15 DIAGNOSIS — E88.89 CYP2B6 INTERMEDIATE METABOLIZER (H): ICD-10-CM

## 2024-07-15 DIAGNOSIS — F34.1 PERSISTENT DEPRESSIVE DISORDER: Primary | ICD-10-CM

## 2024-07-15 DIAGNOSIS — F33.9 RECURRENT MAJOR DEPRESSION RESISTANT TO TREATMENT (H): ICD-10-CM

## 2024-07-15 PROCEDURE — G2211 COMPLEX E/M VISIT ADD ON: HCPCS | Mod: 95 | Performed by: PSYCHIATRY & NEUROLOGY

## 2024-07-15 PROCEDURE — 90791 PSYCH DIAGNOSTIC EVALUATION: CPT | Mod: 95 | Performed by: COUNSELOR

## 2024-07-15 PROCEDURE — 99214 OFFICE O/P EST MOD 30 MIN: CPT | Mod: 95 | Performed by: PSYCHIATRY & NEUROLOGY

## 2024-07-15 RX ORDER — DEXTROAMPHETAMINE SACCHARATE, AMPHETAMINE ASPARTATE, DEXTROAMPHETAMINE SULFATE AND AMPHETAMINE SULFATE 3.75; 3.75; 3.75; 3.75 MG/1; MG/1; MG/1; MG/1
15 TABLET ORAL DAILY
Qty: 30 TABLET | Refills: 0 | Status: SHIPPED | OUTPATIENT
Start: 2024-07-15 | End: 2024-07-25

## 2024-07-15 RX ORDER — DEXTROAMPHETAMINE SACCHARATE, AMPHETAMINE ASPARTATE MONOHYDRATE, DEXTROAMPHETAMINE SULFATE AND AMPHETAMINE SULFATE 6.25; 6.25; 6.25; 6.25 MG/1; MG/1; MG/1; MG/1
25 CAPSULE, EXTENDED RELEASE ORAL DAILY
Qty: 30 CAPSULE | Refills: 0 | Status: SHIPPED | OUTPATIENT
Start: 2024-07-23 | End: 2024-07-25

## 2024-07-15 RX ORDER — DEXTROAMPHETAMINE SACCHARATE, AMPHETAMINE ASPARTATE, DEXTROAMPHETAMINE SULFATE AND AMPHETAMINE SULFATE 3.75; 3.75; 3.75; 3.75 MG/1; MG/1; MG/1; MG/1
15 TABLET ORAL DAILY
Qty: 30 TABLET | Refills: 0 | Status: SHIPPED | OUTPATIENT
Start: 2024-08-14 | End: 2024-07-25

## 2024-07-15 RX ORDER — DEXTROAMPHETAMINE SACCHARATE, AMPHETAMINE ASPARTATE, DEXTROAMPHETAMINE SULFATE AND AMPHETAMINE SULFATE 3.75; 3.75; 3.75; 3.75 MG/1; MG/1; MG/1; MG/1
15 TABLET ORAL DAILY
Qty: 30 TABLET | Refills: 0 | Status: SHIPPED | OUTPATIENT
Start: 2024-09-13 | End: 2024-07-25

## 2024-07-15 RX ORDER — DEXTROAMPHETAMINE SACCHARATE, AMPHETAMINE ASPARTATE MONOHYDRATE, DEXTROAMPHETAMINE SULFATE AND AMPHETAMINE SULFATE 6.25; 6.25; 6.25; 6.25 MG/1; MG/1; MG/1; MG/1
25 CAPSULE, EXTENDED RELEASE ORAL DAILY
Qty: 30 CAPSULE | Refills: 0 | Status: SHIPPED | OUTPATIENT
Start: 2024-09-21 | End: 2024-07-25

## 2024-07-15 RX ORDER — DEXTROAMPHETAMINE SACCHARATE, AMPHETAMINE ASPARTATE MONOHYDRATE, DEXTROAMPHETAMINE SULFATE AND AMPHETAMINE SULFATE 6.25; 6.25; 6.25; 6.25 MG/1; MG/1; MG/1; MG/1
25 CAPSULE, EXTENDED RELEASE ORAL DAILY
Qty: 30 CAPSULE | Refills: 0 | Status: SHIPPED | OUTPATIENT
Start: 2024-08-22 | End: 2024-09-21

## 2024-07-15 ASSESSMENT — COLUMBIA-SUICIDE SEVERITY RATING SCALE - C-SSRS
1. IN THE PAST MONTH, HAVE YOU WISHED YOU WERE DEAD OR WISHED YOU COULD GO TO SLEEP AND NOT WAKE UP?: YES
REASONS FOR IDEATION LIFETIME: COMPLETELY TO END OR STOP THE PAIN (YOU COULDN'T GO ON LIVING WITH THE PAIN OR HOW YOU WERE FEELING)
5. HAVE YOU STARTED TO WORK OUT OR WORKED OUT THE DETAILS OF HOW TO KILL YOURSELF? DO YOU INTEND TO CARRY OUT THIS PLAN?: NO
3. HAVE YOU BEEN THINKING ABOUT HOW YOU MIGHT KILL YOURSELF?: YES
ATTEMPT PAST THREE MONTHS: NO
TOTAL  NUMBER OF ABORTED OR SELF INTERRUPTED ATTEMPTS LIFETIME: NO
1. HAVE YOU WISHED YOU WERE DEAD OR WISHED YOU COULD GO TO SLEEP AND NOT WAKE UP?: YES
2. HAVE YOU ACTUALLY HAD ANY THOUGHTS OF KILLING YOURSELF?: YES
ATTEMPT LIFETIME: YES
5. HAVE YOU STARTED TO WORK OUT OR WORKED OUT THE DETAILS OF HOW TO KILL YOURSELF? DO YOU INTEND TO CARRY OUT THIS PLAN?: YES
4. HAVE YOU HAD THESE THOUGHTS AND HAD SOME INTENTION OF ACTING ON THEM?: NO
REASONS FOR IDEATION PAST MONTH: COMPLETELY TO END OR STOP THE PAIN (YOU COULDN'T GO ON LIVING WITH THE PAIN OR HOW YOU WERE FEELING)
4. HAVE YOU HAD THESE THOUGHTS AND HAD SOME INTENTION OF ACTING ON THEM?: NO
2. HAVE YOU ACTUALLY HAD ANY THOUGHTS OF KILLING YOURSELF?: YES
TOTAL  NUMBER OF INTERRUPTED ATTEMPTS LIFETIME: NO
6. HAVE YOU EVER DONE ANYTHING, STARTED TO DO ANYTHING, OR PREPARED TO DO ANYTHING TO END YOUR LIFE?: NO

## 2024-07-15 ASSESSMENT — PATIENT HEALTH QUESTIONNAIRE - PHQ9
SUM OF ALL RESPONSES TO PHQ QUESTIONS 1-9: 8
SUM OF ALL RESPONSES TO PHQ QUESTIONS 1-9: 8
10. IF YOU CHECKED OFF ANY PROBLEMS, HOW DIFFICULT HAVE THESE PROBLEMS MADE IT FOR YOU TO DO YOUR WORK, TAKE CARE OF THINGS AT HOME, OR GET ALONG WITH OTHER PEOPLE: SOMEWHAT DIFFICULT

## 2024-07-15 NOTE — PATIENT INSTRUCTIONS
Treatment Plan:  Continue Pristiq up  to 75 mg daily for mood, anxiety.  Okay to alternate 50 mg with 75 mg every couple days or every other day if 75 mg daily not tolerated.  Continue methylfolate 7.5 mg daily as supplementation.  Continue Adderall XR 25 mg daily for mood augmentation  Continue Adderall IR 15 mg twice daily as needed for mood augmentation and daytime fatigue/hypersomnia  Continue hydroxyzine 25-50 mg up to three times a day as needed for anxiety/sleep.   Continue ketamine therapy as planned/needed.   Continue to work with your specialist from [facility] as indicated.    Continue with new somatic psychotherapist as planned.  Continue all other cares per primary care provider.   Continue all other medications as reviewed per electronic medical record today.   Safety plan reviewed. To the Emergency Department as needed or call after hours crisis line at [phone] or [phone]. [place] Crisis Text Line. Text [place] to 643696 or Suicide LifeLine Chat: suicidepreventionlifeline.org/chat  Schedule an appointment with me in 3 months, or sooner as needed. Call [facility] at [phone] to schedule.  Follow up with primary care provider as planned or for acute medical concerns.  Call the psychiatric nurse line with medication questions or concerns at [phone].  PingMehart may be used to communicate with your provider, but this is not intended to be used for emergencies.    Risks of benzodiazepine (Ativan, Xanax, Klonopin, Valium, etc) use including, but not limited to, sedation, tolerance, risk for addiction/dependence. Do not drink alcohol while taking benzodiazepines due to risk of trouble breathing and potential death. Do not drive or operate heavy machinery until it is known how the drug affects you. Discuss with physician or pharmacist before ever taking a benzodiazepine with a narcotic/opioid pain medication.     Have previously discussed risks of stimulant medication  "including, but not limited to, decreased appetite, risk of tics (and that they may be lasting), trouble sleeping, cardiac risks such as increased heart rate and blood pressure, and rare risk of sudden cardiac death.  Also risk of addiction/tolerance/dependence.    Patient Education   Collaborative Care Psychiatry Service  What to Expect  Here's what to expect from your Collaborative Care Psychiatry Service (CCPS).   About CCPS  CCPS means 2 people work together to help you get better. You'll meet with a behavioral health clinician and a psychiatric doctor. A behavioral health clinician helps people with mental health problems by talking with them. A psychiatric doctor helps people by giving them medicine.  How it works  At every visit, you'll see the behavioral health clinician (BHC) first. They'll talk with you about how you're doing and teach you how to feel better.   Then you'll see the psychiatric doctor. This doctor can help you deal with troubling thoughts and feelings by giving you medicine. They'll make sure you know the plan for your care.   CCPS usually takes 3 to 6 visits. If you need more visits, we may have you start seeing a different psychiatric doctor for ongoing care.  If you have any questions or concerns, we'll be glad to talk with you.  About visits  Be open  At your visits, please talk openly about your problems. It may feel hard, but it's the best way for us to help you.  Cancelling visits  If you can't come to your visit, please call us right away at [phone]. If you don't cancel at least 24 hours (1 full day) before your visit, that's \"late cancellation.\"  Being late to visits  Being very late is the same as not showing up. You will be a \"no show\" if:  Your appointment starts with a BHC, and you're more than 15 minutes late for a 30-minute (half hour) visit. This will also cancel your appointment with the psychiatric doctor.  Your appointment is with a psychiatric doctor only, and " you're more than 15 minutes late for a 30-minute (half hour) visit.  Your appointment is with a psychiatric doctor only, and you're more than 30 minutes late for a 60-minute (full hour) visit.  If you cancel late or don't show up 2 times within 6 months, we may end your care.   Getting help between visits  If you need help between visits, you can call us Monday to Friday from 8 a.m. to 4:30 p.m. at [phone].  Emergency care  Call 911 or go to the nearest emergency department if your life or someone else's life is in danger.  Call 678 anytime to reach the national Suicide and Crisis hotline.  Medicine refills  To refill your medicine, call your pharmacy. You can also call [facility]'s Behavioral Access at [phone], Monday to Friday, 8 a.m. to 4:30 p.m. It can take 1 to 3 business days to get a refill.   Forms, letters, and tests  You may have papers to fill out, like FMLA, short-term disability, and workability. We can help you with these forms at your visits, but you must have an appointment. You may need more than 1 visit for this, to be in an intensive therapy program, or both.  Before we can give you medicine for ADHD, we may refer you to get tested for it or confirm it another way.  We may not be able to give you an emotional support animal letter.  We don't do mental health checks ordered by the court.   We don't do mental health testing, but we can refer you to get tested.   Thank you for choosing us for your care.  For informational purposes only. Not to replace the advice of your health care provider. Copyright   2022 [facility]. All rights reserved. uTaP 174172 - 12/22.

## 2024-07-15 NOTE — PROGRESS NOTES
"Virtual Visit Details    Type of service:  Video Visit   Video Start Time: {video visit start/end time for provider to select:179261}  Video End Time:{video visit start/end time for provider to select:685596}    Originating Location (pt. Location): {video visit patient location:015628::\"Home\"}  {PROVIDER LOCATION On-site should be selected for visits conducted from your clinic location or adjoining [facility] hospital, academic office, or other nearby [facility] building. Off-site should be selected for all other provider locations, including home:275994}  Distant Location (provider location):  {virtual location provider:649949}  Platform used for Video Visit: {Virtual Visit Platforms:980508::\"ProcureSafe\"}  "

## 2024-07-15 NOTE — PROGRESS NOTES
"Telemedicine Visit: The patient's condition can be safely assessed and treated via synchronous audio and visual telemedicine encounter.      Reason for Telemedicine Visit: Patient has requested telehealth visit    Originating Site (Patient Location): Patient's home    Distant Location (provider location):  On-Site    Consent:  The patient/guardian has verbally consented to: the potential risks and benefits of telemedicine (video visit) versus in person care; bill my insurance or make self-payment for services provided; and responsibility for payment of non-covered services.     Mode of Communication:  Video Conference via [employer]    As the provider I attest to compliance with applicable laws and regulations related to telemedicine.        Outpatient Psychiatric Progress Note    Name: Janelle White   : 1960                    Primary Care Provider: Emili Ballard MD   Therapist: Lorena Corbin @ [facility]     PHQ-9 scores:      2024     8:00 AM 2024    12:01 PM 7/15/2024     6:34 AM   PHQ-9 SCORE   PHQ-9 Total Score MyChart 11 (Moderate depression) 6 (Mild depression) 8 (Mild depression)   PHQ-9 Total Score 11 6 8       STACIE-7 scores:      2024    10:01 AM 2024     5:56 PM 2024    10:02 AM   STACIE-7 SCORE   Total Score 11 (moderate anxiety) 8 (mild anxiety) 8 (mild anxiety)   Total Score 11    11 8 8    8       Patient Identification:  Patient is a 63 year old,   White Choose not to answer female  who presents for return visit with me. Patient attended the phone/video session alone. Patient prefers to be called: \"Janelle\".    Interim History:  I last saw Janelle White for outpatient psychiatry return visit on 4/15/2024. During that appointment, we:  Continue Pristiq 50 mg daily for mood, anxiety.  In past have discussed consideration for increasing to 75 mg daily.   Continue methylfolate 7.5 mg daily as supplementation.  Continue Adderall XR 25 mg " daily for mood augmentation  Continue Adderall IR 15 mg twice daily as needed for mood augmentation and daytime fatigue/hypersomnia  Continue hydroxyzine 25-50 mg up to three times a day as needed for anxiety/sleep.   Continue ketamine injection therapy as planned.   Continue to work with your specialist from [facility] as indicated.    Recommend DBT therapy - resource given today via [facility]. for DBT for professionals program out of [facility].  Recommend individual psychotherapy at a minimum.      7/15: Patient overall doing okay.  Has started divorce paperwork.  Patient with increased psychosocial stressors related to divorce.  Taking medications as prescribed.  Also, did run out of Adderall 15 mg and this provider missed a note to be added to the med list and so for a handful of days patient has taken 2 of her extended release Adderall's.  No negative side effects.  Would prefer to get immediate release 15 mg tablets refilled.  Feeling well supported by friends and children.  Ongoing improvements with suicidal ideation.  No suicidality today.  No problematic drug or alcohol use.    Per [facility], [facility], during today's team-based visit:  Current Stressors / Issues:  MH update:   Depression.  Not as labile.  Stresses:  Chronic pain, started divorce paperwork   Appetite: n/a  Sleep: CLARE/doesn't often use oral appliance   Outpatient Provider updates: Ketamine [facility], Therapy Lorena Corbin, [facility]; divorce support/group  Previous THRIVE, TMS, ECT, PHP/IOP  Neuropsychological Consultation (in chart, 8/11/22)   SI/SIB/HI: Reduced overall to a couple times a week/fleeting.  Baseline method/planning.  Denies any intent. Updated safety plan  Hx of attempts and SIB as teen.  Hx of baseline planning and method seeking without intent.  AYLA:  Attends AA group for support.  SOBER.  Stopped THC.  Side effects/compliance: n/a  Interventions:  [facility] engaged in completing new DA with  psychoeducation and tx planning.  Most important:  Did not get refills of 15mg of adderall therefore has been taking slightly more since didn't have the script    Past medication trials include but are not limited to:   Effexor-very flat  Celexa, zoloft, was on wellbutrin a couple years at one point  wellbutrin XL + celexa; 2005ish  tca-a ton of weight gain  depakote maybe for a short time  Abilify/lithium ?  ECT as teen - made me dull  Cytomel-not effective at all, used 50 mcg    Psychiatric ROS:  Janelle White reports mood has been: See HPI above  Anxiety has been: See HPI above  Sleep has been: struggles at times, especially due to pain  Deepa sxs: Denies  Psychosis sxs: None  ADHD/ADD sxs: None  PTSD sxs: None  PHQ9 and GAD7 scores were reviewed today if completed.   Medication side effects: Denies anything major related to current psychiatric medications  Current stressors include: Symptoms and See HPI above  Coping mechanisms and supports include: Family, Hobbies and Friends, therapy    Current medications include:   Current Outpatient Medications   Medication Sig Dispense Refill    albuterol (PROAIR HFA/PROVENTIL HFA/VENTOLIN HFA) 108 (90 Base) MCG/ACT inhaler Inhale 2 puffs into the lungs every 6 hours as needed for shortness of breath or wheezing 8.5 g 11    amphetamine-dextroamphetamine (ADDERALL XR) 25 MG 24 hr capsule Take 1 capsule (25 mg) by mouth daily 30 capsule 0    Cobalamin Combinations (VITAMIN B12-FOLIC ACID) 500-400 MCG TABS Take 1 tablet by mouth      desvenlafaxine (PRISTIQ) 50 MG 24 hr tablet Take one tab (50 mg) by mouth daily WITH 25 mg tab for total daily dose 75 mg. 90 tablet 1    desvenlafaxine succinate (PRISTIQ) 25 MG 24 hr tablet Take one tab (25 mg) by mouth daily WITH 50 mg tab for total daily dose 75 mg. 90 tablet 1    Estradiol (DIVIGEL) 1 MG/GM GEL Place 1 packet onto the skin daily 30 g 11    hydrOXYzine (VISTARIL) 25 MG capsule Take 1 capsule (25 mg) by mouth 3 times  daily as needed for itching or anxiety 90 capsule 3    Levomefolate Glucosamine (METHYLFOLATE PO) Take 7.5 mg by mouth daily      lidocaine (LIDODERM) 5 % patch Place 3 patches onto the skin daily Apply up to 3 patches to skin. Wear for 12 hours and remove for 12 hrs.  Refill when patient requests. 120 patch 3    medical cannabis (Patient's own supply.  Not a prescription) Medical Cannabis - [place]anger[place] 4-6 ml by mouth daily. [facility]      methocarbamol (ROBAXIN) 500 MG tablet Take 1 tablet (500 mg) by mouth 4 times daily as needed for muscle spasms 120 tablet 1    methocarbamol (ROBAXIN) 750 MG tablet Take 1 tablet (750 mg) by mouth 4 times daily as needed for muscle spasms 120 tablet 4    methylPREDNISolone (MEDROL DOSEPAK) 4 MG tablet therapy pack Follow Package Directions 21 tablet 0    naloxone (NARCAN) 4 MG/0.1ML nasal spray Spray 1 spray (4 mg) into one nostril alternating nostrils as needed for opioid reversal every 2-3 minutes until assistance arrives 0.2 mL 1    NONFORMULARY Take 2 Scoops by mouth daily Protein and Vitamin shake mix - Nutritional supplement      ondansetron (ZOFRAN ODT) 8 MG ODT tab Take 1 tablet (8 mg) by mouth every 8 hours as needed for nausea 30 tablet 4    Prasterone 6.5 MG INST Place 0.5 suppositories vaginally three times a week 28 each 11    rOPINIRole (REQUIP) 0.25 MG tablet TAKE 1 TO 2 TABLETS(0.25 TO 0.5 MG) BY MOUTH AT BEDTIME 180 tablet 3    senna-docusate (SENOKOT-S/PERICOLACE) 8.6-50 MG tablet Take 6-9 tablets by mouth every evening      vitamin D3 (CHOLECALCIFEROL) 125 MCG (5000 UT) tablet Take 5,000 Units by mouth daily       Current Facility-Administered Medications   Medication Dose Route Frequency Provider Last Rate Last Admin    NONFORMULARY 1.1 mg/kg (Ideal)  1.1 mg/kg (Ideal) Intramuscular Q21 Days Zafar Yanes MD   60 mg at 07/02/24 0912     The [place] Prescription Monitoring Program has been reviewed and there are no concerns about diversionary activity  for controlled substances at this time.   07/02/2024 07/02/2024 1 Dextroamp-Amphet Er 25 Mg Cap 30.00 30 Al Bau [id number] [facility] ([id number]) 0/0  [place]are [place]   06/05/2024 03/04/2024 1 Dextroamp-Amphetamin 15 Mg Tab 60.00 30 Al Bau [id number] [facility] ([id number]) 0/0  [place]   06/04/2024 06/04/2024 1 Dextroamp-Amphet Er 25 Mg Cap 30.00 30 Al Bau [id number] [facility] ([id number]) 0/0  [place]   05/07/2024 02/19/2024 1 Dextroamp-Amphet Er 25 Mg Cap 30.00 30 Al Bau [id number] [facility] ([id number]) 0/0  [place]   05/06/2024 03/04/2024 1 Dextroamp-Amphetamin 15 Mg Tab 60.00 30 Al Bau [id number] [facility] ([id number]) 0/0  [place]are [place]       Past Medical/Surgical History:  Past Medical History:   Diagnosis Date    Anxiety     Cervicalgia 2007    C5-6 disc protrusion    COPD (chronic obstructive pulmonary disease) (H) 2/7/2022    Depressive disorder     ESBL (extended spectrum beta-lactamase) producing bacteria infection     History of blood transfusion 2007    Cervical fusion    Leukemia (H)     CLA large beta    Melanoma (H) 1998    Migraine     Other chronic pain     Rotator cuff tear     s/p injections    Sacroiliac inflammation (H24)     Shift work sleep disorder 12/16/2013    Urinary calculi     Vitamin B12 deficiency anemia 2006    started injections      has a past medical history of Anxiety, Cervicalgia (2007), COPD (chronic obstructive pulmonary disease) (H) (2/7/2022), Depressive disorder, ESBL (extended spectrum beta-lactamase) producing bacteria infection, History of blood transfusion (2007), Leukemia (H), Melanoma (H) (1998), Migraine, Other chronic pain, Rotator cuff tear, Sacroiliac inflammation (H24), Shift work sleep disorder (12/16/2013), Urinary calculi, and Vitamin B12 deficiency anemia (2006).    She has no past medical history of Cerebral infarction (H), Congestive heart failure (H), Coronary artery disease, Diabetes (H), Heart disease, Hypertension, Thyroid disease, or Uncomplicated asthma.    Social History:  Reviewed. No changes to social history  except as noted above in HPI.    Vital Signs:   None. This is phone/video visit.     Labs:  Most recent laboratory results reviewed and the pertinent results include:   Lab Results   Component Value Date    WBC 5.6 06/30/2022    WBC 3.2 05/24/2021     Lab Results   Component Value Date    RBC 4.61 06/30/2022    RBC 3.74 05/24/2021     Lab Results   Component Value Date    HGB 14.2 06/30/2022    HGB 11.0 05/24/2021     Lab Results   Component Value Date    HCT 43.6 06/30/2022    HCT 33.6 05/24/2021     No components found for: MCT  Lab Results   Component Value Date    MCV 95 06/30/2022    MCV 90 05/24/2021     Lab Results   Component Value Date    MCH 30.8 06/30/2022    MCH 29.4 05/24/2021     Lab Results   Component Value Date    MCHC 32.6 06/30/2022    MCHC 32.7 05/24/2021     Lab Results   Component Value Date    RDW 11.9 06/30/2022    RDW 13.4 05/24/2021     Lab Results   Component Value Date     07/04/2022     05/24/2021     Last Comprehensive Metabolic Panel:  Sodium   Date Value Ref Range Status   06/30/2022 136 133 - 144 mmol/L Final   05/24/2021 141 133 - 144 mmol/L Final     Potassium   Date Value Ref Range Status   06/30/2022 3.4 3.4 - 5.3 mmol/L Final   05/24/2021 3.9 3.4 - 5.3 mmol/L Final     Chloride   Date Value Ref Range Status   06/30/2022 105 94 - 109 mmol/L Final   05/24/2021 108 94 - 109 mmol/L Final     Carbon Dioxide   Date Value Ref Range Status   05/24/2021 25 20 - 32 mmol/L Final     Carbon Dioxide (CO2)   Date Value Ref Range Status   06/30/2022 25 20 - 32 mmol/L Final     Anion Gap   Date Value Ref Range Status   06/30/2022 6 3 - 14 mmol/L Final   05/24/2021 8 3 - 14 mmol/L Final     Glucose   Date Value Ref Range Status   06/30/2022 91 70 - 99 mg/dL Final   05/24/2021 87 70 - 99 mg/dL Final     Urea Nitrogen   Date Value Ref Range Status   06/30/2022 20 7 - 30 mg/dL Final   05/24/2021 15 7 - 30 mg/dL Final     Creatinine   Date Value Ref Range Status   09/27/2023 0.71 0.51  - 0.95 mg/dL Final   05/24/2021 0.64 0.52 - 1.04 mg/dL Final     GFR Estimate   Date Value Ref Range Status   09/27/2023 >90 >60 mL/min/1.73m2 Final   05/24/2021 >90 >60 mL/min/[1.73_m2] Final     Comment:     Non  GFR Calc  Starting 12/18/2018, serum creatinine based estimated GFR (eGFR) will be   calculated using the Chronic Kidney Disease Epidemiology Collaboration   (CKD-EPI) equation.       Calcium   Date Value Ref Range Status   06/30/2022 8.7 8.5 - 10.1 mg/dL Final   05/24/2021 8.4 (L) 8.5 - 10.1 mg/dL Final     Bilirubin Total   Date Value Ref Range Status   09/27/2023 0.3 <=1.2 mg/dL Final   03/16/2021 0.4 0.2 - 1.3 mg/dL Final     Alkaline Phosphatase   Date Value Ref Range Status   09/27/2023 76 35 - 104 U/L Final   03/16/2021 87 40 - 150 U/L Final     ALT   Date Value Ref Range Status   04/26/2022 21 0 - 50 U/L Final   03/16/2021 26 0 - 50 U/L Final     AST   Date Value Ref Range Status   09/27/2023 26 0 - 45 U/L Final     Comment:     Reference intervals for this test were updated on 6/12/2023 to more accurately reflect our healthy population. There may be differences in the flagging of prior results with similar values performed with this method. Interpretation of those prior results can be made in the context of the updated reference intervals.   03/16/2021 44 0 - 45 U/L Final     Review of Systems:  10 systems (general, cardiovascular, respiratory, eyes, ENT, endocrine, GI, , M/S, neurological) were reviewed. Most pertinent finding(s) is/are: Severe chronic pain. The remaining systems are all unremarkable.    Mental Status Examination (limited as this is by phone/video):  Appearance: Awake, alert, appears stated age, no acute distress  Attitude:  cooperative, pleasant   Motor: No gross abnormalities observed via video, not formally tested.  Oriented to:  person, place, time, and situation  Attention Span and Concentration:  normal  Speech:  clear, coherent, regular rate, rhythm, and  volume  Language: intact  Mood: ok  Affect: Mood congruent  Associations:  no loose associations  Thought Process:  logical, linear and goal oriented  Thought Content: Chronic passive suicidal ideation-does endorse acute suicidality today, no current plan or intent to harm self today, no homicidal ideation, no evidence of psychotic thought, no auditory hallucinations present and no visual hallucinations present  Recent and Remote Memory:  Intact to interview. Not formally assessed. No amnesia.  Fund of Knowledge: appropriate  Insight:  good  Judgment:  intact, adequate for safety  Impulse Control:  intact    Suicide Risk Assessment:  Today Janelle White is with chronic/intermittent suicidal ideation-no acute suicidality endorsed today.There are notable risk factors for self-harm, including anxiety, comorbid medical condition of Chronic pain, suicidal ideation, purposelessness/no reason for living, hopelessness, withdrawing and mood change. However, risk is mitigated by commitment to family, absence of past attempts, ability to volunteer a safety plan, history of seeking help when needed, future oriented and denies suicidal intent today. Therefore, based on all available evidence including the factors cited above, Janelle White does not appear to be at imminent risk for self-harm, does not meet criteria for a 72-hr hold, and therefore remains appropriate for ongoing outpatient level of care.  A thorough assessment of risk factors related to suicide and self-harm have been reviewed and are noted above. Local community safety resources reviewed for patient to use if needed. There was no deceit detected, and the patient presented in a manner that was believable.     DSM5 Diagnosis:  Persistent depressive disorder  Treatment resistant depression  CYP2D6 poor metabolizer  CYP2B6 intermediate metabolizer  Homozygous for C677T polymorphism of MTHFR    Medical comorbidities include:   Patient Active Problem  List    Diagnosis Date Noted    Failed back surgical syndrome 12/01/2023     Priority: Medium    Lumbar radiculopathy 10/26/2023     Priority: Medium    Sacroiliitis (H24) 08/04/2023     Priority: Medium    History of falling 03/13/2023     Priority: Medium    Suicidal ideation 06/30/2022     Priority: Medium    Immunocompromised (H24) 06/30/2022     Priority: Medium    Infection due to 2019 novel coronavirus 06/30/2022     Priority: Medium    Severe episode of recurrent major depressive disorder, without psychotic features (H) 04/17/2022     Priority: Medium    COPD (chronic obstructive pulmonary disease) (H) 02/07/2022     Priority: Medium    Tension type headache 11/04/2021     Priority: Medium    Rotator cuff injury 11/04/2021     Priority: Medium    Spinal stenosis of lumbar region with radiculopathy 09/02/2021     Priority: Medium    COVID-19 08/29/2021     Priority: Medium    Polyarthralgia 08/11/2021     Priority: Medium    Cellulitis, unspecified cellulitis site 08/11/2021     Priority: Medium    Sepsis, due to unspecified organism, unspecified whether acute organ dysfunction present (H) 08/11/2021     Priority: Medium    Cellulitis 08/11/2021     Priority: Medium    STACIE (generalized anxiety disorder) 07/27/2021     Priority: Medium    MTHFR gene mutation 04/12/2021     Priority: Medium    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     Priority: Medium    CYP2D6 poor metabolizer (H) 04/12/2021     Priority: Medium    Status post blepharoplasty 11/18/2020     Priority: Medium    Regular astigmatism, bilateral 11/18/2020     Priority: Medium    Prediabetes 09/19/2019     Priority: Medium    Hyperlipidemia LDL goal <130 09/19/2019     Priority: Medium    CLARE (obstructive sleep apnea) 02/13/2019     Priority: Medium    Controlled substance agreement signed 08/14/2018     Priority: Medium    Chronic pain syndrome 12/21/2017     Priority: Medium    CLL (chronic lymphocytic leukemia) (H) 12/21/2017      Priority: Medium    Hypotension 09/14/2017     Priority: Medium    History of laser assisted in situ keratomileusis 10/14/2014     Priority: Medium    Pain medication agreement 04/23/2014     Priority: Medium     Formatting of this note might be different from the original.  Patient takes morphine 15 mg ER BID for chronic neck and back pain.  She is also on cymbalta, ibuprofen, flexaril prn      Shift work sleep disorder 12/16/2013     Priority: Medium    Vitamin D deficiency 11/08/2012     Priority: Medium    Moderate recurrent major depression (H) 01/06/2011     Priority: Medium    DDD (degenerative disc disease), cervical 10/07/2010     Priority: Medium    CARDIOVASCULAR SCREENING; LDL GOAL LESS THAN 160 02/10/2010     Priority: Medium    Chronic Low Back Pain 10/01/2009     Priority: Medium     S/p AP L3-S1 fusion 12/2010 - referred to [facility].   Orthopedics writing scripts for narcotics post-op.      Migraine      Priority: Medium     Problem list name updated by automated process. Provider to review      B-complex deficiency 10/10/2006     Priority: Medium     Problem list name updated by automated process. Provider to review      PERSONAL HX OF  MELANOMA 12/04/2003     Priority: Low       Psychosocial & Contextual Factors: see HPI above    Assessment:  6/15/2021:  Janelle TORRES Christopher reports overall some significant worsening of mood symptoms.  Increased anxiety with her depression.  Poor motivation and poor energy.  Symptoms severe enough to prevent her from being able to utilize typical coping skills and strategies.  No current motivation or energy to participate in PHP/day treatment program.  Continues to take medication as scheduled.  Recent surgery and general anesthesia could be contributing.  Discussed discontinuing stimulant medication as an augmentation strategy.  She is agreeable to moving forward with T3 as an augmentation for treatment resistant depression.  Discussed risks and benefits at  length.  Will get baseline thyroid labs prior to starting T3.  Hoping she will experience increased energy and motivation and that her mood will lift.  Also discussed setting up an appointment with her interventional psychiatry clinic to discuss other potential options such as ketamine therapy, ECT, TMS.  Does have history of ECT for depression when she was quite young.  I am hopeful to stay ahead of her symptoms before things get too severe.  Has chronic suicidal ideation and is at high risk for suicide.  Continues to deny any plan or intent.  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  See below for risk/benefit conversation regarding T3 had with the patient:    GeneSight testing Info:  GeneSight testing revealed she is a poor 2D6 metabolizer and an intermediate 2B6 metabolizer.  This would explain her multiple failed medication trials due to the negative side effects.  She also has a genotype that would suggest a phenotype sensitivity to serotonin. She also was found to have significantly reduced folic acid conversion.      Starting T3 as augment to antidepressant therapy for treatment resistant depression:  Start T3 at 25 mcg per day for one to two weeks, and if there is little or no improvement, increase the dose to 50 mcg per day; this is consistent with practice guidelines from the American Psychiatric Association and [place] Network for Mood and Anxiety Treatments.     Adverse effects consistent with hyperthyroidism may occur, including tremor, palpitations, heat intolerance, sweating, anxiety, increased frequency of bowel movements, shortness of breath, and exacerbation of cardiac arrhythmia. In addition, hyperthyroidism that emerges during long-term treatment may lead to bone demineralization, osteoporosis, and an increased risk of fracture    Following a normal baseline TSH concentration, no other laboratory monitoring during a four to six week trial of adjunctive T3 is  necessary. However, if T3 is continued longer, a serum TSH concentration should be checked after the first one to three months of treatment and then every six months.    Today, 7/27/21:   Patient overall with little change in her symptoms.  Did not do as well on Cytomel and had limited to no improvement at all after weeks on 50 mcg.  Labs were unremarkable prior to starting Cytomel.  Opted to go back to stimulant augmentation since patient does find it quite helpful.  She has been taking 15 mg of immediate release most afternoons.  Due to good tolerability and some efficacy, we will bump up her immediate release dose slightly to 20 mg daily as needed in addition to her 20 mg extended release dose. I have no concerns about misuse or diversion at this time.  Tolerating well with no negative side effects.  Last blood pressure normal 7/2.  Patient has noted some efficacy from Pristiq more than other antidepressant trials and so we will continue to titrate further to 100 mg daily.  She is tolerating well.  Continues to use lorazepam for anxiety, pain medicine adjunct, and for sleep as prescribed by primary care provider.  Has been utilizing roughly 100 tablets of 0.5 mg every 1.5 months.  Patient with ongoing chronic intermittent passive suicidal ideation with no plan or intent.  Denies safety concerns today.  No problematic drug or alcohol use.  I am hopeful the interventional psychiatry clinic might be able to initiate ketamine therapy or another therapy they would recommend as potentially being more helpful than patient's multiple medication/augmentation trials.    10/6/2021:  Patient with some improved depression symptoms and much more hopeful.  Seeing specialist at [place]-feels very hurt and listened to.  Also is hopeful there will be some helpful treatments.  Visit to [place] was also very good and instilled a lot of hope in her abilities.  She was encouraged to continue to push herself to do the things she enjoys  doing.  No medication changes today.  She will follow-up with getting TMS rescheduled, possibly when things slow down after the holidays.  Does not need to be seen until after the holidays.  No acute safety concerns today.  No problematic drug or alcohol use.    1/14/2022:  Overall patient feeling a little more down lately.  Tolerating TMS well.  Encouraged to continue TMS.  No medication changes today since undergoing TMS treatment.  Talked about life stages today generativity versus stagnation and also integrity versus despair.  Talked about consideration for acceptance and commitment therapy.  She is encouraged to continue to be active.  No acute suicidal ideation today.  No acute safety concerns today.  No problematic drug or alcohol use.    4/13/2022:   Patient continues to have symptoms that wax and wane.  Feels like she tolerates Pristiq 50 mg better than 100 and will continue on this dose.  Last week was particularly difficult.  Pretty intense suicidal ideation but she was able to manage these thoughts and remain safe.  She does report she would come to the hospital if necessary.  We discussed the empath unit today and other resources if she felt she could not keep herself safe.  She continues to work on tapering her narcotic pain medication regimen.  She is working with addiction medicine and also pain management.  She is starting Pilates therapy.  Pool therapy will begin in July.  Discussed possibility of vestibular rehabilitation.  Individual therapy will also start soon.  She was strongly encouraged to continue to pursue ketamine therapy.  No acute suicidality today.  No problematic drug or alcohol use.    6/23/2022:  Overall reports doing relatively okay.  Some pretty down days still, but ketamine therapy going well.  Feels like continuing to move in the right direction.  Suicidal ideation improving.  Off all narcotic pain medication.  Feels good about her progress.  Had some really down days after off  pain medication but improved since those few dark days.  Hopeful about where ketamine therapy may lead.  Discussed some of her restlessness at bedtime and will start pramipexole.  Also some evidence to suggest pramipexole could be helpful for treatment resistant depression symptoms.  Discussed risks and benefits of therapy, including watching for any impulsive behaviors.  Continues to have suicidal thoughts but no acute suicidality today.  Her suicidality overall is improving from her baseline suicidality.  She continues to consider the idea of a partial hospital program.  We will send her information for [facility] program.  Also encouraged her to discuss possibility of a pain program through [facility] with Dr. Medley.  No acute safety concerns today.  No problematic drug or alcohol use.    7/11/2022:  Patient with some recent more intensive struggles.  Depression much more severe recently.  Led to hospitalization.  Patient medically hospitalized since was positive for COVID and has history of CLL and [place] protocol requires isolation.  Patient seen by psychiatry consult service.  No medication changes made.  Patient continues with interventional psychiatry clinic.  They will be increasing ketamine dose and treatments will be every few weeks.  We discussed patient's symptom history a little more today and possible bipolar diagnosis came into question.  Patient does recognize possible hypomanic episodes in the past.  Patient is currently monitoring symptoms closely and pramipexole.  She is feeling better since starting pramipexole but is watching shopping behaviors closely.  Suicidal ideation is improving since hospitalization.  Restless legs improved at night on pramipexole.  Discussed we could consider adding lower dose lithium as an augment to her Pristiq and to help stabilize moods a little bit more and to further help with some of her chronic suicidal ideation.  We also discussed DBT for chronic suicidal  ideation and ongoing mood instability.  Continues off of pain medication.  No acute suicidality or safety concerns today.  Patient will follow up in a few weeks.  No medication changes today since we will wait to see how ketamine dose change goes.  No drug or alcohol use concerns.  Patient noted some ongoing cognitive/memory concerns and requested testing.  Neuropsychological referral placed.    8/8/2022:  Patient with ongoing severe depression, resistant to treatment.  She is agreeable to increasing her stimulant medication.  This could hopefully further improve mood slightly.  May also help with some additional energy and also help with some of her ongoing cognitive concerns.  She has neuropsychological testing coming up soon.  Discussed possibly considering individual DBT therapy with a DBT certified therapist given her ongoing chronic suicidal ideation and inability to participate in group therapy currently.  We will discuss this possibility with her current therapist.  Also discussed possibility of increasing Pristiq by 25 mg only 2 or 3 days a week to decrease risk of negative side effects (25 mg on Tuesdays/Thursdays for Monday/Wednesday/Fridays).  Patient also encouraged to continue ketamine treatment.  No acute suicidality today.  Patient denies any intent or plan to act on any of her suicidal thoughts today.  No problematic drug or alcohol use.    9/7/2022:  Patient doing relatively okay today.  Pain continues to feel a little bit worse.  Emotionally though, despite worsening pain symptoms, patient feels like she is doing relatively okay.  Discussed various psychotherapy approaches that could be taken to address her symptoms.  We discussed that health psychology could be an optimal approach to help with her symptoms but that her suicidal ideation is often still quite intense and may be a distractor to her treatment with a health psychologist (discussed she may be referred often to the emergency room or to  other more intensive programs).  We discussed working with an individual DBT therapist as a possibility to continue addressing her ongoing chronic suicidal ideation (individual therapy would allow patient to move at her own pace since group therapy is much too intense at this time).  Patient was open to this idea.  [facility] today we will send patient some resources/options.  Depending on patient's financial situation, there is also a possibility patient could work with a health psychologist in conjunction with a DBT therapist.  Ketamine therapy has proven to be helpful, although I do wish the effects lasted a little longer than what they currently are for the patient.  I recommend patient continue her ketamine treatments.  No medication changes today.  She denies any acute suicidality today.  No plan or intent to harm herself noted today.  She denies being in a bad place today.  No problematic drug or alcohol use.     10/7/2022:  Patient overall relatively stable at this time.  Mood improving slightly with ketamine therapy.  Patient encouraged to continue with treatment.  Anxiety a little more manageable.  Continuing to struggle with severe chronic pain.  Tolerating current medications well with no negative side effects noted.  No changes today.  Patient will continue in individual psychotherapy.  No acute suicidality today.  Suicidal thoughts at baseline, maybe even a little improved.    1/9/2023:  Patient overall doing quite well.  Some days still worse than others and chronic pain continues to be a big factor influencing her mental health.  Ketamine has been very helpful.  Still some poor energy and fatigue.  Adderall has been very helpful.  We will increase the dose just slightly.  Extended release dose will increase from 20 mg to 25 mg to further address her symptoms.  We will continue with the 15 mg twice daily immediate release doses.  No other medication changes today.  Patient encouraged to stay the course.  No  acute safety concerns.  Suicidal thoughts continue to be passive in nature and have overall improved since starting ketamine.  No problematic drug or alcohol use.    3/20/2023:  Patient remains overall relatively stable.  Having some anxiety over feeling more dependent on cannabis for her pain.  Discussed continuing gratitude journal.  Patient has come quite a ways with relative stability.  No medication changes today.  Denies that cannabis use is causing any problems apart from feeling a little anxious about her use.  Patient even participating in more activities this spring compared to last year.  No acute safety concerns at this time.  No acute suicidality.    5/19/2023:  Patient overall continuing to manage okay.  Feels relatively stable.  Continues ketamine therapy.  No med changes today.  No acute safety concerns.  No acute suicidality today.  Is pleased that she is finding some roxy in pleasurable activities this spring.  Continues to seek purpose in life.  No problematic drug or alcohol use.  Patient will be seen back in 3 months.    8/25/2023:  Continues to manage relatively okay on current regimen.  No major questions or concerns today.  Interested in keeping things status quo/the same.  Continues to receive ketamine infusions.  Pain continues to be forefront.  We discussed referral to a provider who could provide an evaluation to discuss ketamine for pain and possibly mood to replace her infusions.  Referral sent and discussed with patient.  Patient is very agreeable and interested in what ever options might be available for her.  No acute safety concerns today.  No problematic drug or alcohol use.  No acute suicidality today.  Patient continues to find ways to stay distracted.  Has started with new therapist.    11/17/2023:   Patient overall with some worsening symptoms due to some significantly worsening chronic pain.  Patient does have some relief of her pain symptoms after ketamine treatments-for up to  a few days.  Patient reports referral to Dr. Rancho Jennings canceled by her current pain medicine provider since patient is already seeing this pain medicine provider within the same system.  Patient did report her pain medicine provider was going to reach out to Dr. Jennings to have a discussion about ketamine.  This provider will send staff message to both providers to see if we can get an update on the collaboration.  No medication changes made today.  Tolerating well overall.  No problematic drug or alcohol use.  Passive suicidal ideation at baseline.  No acute suicidality or plan or intent to harm self today.    2/19/2024:  Patient with ongoing pain struggles and relatively little to no changes in mood recently.  Patient will be meeting with neurosurgeon to discuss whether there are any appropriate surgical interventions for her pain.  Did not endorse any acute safety concerns today.  Did not nurse acute suicidality.  Continues ketamine therapy which she finds helpful.  No medication changes today.  Discussed DBT recommendation.  No problematic drug or alcohol use.    4/15/2024:   The patient overall doing relatively okay.  Some improvements along with some ongoing struggles.  Given some of these 2-week waves of worsening symptoms, patient is agreeable to a trial of increasing Pristiq.  Depending on how anxiety settles and how increased Pristiq is tolerated, could consider BuSpar or clonidine in the future for anxiety.  No acute safety concerns today.  No acute suicidality.  Continues ketamine therapy.  Recently started with a new somatic therapist.    7/15/2024:  Patient overall doing relatively okay.  Some increase psychosocial stressors.  Filing for divorce from .  Tolerating medication well.  No acute safety concerns.  No SI.  No medication changes today.  Patient will be seen back in 3 months.  Encouraged to continue psychotherapy.    Medication side effects and alternatives were reviewed. Health  promotion activities recommended and reviewed today. All questions addressed. Education and counseling completed regarding risks and benefits of medications and psychotherapy options. Recommend therapy for additional support.     Treatment Plan:  Continue Pristiq up  to 75 mg daily for mood, anxiety.  Okay to alternate 50 mg with 75 mg every couple days or every other day if 75 mg daily not tolerated.  Continue methylfolate 7.5 mg daily as supplementation.  Continue Adderall XR 25 mg daily for mood augmentation  Continue Adderall IR 15 mg twice daily as needed for mood augmentation and daytime fatigue/hypersomnia  Continue hydroxyzine 25-50 mg up to three times a day as needed for anxiety/sleep.   Continue ketamine therapy as planned/needed.   Continue to work with your specialist from [facility] as indicated.    Continue with new somatic psychotherapist as planned.  Continue all other cares per primary care provider.   Continue all other medications as reviewed per electronic medical record today.   Safety plan reviewed. To the Emergency Department as needed or call after hours crisis line at [phone] or [phone]. [place] Crisis Text Line. Text [place] to 963672 or Suicide LifeLine Chat: suicidepreventionlifeline.org/chat  Schedule an appointment with me in 3 months, or sooner as needed. Call [facility] at [phone] to schedule.  Follow up with primary care provider as planned or for acute medical concerns.  Call the psychiatric nurse line with medication questions or concerns at [phone].  [employer] may be used to communicate with your provider, but this is not intended to be used for emergencies.    Risks of benzodiazepine (Ativan, Xanax, Klonopin, Valium, etc) use including, but not limited to, sedation, tolerance, risk for addiction/dependence. Do not drink alcohol while taking benzodiazepines due to risk of trouble breathing and potential death. Do not drive or operate heavy  machinery until it is known how the drug affects you. Discuss with physician or pharmacist before ever taking a benzodiazepine with a narcotic/opioid pain medication.     Have previously discussed risks of stimulant medication including, but not limited to, decreased appetite, risk of tics (and that they may be lasting), trouble sleeping, cardiac risks such as increased heart rate and blood pressure, and rare risk of sudden cardiac death.  Also risk of addiction/tolerance/dependence.    Administrative Billing:   Phone Call/Video Duration: 24 Minutes  8:01a-8:25a    The longitudinal plan of care for the diagnosis(es)/condition(s) as documented were addressed during this visit. Due to the added complexity in care, I will continue to support Janelle in the subsequent management and with ongoing continuity of care.    Patient Status:  Patient is a continuous care patient and refills will continue to come from this provider until otherwise noted.    Signed:   Kimberly Perez DO  [facility]    Disclaimer: This note consists of symbols derived from keyboarding, dictation and/or voice recognition software. As a result, there may be errors in the script that have gone undetected. Please consider this when interpreting information found in this chart.

## 2024-07-15 NOTE — NURSING NOTE
Is the patient currently in the state of [place]? YES    Current patient location: Patient declined to provide     Visit mode:VIDEO    If the visit is dropped, the patient can be reconnected by: VIDEO VISIT: Text to cell phone:   Telephone Information:   Mobile [phone]       Will anyone else be joining the visit? No  (If patient encounters technical issues they should call [phone])    How would you like to obtain your AVS? MyChart    Are changes needed to the allergy or medication list? No    Rooming Documentation: Assigned questionnaire(s) completed .    Reason for visit: MATT Irby

## 2024-07-22 ASSESSMENT — PATIENT HEALTH QUESTIONNAIRE - PHQ9
SUM OF ALL RESPONSES TO PHQ QUESTIONS 1-9: 6
SUM OF ALL RESPONSES TO PHQ QUESTIONS 1-9: 6
10. IF YOU CHECKED OFF ANY PROBLEMS, HOW DIFFICULT HAVE THESE PROBLEMS MADE IT FOR YOU TO DO YOUR WORK, TAKE CARE OF THINGS AT HOME, OR GET ALONG WITH OTHER PEOPLE: SOMEWHAT DIFFICULT

## 2024-07-23 ENCOUNTER — ALLIED HEALTH/NURSE VISIT (OUTPATIENT)
Dept: PSYCHIATRY | Facility: CLINIC | Age: 64
End: 2024-07-23
Payer: COMMERCIAL

## 2024-07-23 VITALS — DIASTOLIC BLOOD PRESSURE: 71 MMHG | SYSTOLIC BLOOD PRESSURE: 112 MMHG | HEART RATE: 75 BPM

## 2024-07-23 DIAGNOSIS — F33.9 RECURRENT MAJOR DEPRESSION RESISTANT TO TREATMENT (H): Primary | ICD-10-CM

## 2024-07-23 DIAGNOSIS — F34.1 PERSISTENT DEPRESSIVE DISORDER: ICD-10-CM

## 2024-07-23 NOTE — PROGRESS NOTES
Janelle White comes into clinic today at the request of LINK Yanes MD Ordering Provider for Med Injection only Ketamine .    Procedure Prep:  Medication double check completed by: Fiona Simon RN  Prior to administration pt identified by name and : yes    Nursing Assessment:  Appearance: casual clothing   Mood: Okay   Affect: WNL  Eye contact: good      2024     8:12 PM   PHQ   PHQ-9 Total Score 6   Q9: Thoughts of better off dead/self-harm past 2 weeks Several days   F/U: Thoughts of suicide or self-harm Yes   F/U: Self harm-plan Yes   F/U: Self-harm action No   F/U: Safety concerns No     QIDDSR 16 weekly assessment: score was not completed this week. Assessment was scanned to EHR.     Procedure Performed:  VSS and mental status WNL. Patient was given ketamine. See MAR for details.     Post Procedure Assessment:  Patient tolerated the treatment with the following side effects: dissociation. Vital signs were monitored, see VS Flowsheet. Patient stated they felt ready to go home prior to discharge. AVS was offered to patient and patient declined. Patient was instructed not to drive for the remainder of the day and to notify clinic if any concerns arise.     Next appt: 3 weeks      This service provided today was under the supervising provider of the day DONAVAN Mireles MD, who was available if needed.        Adali Berg RN

## 2024-07-24 ENCOUNTER — MYC MEDICAL ADVICE (OUTPATIENT)
Dept: PSYCHIATRY | Facility: CLINIC | Age: 64
End: 2024-07-24
Payer: COMMERCIAL

## 2024-07-24 DIAGNOSIS — F34.1 PERSISTENT DEPRESSIVE DISORDER: ICD-10-CM

## 2024-07-24 DIAGNOSIS — F33.9 RECURRENT MAJOR DEPRESSION RESISTANT TO TREATMENT (H): ICD-10-CM

## 2024-07-25 RX ORDER — DEXTROAMPHETAMINE SACCHARATE, AMPHETAMINE ASPARTATE MONOHYDRATE, DEXTROAMPHETAMINE SULFATE AND AMPHETAMINE SULFATE 6.25; 6.25; 6.25; 6.25 MG/1; MG/1; MG/1; MG/1
25 CAPSULE, EXTENDED RELEASE ORAL DAILY
Qty: 30 CAPSULE | Refills: 0 | Status: SHIPPED | OUTPATIENT
Start: 2024-08-02 | End: 2024-09-30

## 2024-07-25 RX ORDER — DEXTROAMPHETAMINE SACCHARATE, AMPHETAMINE ASPARTATE, DEXTROAMPHETAMINE SULFATE AND AMPHETAMINE SULFATE 3.75; 3.75; 3.75; 3.75 MG/1; MG/1; MG/1; MG/1
15 TABLET ORAL 2 TIMES DAILY
Qty: 60 TABLET | Refills: 0 | Status: SHIPPED | OUTPATIENT
Start: 2024-09-01 | End: 2024-09-30

## 2024-07-25 RX ORDER — DEXTROAMPHETAMINE SACCHARATE, AMPHETAMINE ASPARTATE, DEXTROAMPHETAMINE SULFATE AND AMPHETAMINE SULFATE 3.75; 3.75; 3.75; 3.75 MG/1; MG/1; MG/1; MG/1
15 TABLET ORAL 2 TIMES DAILY
Qty: 60 TABLET | Refills: 0 | Status: SHIPPED | OUTPATIENT
Start: 2024-08-01

## 2024-07-25 RX ORDER — DEXTROAMPHETAMINE SACCHARATE, AMPHETAMINE ASPARTATE MONOHYDRATE, DEXTROAMPHETAMINE SULFATE AND AMPHETAMINE SULFATE 6.25; 6.25; 6.25; 6.25 MG/1; MG/1; MG/1; MG/1
25 CAPSULE, EXTENDED RELEASE ORAL DAILY
Qty: 30 CAPSULE | Refills: 0 | Status: SHIPPED | OUTPATIENT
Start: 2024-09-02 | End: 2024-09-30

## 2024-07-25 NOTE — TELEPHONE ENCOUNTER
[facility]      Adderall 15 mg dose #30 sold 7/19 for script written on 7/15/2024  Adderall ER 25 mg dose #30 sold on 7/2/2024 for script written on 7/2/2024      Order history -  Panel Detail for Adderall IMMEDIATE RELEASE 15 MG DAILY (3 MONTH SUPPLY - ORDER PANEL  Outpatient Medication Detail   Disp Refills Start End RAMIRO   amphetamine-dextroamphetamine (ADDERALL) 15 MG tablet 30 tablet 0 7/15/2024 8/14/2024 --   Sig - Route: Take 1 tablet (15 mg) by mouth daily for 30 days - Oral   Sent to pharmacy as: Amphetamine-Dextroamphetamine 15 MG Oral Tablet (Adderall)   Class: E-Prescribe   Earliest Fill Date: 7/15/2024   Order: [id number]   E-Prescribing Status: Receipt confirmed by pharmacy (7/15/2024  8:58 AM CDT)     Other Panel Orders      Outpatient Medications     Disp Refills Start End RAMIRO   amphetamine-dextroamphetamine (ADDERALL) 15 MG tablet 30 tablet 0 8/14/2024 9/13/2024 --   Sig - Route: Take 1 tablet (15 mg) by mouth daily for 30 days - Oral   Sent to pharmacy as: Amphetamine-Dextroamphetamine 15 MG Oral Tablet (Adderall)   Class: E-Prescribe   Earliest Fill Date: 8/12/2024   Order: [id number]   E-Prescribing Status: Receipt confirmed by pharmacy (7/15/2024  8:57 AM CDT)   amphetamine-dextroamphetamine (ADDERALL) 15 MG tablet 30 tablet 0 9/13/2024 10/13/2024 --   Sig - Route: Take 1 tablet (15 mg) by mouth daily for 30 days - Oral   Sent to pharmacy as: Amphetamine-Dextroamphetamine 15 MG Oral Tablet (Adderall)   Class: E-Prescribe   Earliest Fill Date: 9/9/2024   Order: [id number]   E-Prescribing Status: Receipt confirmed by pharmacy (7/15/2024  8:57 AM CDT)   Pharmacy  [id number] - [place], [place] - [address] AT [facility] [address]     AND    Panel Detail for Adderall XR 25 MG (3 MONTH SUPPLY - ORDER PANEL)    Outpatient Medication Detail     Disp Refills Start End RAMIRO   amphetamine-dextroamphetamine (ADDERALL XR) 25 MG 24 hr capsule 30 capsule 0 7/23/2024 8/22/2024 No   Sig -  Route: Take 1 capsule (25 mg) by mouth daily for 30 days - Oral   Sent to pharmacy as: Amphetamine-Dextroamphet ER 25 MG Oral Capsule Extended Release 24 Hour (Adderall XR)   Class: E-Prescribe   Earliest Fill Date: 7/19/2024   Order: [id number]   E-Prescribing Status: Receipt confirmed by pharmacy (7/15/2024  8:57 AM CDT)     Other Panel Orders      Outpatient Medications     Disp Refills Start End RAMIRO   amphetamine-dextroamphetamine (ADDERALL XR) 25 MG 24 hr capsule 30 capsule 0 8/22/2024 9/21/2024 No   Sig - Route: Take 1 capsule (25 mg) by mouth daily for 30 days - Oral   Sent to pharmacy as: Amphetamine-Dextroamphet ER 25 MG Oral Capsule Extended Release 24 Hour (Adderall XR)   Class: E-Prescribe   Earliest Fill Date: 8/20/2024   Order: [id number]   E-Prescribing Status: Receipt confirmed by pharmacy (7/15/2024  8:57 AM CDT)   amphetamine-dextroamphetamine (ADDERALL XR) 25 MG 24 hr capsule 30 capsule 0 9/21/2024 10/21/2024 No   Sig - Route: Take 1 capsule (25 mg) by mouth daily for 30 days - Oral   Sent to pharmacy as: Amphetamine-Dextroamphet ER 25 MG Oral Capsule Extended Release 24 Hour (Adderall XR)   Class: E-Prescribe   Earliest Fill Date: 9/17/2024   Order: [id number]   E-Prescribing Status: Receipt confirmed by pharmacy (7/15/2024  8:57 AM CDT)   Pharmacy  [facility] #[id number] - [facility] [address] AT [facility] [address]       Forwarding to Dr. Perez for review.

## 2024-09-08 ASSESSMENT — PATIENT HEALTH QUESTIONNAIRE - PHQ9
10. IF YOU CHECKED OFF ANY PROBLEMS, HOW DIFFICULT HAVE THESE PROBLEMS MADE IT FOR YOU TO DO YOUR WORK, TAKE CARE OF THINGS AT HOME, OR GET ALONG WITH OTHER PEOPLE: VERY DIFFICULT
SUM OF ALL RESPONSES TO PHQ QUESTIONS 1-9: 8
SUM OF ALL RESPONSES TO PHQ QUESTIONS 1-9: 8

## 2024-09-09 ENCOUNTER — ALLIED HEALTH/NURSE VISIT (OUTPATIENT)
Dept: PSYCHIATRY | Facility: CLINIC | Age: 64
End: 2024-09-09
Payer: COMMERCIAL

## 2024-09-09 ENCOUNTER — TELEPHONE (OUTPATIENT)
Dept: PSYCHIATRY | Facility: CLINIC | Age: 64
End: 2024-09-09

## 2024-09-09 VITALS — DIASTOLIC BLOOD PRESSURE: 78 MMHG | SYSTOLIC BLOOD PRESSURE: 115 MMHG | HEART RATE: 86 BPM

## 2024-09-09 DIAGNOSIS — F33.9 RECURRENT MAJOR DEPRESSION RESISTANT TO TREATMENT (H): Primary | ICD-10-CM

## 2024-09-09 DIAGNOSIS — F34.1 PERSISTENT DEPRESSIVE DISORDER: ICD-10-CM

## 2024-09-09 NOTE — PROGRESS NOTES
Janelle White comes into clinic today at the request of LINK Yanes MD Ordering Provider for Med Injection only Ketamine .    Procedure Prep:  Medication double check completed by: María Miller RN  Prior to administration pt identified by name and : yes    Nursing Assessment:  Appearance: casual clothing   Mood: Okay   Affect: WNL  Eye contact: good      2024     9:07 AM   PHQ   PHQ-9 Total Score 11   Q9: Thoughts of better off dead/self-harm past 2 weeks Several days     QIDDSR 16 weekly assessment: score 9. Assessment was scanned to EHR.     Procedure Performed:  VSS and mental status WNL. Patient was given ketamine. See MAR for details.     Post Procedure Assessment:  Patient tolerated the treatment with the following side effects: dissociation. Vital signs were monitored, see VS Flowsheet. Patient stated they felt ready to go home prior to discharge. AVS was offered to patient and patient declined. Patient was instructed not to drive for the remainder of the day and to notify clinic if any concerns arise.     Next appt: 3 weeks      This service provided today was under the supervising provider of the day DONAVAN Mireles MD, who was available if needed.        Fiona Simon RN      Answers submitted by the patient for this visit:  Patient Health Questionnaire (Submitted on 2024)  If you checked off any problems, how difficult have these problems made it for you to do your work, take care of things at home, or get along with other people?: Very difficult  PHQ9 TOTAL SCORE: 6    Answers submitted by the patient for this visit:  Patient Health Questionnaire (Submitted on 2024)  If you checked off any problems, how difficult have these problems made it for you to do your work, take care of things at home, or get along with other people?: Very difficult  PHQ9 TOTAL SCORE: 8

## 2024-09-11 ASSESSMENT — ANXIETY QUESTIONNAIRES
7. FEELING AFRAID AS IF SOMETHING AWFUL MIGHT HAPPEN: NOT AT ALL
8. IF YOU CHECKED OFF ANY PROBLEMS, HOW DIFFICULT HAVE THESE MADE IT FOR YOU TO DO YOUR WORK, TAKE CARE OF THINGS AT HOME, OR GET ALONG WITH OTHER PEOPLE?: VERY DIFFICULT
GAD7 TOTAL SCORE: 10

## 2024-09-16 ENCOUNTER — OFFICE VISIT (OUTPATIENT)
Dept: PSYCHIATRY | Facility: CLINIC | Age: 64
End: 2024-09-16
Payer: COMMERCIAL

## 2024-09-16 VITALS
DIASTOLIC BLOOD PRESSURE: 70 MMHG | SYSTOLIC BLOOD PRESSURE: 110 MMHG | HEART RATE: 76 BPM | OXYGEN SATURATION: 100 % | TEMPERATURE: 97.8 F

## 2024-09-16 DIAGNOSIS — F33.1 MAJOR DEPRESSIVE DISORDER, RECURRENT EPISODE, MODERATE (H): Primary | ICD-10-CM

## 2024-09-16 ASSESSMENT — PAIN SCALES - GENERAL: PAINLEVEL: MODERATE PAIN (4)

## 2024-09-16 NOTE — PROGRESS NOTES
"  Psychiatry Clinic Progress Note                                                                   Janelle White is a 63 year old female   Therapist: Shaquille Carrillo [facility] (Somatic therapy)  PCP: Carmen Garcia  Other Providers:  Kimberly Perez DO (psychiatrist)       Interim History                                                                                                        4, 4     Janelle stated that ketamine has given her tremendous relief from irritability and anger and rest from her pain. She said each treatment feels like a new beginning. She said it makes her more engaged and excited about doing things. She said the past three years have been stressful due to CLL, COVID, and compromising the Immune system. She said she had joined an AA group in the last six months and found it helpful. She will start a four-step workshop for a couple of hours every Tuesday night for six weeks. She said it is an evaluation of self-evaluation to identify character defects and create a pattern for better living. She said the engagement and community during this program have been helpful.    She has an adult daughter, age 37, who lives with them but has no job. Kimi said she has been  39 years but thinks they are working on some issues. She uses an cyril called \"We Feel\" to identify her feelings because she has trouble identifying them. She denies ketamine side effects but endorses occasional fogginess and fatigue for a couple of hours after the treatment.     She said her current depression symptoms are difficulty making decisions and fleeting thoughts of suicide ideation. She sees Suicde has an exit plan but denies any intent or plan. She said that in the past six months to a year, she has been trying to unravel the goals she wants out of her family members and what is expected of her. She said that a month ago, she had the divorce paperwork prepared. She has asked her  " for couples counseling. She said her  stopped working for a while so she could go to school so that he could take care of the kids. She thinks if she can't breathe, they can't breathe. She believes her  gives her credit but, at the same time, demeans her. She thinks that something is missing. She feels they can't have a conversation.    Kimi thinks her marriage and work were a big part of her identity, and since she has retired and is now dealing with marital issues, she believes that identity is in question. Even though her health has improved much more than a few years ago, Marino said that before starting ketamine treatment, her depression was about  8-9 out of 10, with 10 being the worst depression ever, but since ketamine, her depression is 2-3 out of 10.   She denies any substance use and no alcohol use but goes to AA meetings three times a week.    She is on Prestiq up to 75 mg a day, Adderall is helping her to focus and move through, and Requip 0.25 mg for restless leg, which has been beneficial. She said she has CY protein intolerance, which gives her a strange reaction to medications. She thinks she likes her current medication regimen. In the last five months, she started seeing a therapist, Shaquille Carrillo from [facility], who does somatic therapy weekly.           Psychiatric History    - Summary points of current care   - Bolded items supervised with Dr. Yanes   - Unbolded items supervised by Dr. Foss     09/16/24 - No changes to current plan. Ordered Ketamine routine labs- EKG, hepatic panel, BMP, UA and UDS            Recent Symptoms:   Depression:  suicidal ideation, depressed mood, anhedonia, low energy, poor concentration /memory, feeling hopeless, excessive crying, overwhelmed and mood dysregulation  Anxiety:  excessive worry and feeling fearful  Panic Attack:  dizziness, flushing, SOB, trouble taking deep breath and chest tightness     Recent Substance Use:  none  reported        Social/ Family History                                  [per patient report]                                 1ea,1ea   No changes     Medical / Surgical History                                                                                                                  Patient Active Problem List   Diagnosis    PERSONAL HX OF  MELANOMA    B-complex deficiency    Migraine    Chronic Low Back Pain    CARDIOVASCULAR SCREENING; LDL GOAL LESS THAN 160    DDD (degenerative disc disease), cervical    Moderate recurrent major depression (H)    Vitamin D deficiency    Shift work sleep disorder    Chronic pain syndrome    CLL (chronic lymphocytic leukemia) (H)    Controlled substance agreement signed    CLARE (obstructive sleep apnea)    Prediabetes    Hyperlipidemia LDL goal <130    MTHFR gene mutation    CYP2B6 intermediate metabolizer (H)    CYP2D6 poor metabolizer (H)    STACIE (generalized anxiety disorder)    Polyarthralgia    Cellulitis, unspecified cellulitis site    Sepsis, due to unspecified organism, unspecified whether acute organ dysfunction present (H)    Cellulitis    Tension type headache    Status post blepharoplasty    Rotator cuff injury    Regular astigmatism, bilateral    Pain medication agreement    Hypotension    History of laser assisted in situ keratomileusis    COVID-19    Spinal stenosis of lumbar region with radiculopathy    COPD (chronic obstructive pulmonary disease) (H)    Severe episode of recurrent major depressive disorder, without psychotic features (H)    Suicidal ideation    Immunocompromised (H24)    Infection due to 2019 novel coronavirus    Sacroiliitis (H24)    History of falling    Lumbar radiculopathy    Failed back surgical syndrome       Past Surgical History:   Procedure Laterality Date    anterior cervical discectomy C4-5 ,Fusion C5-6-7  10/2007    APPENDECTOMY      BACK SURGERY      BLEPHAROPLASTY BILATERAL  4/25/2013    Procedure: BLEPHAROPLASTY BILATERAL;   BILATERAL UPPER LID BLEPHAROPLASTY AND BROWPEXY ;  Surgeon: Godfrey Miguel MD;  Location:  [facility]     SECTION      x2    COLONOSCOPY N/A 2020    Procedure: COLONOSCOPY;  Surgeon: Butch Lockhart MD;  Location:  GI    ESOPHAGOSCOPY, GASTROSCOPY, DUODENOSCOPY (EGD), COMBINED N/A 2020    Procedure: ESOPHAGOGASTRODUODENOSCOPY, WITH BIOPSY biosies by cold forceps;  Surgeon: Butch Lockhart MD;  Location:  GI    EYE SURGERY      FUSION LUMBAR ANTERIOR, FUSION LUMBAR POSTERIOR TWO LEVELS, COMBINED  2010    L3-S1 anterior posterior fusion    GENITOURINARY SURGERY  Hysterectomy    2006    HEAD & NECK SURGERY      Cervical spine- c2-T2    HYSTERECTOMY  2006    ovaries intact    HYSTERECTOMY      HYSTERECTOMY, PAP NO LONGER INDICATED      INJECT EPIDURAL LUMBAR / SACRAL SINGLE N/A 2023    Procedure: Caudal Epidural Steroid Injection;  Surgeon: Joslyn Cherry MD;  Location: [facility] OR    INJECT SACROILIAC JOINT Right 2023    Procedure: Sacroiliac Joint Injection;  Surgeon: Joslyn Cherry MD;  Location: [facility] OR    INSERT STIMULATOR DORSAL COLUMN TRIAL N/A 2024    Procedure: Spinal Cord Stimulator Trial;  Surgeon: Joslyn Cherry MD;  Location: [facility] OR    Partial vulvectomy for NEENA III  2009    Pubovaginal sling, post op durasphere injections  2006    wears pad    ROTATOR CUFF REPAIR RT/LT  2011    right    SOFT TISSUE SURGERY  2019    Bilateral carpal/ulnar release    SPINAL FUSION C3-4  2009    C3-4, anterior spinal fusion    TUBAL LIGATION      ZZC APPENDECTOMY  2006        Medical Review of Systems                                                                                                    2,10   none in addition to that documented above    Allergy                                Bupropion, Codeine, Effexor [venlafaxine hydrochloride], Escitalopram, Escitalopram oxalate, Fluoxetine, Levaquin [levofloxacin], Methylphenidate, Milnacipran, Pregabalin, Prozac  [fluoxetine hcl], Tramadol, and Venlafaxine    Current Medications                                                                                                       Current Outpatient Medications   Medication Sig Dispense Refill    albuterol (PROAIR HFA/PROVENTIL HFA/VENTOLIN HFA) 108 (90 Base) MCG/ACT inhaler Inhale 2 puffs into the lungs every 6 hours as needed for shortness of breath or wheezing 8.5 g 11    amphetamine-dextroamphetamine (ADDERALL XR) 25 MG 24 hr capsule Take 1 capsule (25 mg) by mouth daily 30 capsule 0    amphetamine-dextroamphetamine (ADDERALL XR) 25 MG 24 hr capsule Take 1 capsule (25 mg) by mouth daily 30 capsule 0    amphetamine-dextroamphetamine (ADDERALL XR) 25 MG 24 hr capsule Take 1 capsule (25 mg) by mouth daily for 30 days 30 capsule 0    amphetamine-dextroamphetamine (ADDERALL) 15 MG tablet Take 1 tablet (15 mg) by mouth 2 times daily 60 tablet 0    amphetamine-dextroamphetamine (ADDERALL) 15 MG tablet Take 1 tablet (15 mg) by mouth 2 times daily 60 tablet 0    Cobalamin Combinations (VITAMIN B12-FOLIC ACID) 500-400 MCG TABS Take 1 tablet by mouth      desvenlafaxine (PRISTIQ) 50 MG 24 hr tablet Take one tab (50 mg) by mouth daily WITH 25 mg tab for total daily dose 75 mg. 90 tablet 1    desvenlafaxine succinate (PRISTIQ) 25 MG 24 hr tablet Take one tab (25 mg) by mouth daily WITH 50 mg tab for total daily dose 75 mg. 90 tablet 1    Estradiol (DIVIGEL) 1 MG/GM GEL Place 1 packet onto the skin daily 30 g 11    hydrOXYzine (VISTARIL) 25 MG capsule Take 1 capsule (25 mg) by mouth 3 times daily as needed for itching or anxiety 90 capsule 3    Levomefolate Glucosamine (METHYLFOLATE PO) Take 7.5 mg by mouth daily      lidocaine (LIDODERM) 5 % patch Place 3 patches onto the skin daily Apply up to 3 patches to skin. Wear for 12 hours and remove for 12 hrs.  Refill when patient requests. 120 patch 3    medical cannabis (Patient's own supply.  Not a prescription) Medical Cannabis -  "Tangerine 4-6 ml by mouth daily. [facility]      methocarbamol (ROBAXIN) 500 MG tablet Take 1 tablet (500 mg) by mouth 4 times daily as needed for muscle spasms 120 tablet 1    methocarbamol (ROBAXIN) 750 MG tablet Take 1 tablet (750 mg) by mouth 4 times daily as needed for muscle spasms 120 tablet 4    methylPREDNISolone (MEDROL DOSEPAK) 4 MG tablet therapy pack Follow Package Directions 21 tablet 0    naloxone (NARCAN) 4 MG/0.1ML nasal spray Spray 1 spray (4 mg) into one nostril alternating nostrils as needed for opioid reversal every 2-3 minutes until assistance arrives 0.2 mL 1    NONFORMULARY Take 2 Scoops by mouth daily Protein and Vitamin shake mix - Nutritional supplement      ondansetron (ZOFRAN ODT) 8 MG ODT tab Take 1 tablet (8 mg) by mouth every 8 hours as needed for nausea 30 tablet 4    Prasterone 6.5 MG INST Place 0.5 suppositories vaginally three times a week 28 each 11    rOPINIRole (REQUIP) 0.25 MG tablet TAKE 1 TO 2 TABLETS(0.25 TO 0.5 MG) BY MOUTH AT BEDTIME 180 tablet 3    senna-docusate (SENOKOT-S/PERICOLACE) 8.6-50 MG tablet Take 6-9 tablets by mouth every evening      vitamin D3 (CHOLECALCIFEROL) 125 MCG (5000 UT) tablet Take 5,000 Units by mouth daily         Vitals                                                                                                                       3, 3   /70 (BP Location: Right arm, Patient Position: Sitting, Cuff Size: Adult Regular)   Pulse 76   Temp 97.8  F (36.6  C) (Temporal)   LMP 05/01/2005 (LMP Unknown)   SpO2 100%      Mental Status Exam                                                                                    9, 14 cog gs     Alertness: alert  and oriented  Appearance: well groomed  Behavior/Demeanor: cooperative and tearful and in some anxious distress , with good  eye contact   Speech:  normal rate/rythm though at times rapid and/or quivering  Language: intact  Psychomotor: restless  Mood: \"I've made a great deal of " "progress in many areas\"  Affect: tearful; was congruent to mood; was congruent to content  Thought Process/Associations: unremarkable  Thought Content:  Reports suicidal ideation  Perception:  No apparent psychotic symptoms observed or reported  Insight: good  Judgment: good  Cognition: (6) does  appear grossly intact; formal cognitive testing was not done  Gait/Station and/or Muscle Strength/Tone: Not observed due to  Video visit     Labs and Data                                                                                                                 Rating Scales:    N/A    PHQ9:        9/8/2024     9:52 AM 9/9/2024     9:07 AM 9/15/2024     9:17 PM   PHQ   PHQ-9 Total Score 8 11 11   Q9: Thoughts of better off dead/self-harm past 2 weeks Several days Several days Several days   F/U: Thoughts of suicide or self-harm Yes  Yes   F/U: Self harm-plan Yes  Yes   F/U: Self-harm action No  No   F/U: Safety concerns No  No         Diagnosis and Assessment                                                                             m2, h3     continued improvement with ketamine, remains with some ffective instability and distress. Recommended ACT therapy. No changes in plan today.     Background:  Janelle White is a 60 year old female with previous psychiatric history of MDD, recurrent, severe, chronic pain syndrome, h/o CLL. On initial presentation it was thought that Janelle was suffering from an episode of depression that is closely related to her pain syndrome and the resultant physically inability to engage in activities or work. Her pain management was improved and she was treated with TMS though continues to endorse significant depression and anxiety symptoms. She is hesitant to trial MAOIs or other oral agents that could potentially interfere with her current pain management as she feels it is well under control. She is interested in pursuing ketamine therapy, which should not interfere with her " medications and may actually help with her pain. She gave consent to speak with her PCP about medication management and potential interventions for continued depression.     Current:     Kimi continues to see ketamine treatment as a lifesaver that has reduced her depression by 75 percent, albeit with some fleeting passive suicide ideation. The psychosocial stressor from a failing marriage of 39 years is complicating her ability to go into complete remission, as well as the loss of identity that she ascribed to working before being forced into residential due to a health crisis. Notwithstanding, iKmi continues to be resilient and proactive in engaging in a four-step workshop to learn more about herself, attending AA meetings and weekly somatic therapy sessions. She wants to continue her current medication regimen of Prestiq and Adderall. She will continue her IM ketamine at 1.1 mg/kg every three weeks, and I ordered routine ketamine labs. She is scheduled to see Dr. Yanes in October.         Today the following issues were addressed:    1) Major depressive disorder, recurrent, severe  2) chronic pain   3) treatment response   4) fatigue     [place] Prescription Monitoring [place] [] review was not needed today.    PSYCHOTROPIC DRUG INTERACTIONS: none clinically relevant    Plan                                                                                                                    m2, h3      1) Majro depressive disorder, recurrent, severe   -- Medication: continue current outpatient medications    -- Psychotherapy:   Continue your Somatic Therapy, AA, and Four-step workshop    -- Procedures:               - continue IM ketamine every three weeks at 1.1 mg/kg     -- Referrals: None      RTC: In October to see Dr. Yanes    CRISIS NUMBERS:   Provided routinely in AVS.    Treatment Risk Statement:  The patient understands the risks, benefits, adverse effects and alternatives. Agrees to treatment with  the capacity to do so. No medical contraindications to treatment. Agrees to call clinic for any problems. The patient understands to call 911 or go to the nearest ED if life threatening or urgent symptoms occur.     The longitudinal plan of care for the diagnosis(es)/condition(s) as documented were addressed during this visit. Due to the added complexity in care, I will continue to support Janelle in the subsequent management and with ongoing continuity of care.      Psychiatry Individual Psychotherapy Note   Psychotherapy start time - 11: 15am  Psychotherapy end time - 11:40am  Date treatment plan last reviewed with patient - 09/16/24  Subjective: This supportive psychotherapy session addressed issues related to goals of therapy and current psychosocial stressors. Patient's reaction: Action in the context of mental status appropriate for ambulatory setting.    Interactive complexity indicated? No  Plan: RTC in timeframe noted above  Psychotherapy services during this visit included myself and the patient.   Treatment Plan      SYMPTOMS; PROBLEMS   MEASURABLE GOALS;    FUNCTIONAL IMPROVEMENT / GAINS INTERVENTIONS DISCHARGE CRITERIA   Depression: feeling hopelesss   reduce depressive symptoms Supportive / psychodynamic marked symptom improvement     Answers submitted by the patient for this visit:  Patient Health Questionnaire (Submitted on 9/15/2024)  If you checked off any problems, how difficult have these problems made it for you to do your work, take care of things at home, or get along with other people?: Somewhat difficult  PHQ9 TOTAL SCORE: 11  Patient Health Questionnaire (G7) (Submitted on 9/11/2024)  STACIE 7 TOTAL SCORE: 10    PROVIDER:     Nelson Foss APRN, CNP, DNP  [facility]  Interventional Psychiatry Program    Time based billing.  On the day of the visit:  20 minutes spent reviewing chart, past notes, prior treatments.  60 minutes spent face to face with patient.  15  minutes spent preparing note, additional communications.

## 2024-09-17 ENCOUNTER — HOSPITAL ENCOUNTER (OUTPATIENT)
Dept: MAMMOGRAPHY | Facility: CLINIC | Age: 64
Discharge: HOME | End: 2024-09-17
Attending: NURSE PRACTITIONER | Admitting: NURSE PRACTITIONER
Payer: COMMERCIAL

## 2024-09-17 ENCOUNTER — LAB (OUTPATIENT)
Dept: LAB | Facility: CLINIC | Age: 64
End: 2024-09-17
Payer: COMMERCIAL

## 2024-09-17 DIAGNOSIS — F33.1 MAJOR DEPRESSIVE DISORDER, RECURRENT EPISODE, MODERATE (H): ICD-10-CM

## 2024-09-17 DIAGNOSIS — Z12.31 VISIT FOR SCREENING MAMMOGRAM: ICD-10-CM

## 2024-09-17 LAB
ALBUMIN SERPL BCG-MCNC: 4.3 G/DL (ref 3.5–5.2)
ALBUMIN UR-MCNC: NEGATIVE MG/DL
ALP SERPL-CCNC: 100 U/L (ref 40–150)
ALT SERPL W P-5'-P-CCNC: 20 U/L (ref 0–50)
AMPHETAMINES UR QL SCN: ABNORMAL
ANION GAP SERPL CALCULATED.3IONS-SCNC: 10 MMOL/L (ref 7–15)
APPEARANCE UR: ABNORMAL
AST SERPL W P-5'-P-CCNC: 25 U/L (ref 0–45)
BACTERIA #/AREA URNS HPF: ABNORMAL /HPF
BARBITURATES UR QL SCN: ABNORMAL
BENZODIAZ UR QL SCN: ABNORMAL
BILIRUB DIRECT SERPL-MCNC: <0.2 MG/DL (ref 0–0.3)
BILIRUB SERPL-MCNC: <0.2 MG/DL
BILIRUB UR QL STRIP: NEGATIVE
BUN SERPL-MCNC: 20.1 MG/DL (ref 8–23)
BZE UR QL SCN: ABNORMAL
CALCIUM SERPL-MCNC: 9.1 MG/DL (ref 8.8–10.4)
CANNABINOIDS UR QL SCN: ABNORMAL
CHLORIDE SERPL-SCNC: 104 MMOL/L (ref 98–107)
COLOR UR AUTO: YELLOW
CREAT SERPL-MCNC: 0.69 MG/DL (ref 0.51–0.95)
EGFRCR SERPLBLD CKD-EPI 2021: >90 ML/MIN/1.73M2
FENTANYL UR QL: ABNORMAL
GLUCOSE SERPL-MCNC: 94 MG/DL (ref 70–99)
GLUCOSE UR STRIP-MCNC: NEGATIVE MG/DL
HCO3 SERPL-SCNC: 27 MMOL/L (ref 22–29)
HGB UR QL STRIP: NEGATIVE
KETONES UR STRIP-MCNC: ABNORMAL MG/DL
LEUKOCYTE ESTERASE UR QL STRIP: ABNORMAL
MUCOUS THREADS #/AREA URNS LPF: PRESENT /LPF
NITRATE UR QL: NEGATIVE
OPIATES UR QL SCN: ABNORMAL
PCP QUAL URINE (ROCHE): ABNORMAL
PH UR STRIP: 5.5 [PH] (ref 5–7)
POTASSIUM SERPL-SCNC: 3.9 MMOL/L (ref 3.4–5.3)
PROT SERPL-MCNC: 6.4 G/DL (ref 6.4–8.3)
RBC #/AREA URNS AUTO: ABNORMAL /HPF
SODIUM SERPL-SCNC: 141 MMOL/L (ref 135–145)
SP GR UR STRIP: 1.02 (ref 1–1.03)
SQUAMOUS #/AREA URNS AUTO: ABNORMAL /LPF
UROBILINOGEN UR STRIP-ACNC: 0.2 E.U./DL
WBC #/AREA URNS AUTO: ABNORMAL /HPF

## 2024-09-17 PROCEDURE — 80053 COMPREHEN METABOLIC PANEL: CPT

## 2024-09-17 PROCEDURE — 80307 DRUG TEST PRSMV CHEM ANLYZR: CPT

## 2024-09-17 PROCEDURE — 77063 BREAST TOMOSYNTHESIS BI: CPT

## 2024-09-17 PROCEDURE — 87086 URINE CULTURE/COLONY COUNT: CPT | Mod: GZ

## 2024-09-17 PROCEDURE — 36415 COLL VENOUS BLD VENIPUNCTURE: CPT

## 2024-09-17 PROCEDURE — 87186 SC STD MICRODIL/AGAR DIL: CPT

## 2024-09-17 PROCEDURE — 81001 URINALYSIS AUTO W/SCOPE: CPT

## 2024-09-17 PROCEDURE — 82248 BILIRUBIN DIRECT: CPT

## 2024-09-18 LAB — BACTERIA UR CULT: ABNORMAL

## 2024-09-19 ENCOUNTER — MYC MEDICAL ADVICE (OUTPATIENT)
Dept: FAMILY MEDICINE | Facility: CLINIC | Age: 64
End: 2024-09-19
Payer: COMMERCIAL

## 2024-09-20 NOTE — TELEPHONE ENCOUNTER
Kd Foss,   Wondering what the rationale is for doing a UA on ketamine therapy?  Was this intended to be a Utox?  I would advise her to make a virtual visit to discuss the merits of treatment.  I suspect she could be colonized with with e coli and I would not recommend treatment in the absence of symptoms. There are a lot of considerations here and without a visit to discuss, I am not comfortable treating another providers results.      Thanks,  Carmen STATON, DNP

## 2024-09-23 NOTE — TELEPHONE ENCOUNTER
Let's have her come in for repeat UA today and I have VV open this afternoon.  No need to send to UC.

## 2024-09-23 NOTE — TELEPHONE ENCOUNTER
Sounds good- no need to follow up and no treatment indicated if she is asymptomatic.  If symptoms return, e-visit would be fine.  Appreciate you checking in with her!

## 2024-09-23 NOTE — TELEPHONE ENCOUNTER
I talked to pt.    She is out of town until tomorrow afternoon.  She was not able to make any Virtual visit or do labs.    PT feels better today.  She has frequency and urgency, but no sx that would make her request a UA.    How should pt be directed at this point?  Thanks!  ARTEMIO Tyson

## 2024-09-23 NOTE — TELEPHONE ENCOUNTER
Writer responded via Balanced.  SHANEL MooneyN, RN-BC  [facility]

## 2024-09-29 ENCOUNTER — MYC REFILL (OUTPATIENT)
Dept: PSYCHIATRY | Facility: CLINIC | Age: 64
End: 2024-09-29
Payer: COMMERCIAL

## 2024-09-29 DIAGNOSIS — F33.1 MAJOR DEPRESSIVE DISORDER, RECURRENT EPISODE, MODERATE (H): Primary | ICD-10-CM

## 2024-09-29 DIAGNOSIS — F33.9 RECURRENT MAJOR DEPRESSION RESISTANT TO TREATMENT (H): ICD-10-CM

## 2024-09-29 DIAGNOSIS — F34.1 PERSISTENT DEPRESSIVE DISORDER: ICD-10-CM

## 2024-09-29 RX ORDER — DEXTROAMPHETAMINE SACCHARATE, AMPHETAMINE ASPARTATE MONOHYDRATE, DEXTROAMPHETAMINE SULFATE AND AMPHETAMINE SULFATE 6.25; 6.25; 6.25; 6.25 MG/1; MG/1; MG/1; MG/1
25 CAPSULE, EXTENDED RELEASE ORAL DAILY
Qty: 30 CAPSULE | Refills: 0 | Status: CANCELLED | OUTPATIENT
Start: 2024-09-29

## 2024-09-29 RX ORDER — DEXTROAMPHETAMINE SACCHARATE, AMPHETAMINE ASPARTATE, DEXTROAMPHETAMINE SULFATE AND AMPHETAMINE SULFATE 3.75; 3.75; 3.75; 3.75 MG/1; MG/1; MG/1; MG/1
15 TABLET ORAL 2 TIMES DAILY
Qty: 60 TABLET | Refills: 0 | Status: CANCELLED | OUTPATIENT
Start: 2024-09-29

## 2024-09-29 ASSESSMENT — ANXIETY QUESTIONNAIRES
3. WORRYING TOO MUCH ABOUT DIFFERENT THINGS: NEARLY EVERY DAY
6. BECOMING EASILY ANNOYED OR IRRITABLE: MORE THAN HALF THE DAYS
5. BEING SO RESTLESS THAT IT IS HARD TO SIT STILL: NEARLY EVERY DAY
1. FEELING NERVOUS, ANXIOUS, OR ON EDGE: SEVERAL DAYS
7. FEELING AFRAID AS IF SOMETHING AWFUL MIGHT HAPPEN: MORE THAN HALF THE DAYS
GAD7 TOTAL SCORE: 15
7. FEELING AFRAID AS IF SOMETHING AWFUL MIGHT HAPPEN: MORE THAN HALF THE DAYS
GAD7 TOTAL SCORE: 15
8. IF YOU CHECKED OFF ANY PROBLEMS, HOW DIFFICULT HAVE THESE MADE IT FOR YOU TO DO YOUR WORK, TAKE CARE OF THINGS AT HOME, OR GET ALONG WITH OTHER PEOPLE?: VERY DIFFICULT
GAD7 TOTAL SCORE: 15
4. TROUBLE RELAXING: NEARLY EVERY DAY
2. NOT BEING ABLE TO STOP OR CONTROL WORRYING: SEVERAL DAYS
IF YOU CHECKED OFF ANY PROBLEMS ON THIS QUESTIONNAIRE, HOW DIFFICULT HAVE THESE PROBLEMS MADE IT FOR YOU TO DO YOUR WORK, TAKE CARE OF THINGS AT HOME, OR GET ALONG WITH OTHER PEOPLE: VERY DIFFICULT

## 2024-09-30 ENCOUNTER — ALLIED HEALTH/NURSE VISIT (OUTPATIENT)
Dept: PSYCHIATRY | Facility: CLINIC | Age: 64
End: 2024-09-30
Payer: COMMERCIAL

## 2024-09-30 VITALS — HEART RATE: 61 BPM | DIASTOLIC BLOOD PRESSURE: 69 MMHG | SYSTOLIC BLOOD PRESSURE: 96 MMHG

## 2024-09-30 DIAGNOSIS — F34.1 PERSISTENT DEPRESSIVE DISORDER: ICD-10-CM

## 2024-09-30 DIAGNOSIS — F33.9 RECURRENT MAJOR DEPRESSION RESISTANT TO TREATMENT (H): Primary | ICD-10-CM

## 2024-09-30 RX ORDER — DEXTROAMPHETAMINE SACCHARATE, AMPHETAMINE ASPARTATE, DEXTROAMPHETAMINE SULFATE AND AMPHETAMINE SULFATE 3.75; 3.75; 3.75; 3.75 MG/1; MG/1; MG/1; MG/1
15 TABLET ORAL 2 TIMES DAILY
Qty: 60 TABLET | Refills: 0 | Status: SHIPPED | OUTPATIENT
Start: 2024-09-30 | End: 2024-10-30

## 2024-09-30 RX ORDER — DEXTROAMPHETAMINE SACCHARATE, AMPHETAMINE ASPARTATE, DEXTROAMPHETAMINE SULFATE AND AMPHETAMINE SULFATE 3.75; 3.75; 3.75; 3.75 MG/1; MG/1; MG/1; MG/1
15 TABLET ORAL 2 TIMES DAILY
Qty: 60 TABLET | Refills: 0 | Status: SHIPPED | OUTPATIENT
Start: 2024-10-30 | End: 2024-11-29

## 2024-09-30 RX ORDER — DEXTROAMPHETAMINE SACCHARATE, AMPHETAMINE ASPARTATE MONOHYDRATE, DEXTROAMPHETAMINE SULFATE AND AMPHETAMINE SULFATE 6.25; 6.25; 6.25; 6.25 MG/1; MG/1; MG/1; MG/1
25 CAPSULE, EXTENDED RELEASE ORAL DAILY
Qty: 30 CAPSULE | Refills: 0 | Status: SHIPPED | OUTPATIENT
Start: 2024-11-29 | End: 2024-12-29

## 2024-09-30 RX ORDER — DEXTROAMPHETAMINE SACCHARATE, AMPHETAMINE ASPARTATE MONOHYDRATE, DEXTROAMPHETAMINE SULFATE AND AMPHETAMINE SULFATE 6.25; 6.25; 6.25; 6.25 MG/1; MG/1; MG/1; MG/1
25 CAPSULE, EXTENDED RELEASE ORAL DAILY
Qty: 30 CAPSULE | Refills: 0 | Status: SHIPPED | OUTPATIENT
Start: 2024-10-30 | End: 2024-11-29

## 2024-09-30 RX ORDER — DEXTROAMPHETAMINE SACCHARATE, AMPHETAMINE ASPARTATE MONOHYDRATE, DEXTROAMPHETAMINE SULFATE AND AMPHETAMINE SULFATE 6.25; 6.25; 6.25; 6.25 MG/1; MG/1; MG/1; MG/1
25 CAPSULE, EXTENDED RELEASE ORAL DAILY
Qty: 30 CAPSULE | Refills: 0 | Status: SHIPPED | OUTPATIENT
Start: 2024-09-30 | End: 2024-10-30

## 2024-09-30 RX ORDER — DEXTROAMPHETAMINE SACCHARATE, AMPHETAMINE ASPARTATE, DEXTROAMPHETAMINE SULFATE AND AMPHETAMINE SULFATE 3.75; 3.75; 3.75; 3.75 MG/1; MG/1; MG/1; MG/1
15 TABLET ORAL 2 TIMES DAILY
Qty: 60 TABLET | Refills: 0 | Status: SHIPPED | OUTPATIENT
Start: 2024-11-29 | End: 2024-12-29

## 2024-09-30 ASSESSMENT — PATIENT HEALTH QUESTIONNAIRE - PHQ9: SUM OF ALL RESPONSES TO PHQ QUESTIONS 1-9: 11

## 2024-09-30 ASSESSMENT — ANXIETY QUESTIONNAIRES
IF YOU CHECKED OFF ANY PROBLEMS ON THIS QUESTIONNAIRE, HOW DIFFICULT HAVE THESE PROBLEMS MADE IT FOR YOU TO DO YOUR WORK, TAKE CARE OF THINGS AT HOME, OR GET ALONG WITH OTHER PEOPLE: SOMEWHAT DIFFICULT

## 2024-09-30 NOTE — PROGRESS NOTES
Janelle White comes into clinic today at the request of LINK Yanes MD Ordering Provider for Med Injection only Ketamine .    Procedure Prep:  Medication double check completed by: Pipo Tijerina RN  Prior to administration pt identified by name and : yes    Nursing Assessment:  Appearance: casual clothing   Mood: Okay   Affect: WNL  Eye contact: good      2024     9:25 AM   PHQ   PHQ-9 Total Score 11   Q9: Thoughts of better off dead/self-harm past 2 weeks More than half the days     QIDDSR 16 weekly assessment: 10. Assessment was scanned to EHR.     Procedure Performed:  VSS and mental status WNL. Patient was given ketamine. See MAR for details.     Post Procedure Assessment:  Patient tolerated the treatment with the following side effects: dissociation. Vital signs were monitored, see VS Flowsheet. Patient stated they felt ready to go home prior to discharge. AVS was offered to patient and patient declined. Patient was instructed not to drive for the remainder of the day and to notify clinic if any concerns arise.     Next appt: 3 weeks      This service provided today was under the supervising provider of the day DONAVAN Mireles MD, who was available if needed.        Adali Berg RN

## 2024-09-30 NOTE — TELEPHONE ENCOUNTER
Date of Last Office Visit: 7/15/24  Date of Next Office Visit:  10/15/24  No shows since last visit: No  More than one patient-initiated cancellation (with reschedule) since last seen in clinic? No    []Medication refilled per  Medication Refill in Ambulatory Care  policy.  [x]Medication unable to be refilled by RN due to criteria not met as indicated below:    []Eligibility: has not had a provider visit within last 6 months   []Supervision: no future appointment; < 7 days before next appointment   []Compliance: no shows; cancellations; lapse in therapy   []Verification: order discrepancy; may need modification...   [] > 30-day supply request   []Advanced refill request: > 7 days before refill date   [x]Controlled medication   []Medication not included in policy   []Review: new med; med adjusted ? 30 days; safety alert; requires lab monitoring...   []Scope of Practice: refill request processed by LPN/MA   []Other:      Medication(s) requested:     -  amphetamine-dextroamphetamine (ADDERALL XR) 25 MG 24 hr capsule   Date last ordered: 9/2/24  Qty: 30  Refills: 0  Appropriate for refill? Yes    -  amphetamine-dextroamphetamine (ADDERALL) 15 MG tablet   Date last ordered: 9/1/24  Qty: 60  Refills: 0  Appropriate for refill? Yes    Any Controlled Substance(s)? Yes   [place]  checked? [place]   Both were last sold on 8/31/24 for quantity of 30,60.  Other controlled substance on [place] ?: No      Requested medication(s) verified as identical to current order? Yes    Any lapse in adherence to medication(s) greater than 5 days? No      Additional action taken? cued up medication/order(s).      Last visit treatment plan:   Patient overall doing relatively okay.  Some increase psychosocial stressors.  Filing for divorce from .  Tolerating medication well.  No acute safety concerns.  No SI.  No medication changes today.  Patient will be seen back in 3 months.  Encouraged to continue psychotherapy.  Continue Pristiq up  to 75  mg daily for mood, anxiety.  Okay to alternate 50 mg with 75 mg every couple days or every other day if 75 mg daily not tolerated.  Continue methylfolate 7.5 mg daily as supplementation.  Continue Adderall XR 25 mg daily for mood augmentation  Continue Adderall IR 15 mg twice daily as needed for mood augmentation and daytime fatigue/hypersomnia  Continue hydroxyzine 25-50 mg up to three times a day as needed for anxiety/sleep.   Continue ketamine therapy as planned/needed.        Any medication(s) require lab monitoring? No

## 2024-10-02 ENCOUNTER — OFFICE VISIT (OUTPATIENT)
Dept: PSYCHIATRY | Facility: CLINIC | Age: 64
End: 2024-10-02
Payer: COMMERCIAL

## 2024-10-02 VITALS
TEMPERATURE: 97.5 F | DIASTOLIC BLOOD PRESSURE: 65 MMHG | HEART RATE: 63 BPM | WEIGHT: 153 LBS | BODY MASS INDEX: 25.62 KG/M2 | OXYGEN SATURATION: 99 % | SYSTOLIC BLOOD PRESSURE: 99 MMHG

## 2024-10-02 DIAGNOSIS — F33.41 RECURRENT MAJOR DEPRESSIVE DISORDER, IN PARTIAL REMISSION (H): Primary | ICD-10-CM

## 2024-10-02 ASSESSMENT — PAIN SCALES - GENERAL: PAINLEVEL: MODERATE PAIN (4)

## 2024-10-02 ASSESSMENT — PATIENT HEALTH QUESTIONNAIRE - PHQ9
10. IF YOU CHECKED OFF ANY PROBLEMS, HOW DIFFICULT HAVE THESE PROBLEMS MADE IT FOR YOU TO DO YOUR WORK, TAKE CARE OF THINGS AT HOME, OR GET ALONG WITH OTHER PEOPLE: VERY DIFFICULT
SUM OF ALL RESPONSES TO PHQ QUESTIONS 1-9: 13
SUM OF ALL RESPONSES TO PHQ QUESTIONS 1-9: 13

## 2024-10-02 NOTE — PROGRESS NOTES
Psychiatry Clinic Progress Note                                                                   Janelle White is a 63 year old female   Therapist: None (stopped September 2021)   PCP: Carmen Garcia  Other Providers:  Kimberly Perez DO (psychiatrist)       Interim History                                                                                                        4, 4     The patient is a good historian and reports good treatment adherence.      Janelle reports ongoing management mood symptoms (in addition to AUD & chronic pain) through medication, therapy, and self-care practices. She has undergone transcranial magnetic stimulation (TMS) and ketamine treatments, which she finds beneficial for mood stabilization. However, she notices a decline in mood and motivation as the effects of ketamine wane between treatments.    She describes mood symptoms of discontent, restlessness, irritability, isolation, lack of motivation, and indecision. These symptoms are moderate in severity and chronic in nature. Sleep issues are also present; she generally sleeps well but often wakes after a few hours due to discomfort and needs to move around. She manages restless legs with Requip and uses a dental apparatus for sleep apnea. Periods of poor sleep exacerbate her mood and motivation challenges.    Emotionally, she has difficulty identifying and naming her feelings, often experiencing  black and white  moods. She is working on improving her emotional awareness.    She reports issues with medication availability, specifically Adderall, which she notes can affect her mood and motivation.     Recent Symptoms:   Depression:  low energy and discontent  ----  Anxiety:  excessive worry and feeling fearful (historical)   Panic Attack:  dizziness, flushing, SOB, trouble taking deep breath and chest tightness (historical)     Recent Substance Use:  THC cookie dough        Social/ Family History                                   [per patient report]                                 1ea,1ea   No changes     Medical / Surgical History                                                                                                                  Patient Active Problem List   Diagnosis    PERSONAL HX OF  MELANOMA    B-complex deficiency    Migraine    Chronic Low Back Pain    CARDIOVASCULAR SCREENING; LDL GOAL LESS THAN 160    DDD (degenerative disc disease), cervical    Moderate recurrent major depression (H)    Vitamin D deficiency    Shift work sleep disorder    Chronic pain syndrome    CLL (chronic lymphocytic leukemia) (H)    Controlled substance agreement signed    CLARE (obstructive sleep apnea)    Prediabetes    Hyperlipidemia LDL goal <130    MTHFR gene mutation    CYP2B6 intermediate metabolizer (H)    CYP2D6 poor metabolizer (H)    STACIE (generalized anxiety disorder)    Polyarthralgia    Cellulitis, unspecified cellulitis site    Sepsis, due to unspecified organism, unspecified whether acute organ dysfunction present (H)    Cellulitis    Tension type headache    Status post blepharoplasty    Rotator cuff injury    Regular astigmatism, bilateral    Pain medication agreement    Hypotension    History of laser assisted in situ keratomileusis    COVID-19    Spinal stenosis of lumbar region with radiculopathy    COPD (chronic obstructive pulmonary disease) (H)    Severe episode of recurrent major depressive disorder, without psychotic features (H)    Suicidal ideation    Immunocompromised (H)    Infection due to 2019 novel coronavirus    Sacroiliitis (H)    History of falling    Lumbar radiculopathy    Failed back surgical syndrome       Past Surgical History:   Procedure Laterality Date    anterior cervical discectomy C4-5 ,Fusion C5-6-7  10/2007    APPENDECTOMY      BACK SURGERY      BLEPHAROPLASTY BILATERAL  4/25/2013    Procedure: BLEPHAROPLASTY BILATERAL;  BILATERAL UPPER LID BLEPHAROPLASTY AND BROWPEXY ;  Surgeon: Godfrey Miguel,  MD;  Location:  [facility]     SECTION      x2    COLONOSCOPY N/A 2020    Procedure: COLONOSCOPY;  Surgeon: Butch Lockhart MD;  Location:  [facility]    ESOPHAGOSCOPY, GASTROSCOPY, DUODENOSCOPY (EGD), COMBINED N/A 2020    Procedure: ESOPHAGOGASTRODUODENOSCOPY, WITH BIOPSY biosies by cold forceps;  Surgeon: Butch Lockhart MD;  Location:  GI    EYE SURGERY      FUSION LUMBAR ANTERIOR, FUSION LUMBAR POSTERIOR TWO LEVELS, COMBINED  2010    L3-S1 anterior posterior fusion    GENITOURINARY SURGERY  Hysterectomy    2006    HEAD & NECK SURGERY      Cervical spine- c2-T2    HYSTERECTOMY  2006    ovaries intact    HYSTERECTOMY      HYSTERECTOMY, PAP NO LONGER INDICATED      INJECT EPIDURAL LUMBAR / SACRAL SINGLE N/A 2023    Procedure: Caudal Epidural Steroid Injection;  Surgeon: Joslyn Cherry MD;  Location: [facility] OR    INJECT SACROILIAC JOINT Right 2023    Procedure: Sacroiliac Joint Injection;  Surgeon: Joslyn Cherry MD;  Location: [facility] OR    INSERT STIMULATOR DORSAL COLUMN TRIAL N/A 2024    Procedure: Spinal Cord Stimulator Trial;  Surgeon: Joslyn Cherry MD;  Location: [facility] OR    Partial vulvectomy for NEENA III  2009    Pubovaginal sling, post op durasphere injections  2006    wears pad    ROTATOR CUFF REPAIR RT/LT  2011    right    SOFT TISSUE SURGERY  2019    Bilateral carpal/ulnar release    SPINAL FUSION C3-4  2009    C3-4, anterior spinal fusion    TUBAL LIGATION      ZZC APPENDECTOMY  2006        Medical Review of Systems                                                                                                    2,10   none in addition to that documented above    Allergy                                Bupropion, Codeine, Effexor [venlafaxine hydrochloride], Escitalopram, Escitalopram oxalate, Fluoxetine, Levaquin [levofloxacin], Methylphenidate, Milnacipran, Pregabalin, Prozac [fluoxetine hcl], Tramadol, and Venlafaxine    Current Medications                                                                                                        Current Outpatient Medications   Medication Sig Dispense Refill    albuterol (PROAIR HFA/PROVENTIL HFA/VENTOLIN HFA) 108 (90 Base) MCG/ACT inhaler Inhale 2 puffs into the lungs every 6 hours as needed for shortness of breath or wheezing 8.5 g 11    amphetamine-dextroamphetamine (ADDERALL XR) 25 MG 24 hr capsule Take 1 capsule (25 mg) by mouth daily. 30 capsule 0    [START ON 10/30/2024] amphetamine-dextroamphetamine (ADDERALL XR) 25 MG 24 hr capsule Take 1 capsule (25 mg) by mouth daily. 30 capsule 0    [START ON 11/29/2024] amphetamine-dextroamphetamine (ADDERALL XR) 25 MG 24 hr capsule Take 1 capsule (25 mg) by mouth daily. 30 capsule 0    amphetamine-dextroamphetamine (ADDERALL) 15 MG tablet Take 1 tablet (15 mg) by mouth 2 times daily. 60 tablet 0    [START ON 10/30/2024] amphetamine-dextroamphetamine (ADDERALL) 15 MG tablet Take 1 tablet (15 mg) by mouth 2 times daily. 60 tablet 0    [START ON 11/29/2024] amphetamine-dextroamphetamine (ADDERALL) 15 MG tablet Take 1 tablet (15 mg) by mouth 2 times daily. 60 tablet 0    amphetamine-dextroamphetamine (ADDERALL) 15 MG tablet Take 1 tablet (15 mg) by mouth 2 times daily 60 tablet 0    Cobalamin Combinations (VITAMIN B12-FOLIC ACID) 500-400 MCG TABS Take 1 tablet by mouth      desvenlafaxine (PRISTIQ) 50 MG 24 hr tablet Take one tab (50 mg) by mouth daily WITH 25 mg tab for total daily dose 75 mg. 90 tablet 1    desvenlafaxine succinate (PRISTIQ) 25 MG 24 hr tablet Take one tab (25 mg) by mouth daily WITH 50 mg tab for total daily dose 75 mg. 90 tablet 1    Estradiol (DIVIGEL) 1 MG/GM GEL Place 1 packet onto the skin daily 30 g 11    hydrOXYzine (VISTARIL) 25 MG capsule Take 1 capsule (25 mg) by mouth 3 times daily as needed for itching or anxiety 90 capsule 3    Levomefolate Glucosamine (METHYLFOLATE PO) Take 7.5 mg by mouth daily      lidocaine (LIDODERM) 5 % patch Place 3 patches  onto the skin daily Apply up to 3 patches to skin. Wear for 12 hours and remove for 12 hrs.  Refill when patient requests. 120 patch 3    medical cannabis (Patient's own supply.  Not a prescription) Medical Cannabis - Tangerine 4-6 ml by mouth daily. [facility]      methocarbamol (ROBAXIN) 500 MG tablet Take 1 tablet (500 mg) by mouth 4 times daily as needed for muscle spasms 120 tablet 1    methocarbamol (ROBAXIN) 750 MG tablet Take 1 tablet (750 mg) by mouth 4 times daily as needed for muscle spasms 120 tablet 4    methylPREDNISolone (MEDROL DOSEPAK) 4 MG tablet therapy pack Follow Package Directions 21 tablet 0    naloxone (NARCAN) 4 MG/0.1ML nasal spray Spray 1 spray (4 mg) into one nostril alternating nostrils as needed for opioid reversal every 2-3 minutes until assistance arrives 0.2 mL 1    NONFORMULARY Take 2 Scoops by mouth daily Protein and Vitamin shake mix - Nutritional supplement      ondansetron (ZOFRAN ODT) 8 MG ODT tab Take 1 tablet (8 mg) by mouth every 8 hours as needed for nausea 30 tablet 4    Prasterone 6.5 MG INST Place 0.5 suppositories vaginally three times a week 28 each 11    rOPINIRole (REQUIP) 0.25 MG tablet TAKE 1 TO 2 TABLETS(0.25 TO 0.5 MG) BY MOUTH AT BEDTIME 180 tablet 3    senna-docusate (SENOKOT-S/PERICOLACE) 8.6-50 MG tablet Take 6-9 tablets by mouth every evening      vitamin D3 (CHOLECALCIFEROL) 125 MCG (5000 UT) tablet Take 5,000 Units by mouth daily         Vitals                                                                                                                       3, 3   BP 99/65 (BP Location: Left arm, Patient Position: Sitting, Cuff Size: Adult Large)   Pulse 63   Temp 97.5  F (36.4  C) (Temporal)   Wt 69.4 kg (153 lb)   LMP 05/01/2005 (LMP Unknown)   SpO2 99%   BMI 25.62 kg/m       Mental Status Exam                                                                                    9, 14 cog gs     Alertness: alert  and oriented  Appearance: well  "groomed  Behavior/Demeanor: cooperative, with good  eye contact   Speech:  normal rate/rhythm  Language: intact  Psychomotor: normal or unremarkable  Mood: \"I've made a great deal of progress in many areas\"  Affect: full range; was congruent to mood; was congruent to content  Thought Process/Associations: unremarkable  Thought Content:  Reports suicidal ideation  Perception:  No apparent psychotic symptoms observed or reported  Insight: good  Judgment: good  Cognition: (6) does  appear grossly intact; formal cognitive testing was not done  Gait/Station and/or Muscle Strength/Tone: Not observed due to  Video visit     Labs and Data                                                                                                                 Rating Scales:    N/A    PHQ9:        9/15/2024     9:17 PM 9/30/2024     9:25 AM 10/2/2024     7:21 AM   PHQ   PHQ-9 Total Score 11 11 13   Q9: Thoughts of better off dead/self-harm past 2 weeks Several days More than half the days Several days   F/U: Thoughts of suicide or self-harm Yes  Yes   F/U: Self harm-plan Yes  Yes   F/U: Self-harm action No  No   F/U: Safety concerns No  No         Diagnosis and Assessment                                                                             m2, h3       Background:  Janelle White is a 60 year old female with previous psychiatric history of MDD, recurrent, severe, chronic pain syndrome, h/o CLL. On initial presentation it was thought that Janelle was suffering from an episode of depression that is closely related to her pain syndrome and the resultant physically inability to engage in activities or work. Her pain management was improved and she was treated with TMS though continues to endorse significant depression and anxiety symptoms. She is hesitant to trial MAOIs or other oral agents that could potentially interfere with her current pain management as she feels it is well under control. She is interested in pursuing " ketamine therapy, which should not interfere with her medications and may actually help with her pain. She gave consent to speak with her PCP about medication management and potential interventions for continued depression.      Today the following issues were addressed:    1) Major depressive disorder, recurrent, severe - in partial remission.  2) treatment response       [place] Prescription Monitoring Program [] review was not needed today.    PSYCHOTROPIC DRUG INTERACTIONS: none clinically relevant    Plan                                                                                                                    m2, h3      1) Major depressive disorder, recurrent, severe    Medications:  Continue current outpatient medications including Pristiq at 75 mg PO qDAY.  Use Adderall as it becomes available; address supply issues to ensure consistent dosing.    Therapy and Support:  Maintain regular sessions with the somatic therapist on a weekly or bi-weekly basis.  Continue active participation in AA meetings and the step program.  Encourage engagement in new hobbies and social activities to reduce isolation.    Ketamine Treatments:  Monitor mood and motivation levels closely.  Consider adjusting the frequency of ketamine treatments if significant declines are observed between sessions.    Sleep Management:  Review the effectiveness of the dental apparatus for sleep apnea.  Explore additional interventions if sleep disturbances persist.    Follow-Up:  Schedule regular check-ins every three months with the primary care provider.  Address the emotional awareness by possibly incorporating cognitive-behavioral strategies to help identify and name feelings.  Ensure open communication regarding any changes in symptoms or side effects from medications.    Additional Considerations:  Continue emphasizing the importance of self-care and provide resources as needed.    Referrals: None      RTC: 6 weeks  CRISIS NUMBERS:    Provided routinely in AVS.    Treatment Risk Statement:  The patient understands the risks, benefits, adverse effects and alternatives. Agrees to treatment with the capacity to do so. No medical contraindications to treatment. Agrees to call clinic for any problems. The patient understands to call 911 or go to the nearest ED if life threatening or urgent symptoms occur.       RESIDENT: Butch Arevalo MD  PGY-2    ATTENDING: Zafar Yanes MD, PhD    I performed key portions of the exam and agree with the documentation.       Answers submitted by the patient for this visit:  Patient Health Questionnaire (Submitted on 10/2/2024)  If you checked off any problems, how difficult have these problems made it for you to do your work, take care of things at home, or get along with other people?: Very difficult  PHQ9 TOTAL SCORE: 13  Patient Health Questionnaire (G7) (Submitted on 9/29/2024)  STACIE 7 TOTAL SCORE: 15    Psychiatry Individual Psychotherapy Note   Psychotherapy start time -210pm  Psychotherapy end time - 230pm  Date treatment plan last reviewed with patient - 10/06/24  Subjective: This supportive psychotherapy session addressed issues related to goals of therapy and current psychosocial stressors. Patient's reaction: Preparatory in the context of mental status appropriate for ambulatory setting.    Interactive complexity indicated? No  Plan: RTC in timeframe noted above  Psychotherapy services during this visit included myself and the patient.   Treatment Plan      SYMPTOMS; PROBLEMS   MEASURABLE GOALS;    FUNCTIONAL IMPROVEMENT / GAINS INTERVENTIONS DISCHARGE CRITERIA   Depression: depressed mood and alexithymia   reduce depressive symptoms, find enjoyment at least once a day, and articulate feelings more clearly Supportive / psychodynamic marked symptom improvement       I

## 2024-10-10 ASSESSMENT — PATIENT HEALTH QUESTIONNAIRE - PHQ9
SUM OF ALL RESPONSES TO PHQ QUESTIONS 1-9: 14
SUM OF ALL RESPONSES TO PHQ QUESTIONS 1-9: 11

## 2024-10-10 NOTE — PROGRESS NOTES
Spoke with patient. Patient verbalized understanding. Patient has no other questions or concerns at this time.    VSS, afebrile. Pain managed with ms contin, toradol, and norco. On IV ancef and vanco. Ambulating with knee immobilizer. ID, ortho, hem/onc, neurosurgery consulted. Will continue to monitor.

## 2024-10-12 ASSESSMENT — PATIENT HEALTH QUESTIONNAIRE - PHQ9: SUM OF ALL RESPONSES TO PHQ QUESTIONS 1-9: 11

## 2024-10-14 NOTE — PROGRESS NOTES
[facility]iatry [facility]  October 15, 2024      Behavioral Health Clinician Progress Note    Patient Name: Janelle White           Service Type:  Individual      Service Location:   [facility] / Email (patient reached)     Session Start Time: 830am  Session End Time: 9am      Session Length: 16 - 37      Attendees: Client     Service Modality:  Video Visit:      Provider verified identity through the following two step process.  Patient provided:  Patient is known previously to provider    Telemedicine Visit: The patient's condition can be safely assessed and treated via synchronous audio and visual telemedicine encounter.      Reason for Telemedicine Visit: Services only offered telehealth    Originating Site (Patient Location): Patient's home    Distant Site (Provider Location): Provider Remote Setting- Home Office    Consent:  The patient/guardian has verbally consented to: the potential risks and benefits of telemedicine (video visit) versus in person care; bill my insurance or make self-payment for services provided; and responsibility for payment of non-covered services.     Patient would like the video invitation sent by:  My Chart    Mode of Communication:  Video Conference via [facility]    Distant Location (Provider):  Off-site    As the provider I attest to compliance with applicable laws and regulations related to telemedicine.    Visit Activities (Refresh list every visit): [facility] Only    Diagnostic Assessment Date: 7/15/24 Joelle Viera MA [facility]  Treatment Plan Review Date: 10/15/24  See Flowsheets for today's PHQ-9 and STACIE-7 results  Previous PHQ-9:       9/30/2024     9:25 AM 10/2/2024     7:21 AM 10/15/2024     8:14 AM   PHQ-9 SCORE   PHQ-9 Total Score MyChart  13 (Moderate depression) 10 (Moderate depression)   PHQ-9 Total Score 11 13 10     Previous STACIE-7:       7/12/2024    10:02 AM 9/11/2024     6:15 PM 9/29/2024    12:34 PM   STACIE-7 SCORE   Total Score 8 (mild  anxiety) 10 (moderate anxiety) 15 (severe anxiety)   Total Score 8    8 10 15       DATA  Extended Session (60+ minutes): No  Interactive Complexity: No  Crisis: No  [facility] Patient: No    Treatment Objective(s) Addressed in This Session:  Target Behavior(s): disease management/lifestyle changes reducing sx    Depressed Mood: Increase interest, engagement, and pleasure in doing things  Decrease frequency and intensity of feeling down, depressed, hopeless  Anxiety: will experience a reduction in anxiety    Current Stressors / Issues:  MH update:  No big changes.  Frustrated at times with having to slow done to focus on tasks.  A Lot of appts.  Higher anxiety with all of this.  Went to [place] for a month. Enjoyable.  Relationship stressors with her.  Saw son.  Overall less reactive.  Mood about the same.   Pushing self to get things done.  Stresses: chronic pain, divorce paperwork.  Brother arrested.  Appetite: n/a  Sleep: Poor  Outpatient Provider updates: [facility], Therapy [facility]; divorce support/group  Previous THRIVE, TMS, ECT, PHP/IOP  Neuropsychological Consultation (in chart, 8/11/22)   SI/SIB/HI: Reduced overall to a couple times a week/fleeting.  Baseline method/planning.  Denies any intent.  Updated safety plan  Hx of attempts and SIB as teen.  Hx of baseline planning and method seeking without intent.  AYLA:  Attends AA group for support.  SOBER.  Stopped THC.  Side effects/compliance: n/a  Interventions:  Most important:  Meds are good.  Was at higher [place] but stepped down again for last few days sitting at 50.  Not getting consistent adderall due to pharm.  [facility] appt on Friday, cancer check.    7/15  MH update:  ROS above.  Depression.  Not as labile.  Stresses:  Chronic pain, started divorce paperwork   Appetite: n/a  Sleep: CLARE/doesn't often use oral appliance   Outpatient Provider updates: [facility], Therapy [facility]; divorce  support/group  Previous THRIVE, TMS, ECT, PHP/IOP  Neuropsychological Consultation (in chart, 8/11/22)   SI/SIB/HI: Reduced overall to a couple times a week/fleeting.  Baseline method/planning.  Denies any intent.  Updated safety plan  Hx of attempts and SIB as teen.  Hx of baseline planning and method seeking without intent.  AYLA:  Attends AA group for support.  SOBER.  Stopped THC.  Side effects/compliance: n/a  Interventions:  [facility] engaged in completing new DA with psychoeducation and tx planning.  Most important:  Did not get refills of 15mg of adderall therefore has been taking slightly more since didn't have the script    Progress on Treatment Objective(s) / Homework:  New Objective established this session - CONTEMPLATION (Considering change and yet undecided); Intervened by assessing the negative and positive thinking (ambivalence) about behavior change    Motivational Interviewing    MI Intervention: Co-Developed Goal: reducing sx and Expressed Empathy/Understanding     Change Talk Expressed by the Patient: Desire to change Reasons to change    Provider Response to Change Talk: E - Evoked more info from patient about behavior change and A - Affirmed patient's thoughts, decisions, or attempts at behavior change    Also provided psychoeducation about behavioral health condition, symptoms, and treatment options    Assessments completed prior to visit:  The following assessments were completed by patient for this visit:  GAD2:       5/17/2023    10:39 PM 8/19/2023     6:52 PM 11/10/2023    10:24 AM 2/12/2024     9:29 AM 4/14/2024    10:01 AM 7/12/2024    10:02 AM 10/12/2024     3:57 PM   STACIE-2   Feeling nervous, anxious, or on edge 2 1 1 1 2 2 1   Not being able to stop or control worrying 1 0 1 1 1 2 1   STACIE-2 Total Score 3    3 1 2 2 3    3 4    4 2    2       Care Plan review completed: Yes    Medication Review:  Changes to psychiatric medications, see updated Medication List in EPIC.     Medication  Compliance:  Yes    Changes in Health Issues:   None reported    Chemical Use Review:   Substance Use: Chemical use reviewed, no active concerns identified      Tobacco Use: No current tobacco use.      Assessment: Current Emotional / Mental Status (status of significant symptoms):  Risk status (Self / Other harm or suicidal ideation)  Patient has had a history of suicidal ideation: . Reduced overall to a couple times a week/fleeting.  Baseline method/planning.  Denies any intent.  Updated safety plan  Hx of attempts and SIB as teen.  Hx of baseline planning and method seeking without intent.  Patient denies current fears or concerns for personal safety.  Patient denies current or recent suicidal ideation or behaviors.  Patient denies current or recent homicidal ideation or behaviors.  Patient denies current or recent self injurious behavior or ideation.  Patient denies other safety concerns.  A safety and risk management plan has been developed including: Patient consented to co-developed safety plan.  A safety and risk management plan was completed.  Patient agreed to use safety plan should any safety concerns arise.  A copy was given to the patient.    Appearance:   Appropriate   Eye Contact:   Good   Psychomotor Behavior: Normal   Attitude:   Cooperative   Orientation:   All  Speech   Rate / Production: Normal    Volume:  Normal   Mood:    Normal  Affect:    Appropriate   Thought Content:  Clear   Thought Form:  Coherent  Logical   Insight:    Good     Diagnoses:  1. Persistent depressive disorder    2. STACIE (generalized anxiety disorder)        Collateral Reports Completed:  Communicated with: Dr Perez    Plan: (Homework, other):  Patient was given information about behavioral services and encouraged to schedule a follow up appointment with the clinic [facility] as needed.  She was also given information about mental health symptoms and treatment options .  CD Recommendations: No indications of CD issues.       Joelle  "[facility]    ______________________________________________________________________    Integrated Primary Care Behavioral Health Treatment Plan    Individual Treatment Plan    Patient's Name: Janelle White   YOB: 1960  Date of Creation: 10/15/24  Date Treatment Plan Last Reviewed/Revised: 10/15/24    DSM5 Diagnoses:   1. Persistent depressive disorder    2. STACIE (generalized anxiety disorder)      Psychosocial / Contextual Factors: Medical Complexities, Trauma History, Substance Use history/concerns, and Interpersonal Concerns  PROMIS (reviewed every 90 days):   The following assessments were completed by patient for this visit:  PROMIS 10-Global Health (only subscores and total score):       5/17/2023    10:43 PM 8/19/2023     6:54 PM 2/12/2024     9:31 AM 4/14/2024    10:02 AM 4/15/2024     7:40 AM 7/12/2024    10:04 AM 10/12/2024     3:58 PM   PROMIS-10 Scores Only   Global Mental Health Score 8    8 10 8 8 8 10    10 11    11   Global Physical Health Score 8    8 9 11 10 10 12    12 12    12   PROMIS TOTAL - SUBSCORES 16    16 19 19 18 18 22    22 23    23        Referral / Collaboration:  Referral to another professional/service is not indicated at this time..    Anticipated number of session for this episode of care: 6-9 sessions  Anticipation frequency of session: Monthly  Anticipated Duration of each session: 16-37 minutes  Treatment plan will be reviewed in 90 days or when goals have been changed.       MeasurableTreatment Goal(s) related to diagnosis / functional impairment(s)  Goal 1: Patient will reduce sx   \"I will know I've met my goal when I am able to get my tasks done as needed.\"     Objective #A (Patient Action)    Patient will Decrease frequency and intensity of feeling down, depressed, hopeless  Identify negative self-talk and behaviors: challenge core beliefs, myths, and actions  Status: New - Date: 10/15/24      Intervention(s)  [facility] will provide support through CBT, MI, " Acceptance and Commitment Therapy, Dialectic Behavioral Therapy and problem solving model to explore and overcome barriers.        Goal 2: Patient will manage concerns of safety    I will know I've met my goal when I can reduce suicidal ideation .      Objective #A (Patient Action)    Patient will use previously developed safety plan on file.  Status: New - Date: 10/15/24     Intervention(s)  Therapist will provide support through CBT, MI, Acceptance and Commitment Therapy, Dialectic Behavioral Therapy and problem solving model to explore and overcome barriers.        Patient has reviewed and agreed to the above plan.    Written by  Joelle Virea, LPCC, [facility]

## 2024-10-15 ENCOUNTER — VIRTUAL VISIT (OUTPATIENT)
Dept: PSYCHIATRY | Facility: CLINIC | Age: 64
End: 2024-10-15
Payer: COMMERCIAL

## 2024-10-15 ENCOUNTER — VIRTUAL VISIT (OUTPATIENT)
Dept: BEHAVIORAL HEALTH | Facility: CLINIC | Age: 64
End: 2024-10-15
Payer: COMMERCIAL

## 2024-10-15 DIAGNOSIS — F41.1 GAD (GENERALIZED ANXIETY DISORDER): ICD-10-CM

## 2024-10-15 DIAGNOSIS — E72.12 HOMOZYGOUS FOR C677T POLYMORPHISM OF MTHFR (H): ICD-10-CM

## 2024-10-15 DIAGNOSIS — F34.1 PERSISTENT DEPRESSIVE DISORDER: Primary | ICD-10-CM

## 2024-10-15 DIAGNOSIS — F33.9 RECURRENT MAJOR DEPRESSION RESISTANT TO TREATMENT (H): ICD-10-CM

## 2024-10-15 DIAGNOSIS — E88.89 CYP2D6 POOR METABOLIZER (H): ICD-10-CM

## 2024-10-15 DIAGNOSIS — E88.89 CYP2B6 INTERMEDIATE METABOLIZER (H): ICD-10-CM

## 2024-10-15 PROCEDURE — 99214 OFFICE O/P EST MOD 30 MIN: CPT | Mod: 95 | Performed by: PSYCHIATRY & NEUROLOGY

## 2024-10-15 PROCEDURE — G2211 COMPLEX E/M VISIT ADD ON: HCPCS | Mod: 95 | Performed by: PSYCHIATRY & NEUROLOGY

## 2024-10-15 PROCEDURE — 90832 PSYTX W PT 30 MINUTES: CPT | Mod: 95 | Performed by: COUNSELOR

## 2024-10-15 RX ORDER — DESVENLAFAXINE 50 MG/1
50 TABLET, FILM COATED, EXTENDED RELEASE ORAL DAILY
Qty: 90 TABLET | Refills: 1 | Status: SHIPPED | OUTPATIENT
Start: 2024-10-15

## 2024-10-15 ASSESSMENT — PAIN SCALES - GENERAL: PAINLEVEL: MODERATE PAIN (4)

## 2024-10-15 ASSESSMENT — PATIENT HEALTH QUESTIONNAIRE - PHQ9
SUM OF ALL RESPONSES TO PHQ QUESTIONS 1-9: 10
10. IF YOU CHECKED OFF ANY PROBLEMS, HOW DIFFICULT HAVE THESE PROBLEMS MADE IT FOR YOU TO DO YOUR WORK, TAKE CARE OF THINGS AT HOME, OR GET ALONG WITH OTHER PEOPLE: SOMEWHAT DIFFICULT
SUM OF ALL RESPONSES TO PHQ QUESTIONS 1-9: 10

## 2024-10-15 NOTE — PROGRESS NOTES
"Telemedicine Visit: The patient's condition can be safely assessed and treated via synchronous audio and visual telemedicine encounter.      Reason for Telemedicine Visit: Patient has requested telehealth visit    Originating Site (Patient Location): Patient's home    Distant Location (provider location):  Off-Site    Consent:  The patient/guardian has verbally consented to: the potential risks and benefits of telemedicine (video visit) versus in person care; bill my insurance or make self-payment for services provided; and responsibility for payment of non-covered services.     Mode of Communication:  Video Conference via Blink Messenger    As the provider I attest to compliance with applicable laws and regulations related to telemedicine.        Outpatient Psychiatric Progress Note    Name: Janelle White   : 1960                    Primary Care Provider: Emili Ballard MD   Therapist: Lorena Corbin @ [facility]     PHQ-9 scores:      2024     9:25 AM 10/2/2024     7:21 AM 10/15/2024     8:14 AM   PHQ-9 SCORE   PHQ-9 Total Score MyChart  13 (Moderate depression) 10 (Moderate depression)   PHQ-9 Total Score 11 13 10       STACIE-7 scores:      2024    10:02 AM 2024     6:15 PM 2024    12:34 PM   STACIE-7 SCORE   Total Score 8 (mild anxiety) 10 (moderate anxiety) 15 (severe anxiety)   Total Score 8    8 10 15       Patient Identification:  Patient is a 63 year old,   White Choose not to answer female  who presents for return visit with me. Patient attended the phone/video session alone. Patient prefers to be called: \"Janelle\".    Interim History:  I last saw Janelle White for outpatient psychiatry return visit on 7/15/2024. During that appointment, we:  Continue Pristiq 50 mg daily for mood, anxiety.  In past have discussed consideration for increasing to 75 mg daily.   Continue methylfolate 7.5 mg daily as supplementation.  Continue Adderall XR 25 mg daily for " mood augmentation  Continue Adderall IR 15 mg twice daily as needed for mood augmentation and daytime fatigue/hypersomnia  Continue hydroxyzine 25-50 mg up to three times a day as needed for anxiety/sleep.   Continue ketamine injection therapy as planned.   Continue to work with your specialist from [facility] as indicated.    Recommend DBT therapy - resource given today via [employer] for DBT for professionals program out of [facility].  Recommend individual psychotherapy at a minimum.      10/15: Patient overall doing relatively well.  Feeling okay on current medication regimen.  Did not tolerate Pristiq at 75 mg daily-quite a bit of brain fog.  Currently taking 50 mg daily.  No acute safety concerns.  No acute suicidality.  No problematic drug or alcohol use.  See [facility] note below for additional details.    Per [facility], [facility], during today's team-based visit:  MH update:  No big changes.  Frustrated at times with having to slow done to focus on tasks.  A Lot of appts.  Higher anxiety with all of this.  Went to [place] for a month. Enjoyable.  Relationship stressors with her.  Saw son.  Overall less reactive.  Mood about the same.   Pushing self to get things done.  Stresses: chronic pain, divorce paperwork.  Brother arrested.  Appetite: n/a  Sleep: Poor  Outpatient Provider updates: Ketamine John [facility], Therapy Lorena Corbin, [facility]; divorce support/group  Previous THRIVE, TMS, ECT, PHP/IOP  Neuropsychological Consultation (in chart, 8/11/22)   SI/SIB/HI: Reduced overall to a couple times a week/fleeting.  Baseline method/planning.  Denies any intent.  Updated safety plan  Hx of attempts and SIB as teen.  Hx of baseline planning and method seeking without intent.  AYLA:  Attends AA group for support.  SOBER.  Stopped THC.  Side effects/compliance: n/a  Interventions:  Most important:  Meds are good.  Was at higher Pristiq but stepped down again for last few days sitting at 50.  Not  getting consistent adderall due to pharm.  Derian appt on Friday, cancer check.    Past medication trials include but are not limited to:   Effexor-very flat  Celexa, zoloft, was on wellbutrin a couple years at one point  wellbutrin XL + celexa; 2005ish  tca-a ton of weight gain  depakote maybe for a short time  Abilify/lithium ?  ECT as teen - made me dull  Cytomel-not effective at all, used 50 mcg    Psychiatric ROS:  Janelle White reports mood has been: See HPI above  Anxiety has been: See HPI above  Sleep has been: struggles at times, especially due to pain  Deepa sxs: Denies  Psychosis sxs: None  ADHD/ADD sxs: None  PTSD sxs: None  PHQ9 and GAD7 scores were reviewed today if completed.   Medication side effects: Denies anything major related to current psychiatric medications  Current stressors include: Symptoms and See HPI above  Coping mechanisms and supports include: Family, Hobbies and Friends, therapy    Current medications include:   Current Outpatient Medications   Medication Sig Dispense Refill    albuterol (PROAIR HFA/PROVENTIL HFA/VENTOLIN HFA) 108 (90 Base) MCG/ACT inhaler Inhale 2 puffs into the lungs every 6 hours as needed for shortness of breath or wheezing 8.5 g 11    amphetamine-dextroamphetamine (ADDERALL XR) 25 MG 24 hr capsule Take 1 capsule (25 mg) by mouth daily. 30 capsule 0    [START ON 10/30/2024] amphetamine-dextroamphetamine (ADDERALL XR) 25 MG 24 hr capsule Take 1 capsule (25 mg) by mouth daily. 30 capsule 0    [START ON 11/29/2024] amphetamine-dextroamphetamine (ADDERALL XR) 25 MG 24 hr capsule Take 1 capsule (25 mg) by mouth daily. 30 capsule 0    amphetamine-dextroamphetamine (ADDERALL) 15 MG tablet Take 1 tablet (15 mg) by mouth 2 times daily. 60 tablet 0    [START ON 10/30/2024] amphetamine-dextroamphetamine (ADDERALL) 15 MG tablet Take 1 tablet (15 mg) by mouth 2 times daily. 60 tablet 0    [START ON 11/29/2024] amphetamine-dextroamphetamine (ADDERALL) 15 MG tablet Take 1  tablet (15 mg) by mouth 2 times daily. 60 tablet 0    amphetamine-dextroamphetamine (ADDERALL) 15 MG tablet Take 1 tablet (15 mg) by mouth 2 times daily 60 tablet 0    Cobalamin Combinations (VITAMIN B12-FOLIC ACID) 500-400 MCG TABS Take 1 tablet by mouth      desvenlafaxine (PRISTIQ) 50 MG 24 hr tablet Take one tab (50 mg) by mouth daily WITH 25 mg tab for total daily dose 75 mg. 90 tablet 1    desvenlafaxine succinate (PRISTIQ) 25 MG 24 hr tablet Take one tab (25 mg) by mouth daily WITH 50 mg tab for total daily dose 75 mg. 90 tablet 1    Estradiol (DIVIGEL) 1 MG/GM GEL Place 1 packet onto the skin daily 30 g 11    hydrOXYzine (VISTARIL) 25 MG capsule Take 1 capsule (25 mg) by mouth 3 times daily as needed for itching or anxiety 90 capsule 3    Levomefolate Glucosamine (METHYLFOLATE PO) Take 7.5 mg by mouth daily      lidocaine (LIDODERM) 5 % patch Place 3 patches onto the skin daily Apply up to 3 patches to skin. Wear for 12 hours and remove for 12 hrs.  Refill when patient requests. 120 patch 3    medical cannabis (Patient's own supply.  Not a prescription) Medical Cannabis - [place] 4-6 ml by mouth daily. [facility]      methocarbamol (ROBAXIN) 500 MG tablet Take 1 tablet (500 mg) by mouth 4 times daily as needed for muscle spasms 120 tablet 1    methocarbamol (ROBAXIN) 750 MG tablet Take 1 tablet (750 mg) by mouth 4 times daily as needed for muscle spasms 120 tablet 4    methylPREDNISolone (MEDROL DOSEPAK) 4 MG tablet therapy pack Follow Package Directions 21 tablet 0    naloxone (NARCAN) 4 MG/0.1ML nasal spray Spray 1 spray (4 mg) into one nostril alternating nostrils as needed for opioid reversal every 2-3 minutes until assistance arrives 0.2 mL 1    NONFORMULARY Take 2 Scoops by mouth daily Protein and Vitamin shake mix - Nutritional supplement      ondansetron (ZOFRAN ODT) 8 MG ODT tab Take 1 tablet (8 mg) by mouth every 8 hours as needed for nausea 30 tablet 4    Prasterone 6.5 MG INST Place 0.5  suppositories vaginally three times a week 28 each 11    rOPINIRole (REQUIP) 0.25 MG tablet TAKE 1 TO 2 TABLETS(0.25 TO 0.5 MG) BY MOUTH AT BEDTIME 180 tablet 3    senna-docusate (SENOKOT-S/PERICOLACE) 8.6-50 MG tablet Take 6-9 tablets by mouth every evening      vitamin D3 (CHOLECALCIFEROL) 125 MCG (5000 UT) tablet Take 5,000 Units by mouth daily       Current Facility-Administered Medications   Medication Dose Route Frequency Provider Last Rate Last Admin    NONFORMULARY 1.1 mg/kg (Ideal)  1.1 mg/kg (Ideal) Intramuscular Q21 Days Zafar Yanes MD   60 mg at 09/30/24 0925     The [place] Prescription Monitoring Program has been reviewed and there are no concerns about diversionary activity for controlled substances at this time.   10/08/2024 09/30/2024 1 Dextroamp-Amphetamin 15 Mg Tab 60.00 30 [facility] Bau [id number] [facility] ([id number]) 0/0  Med[place]are [place]   10/01/2024 09/30/2024 1 Dextroamp-Amphet Er 25 Mg Cap 30.00 30 Al Bau [id number] [facility] ([id number]) 0/0  [place]are [place]   08/31/2024 07/25/2024 1 Dextroamp-Amphet Er 25 Mg Cap 30.00 30 Al Bau [id number] [facility] ([id number]) 0/0  [place]   08/31/2024 07/25/2024 1 Dextroamp-Amphetamin 15 Mg Tab 60.00 30 Al Bau [id number] [facility] ([id number]) 0/0  [place]are [place]       Past Medical/Surgical History:  Past Medical History:   Diagnosis Date    Anxiety     Cervicalgia 2007    C5-6 disc protrusion    COPD (chronic obstructive pulmonary disease) (H) 2/7/2022    Depressive disorder     ESBL (extended spectrum beta-lactamase) producing bacteria infection     History of blood transfusion 2007    Cervical fusion    Leukemia (H)     CLA large beta    Melanoma (H) 1998    Migraine     Other chronic pain     Rotator cuff tear     s/p injections    Sacroiliac inflammation (H)     Shift work sleep disorder 12/16/2013    Urinary calculi     Vitamin B12 deficiency anemia 2006    started injections      has a past medical history of Anxiety, Cervicalgia (2007), COPD (chronic obstructive pulmonary disease) (H) (2/7/2022), Depressive  disorder, ESBL (extended spectrum beta-lactamase) producing bacteria infection, History of blood transfusion (2007), Leukemia (H), Melanoma (H) (1998), Migraine, Other chronic pain, Rotator cuff tear, Sacroiliac inflammation (H), Shift work sleep disorder (12/16/2013), Urinary calculi, and Vitamin B12 deficiency anemia (2006).    She has no past medical history of Cerebral infarction (H), Congestive heart failure (H), Coronary artery disease, Diabetes (H), Heart disease, Hypertension, Thyroid disease, or Uncomplicated asthma.    Social History:  Reviewed. No changes to social history except as noted above in HPI.    Vital Signs:   None. This is phone/video visit.     Labs:  Most recent laboratory results reviewed and the pertinent results include:   Lab Results   Component Value Date    WBC 5.6 06/30/2022    WBC 3.2 05/24/2021     Lab Results   Component Value Date    RBC 4.61 06/30/2022    RBC 3.74 05/24/2021     Lab Results   Component Value Date    HGB 14.2 06/30/2022    HGB 11.0 05/24/2021     Lab Results   Component Value Date    HCT 43.6 06/30/2022    HCT 33.6 05/24/2021     No components found for: MCT  Lab Results   Component Value Date    MCV 95 06/30/2022    MCV 90 05/24/2021     Lab Results   Component Value Date    MCH 30.8 06/30/2022    MCH 29.4 05/24/2021     Lab Results   Component Value Date    MCHC 32.6 06/30/2022    MCHC 32.7 05/24/2021     Lab Results   Component Value Date    RDW 11.9 06/30/2022    RDW 13.4 05/24/2021     Lab Results   Component Value Date     07/04/2022     05/24/2021     Last Comprehensive Metabolic Panel:  Sodium   Date Value Ref Range Status   09/17/2024 141 135 - 145 mmol/L Final   05/24/2021 141 133 - 144 mmol/L Final     Potassium   Date Value Ref Range Status   09/17/2024 3.9 3.4 - 5.3 mmol/L Final   06/30/2022 3.4 3.4 - 5.3 mmol/L Final   05/24/2021 3.9 3.4 - 5.3 mmol/L Final     Chloride   Date Value Ref Range Status   09/17/2024 104 98 - 107 mmol/L Final    06/30/2022 105 94 - 109 mmol/L Final   05/24/2021 108 94 - 109 mmol/L Final     Carbon Dioxide   Date Value Ref Range Status   05/24/2021 25 20 - 32 mmol/L Final     Carbon Dioxide (CO2)   Date Value Ref Range Status   09/17/2024 27 22 - 29 mmol/L Final   06/30/2022 25 20 - 32 mmol/L Final     Anion Gap   Date Value Ref Range Status   09/17/2024 10 7 - 15 mmol/L Final   06/30/2022 6 3 - 14 mmol/L Final   05/24/2021 8 3 - 14 mmol/L Final     Glucose   Date Value Ref Range Status   09/17/2024 94 70 - 99 mg/dL Final   06/30/2022 91 70 - 99 mg/dL Final   05/24/2021 87 70 - 99 mg/dL Final     Urea Nitrogen   Date Value Ref Range Status   09/17/2024 20.1 8.0 - 23.0 mg/dL Final   06/30/2022 20 7 - 30 mg/dL Final   05/24/2021 15 7 - 30 mg/dL Final     Creatinine   Date Value Ref Range Status   09/17/2024 0.69 0.51 - 0.95 mg/dL Final   05/24/2021 0.64 0.52 - 1.04 mg/dL Final     GFR Estimate   Date Value Ref Range Status   09/17/2024 >90 >60 mL/min/1.73m2 Final     Comment:     eGFR calculated using 2021 CKD-EPI equation.   05/24/2021 >90 >60 mL/min/[1.73_m2] Final     Comment:     Non  GFR Calc  Starting 12/18/2018, serum creatinine based estimated GFR (eGFR) will be   calculated using the Chronic Kidney Disease Epidemiology Collaboration   (CKD-EPI) equation.       Calcium   Date Value Ref Range Status   09/17/2024 9.1 8.8 - 10.4 mg/dL Final     Comment:     Reference intervals for this test were updated on 7/16/2024 to reflect our healthy population more accurately. There may be differences in the flagging of prior results with similar values performed with this method. Those prior results can be interpreted in the context of the updated reference intervals.   05/24/2021 8.4 (L) 8.5 - 10.1 mg/dL Final     Bilirubin Total   Date Value Ref Range Status   09/17/2024 <0.2 <=1.2 mg/dL Final   03/16/2021 0.4 0.2 - 1.3 mg/dL Final     Alkaline Phosphatase   Date Value Ref Range Status   09/17/2024 100 40 - 150  U/L Final   03/16/2021 87 40 - 150 U/L Final     ALT   Date Value Ref Range Status   09/17/2024 20 0 - 50 U/L Final   03/16/2021 26 0 - 50 U/L Final     AST   Date Value Ref Range Status   09/17/2024 25 0 - 45 U/L Final   03/16/2021 44 0 - 45 U/L Final     Review of Systems:  10 systems (general, cardiovascular, respiratory, eyes, ENT, endocrine, GI, , M/S, neurological) were reviewed. Most pertinent finding(s) is/are: Severe chronic pain. The remaining systems are all unremarkable.    Mental Status Examination (limited as this is by phone/video):  Appearance: Awake, alert, appears stated age, no acute distress  Attitude:  cooperative, pleasant   Motor: No gross abnormalities observed via video, not formally tested.  Oriented to:  person, place, time, and situation  Attention Span and Concentration:  normal  Speech:  clear, coherent, regular rate, rhythm, and volume  Language: intact  Mood: ok  Affect: Mood congruent  Associations:  no loose associations  Thought Process:  logical, linear and goal oriented  Thought Content: Chronic passive suicidal ideation-does not endorse acute suicidality today, no current plan or intent to harm self today, no homicidal ideation, no evidence of psychotic thought, no auditory hallucinations present and no visual hallucinations present  Recent and Remote Memory:  Intact to interview. Not formally assessed. No amnesia.  Fund of Knowledge: appropriate  Insight:  good  Judgment:  intact, adequate for safety  Impulse Control:  intact    Suicide Risk Assessment:  Today Janelle White is with chronic/intermittent suicidal ideation-no acute suicidality endorsed today.There are notable risk factors for self-harm, including anxiety, comorbid medical condition of Chronic pain, suicidal ideation, purposelessness/no reason for living, hopelessness, withdrawing and mood change. However, risk is mitigated by commitment to family, absence of past attempts, ability to volunteer a safety  plan, history of seeking help when needed, future oriented and denies suicidal intent today. Therefore, based on all available evidence including the factors cited above, Janelle White does not appear to be at imminent risk for self-harm, does not meet criteria for a 72-hr hold, and therefore remains appropriate for ongoing outpatient level of care.  A thorough assessment of risk factors related to suicide and self-harm have been reviewed and are noted above. Local community safety resources reviewed for patient to use if needed. There was no deceit detected, and the patient presented in a manner that was believable.     DSM5 Diagnosis:  Persistent depressive disorder  Treatment resistant depression  CYP2D6 poor metabolizer  CYP2B6 intermediate metabolizer  Homozygous for C677T polymorphism of MTHFR    Medical comorbidities include:   Patient Active Problem List    Diagnosis Date Noted    Failed back surgical syndrome 12/01/2023     Priority: Medium    Lumbar radiculopathy 10/26/2023     Priority: Medium    Sacroiliitis (H) 08/04/2023     Priority: Medium    History of falling 03/13/2023     Priority: Medium    Suicidal ideation 06/30/2022     Priority: Medium    Immunocompromised (H) 06/30/2022     Priority: Medium    Infection due to 2019 novel coronavirus 06/30/2022     Priority: Medium    Severe episode of recurrent major depressive disorder, without psychotic features (H) 04/17/2022     Priority: Medium    COPD (chronic obstructive pulmonary disease) (H) 02/07/2022     Priority: Medium    Tension type headache 11/04/2021     Priority: Medium    Rotator cuff injury 11/04/2021     Priority: Medium    Spinal stenosis of lumbar region with radiculopathy 09/02/2021     Priority: Medium    COVID-19 08/29/2021     Priority: Medium    Polyarthralgia 08/11/2021     Priority: Medium    Cellulitis, unspecified cellulitis site 08/11/2021     Priority: Medium    Sepsis, due to unspecified organism, unspecified  whether acute organ dysfunction present (H) 08/11/2021     Priority: Medium    Cellulitis 08/11/2021     Priority: Medium    STACIE (generalized anxiety disorder) 07/27/2021     Priority: Medium    MTHFR gene mutation 04/12/2021     Priority: Medium    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     Priority: Medium    CYP2D6 poor metabolizer (H) 04/12/2021     Priority: Medium    Status post blepharoplasty 11/18/2020     Priority: Medium    Regular astigmatism, bilateral 11/18/2020     Priority: Medium    Prediabetes 09/19/2019     Priority: Medium    Hyperlipidemia LDL goal <130 09/19/2019     Priority: Medium    CLARE (obstructive sleep apnea) 02/13/2019     Priority: Medium    Controlled substance agreement signed 08/14/2018     Priority: Medium    Chronic pain syndrome 12/21/2017     Priority: Medium    CLL (chronic lymphocytic leukemia) (H) 12/21/2017     Priority: Medium    Hypotension 09/14/2017     Priority: Medium    History of laser assisted in situ keratomileusis 10/14/2014     Priority: Medium    Pain medication agreement 04/23/2014     Priority: Medium     Formatting of this note might be different from the original.  Patient takes morphine 15 mg ER BID for chronic neck and back pain.  She is also on cymbalta, ibuprofen, flexaril prn      Shift work sleep disorder 12/16/2013     Priority: Medium    Vitamin D deficiency 11/08/2012     Priority: Medium    Moderate recurrent major depression (H) 01/06/2011     Priority: Medium    DDD (degenerative disc disease), cervical 10/07/2010     Priority: Medium    CARDIOVASCULAR SCREENING; LDL GOAL LESS THAN 160 02/10/2010     Priority: Medium    Chronic Low Back Pain 10/01/2009     Priority: Medium     S/p AP L3-S1 fusion 12/2010 - referred to [facility].   Orthopedics writing scripts for narcotics post-op.      Migraine      Priority: Medium     Problem list name updated by automated process. Provider to review      B-complex deficiency 10/10/2006      Priority: Medium     Problem list name updated by automated process. Provider to review      PERSONAL HX OF  MELANOMA 12/04/2003     Priority: Low       Psychosocial & Contextual Factors: see HPI above    Assessment:  6/15/2021:  Janelle White reports overall some significant worsening of mood symptoms.  Increased anxiety with her depression.  Poor motivation and poor energy.  Symptoms severe enough to prevent her from being able to utilize typical coping skills and strategies.  No current motivation or energy to participate in PHP/day treatment program.  Continues to take medication as scheduled.  Recent surgery and general anesthesia could be contributing.  Discussed discontinuing stimulant medication as an augmentation strategy.  She is agreeable to moving forward with T3 as an augmentation for treatment resistant depression.  Discussed risks and benefits at length.  Will get baseline thyroid labs prior to starting T3.  Hoping she will experience increased energy and motivation and that her mood will lift.  Also discussed setting up an appointment with her interventional psychiatry clinic to discuss other potential options such as ketamine therapy, ECT, TMS.  Does have history of ECT for depression when she was quite young.  I am hopeful to stay ahead of her symptoms before things get too severe.  Has chronic suicidal ideation and is at high risk for suicide.  Continues to deny any plan or intent.  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  See below for risk/benefit conversation regarding T3 had with the patient:    GeneSight testing Info:  GeneSight testing revealed she is a poor 2D6 metabolizer and an intermediate 2B6 metabolizer.  This would explain her multiple failed medication trials due to the negative side effects.  She also has a genotype that would suggest a phenotype sensitivity to serotonin. She also was found to have significantly reduced folic acid conversion.       Starting T3 as augment to antidepressant therapy for treatment resistant depression:  Start T3 at 25 mcg per day for one to two weeks, and if there is little or no improvement, increase the dose to 50 mcg per day; this is consistent with practice guidelines from the American Psychiatric Association and [place] [employer] for Mood and Anxiety Treatments.     Adverse effects consistent with hyperthyroidism may occur, including tremor, palpitations, heat intolerance, sweating, anxiety, increased frequency of bowel movements, shortness of breath, and exacerbation of cardiac arrhythmia. In addition, hyperthyroidism that emerges during long-term treatment may lead to bone demineralization, osteoporosis, and an increased risk of fracture    Following a normal baseline TSH concentration, no other laboratory monitoring during a four to six week trial of adjunctive T3 is necessary. However, if T3 is continued longer, a serum TSH concentration should be checked after the first one to three months of treatment and then every six months.    Today, 7/27/21:   Patient overall with little change in her symptoms.  Did not do as well on Cytomel and had limited to no improvement at all after weeks on 50 mcg.  Labs were unremarkable prior to starting Cytomel.  Opted to go back to stimulant augmentation since patient does find it quite helpful.  She has been taking 15 mg of immediate release most afternoons.  Due to good tolerability and some efficacy, we will bump up her immediate release dose slightly to 20 mg daily as needed in addition to her 20 mg extended release dose. I have no concerns about misuse or diversion at this time.  Tolerating well with no negative side effects.  Last blood pressure normal 7/2.  Patient has noted some efficacy from Pristiq more than other antidepressant trials and so we will continue to titrate further to 100 mg daily.  She is tolerating well.  Continues to use lorazepam for anxiety, pain medicine  adjunct, and for sleep as prescribed by primary care provider.  Has been utilizing roughly 100 tablets of 0.5 mg every 1.5 months.  Patient with ongoing chronic intermittent passive suicidal ideation with no plan or intent.  Denies safety concerns today.  No problematic drug or alcohol use.  I am hopeful the interventional psychiatry clinic might be able to initiate ketamine therapy or another therapy they would recommend as potentially being more helpful than patient's multiple medication/augmentation trials.    10/6/2021:  Patient with some improved depression symptoms and much more hopeful.  Seeing specialist at [place]-feels very hurt and listened to.  Also is hopeful there will be some helpful treatments.  Visit to [place] was also very good and instilled a lot of hope in her abilities.  She was encouraged to continue to push herself to do the things she enjoys doing.  No medication changes today.  She will follow-up with getting TMS rescheduled, possibly when things slow down after the holidays.  Does not need to be seen until after the holidays.  No acute safety concerns today.  No problematic drug or alcohol use.    1/14/2022:  Overall patient feeling a little more down lately.  Tolerating TMS well.  Encouraged to continue TMS.  No medication changes today since undergoing TMS treatment.  Talked about life stages today generativity versus stagnation and also integrity versus despair.  Talked about consideration for acceptance and commitment therapy.  She is encouraged to continue to be active.  No acute suicidal ideation today.  No acute safety concerns today.  No problematic drug or alcohol use.    4/13/2022:   Patient continues to have symptoms that wax and wane.  Feels like she tolerates Pristiq 50 mg better than 100 and will continue on this dose.  Last week was particularly difficult.  Pretty intense suicidal ideation but she was able to manage these thoughts and remain safe.  She does report she would  come to the hospital if necessary.  We discussed the empath unit today and other resources if she felt she could not keep herself safe.  She continues to work on tapering her narcotic pain medication regimen.  She is working with addiction medicine and also pain management.  She is starting Pilates therapy.  Pool therapy will begin in July.  Discussed possibility of vestibular rehabilitation.  Individual therapy will also start soon.  She was strongly encouraged to continue to pursue ketamine therapy.  No acute suicidality today.  No problematic drug or alcohol use.    6/23/2022:  Overall reports doing relatively okay.  Some pretty down days still, but ketamine therapy going well.  Feels like continuing to move in the right direction.  Suicidal ideation improving.  Off all narcotic pain medication.  Feels good about her progress.  Had some really down days after off pain medication but improved since those few dark days.  Hopeful about where ketamine therapy may lead.  Discussed some of her restlessness at bedtime and will start pramipexole.  Also some evidence to suggest pramipexole could be helpful for treatment resistant depression symptoms.  Discussed risks and benefits of therapy, including watching for any impulsive behaviors.  Continues to have suicidal thoughts but no acute suicidality today.  Her suicidality overall is improving from her baseline suicidality.  She continues to consider the idea of a partial hospital program.  We will send her information for [facility] program.  Also encouraged her to discuss possibility of a pain program through [facility] with Dr. Medley.  No acute safety concerns today.  No problematic drug or alcohol use.    7/11/2022:  Patient with some recent more intensive struggles.  Depression much more severe recently.  Led to hospitalization.  Patient medically hospitalized since was positive for COVID and has history of CLL and [facility] protocol requires isolation.  Patient  seen by psychiatry consult service.  No medication changes made.  Patient continues with interventional psychiatry clinic.  They will be increasing ketamine dose and treatments will be every few weeks.  We discussed patient's symptom history a little more today and possible bipolar diagnosis came into question.  Patient does recognize possible hypomanic episodes in the past.  Patient is currently monitoring symptoms closely and pramipexole.  She is feeling better since starting pramipexole but is watching shopping behaviors closely.  Suicidal ideation is improving since hospitalization.  Restless legs improved at night on pramipexole.  Discussed we could consider adding lower dose lithium as an augment to her Pristiq and to help stabilize moods a little bit more and to further help with some of her chronic suicidal ideation.  We also discussed DBT for chronic suicidal ideation and ongoing mood instability.  Continues off of pain medication.  No acute suicidality or safety concerns today.  Patient will follow up in a few weeks.  No medication changes today since we will wait to see how ketamine dose change goes.  No drug or alcohol use concerns.  Patient noted some ongoing cognitive/memory concerns and requested testing.  Neuropsychological referral placed.    8/8/2022:  Patient with ongoing severe depression, resistant to treatment.  She is agreeable to increasing her stimulant medication.  This could hopefully further improve mood slightly.  May also help with some additional energy and also help with some of her ongoing cognitive concerns.  She has neuropsychological testing coming up soon.  Discussed possibly considering individual DBT therapy with a DBT certified therapist given her ongoing chronic suicidal ideation and inability to participate in group therapy currently.  We will discuss this possibility with her current therapist.  Also discussed possibility of increasing Pristiq by 25 mg only 2 or 3 days a  week to decrease risk of negative side effects (25 mg on Tuesdays/Thursdays for Monday/Wednesday/Fridays).  Patient also encouraged to continue ketamine treatment.  No acute suicidality today.  Patient denies any intent or plan to act on any of her suicidal thoughts today.  No problematic drug or alcohol use.    9/7/2022:  Patient doing relatively okay today.  Pain continues to feel a little bit worse.  Emotionally though, despite worsening pain symptoms, patient feels like she is doing relatively okay.  Discussed various psychotherapy approaches that could be taken to address her symptoms.  We discussed that health psychology could be an optimal approach to help with her symptoms but that her suicidal ideation is often still quite intense and may be a distractor to her treatment with a health psychologist (discussed she may be referred often to the emergency room or to other more intensive programs).  We discussed working with an individual DBT therapist as a possibility to continue addressing her ongoing chronic suicidal ideation (individual therapy would allow patient to move at her own pace since group therapy is much too intense at this time).  Patient was open to this idea.  [facility] today we will send patient some resources/options.  Depending on patient's financial situation, there is also a possibility patient could work with a health psychologist in conjunction with a DBT therapist.  Ketamine therapy has proven to be helpful, although I do wish the effects lasted a little longer than what they currently are for the patient.  I recommend patient continue her ketamine treatments.  No medication changes today.  She denies any acute suicidality today.  No plan or intent to harm herself noted today.  She denies being in a bad place today.  No problematic drug or alcohol use.     10/7/2022:  Patient overall relatively stable at this time.  Mood improving slightly with ketamine therapy.  Patient encouraged to continue  with treatment.  Anxiety a little more manageable.  Continuing to struggle with severe chronic pain.  Tolerating current medications well with no negative side effects noted.  No changes today.  Patient will continue in individual psychotherapy.  No acute suicidality today.  Suicidal thoughts at baseline, maybe even a little improved.    1/9/2023:  Patient overall doing quite well.  Some days still worse than others and chronic pain continues to be a big factor influencing her mental health.  Ketamine has been very helpful.  Still some poor energy and fatigue.  Adderall has been very helpful.  We will increase the dose just slightly.  Extended release dose will increase from 20 mg to 25 mg to further address her symptoms.  We will continue with the 15 mg twice daily immediate release doses.  No other medication changes today.  Patient encouraged to stay the course.  No acute safety concerns.  Suicidal thoughts continue to be passive in nature and have overall improved since starting ketamine.  No problematic drug or alcohol use.    3/20/2023:  Patient remains overall relatively stable.  Having some anxiety over feeling more dependent on cannabis for her pain.  Discussed continuing gratitude journal.  Patient has come quite a ways with relative stability.  No medication changes today.  Denies that cannabis use is causing any problems apart from feeling a little anxious about her use.  Patient even participating in more activities this spring compared to last year.  No acute safety concerns at this time.  No acute suicidality.    5/19/2023:  Patient overall continuing to manage okay.  Feels relatively stable.  Continues ketamine therapy.  No med changes today.  No acute safety concerns.  No acute suicidality today.  Is pleased that she is finding some roxy in pleasurable activities this spring.  Continues to seek purpose in life.  No problematic drug or alcohol use.  Patient will be seen back in 3  months.    8/25/2023:  Continues to manage relatively okay on current regimen.  No major questions or concerns today.  Interested in keeping things status quo/the same.  Continues to receive ketamine infusions.  Pain continues to be forefront.  We discussed referral to a provider who could provide an evaluation to discuss ketamine for pain and possibly mood to replace her infusions.  Referral sent and discussed with patient.  Patient is very agreeable and interested in what ever options might be available for her.  No acute safety concerns today.  No problematic drug or alcohol use.  No acute suicidality today.  Patient continues to find ways to stay distracted.  Has started with new therapist.    11/17/2023:   Patient overall with some worsening symptoms due to some significantly worsening chronic pain.  Patient does have some relief of her pain symptoms after ketamine treatments-for up to a few days.  Patient reports referral to Dr. Rancho Jennings canceled by her current pain medicine provider since patient is already seeing this pain medicine provider within the same system.  Patient did report her pain medicine provider was going to reach out to Dr. Jennings to have a discussion about ketamine.  This provider will send staff message to both providers to see if we can get an update on the collaboration.  No medication changes made today.  Tolerating well overall.  No problematic drug or alcohol use.  Passive suicidal ideation at baseline.  No acute suicidality or plan or intent to harm self today.    2/19/2024:  Patient with ongoing pain struggles and relatively little to no changes in mood recently.  Patient will be meeting with neurosurgeon to discuss whether there are any appropriate surgical interventions for her pain.  Did not endorse any acute safety concerns today.  Did not nurse acute suicidality.  Continues ketamine therapy which she finds helpful.  No medication changes today.  Discussed DBT  recommendation.  No problematic drug or alcohol use.    4/15/2024:   The patient overall doing relatively okay.  Some improvements along with some ongoing struggles.  Given some of these 2-week waves of worsening symptoms, patient is agreeable to a trial of increasing Pristiq.  Depending on how anxiety settles and how increased Pristiq is tolerated, could consider BuSpar or clonidine in the future for anxiety.  No acute safety concerns today.  No acute suicidality.  Continues ketamine therapy.  Recently started with a new somatic therapist.    7/15/2024:  Patient overall doing relatively okay.  Some increase psychosocial stressors.  Filing for divorce from .  Tolerating medication well.  No acute safety concerns.  No SI.  No medication changes today.  Patient will be seen back in 3 months.  Encouraged to continue psychotherapy.    10/15/2024:  Overall feeling relatively stable.  Taking medication as prescribed.  Tolerating well.  No acute safety concerns.  No acute suicidality.  No problematic drug or alcohol use.  No med changes today.  Patient prefers to continue on Pristiq 50 mg daily at this time.  Discussed taking an extra dose of 25 mg 1-3 times per week to see if can still get some of the benefits of increased dose without some of the side effects experienced at 75 mg daily.    Medication side effects and alternatives were reviewed. Health promotion activities recommended and reviewed today. All questions addressed. Education and counseling completed regarding risks and benefits of medications and psychotherapy options. Recommend therapy for additional support.     Treatment Plan:  Continue Pristiq 50 mg daily for mood, anxiety.    Continue methylfolate 7.5 mg daily as supplementation.  Continue Adderall XR 25 mg daily for mood augmentation  Continue Adderall IR 15 mg twice daily as needed for mood augmentation and daytime fatigue/hypersomnia  Continue hydroxyzine 25-50 mg up to three times a day as  needed for anxiety/sleep.   Continue ketamine therapy as planned/needed.   Continue to work with your specialist from [facility] as indicated.    Continue with new somatic psychotherapist as planned.  Continue all other cares per primary care provider.   Continue all other medications as reviewed per electronic medical record today.   Safety plan reviewed. To the Emergency Department as needed or call after hours crisis line at [phone] or [phone]. [place] Crisis Text Line. Text [place] to 147216 or Suicide LifeLine Chat: suicidepreventionEffcon MXRline.org/chat  Schedule an appointment with me in 3 months, or sooner as needed. Call [facility] at [phone] to schedule.  Follow up with primary care provider as planned or for acute medical concerns.  Call the psychiatric nurse line with medication questions or concerns at [phone].  [employer] may be used to communicate with your provider, but this is not intended to be used for emergencies.    Risks of benzodiazepine (Ativan, Xanax, Klonopin, Valium, etc) use including, but not limited to, sedation, tolerance, risk for addiction/dependence. Do not drink alcohol while taking benzodiazepines due to risk of trouble breathing and potential death. Do not drive or operate heavy machinery until it is known how the drug affects you. Discuss with physician or pharmacist before ever taking a benzodiazepine with a narcotic/opioid pain medication.     Have previously discussed risks of stimulant medication including, but not limited to, decreased appetite, risk of tics (and that they may be lasting), trouble sleeping, cardiac risks such as increased heart rate and blood pressure, and rare risk of sudden cardiac death.  Also risk of addiction/tolerance/dependence.    Administrative Billing:   Phone Call/Video Duration: 20 Minutes  9:00a-9:20a    The longitudinal plan of care for the diagnosis(es)/condition(s) as documented were addressed during this visit.  Due to the added complexity in care, I will continue to support Janelle in the subsequent management and with ongoing continuity of care.    Patient Status:  Patient is a continuous care patient and refills will continue to come from this provider until otherwise noted.    Signed:   Kimberly Perez DO  [facility]    Disclaimer: This note consists of symbols derived from keyboarding, dictation and/or voice recognition software. As a result, there may be errors in the script that have gone undetected. Please consider this when interpreting information found in this chart.

## 2024-10-15 NOTE — PROGRESS NOTES
"Virtual Visit Details    Type of service:  Video Visit   Video Start Time: {video visit start/end time for provider to select:456782}  Video End Time:{video visit start/end time for provider to select:040475}    Originating Location (pt. Location): {video visit patient location:644962::\"Home\"}  {PROVIDER LOCATION On-site should be selected for visits conducted from your clinic location or adjoining [facility] hospital, academic office, or other nearby [facility] building. Off-site should be selected for all other provider locations, including home:301037}  Distant Location (provider location):  {virtual location provider:194369}  Platform used for Video Visit: {Virtual Visit Platforms:798091::\"Myworldwall\"}  "

## 2024-10-15 NOTE — NURSING NOTE
Current patient location: [address] [place] [id number]    Is the patient currently in the state of [place]? YES    Visit mode:VIDEO    If the visit is dropped, the patient can be reconnected by: VIDEO VISIT: Text to cell phone:   Telephone Information:   Mobile [phone]       Will anyone else be joining the visit? NO  (If patient encounters technical issues they should call [phone])    Are changes needed to the allergy or medication list? Pt stated no changes to allergies and Pt stated no med changes    Are refills needed on medications prescribed by this physician? NO    Rooming Documentation:  Questionnaire(s) completed    Reason for visit: MANUEL GUTIERREZ

## 2024-10-15 NOTE — PATIENT INSTRUCTIONS
Treatment Plan:  Continue Pristiq 50 mg daily for mood, anxiety.    Continue methylfolate 7.5 mg daily as supplementation.  Continue Adderall XR 25 mg daily for mood augmentation  Continue Adderall IR 15 mg twice daily as needed for mood augmentation and daytime fatigue/hypersomnia  Continue hydroxyzine 25-50 mg up to three times a day as needed for anxiety/sleep.   Continue ketamine therapy as planned/needed.   Continue to work with your specialist from [facility] as indicated.    Continue with new somatic psychotherapist as planned.  Continue all other cares per primary care provider.   Continue all other medications as reviewed per electronic medical record today.   Safety plan reviewed. To the Emergency Department as needed or call after hours crisis line at [phone] or [phone]. [place] Crisis Text Line. Text [place] to 104614 or Suicide LifeLine Chat: suicidepreventionlifeline.org/chat  Schedule an appointment with me in 3 months, or sooner as needed. Call [facility] at [phone] to schedule.  Follow up with primary care provider as planned or for acute medical concerns.  Call the psychiatric nurse line with medication questions or concerns at [phone].  [employer] may be used to communicate with your provider, but this is not intended to be used for emergencies.    Risks of benzodiazepine (Ativan, Xanax, Klonopin, Valium, etc) use including, but not limited to, sedation, tolerance, risk for addiction/dependence. Do not drink alcohol while taking benzodiazepines due to risk of trouble breathing and potential death. Do not drive or operate heavy machinery until it is known how the drug affects you. Discuss with physician or pharmacist before ever taking a benzodiazepine with a narcotic/opioid pain medication.     Have previously discussed risks of stimulant medication including, but not limited to, decreased appetite, risk of tics (and that they may be lasting), trouble sleeping,  "cardiac risks such as increased heart rate and blood pressure, and rare risk of sudden cardiac death.  Also risk of addiction/tolerance/dependence.    Patient Education   Collaborative Care Psychiatry Service  What to Expect  Here's what to expect from your Collaborative Care Psychiatry Service (CCPS).   About CCPS  CCPS means 2 people work together to help you get better. You'll meet with a behavioral health clinician and a psychiatric doctor. A behavioral health clinician helps people with mental health problems by talking with them. A psychiatric doctor helps people by giving them medicine.  How it works  At every visit, you'll see the behavioral health clinician (BHC) first. They'll talk with you about how you're doing and teach you how to feel better.   Then you'll see the psychiatric doctor. This doctor can help you deal with troubling thoughts and feelings by giving you medicine. They'll make sure you know the plan for your care.   CCPS usually takes 3 to 6 visits. If you need more visits, we may have you start seeing a different psychiatric doctor for ongoing care.  If you have any questions or concerns, we'll be glad to talk with you.  About visits  Be open  At your visits, please talk openly about your problems. It may feel hard, but it's the best way for us to help you.  Cancelling visits  If you can't come to your visit, please call us right away at [phone]. If you don't cancel at least 24 hours (1 full day) before your visit, that's \"late cancellation.\"  Being late to visits  Being very late is the same as not showing up. You will be a \"no show\" if:  Your appointment starts with a BHC, and you're more than 15 minutes late for a 30-minute (half hour) visit. This will also cancel your appointment with the psychiatric doctor.  Your appointment is with a psychiatric doctor only, and you're more than 15 minutes late for a 30-minute (half hour) visit.  Your appointment is with a psychiatric doctor " only, and you're more than 30 minutes late for a 60-minute (full hour) visit.  If you cancel late or don't show up 2 times within 6 months, we may end your care.   Getting help between visits  If you need help between visits, you can call us Monday to Friday from 8 a.m. to 4:30 p.m. at [phone].  Emergency care  Call 911 or go to the nearest emergency department if your life or someone else's life is in danger.  Call 988 anytime to reach the [facility] Suicide and Crisis hotline.  Medicine refills  To refill your medicine, call your pharmacy. You can also call [facility]'s Behavioral Access at [phone], Monday to Friday, 8 a.m. to 4:30 p.m. It can take 1 to 3 business days to get a refill.   Forms, letters, and tests  You may have papers to fill out, like FMLA, short-term disability, and workability. We can help you with these forms at your visits, but you must have an appointment. You may need more than 1 visit for this, to be in an intensive therapy program, or both.  Before we can give you medicine for ADHD, we may refer you to get tested for it or confirm it another way.  We may not be able to give you an emotional support animal letter.  We don't do mental health checks ordered by the court.   We don't do mental health testing, but we can refer you to get tested.   Thank you for choosing us for your care.  For informational purposes only. Not to replace the advice of your health care provider. Copyright   2022 [facility]. All rights reserved. [employer] 650045 - 12/22.

## 2024-10-22 ENCOUNTER — ALLIED HEALTH/NURSE VISIT (OUTPATIENT)
Dept: PSYCHIATRY | Facility: CLINIC | Age: 64
End: 2024-10-22
Payer: COMMERCIAL

## 2024-10-22 VITALS — DIASTOLIC BLOOD PRESSURE: 65 MMHG | HEART RATE: 72 BPM | SYSTOLIC BLOOD PRESSURE: 99 MMHG

## 2024-10-22 DIAGNOSIS — F33.1 MAJOR DEPRESSIVE DISORDER, RECURRENT EPISODE, MODERATE (H): Primary | ICD-10-CM

## 2024-10-22 DIAGNOSIS — F34.1 PERSISTENT DEPRESSIVE DISORDER: ICD-10-CM

## 2024-10-22 NOTE — PROGRESS NOTES
Janelle White comes into clinic today at the request of LINK Yanes MD Ordering Provider for Med Injection only Ketamine .    Procedure Prep:  Medication double check completed by: María Miller RN  Prior to administration pt identified by name and : yes    Nursing Assessment:  Appearance: casual clothing   Mood: Okay   Affect: WNL  Eye contact: good      10/15/2024     8:14 AM   PHQ   PHQ-9 Total Score 10   Q9: Thoughts of better off dead/self-harm past 2 weeks Several days   F/U: Thoughts of suicide or self-harm Yes   F/U: Self harm-plan Yes   F/U: Self-harm action No   F/U: Safety concerns No     QIDDSR 16 weekly assessment: 14. Assessment was scanned to EHR.     Procedure Performed:  VSS and mental status WNL. Patient was given ketamine. See MAR for details.     Post Procedure Assessment:  Patient tolerated the treatment with the following side effects: dissociation. Vital signs were monitored, see VS Flowsheet. Patient stated they felt ready to go home prior to discharge. AVS was offered to patient and patient declined. Patient was instructed not to drive for the remainder of the day and to notify clinic if any concerns arise.     Next appt: 3 weeks      This service provided today was under the supervising provider of the day LINK Hopkins MD, who was available if needed.          Adali Berg RN

## 2024-10-25 ENCOUNTER — HOSPITAL ENCOUNTER (OUTPATIENT)
Dept: CARDIOLOGY | Facility: CLINIC | Age: 64
Discharge: HOME | End: 2024-10-25
Attending: INTERNAL MEDICINE | Admitting: INTERNAL MEDICINE
Payer: COMMERCIAL

## 2024-10-25 DIAGNOSIS — I34.0 MITRAL REGURGITATION: ICD-10-CM

## 2024-10-25 LAB — LVEF ECHO: NORMAL

## 2024-10-25 PROCEDURE — 93306 TTE W/DOPPLER COMPLETE: CPT

## 2024-10-25 PROCEDURE — 93306 TTE W/DOPPLER COMPLETE: CPT | Mod: 26 | Performed by: INTERNAL MEDICINE

## 2024-11-01 ENCOUNTER — OFFICE VISIT (OUTPATIENT)
Dept: CARDIOLOGY | Facility: CLINIC | Age: 64
End: 2024-11-01
Payer: COMMERCIAL

## 2024-11-01 VITALS
HEART RATE: 77 BPM | BODY MASS INDEX: 27.04 KG/M2 | SYSTOLIC BLOOD PRESSURE: 92 MMHG | HEIGHT: 65 IN | OXYGEN SATURATION: 98 % | WEIGHT: 162.3 LBS | DIASTOLIC BLOOD PRESSURE: 62 MMHG

## 2024-11-01 DIAGNOSIS — I34.0 NONRHEUMATIC MITRAL VALVE REGURGITATION: Primary | ICD-10-CM

## 2024-11-01 PROCEDURE — 99213 OFFICE O/P EST LOW 20 MIN: CPT | Performed by: INTERNAL MEDICINE

## 2024-11-01 NOTE — PROGRESS NOTES
CARDIOLOGY CLINIC CONSULTATION    PRIMARY CARE PHYSICIAN:  Carmen Garcia    HISTORY OF PRESENT ILLNESS:  This is a very pleasant 63-year-old female who saw me last year for preventative screening given her family history of cardiovascular disease.  She has borderline hyperlipidemia with LDL levels in the 120 but subsequently coronary CT calcium score was negative at 0.  She had an echocardiogram last year that showed normal biventricular size and function but 1-2+ MR.  This year she had a repeat echocardiogram done to follow-up and mitral regurgitation appears to be mild.  I have personally reviewed the echocardiogram.  Valve anatomy appears essentially intact.  Today she is here for follow-up.  No cardiac symptoms reported.    PAST MEDICAL HISTORY:  Past Medical History:   Diagnosis Date    Anxiety     Cervicalgia 2007    C5-6 disc protrusion    COPD (chronic obstructive pulmonary disease) (H) 2/7/2022    Depressive disorder     ESBL (extended spectrum beta-lactamase) producing bacteria infection     History of blood transfusion 2007    Cervical fusion    Leukemia (H)     CLA large beta    Melanoma (H) 1998    Migraine     Other chronic pain     Rotator cuff tear     s/p injections    Sacroiliac inflammation (H)     Shift work sleep disorder 12/16/2013    Urinary calculi     Vitamin B12 deficiency anemia 2006    started injections       MEDICATIONS:  Current Outpatient Medications   Medication Sig Dispense Refill    albuterol (PROAIR HFA/PROVENTIL HFA/VENTOLIN HFA) 108 (90 Base) MCG/ACT inhaler Inhale 2 puffs into the lungs every 6 hours as needed for shortness of breath or wheezing 8.5 g 11    [START ON 11/29/2024] amphetamine-dextroamphetamine (ADDERALL XR) 25 MG 24 hr capsule Take 1 capsule (25 mg) by mouth daily. 30 capsule 0    amphetamine-dextroamphetamine (ADDERALL) 15 MG tablet Take 1 tablet (15 mg) by mouth 2 times daily. 60 tablet 0    Cobalamin Combinations (VITAMIN B12-FOLIC ACID) 500-400 MCG TABS Take  1 tablet by mouth      desvenlafaxine (PRISTIQ) 50 MG 24 hr tablet Take 1 tablet (50 mg) by mouth daily. 90 tablet 1    Estradiol (DIVIGEL) 1 MG/GM GEL Place 1 packet onto the skin daily 30 g 11    hydrOXYzine (VISTARIL) 25 MG capsule Take 1 capsule (25 mg) by mouth 3 times daily as needed for itching or anxiety 90 capsule 3    Levomefolate Glucosamine (METHYLFOLATE PO) Take 7.5 mg by mouth daily      lidocaine (LIDODERM) 5 % patch Place 3 patches onto the skin daily Apply up to 3 patches to skin. Wear for 12 hours and remove for 12 hrs.  Refill when patient requests. 120 patch 3    medical cannabis (Patient's own supply.  Not a prescription) Medical Cannabis - Tangerine 4-6 ml by mouth daily. [facility]      methocarbamol (ROBAXIN) 500 MG tablet Take 1 tablet (500 mg) by mouth 4 times daily as needed for muscle spasms 120 tablet 1    methocarbamol (ROBAXIN) 750 MG tablet Take 1 tablet (750 mg) by mouth 4 times daily as needed for muscle spasms 120 tablet 4    methylPREDNISolone (MEDROL DOSEPAK) 4 MG tablet therapy pack Follow Package Directions 21 tablet 0    naloxone (NARCAN) 4 MG/0.1ML nasal spray Spray 1 spray (4 mg) into one nostril alternating nostrils as needed for opioid reversal every 2-3 minutes until assistance arrives 0.2 mL 1    NONFORMULARY Take 2 Scoops by mouth daily Protein and Vitamin shake mix - Nutritional supplement      ondansetron (ZOFRAN ODT) 8 MG ODT tab Take 1 tablet (8 mg) by mouth every 8 hours as needed for nausea 30 tablet 4    Prasterone 6.5 MG INST Place 0.5 suppositories vaginally three times a week 28 each 11    rOPINIRole (REQUIP) 0.25 MG tablet TAKE 1 TO 2 TABLETS(0.25 TO 0.5 MG) BY MOUTH AT BEDTIME 180 tablet 3    senna-docusate (SENOKOT-S/PERICOLACE) 8.6-50 MG tablet Take 6-9 tablets by mouth every evening      vitamin D3 (CHOLECALCIFEROL) 125 MCG (5000 UT) tablet Take 5,000 Units by mouth daily      amphetamine-dextroamphetamine (ADDERALL XR) 25 MG 24 hr capsule Take 1  capsule (25 mg) by mouth daily. (Patient not taking: Reported on 11/1/2024) 30 capsule 0    [START ON 11/29/2024] amphetamine-dextroamphetamine (ADDERALL) 15 MG tablet Take 1 tablet (15 mg) by mouth 2 times daily. (Patient not taking: Reported on 11/1/2024) 60 tablet 0     Current Facility-Administered Medications   Medication Dose Route Frequency Provider Last Rate Last Admin    NONFORMULARY 1.1 mg/kg (Ideal)  1.1 mg/kg (Ideal) Intramuscular Q21 Days Zafar Yanes MD   60 mg at 10/22/24 1108       SOCIAL HISTORY:  I have reviewed this patient's social history and updated it with pertinent information if needed. Janelle TORRES Franchescamag  reports that she quit smoking about 40 years ago. Her smoking use included cigarettes and clove cigarettes or kreteks. She started smoking about 45 years ago. She has a 5 pack-year smoking history. She has never used smokeless tobacco. She reports current alcohol use. She reports that she does not currently use drugs after having used the following drugs: Cocaine, Marijuana, LSD, and Psilocybin.    PHYSICAL EXAM:  Pulse:  [77] 77  BP: (92)/(62) 92/62  SpO2:  [98 %] 98 %  162 lbs 4.8 oz    Constitutional: alert, no distress  Respiratory: Good bilateral air entry  Cardiovascular: Normal regular heart sounds  GI: nondistended  Neuropsychiatric: appropriate affact    ASSESSMENT: Pertinent issues addressed/ reviewed during this cardiology visit  Mild mitral regurgitation  Negative calcium score    RECOMMENDATIONS:  Patient is asymptomatic from a cardiac standpoint and not made any changes to her regimen.  I reviewed her echocardiogram and there is mild mitral regurgitation.  I reviewed the findings with her.  I would not recommend any further workup at this time for her MR.  Her calcium score is negative.  Overall she is at low risk of cardiovascular events at this time.  I encouraged her to follow-up with her primary doctor and see us as needed.  She can consider repeating a calcium  score and an echocardiogram in 3 to 4 years from now and see us if there is any change.    It was a pleasure seeing this patient in clinic today. Please do not hesitate to contact me with any future questions.     OLIVE Everett, [facility]  Cardiology - [facility]  November 1, 2024    This note was completed in part using dictation via the Dragon voice recognition software. Some word and grammatical errors may occur and must be interpreted in the appropriate clinical context.  If there are any questions pertaining to this issue, please contact me for further clarification.

## 2024-11-01 NOTE — LETTER
11/1/2024    Carmen Garcia, DNP  [address]  [place] [address]    RE: Janelle White       Dear Colleague,     I had the pleasure of seeing Janelle White in the [facility].  CARDIOLOGY CLINIC CONSULTATION    PRIMARY CARE PHYSICIAN:  Carmen Garcia    HISTORY OF PRESENT ILLNESS:  This is a very pleasant 63-year-old female who saw me last year for preventative screening given her family history of cardiovascular disease.  She has borderline hyperlipidemia with LDL levels in the 120 but subsequently coronary CT calcium score was negative at 0.  She had an echocardiogram last year that showed normal biventricular size and function but 1-2+ MR.  This year she had a repeat echocardiogram done to follow-up and mitral regurgitation appears to be mild.  I have personally reviewed the echocardiogram.  Valve anatomy appears essentially intact.  Today she is here for follow-up.  No cardiac symptoms reported.    PAST MEDICAL HISTORY:  Past Medical History:   Diagnosis Date     Anxiety      Cervicalgia 2007    C5-6 disc protrusion     COPD (chronic obstructive pulmonary disease) (H) 2/7/2022     Depressive disorder      ESBL (extended spectrum beta-lactamase) producing bacteria infection      History of blood transfusion 2007    Cervical fusion     Leukemia (H)     CLA large beta     Melanoma (H) 1998     Migraine      Other chronic pain      Rotator cuff tear     s/p injections     Sacroiliac inflammation (H)      Shift work sleep disorder 12/16/2013     Urinary calculi      Vitamin B12 deficiency anemia 2006    started injections       MEDICATIONS:  Current Outpatient Medications   Medication Sig Dispense Refill     albuterol (PROAIR HFA/PROVENTIL HFA/VENTOLIN HFA) 108 (90 Base) MCG/ACT inhaler Inhale 2 puffs into the lungs every 6 hours as needed for shortness of breath or wheezing 8.5 g 11     [START ON 11/29/2024] amphetamine-dextroamphetamine (ADDERALL XR) 25 MG 24 hr  capsule Take 1 capsule (25 mg) by mouth daily. 30 capsule 0     amphetamine-dextroamphetamine (ADDERALL) 15 MG tablet Take 1 tablet (15 mg) by mouth 2 times daily. 60 tablet 0     Cobalamin Combinations (VITAMIN B12-FOLIC ACID) 500-400 MCG TABS Take 1 tablet by mouth       desvenlafaxine (PRISTIQ) 50 MG 24 hr tablet Take 1 tablet (50 mg) by mouth daily. 90 tablet 1     Estradiol (DIVIGEL) 1 MG/GM GEL Place 1 packet onto the skin daily 30 g 11     hydrOXYzine (VISTARIL) 25 MG capsule Take 1 capsule (25 mg) by mouth 3 times daily as needed for itching or anxiety 90 capsule 3     Levomefolate Glucosamine (METHYLFOLATE PO) Take 7.5 mg by mouth daily       lidocaine (LIDODERM) 5 % patch Place 3 patches onto the skin daily Apply up to 3 patches to skin. Wear for 12 hours and remove for 12 hrs.  Refill when patient requests. 120 patch 3     medical cannabis (Patient's own supply.  Not a prescription) Medical Cannabis - [place] 4-6 ml by mouth daily. [facility]       methocarbamol (ROBAXIN) 500 MG tablet Take 1 tablet (500 mg) by mouth 4 times daily as needed for muscle spasms 120 tablet 1     methocarbamol (ROBAXIN) 750 MG tablet Take 1 tablet (750 mg) by mouth 4 times daily as needed for muscle spasms 120 tablet 4     methylPREDNISolone (MEDROL DOSEPAK) 4 MG tablet therapy pack Follow Package Directions 21 tablet 0     naloxone (NARCAN) 4 MG/0.1ML nasal spray Spray 1 spray (4 mg) into one nostril alternating nostrils as needed for opioid reversal every 2-3 minutes until assistance arrives 0.2 mL 1     NONFORMULARY Take 2 Scoops by mouth daily Protein and Vitamin shake mix - Nutritional supplement       ondansetron (ZOFRAN ODT) 8 MG ODT tab Take 1 tablet (8 mg) by mouth every 8 hours as needed for nausea 30 tablet 4     Prasterone 6.5 MG INST Place 0.5 suppositories vaginally three times a week 28 each 11     rOPINIRole (REQUIP) 0.25 MG tablet TAKE 1 TO 2 TABLETS(0.25 TO 0.5 MG) BY MOUTH AT BEDTIME 180 tablet 3      senna-docusate (SENOKOT-S/PERICOLACE) 8.6-50 MG tablet Take 6-9 tablets by mouth every evening       vitamin D3 (CHOLECALCIFEROL) 125 MCG (5000 UT) tablet Take 5,000 Units by mouth daily       amphetamine-dextroamphetamine (ADDERALL XR) 25 MG 24 hr capsule Take 1 capsule (25 mg) by mouth daily. (Patient not taking: Reported on 11/1/2024) 30 capsule 0     [START ON 11/29/2024] amphetamine-dextroamphetamine (ADDERALL) 15 MG tablet Take 1 tablet (15 mg) by mouth 2 times daily. (Patient not taking: Reported on 11/1/2024) 60 tablet 0     Current Facility-Administered Medications   Medication Dose Route Frequency Provider Last Rate Last Admin     NONFORMULARY 1.1 mg/kg (Ideal)  1.1 mg/kg (Ideal) Intramuscular Q21 Days Zafar Yanes MD   60 mg at 10/22/24 1108       SOCIAL HISTORY:  I have reviewed this patient's social history and updated it with pertinent information if needed. Janelle TORRES Christopher  reports that she quit smoking about 40 years ago. Her smoking use included cigarettes and clove cigarettes or kreteks. She started smoking about 45 years ago. She has a 5 pack-year smoking history. She has never used smokeless tobacco. She reports current alcohol use. She reports that she does not currently use drugs after having used the following drugs: Cocaine, Marijuana, LSD, and Psilocybin.    PHYSICAL EXAM:  Pulse:  [77] 77  BP: (92)/(62) 92/62  SpO2:  [98 %] 98 %  162 lbs 4.8 oz    Constitutional: alert, no distress  Respiratory: Good bilateral air entry  Cardiovascular: Normal regular heart sounds  GI: nondistended  Neuropsychiatric: appropriate affact    ASSESSMENT: Pertinent issues addressed/ reviewed during this cardiology visit  Mild mitral regurgitation  Negative calcium score    RECOMMENDATIONS:  Patient is asymptomatic from a cardiac standpoint and not made any changes to her regimen.  I reviewed her echocardiogram and there is mild mitral regurgitation.  I reviewed the findings with her.  I would not  recommend any further workup at this time for her MR.  Her calcium score is negative.  Overall she is at low risk of cardiovascular events at this time.  I encouraged her to follow-up with her primary doctor and see us as needed.  She can consider repeating a calcium score and an echocardiogram in 3 to 4 years from now and see us if there is any change.    It was a pleasure seeing this patient in clinic today. Please do not hesitate to contact me with any future questions.     OLIVE Everett, [facility]  Cardiology - [facility]  November 1, 2024    This note was completed in part using dictation via the Dragon voice recognition software. Some word and grammatical errors may occur and must be interpreted in the appropriate clinical context.  If there are any questions pertaining to this issue, please contact me for further clarification.      Thank you for allowing me to participate in the care of your patient.      Sincerely,     Leyla Garcia MD     [facility]  cc:   Carmen Garcia, DNP  [address]  [place] [address]

## 2024-11-03 ENCOUNTER — HEALTH MAINTENANCE LETTER (OUTPATIENT)
Age: 64
End: 2024-11-03

## 2024-11-05 ENCOUNTER — TELEPHONE (OUTPATIENT)
Dept: FAMILY MEDICINE | Facility: CLINIC | Age: 64
End: 2024-11-05
Payer: COMMERCIAL

## 2024-11-05 NOTE — TELEPHONE ENCOUNTER
Prior Authorization Retail Medication Request    Medication/Dose: Prasterone 6.5 MG INST   Diagnosis and ICD code (if different than what is on RX):  Atrophic vaginitis [N95.2]     Insurance   Primary: BC[employer] MEDICARE ADVANTAGE   Insurance ID:  [id number]     Pharmacy Information (if different than what is on RX)  Name:  [facility]  Phone:  [phone]  Fax:[phone]

## 2024-11-07 NOTE — TELEPHONE ENCOUNTER
PRIOR AUTHORIZATION DENIED    Medication: INTRAROSA 6.5 MG [facility]  Insurance Company: Other (see comments)Comment:  [employer] [phone]  Denial Date: 11/7/2024  Denial Rational:           Appeal Information:   If provider would like to appeal please review the plan's reasons for denial listed above. Please utilize that information to complete letter and provide specific, detailed clinical information/rationale of your patient's health status to address their denial reasons.    Patient Notified: No

## 2024-11-07 NOTE — TELEPHONE ENCOUNTER
Please advise. PA was denied because patient needs to have tried covered medications before this one

## 2024-11-07 NOTE — TELEPHONE ENCOUNTER
Retail Pharmacy Prior Authorization Team   Phone: [phone]    PA Initiation    Medication: INTRAROSA 6.5 MG [facility]  Insurance Company: Other (see comments)Comment:  [employer] [phone]  Pharmacy Filling the Rx: [id number] [place], [place] - [address] AT [facility] [address]  Filling Pharmacy Phone: [phone]  Filling Pharmacy Fax:    Start Date: 11/7/2024

## 2024-11-12 ENCOUNTER — ALLIED HEALTH/NURSE VISIT (OUTPATIENT)
Dept: PSYCHIATRY | Facility: CLINIC | Age: 64
End: 2024-11-12
Payer: COMMERCIAL

## 2024-11-12 VITALS — SYSTOLIC BLOOD PRESSURE: 115 MMHG | HEART RATE: 56 BPM | DIASTOLIC BLOOD PRESSURE: 68 MMHG

## 2024-11-12 DIAGNOSIS — F33.1 MAJOR DEPRESSIVE DISORDER, RECURRENT EPISODE, MODERATE (H): Primary | ICD-10-CM

## 2024-11-12 NOTE — PROGRESS NOTES
Janelle White comes into clinic today at the request of LINK Yanes MD Ordering Provider for Med Injection only Ketamine .    Procedure Prep:  Medication double check completed by: Pipo Tijerina RN  Prior to administration pt identified by name and : Yes    Nursing Assessment:  Appearance: Good   Mood: Good  Affect: Bright  Eye contact: Good      2024     4:22 PM   PHQ   PHQ-9 Total Score 13    Q9: Thoughts of better off dead/self-harm past 2 weeks Several days    F/U: Thoughts of suicide or self-harm Yes    F/U: Self harm-plan No    F/U: Self-harm action No    F/U: Safety concerns No        Patient-reported     QIDDSR 16 weekly assessment: score 15. Assessment was scanned to EHR.     Procedure Performed:  VSS and mental status WNL. Patient was given Ketamine. See MAR for details.       Post Procedure Assessment:  Patient tolerated the treatment with the following side effects: dissociation. Vital signs were monitored, see VS Flowsheet. Patient stated they felt ready to go home prior to discharge. AVS was offered to patient and patient declined. Patient was instructed not to drive for the remainder of the day and to notify clinic if any concerns arise.     Next appt: 12/3    Medications were supplied by Clinic     This service provided today was under the supervising provider of the day LINK Hopkins MD, who was available if needed.    Tesha Aaron, RN

## 2024-12-03 ENCOUNTER — ALLIED HEALTH/NURSE VISIT (OUTPATIENT)
Dept: PSYCHIATRY | Facility: CLINIC | Age: 64
End: 2024-12-03
Payer: COMMERCIAL

## 2024-12-03 VITALS — SYSTOLIC BLOOD PRESSURE: 109 MMHG | HEART RATE: 86 BPM | DIASTOLIC BLOOD PRESSURE: 74 MMHG

## 2024-12-03 DIAGNOSIS — F33.1 MAJOR DEPRESSIVE DISORDER, RECURRENT EPISODE, MODERATE (H): Primary | ICD-10-CM

## 2024-12-03 ASSESSMENT — PATIENT HEALTH QUESTIONNAIRE - PHQ9: SUM OF ALL RESPONSES TO PHQ QUESTIONS 1-9: 14

## 2024-12-03 NOTE — PROGRESS NOTES
Janelle White comes into clinic today at the request of LINK Yanes MD Ordering Provider for Med Injection only Ketamine .    Procedure Prep:  Medication double check completed by: Tesha Aaron RN  Prior to administration pt identified by name and : Yes    Nursing Assessment:  Appearance: Good   Mood: Fine. OK  Affect: WNL  Eye contact: Good      12/3/2024    10:12 AM   PHQ   PHQ-9 Total Score 14   Q9: Thoughts of better off dead/self-harm past 2 weeks Several days     QIDDSR 16 weekly assessment: score 12. Assessment was scanned to EHR.     Procedure Performed:  VSS and mental status WNL. Patient was given Ketamine. See MAR for details.       Post Procedure Assessment:  Patient tolerated the treatment with the following side effects: dissociation. Vital signs were monitored, see VS Flowsheet. Patient stated they felt ready to go home prior to discharge. AVS was offered to patient and patient declined. Patient was instructed not to drive for the remainder of the day and to notify clinic if any concerns arise.     Next appt:     Medications were supplied by Clinic     This service provided today was under the supervising provider of the day LINK Hopkins MD, who was available if needed.        Adali Berg RN on 12/3/2024

## 2024-12-23 ENCOUNTER — ALLIED HEALTH/NURSE VISIT (OUTPATIENT)
Dept: PSYCHIATRY | Facility: CLINIC | Age: 64
End: 2024-12-23
Payer: COMMERCIAL

## 2024-12-23 VITALS — DIASTOLIC BLOOD PRESSURE: 72 MMHG | HEART RATE: 70 BPM | SYSTOLIC BLOOD PRESSURE: 110 MMHG

## 2024-12-23 DIAGNOSIS — F33.2 SEVERE EPISODE OF RECURRENT MAJOR DEPRESSIVE DISORDER, WITHOUT PSYCHOTIC FEATURES (H): Primary | ICD-10-CM

## 2024-12-23 ASSESSMENT — PATIENT HEALTH QUESTIONNAIRE - PHQ9: SUM OF ALL RESPONSES TO PHQ QUESTIONS 1-9: 14

## 2024-12-23 NOTE — PROGRESS NOTES
"Janelle White comes into clinic today at the request of LINK Yanes MD Ordering Provider for Med Injection only Ketamine .    Procedure Prep:  Medication double check completed by: Adali Berg RN  Prior to administration pt identified by name and : Yes    Nursing Assessment:  Appearance: Good   Mood: \"Labile\". Pt reports feeling highs and lows. She notes that this time of year is difficult but appears optimistic. She expresses having supportive plans set up like taking extra time after ketamine to \"wind down\".  Affect: Calm  Eye contact: Good      2024    10:06 AM   PHQ   PHQ-9 Total Score 14   Q9: Thoughts of better off dead/self-harm past 2 weeks Nearly every day     QIDDSR 16 weekly assessment: score 13. Assessment was scanned to EHR.     Procedure Performed:  VSS and mental status WNL. Patient was given Ketamine. See MAR for details.       Post Procedure Assessment:  Patient tolerated the treatment with the following side effects: dissociation. Vital signs were monitored, see VS Flowsheet. Patient stated they felt ready to go home prior to discharge. AVS was offered to patient and patient declined. Patient was instructed not to drive for the remainder of the day and to notify clinic if any concerns arise.     Next appt:     Medications were supplied by Clinic      This service provided today was under the supervising provider of the day DONAVAN Mireles MD, who was available if needed.    Tesha Aaron RN  "

## 2025-01-12 ASSESSMENT — PATIENT HEALTH QUESTIONNAIRE - PHQ9
SUM OF ALL RESPONSES TO PHQ QUESTIONS 1-9: 10
10. IF YOU CHECKED OFF ANY PROBLEMS, HOW DIFFICULT HAVE THESE PROBLEMS MADE IT FOR YOU TO DO YOUR WORK, TAKE CARE OF THINGS AT HOME, OR GET ALONG WITH OTHER PEOPLE: VERY DIFFICULT
SUM OF ALL RESPONSES TO PHQ QUESTIONS 1-9: 10

## 2025-01-13 ENCOUNTER — APPOINTMENT (OUTPATIENT)
Dept: GENERAL RADIOLOGY | Facility: CLINIC | Age: 65
End: 2025-01-13
Attending: EMERGENCY MEDICINE
Payer: COMMERCIAL

## 2025-01-13 ENCOUNTER — HOSPITAL ENCOUNTER (EMERGENCY)
Facility: CLINIC | Age: 65
Discharge: HOME | End: 2025-01-13
Attending: EMERGENCY MEDICINE
Payer: COMMERCIAL

## 2025-01-13 ENCOUNTER — ALLIED HEALTH/NURSE VISIT (OUTPATIENT)
Dept: PSYCHIATRY | Facility: CLINIC | Age: 65
End: 2025-01-13
Payer: COMMERCIAL

## 2025-01-13 VITALS
SYSTOLIC BLOOD PRESSURE: 123 MMHG | HEART RATE: 68 BPM | DIASTOLIC BLOOD PRESSURE: 81 MMHG | TEMPERATURE: 97.7 F | WEIGHT: 165 LBS | RESPIRATION RATE: 20 BRPM | HEIGHT: 65 IN | OXYGEN SATURATION: 100 % | BODY MASS INDEX: 27.49 KG/M2

## 2025-01-13 VITALS — HEART RATE: 69 BPM | DIASTOLIC BLOOD PRESSURE: 72 MMHG | SYSTOLIC BLOOD PRESSURE: 108 MMHG

## 2025-01-13 DIAGNOSIS — S43.004A SHOULDER DISLOCATION, RIGHT, INITIAL ENCOUNTER: Primary | ICD-10-CM

## 2025-01-13 DIAGNOSIS — F33.2 SEVERE EPISODE OF RECURRENT MAJOR DEPRESSIVE DISORDER, WITHOUT PSYCHOTIC FEATURES (H): Primary | ICD-10-CM

## 2025-01-13 PROCEDURE — 250N000011 HC RX IP 250 OP 636: Performed by: EMERGENCY MEDICINE

## 2025-01-13 PROCEDURE — 73030 X-RAY EXAM OF SHOULDER: CPT | Mod: RT

## 2025-01-13 PROCEDURE — 99285 EMERGENCY DEPT VISIT HI MDM: CPT | Mod: 25

## 2025-01-13 PROCEDURE — 73060 X-RAY EXAM OF HUMERUS: CPT | Mod: RT

## 2025-01-13 PROCEDURE — 23650 CLTX SHO DSLC W/MNPJ WO ANES: CPT | Mod: RT

## 2025-01-13 PROCEDURE — 999N000157 HC STATISTIC RCP TIME EA 10 MIN

## 2025-01-13 PROCEDURE — 96374 THER/PROPH/DIAG INJ IV PUSH: CPT

## 2025-01-13 PROCEDURE — 999N000065 XR SHOULDER RIGHT 2 VIEWS: Mod: RT

## 2025-01-13 PROCEDURE — 73090 X-RAY EXAM OF FOREARM: CPT | Mod: RT

## 2025-01-13 RX ORDER — PROPOFOL 10 MG/ML
0.25 INJECTION, EMULSION INTRAVENOUS
Status: DISCONTINUED | OUTPATIENT
Start: 2025-01-13 | End: 2025-01-14 | Stop reason: HOSPADM

## 2025-01-13 RX ORDER — PROPOFOL 10 MG/ML
1 INJECTION, EMULSION INTRAVENOUS ONCE
Status: COMPLETED | OUTPATIENT
Start: 2025-01-13 | End: 2025-01-13

## 2025-01-13 RX ORDER — HYDROMORPHONE HYDROCHLORIDE 1 MG/ML
0.5 INJECTION, SOLUTION INTRAMUSCULAR; INTRAVENOUS; SUBCUTANEOUS
Status: DISCONTINUED | OUTPATIENT
Start: 2025-01-13 | End: 2025-01-14 | Stop reason: HOSPADM

## 2025-01-13 RX ADMIN — HYDROMORPHONE HYDROCHLORIDE 0.5 MG: 1 INJECTION, SOLUTION INTRAMUSCULAR; INTRAVENOUS; SUBCUTANEOUS at 18:46

## 2025-01-13 RX ADMIN — PROPOFOL 80 MG: 10 INJECTION, EMULSION INTRAVENOUS at 21:16

## 2025-01-13 ASSESSMENT — ACTIVITIES OF DAILY LIVING (ADL)
ADLS_ACUITY_SCORE: 54

## 2025-01-13 NOTE — PROGRESS NOTES
Janelle White comes into clinic today at the request of LINK Yanes MD Ordering Provider for Med Injection only Ketamine .    Procedure Prep:  Medication double check completed by: Tesha Aaron RN  Prior to administration pt identified by name and : Yes    Nursing Assessment:  Appearance: Good   Mood: Fine. OK  Affect: WNL  Eye contact: Good      2025     9:34 AM   PHQ   PHQ-9 Total Score 10    Q9: Thoughts of better off dead/self-harm past 2 weeks Several days   F/U: Thoughts of suicide or self-harm Yes   F/U: Self harm-plan Yes   F/U: Self-harm action No   F/U: Safety concerns No       Patient-reported     QIDDSR 16 weekly assessment: score 15. Assessment was scanned to EHR.     Procedure Performed:  VSS and mental status WNL. Patient was given Ketamine. See MAR for details.       Post Procedure Assessment:  Patient tolerated the treatment with the following side effects: dissociation. Vital signs were monitored, see VS Flowsheet. Patient stated they felt ready to go home prior to discharge. AVS was offered to patient and patient declined. Patient was instructed not to drive for the remainder of the day and to notify clinic if any concerns arise.     Next appt: 3 weeks    Medications were supplied by Clinic     This service provided today was under the supervising provider of the day ALYSA Mireles MD, who was available if needed.        Adali Berg RN

## 2025-01-14 ENCOUNTER — MYC MEDICAL ADVICE (OUTPATIENT)
Dept: PSYCHIATRY | Facility: CLINIC | Age: 65
End: 2025-01-14
Payer: COMMERCIAL

## 2025-01-14 DIAGNOSIS — F33.2 SEVERE EPISODE OF RECURRENT MAJOR DEPRESSIVE DISORDER, WITHOUT PSYCHOTIC FEATURES (H): Primary | ICD-10-CM

## 2025-01-14 DIAGNOSIS — F34.1 PERSISTENT DEPRESSIVE DISORDER: ICD-10-CM

## 2025-01-14 NOTE — SEDATION DOCUMENTATION
Patient tolerated procedure well, alert and orientated, daughter at bedside, immobilizer in place, CMS intact

## 2025-01-14 NOTE — ED TRIAGE NOTES
Pt arrives via EMS. She took her dog out on a walk; the dog pulled her down a hill. Hit her right shoulder on a curb. EMS notes shoulder dislocation as well as notable swelling to the right arm. Did not hit her head or lose consciousness. Reporting numbness to the fingers. Not anticoagulated. EMS gave 75mcg of fentanyl and 1mg of dilaudid with no improvement to pain. VSS en route, .

## 2025-01-14 NOTE — ED PROVIDER NOTES
Emergency Department Note      History of Present Illness     Chief Complaint   Shoulder Injury, Arm Pain, and Fall    HPI   Janelle White is a 64 year old female who is presenting to the ED for shoulder pain. Patient reports that she was walking her dog when she was pulled down the hill. She states that she hit her right shoulder on the curb which caused very intense constant pain since then. She reports that her pain is on the anterior part of her right shoulder, elbow, and forearm. Patient denies hitting her head. She also denies pain in left shoulder or elbow, and pain in neck or back. She received fentanyl and dilaudid but states this has barely helped her pain. Patient reports that she is not on any blood thinners or pain meds, but receives Ketamine every three weeks, her last dose being today.    Independent Historian   None    Review of External Notes   None    Past Medical History     Medical History and Problem List   Anxiety  Cervicalgia  COPD  MDD  ESBL producing bacteria infection  Chronic lymphocytic leukemia  Melanoma of skin  Malignant neoplasm of uterus  Migraine  Chronic pain syndrome  Rotator cuff tear  Sacroiliac inflammation  Shiftwork sleep disorder  Urinary calculi  Vitamin B12 deficiency anemia  Cervical DDD  CLARE  CYP2B6 intermediate metabolizer  CYP2D6 poor metabolizer  Polyarthralgia  Cellulitis  Sepsis  Failed back surgical syndrome  Spinal stenosis of lumbar region with radiculopathy    Medications   Desvenlafaxine  Ropinirole  Amphetamine-dextroamphetamine    Surgical History    section x 2  Complete rotator cuff repair  MEGHAN with BSO  Appendectomy  Lasik  Cervical arthrodesis x 2  Lumbar spinal fusion  Primary anterior decompression of cervical spinal cord and fusion  Bladder surgery  Tubal ligation  D & C    Physical Exam     Patient Vitals for the past 24 hrs:   BP Temp Temp src Pulse Resp SpO2 Height Weight   25 2200 123/81 -- -- 68 20 100 % -- --   25  "2130 (!) 148/86 -- -- 60 -- 99 % -- --   01/13/25 2115 (!) 150/104 -- -- 53 -- 100 % -- --   01/13/25 2114 (!) 153/105 -- -- -- -- 100 % -- --   01/13/25 2110 -- -- -- 50 -- 100 % -- --   01/13/25 2105 (!) 138/96 -- -- 65 -- 100 % -- --   01/13/25 2100 (!) 161/107 -- -- 68 -- 100 % -- --   01/13/25 2059 -- -- -- 71 -- 100 % -- --   01/13/25 2058 130/87 -- -- 67 -- -- -- --   01/13/25 1838 -- -- -- -- -- -- 1.651 m (5' 5\") 74.8 kg (165 lb)   01/13/25 1836 135/87 97.7  F (36.5  C) Oral 74 25 100 % -- --     Physical Exam  General: Alert, appears well-developed and well-nourished. Cooperative.     In moderate distress  HEENT:  Head:  Atraumatic  Ears:  External ears are normal  Mouth/Throat:  Oropharynx is without erythema or exudate and mucous membranes are moist.   Eyes:   Conjunctivae normal and EOM are normal. No scleral icterus.  CV:  Normal rate, regular rhythm, normal heart sounds and radial pulses are 2+ and symmetric.  No murmur.  Resp:  Breath sounds are clear bilaterally    Non-labored, no retractions or accessory muscle use  GI:  Abdomen is soft, no distension, no tenderness. No rebound or guarding.  No CVA tenderness bilaterally  MS:  No edema.    Back atraumatic.    No midline cervical, thoracic, or lumbar tenderness    Right Shoulder:    The Clavicle is intact clinically    The AC joint is non-tender    A dislocation is palpated/appreciated clinically    The proximal humerus is tender    The mid and distal shaft of the humerus are non-tender    Axillary nerve function is intact    Brachial and Radial Artery pulse is normal    Median, Ulnar, and Radial Nerve function distally are intact    Elbow and midshaft forearm are tender to palpation, but no obvious deformity.     Skin:  Warm and dry.  No rash or lesions noted.  Neuro:   Alert. Normal strength.  GCS: 15  Psych: Normal mood and affect.    Diagnostics     Lab Results   Labs Ordered and Resulted from Time of ED Arrival to Time of ED Departure - No data " to display    Imaging   XR Shoulder Right 2 Views   Final Result   IMPRESSION: The glenohumeral joint has been relocated. There is currently anatomic position and alignment. Postoperative and degenerative changes in the spine and proximal humerus. Mild osteoarthrosis of the AC joint. Otherwise negative. There is no    evidence of fracture.      Humerus XR, G/E 2 views, right   Final Result   IMPRESSION: There is anteroinferior dislocation of the humeral head in relation to the glenoid. Suture anchors in the greater tuberosity likely from previous rotator cuff repair. Otherwise negative.      Radius/Ulna XR, PA & LAT, right   Final Result   IMPRESSION: No acute fracture. Joint spaces preserved. Shortened fifth metacarpal.      XR Shoulder Right 2 Views   Final Result   IMPRESSION: Anteroinferior dislocation of the humeral head in relation to the glenoid. Suture anchors in the humerus.          Independent Interpretation   Right shoulder x-ray shows anterior dislocation  Repeat right shoulder x-ray successful reduction post procedure/sedation  Right humerus x-ray shows no fracture  Right radius/ulna x-ray shows no fracture    ED Course      Medications Administered   Medications   HYDROmorphone (PF) (DILAUDID) injection 0.5 mg (0.5 mg Intravenous $Given 1/13/25 1846)   propofol (DIPRIVAN) injection 10 mg/mL vial (has no administration in time range)   propofol (DIPRIVAN) injection 10 mg/mL vial (80 mg Intravenous $Given 1/13/25 2116)       [facility]    -Dislocation - Upper Extremity    Date/Time: 1/13/2025 8:24 PM    Performed by: Lico Viera MD  Authorized by: Lico Viera MD    Emergent condition/consent implied    ED EVALUATION:      Assessment Time: 1/13/2025 8:25 PM      I have performed an Emergency Department Evaluation including taking a history and physical examination, this evaluation will be documented in the electronic medical record for this ED encounter.      Indication: Procedural sedation and need for right shoulder reduction due to dislocation    ASA Class: Class 2- mild systemic disease, no acute problems, no functional limitations    Mallampati: Grade 2- soft palate, base of uvula, tonsillar pillars, and portion of posterior pharyngeal wall visible    NPO Status: yes and not NPO, emergent situation    UNIVERSAL PROTOCOL   Site Marked: No  Prior Images Obtained and Reviewed:  Yes  Required items: Required blood products, implants, devices and special equipment available    Patient identity confirmed:  Verbally with patient, arm band, provided demographic data and hospital-assigned identification number  Patient was reevaluated immediately before administering moderate or deep sedation or anesthesia  Confirmation Checklist:  Patient's identity using two indicators, relevant allergies, procedure was appropriate and matched the consent or emergent situation and correct equipment/implants were available  Time out: Immediately prior to the procedure a time out was called    Universal Protocol: the Joint Commission Universal Protocol was followed    Preparation: Patient was prepped and draped in usual sterile fashion      LOCATION     Location:  Shoulder    Shoulder location:  R shoulder    Shoulder dislocation type: anterior      Chronicity:  New    PRE PROCEDURE ASSESSMENT      Pre-procedure imaging:  X-ray    Imaging findings: dislocation present      Imaging findings: no fracture      Distal perfusion: normal      PROCEDURE DETAILS      Manipulation performed: yes      Shoulder reduction method:  External rotation and traction and counter traction    Reduction successful: yes      Reduction confirmed with imaging: yes      Immobilization: Shoulder immobilizer.    Supplies used:  Prefabricated splint    POST PROCEDURE DETAILS     Neurological function: normal      Distal perfusion: normal      Range of motion: improved        PROCEDURE    Patient Tolerance:  Patient  tolerated the procedure well with no immediate complications  Length of time physician/provider present for 1:1 monitoring during sedation: 10       Discussion of Management   None    ED Course   ED Course as of 01/13/25 1538   Mon Jan 13, 2025   1842 I obtained history and examined the patient as noted above.    2004 Consent obtained for sedation and dislocation reduction.   2157 I rechecked the patient and explained findings. We discussed plans for discharge and the patient is comfortable with this plan.        Additional Documentation  None    Medical Decision Making / Diagnosis     CMS Diagnoses: None    MIPS       None    MDM   Janelle White is a 64 year old female who presents for evaluation of right shoulder pain after falling down a hill and hitting it on a curb.  This is consistent by clinical and radiological exam with a shoulder dislocation. The dislocation was successfully reduced and a shoulder immobilizer was placed by myself, the fit was checked.  The neurovascular exam is normal pre and post-reduction.  I will have patient stay in the immobilizer until follow-up with orthopedics in 3-5 days.  The head to toe trauma exam is otherwise normal and I doubt serious underlying spinal, intracerebral, chest, abdominal, pelvic or extremity trauma.       Disposition   The patient was discharged.     Diagnosis     ICD-10-CM    1. Shoulder dislocation, right, initial encounter  S43.004A          Discharge Medications   Discharge Medication List as of 1/13/2025 10:03 PM        Scribe Disclosure:  I, Nora Ocasio, am serving as a scribe at 9:23 PM on 1/13/2025 to document services personally performed by Lico Viera MD based on my observations and the provider's statements to me.        Lico Viera MD  01/13/25 [id number]     MD  01/13/25 [id number]       Lico Viera MD  01/21/25 0063

## 2025-01-14 NOTE — PROGRESS NOTES
An ETCO2 monitor was placed on the pt with 3LPM bled in. The Ambu bag, suction, and any necessary airway supplies were setup and present in the room but not needed. Pt was able to maintain airway throughout the procedure with no intervention needed. ETCO2 levels were maintained between 20 and 25.    RT Leighann on 1/13/2025 at 9:16 PM

## 2025-01-14 NOTE — ED NOTES
Bed: ED14  Expected date:   Expected time:   Means of arrival:   Comments:  EMS. 64. F. Shoulder injury. Deformity noted.

## 2025-01-15 RX ORDER — DEXTROAMPHETAMINE SACCHARATE, AMPHETAMINE ASPARTATE, DEXTROAMPHETAMINE SULFATE AND AMPHETAMINE SULFATE 3.75; 3.75; 3.75; 3.75 MG/1; MG/1; MG/1; MG/1
15 TABLET ORAL 2 TIMES DAILY
Qty: 60 TABLET | Refills: 0 | Status: SHIPPED | OUTPATIENT
Start: 2025-01-15 | End: 2025-02-14

## 2025-01-15 RX ORDER — DEXTROAMPHETAMINE SACCHARATE, AMPHETAMINE ASPARTATE MONOHYDRATE, DEXTROAMPHETAMINE SULFATE AND AMPHETAMINE SULFATE 6.25; 6.25; 6.25; 6.25 MG/1; MG/1; MG/1; MG/1
25 CAPSULE, EXTENDED RELEASE ORAL DAILY
Qty: 30 CAPSULE | Refills: 0 | Status: SHIPPED | OUTPATIENT
Start: 2025-02-14 | End: 2025-03-16

## 2025-01-15 RX ORDER — DEXTROAMPHETAMINE SACCHARATE, AMPHETAMINE ASPARTATE, DEXTROAMPHETAMINE SULFATE AND AMPHETAMINE SULFATE 3.75; 3.75; 3.75; 3.75 MG/1; MG/1; MG/1; MG/1
15 TABLET ORAL 2 TIMES DAILY
Qty: 60 TABLET | Refills: 0 | Status: CANCELLED | OUTPATIENT
Start: 2025-01-15

## 2025-01-15 RX ORDER — DEXTROAMPHETAMINE SACCHARATE, AMPHETAMINE ASPARTATE MONOHYDRATE, DEXTROAMPHETAMINE SULFATE AND AMPHETAMINE SULFATE 6.25; 6.25; 6.25; 6.25 MG/1; MG/1; MG/1; MG/1
25 CAPSULE, EXTENDED RELEASE ORAL DAILY
Qty: 30 CAPSULE | Refills: 0 | Status: SHIPPED | OUTPATIENT
Start: 2025-01-15 | End: 2025-02-14

## 2025-01-15 RX ORDER — DEXTROAMPHETAMINE SACCHARATE, AMPHETAMINE ASPARTATE MONOHYDRATE, DEXTROAMPHETAMINE SULFATE AND AMPHETAMINE SULFATE 6.25; 6.25; 6.25; 6.25 MG/1; MG/1; MG/1; MG/1
25 CAPSULE, EXTENDED RELEASE ORAL DAILY
Qty: 30 CAPSULE | Refills: 0 | Status: SHIPPED | OUTPATIENT
Start: 2025-03-16 | End: 2025-04-15

## 2025-01-15 RX ORDER — DEXTROAMPHETAMINE SACCHARATE, AMPHETAMINE ASPARTATE, DEXTROAMPHETAMINE SULFATE AND AMPHETAMINE SULFATE 3.75; 3.75; 3.75; 3.75 MG/1; MG/1; MG/1; MG/1
15 TABLET ORAL 2 TIMES DAILY
Qty: 60 TABLET | Refills: 0 | Status: SHIPPED | OUTPATIENT
Start: 2025-03-16 | End: 2025-04-15

## 2025-01-15 RX ORDER — DEXTROAMPHETAMINE SACCHARATE, AMPHETAMINE ASPARTATE, DEXTROAMPHETAMINE SULFATE AND AMPHETAMINE SULFATE 3.75; 3.75; 3.75; 3.75 MG/1; MG/1; MG/1; MG/1
15 TABLET ORAL 2 TIMES DAILY
Qty: 60 TABLET | Refills: 0 | Status: SHIPPED | OUTPATIENT
Start: 2025-02-14 | End: 2025-03-16

## 2025-01-15 NOTE — TELEPHONE ENCOUNTER
Date of Last Office Visit: 10/15/24  Date of Next Office Visit:  1/27/25  No shows since last visit: No  More than one patient-initiated cancellation (with reschedule) since last seen in clinic? No    []Medication refilled per  Medication Refill in Ambulatory Care  policy.  [x]Medication unable to be refilled by RN due to criteria not met as indicated below:    []Eligibility: has not had a provider visit within last 6 months   []Supervision: no future appointment; < 7 days before next appointment   []Compliance: no shows; cancellations; lapse in therapy   []Verification: order discrepancy; may need modification...   [] > 30-day supply request   []Advanced refill request: > 7 days before refill date   [x]Controlled medication   []Medication not included in policy   []Review: new med; med adjusted <= 30 days; safety alert; requires lab monitoring...   []Scope of Practice: refill request processed by LPN/MA   []Other:      Medication(s) requested:     -  amphetamine-dextroamphetamine (ADDERALL) 15 MG tablet   Date last ordered: 11/29/24  Qty: 60  Refills: 0  Appropriate for refill? Yes    -Any Controlled Substance(s)? Yes   [place]  checked? N/A      Requested medication(s) verified as identical to current order? Yes    Any lapse in adherence to medication(s) greater than 5 days? Unknown     Additional action taken? cued up medication/order(s) and routed encounter to provider for review.      Last visit treatment plan:   Continue Pristiq 50 mg daily for mood, anxiety.    Continue methylfolate 7.5 mg daily as supplementation.  Continue Adderall XR 25 mg daily for mood augmentation  Continue Adderall IR 15 mg twice daily as needed for mood augmentation and daytime fatigue/hypersomnia  Continue hydroxyzine 25-50 mg up to three times a day as needed for anxiety/sleep.   Continue ketamine therapy as planned/needed.   Continue to work with your specialist from [facility] as indicated.    Continue with new somatic  psychotherapist as planned.    Any medication(s) require lab monitoring? No

## 2025-01-17 ENCOUNTER — TRANSFERRED RECORDS (OUTPATIENT)
Dept: HEALTH INFORMATION MANAGEMENT | Facility: CLINIC | Age: 65
End: 2025-01-17
Payer: COMMERCIAL

## 2025-01-24 ENCOUNTER — TRANSFERRED RECORDS (OUTPATIENT)
Dept: HEALTH INFORMATION MANAGEMENT | Facility: CLINIC | Age: 65
End: 2025-01-24
Payer: COMMERCIAL

## 2025-01-24 NOTE — PROGRESS NOTES
[facility]  1/27/25      Behavioral Health Clinician Progress Note    Patient Name: Janelle White           Service Type:  Individual      Service Location:   [facility] / Email (patient reached)     Session Start Time: 8am  Session End Time: 828am      Session Length: 16 - 37      Attendees: Client     Service Modality:  Video Visit:      Provider verified identity through the following two step process.  Patient provided:  Patient is known previously to provider    Telemedicine Visit: The patient's condition can be safely assessed and treated via synchronous audio and visual telemedicine encounter.      Reason for Telemedicine Visit: Services only offered telehealth    Originating Site (Patient Location): Patient's home    Distant Site (Provider Location): [facility]ENT[facility] HEALTH [facility]    Consent:  The patient/guardian has verbally consented to: the potential risks and benefits of telemedicine (video visit) versus in person care; bill my insurance or make self-payment for services provided; and responsibility for payment of non-covered services.     Patient would like the video invitation sent by:  My Chart    Mode of Communication:  Video Conference via [facility]    Distant Location (Provider):  On-site    As the provider I attest to compliance with applicable laws and regulations related to telemedicine.    Visit Activities (Refresh list every visit): [facility] Only    Diagnostic Assessment Date: 7/15/24 Joelle Viera MA [facility]  Treatment Plan Review Date: 1/27/25  See Flowsheets for today's PHQ-9 and STACIE-7 results  Previous PHQ-9:       12/3/2024    10:12 AM 12/23/2024    10:06 AM 1/12/2025     9:34 AM   PHQ-9 SCORE   PHQ-9 Total Score MyChart   10 (Moderate depression)   PHQ-9 Total Score 14 14 10        Patient-reported     Previous STACIE-7:       7/12/2024    10:02 AM 9/11/2024     6:15 PM 9/29/2024    12:34 PM   STACIE-7 SCORE   Total Score 8  (mild anxiety) 10 (moderate anxiety) 15 (severe anxiety)   Total Score 8    8 10 15       DATA  Extended Session (60+ minutes): No  Interactive Complexity: No  Crisis: No  [facility] Patient: No    Treatment Objective(s) Addressed in This Session:  Target Behavior(s): disease management/lifestyle changes reducing sx    Depressed Mood: Increase interest, engagement, and pleasure in doing things  Decrease frequency and intensity of feeling down, depressed, hopeless  Anxiety: will experience a reduction in anxiety    Current Stressors / Issues:  MH update:  ER visit- shoulder dislocated/fracture/tear.  Pain and poor sleep.  Doing okay since last appt.  Went to [facility] for holidays- high anxiety  Stresses:  family stress, divorce on hold,  traveling  Appetite: n/a  Sleep: Poor-pain  Outpatient Provider updates: [facility], Therapy Lorena Shaquille, [facility]  SI/SIB/HI: Reduced overall to a couple times a week/fleeting.  Baseline method/planning.  Denies any intent.  safety plan on file.  Hx of attempts and SIB as teen.  Hx of baseline planning and method seeking without intent.  YALA:  Sober.  AA support.  Using THC again  Side effects/compliance:  Interventions:  [facility] engaged in discussion about interpersonal dynamics during holidays.  Let them theory.  Most important:  Doing over all okay.  ER visit-medical    10/25  MH update:  No big changes.  Frustrated at times with having to slow done to focus on tasks.  A Lot of appts.  Higher anxiety with all of this.  Went to [place] for a month. Enjoyable.  Relationship stressors with her.  Saw son.  Overall less reactive.  Mood about the same.   Pushing self to get things done.  Stresses: chronic pain, divorce paperwork.  Brother arrested.  Appetite: n/a  Sleep: Poor  Outpatient Provider updates: [facility], Therapy Shayna Guzman; divorce support/group  Previous THRIVE, TMS, ECT, PHP/IOP  Neuropsychological Consultation (in chart,  8/11/22)   SI/SIB/HI: Reduced overall to a couple times a week/fleeting.  Baseline method/planning.  Denies any intent.  Updated safety plan  Hx of attempts and SIB as teen.  Hx of baseline planning and method seeking without intent.  AYLA:  Attends AA group for support.  SOBER.  Stopped THC.  Side effects/compliance: n/a  Interventions:  Most important:  Meds are good.  Was at higher Pristiq but stepped down again for last few days sitting at 50.  Not getting consistent adderall due to pharm.  Derian appt on Friday, cancer check.    7/15   update:  ROS above.  Depression.  Not as labile.  Stresses:  Chronic pain, started divorce paperwork   Appetite: n/a  Sleep: CLARE/doesn't often use oral appliance   Outpatient Provider updates: [facility], Therapy Lorena Corbin, [facility]; divorce support/group  Previous THRIVE, TMS, ECT, PHP/IOP  Neuropsychological Consultation (in chart, 8/11/22)   SI/SIB/HI: Reduced overall to a couple times a week/fleeting.  Baseline method/planning.  Denies any intent.  Updated safety plan  Hx of attempts and SIB as teen.  Hx of baseline planning and method seeking without intent.  AYLA:  Attends AA group for support.  SOBER.  Stopped THC.  Side effects/compliance: n/a  Interventions:  [facility] engaged in completing new DA with psychoeducation and tx planning.  Most important:  Did not get refills of 15mg of adderall therefore has been taking slightly more since didn't have the script    Progress on Treatment Objective(s) / Homework:  New Objective established this session - CONTEMPLATION (Considering change and yet undecided); Intervened by assessing the negative and positive thinking (ambivalence) about behavior change    Motivational Interviewing    MI Intervention: Co-Developed Goal: reducing sx and Expressed Empathy/Understanding     Change Talk Expressed by the Patient: Desire to change Reasons to change    Provider Response to Change Talk: E - Evoked more info from patient  about behavior change and A - Affirmed patient's thoughts, decisions, or attempts at behavior change    Also provided psychoeducation about behavioral health condition, symptoms, and treatment options    Assessments completed prior to visit:  The following assessments were completed by patient for this visit:  GAD2:       8/19/2023     6:52 PM 11/10/2023    10:24 AM 2/12/2024     9:29 AM 4/14/2024    10:01 AM 7/12/2024    10:02 AM 10/12/2024     3:57 PM 1/22/2025     8:23 AM   STACIE-2   Feeling nervous, anxious, or on edge 1 1 1 2 2 1 1   Not being able to stop or control worrying 0 1 1 1 2 1 1   STACIE-2 Total Score 1 2 2 3    3 4    4 2    2 2        Patient-reported       Care Plan review completed: Yes    Medication Review:  Changes to psychiatric medications, see updated Medication List in EPIC.     Medication Compliance:  Yes    Changes in Health Issues:   None reported    Chemical Use Review:   Substance Use: Chemical use reviewed, no active concerns identified      Tobacco Use: No current tobacco use.      Assessment: Current Emotional / Mental Status (status of significant symptoms):  Risk status (Self / Other harm or suicidal ideation)  Patient has had a history of suicidal ideation: .  Reduced overall to a couple times a week/fleeting.  Baseline method/planning.  Denies any intent.    Hx of attempts and SIB as teen.  Hx of baseline planning and method seeking without intent.  Patient denies current fears or concerns for personal safety.  Patient denies current or recent suicidal ideation or behaviors.  Patient denies current or recent homicidal ideation or behaviors.  Patient denies current or recent self injurious behavior or ideation.  Patient denies other safety concerns.  A safety and risk management plan has been developed including: Patient consented to co-developed safety plan.  A safety and risk management plan was completed.  Patient agreed to use safety plan should any safety concerns arise.  A copy  was given to the patient.    Appearance:   Appropriate   Eye Contact:   Good   Psychomotor Behavior: Normal   Attitude:   Cooperative   Orientation:   All  Speech   Rate / Production: Normal    Volume:  Normal   Mood:    Normal  Affect:    Appropriate   Thought Content:  Clear   Thought Form:  Coherent  Logical   Insight:    Good     Diagnoses:  1. Persistent depressive disorder    2. STACIE (generalized anxiety disorder)        Collateral Reports Completed:  Communicated with: Dr Perez    Plan: (Homework, other):  Patient was given information about behavioral services and encouraged to schedule a follow up appointment with the clinic [facility] as needed.  She was also given information about mental health symptoms and treatment options .  CD Recommendations: No indications of CD issues.       Joelle Viera [facility]    ______________________________________________________________________    Integrated Primary Care Behavioral Health Treatment Plan    Individual Treatment Plan    Patient's Name: Janelle White   YOB: 1960  Date of Creation: 10/15/24  Date Treatment Plan Last Reviewed/Revised: 1/27/25    DSM5 Diagnoses:   1. Persistent depressive disorder    2. STACIE (generalized anxiety disorder)      Psychosocial / Contextual Factors: Medical Complexities, Trauma History, Substance Use history/concerns, and Interpersonal Concerns  PROMIS (reviewed every 90 days):   The following assessments were completed by patient for this visit:  PROMIS 10-Global Health (only subscores and total score):       8/19/2023     6:54 PM 2/12/2024     9:31 AM 4/14/2024    10:02 AM 4/15/2024     7:40 AM 7/12/2024    10:04 AM 10/12/2024     3:58 PM 1/22/2025     8:25 AM   PROMIS-10 Scores Only   Global Mental Health Score 10 8 8 8 10    10 11    11 9    Global Physical Health Score 9 11 10 10 12    12 12    12 11    PROMIS TOTAL - SUBSCORES 19 19 18 18 22    22 23    23 20        Patient-reported        Referral /  "Collaboration:  Referral to another professional/service is not indicated at this time..    Anticipated number of session for this episode of care: 6-9 sessions  Anticipation frequency of session: Monthly  Anticipated Duration of each session: 16-37 minutes  Treatment plan will be reviewed in 90 days or when goals have been changed.       MeasurableTreatment Goal(s) related to diagnosis / functional impairment(s)  Goal 1: Patient will reduce sx   \"I will know I've met my goal when I am able to get my tasks done as needed.\"     Objective #A (Patient Action)    Patient will Decrease frequency and intensity of feeling down, depressed, hopeless  Identify negative self-talk and behaviors: challenge core beliefs, myths, and actions  Status: Continued - Date(s):1/27/25     Intervention(s)  [facility] will provide support through CBT, MI, Acceptance and Commitment Therapy, Dialectic Behavioral Therapy and problem solving model to explore and overcome barriers.        Goal 2: Patient will manage concerns of safety    I will know I've met my goal when I can reduce suicidal ideation .      Objective #A (Patient Action)    Patient will use previously developed safety plan on file.  Status: Con't - Date: 1/27/25     Intervention(s)  Therapist will provide support through CBT, MI, Acceptance and Commitment Therapy, Dialectic Behavioral Therapy and problem solving model to explore and overcome barriers.        Patient has reviewed and agreed to the above plan.    Written by  Joelle Viera LP[facility], [facility]     "

## 2025-01-27 ENCOUNTER — VIRTUAL VISIT (OUTPATIENT)
Dept: BEHAVIORAL HEALTH | Facility: CLINIC | Age: 65
End: 2025-01-27
Payer: COMMERCIAL

## 2025-01-27 ENCOUNTER — VIRTUAL VISIT (OUTPATIENT)
Dept: PSYCHIATRY | Facility: CLINIC | Age: 65
End: 2025-01-27
Payer: COMMERCIAL

## 2025-01-27 DIAGNOSIS — E72.12 HOMOZYGOUS FOR C677T POLYMORPHISM OF MTHFR: ICD-10-CM

## 2025-01-27 DIAGNOSIS — F33.9 RECURRENT MAJOR DEPRESSION RESISTANT TO TREATMENT: ICD-10-CM

## 2025-01-27 DIAGNOSIS — E88.89 CYP2D6 POOR METABOLIZER (H): ICD-10-CM

## 2025-01-27 DIAGNOSIS — F34.1 PERSISTENT DEPRESSIVE DISORDER: Primary | ICD-10-CM

## 2025-01-27 DIAGNOSIS — F41.1 GAD (GENERALIZED ANXIETY DISORDER): ICD-10-CM

## 2025-01-27 DIAGNOSIS — E88.89 CYP2B6 INTERMEDIATE METABOLIZER (H): ICD-10-CM

## 2025-01-27 PROCEDURE — 90832 PSYTX W PT 30 MINUTES: CPT | Mod: 95 | Performed by: COUNSELOR

## 2025-01-27 PROCEDURE — G2211 COMPLEX E/M VISIT ADD ON: HCPCS | Performed by: PSYCHIATRY & NEUROLOGY

## 2025-01-27 PROCEDURE — 98006 SYNCH AUDIO-VIDEO EST MOD 30: CPT | Performed by: PSYCHIATRY & NEUROLOGY

## 2025-01-27 RX ORDER — FLUOXETINE 10 MG/1
10 CAPSULE ORAL DAILY
Qty: 90 CAPSULE | Refills: 1 | Status: SHIPPED | OUTPATIENT
Start: 2025-01-27

## 2025-01-27 ASSESSMENT — PAIN SCALES - GENERAL: PAINLEVEL_OUTOF10: MODERATE PAIN (5)

## 2025-01-27 ASSESSMENT — PATIENT HEALTH QUESTIONNAIRE - PHQ9: SUM OF ALL RESPONSES TO PHQ QUESTIONS 1-9: 21

## 2025-01-27 NOTE — PROGRESS NOTES
"Virtual Visit Details    Type of service:  Video Visit   Video Start Time: {video visit start/end time for provider to select:885251}  Video End Time:{video visit start/end time for provider to select:167027}    Originating Location (pt. Location): {video visit patient location:200433::\"Home\"}  {PROVIDER LOCATION On-site should be selected for visits conducted from your clinic location or adjoining [facility] hospital, academic office, or other nearby [facility] building. Off-site should be selected for all other provider locations, including home:799063}  Distant Location (provider location):  {virtual location provider:027490}  Platform used for Video Visit: {Virtual Visit Platforms:871333::\"Appinions\"}          "

## 2025-01-27 NOTE — PATIENT INSTRUCTIONS
Treatment Plan:  Continue Pristiq 50 mg daily for mood, anxiety.    Continue methylfolate 7.5 mg daily as supplementation.  Continue Adderall XR 25 mg daily for mood augmentation  Continue Adderall IR 15 mg twice daily as needed for mood augmentation and daytime fatigue/hypersomnia  Continue hydroxyzine 25-50 mg up to three times a day as needed for anxiety/sleep.   Continue ketamine therapy as planned/needed.   Start fluoxetine/Prozac 10 mg daily for mood, anxiety. OK to start at 10 mg every other day and work way up to 20 mg daily as tolerated before next appt. If poorly tolerated, OK to stop.   Continue to work with your specialist from [facility] as indicated.    Continue with new somatic psychotherapist as planned.  Continue all other cares per primary care provider.   Continue all other medications as reviewed per electronic medical record today.   Safety plan reviewed. To the Emergency Department as needed or call after hours crisis line at [phone] or [phone]. [place] Crisis Text Line. Text [place] to 773679 or Suicide LifeLine Chat: suicidepreventionlifeline.org/chat  Schedule an appointment with me in 3 months, or sooner as needed. Call [facility] at [phone] to schedule.  Follow up with primary care provider as planned or for acute medical concerns.  Call the psychiatric nurse line with medication questions or concerns at [phone].  MyChart may be used to communicate with your provider, but this is not intended to be used for emergencies.    Risks of benzodiazepine (Ativan, Xanax, Klonopin, Valium, etc) use including, but not limited to, sedation, tolerance, risk for addiction/dependence. Do not drink alcohol while taking benzodiazepines due to risk of trouble breathing and potential death. Do not drive or operate heavy machinery until it is known how the drug affects you. Discuss with physician or pharmacist before ever taking a benzodiazepine with a narcotic/opioid pain  medication.     Have previously discussed risks of stimulant medication including, but not limited to, decreased appetite, risk of tics (and that they may be lasting), trouble sleeping, cardiac risks such as increased heart rate and blood pressure, and rare risk of sudden cardiac death.  Also risk of addiction/tolerance/dependence.    Serotonin syndrome symptoms usually occur within several hours of taking a new drug or increasing the dose of a drug you're already taking.   Signs and symptoms include:  Agitation or restlessness  Confusion  Rapid heart rate and high blood pressure  Dilated pupils  Loss of muscle coordination or twitching muscles  Muscle rigidity  Heavy sweating  Diarrhea  Headache  Shivering  Goose bumps    Severe serotonin syndrome can be life-threatening. Signs and symptoms include:  High fever  Seizures  Irregular heartbeat  Unconsciousness    If you suspect you might have serotonin syndrome after starting a new drug or increasing the dose of a drug you're already taking, call your doctor right away or go to the emergency room. If you have severe or rapidly worsening symptoms, seek emergency treatment immediately.    There are some over-the-counter medications AND non-mental health prescribed drugs that can cause or contribute to serotonin syndrome when you are taking a medication already that increases serotonin.     Medications and supplements (Rx'd and OTC) that can increase risk for serotonin syndrome:  [email or web address]-[email or web address]

## 2025-01-27 NOTE — NURSING NOTE
Is the patient currently in the state of [place]? YES    Current patient location: [address] [place] [id number]    Visit mode:Video    If the visit is dropped, the patient can be reconnected by: VIDEO VISIT: Text to cell phone:   Telephone Information:   Mobile [phone]       Will anyone else be joining the visit? No  (If patient encounters technical issues they should call [phone])    Are changes needed to the allergy or medication list? Pt stated no changes to allergies and Pt stated no med changes    Are refills needed on medications prescribed by this physician? Yes, discuss with provider:  -amphetamine-dextroamphetamine (ADDERALL XR) 25 MG 24 hr capsule     Rooming Documentation: Questionnaire(s) completed.    Reason for visit: RECHECK     MATT Davila

## 2025-01-27 NOTE — PROGRESS NOTES
"Telemedicine Visit: The patient's condition can be safely assessed and treated via synchronous audio and visual telemedicine encounter.      Reason for Telemedicine Visit: Patient has requested telehealth visit    Originating Site (Patient Location): Patient's home    Distant Location (provider location):  On-Site    Consent:  The patient/guardian has verbally consented to: the potential risks and benefits of telemedicine (video visit) versus in person care; bill my insurance or make self-payment for services provided; and responsibility for payment of non-covered services.     Mode of Communication:  Video Conference via [employer]    As the provider I attest to compliance with applicable laws and regulations related to telemedicine.        Outpatient Psychiatric Progress Note    Name: Janelle White   : 1960                    Primary Care Provider: Emili Ballard MD   Therapist: Lorena Corbin @ [facility]     PHQ-9 scores:      2024    10:06 AM 2025     9:34 AM 2025     7:47 AM   PHQ-9 SCORE   PHQ-9 Total Score MyChart  10 (Moderate depression)    PHQ-9 Total Score 14 10  21       Patient-reported       STACIE-7 scores:      2024    10:02 AM 2024     6:15 PM 2024    12:34 PM   STACIE-7 SCORE   Total Score 8 (mild anxiety) 10 (moderate anxiety) 15 (severe anxiety)   Total Score 8    8 10 15       Patient Identification:  Patient is a 64 year old,   White Choose not to answer female  who presents for return visit with me. Patient attended the phone/video session alone. Patient prefers to be called: \"Janelle\".    Interim History:  I last saw Janelle White for outpatient psychiatry return visit on 10/15/2024. During that appointment, we:  Continue Pristiq 50 mg daily for mood, anxiety.    Continue methylfolate 7.5 mg daily as supplementation.  Continue Adderall XR 25 mg daily for mood augmentation  Continue Adderall IR 15 mg twice daily as needed " for mood augmentation and daytime fatigue/hypersomnia  Continue hydroxyzine 25-50 mg up to three times a day as needed for anxiety/sleep.   Continue ketamine therapy as planned/needed.   Continue to work with your specialist from [facility] as indicated.    Continue with new somatic psychotherapist as planned.    1/27: Overall doing relatively okay.  Some worsening pain involving shoulder injury.  Patient also tends to have more difficulties during the holiday season.  Some seasonally worsening symptoms as well-reduced energy and motivation.  However, patient feels like managing relatively okay.  Continues in psychotherapy.  Suicidal ideation remains overall improved and is currently a couple times a week and fleeting.  Ketamine continues to be helpful.  No acute safety concerns today.  No problematic drug or alcohol use.  Patient has been using THC lately and would prefer to cut back some.  Is not causing any notable problems, but taking up more space as a distraction for the patient than she would prefer.  Patient did tried taking 75 mg every several days of Pristiq but continued to have some word-finding difficulties and other cognitive related effects.  Just taking 50 mg of Pristiq currently.    Per [facility], Joelle Viera, [facility], during today's team-based visit:  Current Stressors / Issues:  MH update:  ER visit- shoulder dislocated/fracture/tear.  Pain and poor sleep.  Doing okay since last appt.  Went to home town for holidays- high anxiety  Stresses:  family stress, divorce on hold,  traveling  Appetite: n/a  Sleep: Poor-pain  Outpatient Provider updates: Ketamine John TRD, Therapy Lorena Corbin, [facility]  SI/SIB/HI: Reduced overall to a couple times a week/fleeting.  Baseline method/planning.  Denies any intent.  safety plan on file.  Hx of attempts and SIB as teen.  Hx of baseline planning and method seeking without intent.  AYLA:  Sober.  AA support.  Using THC again  Side  effects/compliance:  Interventions:  [facility] engaged in discussion about interpersonal dynamics during holidays.  Let them theory.  Most important:  Doing over all okay.  ER visit-medical    Past medication trials include but are not limited to:   Effexor-very flat  Celexa, zoloft, was on wellbutrin a couple years at one point  wellbutrin XL + celexa; 2005ish  tca-a ton of weight gain  depakote maybe for a short time  Abilify/lithium ?  ECT as teen - made me dull  Cytomel-not effective at all, used 50 mcg    Psychiatric ROS:  Janelle White reports mood has been: See HPI above  Anxiety has been: See HPI above  Sleep has been: struggles at times, especially due to pain  Deepa sxs: Denies  Psychosis sxs: None  ADHD/ADD sxs: None  PTSD sxs: None  PHQ9 and GAD7 scores were reviewed today if completed.   Medication side effects: Denies anything major related to current psychiatric medications  Current stressors include: Symptoms and See HPI above  Coping mechanisms and supports include: Family, Hobbies and Friends, therapy    Current medications include:   Current Outpatient Medications   Medication Sig Dispense Refill    albuterol (PROAIR HFA/PROVENTIL HFA/VENTOLIN HFA) 108 (90 Base) MCG/ACT inhaler Inhale 2 puffs into the lungs every 6 hours as needed for shortness of breath or wheezing 8.5 g 11    amphetamine-dextroamphetamine (ADDERALL XR) 25 MG 24 hr capsule Take 1 capsule (25 mg) by mouth daily. 30 capsule 0    [START ON 2/14/2025] amphetamine-dextroamphetamine (ADDERALL XR) 25 MG 24 hr capsule Take 1 capsule (25 mg) by mouth daily. 30 capsule 0    [START ON 3/16/2025] amphetamine-dextroamphetamine (ADDERALL XR) 25 MG 24 hr capsule Take 1 capsule (25 mg) by mouth daily. 30 capsule 0    amphetamine-dextroamphetamine (ADDERALL) 15 MG tablet Take 1 tablet (15 mg) by mouth 2 times daily. 60 tablet 0    [START ON 2/14/2025] amphetamine-dextroamphetamine (ADDERALL) 15 MG tablet Take 1 tablet (15 mg) by mouth 2 times  daily. 60 tablet 0    [START ON 3/16/2025] amphetamine-dextroamphetamine (ADDERALL) 15 MG tablet Take 1 tablet (15 mg) by mouth 2 times daily. 60 tablet 0    Cobalamin Combinations (VITAMIN B12-FOLIC ACID) 500-400 MCG TABS Take 1 tablet by mouth      desvenlafaxine (PRISTIQ) 50 MG 24 hr tablet Take 1 tablet (50 mg) by mouth daily. 90 tablet 1    Estradiol (DIVIGEL) 1 MG/GM GEL Place 1 packet onto the skin daily 30 g 11    hydrOXYzine (VISTARIL) 25 MG capsule Take 1 capsule (25 mg) by mouth 3 times daily as needed for itching or anxiety 90 capsule 3    Levomefolate Glucosamine (METHYLFOLATE PO) Take 7.5 mg by mouth daily      lidocaine (LIDODERM) 5 % patch Place 3 patches onto the skin daily Apply up to 3 patches to skin. Wear for 12 hours and remove for 12 hrs.  Refill when patient requests. 120 patch 3    medical cannabis (Patient's own supply.  Not a prescription) Medical Cannabis - [place] 4-6 ml by mouth daily. [facility]      methocarbamol (ROBAXIN) 500 MG tablet Take 1 tablet (500 mg) by mouth 4 times daily as needed for muscle spasms 120 tablet 1    methocarbamol (ROBAXIN) 750 MG tablet Take 1 tablet (750 mg) by mouth 4 times daily as needed for muscle spasms 120 tablet 4    methylPREDNISolone (MEDROL DOSEPAK) 4 MG tablet therapy pack Follow Package Directions 21 tablet 0    naloxone (NARCAN) 4 MG/0.1ML nasal spray Spray 1 spray (4 mg) into one nostril alternating nostrils as needed for opioid reversal every 2-3 minutes until assistance arrives 0.2 mL 1    NONFORMULARY Take 2 Scoops by mouth daily Protein and Vitamin shake mix - Nutritional supplement      ondansetron (ZOFRAN ODT) 8 MG ODT tab Take 1 tablet (8 mg) by mouth every 8 hours as needed for nausea 30 tablet 4    Prasterone 6.5 MG INST Place 0.5 suppositories vaginally three times a week 28 each 11    rOPINIRole (REQUIP) 0.25 MG tablet TAKE 1 TO 2 TABLETS(0.25 TO 0.5 MG) BY MOUTH AT BEDTIME 180 tablet 3    senna-docusate (SENOKOT-S/PERICOLACE)  8.6-50 MG tablet Take 6-9 tablets by mouth every evening      vitamin D3 (CHOLECALCIFEROL) 125 MCG (5000 UT) tablet Take 5,000 Units by mouth daily       Current Facility-Administered Medications   Medication Dose Route Frequency Provider Last Rate Last Admin    NONFORMULARY 1.1 mg/kg (Ideal)  1.1 mg/kg (Ideal) Intramuscular Q21 Days Zafar Yanes MD   60 mg at 01/13/25 0950     The [place] Prescription Monitoring Program has been reviewed and there are no concerns about diversionary activity for controlled substances at this time.   01/16/2025 01/15/2025 1 Dextroamp-Amphetamin 15 Mg Tab 60.00 30 [facility] Bau [id number] [facility] ([id number]) 0/0  [place]are [place]   01/07/2025 09/30/2024 1 Dextroamp-Amphet Er 25 Mg Cap 30.00 30 Al Bau [id number] [facility] ([id number]) 0/0  [place]   12/10/2024 09/30/2024 1 Dextroamp-Amphetamin 15 Mg Tab 60.00 30 Al Bau [id number] [facility] ([id number]) 0/0  [place]   12/02/2024 07/15/2024 1 Dextroamp-Amphet Er 25 Mg Cap 30.00 30 Al Bau [id number] [facility] ([id number]) 0/0  [place]       Past Medical/Surgical History:  Past Medical History:   Diagnosis Date    Anxiety     Cervicalgia 2007    C5-6 disc protrusion    COPD (chronic obstructive pulmonary disease) (H) 2/7/2022    Depressive disorder     ESBL (extended spectrum beta-lactamase) producing bacteria infection     History of blood transfusion 2007    Cervical fusion    Leukemia (H)     CLA large beta    Melanoma (H) 1998    Migraine     Other chronic pain     Rotator cuff tear     s/p injections    Sacroiliac inflammation     Shift work sleep disorder 12/16/2013    Urinary calculi     Vitamin B12 deficiency anemia 2006    started injections      has a past medical history of Anxiety, Cervicalgia (2007), COPD (chronic obstructive pulmonary disease) (H) (2/7/2022), Depressive disorder, ESBL (extended spectrum beta-lactamase) producing bacteria infection, History of blood transfusion (2007), Leukemia (H), Melanoma (H) (1998), Migraine, Other chronic pain, Rotator cuff tear,  "Sacroiliac inflammation, Shift work sleep disorder (12/16/2013), Urinary calculi, and Vitamin B12 deficiency anemia (2006).    She has no past medical history of Cerebral infarction (H), Congestive heart failure (H), Coronary artery disease, Diabetes (H), Heart disease, Hypertension, Thyroid disease, or Uncomplicated asthma.    Social History:  Reviewed. No changes to social history except as noted above in HPI.    Vital Signs:   None. This is phone/video visit.     Labs:  Most recent laboratory results reviewed and the pertinent results include:   Lab Results   Component Value Date    WBC 7.1 06/21/2024    WBC 3.2 05/24/2021     Lab Results   Component Value Date    RBC 4.17 06/21/2024    RBC 3.74 05/24/2021     Lab Results   Component Value Date    HGB 13.1 06/21/2024    HGB 11.0 05/24/2021     Lab Results   Component Value Date    HCT 40.4 06/21/2024    HCT 33.6 05/24/2021     No components found for: \"MCT\"  Lab Results   Component Value Date    MCV 97 06/21/2024    MCV 90 05/24/2021     Lab Results   Component Value Date    MCH 31.4 06/21/2024    MCH 29.4 05/24/2021     Lab Results   Component Value Date    MCHC 32.4 06/21/2024    MCHC 32.7 05/24/2021     Lab Results   Component Value Date    RDW 12.7 06/21/2024    RDW 13.4 05/24/2021     Lab Results   Component Value Date     06/21/2024     05/24/2021       Last Comprehensive Metabolic Panel:  Sodium   Date Value Ref Range Status   09/17/2024 141 135 - 145 mmol/L Final   05/24/2021 141 133 - 144 mmol/L Final     Potassium   Date Value Ref Range Status   09/17/2024 3.9 3.4 - 5.3 mmol/L Final   06/30/2022 3.4 3.4 - 5.3 mmol/L Final   05/24/2021 3.9 3.4 - 5.3 mmol/L Final     Chloride   Date Value Ref Range Status   09/17/2024 104 98 - 107 mmol/L Final   06/30/2022 105 94 - 109 mmol/L Final   05/24/2021 108 94 - 109 mmol/L Final     Carbon Dioxide   Date Value Ref Range Status   05/24/2021 25 20 - 32 mmol/L Final     Carbon Dioxide (CO2)   Date Value " Ref Range Status   09/17/2024 27 22 - 29 mmol/L Final   06/30/2022 25 20 - 32 mmol/L Final     Anion Gap   Date Value Ref Range Status   09/17/2024 10 7 - 15 mmol/L Final   06/30/2022 6 3 - 14 mmol/L Final   05/24/2021 8 3 - 14 mmol/L Final     Glucose   Date Value Ref Range Status   09/17/2024 94 70 - 99 mg/dL Final   06/30/2022 91 70 - 99 mg/dL Final   05/24/2021 87 70 - 99 mg/dL Final     Urea Nitrogen   Date Value Ref Range Status   09/17/2024 20.1 8.0 - 23.0 mg/dL Final   06/30/2022 20 7 - 30 mg/dL Final   05/24/2021 15 7 - 30 mg/dL Final     Creatinine   Date Value Ref Range Status   09/17/2024 0.69 0.51 - 0.95 mg/dL Final   05/24/2021 0.64 0.52 - 1.04 mg/dL Final     GFR Estimate   Date Value Ref Range Status   09/17/2024 >90 >60 mL/min/1.73m2 Final     Comment:     eGFR calculated using 2021 CKD-EPI equation.   05/24/2021 >90 >60 mL/min/[1.73_m2] Final     Comment:     Non  GFR Calc  Starting 12/18/2018, serum creatinine based estimated GFR (eGFR) will be   calculated using the Chronic Kidney Disease Epidemiology Collaboration   (CKD-EPI) equation.       Calcium   Date Value Ref Range Status   09/17/2024 9.1 8.8 - 10.4 mg/dL Final     Comment:     Reference intervals for this test were updated on 7/16/2024 to reflect our healthy population more accurately. There may be differences in the flagging of prior results with similar values performed with this method. Those prior results can be interpreted in the context of the updated reference intervals.   05/24/2021 8.4 (L) 8.5 - 10.1 mg/dL Final     Bilirubin Total   Date Value Ref Range Status   09/17/2024 <0.2 <=1.2 mg/dL Final   03/16/2021 0.4 0.2 - 1.3 mg/dL Final     Alkaline Phosphatase   Date Value Ref Range Status   09/17/2024 100 40 - 150 U/L Final   03/16/2021 87 40 - 150 U/L Final     ALT   Date Value Ref Range Status   09/17/2024 20 0 - 50 U/L Final   03/16/2021 26 0 - 50 U/L Final     AST   Date Value Ref Range Status   09/17/2024 25  0 - 45 U/L Final   03/16/2021 44 0 - 45 U/L Final     Review of Systems:  10 systems (general, cardiovascular, respiratory, eyes, ENT, endocrine, GI, , M/S, neurological) were reviewed. Most pertinent finding(s) is/are: Severe chronic pain. The remaining systems are all unremarkable.    Mental Status Examination (limited as this is by phone/video):  Appearance: Awake, alert, appears stated age, no acute distress  Attitude:  cooperative, pleasant   Motor: No gross abnormalities observed via video, not formally tested.  Oriented to:  person, place, time, and situation  Attention Span and Concentration:  normal  Speech:  clear, coherent, regular rate, rhythm, and volume  Language: intact  Mood: ok  Affect: Mood congruent  Associations:  no loose associations  Thought Process:  logical, linear and goal oriented  Thought Content: Chronic passive suicidal ideation-does not endorse acute suicidality today, no current plan or intent to harm self today, no homicidal ideation, no evidence of psychotic thought, no auditory hallucinations present and no visual hallucinations present  Recent and Remote Memory:  Intact to interview. Not formally assessed. No amnesia.  Fund of Knowledge: appropriate  Insight:  good  Judgment:  intact, adequate for safety  Impulse Control:  intact    Suicide Risk Assessment:  Today Janelle White is with chronic/intermittent suicidal ideation-no acute suicidality endorsed today and ongoing sustained improvement in SI.There are notable risk factors for self-harm, including anxiety, comorbid medical condition of Chronic pain, suicidal ideation, purposelessness/no reason for living, hopelessness, withdrawing and mood change. However, risk is mitigated by commitment to family, absence of past attempts, ability to volunteer a safety plan, history of seeking help when needed, future oriented and denies suicidal intent today. Therefore, based on all available evidence including the factors cited  above, Janelle White does not appear to be at imminent risk for self-harm, does not meet criteria for a 72-hr hold, and therefore remains appropriate for ongoing outpatient level of care.  A thorough assessment of risk factors related to suicide and self-harm have been reviewed and are noted above. Local community safety resources reviewed for patient to use if needed. There was no deceit detected, and the patient presented in a manner that was believable.     DSM5 Diagnosis:  Persistent depressive disorder  Treatment resistant depression  CYP2D6 poor metabolizer  CYP2B6 intermediate metabolizer  Homozygous for C677T polymorphism of MTHFR    Medical comorbidities include:   Patient Active Problem List    Diagnosis Date Noted    Failed back surgical syndrome 12/01/2023     Priority: Medium    Lumbar radiculopathy 10/26/2023     Priority: Medium    Sacroiliitis 08/04/2023     Priority: Medium    History of falling 03/13/2023     Priority: Medium    Suicidal ideation 06/30/2022     Priority: Medium    Immunocompromised 06/30/2022     Priority: Medium    Infection due to 2019 novel coronavirus 06/30/2022     Priority: Medium    Severe episode of recurrent major depressive disorder, without psychotic features (H) 04/17/2022     Priority: Medium    COPD (chronic obstructive pulmonary disease) (H) 02/07/2022     Priority: Medium    Tension type headache 11/04/2021     Priority: Medium    Rotator cuff injury 11/04/2021     Priority: Medium    Spinal stenosis of lumbar region with radiculopathy 09/02/2021     Priority: Medium    COVID-19 08/29/2021     Priority: Medium    Polyarthralgia 08/11/2021     Priority: Medium    Cellulitis, unspecified cellulitis site 08/11/2021     Priority: Medium    Sepsis, due to unspecified organism, unspecified whether acute organ dysfunction present (H) 08/11/2021     Priority: Medium    Cellulitis 08/11/2021     Priority: Medium    STACIE (generalized anxiety disorder) 07/27/2021      Priority: Medium    MTHFR gene mutation 04/12/2021     Priority: Medium    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     Priority: Medium    CYP2D6 poor metabolizer (H) 04/12/2021     Priority: Medium    Status post blepharoplasty 11/18/2020     Priority: Medium    Regular astigmatism, bilateral 11/18/2020     Priority: Medium    Prediabetes 09/19/2019     Priority: Medium    Hyperlipidemia LDL goal <130 09/19/2019     Priority: Medium    CLARE (obstructive sleep apnea) 02/13/2019     Priority: Medium    Controlled substance agreement signed 08/14/2018     Priority: Medium    Chronic pain syndrome 12/21/2017     Priority: Medium    CLL (chronic lymphocytic leukemia) (H) 12/21/2017     Priority: Medium    Hypotension 09/14/2017     Priority: Medium    History of laser assisted in situ keratomileusis 10/14/2014     Priority: Medium    Pain medication agreement 04/23/2014     Priority: Medium     Formatting of this note might be different from the original.  Patient takes morphine 15 mg ER BID for chronic neck and back pain.  She is also on cymbalta, ibuprofen, flexaril prn      Shift work sleep disorder 12/16/2013     Priority: Medium    Vitamin D deficiency 11/08/2012     Priority: Medium    Moderate recurrent major depression (H) 01/06/2011     Priority: Medium    DDD (degenerative disc disease), cervical 10/07/2010     Priority: Medium    CARDIOVASCULAR SCREENING; LDL GOAL LESS THAN 160 02/10/2010     Priority: Medium    Chronic Low Back Pain 10/01/2009     Priority: Medium     S/p AP L3-S1 fusion 12/2010 - referred to [facility].   Orthopedics writing scripts for narcotics post-op.      Migraine      Priority: Medium     Problem list name updated by automated process. Provider to review      B-complex deficiency 10/10/2006     Priority: Medium     Problem list name updated by automated process. Provider to review      PERSONAL HX OF  MELANOMA 12/04/2003     Priority: Low       Psychosocial & Contextual  Factors: see HPI above    Assessment:  6/15/2021:  Janelle White reports overall some significant worsening of mood symptoms.  Increased anxiety with her depression.  Poor motivation and poor energy.  Symptoms severe enough to prevent her from being able to utilize typical coping skills and strategies.  No current motivation or energy to participate in PHP/day treatment program.  Continues to take medication as scheduled.  Recent surgery and general anesthesia could be contributing.  Discussed discontinuing stimulant medication as an augmentation strategy.  She is agreeable to moving forward with T3 as an augmentation for treatment resistant depression.  Discussed risks and benefits at length.  Will get baseline thyroid labs prior to starting T3.  Hoping she will experience increased energy and motivation and that her mood will lift.  Also discussed setting up an appointment with her interventional psychiatry clinic to discuss other potential options such as ketamine therapy, ECT, TMS.  Does have history of ECT for depression when she was quite young.  I am hopeful to stay ahead of her symptoms before things get too severe.  Has chronic suicidal ideation and is at high risk for suicide.  Continues to deny any plan or intent.  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  See below for risk/benefit conversation regarding T3 had with the patient:    GeneSight testing Info:  GeneSight testing revealed she is a poor 2D6 metabolizer and an intermediate 2B6 metabolizer.  This would explain her multiple failed medication trials due to the negative side effects.  She also has a genotype that would suggest a phenotype sensitivity to serotonin. She also was found to have significantly reduced folic acid conversion.      Starting T3 as augment to antidepressant therapy for treatment resistant depression:  Start T3 at 25 mcg per day for one to two weeks, and if there is little or no  improvement, increase the dose to 50 mcg per day; this is consistent with practice guidelines from the American Psychiatric Association and [place] [employer] for Mood and Anxiety Treatments.     Adverse effects consistent with hyperthyroidism may occur, including tremor, palpitations, heat intolerance, sweating, anxiety, increased frequency of bowel movements, shortness of breath, and exacerbation of cardiac arrhythmia. In addition, hyperthyroidism that emerges during long-term treatment may lead to bone demineralization, osteoporosis, and an increased risk of fracture    Following a normal baseline TSH concentration, no other laboratory monitoring during a four to six week trial of adjunctive T3 is necessary. However, if T3 is continued longer, a serum TSH concentration should be checked after the first one to three months of treatment and then every six months.    Today, 7/27/21:   Patient overall with little change in her symptoms.  Did not do as well on Cytomel and had limited to no improvement at all after weeks on 50 mcg.  Labs were unremarkable prior to starting Cytomel.  Opted to go back to stimulant augmentation since patient does find it quite helpful.  She has been taking 15 mg of immediate release most afternoons.  Due to good tolerability and some efficacy, we will bump up her immediate release dose slightly to 20 mg daily as needed in addition to her 20 mg extended release dose. I have no concerns about misuse or diversion at this time.  Tolerating well with no negative side effects.  Last blood pressure normal 7/2.  Patient has noted some efficacy from Pristiq more than other antidepressant trials and so we will continue to titrate further to 100 mg daily.  She is tolerating well.  Continues to use lorazepam for anxiety, pain medicine adjunct, and for sleep as prescribed by primary care provider.  Has been utilizing roughly 100 tablets of 0.5 mg every 1.5 months.  Patient with ongoing chronic  intermittent passive suicidal ideation with no plan or intent.  Denies safety concerns today.  No problematic drug or alcohol use.  I am hopeful the interventional psychiatry clinic might be able to initiate ketamine therapy or another therapy they would recommend as potentially being more helpful than patient's multiple medication/augmentation trials.    10/6/2021:  Patient with some improved depression symptoms and much more hopeful.  [facility]  Also is hopeful there will be some helpful treatments.  Visit to [place] was also very good and instilled a lot of hope in her abilities.  She was encouraged to continue to push herself to do the things she enjoys doing.  No medication changes today.  She will follow-up with getting TMS rescheduled, possibly when things slow down after the holidays.  Does not need to be seen until after the holidays.  No acute safety concerns today.  No problematic drug or alcohol use.    1/14/2022:  Overall patient feeling a little more down lately.  Tolerating TMS well.  Encouraged to continue TMS.  No medication changes today since undergoing TMS treatment.  Talked about life stages today generativity versus stagnation and also integrity versus despair.  Talked about consideration for acceptance and commitment therapy.  She is encouraged to continue to be active.  No acute suicidal ideation today.  No acute safety concerns today.  No problematic drug or alcohol use.    4/13/2022:   Patient continues to have symptoms that wax and wane.  Feels like she tolerates Pristiq 50 mg better than 100 and will continue on this dose.  Last week was particularly difficult.  Pretty intense suicidal ideation but she was able to manage these thoughts and remain safe.  She does report she would come to the hospital if necessary.  We discussed the empath unit today and other resources if she felt she could not keep herself safe.  She continues to work on  tapering her narcotic pain medication regimen.  She is working with addiction medicine and also pain management.  She is starting Pilates therapy.  Pool therapy will begin in July.  Discussed possibility of vestibular rehabilitation.  Individual therapy will also start soon.  She was strongly encouraged to continue to pursue ketamine therapy.  No acute suicidality today.  No problematic drug or alcohol use.    6/23/2022:  Overall reports doing relatively okay.  Some pretty down days still, but ketamine therapy going well.  Feels like continuing to move in the right direction.  Suicidal ideation improving.  Off all narcotic pain medication.  Feels good about her progress.  Had some really down days after off pain medication but improved since those few dark days.  Hopeful about where ketamine therapy may lead.  Discussed some of her restlessness at bedtime and will start pramipexole.  Also some evidence to suggest pramipexole could be helpful for treatment resistant depression symptoms.  Discussed risks and benefits of therapy, including watching for any impulsive behaviors.  Continues to have suicidal thoughts but no acute suicidality today.  Her suicidality overall is improving from her baseline suicidality.  She continues to consider the idea of a partial hospital program.  We will send her information for [facility] program.  Also encouraged her to discuss possibility of a pain program through [facility] with Dr. Medley.  No acute safety concerns today.  No problematic drug or alcohol use.    7/11/2022:  Patient with some recent more intensive struggles.  Depression much more severe recently.  Led to hospitalization.  Patient medically hospitalized since was positive for COVID and has history of CLL and [facility] protocol requires isolation.  Patient seen by psychiatry consult service.  No medication changes made.  Patient continues with interventional psychiatry clinic.  They will be increasing ketamine dose  and treatments will be every few weeks.  We discussed patient's symptom history a little more today and possible bipolar diagnosis came into question.  Patient does recognize possible hypomanic episodes in the past.  Patient is currently monitoring symptoms closely and pramipexole.  She is feeling better since starting pramipexole but is watching shopping behaviors closely.  Suicidal ideation is improving since hospitalization.  Restless legs improved at night on pramipexole.  Discussed we could consider adding lower dose lithium as an augment to her Pristiq and to help stabilize moods a little bit more and to further help with some of her chronic suicidal ideation.  We also discussed DBT for chronic suicidal ideation and ongoing mood instability.  Continues off of pain medication.  No acute suicidality or safety concerns today.  Patient will follow up in a few weeks.  No medication changes today since we will wait to see how ketamine dose change goes.  No drug or alcohol use concerns.  Patient noted some ongoing cognitive/memory concerns and requested testing.  Neuropsychological referral placed.    8/8/2022:  Patient with ongoing severe depression, resistant to treatment.  She is agreeable to increasing her stimulant medication.  This could hopefully further improve mood slightly.  May also help with some additional energy and also help with some of her ongoing cognitive concerns.  She has neuropsychological testing coming up soon.  Discussed possibly considering individual DBT therapy with a DBT certified therapist given her ongoing chronic suicidal ideation and inability to participate in group therapy currently.  We will discuss this possibility with her current therapist.  Also discussed possibility of increasing Pristiq by 25 mg only 2 or 3 days a week to decrease risk of negative side effects (25 mg on Tuesdays/Thursdays for Monday/Wednesday/Fridays).  Patient also encouraged to continue ketamine treatment.   No acute suicidality today.  Patient denies any intent or plan to act on any of her suicidal thoughts today.  No problematic drug or alcohol use.    9/7/2022:  Patient doing relatively okay today.  Pain continues to feel a little bit worse.  Emotionally though, despite worsening pain symptoms, patient feels like she is doing relatively okay.  Discussed various psychotherapy approaches that could be taken to address her symptoms.  We discussed that health psychology could be an optimal approach to help with her symptoms but that her suicidal ideation is often still quite intense and may be a distractor to her treatment with a health psychologist (discussed she may be referred often to the emergency room or to other more intensive programs).  We discussed working with an individual DBT therapist as a possibility to continue addressing her ongoing chronic suicidal ideation (individual therapy would allow patient to move at her own pace since group therapy is much too intense at this time).  Patient was open to this idea.  [facility] today we will send patient some resources/options.  Depending on patient's financial situation, there is also a possibility patient could work with a health psychologist in conjunction with a DBT therapist.  Ketamine therapy has proven to be helpful, although I do wish the effects lasted a little longer than what they currently are for the patient.  I recommend patient continue her ketamine treatments.  No medication changes today.  She denies any acute suicidality today.  No plan or intent to harm herself noted today.  She denies being in a bad place today.  No problematic drug or alcohol use.     10/7/2022:  Patient overall relatively stable at this time.  Mood improving slightly with ketamine therapy.  Patient encouraged to continue with treatment.  Anxiety a little more manageable.  Continuing to struggle with severe chronic pain.  Tolerating current medications well with no negative side  effects noted.  No changes today.  Patient will continue in individual psychotherapy.  No acute suicidality today.  Suicidal thoughts at baseline, maybe even a little improved.    1/9/2023:  Patient overall doing quite well.  Some days still worse than others and chronic pain continues to be a big factor influencing her mental health.  Ketamine has been very helpful.  Still some poor energy and fatigue.  Adderall has been very helpful.  We will increase the dose just slightly.  Extended release dose will increase from 20 mg to 25 mg to further address her symptoms.  We will continue with the 15 mg twice daily immediate release doses.  No other medication changes today.  Patient encouraged to stay the course.  No acute safety concerns.  Suicidal thoughts continue to be passive in nature and have overall improved since starting ketamine.  No problematic drug or alcohol use.    3/20/2023:  Patient remains overall relatively stable.  Having some anxiety over feeling more dependent on cannabis for her pain.  Discussed continuing gratitude journal.  Patient has come quite a ways with relative stability.  No medication changes today.  Denies that cannabis use is causing any problems apart from feeling a little anxious about her use.  Patient even participating in more activities this spring compared to last year.  No acute safety concerns at this time.  No acute suicidality.    5/19/2023:  Patient overall continuing to manage okay.  Feels relatively stable.  Continues ketamine therapy.  No med changes today.  No acute safety concerns.  No acute suicidality today.  Is pleased that she is finding some roxy in pleasurable activities this spring.  Continues to seek purpose in life.  No problematic drug or alcohol use.  Patient will be seen back in 3 months.    8/25/2023:  Continues to manage relatively okay on current regimen.  No major questions or concerns today.  Interested in keeping things status quo/the same.  Continues  to receive ketamine infusions.  Pain continues to be forefront.  We discussed referral to a provider who could provide an evaluation to discuss ketamine for pain and possibly mood to replace her infusions.  Referral sent and discussed with patient.  Patient is very agreeable and interested in what ever options might be available for her.  No acute safety concerns today.  No problematic drug or alcohol use.  No acute suicidality today.  Patient continues to find ways to stay distracted.  Has started with new therapist.    11/17/2023:   Patient overall with some worsening symptoms due to some significantly worsening chronic pain.  Patient does have some relief of her pain symptoms after ketamine treatments-for up to a few days.  Patient reports referral to Dr. Rancho Jennings canceled by her current pain medicine provider since patient is already seeing this pain medicine provider within the same system.  Patient did report her pain medicine provider was going to reach out to Dr. Jennings to have a discussion about ketamine.  This provider will send staff message to both providers to see if we can get an update on the collaboration.  No medication changes made today.  Tolerating well overall.  No problematic drug or alcohol use.  Passive suicidal ideation at baseline.  No acute suicidality or plan or intent to harm self today.    2/19/2024:  Patient with ongoing pain struggles and relatively little to no changes in mood recently.  Patient will be meeting with neurosurgeon to discuss whether there are any appropriate surgical interventions for her pain.  Did not endorse any acute safety concerns today.  Did not nurse acute suicidality.  Continues ketamine therapy which she finds helpful.  No medication changes today.  Discussed DBT recommendation.  No problematic drug or alcohol use.    4/15/2024:   The patient overall doing relatively okay.  Some improvements along with some ongoing struggles.  Given some of these  2-week waves of worsening symptoms, patient is agreeable to a trial of increasing Pristiq.  Depending on how anxiety settles and how increased Pristiq is tolerated, could consider BuSpar or clonidine in the future for anxiety.  No acute safety concerns today.  No acute suicidality.  Continues ketamine therapy.  Recently started with a new somatic therapist.    7/15/2024:  Patient overall doing relatively okay.  Some increase psychosocial stressors.  Filing for divorce from .  Tolerating medication well.  No acute safety concerns.  No SI.  No medication changes today.  Patient will be seen back in 3 months.  Encouraged to continue psychotherapy.    10/15/2024:  Overall feeling relatively stable.  Taking medication as prescribed.  Tolerating well.  No acute safety concerns.  No acute suicidality.  No problematic drug or alcohol use.  No med changes today.  Patient prefers to continue on Pristiq 50 mg daily at this time.  Discussed taking an extra dose of 25 mg 1-3 times per week to see if can still get some of the benefits of increased dose without some of the side effects experienced at 75 mg daily.    1/27/2025:  Patient overall doing relatively okay.  Still some down days, high anxiety, and reduced energy and motivation.  Unfortunately has not tolerated attempts to increase the Pristiq due to side effects.  Patient agreeable to augment Pristiq with just a little bit of Prozac/fluoxetine.  Patient is poor 2D6 metabolizer and so we will start the Prozac at just 10 mg daily.  Discussed Prozac can increase strength of stimulants.  Patient will monitor for side effects.  Hopeful that Prozac might further lift mood and help with energy, motivation, and anxiety improvement.  Discussed watching for signs and symptoms of serotonin syndrome.  No acute safety concerns today.  No acute suicidality.  No problematic drug or alcohol use.  Patient is using cannabis and continues to explore her relationship with the  psychoactive substance.  Continues ketamine treatment and finds it helpful.  Encouraged to continue in psychotherapy.    Medication side effects and alternatives were reviewed. Health promotion activities recommended and reviewed today. All questions addressed. Education and counseling completed regarding risks and benefits of medications and psychotherapy options. Recommend therapy for additional support.     Treatment Plan:  Continue Pristiq 50 mg daily for mood, anxiety.    Continue methylfolate 7.5 mg daily as supplementation.  Continue Adderall XR 25 mg daily for mood augmentation  Continue Adderall IR 15 mg twice daily as needed for mood augmentation and daytime fatigue/hypersomnia  Continue hydroxyzine 25-50 mg up to three times a day as needed for anxiety/sleep.   Continue ketamine therapy as planned/needed.   Start fluoxetine/Prozac 10 mg daily for mood, anxiety. OK to start at 10 mg every other day and work way up to 20 mg daily as tolerated before next appt. If poorly tolerated, OK to stop.   Continue to work with your specialist from [facility] as indicated.    Continue with new somatic psychotherapist as planned.  Continue all other cares per primary care provider.   Continue all other medications as reviewed per electronic medical record today.   Safety plan reviewed. To the Emergency Department as needed or call after hours crisis line at [phone] or [phone]. [place] Crisis Text Line. Text [place] to 781906 or Suicide LifeLine Chat: suicidepreventionlifeline.org/chat  Schedule an appointment with me in 3 months, or sooner as needed. Call [facility] at [phone] to schedule.  Follow up with primary care provider as planned or for acute medical concerns.  Call the psychiatric nurse line with medication questions or concerns at [phone].  MyChart may be used to communicate with your provider, but this is not intended to be used for emergencies.    Risks of benzodiazepine  (Ativan, Xanax, Klonopin, Valium, etc) use including, but not limited to, sedation, tolerance, risk for addiction/dependence. Do not drink alcohol while taking benzodiazepines due to risk of trouble breathing and potential death. Do not drive or operate heavy machinery until it is known how the drug affects you. Discuss with physician or pharmacist before ever taking a benzodiazepine with a narcotic/opioid pain medication.     Have previously discussed risks of stimulant medication including, but not limited to, decreased appetite, risk of tics (and that they may be lasting), trouble sleeping, cardiac risks such as increased heart rate and blood pressure, and rare risk of sudden cardiac death.  Also risk of addiction/tolerance/dependence.    Serotonin syndrome symptoms usually occur within several hours of taking a new drug or increasing the dose of a drug you're already taking.   Signs and symptoms include:  Agitation or restlessness  Confusion  Rapid heart rate and high blood pressure  Dilated pupils  Loss of muscle coordination or twitching muscles  Muscle rigidity  Heavy sweating  Diarrhea  Headache  Shivering  Goose bumps    Severe serotonin syndrome can be life-threatening. Signs and symptoms include:  High fever  Seizures  Irregular heartbeat  Unconsciousness    If you suspect you might have serotonin syndrome after starting a new drug or increasing the dose of a drug you're already taking, call your doctor right away or go to the emergency room. If you have severe or rapidly worsening symptoms, seek emergency treatment immediately.    There are some over-the-counter medications AND non-mental health prescribed drugs that can cause or contribute to serotonin syndrome when you are taking a medication already that increases serotonin.     Medications and supplements (Rx'd and OTC) that can increase risk for serotonin syndrome:  [email or web address]-[email or web address]      Administrative Billing:   Phone Call/Video Duration: 25 Minutes  8:33a-8:58a    The longitudinal plan of care for the diagnosis(es)/condition(s) as documented were addressed during this visit. Due to the added complexity in care, I will continue to support Janelle in the subsequent management and with ongoing continuity of care.    Patient Status:  Patient is a continuous care patient and refills will continue to come from this provider until otherwise noted.    Signed:   Kimberly Perez DO  [facility]    Disclaimer: This note consists of symbols derived from keyboarding, dictation and/or voice recognition software. As a result, there may be errors in the script that have gone undetected. Please consider this when interpreting information found in this chart.

## 2025-02-02 ASSESSMENT — PATIENT HEALTH QUESTIONNAIRE - PHQ9
SUM OF ALL RESPONSES TO PHQ QUESTIONS 1-9: 14
SUM OF ALL RESPONSES TO PHQ QUESTIONS 1-9: 14
10. IF YOU CHECKED OFF ANY PROBLEMS, HOW DIFFICULT HAVE THESE PROBLEMS MADE IT FOR YOU TO DO YOUR WORK, TAKE CARE OF THINGS AT HOME, OR GET ALONG WITH OTHER PEOPLE: SOMEWHAT DIFFICULT

## 2025-02-03 ENCOUNTER — ALLIED HEALTH/NURSE VISIT (OUTPATIENT)
Dept: PSYCHIATRY | Facility: CLINIC | Age: 65
End: 2025-02-03
Payer: COMMERCIAL

## 2025-02-03 VITALS — SYSTOLIC BLOOD PRESSURE: 103 MMHG | HEART RATE: 72 BPM | DIASTOLIC BLOOD PRESSURE: 61 MMHG

## 2025-02-03 DIAGNOSIS — F33.41 RECURRENT MAJOR DEPRESSIVE DISORDER, IN PARTIAL REMISSION: Primary | ICD-10-CM

## 2025-02-03 NOTE — PROGRESS NOTES
Janelle White comes into clinic today at the request of LINK Yanes MD Ordering Provider for Med Injection only Ketamine .    Procedure Prep:  Medication double check completed by: Pipo Tijerina RN  Prior to administration pt identified by name and : Yes    Nursing Assessment:  Appearance: Fair.   Mood: Depressed. Pt reported having decreased mood especially related to recent shoulder/arm injury. She reported feeling helpless at times.  Affect: Sad  Eye contact: Fair      2025     5:59 PM   PHQ   PHQ-9 Total Score 14    Q9: Thoughts of better off dead/self-harm past 2 weeks More than half the days   F/U: Thoughts of suicide or self-harm Yes   F/U: Self harm-plan Yes   F/U: Self-harm action No   F/U: Safety concerns No       Patient-reported     QIDDSR 16 weekly assessment: score 17. Assessment was scanned to EHR.     Procedure Performed:  VSS and mental status WNL. Patient was given Ketamine. See MAR for details.         Post Procedure Assessment:  Patient tolerated the treatment with the following side effects: dissociation. Vital signs were monitored, see VS Flowsheet. Patient stated they felt ready to go home prior to discharge. AVS was offered to patient and patient declined. Patient was instructed not to drive for the remainder of the day and to notify clinic if any concerns arise.     Next appt:     Medications were supplied by Clinic     This service provided today was under the supervising provider of the day ALYSA Mireles MD, who was available if needed.    Tesha Aaron RN   
1

## 2025-02-12 SDOH — HEALTH STABILITY: PHYSICAL HEALTH: ON AVERAGE, HOW MANY MINUTES DO YOU ENGAGE IN EXERCISE AT THIS LEVEL?: 30 MIN

## 2025-02-12 SDOH — HEALTH STABILITY: PHYSICAL HEALTH: ON AVERAGE, HOW MANY DAYS PER WEEK DO YOU ENGAGE IN MODERATE TO STRENUOUS EXERCISE (LIKE A BRISK WALK)?: 4 DAYS

## 2025-02-12 ASSESSMENT — SOCIAL DETERMINANTS OF HEALTH (SDOH): HOW OFTEN DO YOU GET TOGETHER WITH FRIENDS OR RELATIVES?: MORE THAN THREE TIMES A WEEK

## 2025-02-13 ENCOUNTER — OFFICE VISIT (OUTPATIENT)
Dept: FAMILY MEDICINE | Facility: CLINIC | Age: 65
End: 2025-02-13
Payer: COMMERCIAL

## 2025-02-13 VITALS
HEART RATE: 84 BPM | DIASTOLIC BLOOD PRESSURE: 78 MMHG | TEMPERATURE: 97.1 F | HEIGHT: 65 IN | RESPIRATION RATE: 20 BRPM | OXYGEN SATURATION: 100 % | SYSTOLIC BLOOD PRESSURE: 108 MMHG | BODY MASS INDEX: 27.96 KG/M2 | WEIGHT: 167.8 LBS

## 2025-02-13 DIAGNOSIS — M54.2 CERVICALGIA: ICD-10-CM

## 2025-02-13 DIAGNOSIS — N95.2 ATROPHIC VAGINITIS: ICD-10-CM

## 2025-02-13 DIAGNOSIS — Z78.0 POSTMENOPAUSAL STATUS: ICD-10-CM

## 2025-02-13 DIAGNOSIS — M25.50 POLYARTHRALGIA: ICD-10-CM

## 2025-02-13 DIAGNOSIS — F33.2 SEVERE EPISODE OF RECURRENT MAJOR DEPRESSIVE DISORDER, WITHOUT PSYCHOTIC FEATURES (H): ICD-10-CM

## 2025-02-13 DIAGNOSIS — G25.81 RESTLESS LEGS SYNDROME (RLS): ICD-10-CM

## 2025-02-13 DIAGNOSIS — Z00.00 ENCOUNTER FOR MEDICARE ANNUAL WELLNESS EXAM: Primary | ICD-10-CM

## 2025-02-13 DIAGNOSIS — C91.11 CHRONIC LYMPHOID LEUKEMIA IN REMISSION (H): ICD-10-CM

## 2025-02-13 DIAGNOSIS — J41.0 SIMPLE CHRONIC BRONCHITIS (H): ICD-10-CM

## 2025-02-13 RX ORDER — ALBUTEROL SULFATE 90 UG/1
2 INHALANT RESPIRATORY (INHALATION) EVERY 6 HOURS PRN
Qty: 8.5 G | Refills: 11 | Status: SHIPPED | OUTPATIENT
Start: 2025-02-13

## 2025-02-13 RX ORDER — ESTRADIOL 1 MG/G
GEL TOPICAL
Qty: 30 G | Refills: 11 | Status: SHIPPED | OUTPATIENT
Start: 2025-02-13

## 2025-02-13 RX ORDER — ROPINIROLE 0.25 MG/1
TABLET, FILM COATED ORAL
Qty: 180 TABLET | Refills: 3 | Status: SHIPPED | OUTPATIENT
Start: 2025-02-13

## 2025-02-13 RX ORDER — LIDOCAINE 50 MG/G
3 PATCH TOPICAL DAILY
Qty: 120 PATCH | Refills: 3 | Status: SHIPPED | OUTPATIENT
Start: 2025-02-13

## 2025-02-13 ASSESSMENT — PAIN SCALES - GENERAL: PAINLEVEL_OUTOF10: NO PAIN (0)

## 2025-02-13 NOTE — PROGRESS NOTES
Preventive Care Visit  [facility]  Carmen Garcia, RIGO, Nurse Practitioner Primary Care  Feb 13, 2025      Assessment & Plan     Encounter for Medicare annual wellness exam  Reviewed medical/social/family history and health maintenance   Colonoscopy up to date  Mammogram up to date  Labs done through specialist    Simple chronic bronchitis (H)  Well controlled overall.    - albuterol (PROAIR HFA/PROVENTIL HFA/VENTOLIN HFA) 108 (90 Base) MCG/ACT inhaler; Inhale 2 puffs into the lungs every 6 hours as needed for shortness of breath or wheezing.    Chronic lymphoid leukemia in remission (H)  Stable, follows with Heme/onc through [facility]    Severe episode of recurrent major depressive disorder, without psychotic features (H)  Stable, medications managed by psychiatry.  Continues on ketamine infusions.      Postmenopausal status   Stable, tolerating med, no changes at this time  - Estradiol (DIVIGEL) 1 MG/GM GEL; Place 1 packet onto the skin daily    Atrophic vaginitis   Stable, tolerating med, no changes at this time  - Prasterone 6.5 MG INST; Place 0.5 suppositories vaginally three times a week.    Restless legs syndrome (RLS)   Stable, tolerating med, no changes at this time  - rOPINIRole (REQUIP) 0.25 MG tablet; TAKE 1 TO 2 TABLETS(0.25 TO 0.5 MG) BY MOUTH AT BEDTIME    Polyarthralgia   Stable, tolerating med, no changes at this time  - rOPINIRole (REQUIP) 0.25 MG tablet; TAKE 1 TO 2 TABLETS(0.25 TO 0.5 MG) BY MOUTH AT BEDTIME    Cervicalgia   Stable.  Recent exacerbation due to right shoulder fracture.  - lidocaine (LIDODERM) 5 % patch; Place 3 patches onto the skin daily. Apply up to 3 patches to skin. Wear for 12 hours and remove for 12 hrs.  Refill when patient requests.    Patient has been advised of split billing requirements and indicates understanding: Yes    The longitudinal plan of care for the diagnosis(es)/condition(s) as documented were addressed during this visit. Due to the  "added complexity in care, I will continue to support Janelle in the subsequent management and with ongoing continuity of care.    BMI  Estimated body mass index is 28.3 kg/m  as calculated from the following:    Height as of this encounter: 1.64 m (5' 4.57\").    Weight as of this encounter: 76.1 kg (167 lb 12.8 oz).       Counseling  Appropriate preventive services were addressed with this patient via screening, questionnaire, or discussion as appropriate for fall prevention, nutrition, physical activity, Tobacco-use cessation, social engagement, weight loss and cognition.  Checklist reviewing preventive services available has been given to the patient.  Reviewed patient's diet, addressing concerns and/or questions.   She is at risk for psychosocial distress and has been provided with information to reduce risk.   Discussed possible causes of fatigue. Patient reported safety concerns were addressed today.The patient was provided with written information regarding signs of hearing loss.   Information on urinary incontinence and treatment options given to patient.           Subjective   Janelle is a 64 year old, presenting for the following:  Physical (Thinks she has bronchitis,  brought a cold home and she has rattling in lungs, trying dayquil )        2/13/2025     1:50 PM   Additional Questions   Roomed by Vandana ERWIN   Accompanied by self         2/13/2025     1:50 PM   Patient Reported Additional Medications   Patient reports taking the following new medications no           HPI    Health Care Directive  Patient does not have a Health Care Directive: Patient states has Advance Directive and will bring in a copy to clinic.      2/12/2025   General Health   How would you rate your overall physical health? (!) FAIR   Feel stress (tense, anxious, or unable to sleep) To some extent   (!) STRESS CONCERN      2/12/2025   Nutrition   Diet: Regular (no restrictions)         2/12/2025   Exercise   Days per week of " moderate/strenous exercise 4 days   Average minutes spent exercising at this level 30 min         2/12/2025   Social Factors   Frequency of gathering with friends or relatives More than three times a week   Worry food won't last until get money to buy more No   Food not last or not have enough money for food? No   Do you have housing? (Housing is defined as stable permanent housing and does not include staying ouside in a car, in a tent, in an abandoned building, in an overnight shelter, or couch-surfing.) Yes   Are you worried about losing your housing? No   Lack of transportation? No   Unable to get utilities (heat,electricity)? No         2/13/2025   Fall Risk   Gait Speed Test (Document in seconds) 3.5   Gait Speed Test Interpretation Less than or equal to 5.00 seconds - PASS          2/12/2025   Activities of Daily Living- Home Safety   Needs help with the following daily activites None of the above   Safety concerns in the home Throw rugs in the hallway    No grab bars in the bathroom       Multiple values from one day are sorted in reverse-chronological order         2/12/2025   Dental   Dentist two times every year? Yes         2/12/2025   Hearing Screening   Hearing concerns? (!) TROUBLE UNDERSTANDING SOFT OR WHISPERED SPEECH.         2/12/2025   Driving Risk Screening   Patient/family members have concerns about driving (!) DECLINE         2/12/2025   General Alertness/Fatigue Screening   Have you been more tired than usual lately? (!) YES         2/12/2025   Urinary Incontinence Screening   Bothered by leaking urine in past 6 months Yes         3/21/2024   TB Screening   Were you born outside of the [place]? No         Today's PHQ-9 Score:       2/2/2025     5:59 PM   PHQ-9 SCORE   PHQ-9 Total Score MyChart 14 (Moderate depression)   PHQ-9 Total Score 14        Patient-reported           2/12/2025   Substance Use   Alcohol more than 3/day or more than 7/wk Not Applicable   Do you have a current opioid  prescription? No   How severe/bad is pain from 1 to 10? 7/10   Do you use any other substances recreationally? (!) CANNABIS PRODUCTS     Social History     Tobacco Use    Smoking status: Former     Current packs/day: 0.00     Average packs/day: 1 pack/day for 5.0 years (5.0 ttl pk-yrs)     Types: Cigarettes, Clove cigarettes or kreteks     Start date: 1979     Quit date: 1984     Years since quittin.1    Smokeless tobacco: Never   Vaping Use    Vaping status: Some Days   Substance Use Topics    Alcohol use: Yes     Comment: rare    Drug use: Not Currently     Types: Cocaine, Marijuana, LSD, Psilocybin     Comment: college experimentation --none since           2024   LAST FHS-7 RESULTS   1st degree relative breast or ovarian cancer No   Any relative bilateral breast cancer No   Any male have breast cancer No   Any ONE woman have BOTH breast AND ovarian cancer No   Any woman with breast cancer before 50yrs No   2 or more relatives with breast AND/OR ovarian cancer No   2 or more relatives with breast AND/OR bowel cancer No        Mammogram Screening - Mammogram every 1-2 years updated in Health Maintenance based on mutual decision making      History of abnormal Pap smear: No - age 30- 64 PAP with HPV every 5 years recommended       ASCVD Risk   The 10-year ASCVD risk score (Nahomi DK, et al., 2019) is: 3.8%    Values used to calculate the score:      Age: 64 years      Sex: Female      Is Non- : No      Diabetic: No      Tobacco smoker: No      Systolic Blood Pressure: 108 mmHg      Is BP treated: No      HDL Cholesterol: 50 mg/dL      Total Cholesterol: 203 mg/dL            Reviewed and updated as needed this visit by Provider                      Current providers sharing in care for this patient include:  Patient Care Team:  Carmen Garcia DNP as PCP - General (Nurse Practitioner Primary Care)  Kimberly Perez DO as Assigned Behavioral Health Provider  Radha  "MD Leyla as MD (Cardiovascular Disease)  Joslyn Cherry MD as MD (Anesthesiology)  Leyla Garcia MD as Assigned Heart and Vascular Provider  Sonia Funk PsyD (PSYCHOLOGIST COUNSELING)  Joslyn Cherry MD as MD (Anesthesiology)  Carmen Garcia DNP as Assigned PCP    The following health maintenance items are reviewed in Epic and correct as of today:  Health Maintenance   Topic Date Due    SPIROMETRY  Never done    COPD ACTION PLAN  Never done    ANNUAL REVIEW OF HM ORDERS  08/17/2024    COVID-19 Vaccine (8 - Pfizer risk 2024-25 season) 03/13/2025    MEDICARE ANNUAL WELLNESS VISIT  03/21/2025    STACIE ASSESSMENT  04/02/2025    PHQ-9  08/03/2025    URINE DRUG SCREEN  09/17/2025    MAMMO SCREENING  09/17/2025    Pneumococcal Vaccine: 50+ Years (3 of 3 - PPSV23, PCV20 or PCV21) 02/07/2027    GLUCOSE  09/17/2027    LIPID  03/16/2028    ADVANCE CARE PLANNING  03/21/2029    DTAP/TDAP/TD IMMUNIZATION (4 - Td or Tdap) 08/27/2029    COLORECTAL CANCER SCREENING  02/18/2030    HEPATITIS C SCREENING  Completed    DEPRESSION ACTION PLAN  Completed    MIGRAINE ACTION PLAN  Completed    INFLUENZA VACCINE  Completed    ZOSTER IMMUNIZATION  Completed    RSV VACCINE  Completed    HPV IMMUNIZATION  Aged Out    MENINGITIS IMMUNIZATION  Aged Out    HIV SCREENING  Discontinued    PAP  Discontinued            Objective    Exam  /78 (BP Location: Left arm, Patient Position: Sitting, Cuff Size: Adult Regular)   Pulse 84   Temp 97.1  F (36.2  C) (Temporal)   Resp 20   Ht 1.64 m (5' 4.57\")   Wt 76.1 kg (167 lb 12.8 oz)   LMP 05/01/2005 (LMP Unknown)   SpO2 100%   BMI 28.30 kg/m     Estimated body mass index is 28.3 kg/m  as calculated from the following:    Height as of this encounter: 1.64 m (5' 4.57\").    Weight as of this encounter: 76.1 kg (167 lb 12.8 oz).    Physical Exam  GENERAL: alert and no distress  RESP: lungs clear to auscultation - no rales, rhonchi or wheezes  CV: regular rate and rhythm, normal S1 S2, " no S3 or S4, no murmur, click or rub, no peripheral edema  MS: no gross musculoskeletal defects noted, no edema        2/13/2025   Mini Cog   Clock Draw Score 2 Normal   3 Item Recall 3 objects recalled   Mini Cog Total Score 5              Signed Electronically by: Carmen Garcia, DNP

## 2025-02-13 NOTE — PATIENT INSTRUCTIONS
Patient Education   Preventive Care Advice   This is general advice given by our system to help you stay healthy. However, your care team may have specific advice just for you. Please talk to your care team about your preventive care needs.  Nutrition  Eat 5 or more servings of fruits and vegetables each day.  Try wheat bread, brown rice and whole grain pasta (instead of white bread, rice, and pasta).  Get enough calcium and vitamin D. Check the label on foods and aim for 100% of the RDA (recommended daily allowance).  Lifestyle  Exercise at least 150 minutes each week  (30 minutes a day, 5 days a week).  Do muscle strengthening activities 2 days a week. These help control your weight and prevent disease.  No smoking.  Wear sunscreen to prevent skin cancer.  Have a dental exam and cleaning every 6 months.  Yearly exams  See your health care team every year to talk about:  Any changes in your health.  Any medicines your care team has prescribed.  Preventive care, family planning, and ways to prevent chronic diseases.  Shots (vaccines)   HPV shots (up to age 26), if you've never had them before.  Hepatitis B shots (up to age 59), if you've never had them before.  COVID-19 shot: Get this shot when it's due.  Flu shot: Get a flu shot every year.  Tetanus shot: Get a tetanus shot every 10 years.  Pneumococcal, hepatitis A, and RSV shots: Ask your care team if you need these based on your risk.  Shingles shot (for age 50 and up)  General health tests  Diabetes screening:  Starting at age 35, Get screened for diabetes at least every 3 years.  If you are younger than age 35, ask your care team if you should be screened for diabetes.  Cholesterol test: At age 39, start having a cholesterol test every 5 years, or more often if advised.  Bone density scan (DEXA): At age 50, ask your care team if you should have this scan for osteoporosis (brittle bones).  Hepatitis C: Get tested at least once in your life.  STIs (sexually  transmitted infections)  Before age 24: Ask your care team if you should be screened for STIs.  After age 24: Get screened for STIs if you're at risk. You are at risk for STIs (including HIV) if:  You are sexually active with more than one person.  You don't use condoms every time.  You or a partner was diagnosed with a sexually transmitted infection.  If you are at risk for HIV, ask about PrEP medicine to prevent HIV.  Get tested for HIV at least once in your life, whether you are at risk for HIV or not.  Cancer screening tests  Cervical cancer screening: If you have a cervix, begin getting regular cervical cancer screening tests starting at age 21.  Breast cancer scan (mammogram): If you've ever had breasts, begin having regular mammograms starting at age 40. This is a scan to check for breast cancer.  Colon cancer screening: It is important to start screening for colon cancer at age 45.  Have a colonoscopy test every 10 years (or more often if you're at risk) Or, ask your provider about stool tests like a FIT test every year or Cologuard test every 3 years.  To learn more about your testing options, visit:   .  For help making a decision, visit:   https://bit.ly/io39266.  Prostate cancer screening test: If you have a prostate, ask your care team if a prostate cancer screening test (PSA) at age 55 is right for you.  Lung cancer screening: If you are a current or former smoker ages 50 to 80, ask your care team if ongoing lung cancer screenings are right for you.  For informational purposes only. Not to replace the advice of your health care provider. Copyright   2023 [facility]. All rights reserved. Clinically reviewed by the [facility] Transitions Program. [employer] [id number] - REV 01/24.  Learning About Activities of Daily Living  What are activities of daily living?     Activities of daily living (ADLs) are the basic self-care tasks you do every day. These include eating, bathing, dressing,  and moving around.  As you age, and if you have health problems, you may find that it's harder to do some of these tasks. If so, your doctor can suggest ideas that may help.  To measure what kind of help you may need, your doctor will ask how well you are able to do ADLs. Let your doctor know if there are any tasks that you are having trouble doing. This is an important first step to getting help. And when you have the help you need, you can stay as independent as possible.  How will a doctor assess your ADLs?  Asking about ADLs is part of a routine health checkup your doctor will likely do as you age. Your health check might be done in a doctor's office, in your home, or at a hospital. The goal is to find out if you are having any problems that could make it hard to care for yourself or that make it unsafe for you to be on your own.  To measure your ADLs, your doctor will ask how hard it is for you to do routine tasks. Your doctor may also want to know if you have changed the way you do a task because of a health problem. Your doctor may watch how you:  Walk back and forth.  Keep your balance while you stand or walk.  Move from sitting to standing or from a bed to a chair.  Button or unbutton a shirt or sweater.  Remove and put on your shoes.  It's common to feel a little worried or anxious if you find you can't do all the things you used to be able to do. Talking with your doctor about ADLs is a way to make sure you're as safe as possible and able to care for yourself as well as you can. You may want to bring a caregiver, friend, or family member to your checkup. They can help you talk to your doctor.  Follow-up care is a key part of your treatment and safety. Be sure to make and go to all appointments, and call your doctor if you are having problems. It's also a good idea to know your test results and keep a list of the medicines you take.  Current as of: October 24, 2023  Content Version: 14.3    2024 [facility]  [email or web address].   Care instructions adapted under license by your healthcare professional. If you have questions about a medical condition or this instruction, always ask your healthcare professional. [facility] [email or web address] disclaims any warranty or liability for your use of this information.    Preventing Falls: Care Instructions  Injuries and health problems such as trouble walking or poor eyesight can increase your risk of falling. So can some medicines. But there are things you can do to help prevent falls. You can exercise to get stronger. You can also arrange your home to make it safer.    Talk to your doctor about the medicines you take. Ask if any of them increase the risk of falls and whether they can be changed or stopped.   Try to exercise regularly. It can help improve your strength and balance. This can help lower your risk of falling.         Practice fall safety and prevention.   Wear low-heeled shoes that fit well and give your feet good support. Talk to your doctor if you have foot problems that make this hard.  Carry a cellphone or wear a medical alert device that you can use to call for help.  Use stepladders instead of chairs to reach high objects. Don't climb if you're at risk for falls. Ask for help, if needed.  Wear the correct eyeglasses, if you need them.        Make your home safer.   Remove rugs, cords, clutter, and furniture from walkways.  Keep your house well lit. Use night-lights in hallways and bathrooms.  Install and use sturdy handrails on stairways.  Wear nonskid footwear, even inside. Don't walk barefoot or in socks without shoes.        Be safe outside.   Use handrails, curb cuts, and ramps whenever possible.  Keep your hands free by using a shoulder bag or backpack.  Try to walk in well-lit areas. Watch out for uneven ground, changes in pavement, and debris.  Be careful in the winter. Walk on the grass or gravel when sidewalks are slippery. Use de-icer on steps and walkways.  "Add non-slip devices to shoes.    Put grab bars and nonskid mats in your shower or tub and near the toilet. Try to use a shower chair or bath bench when bathing.   Get into a tub or shower by putting in your weaker leg first. Get out with your strong side first. Have a phone or medical alert device in the bathroom with you.   Where can you learn more?  Go to https://www.[employer]/patiented  Enter G117 in the search box to learn more about \"Preventing Falls: Care Instructions.\"  Current as of: July 31, 2024  Content Version: 14.3    2024 [facility].   Care instructions adapted under license by your healthcare professional. If you have questions about a medical condition or this instruction, always ask your healthcare professional. [facility] disclaims any warranty or liability for your use of this information.    Hearing Loss: Care Instructions  Overview     Hearing loss is a sudden or slow decrease in how well you hear. It can range from slight to profound. Permanent hearing loss can occur with aging. It also can happen when you are exposed long-term to loud noise. Examples include listening to loud music, riding motorcycles, or being around other loud machines.  Hearing loss can affect your work and home life. It can make you feel lonely or depressed. You may feel that you have lost your independence. But hearing aids and other devices can help you hear better and feel connected to others.  Follow-up care is a key part of your treatment and safety. Be sure to make and go to all appointments, and call your doctor if you are having problems. It's also a good idea to know your test results and keep a list of the medicines you take.  How can you care for yourself at home?  Avoid loud noises whenever possible. This helps keep your hearing from getting worse.  Always wear hearing protection around loud noises.  Wear a hearing aid as directed.  A professional can help you pick a hearing aid " "that will work best for you.  You can also get hearing aids over the counter for mild to moderate hearing loss.  Have hearing tests as your doctor suggests. They can show whether your hearing has changed. Your hearing aid may need to be adjusted.  Use other devices as needed. These may include:  Telephone amplifiers and hearing aids that can connect to a television, stereo, radio, or microphone.  Devices that use lights or vibrations. These alert you to the doorbell, a ringing telephone, or a baby monitor.  Television closed-captioning. This shows the words at the bottom of the screen. Most new TVs can do this.  TTY (text telephone). This lets you type messages back and forth on the telephone instead of talking or listening. These devices are also called TDD. When messages are typed on the keyboard, they are sent over the phone line to a receiving TTY. The message is shown on a monitor.  Use text messaging, social media, and email if it is hard for you to communicate by telephone.  Try to learn a listening technique called speechreading. It is not lipreading. You pay attention to people's gestures, expressions, posture, and tone of voice. These clues can help you understand what a person is saying. Face the person you are talking to, and have them face you. Make sure the lighting is good. You need to see the other person's face clearly.  Think about counseling if you need help to adjust to your hearing loss.  When should you call for help?  Watch closely for changes in your health, and be sure to contact your doctor if:    You think your hearing is getting worse.     You have new symptoms, such as dizziness or nausea.   Where can you learn more?  Go to https://www.[employer].net/patiented  Enter R798 in the search box to learn more about \"Hearing Loss: Care Instructions.\"  Current as of: September 27, 2023  Content Version: 14.3    2024 [employer].   Care instructions adapted under license by your " healthcare professional. If you have questions about a medical condition or this instruction, always ask your healthcare professional. [employer] disclaims any warranty or liability for your use of this information.    Learning About Stress  What is stress?     Stress is your body's response to a hard situation. Your body can have a physical, emotional, or mental response. Stress is a fact of life for most people, and it affects everyone differently. What causes stress for you may not be stressful for someone else.  A lot of things can cause stress. You may feel stress when you go on a job interview, take a test, or run a race. This kind of short-term stress is normal and even useful. It can help you if you need to work hard or react quickly. For example, stress can help you finish an important job on time.  Long-term stress is caused by ongoing stressful situations or events. Examples of long-term stress include long-term health problems, ongoing problems at work, or conflicts in your family. Long-term stress can harm your health.  How does stress affect your health?  When you are stressed, your body responds as though you are in danger. It makes hormones that speed up your heart, make you breathe faster, and give you a burst of energy. This is called the fight-or-flight stress response. If the stress is over quickly, your body goes back to normal and no harm is done.  But if stress happens too often or lasts too long, it can have bad effects. Long-term stress can make you more likely to get sick, and it can make symptoms of some diseases worse. If you tense up when you are stressed, you may develop neck, shoulder, or low back pain. Stress is linked to high blood pressure and heart disease.  Stress also harms your emotional health. It can make you ponce, tense, or depressed. Your relationships may suffer, and you may not do well at work or school.  What can you do to manage stress?  You can try these  things to help manage stress:   Do something active. Exercise or activity can help reduce stress. Walking is a great way to get started. Even everyday activities such as housecleaning or yard work can help.  Try yoga or oracio chi. These techniques combine exercise and meditation. You may need some training at first to learn them.  Do something you enjoy. For example, listen to music or go to a movie. Practice your hobby or do volunteer work.  Meditate. This can help you relax, because you are not worrying about what happened before or what may happen in the future.  Do guided imagery. Imagine yourself in any setting that helps you feel calm. You can use online videos, books, or a teacher to guide you.  Do breathing exercises. For example:  From a standing position, bend forward from the waist with your knees slightly bent. Let your arms dangle close to the floor.  Breathe in slowly and deeply as you return to a standing position. Roll up slowly and lift your head last.  Hold your breath for just a few seconds in the standing position.  Breathe out slowly and bend forward from the waist.  Let your feelings out. Talk, laugh, cry, and express anger when you need to. Talking with supportive friends or family, a counselor, or a carolyn leader about your feelings is a healthy way to relieve stress. Avoid discussing your feelings with people who make you feel worse.  Write. It may help to write about things that are bothering you. This helps you find out how much stress you feel and what is causing it. When you know this, you can find better ways to cope.  What can you do to prevent stress?  You might try some of these things to help prevent stress:  Manage your time. This helps you find time to do the things you want and need to do.  Get enough sleep. Your body recovers from the stresses of the day while you are sleeping.  Get support. Your family, friends, and community can make a difference in how you experience  "stress.  Limit your news feed. Avoid or limit time on social media or news that may make you feel stressed.  Do something active. Exercise or activity can help reduce stress. Walking is a great way to get started.  Where can you learn more?  Go to https://www.[employer].net/patiented  Enter N032 in the search box to learn more about \"Learning About Stress.\"  Current as of: October 24, 2023  Content Version: 14.3    2024 [employer].   Care instructions adapted under license by your healthcare professional. If you have questions about a medical condition or this instruction, always ask your healthcare professional. [employer] disclaims any warranty or liability for your use of this information.    Learning About Sleeping Well  What does sleeping well mean?     Sleeping well means getting enough sleep to feel good and stay healthy. How much sleep is enough varies among people.  The number of hours you sleep and how you feel when you wake up are both important. If you do not feel refreshed, you probably need more sleep. Another sign of not getting enough sleep is feeling tired during the day.  Experts recommend that adults get at least 7 or more hours of sleep per day. Children and older adults need more sleep.  Why is getting enough sleep important?  Getting enough quality sleep is a basic part of good health. When your sleep suffers, your physical health, mood, and your thoughts can suffer too. You may find yourself feeling more grumpy or stressed. Not getting enough sleep also can lead to serious problems, including injury, accidents, anxiety, and depression.  What might cause poor sleeping?  Many things can cause sleep problems, including:  Changes to your sleep schedule.  Stress. Stress can be caused by fear about a single event, such as giving a speech. Or you may have ongoing stress, such as worry about work or school.  Depression, anxiety, and other mental or emotional " "conditions.  Changes in your sleep habits or surroundings. This includes changes that happen where you sleep, such as noise, light, or sleeping in a different bed. It also includes changes in your sleep pattern, such as having jet lag or working a late shift.  Health problems, such as pain, breathing problems, and restless legs syndrome.  Lack of regular exercise.  Using alcohol, nicotine, or caffeine before bed.  How can you help yourself?  Here are some tips that may help you sleep more soundly and wake up feeling more refreshed.  Your sleeping area   Use your bedroom only for sleeping and sex. A bit of light reading may help you fall asleep. But if it doesn't, do your reading elsewhere in the house. Try not to use your TV, computer, smartphone, or tablet while you are in bed.  Be sure your bed is big enough to stretch out comfortably, especially if you have a sleep partner.  Keep your bedroom quiet, dark, and cool. Use curtains, blinds, or a sleep mask to block out light. To block out noise, use earplugs, soothing music, or a \"white noise\" machine.  Your evening and bedtime routine   Create a relaxing bedtime routine. You might want to take a warm shower or bath, or listen to soothing music.  Go to bed at the same time every night. And get up at the same time every morning, even if you feel tired.  What to avoid   Limit caffeine (coffee, tea, caffeinated sodas) during the day, and don't have any for at least 6 hours before bedtime.  Avoid drinking alcohol before bedtime. Alcohol can cause you to wake up more often during the night.  Try not to smoke or use tobacco, especially in the evening. Nicotine can keep you awake.  Limit naps during the day, especially close to bedtime.  Avoid lying in bed awake for too long. If you can't fall asleep or if you wake up in the middle of the night and can't get back to sleep within about 20 minutes, get out of bed and go to another room until you feel sleepy.  Avoid taking " "medicine right before bed that may keep you awake or make you feel hyper or energized. Your doctor can tell you if your medicine may do this and if you can take it earlier in the day.  If you can't sleep   Imagine yourself in a peaceful, pleasant scene. Focus on the details and feelings of being in a place that is relaxing.  Get up and do a quiet or boring activity until you feel sleepy.  Avoid drinking any liquids before going to bed to help prevent waking up often to use the bathroom.  Where can you learn more?  Go to https://www.[employer].net/patiented  Enter J942 in the search box to learn more about \"Learning About Sleeping Well.\"  Current as of: July 31, 2024  Content Version: 14.3    2024 [employer].   Care instructions adapted under license by your healthcare professional. If you have questions about a medical condition or this instruction, always ask your healthcare professional. [employer] disclaims any warranty or liability for your use of this information.    Bladder Training: Care Instructions  Your Care Instructions     Bladder training is used to treat urge incontinence and stress incontinence. Urge incontinence means that the need to urinate comes on so fast that you can't get to a toilet in time. Stress incontinence means that you leak urine because of pressure on your bladder. For example, it may happen when you laugh, cough, or lift something heavy.  Bladder training can increase how long you can wait before you have to urinate. It can also help your bladder hold more urine. And it can give you better control over the urge to urinate.  It is important to remember that bladder training takes a few weeks to a few months to make a difference. You may not see results right away, but don't give up.  Follow-up care is a key part of your treatment and safety. Be sure to make and go to all appointments, and call your doctor if you are having problems. It's also a good idea to know " your test results and keep a list of the medicines you take.  How can you care for yourself at home?  Work with your doctor to come up with a bladder training program that is right for you. You may use one or more of the following methods.  Delayed urination  In the beginning, try to keep from urinating for 5 minutes after you first feel the need to go.  While you wait, take deep, slow breaths to relax. Kegel exercises can also help you delay the need to go to the bathroom.  After some practice, when you can easily wait 5 minutes to urinate, try to wait 10 minutes before you urinate.  Slowly increase the waiting period until you are able to control when you have to urinate.  Scheduled urination  Empty your bladder when you first wake up in the morning.  Schedule times throughout the day when you will urinate.  Start by going to the bathroom every hour, even if you don't need to go.  Slowly increase the time between trips to the bathroom.  When you have found a schedule that works well for you, keep doing it.  If you wake up during the night and have to urinate, do it. Apply your schedule to waking hours only.  Kegel exercises  These tighten and strengthen pelvic muscles, which can help you control the flow of urine. (If doing these exercises causes pain, stop doing them and talk with your doctor.) To do Kegel exercises:  Squeeze your muscles as if you were trying not to pass gas. Or squeeze your muscles as if you were stopping the flow of urine. Your belly, legs, and buttocks shouldn't move.  Hold the squeeze for 3 seconds, then relax for 5 to 10 seconds.  Start with 3 seconds, then add 1 second each week until you are able to squeeze for 10 seconds.  Repeat the exercise 10 times a session. Do 3 to 8 sessions a day.  When should you call for help?  Watch closely for changes in your health, and be sure to contact your doctor if:    Your incontinence is getting worse.     You do not get better as expected.   Where can  "you learn more?  Go to https://www."[employer]net/patiented  Enter V684 in the search box to learn more about \"Bladder Training: Care Instructions.\"  Current as of: April 30, 2024  Content Version: 14.3    2024 [employer].   Care instructions adapted under license by your healthcare professional. If you have questions about a medical condition or this instruction, always ask your healthcare professional. [employer] disclaims any warranty or liability for your use of this information.    Substance Use Disorder: Care Instructions  Overview     You can improve your life and health by stopping your use of alcohol or drugs. When you don't drink or use drugs, you may feel and sleep better. You may get along better with your family, friends, and coworkers. There are medicines and programs that can help with substance use disorder.  How can you care for yourself at home?  Here are some ways to help you stay sober and prevent relapse.  If you have been given medicine to help keep you sober or reduce your cravings, be sure to take it exactly as prescribed.  Talk to your doctor about programs that can help you stop using drugs or drinking alcohol.  Do not keep alcohol or drugs in your home.  Plan ahead. Think about what you'll say if other people ask you to drink or use drugs. Try not to spend time with people who drink or use drugs.  Use the time and money spent on drinking or drugs to do something that's important to you.  Preventing a relapse  Have a plan to deal with relapse. Learn to recognize changes in your thinking that lead you to drink or use drugs. Get help before you start to drink or use drugs again.  Try to stay away from situations, friends, or places that may lead you to drink or use drugs.  If you feel the need to drink alcohol or use drugs again, seek help right away. Call a trusted friend or family member. Some people get support from organizations such as Narcotics Anonymous or [facility] " Recovery or from treatment facilities.  If you relapse, get help as soon as you can. Some people make a plan with another person that outlines what they want that person to do for them if they relapse. The plan usually includes how to handle the relapse and who to notify in case of relapse.  Don't give up. Remember that a relapse doesn't mean that you have failed. Use the experience to learn the triggers that lead you to drink or use drugs. Then quit again. Recovery is a lifelong process. Many people have several relapses before they are able to quit for good.  Follow-up care is a key part of your treatment and safety. Be sure to make and go to all appointments, and call your doctor if you are having problems. It's also a good idea to know your test results and keep a list of the medicines you take.  When should you call for help?   Call 911  anytime you think you may need emergency care. For example, call if you or someone else:    Has overdosed or has withdrawal signs. Be sure to tell the emergency workers that you are or someone else is using or trying to quit using drugs. Overdose or withdrawal signs may include:  Losing consciousness.  Seizure.  Seeing or hearing things that aren't there (hallucinations).     Is thinking or talking about suicide or harming others.   Where to get help 24 hours a day, 7 days a week   If you or someone you know talks about suicide, self-harm, a mental health crisis, a substance use crisis, or any other kind of emotional distress, get help right away. You can:    Call the Suicide and Crisis Lifeline at 988.     Call 0-413-443-TALK (1-151.826.6387).     Text HOME to 335454 to access the Crisis Text Line.   Consider saving these numbers in your phone.  Go to [employer] for more information or to chat online.  Call your doctor now or seek immediate medical care if:    You are having withdrawal symptoms. These may include nausea or vomiting, sweating, shakiness, and anxiety.  "  Watch closely for changes in your health, and be sure to contact your doctor if:    You have a relapse.     You need more help or support to stop.   Where can you learn more?  Go to https://www.[employer].net/patiented  Enter H573 in the search box to learn more about \"Substance Use Disorder: Care Instructions.\"  Current as of: November 15, 2023  Content Version: 14.3    2024 [employer].   Care instructions adapted under license by your healthcare professional. If you have questions about a medical condition or this instruction, always ask your healthcare professional. [employer] disclaims any warranty or liability for your use of this information.       "

## 2025-02-21 ENCOUNTER — TRANSFERRED RECORDS (OUTPATIENT)
Dept: HEALTH INFORMATION MANAGEMENT | Facility: CLINIC | Age: 65
End: 2025-02-21
Payer: COMMERCIAL

## 2025-02-24 ENCOUNTER — ALLIED HEALTH/NURSE VISIT (OUTPATIENT)
Dept: PSYCHIATRY | Facility: CLINIC | Age: 65
End: 2025-02-24
Payer: COMMERCIAL

## 2025-02-24 VITALS — HEART RATE: 68 BPM | SYSTOLIC BLOOD PRESSURE: 102 MMHG | DIASTOLIC BLOOD PRESSURE: 66 MMHG

## 2025-02-24 DIAGNOSIS — F33.41 RECURRENT MAJOR DEPRESSIVE DISORDER, IN PARTIAL REMISSION: Primary | ICD-10-CM

## 2025-02-24 ASSESSMENT — PATIENT HEALTH QUESTIONNAIRE - PHQ9: SUM OF ALL RESPONSES TO PHQ QUESTIONS 1-9: 13

## 2025-02-24 NOTE — PROGRESS NOTES
Janelle White comes into clinic today at the request of LINK Yanes MD Ordering Provider for Med Injection only Ketamine .    Procedure Prep:  Medication double check completed by: María Miller RN  Prior to administration pt identified by name and : Yes    Nursing Assessment:  Appearance: Good   Mood: Doing ok.   Affect: WNL  Eye contact: Good      2025     9:12 AM   PHQ   PHQ-9 Total Score 13   Q9: Thoughts of better off dead/self-harm past 2 weeks More than half the days     QIDDSR 16 weekly assessment: score 13. Assessment was scanned to EHR.     Procedure Performed:  VSS and mental status WNL. Patient was given Ketamine. See MAR for details.       Post Procedure Assessment:  Patient tolerated the treatment with the following side effects: dissociation. Vital signs were monitored, see VS Flowsheet. Patient stated they felt ready to go home prior to discharge. AVS was offered to patient and patient declined. Patient was instructed not to drive for the remainder of the day and to notify clinic if any concerns arise.     Next appt: 3 weeks    Medications were supplied by Clinic     This service provided today was under the supervising provider of the day ROSALIA Taveras MD, who was available if needed.        Adali Berg RN

## 2025-02-27 NOTE — TELEPHONE ENCOUNTER
Carmen,    What shall we tell pt at this point on a Friday afternoon? In case [facility] does not respond pt will be waiting to hear and clinic now closing  Shall I tell her to go to UC if she has symptoms?    Veronica WILKS RN, BSN  [facility]          Spoke to patient at this time. Advised on the alternative insulin options covered under insurance plan.+copay amount. Patient confirmed understanding. She does not have a preference on insulin and is okay with whichever insulin is recommended by BOY Villalobos. RN verified pharmacy. Patient would like her scripts sent to [facility].

## 2025-03-17 ENCOUNTER — ALLIED HEALTH/NURSE VISIT (OUTPATIENT)
Dept: PSYCHIATRY | Facility: CLINIC | Age: 65
End: 2025-03-17
Payer: COMMERCIAL

## 2025-03-17 VITALS — HEART RATE: 83 BPM | SYSTOLIC BLOOD PRESSURE: 111 MMHG | DIASTOLIC BLOOD PRESSURE: 73 MMHG

## 2025-03-17 DIAGNOSIS — F33.41 RECURRENT MAJOR DEPRESSIVE DISORDER, IN PARTIAL REMISSION: Primary | ICD-10-CM

## 2025-03-17 NOTE — PROGRESS NOTES
Janelle White comes into clinic today at the request of Zafar Yanes MD Ordering Provider for Med Injection only Ketamine .    Procedure Prep:  Medication double check completed by:  Tesha Aaron RN  Prior to administration pt identified by name and : Yes    Nursing Assessment:  Appearance: Well groomed   Mood: WNL for patient, she describes her mood as labile throughout the week.  Affect: Bright, pleasant.  Eye contact: Good      2025     9:12 AM   PHQ   PHQ-9 Total Score 13   Q9: Thoughts of better off dead/self-harm past 2 weeks More than half the days     QIDDSR 16 weekly assessment: score 14. Assessment was scanned to EHR.     Procedure Performed:  VSS and mental status WNL. Patient was given ketamine injection. See MAR for details.     Post Procedure Assessment:  Patient tolerated the treatment with the following side effects: dissociation. Vital signs were monitored, see VS Flowsheet. Patient stated they felt ready to go home prior to discharge. AVS was offered to patient and patient declined. Patient was instructed not to drive for the remainder of the day and to notify clinic if any concerns arise.     Next appt: 25    Medications were supplied by Clinic      This service provided today was under the supervising provider of the day JOSE Abarca MD, who was available if needed.        Bri Mckeon RN

## 2025-03-28 PROBLEM — A41.9 SEPSIS, DUE TO UNSPECIFIED ORGANISM, UNSPECIFIED WHETHER ACUTE ORGAN DYSFUNCTION PRESENT (H): Status: RESOLVED | Noted: 2021-08-11 | Resolved: 2025-03-28

## 2025-04-02 DIAGNOSIS — M54.2 CERVICALGIA: ICD-10-CM

## 2025-04-02 DIAGNOSIS — M79.18 MYOFASCIAL PAIN: ICD-10-CM

## 2025-04-02 DIAGNOSIS — M47.812 CERVICAL SPONDYLOSIS: ICD-10-CM

## 2025-04-02 DIAGNOSIS — M46.1 SACROILIITIS: ICD-10-CM

## 2025-04-03 DIAGNOSIS — F34.1 PERSISTENT DEPRESSIVE DISORDER: Primary | ICD-10-CM

## 2025-04-03 RX ORDER — HYDROXYZINE PAMOATE 25 MG/1
25 CAPSULE ORAL 3 TIMES DAILY PRN
Qty: 90 CAPSULE | Refills: 0 | Status: SHIPPED | OUTPATIENT
Start: 2025-04-03

## 2025-04-03 RX ORDER — METHOCARBAMOL 750 MG/1
TABLET, FILM COATED ORAL
Qty: 120 TABLET | Refills: 4 | Status: SHIPPED | OUTPATIENT
Start: 2025-04-03

## 2025-04-03 NOTE — TELEPHONE ENCOUNTER
YAIMA received faxed refill request for hydrOXYzine (VISTARIL) 25 MG capsule  Per chart review  this medication was last filled 08/25/2023 Qty: 90 Refills: 3    Upon calling pharmacy the last time this medication was filled was 09/20/2023 and according to the medications tab the patient stated that 03/28/2025 she is not taking    I called and left a vm for the patient to call the clinic back so that we can find out if she is taking this medication or not.     In the last virtual visit note date 01/27/2025 Perez stated to continue  hydroxyzine 25-50 mg up to three times a day as needed for anxiety/sleep.     Patient does  have a follow-up appointment scheduled on 05/02/2025.         Shilpi Hines CMA April 3, 2025

## 2025-04-03 NOTE — TELEPHONE ENCOUNTER
Date of Last Office Visit: 1/27/25  Date of Next Office Visit:  5/2/25  No shows since last visit: No  More than one patient-initiated cancellation (with reschedule) since last seen in clinic? No    []Medication refilled per  Medication Refill in Ambulatory Care  policy.  []Scope of Practice: refill request processed by LPN/MA  [x]Medication unable to be refilled by RN due to criteria not met as indicated below:    []Eligibility: has not had a provider visit within last 6 months   []Supervision: no future appointment; < 7 days before next appointment   []Compliance: no shows; cancellations; lapse in therapy   []Verification: order discrepancy; may need modification...   [] > 30-day supply request   []Advanced refill request: > 7 days before refill date   []Controlled medication   []Medication not included in policy   []Review: new med; med adjusted <= 30 days; safety alert; requires lab monitoring...   [x]Other: PRN: Last prescribed over 1 year 7 months ago on current (1/27/25) treatment plan      Medication(s) requested:     -  hydrOXYzine (VISTARIL) 25 MG capsule  Date last ordered: 8/25/23  Qty: 90  Refills: 3  Appropriate for refill? Provider to review. Not on dispense report. Last prescribed in 1 year 7 months ago     Any Controlled Substance(s)? No      Requested medication(s) verified as identical to current order? Yes    Any lapse in adherence to medication(s) greater than 5 days? Unknown     Additional action taken? cued up medication/order(s) and routed encounter to provider for review.      Last visit treatment plan:     1/27/25      Treatment Plan:  Continue Pristiq 50 mg daily for mood, anxiety.    Continue methylfolate 7.5 mg daily as supplementation.  Continue Adderall XR 25 mg daily for mood augmentation  Continue Adderall IR 15 mg twice daily as needed for mood augmentation and daytime fatigue/hypersomnia  Continue hydroxyzine 25-50 mg up to three times a day as needed for anxiety/sleep.   Continue  ketamine therapy as planned/needed.   Start fluoxetine/Prozac 10 mg daily for mood, anxiety. OK to start at 10 mg every other day and work way up to 20 mg daily as tolerated before next appt. If poorly tolerated, OK to stop.   Continue to work with your specialist from [facility] as indicated.    Continue with new somatic psychotherapist as planned.  Continue all other cares per primary care provider.   Continue all other medications as reviewed per electronic medical record today.   Safety plan reviewed. To the Emergency Department as needed or call after hours crisis line at [phone] or [phone]. [place] Crisis Text Line. Text [place] to 346600 or Suicide LifeLine Chat: suicidepreventionlifeline.org/chat  Schedule an appointment with me in 3 months, or sooner as needed. Call [facility] at [phone] to schedule.  Follow up with primary care provider as planned or for acute medical concerns.  Call the psychiatric nurse line with medication questions or concerns at [phone].  [employer] may be used to communicate with your provider, but this is not intended to be used for emergencies.    Any medication(s) require lab monitoring? No    Abundio Harley RN on 4/3/2025 at 1:43 PM

## 2025-04-03 NOTE — TELEPHONE ENCOUNTER
Reason for call:  Medication   If this is a refill request, has the caller requested the refill from the pharmacy already? Yes  Will the patient be using a [facility]? No  Name of the pharmacy and phone number for the current request: [facility] and [phone]    Name of the medication requested: Vistiral and Robaxin, but pt is not sure if you prescribe these for her or not.    Other request: pt also said a nurse left a message about a med but she couldn't understand the message. She has not been taking the prozac. Can you please call pt back     Phone number to reach patient:  Cell number on file:    Telephone Information:   Mobile [phone]       Best Time:  anytime    Can we leave a detailed message on this number?  YES    Travel screening: Not Applicable

## 2025-04-06 ASSESSMENT — PATIENT HEALTH QUESTIONNAIRE - PHQ9
SUM OF ALL RESPONSES TO PHQ QUESTIONS 1-9: 17
SUM OF ALL RESPONSES TO PHQ QUESTIONS 1-9: 17
10. IF YOU CHECKED OFF ANY PROBLEMS, HOW DIFFICULT HAVE THESE PROBLEMS MADE IT FOR YOU TO DO YOUR WORK, TAKE CARE OF THINGS AT HOME, OR GET ALONG WITH OTHER PEOPLE: VERY DIFFICULT

## 2025-04-07 ENCOUNTER — ALLIED HEALTH/NURSE VISIT (OUTPATIENT)
Dept: PSYCHIATRY | Facility: CLINIC | Age: 65
End: 2025-04-07
Payer: COMMERCIAL

## 2025-04-07 VITALS — HEART RATE: 68 BPM | DIASTOLIC BLOOD PRESSURE: 85 MMHG | SYSTOLIC BLOOD PRESSURE: 129 MMHG

## 2025-04-07 DIAGNOSIS — F33.41 RECURRENT MAJOR DEPRESSIVE DISORDER, IN PARTIAL REMISSION: Primary | ICD-10-CM

## 2025-04-07 NOTE — PROGRESS NOTES
Janelle White comes into clinic today at the request of LINK Yanes MD Ordering Provider for Med Injection only Ketamine .    Procedure Prep:  Medication double check completed by: Tesha Aaron RN  Prior to administration pt identified by name and : Yes    Nursing Assessment:  Appearance: Good   Mood: Doing okay. Has shoulder replacement surgery this week. Has been taking care of mom with cancer diagnosis  Affect: WNL  Eye contact: Good      2025     9:07 AM   PHQ   PHQ-9 Total Score 22   Q9: Thoughts of better off dead/self-harm past 2 weeks Nearly every day     QIDDSR 16 weekly assessment: score 19. Assessment was scanned to EHR.     Procedure Performed:  VSS and mental status WNL. Patient was given Ketamine. See MAR for details.     Post Procedure Assessment:  Patient tolerated the treatment with the following side effects: dissociation. Vital signs were monitored, see VS Flowsheet. Patient stated they felt ready to go home prior to discharge. AVS was offered to patient and patient declined. Patient was instructed not to drive for the remainder of the day and to notify clinic if any concerns arise.     Next appt: 3 weeks    Medications were supplied by Clinic     This service provided today was under the supervising provider of the day DONAVAN Mireles MD, who was available if needed.        Adali Berg RN

## 2025-04-08 ENCOUNTER — DOCUMENTATION ONLY (OUTPATIENT)
Dept: OTHER | Facility: CLINIC | Age: 65
End: 2025-04-08
Payer: COMMERCIAL

## 2025-04-10 ASSESSMENT — PATIENT HEALTH QUESTIONNAIRE - PHQ9: SUM OF ALL RESPONSES TO PHQ QUESTIONS 1-9: 22

## 2025-04-15 ENCOUNTER — DOCUMENTATION ONLY (OUTPATIENT)
Dept: OTHER | Facility: CLINIC | Age: 65
End: 2025-04-15
Payer: COMMERCIAL

## 2025-04-17 ENCOUNTER — TRANSFERRED RECORDS (OUTPATIENT)
Dept: HEALTH INFORMATION MANAGEMENT | Facility: CLINIC | Age: 65
End: 2025-04-17
Payer: COMMERCIAL

## 2025-04-22 ENCOUNTER — TRANSFERRED RECORDS (OUTPATIENT)
Dept: HEALTH INFORMATION MANAGEMENT | Facility: CLINIC | Age: 65
End: 2025-04-22
Payer: COMMERCIAL

## 2025-04-27 ASSESSMENT — PATIENT HEALTH QUESTIONNAIRE - PHQ9
SUM OF ALL RESPONSES TO PHQ QUESTIONS 1-9: 21
10. IF YOU CHECKED OFF ANY PROBLEMS, HOW DIFFICULT HAVE THESE PROBLEMS MADE IT FOR YOU TO DO YOUR WORK, TAKE CARE OF THINGS AT HOME, OR GET ALONG WITH OTHER PEOPLE: VERY DIFFICULT
SUM OF ALL RESPONSES TO PHQ QUESTIONS 1-9: 21

## 2025-04-28 ENCOUNTER — ALLIED HEALTH/NURSE VISIT (OUTPATIENT)
Dept: PSYCHIATRY | Facility: CLINIC | Age: 65
End: 2025-04-28
Payer: COMMERCIAL

## 2025-04-28 VITALS — HEART RATE: 72 BPM | DIASTOLIC BLOOD PRESSURE: 67 MMHG | SYSTOLIC BLOOD PRESSURE: 104 MMHG

## 2025-04-28 DIAGNOSIS — F33.41 RECURRENT MAJOR DEPRESSIVE DISORDER, IN PARTIAL REMISSION: Primary | ICD-10-CM

## 2025-04-28 NOTE — PROGRESS NOTES
"Janelle White comes into clinic today at the request of LINK Yanes MD Ordering Provider for Med Injection only Ketamine .    Procedure Prep:  Medication double check completed by: Adali Berg RN  Prior to administration pt identified by name and : Yes    Nursing Assessment:  Appearance: Casual  Mood: \"Not great\". Pt reports having a difficult time recovering from shoulder replacement around 2 weeks ago. Her pain has been high, she is having difficulty sleeping due to pain and discomfort. Looking forward to her [employer] trip to [place].   Affect: Neutral  Eye contact: Good      2025     8:38 AM   PHQ   PHQ-9 Total Score 21    Q9: Thoughts of better off dead/self-harm past 2 weeks More than half the days   F/U: Thoughts of suicide or self-harm Yes   F/U: Self harm-plan Yes   F/U: Self-harm action No   F/U: Safety concerns No       Patient-reported     QIDDSR 16 weekly assessment: score 15. Assessment was scanned to EHR.     Procedure Performed:  VSS and mental status WNL. Patient was given Ketamine. See MAR for details.       Post Procedure Assessment:  Patient tolerated the treatment with the following side effects: dissociation. Vital signs were monitored, see VS Flowsheet. Patient stated they felt ready to go home prior to discharge. AVS was offered to patient and patient declined. Patient was instructed not to drive for the remainder of the day and to notify clinic if any concerns arise.     Next appt:     Medications were supplied by Clinic     This service provided today was under the supervising provider of the day DONAVAN Mireles MD, who was available if needed.    Tesha Aaron RN   "

## 2025-05-02 NOTE — DISCHARGE SUMMARY
Physician Discharge Summary        Primary Care Physician:  Carmen Garcia Admission Date: 6/30/2022   Discharge Provider: Carlos Harris DO Discharge Date: 7/5/2022   Disposition: Home Code Status: Full Code   Activity: As tolerated Diet: Orders Placed This Encounter      Diet      Combination Diet Regular Diet Adult; Safe Tray - with utensils      Diet        Condition at Discharge: Stable.       Discharge Diagnoses/Hospital Summary:      Janelle White is a 61 year old female admitted on 6/30/2022. She has a past medical history most remarkable for CLL and recent COVID-19 infection (started 6/22/2022).  She presented to the emergency department with SI, psychiatric evaluation recommended inpatient admission.  Unfortunately patient was not able to be placed due to COVID-19 infection requiring 20 days of isolation due to underlying immunocompromise state.  She is admitted to medicine for further care.     History of depression with current suicidal ideation; with 72-hour hold placed during inpatient stay.  - Psych consulted, social work. Close outpatient follow up.   - Defer psych meds to psychiatry team.   - Close outpatient follow up.      History of chronic lymphocytic leukemia follows with the oncology group at [facility].     COVID-19 infection that started 6/22/2022. Due to underlying immunocompromise state (CLL plus intermittent IVIG infusion, last received 6/20 /2022), she needs 20 days of isolation per [place] policy.  - Isolation precautions.      Chronic Issues:  Chronic pain syndrome.  Home pain meds   ADHD  Constipation.  Home bowel regimen     Discharge Medications:      Review of your medicines      CONTINUE these medicines which may have CHANGED, or have new prescriptions. If we are uncertain of the size of tablets/capsules you have at home, strength may be listed as something that might have changed.      Dose / Directions   albuterol 108 (90 Base) MCG/ACT inhaler  Commonly  oriented to person, place and time, short and long term memory intact, cooperative with exam, sensory exam intact known as: PROAIR HFA/PROVENTIL HFA/VENTOLIN HFA  This may have changed:     when to take this    reasons to take this  Used for: Cough      Dose: 2 puff  Inhale 2 puffs into the lungs every 6 hours  Quantity: 8.5 g  Refills: 4     * amphetamine-dextroamphetamine 20 MG 24 hr capsule  Commonly known as: ADDERALL XR  This may have changed: Another medication with the same name was changed. Make sure you understand how and when to take each.  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)      Dose: 20 mg  Start taking on: July 13, 2022  Take 1 tablet (20 mg) by mouth daily  Quantity: 30 tablet  Refills: 0     LORazepam 1 MG tablet  Commonly known as: ATIVAN  This may have changed: when to take this  Used for: Insomnia, unspecified type      Dose: 1 mg  Take 1 tablet (1 mg) by mouth every 6 hours as needed for anxiety  Quantity: 50 tablet  Refills: 3     methocarbamol 500 MG tablet  Commonly known as: ROBAXIN  This may have changed:     how much to take    when to take this    additional instructions  Used for: Cervical spondylosis, Myofascial pain      Dose: 500 mg  Take 1 tablet (500 mg) by mouth 4 times daily as needed for muscle spasms  Quantity: 120 tablet  Refills: 0         * This list has 2 medication(s) that are the same as other medications prescribed for you. Read the directions carefully, and ask your doctor or other care provider to review them with you.            CONTINUE these medicines which have NOT CHANGED      Dose / Directions   ASHWAGANDHA PO      Dose: 1 tablet  Take 1 tablet by mouth daily  Refills: 0     desvenlafaxine 50 MG 24  "hr tablet  Commonly known as: PRISTIQ  Used for: Severe episode of recurrent major depressive disorder, without psychotic features (H)      Dose: 50 mg  Take 1 tablet (50 mg) by mouth daily  Quantity: 90 tablet  Refills: 1     Divigel 1 MG/GM Gel  Used for: Postmenopausal status  Generic drug: Estradiol      Place 1 packet onto the skin daily  Quantity: 30 g  Refills: 11     HYDROcodone-acetaminophen  MG per tablet  Commonly known as: NORCO  Used for: Chronic pain syndrome      Dose: 1 tablet  Take 1 tablet by mouth every 4 hours as needed for severe pain max 4 tabs/24 hrs  Quantity: 10 tablet  Refills: 0     insulin syringe-needle U-100 30G X 1/2\" 1 ML miscellaneous  Commonly known as: 30G X 1/2\" 1 ML  Used for: B-complex deficiency      Inject 1 ml B12 qmonth  Quantity: 10 each  Refills: 1     lidocaine 5 % patch  Commonly known as: LIDODERM  Used for: Cervicalgia      Dose: 4 patch  Place 4 patches onto the skin daily Apply up to 4 patches to skin. Wear for 12 hours and remove for 12 hrs.  Refill when patient requests.  Quantity: 120 patch  Refills: 3     medical cannabis  (Patient's own supply)      Medical Cannabis - Tangerine 4-6 ml by mouth daily. [facility]  Refills: 0     methylfolate 7.5 MG Tabs tablet  Commonly known as: DEPLIN  Used for: Severe episode of recurrent major depressive disorder, without psychotic features (H)      Dose: 7.5 mg  Take 1 tablet (7.5 mg) by mouth daily  Quantity: 30 tablet  Refills: 1     naloxone 4 MG/0.1ML nasal spray  Commonly known as: NARCAN  Used for: Nontraumatic tear of left rotator cuff, unspecified tear extent, Chronic pain syndrome      Dose: 4 mg  Spray 1 spray (4 mg) into one nostril alternating nostrils as needed for opioid reversal every 2-3 minutes until assistance arrives  Quantity: 0.2 mL  Refills: 1     NONFORMULARY      Dose: 2 Scoop  Take 2 Scoops by mouth daily Protein and Vitamin shake mix - Nutritional supplement  Refills: 0     ondansetron 8 MG " ODT tab  Commonly known as: ZOFRAN ODT  Used for: Nausea      Dose: 8 mg  Take 1 tablet (8 mg) by mouth every 8 hours as needed for nausea  Quantity: 30 tablet  Refills: 4     pramipexole 0.25 MG tablet  Commonly known as: MIRAPEX  Used for: Restless leg syndrome      Dose: 0.25 mg  Take 1 tablet (0.25 mg) by mouth At Bedtime  Quantity: 30 tablet  Refills: 1     Prasterone 6.5 MG Inst  Used for: Atrophic vaginitis      Dose: 0.5 suppository  Place 0.5 suppositories vaginally three times a week  Quantity: 28 each  Refills: 11     senna-docusate 8.6-50 MG tablet  Commonly known as: SENOKOT-S/PERICOLACE      Dose: 6-9 tablet  Take 6-9 tablets by mouth every evening  Refills: 0     vitamin D3 125 MCG (5000 UT) tablet  Commonly known as: CHOLECALCIFEROL      Dose: 5,000 Units  Take 5,000 Units by mouth daily  Refills: 0                  Discharge Instructions:  Follow up appointment with Primary Care Physician: Carmen Garcia  Follow up appointment with Specialist: Psychiatry.        Vital Signs in last 24 hours:    Temp:  [97.5  F (36.4  C)-97.9  F (36.6  C)] 97.5  F (36.4  C)  Pulse:  [66-85] 85  Resp:  [16] 16  BP: (100-111)/(68-73) 111/72  SpO2:  [97 %-100 %] 98 %    GENERAL: Alert and oriented. NAD. Conversational, appropriate.   HEENT: Normocephalic. EOMI. No icterus or injection. Nares normal.   LUNGS: Clear to auscultation. No dyspnea at rest.   HEART: Regular rate. Extremities perfused.   ABDOMEN: Soft, nontender, and nondistended. Positive bowel sounds.   EXTREMITIES: No LE edema noted.   NEUROLOGIC: Moves extremities x4 on command. No acute focal neurologic abnormalities noted.     Total Time on discharge process is: 34 minutes    Dr. Carlos Harris DO, [facility], [facility]  Internal Medicine Hospitalist  7/5/2022

## 2025-05-06 ASSESSMENT — PATIENT HEALTH QUESTIONNAIRE - PHQ9
SUM OF ALL RESPONSES TO PHQ QUESTIONS 1-9: 9
10. IF YOU CHECKED OFF ANY PROBLEMS, HOW DIFFICULT HAVE THESE PROBLEMS MADE IT FOR YOU TO DO YOUR WORK, TAKE CARE OF THINGS AT HOME, OR GET ALONG WITH OTHER PEOPLE: VERY DIFFICULT
SUM OF ALL RESPONSES TO PHQ QUESTIONS 1-9: 9

## 2025-05-06 NOTE — PROGRESS NOTES
Psychiatry Clinic Progress Note                                                                   Janelle White is a 64 year old female   Therapist: None (stopped September 2021)   PCP: Carmen Garcia  Other Providers:  Kimberly Perez DO (psychiatrist)       Interim History                                                                                                        4, 4       The patient is a good historian and reports good treatment adherence.      On her last visit in October, Janelle reports ongoing management mood symptoms (in addition to AUD & chronic pain) through medication, therapy, and self-care practices. She has undergone transcranial magnetic stimulation (TMS) and ketamine treatments, which she finds beneficial for mood stabilization. However, she had notices a decline in mood and motivation as the effects of ketamine wane between treatments, with mood symptoms of discontent, restlessness, irritability, isolation, lack of motivation, and indecision.     Interim history:    Per note from her primary psychiatrist 1/27/2025:  Patient overall doing relatively okay.  Still some down days, high anxiety, and reduced energy and motivation.  Unfortunately has not tolerated attempts to increase the Pristiq due to side effects.  Patient agreeable to augment Pristiq with just a little bit of Prozac/fluoxetine.  Patient is poor 2D6 metabolizer and so we will start the Prozac at just 10 mg daily.  Discussed Prozac can increase strength of stimulants.  Patient will monitor for side effects.  Hopeful that Prozac might further lift mood and help with energy, motivation, and anxiety improvement.  Discussed watching for signs and symptoms of serotonin syndrome.  No acute safety concerns today.  No acute suicidality.  No problematic drug or alcohol use.  Patient is using cannabis and continues to explore her relationship with the psychoactive substance.  Continues ketamine treatment and finds it helpful.   "Encouraged to continue in psychotherapy.    Today:     We discussed her recent shoulders troubles and she shared to be glad to be improving although her issues got her frustrated. 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 We discussed her interest in rocks, collecting, polishing them, and showcasing them.    She says that she's been struggling with the lability in her mood, saying that it's been happening for a long time, and describing herself as \"chemically sensitive\". She says that she works through these things. She reports that the ketamine helps with that, and although it's not long-lasting it helps with going through it. She adds that she tries to push herself, giving an example of her last trip.    She says that when she feels restrained it is hard for her to get things done but then sometimes when she pushes the limits too much it can be a hard fallback. She shares she would like to have a marriage counselor and elaborates that things \"are not going\" with her marriage of 40 years and that relationship, adding that having her  away in [facility] for a months was \"nice\". She adds that communication has been an issue and has been unable to do that without \"having someone hurt\", sharing frustration that he always goes with everything she says and falls into the caregiving role and that she needs some input and doesn't know if they have \"the same interests\", noting that she really wants and needs to stay active. We encouraged her to pursue marriage counseling and suggested reaching out to our .    She says that her psychiatrist suggested that she tries fluoxetine but she mentions that she had reacted poorly to it in the past, and says she's only been taking 25 mg of Pristiq now and that has been better for her. She adds \"ketamine gives me tremendous relief and I just need " "to learn coping\"    Recent Symptoms:   Depression:  low energy and discontent  ----  Anxiety:  excessive worry and feeling fearful (historical)   Panic Attack:  dizziness, flushing, SOB, trouble taking deep breath and chest tightness (historical)        Social/ Family History                                  [per patient report]                                 1ea,1ea   No changes     Medical / Surgical History                                                                                                                  Patient Active Problem List   Diagnosis    PERSONAL HX OF  MELANOMA    B-complex deficiency    Migraine    Chronic Low Back Pain    CARDIOVASCULAR SCREENING; LDL GOAL LESS THAN 160    DDD (degenerative disc disease), cervical    Vitamin D deficiency    Chronic pain syndrome    CLL (chronic lymphocytic leukemia) (H)    Controlled substance agreement signed    CLARE (obstructive sleep apnea)    Prediabetes    Hyperlipidemia LDL goal <130    MTHFR gene mutation    CYP2B6 intermediate metabolizer (H)    CYP2D6 poor metabolizer (H)    STACIE (generalized anxiety disorder)    Polyarthralgia    Cellulitis, unspecified cellulitis site    Cellulitis    Tension type headache    Status post blepharoplasty    Rotator cuff injury    Regular astigmatism, bilateral    Pain medication agreement    Hypotension    History of laser assisted in situ keratomileusis    COVID-19    Spinal stenosis of lumbar region with radiculopathy    COPD (chronic obstructive pulmonary disease) (H)    Severe episode of recurrent major depressive disorder, without psychotic features (H)    Suicidal ideation    Immunocompromised    Infection due to 2019 novel coronavirus    Sacroiliitis    History of falling    Lumbar radiculopathy    Failed back surgical syndrome       Past Surgical History:   Procedure Laterality Date    anterior cervical discectomy C4-5 ,Fusion C5-6-7  10/2007    APPENDECTOMY      BACK SURGERY      BLEPHAROPLASTY BILATERAL "  2013    Procedure: BLEPHAROPLASTY BILATERAL;  BILATERAL UPPER LID BLEPHAROPLASTY AND BROWPEXY ;  Surgeon: Godfrey Miguel MD;  [facility]:  [facility]     SECTION      x2    COLONOSCOPY N/A 2020    Procedure: COLONOSCOPY;  Surgeon: Butch Lockhart MD;  Location:  GI    ESOPHAGOSCOPY, GASTROSCOPY, DUODENOSCOPY (EGD), COMBINED N/A 2020    Procedure: ESOPHAGOGASTRODUODENOSCOPY, WITH BIOPSY biosies by cold forceps;  Surgeon: Butch Lockhart MD;  Location:  GI    EYE SURGERY      FUSION LUMBAR ANTERIOR, FUSION LUMBAR POSTERIOR TWO LEVELS, COMBINED  2010    L3-S1 anterior posterior fusion    GENITOURINARY SURGERY  Hysterectomy        HEAD & NECK SURGERY      Cervical spine- c2-T2    HYSTERECTOMY  2006    ovaries intact    HYSTERECTOMY      HYSTERECTOMY, PAP NO LONGER INDICATED      INJECT EPIDURAL LUMBAR / SACRAL SINGLE N/A 2023    Procedure: Caudal Epidural Steroid Injection;  Surgeon: Joslyn Cherry MD;  Location: [facility] OR    INJECT SACROILIAC JOINT Right 2023    Procedure: Sacroiliac Joint Injection;  Surgeon: Joslyn Cherry MD;  Location: [facility] OR    INSERT STIMULATOR DORSAL COLUMN TRIAL N/A 2024    Procedure: Spinal Cord Stimulator Trial;  Surgeon: Joslyn Cherry MD;  Location: [facility] OR    Partial vulvectomy for NEENA III  2009    Pubovaginal sling, post op durasphere injections  2006    wears pad    ROTATOR CUFF REPAIR RT/LT  2011    right    SOFT TISSUE SURGERY  2019    Bilateral carpal/ulnar release    SPINAL FUSION C3-4  2009    C3-4, anterior spinal fusion    TUBAL LIGATION      ZZC APPENDECTOMY  2006        Medical Review of Systems                                                                                                    2,10   none in addition to that documented above    Allergy                                Bupropion, Codeine, Effexor [venlafaxine hydrochloride], Escitalopram, Escitalopram oxalate, Levaquin [levofloxacin],  Methylphenidate, Milnacipran, Pregabalin, Prozac [fluoxetine hcl], Tramadol, and Venlafaxine    Current Medications                                                                                                       Current Outpatient Medications   Medication Sig Dispense Refill    albuterol (PROAIR HFA/PROVENTIL HFA/VENTOLIN HFA) 108 (90 Base) MCG/ACT inhaler Inhale 2 puffs into the lungs every 6 hours as needed for shortness of breath or wheezing. 8.5 g 11    [START ON 5/13/2025] amphetamine-dextroamphetamine (ADDERALL XR) 25 MG 24 hr capsule Take 1 capsule (25 mg) by mouth every morning. 30 capsule 0    [START ON 6/12/2025] amphetamine-dextroamphetamine (ADDERALL XR) 25 MG 24 hr capsule Take 1 capsule (25 mg) by mouth every morning. 30 capsule 0    [START ON 7/12/2025] amphetamine-dextroamphetamine (ADDERALL XR) 25 MG 24 hr capsule Take 1 capsule (25 mg) by mouth every morning. 30 capsule 0    amphetamine-dextroamphetamine (ADDERALL) 15 MG tablet Take 1 tablet (15 mg) by mouth 2 times daily. 60 tablet 0    [START ON 5/18/2025] amphetamine-dextroamphetamine (ADDERALL) 15 MG tablet Take 1 tablet (15 mg) by mouth 2 times daily. 60 tablet 0    [START ON 6/17/2025] amphetamine-dextroamphetamine (ADDERALL) 15 MG tablet Take 1 tablet (15 mg) by mouth 2 times daily. 60 tablet 0    Cobalamin Combinations (VITAMIN B12-FOLIC ACID) 500-400 MCG TABS Take 1 tablet by mouth (Patient not taking: Reported on 3/28/2025)      desvenlafaxine (PRISTIQ) 50 MG 24 hr tablet Take 1 tablet (50 mg) by mouth daily. 90 tablet 1    Estradiol (DIVIGEL) 1 MG/GM GEL Place 1 packet onto the skin daily 30 g 11    hydrOXYzine candace (VISTARIL) 25 MG capsule Take 1 capsule (25 mg) by mouth 3 times daily as needed for anxiety or other (sleep). 90 capsule 0    Levomefolate Glucosamine (METHYLFOLATE PO) Take 7.5 mg by mouth daily      lidocaine (LIDODERM) 5 % patch Place 3 patches onto the skin daily. Apply up to 3 patches to skin. Wear for 12 hours and  remove for 12 hrs.  Refill when patient requests. 120 patch 3    medical cannabis (Patient's own supply.  Not a prescription) Medical Cannabis - [place]angerine 4-6 ml by mouth daily. [facility] (Patient not taking: Reported on 3/28/2025)      methocarbamol (ROBAXIN) 500 MG tablet Take 1 tablet (500 mg) by mouth 4 times daily as needed for muscle spasms (Patient not taking: Reported on 3/28/2025) 120 tablet 1    methocarbamol (ROBAXIN) 750 MG tablet TAKE 1 TABLET(750 MG) BY MOUTH FOUR TIMES DAILY AS NEEDED FOR MUSCLE SPASMS 120 tablet 4    methylPREDNISolone (MEDROL DOSEPAK) 4 MG tablet therapy pack Follow Package Directions (Patient not taking: Reported on 3/28/2025) 21 tablet 0    naloxone (NARCAN) 4 MG/0.1ML nasal spray Spray 1 spray (4 mg) into one nostril alternating nostrils as needed for opioid reversal every 2-3 minutes until assistance arrives (Patient not taking: Reported on 3/28/2025) 0.2 mL 1    NONFORMULARY Take 2 Scoops by mouth daily Protein and Vitamin shake mix - Nutritional supplement      ondansetron (ZOFRAN ODT) 8 MG ODT tab Take 1 tablet (8 mg) by mouth every 8 hours as needed for nausea 30 tablet 4    Prasterone 6.5 MG INST Place 0.5 suppositories vaginally three times a week. 28 each 11    rOPINIRole (REQUIP) 0.25 MG tablet TAKE 1 TO 2 TABLETS(0.25 TO 0.5 MG) BY MOUTH AT BEDTIME 180 tablet 3    senna-docusate (SENOKOT-S/PERICOLACE) 8.6-50 MG tablet Take 6-9 tablets by mouth every evening      vitamin D3 (CHOLECALCIFEROL) 125 MCG (5000 UT) tablet Take 5,000 Units by mouth daily         Vitals                                                                                                                       3, 3   LMP 05/01/2005 (LMP Unknown)      Mental Status Exam                                                                                    9, 14 cog gs     Alertness: alert  and oriented  Appearance: well groomed  Behavior/Demeanor: cooperative, with good  eye contact   Speech: normal  "rate/rhythm  Language: intact  Psychomotor: normal or unremarkable  Mood: \"It's hard for me to settle\", grossly euthymic on interview  Affect: full range; was congruent to mood; was congruent to content  Thought Process/Associations: unremarkable  Thought Content:  Reports suicidal ideation  Perception:  No apparent psychotic symptoms observed or reported  Insight: good  Judgment: good  Cognition: (6) does  appear grossly intact; formal cognitive testing was not done  Gait/Station and/or Muscle Strength/Tone: Not observed due to  Video visit     Labs and Data                                                                                                                 Rating Scales:    N/A    PHQ9:        4/7/2025     9:07 AM 4/27/2025     8:38 AM 5/6/2025     3:18 PM   PHQ   PHQ-9 Total Score 22 21  9    Q9: Thoughts of better off dead/self-harm past 2 weeks Nearly every day More than half the days Several days   F/U: Thoughts of suicide or self-harm  Yes Yes   F/U: Self harm-plan  Yes Yes   F/U: Self-harm action  No No   F/U: Safety concerns  No No       Patient-reported         Diagnosis and Assessment                                                                             m2, h3       Background:  Janelle White is a 60 year old female with previous psychiatric history of MDD, recurrent, severe, chronic pain syndrome, h/o CLL. On initial presentation it was thought that Janelle was suffering from an episode of depression that is closely related to her pain syndrome and the resultant physically inability to engage in activities or work. Her pain management was improved and she was treated with TMS though continues to endorse significant depression and anxiety symptoms. She is hesitant to trial MAOIs or other oral agents that could potentially interfere with her current pain management as she feels it is well under control. She is interested in pursuing ketamine therapy, which should not interfere with " her medications and may actually help with her pain. She gave consent to speak with her PCP about medication management and potential interventions for continued depression.      Today the following issues were addressed:    1) Major depressive disorder, recurrent, severe - in partial remission.  2) treatment response       [place] Prescription Monitoring Program [] review was not needed today.    PSYCHOTROPIC DRUG INTERACTIONS: none clinically relevant    Plan                                                                                                                    m2, h3      1) Major depressive disorder, recurrent, severe    Medications:  Continue current outpatient medications (Pristiq at 25 mg PO qDAY) at this moment.  Continue with the current Ketamine management.    Therapy and Support:  Maintain regular sessions with the somatic therapist on a weekly or bi-weekly basis.  Continue active participation in AA meetings and the step program.  Encourage engagement in new hobbies and social activities to reduce isolation.  Recommending marriage counseling today.    Ketamine Treatments:  Monitor mood and motivation levels closely.  Consider adjusting the frequency of ketamine treatments if significant declines are observed between sessions.    Sleep Management:  Review the effectiveness of the dental apparatus for sleep apnea.  Explore additional interventions if sleep disturbances persist.    Follow-Up:  Schedule regular check-ins every three months with the primary care provider.  Address the emotional awareness by possibly incorporating cognitive-behavioral strategies to help identify and name feelings.  Ensure open communication regarding any changes in symptoms or side effects from medications.    Additional Considerations:  Continue emphasizing the importance of self-care and provide resources as needed.    Referrals: None      RTC: 3 Months  CRISIS NUMBERS:   Provided routinely in AVS.    Treatment Risk  Statement:  The patient understands the risks, benefits, adverse effects and alternatives. Agrees to treatment with the capacity to do so. No medical contraindications to treatment. Agrees to call clinic for any problems. The patient understands to call 911 or go to the nearest ED if life threatening or urgent symptoms occur.       Ramez Dagher, MD  Psychiatry Resident Physician      ATTENDING: Zafar Yanes MD, PhD    I performed key portions of the exam and agree with the documentation.       Answers submitted by the patient for this visit:  Patient Health Questionnaire (Submitted on 10/2/2024)  If you checked off any problems, how difficult have these problems made it for you to do your work, take care of things at home, or get along with other people?: Very difficult  PHQ9 TOTAL SCORE: 13  Patient Health Questionnaire (G7) (Submitted on 9/29/2024)  STACIE 7 TOTAL SCORE: 15    Psychiatry Individual Psychotherapy Note   Psychotherapy start time -440  Psychotherapy end time - 5pm  Date treatment plan last reviewed with patient - 10/06/24  Subjective: This supportive psychotherapy session addressed issues related to goals of therapy and current psychosocial stressors. Patient's reaction: Preparatory in the context of mental status appropriate for ambulatory setting.    Interactive complexity indicated? No  Plan: RTC in timeframe noted above  Psychotherapy services during this visit included myself and the patient.   Treatment Plan      SYMPTOMS; PROBLEMS   MEASURABLE GOALS;    FUNCTIONAL IMPROVEMENT / GAINS INTERVENTIONS DISCHARGE CRITERIA   Depression: depressed mood and alexithymia   reduce depressive symptoms, find enjoyment at least once a day, and articulate feelings more clearly Supportive / psychodynamic marked symptom improvement       I

## 2025-05-07 ENCOUNTER — OFFICE VISIT (OUTPATIENT)
Dept: PSYCHIATRY | Facility: CLINIC | Age: 65
End: 2025-05-07
Payer: COMMERCIAL

## 2025-05-07 VITALS
HEART RATE: 92 BPM | SYSTOLIC BLOOD PRESSURE: 111 MMHG | TEMPERATURE: 97.2 F | OXYGEN SATURATION: 98 % | DIASTOLIC BLOOD PRESSURE: 72 MMHG

## 2025-05-07 DIAGNOSIS — F33.41 RECURRENT MAJOR DEPRESSIVE DISORDER, IN PARTIAL REMISSION: Primary | ICD-10-CM

## 2025-05-12 ENCOUNTER — VIRTUAL VISIT (OUTPATIENT)
Dept: PSYCHIATRY | Facility: CLINIC | Age: 65
End: 2025-05-12
Payer: COMMERCIAL

## 2025-05-12 ENCOUNTER — VIRTUAL VISIT (OUTPATIENT)
Dept: BEHAVIORAL HEALTH | Facility: CLINIC | Age: 65
End: 2025-05-12
Payer: COMMERCIAL

## 2025-05-12 DIAGNOSIS — F33.41 RECURRENT MAJOR DEPRESSIVE DISORDER, IN PARTIAL REMISSION: Primary | ICD-10-CM

## 2025-05-12 DIAGNOSIS — F33.9 RECURRENT MAJOR DEPRESSION RESISTANT TO TREATMENT: ICD-10-CM

## 2025-05-12 DIAGNOSIS — F34.1 PERSISTENT DEPRESSIVE DISORDER: Primary | ICD-10-CM

## 2025-05-12 DIAGNOSIS — F34.1 PERSISTENT DEPRESSIVE DISORDER: ICD-10-CM

## 2025-05-12 DIAGNOSIS — F41.1 GAD (GENERALIZED ANXIETY DISORDER): ICD-10-CM

## 2025-05-12 DIAGNOSIS — R41.89 SUBJECTIVE COGNITIVE IMPAIRMENT: ICD-10-CM

## 2025-05-12 PROCEDURE — G2211 COMPLEX E/M VISIT ADD ON: HCPCS | Performed by: PSYCHIATRY & NEUROLOGY

## 2025-05-12 PROCEDURE — 90832 PSYTX W PT 30 MINUTES: CPT | Mod: 95 | Performed by: COUNSELOR

## 2025-05-12 PROCEDURE — 1126F AMNT PAIN NOTED NONE PRSNT: CPT | Mod: 95 | Performed by: PSYCHIATRY & NEUROLOGY

## 2025-05-12 PROCEDURE — 98006 SYNCH AUDIO-VIDEO EST MOD 30: CPT | Performed by: PSYCHIATRY & NEUROLOGY

## 2025-05-12 RX ORDER — DEXTROAMPHETAMINE SACCHARATE, AMPHETAMINE ASPARTATE, DEXTROAMPHETAMINE SULFATE AND AMPHETAMINE SULFATE 3.75; 3.75; 3.75; 3.75 MG/1; MG/1; MG/1; MG/1
15 TABLET ORAL 2 TIMES DAILY
Qty: 60 TABLET | Refills: 0 | Status: SHIPPED | OUTPATIENT
Start: 2025-07-17

## 2025-05-12 RX ORDER — DESVENLAFAXINE 25 MG/1
25 TABLET, EXTENDED RELEASE ORAL DAILY
Qty: 90 TABLET | Refills: 3 | Status: SHIPPED | OUTPATIENT
Start: 2025-05-12

## 2025-05-12 ASSESSMENT — PAIN SCALES - GENERAL: PAINLEVEL_OUTOF10: NO PAIN (0)

## 2025-05-12 NOTE — PROGRESS NOTES
[facility]  5/12/25      Behavioral Health Clinician Progress Note    Patient Name: Janelle White           Service Type:  Individual      [facility] Email (patient reached)     Session Start Time: 244pm  Session End Time: 309pm      Session Length: 16 - 37      Attendees: Client     Service Modality:  Video Visit:      Provider verified identity through the following two step process.  Patient provided:  Patient is known previously to provider    Telemedicine Visit: The patient's condition can be safely assessed and treated via synchronous audio and visual telemedicine encounter.      Reason for Telemedicine Visit: Services only offered telehealth    Originating Site (Patient Location): Patient's home    Distant Site (Provider Location): [facility] MENTAL [facility] & ADDICTION [facility]    Consent:  The patient/guardian has verbally consented to: the potential risks and benefits of telemedicine (video visit) versus in person care; bill my insurance or make self-payment for services provided; and responsibility for payment of non-covered services.     Patient would like the video invitation sent by:  My Chart    Mode of Communication:  Video Conference via [facility]    Distant Location (Provider):  On-site    As the provider I attest to compliance with applicable laws and regulations related to telemedicine.    Visit Activities (Refresh list every visit): [facility] [place] Only    Diagnostic Assessment Date: 7/15/24 Joelle Viera MA [facility]  Treatment Plan Review Date: 5/12/25  See Flowsheets for today's PHQ-9 and STACIE-7 results  Previous PHQ-9:       4/7/2025     9:07 AM 4/27/2025     8:38 AM 5/6/2025     3:18 PM   PHQ-9 SCORE   PHQ-9 Total Score MyChart  21 (Severe depression) 9 (Mild depression)   PHQ-9 Total Score 22 21  9        Patient-reported     Previous STACIE-7:       9/29/2024    12:34 PM 3/28/2025    10:04 AM 4/27/2025     8:41 AM   STACIE-7 SCORE    Total Score 15 (severe anxiety) 11 (moderate anxiety) 9 (mild anxiety)   Total Score 15 11  9        Patient-reported    Proxy-reported       DATA  Extended Session (60+ minutes): No  Interactive Complexity: No  Crisis: No  [facility] Patient: No    Treatment Objective(s) Addressed in This Session:  Target Behavior(s): disease management/lifestyle changes reducing sx    Depressed Mood: Increase interest, engagement, and pleasure in doing things  Decrease frequency and intensity of feeling down, depressed, hopeless  Anxiety: will experience a reduction in anxiety    Current Stressors / Issues:   update:  Went on vacation.  Had a good time. Terrible reaction to post surgery.  Feels chemically sensitive.   Stresses:  Had shoulder surgery.  Month into recovery.  Still using sling and spacer yet.  Home stress is feeling manageable.  On lability.  Prozac trail was not good-lock jaw  Appetite: n/a  Sleep: n/a  Outpatient Provider updates: Ket[facility] [place] TRD, Therapy Lorena Corbin, [facility]- hasn't been seeing in a while.  Denies wanting a new one.  Considering a couples counselor.   SI/SIB/HI: Reduced overall to a couple times a week/fleeting.  Baseline method/planning.  Denies any intent.  safety plan on file.  Hx of attempts and SIB as teen.  Hx of baseline planning and method seeking without intent.  AYLA:  Sober.  AA support, sponser  Side effects/compliance:  Interventions:  [facility] engaged in discussion of medical stressors that caused increased safety concerns.  Most important:  son is coming into town for a visit.  Back to baseline post surgery from  sx that did increase.  Safety back to baseline.  Lowered prestiq, 25 mg, feeling better.  Not looking for more changes.    Rock hunting  Mushroom hunting  Pottery, jewelry making    1/27   update:  ER visit- shoulder dislocated/fracture/tear.  Pain and poor sleep.  Doing okay since last appt.  Went to home town for holidays- high anxiety  Stresses:  family  stress, divorce on hold,  traveling  Appetite: n/a  Sleep: Poor-pain  Outpatient Provider updates: [facility] [place] [facility], Therapy Lorena [place], [facility]  SI/SIB/HI: Reduced overall to a couple times a week/fleeting.  Baseline method/planning.  Denies any intent.  safety plan on file.  Hx of attempts and SIB as teen.  Hx of baseline planning and method seeking without intent.  AYLA:  Sober.  AA support.  Using THC again  Side effects/compliance:  Interventions:  [facility] engaged in discussion about interpersonal dynamics during holidays.  Let them theory.  Most important:  Doing over all okay.  ER visit-medical    10/25  MH update:  No big changes.  Frustrated at times with having to slow done to focus on tasks.  A Lot of appts.  Higher anxiety with all of this.  Went to [place] for a month. Enjoyable.  Relationship stressors with her.  Saw son.  Overall less reactive.  Mood about the same.   Pushing self to get things done.  Stresses: chronic pain, divorce paperwork.  Brother arrested.  Appetite: n/a  Sleep: Poor  Outpatient Provider updates: [facility] [place] TR[facility], Therapy Shayna Guzman; divorce support/group  Previous THRIVE, TMS, ECT, PHP/IOP  Neuropsychological Consultation (in chart, 8/11/22)   SI/SIB/HI: Reduced overall to a couple times a week/fleeting.  Baseline method/planning.  Denies any intent.  Updated safety plan  Hx of attempts and SIB as teen.  Hx of baseline planning and method seeking without intent.  AYLA:  Attends AA group for support.  SOBER.  Stopped THC.  Side effects/compliance: n/a  Interventions:  Most important:  Meds are good.  Was at higher Pristiq but stepped down again for last few days sitting at 50.  Not getting consistent adderall due to pharmSterling Menard appt on Friday, cancer check.    7/15  MH update:  ROS above.  Depression.  Not as labile.  Stresses:  Chronic pain, started divorce paperwork   Appetite: n/a  Sleep: CLARE/doesn't often use oral appliance    Outpatient Provider updates: Ketamine [place] [facility], Therapy Lorena Corbin, [facility]; divorce support/group  Previous THRIVE, TMS, ECT, PHP/IOP  Neuropsychological Consultation (in chart, 8/11/22)   SI/SIB/HI: Reduced overall to a couple times a week/fleeting.  Baseline method/planning.  Denies any intent.  Updated safety plan  Hx of attempts and SIB as teen.  Hx of baseline planning and method seeking without intent.  AYLA:  Attends AA group for support.  SOBER.  Stopped THC.  Side effects/compliance: n/a  Interventions:  [facility] engaged in completing new DA with psychoeducation and tx planning.  Most important:  Did not get refills of 15mg of adderall therefore has been taking slightly more since didn't have the script    Progress on Treatment Objective(s) / Homework:  Satisfactory progress - ACTION (Actively working towards change); Intervened by reinforcing change plan / affirming steps taken    Motivational Interviewing    MI Intervention: Co-Developed Goal: reducing sx and Expressed Empathy/Understanding     Change Talk Expressed by the Patient: Desire to change Reasons to change    Provider Response to Change Talk: E - Evoked more info from patient about behavior change and A - Affirmed patient's thoughts, decisions, or attempts at behavior change    Also provided psychoeducation about behavioral health condition, symptoms, and treatment options    Assessments completed prior to visit:  The following assessments were completed by patient for this visit:  GAD2:       11/10/2023    10:24 AM 2/12/2024     9:29 AM 4/14/2024    10:01 AM 7/12/2024    10:02 AM 10/12/2024     3:57 PM 1/22/2025     8:23 AM 4/27/2025     8:41 AM   STACIE-2   Feeling nervous, anxious, or on edge 1 1 2 2 1 1 2   Not being able to stop or control worrying 1 1 1 2 1 1 1   STACIE-2 Total Score 2 2 3    3 4    4 2    2 2  3        Patient-reported       Care Plan review completed: Yes    Medication Review:  Changes to psychiatric medications,  see updated Medication List in EPIC.     Medication Compliance:  Yes    Changes in Health Issues:   None reported    Chemical Use Review:   Substance Use: Chemical use reviewed, no active concerns identified      Tobacco Use: No current tobacco use.      Assessment: Current Emotional / Mental Status (status of significant symptoms):  Risk status (Self / Other harm or suicidal ideation)  Patient has had a history of suicidal ideation: .  Reduced overall to a couple times a week/fleeting.  Baseline method/planning.  Denies any intent.    Hx of attempts and SIB as teen.  Hx of baseline planning and method seeking without intent.  Patient denies current fears or concerns for personal safety.  Patient denies current or recent suicidal ideation or behaviors.  Patient denies current or recent homicidal ideation or behaviors.  Patient denies current or recent self injurious behavior or ideation.  Patient denies other safety concerns.  A safety and risk management plan has been developed including: Patient consented to co-developed safety plan.  A safety and risk management plan was completed.  Patient agreed to use safety plan should any safety concerns arise.  A copy was given to the patient.    Appearance:   Appropriate   Eye Contact:   Good   Psychomotor Behavior: Normal   Attitude:   Cooperative   Orientation:   All  Speech   Rate / Production: Normal    Volume:  Normal   Mood:    Normal  Affect:    Appropriate   Thought Content:  Clear   Thought Form:  Coherent  Logical   Insight:    Good     Diagnoses:  1. Persistent depressive disorder    2. STACIE (generalized anxiety disorder)          Collateral Reports Completed:  Communicated with: Dr Perez    Plan: (Homework, other):  Patient was given information about behavioral services and encouraged to schedule a follow up appointment with the clinic [facility] as needed.  She was also given information about mental health symptoms and treatment options .  CD Recommendations: No  "indications of CD issues.       Joelle Viera, LPCC    ______________________________________________________________________    Integrated Primary Care Behavioral Health Treatment Plan    Individual Treatment Plan    Patient's Name: Janelle White   YOB: 1960  Date of Creation: 10/15/24  Date Treatment Plan Last Reviewed/Revised: 5/12/25    DSM5 Diagnoses:   1. Persistent depressive disorder    2. STACIE (generalized anxiety disorder)      Psychosocial / Contextual Factors: Medical Complexities, Trauma History, Substance Use history/concerns, and Interpersonal Concerns  PROMIS (reviewed every 90 days):   The following assessments were completed by patient for this visit:  PROMIS 10-Global Health (only subscores and total score):       2/12/2024     9:31 AM 4/14/2024    10:02 AM 4/15/2024     7:40 AM 7/12/2024    10:04 AM 10/12/2024     3:58 PM 1/22/2025     8:25 AM 4/27/2025     8:42 AM   PROMIS-10 Scores Only   Global Mental Health Score 8 8 8 10    10 11    11 9    9 10    Global Physical Health Score 11 10 10 12    12 12    12 11    11 8    PROMIS TOTAL - SUBSCORES 19 18 18 22    22 23    23 20    20 18        Patient-reported        Referral / Collaboration:  Referral to another professional/service is not indicated at this time..    Anticipated number of session for this episode of care: 6-9 sessions  Anticipation frequency of session: Monthly  Anticipated Duration of each session: 16-37 minutes  Treatment plan will be reviewed in 90 days or when goals have been changed.       MeasurableTreatment Goal(s) related to diagnosis / functional impairment(s)  Goal 1: Patient will reduce sx   \"I will know I've met my goal when I am able to get my tasks done as needed.\"     Objective #A (Patient Action)    Patient will Decrease frequency and intensity of feeling down, depressed, hopeless  Identify negative self-talk and behaviors: challenge core beliefs, myths, and actions  Status: Continued - " Date(s):1/27/25 , 5/12/25    Intervention(s)  C will provide support through CBT, MI, Acceptance and Commitment Therapy, Dialectic Behavioral Therapy and problem solving model to explore and overcome barriers.        Goal 2: Patient will manage concerns of safety    I will know I've met my goal when I can reduce suicidal ideation .      Objective #A (Patient Action)    Patient will use previously developed safety plan on file.  Status: Con't - Date: 1/27/25 , 5/12/25    Intervention(s)  Therapist will provide support through CBT, MI, Acceptance and Commitment Therapy, Dialectic Behavioral Therapy and problem solving model to explore and overcome barriers.        Patient has reviewed and agreed to the above plan.    Written by  Joelle Viera LPCC, [facility] [place]

## 2025-05-12 NOTE — NURSING NOTE
Current patient location: [address] [place] [id number]    Is the patient currently in the state of [place]? YES    Visit mode: VIDEO    If the visit is dropped, the patient can be reconnected by:VIDEO VISIT: Text to cell phone:   Telephone Information:   Mobile [phone]    and VIDEO VISIT: Send to e-mail at: [email or web address]    Will anyone else be joining the visit? NO  (If patient encounters technical issues they should call [phone]    Are changes needed to the allergy or medication list? No    Are refills needed on medications prescribed by this physician? Discuss with provider    Rooming Documentation:  Questionnaire(s) completed    Reason for visit: RECHECK    Oumar GUTIERREZ

## 2025-05-12 NOTE — PROGRESS NOTES
"    Telemedicine Visit: The patient's condition can be safely assessed and treated via synchronous audio and visual telemedicine encounter.      Reason for Telemedicine Visit: Patient has requested telehealth visit    Originating Site (Patient Location): Patient's home    Distant Location (provider location):  On-Site    Consent:  The patient/guardian has verbally consented to: the potential risks and benefits of telemedicine (video visit) versus in person care; bill my insurance or make self-payment for services provided; and responsibility for payment of non-covered services.     Mode of Communication:  Video Conference via [employer]    As the provider I attest to compliance with applicable laws and regulations related to telemedicine.        Outpatient Psychiatric Progress Note    Name: Janelle White   : 1960                    Primary Care Provider: Emili Ballard MD   Therapist: Lorena Corbin @ [facility]     PHQ-9 scores:      2025     9:07 AM 2025     8:38 AM 2025     3:18 PM   PHQ-9 SCORE   PHQ-9 Total Score MyChart  21 (Severe depression) 9 (Mild depression)   PHQ-9 Total Score 22 21  9        Patient-reported       STACIE-7 scores:      2024    12:34 PM 3/28/2025    10:04 AM 2025     8:41 AM   STACIE-7 SCORE   Total Score 15 (severe anxiety) 11 (moderate anxiety) 9 (mild anxiety)   Total Score 15 11  9        Patient-reported    Proxy-reported       Patient Identification:  Patient is a 64 year old,   White Choose not to answer female  who presents for return visit with me. Patient attended the phone/video session alone. Patient prefers to be called: \"Janelle\".    Interim History:  I last saw Janelle White for outpatient psychiatry return visit on 2025. During that appointment, we:  Continue Pristiq 50 mg daily for mood, anxiety.    Continue methylfolate 7.5 mg daily as supplementation.  Continue Adderall XR 25 mg daily for mood " augmentation  Continue Adderall IR 15 mg twice daily as needed for mood augmentation and daytime fatigue/hypersomnia  Continue hydroxyzine 25-50 mg up to three times a day as needed for anxiety/sleep.   Continue ketamine therapy as planned/needed.   Start fluoxetine/Prozac 10 mg daily for mood, anxiety. OK to start at 10 mg every other day and work way up to 20 mg daily as tolerated before next appt. If poorly tolerated, OK to stop.   Continue to work with your specialist from [facility] as indicated.    Continue with new somatic psychotherapist as planned.      5/12: Overall doing okay.  Had a slight crash after recent surgery on shoulder.  Feels like she is now back to baseline.  Managed symptoms okay.  Had a good trip with a fellow [place].  Currently mushroom hunting season.  Patient lowered Pristiq to 25 mg daily to see how symptoms may change.  Felt a little bit better on reduced dose.  Continues ketamine treatments.  Continues in psychotherapy as needed.  Taking medication as prescribed.  No new chest pains or racing heart from Adderall.  Adderall continues to be incredibly beneficial.  No acute safety concerns today.  No acute suicidality.  No problematic drug or alcohol use.  See [facility] note below for additional details.    Per [facility], Joelle Viera, [facility], during today's team-based visit:  MH update:  Went on vacation.  Had a good time. Terrible reaction to post surgery.  Feels chemically sensitive.   Stresses:  Had shoulder surgery.  Month into recovery.  Still using sling and spacer yet.  Home stress is feeling manageable.  On lability.  Prozac trail was not good-lock jaw  Appetite: n/a  Sleep: n/a  Outpatient Provider updates: Ketamine John MYERS, Therapy Lorena Corbin, [facility]- hasn't been seeing in a while.  Denies wanting a new one.  Considering a couples counselor.   SI/SIB/HI: Reduced overall to a couple times a week/fleeting.  Baseline method/planning.  Denies any intent.  safety plan  on file.  Hx of attempts and SIB as teen.  Hx of baseline planning and method seeking without intent.  AYLA:  Sober.  AA support, sponser  Side effects/compliance:  Interventions:  [facility] engaged in discussion of medical stressors that caused increased safety concerns.  Most important:  son is coming into town for a visit.  Back to baseline post surgery from  sx that did increase.  Safety back to baseline.  Lowered prestiq, 25 mg, feeling better.  Not looking for more changes.     Rock hunting  Mushroom hunting  Pottery, jewelry making    Past medication trials include but are not limited to:   Effexor-very flat  Celexa, zoloft, was on wellbutrin a couple years at one point  wellbutrin XL + celexa; 2005ish  tca-a ton of weight gain  depakote maybe for a short time  Abilify/lithium ?  ECT as teen - made me dull  Cytomel-not effective at all, used 50 mcg    Psychiatric ROS:  Janelle White reports mood has been: See HPI above  Anxiety has been: See HPI above  Sleep has been: struggles at times, especially due to pain  Deepa sxs: Denies  Psychosis sxs: None  ADHD/ADD sxs: None  PTSD sxs: None  PHQ9 and GAD7 scores were reviewed today if completed.   Medication side effects: Denies anything major related to current psychiatric medications  Current stressors include: Symptoms and See HPI above  Coping mechanisms and supports include: Family, Hobbies and Friends, therapy    Current medications include:   Current Outpatient Medications   Medication Sig Dispense Refill    albuterol (PROAIR HFA/PROVENTIL HFA/VENTOLIN HFA) 108 (90 Base) MCG/ACT inhaler Inhale 2 puffs into the lungs every 6 hours as needed for shortness of breath or wheezing. 8.5 g 11    [START ON 5/13/2025] amphetamine-dextroamphetamine (ADDERALL XR) 25 MG 24 hr capsule Take 1 capsule (25 mg) by mouth every morning. 30 capsule 0    [START ON 6/12/2025] amphetamine-dextroamphetamine (ADDERALL XR) 25 MG 24 hr capsule Take 1 capsule (25 mg) by mouth every  morning. 30 capsule 0    [START ON 7/12/2025] amphetamine-dextroamphetamine (ADDERALL XR) 25 MG 24 hr capsule Take 1 capsule (25 mg) by mouth every morning. 30 capsule 0    amphetamine-dextroamphetamine (ADDERALL) 15 MG tablet Take 1 tablet (15 mg) by mouth 2 times daily. 60 tablet 0    [START ON 5/18/2025] amphetamine-dextroamphetamine (ADDERALL) 15 MG tablet Take 1 tablet (15 mg) by mouth 2 times daily. 60 tablet 0    [START ON 6/17/2025] amphetamine-dextroamphetamine (ADDERALL) 15 MG tablet Take 1 tablet (15 mg) by mouth 2 times daily. 60 tablet 0    Cobalamin Combinations (VITAMIN B12-FOLIC ACID) 500-400 MCG TABS Take 1 tablet by mouth      desvenlafaxine (PRISTIQ) 50 MG 24 hr tablet Take 1 tablet (50 mg) by mouth daily. 90 tablet 1    Estradiol (DIVIGEL) 1 MG/GM GEL Place 1 packet onto the skin daily 30 g 11    hydrOXYzine candace (VISTARIL) 25 MG capsule Take 1 capsule (25 mg) by mouth 3 times daily as needed for anxiety or other (sleep). 90 capsule 0    Levomefolate Glucosamine (METHYLFOLATE PO) Take 7.5 mg by mouth daily      lidocaine (LIDODERM) 5 % patch Place 3 patches onto the skin daily. Apply up to 3 patches to skin. Wear for 12 hours and remove for 12 hrs.  Refill when patient requests. 120 patch 3    medical cannabis (Patient's own supply.  Not a prescription) Medical Cannabis - [place] 4-6 ml by mouth daily. [facility]      methocarbamol (ROBAXIN) 500 MG tablet Take 1 tablet (500 mg) by mouth 4 times daily as needed for muscle spasms 120 tablet 1    methocarbamol (ROBAXIN) 750 MG tablet TAKE 1 TABLET(750 MG) BY MOUTH FOUR TIMES DAILY AS NEEDED FOR MUSCLE SPASMS 120 tablet 4    methylPREDNISolone (MEDROL DOSEPAK) 4 MG tablet therapy pack Follow Package Directions 21 tablet 0    naloxone (NARCAN) 4 MG/0.1ML nasal spray Spray 1 spray (4 mg) into one nostril alternating nostrils as needed for opioid reversal every 2-3 minutes until assistance arrives 0.2 mL 1    NONFORMULARY Take 2 Scoops by mouth  daily Protein and Vitamin shake mix - Nutritional supplement      ondansetron (ZOFRAN ODT) 8 MG ODT tab Take 1 tablet (8 mg) by mouth every 8 hours as needed for nausea 30 tablet 4    Prasterone 6.5 MG INST Place 0.5 suppositories vaginally three times a week. 28 each 11    rOPINIRole (REQUIP) 0.25 MG tablet TAKE 1 TO 2 TABLETS(0.25 TO 0.5 MG) BY MOUTH AT BEDTIME 180 tablet 3    senna-docusate (SENOKOT-S/PERICOLACE) 8.6-50 MG tablet Take 6-9 tablets by mouth every evening      vitamin D3 (CHOLECALCIFEROL) 125 MCG (5000 UT) tablet Take 5,000 Units by mouth daily       Current Facility-Administered Medications   Medication Dose Route Frequency Provider Last Rate Last Admin    NONFORMULARY 1.1 mg/kg (Ideal)  1.1 mg/kg (Ideal) Intramuscular Q21 Days Zafar Yanes MD   60 mg at 04/28/25 0926     The [place] Prescription Monitoring Program has been reviewed and there are no concerns about diversionary activity for controlled substances at this time.   01/16/2025 01/15/2025 1 Dextroamp-Amphetamin 15 Mg Tab 60.00 30 Al Bau [id number] [facility] ([id number]) 0/0  [place]are [place]   01/07/2025 09/30/2024 1 Dextroamp-Amphet Er 25 Mg Cap 30.00 30 Al Bau [id number] [facility] ([id number]) 0/0  [place]   12/10/2024 09/30/2024 1 Dextroamp-Amphetamin 15 Mg Tab 60.00 30 Al Bau [id number] [facility] ([id number]) 0/0  [place]   12/02/2024 07/15/2024 1 Dextroamp-Amphet Er 25 Mg Cap 30.00 30 Al Bau [id number] [facility] ([id number]) 0/0  [place]       Past Medical/Surgical History:  Past Medical History:   Diagnosis Date    Anxiety     Cervicalgia 2007    C5-6 disc protrusion    COPD (chronic obstructive pulmonary disease) (H) 2/7/2022    Depressive disorder     ESBL (extended spectrum beta-lactamase) producing bacteria infection     History of blood transfusion 2007    Cervical fusion    Leukemia (H)     CLA large beta    Melanoma (H) 1998    Migraine     Other chronic pain     Rotator cuff tear     s/p injections    Sacroiliac inflammation     Shift work sleep disorder 12/16/2013     "Urinary calculi     Vitamin B12 deficiency anemia 2006    started injections      has a past medical history of Anxiety, Cervicalgia (2007), COPD (chronic obstructive pulmonary disease) (H) (2/7/2022), Depressive disorder, ESBL (extended spectrum beta-lactamase) producing bacteria infection, History of blood transfusion (2007), Leukemia (H), Melanoma (H) (1998), Migraine, Other chronic pain, Rotator cuff tear, Sacroiliac inflammation, Shift work sleep disorder (12/16/2013), Urinary calculi, and Vitamin B12 deficiency anemia (2006).    She has no past medical history of Cerebral infarction (H), Congestive heart failure (H), Coronary artery disease, Diabetes (H), Heart disease, Hypertension, Thyroid disease, or Uncomplicated asthma.    Social History:  Reviewed. No changes to social history except as noted above in HPI.    Vital Signs:   None. This is phone/video visit.     Labs:  Most recent laboratory results reviewed and the pertinent results include:   Lab Results   Component Value Date    WBC 7.1 06/21/2024    WBC 3.2 05/24/2021     Lab Results   Component Value Date    RBC 4.17 06/21/2024    RBC 3.74 05/24/2021     Lab Results   Component Value Date    HGB 13.1 06/21/2024    HGB 11.0 05/24/2021     Lab Results   Component Value Date    HCT 40.4 06/21/2024    HCT 33.6 05/24/2021     No components found for: \"MCT\"  Lab Results   Component Value Date    MCV 97 06/21/2024    MCV 90 05/24/2021     Lab Results   Component Value Date    MCH 31.4 06/21/2024    MCH 29.4 05/24/2021     Lab Results   Component Value Date    MCHC 32.4 06/21/2024    MCHC 32.7 05/24/2021     Lab Results   Component Value Date    RDW 12.7 06/21/2024    RDW 13.4 05/24/2021     Lab Results   Component Value Date     06/21/2024     05/24/2021       Last Comprehensive Metabolic Panel:  Sodium   Date Value Ref Range Status   09/17/2024 141 135 - 145 mmol/L Final   05/24/2021 141 133 - 144 mmol/L Final     Potassium   Date Value Ref " Range Status   09/17/2024 3.9 3.4 - 5.3 mmol/L Final   06/30/2022 3.4 3.4 - 5.3 mmol/L Final   05/24/2021 3.9 3.4 - 5.3 mmol/L Final     Chloride   Date Value Ref Range Status   09/17/2024 104 98 - 107 mmol/L Final   06/30/2022 105 94 - 109 mmol/L Final   05/24/2021 108 94 - 109 mmol/L Final     Carbon Dioxide   Date Value Ref Range Status   05/24/2021 25 20 - 32 mmol/L Final     Carbon Dioxide (CO2)   Date Value Ref Range Status   09/17/2024 27 22 - 29 mmol/L Final   06/30/2022 25 20 - 32 mmol/L Final     Anion Gap   Date Value Ref Range Status   09/17/2024 10 7 - 15 mmol/L Final   06/30/2022 6 3 - 14 mmol/L Final   05/24/2021 8 3 - 14 mmol/L Final     Glucose   Date Value Ref Range Status   09/17/2024 94 70 - 99 mg/dL Final   06/30/2022 91 70 - 99 mg/dL Final   05/24/2021 87 70 - 99 mg/dL Final     Urea Nitrogen   Date Value Ref Range Status   09/17/2024 20.1 8.0 - 23.0 mg/dL Final   06/30/2022 20 7 - 30 mg/dL Final   05/24/2021 15 7 - 30 mg/dL Final     Creatinine   Date Value Ref Range Status   09/17/2024 0.69 0.51 - 0.95 mg/dL Final   05/24/2021 0.64 0.52 - 1.04 mg/dL Final     GFR Estimate   Date Value Ref Range Status   09/17/2024 >90 >60 mL/min/1.73m2 Final     Comment:     eGFR calculated using 2021 CKD-EPI equation.   05/24/2021 >90 >60 mL/min/[1.73_m2] Final     Comment:     Non  GFR Calc  Starting 12/18/2018, serum creatinine based estimated GFR (eGFR) will be   calculated using the Chronic Kidney Disease Epidemiology Collaboration   (CKD-EPI) equation.       Calcium   Date Value Ref Range Status   09/17/2024 9.1 8.8 - 10.4 mg/dL Final     Comment:     Reference intervals for this test were updated on 7/16/2024 to reflect our healthy population more accurately. There may be differences in the flagging of prior results with similar values performed with this method. Those prior results can be interpreted in the context of the updated reference intervals.   05/24/2021 8.4 (L) 8.5 - 10.1  mg/dL Final     Bilirubin Total   Date Value Ref Range Status   09/17/2024 <0.2 <=1.2 mg/dL Final   03/16/2021 0.4 0.2 - 1.3 mg/dL Final     Alkaline Phosphatase   Date Value Ref Range Status   09/17/2024 100 40 - 150 U/L Final   03/16/2021 87 40 - 150 U/L Final     ALT   Date Value Ref Range Status   09/17/2024 20 0 - 50 U/L Final   03/16/2021 26 0 - 50 U/L Final     AST   Date Value Ref Range Status   09/17/2024 25 0 - 45 U/L Final   03/16/2021 44 0 - 45 U/L Final     Review of Systems:  10 systems (general, cardiovascular, respiratory, eyes, ENT, endocrine, GI, , M/S, neurological) were reviewed. Most pertinent finding(s) is/are: Severe chronic pain. The remaining systems are all unremarkable.    Mental Status Examination (limited as this is by phone/video):  Appearance: Awake, alert, appears stated age, no acute distress  Attitude:  cooperative, pleasant   Motor: No gross abnormalities observed via video, not formally tested.  Oriented to:  person, place, time, and situation  Attention Span and Concentration:  normal  Speech:  clear, coherent, regular rate, rhythm, and volume  Language: intact  Mood: ok  Affect: Mood congruent  Associations:  no loose associations  Thought Process:  logical, linear and goal oriented  Thought Content: Chronic passive suicidal ideation-does not endorse acute suicidality today, no current plan or intent to harm self today, no homicidal ideation, no evidence of psychotic thought, no auditory hallucinations present and no visual hallucinations present  Recent and Remote Memory:  Intact to interview. Not formally assessed. No amnesia.  Fund of Knowledge: appropriate  Insight:  good  Judgment:  intact, adequate for safety  Impulse Control:  intact    Suicide Risk Assessment:  Today Janelle TORRES Rodolfopetey is with chronic/intermittent suicidal ideation-no acute suicidality endorsed today and ongoing sustained improvement in SI.There are notable risk factors for self-harm, including  anxiety, comorbid medical condition of Chronic pain, suicidal ideation, purposelessness/no reason for living, hopelessness, withdrawing and mood change. However, risk is mitigated by commitment to family, absence of past attempts, ability to volunteer a safety plan, history of seeking help when needed, future oriented and denies suicidal intent today. Therefore, based on all available evidence including the factors cited above, Janelle White does not appear to be at imminent risk for self-harm, does not meet criteria for a 72-hr hold, and therefore remains appropriate for ongoing outpatient level of care.  A thorough assessment of risk factors related to suicide and self-harm have been reviewed and are noted above. Local community safety resources reviewed for patient to use if needed. There was no deceit detected, and the patient presented in a manner that was believable.     DSM5 Diagnosis:  Persistent depressive disorder  Treatment resistant depression  CYP2D6 poor metabolizer  CYP2B6 intermediate metabolizer  Homozygous for C677T polymorphism of MTHFR    Medical comorbidities include:   Patient Active Problem List    Diagnosis Date Noted    Failed back surgical syndrome 12/01/2023     Priority: Medium    Lumbar radiculopathy 10/26/2023     Priority: Medium    Sacroiliitis 08/04/2023     Priority: Medium    History of falling 03/13/2023     Priority: Medium    Suicidal ideation 06/30/2022     Priority: Medium    Immunocompromised 06/30/2022     Priority: Medium    Infection due to 2019 novel coronavirus 06/30/2022     Priority: Medium    Severe episode of recurrent major depressive disorder, without psychotic features (H) 04/17/2022     Priority: Medium    COPD (chronic obstructive pulmonary disease) (H) 02/07/2022     Priority: Medium    Tension type headache 11/04/2021     Priority: Medium    Rotator cuff injury 11/04/2021     Priority: Medium    Spinal stenosis of lumbar region with radiculopathy  09/02/2021     Priority: Medium    COVID-19 08/29/2021     Priority: Medium    Polyarthralgia 08/11/2021     Priority: Medium    Cellulitis, unspecified cellulitis site 08/11/2021     Priority: Medium    Cellulitis 08/11/2021     Priority: Medium    STACIE (generalized anxiety disorder) 07/27/2021     Priority: Medium    MTHFR gene mutation 04/12/2021     Priority: Medium    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     Priority: Medium    CYP2D6 poor metabolizer (H) 04/12/2021     Priority: Medium    Status post blepharoplasty 11/18/2020     Priority: Medium    Regular astigmatism, bilateral 11/18/2020     Priority: Medium    Prediabetes 09/19/2019     Priority: Medium    Hyperlipidemia LDL goal <130 09/19/2019     Priority: Medium    CLARE (obstructive sleep apnea) 02/13/2019     Priority: Medium    Controlled substance agreement signed 08/14/2018     Priority: Medium    Chronic pain syndrome 12/21/2017     Priority: Medium    CLL (chronic lymphocytic leukemia) (H) 12/21/2017     Priority: Medium    Hypotension 09/14/2017     Priority: Medium    History of laser assisted in situ keratomileusis 10/14/2014     Priority: Medium    Pain medication agreement 04/23/2014     Priority: Medium     Formatting of this note might be different from the original.  Patient takes morphine 15 mg ER BID for chronic neck and back pain.  She is also on cymbalta, ibuprofen, flexaril prn      Vitamin D deficiency 11/08/2012     Priority: Medium    DDD (degenerative disc disease), cervical 10/07/2010     Priority: Medium    CARDIOVASCULAR SCREENING; LDL GOAL LESS THAN 160 02/10/2010     Priority: Medium    Chronic Low Back Pain 10/01/2009     Priority: Medium     S/p AP L3-S1 fusion 12/2010 - referred to [facility].   Orthopedics writing scripts for narcotics post-op.      Migraine      Priority: Medium     Problem list name updated by automated process. Provider to review      B-complex deficiency 10/10/2006     Priority: Medium      Problem list name updated by automated process. Provider to review      PERSONAL HX OF  MELANOMA 12/04/2003     Priority: Low       Psychosocial & Contextual Factors: see HPI above    Assessment:  6/15/2021:  Janelle White reports overall some significant worsening of mood symptoms.  Increased anxiety with her depression.  Poor motivation and poor energy.  Symptoms severe enough to prevent her from being able to utilize typical coping skills and strategies.  No current motivation or energy to participate in PHP/day treatment program.  Continues to take medication as scheduled.  Recent surgery and general anesthesia could be contributing.  Discussed discontinuing stimulant medication as an augmentation strategy.  She is agreeable to moving forward with T3 as an augmentation for treatment resistant depression.  Discussed risks and benefits at length.  Will get baseline thyroid labs prior to starting T3.  Hoping she will experience increased energy and motivation and that her mood will lift.  Also discussed setting up an appointment with her interventional psychiatry clinic to discuss other potential options such as ketamine therapy, ECT, TMS.  Does have history of ECT for depression when she was quite young.  I am hopeful to stay ahead of her symptoms before things get too severe.  Has chronic suicidal ideation and is at high risk for suicide.  Continues to deny any plan or intent.  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  See below for risk/benefit conversation regarding T3 had with the patient:    GeneSight testing Info:  GeneSight testing revealed she is a poor 2D6 metabolizer and an intermediate 2B6 metabolizer.  This would explain her multiple failed medication trials due to the negative side effects.  She also has a genotype that would suggest a phenotype sensitivity to serotonin. She also was found to have significantly reduced folic acid conversion.      Starting T3 as  augment to antidepressant therapy for treatment resistant depression:  Start T3 at 25 mcg per day for one to two weeks, and if there is little or no improvement, increase the dose to 50 mcg per day; this is consistent with practice guidelines from the American Psychiatric Association and [place] [employer] for Mood and Anxiety Treatments.     Adverse effects consistent with hyperthyroidism may occur, including tremor, palpitations, heat intolerance, sweating, anxiety, increased frequency of bowel movements, shortness of breath, and exacerbation of cardiac arrhythmia. In addition, hyperthyroidism that emerges during long-term treatment may lead to bone demineralization, osteoporosis, and an increased risk of fracture    Following a normal baseline TSH concentration, no other laboratory monitoring during a four to six week trial of adjunctive T3 is necessary. However, if T3 is continued longer, a serum TSH concentration should be checked after the first one to three months of treatment and then every six months.    Today, 7/27/21:   Patient overall with little change in her symptoms.  Did not do as well on Cytomel and had limited to no improvement at all after weeks on 50 mcg.  Labs were unremarkable prior to starting Cytomel.  Opted to go back to stimulant augmentation since patient does find it quite helpful.  She has been taking 15 mg of immediate release most afternoons.  Due to good tolerability and some efficacy, we will bump up her immediate release dose slightly to 20 mg daily as needed in addition to her 20 mg extended release dose. I have no concerns about misuse or diversion at this time.  Tolerating well with no negative side effects.  Last blood pressure normal 7/2.  Patient has noted some efficacy from Pristiq more than other antidepressant trials and so we will continue to titrate further to 100 mg daily.  She is tolerating well.  Continues to use lorazepam for anxiety, pain medicine adjunct, and for  sleep as prescribed by primary care provider.  Has been utilizing roughly 100 tablets of 0.5 mg every 1.5 months.  Patient with ongoing chronic intermittent passive suicidal ideation with no plan or intent.  Denies safety concerns today.  No problematic drug or alcohol use.  I am hopeful the interventional psychiatry clinic might be able to initiate ketamine therapy or another therapy they would recommend as potentially being more helpful than patient's multiple medication/augmentation trials.    10/6/2021:  Patient with some improved depression symptoms and much more hopeful.  Seeing specialist at [place]-feels very hurt and listened to.  Also is hopeful there will be some helpful treatments.  Visit to [place] was also very good and instilled a lot of hope in her abilities.  She was encouraged to continue to push herself to do the things she enjoys doing.  No medication changes today.  She will follow-up with getting TMS rescheduled, possibly when things slow down after the holidays.  Does not need to be seen until after the holidays.  No acute safety concerns today.  No problematic drug or alcohol use.    1/14/2022:  Overall patient feeling a little more down lately.  Tolerating TMS well.  Encouraged to continue TMS.  No medication changes today since undergoing TMS treatment.  Talked about life stages today generativity versus stagnation and also integrity versus despair.  Talked about consideration for acceptance and commitment therapy.  She is encouraged to continue to be active.  No acute suicidal ideation today.  No acute safety concerns today.  No problematic drug or alcohol use.    4/13/2022:   Patient continues to have symptoms that wax and wane.  Feels like she tolerates Pristiq 50 mg better than 100 and will continue on this dose.  Last week was particularly difficult.  Pretty intense suicidal ideation but she was able to manage these thoughts and remain safe.  She does report she would come to the  hospital if necessary.  We discussed the empath unit today and other resources if she felt she could not keep herself safe.  She continues to work on tapering her narcotic pain medication regimen.  She is working with addiction medicine and also pain management.  She is starting Pilates therapy.  Pool therapy will begin in July.  Discussed possibility of vestibular rehabilitation.  Individual therapy will also start soon.  She was strongly encouraged to continue to pursue ketamine therapy.  No acute suicidality today.  No problematic drug or alcohol use.    6/23/2022:  Overall reports doing relatively okay.  Some pretty down days still, but ketamine therapy going well.  Feels like continuing to move in the right direction.  Suicidal ideation improving.  Off all narcotic pain medication.  Feels good about her progress.  Had some really down days after off pain medication but improved since those few dark days.  Hopeful about where ketamine therapy may lead.  Discussed some of her restlessness at bedtime and will start pramipexole.  Also some evidence to suggest pramipexole could be helpful for treatment resistant depression symptoms.  Discussed risks and benefits of therapy, including watching for any impulsive behaviors.  Continues to have suicidal thoughts but no acute suicidality today.  Her suicidality overall is improving from her baseline suicidality.  She continues to consider the idea of a partial hospital program.  We will send her information for [facility] program.  Also encouraged her to discuss possibility of a pain program through [facility] with Dr. Medley.  No acute safety concerns today.  No problematic drug or alcohol use.    7/11/2022:  Patient with some recent more intensive struggles.  Depression much more severe recently.  Led to hospitalization.  Patient medically hospitalized since was positive for COVID and has history of CLL and [facility] protocol requires isolation.  Patient seen by  psychiatry consult service.  No medication changes made.  Patient continues with interventional psychiatry clinic.  They will be increasing ketamine dose and treatments will be every few weeks.  We discussed patient's symptom history a little more today and possible bipolar diagnosis came into question.  Patient does recognize possible hypomanic episodes in the past.  Patient is currently monitoring symptoms closely and pramipexole.  She is feeling better since starting pramipexole but is watching shopping behaviors closely.  Suicidal ideation is improving since hospitalization.  Restless legs improved at night on pramipexole.  Discussed we could consider adding lower dose lithium as an augment to her Pristiq and to help stabilize moods a little bit more and to further help with some of her chronic suicidal ideation.  We also discussed DBT for chronic suicidal ideation and ongoing mood instability.  Continues off of pain medication.  No acute suicidality or safety concerns today.  Patient will follow up in a few weeks.  No medication changes today since we will wait to see how ketamine dose change goes.  No drug or alcohol use concerns.  Patient noted some ongoing cognitive/memory concerns and requested testing.  Neuropsychological referral placed.    8/8/2022:  Patient with ongoing severe depression, resistant to treatment.  She is agreeable to increasing her stimulant medication.  This could hopefully further improve mood slightly.  May also help with some additional energy and also help with some of her ongoing cognitive concerns.  She has neuropsychological testing coming up soon.  Discussed possibly considering individual DBT therapy with a DBT certified therapist given her ongoing chronic suicidal ideation and inability to participate in group therapy currently.  We will discuss this possibility with her current therapist.  Also discussed possibility of increasing Pristiq by 25 mg only 2 or 3 days a week to  decrease risk of negative side effects (25 mg on Tuesdays/Thursdays for Monday/Wednesday/Fridays).  Patient also encouraged to continue ketamine treatment.  No acute suicidality today.  Patient denies any intent or plan to act on any of her suicidal thoughts today.  No problematic drug or alcohol use.    9/7/2022:  Patient doing relatively okay today.  Pain continues to feel a little bit worse.  Emotionally though, despite worsening pain symptoms, patient feels like she is doing relatively okay.  Discussed various psychotherapy approaches that could be taken to address her symptoms.  We discussed that health psychology could be an optimal approach to help with her symptoms but that her suicidal ideation is often still quite intense and may be a distractor to her treatment with a health psychologist (discussed she may be referred often to the emergency room or to other more intensive programs).  We discussed working with an individual DBT therapist as a possibility to continue addressing her ongoing chronic suicidal ideation (individual therapy would allow patient to move at her own pace since group therapy is much too intense at this time).  Patient was open to this idea.  [facility] today we will send patient some resources/options.  Depending on patient's financial situation, there is also a possibility patient could work with a health psychologist in conjunction with a DBT therapist.  Ketamine therapy has proven to be helpful, although I do wish the effects lasted a little longer than what they currently are for the patient.  I recommend patient continue her ketamine treatments.  No medication changes today.  She denies any acute suicidality today.  No plan or intent to harm herself noted today.  She denies being in a bad place today.  No problematic drug or alcohol use.     10/7/2022:  Patient overall relatively stable at this time.  Mood improving slightly with ketamine therapy.  Patient encouraged to continue with  treatment.  Anxiety a little more manageable.  Continuing to struggle with severe chronic pain.  Tolerating current medications well with no negative side effects noted.  No changes today.  Patient will continue in individual psychotherapy.  No acute suicidality today.  Suicidal thoughts at baseline, maybe even a little improved.    1/9/2023:  Patient overall doing quite well.  Some days still worse than others and chronic pain continues to be a big factor influencing her mental health.  Ketamine has been very helpful.  Still some poor energy and fatigue.  Adderall has been very helpful.  We will increase the dose just slightly.  Extended release dose will increase from 20 mg to 25 mg to further address her symptoms.  We will continue with the 15 mg twice daily immediate release doses.  No other medication changes today.  Patient encouraged to stay the course.  No acute safety concerns.  Suicidal thoughts continue to be passive in nature and have overall improved since starting ketamine.  No problematic drug or alcohol use.    3/20/2023:  Patient remains overall relatively stable.  Having some anxiety over feeling more dependent on cannabis for her pain.  Discussed continuing gratitude journal.  Patient has come quite a ways with relative stability.  No medication changes today.  Denies that cannabis use is causing any problems apart from feeling a little anxious about her use.  Patient even participating in more activities this spring compared to last year.  No acute safety concerns at this time.  No acute suicidality.    5/19/2023:  Patient overall continuing to manage okay.  Feels relatively stable.  Continues ketamine therapy.  No med changes today.  No acute safety concerns.  No acute suicidality today.  Is pleased that she is finding some roxy in pleasurable activities this spring.  Continues to seek purpose in life.  No problematic drug or alcohol use.  Patient will be seen back in 3  months.    8/25/2023:  Continues to manage relatively okay on current regimen.  No major questions or concerns today.  Interested in keeping things status quo/the same.  Continues to receive ketamine infusions.  Pain continues to be forefront.  We discussed referral to a provider who could provide an evaluation to discuss ketamine for pain and possibly mood to replace her infusions.  Referral sent and discussed with patient.  Patient is very agreeable and interested in what ever options might be available for her.  No acute safety concerns today.  No problematic drug or alcohol use.  No acute suicidality today.  Patient continues to find ways to stay distracted.  Has started with new therapist.    11/17/2023:   Patient overall with some worsening symptoms due to some significantly worsening chronic pain.  Patient does have some relief of her pain symptoms after ketamine treatments-for up to a few days.  Patient reports referral to Dr. Rancho Jennings canceled by her current pain medicine provider since patient is already seeing this pain medicine provider within the same system.  Patient did report her pain medicine provider was going to reach out to Dr. Jennings to have a discussion about ketamine.  This provider will send staff message to both providers to see if we can get an update on the collaboration.  No medication changes made today.  Tolerating well overall.  No problematic drug or alcohol use.  Passive suicidal ideation at baseline.  No acute suicidality or plan or intent to harm self today.    2/19/2024:  Patient with ongoing pain struggles and relatively little to no changes in mood recently.  Patient will be meeting with neurosurgeon to discuss whether there are any appropriate surgical interventions for her pain.  Did not endorse any acute safety concerns today.  Did not nurse acute suicidality.  Continues ketamine therapy which she finds helpful.  No medication changes today.  Discussed DBT  recommendation.  No problematic drug or alcohol use.    4/15/2024:   The patient overall doing relatively okay.  Some improvements along with some ongoing struggles.  Given some of these 2-week waves of worsening symptoms, patient is agreeable to a trial of increasing Pristiq.  Depending on how anxiety settles and how increased Pristiq is tolerated, could consider BuSpar or clonidine in the future for anxiety.  No acute safety concerns today.  No acute suicidality.  Continues ketamine therapy.  Recently started with a new somatic therapist.    7/15/2024:  Patient overall doing relatively okay.  Some increase psychosocial stressors.  Filing for divorce from .  Tolerating medication well.  No acute safety concerns.  No SI.  No medication changes today.  Patient will be seen back in 3 months.  Encouraged to continue psychotherapy.    10/15/2024:  Overall feeling relatively stable.  Taking medication as prescribed.  Tolerating well.  No acute safety concerns.  No acute suicidality.  No problematic drug or alcohol use.  No med changes today.  Patient prefers to continue on Pristiq 50 mg daily at this time.  Discussed taking an extra dose of 25 mg 1-3 times per week to see if can still get some of the benefits of increased dose without some of the side effects experienced at 75 mg daily.    1/27/2025:  Patient overall doing relatively okay.  Still some down days, high anxiety, and reduced energy and motivation.  Unfortunately has not tolerated attempts to increase the Pristiq due to side effects.  Patient agreeable to augment Pristiq with just a little bit of Prozac/fluoxetine.  Patient is poor 2D6 metabolizer and so we will start the Prozac at just 10 mg daily.  Discussed Prozac can increase strength of stimulants.  Patient will monitor for side effects.  Hopeful that Prozac might further lift mood and help with energy, motivation, and anxiety improvement.  Discussed watching for signs and symptoms of serotonin  syndrome.  No acute safety concerns today.  No acute suicidality.  No problematic drug or alcohol use.  Patient is using cannabis and continues to explore her relationship with the psychoactive substance.  Continues ketamine treatment and finds it helpful.  Encouraged to continue in psychotherapy.    5/12/2025:  Overall patient doing okay.  Managing symptoms well.  Trying to stay active while continuing to listen to her body and take rests when needed.  Continues to find ketamine treatments very beneficial.  We will continue Pristiq at 25 mg daily.  Continues to tolerate stimulants well with no major negative side effects.  No acute safety concerns.  No acute suicidality.  No problematic drug or alcohol use.  Continuing in psychotherapy as needed right now.    Medication side effects and alternatives were reviewed. Health promotion activities recommended and reviewed today. All questions addressed. Education and counseling completed regarding risks and benefits of medications and psychotherapy options. Recommend therapy for additional support.     Treatment Plan:  Continue Pristiq 25 mg daily for mood, anxiety.    Continue methylfolate 7.5 mg daily as supplementation.  Continue Adderall XR 25 mg daily for mood augmentation  Continue Adderall IR 15 mg twice daily as needed for mood augmentation and daytime fatigue/hypersomnia  Continue hydroxyzine 25-50 mg up to three times a day as needed for anxiety/sleep.   Continue ketamine therapy as planned/needed.   Continue to work with your specialist from [facility] as indicated.    Continue with new somatic psychotherapist as planned.  Continue all other cares per primary care provider.   Continue all other medications as reviewed per electronic medical record today.   Safety plan reviewed. To the Emergency Department as needed or call after hours crisis line at [phone] or [phone]. [place] Crisis Text Line. Text [place] to 624037 or Suicide LifeLine Chat:  suicidepreventionlifeline.org/chat  Schedule an appointment with me in 4 months, or sooner as needed. Call [facility] at [phone] to schedule.  Follow up with primary care provider as planned or for acute medical concerns.  Call the psychiatric nurse line with medication questions or concerns at [phone].  MyChart may be used to communicate with your provider, but this is not intended to be used for emergencies.    Risks of benzodiazepine (Ativan, Xanax, Klonopin, Valium, etc) use including, but not limited to, sedation, tolerance, risk for addiction/dependence. Do not drink alcohol while taking benzodiazepines due to risk of trouble breathing and potential death. Do not drive or operate heavy machinery until it is known how the drug affects you. Discuss with physician or pharmacist before ever taking a benzodiazepine with a narcotic/opioid pain medication.     Have previously discussed risks of stimulant medication including, but not limited to, decreased appetite, risk of tics (and that they may be lasting), trouble sleeping, cardiac risks such as increased heart rate and blood pressure, and rare risk of sudden cardiac death.  Also risk of addiction/tolerance/dependence.      Administrative Billing:   Phone Call/Video Duration: 27 Minutes  3:34p-4:01p    The longitudinal plan of care for the diagnosis(es)/condition(s) as documented were addressed during this visit. Due to the added complexity in care, I will continue to support Janelle in the subsequent management and with ongoing continuity of care.    Patient Status:  Patient is a continuous care patient and refills will continue to come from this provider until otherwise noted.    Signed:   Kimberly Perez DO  [facility] Psychiatry    Disclaimer: This note consists of symbols derived from keyboarding, dictation and/or voice recognition software. As a result, there may be errors in the script that have gone undetected. Please consider this when  interpreting information found in this chart.

## 2025-05-12 NOTE — PATIENT INSTRUCTIONS
Treatment Plan:  Continue Pristiq 25 mg daily for mood, anxiety.    Continue methylfolate 7.5 mg daily as supplementation.  Continue Adderall XR 25 mg daily for mood augmentation  Continue Adderall IR 15 mg twice daily as needed for mood augmentation and daytime fatigue/hypersomnia  Continue hydroxyzine 25-50 mg up to three times a day as needed for anxiety/sleep.   Continue ketamine therapy as planned/needed.   Continue to work with your specialist from [facility] as indicated.    Continue with new somatic psychotherapist as planned.  Continue all other cares per primary care provider.   Continue all other medications as reviewed per electronic medical record today.   Safety plan reviewed. To the Emergency Department as needed or call after hours crisis line at [phone] or [phone]. [place] Crisis Text Line. Text [place] to 860567 or Suicide LifeLine Chat: suicidepreventionlifeline.org/chat  Schedule an appointment with me in 4 months, or sooner as needed. Call [facility] at [phone] to schedule.  Follow up with primary care provider as planned or for acute medical concerns.  Call the psychiatric nurse line with medication questions or concerns at [phone].  [employer] may be used to communicate with your provider, but this is not intended to be used for emergencies.    Risks of benzodiazepine (Ativan, Xanax, Klonopin, Valium, etc) use including, but not limited to, sedation, tolerance, risk for addiction/dependence. Do not drink alcohol while taking benzodiazepines due to risk of trouble breathing and potential death. Do not drive or operate heavy machinery until it is known how the drug affects you. Discuss with physician or pharmacist before ever taking a benzodiazepine with a narcotic/opioid pain medication.     Have previously discussed risks of stimulant medication including, but not limited to, decreased appetite, risk of tics (and that they may be lasting), trouble sleeping,  "cardiac risks such as increased heart rate and blood pressure, and rare risk of sudden cardiac death.  Also risk of addiction/tolerance/dependence.    Patient Education   Collaborative Care Psychiatry Service  What to Expect  Here's what to expect from your Collaborative Care Psychiatry Service (CCPS).   About CCPS  CCPS means 2 people work together to help you get better. You'll meet with a behavioral health clinician and a psychiatric doctor. A behavioral health clinician helps people with mental health problems by talking with them. A psychiatric doctor helps people by giving them medicine.  How it works  At every visit, you'll see the behavioral health clinician (BHC) first. They'll talk with you about how you're doing and teach you how to feel better.   Then you'll see the psychiatric doctor. This doctor can help you deal with troubling thoughts and feelings by giving you medicine. They'll make sure you know the plan for your care.   CCPS usually takes 3 to 6 visits. If you need more visits, we may have you start seeing a different psychiatric doctor for ongoing care.  If you have any questions or concerns, we'll be glad to talk with you.  About visits  Be open  At your visits, please talk openly about your problems. It may feel hard, but it's the best way for us to help you.  Cancelling visits  If you can't come to your visit, please call us right away at [phone]. If you don't cancel at least 24 hours (1 full day) before your visit, that's \"late cancellation.\"  Being late to visits  Being very late is the same as not showing up. You will be a \"no show\" if:  Your appointment starts with a BHC, and you're more than 15 minutes late for a 30-minute (half hour) visit. This will also cancel your appointment with the psychiatric doctor.  Your appointment is with a psychiatric doctor only, and you're more than 15 minutes late for a 30-minute (half hour) visit.  Your appointment is with a psychiatric doctor " only, and you're more than 30 minutes late for a 60-minute (full hour) visit.  If you cancel late or don't show up 2 times within 6 months, we may end your care.   Getting help between visits  If you need help between visits, you can call us Monday to Friday from 8 a.m. to 4:30 p.m. at [phone].  Emergency care  Call 911 or go to the nearest emergency department if your life or someone else's life is in danger.  Call 988 anytime to reach the [facility] Suicide and Crisis hotline.  Medicine refills  To refill your medicine, call your pharmacy. You can also call [facility]'s Behavioral Access at [phone], Monday to Friday, 8 a.m. to 4:30 p.m. It can take 1 to 3 business days to get a refill.   Forms, letters, and tests  You may have papers to fill out, like FMLA, short-term disability, and workability. We can help you with these forms at your visits, but you must have an appointment. You may need more than 1 visit for this, to be in an intensive therapy program, or both.  Before we can give you medicine for ADHD, we may refer you to get tested for it or confirm it another way.  We may not be able to give you an emotional support animal letter.  We don't do mental health checks ordered by the court.   We don't do mental health testing, but we can refer you to get tested.   Thank you for choosing us for your care.  For informational purposes only. Not to replace the advice of your health care provider. Copyright   2022 [facility]. All rights reserved. [employer] 312228 - Rev 11/24.

## 2025-05-14 ENCOUNTER — OFFICE VISIT (OUTPATIENT)
Dept: URGENT CARE | Facility: URGENT CARE | Age: 65
End: 2025-05-14
Payer: COMMERCIAL

## 2025-05-14 VITALS
DIASTOLIC BLOOD PRESSURE: 64 MMHG | WEIGHT: 164 LBS | HEART RATE: 84 BPM | OXYGEN SATURATION: 96 % | SYSTOLIC BLOOD PRESSURE: 128 MMHG | TEMPERATURE: 98.1 F | HEIGHT: 65 IN | BODY MASS INDEX: 27.32 KG/M2 | RESPIRATION RATE: 14 BRPM

## 2025-05-14 DIAGNOSIS — S00.461A TICK BITE OF RIGHT EAR, INITIAL ENCOUNTER: Primary | ICD-10-CM

## 2025-05-14 DIAGNOSIS — L08.9 SUPERFICIAL BACTERIAL SKIN INFECTION: ICD-10-CM
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
## 2025-05-14 DIAGNOSIS — L01.00 IMPETIGO: ICD-10-CM

## 2025-05-14 DIAGNOSIS — B96.89 SUPERFICIAL BACTERIAL SKIN INFECTION: ICD-10-CM

## 2025-05-14 RX ORDER — CEPHALEXIN 500 MG/1
500 CAPSULE ORAL 4 TIMES DAILY
Qty: 28 CAPSULE | Refills: 0 | Status: SHIPPED | OUTPATIENT
Start: 2025-05-14 | End: 2025-05-21

## 2025-05-14 RX ORDER — MUPIROCIN 20 MG/G
OINTMENT TOPICAL 3 TIMES DAILY
Qty: 30 G | Refills: 0 | Status: SHIPPED | OUTPATIENT
Start: 2025-05-14 | End: 2025-05-19

## 2025-05-14 ASSESSMENT — ENCOUNTER SYMPTOMS: FEVER: 0

## 2025-05-14 NOTE — PROGRESS NOTES
Urgent Care Clinic Visit    Chief Complaint   Patient presents with    Insect Bites     Tick bite on  right ear x SAT -- her daughter removed -- has  a photo- may be an American dog tick               5/14/2025     5:49 PM   Additional Questions   Roomed by mylene   Accompanied by self

## 2025-05-14 NOTE — PROGRESS NOTES
Assessment & Plan:        ICD-10-CM    1. Tick bite of right ear, initial encounter  S00.461A     W57.XXXA       2. Impetigo  L01.00 cephALEXin (KEFLEX) 500 MG capsule     mupirocin (BACTROBAN) 2 % external ointment      3. Superficial bacterial skin infection  L08.9 cephALEXin (KEFLEX) 500 MG capsule    B96.89 mupirocin (BACTROBAN) 2 % external ointment            Plan/Clinical Decision Making:          Return if symptoms worsen or fail to improve, for in 2-3 days.     At the end of the encounter, I discussed results, diagnosis, medications. Discussed red flags for immediate return to clinic/ER, as well as indications for follow up if no improvement. Patient understood and agreed to plan. Patient was stable for discharge.        Ramona Soliman PA-C on 5/14/2025 at 6:04 PM          Subjective:     HPI:    Janelle is a 64 year old female who presents to clinic today for the following health issues:  Chief Complaint   Patient presents with    Insect Bites     Tick bite on  right ear x SAT -- her daughter removed -- has  a photo- may be an American dog tick     HPI    Patient complains of tick bite. Had common wood tick bite.   Wound has been weeping.     Review of Systems   Constitutional:  Negative for fever.         Patient Active Problem List   Diagnosis    PERSONAL HX OF  MELANOMA    B-complex deficiency    Migraine    Chronic Low Back Pain    CARDIOVASCULAR SCREENING; LDL GOAL LESS THAN 160    DDD (degenerative disc disease), cervical    Vitamin D deficiency    Chronic pain syndrome    CLL (chronic lymphocytic leukemia) (H)    Controlled substance agreement signed    CLARE (obstructive sleep apnea)    Prediabetes    Hyperlipidemia LDL goal <130    MTHFR gene mutation    CYP2B6 intermediate metabolizer (H)    CYP2D6 poor metabolizer (H)    STACIE (generalized anxiety disorder)    Polyarthralgia    Cellulitis, unspecified cellulitis site    Cellulitis    Tension type headache    Status post blepharoplasty     "Rotator cuff injury    Regular astigmatism, bilateral    Pain medication agreement    Hypotension    History of laser assisted in situ keratomileusis    COVID-19    Spinal stenosis of lumbar region with radiculopathy    COPD (chronic obstructive pulmonary disease) (H)    Severe episode of recurrent major depressive disorder, without psychotic features (H)    Suicidal ideation    Immunocompromised    Infection due to 2019 novel coronavirus    Sacroiliitis    History of falling    Lumbar radiculopathy    Failed back surgical syndrome        Past Medical History:   Diagnosis Date    Anxiety     Cervicalgia     C5-6 disc protrusion    COPD (chronic obstructive pulmonary disease) (H) 2022    Depressive disorder     ESBL (extended spectrum beta-lactamase) producing bacteria infection     History of blood transfusion     Cervical fusion    Leukemia (H)     CLA large beta    Melanoma (H)     Migraine     Other chronic pain     Rotator cuff tear     s/p injections    Sacroiliac inflammation     Shift work sleep disorder 2013    Urinary calculi     Vitamin B12 deficiency anemia 2006    started injections       Social History     Tobacco Use    Smoking status: Former     Current packs/day: 0.00     Average packs/day: 1 pack/day for 5.0 years (5.0 ttl pk-yrs)     Types: Cigarettes, Clove cigarettes or kreteks     Start date: 1979     Quit date: 1984     Years since quittin.3    Smokeless tobacco: Never   Substance Use Topics    Alcohol use: Yes     Comment: rare             Objective:     Vitals:    25 1743   BP: 128/64   BP Location: Left arm   Patient Position: Chair   Cuff Size: Adult Regular   Pulse: 84   Resp: 14   Temp: 98.1  F (36.7  C)   TempSrc: Oral   SpO2: 96%   Weight: 74.4 kg (164 lb)   Height: 1.651 m (5' 5\")         Physical Exam   EXAM:   Pleasant, alert, appropriate appearance. NAD.  Head Exam: Normocephalic, atraumatic.  Eye Exam:  non icteric/injection.    Ear Exam: " TMs grey without bulging. Normal canals.  Normal pinna.  Nose Exam: Normal external nose.    OroPharynx Exam:  Moist mucous membranes. No erythema, pharynx without exudate or hypertrophy.  Neck/Thyroid Exam:  preauricular right adenopathy  Skin: right external ear with erythema, yellow crusting.     Skin: no rash or lesion.      Results:  No results found for any visits on 05/14/25.

## 2025-05-18 ASSESSMENT — PATIENT HEALTH QUESTIONNAIRE - PHQ9
10. IF YOU CHECKED OFF ANY PROBLEMS, HOW DIFFICULT HAVE THESE PROBLEMS MADE IT FOR YOU TO DO YOUR WORK, TAKE CARE OF THINGS AT HOME, OR GET ALONG WITH OTHER PEOPLE: SOMEWHAT DIFFICULT
SUM OF ALL RESPONSES TO PHQ QUESTIONS 1-9: 9
SUM OF ALL RESPONSES TO PHQ QUESTIONS 1-9: 9

## 2025-05-19 ENCOUNTER — ALLIED HEALTH/NURSE VISIT (OUTPATIENT)
Dept: PSYCHIATRY | Facility: CLINIC | Age: 65
End: 2025-05-19
Payer: COMMERCIAL

## 2025-05-19 ENCOUNTER — TELEPHONE (OUTPATIENT)
Dept: PSYCHIATRY | Facility: CLINIC | Age: 65
End: 2025-05-19

## 2025-05-19 VITALS — HEART RATE: 72 BPM | DIASTOLIC BLOOD PRESSURE: 62 MMHG | SYSTOLIC BLOOD PRESSURE: 99 MMHG

## 2025-05-19 DIAGNOSIS — F33.41 RECURRENT MAJOR DEPRESSIVE DISORDER, IN PARTIAL REMISSION: Primary | ICD-10-CM

## 2025-05-19 NOTE — TELEPHONE ENCOUNTER
Verbal order from Dr. Yanes to continue ketamine 1.1 mg/kg q3w due to office visit note not being signed.

## 2025-05-19 NOTE — PROGRESS NOTES
"Janelle White comes into clinic today at the request of LINK Yanes MD Ordering Provider for Med Injection only Ketamine.    Procedure Prep:  Medication double check completed by: María Miller RN  Prior to administration pt identified by name and : Yes    Nursing Assessment:  Appearance: Casual  Mood: \"Good\" - patient states she is doing much better in the last 3 weeks or so.  Affect: Neutral  Eye contact: Good      2025     9:15 AM   PHQ   PHQ-9 Total Score 17   Q9: Thoughts of better off dead/self-harm past 2 weeks More than half the days     QIDDSR 16 weekly assessment: score 13. Assessment was scanned to EHR.     Procedure Performed:  VSS and mental status WNL. Patient was given Ketamine. See MAR for details.     Post Procedure Assessment:  Patient tolerated the treatment with the following side effects: dissociation. Vital signs were monitored, see VS Flowsheet. Patient stated they felt ready to go home prior to discharge. AVS was offered to patient and patient declined. Patient was instructed not to drive for the remainder of the day and to notify clinic if any concerns arise.     Next appt: 3 weeks    Medications were supplied by Clinic     This service provided today was under the supervising provider of the day DONAVAN Mireles MD, who was available if needed.        Bri Mckeon RN   "

## 2025-05-20 NOTE — PROGRESS NOTES
"[facility]                                     Progress Note    Patient Name: Janelle White  Date: 06/13/2022         Service Type: Individual      Session Start Time: 9:35 am  Session End Time: 10:34 am      Session Length: 59 minutes     Session #: 2nd visit     Attendees: Client attended alone    Service Modality:  Video Visit:      Provider verified identity through the following two step process.  Patient provided:  Patient photo and Patient was verified at admission/transfer    Telemedicine Visit: The patient's condition can be safely assessed and treated via synchronous audio and visual telemedicine encounter.      Reason for Telemedicine Visit: Patient has requested telehealth visit    Originating Site (Patient Location): Patient's home    Distant Site (Provider Location): Provider Remote Setting- Home Office    Consent:  The patient/guardian has verbally consented to: the potential risks and benefits of telemedicine (video visit) versus in person care; bill my insurance or make self-payment for services provided; and responsibility for payment of non-covered services.     Patient would like the video invitation sent by:  My Chart    Mode of Communication:  Video Conference via [employer]well    As the provider I attest to compliance with applicable laws and regulations related to telemedicine.    DATA  Interactive Complexity: No  Crisis: No        Progress Since Last Session (Related to Symptoms / Goals / Homework):   Symptoms:The client reports that she was getting up 1-2x per night related to pain at times and getting 8-9 hours of sleep, but for the past three days and she is up 4-5x per nights and up 5-30 minutes at a time then she tries to go back to bed. She reports she is tossing and turning then she will fall asleep at some point.   She reports that she is struggling with thoughts of \"I didn't accomplish anything. I didn't do anything. I can't live this way. My family has been " Catheter inserted. "dealing with this for 20 years and that is a long time. My family is burned out from this. I am so burned out from this. My family says you are so angry, you are are so irritable, nothing I enjoy, but I don't like the hovering or being put in a bubble. My family runs over to help me and I don't always need or want the help.\" The client reports that she feels inadequate. She reports she is not worth the time and effort to be where she is at now. She reports she is frustrated and unhappy.     Homework: Consider goals to work on in sessions       Episode of Care Goals: No improvement - PREPARATION (Decided to change - considering how); Intervened by negotiating a change plan and determining options / strategies for behavior change, identifying triggers, exploring social supports, and working towards setting a date to begin behavior change     Current / Ongoing Stressors and Concerns:   Chronic pain-stopping morphine and norco recently.    Her brother is an \"Alcoholic\" and the client reports that her brother has \"destroyed their  family\", but she reports she cannot make him the \"center again\".    Also reports she cannot stand to be around her mother and she feels worse after  talking to her.      Treatment Objective(s) Addressed in This Session:   Mainly focused on connection between physical and mental health symptoms and how to manage symptoms of physical pain in order to help cope with mental health symptoms of depression. We will work on developing goals going forward.      Intervention:   Motivational Interviewing and Cognitive Behavioral Therapy:   Feeling like if she says something people are upset at what she says.    Her son is in town and he is, \"the peace keeper\". Her son will be here staying with them  for a month and he will be here for a time.    This writer provided a lot of validation, support and empathy surrounding the client having chronic pain, taking care of herself (I.e.: ice,  heat,  stretches). " "She reports that she tries to think back to having the ketamine treatment and how helpful this way and what she  feels. She says that  she has gels, patches and all kinds of treatments around her. \"If I can keep my pain in the background then I can  dodge through a lot of it, but  then I crash which isn't the healthiest.\" She reports she gets caught up in the moment and does so much  then the pain creeps in and goes all  over her body.    This writer did challenge some of the client's thoughts on her family being better off without her and said will her children really be better off without her? The client then said she did not really think they would be, but she then talked about her  being someone she could not really care about what he would think or feel. When she was working she really wasn't in the family mix.     Assessments completed prior to visit:  PHQ9:   PHQ-9 SCORE 2/17/2022 2/18/2022 3/9/2022 4/12/2022 5/17/2022 6/5/2022 6/13/2022   PHQ-9 Total Score - - - - - - -   PHQ-9 Total Score MyChart - - - 18 (Moderately severe depression) - 12 (Moderate depression) 18 (Moderately severe depression)   PHQ-9 Total Score 26 18 19 18 12 12 18         ASSESSMENT: Current Emotional / Mental Status (status of significant symptoms):   Risk status (Self / Other harm or suicidal ideation)   Patient denies current fears or concerns for personal safety.   Patient reports the following current or recent suicidal ideation or behaviors: \"yesterday was really bad, but my son is here and I cannot do that to him.\" She reports with her son in town she will not do anything unless something really awful happened..\"I don't really want to be here. I don't really forsee changes that I would like to see to make it all worth while.\"    Patient denies current or recent homicidal ideation or behaviors.   Patient denies current or recent self injurious behavior or ideation.   Patient denies other safety concerns.   Patient reports " "there has been a change in risk factors since their last session.  Taken off morphine and norco and physical pain is hard to manage   Patient reports there has been a chance in protective factors since their last session.  Her son is in town currently and he is support for her      [facility]                                       Patient's Name: Janelle White  March 11, 2021     SAFETY PLAN:  Step 1: Warning signs / cues (Thoughts, images, mood, situation, behavior) that a crisis may be developing:  ? Thoughts: \"I don't matter\", \"People would be better off without me\", \"I can't do this anymore\" and \"I just want this to end\"  ? Images: obsessive thoughts of death or dying: thoughts of carrying out plan  ? Thinking Processes: ruminations (can't stop thinking about my problems): ruminate on my plan and highly critical and negative thoughts: if  says something critical i shut down  ? Mood: worsening depression, hopelessness, disinhibited (not caring about things or consequences) and worthlessness  ? Behaviors: isolating/withdrawing , can't stop crying, not taking care of myself and not taking care of my responsibilities  ? Situations: relationship problems   Step 2: Coping strategies - Things I can do to take my mind off of my problems without contacting another person (relaxation technique, physical activity):  ? Distress Tolerance Strategies:  play with my pet  and watch a funny movie:    ? Physical Activities: go for a walk and get in the jacuzzi  ? Focus on helpful thoughts:  \"This is temporary\"  Step 3: People and social settings that provide distraction:                 Name: Alyx         Phone: in my phone                 Name: Raven      Phone: in my phone  ? park                 Step 4: Remind myself of people and things that are important to me and worth living for:  Children, dog, friends  Step 5: When I am in crisis, I can ask these people to help me use my safety " plan:                 Name: Alyx         Phone: in my phone                 Name: Raven      Phone: in my phone  Step 6: Making the environment safe:   ? none identified  Step 7: Professionals or agencies I can contact during a crisis:  ? Suicide Prevention Lifeline: 1-861-102-GONJ (0850)  ? Crisis Text Line Service: Text   HOME  to 119-299.    Local Crisis Services: [facility]     Call 911 or go to my nearest emergency department.       I helped develop this safety plan and agree to use it when needed.  I have been given a copy of this plan.       Patient signature: _______________________________________________________________  Today s date:  March 11, 2021  Adapted from Safety Plan Template 2008 Antionette Trevino and Joseph Leon is reprinted with the express permission of the authors.  No portion of the Safety Plan Template may be reproduced without the express, written permission.  You can contact the authors at [email or web address] or [email or web address].     Appearance:   Appropriate    Eye Contact:   Good    Psychomotor Behavior: Normal    Attitude:   Cooperative  Friendly Pleasant   Orientation:   All   Speech    Rate / Production: Emotional Talkative    Volume:  Normal    Mood:    Anxious  Irritable  Sad  Panicked- Irritability due to physical pain   Affect:    Tearful Worrisome    Thought Content:  Rumination -related to physical pain   Thought Form:  Coherent    Insight:    Good      Medication Review:   No changes to current psychiatric medication(s)     Medication Compliance:   Yes     Changes in Health Issues:   Chronic Pain:She has stopped morphine and norco for pain and the pain is difficult to manage. The client has been on morphine and norco for the past 10 years. She had started Ketamine therapy for pain and this had been helpful. She reports that her whole sacral area is in pain. She reports intense pain that is causing her to not sleep well. She reports she will  sleep for a few hours then she needs to get up and manage her pain. She is trying to maintain the self-care with ice, heat and stretches.    Tinnitus: Reports ringing in her ears is bothersome at times.      Chemical Use Review:   Substance Use: Chemical use reviewed, no active concerns identified      Tobacco Use: No current tobacco use.      Diagnosis:  1. Major depressive disorder, recurrent, severe w/o psychotic behavior (H)        Collateral Reports Completed:   None this session.     PLAN: (Patient Tasks / Therapist Tasks / Other)    Each session this writer will check in with this client regarding safety as she reports frequent suicidal ideation with a plan that she is evasive about and that she has days she will not follow through with harming herself. Her son is staying with her and her  for the next month and she reports with him at her home she would not do anything to harm herself at this time. She is is a lot of pain and frustrated understandably with the lack of options to treat her pain. We will work on mental health symptom management as well as ways to manage physical symptoms in order to also help support mental health symptoms. The client is encouraged to take care of herself. We will work on goal development going forward. If the client continues to have frequent SI with evasive plans then meeting biweekly or a PHP/IOP would be warranted; the client is not open to this option at this time and contracts for safety given her son is staying with her.         Janelle Brooks, [facility], [facility][note 3]                                                         ______________________________________________________________________    Individual Treatment Plan    Patient's Name: Janelle TORRES Christopher  YOB: 1960    Date of Creation: 06/13/2022  Date Treatment Plan Last Reviewed/Revised: 06/13/2022    DSM5 Diagnoses: 296.33 (F33.2) Major Depressive Disorder, Recurrent Episode, Severe, without  "psychotic features   Psychosocial / Contextual Factors: Chronic Pain, problems with pain and feeling like a burden on her family and also has some \"toxic\" family members as well, 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   PROMIS (reviewed every 90 days): 14    Referral / Collaboration:  The following referral(s) was/were discussed but patient declines follow up at this time. PHP/IOP, Empathy Unit, but wanting to try therapy for now with her son in town.    Anticipated number of session for this episode of care: 12-16 sessions   Anticipation frequency of session: Biweekly to weekly  Anticipated Duration of each session: 38-52 minutes  Treatment plan will be reviewed in 90 days or when goals have been changed.       "

## 2025-05-22 ASSESSMENT — PATIENT HEALTH QUESTIONNAIRE - PHQ9: SUM OF ALL RESPONSES TO PHQ QUESTIONS 1-9: 17

## 2025-06-04 ENCOUNTER — TRANSFERRED RECORDS (OUTPATIENT)
Dept: HEALTH INFORMATION MANAGEMENT | Facility: CLINIC | Age: 65
End: 2025-06-04
Payer: COMMERCIAL

## 2025-06-08 ASSESSMENT — PATIENT HEALTH QUESTIONNAIRE - PHQ9
10. IF YOU CHECKED OFF ANY PROBLEMS, HOW DIFFICULT HAVE THESE PROBLEMS MADE IT FOR YOU TO DO YOUR WORK, TAKE CARE OF THINGS AT HOME, OR GET ALONG WITH OTHER PEOPLE: SOMEWHAT DIFFICULT
SUM OF ALL RESPONSES TO PHQ QUESTIONS 1-9: 14
SUM OF ALL RESPONSES TO PHQ QUESTIONS 1-9: 14

## 2025-06-09 ENCOUNTER — ALLIED HEALTH/NURSE VISIT (OUTPATIENT)
Dept: PSYCHIATRY | Facility: CLINIC | Age: 65
End: 2025-06-09
Payer: COMMERCIAL

## 2025-06-09 VITALS — SYSTOLIC BLOOD PRESSURE: 105 MMHG | HEART RATE: 67 BPM | DIASTOLIC BLOOD PRESSURE: 65 MMHG

## 2025-06-09 DIAGNOSIS — F33.41 RECURRENT MAJOR DEPRESSIVE DISORDER, IN PARTIAL REMISSION: Primary | ICD-10-CM

## 2025-06-09 NOTE — PROGRESS NOTES
"Janelle White comes into clinic today at the request of LINK Yanes MD Ordering Provider for Med Injection only Ketamine.    Procedure Prep:  Medication double check completed by: Adali Berg RN  Prior to administration pt identified by name and : Yes    Nursing Assessment:  Appearance: Well groomed  Mood: \"Okay\" - patient states mood has been doing well considering current like stressors.  Affect: Bright  Eye contact: Good      2025     9:39 PM   PHQ   PHQ-9 Total Score 14    Q9: Thoughts of better off dead/self-harm past 2 weeks Several days   F/U: Thoughts of suicide or self-harm Yes   F/U: Self harm-plan Yes   F/U: Self-harm action No   F/U: Safety concerns No       Patient-reported     QIDDSR 16 weekly assessment: score 16. Assessment was scanned to EHR.     Procedure Performed:  VSS and mental status WNL. Patient was given Ketamine. See MAR for details.     Post Procedure Assessment:  Patient tolerated the treatment with the following side effects: dissociation. Vital signs were monitored, see VS Flowsheet. Patient stated they felt ready to go home prior to discharge. AVS was offered to patient and patient declined. Patient was instructed not to drive for the remainder of the day and to notify clinic if any concerns arise.     Next appt: 3 weeks    Medications were supplied by Clinic     This service provided today was under the supervising provider of the day LINK Yanes MD, who was available if needed.        Bri Mckeon RN   "

## 2025-06-12 ENCOUNTER — MYC REFILL (OUTPATIENT)
Dept: PSYCHIATRY | Facility: CLINIC | Age: 65
End: 2025-06-12
Payer: COMMERCIAL

## 2025-06-12 DIAGNOSIS — F33.9 RECURRENT MAJOR DEPRESSION RESISTANT TO TREATMENT: ICD-10-CM

## 2025-06-12 DIAGNOSIS — F33.2 SEVERE EPISODE OF RECURRENT MAJOR DEPRESSIVE DISORDER, WITHOUT PSYCHOTIC FEATURES (H): ICD-10-CM

## 2025-06-12 DIAGNOSIS — F34.1 PERSISTENT DEPRESSIVE DISORDER: ICD-10-CM

## 2025-06-12 RX ORDER — DEXTROAMPHETAMINE SACCHARATE, AMPHETAMINE ASPARTATE MONOHYDRATE, DEXTROAMPHETAMINE SULFATE AND AMPHETAMINE SULFATE 6.25; 6.25; 6.25; 6.25 MG/1; MG/1; MG/1; MG/1
25 CAPSULE, EXTENDED RELEASE ORAL EVERY MORNING
Qty: 30 CAPSULE | Refills: 0 | OUTPATIENT
Start: 2025-06-12

## 2025-06-12 RX ORDER — DEXTROAMPHETAMINE SACCHARATE, AMPHETAMINE ASPARTATE, DEXTROAMPHETAMINE SULFATE AND AMPHETAMINE SULFATE 3.75; 3.75; 3.75; 3.75 MG/1; MG/1; MG/1; MG/1
15 TABLET ORAL 2 TIMES DAILY
Qty: 60 TABLET | Refills: 0 | OUTPATIENT
Start: 2025-06-12

## 2025-06-12 NOTE — TELEPHONE ENCOUNTER
1) Reviewed refill request(s) from [id number] - [place] - [address] AT [facility] [address]    2) Any Controlled Substance(s)? Yes   [place]  checked? N/A    3) Refill(s) requested for:     - amphetamine-dextroamphetamine (ADDERALL) 15 MG tablet  Date last ordered: 5/18/25 Qty: 60 Refills: 0   Has two new prescriptions on profile at pharmacy with start dates of 6/17/25 and 7/17/25.     - amphetamine-dextroamphetamine (ADDERALL XR) 25 MG 24 hr capsule  Date last ordered: 6/12/25 Qty: 30 Refills: 0   Has two new prescriptions on profile at pharmacy with start dates of today (6/12/25) and 7/12/25.     4) Action taken: contacted patient via [employer]   and refill request(s) sent back to pharmacy as DENIED.    Nicole Renee RN on 6/12/2025 at 1:40 PM

## 2025-07-01 ENCOUNTER — ALLIED HEALTH/NURSE VISIT (OUTPATIENT)
Dept: PSYCHIATRY | Facility: CLINIC | Age: 65
End: 2025-07-01
Payer: COMMERCIAL

## 2025-07-01 VITALS — SYSTOLIC BLOOD PRESSURE: 111 MMHG | HEART RATE: 85 BPM | DIASTOLIC BLOOD PRESSURE: 73 MMHG

## 2025-07-01 DIAGNOSIS — F33.41 RECURRENT MAJOR DEPRESSIVE DISORDER, IN PARTIAL REMISSION: Primary | ICD-10-CM

## 2025-07-01 NOTE — PROGRESS NOTES
"Janelle White comes into clinic today at the request of LINK Yanes MD Ordering Provider for Med Injection only Ketamine.    Procedure Prep:  Medication double check completed by: Adali Berg RN  Prior to administration pt identified by name and : Yes    Nursing Assessment:  Appearance: Well groomed  Mood: \"Okay\" - Pt reports life feeling chaotic and having pain recovering from right shoulder surgery.   Affect: Neutral  Eye contact: Good      2025     9:11 AM   PHQ   PHQ-9 Total Score 13   Q9: Thoughts of better off dead/self-harm past 2 weeks Several days     QIDDSR 16 weekly assessment: score 12. Assessment was scanned to EHR.     Procedure Performed:  VSS and mental status WNL. Patient was given Ketamine. See MAR for details.       Post Procedure Assessment:  Patient tolerated the treatment with the following side effects: dissociation. Vital signs were monitored, see VS Flowsheet. Patient stated they felt ready to go home prior to discharge. AVS was offered to patient and patient declined. Patient was instructed not to drive for the remainder of the day and to notify clinic if any concerns arise.     Next appt: 3 weeks,     Medications were supplied by Clinic     This service provided today was under the supervising provider of the day LINK Hopkins MD, who was available if needed.    Tesha Aaron RN   "

## 2025-07-03 ASSESSMENT — PATIENT HEALTH QUESTIONNAIRE - PHQ9: SUM OF ALL RESPONSES TO PHQ QUESTIONS 1-9: 10

## 2025-07-08 ENCOUNTER — TRANSFERRED RECORDS (OUTPATIENT)
Dept: HEALTH INFORMATION MANAGEMENT | Facility: CLINIC | Age: 65
End: 2025-07-08
Payer: COMMERCIAL

## 2025-07-20 ASSESSMENT — PATIENT HEALTH QUESTIONNAIRE - PHQ9
SUM OF ALL RESPONSES TO PHQ QUESTIONS 1-9: 12
10. IF YOU CHECKED OFF ANY PROBLEMS, HOW DIFFICULT HAVE THESE PROBLEMS MADE IT FOR YOU TO DO YOUR WORK, TAKE CARE OF THINGS AT HOME, OR GET ALONG WITH OTHER PEOPLE: VERY DIFFICULT
SUM OF ALL RESPONSES TO PHQ QUESTIONS 1-9: 12

## 2025-07-21 ENCOUNTER — ALLIED HEALTH/NURSE VISIT (OUTPATIENT)
Dept: PSYCHIATRY | Facility: CLINIC | Age: 65
End: 2025-07-21
Payer: COMMERCIAL

## 2025-07-21 VITALS — SYSTOLIC BLOOD PRESSURE: 127 MMHG | DIASTOLIC BLOOD PRESSURE: 78 MMHG | HEART RATE: 66 BPM

## 2025-07-21 DIAGNOSIS — F33.41 RECURRENT MAJOR DEPRESSIVE DISORDER, IN PARTIAL REMISSION: Primary | ICD-10-CM

## 2025-07-21 NOTE — PROGRESS NOTES
"Janelle White comes into clinic today at the request of LINK Yanes MD Ordering Provider for Med Injection only Ketamine.    Procedure Prep:  Medication double check completed by: Bri Mckeon RN  Prior to administration pt identified by name and : Yes    Nursing Assessment:  Appearance: Casual  Mood: \"Okay\" - Pt reports improvement in shoulder mobility and pain but has difficulty managing two households while helping her mother through health issues.    Affect: Neutral, Pleasant  Eye contact: Good      2025     9:26 AM   PHQ   PHQ-9 Total Score 17   Q9: Thoughts of better off dead/self-harm past 2 weeks Several days     QIDDSR 16 weekly assessment: score 16. Assessment was scanned to EHR.     Procedure Performed:  VSS and mental status WNL. Patient was given Ketamine. See MAR for details.       Post Procedure Assessment:  Patient tolerated the treatment with the following side effects: dissociation. Vital signs were monitored, see VS Flowsheet. Patient stated they felt ready to go home prior to discharge. AVS was offered to patient and patient declined. Patient was instructed not to drive for the remainder of the day and to notify clinic if any concerns arise.     Next appt: 3 weeks,     Medications were supplied by Clinic     This service provided today was under the supervising provider of the day JOSE Abarca MD, who was available if needed.    Tesha Aaron RN   "

## 2025-07-24 ASSESSMENT — PATIENT HEALTH QUESTIONNAIRE - PHQ9: SUM OF ALL RESPONSES TO PHQ QUESTIONS 1-9: 17

## 2025-08-05 ENCOUNTER — TRANSFERRED RECORDS (OUTPATIENT)
Dept: HEALTH INFORMATION MANAGEMENT | Facility: CLINIC | Age: 65
End: 2025-08-05
Payer: COMMERCIAL

## 2025-08-11 ENCOUNTER — ALLIED HEALTH/NURSE VISIT (OUTPATIENT)
Dept: PSYCHIATRY | Facility: CLINIC | Age: 65
End: 2025-08-11
Payer: COMMERCIAL

## 2025-08-11 VITALS — DIASTOLIC BLOOD PRESSURE: 69 MMHG | HEART RATE: 78 BPM | SYSTOLIC BLOOD PRESSURE: 108 MMHG

## 2025-08-11 DIAGNOSIS — F33.41 RECURRENT MAJOR DEPRESSIVE DISORDER, IN PARTIAL REMISSION: Primary | ICD-10-CM

## 2025-08-11 ASSESSMENT — PATIENT HEALTH QUESTIONNAIRE - PHQ9: SUM OF ALL RESPONSES TO PHQ QUESTIONS 1-9: 15

## 2025-08-14 ENCOUNTER — MYC REFILL (OUTPATIENT)
Dept: PSYCHIATRY | Facility: CLINIC | Age: 65
End: 2025-08-14
Payer: COMMERCIAL

## 2025-08-14 DIAGNOSIS — R41.89 SUBJECTIVE COGNITIVE IMPAIRMENT: ICD-10-CM

## 2025-08-14 DIAGNOSIS — F34.1 PERSISTENT DEPRESSIVE DISORDER: ICD-10-CM

## 2025-08-14 DIAGNOSIS — F33.2 SEVERE EPISODE OF RECURRENT MAJOR DEPRESSIVE DISORDER, WITHOUT PSYCHOTIC FEATURES (H): ICD-10-CM

## 2025-08-14 DIAGNOSIS — F33.41 RECURRENT MAJOR DEPRESSIVE DISORDER, IN PARTIAL REMISSION: ICD-10-CM

## 2025-08-14 DIAGNOSIS — F33.9 RECURRENT MAJOR DEPRESSION RESISTANT TO TREATMENT: ICD-10-CM

## 2025-08-14 RX ORDER — DEXTROAMPHETAMINE SACCHARATE, AMPHETAMINE ASPARTATE MONOHYDRATE, DEXTROAMPHETAMINE SULFATE AND AMPHETAMINE SULFATE 6.25; 6.25; 6.25; 6.25 MG/1; MG/1; MG/1; MG/1
25 CAPSULE, EXTENDED RELEASE ORAL EVERY MORNING
Qty: 30 CAPSULE | Refills: 0 | Status: SHIPPED | OUTPATIENT
Start: 2025-08-14

## 2025-08-14 RX ORDER — DEXTROAMPHETAMINE SACCHARATE, AMPHETAMINE ASPARTATE, DEXTROAMPHETAMINE SULFATE AND AMPHETAMINE SULFATE 3.75; 3.75; 3.75; 3.75 MG/1; MG/1; MG/1; MG/1
15 TABLET ORAL 2 TIMES DAILY
Qty: 60 TABLET | Refills: 0 | Status: SHIPPED | OUTPATIENT
Start: 2025-08-14

## 2025-08-18 ENCOUNTER — PATIENT OUTREACH (OUTPATIENT)
Dept: CARE COORDINATION | Facility: CLINIC | Age: 65
End: 2025-08-18
Payer: COMMERCIAL

## (undated) DEVICE — DRSG TEGADERM 4X4 3/4" 1626W

## (undated) DEVICE — SU DERMABOND ADVANCED .7ML DNX12

## (undated) DEVICE — PREP CHLORAPREP 26ML TINTED ORANGE  260815

## (undated) DEVICE — ENDO FORCEP ENDOJAW BIOPSY 2.8MMX160CM FB-220K

## (undated) DEVICE — DRSG TEGADERM 4X10" 1627

## (undated) DEVICE — SYR 05ML GLASS PORTEX PULSATOR LF 4904

## (undated) DEVICE — NDL EPIDURAL TUOHY 20GA 3.5" 405028

## (undated) DEVICE — PREP CHLORAPREP W/ORANGE TINT 10.5ML 260715

## (undated) DEVICE — KIT ENDO TURNOVER/PROCEDURE W/CLEAN A SCOPE LINERS 103888

## (undated) DEVICE — DRSG STERI STRIP 1/2X4" R1547

## (undated) DEVICE — BLADE KNIFE SURG 11 WITH HANDLE 4-411

## (undated) DEVICE — ENDO BITE BLOCK ADULT OLYMPUS LATEX FREE MAJ-1632

## (undated) DEVICE — WIRELESS EXTERNAL NEUROSTIMULATOR

## (undated) DEVICE — NDL EPIDURAL TUOHY 22GA 3.5" 4911-22

## (undated) DEVICE — TRAY PAIN INJECTION 97A 640

## (undated) DEVICE — DRAPE SHEET HALF 40X60" 9358

## (undated) DEVICE — NDL SPINAL 25GA 3.5" QUINCKE 405180

## (undated) DEVICE — COVER EASY EQUIP BAG W/BAND LATEX FREE EZ-28

## (undated) DEVICE — TAPE MEDIPORE 2"X2YD 2962S

## (undated) DEVICE — SU ETHILON 4-0 PC-3 18" 1864G

## (undated) DEVICE — Device

## (undated) DEVICE — ADH LIQUID MASTISOL TOPICAL VIAL 2-3ML 0523-48

## (undated) DEVICE — NDL SPINAL 22GA 3.5" QUINCKE 405181

## (undated) DEVICE — GLOVE PROTEXIS MICRO 6.0  2D73PM60

## (undated) DEVICE — GLOVE BIOGEL PI MICRO SZ 6.0 48560

## (undated) DEVICE — SYR 10ML PERFIX LL 332152

## (undated) DEVICE — GOWN XLG DISP 9545

## (undated) RX ORDER — LIDOCAINE HYDROCHLORIDE 20 MG/ML
INJECTION, SOLUTION INFILTRATION; PERINEURAL
Status: DISPENSED
Start: 2022-09-08

## (undated) RX ORDER — TRIAMCINOLONE ACETONIDE 40 MG/ML
INJECTION, SUSPENSION INTRA-ARTICULAR; INTRAMUSCULAR
Status: DISPENSED
Start: 2022-09-08

## (undated) RX ORDER — FENTANYL CITRATE 50 UG/ML
INJECTION, SOLUTION INTRAMUSCULAR; INTRAVENOUS
Status: DISPENSED
Start: 2024-01-12

## (undated) RX ORDER — FENTANYL CITRATE 50 UG/ML
INJECTION, SOLUTION INTRAMUSCULAR; INTRAVENOUS
Status: DISPENSED
Start: 2020-02-18

## (undated) RX ORDER — CEFAZOLIN SODIUM 1 G/3ML
INJECTION, POWDER, FOR SOLUTION INTRAMUSCULAR; INTRAVENOUS
Status: DISPENSED
Start: 2024-01-12

## (undated) RX ORDER — BUPIVACAINE HYDROCHLORIDE 2.5 MG/ML
INJECTION, SOLUTION EPIDURAL; INFILTRATION; INTRACAUDAL
Status: DISPENSED
Start: 2022-09-08

## (undated) RX ORDER — ONDANSETRON 2 MG/ML
INJECTION INTRAMUSCULAR; INTRAVENOUS
Status: DISPENSED
Start: 2022-05-24